# Patient Record
Sex: FEMALE | Race: WHITE | NOT HISPANIC OR LATINO | Employment: UNEMPLOYED | URBAN - METROPOLITAN AREA
[De-identification: names, ages, dates, MRNs, and addresses within clinical notes are randomized per-mention and may not be internally consistent; named-entity substitution may affect disease eponyms.]

---

## 2020-09-25 ENCOUNTER — APPOINTMENT (OUTPATIENT)
Dept: GENERAL RADIOLOGY | Facility: HOSPITAL | Age: 40
End: 2020-09-25

## 2020-09-25 ENCOUNTER — HOSPITAL ENCOUNTER (EMERGENCY)
Facility: HOSPITAL | Age: 40
Discharge: HOME | End: 2020-09-26
Attending: EMERGENCY MEDICINE | Admitting: EMERGENCY MEDICINE

## 2020-09-25 DIAGNOSIS — S20.212A CONTUSION OF LEFT CHEST WALL, INITIAL ENCOUNTER: ICD-10-CM

## 2020-09-25 DIAGNOSIS — S20.222A BACK CONTUSION, LEFT, INITIAL ENCOUNTER: ICD-10-CM
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
## 2020-09-25 LAB
AMPHET+METHAMPHET UR QL: NEGATIVE
BARBITURATES UR QL SCN: NEGATIVE
BENZODIAZ UR QL SCN: NEGATIVE
BILIRUB UR QL STRIP: NEGATIVE
CANNABINOIDS SERPL QL: NEGATIVE
CLARITY UR: CLEAR
COCAINE UR QL: NEGATIVE
COLOR UR: YELLOW
GLUCOSE UR STRIP-MCNC: NEGATIVE MG/DL
HGB UR QL STRIP.AUTO: NEGATIVE
KETONES UR QL STRIP: NEGATIVE
LEUKOCYTE ESTERASE UR QL STRIP.AUTO: NEGATIVE
METHADONE UR QL SCN: NEGATIVE
NITRITE UR QL STRIP: NEGATIVE
OPIATES UR QL: POSITIVE
OXYCODONE UR QL SCN: NEGATIVE
PH UR STRIP.AUTO: 6.5 [PH] (ref 5–8)
PROT UR QL STRIP: NEGATIVE
SP GR UR STRIP: 1.02 (ref 1–1.03)
UROBILINOGEN UR QL STRIP: NORMAL

## 2020-09-25 PROCEDURE — 71101 X-RAY EXAM UNILAT RIBS/CHEST: CPT

## 2020-09-25 PROCEDURE — 80307 DRUG TEST PRSMV CHEM ANLYZR: CPT | Performed by: EMERGENCY MEDICINE

## 2020-09-25 PROCEDURE — 25010000002 HYDROMORPHONE 1 MG/ML SOLUTION: Performed by: EMERGENCY MEDICINE

## 2020-09-25 PROCEDURE — 72110 X-RAY EXAM L-2 SPINE 4/>VWS: CPT

## 2020-09-25 PROCEDURE — 99284 EMERGENCY DEPT VISIT MOD MDM: CPT

## 2020-09-25 PROCEDURE — 81003 URINALYSIS AUTO W/O SCOPE: CPT | Performed by: EMERGENCY MEDICINE

## 2020-09-25 PROCEDURE — 96372 THER/PROPH/DIAG INJ SC/IM: CPT

## 2020-09-25 PROCEDURE — 73060 X-RAY EXAM OF HUMERUS: CPT

## 2020-09-25 RX ORDER — OXYCODONE HYDROCHLORIDE AND ACETAMINOPHEN 5; 325 MG/1; MG/1
1 TABLET ORAL ONCE
Status: COMPLETED | OUTPATIENT
Start: 2020-09-25 | End: 2020-09-25

## 2020-09-25 RX ORDER — ONDANSETRON 2 MG/ML
4 INJECTION INTRAMUSCULAR; INTRAVENOUS ONCE
Status: COMPLETED | OUTPATIENT
Start: 2020-09-25 | End: 2020-09-26

## 2020-09-25 RX ORDER — HYDROMORPHONE HYDROCHLORIDE 1 MG/ML
0.5 INJECTION, SOLUTION INTRAMUSCULAR; INTRAVENOUS; SUBCUTANEOUS ONCE
Status: COMPLETED | OUTPATIENT
Start: 2020-09-25 | End: 2020-09-26

## 2020-09-25 RX ADMIN — HYDROMORPHONE HYDROCHLORIDE 1 MG: 1 INJECTION, SOLUTION INTRAMUSCULAR; INTRAVENOUS; SUBCUTANEOUS at 21:52

## 2020-09-25 RX ADMIN — OXYCODONE HYDROCHLORIDE AND ACETAMINOPHEN 1 TABLET: 5; 325 TABLET ORAL at 23:32

## 2020-09-26 ENCOUNTER — APPOINTMENT (OUTPATIENT)
Dept: CT IMAGING | Facility: HOSPITAL | Age: 40
End: 2020-09-26

## 2020-09-26 VITALS
WEIGHT: 190 LBS | DIASTOLIC BLOOD PRESSURE: 107 MMHG | BODY MASS INDEX: 32.44 KG/M2 | HEIGHT: 64 IN | HEART RATE: 98 BPM | TEMPERATURE: 98.6 F | SYSTOLIC BLOOD PRESSURE: 169 MMHG | RESPIRATION RATE: 18 BRPM | OXYGEN SATURATION: 96 %

## 2020-09-26 LAB
ALBUMIN SERPL-MCNC: 3.8 G/DL (ref 3.5–5.2)
ALBUMIN/GLOB SERPL: 1.5 G/DL
ALP SERPL-CCNC: 120 U/L (ref 39–117)
ALT SERPL W P-5'-P-CCNC: 32 U/L (ref 1–33)
ANION GAP SERPL CALCULATED.3IONS-SCNC: 7.8 MMOL/L (ref 5–15)
AST SERPL-CCNC: 33 U/L (ref 1–32)
BASOPHILS # BLD AUTO: 0.04 10*3/MM3 (ref 0–0.2)
BASOPHILS NFR BLD AUTO: 0.4 % (ref 0–1.5)
BILIRUB SERPL-MCNC: 0.2 MG/DL (ref 0–1.2)
BUN SERPL-MCNC: 7 MG/DL (ref 6–20)
BUN/CREAT SERPL: 9.7 (ref 7–25)
CALCIUM SPEC-SCNC: 9.5 MG/DL (ref 8.6–10.5)
CHLORIDE SERPL-SCNC: 101 MMOL/L (ref 98–107)
CO2 SERPL-SCNC: 32.2 MMOL/L (ref 22–29)
CREAT SERPL-MCNC: 0.72 MG/DL (ref 0.57–1)
DEPRECATED RDW RBC AUTO: 48.1 FL (ref 37–54)
EOSINOPHIL # BLD AUTO: 0.35 10*3/MM3 (ref 0–0.4)
EOSINOPHIL NFR BLD AUTO: 3.6 % (ref 0.3–6.2)
ERYTHROCYTE [DISTWIDTH] IN BLOOD BY AUTOMATED COUNT: 13.6 % (ref 12.3–15.4)
GFR SERPL CREATININE-BSD FRML MDRD: 91 ML/MIN/1.73
GLOBULIN UR ELPH-MCNC: 2.5 GM/DL
GLUCOSE SERPL-MCNC: 91 MG/DL (ref 65–99)
HCG SERPL QL: NEGATIVE
HCT VFR BLD AUTO: 36.1 % (ref 34–46.6)
HGB BLD-MCNC: 12.3 G/DL (ref 12–15.9)
IMM GRANULOCYTES # BLD AUTO: 0.04 10*3/MM3 (ref 0–0.05)
IMM GRANULOCYTES NFR BLD AUTO: 0.4 % (ref 0–0.5)
LYMPHOCYTES # BLD AUTO: 3.19 10*3/MM3 (ref 0.7–3.1)
LYMPHOCYTES NFR BLD AUTO: 32.7 % (ref 19.6–45.3)
MCH RBC QN AUTO: 32.7 PG (ref 26.6–33)
MCHC RBC AUTO-ENTMCNC: 34.1 G/DL (ref 31.5–35.7)
MCV RBC AUTO: 96 FL (ref 79–97)
MONOCYTES # BLD AUTO: 0.55 10*3/MM3 (ref 0.1–0.9)
MONOCYTES NFR BLD AUTO: 5.6 % (ref 5–12)
NEUTROPHILS NFR BLD AUTO: 5.58 10*3/MM3 (ref 1.7–7)
NEUTROPHILS NFR BLD AUTO: 57.3 % (ref 42.7–76)
NRBC BLD AUTO-RTO: 0 /100 WBC (ref 0–0.2)
PLATELET # BLD AUTO: 294 10*3/MM3 (ref 140–450)
PMV BLD AUTO: 9.3 FL (ref 6–12)
POTASSIUM SERPL-SCNC: 3.9 MMOL/L (ref 3.5–5.2)
PROT SERPL-MCNC: 6.3 G/DL (ref 6–8.5)
RBC # BLD AUTO: 3.76 10*6/MM3 (ref 3.77–5.28)
SODIUM SERPL-SCNC: 141 MMOL/L (ref 136–145)
WBC # BLD AUTO: 9.75 10*3/MM3 (ref 3.4–10.8)

## 2020-09-26 PROCEDURE — 96375 TX/PRO/DX INJ NEW DRUG ADDON: CPT

## 2020-09-26 PROCEDURE — 74177 CT ABD & PELVIS W/CONTRAST: CPT

## 2020-09-26 PROCEDURE — 85025 COMPLETE CBC W/AUTO DIFF WBC: CPT | Performed by: EMERGENCY MEDICINE

## 2020-09-26 PROCEDURE — 25010000002 ONDANSETRON PER 1 MG: Performed by: EMERGENCY MEDICINE

## 2020-09-26 PROCEDURE — 84703 CHORIONIC GONADOTROPIN ASSAY: CPT | Performed by: EMERGENCY MEDICINE

## 2020-09-26 PROCEDURE — 36415 COLL VENOUS BLD VENIPUNCTURE: CPT

## 2020-09-26 PROCEDURE — 25010000002 IOPAMIDOL 61 % SOLUTION: Performed by: EMERGENCY MEDICINE

## 2020-09-26 PROCEDURE — 96374 THER/PROPH/DIAG INJ IV PUSH: CPT

## 2020-09-26 PROCEDURE — 80053 COMPREHEN METABOLIC PANEL: CPT | Performed by: EMERGENCY MEDICINE

## 2020-09-26 PROCEDURE — 25010000002 HYDROMORPHONE PER 4 MG: Performed by: EMERGENCY MEDICINE

## 2020-09-26 RX ORDER — HYDROCODONE BITARTRATE AND ACETAMINOPHEN 10; 325 MG/1; MG/1
1 TABLET ORAL EVERY 4 HOURS PRN
Qty: 10 TABLET | Refills: 0 | Status: ON HOLD | OUTPATIENT
Start: 2020-09-26 | End: 2020-11-09

## 2020-09-26 RX ORDER — HYDROCODONE BITARTRATE AND ACETAMINOPHEN 7.5; 325 MG/1; MG/1
1 TABLET ORAL ONCE
Status: COMPLETED | OUTPATIENT
Start: 2020-09-26 | End: 2020-09-26

## 2020-09-26 RX ADMIN — HYDROCODONE BITARTRATE AND ACETAMINOPHEN 1 TABLET: 7.5; 325 TABLET ORAL at 02:25

## 2020-09-26 RX ADMIN — HYDROMORPHONE HYDROCHLORIDE 0.5 MG: 1 INJECTION, SOLUTION INTRAMUSCULAR; INTRAVENOUS; SUBCUTANEOUS at 00:24

## 2020-09-26 RX ADMIN — ONDANSETRON HYDROCHLORIDE 4 MG: 2 INJECTION, SOLUTION INTRAMUSCULAR; INTRAVENOUS at 00:22

## 2020-09-26 RX ADMIN — IOPAMIDOL 85 ML: 612 INJECTION, SOLUTION INTRAVENOUS at 01:34

## 2020-09-26 RX ADMIN — SODIUM CHLORIDE 500 ML: 9 INJECTION, SOLUTION INTRAVENOUS at 00:24

## 2020-09-26 NOTE — ED NOTES
Pt c/o generalized back and left rib pain that started couple days ago. Pt states that she fell out of bed onto stool. Reports feeling week in the legs. Bruising to left upper arm that is yellow in color. No bruising to back, abd or rib area. Breath sounds present bilaterally, bowel sounds present    Pt had mask on when placed in room. RN wore goggles and surgical mask upon entering room.        Kathy Lees, RN  09/25/20 2662

## 2020-09-26 NOTE — ED PROVIDER NOTES
EMERGENCY DEPARTMENT ENCOUNTER    Room Number:  22/22  Date of encounter:  9/26/2020  PCP: Sahil Cornelius MD  Historian: Patient      HPI:  Chief Complaint: Fall      Context: Belen Allen is a 39 y.o. female who presents to the ED c/o rolling out of bed 2 days ago and landing on a stool on her left side.  Since that time she has had left rib, left arm and back pain.  The patient denies hitting her head, loss of consciousness or neck pain.  The patient denies abdominal pain, vomiting or diarrhea.  The patient has been ambulatory since that time with mild shortness of breath.  She denies fevers, chills, cough or known COVID exposure      PAST MEDICAL HISTORY  Active Ambulatory Problems     Diagnosis Date Noted   • No Active Ambulatory Problems     Resolved Ambulatory Problems     Diagnosis Date Noted   • No Resolved Ambulatory Problems     Past Medical History:   Diagnosis Date   • Injury of back          PAST SURGICAL HISTORY  Past Surgical History:   Procedure Laterality Date   • BACK SURGERY     • CHOLECYSTECTOMY     • SKIN SURGERY           FAMILY HISTORY  History reviewed. No pertinent family history.      SOCIAL HISTORY  Social History     Socioeconomic History   • Marital status: Single     Spouse name: Not on file   • Number of children: Not on file   • Years of education: Not on file   • Highest education level: Not on file   Tobacco Use   • Smoking status: Current Every Day Smoker     Packs/day: 0.50   • Smokeless tobacco: Never Used   Substance and Sexual Activity   • Alcohol use: Not Currently   • Drug use: Not Currently         ALLERGIES  Patient has no known allergies.        REVIEW OF SYSTEMS  Review of Systems         All systems reviewed and negative except for those discussed in HPI.     PHYSICAL EXAM    I have reviewed the triage vital signs and nursing notes.    ED Triage Vitals   Temp Heart Rate Resp BP SpO2   09/25/20 2041 09/25/20 2041 09/25/20 2041 09/25/20 2127 09/25/20 2041    98.6 °F (37 °C) (!) 136 20 (!) 167/116 97 %      Temp src Heart Rate Source Patient Position BP Location FiO2 (%)   09/25/20 2041 09/25/20 2041 -- -- --   Tympanic Monitor          GENERAL: 39-year-old well developed, well nourished in mild distress  HENT: NCAT, neck supple, trachea midline  EYES: no scleral icterus, PERRL, normal conjunctiva  CV: regular rhythm, regular rate, no murmur: Tenderness to left lower ribs but no bruising  RESPIRATORY: unlabored effort, CTAB  ABDOMEN: soft, non-tender, non-distended, bowel sounds present: No left upper quadrant tenderness and no bruising  MUSCULOSKELETAL: no gross deformity, no pedal edema, no calf tenderness: There is an old bruise to her left humerus with full range of motion: The patient complains of L-spine and left flank tenderness with no bruising  NEURO: alert,  sensory and motor function of extremities grossly intact, speech clear, mental status normal  SKIN: warm, dry, no rash  PSYCH:  Appropriate mood and affect      PPE  Pt does not present with symptoms for COVID19; however, I was wearing a mask throughout all patient interaction.        Vital signs and nursing notes reviewed.      LAB RESULTS  Recent Results (from the past 24 hour(s))   Urinalysis With Microscopic If Indicated (No Culture) - Urine, Clean Catch    Collection Time: 09/25/20 10:34 PM    Specimen: Urine, Clean Catch   Result Value Ref Range    Color, UA Yellow Yellow, Straw    Appearance, UA Clear Clear    pH, UA 6.5 5.0 - 8.0    Specific Gravity, UA 1.025 1.005 - 1.030    Glucose, UA Negative Negative    Ketones, UA Negative Negative    Bilirubin, UA Negative Negative    Blood, UA Negative Negative    Protein, UA Negative Negative    Leuk Esterase, UA Negative Negative    Nitrite, UA Negative Negative    Urobilinogen, UA 0.2 E.U./dL 0.2 - 1.0 E.U./dL   Urine Drug Screen - Urine, Clean Catch    Collection Time: 09/25/20 10:34 PM    Specimen: Urine, Clean Catch   Result Value Ref Range     Amphet/Methamphet, Screen Negative Negative    Barbiturates Screen, Urine Negative Negative    Benzodiazepine Screen, Urine Negative Negative    Cocaine Screen, Urine Negative Negative    Opiate Screen Positive (A) Negative    THC, Screen, Urine Negative Negative    Methadone Screen, Urine Negative Negative    Oxycodone Screen, Urine Negative Negative   Comprehensive Metabolic Panel    Collection Time: 09/26/20 12:22 AM    Specimen: Blood   Result Value Ref Range    Glucose 91 65 - 99 mg/dL    BUN 7 6 - 20 mg/dL    Creatinine 0.72 0.57 - 1.00 mg/dL    Sodium 141 136 - 145 mmol/L    Potassium 3.9 3.5 - 5.2 mmol/L    Chloride 101 98 - 107 mmol/L    CO2 32.2 (H) 22.0 - 29.0 mmol/L    Calcium 9.5 8.6 - 10.5 mg/dL    Total Protein 6.3 6.0 - 8.5 g/dL    Albumin 3.80 3.50 - 5.20 g/dL    ALT (SGPT) 32 1 - 33 U/L    AST (SGOT) 33 (H) 1 - 32 U/L    Alkaline Phosphatase 120 (H) 39 - 117 U/L    Total Bilirubin 0.2 0.0 - 1.2 mg/dL    eGFR Non African Amer 91 >60 mL/min/1.73    Globulin 2.5 gm/dL    A/G Ratio 1.5 g/dL    BUN/Creatinine Ratio 9.7 7.0 - 25.0    Anion Gap 7.8 5.0 - 15.0 mmol/L   hCG, Serum, Qualitative    Collection Time: 09/26/20 12:22 AM    Specimen: Blood   Result Value Ref Range    HCG Qualitative Negative Negative   CBC Auto Differential    Collection Time: 09/26/20 12:22 AM    Specimen: Blood   Result Value Ref Range    WBC 9.75 3.40 - 10.80 10*3/mm3    RBC 3.76 (L) 3.77 - 5.28 10*6/mm3    Hemoglobin 12.3 12.0 - 15.9 g/dL    Hematocrit 36.1 34.0 - 46.6 %    MCV 96.0 79.0 - 97.0 fL    MCH 32.7 26.6 - 33.0 pg    MCHC 34.1 31.5 - 35.7 g/dL    RDW 13.6 12.3 - 15.4 %    RDW-SD 48.1 37.0 - 54.0 fl    MPV 9.3 6.0 - 12.0 fL    Platelets 294 140 - 450 10*3/mm3    Neutrophil % 57.3 42.7 - 76.0 %    Lymphocyte % 32.7 19.6 - 45.3 %    Monocyte % 5.6 5.0 - 12.0 %    Eosinophil % 3.6 0.3 - 6.2 %    Basophil % 0.4 0.0 - 1.5 %    Immature Grans % 0.4 0.0 - 0.5 %    Neutrophils, Absolute 5.58 1.70 - 7.00 10*3/mm3     Lymphocytes, Absolute 3.19 (H) 0.70 - 3.10 10*3/mm3    Monocytes, Absolute 0.55 0.10 - 0.90 10*3/mm3    Eosinophils, Absolute 0.35 0.00 - 0.40 10*3/mm3    Basophils, Absolute 0.04 0.00 - 0.20 10*3/mm3    Immature Grans, Absolute 0.04 0.00 - 0.05 10*3/mm3    nRBC 0.0 0.0 - 0.2 /100 WBC       Ordered the above labs and independently reviewed the results.        RADIOLOGY  Xr Ribs Left With Pa Chest    Result Date: 9/25/2020  CHEST, LEFT RIB, LUMBAR SPINE, LEFT HUMERUS X-RAYS  HISTORY: Pain after falling off of bed.  TECHNIQUE: Six x-rays of the lumbar spine, two x-rays of the left humerus, five x-rays of the chest and left thoracic cage are provided. These are correlated with lumbar spine x-rays from 11/01/2009.  FINDINGS: The cardiomediastinal silhouette is normal. There is no pneumothorax or pleural effusion. No rib fracture or rib lesion is identified.  Two images of the left humerus show no osseous, articular, or soft tissue abnormality.  Lumbar spine x-rays show posterior and interbody fusion hardware at L5-S1. There is 4 mm retrolisthesis of L4 on L5. The hardware appears intact. The patient describes known loose hardware. There is some subtle lucency around the left L5 pedicle screw but this is not absolutely convincing for loose hardware. Vertebral height and alignment elsewhere throughout the lumbar spine is normal. There is degenerative disc change along the lower thoracic spine. No acute posttraumatic abnormalities identified anywhere in the lumbar spine or the visualized pelvis.      No acute abnormality identified.  This report was finalized on 9/25/2020 11:45 PM by Dr. Dennys Eckert M.D.      Xr Humerus Left    Result Date: 9/25/2020  CHEST, LEFT RIB, LUMBAR SPINE, LEFT HUMERUS X-RAYS  HISTORY: Pain after falling off of bed.  TECHNIQUE: Six x-rays of the lumbar spine, two x-rays of the left humerus, five x-rays of the chest and left thoracic cage are provided. These are correlated with lumbar spine  x-rays from 11/01/2009.  FINDINGS: The cardiomediastinal silhouette is normal. There is no pneumothorax or pleural effusion. No rib fracture or rib lesion is identified.  Two images of the left humerus show no osseous, articular, or soft tissue abnormality.  Lumbar spine x-rays show posterior and interbody fusion hardware at L5-S1. There is 4 mm retrolisthesis of L4 on L5. The hardware appears intact. The patient describes known loose hardware. There is some subtle lucency around the left L5 pedicle screw but this is not absolutely convincing for loose hardware. Vertebral height and alignment elsewhere throughout the lumbar spine is normal. There is degenerative disc change along the lower thoracic spine. No acute posttraumatic abnormalities identified anywhere in the lumbar spine or the visualized pelvis.      No acute abnormality identified.  This report was finalized on 9/25/2020 11:45 PM by Dr. Dennys Eckert M.D.      Xr Spine Lumbar Complete 4+vw    Result Date: 9/25/2020  CHEST, LEFT RIB, LUMBAR SPINE, LEFT HUMERUS X-RAYS  HISTORY: Pain after falling off of bed.  TECHNIQUE: Six x-rays of the lumbar spine, two x-rays of the left humerus, five x-rays of the chest and left thoracic cage are provided. These are correlated with lumbar spine x-rays from 11/01/2009.  FINDINGS: The cardiomediastinal silhouette is normal. There is no pneumothorax or pleural effusion. No rib fracture or rib lesion is identified.  Two images of the left humerus show no osseous, articular, or soft tissue abnormality.  Lumbar spine x-rays show posterior and interbody fusion hardware at L5-S1. There is 4 mm retrolisthesis of L4 on L5. The hardware appears intact. The patient describes known loose hardware. There is some subtle lucency around the left L5 pedicle screw but this is not absolutely convincing for loose hardware. Vertebral height and alignment elsewhere throughout the lumbar spine is normal. There is degenerative disc change  along the lower thoracic spine. No acute posttraumatic abnormalities identified anywhere in the lumbar spine or the visualized pelvis.      No acute abnormality identified.  This report was finalized on 9/25/2020 11:45 PM by Dr. Dennys Eckert M.D.        I ordered the above noted radiological studies. Independently reviewed by me and discussed with radiologist.  See dictation above for official radiology interpretation.      PROCEDURES    Procedures        MEDICATIONS GIVEN IN ER    Medications   HYDROmorphone (DILAUDID) injection 1 mg (1 mg Intramuscular Given 9/25/20 2152)   oxyCODONE-acetaminophen (PERCOCET) 5-325 MG per tablet 1 tablet (1 tablet Oral Given 9/25/20 2332)   sodium chloride 0.9 % bolus 500 mL (0 mL Intravenous Stopped 9/26/20 0226)   HYDROmorphone (DILAUDID) injection 0.5 mg (0.5 mg Intravenous Given 9/26/20 0024)   ondansetron (ZOFRAN) injection 4 mg (4 mg Intravenous Given 9/26/20 0022)   iopamidol (ISOVUE-300) 61 % injection 85 mL (85 mL Intravenous Given by Other 9/26/20 0134)   HYDROcodone-acetaminophen (NORCO) 7.5-325 MG per tablet 1 tablet (1 tablet Oral Given 9/26/20 0225)         PROGRESS, DATA ANALYSIS, CONSULTS, AND MEDICAL DECISION MAKING    All labs have been independently reviewed by me.  All radiology studies have been reviewed by me and discussed with radiologist dictating report.   EKG's independently reviewed by me.  Discussion below represents my analysis of pertinent findings related to patient's condition, differential diagnosis, treatment plan and final disposition.      ED Course as of Sep 26 0236   Fri Sep 25, 2020   2348 After telling patient that her x-rays are unremarkable.  She notes that she is having left upper quadrant pain so severe that it hurts to breathe.  Thus I feel I need to image her spleen to ensure there is been no trauma.    [GP]   Sat Sep 26, 2020   0206 I discussed the patient's CT scan with the radiologist.  He states the patient has no rib fractures,  no intra-abdominal trauma, and no back fractures.    [GP]   0209 I advised the patient that her labs and CAT scan and x-rays were all negative and that we will treat her supportively and she could rest through the weekend.    [GP]      ED Course User Index  [GP] Kristopher Sultana MD               AS OF 02:36 EDT VITALS:    BP - (!) 169/107  HR - 98  TEMP - 98.6 °F (37 °C) (Tympanic)  02 SATS - 96%        DIAGNOSIS  Final diagnoses:   Fall, initial encounter   Contusion of left chest wall, initial encounter   Back contusion, left, initial encounter         DISPOSITION  DISCHARGE    Patient discharged in stable condition.    Reviewed implications of results, diagnosis, meds, responsibility to follow up, warning signs and symptoms of possible worsening, potential complications and reasons to return to ER.  Patient/Family voiced understanding of above instructions.    Discussed plan for discharge, as there is no emergent indication for admission.  Pt/family is agreeable and understands need for follow up and repeat testing.  Pt is aware that discharge does not mean that nothing is wrong but it indicates no emergency is present and they must continue care with follow-up as given below or physician of their choice.     FOLLOW-UP  Sahil Cornelius MD  [address] [place], [place] [id number] [phone]    In 1 week  For recheck              EMR Dragon/Transcription disclaimer:   Much of this encounter note is an electronic transcription/translation of spoken language to printed text. The electronic translation of spoken language may permit erroneous, or at times, nonsensical words or phrases to be inadvertently transcribed; Although I have reviewed the note for such errors, some may still exist.       No scribe was used     Kristopher Sultana MD  09/26/20 [id number]

## 2020-11-08 ENCOUNTER — HOSPITAL ENCOUNTER (INPATIENT)
Facility: HOSPITAL | Age: 40
LOS: 4 days | Discharge: HOME | End: 2020-11-12
Attending: EMERGENCY MEDICINE | Admitting: INTERNAL MEDICINE

## 2020-11-08 ENCOUNTER — APPOINTMENT (OUTPATIENT)
Dept: CT IMAGING | Facility: HOSPITAL | Age: 40
End: 2020-11-08

## 2020-11-08 DIAGNOSIS — D73.5 SPLENIC INFARCT: Primary | ICD-10-CM

## 2020-11-08 DIAGNOSIS — Q89.01 FUNCTIONAL ASPLENIA: ICD-10-CM

## 2020-11-08 LAB
ALBUMIN SERPL-MCNC: 4.4 G/DL (ref 3.5–5.2)
ALBUMIN/GLOB SERPL: 1.5 G/DL
ALP SERPL-CCNC: 153 U/L (ref 39–117)
ALT SERPL W P-5'-P-CCNC: 39 U/L (ref 1–33)
AMPHET+METHAMPHET UR QL: NEGATIVE
ANION GAP SERPL CALCULATED.3IONS-SCNC: 8.9 MMOL/L (ref 5–15)
APTT PPP: 30.5 SECONDS (ref 22.7–35.4)
AST SERPL-CCNC: 30 U/L (ref 1–32)
BARBITURATES UR QL SCN: NEGATIVE
BASOPHILS # BLD AUTO: 0.06 10*3/MM3 (ref 0–0.2)
BASOPHILS NFR BLD AUTO: 0.4 % (ref 0–1.5)
BENZODIAZ UR QL SCN: NEGATIVE
BILIRUB SERPL-MCNC: 0.4 MG/DL (ref 0–1.2)
BILIRUB UR QL STRIP: NEGATIVE
BUN SERPL-MCNC: 7 MG/DL (ref 6–20)
BUN/CREAT SERPL: 7.6 (ref 7–25)
CALCIUM SPEC-SCNC: 9.4 MG/DL (ref 8.6–10.5)
CANNABINOIDS SERPL QL: NEGATIVE
CHLORIDE SERPL-SCNC: 102 MMOL/L (ref 98–107)
CLARITY UR: CLEAR
CO2 SERPL-SCNC: 28.1 MMOL/L (ref 22–29)
COCAINE UR QL: NEGATIVE
COLOR UR: YELLOW
CREAT SERPL-MCNC: 0.92 MG/DL (ref 0.57–1)
DEPRECATED RDW RBC AUTO: 43.7 FL (ref 37–54)
EOSINOPHIL # BLD AUTO: 0.15 10*3/MM3 (ref 0–0.4)
EOSINOPHIL NFR BLD AUTO: 1.1 % (ref 0.3–6.2)
ERYTHROCYTE [DISTWIDTH] IN BLOOD BY AUTOMATED COUNT: 12.6 % (ref 12.3–15.4)
GFR SERPL CREATININE-BSD FRML MDRD: 68 ML/MIN/1.73
GLOBULIN UR ELPH-MCNC: 2.9 GM/DL
GLUCOSE SERPL-MCNC: 101 MG/DL (ref 65–99)
GLUCOSE UR STRIP-MCNC: NEGATIVE MG/DL
HCG SERPL QL: NEGATIVE
HCT VFR BLD AUTO: 42.7 % (ref 34–46.6)
HGB BLD-MCNC: 14.6 G/DL (ref 12–15.9)
HGB UR QL STRIP.AUTO: NEGATIVE
HOLD SPECIMEN: NORMAL
IMM GRANULOCYTES # BLD AUTO: 0.06 10*3/MM3 (ref 0–0.05)
IMM GRANULOCYTES NFR BLD AUTO: 0.4 % (ref 0–0.5)
INR PPP: 0.94 (ref 0.9–1.1)
KETONES UR QL STRIP: NEGATIVE
LEUKOCYTE ESTERASE UR QL STRIP.AUTO: NEGATIVE
LIPASE SERPL-CCNC: 19 U/L (ref 13–60)
LYMPHOCYTES # BLD AUTO: 2.61 10*3/MM3 (ref 0.7–3.1)
LYMPHOCYTES NFR BLD AUTO: 19.5 % (ref 19.6–45.3)
MAGNESIUM SERPL-MCNC: 2.2 MG/DL (ref 1.6–2.6)
MCH RBC QN AUTO: 32.3 PG (ref 26.6–33)
MCHC RBC AUTO-ENTMCNC: 34.2 G/DL (ref 31.5–35.7)
MCV RBC AUTO: 94.5 FL (ref 79–97)
METHADONE UR QL SCN: NEGATIVE
MONOCYTES # BLD AUTO: 1.05 10*3/MM3 (ref 0.1–0.9)
MONOCYTES NFR BLD AUTO: 7.8 % (ref 5–12)
NEUTROPHILS NFR BLD AUTO: 70.8 % (ref 42.7–76)
NEUTROPHILS NFR BLD AUTO: 9.46 10*3/MM3 (ref 1.7–7)
NITRITE UR QL STRIP: NEGATIVE
NRBC BLD AUTO-RTO: 0 /100 WBC (ref 0–0.2)
OPIATES UR QL: NEGATIVE
OXYCODONE UR QL SCN: NEGATIVE
PH UR STRIP.AUTO: 6 [PH] (ref 5–8)
PLATELET # BLD AUTO: 388 10*3/MM3 (ref 140–450)
PMV BLD AUTO: 9.8 FL (ref 6–12)
POTASSIUM SERPL-SCNC: 4.4 MMOL/L (ref 3.5–5.2)
PROT SERPL-MCNC: 7.3 G/DL (ref 6–8.5)
PROT UR QL STRIP: NEGATIVE
PROTHROMBIN TIME: 12.5 SECONDS (ref 11.7–14.2)
RBC # BLD AUTO: 4.52 10*6/MM3 (ref 3.77–5.28)
SODIUM SERPL-SCNC: 139 MMOL/L (ref 136–145)
SP GR UR STRIP: 1.02 (ref 1–1.03)
TSH SERPL DL<=0.05 MIU/L-ACNC: 1.11 UIU/ML (ref 0.27–4.2)
UROBILINOGEN UR QL STRIP: NORMAL
WBC # BLD AUTO: 13.39 10*3/MM3 (ref 3.4–10.8)
WHOLE BLOOD HOLD SPECIMEN: NORMAL

## 2020-11-08 PROCEDURE — 80053 COMPREHEN METABOLIC PANEL: CPT | Performed by: EMERGENCY MEDICINE

## 2020-11-08 PROCEDURE — 0202U NFCT DS 22 TRGT SARS-COV-2: CPT | Performed by: EMERGENCY MEDICINE

## 2020-11-08 PROCEDURE — 85025 COMPLETE CBC W/AUTO DIFF WBC: CPT | Performed by: EMERGENCY MEDICINE

## 2020-11-08 PROCEDURE — 84443 ASSAY THYROID STIM HORMONE: CPT | Performed by: EMERGENCY MEDICINE

## 2020-11-08 PROCEDURE — 80307 DRUG TEST PRSMV CHEM ANLYZR: CPT | Performed by: EMERGENCY MEDICINE

## 2020-11-08 PROCEDURE — 85730 THROMBOPLASTIN TIME PARTIAL: CPT | Performed by: EMERGENCY MEDICINE

## 2020-11-08 PROCEDURE — 25010000002 ONDANSETRON PER 1 MG: Performed by: EMERGENCY MEDICINE

## 2020-11-08 PROCEDURE — 93010 ELECTROCARDIOGRAM REPORT: CPT | Performed by: INTERNAL MEDICINE

## 2020-11-08 PROCEDURE — 84703 CHORIONIC GONADOTROPIN ASSAY: CPT | Performed by: EMERGENCY MEDICINE

## 2020-11-08 PROCEDURE — 74177 CT ABD & PELVIS W/CONTRAST: CPT

## 2020-11-08 PROCEDURE — 71275 CT ANGIOGRAPHY CHEST: CPT

## 2020-11-08 PROCEDURE — 99284 EMERGENCY DEPT VISIT MOD MDM: CPT

## 2020-11-08 PROCEDURE — 85610 PROTHROMBIN TIME: CPT | Performed by: EMERGENCY MEDICINE

## 2020-11-08 PROCEDURE — 83690 ASSAY OF LIPASE: CPT | Performed by: EMERGENCY MEDICINE

## 2020-11-08 PROCEDURE — 93005 ELECTROCARDIOGRAM TRACING: CPT | Performed by: EMERGENCY MEDICINE

## 2020-11-08 PROCEDURE — 83735 ASSAY OF MAGNESIUM: CPT | Performed by: EMERGENCY MEDICINE

## 2020-11-08 PROCEDURE — 0 IOPAMIDOL PER 1 ML: Performed by: EMERGENCY MEDICINE

## 2020-11-08 PROCEDURE — 81003 URINALYSIS AUTO W/O SCOPE: CPT | Performed by: EMERGENCY MEDICINE

## 2020-11-08 PROCEDURE — 25010000002 HYDROMORPHONE 1 MG/ML SOLUTION: Performed by: EMERGENCY MEDICINE

## 2020-11-08 RX ORDER — SODIUM CHLORIDE 9 MG/ML
125 INJECTION, SOLUTION INTRAVENOUS CONTINUOUS
Status: DISCONTINUED | OUTPATIENT
Start: 2020-11-08 | End: 2020-11-09

## 2020-11-08 RX ORDER — ONDANSETRON 2 MG/ML
4 INJECTION INTRAMUSCULAR; INTRAVENOUS ONCE
Status: COMPLETED | OUTPATIENT
Start: 2020-11-08 | End: 2020-11-08

## 2020-11-08 RX ORDER — SODIUM CHLORIDE 0.9 % (FLUSH) 0.9 %
10 SYRINGE (ML) INJECTION AS NEEDED
Status: DISCONTINUED | OUTPATIENT
Start: 2020-11-08 | End: 2020-11-12 | Stop reason: HOSPADM

## 2020-11-08 RX ADMIN — ONDANSETRON 4 MG: 2 INJECTION INTRAMUSCULAR; INTRAVENOUS at 20:48

## 2020-11-08 RX ADMIN — SODIUM CHLORIDE 500 ML: 9 INJECTION, SOLUTION INTRAVENOUS at 23:12

## 2020-11-08 RX ADMIN — HYDROMORPHONE HYDROCHLORIDE 1 MG: 1 INJECTION, SOLUTION INTRAMUSCULAR; INTRAVENOUS; SUBCUTANEOUS at 23:04

## 2020-11-08 RX ADMIN — IOPAMIDOL 95 ML: 755 INJECTION, SOLUTION INTRAVENOUS at 22:04

## 2020-11-08 RX ADMIN — SODIUM CHLORIDE 125 ML/HR: 9 INJECTION, SOLUTION INTRAVENOUS at 23:12

## 2020-11-08 RX ADMIN — HYDROMORPHONE HYDROCHLORIDE 1 MG: 1 INJECTION, SOLUTION INTRAMUSCULAR; INTRAVENOUS; SUBCUTANEOUS at 20:37

## 2020-11-08 NOTE — ED NOTES
Pt reports abd pain radiating to back.     Patient was placed in face mask during first look triage.  Patient was wearing a face mask throughout encounter.  I wore personal protective equipment throughout the encounter.  Hand hygiene was performed before and after patient encounter.        Smitha Hernandez RN  11/08/20 1227

## 2020-11-09 PROBLEM — F41.9 ANXIETY DISORDER: Status: ACTIVE | Noted: 2020-11-09

## 2020-11-09 LAB
ANION GAP SERPL CALCULATED.3IONS-SCNC: 10.6 MMOL/L (ref 5–15)
APTT PPP: 53.3 SECONDS (ref 22.7–35.4)
APTT PPP: 84.3 SECONDS (ref 22.7–35.4)
B PARAPERT DNA SPEC QL NAA+PROBE: NOT DETECTED
B PERT DNA SPEC QL NAA+PROBE: NOT DETECTED
BUN SERPL-MCNC: 8 MG/DL (ref 6–20)
BUN/CREAT SERPL: 10.4 (ref 7–25)
C PNEUM DNA NPH QL NAA+NON-PROBE: NOT DETECTED
CALCIUM SPEC-SCNC: 8.6 MG/DL (ref 8.6–10.5)
CHLORIDE SERPL-SCNC: 102 MMOL/L (ref 98–107)
CO2 SERPL-SCNC: 24.4 MMOL/L (ref 22–29)
CREAT SERPL-MCNC: 0.77 MG/DL (ref 0.57–1)
FLUAV SUBTYP SPEC NAA+PROBE: NOT DETECTED
FLUBV RNA ISLT QL NAA+PROBE: NOT DETECTED
GFR SERPL CREATININE-BSD FRML MDRD: 84 ML/MIN/1.73
GLUCOSE SERPL-MCNC: 126 MG/DL (ref 65–99)
HADV DNA SPEC NAA+PROBE: NOT DETECTED
HCOV 229E RNA SPEC QL NAA+PROBE: NOT DETECTED
HCOV HKU1 RNA SPEC QL NAA+PROBE: NOT DETECTED
HCOV NL63 RNA SPEC QL NAA+PROBE: NOT DETECTED
HCOV OC43 RNA SPEC QL NAA+PROBE: NOT DETECTED
HMPV RNA NPH QL NAA+NON-PROBE: NOT DETECTED
HPIV1 RNA SPEC QL NAA+PROBE: NOT DETECTED
HPIV2 RNA SPEC QL NAA+PROBE: NOT DETECTED
HPIV3 RNA NPH QL NAA+PROBE: NOT DETECTED
HPIV4 P GENE NPH QL NAA+PROBE: NOT DETECTED
M PNEUMO IGG SER IA-ACNC: NOT DETECTED
POTASSIUM SERPL-SCNC: 3.6 MMOL/L (ref 3.5–5.2)
QT INTERVAL: 299 MS
RHINOVIRUS RNA SPEC NAA+PROBE: NOT DETECTED
RSV RNA NPH QL NAA+NON-PROBE: NOT DETECTED
SARS-COV-2 RNA NPH QL NAA+NON-PROBE: NOT DETECTED
SODIUM SERPL-SCNC: 137 MMOL/L (ref 136–145)

## 2020-11-09 PROCEDURE — 85303 CLOT INHIBIT PROT C ACTIVITY: CPT | Performed by: INTERNAL MEDICINE

## 2020-11-09 PROCEDURE — 80048 BASIC METABOLIC PNL TOTAL CA: CPT | Performed by: NURSE PRACTITIONER

## 2020-11-09 PROCEDURE — 85306 CLOT INHIBIT PROT S FREE: CPT | Performed by: INTERNAL MEDICINE

## 2020-11-09 PROCEDURE — 85705 THROMBOPLASTIN INHIBITION: CPT | Performed by: INTERNAL MEDICINE

## 2020-11-09 PROCEDURE — 99223 1ST HOSP IP/OBS HIGH 75: CPT | Performed by: INTERNAL MEDICINE

## 2020-11-09 PROCEDURE — 85670 THROMBIN TIME PLASMA: CPT | Performed by: INTERNAL MEDICINE

## 2020-11-09 PROCEDURE — 85613 RUSSELL VIPER VENOM DILUTED: CPT | Performed by: INTERNAL MEDICINE

## 2020-11-09 PROCEDURE — 85300 ANTITHROMBIN III ACTIVITY: CPT | Performed by: INTERNAL MEDICINE

## 2020-11-09 PROCEDURE — 81240 F2 GENE: CPT | Performed by: INTERNAL MEDICINE

## 2020-11-09 PROCEDURE — 85730 THROMBOPLASTIN TIME PARTIAL: CPT | Performed by: HOSPITALIST

## 2020-11-09 PROCEDURE — 86146 BETA-2 GLYCOPROTEIN ANTIBODY: CPT | Performed by: INTERNAL MEDICINE

## 2020-11-09 PROCEDURE — 25010000002 HEPARIN (PORCINE) PER 1000 UNITS: Performed by: HOSPITALIST

## 2020-11-09 PROCEDURE — 86147 CARDIOLIPIN ANTIBODY EA IG: CPT | Performed by: INTERNAL MEDICINE

## 2020-11-09 PROCEDURE — 85732 THROMBOPLASTIN TIME PARTIAL: CPT | Performed by: INTERNAL MEDICINE

## 2020-11-09 PROCEDURE — 99254 IP/OBS CNSLTJ NEW/EST MOD 60: CPT | Performed by: INTERNAL MEDICINE

## 2020-11-09 PROCEDURE — 85730 THROMBOPLASTIN TIME PARTIAL: CPT | Performed by: INTERNAL MEDICINE

## 2020-11-09 PROCEDURE — 25010000002 ONDANSETRON PER 1 MG: Performed by: NURSE PRACTITIONER

## 2020-11-09 PROCEDURE — 25010000002 HYDROMORPHONE PER 4 MG: Performed by: NURSE PRACTITIONER

## 2020-11-09 PROCEDURE — 81241 F5 GENE: CPT | Performed by: INTERNAL MEDICINE

## 2020-11-09 RX ORDER — SUMATRIPTAN 100 MG/1
100 TABLET, FILM COATED ORAL
COMMUNITY
End: 2021-06-02 | Stop reason: HOSPADM

## 2020-11-09 RX ORDER — HEPARIN SODIUM 10000 [USP'U]/100ML
17.4 INJECTION, SOLUTION INTRAVENOUS
Status: DISCONTINUED | OUTPATIENT
Start: 2020-11-09 | End: 2020-11-09

## 2020-11-09 RX ORDER — HYDROXYZINE HYDROCHLORIDE 25 MG/1
1-2 TABLET, FILM COATED ORAL 3 TIMES DAILY PRN
COMMUNITY
Start: 2020-08-06

## 2020-11-09 RX ORDER — SODIUM CHLORIDE 0.9 % (FLUSH) 0.9 %
10 SYRINGE (ML) INJECTION EVERY 12 HOURS SCHEDULED
Status: DISCONTINUED | OUTPATIENT
Start: 2020-11-09 | End: 2020-11-12 | Stop reason: HOSPADM

## 2020-11-09 RX ORDER — HEPARIN SODIUM 10000 [USP'U]/100ML
11.6 INJECTION, SOLUTION INTRAVENOUS
Status: DISCONTINUED | OUTPATIENT
Start: 2020-11-09 | End: 2020-11-12 | Stop reason: HOSPADM

## 2020-11-09 RX ORDER — HYDROXYZINE HYDROCHLORIDE 25 MG/1
25 TABLET, FILM COATED ORAL 3 TIMES DAILY PRN
Status: DISCONTINUED | OUTPATIENT
Start: 2020-11-09 | End: 2020-11-12 | Stop reason: HOSPADM

## 2020-11-09 RX ORDER — AMITRIPTYLINE HYDROCHLORIDE 25 MG/1
25 TABLET, FILM COATED ORAL NIGHTLY
COMMUNITY
End: 2022-12-20 | Stop reason: HOSPADM

## 2020-11-09 RX ORDER — SODIUM CHLORIDE 0.9 % (FLUSH) 0.9 %
10 SYRINGE (ML) INJECTION AS NEEDED
Status: DISCONTINUED | OUTPATIENT
Start: 2020-11-09 | End: 2020-11-12 | Stop reason: HOSPADM

## 2020-11-09 RX ORDER — ACETAMINOPHEN 650 MG/1
650 SUPPOSITORY RECTAL EVERY 4 HOURS PRN
Status: DISCONTINUED | OUTPATIENT
Start: 2020-11-09 | End: 2020-11-12 | Stop reason: HOSPADM

## 2020-11-09 RX ORDER — OXYCODONE HYDROCHLORIDE AND ACETAMINOPHEN 5; 325 MG/1; MG/1
1 TABLET ORAL EVERY 4 HOURS PRN
Status: DISCONTINUED | OUTPATIENT
Start: 2020-11-09 | End: 2020-11-11

## 2020-11-09 RX ORDER — NITROGLYCERIN 0.4 MG/1
0.4 TABLET SUBLINGUAL
Status: DISCONTINUED | OUTPATIENT
Start: 2020-11-09 | End: 2020-11-12 | Stop reason: HOSPADM

## 2020-11-09 RX ORDER — HYDROMORPHONE HYDROCHLORIDE 1 MG/ML
0.5 INJECTION, SOLUTION INTRAMUSCULAR; INTRAVENOUS; SUBCUTANEOUS
Status: DISCONTINUED | OUTPATIENT
Start: 2020-11-09 | End: 2020-11-11

## 2020-11-09 RX ORDER — GABAPENTIN 800 MG/1
800 TABLET ORAL 4 TIMES DAILY
COMMUNITY
Start: 2020-09-22 | End: 2022-11-30 | Stop reason: HOSPADM

## 2020-11-09 RX ORDER — ACETAMINOPHEN 325 MG/1
650 TABLET ORAL EVERY 4 HOURS PRN
Status: DISCONTINUED | OUTPATIENT
Start: 2020-11-09 | End: 2020-11-12 | Stop reason: HOSPADM

## 2020-11-09 RX ORDER — AMITRIPTYLINE HYDROCHLORIDE 25 MG/1
25 TABLET, FILM COATED ORAL NIGHTLY
Status: DISCONTINUED | OUTPATIENT
Start: 2020-11-09 | End: 2020-11-12 | Stop reason: HOSPADM

## 2020-11-09 RX ORDER — HEPARIN SODIUM 5000 [USP'U]/ML
40-80 INJECTION, SOLUTION INTRAVENOUS; SUBCUTANEOUS EVERY 6 HOURS PRN
Status: DISCONTINUED | OUTPATIENT
Start: 2020-11-09 | End: 2020-11-09

## 2020-11-09 RX ORDER — ACETAMINOPHEN 160 MG/5ML
650 SOLUTION ORAL EVERY 4 HOURS PRN
Status: DISCONTINUED | OUTPATIENT
Start: 2020-11-09 | End: 2020-11-12 | Stop reason: HOSPADM

## 2020-11-09 RX ORDER — SODIUM CHLORIDE 9 MG/ML
100 INJECTION, SOLUTION INTRAVENOUS CONTINUOUS
Status: DISCONTINUED | OUTPATIENT
Start: 2020-11-09 | End: 2020-11-12 | Stop reason: HOSPADM

## 2020-11-09 RX ORDER — LORATADINE 10 MG/1
10 TABLET ORAL DAILY
COMMUNITY
End: 2021-06-02 | Stop reason: HOSPADM

## 2020-11-09 RX ORDER — GABAPENTIN 400 MG/1
400 CAPSULE ORAL EVERY 12 HOURS SCHEDULED
Status: DISCONTINUED | OUTPATIENT
Start: 2020-11-09 | End: 2020-11-09

## 2020-11-09 RX ORDER — HEPARIN SODIUM 5000 [USP'U]/ML
80 INJECTION, SOLUTION INTRAVENOUS; SUBCUTANEOUS ONCE
Status: DISCONTINUED | OUTPATIENT
Start: 2020-11-09 | End: 2020-11-09

## 2020-11-09 RX ORDER — CYCLOBENZAPRINE HCL 10 MG
1 TABLET ORAL 3 TIMES DAILY
Status: ON HOLD | COMMUNITY
Start: 2020-08-17 | End: 2021-07-05

## 2020-11-09 RX ORDER — CYCLOBENZAPRINE HCL 10 MG
10 TABLET ORAL 2 TIMES DAILY
Status: DISCONTINUED | OUTPATIENT
Start: 2020-11-09 | End: 2020-11-12 | Stop reason: HOSPADM

## 2020-11-09 RX ORDER — HEPARIN SODIUM 10000 [USP'U]/100ML
11.6 INJECTION, SOLUTION INTRAVENOUS
Status: DISCONTINUED | OUTPATIENT
Start: 2020-11-09 | End: 2020-11-09

## 2020-11-09 RX ORDER — GABAPENTIN 400 MG/1
800 CAPSULE ORAL EVERY 8 HOURS SCHEDULED
Status: DISCONTINUED | OUTPATIENT
Start: 2020-11-09 | End: 2020-11-12 | Stop reason: HOSPADM

## 2020-11-09 RX ORDER — NICOTINE 21 MG/24HR
1 PATCH, TRANSDERMAL 24 HOURS TRANSDERMAL
Status: DISCONTINUED | OUTPATIENT
Start: 2020-11-09 | End: 2020-11-12 | Stop reason: HOSPADM

## 2020-11-09 RX ORDER — MELATONIN 10 MG
30 CAPSULE ORAL NIGHTLY
COMMUNITY
End: 2021-06-02 | Stop reason: HOSPADM

## 2020-11-09 RX ORDER — ONDANSETRON 2 MG/ML
4 INJECTION INTRAMUSCULAR; INTRAVENOUS EVERY 6 HOURS PRN
Status: DISCONTINUED | OUTPATIENT
Start: 2020-11-09 | End: 2020-11-12 | Stop reason: HOSPADM

## 2020-11-09 RX ORDER — ACETAMINOPHEN,DIPHENHYDRAMINE HCL 500; 25 MG/1; MG/1
2 TABLET, FILM COATED ORAL NIGHTLY
COMMUNITY
End: 2021-06-02 | Stop reason: HOSPADM

## 2020-11-09 RX ADMIN — CYCLOBENZAPRINE 10 MG: 10 TABLET, FILM COATED ORAL at 20:26

## 2020-11-09 RX ADMIN — HYDROMORPHONE HYDROCHLORIDE 0.5 MG: 10 INJECTION INTRAMUSCULAR; INTRAVENOUS; SUBCUTANEOUS at 08:23

## 2020-11-09 RX ADMIN — HYDROMORPHONE HYDROCHLORIDE 0.5 MG: 10 INJECTION INTRAMUSCULAR; INTRAVENOUS; SUBCUTANEOUS at 06:08

## 2020-11-09 RX ADMIN — AMITRIPTYLINE HYDROCHLORIDE 25 MG: 25 TABLET, FILM COATED ORAL at 03:22

## 2020-11-09 RX ADMIN — HYDROMORPHONE HYDROCHLORIDE 0.5 MG: 10 INJECTION INTRAMUSCULAR; INTRAVENOUS; SUBCUTANEOUS at 10:24

## 2020-11-09 RX ADMIN — OXYCODONE HYDROCHLORIDE AND ACETAMINOPHEN 1 TABLET: 5; 325 TABLET ORAL at 15:28

## 2020-11-09 RX ADMIN — SODIUM CHLORIDE, PRESERVATIVE FREE 10 ML: 5 INJECTION INTRAVENOUS at 02:16

## 2020-11-09 RX ADMIN — SODIUM CHLORIDE, PRESERVATIVE FREE 10 ML: 5 INJECTION INTRAVENOUS at 20:30

## 2020-11-09 RX ADMIN — HYDROMORPHONE HYDROCHLORIDE 0.5 MG: 10 INJECTION INTRAMUSCULAR; INTRAVENOUS; SUBCUTANEOUS at 03:21

## 2020-11-09 RX ADMIN — GABAPENTIN 800 MG: 400 CAPSULE ORAL at 14:29

## 2020-11-09 RX ADMIN — HYDROMORPHONE HYDROCHLORIDE 0.5 MG: 10 INJECTION INTRAMUSCULAR; INTRAVENOUS; SUBCUTANEOUS at 12:26

## 2020-11-09 RX ADMIN — GABAPENTIN 800 MG: 400 CAPSULE ORAL at 20:27

## 2020-11-09 RX ADMIN — CYCLOBENZAPRINE 10 MG: 10 TABLET, FILM COATED ORAL at 08:22

## 2020-11-09 RX ADMIN — AMITRIPTYLINE HYDROCHLORIDE 25 MG: 25 TABLET, FILM COATED ORAL at 21:48

## 2020-11-09 RX ADMIN — OXYCODONE HYDROCHLORIDE AND ACETAMINOPHEN 1 TABLET: 5; 325 TABLET ORAL at 20:30

## 2020-11-09 RX ADMIN — SODIUM CHLORIDE 100 ML/HR: 9 INJECTION, SOLUTION INTRAVENOUS at 20:30

## 2020-11-09 RX ADMIN — HYDROXYZINE HYDROCHLORIDE 25 MG: 25 TABLET ORAL at 03:21

## 2020-11-09 RX ADMIN — ONDANSETRON 4 MG: 2 INJECTION INTRAMUSCULAR; INTRAVENOUS at 16:49

## 2020-11-09 RX ADMIN — HYDROMORPHONE HYDROCHLORIDE 0.5 MG: 10 INJECTION INTRAMUSCULAR; INTRAVENOUS; SUBCUTANEOUS at 16:49

## 2020-11-09 RX ADMIN — Medication 1 PATCH: at 08:23

## 2020-11-09 RX ADMIN — SODIUM CHLORIDE 100 ML/HR: 9 INJECTION, SOLUTION INTRAVENOUS at 10:24

## 2020-11-09 RX ADMIN — HYDROMORPHONE HYDROCHLORIDE 0.5 MG: 10 INJECTION INTRAMUSCULAR; INTRAVENOUS; SUBCUTANEOUS at 14:29

## 2020-11-09 RX ADMIN — HYDROMORPHONE HYDROCHLORIDE 0.5 MG: 10 INJECTION INTRAMUSCULAR; INTRAVENOUS; SUBCUTANEOUS at 21:48

## 2020-11-09 RX ADMIN — HEPARIN SODIUM 14.6 UNITS/KG/HR: 10000 INJECTION, SOLUTION INTRAVENOUS at 18:20

## 2020-11-09 RX ADMIN — HYDROMORPHONE HYDROCHLORIDE 0.5 MG: 10 INJECTION INTRAMUSCULAR; INTRAVENOUS; SUBCUTANEOUS at 01:24

## 2020-11-09 RX ADMIN — HYDROXYZINE HYDROCHLORIDE 25 MG: 25 TABLET ORAL at 21:48

## 2020-11-09 RX ADMIN — HYDROMORPHONE HYDROCHLORIDE 0.5 MG: 10 INJECTION INTRAMUSCULAR; INTRAVENOUS; SUBCUTANEOUS at 19:31

## 2020-11-09 RX ADMIN — GABAPENTIN 400 MG: 400 CAPSULE ORAL at 08:22

## 2020-11-09 RX ADMIN — ACETAMINOPHEN 650 MG: 325 TABLET, FILM COATED ORAL at 14:29

## 2020-11-09 RX ADMIN — ONDANSETRON 4 MG: 2 INJECTION INTRAMUSCULAR; INTRAVENOUS at 10:30

## 2020-11-09 RX ADMIN — HEPARIN SODIUM 11.6 UNITS/KG/HR: 10000 INJECTION, SOLUTION INTRAVENOUS at 02:15

## 2020-11-09 NOTE — CONSULTS
Name: Belen Allen ADMIT: 2020   : 1980  PCP: Sahil Cornelius MD    MRN: [id number] LOS: 1 days   AGE/SEX: 39 y.o. female  ROOM: [address]      Patient Care Team:  Sahil Cornelius MD as PCP - General (Internal Medicine)  Chief Complaint   Patient presents with   • Flank Pain     CC: Splenic infarct/splenic aneurysm evaluation.    Subjective     Inpatient Vascular Surgery Consult  Consult performed by: Braulio Jane MD  Consult ordered by: Beena Fregoso APRN  Reason for consult: Splenic infarct and aneurysm evaluation.        History generated from review of chart and verification of findings at bedside with patient.    Ms. Allen is a 39 y.o. female smoker with a history of anxiety, chronic pain that presents to  complaining of left sided abdominal pain and vomiting. Patient reports constant, sharp pain to left upper quadrant, radiating to her back, worse with inspiration. She also reports nausea and vomiting and states these symptoms have been ongoing for past 3 days. She denies fever, chest pain, changes in bowel or bladder habits but does report feeling of shortness of breath due to the pain. 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 She denies history of clots in the past, though does confirm that she is a current smoker. Labs done tonight show leukocytosis and elevated alk phos. CT shows left splenic artery aneurysm with new wedge-shaped hypodensity concerning for splenic infarction.     Review of Systems   Musculoskeletal: Positive for back pain.   All other systems reviewed and are negative.      Past Medical History:   Diagnosis Date   • Injury of back      Past Surgical History:   Procedure Laterality Date   • BACK SURGERY     • CHOLECYSTECTOMY     • SKIN SURGERY       History reviewed. No pertinent family history.  Social History     Tobacco Use   • Smoking status: Current  Every Day Smoker     Packs/day: 0.50   • Smokeless tobacco: Never Used   Substance Use Topics   • Alcohol use: Not Currently   • Drug use: Not Currently     Medications Prior to Admission   Medication Sig Dispense Refill Last Dose   • amitriptyline (ELAVIL) 25 MG tablet Take 25 mg by mouth Every Night.   11/8/2020 at Unknown time   • cyclobenzaprine (FLEXERIL) 10 MG tablet Take 1 tablet by mouth 2 (two) times a day.   11/8/2020 at Unknown time   • gabapentin (NEURONTIN) 800 MG tablet Take 800 mg by mouth 3 (Three) Times a Day.   11/8/2020 at Unknown time   • HYDROcodone-acetaminophen (NORCO)  MG per tablet Take 1 tablet by mouth Every 4 (Four) Hours As Needed for Moderate Pain . 10 tablet 0 11/8/2020 at Unknown time   • hydrOXYzine (ATARAX) 25 MG tablet Take 4 tablets by mouth every night at bedtime.   11/8/2020 at Unknown time   • loratadine (Claritin) 10 MG tablet Take 10 mg by mouth Daily.   11/8/2020 at Unknown time   • SUMAtriptan (IMITREX) 100 MG tablet Take 100 mg by mouth Every 2 (Two) Hours As Needed for Migraine. Take one tablet at onset of headache. May repeat dose one time in 2 hours if headache not relieved.   11/8/2020 at Unknown time     amitriptyline, 25 mg, Oral, Nightly  cyclobenzaprine, 10 mg, Oral, BID  gabapentin, 400 mg, Oral, Q12H  nicotine, 1 patch, Transdermal, Q24H  sodium chloride, 10 mL, Intravenous, Q12H      heparin (porcine), 11.6 Units/kg/hr, Last Rate: 14.6 Units/kg/hr (11/09/20 0941)  sodium chloride, 100 mL/hr, Last Rate: 100 mL/hr (11/09/20 0121)      •  acetaminophen **OR** acetaminophen **OR** acetaminophen  •  HYDROmorphone  •  hydrOXYzine  •  nitroglycerin  •  ondansetron  •  sodium chloride  •  sodium chloride  Patient has no known allergies.    Objective     Physical Exam:  Physical Exam  Vitals signs and nursing note reviewed.   Constitutional:       Appearance: Normal appearance.   HENT:      Head: Normocephalic and atraumatic.      Right Ear: External ear normal.     "  Left Ear: External ear normal.      Nose: Nose normal.      Mouth/Throat:      Mouth: Mucous membranes are moist.   Eyes:      Extraocular Movements: Extraocular movements intact.      Pupils: Pupils are equal, round, and reactive to light.   Neck:      Musculoskeletal: Normal range of motion and neck supple.   Cardiovascular:      Rate and Rhythm: Regular rhythm.      Comments: Tachycardic.  Pulmonary:      Effort: Pulmonary effort is normal.      Breath sounds: Normal breath sounds.   Abdominal:      General: Abdomen is flat.   Musculoskeletal: Normal range of motion.         General: No swelling.   Skin:     General: Skin is warm and dry.      Capillary Refill: Capillary refill takes less than 2 seconds.   Neurological:      General: No focal deficit present.      Mental Status: She is alert and oriented to person, place, and time.   Psychiatric:         Mood and Affect: Mood normal.         Behavior: Behavior normal.          Vital Signs and Labs:  Vital Signs Patient Vitals for the past 24 hrs:   BP Temp Temp src Pulse Resp SpO2 Height Weight   11/09/20 0820 133/87 98.6 °F (37 °C) Oral -- 18 -- -- --   11/09/20 0115 139/95 98.4 °F (36.9 °C) Oral 114 18 96 % 162.6 cm (64\") 104 kg (228 lb 11.2 oz)   11/09/20 0000 145/89 -- -- 115 18 99 % -- --   11/08/20 2330 107/88 -- -- 115 18 99 % -- --   11/08/20 2329 -- -- -- 115 -- 99 % -- --   11/08/20 2300 119/83 -- -- 116 -- 99 % -- --   11/08/20 2246 -- -- -- 116 -- 99 % -- --   11/08/20 2230 -- -- -- (!) 122 -- 98 % -- --   11/08/20 2229 116/72 -- -- -- -- -- -- --   11/08/20 2215 -- -- -- 117 -- 100 % -- --   11/08/20 2158 -- -- -- (!) 126 -- 96 % -- --   11/08/20 2146 -- -- -- (!) 125 -- 100 % -- --   11/08/20 2116 -- -- -- (!) 124 -- 98 % -- --   11/08/20 2115 -- -- -- (!) 125 -- 97 % -- --   11/08/20 2100 112/69 -- -- 118 18 96 % -- --   11/08/20 2031 109/82 -- -- (!) 121 18 99 % -- --   11/08/20 2014 128/90 -- -- (!) 123 18 100 % -- --   11/08/20 1845 -- -- -- " "-- -- -- 162.6 cm (64\") 86.2 kg (190 lb)   11/08/20 1833 -- -- -- (!) 133 -- 100 % -- --   11/08/20 1832 -- -- -- (!) 135 -- 98 % -- --   11/08/20 1831 141/93 -- -- -- -- -- -- --   11/08/20 1827 -- 98.7 °F (37.1 °C) Tympanic (!) 142 18 99 % -- --     I/O:  No intake/output data recorded.    CBC    Results from last 7 days   Lab Units 11/08/20  1851   WBC 10*3/mm3 13.39*   HEMOGLOBIN g/dL 14.6   PLATELETS 10*3/mm3 388     BMP   Results from last 7 days   Lab Units 11/09/20 0511 11/08/20 1851   SODIUM mmol/L 137 139   POTASSIUM mmol/L 3.6 4.4   CHLORIDE mmol/L 102 102   CO2 mmol/L 24.4 28.1   BUN mg/dL 8 7   CREATININE mg/dL 0.77 0.92   GLUCOSE mg/dL 126* 101*   MAGNESIUM mg/dL  --  2.2     Cr Clearance Estimated Creatinine Clearance: 116.4 mL/min (by C-G formula based on SCr of 0.77 mg/dL).  Coag   Results from last 7 days   Lab Units 11/09/20  0807 11/08/20  2318   INR   --  0.94   APTT seconds 53.3* 30.5     HbA1C No results found for: HGBA1C  Blood Glucose No results found for: POCGLU  Infection     CMP   Results from last 7 days   Lab Units 11/09/20 0511 11/08/20  1851   SODIUM mmol/L 137 139   POTASSIUM mmol/L 3.6 4.4   CHLORIDE mmol/L 102 102   CO2 mmol/L 24.4 28.1   BUN mg/dL 8 7   CREATININE mg/dL 0.77 0.92   GLUCOSE mg/dL 126* 101*   ALBUMIN g/dL  --  4.40   BILIRUBIN mg/dL  --  0.4   ALK PHOS U/L  --  153*   AST (SGOT) U/L  --  30   ALT (SGPT) U/L  --  39*   LIPASE U/L  --  19     ABG      UA    Results from last 7 days   Lab Units 11/08/20  1851   NITRITE UA  Negative       Active Hospital Problems    Diagnosis  POA   • **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     Problem Points:  3:  Patient has a new problem, with no additional work-up planned (max of 1)  Total problem points:3    Data Points:  1:  I have reviewed or order clinical lab test  1:  I have reviewed or order radiology test (except heart " catheterization or echo)  2:  I have personally and independently review of image, tracing, or specimen  Total data points:4 or more    Risk Points:  Moderate: New diagnosis with unknown prognosis    MDM requires 2/3 (Problem points, Data points and Risk)  MDM Prob point Data point Risk   SF 1 1 Minimal   Low 2 2 Low   Mod 3 3 Moderate   High 4 4 High     Code requires 3/3 (MDM, History and Exam)  Code MDM History Exam Time   48580 SF/Low Detailed Detailed 30   19306 Mod Comprehensive Comprehensive 50   22390 High Comprehensive Comprehensive 70     Detailed history:  4 elements HPI or status of 3 chronic problems; 2-9 system ROS  Comprehensive:  4 elements HPI or status of 3 chronic problems;  10 system ROS    Detailed Exam:  12 findings from any organ system  Comprehensive Exam:  2 findings from each of 9 systems.   [id number]    Assessment/Plan       Splenic infarct    Anxiety disorder      39 y.o. 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  The area of the spleen that is infarcted is not that that is supplied by the area of the spleen with the aneurysm.  I think these are 2 separate and unrelated issues.  Her aneurysm is small approximately 8 mm that is somewhat saccular.  She is not of childbearing potential she states she has had her tubes tied.  I recommended consider conservative outpatient follow-up with serial observation of the aneurysm.    As far as the infarcted spleen I have no clear-cut cause of this.  Her thoracic aorta appears normal.  We will ask cardiology and hematology to see for both a cardiogenic source work-up as well as a hypercoagulable state work-up.  For now she will be on heparin until evaluations completed but ultimately will likely require outpatient anticoagulation.    Personal protective equipment used for this patient encounter:  Patient wearing surgical mask []    Provider wearing a surgical mask [x]    Gloves [x]    Eye protection  [x]    Face Shield []    Gown []    N 95 respirator or CAPR/PAPR []   Duration of interaction 15    I discussed the patients findings and my recommendations with patient.    Braulio Jane MD  11/09/20  10:02 EST    Please call my office with any question: [phone]

## 2020-11-09 NOTE — PAYOR COMM NOTE
"Carson Muleln (39 y.o. Female)     ATTN: INITIAL CLINICALS FOR REVIEW,  [id number]              PLEASE REPLY TO [facility] DEPT:  LI TAPIA RN/CCP                    [phone], [phone]           Date of Birth Social Security Number Address Home Phone MRN    1980  [address]  [place] [address] [phone] [id number]    Mormonism Marital Status          None Single       Admission Date Admission Type Admitting Provider Attending Provider Department, Room/Bed    11/8/20 Emergency Mane Urrutia MD Reddy, Rahul Kandada, MD [address], [address]    Discharge Date Discharge Disposition Discharge Destination                       Attending Provider: Mane Urrutia MD    Allergies: No Known Allergies    Isolation: None   Infection: None   Code Status: CPR    Ht: 162.6 cm (64\")   Wt: 104 kg (228 lb 11.2 oz)    Admission Cmt: None   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]                 Active Insurance as of 11/8/2020     Primary Coverage     Payor Plan Insurance Group Employer/Plan Group    [id number]     Payor Plan Address Payor Plan Phone Number Payor Plan Fax Number Effective Dates    [address] [phone]  1/1/2019 - None Entered    [facility] [id number]       Subscriber Name Subscriber Birth Date Member ID       CARSON MULLEN 1980 [id number]                 Emergency Contacts      (Rel.) Home Phone Work Phone Mobile Phone    ISAIAH AGUAYO (Significant Other) [phone] -- --               History & Physical      , NELSON Lincoln at 11/09/20 0022              Patient Name:  Carson Mullen  YOB: 1980  MRN:  [id number]  Admit Date:  11/8/2020  Patient Care Team:  Sahil Cornelius MD as PCP - General (Internal Medicine)      Chief Complaint   Patient presents with   • Flank Pain       Subjective     Ms. Mullen is a 39 y.o. female smoker with a history " of anxiety, chronic pain that presents to [facility] complaining of left sided abdominal pain and vomiting. Patient reports constant, sharp pain to left upper quadrant, radiating to her back, worse with inspiration. She also reports nausea and vomiting and states these symptoms have been ongoing for past 3 days. She denies fever, chest pain, changes in bowel or bladder habits but does report feeling of shortness of breath due to the pain. 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 She denies history of clots in the past, though does confirm that she is a current smoker. Labs done tonight show leukocytosis and elevated alk phos. CT shows left splenic artery aneurysm with new wedge-shaped hypodensity concerning for splenic infarction.     History of Present Illness    Past Medical History:   Diagnosis Date   • Injury of back      Past Surgical History:   Procedure Laterality Date   • BACK SURGERY     • CHOLECYSTECTOMY     • SKIN SURGERY       History reviewed. No pertinent family history.  Social History     Tobacco Use   • Smoking status: Current Every Day Smoker     Packs/day: 0.50   • Smokeless tobacco: Never Used   Substance Use Topics   • Alcohol use: Not Currently   • Drug use: Not Currently     Medications Prior to Admission   Medication Sig Dispense Refill Last Dose   • HYDROcodone-acetaminophen (NORCO)  MG per tablet Take 1 tablet by mouth Every 4 (Four) Hours As Needed for Moderate Pain . 10 tablet 0      Allergies:  No Known Allergies    Review of Systems   Constitutional: Negative.  Negative for chills and fever.   HENT: Negative.  Negative for congestion and sore throat.    Eyes: Negative.  Negative for visual disturbance.   Respiratory: Positive for shortness of breath. Negative for cough.    Cardiovascular: Negative.  Negative for chest pain and leg swelling.   Gastrointestinal: Positive for abdominal pain, nausea and vomiting.    Endocrine: Negative.    Genitourinary: Negative.  Negative for dysuria, frequency and urgency.   Musculoskeletal: Negative.  Negative for arthralgias and myalgias.   Skin: Negative.  Negative for color change and pallor.   Allergic/Immunologic: Negative.    Neurological: Negative.  Negative for dizziness, weakness and light-headedness.   Hematological: Negative.    Psychiatric/Behavioral: Negative.  Negative for agitation, behavioral problems and confusion.        Objective    Vital Signs  Temp:  [98.7 °F (37.1 °C)] 98.7 °F (37.1 °C)  Heart Rate:  [115-142] 115  Resp:  [18] 18  BP: (107-145)/(69-93) 145/89  SpO2:  [96 %-100 %] 99 %  on   ;   Device (Oxygen Therapy): room air  Body mass index is 32.61 kg/m².    Physical Exam  Vitals signs and nursing note reviewed.   Constitutional:       General: She is not in acute distress.     Appearance: She is obese.   HENT:      Head: Normocephalic and atraumatic.      Mouth/Throat:      Mouth: Mucous membranes are moist.   Eyes:      Extraocular Movements: Extraocular movements intact.   Neck:      Musculoskeletal: Normal range of motion and neck supple.   Cardiovascular:      Rate and Rhythm: Normal rate.      Pulses: Normal pulses.   Pulmonary:      Effort: Pulmonary effort is normal.      Breath sounds: Normal breath sounds.   Abdominal:      General: Abdomen is flat. Bowel sounds are normal.      Palpations: Abdomen is soft.      Tenderness: There is abdominal tenderness. There is no guarding or rebound.   Musculoskeletal: Normal range of motion.   Skin:     General: Skin is warm and dry.   Neurological:      General: No focal deficit present.      Mental Status: She is alert and oriented to person, place, and time. Mental status is at baseline.   Psychiatric:         Mood and Affect: Mood normal.         Behavior: Behavior normal.         Thought Content: Thought content normal.         Judgment: Judgment normal.         Results Review:   I reviewed the patient's new  clinical results including all labs and xrays.    Lab Results (last 24 hours)     Procedure Component Value Units Date/Time    CBC & Differential [[id number]]  (Abnormal) Collected: 11/08/20 1851    Specimen: Blood Updated: 11/08/20 1858    Narrative:      The following orders were created for panel order CBC & Differential.  Procedure                               Abnormality         Status                     ---------                               -----------         ------                     CBC Auto Differential[[id number]]        Abnormal            Final result                 Please view results for these tests on the individual orders.    Comprehensive Metabolic Panel [[id number]]  (Abnormal) Collected: 11/08/20 1851    Specimen: Blood Updated: 11/08/20 1921     Glucose 101 mg/dL      BUN 7 mg/dL      Creatinine 0.92 mg/dL      Sodium 139 mmol/L      Potassium 4.4 mmol/L      Chloride 102 mmol/L      CO2 28.1 mmol/L      Calcium 9.4 mg/dL      Total Protein 7.3 g/dL      Albumin 4.40 g/dL      ALT (SGPT) 39 U/L      AST (SGOT) 30 U/L      Alkaline Phosphatase 153 U/L      Total Bilirubin 0.4 mg/dL      eGFR Non African Amer 68 mL/min/1.73      Globulin 2.9 gm/dL      A/G Ratio 1.5 g/dL      BUN/Creatinine Ratio 7.6     Anion Gap 8.9 mmol/L     Narrative:      GFR Normal >60  Chronic Kidney Disease <60  Kidney Failure <15      Lipase [[id number]]  (Normal) Collected: 11/08/20 1851    Specimen: Blood Updated: 11/08/20 1921     Lipase 19 U/L     Urinalysis With Microscopic If Indicated (No Culture) - Urine, Clean Catch [[id number]]  (Normal) Collected: 11/08/20 1851    Specimen: Urine, Clean Catch Updated: 11/08/20 1901     Color, UA Yellow     Appearance, UA Clear     pH, UA 6.0     Specific Gravity, UA 1.022     Glucose, UA Negative     Ketones, UA Negative     Bilirubin, UA Negative     Blood, UA Negative     Protein, UA Negative     Leuk Esterase, UA Negative     Nitrite, UA Negative     Urobilinogen, UA 1.0  E.U./dL    Narrative:      Urine microscopic not indicated.    hCG, Serum, Qualitative [[id number]]  (Normal) Collected: 11/08/20 1851    Specimen: Blood Updated: 11/08/20 1923     HCG Qualitative Negative    CBC Auto Differential [[id number]]  (Abnormal) Collected: 11/08/20 1851    Specimen: Blood Updated: 11/08/20 1858     WBC 13.39 10*3/mm3      RBC 4.52 10*6/mm3      Hemoglobin 14.6 g/dL      Hematocrit 42.7 %      MCV 94.5 fL      MCH 32.3 pg      MCHC 34.2 g/dL      RDW 12.6 %      RDW-SD 43.7 fl      MPV 9.8 fL      Platelets 388 10*3/mm3      Neutrophil % 70.8 %      Lymphocyte % 19.5 %      Monocyte % 7.8 %      Eosinophil % 1.1 %      Basophil % 0.4 %      Immature Grans % 0.4 %      Neutrophils, Absolute 9.46 10*3/mm3      Lymphocytes, Absolute 2.61 10*3/mm3      Monocytes, Absolute 1.05 10*3/mm3      Eosinophils, Absolute 0.15 10*3/mm3      Basophils, Absolute 0.06 10*3/mm3      Immature Grans, Absolute 0.06 10*3/mm3      nRBC 0.0 /100 WBC     Urine Drug Screen - Urine, Clean Catch [[id number]]  (Normal) Collected: 11/08/20 1851    Specimen: Urine, Clean Catch Updated: 11/08/20 2101     Amphet/Methamphet, Screen Negative     Barbiturates Screen, Urine Negative     Benzodiazepine Screen, Urine Negative     Cocaine Screen, Urine Negative     Opiate Screen Negative     THC, Screen, Urine Negative     Methadone Screen, Urine Negative     Oxycodone Screen, Urine Negative    Narrative:      Negative Thresholds For Drugs Screened:     Amphetamines               500 ng/ml   Barbiturates               200 ng/ml   Benzodiazepines            100 ng/ml   Cocaine                    300 ng/ml   Methadone                  300 ng/ml   Opiates                    300 ng/ml   Oxycodone                  100 ng/ml   THC                        50 ng/ml    The Normal Value for all drugs tested is negative. This report includes final unconfirmed screening results to be used for medical treatment purposes only. Unconfirmed results  must not be used for non-medical purposes such as employment or legal testing. Clinical consideration should be applied to any drug of abuse test, particulary when unconfirmed results are used.    Magnesium [[id number]]  (Normal) Collected: 11/08/20 1851    Specimen: Blood Updated: 11/08/20 2054     Magnesium 2.2 mg/dL     TSH [[id number]]  (Normal) Collected: 11/08/20 1851    Specimen: Blood Updated: 11/08/20 2108     TSH 1.110 uIU/mL     COVID PRE-OP / PRE-PROCEDURE SCREENING ORDER (NO ISOLATION) - Swab, Nasopharynx [[id number]]  (Normal) Collected: 11/08/20 2314    Specimen: Swab from Nasopharynx Updated: 11/09/20 0052    Narrative:      The following orders were created for panel order COVID PRE-OP / PRE-PROCEDURE SCREENING ORDER (NO ISOLATION) - Swab, Nasopharynx.  Procedure                               Abnormality         Status                     ---------                               -----------         ------                     Respiratory Panel PCR w/...[[id number]]  Normal              Final result                 Please view results for these tests on the individual orders.    Respiratory Panel PCR w/COVID-19(SARS-CoV-2) LETY/REESE/RASHAWN/PAD/COR/MAD/CHANEL In-House, NP Swab in [facility], 3-4 HR TAT - Swab, Nasopharynx [[id number]]  (Normal) Collected: 11/08/20 2314    Specimen: Swab from Nasopharynx Updated: 11/09/20 0052     ADENOVIRUS, PCR Not Detected     Coronavirus 229E Not Detected     Coronavirus HKU1 Not Detected     Coronavirus NL63 Not Detected     Coronavirus OC43 Not Detected     COVID19 Not Detected     Human Metapneumovirus Not Detected     Human Rhinovirus/Enterovirus Not Detected     Influenza A PCR Not Detected     Influenza B PCR Not Detected     Parainfluenza Virus 1 Not Detected     Parainfluenza Virus 2 Not Detected     Parainfluenza Virus 3 Not Detected     Parainfluenza Virus 4 Not Detected     RSV, PCR Not Detected     Bordetella pertussis pcr Not Detected     Bordetella parapertussis PCR  Not Detected     Chlamydophila pneumoniae PCR Not Detected     Mycoplasma pneumo by PCR Not Detected    Narrative:      Fact sheet for providers: https://docs.[facility]/wp-content/uploads/FFJ7312-3709-KO8.1-EUA-Provider-Fact-Sheet-3.pdf    Fact sheet for patients: https://docs.[facility]/wp-content/uploads/HGL2203-0798-WG1.1-EUA-Patient-Fact-Sheet-1.pdf    Protime-INR [[id number]]  (Normal) Collected: 11/08/20 2318    Specimen: Blood Updated: 11/08/20 2337     Protime 12.5 Seconds      INR 0.94    aPTT [[id number]]  (Normal) Collected: 11/08/20 2318    Specimen: Blood Updated: 11/08/20 2337     PTT 30.5 seconds           CT Abdomen Pelvis With Contrast   Final Result   1.  No PE.   2.  No acute cardiopulmonary pathology.   3.  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                       CT ABDOMEN AND PELVIS WITH CONTRAST:       HISTORY: Left flank pain.        TECHNIQUE:  Multiple contiguous helical CT images of the abdomen and   pelvis were obtained following the uneventful administration of   intravenous contrast. Multiplanar reformatted images were reconstructed   from the helical source data. Radiation dose reduction techniques were   utilized, including automated exposure control and exposure modulation   based on body size.           COMPARISON:  CT abdomen and pelvis 09/26/2020       FINDINGS:        Vasculature: Normal caliber abdominal aorta and branch vessels. No   atherosclerotic calcifications.  Major venous structures are   unremarkable.       Liver: Unremarkable.        Gallbladder and Bile Ducts: Cholecystectomy. No biliary ductal   dilatation.       Spleen: 3.3 x 4.6 cm wedge-shaped region of hypodensity in the superior   aspect of the spleen. This is new from the prior CT. 8mm distal splenic   artery aneurysm.       Pancreas: Unremarkable.       Adrenals: Unremarkable.       Kidneys/Ureters/Bladder: Stable left renal volume loss. 2 cm simple left   lower pole renal cyst. No calculi or  hydronephrosis. Unremarkable   ureters and bladder.       GI Tract/Mesentery:The stomach is unremarkable. No abnormally dilated   small or large bowel loops. No definite bowel wall thickening. Normal   appendix. No free or loculated fluid collections. No intraperitoneal   free air.       Lymphadenopathy: No retroperitoneal, mesenteric or pelvic   lymphadenopathy.       Pelvic organs: Unremarkable       Peripheral Soft Tissues: No soft tissue mass or fluid collection.  Small   fat-containing umbilical hernia.       Osseous Structures: No acute osseous abnormality. No suspicious lytic or   blastic lesions. Moderate spondylosis in the lower thoracic spine.   Fusion hardware at L5-S1.           IMPRESSION:     New wedge-shaped hypodensity in the superior aspect of the spleen   concerning for splenic infarction.   8 mm distal left splenic artery aneurysm best seen on the concurrent PE   exam.       Findings were discussed with Dr. Hansen at 10:50 PM on 11/08/2020.               This report was finalized on 11/8/2020 11:02 PM by Dr. Charly Hood M.D.          CT Angiogram Chest   Final Result   1.  No PE.   2.  No acute cardiopulmonary pathology.   3.  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                       CT ABDOMEN AND PELVIS WITH CONTRAST:       HISTORY: Left flank pain.        TECHNIQUE:  Multiple contiguous helical CT images of the abdomen and   pelvis were obtained following the uneventful administration of   intravenous contrast. Multiplanar reformatted images were reconstructed   from the helical source data. Radiation dose reduction techniques were   utilized, including automated exposure control and exposure modulation   based on body size.           COMPARISON:  CT abdomen and pelvis 09/26/2020       FINDINGS:        Vasculature: Normal caliber abdominal aorta and branch vessels. No   atherosclerotic calcifications.  Major venous structures are   unremarkable.       Liver: Unremarkable.         Gallbladder and Bile Ducts: Cholecystectomy. No biliary ductal   dilatation.       Spleen: 3.3 x 4.6 cm wedge-shaped region of hypodensity in the superior   aspect of the spleen. This is new from the prior CT. 8mm distal splenic   artery aneurysm.       Pancreas: Unremarkable.       Adrenals: Unremarkable.       Kidneys/Ureters/Bladder: Stable left renal volume loss. 2 cm simple left   lower pole renal cyst. No calculi or hydronephrosis. Unremarkable   ureters and bladder.       GI Tract/Mesentery:The stomach is unremarkable. No abnormally dilated   small or large bowel loops. No definite bowel wall thickening. Normal   appendix. No free or loculated fluid collections. No intraperitoneal   free air.       Lymphadenopathy: No retroperitoneal, mesenteric or pelvic   lymphadenopathy.       Pelvic organs: Unremarkable       Peripheral Soft Tissues: No soft tissue mass or fluid collection.  Small   fat-containing umbilical hernia.       Osseous Structures: No acute osseous abnormality. No suspicious lytic or   blastic lesions. Moderate spondylosis in the lower thoracic spine.   Fusion hardware at L5-S1.           IMPRESSION:     New wedge-shaped hypodensity in the superior aspect of the spleen   concerning for splenic infarction.   8 mm distal left splenic artery aneurysm best seen on the concurrent PE   exam.       Findings were discussed with Dr. Hansen at 10:50 PM on 11/08/2020.               This report was finalized on 11/8/2020 11:02 PM by Dr. Charly Hood M.D.            Assessment/Plan      Active Hospital Problems    Diagnosis  POA   • **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     Splenic infarct  -CTA showing distal left splenic artery aneurysm with area concerning for splenic infarction, apparent cause of her left sided pain tonight  -will start low dose heparin gtt for now, no boluses  -vascular consult  -continue  antiemetics and pain medication PRN    Anxiety  -will continue home medications    VTE Ppx  -SCDs    CODE status  -full    I discussed the patients findings and my recommendations with patient.    NELSON Koehler  [facility]  11/09/20  12:22 AM EST    Electronically signed by Beena Fregoso APRN at 11/09/20 0121          Emergency Department Notes      Smitha Hernandez RN at 11/08/20 1826        Pt reports abd pain radiating to back.     Patient was placed in face mask during first look triage.  Patient was wearing a face mask throughout encounter.  I wore personal protective equipment throughout the encounter.  Hand hygiene was performed before and after patient encounter.        Smitha Hernandez RN  11/08/20 1826      Electronically signed by Smitha Hernandez RN at 11/08/20 1826     Teresa Hansen MD at 11/08/20 2018           EMERGENCY DEPARTMENT ENCOUNTER    CHIEF COMPLAINT  Chief Complaint: Left flank pain  History given by: Patient  History limited by: Nothing  Room Number: 16/16  PMD: Sahil Cornelius MD      HPI:  Pt is a 39 y.o. female presents complaining of left flank/abdomen pain for 3 days.  Patient reports the pain is sharp, worse with movement and deep breath.  Patient denies shortness of air, cough, fever, rash, urinary symptoms, does report nausea but denies vomiting.    Patient reports she is a smoker, history of BTL, cholecystectomy and lumbar fusion.  Patient reports her last normal menstrual period was 11/1/2020.  Patient reports she has back pain on a daily basis for which she takes gabapentin and Tylenol/ibuprofen.    Duration: 3 days  Associated Symptoms: Nausea  Aggravating Factors: Movement, deep breath  Alleviating Factors: Nothing  Treatment before arrival: Regular medications    PAST MEDICAL HISTORY  Active Ambulatory Problems     Diagnosis Date Noted   • No Active Ambulatory Problems     Resolved Ambulatory Problems     Diagnosis Date  Noted   • No Resolved Ambulatory Problems     Past Medical History:   Diagnosis Date   • Injury of back        PAST SURGICAL HISTORY  Past Surgical History:   Procedure Laterality Date   • BACK SURGERY     • CHOLECYSTECTOMY     • SKIN SURGERY         FAMILY HISTORY  History reviewed. No pertinent family history.    SOCIAL HISTORY  Social History     Socioeconomic History   • Marital status: Single     Spouse name: Not on file   • Number of children: Not on file   • Years of education: Not on file   • Highest education level: Not on file   Tobacco Use   • Smoking status: Current Every Day Smoker     Packs/day: 0.50   • Smokeless tobacco: Never Used   Substance and Sexual Activity   • Alcohol use: Not Currently   • Drug use: Not Currently       ALLERGIES  Patient has no known allergies.    REVIEW OF SYSTEMS  Review of Systems   Constitutional: Negative for chills and fever.   HENT: Negative for sore throat and trouble swallowing.    Eyes: Negative for visual disturbance.   Respiratory: Negative for cough and shortness of breath.    Cardiovascular: Negative for chest pain, palpitations and leg swelling.   Gastrointestinal: Positive for abdominal pain ( ) and nausea. Negative for diarrhea and vomiting.   Endocrine: Negative.    Genitourinary: Negative for decreased urine volume, dysuria and frequency.   Musculoskeletal: Positive for back pain ( Left lower back). Negative for neck pain.   Skin: Negative for rash.   Allergic/Immunologic: Negative.    Neurological: Negative for syncope, weakness, numbness and headaches.   Hematological: Negative.    Psychiatric/Behavioral: Negative.    All other systems reviewed and are negative.      PHYSICAL EXAM  ED Triage Vitals   Temp Heart Rate Resp BP SpO2   11/08/20 1827 11/08/20 1827 11/08/20 1827 11/08/20 1831 11/08/20 1827   98.7 °F (37.1 °C) (!) 142 18 141/93 99 %      Temp src Heart Rate Source Patient Position BP Location FiO2 (%)   11/08/20 1827 11/08/20 1827 -- -- --    Tympanic Monitor          Physical Exam   Constitutional: She is oriented to person, place, and time.  Non-toxic appearance. She appears distressed (mild).   HENT:   Head: Normocephalic and atraumatic.   Eyes: EOM are normal.   Neck: Normal range of motion. Neck supple.   Cardiovascular: Normal rate, regular rhythm, normal heart sounds and intact distal pulses.   No murmur heard.  Pulses:       Posterior tibial pulses are 2+ on the right side and 2+ on the left side.   Pulmonary/Chest: Effort normal and breath sounds normal. No respiratory distress.   Abdominal: Soft. Bowel sounds are normal. There is abdominal tenderness in the left upper quadrant. There is no rebound and no guarding.   Musculoskeletal: Normal range of motion.         General: No edema.      Lumbar back: She exhibits tenderness ( Soft tissue tenderness left lower back, no midline vertebral tenderness).   Neurological: She is alert and oriented to person, place, and time.   Skin: Skin is warm and dry.   Psychiatric: Affect normal.   Nursing note and vitals reviewed.      LAB RESULTS  Lab Results (last 24 hours)     Procedure Component Value Units Date/Time    CBC & Differential [[id number]]  (Abnormal) Collected: 11/08/20 1851    Specimen: Blood Updated: 11/08/20 1858    Narrative:      The following orders were created for panel order CBC & Differential.  Procedure                               Abnormality         Status                     ---------                               -----------         ------                     CBC Auto Differential[[id number]]        Abnormal            Final result                 Please view results for these tests on the individual orders.    Comprehensive Metabolic Panel [[id number]]  (Abnormal) Collected: 11/08/20 1851    Specimen: Blood Updated: 11/08/20 1921     Glucose 101 mg/dL      BUN 7 mg/dL      Creatinine 0.92 mg/dL      Sodium 139 mmol/L      Potassium 4.4 mmol/L      Chloride 102 mmol/L      CO2 28.1  mmol/L      Calcium 9.4 mg/dL      Total Protein 7.3 g/dL      Albumin 4.40 g/dL      ALT (SGPT) 39 U/L      AST (SGOT) 30 U/L      Alkaline Phosphatase 153 U/L      Total Bilirubin 0.4 mg/dL      eGFR Non African Amer 68 mL/min/1.73      Globulin 2.9 gm/dL      A/G Ratio 1.5 g/dL      BUN/Creatinine Ratio 7.6     Anion Gap 8.9 mmol/L     Narrative:      GFR Normal >60  Chronic Kidney Disease <60  Kidney Failure <15      Lipase [[id number]]  (Normal) Collected: 11/08/20 1851    Specimen: Blood Updated: 11/08/20 1921     Lipase 19 U/L     Urinalysis With Microscopic If Indicated (No Culture) - Urine, Clean Catch [[id number]]  (Normal) Collected: 11/08/20 1851    Specimen: Urine, Clean Catch Updated: 11/08/20 1901     Color, UA Yellow     Appearance, UA Clear     pH, UA 6.0     Specific Gravity, UA 1.022     Glucose, UA Negative     Ketones, UA Negative     Bilirubin, UA Negative     Blood, UA Negative     Protein, UA Negative     Leuk Esterase, UA Negative     Nitrite, UA Negative     Urobilinogen, UA 1.0 E.U./dL    Narrative:      Urine microscopic not indicated.    hCG, Serum, Qualitative [[id number]]  (Normal) Collected: 11/08/20 1851    Specimen: Blood Updated: 11/08/20 1923     HCG Qualitative Negative    CBC Auto Differential [[id number]]  (Abnormal) Collected: 11/08/20 1851    Specimen: Blood Updated: 11/08/20 1858     WBC 13.39 10*3/mm3      RBC 4.52 10*6/mm3      Hemoglobin 14.6 g/dL      Hematocrit 42.7 %      MCV 94.5 fL      MCH 32.3 pg      MCHC 34.2 g/dL      RDW 12.6 %      RDW-SD 43.7 fl      MPV 9.8 fL      Platelets 388 10*3/mm3      Neutrophil % 70.8 %      Lymphocyte % 19.5 %      Monocyte % 7.8 %      Eosinophil % 1.1 %      Basophil % 0.4 %      Immature Grans % 0.4 %      Neutrophils, Absolute 9.46 10*3/mm3      Lymphocytes, Absolute 2.61 10*3/mm3      Monocytes, Absolute 1.05 10*3/mm3      Eosinophils, Absolute 0.15 10*3/mm3      Basophils, Absolute 0.06 10*3/mm3      Immature Grans, Absolute 0.06  10*3/mm3      nRBC 0.0 /100 WBC     Urine Drug Screen - Urine, Clean Catch [[id number]]  (Normal) Collected: 11/08/20 1851    Specimen: Urine, Clean Catch Updated: 11/08/20 2101     Amphet/Methamphet, Screen Negative     Barbiturates Screen, Urine Negative     Benzodiazepine Screen, Urine Negative     Cocaine Screen, Urine Negative     Opiate Screen Negative     THC, Screen, Urine Negative     Methadone Screen, Urine Negative     Oxycodone Screen, Urine Negative    Narrative:      Negative Thresholds For Drugs Screened:     Amphetamines               500 ng/ml   Barbiturates               200 ng/ml   Benzodiazepines            100 ng/ml   Cocaine                    300 ng/ml   Methadone                  300 ng/ml   Opiates                    300 ng/ml   Oxycodone                  100 ng/ml   THC                        50 ng/ml    The Normal Value for all drugs tested is negative. This report includes final unconfirmed screening results to be used for medical treatment purposes only. Unconfirmed results must not be used for non-medical purposes such as employment or legal testing. Clinical consideration should be applied to any drug of abuse test, particulary when unconfirmed results are used.    Magnesium [[id number]]  (Normal) Collected: 11/08/20 1851    Specimen: Blood Updated: 11/08/20 2054     Magnesium 2.2 mg/dL     TSH [[id number]]  (Normal) Collected: 11/08/20 1851    Specimen: Blood Updated: 11/08/20 2108     TSH 1.110 uIU/mL     COVID PRE-OP / PRE-PROCEDURE SCREENING ORDER (NO ISOLATION) - Swab, Nasopharynx [[id number]] Collected: 11/08/20 2314    Specimen: Swab from Nasopharynx Updated: 11/08/20 2324    Narrative:      The following orders were created for panel order COVID PRE-OP / PRE-PROCEDURE SCREENING ORDER (NO ISOLATION) - Swab, Nasopharynx.  Procedure                               Abnormality         Status                     ---------                               -----------         ------                      Respiratory Panel PCR w/...[[id number]]                      In process                   Please view results for these tests on the individual orders.    Respiratory Panel PCR w/COVID-19(SARS-CoV-2) LETY/REESE/RASHAWN/PAD/COR/MAD/CHANEL In-House, NP Swab in UTM/VTM, 3-4 HR TAT - Swab, Nasopharynx [[id number]] Collected: 11/08/20 2314    Specimen: Swab from Nasopharynx Updated: 11/08/20 2324    Protime-INR [[id number]]  (Normal) Collected: 11/08/20 2318    Specimen: Blood Updated: 11/08/20 2337     Protime 12.5 Seconds      INR 0.94    aPTT [[id number]]  (Normal) Collected: 11/08/20 2318    Specimen: Blood Updated: 11/08/20 2337     PTT 30.5 seconds           I ordered the above labs and reviewed the results    RADIOLOGY  CT Abdomen Pelvis With Contrast   Final Result   1.  No PE.   2.  No acute cardiopulmonary pathology.   3.  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                       CT ABDOMEN AND PELVIS WITH CONTRAST:       HISTORY: Left flank pain.        TECHNIQUE:  Multiple contiguous helical CT images of the abdomen and   pelvis were obtained following the uneventful administration of   intravenous contrast. Multiplanar reformatted images were reconstructed   from the helical source data. Radiation dose reduction techniques were   utilized, including automated exposure control and exposure modulation   based on body size.           COMPARISON:  CT abdomen and pelvis 09/26/2020       FINDINGS:        Vasculature: Normal caliber abdominal aorta and branch vessels. No   atherosclerotic calcifications.  Major venous structures are   unremarkable.       Liver: Unremarkable.        Gallbladder and Bile Ducts: Cholecystectomy. No biliary ductal   dilatation.       Spleen: 3.3 x 4.6 cm wedge-shaped region of hypodensity in the superior   aspect of the spleen. This is new from the prior CT. 8mm distal splenic   artery aneurysm.       Pancreas: Unremarkable.       Adrenals: Unremarkable.       Kidneys/Ureters/Bladder:  Stable left renal volume loss. 2 cm simple left   lower pole renal cyst. No calculi or hydronephrosis. Unremarkable   ureters and bladder.       GI Tract/Mesentery:The stomach is unremarkable. No abnormally dilated   small or large bowel loops. No definite bowel wall thickening. Normal   appendix. No free or loculated fluid collections. No intraperitoneal   free air.       Lymphadenopathy: No retroperitoneal, mesenteric or pelvic   lymphadenopathy.       Pelvic organs: Unremarkable       Peripheral Soft Tissues: No soft tissue mass or fluid collection.  Small   fat-containing umbilical hernia.       Osseous Structures: No acute osseous abnormality. No suspicious lytic or   blastic lesions. Moderate spondylosis in the lower thoracic spine.   Fusion hardware at L5-S1.           IMPRESSION:     New wedge-shaped hypodensity in the superior aspect of the spleen   concerning for splenic infarction.   8 mm distal left splenic artery aneurysm best seen on the concurrent PE   exam.       Findings were discussed with Dr. Hansen at 10:50 PM on 11/08/2020.               This report was finalized on 11/8/2020 11:02 PM by Dr. Charly Hood M.D.          CT Angiogram Chest   Final Result   1.  No PE.   2.  No acute cardiopulmonary pathology.   3.  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                       CT ABDOMEN AND PELVIS WITH CONTRAST:       HISTORY: Left flank pain.        TECHNIQUE:  Multiple contiguous helical CT images of the abdomen and   pelvis were obtained following the uneventful administration of   intravenous contrast. Multiplanar reformatted images were reconstructed   from the helical source data. Radiation dose reduction techniques were   utilized, including automated exposure control and exposure modulation   based on body size.           COMPARISON:  CT abdomen and pelvis 09/26/2020       FINDINGS:        Vasculature: Normal caliber abdominal aorta and branch vessels. No   atherosclerotic calcifications.   Major venous structures are   unremarkable.       Liver: Unremarkable.        Gallbladder and Bile Ducts: Cholecystectomy. No biliary ductal   dilatation.       Spleen: 3.3 x 4.6 cm wedge-shaped region of hypodensity in the superior   aspect of the spleen. This is new from the prior CT. 8mm distal splenic   artery aneurysm.       Pancreas: Unremarkable.       Adrenals: Unremarkable.       Kidneys/Ureters/Bladder: Stable left renal volume loss. 2 cm simple left   lower pole renal cyst. No calculi or hydronephrosis. Unremarkable   ureters and bladder.       GI Tract/Mesentery:The stomach is unremarkable. No abnormally dilated   small or large bowel loops. No definite bowel wall thickening. Normal   appendix. No free or loculated fluid collections. No intraperitoneal   free air.       Lymphadenopathy: No retroperitoneal, mesenteric or pelvic   lymphadenopathy.       Pelvic organs: Unremarkable       Peripheral Soft Tissues: No soft tissue mass or fluid collection.  Small   fat-containing umbilical hernia.       Osseous Structures: No acute osseous abnormality. No suspicious lytic or   blastic lesions. Moderate spondylosis in the lower thoracic spine.   Fusion hardware at L5-S1.           IMPRESSION:     New wedge-shaped hypodensity in the superior aspect of the spleen   concerning for splenic infarction.   8 mm distal left splenic artery aneurysm best seen on the concurrent PE   exam.       Findings were discussed with Dr. Hansen at 10:50 PM on 11/08/2020.               This report was finalized on 11/8/2020 11:02 PM by Dr. Charly Hood M.D.               I ordered the above noted radiological studies. Interpreted by radiologist. Viewed by me in PACS.       PROCEDURES  Procedures      PROGRESS AND CONSULTS  ED Course as of Nov 09 0022   Sun Nov 08, 2020   9075 Discussed with NELSON Hargrove on-call for A who is aware patient presentation, complaints CT imaging results and agrees with admit for further testing,  treatment as needed to Dr. Suazo.    [TO]   2331 Discussed with Dr. Hood, radiology who reports patient CT chest negative for PE but significant for 8 mm splenic artery aneurysm and wedge-shaped perfusion defect of the parenchyma of the spleen consistent with infarct.    [TO]   Mon Nov 09, 2020   0017 Leon with NELSON Hargrove who examined the patient and request heparin drip with bolus as discussed with Dr. Suazo     [TO]      ED Course User Index  [TO] Teresa Hansen MD     EKG          EKG time: 2035  Rhythm/Rate: Sinus tachycardia, rate in the 1 teens  P waves and NH: Normal P waves, normal CHEN's  QRS, axis: QRS unremarkable  ST and T waves: Nonspecific ST/T wave findings    Interpreted Contemporaneously by me, independently viewed  No old for comparison        MEDICAL DECISION MAKING  Results were reviewed/discussed with the patient and they were also made aware of online access. Pt also made aware that some labs, such as cultures, will not be resulted during ER visit and follow up with PMD is necessary.       MDM       DIAGNOSIS  Final diagnoses:   Splenic infarct       DISPOSITION  ADMISSION    Discussed treatment plan and reason for admission with pt/family and admitting physician.  Pt/family voiced understanding of the plan for admission for further testing/treatment as needed.         Latest Documented Vital Signs:  As of 00:22 EST  BP- 145/89 HR- 115 Temp- 98.7 °F (37.1 °C) (Tympanic) O2 sat- 99%    --  Patient was wearing facemask when I entered the room and throughout our encounter. Full protective equipment was worn throughout this patient encounter including a face mask, eye protection and gloves. Hand hygiene was performed before donning protective equipment and after removal when leaving the room.      Teresa Hansen MD  11/09/20 0023       Teresa Hansen MD  11/09/20 0041      Electronically signed by Teresa Hansen MD at 11/09/20 0041     Alana Luo, RN at 11/08/20 4188        Pt calling out  "for pain medication for increased flank pain and headache. MD notified, primary nurse in cath lab with pt. Awaiting further orders.     Alana Luo RN  11/08/20 2235      Electronically signed by Alana Luo RN at 11/08/20 2235     Marysol Pa RN at 11/08/20 2350          \"  Nursing report ED to floor  Belen Allen  39 y.o.  female    HPI (triage note):   Chief Complaint   Patient presents with   • Flank Pain       Admitting doctor:   Nadir Suazo MD    Admitting diagnosis:   The encounter diagnosis was Splenic infarct.    Code status:   Current Code Status     Date Active Code Status Order ID Comments User Context       Not on file    Advance Care Planning Activity          Allergies:   Patient has no known allergies.    Weight:       11/08/20  1845   Weight: 86.2 kg (190 lb)       Most recent vitals:   Vitals:    11/08/20 2246 11/08/20 2300 11/08/20 2329 11/08/20 2330   BP:  119/83  107/88   Pulse: 116 116 115 115   Resp:    18   Temp:       TempSrc:       SpO2: 99% 99% 99% 99%   Weight:       Height:           Active LDAs/IV Access:   Lines, Drains & Airways    Active LDAs     Name:   Placement date:   Placement time:   Site:   Days:    Peripheral IV 11/08/20 2146 Left;Medial Antecubital   11/08/20 2146    Antecubital   less than 1                Labs (abnormal labs have a star):   Labs Reviewed   COMPREHENSIVE METABOLIC PANEL - Abnormal; Notable for the following components:       Result Value    Glucose 101 (*)     ALT (SGPT) 39 (*)     Alkaline Phosphatase 153 (*)     All other components within normal limits    Narrative:     GFR Normal >60  Chronic Kidney Disease <60  Kidney Failure <15     CBC WITH AUTO DIFFERENTIAL - Abnormal; Notable for the following components:    WBC 13.39 (*)     Lymphocyte % 19.5 (*)     Neutrophils, Absolute 9.46 (*)     Monocytes, Absolute 1.05 (*)     Immature Grans, Absolute 0.06 (*)     All other components within normal limits   LIPASE - Normal "   URINALYSIS W/ MICROSCOPIC IF INDICATED (NO CULTURE) - Normal    Narrative:     Urine microscopic not indicated.   HCG, SERUM, QUALITATIVE - Normal   URINE DRUG SCREEN - Normal    Narrative:     Negative Thresholds For Drugs Screened:     Amphetamines               500 ng/ml   Barbiturates               200 ng/ml   Benzodiazepines            100 ng/ml   Cocaine                    300 ng/ml   Methadone                  300 ng/ml   Opiates                    300 ng/ml   Oxycodone                  100 ng/ml   THC                        50 ng/ml    The Normal Value for all drugs tested is negative. This report includes final unconfirmed screening results to be used for medical treatment purposes only. Unconfirmed results must not be used for non-medical purposes such as employment or legal testing. Clinical consideration should be applied to any drug of abuse test, particulary when unconfirmed results are used.   MAGNESIUM - Normal   TSH - Normal   PROTIME-INR - Normal   APTT - Normal   COVID PRE-OP / PRE-PROCEDURE SCREENING ORDER (NO ISOLATION)    Narrative:     The following orders were created for panel order COVID PRE-OP / PRE-PROCEDURE SCREENING ORDER (NO ISOLATION) - Swab, Nasopharynx.  Procedure                               Abnormality         Status                     ---------                               -----------         ------                     Respiratory Panel PCR w/...[[id number]]                      In process                   Please view results for these tests on the individual orders.   RESPIRATORY PANEL PCR W/ COVID-19 (SARS-COV-2) LETY/REESE/RASHAWN/PAD/COR/MAD/CHANEL IN-HOUSE, NP SWAB IN [facility], 3-4 HR TAT   RAINBOW DRAW    Narrative:     The following orders were created for panel order [place] Draw.  Procedure                               Abnormality         Status                     ---------                               -----------         ------                     Light Blue Top[[id number]]                                                               Green Top (Gel)[[id number]]                                  Final result               Lavender Top[[id number]]                                     Final result               Gold Top - [facility][[id number]]                                                                Please view results for these tests on the individual orders.   CBC AND DIFFERENTIAL    Narrative:     The following orders were created for panel order CBC & Differential.  Procedure                               Abnormality         Status                     ---------                               -----------         ------                     CBC Auto Differential[[id number]]        Abnormal            Final result                 Please view results for these tests on the individual orders.   GREEN TOP   LAVENDER TOP   LIGHT BLUE TOP       EKG:   ECG 12 Lead   Preliminary Result   HEART RATE= 119  bpm   RR Interval= 504  ms   TN Interval= 135  ms   P Horizontal Axis= 22  deg   P Front Axis= 57  deg   QRSD Interval= 80  ms   QT Interval= 299  ms   QRS Axis= 25  deg   T Wave Axis= 13  deg   - BORDERLINE ECG -   Sinus tachycardia   Borderline T wave abnormalities   Electronically Signed By:    Date and Time of Study: 2020-11-08 20:35:14          Meds given in ED:   Medications   sodium chloride 0.9 % flush 10 mL (has no administration in time range)   sodium chloride 0.9 % infusion (125 mL/hr Intravenous New Bag 11/8/20 2312)   HYDROmorphone (DILAUDID) injection 1 mg (1 mg Intravenous Given 11/8/20 2037)   ondansetron (ZOFRAN) injection 4 mg (4 mg Intravenous Given 11/8/20 2048)   iopamidol (ISOVUE-370) 76 % injection 100 mL (95 mL Intravenous Given by Other 11/8/20 2204)   HYDROmorphone (DILAUDID) injection 1 mg (1 mg Intravenous Given 11/8/20 2304)   sodium chloride 0.9 % bolus 500 mL (500 mL Intravenous New Bag 11/8/20 2312)       Imaging results:  Ct Abdomen Pelvis With Contrast    Result Date:  11/8/2020  1.  No PE. 2.  No acute cardiopulmonary pathology. 3.  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      CT ABDOMEN AND PELVIS WITH CONTRAST:  HISTORY: Left flank pain.  TECHNIQUE:  Multiple contiguous helical CT images of the abdomen and pelvis were obtained following the uneventful administration of intravenous contrast. Multiplanar reformatted images were reconstructed from the helical source data. Radiation dose reduction techniques were utilized, including automated exposure control and exposure modulation based on body size.   COMPARISON:  CT abdomen and pelvis 09/26/2020  FINDINGS:  Vasculature: Normal caliber abdominal aorta and branch vessels. No atherosclerotic calcifications.  Major venous structures are unremarkable.  Liver: Unremarkable.  Gallbladder and Bile Ducts: Cholecystectomy. No biliary ductal dilatation.  Spleen: 3.3 x 4.6 cm wedge-shaped region of hypodensity in the superior aspect of the spleen. This is new from the prior CT. 8mm distal splenic artery aneurysm.  Pancreas: Unremarkable.  Adrenals: Unremarkable.  Kidneys/Ureters/Bladder: Stable left renal volume loss. 2 cm simple left lower pole renal cyst. No calculi or hydronephrosis. Unremarkable ureters and bladder.  GI Tract/Mesentery:The stomach is unremarkable. No abnormally dilated small or large bowel loops. No definite bowel wall thickening. Normal appendix. No free or loculated fluid collections. No intraperitoneal free air.  Lymphadenopathy: No retroperitoneal, mesenteric or pelvic lymphadenopathy.  Pelvic organs: Unremarkable  Peripheral Soft Tissues: No soft tissue mass or fluid collection.  Small fat-containing umbilical hernia.  Osseous Structures: No acute osseous abnormality. No suspicious lytic or blastic lesions. Moderate spondylosis in the lower thoracic spine. Fusion hardware at L5-S1.   IMPRESSION:  New wedge-shaped hypodensity in the superior aspect of the spleen concerning for splenic infarction. 8 mm distal left  splenic artery aneurysm best seen on the concurrent PE exam.  Findings were discussed with Dr. Hansen at 10:50 PM on 11/08/2020.    This report was finalized on 11/8/2020 11:02 PM by Dr. Charly Hood M.D.      Ct Angiogram Chest    Result Date: 11/8/2020  1.  No PE. 2.  No acute cardiopulmonary pathology. 3.  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      CT ABDOMEN AND PELVIS WITH CONTRAST:  HISTORY: Left flank pain.  TECHNIQUE:  Multiple contiguous helical CT images of the abdomen and pelvis were obtained following the uneventful administration of intravenous contrast. Multiplanar reformatted images were reconstructed from the helical source data. Radiation dose reduction techniques were utilized, including automated exposure control and exposure modulation based on body size.   COMPARISON:  CT abdomen and pelvis 09/26/2020  FINDINGS:  Vasculature: Normal caliber abdominal aorta and branch vessels. No atherosclerotic calcifications.  Major venous structures are unremarkable.  Liver: Unremarkable.  Gallbladder and Bile Ducts: Cholecystectomy. No biliary ductal dilatation.  Spleen: 3.3 x 4.6 cm wedge-shaped region of hypodensity in the superior aspect of the spleen. This is new from the prior CT. 8mm distal splenic artery aneurysm.  Pancreas: Unremarkable.  Adrenals: Unremarkable.  Kidneys/Ureters/Bladder: Stable left renal volume loss. 2 cm simple left lower pole renal cyst. No calculi or hydronephrosis. Unremarkable ureters and bladder.  GI Tract/Mesentery:The stomach is unremarkable. No abnormally dilated small or large bowel loops. No definite bowel wall thickening. Normal appendix. No free or loculated fluid collections. No intraperitoneal free air.  Lymphadenopathy: No retroperitoneal, mesenteric or pelvic lymphadenopathy.  Pelvic organs: Unremarkable  Peripheral Soft Tissues: No soft tissue mass or fluid collection.  Small fat-containing umbilical hernia.  Osseous Structures: No acute osseous abnormality. No  suspicious lytic or blastic lesions. Moderate spondylosis in the lower thoracic spine. Fusion hardware at L5-S1.   IMPRESSION:  New wedge-shaped hypodensity in the superior aspect of the spleen concerning for splenic infarction. 8 mm distal left splenic artery aneurysm best seen on the concurrent PE exam.  Findings were discussed with Dr. Hansen at 10:50 PM on 11/08/2020.    This report was finalized on 11/8/2020 11:02 PM by Dr. Charly Hood M.D.        Ambulatory status:   Ad js  Social issues:   Social History     Socioeconomic History   • Marital status: Single     Spouse name: Not on file   • Number of children: Not on file   • Years of education: Not on file   • Highest education level: Not on file   Tobacco Use   • Smoking status: Current Every Day Smoker     Packs/day: 0.50   • Smokeless tobacco: Never Used   Substance and Sexual Activity   • Alcohol use: Not Currently   • Drug use: Not Currently    Nursing report ED to floor     Marysol Pa RN  11/08/20 2351      Electronically signed by Marysol Pa RN at 11/08/20 2351       {Outbreak/Travel/Exposure Documentation......;  Question Available Choices Patient Response   Outbreak Screen: Do you currently have a new onset of the following symptoms?        Fever/Chils, Cough, Shortness of air, Loss of taste or smell, No, Unknown  No (11/08/20 1845)   Outbreak Screen: In the last 14 days, have you had contact with anyone who is ill, has show any of the symptoms listed above and/or has been diagnosis with the 2019 Novel Coronavirus? This includes any immediate household members but excludes any patients with whom you have been in contact within your normal work duties wearing proper PPE, if you are a healthcare worker.  Yes, No, Unknown              No (11/08/20 1845)   Outbreak Screen: Who was notified?    Free text  (not recorded)   Travel Screen: Have you traveled in the last month? If so, to what country have you traveled? If [place] what state? Yes, No,  Unknown  List of all countries  List of all States No (11/08/20 1845)  (not recorded)  (not recorded)   Infection Risk: Do you currently have the following symptoms?  (If cough is selected, the Tuberculosis Screen is performed.) Cough, Fever, Rash, No No (11/08/20 1845)   Tuberculosis Screen: Do you have any of the following Tuberculosis Risks?  · Have you lived or spent time with anyone who had or may have TB?  · Have you lived in or visited any of the following areas for more than one month: [place], [place], [place], [place] or [place], [place] or [place]?  · Do you have HIV/AIDS?  · Have you lived in or worked in a nursing home, homeless shelter, correctional facility, or substance abuse treatment facility?   · No    If Yes do you have any of the following symptoms? Yes responses display to the right    If Yes, symptoms listed are:  Cough greater than or equal to 3 weeks, Loss of appetite, Unexplained weight loss, Night sweats, Bloody sputum or hemoptysis, Hoarseness, Fever, Fatigue, Chest pain, No (not recorded)  (not recorded)   Exposure Screen: Have you been exposed to any of these contagious diseases in the last month? Measles, Chickenpox, Meningitis, Pertussis, Whooping Cough, No No (11/08/20 1845)       Vital Signs (last day)     Date/Time   Temp   Temp src   Pulse   Resp   BP   Patient Position   SpO2    11/09/20 0820   98.6 (37)   Oral   --   18   133/87   Lying   --    11/09/20 0115   98.4 (36.9)   Oral   114   18   139/95   Lying   96    11/09/20 0000   --   --   115   18   145/89   --   99    11/08/20 2330   --   --   115   18   107/88   --   99    11/08/20 2329   --   --   115   --   --   --   99    11/08/20 2300   --   --   116   --   119/83   --   99    11/08/20 2246   --   --   116   --   --   --   99    11/08/20 2230   --   --   (!) 122   --   --   --   98    11/08/20 2229   --   --   --   --   116/72   --   --    11/08/20 2215   --   --   117   --   --   --   100    11/08/20 2150    --   --   (!) 126   --   --   --   96    11/08/20 2146   --   --   (!) 125   --   --   --   100    11/08/20 2116   --   --   (!) 124   --   --   --   98    11/08/20 2115   --   --   (!) 125   --   --   --   97    11/08/20 2100   --   --   118   18   112/69   --   96    11/08/20 2031   --   --   (!) 121   18   109/82   --   99    11/08/20 2014   --   --   (!) 123   18   128/90   --   100    11/08/20 1833   --   --   (!) 133   --   --   --   100    11/08/20 1832   --   --   (!) 135   --   --   --   98    11/08/20 1831   --   --   --   --   141/93   --   --    11/08/20 1827   98.7 (37.1)   Tympanic   (!) 142   18   --   --   99              Oxygen Therapy (last day)     Date/Time   SpO2   Device (Oxygen Therapy)   Flow (L/min)   Oxygen Concentration (%)   ETCO2 (mmHg)    11/09/20 0820   --   room air   --   --   --    11/09/20 0800   --   room air   --   --   --    11/09/20 0115   96   room air   --   --   --    11/09/20 0000   99   --   --   --   --    11/08/20 2330   99   --   --   --   --    11/08/20 2329   99   --   --   --   --    11/08/20 2300   99   --   --   --   --    11/08/20 2246   99   --   --   --   --    11/08/20 2230   98   --   --   --   --    11/08/20 2215   100   --   --   --   --    11/08/20 2158   96   --   --   --   --    11/08/20 2146   100   --   --   --   --    11/08/20 2116   98   --   --   --   --    11/08/20 2115   97   --   --   --   --    11/08/20 2100   96   --   --   --   --    11/08/20 2031   99   --   --   --   --    11/08/20 2014   100   --   --   --   --    11/08/20 1833   100   --   --   --   --    11/08/20 1832   98   --   --   --   --    11/08/20 1827   99   room air   --   --   --            Intake & Output (last day)       11/08 0701 - 11/09 0700 11/09 0701 - 11/10 0700          Urine Unmeasured Occurrence 1 x         Lines, Drains & Airways    Active LDAs     Name:   Placement date:   Placement time:   Site:   Days:    Peripheral IV 11/08/20 2146 Left;Medial Antecubital    11/08/20 2146    Antecubital   less than 1    Peripheral IV 11/09/20 0130 Right Antecubital   11/09/20    0130    Antecubital   less than 1                Facility-Administered Medications as of 11/9/2020   Medication Dose Route Frequency Provider Last Rate Last Dose   • acetaminophen (TYLENOL) tablet 650 mg  650 mg Oral Q4H PRN Beena Fregoso APRN        Or   • acetaminophen (TYLENOL) 160 MG/5ML solution 650 mg  650 mg Oral Q4H PRN Beena Fregoso APRN        Or   • acetaminophen (TYLENOL) suppository 650 mg  650 mg Rectal Q4H PRN Beena Fregoso APRN       • amitriptyline (ELAVIL) tablet 25 mg  25 mg Oral Nightly Beatriz Hernandez APRN   25 mg at 11/09/20 0322   • cyclobenzaprine (FLEXERIL) tablet 10 mg  10 mg Oral BID Beatriz Hernandez APRN   10 mg at 11/09/20 0822   • gabapentin (NEURONTIN) capsule 800 mg  800 mg Oral Q8H Mane Urrutia MD       • heparin 61188 units/250 mL (100 units/mL) in 0.45 % NaCl infusion  11.6 Units/kg/hr Intravenous Titrated Nadir Suazo MD 15.18 mL/hr at 11/09/20 0941 14.6 Units/kg/hr at 11/09/20 0941   • HYDROmorphone (DILAUDID) injection 0.5 mg  0.5 mg Intravenous Q2H PRN Beena Fregoso APRN   0.5 mg at 11/09/20 1024   • [COMPLETED] HYDROmorphone (DILAUDID) injection 1 mg  1 mg Intravenous Once Teresa Hansen MD   1 mg at 11/08/20 2037   • [COMPLETED] HYDROmorphone (DILAUDID) injection 1 mg  1 mg Intravenous Once Teresa Hansen MD   1 mg at 11/08/20 2304   • hydrOXYzine (ATARAX) tablet 25 mg  25 mg Oral TID PRN Beatriz Hernandez APRN   25 mg at 11/09/20 0321   • [COMPLETED] iopamidol (ISOVUE-370) 76 % injection 100 mL  100 mL Intravenous Once in imaging Teresa Hansen MD   95 mL at 11/08/20 2204   • nicotine (NICODERM CQ) 14 MG/24HR patch 1 patch  1 patch Transdermal Q24H Beatriz Hernandez APRN   1 patch at 11/09/20 0823   • nitroglycerin (NITROSTAT) SL tablet 0.4 mg  0.4 mg Sublingual Q5 Min PRN Beena Fregoso APRN       • [COMPLETED]  ondansetron (ZOFRAN) injection 4 mg  4 mg Intravenous Once Teresa Hansen MD   4 mg at 11/08/20 2048   • ondansetron (ZOFRAN) injection 4 mg  4 mg Intravenous Q6H PRN Beena Fregoso APRN   4 mg at 11/09/20 1030   • oxyCODONE-acetaminophen (PERCOCET) 5-325 MG per tablet 1 tablet  1 tablet Oral Q4H PRN Mane Urrutia MD       • [COMPLETED] sodium chloride 0.9 % bolus 500 mL  500 mL Intravenous Once Teresa Hansen MD 1,000 mL/hr at 11/08/20 2312 500 mL at 11/08/20 2312   • sodium chloride 0.9 % flush 10 mL  10 mL Intravenous PRN Teresa Hansen MD       • sodium chloride 0.9 % flush 10 mL  10 mL Intravenous Q12H Beena Fregoso APRN   10 mL at 11/09/20 0216   • sodium chloride 0.9 % flush 10 mL  10 mL Intravenous PRN Beena Fregoso APRN       • sodium chloride 0.9 % infusion  100 mL/hr Intravenous Continuous Beena Fregoso, APRN 100 mL/hr at 11/09/20 1024 100 mL/hr at 11/09/20 1024     Lab Results (last 24 hours)     Procedure Component Value Units Date/Time    aPTT [[id number]]  (Abnormal) Collected: 11/09/20 0807    Specimen: Blood from Hand, Left Updated: 11/09/20 0851     PTT 53.3 seconds     Basic Metabolic Panel [[id number]]  (Abnormal) Collected: 11/09/20 0511    Specimen: Blood Updated: 11/09/20 0614     Glucose 126 mg/dL      BUN 8 mg/dL      Creatinine 0.77 mg/dL      Sodium 137 mmol/L      Potassium 3.6 mmol/L      Chloride 102 mmol/L      CO2 24.4 mmol/L      Calcium 8.6 mg/dL      eGFR Non African Amer 84 mL/min/1.73      BUN/Creatinine Ratio 10.4     Anion Gap 10.6 mmol/L     Narrative:      GFR Normal >60  Chronic Kidney Disease <60  Kidney Failure <15      [place] Draw [[id number]] Collected: 11/08/20 1851    Specimen: Blood Updated: 11/09/20 0507    Narrative:      The following orders were created for panel order [place] Draw.  Procedure                               Abnormality         Status                     ---------                               -----------         ------                      Light Blue Top[[id number]]                                                              Green Top (Gel)[[id number]]                                  Final result               Lavender Top[[id number]]                                     Final result               Gold Top - SST[[id number]]                                                                Please view results for these tests on the individual orders.    COVID PRE-OP / PRE-PROCEDURE SCREENING ORDER (NO ISOLATION) - Swab, Nasopharynx [[id number]]  (Normal) Collected: 11/08/20 2314    Specimen: Swab from Nasopharynx Updated: 11/09/20 0052    Narrative:      The following orders were created for panel order COVID PRE-OP / PRE-PROCEDURE SCREENING ORDER (NO ISOLATION) - Swab, Nasopharynx.  Procedure                               Abnormality         Status                     ---------                               -----------         ------                     Respiratory Panel PCR w/...[[id number]]  Normal              Final result                 Please view results for these tests on the individual orders.    Respiratory Panel PCR w/COVID-19(SARS-CoV-2) LETY/REESE/RASHAWN/PAD/COR/MAD/CHANEL In-House, NP Swab in UTM/VTM, 3-4 HR TAT - Swab, Nasopharynx [[id number]]  (Normal) Collected: 11/08/20 2314    Specimen: Swab from Nasopharynx Updated: 11/09/20 0052     ADENOVIRUS, PCR Not Detected     Coronavirus 229E Not Detected     Coronavirus HKU1 Not Detected     Coronavirus NL63 Not Detected     Coronavirus OC43 Not Detected     COVID19 Not Detected     Human Metapneumovirus Not Detected     Human Rhinovirus/Enterovirus Not Detected     Influenza A PCR Not Detected     Influenza B PCR Not Detected     Parainfluenza Virus 1 Not Detected     Parainfluenza Virus 2 Not Detected     Parainfluenza Virus 3 Not Detected     Parainfluenza Virus 4 Not Detected     RSV, PCR Not Detected     Bordetella pertussis pcr Not Detected     Bordetella parapertussis PCR Not Detected      Chlamydophila pneumoniae PCR Not Detected     Mycoplasma pneumo by PCR Not Detected    Narrative:      Fact sheet for providers: https://docs.[employer]/wp-content/uploads/LKC6764-3558-ZL2.1-EUA-Provider-Fact-Sheet-3.pdf    Fact sheet for patients: https://docs.[employer]/wp-content/uploads/SQC2440-3814-MJ3.1-EUA-Patient-Fact-Sheet-1.pdf    Protime-INR [[id number]]  (Normal) Collected: 11/08/20 2318    Specimen: Blood Updated: 11/08/20 2337     Protime 12.5 Seconds      INR 0.94    aPTT [[id number]]  (Normal) Collected: 11/08/20 2318    Specimen: Blood Updated: 11/08/20 2337     PTT 30.5 seconds     TSH [[id number]]  (Normal) Collected: 11/08/20 1851    Specimen: Blood Updated: 11/08/20 2108     TSH 1.110 uIU/mL     Urine Drug Screen - Urine, Clean Catch [[id number]]  (Normal) Collected: 11/08/20 1851    Specimen: Urine, Clean Catch Updated: 11/08/20 2101     Amphet/Methamphet, Screen Negative     Barbiturates Screen, Urine Negative     Benzodiazepine Screen, Urine Negative     Cocaine Screen, Urine Negative     Opiate Screen Negative     THC, Screen, Urine Negative     Methadone Screen, Urine Negative     Oxycodone Screen, Urine Negative    Narrative:      Negative Thresholds For Drugs Screened:     Amphetamines               500 ng/ml   Barbiturates               200 ng/ml   Benzodiazepines            100 ng/ml   Cocaine                    300 ng/ml   Methadone                  300 ng/ml   Opiates                    300 ng/ml   Oxycodone                  100 ng/ml   THC                        50 ng/ml    The Normal Value for all drugs tested is negative. This report includes final unconfirmed screening results to be used for medical treatment purposes only. Unconfirmed results must not be used for non-medical purposes such as employment or legal testing. Clinical consideration should be applied to any drug of abuse test, particulary when unconfirmed results are used.    Magnesium [[id number]]  (Normal) Collected:  11/08/20 1851    Specimen: Blood Updated: 11/08/20 2054     Magnesium 2.2 mg/dL     Green Top (Gel) [[id number]] Collected: 11/08/20 1851    Specimen: Blood Updated: 11/08/20 2000     Extra Tube Hold for add-ons.     Comment: Auto resulted.       Lavender Top [[id number]] Collected: 11/08/20 1851    Specimen: Blood Updated: 11/08/20 2000     Extra Tube hold for add-on     Comment: Auto resulted       hCG, Serum, Qualitative [[id number]]  (Normal) Collected: 11/08/20 1851    Specimen: Blood Updated: 11/08/20 1923     HCG Qualitative Negative    Comprehensive Metabolic Panel [[id number]]  (Abnormal) Collected: 11/08/20 1851    Specimen: Blood Updated: 11/08/20 1921     Glucose 101 mg/dL      BUN 7 mg/dL      Creatinine 0.92 mg/dL      Sodium 139 mmol/L      Potassium 4.4 mmol/L      Chloride 102 mmol/L      CO2 28.1 mmol/L      Calcium 9.4 mg/dL      Total Protein 7.3 g/dL      Albumin 4.40 g/dL      ALT (SGPT) 39 U/L      AST (SGOT) 30 U/L      Alkaline Phosphatase 153 U/L      Total Bilirubin 0.4 mg/dL      eGFR Non African Amer 68 mL/min/1.73      Globulin 2.9 gm/dL      A/G Ratio 1.5 g/dL      BUN/Creatinine Ratio 7.6     Anion Gap 8.9 mmol/L     Narrative:      GFR Normal >60  Chronic Kidney Disease <60  Kidney Failure <15      Lipase [[id number]]  (Normal) Collected: 11/08/20 1851    Specimen: Blood Updated: 11/08/20 1921     Lipase 19 U/L     Urinalysis With Microscopic If Indicated (No Culture) - Urine, Clean Catch [[id number]]  (Normal) Collected: 11/08/20 1851    Specimen: Urine, Clean Catch Updated: 11/08/20 1901     Color, UA Yellow     Appearance, UA Clear     pH, UA 6.0     Specific Gravity, UA 1.022     Glucose, UA Negative     Ketones, UA Negative     Bilirubin, UA Negative     Blood, UA Negative     Protein, UA Negative     Leuk Esterase, UA Negative     Nitrite, UA Negative     Urobilinogen, UA 1.0 E.U./dL    Narrative:      Urine microscopic not indicated.    CBC & Differential [[id number]]  (Abnormal)  Collected: 11/08/20 1851    Specimen: Blood Updated: 11/08/20 1858    Narrative:      The following orders were created for panel order CBC & Differential.  Procedure                               Abnormality         Status                     ---------                               -----------         ------                     CBC Auto Differential[[id number]]        Abnormal            Final result                 Please view results for these tests on the individual orders.    CBC Auto Differential [[id number]]  (Abnormal) Collected: 11/08/20 1851    Specimen: Blood Updated: 11/08/20 1858     WBC 13.39 10*3/mm3      RBC 4.52 10*6/mm3      Hemoglobin 14.6 g/dL      Hematocrit 42.7 %      MCV 94.5 fL      MCH 32.3 pg      MCHC 34.2 g/dL      RDW 12.6 %      RDW-SD 43.7 fl      MPV 9.8 fL      Platelets 388 10*3/mm3      Neutrophil % 70.8 %      Lymphocyte % 19.5 %      Monocyte % 7.8 %      Eosinophil % 1.1 %      Basophil % 0.4 %      Immature Grans % 0.4 %      Neutrophils, Absolute 9.46 10*3/mm3      Lymphocytes, Absolute 2.61 10*3/mm3      Monocytes, Absolute 1.05 10*3/mm3      Eosinophils, Absolute 0.15 10*3/mm3      Basophils, Absolute 0.06 10*3/mm3      Immature Grans, Absolute 0.06 10*3/mm3      nRBC 0.0 /100 WBC         Imaging Results (Last 24 Hours)     Procedure Component Value Units Date/Time    CT Abdomen Pelvis With Contrast [[id number]] Collected: 11/08/20 2241     Updated: 11/08/20 2305    Narrative:      CT PULMONARY ANGIOGRAM:     HISTORY:  Evaluate for left lower PE     TECHNIQUE:  Non contrast localizing images were performed through the  mediastinum for localization and bolus timing. Axial images were  obtained from the apex to the lung bases and during IV contrast  infusion.  100 ml of Isovue-300 intravenous contrast was administered.  No adverse reaction. Multiplanar reformatted images were reconstructed  from the helical source data. Radiation dose reduction techniques were  utilized,  including automated exposure control and exposure modulation  based on body size.         COMPARISON:  CT abdomen and pelvis 09/26/2020.     FINDINGS:        Excellent opacification of the pulmonary arterial system. No  respiratory motion artifact. Normal caliber main pulmonary artery. No  evidence of pulmonary embolus.     The thoracic aorta is normal in caliber.     The heart is normal in size. No pericardial effusion.     No mediastinal, hilar or axillary adenopathy.     The central airways are patent. No focal consolidation or suspicious  pulmonary nodules. No pleural effusions. No pneumothorax.     No acute osseous abnormality. No aggressive osseous lesions. Moderate  thoracic spondylosis.     The chest wall soft tissues are normal.     The visualized thyroid gland is unremarkable.     8 mm distal left splenic artery aneurysm (series 7, image 133). This is  stable compared to the prior exam dated 09/26/2020.          Impression:      1.  No PE.  2.  No acute cardiopulmonary pathology.  3.  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                 CT ABDOMEN AND PELVIS WITH CONTRAST:     HISTORY: Left flank pain.      TECHNIQUE:  Multiple contiguous helical CT images of the abdomen and  pelvis were obtained following the uneventful administration of  intravenous contrast. Multiplanar reformatted images were reconstructed  from the helical source data. Radiation dose reduction techniques were  utilized, including automated exposure control and exposure modulation  based on body size.        COMPARISON:  CT abdomen and pelvis 09/26/2020     FINDINGS:      Vasculature: Normal caliber abdominal aorta and branch vessels. No  atherosclerotic calcifications.  Major venous structures are  unremarkable.     Liver: Unremarkable.      Gallbladder and Bile Ducts: Cholecystectomy. No biliary ductal  dilatation.     Spleen: 3.3 x 4.6 cm wedge-shaped region of hypodensity in the superior  aspect of the spleen. This is new from  the prior CT. 8mm distal splenic  artery aneurysm.     Pancreas: Unremarkable.     Adrenals: Unremarkable.     Kidneys/Ureters/Bladder: Stable left renal volume loss. 2 cm simple left  lower pole renal cyst. No calculi or hydronephrosis. Unremarkable  ureters and bladder.     GI Tract/Mesentery:The stomach is unremarkable. No abnormally dilated  small or large bowel loops. No definite bowel wall thickening. Normal  appendix. No free or loculated fluid collections. No intraperitoneal  free air.     Lymphadenopathy: No retroperitoneal, mesenteric or pelvic  lymphadenopathy.     Pelvic organs: Unremarkable     Peripheral Soft Tissues: No soft tissue mass or fluid collection.  Small  fat-containing umbilical hernia.     Osseous Structures: No acute osseous abnormality. No suspicious lytic or  blastic lesions. Moderate spondylosis in the lower thoracic spine.  Fusion hardware at L5-S1.        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  8 mm distal left splenic artery aneurysm best seen on the concurrent PE  exam.     Findings were discussed with Dr. Hansen at 10:50 PM on 11/08/2020.           This report was finalized on 11/8/2020 11:02 PM by Dr. Charly Hood M.D.       CT Angiogram Chest [[id number]] Collected: 11/08/20 2241     Updated: 11/08/20 2305    Narrative:      CT PULMONARY ANGIOGRAM:     HISTORY:  Evaluate for left lower PE     TECHNIQUE:  Non contrast localizing images were performed through the  mediastinum for localization and bolus timing. Axial images were  obtained from the apex to the lung bases and during IV contrast  infusion.  100 ml of Isovue-300 intravenous contrast was administered.  No adverse reaction. Multiplanar reformatted images were reconstructed  from the helical source data. Radiation dose reduction techniques were  utilized, including automated exposure control and exposure modulation  based on body size.          COMPARISON:  CT abdomen and pelvis 09/26/2020.     FINDINGS:        Excellent opacification of the pulmonary arterial system. No  respiratory motion artifact. Normal caliber main pulmonary artery. No  evidence of pulmonary embolus.     The thoracic aorta is normal in caliber.     The heart is normal in size. No pericardial effusion.     No mediastinal, hilar or axillary adenopathy.     The central airways are patent. No focal consolidation or suspicious  pulmonary nodules. No pleural effusions. No pneumothorax.     No acute osseous abnormality. No aggressive osseous lesions. Moderate  thoracic spondylosis.     The chest wall soft tissues are normal.     The visualized thyroid gland is unremarkable.     8 mm distal left splenic artery aneurysm (series 7, image 133). This is  stable compared to the prior exam dated 09/26/2020.          Impression:      1.  No PE.  2.  No acute cardiopulmonary pathology.  3.  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                 CT ABDOMEN AND PELVIS WITH CONTRAST:     HISTORY: Left flank pain.      TECHNIQUE:  Multiple contiguous helical CT images of the abdomen and  pelvis were obtained following the uneventful administration of  intravenous contrast. Multiplanar reformatted images were reconstructed  from the helical source data. Radiation dose reduction techniques were  utilized, including automated exposure control and exposure modulation  based on body size.        COMPARISON:  CT abdomen and pelvis 09/26/2020     FINDINGS:      Vasculature: Normal caliber abdominal aorta and branch vessels. No  atherosclerotic calcifications.  Major venous structures are  unremarkable.     Liver: Unremarkable.      Gallbladder and Bile Ducts: Cholecystectomy. No biliary ductal  dilatation.     Spleen: 3.3 x 4.6 cm wedge-shaped region of hypodensity in the superior  aspect of the spleen. This is new from the prior CT. 8mm distal splenic  artery aneurysm.     Pancreas: Unremarkable.      Adrenals: Unremarkable.     Kidneys/Ureters/Bladder: Stable left renal volume loss. 2 cm simple left  lower pole renal cyst. No calculi or hydronephrosis. Unremarkable  ureters and bladder.     GI Tract/Mesentery:The stomach is unremarkable. No abnormally dilated  small or large bowel loops. No definite bowel wall thickening. Normal  appendix. No free or loculated fluid collections. No intraperitoneal  free air.     Lymphadenopathy: No retroperitoneal, mesenteric or pelvic  lymphadenopathy.     Pelvic organs: Unremarkable     Peripheral Soft Tissues: No soft tissue mass or fluid collection.  Small  fat-containing umbilical hernia.     Osseous Structures: No acute osseous abnormality. No suspicious lytic or  blastic lesions. Moderate spondylosis in the lower thoracic spine.  Fusion hardware at L5-S1.        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  8 mm distal left splenic artery aneurysm best seen on the concurrent PE  exam.     Findings were discussed with Dr. Hansen at 10:50 PM on 11/08/2020.           This report was finalized on 11/8/2020 11:02 PM by Dr. Charly Hood M.D.           Orders (last 24 hrs)      Start     Ordered    11/10/20 0430  aPTT  Daily,   Status:  Canceled     Comments: Cancel If Patient Has Infusion Changes      11/09/20 0017    11/10/20 0430  CBC & Differential  Daily,   Status:  Canceled     Comments: Discontinue After Heparin Stopped      11/09/20 0017    11/10/20 0430  aPTT  Daily     Comments: Cancel If Patient Has Infusion Changes      11/09/20 0110    11/10/20 0430  CBC & Differential  Daily     Comments: Discontinue After Heparin Stopped      11/09/20 0110    11/09/20 1600  aPTT  Timed      11/09/20 1040    11/09/20 1400  gabapentin (NEURONTIN) capsule 800 mg  Every 8 Hours Scheduled      11/09/20 1201    11/09/20 1202  oxyCODONE-acetaminophen (PERCOCET) 5-325 MG per tablet 1 tablet  Every 4 Hours PRN       11/09/20 1203    11/09/20 0950  Hematology & Oncology Inpatient Consult  Once     Specialty:  Hematology and Oncology  Provider:  Bjorn Kendrick II, MD    11/09/20 0949    11/09/20 0950  Inpatient Cardiology Consult  Once     Specialty:  Cardiology  Provider:  Jannie Vu MD    11/09/20 0949    11/09/20 0900  gabapentin (NEURONTIN) capsule 400 mg  Every 12 Hours Scheduled,   Status:  Discontinued      11/09/20 0208    11/09/20 0900  cyclobenzaprine (FLEXERIL) tablet 10 mg  2 times daily      11/09/20 0208    11/09/20 0900  nicotine (NICODERM CQ) 14 MG/24HR patch 1 patch  Every 24 Hours Scheduled      11/09/20 0401    11/09/20 0815  aPTT  Timed      11/09/20 0552    11/09/20 0600  Basic Metabolic Panel  Morning Draw      11/09/20 0112 11/09/20 0600  CBC (No Diff)  Morning Draw,   Status:  Canceled      11/09/20 0112 11/09/20 0400  Vital Signs  Every 4 Hours      11/09/20 0112    11/09/20 0300  amitriptyline (ELAVIL) tablet 25 mg  Nightly      11/09/20 0208    11/09/20 0300  heparin 44579 units/250 mL (100 units/mL) in 0.45 % NaCl infusion  Titrated      11/09/20 0209    11/09/20 0207  hydrOXYzine (ATARAX) tablet 25 mg  3 Times Daily PRN      11/09/20 0208    11/09/20 0200  heparin 93196 units/250 mL (100 units/mL) in 0.45 % NaCl infusion  Titrated,   Status:  Discontinued      11/09/20 0110 11/09/20 0200  sodium chloride 0.9 % flush 10 mL  Every 12 Hours Scheduled      11/09/20 0112 11/09/20 0200  sodium chloride 0.9 % infusion  Continuous      11/09/20 0112 11/09/20 0130  Opioid Administration - Continuous Pulse Oximetry (SpO2) Monitoring  Per Order Details      11/09/20 0130 11/09/20 0130  Opioid Administration - Document SpO2 Value With Each Set of Vitals & Any Change in Patient Status  Per Order Details      11/09/20 0130    11/09/20 0130  Opioid Administration - Notify Provider Pulse Oximetry (SpO2)  Until Discontinued      11/09/20 0130    11/09/20 0112  HYDROmorphone (DILAUDID)  injection 0.5 mg  Every 2 Hours PRN      11/09/20 0112    11/09/20 0112  Place Sequential Compression Device  Once      11/09/20 0112 11/09/20 0112  Maintain Sequential Compression Device  Continuous      11/09/20 0112 11/09/20 0112  Telemetry - Maintain IV Access  Continuous      11/09/20 0112 11/09/20 0112  Continuous Cardiac Monitoring  Continuous      11/09/20 0112 11/09/20 0112  May Be Off Telemetry for Tests  Continuous      11/09/20 0112 11/09/20 0112  ACLS Protocol For Life Threatening Dysrhythmias (Unless Code Status Indicates Otherwise)  Continuous      11/09/20 0112 11/09/20 0112  Notify Provider if ACLS Protocol Activated  Until Discontinued      11/09/20 0112 11/09/20 0112  Diet Regular; Cardiac  Diet Effective Now      11/09/20 0112 11/09/20 0112  Inpatient Vascular Surgery Consult  Once     Specialty:  Vascular Surgery  Provider:  Christian Auguste MD    11/09/20 0112 11/09/20 0111  ondansetron (ZOFRAN) injection 4 mg  Every 6 Hours PRN      11/09/20 0112 11/09/20 0111  Telemetry - Pulse Oximetry  Continuous PRN,   Status:  Canceled     Comments: If Patient Develops Unresponsiveness, Acute Dyspnea, Cyanosis or Suspected Hypoxemia Start Continuous Pulse Ox Monitoring, Apply Oxygen & Notify Provider    11/09/20 0112 11/09/20 0111  nitroglycerin (NITROSTAT) SL tablet 0.4 mg  Every 5 Minutes PRN      11/09/20 0112 11/09/20 0111  Adjust Heparin Rate Based on aPTT Using Nomogram  Continuous      11/09/20 0110 11/09/20 0111  Verify All Anticoagulant Orders Are Discontinued Upon Initiation of Heparin Protocol (eg Enoxaparin, Fondaparinux, Apixaban, Dabigatran, Edoxaban, or Rivaroxaban)  Once      11/09/20 0110 11/09/20 0111  Protime-INR  Once,   Status:  Canceled     Comments: Prior to Initial Heparin Bolus      11/09/20 0110 11/09/20 0111  aPTT  Once,   Status:  Canceled     Comments: Prior to Initial Heparin Bolus      11/09/20 0110 11/09/20 0111  RN To  Release aPTT Order 6 Hours After Heparin Bolus & 6 Hours After Any Heparin Rate Change  Continuous      11/09/20 0110 11/09/20 0111  CBC & Differential  Once,   Status:  Canceled     Comments: Prior to Initial Heparin Bolus      11/09/20 0110 11/09/20 0111  CBC Auto Differential  PROCEDURE ONCE,   Status:  Canceled     Comments: Prior to Initial Heparin Bolus      11/09/20 0110 11/09/20 0111  Code Status and Medical Interventions:  Continuous      11/09/20 0112 11/09/20 0111  Intake & Output  Every Shift      11/09/20 0112 11/09/20 0111  Weigh Patient  Once      11/09/20 0112 11/09/20 0111  Oxygen Therapy- Nasal Cannula; Titrate for SPO2: 90%  Continuous      11/09/20 0112 11/09/20 0111  Insert Peripheral IV  Once      11/09/20 0112 11/09/20 0111  Saline Lock & Maintain IV Access  Continuous,   Status:  Canceled      11/09/20 0112 11/09/20 0111  sodium chloride 0.9 % flush 10 mL  As Needed      11/09/20 0112 11/09/20 0111  acetaminophen (TYLENOL) tablet 650 mg  Every 4 Hours PRN      11/09/20 0112    11/09/20 0111  acetaminophen (TYLENOL) 160 MG/5ML solution 650 mg  Every 4 Hours PRN      11/09/20 0112    11/09/20 0111  acetaminophen (TYLENOL) suppository 650 mg  Every 4 Hours PRN      11/09/20 0112    11/09/20 0019  heparin (porcine) 5000 UNIT/ML injection 6,900 Units  Once,   Status:  Discontinued      11/09/20 0017 11/09/20 0019  heparin 96324 units/250 mL (100 units/mL) in 0.45 % NaCl infusion  Titrated,   Status:  Discontinued      11/09/20 0017 11/09/20 0018  Insert 2nd peripheral IV  Once      11/09/20 0017 11/09/20 0016  Adjust Heparin Rate Based on aPTT Using Nomogram  Continuous,   Status:  Canceled      11/09/20 0017 11/09/20 0016  Verify All Anticoagulant Orders Are Discontinued Upon Initiation of Heparin Protocol (eg Enoxaparin, Fondaparinux, Apixaban, Dabigatran, Edoxaban, or Rivaroxaban)  Once,   Status:  Canceled      11/09/20 0017    11/09/20 0016  RN To  Release aPTT Order 6 Hours After Heparin Bolus & 6 Hours After Any Heparin Rate Change  Continuous,   Status:  Canceled      11/09/20 0017 11/09/20 0015  heparin (porcine) 5000 UNIT/ML injection 3,400-6,900 Units  Every 6 Hours PRN,   Status:  Discontinued      11/09/20 0017 11/09/20 0015  aPTT  As Needed,   Status:  Canceled      11/09/20 0017 11/08/20 2335  Cardiac Monitoring  Once,   Status:  Canceled      11/08/20 2334    11/08/20 2335  Inpatient Admission  Once      11/08/20 2335    11/08/20 2322  [facility] (on-call MD unless specified) Details  Once     Specialty:  Hospitalist  Provider:  (Not yet assigned)    11/08/20 2321 11/08/20 2319  aPTT  Once      11/08/20 2318 11/08/20 2317  aPTT  Once,   Status:  Canceled      11/08/20 2316 11/08/20 2302  HYDROmorphone (DILAUDID) injection 1 mg  Once      11/08/20 2300 11/08/20 2302  sodium chloride 0.9 % infusion  Continuous,   Status:  Discontinued      11/08/20 2300 11/08/20 2302  sodium chloride 0.9 % bolus 500 mL  Once      11/08/20 2300 11/08/20 2302  COVID PRE-OP / PRE-PROCEDURE SCREENING ORDER (NO ISOLATION) - Swab, Nasopharynx  Once      11/08/20 2301 11/08/20 2302  Respiratory Panel PCR w/COVID-19(SARS-CoV-2) LETY/REESE/RASHAWN/PAD/COR/MAD/CHANEL In-House, NP Swab in UTM/VTM, 3-4 HR TAT - Swab, Nasopharynx  PROCEDURE ONCE      11/08/20 2301 11/08/20 2301  Protime-INR  Once      11/08/20 2300 11/08/20 2301  aPTT  Once,   Status:  Canceled      11/08/20 2300 11/08/20 2126  iopamidol (ISOVUE-370) 76 % injection 100 mL  Once in Imaging      11/08/20 2124 11/08/20 2048  ondansetron (ZOFRAN) injection 4 mg  Once      11/08/20 2046 11/08/20 2030  HYDROmorphone (DILAUDID) injection 1 mg  Once      11/08/20 2028 11/08/20 2028  Magnesium  Once      11/08/20 2028 11/08/20 2028  TSH  Once      11/08/20 2028 11/08/20 2028  ECG 12 Lead  Once      11/08/20 2028 11/08/20 2028  CT Angiogram Chest  1 Time Imaging      11/08/20 2028     11/08/20 2020  Urine Drug Screen - Urine, Clean Catch  Once      11/08/20 2019 11/08/20 2019  CT Abdomen Pelvis With Contrast  1 Time Imaging     Comments: IV CONTRAST ONLY      11/08/20 2019 11/08/20 1842  Insert peripheral IV  Once      11/08/20 1841 11/08/20 1842  [place] Draw  Once      11/08/20 1841 11/08/20 1842  CBC & Differential  Once      11/08/20 1841 11/08/20 1842  Comprehensive Metabolic Panel  Once      11/08/20 1841 11/08/20 1842  Lipase  Once      11/08/20 1841 11/08/20 1842  Urinalysis With Microscopic If Indicated (No Culture) - Urine, Clean Catch  STAT      11/08/20 1841 11/08/20 1842  hCG, Serum, Qualitative  Once      11/08/20 1841 11/08/20 1842  Light Blue Top  PROCEDURE ONCE,   Status:  Canceled      11/08/20 1841 11/08/20 1842  Green Top (Gel)  PROCEDURE ONCE      11/08/20 1841 11/08/20 1842  Lavender Top  PROCEDURE ONCE      11/08/20 1841 11/08/20 1842  Gold Top - SST  PROCEDURE ONCE,   Status:  Canceled      11/08/20 1841 11/08/20 1842  CBC Auto Differential  PROCEDURE ONCE      11/08/20 1841 11/08/20 1841  sodium chloride 0.9 % flush 10 mL  As Needed      11/08/20 1841 09/22/20 0000  gabapentin (NEURONTIN) 800 MG tablet  3 Times Daily      11/09/20 0133 08/17/20 0000  cyclobenzaprine (FLEXERIL) 10 MG tablet  2 times daily      11/09/20 0133 08/06/20 0000  hydrOXYzine (ATARAX) 25 MG tablet  Every Night at Bedtime      11/09/20 0133    Unscheduled  aPTT  As Needed      11/09/20 0110    Unscheduled  Oxygen Therapy- Nasal Cannula; Titrate for SPO2: 90% - 95%  Continuous PRN     Comments: If Patient Develops Unresponsiveness, Acute Dyspnea, Cyanosis or Suspected Hypoxemia Start Continuous Pulse Ox Monitoring, Apply Oxygen & Notify Provider    11/09/20 0112    Unscheduled  ECG 12 Lead  As Needed     Comments: Nurse to Release if Patient Expericences Acute Chest Pain or Dysrhythmias    11/09/20 0112    Unscheduled  Potassium  As Needed      Comments: For Ventricular Arrhythmias      20    Unscheduled  Magnesium  As Needed     Comments: For Ventricular Arrhythmias      20    Unscheduled  Troponin  As Needed     Comments: For Chest Pain      20    Unscheduled  Blood Gas, Arterial -  As Needed     Comments: Per O2 PolicyNotify Physician      20    Unscheduled  Up With Assistance  As Needed      20    --  SUMAtriptan (IMITREX) 100 MG tablet  Every 2 Hours PRN      20    --  amitriptyline (ELAVIL) 25 MG tablet  Nightly      20    --  loratadine (Claritin) 10 MG tablet  Daily      20                   Consult Notes (last 24 hours) (Notes from 20 1208 through 20 1208)      Braulio Jane MD at 20 1002      Consult Orders    1. Inpatient Vascular Surgery Consult [[id number]] ordered by Beena Fregoso APRN               Name: Belen Allen ADMIT: 2020   : 1980  PCP: Sahil Cornelius MD    MRN: [id number] LOS: 1 days   AGE/SEX: 39 y.o. female  ROOM: [address]      Patient Care Team:  Sahil Cornelius MD as PCP - General (Internal Medicine)  Chief Complaint   Patient presents with   • Flank Pain     CC: Splenic infarct/splenic aneurysm evaluation.    Subjective     Inpatient Vascular Surgery Consult  Consult performed by: Braulio Jane MD  Consult ordered by: Beena Fregoso APRN  Reason for consult: Splenic infarct and aneurysm evaluation.    History generated from review of chart and verification of findings at bedside with patient.    Ms. Allen is a 39 y.o. female smoker with a history of anxiety, chronic pain that presents to [facility] complaining of left sided abdominal pain and vomiting. Patient reports constant, sharp pain to left upper quadrant, radiating to her back, worse with inspiration. She also reports nausea and vomiting and states these symptoms have been ongoing for  past 3 days. She denies fever, chest pain, changes in bowel or bladder habits but does report feeling of shortness of breath due to the pain. 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 She denies history of clots in the past, though does confirm that she is a current smoker. Labs done tonight show leukocytosis and elevated alk phos. CT shows left splenic artery aneurysm with new wedge-shaped hypodensity concerning for splenic infarction.     Review of Systems   Musculoskeletal: Positive for back pain.   All other systems reviewed and are negative.    Past Medical History:   Diagnosis Date   • Injury of back      Past Surgical History:   Procedure Laterality Date   • BACK SURGERY     • CHOLECYSTECTOMY     • SKIN SURGERY       History reviewed. No pertinent family history.  Social History     Tobacco Use   • Smoking status: Current Every Day Smoker     Packs/day: 0.50   • Smokeless tobacco: Never Used   Substance Use Topics   • Alcohol use: Not Currently   • Drug use: Not Currently     Medications Prior to Admission   Medication Sig Dispense Refill Last Dose   • amitriptyline (ELAVIL) 25 MG tablet Take 25 mg by mouth Every Night.   11/8/2020 at Unknown time   • cyclobenzaprine (FLEXERIL) 10 MG tablet Take 1 tablet by mouth 2 (two) times a day.   11/8/2020 at Unknown time   • gabapentin (NEURONTIN) 800 MG tablet Take 800 mg by mouth 3 (Three) Times a Day.   11/8/2020 at Unknown time   • HYDROcodone-acetaminophen (NORCO)  MG per tablet Take 1 tablet by mouth Every 4 (Four) Hours As Needed for Moderate Pain . 10 tablet 0 11/8/2020 at Unknown time   • hydrOXYzine (ATARAX) 25 MG tablet Take 4 tablets by mouth every night at bedtime.   11/8/2020 at Unknown time   • loratadine (Claritin) 10 MG tablet Take 10 mg by mouth Daily.   11/8/2020 at Unknown time   • SUMAtriptan (IMITREX) 100 MG tablet Take 100 mg by mouth Every 2 (Two) Hours As Needed for Migraine. Take one  tablet at onset of headache. May repeat dose one time in 2 hours if headache not relieved.   11/8/2020 at Unknown time     amitriptyline, 25 mg, Oral, Nightly  cyclobenzaprine, 10 mg, Oral, BID  gabapentin, 400 mg, Oral, Q12H  nicotine, 1 patch, Transdermal, Q24H  sodium chloride, 10 mL, Intravenous, Q12H      heparin (porcine), 11.6 Units/kg/hr, Last Rate: 14.6 Units/kg/hr (11/09/20 0941)  sodium chloride, 100 mL/hr, Last Rate: 100 mL/hr (11/09/20 0121)      •  acetaminophen **OR** acetaminophen **OR** acetaminophen  •  HYDROmorphone  •  hydrOXYzine  •  nitroglycerin  •  ondansetron  •  sodium chloride  •  sodium chloride  Patient has no known allergies.    Objective     Physical Exam:  Physical Exam  Vitals signs and nursing note reviewed.   Constitutional:       Appearance: Normal appearance.   HENT:      Head: Normocephalic and atraumatic.      Right Ear: External ear normal.      Left Ear: External ear normal.      Nose: Nose normal.      Mouth/Throat:      Mouth: Mucous membranes are moist.   Eyes:      Extraocular Movements: Extraocular movements intact.      Pupils: Pupils are equal, round, and reactive to light.   Neck:      Musculoskeletal: Normal range of motion and neck supple.   Cardiovascular:      Rate and Rhythm: Regular rhythm.      Comments: Tachycardic.  Pulmonary:      Effort: Pulmonary effort is normal.      Breath sounds: Normal breath sounds.   Abdominal:      General: Abdomen is flat.   Musculoskeletal: Normal range of motion.         General: No swelling.   Skin:     General: Skin is warm and dry.      Capillary Refill: Capillary refill takes less than 2 seconds.   Neurological:      General: No focal deficit present.      Mental Status: She is alert and oriented to person, place, and time.   Psychiatric:         Mood and Affect: Mood normal.         Behavior: Behavior normal.          Vital Signs and Labs:  Vital Signs Patient Vitals for the past 24 hrs:   BP Temp Temp src Pulse Resp SpO2  "Height Weight   11/09/20 0820 133/87 98.6 °F (37 °C) Oral -- 18 -- -- --   11/09/20 0115 139/95 98.4 °F (36.9 °C) Oral 114 18 96 % 162.6 cm (64\") 104 kg (228 lb 11.2 oz)   11/09/20 0000 145/89 -- -- 115 18 99 % -- --   11/08/20 2330 107/88 -- -- 115 18 99 % -- --   11/08/20 2329 -- -- -- 115 -- 99 % -- --   11/08/20 2300 119/83 -- -- 116 -- 99 % -- --   11/08/20 2246 -- -- -- 116 -- 99 % -- --   11/08/20 2230 -- -- -- (!) 122 -- 98 % -- --   11/08/20 2229 116/72 -- -- -- -- -- -- --   11/08/20 2215 -- -- -- 117 -- 100 % -- --   11/08/20 2158 -- -- -- (!) 126 -- 96 % -- --   11/08/20 2146 -- -- -- (!) 125 -- 100 % -- --   11/08/20 2116 -- -- -- (!) 124 -- 98 % -- --   11/08/20 2115 -- -- -- (!) 125 -- 97 % -- --   11/08/20 2100 112/69 -- -- 118 18 96 % -- --   11/08/20 2031 109/82 -- -- (!) 121 18 99 % -- --   11/08/20 2014 128/90 -- -- (!) 123 18 100 % -- --   11/08/20 1845 -- -- -- -- -- -- 162.6 cm (64\") 86.2 kg (190 lb)   11/08/20 1833 -- -- -- (!) 133 -- 100 % -- --   11/08/20 1832 -- -- -- (!) 135 -- 98 % -- --   11/08/20 1831 141/93 -- -- -- -- -- -- --   11/08/20 1827 -- 98.7 °F (37.1 °C) Tympanic (!) 142 18 99 % -- --     I/O:  No intake/output data recorded.    CBC    Results from last 7 days   Lab Units 11/08/20  1851   WBC 10*3/mm3 13.39*   HEMOGLOBIN g/dL 14.6   PLATELETS 10*3/mm3 388     BMP   Results from last 7 days   Lab Units 11/09/20  0511 11/08/20  1851   SODIUM mmol/L 137 139   POTASSIUM mmol/L 3.6 4.4   CHLORIDE mmol/L 102 102   CO2 mmol/L 24.4 28.1   BUN mg/dL 8 7   CREATININE mg/dL 0.77 0.92   GLUCOSE mg/dL 126* 101*   MAGNESIUM mg/dL  --  2.2     Cr Clearance Estimated Creatinine Clearance: 116.4 mL/min (by C-G formula based on SCr of 0.77 mg/dL).  Coag   Results from last 7 days   Lab Units 11/09/20  0807 11/08/20  2318   INR   --  0.94   APTT seconds 53.3* 30.5     HbA1C No results found for: HGBA1C  Blood Glucose No results found for: POCGLU  Infection     CMP   Results from last 7 days "   Lab Units 11/09/20  0511 11/08/20  1851   SODIUM mmol/L 137 139   POTASSIUM mmol/L 3.6 4.4   CHLORIDE mmol/L 102 102   CO2 mmol/L 24.4 28.1   BUN mg/dL 8 7   CREATININE mg/dL 0.77 0.92   GLUCOSE mg/dL 126* 101*   ALBUMIN g/dL  --  4.40   BILIRUBIN mg/dL  --  0.4   ALK PHOS U/L  --  153*   AST (SGOT) U/L  --  30   ALT (SGPT) U/L  --  39*   LIPASE U/L  --  19     ABG      UA    Results from last 7 days   Lab Units 11/08/20  1851   NITRITE UA  Negative       Active Hospital Problems    Diagnosis  POA   • **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     Problem Points:  3:  Patient has a new problem, with no additional work-up planned (max of 1)  Total problem points:3    Data Points:  1:  I have reviewed or order clinical lab test  1:  I have reviewed or order radiology test (except heart catheterization or echo)  2:  I have personally and independently review of image, tracing, or specimen  Total data points:4 or more    Risk Points:  Moderate: New diagnosis with unknown prognosis    MDM requires 2/3 (Problem points, Data points and Risk)  MDM Prob point Data point Risk   SF 1 1 Minimal   Low 2 2 Low   Mod 3 3 Moderate   High 4 4 High     Code requires 3/3 (MDM, History and Exam)  Code MDM History Exam Time   94563 SF/Low Detailed Detailed 30   08935 Mod Comprehensive Comprehensive 50   99127 High Comprehensive Comprehensive 70     Detailed history:  4 elements HPI or status of 3 chronic problems; 2-9 system ROS  Comprehensive:  4 elements HPI or status of 3 chronic problems;  10 system ROS    Detailed Exam:  12 findings from any organ system  Comprehensive Exam:  2 findings from each of 9 systems.   15277    Assessment/Plan       Splenic infarct    Anxiety disorder      39 y.o. 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  The area of the spleen that is infarcted is not that  that is supplied by the area of the spleen with the aneurysm.  I think these are 2 separate and unrelated issues.  Her aneurysm is small approximately 8 mm that is somewhat saccular.  She is not of childbearing potential she states she has had her tubes tied.  I recommended consider conservative outpatient follow-up with serial observation of the aneurysm.    As far as the infarcted spleen I have no clear-cut cause of this.  Her thoracic aorta appears normal.  We will ask cardiology and hematology to see for both a cardiogenic source work-up as well as a hypercoagulable state work-up.  For now she will be on heparin until evaluations completed but ultimately will likely require outpatient anticoagulation.    Personal protective equipment used for this patient encounter:  Patient wearing surgical mask []    Provider wearing a surgical mask [x]    Gloves [x]    Eye protection [x]    Face Shield []    Gown []    N 95 respirator or CAPR/PAPR []   Duration of interaction 15    I discussed the patients findings and my recommendations with patient.    Braulio Jane MD  11/09/20  10:02 EST    Please call my office with any question: [phone]              Electronically signed by Braulio Jane MD at 11/09/20 4943

## 2020-11-09 NOTE — CONSULTS
.     REASON FOR CONSULTATION:   Splenic infarct evaluation, hypercoagulable state evaluation.  Provide an opinion on any further workup or treatment                             REQUESTING PHYSICIAN: Nadir Suazo MD  RECORDS OBTAINED:  Records of the patients history including those from the electronic medical record were reviewed and summarized in detail.    HISTORY OF PRESENT ILLNESS:  The patient is a 39 y.o. year old female  who is here for follow-up with the above-mentioned history.    Patient came to ER due to left flank abdominal pain x3 days, worse with movement and deep breaths.  Associated nausea.    CT angiogram chest and CT AP 11/8/2020: No PE.  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  Also, 8 mm splenic artery aneurysm.  Dr. Jane, vascular surgery, evaluate this patient today, 11/9/2020.  He recommended conservative outpatient follow-up with serial imaging of the aneurysm.  He has recommended anticoagulation and ordered hematology and cardiology consult to assess for possible etiologies of infarction.    Denies any prior history of clotting and any family history of clotting.  She has not been on any OCP recently.  She has had a bilateral tubal ligation.    Continues to have significant left-sided abdominal pain with deep breaths.    Past Medical History:   Diagnosis Date   • Injury of back      Past Surgical History:   Procedure Laterality Date   • BACK SURGERY     • CHOLECYSTECTOMY     • SKIN SURGERY         MEDICATIONS    Current Facility-Administered Medications:   •  acetaminophen (TYLENOL) tablet 650 mg, 650 mg, Oral, Q4H PRN **OR** acetaminophen (TYLENOL) 160 MG/5ML solution 650 mg, 650 mg, Oral, Q4H PRN **OR** acetaminophen (TYLENOL) suppository 650 mg, 650 mg, Rectal, Q4H PRN, Beena Fregoso APRN  •  amitriptyline (ELAVIL) tablet 25 mg, 25 mg, Oral, Nightly, Beatriz Hernandez APRN, 25 mg at 11/09/20 0322  •  cyclobenzaprine (FLEXERIL) tablet 10 mg, 10 mg, Oral, BID,  Beatriz Hernandez APRN, 10 mg at 11/09/20 0822  •  gabapentin (NEURONTIN) capsule 400 mg, 400 mg, Oral, Q12H, Beatriz Hernandez APRN, 400 mg at 11/09/20 0822  •  heparin 53763 units/250 mL (100 units/mL) in 0.45 % NaCl infusion, 11.6 Units/kg/hr, Intravenous, Titrated, Nadir Suazo MD, Last Rate: 15.18 mL/hr at 11/09/20 0941, 14.6 Units/kg/hr at 11/09/20 0941  •  HYDROmorphone (DILAUDID) injection 0.5 mg, 0.5 mg, Intravenous, Q2H PRN, Beena Fregoso APRN, 0.5 mg at 11/09/20 1024  •  hydrOXYzine (ATARAX) tablet 25 mg, 25 mg, Oral, TID PRN, Beatriz Hernandez APRN, 25 mg at 11/09/20 0321  •  nicotine (NICODERM CQ) 14 MG/24HR patch 1 patch, 1 patch, Transdermal, Q24H, Beatriz Hernandez APRN, 1 patch at 11/09/20 0823  •  nitroglycerin (NITROSTAT) SL tablet 0.4 mg, 0.4 mg, Sublingual, Q5 Min PRN, Beena Fregoso APRN  •  ondansetron (ZOFRAN) injection 4 mg, 4 mg, Intravenous, Q6H PRN, Beena Fregoso APRN, 4 mg at 11/09/20 1030  •  sodium chloride 0.9 % flush 10 mL, 10 mL, Intravenous, PRN, Teresa Hansen MD  •  sodium chloride 0.9 % flush 10 mL, 10 mL, Intravenous, Q12H, Beena Fregoso APRN, 10 mL at 11/09/20 0216  •  sodium chloride 0.9 % flush 10 mL, 10 mL, Intravenous, PRN, Beena Fregoso APRN  •  sodium chloride 0.9 % infusion, 100 mL/hr, Intravenous, Continuous, Beena Fregoso APRN, Last Rate: 100 mL/hr at 11/09/20 1024, 100 mL/hr at 11/09/20 1024    ALLERGIES:   No Known Allergies    SOCIAL HISTORY:       Social History     Socioeconomic History   • Marital status: Single     Spouse name: Not on file   • Number of children: Not on file   • Years of education: Not on file   • Highest education level: Not on file   Tobacco Use   • Smoking status: Current Every Day Smoker     Packs/day: 0.50   • Smokeless tobacco: Never Used   Substance and Sexual Activity   • Alcohol use: Not Currently   • Drug use: Not Currently         FAMILY HISTORY:  History reviewed. No pertinent family  "history.    REVIEW OF SYSTEMS:  Review of Systems   Constitutional: Negative for activity change.   HENT: Negative for nosebleeds and trouble swallowing.    Respiratory: Negative for shortness of breath and wheezing.    Cardiovascular: Negative for chest pain and palpitations.   Gastrointestinal: Positive for abdominal pain. Negative for constipation, diarrhea and nausea.   Genitourinary: Negative for dysuria and hematuria.   Musculoskeletal: Negative for arthralgias and myalgias.   Skin: Negative for rash and wound.   Neurological: Negative for seizures and syncope.   Hematological: Negative for adenopathy. Does not bruise/bleed easily.   Psychiatric/Behavioral: Negative for confusion.              Vitals:    11/08/20 2330 11/09/20 0000 11/09/20 0115 11/09/20 0820   BP: 107/88 145/89 139/95 133/87   BP Location:   Left arm Left arm   Patient Position:   Lying Lying   Pulse: 115 115 114    Resp: 18 18 18 18   Temp:   98.4 °F (36.9 °C) 98.6 °F (37 °C)   TempSrc:   Oral Oral   SpO2: 99% 99% 96%    Weight:   104 kg (228 lb 11.2 oz)    Height:   162.6 cm (64\")      No flowsheet data found.   PHYSICAL EXAM:    CONSTITUTIONAL:  Vital signs reviewed.  No distress, looks comfortable.  Overweight  EYES:  Conjunctiva and lids unremarkable.  PERRLA  EARS,NOSE,MOUTH,THROAT:  Ears and nose appear unremarkable.  Lips, teeth, gums appear unremarkable.  RESPIRATORY:  Normal respiratory effort.  Lungs clear to auscultation bilaterally.  CARDIOVASCULAR:  Normal S1, S2.  No murmurs rubs or gallops.  No significant lower extremity edema.  GASTROINTESTINAL: Abdomen appears unremarkable.  Nontender.  No hepatomegaly.  No splenomegaly.  LYMPHATIC:  No cervical, supraclavicular, axillary lymphadenopathy.  NEURO: cranial nerves 2-12 grossly intact.  No focal deficits.  Appears to have equal strength all 4 extremities.  MUSCULOSKELETAL:  Unremarkable digits/nails.  No cyanosis or clubbing.  No apparent joint deformities.  SKIN:  Warm.  No " vishal.  PSYCHIATRIC:  Normal judgment and insight.  Normal mood and affect.     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    *Risk factors for clotting  · Smokes  · Overweight  · No personal or family history of clotting  · Await hypercoagulable labs    *Smokes.  Recommended smoking cessation.  She was told this increases the risk of both arterial and venous clotting as well as strokes and heart attacks.  It also increases the risk of malignancies.  She states she is considering smoking cessation.    *Class II obesity  Body mass index is 39.26 kg/m².  BMI 25 to <30 is overweight  BMI 30 to <35 is class 1 obesity  BMI 35 to <40 is class 2 obesity  BMI 40 or higher is class 3 obesity   This increases the risk of clotting.  She was told she should ideally lose weight    *Splenic artery aneurysm, 8 mm, initially seen on 11/8/2020 CT.  Dr. Jane, vascular surgery, recommended serial outpatient imaging to follow this.    *Leukocytosis.  Predominance of mature segmented neutrophils.  Therefore, appears reactive.    Plan  · Check hypercoagulable labs (currently on heparin which will likely make Antithrombin falsely low).  · Ideally, lose weight and stop smoking.  · Await cardiology evaluation.    Chart reviewed and summarized including vascular surgery note

## 2020-11-09 NOTE — ED NOTES
"\"  Nursing report ED to floor  Belen Allen  39 y.o.  female    HPI (triage note):   Chief Complaint   Patient presents with   • Flank Pain       Admitting doctor:   Nadir Suazo MD    Admitting diagnosis:   The encounter diagnosis was Splenic infarct.    Code status:   Current Code Status     Date Active Code Status Order ID Comments User Context       Not on file    Advance Care Planning Activity          Allergies:   Patient has no known allergies.    Weight:       11/08/20  1845   Weight: 86.2 kg (190 lb)       Most recent vitals:   Vitals:    11/08/20 2246 11/08/20 2300 11/08/20 2329 11/08/20 2330   BP:  119/83  107/88   Pulse: 116 116 115 115   Resp:    18   Temp:       TempSrc:       SpO2: 99% 99% 99% 99%   Weight:       Height:           Active LDAs/IV Access:   Lines, Drains & Airways    Active LDAs     Name:   Placement date:   Placement time:   Site:   Days:    Peripheral IV 11/08/20 2146 Left;Medial Antecubital   11/08/20 2146    Antecubital   less than 1                Labs (abnormal labs have a star):   Labs Reviewed   COMPREHENSIVE METABOLIC PANEL - Abnormal; Notable for the following components:       Result Value    Glucose 101 (*)     ALT (SGPT) 39 (*)     Alkaline Phosphatase 153 (*)     All other components within normal limits    Narrative:     GFR Normal >60  Chronic Kidney Disease <60  Kidney Failure <15     CBC WITH AUTO DIFFERENTIAL - Abnormal; Notable for the following components:    WBC 13.39 (*)     Lymphocyte % 19.5 (*)     Neutrophils, Absolute 9.46 (*)     Monocytes, Absolute 1.05 (*)     Immature Grans, Absolute 0.06 (*)     All other components within normal limits   LIPASE - Normal   URINALYSIS W/ MICROSCOPIC IF INDICATED (NO CULTURE) - Normal    Narrative:     Urine microscopic not indicated.   HCG, SERUM, QUALITATIVE - Normal   URINE DRUG SCREEN - Normal    Narrative:     Negative Thresholds For Drugs Screened:     Amphetamines               500 ng/ml   Barbiturates   "             200 ng/ml   Benzodiazepines            100 ng/ml   Cocaine                    300 ng/ml   Methadone                  300 ng/ml   Opiates                    300 ng/ml   Oxycodone                  100 ng/ml   THC                        50 ng/ml    The Normal Value for all drugs tested is negative. This report includes final unconfirmed screening results to be used for medical treatment purposes only. Unconfirmed results must not be used for non-medical purposes such as employment or legal testing. Clinical consideration should be applied to any drug of abuse test, particulary when unconfirmed results are used.   MAGNESIUM - Normal   TSH - Normal   PROTIME-INR - Normal   APTT - Normal   COVID PRE-OP / PRE-PROCEDURE SCREENING ORDER (NO ISOLATION)    Narrative:     The following orders were created for panel order COVID PRE-OP / PRE-PROCEDURE SCREENING ORDER (NO ISOLATION) - Swab, Nasopharynx.  Procedure                               Abnormality         Status                     ---------                               -----------         ------                     Respiratory Panel PCR w/...[[id number]]                      In process                   Please view results for these tests on the individual orders.   RESPIRATORY PANEL PCR W/ COVID-19 (SARS-COV-2) LETY/REESE/RASHAWN/PAD/COR/MAD/CHANEL IN-HOUSE, NP SWAB IN [facility], 3-4 HR TAT   RAINBOW DRAW    Narrative:     The following orders were created for panel order [place] Draw.  Procedure                               Abnormality         Status                     ---------                               -----------         ------                     Light Blue Top[[id number]]                                                              Green Top (Gel)[[id number]]                                  Final result               Lavender Top[[id number]]                                     Final result               Gold Top - SST[[id number]]                                                                 Please view results for these tests on the individual orders.   CBC AND DIFFERENTIAL    Narrative:     The following orders were created for panel order CBC & Differential.  Procedure                               Abnormality         Status                     ---------                               -----------         ------                     CBC Auto Differential[[id number]]        Abnormal            Final result                 Please view results for these tests on the individual orders.   GREEN TOP   LAVENDER TOP   LIGHT BLUE TOP       EKG:   ECG 12 Lead   Preliminary Result   HEART RATE= 119  bpm   RR Interval= 504  ms   MI Interval= 135  ms   P Horizontal Axis= 22  deg   P Front Axis= 57  deg   QRSD Interval= 80  ms   QT Interval= 299  ms   QRS Axis= 25  deg   T Wave Axis= 13  deg   - BORDERLINE ECG -   Sinus tachycardia   Borderline T wave abnormalities   Electronically Signed By:    Date and Time of Study: 2020-11-08 20:35:14          Meds given in ED:   Medications   sodium chloride 0.9 % flush 10 mL (has no administration in time range)   sodium chloride 0.9 % infusion (125 mL/hr Intravenous New Bag 11/8/20 2312)   HYDROmorphone (DILAUDID) injection 1 mg (1 mg Intravenous Given 11/8/20 2037)   ondansetron (ZOFRAN) injection 4 mg (4 mg Intravenous Given 11/8/20 2048)   iopamidol (ISOVUE-370) 76 % injection 100 mL (95 mL Intravenous Given by Other 11/8/20 2204)   HYDROmorphone (DILAUDID) injection 1 mg (1 mg Intravenous Given 11/8/20 2304)   sodium chloride 0.9 % bolus 500 mL (500 mL Intravenous New Bag 11/8/20 2312)       Imaging results:  Ct Abdomen Pelvis With Contrast    Result Date: 11/8/2020  1.  No PE. 2.  No acute cardiopulmonary pathology. 3.  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      CT ABDOMEN AND PELVIS WITH CONTRAST:  HISTORY: Left flank pain.  TECHNIQUE:  Multiple contiguous helical CT images of the abdomen and pelvis were obtained following the uneventful  administration of intravenous contrast. Multiplanar reformatted images were reconstructed from the helical source data. Radiation dose reduction techniques were utilized, including automated exposure control and exposure modulation based on body size.   COMPARISON:  CT abdomen and pelvis 09/26/2020  FINDINGS:  Vasculature: Normal caliber abdominal aorta and branch vessels. No atherosclerotic calcifications.  Major venous structures are unremarkable.  Liver: Unremarkable.  Gallbladder and Bile Ducts: Cholecystectomy. No biliary ductal dilatation.  Spleen: 3.3 x 4.6 cm wedge-shaped region of hypodensity in the superior aspect of the spleen. This is new from the prior CT. 8mm distal splenic artery aneurysm.  Pancreas: Unremarkable.  Adrenals: Unremarkable.  Kidneys/Ureters/Bladder: Stable left renal volume loss. 2 cm simple left lower pole renal cyst. No calculi or hydronephrosis. Unremarkable ureters and bladder.  GI Tract/Mesentery:The stomach is unremarkable. No abnormally dilated small or large bowel loops. No definite bowel wall thickening. Normal appendix. No free or loculated fluid collections. No intraperitoneal free air.  Lymphadenopathy: No retroperitoneal, mesenteric or pelvic lymphadenopathy.  Pelvic organs: Unremarkable  Peripheral Soft Tissues: No soft tissue mass or fluid collection.  Small fat-containing umbilical hernia.  Osseous Structures: No acute osseous abnormality. No suspicious lytic or blastic lesions. Moderate spondylosis in the lower thoracic spine. Fusion hardware at L5-S1.   IMPRESSION:  New wedge-shaped hypodensity in the superior aspect of the spleen concerning for splenic infarction. 8 mm distal left splenic artery aneurysm best seen on the concurrent PE exam.  Findings were discussed with Dr. Hansen at 10:50 PM on 11/08/2020.    This report was finalized on 11/8/2020 11:02 PM by Dr. Charly Hood M.D.      Ct Angiogram Chest    Result Date: 11/8/2020  1.  No PE. 2.  No acute  cardiopulmonary pathology. 3.  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      CT ABDOMEN AND PELVIS WITH CONTRAST:  HISTORY: Left flank pain.  TECHNIQUE:  Multiple contiguous helical CT images of the abdomen and pelvis were obtained following the uneventful administration of intravenous contrast. Multiplanar reformatted images were reconstructed from the helical source data. Radiation dose reduction techniques were utilized, including automated exposure control and exposure modulation based on body size.   COMPARISON:  CT abdomen and pelvis 09/26/2020  FINDINGS:  Vasculature: Normal caliber abdominal aorta and branch vessels. No atherosclerotic calcifications.  Major venous structures are unremarkable.  Liver: Unremarkable.  Gallbladder and Bile Ducts: Cholecystectomy. No biliary ductal dilatation.  Spleen: 3.3 x 4.6 cm wedge-shaped region of hypodensity in the superior aspect of the spleen. This is new from the prior CT. 8mm distal splenic artery aneurysm.  Pancreas: Unremarkable.  Adrenals: Unremarkable.  Kidneys/Ureters/Bladder: Stable left renal volume loss. 2 cm simple left lower pole renal cyst. No calculi or hydronephrosis. Unremarkable ureters and bladder.  GI Tract/Mesentery:The stomach is unremarkable. No abnormally dilated small or large bowel loops. No definite bowel wall thickening. Normal appendix. No free or loculated fluid collections. No intraperitoneal free air.  Lymphadenopathy: No retroperitoneal, mesenteric or pelvic lymphadenopathy.  Pelvic organs: Unremarkable  Peripheral Soft Tissues: No soft tissue mass or fluid collection.  Small fat-containing umbilical hernia.  Osseous Structures: No acute osseous abnormality. No suspicious lytic or blastic lesions. Moderate spondylosis in the lower thoracic spine. Fusion hardware at L5-S1.   IMPRESSION:  New wedge-shaped hypodensity in the superior aspect of the spleen concerning for splenic infarction. 8 mm distal left splenic artery aneurysm best seen  on the concurrent PE exam.  Findings were discussed with Dr. Hansen at 10:50 PM on 11/08/2020.    This report was finalized on 11/8/2020 11:02 PM by Dr. Charly Hood M.D.        Ambulatory status:   Ad js  Social issues:   Social History     Socioeconomic History   • Marital status: Single     Spouse name: Not on file   • Number of children: Not on file   • Years of education: Not on file   • Highest education level: Not on file   Tobacco Use   • Smoking status: Current Every Day Smoker     Packs/day: 0.50   • Smokeless tobacco: Never Used   Substance and Sexual Activity   • Alcohol use: Not Currently   • Drug use: Not Currently    Nursing report ED to floor     Marysol Pa RN  11/08/20 3199

## 2020-11-09 NOTE — ED NOTES
Pt calling out for pain medication for increased flank pain and headache. MD notified, primary nurse in cath lab with pt. Awaiting further orders.     Alana Luo, RN  11/08/20 7241

## 2020-11-09 NOTE — PROGRESS NOTES
Clinical Pharmacy Services: Medication History    Belen Allen is a 39 y.o. female presenting to [facility] for Splenic infarct [D73.5]    She  has a past medical history of Injury of back.    Allergies as of 11/08/2020    (No Known Allergies)       Medication information was obtained from: Patient  Pharmacy and Phone Number:      Prior to Admission Medications       Prescriptions Last Dose Informant Patient Reported? Taking?    amitriptyline (ELAVIL) 25 MG tablet 11/8/2020 Self Yes Yes    Take 25 mg by mouth Every Night.    cyclobenzaprine (FLEXERIL) 10 MG tablet 11/8/2020 Self Yes Yes    Take 1 tablet by mouth 3 (Three) Times a Day.    diphenhydrAMINE-acetaminophen (TYLENOL PM)  MG tablet per tablet  Self Yes Yes    Take 2 tablets by mouth Every Night.    gabapentin (NEURONTIN) 800 MG tablet 11/8/2020 Self Yes Yes    Take 800 mg by mouth 3 (Three) Times a Day.    hydrOXYzine (ATARAX) 25 MG tablet 11/8/2020 Self Yes Yes    Take 4 tablets by mouth every night at bedtime.    loratadine (Claritin) 10 MG tablet 11/8/2020 Self Yes Yes    Take 10 mg by mouth Daily.    Melatonin 10 MG capsule  Self Yes Yes    Take 30 mg by mouth Every Night.    SUMAtriptan (IMITREX) 100 MG tablet 11/8/2020 Self Yes Yes    Take 100 mg by mouth Every 2 (Two) Hours As Needed for Migraine. Take one tablet at onset of headache. May repeat dose one time in 2 hours if headache not relieved.              Medication notes:      This medication list is complete to the best of my knowledge as of 11/9/2020    Please call if questions.    Penny Rodriguez, Pharmacy Intern  11/9/2020 13:36 EST

## 2020-11-09 NOTE — H&P
Patient Name:  Belen Allen  YOB: 1980  MRN:  [id number]  Admit Date:  11/8/2020  Patient Care Team:  Sahil Cornelius MD as PCP - General (Internal Medicine)      Chief Complaint   Patient presents with   • Flank Pain       Subjective     Ms. Allen is a 39 y.o. female smoker with a history of anxiety, chronic pain that presents to [facility] complaining of left sided abdominal pain and vomiting. Patient reports constant, sharp pain to left upper quadrant, radiating to her back, worse with inspiration. She also reports nausea and vomiting and states these symptoms have been ongoing for past 3 days. She denies fever, chest pain, changes in bowel or bladder habits but does report feeling of shortness of breath due to the pain. 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 She denies history of clots in the past, though does confirm that she is a current smoker. Labs done tonight show leukocytosis and elevated alk phos. CT shows left splenic artery aneurysm with new wedge-shaped hypodensity concerning for splenic infarction.     History of Present Illness    Past Medical History:   Diagnosis Date   • Injury of back      Past Surgical History:   Procedure Laterality Date   • BACK SURGERY     • CHOLECYSTECTOMY     • SKIN SURGERY       History reviewed. No pertinent family history.  Social History     Tobacco Use   • Smoking status: Current Every Day Smoker     Packs/day: 0.50   • Smokeless tobacco: Never Used   Substance Use Topics   • Alcohol use: Not Currently   • Drug use: Not Currently     Medications Prior to Admission   Medication Sig Dispense Refill Last Dose   • HYDROcodone-acetaminophen (NORCO)  MG per tablet Take 1 tablet by mouth Every 4 (Four) Hours As Needed for Moderate Pain . 10 tablet 0      Allergies:  No Known Allergies    Review of Systems   Constitutional: Negative.  Negative for chills and fever.    HENT: Negative.  Negative for congestion and sore throat.    Eyes: Negative.  Negative for visual disturbance.   Respiratory: Positive for shortness of breath. Negative for cough.    Cardiovascular: Negative.  Negative for chest pain and leg swelling.   Gastrointestinal: Positive for abdominal pain, nausea and vomiting.   Endocrine: Negative.    Genitourinary: Negative.  Negative for dysuria, frequency and urgency.   Musculoskeletal: Negative.  Negative for arthralgias and myalgias.   Skin: Negative.  Negative for color change and pallor.   Allergic/Immunologic: Negative.    Neurological: Negative.  Negative for dizziness, weakness and light-headedness.   Hematological: Negative.    Psychiatric/Behavioral: Negative.  Negative for agitation, behavioral problems and confusion.        Objective    Vital Signs  Temp:  [98.7 °F (37.1 °C)] 98.7 °F (37.1 °C)  Heart Rate:  [115-142] 115  Resp:  [18] 18  BP: (107-145)/(69-93) 145/89  SpO2:  [96 %-100 %] 99 %  on   ;   Device (Oxygen Therapy): room air  Body mass index is 32.61 kg/m².    Physical Exam  Vitals signs and nursing note reviewed.   Constitutional:       General: She is not in acute distress.     Appearance: She is obese.   HENT:      Head: Normocephalic and atraumatic.      Mouth/Throat:      Mouth: Mucous membranes are moist.   Eyes:      Extraocular Movements: Extraocular movements intact.   Neck:      Musculoskeletal: Normal range of motion and neck supple.   Cardiovascular:      Rate and Rhythm: Normal rate.      Pulses: Normal pulses.   Pulmonary:      Effort: Pulmonary effort is normal.      Breath sounds: Normal breath sounds.   Abdominal:      General: Abdomen is flat. Bowel sounds are normal.      Palpations: Abdomen is soft.      Tenderness: There is abdominal tenderness. There is no guarding or rebound.   Musculoskeletal: Normal range of motion.   Skin:     General: Skin is warm and dry.   Neurological:      General: No focal deficit present.       Mental Status: She is alert and oriented to person, place, and time. Mental status is at baseline.   Psychiatric:         Mood and Affect: Mood normal.         Behavior: Behavior normal.         Thought Content: Thought content normal.         Judgment: Judgment normal.         Results Review:   I reviewed the patient's new clinical results including all labs and xrays.    Lab Results (last 24 hours)     Procedure Component Value Units Date/Time    CBC & Differential [[id number]]  (Abnormal) Collected: 11/08/20 1851    Specimen: Blood Updated: 11/08/20 1858    Narrative:      The following orders were created for panel order CBC & Differential.  Procedure                               Abnormality         Status                     ---------                               -----------         ------                     CBC Auto Differential[[id number]]        Abnormal            Final result                 Please view results for these tests on the individual orders.    Comprehensive Metabolic Panel [[id number]]  (Abnormal) Collected: 11/08/20 1851    Specimen: Blood Updated: 11/08/20 1921     Glucose 101 mg/dL      BUN 7 mg/dL      Creatinine 0.92 mg/dL      Sodium 139 mmol/L      Potassium 4.4 mmol/L      Chloride 102 mmol/L      CO2 28.1 mmol/L      Calcium 9.4 mg/dL      Total Protein 7.3 g/dL      Albumin 4.40 g/dL      ALT (SGPT) 39 U/L      AST (SGOT) 30 U/L      Alkaline Phosphatase 153 U/L      Total Bilirubin 0.4 mg/dL      eGFR Non African Amer 68 mL/min/1.73      Globulin 2.9 gm/dL      A/G Ratio 1.5 g/dL      BUN/Creatinine Ratio 7.6     Anion Gap 8.9 mmol/L     Narrative:      GFR Normal >60  Chronic Kidney Disease <60  Kidney Failure <15      Lipase [[id number]]  (Normal) Collected: 11/08/20 1851    Specimen: Blood Updated: 11/08/20 1921     Lipase 19 U/L     Urinalysis With Microscopic If Indicated (No Culture) - Urine, Clean Catch [[id number]]  (Normal) Collected: 11/08/20 1851    Specimen: Urine, Clean  Catch Updated: 11/08/20 1901     Color, UA Yellow     Appearance, UA Clear     pH, UA 6.0     Specific Gravity, UA 1.022     Glucose, UA Negative     Ketones, UA Negative     Bilirubin, UA Negative     Blood, UA Negative     Protein, UA Negative     Leuk Esterase, UA Negative     Nitrite, UA Negative     Urobilinogen, UA 1.0 E.U./dL    Narrative:      Urine microscopic not indicated.    hCG, Serum, Qualitative [[id number]]  (Normal) Collected: 11/08/20 1851    Specimen: Blood Updated: 11/08/20 1923     HCG Qualitative Negative    CBC Auto Differential [[id number]]  (Abnormal) Collected: 11/08/20 1851    Specimen: Blood Updated: 11/08/20 1858     WBC 13.39 10*3/mm3      RBC 4.52 10*6/mm3      Hemoglobin 14.6 g/dL      Hematocrit 42.7 %      MCV 94.5 fL      MCH 32.3 pg      MCHC 34.2 g/dL      RDW 12.6 %      RDW-SD 43.7 fl      MPV 9.8 fL      Platelets 388 10*3/mm3      Neutrophil % 70.8 %      Lymphocyte % 19.5 %      Monocyte % 7.8 %      Eosinophil % 1.1 %      Basophil % 0.4 %      Immature Grans % 0.4 %      Neutrophils, Absolute 9.46 10*3/mm3      Lymphocytes, Absolute 2.61 10*3/mm3      Monocytes, Absolute 1.05 10*3/mm3      Eosinophils, Absolute 0.15 10*3/mm3      Basophils, Absolute 0.06 10*3/mm3      Immature Grans, Absolute 0.06 10*3/mm3      nRBC 0.0 /100 WBC     Urine Drug Screen - Urine, Clean Catch [[id number]]  (Normal) Collected: 11/08/20 1851    Specimen: Urine, Clean Catch Updated: 11/08/20 2101     Amphet/Methamphet, Screen Negative     Barbiturates Screen, Urine Negative     Benzodiazepine Screen, Urine Negative     Cocaine Screen, Urine Negative     Opiate Screen Negative     THC, Screen, Urine Negative     Methadone Screen, Urine Negative     Oxycodone Screen, Urine Negative    Narrative:      Negative Thresholds For Drugs Screened:     Amphetamines               500 ng/ml   Barbiturates               200 ng/ml   Benzodiazepines            100 ng/ml   Cocaine                    300 ng/ml    Methadone                  300 ng/ml   Opiates                    300 ng/ml   Oxycodone                  100 ng/ml   THC                        50 ng/ml    The Normal Value for all drugs tested is negative. This report includes final unconfirmed screening results to be used for medical treatment purposes only. Unconfirmed results must not be used for non-medical purposes such as employment or legal testing. Clinical consideration should be applied to any drug of abuse test, particulary when unconfirmed results are used.    Magnesium [[id number]]  (Normal) Collected: 11/08/20 1851    Specimen: Blood Updated: 11/08/20 2054     Magnesium 2.2 mg/dL     TSH [[id number]]  (Normal) Collected: 11/08/20 1851    Specimen: Blood Updated: 11/08/20 2108     TSH 1.110 uIU/mL     COVID PRE-OP / PRE-PROCEDURE SCREENING ORDER (NO ISOLATION) - Swab, Nasopharynx [[id number]]  (Normal) Collected: 11/08/20 2314    Specimen: Swab from Nasopharynx Updated: 11/09/20 0052    Narrative:      The following orders were created for panel order COVID PRE-OP / PRE-PROCEDURE SCREENING ORDER (NO ISOLATION) - Swab, Nasopharynx.  Procedure                               Abnormality         Status                     ---------                               -----------         ------                     Respiratory Panel PCR w/...[[id number]]  Normal              Final result                 Please view results for these tests on the individual orders.    Respiratory Panel PCR w/COVID-19(SARS-CoV-2) LETY/REESE/RASHAWN/PAD/COR/MAD/CHANEL In-House, NP Swab in [place], 3-4 HR TAT - Swab, Nasopharynx [[id number]]  (Normal) Collected: 11/08/20 2314    Specimen: Swab from Nasopharynx Updated: 11/09/20 0052     ADENOVIRUS, PCR Not Detected     Coronavirus 229E Not Detected     Coronavirus HKU1 Not Detected     Coronavirus NL63 Not Detected     Coronavirus OC43 Not Detected     COVID19 Not Detected     Human Metapneumovirus Not Detected     Human Rhinovirus/Enterovirus Not  Detected     Influenza A PCR Not Detected     Influenza B PCR Not Detected     Parainfluenza Virus 1 Not Detected     Parainfluenza Virus 2 Not Detected     Parainfluenza Virus 3 Not Detected     Parainfluenza Virus 4 Not Detected     RSV, PCR Not Detected     Bordetella pertussis pcr Not Detected     Bordetella parapertussis PCR Not Detected     Chlamydophila pneumoniae PCR Not Detected     Mycoplasma pneumo by PCR Not Detected    Narrative:      Fact sheet for providers: https://docs.[employer]/wp-content/uploads/OTY0444-9203-VN5.1-EUA-Provider-Fact-Sheet-3.pdf    Fact sheet for patients: https://docs.[employer]/wp-content/uploads/SLT1293-6816-GU5.1-EUA-Patient-Fact-Sheet-1.pdf    Protime-INR [[id number]]  (Normal) Collected: 11/08/20 2318    Specimen: Blood Updated: 11/08/20 2337     Protime 12.5 Seconds      INR 0.94    aPTT [[id number]]  (Normal) Collected: 11/08/20 2318    Specimen: Blood Updated: 11/08/20 2337     PTT 30.5 seconds           CT Abdomen Pelvis With Contrast   Final Result   1.  No PE.   2.  No acute cardiopulmonary pathology.   3.  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                       CT ABDOMEN AND PELVIS WITH CONTRAST:       HISTORY: Left flank pain.        TECHNIQUE:  Multiple contiguous helical CT images of the abdomen and   pelvis were obtained following the uneventful administration of   intravenous contrast. Multiplanar reformatted images were reconstructed   from the helical source data. Radiation dose reduction techniques were   utilized, including automated exposure control and exposure modulation   based on body size.           COMPARISON:  CT abdomen and pelvis 09/26/2020       FINDINGS:        Vasculature: Normal caliber abdominal aorta and branch vessels. No   atherosclerotic calcifications.  Major venous structures are   unremarkable.       Liver: Unremarkable.        Gallbladder and Bile Ducts: Cholecystectomy. No biliary ductal   dilatation.       Spleen: 3.3 x  4.6 cm wedge-shaped region of hypodensity in the superior   aspect of the spleen. This is new from the prior CT. 8mm distal splenic   artery aneurysm.       Pancreas: Unremarkable.       Adrenals: Unremarkable.       Kidneys/Ureters/Bladder: Stable left renal volume loss. 2 cm simple left   lower pole renal cyst. No calculi or hydronephrosis. Unremarkable   ureters and bladder.       GI Tract/Mesentery:The stomach is unremarkable. No abnormally dilated   small or large bowel loops. No definite bowel wall thickening. Normal   appendix. No free or loculated fluid collections. No intraperitoneal   free air.       Lymphadenopathy: No retroperitoneal, mesenteric or pelvic   lymphadenopathy.       Pelvic organs: Unremarkable       Peripheral Soft Tissues: No soft tissue mass or fluid collection.  Small   fat-containing umbilical hernia.       Osseous Structures: No acute osseous abnormality. No suspicious lytic or   blastic lesions. Moderate spondylosis in the lower thoracic spine.   Fusion hardware at L5-S1.           IMPRESSION:     New wedge-shaped hypodensity in the superior aspect of the spleen   concerning for splenic infarction.   8 mm distal left splenic artery aneurysm best seen on the concurrent PE   exam.       Findings were discussed with Dr. Hansen at 10:50 PM on 11/08/2020.               This report was finalized on 11/8/2020 11:02 PM by Dr. Charly Hood M.D.          CT Angiogram Chest   Final Result   1.  No PE.   2.  No acute cardiopulmonary pathology.   3.  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                       CT ABDOMEN AND PELVIS WITH CONTRAST:       HISTORY: Left flank pain.        TECHNIQUE:  Multiple contiguous helical CT images of the abdomen and   pelvis were obtained following the uneventful administration of   intravenous contrast. Multiplanar reformatted images were reconstructed   from the helical source data. Radiation dose reduction techniques were   utilized, including automated  exposure control and exposure modulation   based on body size.           COMPARISON:  CT abdomen and pelvis 09/26/2020       FINDINGS:        Vasculature: Normal caliber abdominal aorta and branch vessels. No   atherosclerotic calcifications.  Major venous structures are   unremarkable.       Liver: Unremarkable.        Gallbladder and Bile Ducts: Cholecystectomy. No biliary ductal   dilatation.       Spleen: 3.3 x 4.6 cm wedge-shaped region of hypodensity in the superior   aspect of the spleen. This is new from the prior CT. 8mm distal splenic   artery aneurysm.       Pancreas: Unremarkable.       Adrenals: Unremarkable.       Kidneys/Ureters/Bladder: Stable left renal volume loss. 2 cm simple left   lower pole renal cyst. No calculi or hydronephrosis. Unremarkable   ureters and bladder.       GI Tract/Mesentery:The stomach is unremarkable. No abnormally dilated   small or large bowel loops. No definite bowel wall thickening. Normal   appendix. No free or loculated fluid collections. No intraperitoneal   free air.       Lymphadenopathy: No retroperitoneal, mesenteric or pelvic   lymphadenopathy.       Pelvic organs: Unremarkable       Peripheral Soft Tissues: No soft tissue mass or fluid collection.  Small   fat-containing umbilical hernia.       Osseous Structures: No acute osseous abnormality. No suspicious lytic or   blastic lesions. Moderate spondylosis in the lower thoracic spine.   Fusion hardware at L5-S1.           IMPRESSION:     New wedge-shaped hypodensity in the superior aspect of the spleen   concerning for splenic infarction.   8 mm distal left splenic artery aneurysm best seen on the concurrent PE   exam.       Findings were discussed with Dr. Hansen at 10:50 PM on 11/08/2020.               This report was finalized on 11/8/2020 11:02 PM by Dr. Charly Hood M.D.            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     Splenic infarct  -CTA showing distal left splenic artery aneurysm with area concerning for splenic infarction, apparent cause of her left sided pain tonight  -will start low dose heparin gtt for now, no boluses  -vascular consult  -continue antiemetics and pain medication PRN    Anxiety  -will continue home medications    VTE Ppx  -SCDs    CODE status  -full    I discussed the patients findings and my recommendations with patient.    NELSON Koehler  [facility]  11/09/20  12:22 AM EST

## 2020-11-09 NOTE — CONSULTS
Patient Name: Belen Allen  :1980  39 y.o.    Date of Admission: 2020  Date of Consultation:  20  Encounter Provider: Jannie Vu MD  Place of Service: [facility] CARDIOLOGY  Referring Provider: Nadir Suazo MD  Patient Care Team:  Sahil Cornelius MD as PCP - General (Internal Medicine)      Chief complaint: left flank pain     History of Present Illness:   This is a 39-year-old female with history of anxiety, chronic pain and tobacco use.  She has had a cholecystectomy in the past.  She has no history of prior myocardial infarction, heart failure, heart rhythm disturbances, cancer or blood clots.  She has had palpitations in the past.    She presented to [facility] on 2020 with left-sided abdominal pain and vomiting.  The pain was constant and sharp in left upper quadrant with radiation to her back and worse with taking a deep breath.  She also had nausea and vomiting for 3 days prior.  She lives at home with her fiancé and 2 little girls.  She smokes but does not use alcohol or illicit drugs.  Upon arrival, her CT showed a left splenic artery aneurysm with a wedge-shaped hypodensity concerning for splenic infarct.  CTA then revealed a distal left splenic artery aneurysm.  Vitals showed blood pressure in the 140s, heart rate 142, respiratory 99% on room air.  She is still mildly tachycardic.  Labs showed glucose elevation at 101, alkaline phosphatase 153, otherwise normal CMP, normal TSH, normal magnesium and lipase, negative beta-hCG and normal CBC.     We have been asked to see for cardioembolic source of stroke.    Past Medical History:   Diagnosis Date   • Injury of back        Past Surgical History:   Procedure Laterality Date   • BACK SURGERY     • CHOLECYSTECTOMY     • SKIN SURGERY           Prior to Admission medications    Medication Sig Start Date End Date Taking? Authorizing Provider   amitriptyline (ELAVIL) 25  MG tablet Take 25 mg by mouth Every Night.   Yes Michele Chandler MD   cyclobenzaprine (FLEXERIL) 10 MG tablet Take 1 tablet by mouth 2 (two) times a day. 8/17/20  Yes Michele Chandler MD   gabapentin (NEURONTIN) 800 MG tablet Take 800 mg by mouth 3 (Three) Times a Day. 9/22/20 9/22/21 Yes Michele Chandler MD   HYDROcodone-acetaminophen (NORCO)  MG per tablet Take 1 tablet by mouth Every 4 (Four) Hours As Needed for Moderate Pain . 9/26/20  Yes Kristopher Sultana MD   hydrOXYzine (ATARAX) 25 MG tablet Take 4 tablets by mouth every night at bedtime. 8/6/20  Yes Michele Chandler MD   loratadine (Claritin) 10 MG tablet Take 10 mg by mouth Daily.   Yes Michele Chandler MD   SUMAtriptan (IMITREX) 100 MG tablet Take 100 mg by mouth Every 2 (Two) Hours As Needed for Migraine. Take one tablet at onset of headache. May repeat dose one time in 2 hours if headache not relieved.   Yes Michele Chandler MD       No Known Allergies    Social History     Socioeconomic History   • Marital status: Single     Spouse name: Not on file   • Number of children: Not on file   • Years of education: Not on file   • Highest education level: Not on file   Tobacco Use   • Smoking status: Current Every Day Smoker     Packs/day: 0.50   • Smokeless tobacco: Never Used   Substance and Sexual Activity   • Alcohol use: Not Currently   • Drug use: Not Currently       History reviewed. No pertinent family history.    REVIEW OF SYSTEMS:   All systems reviewed.  Pertinent positives identified in HPI.  All other systems are negative.      Objective:     Vitals:    11/09/20 0000 11/09/20 0115 11/09/20 0820 11/09/20 1100   BP: 145/89 139/95 133/87 122/82   BP Location:  Left arm Left arm Left arm   Patient Position:  Lying Lying Lying   Pulse: 115 114  110   Resp: 18 18 18 18   Temp:  98.4 °F (36.9 °C) 98.6 °F (37 °C) 98.3 °F (36.8 °C)   TempSrc:  Oral Oral Oral   SpO2: 99% 96%  93%   Weight:  104 kg (228 lb 11.2 oz)    "  Height:  162.6 cm (64\")       Body mass index is 39.26 kg/m².    General Appearance:    Alert, cooperative, in no acute distress   Head:    Normocephalic, without obvious abnormality, atraumatic   Eyes:            Lids and lashes normal, conjunctivae and sclerae normal, no icterus, no pallor, corneas clear   Ears:    Ears appear intact with no abnormalities noted   Throat:   No oral lesions, no thrush, oral mucosa moist   Neck:   No adenopathy, supple, trachea midline, no thyromegaly, no carotid bruit, no JVD   Back:     No kyphosis present, no scoliosis present, no skin lesions, erythema or scars, no tenderness to palpation, range of motion normal   Lungs:     Clear to auscultation, respirations regular, even and unlabored    Heart:    Regular rhythm and normal rate, normal S1 and S2, no murmur, no gallop, no rub, no click   Chest Wall:    No abnormalities observed   Abdomen:     Normal bowel sounds, no masses, no organomegaly, soft, nontender, nondistended, no guarding, no rebound  tenderness   Extremities:   Moves all extremities well, no edema, no cyanosis, no redness   Pulses:   Pulses palpable and equal bilaterally. Normal radial, carotid,dorsalis pedis and posterior tibial pulses bilaterally.    Skin:  Psychiatric:   No bleeding, bruising or rash    Alert and oriented x 3, normal mood and affect   Lab Review:     Results from last 7 days   Lab Units 11/09/20  0511 11/08/20  1851   SODIUM mmol/L 137 139   POTASSIUM mmol/L 3.6 4.4   CHLORIDE mmol/L 102 102   CO2 mmol/L 24.4 28.1   BUN mg/dL 8 7   CREATININE mg/dL 0.77 0.92   CALCIUM mg/dL 8.6 9.4   BILIRUBIN mg/dL  --  0.4   ALK PHOS U/L  --  153*   ALT (SGPT) U/L  --  39*   AST (SGOT) U/L  --  30   GLUCOSE mg/dL 126* 101*         Results from last 7 days   Lab Units 11/08/20  1851   WBC 10*3/mm3 13.39*   HEMOGLOBIN g/dL 14.6   HEMATOCRIT % 42.7   PLATELETS 10*3/mm3 388     Results from last 7 days   Lab Units 11/09/20  0807 11/08/20  2318   INR   --  0.94 "   APTT seconds 53.3* 30.5     Results from last 7 days   Lab Units 11/08/20  1851   MAGNESIUM mg/dL 2.2                             I personally viewed and interpreted the patient's EKG/Telemetry data.        Assessment and Plan:       1. Splenic infarct, will set the DENIS for tomorrow.  I am concerned that her body habitus is such that we will not get good images on a transthoracic echo.  Risks and benefits were explained to patient and she is amenable to this.  2. Tobacco use, advised cessation.  3. Obesity  4. Anxiety  5. Splenic artery aneurysm, followed by vascular.    Hematology is seeing for hypercoagulable work-up.  DENIS will be rescheduled for tomorrow.  No medication changes at this time.    Jannie Vu MD  11/09/20  12:54 EST

## 2020-11-09 NOTE — ED PROVIDER NOTES
EMERGENCY DEPARTMENT ENCOUNTER    CHIEF COMPLAINT  Chief Complaint: Left flank pain  History given by: Patient  History limited by: Nothing  Room Number: 16/16  PMD: Sahil Cornelius MD      HPI:  Pt is a 39 y.o. female presents complaining of left flank/abdomen pain for 3 days.  Patient reports the pain is sharp, worse with movement and deep breath.  Patient denies shortness of air, cough, fever, rash, urinary symptoms, does report nausea but denies vomiting.    Patient reports she is a smoker, history of BTL, cholecystectomy and lumbar fusion.  Patient reports her last normal menstrual period was 11/1/2020.  Patient reports she has back pain on a daily basis for which she takes gabapentin and Tylenol/ibuprofen.    Duration: 3 days  Associated Symptoms: Nausea  Aggravating Factors: Movement, deep breath  Alleviating Factors: Nothing  Treatment before arrival: Regular medications    PAST MEDICAL HISTORY  Active Ambulatory Problems     Diagnosis Date Noted   • No Active Ambulatory Problems     Resolved Ambulatory Problems     Diagnosis Date Noted   • No Resolved Ambulatory Problems     Past Medical History:   Diagnosis Date   • Injury of back        PAST SURGICAL HISTORY  Past Surgical History:   Procedure Laterality Date   • BACK SURGERY     • CHOLECYSTECTOMY     • SKIN SURGERY         FAMILY HISTORY  History reviewed. No pertinent family history.    SOCIAL HISTORY  Social History     Socioeconomic History   • Marital status: Single     Spouse name: Not on file   • Number of children: Not on file   • Years of education: Not on file   • Highest education level: Not on file   Tobacco Use   • Smoking status: Current Every Day Smoker     Packs/day: 0.50   • Smokeless tobacco: Never Used   Substance and Sexual Activity   • Alcohol use: Not Currently   • Drug use: Not Currently       ALLERGIES  Patient has no known allergies.    REVIEW OF SYSTEMS  Review of Systems   Constitutional: Negative for chills and fever.    HENT: Negative for sore throat and trouble swallowing.    Eyes: Negative for visual disturbance.   Respiratory: Negative for cough and shortness of breath.    Cardiovascular: Negative for chest pain, palpitations and leg swelling.   Gastrointestinal: Positive for abdominal pain ( ) and nausea. Negative for diarrhea and vomiting.   Endocrine: Negative.    Genitourinary: Negative for decreased urine volume, dysuria and frequency.   Musculoskeletal: Positive for back pain ( Left lower back). Negative for neck pain.   Skin: Negative for rash.   Allergic/Immunologic: Negative.    Neurological: Negative for syncope, weakness, numbness and headaches.   Hematological: Negative.    Psychiatric/Behavioral: Negative.    All other systems reviewed and are negative.      PHYSICAL EXAM  ED Triage Vitals   Temp Heart Rate Resp BP SpO2   11/08/20 1827 11/08/20 1827 11/08/20 1827 11/08/20 1831 11/08/20 1827   98.7 °F (37.1 °C) (!) 142 18 141/93 99 %      Temp src Heart Rate Source Patient Position BP Location FiO2 (%)   11/08/20 1827 11/08/20 1827 -- -- --   Tympanic Monitor          Physical Exam   Constitutional: She is oriented to person, place, and time.  Non-toxic appearance. She appears distressed (mild).   HENT:   Head: Normocephalic and atraumatic.   Eyes: EOM are normal.   Neck: Normal range of motion. Neck supple.   Cardiovascular: Normal rate, regular rhythm, normal heart sounds and intact distal pulses.   No murmur heard.  Pulses:       Posterior tibial pulses are 2+ on the right side and 2+ on the left side.   Pulmonary/Chest: Effort normal and breath sounds normal. No respiratory distress.   Abdominal: Soft. Bowel sounds are normal. There is abdominal tenderness in the left upper quadrant. There is no rebound and no guarding.   Musculoskeletal: Normal range of motion.         General: No edema.      Lumbar back: She exhibits tenderness ( Soft tissue tenderness left lower back, no midline vertebral tenderness).    Neurological: She is alert and oriented to person, place, and time.   Skin: Skin is warm and dry.   Psychiatric: Affect normal.   Nursing note and vitals reviewed.      LAB RESULTS  Lab Results (last 24 hours)     Procedure Component Value Units Date/Time    CBC & Differential [[id number]]  (Abnormal) Collected: 11/08/20 1851    Specimen: Blood Updated: 11/08/20 1858    Narrative:      The following orders were created for panel order CBC & Differential.  Procedure                               Abnormality         Status                     ---------                               -----------         ------                     CBC Auto Differential[[id number]]        Abnormal            Final result                 Please view results for these tests on the individual orders.    Comprehensive Metabolic Panel [[id number]]  (Abnormal) Collected: 11/08/20 1851    Specimen: Blood Updated: 11/08/20 1921     Glucose 101 mg/dL      BUN 7 mg/dL      Creatinine 0.92 mg/dL      Sodium 139 mmol/L      Potassium 4.4 mmol/L      Chloride 102 mmol/L      CO2 28.1 mmol/L      Calcium 9.4 mg/dL      Total Protein 7.3 g/dL      Albumin 4.40 g/dL      ALT (SGPT) 39 U/L      AST (SGOT) 30 U/L      Alkaline Phosphatase 153 U/L      Total Bilirubin 0.4 mg/dL      eGFR Non African Amer 68 mL/min/1.73      Globulin 2.9 gm/dL      A/G Ratio 1.5 g/dL      BUN/Creatinine Ratio 7.6     Anion Gap 8.9 mmol/L     Narrative:      GFR Normal >60  Chronic Kidney Disease <60  Kidney Failure <15      Lipase [[id number]]  (Normal) Collected: 11/08/20 1851    Specimen: Blood Updated: 11/08/20 1921     Lipase 19 U/L     Urinalysis With Microscopic If Indicated (No Culture) - Urine, Clean Catch [[id number]]  (Normal) Collected: 11/08/20 1851    Specimen: Urine, Clean Catch Updated: 11/08/20 1901     Color, UA Yellow     Appearance, UA Clear     pH, UA 6.0     Specific Gravity, UA 1.022     Glucose, UA Negative     Ketones, UA Negative     Bilirubin, UA  Negative     Blood, UA Negative     Protein, UA Negative     Leuk Esterase, UA Negative     Nitrite, UA Negative     Urobilinogen, UA 1.0 E.U./dL    Narrative:      Urine microscopic not indicated.    hCG, Serum, Qualitative [[id number]]  (Normal) Collected: 11/08/20 1851    Specimen: Blood Updated: 11/08/20 1923     HCG Qualitative Negative    CBC Auto Differential [[id number]]  (Abnormal) Collected: 11/08/20 1851    Specimen: Blood Updated: 11/08/20 1858     WBC 13.39 10*3/mm3      RBC 4.52 10*6/mm3      Hemoglobin 14.6 g/dL      Hematocrit 42.7 %      MCV 94.5 fL      MCH 32.3 pg      MCHC 34.2 g/dL      RDW 12.6 %      RDW-SD 43.7 fl      MPV 9.8 fL      Platelets 388 10*3/mm3      Neutrophil % 70.8 %      Lymphocyte % 19.5 %      Monocyte % 7.8 %      Eosinophil % 1.1 %      Basophil % 0.4 %      Immature Grans % 0.4 %      Neutrophils, Absolute 9.46 10*3/mm3      Lymphocytes, Absolute 2.61 10*3/mm3      Monocytes, Absolute 1.05 10*3/mm3      Eosinophils, Absolute 0.15 10*3/mm3      Basophils, Absolute 0.06 10*3/mm3      Immature Grans, Absolute 0.06 10*3/mm3      nRBC 0.0 /100 WBC     Urine Drug Screen - Urine, Clean Catch [[id number]]  (Normal) Collected: 11/08/20 1851    Specimen: Urine, Clean Catch Updated: 11/08/20 2101     Amphet/Methamphet, Screen Negative     Barbiturates Screen, Urine Negative     Benzodiazepine Screen, Urine Negative     Cocaine Screen, Urine Negative     Opiate Screen Negative     THC, Screen, Urine Negative     Methadone Screen, Urine Negative     Oxycodone Screen, Urine Negative    Narrative:      Negative Thresholds For Drugs Screened:     Amphetamines               500 ng/ml   Barbiturates               200 ng/ml   Benzodiazepines            100 ng/ml   Cocaine                    300 ng/ml   Methadone                  300 ng/ml   Opiates                    300 ng/ml   Oxycodone                  100 ng/ml   THC                        50 ng/ml    The Normal Value for all drugs tested  is negative. This report includes final unconfirmed screening results to be used for medical treatment purposes only. Unconfirmed results must not be used for non-medical purposes such as employment or legal testing. Clinical consideration should be applied to any drug of abuse test, particulary when unconfirmed results are used.    Magnesium [[id number]]  (Normal) Collected: 11/08/20 1851    Specimen: Blood Updated: 11/08/20 2054     Magnesium 2.2 mg/dL     TSH [[id number]]  (Normal) Collected: 11/08/20 1851    Specimen: Blood Updated: 11/08/20 2108     TSH 1.110 uIU/mL     COVID PRE-OP / PRE-PROCEDURE SCREENING ORDER (NO ISOLATION) - Swab, Nasopharynx [[id number]] Collected: 11/08/20 2314    Specimen: Swab from Nasopharynx Updated: 11/08/20 2324    Narrative:      The following orders were created for panel order COVID PRE-OP / PRE-PROCEDURE SCREENING ORDER (NO ISOLATION) - Swab, Nasopharynx.  Procedure                               Abnormality         Status                     ---------                               -----------         ------                     Respiratory Panel PCR w/...[[id number]]                      In process                   Please view results for these tests on the individual orders.    Respiratory Panel PCR w/COVID-19(SARS-CoV-2) LETY/REESE/RASHAWN/PAD/COR/MAD/CHANEL In-House, NP Swab in [facility], 3-4 HR TAT - Swab, Nasopharynx [[id number]] Collected: 11/08/20 2314    Specimen: Swab from Nasopharynx Updated: 11/08/20 2324    Protime-INR [[id number]]  (Normal) Collected: 11/08/20 2318    Specimen: Blood Updated: 11/08/20 2337     Protime 12.5 Seconds      INR 0.94    aPTT [[id number]]  (Normal) Collected: 11/08/20 2318    Specimen: Blood Updated: 11/08/20 2337     PTT 30.5 seconds           I ordered the above labs and reviewed the results    RADIOLOGY  CT Abdomen Pelvis With Contrast   Final Result   1.  No PE.   2.  No acute cardiopulmonary pathology.   3.  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                        CT ABDOMEN AND PELVIS WITH CONTRAST:       HISTORY: Left flank pain.        TECHNIQUE:  Multiple contiguous helical CT images of the abdomen and   pelvis were obtained following the uneventful administration of   intravenous contrast. Multiplanar reformatted images were reconstructed   from the helical source data. Radiation dose reduction techniques were   utilized, including automated exposure control and exposure modulation   based on body size.           COMPARISON:  CT abdomen and pelvis 09/26/2020       FINDINGS:        Vasculature: Normal caliber abdominal aorta and branch vessels. No   atherosclerotic calcifications.  Major venous structures are   unremarkable.       Liver: Unremarkable.        Gallbladder and Bile Ducts: Cholecystectomy. No biliary ductal   dilatation.       Spleen: 3.3 x 4.6 cm wedge-shaped region of hypodensity in the superior   aspect of the spleen. This is new from the prior CT. 8mm distal splenic   artery aneurysm.       Pancreas: Unremarkable.       Adrenals: Unremarkable.       Kidneys/Ureters/Bladder: Stable left renal volume loss. 2 cm simple left   lower pole renal cyst. No calculi or hydronephrosis. Unremarkable   ureters and bladder.       GI Tract/Mesentery:The stomach is unremarkable. No abnormally dilated   small or large bowel loops. No definite bowel wall thickening. Normal   appendix. No free or loculated fluid collections. No intraperitoneal   free air.       Lymphadenopathy: No retroperitoneal, mesenteric or pelvic   lymphadenopathy.       Pelvic organs: Unremarkable       Peripheral Soft Tissues: No soft tissue mass or fluid collection.  Small   fat-containing umbilical hernia.       Osseous Structures: No acute osseous abnormality. No suspicious lytic or   blastic lesions. Moderate spondylosis in the lower thoracic spine.   Fusion hardware at L5-S1.           IMPRESSION:     New wedge-shaped hypodensity in the superior aspect of the spleen    concerning for splenic infarction.   8 mm distal left splenic artery aneurysm best seen on the concurrent PE   exam.       Findings were discussed with Dr. Hansen at 10:50 PM on 11/08/2020.               This report was finalized on 11/8/2020 11:02 PM by Dr. Charly Hood M.D.          CT Angiogram Chest   Final Result   1.  No PE.   2.  No acute cardiopulmonary pathology.   3.  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                       CT ABDOMEN AND PELVIS WITH CONTRAST:       HISTORY: Left flank pain.        TECHNIQUE:  Multiple contiguous helical CT images of the abdomen and   pelvis were obtained following the uneventful administration of   intravenous contrast. Multiplanar reformatted images were reconstructed   from the helical source data. Radiation dose reduction techniques were   utilized, including automated exposure control and exposure modulation   based on body size.           COMPARISON:  CT abdomen and pelvis 09/26/2020       FINDINGS:        Vasculature: Normal caliber abdominal aorta and branch vessels. No   atherosclerotic calcifications.  Major venous structures are   unremarkable.       Liver: Unremarkable.        Gallbladder and Bile Ducts: Cholecystectomy. No biliary ductal   dilatation.       Spleen: 3.3 x 4.6 cm wedge-shaped region of hypodensity in the superior   aspect of the spleen. This is new from the prior CT. 8mm distal splenic   artery aneurysm.       Pancreas: Unremarkable.       Adrenals: Unremarkable.       Kidneys/Ureters/Bladder: Stable left renal volume loss. 2 cm simple left   lower pole renal cyst. No calculi or hydronephrosis. Unremarkable   ureters and bladder.       GI Tract/Mesentery:The stomach is unremarkable. No abnormally dilated   small or large bowel loops. No definite bowel wall thickening. Normal   appendix. No free or loculated fluid collections. No intraperitoneal   free air.       Lymphadenopathy: No retroperitoneal, mesenteric or pelvic    lymphadenopathy.       Pelvic organs: Unremarkable       Peripheral Soft Tissues: No soft tissue mass or fluid collection.  Small   fat-containing umbilical hernia.       Osseous Structures: No acute osseous abnormality. No suspicious lytic or   blastic lesions. Moderate spondylosis in the lower thoracic spine.   Fusion hardware at L5-S1.           IMPRESSION:     New wedge-shaped hypodensity in the superior aspect of the spleen   concerning for splenic infarction.   8 mm distal left splenic artery aneurysm best seen on the concurrent PE   exam.       Findings were discussed with Dr. Hansen at 10:50 PM on 11/08/2020.               This report was finalized on 11/8/2020 11:02 PM by Dr. Charly Hood M.D.               I ordered the above noted radiological studies. Interpreted by radiologist. Viewed by me in PACS.       PROCEDURES  Procedures      PROGRESS AND CONSULTS  ED Course as of Nov 09 0022   Sun Nov 08, 2020 2331 Discussed with NELSON Hargrove on-call for A who is aware patient presentation, complaints CT imaging results and agrees with admit for further testing, treatment as needed to Dr. Suazo.    [TO]   2331 Discussed with Dr. Hood, radiology who reports patient CT chest negative for PE but significant for 8 mm splenic artery aneurysm and wedge-shaped perfusion defect of the parenchyma of the spleen consistent with infarct.    [TO]   Mon Nov 09, 2020   0017 Leon with NELSON Hargrove who examined the patient and request heparin drip with bolus as discussed with Dr. Suazo     [TO]      ED Course User Index  [TO] Teresa Hansen MD     EKG          EKG time: 2035  Rhythm/Rate: Sinus tachycardia, rate in the 1 teens  P waves and MA: Normal P waves, normal CHEN's  QRS, axis: QRS unremarkable  ST and T waves: Nonspecific ST/T wave findings    Interpreted Contemporaneously by me, independently viewed  No old for comparison        MEDICAL DECISION MAKING  Results were reviewed/discussed with the patient and they  were also made aware of online access. Pt also made aware that some labs, such as cultures, will not be resulted during ER visit and follow up with PMD is necessary.       MDM       DIAGNOSIS  Final diagnoses:   Splenic infarct       DISPOSITION  ADMISSION    Discussed treatment plan and reason for admission with pt/family and admitting physician.  Pt/family voiced understanding of the plan for admission for further testing/treatment as needed.         Latest Documented Vital Signs:  As of 00:22 EST  BP- 145/89 HR- 115 Temp- 98.7 °F (37.1 °C) (Tympanic) O2 sat- 99%    --  Patient was wearing facemask when I entered the room and throughout our encounter. Full protective equipment was worn throughout this patient encounter including a face mask, eye protection and gloves. Hand hygiene was performed before donning protective equipment and after removal when leaving the room.      Teresa Hansen MD  11/09/20 0023       Teresa Hansen MD  11/09/20 0041

## 2020-11-09 NOTE — PLAN OF CARE
Goal Outcome Evaluation:         VSS, A/O x4, admit from ED for splenic infarct, heparin gtt started, pain control w/IV dilaudid, IVF, vascular consult in AM, nicotine patch ordered per pt request, home meds restarted, will continue to monitor.

## 2020-11-09 NOTE — PLAN OF CARE
Problem: Adult Inpatient Plan of Care  Goal: Plan of Care Review  Outcome: Ongoing, Progressing  Flowsheets (Taken 11/9/2020 1845)  Progress: no change  Plan of Care Reviewed With: patient  Outcome Summary: VSS. pain controlled with prn pain medication, continues on hep gtt at 14.6 units/kg. NPO at midnight, will continue to montior  Goal: Patient-Specific Goal (Individualized)  Outcome: Ongoing, Progressing  Goal: Absence of Hospital-Acquired Illness or Injury  Outcome: Ongoing, Progressing  Intervention: Identify and Manage Fall Risk  Recent Flowsheet Documentation  Taken 11/9/2020 1800 by Aida Contreras RN  Safety Promotion/Fall Prevention:   activity supervised   clutter free environment maintained   safety round/check completed  Taken 11/9/2020 1630 by Aida Contreras RN  Safety Promotion/Fall Prevention:   activity supervised   clutter free environment maintained  Taken 11/9/2020 1520 by Aida Contreras RN  Safety Promotion/Fall Prevention:   activity supervised   clutter free environment maintained  Taken 11/9/2020 1430 by Aida Contreras RN  Safety Promotion/Fall Prevention:   activity supervised   clutter free environment maintained   assistive device/personal items within reach  Taken 11/9/2020 1200 by Aida Contreras RN  Safety Promotion/Fall Prevention:   activity supervised   assistive device/personal items within reach  Taken 11/9/2020 1000 by Aida Contreras RN  Safety Promotion/Fall Prevention:   activity supervised   clutter free environment maintained  Taken 11/9/2020 0800 by Aida Contreras RN  Safety Promotion/Fall Prevention:   activity supervised   clutter free environment maintained  Intervention: Prevent and Manage VTE (venous thromboembolism) Risk  Recent Flowsheet Documentation  Taken 11/9/2020 1430 by Aida Contreras RN  VTE Prevention/Management:   bilateral   dorsiflexion/plantar flexion performed  Taken 11/9/2020 0800 by Aida Contreras RN  VTE Prevention/Management:    bilateral   dorsiflexion/plantar flexion performed  Intervention: Prevent Infection  Recent Flowsheet Documentation  Taken 11/9/2020 1800 by Aida Contreras RN  Infection Prevention:   single patient room provided   visitors restricted/screened  Taken 11/9/2020 1630 by Aida Contreras RN  Infection Prevention: single patient room provided  Taken 11/9/2020 1520 by Aida Contreras RN  Infection Prevention: single patient room provided  Taken 11/9/2020 1430 by Aida Contreras RN  Infection Prevention:   visitors restricted/screened   single patient room provided  Taken 11/9/2020 1000 by Aida Contreras RN  Infection Prevention:   single patient room provided   visitors restricted/screened   hand hygiene promoted  Taken 11/9/2020 0800 by Aida Contreras RN  Infection Prevention: single patient room provided  Goal: Optimal Comfort and Wellbeing  Outcome: Ongoing, Progressing  Goal: Readiness for Transition of Care  Outcome: Ongoing, Progressing     Problem: Asthma Comorbidity  Goal: Maintenance of Asthma Control  Outcome: Ongoing, Progressing  Intervention: Maintain Asthma Symptom Control  Recent Flowsheet Documentation  Taken 11/9/2020 1800 by Aida Contreras RN  Medication Review/Management: medications reviewed  Taken 11/9/2020 1630 by Aida Contreras RN  Medication Review/Management: medications reviewed  Taken 11/9/2020 1520 by Aida Contreras RN  Medication Review/Management: medications reviewed  Taken 11/9/2020 1430 by Aida Contreras RN  Medication Review/Management: medications reviewed  Taken 11/9/2020 1200 by Aida Contreras RN  Medication Review/Management: medications reviewed  Taken 11/9/2020 1000 by Aida Contreras RN  Medication Review/Management: medications reviewed  Taken 11/9/2020 0800 by Aida Contreras RN  Medication Review/Management: medications reviewed     Problem: COPD Comorbidity  Goal: Maintenance of COPD Symptom Control  Outcome: Ongoing, Progressing  Intervention: Maintain  COPD-Symptom Control  Recent Flowsheet Documentation  Taken 11/9/2020 1800 by Aida Contreras RN  Medication Review/Management: medications reviewed  Taken 11/9/2020 1630 by Aida Contreras RN  Medication Review/Management: medications reviewed  Taken 11/9/2020 1520 by Aida Contreras RN  Medication Review/Management: medications reviewed  Taken 11/9/2020 1430 by Aida Contreras RN  Medication Review/Management: medications reviewed  Taken 11/9/2020 1200 by Aida Contreras RN  Medication Review/Management: medications reviewed  Taken 11/9/2020 1000 by Aida Contreras RN  Medication Review/Management: medications reviewed  Taken 11/9/2020 0800 by Aida Contreras RN  Medication Review/Management: medications reviewed     Problem: Diabetes Comorbidity  Goal: Blood Glucose Level Within Desired Range  Outcome: Ongoing, Progressing  Intervention: Maintain Glycemic Control  Recent Flowsheet Documentation  Taken 11/9/2020 1430 by Aida Contreras RN  Glycemic Management: blood glucose monitoring  Taken 11/9/2020 0800 by Aida Contreras RN  Glycemic Management: blood glucose monitoring     Problem: Heart Failure Comorbidity  Goal: Maintenance of Heart Failure Symptom Control  Outcome: Ongoing, Progressing  Intervention: Maintain Heart Failure-Management Strategies  Recent Flowsheet Documentation  Taken 11/9/2020 1800 by Aida Contreras RN  Medication Review/Management: medications reviewed  Taken 11/9/2020 1630 by Aida Contreras RN  Medication Review/Management: medications reviewed  Taken 11/9/2020 1520 by Aida Contreras RN  Medication Review/Management: medications reviewed  Taken 11/9/2020 1430 by Aida Contreras RN  Medication Review/Management: medications reviewed  Taken 11/9/2020 1200 by Aida Contreras RN  Medication Review/Management: medications reviewed  Taken 11/9/2020 1000 by Aida Contreras RN  Medication Review/Management: medications reviewed  Taken 11/9/2020 0800 by Aida Contreras  RN  Medication Review/Management: medications reviewed     Problem: Hypertension Comorbidity  Goal: Blood Pressure in Desired Range  Outcome: Ongoing, Progressing  Intervention: Maintain Hypertension-Management Strategies  Recent Flowsheet Documentation  Taken 11/9/2020 1800 by Aida Contreras RN  Medication Review/Management: medications reviewed  Taken 11/9/2020 1630 by Aida Contreras RN  Medication Review/Management: medications reviewed  Taken 11/9/2020 1520 by Aida Contreras RN  Medication Review/Management: medications reviewed  Taken 11/9/2020 1430 by Aida Contreras RN  Medication Review/Management: medications reviewed  Taken 11/9/2020 1200 by Aida Contreras RN  Medication Review/Management: medications reviewed  Taken 11/9/2020 1000 by Aida Contreras RN  Medication Review/Management: medications reviewed  Taken 11/9/2020 0800 by Aida Contreras RN  Medication Review/Management: medications reviewed     Problem: Obstructive Sleep Apnea Risk or Actual (Comorbidity Management)  Goal: Unobstructed Breathing During Sleep  Outcome: Ongoing, Progressing     Problem: Pain Chronic (Persistent) (Comorbidity Management)  Goal: Acceptable Pain Control and Functional Ability  Outcome: Ongoing, Progressing  Intervention: Develop Pain Management Plan  Recent Flowsheet Documentation  Taken 11/9/2020 1800 by Aida Contreras RN  Pain Management Interventions: see MAR  Taken 11/9/2020 1630 by Aida Contreras RN  Pain Management Interventions: see MAR  Taken 11/9/2020 1520 by Aida Contreras RN  Pain Management Interventions: see MAR  Taken 11/9/2020 0800 by Aida Contreras RN  Pain Management Interventions: see MAR  Intervention: Manage Persistent Pain  Recent Flowsheet Documentation  Taken 11/9/2020 1800 by Aida Contreras RN  Medication Review/Management: medications reviewed  Taken 11/9/2020 1630 by Aida Contreras RN  Medication Review/Management: medications reviewed  Taken 11/9/2020 1520 by Ben  Aida MENEZES RN  Medication Review/Management: medications reviewed  Taken 11/9/2020 1430 by Aida Contreras RN  Medication Review/Management: medications reviewed  Taken 11/9/2020 1200 by Aida Contreras RN  Medication Review/Management: medications reviewed  Taken 11/9/2020 1000 by Aida Contreras RN  Medication Review/Management: medications reviewed  Taken 11/9/2020 0800 by Aida Contreras RN  Medication Review/Management: medications reviewed  Intervention: Optimize Psychosocial Wellbeing  Recent Flowsheet Documentation  Taken 11/9/2020 1430 by Aida Contreras RN  Supportive Measures: active listening utilized     Problem: Seizure Disorder Comorbidity  Goal: Maintenance of Seizure Control  Outcome: Ongoing, Progressing   Goal Outcome Evaluation:  Plan of Care Reviewed With: patient  Progress: no change  Outcome Summary: VSS. pain controlled with prn pain medication, continues on hep gtt at 14.6 units/kg. NPO at midnight[facility]

## 2020-11-10 ENCOUNTER — APPOINTMENT (OUTPATIENT)
Dept: CARDIOLOGY | Facility: HOSPITAL | Age: 40
End: 2020-11-10

## 2020-11-10 DIAGNOSIS — C91.10 CLL (CHRONIC LYMPHOCYTIC LEUKEMIA) (HCC): Primary | ICD-10-CM

## 2020-11-10 PROBLEM — Q89.01 FUNCTIONAL ASPLENIA: Status: ACTIVE | Noted: 2020-11-10

## 2020-11-10 LAB
APTT PPP: 63.9 SECONDS (ref 22.7–35.4)
APTT PPP: 99 SECONDS (ref 22.7–35.4)
ASCENDING AORTA: 2.3 CM
AT III PPP CHRO-ACNC: 110 % (ref 90–134)
BASOPHILS # BLD AUTO: 0.06 10*3/MM3 (ref 0–0.2)
BASOPHILS NFR BLD AUTO: 0.7 % (ref 0–1.5)
BH CV ECHO MEAS - ASC AORTA: 2.3 CM
BH CV ECHO MEAS - BSA(HAYCOCK): 2.2 M^2
BH CV ECHO MEAS - BSA: 2 M^2
BH CV ECHO MEAS - BZI_BMI: 37.8 KILOGRAMS/M^2
BH CV ECHO MEAS - BZI_METRIC_HEIGHT: 162.6 CM
BH CV ECHO MEAS - BZI_METRIC_WEIGHT: 99.8 KG
CARDIOLIPIN IGA SER IA-ACNC: <9 APL U/ML (ref 0–11)
CARDIOLIPIN IGG SER IA-ACNC: <9 GPL U/ML (ref 0–14)
CARDIOLIPIN IGM SER IA-ACNC: 22 MPL U/ML (ref 0–12)
DEPRECATED RDW RBC AUTO: 43.4 FL (ref 37–54)
EOSINOPHIL # BLD AUTO: 0.22 10*3/MM3 (ref 0–0.4)
EOSINOPHIL NFR BLD AUTO: 2.5 % (ref 0.3–6.2)
ERYTHROCYTE [DISTWIDTH] IN BLOOD BY AUTOMATED COUNT: 12.3 % (ref 12.3–15.4)
FERRITIN SERPL-MCNC: 138 NG/ML (ref 13–150)
FOLATE SERPL-MCNC: 2.71 NG/ML (ref 4.78–24.2)
HCT VFR BLD AUTO: 36.5 % (ref 34–46.6)
HGB BLD-MCNC: 11.8 G/DL (ref 12–15.9)
IMM GRANULOCYTES # BLD AUTO: 0.05 10*3/MM3 (ref 0–0.05)
IMM GRANULOCYTES NFR BLD AUTO: 0.6 % (ref 0–0.5)
IRON 24H UR-MRATE: 58 MCG/DL (ref 37–145)
IRON SATN MFR SERPL: 19 % (ref 20–50)
LYMPHOCYTES # BLD AUTO: 3.1 10*3/MM3 (ref 0.7–3.1)
LYMPHOCYTES NFR BLD AUTO: 35.3 % (ref 19.6–45.3)
MCH RBC QN AUTO: 31 PG (ref 26.6–33)
MCHC RBC AUTO-ENTMCNC: 32.3 G/DL (ref 31.5–35.7)
MCV RBC AUTO: 95.8 FL (ref 79–97)
MONOCYTES # BLD AUTO: 0.66 10*3/MM3 (ref 0.1–0.9)
MONOCYTES NFR BLD AUTO: 7.5 % (ref 5–12)
NEUTROPHILS NFR BLD AUTO: 4.68 10*3/MM3 (ref 1.7–7)
NEUTROPHILS NFR BLD AUTO: 53.4 % (ref 42.7–76)
NRBC BLD AUTO-RTO: 0 /100 WBC (ref 0–0.2)
PLATELET # BLD AUTO: 294 10*3/MM3 (ref 140–450)
PMV BLD AUTO: 9.9 FL (ref 6–12)
PROT C ACT/NOR PPP: 125 % (ref 86–163)
PROT S ACT/NOR PPP: 84 % (ref 70–127)
PROT S FREE PPP-ACNC: 106 % (ref 49–138)
RBC # BLD AUTO: 3.81 10*6/MM3 (ref 3.77–5.28)
SINUS: 2.3 CM
STJ: 2.1 CM
TIBC SERPL-MCNC: 301 MCG/DL (ref 298–536)
TRANSFERRIN SERPL-MCNC: 202 MG/DL (ref 200–360)
VIT B12 BLD-MCNC: 255 PG/ML (ref 211–946)
WBC # BLD AUTO: 8.77 10*3/MM3 (ref 3.4–10.8)

## 2020-11-10 PROCEDURE — 99232 SBSQ HOSP IP/OBS MODERATE 35: CPT | Performed by: INTERNAL MEDICINE

## 2020-11-10 PROCEDURE — 93325 DOPPLER ECHO COLOR FLOW MAPG: CPT | Performed by: INTERNAL MEDICINE

## 2020-11-10 PROCEDURE — 25010000002 MIDAZOLAM PER 1 MG: Performed by: INTERNAL MEDICINE

## 2020-11-10 PROCEDURE — 25010000002 HEPARIN (PORCINE) PER 1000 UNITS: Performed by: HOSPITALIST

## 2020-11-10 PROCEDURE — 82746 ASSAY OF FOLIC ACID SERUM: CPT | Performed by: INTERNAL MEDICINE

## 2020-11-10 PROCEDURE — 93312 ECHO TRANSESOPHAGEAL: CPT

## 2020-11-10 PROCEDURE — 93321 DOPPLER ECHO F-UP/LMTD STD: CPT | Performed by: INTERNAL MEDICINE

## 2020-11-10 PROCEDURE — 25010000002 FENTANYL CITRATE (PF) 100 MCG/2ML SOLUTION: Performed by: INTERNAL MEDICINE

## 2020-11-10 PROCEDURE — 25010000002 HYDROMORPHONE 1 MG/ML SOLUTION: Performed by: NURSE PRACTITIONER

## 2020-11-10 PROCEDURE — 84466 ASSAY OF TRANSFERRIN: CPT | Performed by: INTERNAL MEDICINE

## 2020-11-10 PROCEDURE — 82607 VITAMIN B-12: CPT | Performed by: INTERNAL MEDICINE

## 2020-11-10 PROCEDURE — 93325 DOPPLER ECHO COLOR FLOW MAPG: CPT

## 2020-11-10 PROCEDURE — 85730 THROMBOPLASTIN TIME PARTIAL: CPT | Performed by: NURSE PRACTITIONER

## 2020-11-10 PROCEDURE — 85025 COMPLETE CBC W/AUTO DIFF WBC: CPT | Performed by: NURSE PRACTITIONER

## 2020-11-10 PROCEDURE — 83540 ASSAY OF IRON: CPT | Performed by: INTERNAL MEDICINE

## 2020-11-10 PROCEDURE — 83735 ASSAY OF MAGNESIUM: CPT | Performed by: INTERNAL MEDICINE

## 2020-11-10 PROCEDURE — 85730 THROMBOPLASTIN TIME PARTIAL: CPT | Performed by: INTERNAL MEDICINE

## 2020-11-10 PROCEDURE — 84132 ASSAY OF SERUM POTASSIUM: CPT | Performed by: INTERNAL MEDICINE

## 2020-11-10 PROCEDURE — 99152 MOD SED SAME PHYS/QHP 5/>YRS: CPT

## 2020-11-10 PROCEDURE — 82728 ASSAY OF FERRITIN: CPT | Performed by: INTERNAL MEDICINE

## 2020-11-10 PROCEDURE — 25010000002 HYDROMORPHONE PER 4 MG: Performed by: NURSE PRACTITIONER

## 2020-11-10 PROCEDURE — 93312 ECHO TRANSESOPHAGEAL: CPT | Performed by: INTERNAL MEDICINE

## 2020-11-10 PROCEDURE — 93321 DOPPLER ECHO F-UP/LMTD STD: CPT

## 2020-11-10 RX ORDER — LIDOCAINE HYDROCHLORIDE 20 MG/ML
SOLUTION OROPHARYNGEAL
Status: COMPLETED | OUTPATIENT
Start: 2020-11-10 | End: 2020-11-10

## 2020-11-10 RX ORDER — MIDAZOLAM HYDROCHLORIDE 1 MG/ML
INJECTION INTRAMUSCULAR; INTRAVENOUS
Status: COMPLETED | OUTPATIENT
Start: 2020-11-10 | End: 2020-11-10

## 2020-11-10 RX ORDER — SODIUM CHLORIDE 9 MG/ML
INJECTION, SOLUTION INTRAVENOUS
Status: COMPLETED | OUTPATIENT
Start: 2020-11-10 | End: 2020-11-10

## 2020-11-10 RX ORDER — FENTANYL CITRATE 50 UG/ML
INJECTION, SOLUTION INTRAMUSCULAR; INTRAVENOUS
Status: COMPLETED | OUTPATIENT
Start: 2020-11-10 | End: 2020-11-10

## 2020-11-10 RX ADMIN — HYDROMORPHONE HYDROCHLORIDE 0.5 MG: 10 INJECTION INTRAMUSCULAR; INTRAVENOUS; SUBCUTANEOUS at 21:33

## 2020-11-10 RX ADMIN — OXYCODONE HYDROCHLORIDE AND ACETAMINOPHEN 1 TABLET: 5; 325 TABLET ORAL at 05:42

## 2020-11-10 RX ADMIN — FENTANYL CITRATE 25 MCG: 50 INJECTION INTRAMUSCULAR; INTRAVENOUS at 09:48

## 2020-11-10 RX ADMIN — FENTANYL CITRATE 25 MCG: 50 INJECTION INTRAMUSCULAR; INTRAVENOUS at 09:51

## 2020-11-10 RX ADMIN — FENTANYL CITRATE 25 MCG: 50 INJECTION INTRAMUSCULAR; INTRAVENOUS at 09:46

## 2020-11-10 RX ADMIN — HYDROMORPHONE HYDROCHLORIDE 0.5 MG: 10 INJECTION INTRAMUSCULAR; INTRAVENOUS; SUBCUTANEOUS at 23:25

## 2020-11-10 RX ADMIN — HYDROMORPHONE HYDROCHLORIDE 0.5 MG: 10 INJECTION INTRAMUSCULAR; INTRAVENOUS; SUBCUTANEOUS at 02:46

## 2020-11-10 RX ADMIN — AMITRIPTYLINE HYDROCHLORIDE 25 MG: 25 TABLET, FILM COATED ORAL at 20:36

## 2020-11-10 RX ADMIN — MIDAZOLAM 2 MG: 1 INJECTION INTRAMUSCULAR; INTRAVENOUS at 09:51

## 2020-11-10 RX ADMIN — HYDROXYZINE HYDROCHLORIDE 25 MG: 25 TABLET ORAL at 20:36

## 2020-11-10 RX ADMIN — BENZOCAINE, BUTAMBEN, AND TETRACAINE HYDROCHLORIDE 1 SPRAY: .028; .004; .004 AEROSOL, SPRAY TOPICAL at 09:46

## 2020-11-10 RX ADMIN — OXYCODONE HYDROCHLORIDE AND ACETAMINOPHEN 1 TABLET: 5; 325 TABLET ORAL at 11:43

## 2020-11-10 RX ADMIN — MIDAZOLAM 1 MG: 1 INJECTION INTRAMUSCULAR; INTRAVENOUS at 09:55

## 2020-11-10 RX ADMIN — HYDROMORPHONE HYDROCHLORIDE 0.5 MG: 10 INJECTION INTRAMUSCULAR; INTRAVENOUS; SUBCUTANEOUS at 05:11

## 2020-11-10 RX ADMIN — HYDROMORPHONE HYDROCHLORIDE 0.5 MG: 10 INJECTION INTRAMUSCULAR; INTRAVENOUS; SUBCUTANEOUS at 17:29

## 2020-11-10 RX ADMIN — SODIUM CHLORIDE, PRESERVATIVE FREE 10 ML: 5 INJECTION INTRAVENOUS at 20:37

## 2020-11-10 RX ADMIN — HYDROMORPHONE HYDROCHLORIDE 0.5 MG: 10 INJECTION INTRAMUSCULAR; INTRAVENOUS; SUBCUTANEOUS at 07:12

## 2020-11-10 RX ADMIN — HEPARIN SODIUM 12.6 UNITS/KG/HR: 10000 INJECTION, SOLUTION INTRAVENOUS at 15:20

## 2020-11-10 RX ADMIN — SODIUM CHLORIDE 100 ML/HR: 9 INJECTION, SOLUTION INTRAVENOUS at 23:25

## 2020-11-10 RX ADMIN — SODIUM CHLORIDE 100 ML/HR: 9 INJECTION, SOLUTION INTRAVENOUS at 10:40

## 2020-11-10 RX ADMIN — HYDROMORPHONE HYDROCHLORIDE 0.5 MG: 10 INJECTION INTRAMUSCULAR; INTRAVENOUS; SUBCUTANEOUS at 10:41

## 2020-11-10 RX ADMIN — MIDAZOLAM 1 MG: 1 INJECTION INTRAMUSCULAR; INTRAVENOUS at 09:59

## 2020-11-10 RX ADMIN — HYDROMORPHONE HYDROCHLORIDE 0.5 MG: 10 INJECTION INTRAMUSCULAR; INTRAVENOUS; SUBCUTANEOUS at 15:24

## 2020-11-10 RX ADMIN — FENTANYL CITRATE 25 MCG: 50 INJECTION INTRAMUSCULAR; INTRAVENOUS at 09:58

## 2020-11-10 RX ADMIN — CYCLOBENZAPRINE 10 MG: 10 TABLET, FILM COATED ORAL at 20:36

## 2020-11-10 RX ADMIN — HYDROMORPHONE HYDROCHLORIDE 0.5 MG: 10 INJECTION INTRAMUSCULAR; INTRAVENOUS; SUBCUTANEOUS at 13:18

## 2020-11-10 RX ADMIN — CYCLOBENZAPRINE 10 MG: 10 TABLET, FILM COATED ORAL at 11:43

## 2020-11-10 RX ADMIN — MIDAZOLAM 2 MG: 1 INJECTION INTRAMUSCULAR; INTRAVENOUS at 09:46

## 2020-11-10 RX ADMIN — SODIUM CHLORIDE 50 ML/HR: 9 INJECTION, SOLUTION INTRAVENOUS at 09:23

## 2020-11-10 RX ADMIN — MIDAZOLAM 2 MG: 1 INJECTION INTRAMUSCULAR; INTRAVENOUS at 09:48

## 2020-11-10 RX ADMIN — GABAPENTIN 800 MG: 400 CAPSULE ORAL at 14:36

## 2020-11-10 RX ADMIN — LIDOCAINE HYDROCHLORIDE 10 ML: 20 SOLUTION ORAL; TOPICAL at 09:23

## 2020-11-10 RX ADMIN — OXYCODONE HYDROCHLORIDE AND ACETAMINOPHEN 1 TABLET: 5; 325 TABLET ORAL at 15:57

## 2020-11-10 RX ADMIN — GABAPENTIN 800 MG: 400 CAPSULE ORAL at 20:38

## 2020-11-10 RX ADMIN — OXYCODONE HYDROCHLORIDE AND ACETAMINOPHEN 1 TABLET: 5; 325 TABLET ORAL at 20:36

## 2020-11-10 RX ADMIN — GABAPENTIN 800 MG: 400 CAPSULE ORAL at 05:11

## 2020-11-10 RX ADMIN — HYDROMORPHONE HYDROCHLORIDE 0.5 MG: 10 INJECTION INTRAMUSCULAR; INTRAVENOUS; SUBCUTANEOUS at 19:16

## 2020-11-10 RX ADMIN — Medication 1 PATCH: at 10:41

## 2020-11-10 NOTE — PLAN OF CARE
Vss, Ra, NSR, A&O. Up with standby assist. Pt went for DENIS today, results came back normal. Pt remains on heparin gtt with plans to transfer to PO eliquis or xarelto at discharge. Pt will c/u with hem/onc in 2-4 weeks and with vascular in 6 months for follow up CT angiogram. Cardiology has signed off. Pt requests dilaudid q2 and percocet q4 when due, c/o severe abdominal and L flank pain. NS infusing @100/hr. It is possible patient can discharge today pending order of PO anticoagulant by attending physician.

## 2020-11-10 NOTE — PROGRESS NOTES
Name: Belen Allen ADMIT: 2020   : 1980  PCP: Sahil Cornelius MD    MRN: [id number] LOS: 2 days   AGE/SEX: 39 y.o. female  ROOM: [facility]     Subjective   Subjective    Patient is lying in the bed in no major distress.  No new events overnight.  Continues to complain of left-sided abdominal pain.  Denies nausea, vomiting or chest pain, shortness of breath.       Objective   Objective   Vital Signs  Temp:  [97.5 °F (36.4 °C)-98.7 °F (37.1 °C)] 97.5 °F (36.4 °C)  Heart Rate:  [100-118] 109  Resp:  [16-20] 16  BP: (104-144)/() 104/93  SpO2:  [97 %-100 %] 100 %  on   ;   Device (Oxygen Therapy): room air  Body mass index is 39.26 kg/m².  Physical Exam    HEENT:  Atraumatic, normocephalic.  PERRLA.  Extraocular movements intact.  Conjunctivae pink.  Sclerae, no icterus.  Mucous membranes dry.    NECK:  Supple.  No JVD.  HEART:  Regular rate and rhythm.  Normal S1, S2.   LUNGS:  Fairly clear to auscultation anteriorly.  No wheezes.  No crackles.  ABDOMEN:   Soft,  Tender on the left side.  Bowel sounds present.  No rebound.  No  guarding.  EXTREMITIES:  No cyanosis, clubbing, or edema.  Palpable pedal pulses.  NEURO:  Grossly nonfocal.  No facial asymmetry.  Good strength in all 4  extremities.  SKIN:  Warm and dry.  No evidence of rashes.  LYMPH NODES:  No palpable cervical or supraclavicular lymphadenopathy.    Results Review     I reviewed the patient's new clinical results.  Results from last 7 days   Lab Units 11/10/20  0512 20  1851   WBC 10*3/mm3 8.77 13.39*   HEMOGLOBIN g/dL 11.8* 14.6   PLATELETS 10*3/mm3 294 388     Results from last 7 days   Lab Units 20  0511 20  1851   SODIUM mmol/L 137 139   POTASSIUM mmol/L 3.6 4.4   CHLORIDE mmol/L 102 102   CO2 mmol/L 24.4 28.1   BUN mg/dL 8 7   CREATININE mg/dL 0.77 0.92   GLUCOSE mg/dL 126* 101*   Estimated Creatinine Clearance: 116.4 mL/min (by C-G formula based on SCr of 0.77 mg/dL).  Results from last 7 days   Lab  Units 11/08/20  1851   ALBUMIN g/dL 4.40   BILIRUBIN mg/dL 0.4   ALK PHOS U/L 153*   AST (SGOT) U/L 30   ALT (SGPT) U/L 39*     Results from last 7 days   Lab Units 11/09/20  0511 11/08/20  1851   CALCIUM mg/dL 8.6 9.4   ALBUMIN g/dL  --  4.40   MAGNESIUM mg/dL  --  2.2       COVID19   Date Value Ref Range Status   11/08/2020 Not Detected Not Detected - Ref. Range Final     No results found for: HGBA1C, POCGLU    Adult Transesophageal Echo (DENIS) W/ Cont if Necessary Per Protocol  · Left ventricular ejection fraction appears to be 61 - 65%. Left   ventricular systolic function is normal.  · Saline test results are negative.       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        1. 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  Currently on heparin drip and will switch to Eliquis or Xarelto upon discharge.  She is due for DENIS tomorrow.    2. Tobacco abuse, patient was counseled to quit smoking.    3. Obesity, patient was counseled to lose weight.  4. Splenic artery aneurysm, vascular surgery did evaluate and no need for any further intervention at this point.  5. Patient is being administered vaccines for encapsulated organisms and arrangements have been made to receive these vaccinations as an outpatient basis.    Mane Urrutia MD  [facility]  11/10/20  15:04 EST

## 2020-11-10 NOTE — PROGRESS NOTES
Name: Belen Allen ADMIT: 2020   : 1980  PCP: Sahil Cornelius MD    MRN: [id number] LOS: 2 days   AGE/SEX: 39 y.o. female  ROOM: [address]    [place], Subsequent Hospital Care    Chief Complaint   Patient presents with   • Flank Pain     CC: Splenic aneurysm and infarct follow-up.  Subjective     39 y.o. female patient still complaining of left upper quadrant and left flank pain.  She denies any chest pain or shortness of breath.  She is going to get her transesophageal echocardiogram today.    Review of Systems    Objective     Scheduled Medications:   amitriptyline, 25 mg, Oral, Nightly  cyclobenzaprine, 10 mg, Oral, BID  gabapentin, 800 mg, Oral, Q8H  nicotine, 1 patch, Transdermal, Q24H  sodium chloride, 10 mL, Intravenous, Q12H        Active Infusions:  heparin (porcine), 11.6 Units/kg/hr, Last Rate: 12.6 Units/kg/hr (11/10/20 0636)  sodium chloride, 100 mL/hr, Last Rate: 100 mL/hr (20)        As Needed Medications:  •  acetaminophen **OR** acetaminophen **OR** acetaminophen  •  HYDROmorphone  •  hydrOXYzine  •  nitroglycerin  •  ondansetron  •  oxyCODONE-acetaminophen  •  sodium chloride  •  sodium chloride    Vital Signs  Vital Signs Patient Vitals for the past 24 hrs:   BP Temp Temp src Pulse Resp SpO2   11/10/20 0700 116/85 97.7 °F (36.5 °C) Oral 104 16 98 %   20 2346 -- -- -- 100 -- --   20 2214 133/89 98.7 °F (37.1 °C) Oral 109 17 97 %   20 1900 130/81 98.5 °F (36.9 °C) Oral 108 18 97 %   20 1500 119/89 98.5 °F (36.9 °C) Oral 120 18 92 %   20 1100 122/82 98.3 °F (36.8 °C) Oral 110 18 93 %     I/O:  I/O last 3 completed shifts:  In: 2781 [P.O.:1560; I.V.:1221]  Out: -     Physical Exam:  Physical Exam  Vitals signs reviewed.   Constitutional:       Appearance: She is well-developed.   HENT:      Head: Normocephalic and atraumatic.   Eyes:      General: No scleral icterus.        Right eye: No discharge.         Left  eye: No discharge.      Comments: Vision grossly intact   Neck:      Musculoskeletal: Normal range of motion.      Trachea: No tracheal deviation.   Pulmonary:      Effort: Pulmonary effort is normal. No respiratory distress.   Abdominal:      General: There is no distension.      Palpations: Abdomen is soft.   Skin:     General: Skin is warm and dry.   Neurological:      Mental Status: She is alert and oriented to person, place, and time.   Psychiatric:         Behavior: Behavior normal.          Results Review:     CBC    Results from last 7 days   Lab Units 11/10/20  0512 11/08/20  1851   WBC 10*3/mm3 8.77 13.39*   HEMOGLOBIN g/dL 11.8* 14.6   PLATELETS 10*3/mm3 294 388     BMP   Results from last 7 days   Lab Units 11/09/20  0511 11/08/20  1851   SODIUM mmol/L 137 139   POTASSIUM mmol/L 3.6 4.4   CHLORIDE mmol/L 102 102   CO2 mmol/L 24.4 28.1   BUN mg/dL 8 7   CREATININE mg/dL 0.77 0.92   GLUCOSE mg/dL 126* 101*   MAGNESIUM mg/dL  --  2.2     Cr Clearance Estimated Creatinine Clearance: 116.4 mL/min (by C-G formula based on SCr of 0.77 mg/dL).  Coag   Results from last 7 days   Lab Units 11/10/20  0512 11/09/20  1539 11/09/20  0807 11/08/20  2318   INR   --   --   --  0.94   APTT seconds 99.0* 84.3* 53.3* 30.5     HbA1C No results found for: HGBA1C  Blood Glucose No results found for: POCGLU  Infection     CMP   Results from last 7 days   Lab Units 11/09/20  0511 11/08/20  1851   SODIUM mmol/L 137 139   POTASSIUM mmol/L 3.6 4.4   CHLORIDE mmol/L 102 102   CO2 mmol/L 24.4 28.1   BUN mg/dL 8 7   CREATININE mg/dL 0.77 0.92   GLUCOSE mg/dL 126* 101*   ALBUMIN g/dL  --  4.40   BILIRUBIN mg/dL  --  0.4   ALK PHOS U/L  --  153*   AST (SGOT) U/L  --  30   ALT (SGPT) U/L  --  39*   LIPASE U/L  --  19     ABG      UA    Results from last 7 days   Lab Units 11/08/20  1851   NITRITE UA  Negative     MARIA GUADALUPE  No results found for: POCMETH, POCAMPHET, POCBARBITUR, POCBENZO, POCCOCAINE, POCOPIATES, POCOXYCODO, POCPHENCYC,  POCPROPOXY, POCTHC, POCTRICYC  Radiology(recent) Ct Abdomen Pelvis With Contrast    Result Date: 11/8/2020  1.  No PE. 2.  No acute cardiopulmonary pathology. 3.  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      CT ABDOMEN AND PELVIS WITH CONTRAST:  HISTORY: Left flank pain.  TECHNIQUE:  Multiple contiguous helical CT images of the abdomen and pelvis were obtained following the uneventful administration of intravenous contrast. Multiplanar reformatted images were reconstructed from the helical source data. Radiation dose reduction techniques were utilized, including automated exposure control and exposure modulation based on body size.   COMPARISON:  CT abdomen and pelvis 09/26/2020  FINDINGS:  Vasculature: Normal caliber abdominal aorta and branch vessels. No atherosclerotic calcifications.  Major venous structures are unremarkable.  Liver: Unremarkable.  Gallbladder and Bile Ducts: Cholecystectomy. No biliary ductal dilatation.  Spleen: 3.3 x 4.6 cm wedge-shaped region of hypodensity in the superior aspect of the spleen. This is new from the prior CT. 8mm distal splenic artery aneurysm.  Pancreas: Unremarkable.  Adrenals: Unremarkable.  Kidneys/Ureters/Bladder: Stable left renal volume loss. 2 cm simple left lower pole renal cyst. No calculi or hydronephrosis. Unremarkable ureters and bladder.  GI Tract/Mesentery:The stomach is unremarkable. No abnormally dilated small or large bowel loops. No definite bowel wall thickening. Normal appendix. No free or loculated fluid collections. No intraperitoneal free air.  Lymphadenopathy: No retroperitoneal, mesenteric or pelvic lymphadenopathy.  Pelvic organs: Unremarkable  Peripheral Soft Tissues: No soft tissue mass or fluid collection.  Small fat-containing umbilical hernia.  Osseous Structures: No acute osseous abnormality. No suspicious lytic or blastic lesions. Moderate spondylosis in the lower thoracic spine. Fusion hardware at L5-S1.   IMPRESSION:  New wedge-shaped  hypodensity in the superior aspect of the spleen concerning for splenic infarction. 8 mm distal left splenic artery aneurysm best seen on the concurrent PE exam.  Findings were discussed with Dr. Hansen at 10:50 PM on 11/08/2020.    This report was finalized on 11/8/2020 11:02 PM by Dr. Charly Hood M.D.      Ct Angiogram Chest    Result Date: 11/8/2020  1.  No PE. 2.  No acute cardiopulmonary pathology. 3.  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      CT ABDOMEN AND PELVIS WITH CONTRAST:  HISTORY: Left flank pain.  TECHNIQUE:  Multiple contiguous helical CT images of the abdomen and pelvis were obtained following the uneventful administration of intravenous contrast. Multiplanar reformatted images were reconstructed from the helical source data. Radiation dose reduction techniques were utilized, including automated exposure control and exposure modulation based on body size.   COMPARISON:  CT abdomen and pelvis 09/26/2020  FINDINGS:  Vasculature: Normal caliber abdominal aorta and branch vessels. No atherosclerotic calcifications.  Major venous structures are unremarkable.  Liver: Unremarkable.  Gallbladder and Bile Ducts: Cholecystectomy. No biliary ductal dilatation.  Spleen: 3.3 x 4.6 cm wedge-shaped region of hypodensity in the superior aspect of the spleen. This is new from the prior CT. 8mm distal splenic artery aneurysm.  Pancreas: Unremarkable.  Adrenals: Unremarkable.  Kidneys/Ureters/Bladder: Stable left renal volume loss. 2 cm simple left lower pole renal cyst. No calculi or hydronephrosis. Unremarkable ureters and bladder.  GI Tract/Mesentery:The stomach is unremarkable. No abnormally dilated small or large bowel loops. No definite bowel wall thickening. Normal appendix. No free or loculated fluid collections. No intraperitoneal free air.  Lymphadenopathy: No retroperitoneal, mesenteric or pelvic lymphadenopathy.  Pelvic organs: Unremarkable  Peripheral Soft Tissues: No soft tissue mass or fluid  collection.  Small fat-containing umbilical hernia.  Osseous Structures: No acute osseous abnormality. No suspicious lytic or blastic lesions. Moderate spondylosis in the lower thoracic spine. Fusion hardware at L5-S1.   IMPRESSION:  New wedge-shaped hypodensity in the superior aspect of the spleen concerning for splenic infarction. 8 mm distal left splenic artery aneurysm best seen on the concurrent PE exam.  Findings were discussed with Dr. Hansen at 10:50 PM on 11/08/2020.    This report was finalized on 11/8/2020 11:02 PM by Dr. Charly Hood M.D.        Assessment/Plan     Assessment & Plan      Splenic infarct    Anxiety disorder      39 y.o. female patient has 2 splenic abnormalities.  First is her splenic artery aneurysm and a hilar branch in the inferior aspect.  I believe this to be asymptomatic as the area of infarcted spleen is not supplied by this area.  She is undergoing transesophageal echocardiogram and hypercoagulable state work-up by hematology oncology and cardiology today.  From my standpoint I am fine with her transitioning over to an oral anticoagulant.  I would like to see her back in 6 months time for a CT angiogram to follow the splenic aneurysm.  Would be more than happy to see her back while she is in the hospital but please call with questions.      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

## 2020-11-10 NOTE — PROGRESS NOTES
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    11/23 or later: ActHIB, Prevnar, Menveo, Bexsero   Administer second set of vaccines 8 weeks or more after: Bexero, Menveo, Pneumovax.     ___________  S/w Dr Kendrick and he is ok w transition to Eliquis or Xarelto today, defers ordering to [facility].   Request pt to  f/u w/ oncology in 2-4 weeks to f/u w/ labs.     Leah Omer, PharmD, BCPS

## 2020-11-10 NOTE — PROGRESS NOTES
LOS: 2 days   Patient Care Team:  Sahil Cornelius MD as PCP - General (Internal Medicine)    Chief Complaint:     F/u splenic infarct - DENIS today.     Interval History:     She has mild difficulty swallowing.  She has no chest pain or difficulty breathing.  She is had no tachycardia or dizziness.  She denies nausea.  She still has some left upper quadrant pain.    Objective   Vital Signs  Temp:  [97.7 °F (36.5 °C)-98.7 °F (37.1 °C)] 97.7 °F (36.5 °C)  Heart Rate:  [100-120] 104  Resp:  [16-18] 16  BP: (116-133)/(81-89) 116/85    Intake/Output Summary (Last 24 hours) at 11/10/2020 0831  Last data filed at 11/9/2020 2208  Gross per 24 hour   Intake 2780.95 ml   Output --   Net 2780.95 ml       Comfortable NAD  conjunctivae clear  Neck supple, no JVD or thyromegaly appreciated  S1/S2 RRR, no m/r/g  Lungs CTA B, normal effort  Abdomen S/NT/ND (+) BS, no HSM appreciated  Extremities warm, no clubbing, cyanosis, or edema  No visible or palpable skin lesions  A/Ox4, mood and affect appropriate    Results Review:      Results from last 7 days   Lab Units 11/09/20  0511 11/08/20  1851   SODIUM mmol/L 137 139   POTASSIUM mmol/L 3.6 4.4   CHLORIDE mmol/L 102 102   CO2 mmol/L 24.4 28.1   BUN mg/dL 8 7   CREATININE mg/dL 0.77 0.92   GLUCOSE mg/dL 126* 101*   CALCIUM mg/dL 8.6 9.4         Results from last 7 days   Lab Units 11/10/20  0512 11/08/20  1851   WBC 10*3/mm3 8.77 13.39*   HEMOGLOBIN g/dL 11.8* 14.6   HEMATOCRIT % 36.5 42.7   PLATELETS 10*3/mm3 294 388     Results from last 7 days   Lab Units 11/10/20  0512 11/09/20  1539 11/09/20  0807 11/08/20  2318   INR   --   --   --  0.94   APTT seconds 99.0* 84.3* 53.3* 30.5         Results from last 7 days   Lab Units 11/08/20  1851   MAGNESIUM mg/dL 2.2           I reviewed the patient's new clinical results.  I personally viewed and interpreted the patient's EKG/Telemetry data        Medication Review:   amitriptyline, 25 mg, Oral, Nightly  cyclobenzaprine, 10 mg,  Oral, BID  gabapentin, 800 mg, Oral, Q8H  nicotine, 1 patch, Transdermal, Q24H  sodium chloride, 10 mL, Intravenous, Q12H        heparin (porcine), 11.6 Units/kg/hr, Last Rate: 12.6 Units/kg/hr (11/10/20 0636)  sodium chloride, 100 mL/hr, Last Rate: 100 mL/hr (11/09/20 2030)        Assessment/Plan       Splenic infarct    Anxiety disorder    1. Splenic infarct, will set the DENIS for tomorrow.  I am concerned that her body habitus is such that we will not get good images on a transthoracic echo.  Risks and benefits were explained to patient and she is amenable to this.  2. Tobacco use, advised cessation.  3. Obesity  4. Anxiety  5. Splenic artery aneurysm, followed by vascular.    DENIS was normal.     We will sign off.    Jannie Vu MD  11/10/20  08:31 EST

## 2020-11-10 NOTE — PLAN OF CARE
Problem: Adult Inpatient Plan of Care  Goal: Plan of Care Review  Outcome: Ongoing, Progressing  Flowsheets (Taken 11/9/2020 1845 by Aida Contreras, RN)  Outcome Summary: VSS. pain controlled with prn pain medication, continues on hep gtt at 14.6 units/kg. NPO at midnight for DENIS in AM, will continue to montior  Goal: Patient-Specific Goal (Individualized)  Outcome: Ongoing, Progressing  Goal: Absence of Hospital-Acquired Illness or Injury  Outcome: Ongoing, Progressing  Intervention: Identify and Manage Fall Risk  Recent Flowsheet Documentation  Taken 11/10/2020 0252 by Dorie Kim, RN  Safety Promotion/Fall Prevention:   activity supervised   assistive device/personal items within reach   clutter free environment maintained   fall prevention program maintained   lighting adjusted   mobility aid in reach   nonskid shoes/slippers when out of bed   safety round/check completed   toileting scheduled  Taken 11/10/2020 0026 by Dorie Kim, RN  Safety Promotion/Fall Prevention:   activity supervised   assistive device/personal items within reach   clutter free environment maintained   fall prevention program maintained   lighting adjusted   mobility aid in reach   nonskid shoes/slippers when out of bed   safety round/check completed   toileting scheduled  Taken 11/9/2020 2230 by Dorie Kim, RN  Safety Promotion/Fall Prevention:   activity supervised   assistive device/personal items within reach   clutter free environment maintained   fall prevention program maintained   lighting adjusted   mobility aid in reach   nonskid shoes/slippers when out of bed   safety round/check completed   toileting scheduled  Taken 11/9/2020 2027 by Dorie Kim, RN  Safety Promotion/Fall Prevention:   activity supervised   assistive device/personal items within reach   clutter free environment maintained   fall prevention program maintained   lighting adjusted   mobility aid in reach   nonskid  shoes/slippers when out of bed   safety round/check completed   toileting scheduled  Intervention: Prevent and Manage VTE (venous thromboembolism) Risk  Recent Flowsheet Documentation  Taken 11/10/2020 0252 by Dorie Kim RN  VTE Prevention/Management:   bilateral   dorsiflexion/plantar flexion performed  Taken 11/9/2020 2027 by Dorie Kim, RN  VTE Prevention/Management:   bilateral   dorsiflexion/plantar flexion performed  Goal: Optimal Comfort and Wellbeing  Outcome: Ongoing, Progressing  Goal: Readiness for Transition of Care  Outcome: Ongoing, Progressing   Goal Outcome Evaluation:  Plan of Care Reviewed With: patient  Progress: no change

## 2020-11-10 NOTE — PROGRESS NOTES
Discharge Planning Assessment  [facility]     Patient Name: Belen Allen  MRN: [id number]  Today's Date: 11/10/2020    Admit Date: 11/8/2020    Discharge Needs Assessment     Row Name 11/10/20 1434       Living Environment    Lives With  significant other    Name(s) of Who Lives With Patient  alex Aquino [phone] and her 2 daughters    Current Living Arrangements  home/apartment/condo    Provides Primary Care For  child(manuel)    Family Caregiver if Needed  significant other    Family Caregiver Names  alex Aquino [phone] and her 2 daughters    Quality of Family Relationships  helpful;involved;supportive    Able to Return to Prior Arrangements  yes       Resource/Environmental Concerns    Resource/Environmental Concerns  home accessibility    Home Accessibility Concerns  stairs to enter home 8-9 steps with 2 handrails to enter the 1st floor apartment.       Transition Planning    Patient/Family Anticipates Transition to  home with family    Patient/Family Anticipated Services at Transition  outpatient care    Transportation Anticipated  family or friend will provide       Discharge Needs Assessment    Equipment Currently Used at Home  none    Concerns to be Addressed  discharge planning    Anticipated Changes Related to Illness  none    Equipment Needed After Discharge  none    Current Discharge Risk  physical impairment        Discharge Plan     Row Name 11/10/20 2301       Plan    Plan  Plans home; follow up to assist the patient with her ambulatory referral to [facility] for vaccine series for hyposplenia and follow to see if the patient is d/c on Eliquis or Xarelto    Provided Post Acute Provider List?  Yes    Post Acute Provider List  Home Health;Nursing Home    Provided Post Acute Provider Quality & Resource List?  N/A    N/A Quality & Resource List Comment  The patient was provided with a HH/SNF list and not a print out of the HH/nursing home compare list as she currently denies any d/c  needs.    Delivered To  Patient    Method of Delivery  In person    Patient/Family in Agreement with Plan  yes    Plan Comments  Met with the patient at bedside; explained role of CCP, verified facesheet and discussed discharge planning needs. The patient states that she will return home upon d/c with assistance from her firayne Aquino [phone] and her 2 daughters.  The patient uses no DME, has 8-9 steps with 2 handrails to enter the 1st floor apartment.  The patient’s PCP is Sahil Cornelius, pharmacy is [facility] in [facility] and she does not have any trouble remembering to take her medication or with affording her medication.  The patient has no HH/SNF history, denies any POA documents, she drives herself to her appointments and her fiancé will transport her home upon d/c.  The patient was provided with a HH/SNF list and not a print out of the HH/nursing home compare list as she currently denies any d/c needs.  CCP will follow up to assist the patient with her ambulatory referral to [facility] for vaccine series for hyposplenia and will follow to see if the patient is d/c on Eliquis or Xarelto.  ZACHARY Soriano        Continued Care and Services - Admitted Since 11/8/2020    Coordination has not been started for this encounter.         Demographic Summary     Row Name 11/10/20 143       General Information    Admission Type  inpatient    Arrived From  home    Referral Source  admission list    Reason for Consult  discharge planning    Preferred Language  English     Used During This Interaction  no        Functional Status     Row Name 11/10/20 1438       Functional Status    Usual Activity Tolerance  moderate    Current Activity Tolerance  fair       Functional Status, IADL    Medications  independent    Meal Preparation  independent    Housekeeping  independent    Laundry  independent    Shopping  independent       Mental Status    General Appearance WDL  WDL       Mental Status Summary     Recent Changes in Mental Status/Cognitive Functioning  no changes        Psychosocial    No documentation.       Abuse/Neglect    No documentation.       Legal    No documentation.       Substance Abuse    No documentation.       Patient Forms     Row Name 11/10/20 3250       Patient Forms    Provider Choice List  Delivered    Delivered to  Patient    Method of delivery  In person            ZACHARY Eli

## 2020-11-10 NOTE — PROGRESS NOTES
Continued Stay Note  [facility]     Patient Name: Belen Allen  MRN: [id number]  Today's Date: 11/10/2020    Admit Date: 11/8/2020    Discharge Plan     Row Name 11/10/20 1541       Plan    Plan Comments  Spoke with Bindu Murillo/[facility] who scheduled pt for hyposplenia vaccine series on 11/23/2020 at 11am.  [facility] will schedule second part of series.  JUANITA Pulliam RN    Row Name 11/10/20 6092       Plan    Plan  Plans home; follow up to assist the patient with her ambulatory referral to [facility] for vaccine series for hyposplenia and follow to see if the patient is d/c on Eliquis or Xarelto    Provided Post Acute Provider List?  Yes    Post Acute Provider List  Home Health;Nursing Home    Provided Post Acute Provider Quality & Resource List?  N/A    N/A Quality & Resource List Comment  The patient was provided with a HH/SNF list and not a print out of the HH/nursing home compare list as she currently denies any d/c needs.    Delivered To  Patient    Method of Delivery  In person    Patient/Family in Agreement with Plan  yes    Plan Comments  Met with the patient at bedside; explained role of CCP, verified facesheet and discussed discharge planning needs. The patient states that she will return home upon d/c with assistance from her fiancé Ced Aquino [phone] and her 2 daughters.  The patient uses no DME, has 8-9 steps with 2 handrails to enter the 1st floor apartment.  The patient’s PCP is Sahil Cornelius, pharmacy is [facility] in [place] [facility] and she does not have any trouble remembering to take her medication or with affording her medication.  The patient has no HH/SNF history, denies any POA documents, she drives herself to her appointments and her fiancé will transport her home upon d/c.  The patient was provided with a HH/SNF list and not a print out of the HH/nursing home compare list as she currently denies any d/c needs.  CCP will follow up to assist the patient with her ambulatory referral to [facility] for  vaccine series for hyposplenia and will follow to see if the patient is d/c on Eliquis or Xarelto.  ZACHARY Soriano        Discharge Codes    No documentation.             Dianelys Pulliam RN

## 2020-11-10 NOTE — PROGRESS NOTES
REASON FOR FOLLOWUP/CHIEF COMPLAINT:  Splenic infarct.    HISTORY OF PRESENT ILLNESS:   No new events overnight.  DENIS planned today.  Denies bleeding.    Past Medical History, Past Surgical History, Social History, Family History have been reviewed and are without significant changes except as mentioned.    Review of Systems   Review of Systems   Constitutional: Negative for activity change.   HENT: Negative for nosebleeds and trouble swallowing.    Respiratory: Negative for shortness of breath and wheezing.    Cardiovascular: Negative for chest pain and palpitations.   Gastrointestinal: Negative for constipation, diarrhea and nausea.   Genitourinary: Negative for dysuria and hematuria.   Musculoskeletal: Negative for arthralgias and myalgias.   Skin: Negative for rash and wound.   Neurological: Negative for seizures and syncope.   Hematological: Negative for adenopathy. Does not bruise/bleed easily.   Psychiatric/Behavioral: Negative for confusion.       Medications:  The current medication list was reviewed in the EMR    ALLERGIES:  No Known Allergies           Vitals:    11/09/20 1900 11/09/20 2214 11/09/20 2346 11/10/20 0700   BP: 130/81 133/89  116/85   BP Location: Left arm Left arm  Right arm   Patient Position: Lying Lying  Lying   Pulse: 108 109 100 104   Resp: 18 17  16   Temp: 98.5 °F (36.9 °C) 98.7 °F (37.1 °C)  97.7 °F (36.5 °C)   TempSrc: Oral Oral  Oral   SpO2: 97% 97%  98%   Weight:       Height:         Physical Exam    CONSTITUTIONAL:  Vital signs reviewed.  No distress, looks comfortable.  EYES:  Conjunctiva and lids unremarkable.  PERRLA  EARS,NOSE,MOUTH,THROAT:  Ears and nose appear unremarkable.  Lips, teeth, gums appear unremarkable.  RESPIRATORY:  Normal respiratory effort.  Lungs clear to auscultation bilaterally.  CARDIOVASCULAR:  Normal S1, S2.  No murmurs rubs or gallops.  No significant lower extremity edema.  GASTROINTESTINAL: Abdomen appears unremarkable.  Nontender.  No  hepatomegaly.  No splenomegaly.  NEURO: cranial nerves 2-12 grossly intact.  No focal deficits.  Appears to have equal strength all 4 extremities.  MUSCULOSKELETAL:  Unremarkable digits/nails.  No cyanosis or clubbing.  SKIN:  Warm.  No rashes.  PSYCHIATRIC:  Normal judgment and insight.  Normal mood and affect.       RECENT LABS:  WBC   Date Value Ref Range Status   11/10/2020 8.77 3.40 - 10.80 10*3/mm3 Final   11/08/2020 13.39 (H) 3.40 - 10.80 10*3/mm3 Final     Hemoglobin   Date Value Ref Range Status   11/10/2020 11.8 (L) 12.0 - 15.9 g/dL Final   11/08/2020 14.6 12.0 - 15.9 g/dL Final     Platelets   Date Value Ref Range Status   11/10/2020 294 140 - 450 10*3/mm3 Final   11/08/2020 388 140 - 450 10*3/mm3 Final       ASSESSMENT/PLAN:  Belen KEYON Alejandro E562/1     *Splenic infarction, 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  Currently on heparin     *Risk factors for clotting  · Smokes  · Overweight  · No personal or family history of clotting  · Await hypercoagulable labs     *Smokes.  Recommended smoking cessation.  She was told this increases the risk of both arterial and venous clotting as well as strokes and heart attacks.  It also increases the risk of malignancies.  She states she is considering smoking cessation.     *Class II obesity  Body mass index is 39.26 kg/m².  BMI 25 to <30 is overweight  BMI 30 to <35 is class 1 obesity  BMI 35 to <40 is class 2 obesity  BMI 40 or higher is class 3 obesity   This increases the risk of clotting.  She was told she should ideally lose weight     *Splenic artery aneurysm, 8 mm, initially seen on 11/8/2020 CT.  Dr. Jane, vascular surgery, recommended serial outpatient imaging to follow this.  · He is going to see her in 6 months with CT angiogram to follow the splenic aneurysm     *Leukocytosis.  Predominance of mature segmented neutrophils.  Therefore, appears reactive.  WBC down to 8.8.    *Anemia.  Mild.  Check anemia labs.  This was a new issue for me to address  today.     Plan  · Await hypercoagulable labs (currently on heparin which will likely make Antithrombin falsely low).  · Ideally, lose weight and stop smoking.  · DENIS planned today by cardiology.  · Check anemia labs    Chart reviewed and summarized including cardiology note.     Addendum  DENIS was normal.  Cardiology signed off.  Nursing called me asking my opinion on discharge and switch to oral anticoagulant  I am fine with any oral anticoagulant.  Xarelto or Eliquis might be easiest for her.  Okay from a hematology standpoint with discharge.  I asked our office to arrange follow-up with me in 2 to 4 weeks with CBC that day

## 2020-11-11 ENCOUNTER — APPOINTMENT (OUTPATIENT)
Dept: GENERAL RADIOLOGY | Facility: HOSPITAL | Age: 40
End: 2020-11-11

## 2020-11-11 ENCOUNTER — APPOINTMENT (OUTPATIENT)
Dept: CARDIOLOGY | Facility: HOSPITAL | Age: 40
End: 2020-11-11

## 2020-11-11 LAB
APTT PPP: 55.5 SECONDS (ref 22.7–35.4)
APTT PPP: 66.2 SECONDS (ref 22.7–35.4)
B2 GLYCOPROT1 IGA SER-ACNC: <9 GPI IGA UNITS (ref 0–25)
B2 GLYCOPROT1 IGG SER-ACNC: <9 GPI IGG UNITS (ref 0–20)
B2 GLYCOPROT1 IGM SER-ACNC: <9 GPI IGM UNITS (ref 0–32)
BASOPHILS # BLD AUTO: 0.04 10*3/MM3 (ref 0–0.2)
BASOPHILS NFR BLD AUTO: 0.5 % (ref 0–1.5)
BH CV LOWER VASCULAR LEFT COMMON FEMORAL AUGMENT: NORMAL
BH CV LOWER VASCULAR LEFT COMMON FEMORAL COMPETENT: NORMAL
BH CV LOWER VASCULAR LEFT COMMON FEMORAL COMPRESS: NORMAL
BH CV LOWER VASCULAR LEFT COMMON FEMORAL PHASIC: NORMAL
BH CV LOWER VASCULAR LEFT COMMON FEMORAL SPONT: NORMAL
BH CV LOWER VASCULAR LEFT DISTAL FEMORAL COMPRESS: NORMAL
BH CV LOWER VASCULAR LEFT GASTRONEMIUS COMPRESS: NORMAL
BH CV LOWER VASCULAR LEFT GREATER SAPH AK COMPRESS: NORMAL
BH CV LOWER VASCULAR LEFT GREATER SAPH BK COMPRESS: NORMAL
BH CV LOWER VASCULAR LEFT LESSER SAPH COMPRESS: NORMAL
BH CV LOWER VASCULAR LEFT MID FEMORAL AUGMENT: NORMAL
BH CV LOWER VASCULAR LEFT MID FEMORAL COMPETENT: NORMAL
BH CV LOWER VASCULAR LEFT MID FEMORAL COMPRESS: NORMAL
BH CV LOWER VASCULAR LEFT MID FEMORAL PHASIC: NORMAL
BH CV LOWER VASCULAR LEFT MID FEMORAL SPONT: NORMAL
BH CV LOWER VASCULAR LEFT PERONEAL COMPRESS: NORMAL
BH CV LOWER VASCULAR LEFT POPLITEAL AUGMENT: NORMAL
BH CV LOWER VASCULAR LEFT POPLITEAL COMPETENT: NORMAL
BH CV LOWER VASCULAR LEFT POPLITEAL COMPRESS: NORMAL
BH CV LOWER VASCULAR LEFT POPLITEAL PHASIC: NORMAL
BH CV LOWER VASCULAR LEFT POPLITEAL SPONT: NORMAL
BH CV LOWER VASCULAR LEFT POSTERIOR TIBIAL COMPRESS: NORMAL
BH CV LOWER VASCULAR LEFT PROFUNDA FEMORAL COMPRESS: NORMAL
BH CV LOWER VASCULAR LEFT PROXIMAL FEMORAL COMPRESS: NORMAL
BH CV LOWER VASCULAR LEFT SAPHENOFEMORAL JUNCTION COMPRESS: NORMAL
BH CV LOWER VASCULAR RIGHT COMMON FEMORAL AUGMENT: NORMAL
BH CV LOWER VASCULAR RIGHT COMMON FEMORAL COMPETENT: NORMAL
BH CV LOWER VASCULAR RIGHT COMMON FEMORAL COMPRESS: NORMAL
BH CV LOWER VASCULAR RIGHT COMMON FEMORAL PHASIC: NORMAL
BH CV LOWER VASCULAR RIGHT COMMON FEMORAL SPONT: NORMAL
CK MB SERPL-CCNC: <1 NG/ML
CK SERPL-CCNC: 30 U/L (ref 20–180)
CK SERPL-CCNC: 30 U/L (ref 20–180)
DEPRECATED RDW RBC AUTO: 45.6 FL (ref 37–54)
EOSINOPHIL # BLD AUTO: 0.29 10*3/MM3 (ref 0–0.4)
EOSINOPHIL NFR BLD AUTO: 3.7 % (ref 0.3–6.2)
ERYTHROCYTE [DISTWIDTH] IN BLOOD BY AUTOMATED COUNT: 12.6 % (ref 12.3–15.4)
HCT VFR BLD AUTO: 38.5 % (ref 34–46.6)
HGB BLD-MCNC: 12.2 G/DL (ref 12–15.9)
IMM GRANULOCYTES # BLD AUTO: 0.04 10*3/MM3 (ref 0–0.05)
IMM GRANULOCYTES NFR BLD AUTO: 0.5 % (ref 0–0.5)
LYMPHOCYTES # BLD AUTO: 3.08 10*3/MM3 (ref 0.7–3.1)
LYMPHOCYTES NFR BLD AUTO: 39.6 % (ref 19.6–45.3)
MAGNESIUM SERPL-MCNC: 2.1 MG/DL (ref 1.6–2.6)
MCH RBC QN AUTO: 31.2 PG (ref 26.6–33)
MCHC RBC AUTO-ENTMCNC: 31.7 G/DL (ref 31.5–35.7)
MCV RBC AUTO: 98.5 FL (ref 79–97)
MONOCYTES # BLD AUTO: 0.46 10*3/MM3 (ref 0.1–0.9)
MONOCYTES NFR BLD AUTO: 5.9 % (ref 5–12)
NEUTROPHILS NFR BLD AUTO: 3.87 10*3/MM3 (ref 1.7–7)
NEUTROPHILS NFR BLD AUTO: 49.8 % (ref 42.7–76)
NRBC BLD AUTO-RTO: 0 /100 WBC (ref 0–0.2)
PLATELET # BLD AUTO: 293 10*3/MM3 (ref 140–450)
PMV BLD AUTO: 9.7 FL (ref 6–12)
POTASSIUM SERPL-SCNC: 4 MMOL/L (ref 3.5–5.2)
QT INTERVAL: 329 MS
RBC # BLD AUTO: 3.91 10*6/MM3 (ref 3.77–5.28)
TROPONIN T SERPL-MCNC: <0.01 NG/ML (ref 0–0.03)
WBC # BLD AUTO: 7.78 10*3/MM3 (ref 3.4–10.8)

## 2020-11-11 PROCEDURE — 93971 EXTREMITY STUDY: CPT

## 2020-11-11 PROCEDURE — 93010 ELECTROCARDIOGRAM REPORT: CPT | Performed by: INTERNAL MEDICINE

## 2020-11-11 PROCEDURE — 85730 THROMBOPLASTIN TIME PARTIAL: CPT | Performed by: INTERNAL MEDICINE

## 2020-11-11 PROCEDURE — 25010000002 ONDANSETRON PER 1 MG: Performed by: NURSE PRACTITIONER

## 2020-11-11 PROCEDURE — 71045 X-RAY EXAM CHEST 1 VIEW: CPT

## 2020-11-11 PROCEDURE — 84484 ASSAY OF TROPONIN QUANT: CPT | Performed by: INTERNAL MEDICINE

## 2020-11-11 PROCEDURE — 25010000002 HEPARIN (PORCINE) PER 1000 UNITS: Performed by: HOSPITALIST

## 2020-11-11 PROCEDURE — 25010000002 HYDROMORPHONE PER 4 MG: Performed by: NURSE PRACTITIONER

## 2020-11-11 PROCEDURE — 99233 SBSQ HOSP IP/OBS HIGH 50: CPT | Performed by: INTERNAL MEDICINE

## 2020-11-11 PROCEDURE — 82553 CREATINE MB FRACTION: CPT | Performed by: INTERNAL MEDICINE

## 2020-11-11 PROCEDURE — 85730 THROMBOPLASTIN TIME PARTIAL: CPT | Performed by: NURSE PRACTITIONER

## 2020-11-11 PROCEDURE — 85025 COMPLETE CBC W/AUTO DIFF WBC: CPT | Performed by: NURSE PRACTITIONER

## 2020-11-11 PROCEDURE — 82550 ASSAY OF CK (CPK): CPT | Performed by: INTERNAL MEDICINE

## 2020-11-11 PROCEDURE — 93005 ELECTROCARDIOGRAM TRACING: CPT | Performed by: INTERNAL MEDICINE

## 2020-11-11 RX ORDER — OXYCODONE AND ACETAMINOPHEN 10; 325 MG/1; MG/1
1 TABLET ORAL EVERY 4 HOURS PRN
Status: DISCONTINUED | OUTPATIENT
Start: 2020-11-11 | End: 2020-11-12 | Stop reason: HOSPADM

## 2020-11-11 RX ORDER — FOLIC ACID 1 MG/1
1 TABLET ORAL DAILY
Status: DISCONTINUED | OUTPATIENT
Start: 2020-11-11 | End: 2020-11-12 | Stop reason: HOSPADM

## 2020-11-11 RX ORDER — DIPHENHYDRAMINE HCL 25 MG
25 CAPSULE ORAL NIGHTLY PRN
Status: DISCONTINUED | OUTPATIENT
Start: 2020-11-11 | End: 2020-11-12 | Stop reason: HOSPADM

## 2020-11-11 RX ORDER — OXYCODONE AND ACETAMINOPHEN 7.5; 325 MG/1; MG/1
1 TABLET ORAL EVERY 4 HOURS PRN
Status: DISCONTINUED | OUTPATIENT
Start: 2020-11-11 | End: 2020-11-11

## 2020-11-11 RX ADMIN — GABAPENTIN 800 MG: 400 CAPSULE ORAL at 20:24

## 2020-11-11 RX ADMIN — OXYCODONE HYDROCHLORIDE AND ACETAMINOPHEN 1 TABLET: 10; 325 TABLET ORAL at 22:04

## 2020-11-11 RX ADMIN — HYDROMORPHONE HYDROCHLORIDE 0.5 MG: 10 INJECTION INTRAMUSCULAR; INTRAVENOUS; SUBCUTANEOUS at 05:23

## 2020-11-11 RX ADMIN — CYCLOBENZAPRINE 10 MG: 10 TABLET, FILM COATED ORAL at 20:24

## 2020-11-11 RX ADMIN — OXYCODONE HYDROCHLORIDE AND ACETAMINOPHEN 1 TABLET: 5; 325 TABLET ORAL at 09:54

## 2020-11-11 RX ADMIN — OXYCODONE HYDROCHLORIDE AND ACETAMINOPHEN 1 TABLET: 5; 325 TABLET ORAL at 03:34

## 2020-11-11 RX ADMIN — HEPARIN SODIUM 13.6 UNITS/KG/HR: 10000 INJECTION, SOLUTION INTRAVENOUS at 10:16

## 2020-11-11 RX ADMIN — SODIUM CHLORIDE 100 ML/HR: 9 INJECTION, SOLUTION INTRAVENOUS at 11:23

## 2020-11-11 RX ADMIN — ONDANSETRON 4 MG: 2 INJECTION INTRAMUSCULAR; INTRAVENOUS at 14:10

## 2020-11-11 RX ADMIN — OXYCODONE HYDROCHLORIDE AND ACETAMINOPHEN 1 TABLET: 10; 325 TABLET ORAL at 18:06

## 2020-11-11 RX ADMIN — CYCLOBENZAPRINE 10 MG: 10 TABLET, FILM COATED ORAL at 08:19

## 2020-11-11 RX ADMIN — HYDROMORPHONE HYDROCHLORIDE 0.5 MG: 10 INJECTION INTRAMUSCULAR; INTRAVENOUS; SUBCUTANEOUS at 08:20

## 2020-11-11 RX ADMIN — GABAPENTIN 800 MG: 400 CAPSULE ORAL at 15:40

## 2020-11-11 RX ADMIN — HYDROMORPHONE HYDROCHLORIDE 0.5 MG: 10 INJECTION INTRAMUSCULAR; INTRAVENOUS; SUBCUTANEOUS at 11:23

## 2020-11-11 RX ADMIN — OXYCODONE HYDROCHLORIDE AND ACETAMINOPHEN 1 TABLET: 7.5; 325 TABLET ORAL at 14:10

## 2020-11-11 RX ADMIN — GABAPENTIN 800 MG: 400 CAPSULE ORAL at 05:23

## 2020-11-11 RX ADMIN — FOLIC ACID 1 MG: 1 TABLET ORAL at 08:19

## 2020-11-11 RX ADMIN — AMITRIPTYLINE HYDROCHLORIDE 25 MG: 25 TABLET, FILM COATED ORAL at 20:24

## 2020-11-11 RX ADMIN — SODIUM CHLORIDE, PRESERVATIVE FREE 10 ML: 5 INJECTION INTRAVENOUS at 20:24

## 2020-11-11 RX ADMIN — HYDROMORPHONE HYDROCHLORIDE 0.5 MG: 10 INJECTION INTRAMUSCULAR; INTRAVENOUS; SUBCUTANEOUS at 01:46

## 2020-11-11 RX ADMIN — Medication 1 PATCH: at 16:14

## 2020-11-11 RX ADMIN — SODIUM CHLORIDE, PRESERVATIVE FREE 10 ML: 5 INJECTION INTRAVENOUS at 08:20

## 2020-11-11 NOTE — PROGRESS NOTES
Name: Belen Allen ADMIT: 2020   : 1980  PCP: Sahil Cornelius MD    MRN: [id number] LOS: 3 days   AGE/SEX: 39 y.o. female  ROOM: [facility]     Subjective   Subjective     Patient is lying on the bed and is requiring IV Dilaudid every 2 hours  For pain control.  Denies nausea, vomiting, shortness of breath, chest pain.     Objective   Objective   Vital Signs  Temp:  [98.1 °F (36.7 °C)-99.9 °F (37.7 °C)] 99.9 °F (37.7 °C)  Heart Rate:  [101-111] 111  Resp:  [16-18] 18  BP: (105-137)/() 137/85  SpO2:  [92 %-99 %] 96 %  on   ;   Device (Oxygen Therapy): room air  Body mass index is 39.26 kg/m².  Physical Exam    HEENT:  Atraumatic, normocephalic.  PERRLA.  Extraocular movements intact.  Conjunctivae pink.  Sclerae, no icterus.  Mucous membranes dry.    NECK:  Supple.  No JVD.  HEART:  Regular rate and rhythm.  Normal S1, S2.   LUNGS:  Fairly clear to auscultation anteriorly.  No wheezes.  No crackles.  ABDOMEN:   Soft,  Tender on the left side.  Bowel sounds present.  No rebound.  No  guarding.  EXTREMITIES:  No cyanosis, clubbing, or edema.  Palpable pedal pulses.  NEURO:  Grossly nonfocal.  No facial asymmetry.  Good strength in all 4  extremities.  SKIN:  Warm and dry.  No evidence of rashes.  LYMPH NODES:  No palpable cervical or supraclavicular lymphadenopathy.    Results Review     I reviewed the patient's new clinical results.  Results from last 7 days   Lab Units 20  0423 11/10/20  0512 20  1851   WBC 10*3/mm3 7.78 8.77 13.39*   HEMOGLOBIN g/dL 12.2 11.8* 14.6   PLATELETS 10*3/mm3 293 294 388     Results from last 7 days   Lab Units 11/10/20  1205 20  0511 20  1851   SODIUM mmol/L  --  137 139   POTASSIUM mmol/L 4.0 3.6 4.4   CHLORIDE mmol/L  --  102 102   CO2 mmol/L  --  24.4 28.1   BUN mg/dL  --  8 7   CREATININE mg/dL  --  0.77 0.92   GLUCOSE mg/dL  --  126* 101*   Estimated Creatinine Clearance: 116.4 mL/min (by C-G formula based on SCr of 0.77  mg/dL).  Results from last 7 days   Lab Units 11/08/20  1851   ALBUMIN g/dL 4.40   BILIRUBIN mg/dL 0.4   ALK PHOS U/L 153*   AST (SGOT) U/L 30   ALT (SGPT) U/L 39*     Results from last 7 days   Lab Units 11/10/20  1205 11/09/20  0511 11/08/20  1851   CALCIUM mg/dL  --  8.6 9.4   ALBUMIN g/dL  --   --  4.40   MAGNESIUM mg/dL 2.1  --  2.2       COVID19   Date Value Ref Range Status   11/08/2020 Not Detected Not Detected - Ref. Range Final     No results found for: HGBA1C, POCGLU    Duplex Venous Lower Extremity - Left CAR  · Normal left lower extremity venous duplex scan.     XR Chest 1 View  Narrative: SINGLE VIEW CHEST RADIOGRAPH     HISTORY: 39-year-old female with a history of chest pain with a deep  breath.     FINDINGS: An AP chest radiograph was obtained. Comparison is made to a  chest CT dated 11/08/2020. The lungs are normal in volume and are clear.  The cardiomediastinal silhouette and pulmonary vasculature appear  normal. No bony abnormality of the chest is appreciated.     Impression: Negative chest radiograph.       Scheduled Medications  amitriptyline, 25 mg, Oral, Nightly  cyclobenzaprine, 10 mg, Oral, BID  folic acid, 1 mg, Oral, Daily  gabapentin, 800 mg, Oral, Q8H  nicotine, 1 patch, Transdermal, Q24H  sodium chloride, 10 mL, Intravenous, Q12H    Infusions  heparin (porcine), 11.6 Units/kg/hr, Last Rate: 13.6 Units/kg/hr (11/11/20 1016)  sodium chloride, 100 mL/hr, Last Rate: 100 mL/hr (11/11/20 1123)    Diet  Diet Regular; Cardiac       Assessment/Plan     Active Hospital Problems    Diagnosis  POA   • **Splenic infarct [D73.5]  Yes   • Functional asplenia [Q89.01]  Unknown   • Anxiety disorder [F41.9]  Yes      Resolved Hospital Problems   No resolved problems to display.        1. 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  Currently on heparin drip and will switch to Eliquis or Xarelto upon discharge.  She is due for DENIS  Did not reveal any clots.  Ejection fraction is normal.   Patient's IV Dilaudid will be discontinued and dose of p.o. analgesics will be further advanced in anticipation of going home tomorrow.    2. Tobacco abuse, patient was counseled to quit smoking.    3. Obesity, patient was counseled to lose weight.  4. Splenic artery aneurysm, vascular surgery did evaluate and no need for any further intervention at this point.  5. Patient is being administered vaccines for encapsulated organisms and arrangements have been made to receive these vaccinations as an outpatient basis.    Mane Urrutia MD  [facility]  11/11/20  15:00 EST

## 2020-11-11 NOTE — PLAN OF CARE
Pt. VS WNL.  C/o persistent abd and back pain, unrelieved by IV and oral pain medications per patient.Pt. A&O x4.  Pt. Up ad js.  Pt. Receiving IVFs.  Pt. Receiving IV heparin, will switch to PO eliquis or Xarelto at DC.  Possible DC in a.m.  Pt. Anxious about DC.  Pt. Resting comfortably at present, will continue to monitor closely.      Problem: Adult Inpatient Plan of Care  Goal: Plan of Care Review  Outcome: Ongoing, Progressing  Flowsheets (Taken 11/11/2020 6721)  Progress: improving  Plan of Care Reviewed With: patient

## 2020-11-11 NOTE — PLAN OF CARE
Problem: Adult Inpatient Plan of Care  Goal: Plan of Care Review  Outcome: Ongoing, Progressing  Flowsheets (Taken 11/11/2020 0256)  Progress: improving  Plan of Care Reviewed With: patient  Outcome Summary: Monitor pain,labs,and vitals. Pt c/o frequent pain on current shift. Requested IV pain medications every two hours. Educated patient on pain management plan and the importance of asking questions before discharge. Heparin drip. Plan is to discharge patient today. Will continue to monitor.  Goal: Absence of Hospital-Acquired Illness or Injury  Intervention: Identify and Manage Fall Risk  Recent Flowsheet Documentation  Taken 11/11/2020 0118 by Zahira Vernon, YNES  Safety Promotion/Fall Prevention:   activity supervised   assistive device/personal items within reach   clutter free environment maintained   fall prevention program maintained   lighting adjusted   room organization consistent   safety round/check completed   nonskid shoes/slippers when out of bed  Taken 11/11/2020 0012 by Zahira Vernon RN  Safety Promotion/Fall Prevention:   clutter free environment maintained   fall prevention program maintained   lighting adjusted   nonskid shoes/slippers when out of bed   room organization consistent   safety round/check completed   activity supervised   assistive device/personal items within reach  Taken 11/10/2020 2228 by Zahira Vernon RN  Safety Promotion/Fall Prevention:   activity supervised   assistive device/personal items within reach   clutter free environment maintained   fall prevention program maintained   lighting adjusted   nonskid shoes/slippers when out of bed   room organization consistent   safety round/check completed  Taken 11/10/2020 2030 by Zahira Vernon, RN  Safety Promotion/Fall Prevention:   activity supervised   assistive device/personal items within reach   clutter free environment maintained   fall prevention program maintained   nonskid shoes/slippers when out of bed   room  organization consistent   safety round/check completed  Intervention: Prevent Skin Injury  Recent Flowsheet Documentation  Taken 11/11/2020 0118 by Zahira Vernon RN  Body Position: position changed independently  Taken 11/10/2020 2030 by Zahira Vernon RN  Body Position: position changed independently  Intervention: Prevent and Manage VTE (venous thromboembolism) Risk  Recent Flowsheet Documentation  Taken 11/11/2020 0118 by Zahira Vernon RN  VTE Prevention/Management:   bilateral   dorsiflexion/plantar flexion performed  Taken 11/10/2020 2030 by Zahira Vernon RN  VTE Prevention/Management:   bilateral   dorsiflexion/plantar flexion performed  Intervention: Prevent Infection  Recent Flowsheet Documentation  Taken 11/11/2020 0118 by Zahira Vernon RN  Infection Prevention:   personal protective equipment utilized   rest/sleep promoted   hand hygiene promoted   single patient room provided  Taken 11/11/2020 0012 by Zahira Vernon RN  Infection Prevention:   personal protective equipment utilized   hand hygiene promoted   rest/sleep promoted   single patient room provided  Taken 11/10/2020 2228 by Zahira Vernon RN  Infection Prevention:   single patient room provided   rest/sleep promoted   personal protective equipment utilized   hand hygiene promoted  Taken 11/10/2020 2030 by Zahira Vernon RN  Infection Prevention:   rest/sleep promoted   single patient room provided   personal protective equipment utilized   hand hygiene promoted   Goal Outcome Evaluation:  Plan of Care Reviewed With: patient  Progress: improving  Outcome Summary: Monitor pain,labs,and vitals. Pt c/o frequent pain on current shift. Requested IV pain medications every two hours. Educated patient on pain management plan and the importance of asking questions before discharge. Heparin drip. Plan is to discharge patient today. Will continue to monitor.

## 2020-11-11 NOTE — PROGRESS NOTES
REASON FOR FOLLOWUP/CHIEF COMPLAINT:  Splenic infarct.    HISTORY OF PRESENT ILLNESS:   She feels like her left foot is swollen. No bleeding.  No other new events    Past Medical History, Past Surgical History, Social History, Family History have been reviewed and are without significant changes except as mentioned.    Review of Systems   Review of Systems   Constitutional: Negative for activity change.   HENT: Negative for nosebleeds and trouble swallowing.    Respiratory: Negative for shortness of breath and wheezing.    Cardiovascular: Negative for chest pain and palpitations.   Gastrointestinal: Negative for constipation, diarrhea and nausea.   Genitourinary: Negative for dysuria and hematuria.   Musculoskeletal: Negative for arthralgias and myalgias.   Skin: Negative for rash and wound.   Neurological: Negative for seizures and syncope.   Hematological: Negative for adenopathy. Does not bruise/bleed easily.   Psychiatric/Behavioral: Negative for confusion.       Medications:  The current medication list was reviewed in the EMR    ALLERGIES:  No Known Allergies           Vitals:    11/10/20 1100 11/10/20 1500 11/10/20 1900 11/10/20 2300   BP: 104/93 111/100 (!) 130/102 115/82   BP Location: Left arm Left arm Left arm Left arm   Patient Position: Sitting   Lying   Pulse: 109 114 109    Resp: 16 16 16 16   Temp: 97.5 °F (36.4 °C) 98 °F (36.7 °C)  98.1 °F (36.7 °C)   TempSrc: Oral Oral Oral Oral   SpO2: 100% 97% 92% 99%   Weight:       Height:         Physical Exam    CONSTITUTIONAL:  Vital signs reviewed.  No distress, looks comfortable.  EYES:  Conjunctiva and lids unremarkable.  PERRLA  EARS,NOSE,MOUTH,THROAT:  Ears and nose appear unremarkable.  Lips, teeth, gums appear unremarkable.  RESPIRATORY:  Normal respiratory effort.  Lungs clear to auscultation bilaterally.  CARDIOVASCULAR:  Normal S1, S2.  No murmurs rubs or gallops.  No significant lower extremity edema on my exam, although patient feels left  foot is swollen.  GASTROINTESTINAL: Abdomen appears unremarkable.  Nontender.  No hepatomegaly.  No splenomegaly.  NEURO: cranial nerves 2-12 grossly intact.  No focal deficits.  Appears to have equal strength all 4 extremities.  MUSCULOSKELETAL:  Unremarkable digits/nails.  No cyanosis or clubbing.  SKIN:  Warm.  No rashes.  PSYCHIATRIC:  Normal judgment and insight.  Normal mood and affect.          RECENT LABS:  WBC   Date Value Ref Range Status   11/11/2020 7.78 3.40 - 10.80 10*3/mm3 Final   11/10/2020 8.77 3.40 - 10.80 10*3/mm3 Final   11/08/2020 13.39 (H) 3.40 - 10.80 10*3/mm3 Final     Hemoglobin   Date Value Ref Range Status   11/11/2020 12.2 12.0 - 15.9 g/dL Final   11/10/2020 11.8 (L) 12.0 - 15.9 g/dL Final   11/08/2020 14.6 12.0 - 15.9 g/dL Final     Platelets   Date Value Ref Range Status   11/11/2020 293 140 - 450 10*3/mm3 Final   11/10/2020 294 140 - 450 10*3/mm3 Final   11/08/2020 388 140 - 450 10*3/mm3 Final       ASSESSMENT/PLAN:  Belen Allen E562/1     *Splenic infarction, 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  Currently on heparin     *Risk factors for clotting  · Smokes  · Overweight  · No personal or family history of clotting  · Unremarkable: Protein C activity, protein S activity, protein S free, beta-2 glycoprotein antibodies, antithrombin  · Await remaining hypercoagulable labs (factor V Leiden and prothrombin gene mutation and lupus anticoagulant)    *Low to medium positive anticardiolipin IgM  · 22 (low to medium +21-80), on 11/9/2020    *Choice of anticoagulation  I told her with the positive anticardiolipin antibody lab test, she may have antiphospholipid antibody syndrome.  I told her if this is the case, Coumadin has been proven to be more effective than newer oral direct anticoagulants.  She understands this but refuses Coumadin.  She does not want to come in frequently for lab checks, deal with those changes, deal with dietary changes.  She wants something such as Eliquis or Xarelto upon  discharge.     *Smokes.  Recommended smoking cessation.  She was told this increases the risk of both arterial and venous clotting as well as strokes and heart attacks.  It also increases the risk of malignancies.  She states she is considering smoking cessation.     *Class II obesity  Body mass index is 39.26 kg/m².  BMI 25 to <30 is overweight  BMI 30 to <35 is class 1 obesity  BMI 35 to <40 is class 2 obesity  BMI 40 or higher is class 3 obesity   This increases the risk of clotting.  She was told she should ideally lose weight     *Splenic artery aneurysm, 8 mm, initially seen on 11/8/2020 CT.  Dr. Jane, vascular surgery, recommended serial outpatient imaging to follow this.  · He is going to see her in 6 months with CT angiogram to follow the splenic aneurysm     *Leukocytosis.  Predominance of mature segmented neutrophils.  Therefore, appears reactive.  WBC down to 8.8.    *Anemia.  Mild.  Check anemia labs.  Unremarkable: Iron labs.  B12 normal at 255.    *Folate deficiency  Serum folate 2.7 on 11/10/2020  Begin folic acid 1 mg daily    *Patient feels her left foot is swollen (although not so on exam)     Plan  · Await remaining hypercoagulable labs  · Ideally, lose weight and stop smoking.  · Begin folic acid 1 mg daily  · Left leg Doppler  · Check left lower extremity Doppler  · I told her if anticardiolipin antibody lab remains persistently positive after 3 months, she should probably stay on indefinite anticoagulation.  (Unless develops a contraindication)    Chart reviewed and summarized including hospitalist note.  They are vaccinating her     Okay from a hematology standpoint with discharge.  I asked our office to arrange follow-up with me in 2 to 4 weeks with CBC that day

## 2020-11-12 VITALS
DIASTOLIC BLOOD PRESSURE: 97 MMHG | OXYGEN SATURATION: 96 % | WEIGHT: 228.7 LBS | HEIGHT: 64 IN | RESPIRATION RATE: 17 BRPM | SYSTOLIC BLOOD PRESSURE: 153 MMHG | TEMPERATURE: 98.2 F | BODY MASS INDEX: 39.05 KG/M2 | HEART RATE: 106 BPM

## 2020-11-12 LAB
APTT PPP: 70.5 SECONDS (ref 22.7–35.4)
BASOPHILS # BLD AUTO: 0.03 10*3/MM3 (ref 0–0.2)
BASOPHILS NFR BLD AUTO: 0.4 % (ref 0–1.5)
DEPRECATED RDW RBC AUTO: 42.6 FL (ref 37–54)
EOSINOPHIL # BLD AUTO: 0.24 10*3/MM3 (ref 0–0.4)
EOSINOPHIL NFR BLD AUTO: 3 % (ref 0.3–6.2)
ERYTHROCYTE [DISTWIDTH] IN BLOOD BY AUTOMATED COUNT: 12.4 % (ref 12.3–15.4)
F2 GENE MUT ANL BLD/T: NORMAL
HCT VFR BLD AUTO: 36 % (ref 34–46.6)
HGB BLD-MCNC: 12 G/DL (ref 12–15.9)
IMM GRANULOCYTES # BLD AUTO: 0.05 10*3/MM3 (ref 0–0.05)
IMM GRANULOCYTES NFR BLD AUTO: 0.6 % (ref 0–0.5)
LYMPHOCYTES # BLD AUTO: 3.37 10*3/MM3 (ref 0.7–3.1)
LYMPHOCYTES NFR BLD AUTO: 42.6 % (ref 19.6–45.3)
MCH RBC QN AUTO: 31.3 PG (ref 26.6–33)
MCHC RBC AUTO-ENTMCNC: 33.3 G/DL (ref 31.5–35.7)
MCV RBC AUTO: 94 FL (ref 79–97)
MONOCYTES # BLD AUTO: 0.48 10*3/MM3 (ref 0.1–0.9)
MONOCYTES NFR BLD AUTO: 6.1 % (ref 5–12)
NEUTROPHILS NFR BLD AUTO: 3.74 10*3/MM3 (ref 1.7–7)
NEUTROPHILS NFR BLD AUTO: 47.3 % (ref 42.7–76)
NRBC BLD AUTO-RTO: 0 /100 WBC (ref 0–0.2)
PLATELET # BLD AUTO: 322 10*3/MM3 (ref 140–450)
PMV BLD AUTO: 9.9 FL (ref 6–12)
RBC # BLD AUTO: 3.83 10*6/MM3 (ref 3.77–5.28)
WBC # BLD AUTO: 7.91 10*3/MM3 (ref 3.4–10.8)

## 2020-11-12 PROCEDURE — 99233 SBSQ HOSP IP/OBS HIGH 50: CPT | Performed by: INTERNAL MEDICINE

## 2020-11-12 PROCEDURE — 85730 THROMBOPLASTIN TIME PARTIAL: CPT | Performed by: NURSE PRACTITIONER

## 2020-11-12 PROCEDURE — 25010000002 HEPARIN (PORCINE) PER 1000 UNITS: Performed by: HOSPITALIST

## 2020-11-12 PROCEDURE — 85025 COMPLETE CBC W/AUTO DIFF WBC: CPT | Performed by: NURSE PRACTITIONER

## 2020-11-12 RX ORDER — NICOTINE 21 MG/24HR
1 PATCH, TRANSDERMAL 24 HOURS TRANSDERMAL
Qty: 30 PATCH | Refills: 0 | Status: SHIPPED | OUTPATIENT
Start: 2020-11-12 | End: 2020-12-12

## 2020-11-12 RX ORDER — OXYCODONE AND ACETAMINOPHEN 10; 325 MG/1; MG/1
1 TABLET ORAL EVERY 6 HOURS PRN
Qty: 28 TABLET | Refills: 0 | Status: SHIPPED | OUTPATIENT
Start: 2020-11-12 | End: 2020-11-19

## 2020-11-12 RX ADMIN — GABAPENTIN 800 MG: 400 CAPSULE ORAL at 05:48

## 2020-11-12 RX ADMIN — FOLIC ACID 1 MG: 1 TABLET ORAL at 08:16

## 2020-11-12 RX ADMIN — CYCLOBENZAPRINE 10 MG: 10 TABLET, FILM COATED ORAL at 08:16

## 2020-11-12 RX ADMIN — OXYCODONE HYDROCHLORIDE AND ACETAMINOPHEN 1 TABLET: 10; 325 TABLET ORAL at 05:48

## 2020-11-12 RX ADMIN — SODIUM CHLORIDE, PRESERVATIVE FREE 10 ML: 5 INJECTION INTRAVENOUS at 08:16

## 2020-11-12 RX ADMIN — Medication 1 PATCH: at 12:17

## 2020-11-12 RX ADMIN — HEPARIN SODIUM 13.6 UNITS/KG/HR: 10000 INJECTION, SOLUTION INTRAVENOUS at 05:51

## 2020-11-12 RX ADMIN — OXYCODONE HYDROCHLORIDE AND ACETAMINOPHEN 1 TABLET: 10; 325 TABLET ORAL at 10:11

## 2020-11-12 NOTE — PROGRESS NOTES
Case Management Discharge Note      Final Note: Pt discharged home.  Spoke with [facility] pharmacy who state her Eliquis is covered with no charge.  Appt scheduled for pt to f/u at [facility] for hyposplenia vaccine series. No further needs identified.  JUANITA Pulliam RN    Provided Post Acute Provider List?: Yes  Post Acute Provider List: Home Health, Nursing Home  Provided Post Acute Provider Quality & Resource List?: N/A  N/A Quality & Resource List Comment: The patient was provided with a HH/SNF list and not a print out of the HH/nursing home compare list as she currently denies any d/c needs.  Delivered To: Patient  Method of Delivery: In person    Selected Continued Care - Discharged on 11/12/2020 Admission date: 11/8/2020 - Discharge disposition: Home or Self Care    Destination    No services have been selected for the patient.              Durable Medical Equipment    No services have been selected for the patient.              Dialysis/Infusion    No services have been selected for the patient.              Home Medical Care    No services have been selected for the patient.              Therapy    No services have been selected for the patient.              Community Resources    No services have been selected for the patient.                  Transportation Services  Private: Car    Final Discharge Disposition Code: 01 - home or self-care

## 2020-11-12 NOTE — PAYOR COMM NOTE
"Carson Mullen (39 y.o. Female)     ATTN: CONTINUED STAY CLINICALS FOR REVIEW, [id number]                       PLEASE REPLY TO UR DEPT: LI TAPIA RN/CCP                                 [phone], [phone]      Date of Birth Social Security Number Address Home Phone MRN    1980  [address]  [place] [address] [phone] [id number]    Cheondoism Marital Status          None Single       Admission Date Admission Type Admitting Provider Attending Provider Department, Room/Bed    11/8/20 Emergency Mane Urrutia MD Reddy, Rahul Kandada, MD [address], [address]    Discharge Date Discharge Disposition Discharge Destination                       Attending Provider: Mane Urrutia MD    Allergies: No Known Allergies    Isolation: None   Infection: None   Code Status: CPR    Ht: 162.6 cm (64\")   Wt: 104 kg (228 lb 11.2 oz)    Admission Cmt: None   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]                 Active Insurance as of 11/8/2020     Primary Coverage     Payor Plan Insurance Group Employer/Plan Group    [id number]     Payor Plan Address Payor Plan Phone Number Payor Plan Fax Number Effective Dates    [address] [phone]  1/1/2019 - None Entered    [facility] [id number]       Subscriber Name Subscriber Birth Date Member ID       CARSON MULELN 1980 [id number]                 Emergency Contacts      (Rel.) Home Phone Work Phone Mobile Phone    ISAIAH AGUAYO (Significant Other) [phone] -- --            Vital Signs (last 2 days)     Date/Time   Temp   Temp src   Pulse   Resp   BP   Patient Position   SpO2    11/11/20 2300   98 (36.7)   Oral   113   17   (!) 146/114   Lying   --    11/11/20 2048   98.9 (37.2)   Oral   104   17   144/100   Lying   --    11/11/20 1700   --   --   99   --   --   --   --    11/11/20 1530   --   --   114   --   141/90   Sitting   96    11/11/20 1123   " 99.9 (37.7)   Oral   111   18   137/85   Lying   96    11/11/20 0720   98.3 (36.8)   Oral   101   16   105/93   Lying   --    11/10/20 2300   98.1 (36.7)   Oral   --   16   115/82   Lying   99    11/10/20 1900   --   Oral   109   16   (!) 130/102   --   92    11/10/20 1500   98 (36.7)   Oral   114   16   111/100   --   97    11/10/20 1100   97.5 (36.4)   Oral   109   16   104/93   Sitting   100    11/10/20 1015   --   --   114   16   136/80   --   99    11/10/20 1010   --   --   117   18   137/81   --   99    11/10/20 1005   --   --   118   20   (!) 135/106   --   97    11/10/20 1000   --   --   110   20   (!) 130/108   --   100    11/10/20 09:56:04   --   --   111   16   116/93   --   100    11/10/20 0950   --   --   110   16   111/74   --   100    11/10/20 0945   --   --   112   16   138/88   --   100    11/10/20 09:22:03   --   --   105   16   144/83   --   98    11/10/20 0700   97.7 (36.5)   Oral   104   16   116/85   Lying   98            Oxygen Therapy (last 2 days)     Date/Time   SpO2   Device (Oxygen Therapy)   Flow (L/min)   Oxygen Concentration (%)   ETCO2 (mmHg)    11/12/20 0200   --   room air   --   --   --    11/11/20 2300   --   room air   --   --   --    11/11/20 2048   --   room air   --   --   --    11/11/20 2026   --   room air   --   --   --    11/11/20 1530   96   room air   --   --   --    11/11/20 1123   96   room air   --   --   --    11/11/20 0118   --   room air   --   --   --    11/10/20 2300   99   room air   --   --   --    11/10/20 2030   --   room air   --   --   --    11/10/20 1900   92   room air   --   --   --    11/10/20 1500   97   room air   --   --   --    11/10/20 1400   --   room air   --   --   --    11/10/20 1100   100   --   --   --   --    11/10/20 1015   99   --   --   --   --    11/10/20 1010   99   --   --   --   --    11/10/20 1005   97   --   --   --   --    11/10/20 1000   100   --   --   --   --    11/10/20 09:56:04   100   --   --   --   --    11/10/20 0950   100    --   --   --   --    11/10/20 0945   100   --   --   --   --    11/10/20 09:22:03   98   --   --   --   --    11/10/20 0800   --   room air   --   --   --    11/10/20 0700   98   room air   --   --   --    11/10/20 0252   --   room air   --   --   --            Intake & Output (last 2 days)       11/10 0701 - 11/11 0700 11/11 0701 - 11/12 0700 11/12 0701 - 11/13 0700    P.O. 960 480     I.V. (mL/kg) 980 (9.4)      Total Intake(mL/kg) 1940 (18.7) 480 (4.6)     Net +1940 +480            Urine Unmeasured Occurrence 4 x 5 x         Lines, Drains & Airways    Active LDAs     Name:   Placement date:   Placement time:   Site:   Days:    Peripheral IV 11/08/20 2146 Left;Medial Antecubital   11/08/20 2146    Antecubital   3    Peripheral IV 11/10/20 2100 Right Forearm   11/10/20    2100    Forearm   1              Lab Results (last 48 hours)     Procedure Component Value Units Date/Time    aPTT [[id number]]  (Abnormal) Collected: 11/12/20 0504    Specimen: Blood Updated: 11/12/20 0610     PTT 70.5 seconds     CBC & Differential [[id number]]  (Abnormal) Collected: 11/12/20 0504    Specimen: Blood Updated: 11/12/20 0559    Narrative:      The following orders were created for panel order CBC & Differential.  Procedure                               Abnormality         Status                     ---------                               -----------         ------                     CBC Auto Differential[[id number]]        Abnormal            Final result                 Please view results for these tests on the individual orders.    CBC Auto Differential [[id number]]  (Abnormal) Collected: 11/12/20 0504    Specimen: Blood Updated: 11/12/20 0559     WBC 7.91 10*3/mm3      RBC 3.83 10*6/mm3      Hemoglobin 12.0 g/dL      Hematocrit 36.0 %      MCV 94.0 fL      MCH 31.3 pg      MCHC 33.3 g/dL      RDW 12.4 %      RDW-SD 42.6 fl      MPV 9.9 fL      Platelets 322 10*3/mm3      Neutrophil % 47.3 %      Lymphocyte % 42.6 %       Monocyte % 6.1 %      Eosinophil % 3.0 %      Basophil % 0.4 %      Immature Grans % 0.6 %      Neutrophils, Absolute 3.74 10*3/mm3      Lymphocytes, Absolute 3.37 10*3/mm3      Monocytes, Absolute 0.48 10*3/mm3      Eosinophils, Absolute 0.24 10*3/mm3      Basophils, Absolute 0.03 10*3/mm3      Immature Grans, Absolute 0.05 10*3/mm3      nRBC 0.0 /100 WBC     aPTT [[id number]]  (Abnormal) Collected: 11/11/20 1147    Specimen: Blood from Hand, Left Updated: 11/11/20 1258     PTT 66.2 seconds     CK Total & CKMB [[id number]]  (Normal) Collected: 11/11/20 1019    Specimen: Blood Updated: 11/11/20 1137     CKMB <1.00 ng/mL      Creatine Kinase 30 U/L     Narrative:      CKMB results may be falsely decreased if patient taking Biotin.    CK [[id number]]  (Normal) Collected: 11/11/20 1019    Specimen: Blood Updated: 11/11/20 1122     Creatine Kinase 30 U/L     Troponin [[id number]]  (Normal) Collected: 11/11/20 1019    Specimen: Blood Updated: 11/11/20 1102     Troponin T <0.010 ng/mL     Narrative:      Troponin T Reference Range:  <= 0.03 ng/mL-   Negative for AMI  >0.03 ng/mL-     Abnormal for myocardial necrosis.  Clinicians would have to utilize clinical acumen, EKG, Troponin and serial changes to determine if it is an Acute Myocardial Infarction or myocardial injury due to an underlying chronic condition.       Results may be falsely decreased if patient taking Biotin.      Potassium [[id number]]  (Normal) Collected: 11/10/20 1205    Specimen: Blood Updated: 11/11/20 0956     Potassium 4.0 mmol/L     Magnesium [[id number]]  (Normal) Collected: 11/10/20 1205    Specimen: Blood Updated: 11/11/20 0946     Magnesium 2.1 mg/dL     aPTT [[id number]]  (Abnormal) Collected: 11/11/20 0423    Specimen: Blood from Arm, Left Updated: 11/11/20 0459     PTT 55.5 seconds     CBC & Differential [[id number]]  (Abnormal) Collected: 11/11/20 0423    Specimen: Blood Updated: 11/11/20 0447    Narrative:      The following orders were created  for panel order CBC & Differential.  Procedure                               Abnormality         Status                     ---------                               -----------         ------                     CBC Auto Differential[[id number]]        Abnormal            Final result                 Please view results for these tests on the individual orders.    CBC Auto Differential [[id number]]  (Abnormal) Collected: 11/11/20 0423    Specimen: Blood Updated: 11/11/20 0447     WBC 7.78 10*3/mm3      RBC 3.91 10*6/mm3      Hemoglobin 12.2 g/dL      Hematocrit 38.5 %      MCV 98.5 fL      MCH 31.2 pg      MCHC 31.7 g/dL      RDW 12.6 %      RDW-SD 45.6 fl      MPV 9.7 fL      Platelets 293 10*3/mm3      Neutrophil % 49.8 %      Lymphocyte % 39.6 %      Monocyte % 5.9 %      Eosinophil % 3.7 %      Basophil % 0.5 %      Immature Grans % 0.5 %      Neutrophils, Absolute 3.87 10*3/mm3      Lymphocytes, Absolute 3.08 10*3/mm3      Monocytes, Absolute 0.46 10*3/mm3      Eosinophils, Absolute 0.29 10*3/mm3      Basophils, Absolute 0.04 10*3/mm3      Immature Grans, Absolute 0.04 10*3/mm3      nRBC 0.0 /100 WBC     Beta-2 Glycoprotein Antibodies [[id number]] Collected: 11/09/20 1539    Specimen: Blood Updated: 11/11/20 0407     Beta-2 Glyco 1 IgG <9 GPI IgG units      Comment: The reference interval reflects a 3SD or 99th percentile interval,  which is thought to represent a potentially clinically significant  result in accordance with the International Consensus Statement on  the classification criteria for definitive antiphospholipid syndrome  (APS). J Thromb Haem 2006;4:295-306.        Beta-2 Glyco 1 IgA <9 GPI IgA units      Comment: The reference interval reflects a 3SD or 99th percentile interval,  which is thought to represent a potentially clinically significant  result in accordance with the International Consensus Statement on  the classification criteria for definitive antiphospholipid syndrome  (APS). J Thromb  Haem 2006;4:295-306.        Beta-2 Glyco 1 IgM <9 GPI IgM units      Comment: The reference interval reflects a 3SD or 99th percentile interval,  which is thought to represent a potentially clinically significant  result in accordance with the International Consensus Statement on  the classification criteria for definitive antiphospholipid syndrome  (APS). J Thromb Haem 2006;4:295-306.       Narrative:      Performed at:  01 - [facility] [address]  [id number]  : Shan Elliott MD, Phone:  [phone]    Cardiolipin Antibody [[id number]]  (Abnormal) Collected: 11/09/20 1539    Specimen: Blood Updated: 11/10/20 1410     Anticardiolipin IgG <9 GPL U/mL      Comment:                           Negative:              <15                            Indeterminate:     15 - 20                            Low-Med Positive: >20 - 80                            High Positive:         >80        Anticardiolipin IgM 22 MPL U/mL      Comment:                           Negative:              <13                            Indeterminate:     13 - 20                            Low-Med Positive: >20 - 80                            High Positive:         >80        Anticardiolipin IgA <9 APL U/mL      Comment:                           Negative:              <12                            Indeterminate:     12 - 20                            Low-Med Positive: >20 - 80                            High Positive:         >80       Narrative:      Performed at:  01 - [facility] [address]  [id number]  : Anton Westfall PhD, Phone:  [phone]    Folate [[id number]]  (Abnormal) Collected: 11/10/20 1205    Specimen: Blood Updated: 11/10/20 1320     Folate 2.71 ng/mL     Narrative:      Results may be falsely increased if patient taking Biotin.      Ferritin [[id number]]  (Normal) Collected: 11/10/20 1205    Specimen: Blood Updated: 11/10/20 1320     Ferritin 138.00 ng/mL      Narrative:      Results may be falsely decreased if patient taking Biotin.      Vitamin B12 [[id number]]  (Normal) Collected: 11/10/20 1205    Specimen: Blood Updated: 11/10/20 1320     Vitamin B-12 255 pg/mL     Narrative:      Results may be falsely increased if patient taking Biotin.      aPTT [[id number]]  (Abnormal) Collected: 11/10/20 1205    Specimen: Blood from Hand, Right Updated: 11/10/20 1303     PTT 63.9 seconds     Iron Profile [[id number]]  (Abnormal) Collected: 11/10/20 1205    Specimen: Blood Updated: 11/10/20 1303     Iron 58 mcg/dL      Iron Saturation 19 %      Transferrin 202 mg/dL      TIBC 301 mcg/dL     Protein S Antigen, Free [[id number]]  (Normal) Collected: 11/09/20 1539    Specimen: Blood Updated: 11/10/20 1133     Protein S Ag, Free 106.0 %     Narrative:      Deficiency of Protein S is associated with a high risk of developing venous thromboembolism, especially in young adults.  Acquired deficiencies of Protein S are associated with pregnancy, hepatic disorder, oral anticoagulant therapy, disseminated intravascular coagulation (DIC), newborns, and other clinical conditions.      Protein S Functional [[id number]]  (Normal) Collected: 11/09/20 1539    Specimen: Blood Updated: 11/10/20 1132     Protein S Functional 84 %     Narrative:      Lupus anticoagulants and/or anti-phospholipid antibodies (APA) have been noted to interfere in the assay. Highly elevated levels of Factor VIII (greater than 250%) may lead to an under-estimation of the functional protein S level. Thrombin inhibitors and heparin may lead to an over-estimation of the functional protein S level.    Protein C Activity [[id number]]  (Normal) Collected: 11/09/20 1539    Specimen: Blood Updated: 11/10/20 1131     Protein C Activity 125 %     Narrative:      Deficiency of Protein C is associated with recurrent venous thrombosis, especially in young adults.  Acquired deficiencies of Protein C are associated with hepatic disorder,  oral anticoagulant therapy, and disseminated intravascular coagulation (DIC).      Antithrombin III [[id number]]  (Normal) Collected: 11/09/20 1539    Specimen: Blood from Hand, Left Updated: 11/10/20 1052     Antithrombin Activity 110 %           Imaging Results (Last 48 Hours)     Procedure Component Value Units Date/Time    XR Chest 1 View [[id number]] Collected: 11/11/20 1041     Updated: 11/11/20 1710    Narrative:      SINGLE VIEW CHEST RADIOGRAPH     HISTORY: 39-year-old female with a history of chest pain with a deep  breath.     FINDINGS: An AP chest radiograph was obtained. Comparison is made to a  chest CT dated 11/08/2020. The lungs are normal in volume and are clear.  The cardiomediastinal silhouette and pulmonary vasculature appear  normal. No bony abnormality of the chest is appreciated.       Impression:      Negative chest radiograph.     This report was finalized on 11/11/2020 5:07 PM by Dr. Glenn Lozada M.D.             Orders (last 48 hrs)      Start     Ordered    11/12/20 0600  CBC Auto Differential  PROCEDURE ONCE     Comments: Discontinue After Heparin Stopped      11/12/20 0002    11/11/20 1705  diphenhydrAMINE (BENADRYL) capsule 25 mg  Nightly PRN      11/11/20 1706    11/11/20 1639  oxyCODONE-acetaminophen (PERCOCET)  MG per tablet 1 tablet  Every 4 Hours PRN      11/11/20 1640    11/11/20 1154  oxyCODONE-acetaminophen (PERCOCET) 7.5-325 MG per tablet 1 tablet  Every 4 Hours PRN,   Status:  Discontinued      11/11/20 1155    11/11/20 1130  aPTT  Timed      11/11/20 0622    11/11/20 0934  CK Total & CKMB  STAT      11/11/20 0934    11/11/20 0934  Magnesium  STAT      11/11/20 0934    11/11/20 0934  Potassium  STAT      11/11/20 0934    11/11/20 0933  XR Chest 1 View  1 Time Imaging      11/11/20 0934    11/11/20 0933  CK  STAT      11/11/20 0934    11/11/20 0933  Troponin  STAT      11/11/20 0934    11/11/20 0928  ECG 12 Lead  STAT      11/11/20 0928    11/11/20 0815  folic acid  (FOLVITE) tablet 1 mg  Daily      11/11/20 0725    11/11/20 0725  Duplex Venous Lower Extremity - Left CAR  Once      11/11/20 0725    11/11/20 0600  CBC Auto Differential  PROCEDURE ONCE     Comments: Discontinue After Heparin Stopped      11/11/20 0002    11/10/20 1230  aPTT  Timed      11/10/20 0843    11/10/20 1130  Diet Regular  Diet Effective Now,   Status:  Canceled      11/10/20 1036    11/10/20 1130  Diet Regular; Cardiac  Diet Effective Now      11/10/20 1037    11/10/20 0946  butamben-tetracaine-benzocaine (CETACAINE) 2-2-14 % spray  Code / Trauma / Sedation Medication      11/10/20 0946    11/10/20 0946  fentaNYL citrate (PF) (SUBLIMAZE) injection  Code / Trauma / Sedation Medication      11/10/20 0946    11/10/20 0946  midazolam (VERSED) injection  Code / Trauma / Sedation Medication      11/10/20 0946    11/10/20 0923  Lidocaine Viscous HCl (XYLOCAINE) 2 % mouth solution  Code / Trauma / Sedation Medication      11/10/20 0923    11/10/20 0923  sodium chloride 0.9 % infusion  Code / Trauma / Sedation Continuous Med      11/10/20 0923    11/10/20 0842  Ferritin  Once      11/10/20 0841    11/10/20 0842  Iron Profile  Once      11/10/20 0841    11/10/20 0842  Vitamin B12  Once      11/10/20 0841    11/10/20 0842  Folate  Once      11/10/20 0841    11/10/20 0430  aPTT  Daily     Comments: Cancel If Patient Has Infusion Changes      11/09/20 0110    11/10/20 0430  CBC & Differential  Daily     Comments: Discontinue After Heparin Stopped      11/09/20 0110    11/10/20 0000  Ambulatory Referral to [facility] For Infusion Treatment      11/10/20 1204    11/09/20 1400  gabapentin (NEURONTIN) capsule 800 mg  Every 8 Hours Scheduled      11/09/20 1201    11/09/20 1202  oxyCODONE-acetaminophen (PERCOCET) 5-325 MG per tablet 1 tablet  Every 4 Hours PRN,   Status:  Discontinued      11/09/20 1203    11/09/20 0900  cyclobenzaprine (FLEXERIL) tablet 10 mg  2 times daily      11/09/20 0208    11/09/20 0900  nicotine (NICODERM  CQ) 14 MG/24HR patch 1 patch  Every 24 Hours Scheduled      11/09/20 0401    11/09/20 0400  Vital Signs  Every 4 Hours      11/09/20 0112    11/09/20 0300  amitriptyline (ELAVIL) tablet 25 mg  Nightly      11/09/20 0208    11/09/20 0300  heparin 89253 units/250 mL (100 units/mL) in 0.45 % NaCl infusion  Titrated      11/09/20 0209    11/09/20 0207  hydrOXYzine (ATARAX) tablet 25 mg  3 Times Daily PRN      11/09/20 0208    11/09/20 0200  sodium chloride 0.9 % flush 10 mL  Every 12 Hours Scheduled      11/09/20 0112 11/09/20 0200  sodium chloride 0.9 % infusion  Continuous      11/09/20 0112 11/09/20 0112  HYDROmorphone (DILAUDID) injection 0.5 mg  Every 2 Hours PRN,   Status:  Discontinued      11/09/20 0112 11/09/20 0111  ondansetron (ZOFRAN) injection 4 mg  Every 6 Hours PRN      11/09/20 0112    11/09/20 0111  nitroglycerin (NITROSTAT) SL tablet 0.4 mg  Every 5 Minutes PRN      11/09/20 0112    11/09/20 0111  Intake & Output  Every Shift      11/09/20 0112    11/09/20 0111  sodium chloride 0.9 % flush 10 mL  As Needed      11/09/20 0112    11/09/20 0111  acetaminophen (TYLENOL) tablet 650 mg  Every 4 Hours PRN      11/09/20 0112    11/09/20 0111  acetaminophen (TYLENOL) 160 MG/5ML solution 650 mg  Every 4 Hours PRN      11/09/20 0112    11/09/20 0111  acetaminophen (TYLENOL) suppository 650 mg  Every 4 Hours PRN      11/09/20 0112    11/08/20 1841  sodium chloride 0.9 % flush 10 mL  As Needed      11/08/20 1841 09/22/20 0000  gabapentin (NEURONTIN) 800 MG tablet  3 Times Daily      11/09/20 0133    08/17/20 0000  cyclobenzaprine (FLEXERIL) 10 MG tablet  3 Times Daily      11/09/20 0133    08/06/20 0000  hydrOXYzine (ATARAX) 25 MG tablet  Every Night at Bedtime      11/09/20 0133    Unscheduled  aPTT  As Needed      11/09/20 0110    Unscheduled  Oxygen Therapy- Nasal Cannula; Titrate for SPO2: 90% - 95%  Continuous PRN     Comments: If Patient Develops Unresponsiveness, Acute Dyspnea, Cyanosis or  Suspected Hypoxemia Start Continuous Pulse Ox Monitoring, Apply Oxygen & Notify Provider    20    Unscheduled  ECG 12 Lead  As Needed     Comments: Nurse to Release if Patient Expericences Acute Chest Pain or Dysrhythmias    20    Unscheduled  Potassium  As Needed     Comments: For Ventricular Arrhythmias      20    Unscheduled  Magnesium  As Needed     Comments: For Ventricular Arrhythmias      20    Unscheduled  Troponin  As Needed     Comments: For Chest Pain      20    Unscheduled  Blood Gas, Arterial -  As Needed     Comments: Per O2 PolicyNotify Physician      20    Unscheduled  Up With Assistance  As Needed      20    --  SUMAtriptan (IMITREX) 100 MG tablet  Every 2 Hours PRN      20    --  amitriptyline (ELAVIL) 25 MG tablet  Nightly      20    --  loratadine (Claritin) 10 MG tablet  Daily      20    --  Melatonin 10 MG capsule  Nightly      20 1330    --  diphenhydrAMINE-acetaminophen (TYLENOL PM)  MG tablet per tablet  Nightly      20 1330                 Physician Progress Notes (last 48 hours) (Notes from 11/10/20 0716 through 20 0716)      Mane Urrutia MD at 20 1500              Name: Belen Allen ADMIT: 2020   : 1980  PCP: Sahil Cornelius MD    MRN: [id number] LOS: 3 days   AGE/SEX: 39 y.o. female  ROOM: [facility]     Subjective   Subjective     Patient is lying on the bed and is requiring IV Dilaudid every 2 hours  For pain control.  Denies nausea, vomiting, shortness of breath, chest pain.    Objective   Objective   Vital Signs  Temp:  [98.1 °F (36.7 °C)-99.9 °F (37.7 °C)] 99.9 °F (37.7 °C)  Heart Rate:  [101-111] 111  Resp:  [16-18] 18  BP: (105-137)/() 137/85  SpO2:  [92 %-99 %] 96 %  on   ;   Device (Oxygen Therapy): room air  Body mass index is 39.26 kg/m².  Physical Exam    HEENT:  Atraumatic, normocephalic.  PERRLA.   Extraocular movements intact.  Conjunctivae pink.  Sclerae, no icterus.  Mucous membranes dry.    NECK:  Supple.  No JVD.  HEART:  Regular rate and rhythm.  Normal S1, S2.   LUNGS:  Fairly clear to auscultation anteriorly.  No wheezes.  No crackles.  ABDOMEN:   Soft,  Tender on the left side.  Bowel sounds present.  No rebound.  No  guarding.  EXTREMITIES:  No cyanosis, clubbing, or edema.  Palpable pedal pulses.  NEURO:  Grossly nonfocal.  No facial asymmetry.  Good strength in all 4  extremities.  SKIN:  Warm and dry.  No evidence of rashes.  LYMPH NODES:  No palpable cervical or supraclavicular lymphadenopathy.    Results Review     I reviewed the patient's new clinical results.  Results from last 7 days   Lab Units 11/11/20  0423 11/10/20  0512 11/08/20  1851   WBC 10*3/mm3 7.78 8.77 13.39*   HEMOGLOBIN g/dL 12.2 11.8* 14.6   PLATELETS 10*3/mm3 293 294 388     Results from last 7 days   Lab Units 11/10/20  1205 11/09/20  0511 11/08/20  1851   SODIUM mmol/L  --  137 139   POTASSIUM mmol/L 4.0 3.6 4.4   CHLORIDE mmol/L  --  102 102   CO2 mmol/L  --  24.4 28.1   BUN mg/dL  --  8 7   CREATININE mg/dL  --  0.77 0.92   GLUCOSE mg/dL  --  126* 101*   Estimated Creatinine Clearance: 116.4 mL/min (by C-G formula based on SCr of 0.77 mg/dL).  Results from last 7 days   Lab Units 11/08/20  1851   ALBUMIN g/dL 4.40   BILIRUBIN mg/dL 0.4   ALK PHOS U/L 153*   AST (SGOT) U/L 30   ALT (SGPT) U/L 39*     Results from last 7 days   Lab Units 11/10/20  1205 11/09/20 0511 11/08/20  1851   CALCIUM mg/dL  --  8.6 9.4   ALBUMIN g/dL  --   --  4.40   MAGNESIUM mg/dL 2.1  --  2.2       COVID19   Date Value Ref Range Status   11/08/2020 Not Detected Not Detected - Ref. Range Final     No results found for: HGBA1C, POCGLU    Duplex Venous Lower Extremity - Left CAR  · Normal left lower extremity venous duplex scan.     XR Chest 1 View  Narrative: SINGLE VIEW CHEST RADIOGRAPH     HISTORY: 39-year-old female with a history of chest pain  with a deep  breath.     FINDINGS: An AP chest radiograph was obtained. Comparison is made to a  chest CT dated 11/08/2020. The lungs are normal in volume and are clear.  The cardiomediastinal silhouette and pulmonary vasculature appear  normal. No bony abnormality of the chest is appreciated.     Impression: Negative chest radiograph.       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        1. 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  Currently on heparin drip and will switch to Eliquis or Xarelto upon discharge.  She is due for DENIS  Did not reveal any clots.  Ejection fraction is normal.  Patient's IV Dilaudid will be discontinued and dose of p.o. analgesics will be further advanced in anticipation of going home tomorrow.    2. Tobacco abuse, patient was counseled to quit smoking.    3. Obesity, patient was counseled to lose weight.  4. Splenic artery aneurysm, vascular surgery did evaluate and no need for any further intervention at this point.  5. Patient is being administered vaccines for encapsulated organisms and arrangements have been made to receive these vaccinations as an outpatient basis.    Mane Urrutia MD  [facility]  11/11/20  15:00 EST        Electronically signed by Mane Urrutia MD at 11/11/20 1501     Aroldo, Bjorn HERRERA II,  MD at 11/11/20 0713            Subjective      REASON FOR FOLLOWUP/CHIEF COMPLAINT:  Splenic infarct.    HISTORY OF PRESENT ILLNESS:   She feels like her left foot is swollen. No bleeding.  No other new events    Past Medical History, Past Surgical History, Social History, Family History have been reviewed and are without significant changes except as mentioned.    Review of Systems   Review of Systems   Constitutional: Negative for activity change.   HENT: Negative for nosebleeds and trouble swallowing.    Respiratory: Negative for shortness of breath and wheezing.    Cardiovascular: Negative for chest pain and palpitations.   Gastrointestinal: Negative for constipation, diarrhea and nausea.   Genitourinary: Negative for dysuria and hematuria.   Musculoskeletal: Negative for arthralgias and myalgias.   Skin: Negative for rash and wound.   Neurological: Negative for seizures and syncope.   Hematological: Negative for adenopathy. Does not bruise/bleed easily.   Psychiatric/Behavioral: Negative for confusion.       Medications:  The current medication list was reviewed in the EMR    ALLERGIES:  No Known Allergies    Objective       Vitals:    11/10/20 1100 11/10/20 1500 11/10/20 1900 11/10/20 2300   BP: 104/93 111/100 (!) 130/102 115/82   BP Location: Left arm Left arm Left arm Left arm   Patient Position: Sitting   Lying   Pulse: 109 114 109    Resp: 16 16 16 16   Temp: 97.5 °F (36.4 °C) 98 °F (36.7 °C)  98.1 °F (36.7 °C)   TempSrc: Oral Oral Oral Oral   SpO2: 100% 97% 92% 99%   Weight:       Height:         Physical Exam    CONSTITUTIONAL:  Vital signs reviewed.  No distress, looks comfortable.  EYES:  Conjunctiva and lids unremarkable.  PERRLA  EARS,NOSE,MOUTH,THROAT:  Ears and nose appear unremarkable.  Lips, teeth, gums appear unremarkable.  RESPIRATORY:  Normal respiratory effort.  Lungs clear to auscultation bilaterally.  CARDIOVASCULAR:  Normal S1, S2.  No murmurs rubs or gallops.  No significant lower  extremity edema on my exam, although patient feels left foot is swollen.  GASTROINTESTINAL: Abdomen appears unremarkable.  Nontender.  No hepatomegaly.  No splenomegaly.  NEURO: cranial nerves 2-12 grossly intact.  No focal deficits.  Appears to have equal strength all 4 extremities.  MUSCULOSKELETAL:  Unremarkable digits/nails.  No cyanosis or clubbing.  SKIN:  Warm.  No rashes.  PSYCHIATRIC:  Normal judgment and insight.  Normal mood and affect.          RECENT LABS:  WBC   Date Value Ref Range Status   11/11/2020 7.78 3.40 - 10.80 10*3/mm3 Final   11/10/2020 8.77 3.40 - 10.80 10*3/mm3 Final   11/08/2020 13.39 (H) 3.40 - 10.80 10*3/mm3 Final     Hemoglobin   Date Value Ref Range Status   11/11/2020 12.2 12.0 - 15.9 g/dL Final   11/10/2020 11.8 (L) 12.0 - 15.9 g/dL Final   11/08/2020 14.6 12.0 - 15.9 g/dL Final     Platelets   Date Value Ref Range Status   11/11/2020 293 140 - 450 10*3/mm3 Final   11/10/2020 294 140 - 450 10*3/mm3 Final   11/08/2020 388 140 - 450 10*3/mm3 Final     Assessment/Plan  ASSESSMENT/PLAN:  Belen Allen E562/1     *Splenic infarction, 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  Currently on heparin     *Risk factors for clotting  · Smokes  · Overweight  · No personal or family history of clotting  · Unremarkable: Protein C activity, protein S activity, protein S free, beta-2 glycoprotein antibodies, antithrombin  · Await remaining hypercoagulable labs (factor V Leiden and prothrombin gene mutation and lupus anticoagulant)    *Low to medium positive anticardiolipin IgM  · 22 (low to medium +21-80), on 11/9/2020    *Choice of anticoagulation  I told her with the positive anticardiolipin antibody lab test, she may have antiphospholipid antibody syndrome.  I told her if this is the case, Coumadin has been proven to be more effective than newer oral direct anticoagulants.  She understands this but refuses Coumadin.  She does not want to come in frequently for lab checks, deal with those changes, deal with dietary  changes.  She wants something such as Eliquis or Xarelto upon discharge.     *Smokes.  Recommended smoking cessation.  She was told this increases the risk of both arterial and venous clotting as well as strokes and heart attacks.  It also increases the risk of malignancies.  She states she is considering smoking cessation.     *Class II obesity  Body mass index is 39.26 kg/m².  BMI 25 to <30 is overweight  BMI 30 to <35 is class 1 obesity  BMI 35 to <40 is class 2 obesity  BMI 40 or higher is class 3 obesity   This increases the risk of clotting.  She was told she should ideally lose weight     *Splenic artery aneurysm, 8 mm, initially seen on 11/8/2020 CT.  Dr. Jane, vascular surgery, recommended serial outpatient imaging to follow this.  · He is going to see her in 6 months with CT angiogram to follow the splenic aneurysm     *Leukocytosis.  Predominance of mature segmented neutrophils.  Therefore, appears reactive.  WBC down to 8.8.    *Anemia.  Mild.  Check anemia labs.  Unremarkable: Iron labs.  B12 normal at 255.    *Folate deficiency  Serum folate 2.7 on 11/10/2020  Begin folic acid 1 mg daily    *Patient feels her left foot is swollen (although not so on exam)     Plan  · Await remaining hypercoagulable labs  · Ideally, lose weight and stop smoking.  · Begin folic acid 1 mg daily  · Left leg Doppler  · Check left lower extremity Doppler  · I told her if anticardiolipin antibody lab remains persistently positive after 3 months, she should probably stay on indefinite anticoagulation.  (Unless develops a contraindication)    Chart reviewed and summarized including hospitalist note.  They are vaccinating her     Okay from a hematology standpoint with discharge.  I asked our office to arrange follow-up with me in 2 to 4 weeks with CBC that day    Electronically signed by Bjorn Kendrick II, MD at 11/11/20 3011     Mane Urrutia MD at 11/10/20 2921              Name: Belen Allen ADMIT: 11/8/2020    : 1980  PCP: Sahil Cornelius MD    MRN: [id number] LOS: 2 days   AGE/SEX: 39 y.o. female  ROOM: [facility]     Subjective   Subjective    Patient is lying in the bed in no major distress.  No new events overnight.  Continues to complain of left-sided abdominal pain.  Denies nausea, vomiting or chest pain, shortness of breath.      Objective   Objective   Vital Signs  Temp:  [97.5 °F (36.4 °C)-98.7 °F (37.1 °C)] 97.5 °F (36.4 °C)  Heart Rate:  [100-118] 109  Resp:  [16-20] 16  BP: (104-144)/() 104/93  SpO2:  [97 %-100 %] 100 %  on   ;   Device (Oxygen Therapy): room air  Body mass index is 39.26 kg/m².  Physical Exam    HEENT:  Atraumatic, normocephalic.  PERRLA.  Extraocular movements intact.  Conjunctivae pink.  Sclerae, no icterus.  Mucous membranes dry.    NECK:  Supple.  No JVD.  HEART:  Regular rate and rhythm.  Normal S1, S2.   LUNGS:  Fairly clear to auscultation anteriorly.  No wheezes.  No crackles.  ABDOMEN:   Soft,  Tender on the left side.  Bowel sounds present.  No rebound.  No  guarding.  EXTREMITIES:  No cyanosis, clubbing, or edema.  Palpable pedal pulses.  NEURO:  Grossly nonfocal.  No facial asymmetry.  Good strength in all 4  extremities.  SKIN:  Warm and dry.  No evidence of rashes.  LYMPH NODES:  No palpable cervical or supraclavicular lymphadenopathy.    Results Review     I reviewed the patient's new clinical results.  Results from last 7 days   Lab Units 11/10/20  0512 20  1851   WBC 10*3/mm3 8.77 13.39*   HEMOGLOBIN g/dL 11.8* 14.6   PLATELETS 10*3/mm3 294 388     Results from last 7 days   Lab Units 20  0511 20  1851   SODIUM mmol/L 137 139   POTASSIUM mmol/L 3.6 4.4   CHLORIDE mmol/L 102 102   CO2 mmol/L 24.4 28.1   BUN mg/dL 8 7   CREATININE mg/dL 0.77 0.92   GLUCOSE mg/dL 126* 101*   Estimated Creatinine Clearance: 116.4 mL/min (by C-G formula based on SCr of 0.77 mg/dL).  Results from last 7 days   Lab Units 20  1851   ALBUMIN g/dL 4.40    BILIRUBIN mg/dL 0.4   ALK PHOS U/L 153*   AST (SGOT) U/L 30   ALT (SGPT) U/L 39*     Results from last 7 days   Lab Units 11/09/20  0511 11/08/20  1851   CALCIUM mg/dL 8.6 9.4   ALBUMIN g/dL  --  4.40   MAGNESIUM mg/dL  --  2.2       COVID19   Date Value Ref Range Status   11/08/2020 Not Detected Not Detected - Ref. Range Final     No results found for: HGBA1C, POCGLU    Adult Transesophageal Echo (DENIS) W/ Cont if Necessary Per Protocol  · Left ventricular ejection fraction appears to be 61 - 65%. Left   ventricular systolic function is normal.  · Saline test results are negative.       Scheduled Medications  amitriptyline, 25 mg, Oral, Nightly  cyclobenzaprine, 10 mg, Oral, BID  gabapentin, 800 mg, Oral, Q8H  nicotine, 1 patch, Transdermal, Q24H  sodium chloride, 10 mL, Intravenous, Q12H    Infusions  heparin (porcine), 11.6 Units/kg/hr, Last Rate: 12.6 Units/kg/hr (11/10/20 0636)  sodium chloride, 100 mL/hr, Last Rate: 100 mL/hr (11/10/20 1040)    Diet  Diet Regular; Cardiac      Assessment/Plan     Active Hospital Problems    Diagnosis  POA   • **Splenic infarct [D73.5]  Yes   • Functional asplenia [Q89.01]  Unknown   • Anxiety disorder [F41.9]  Yes      Resolved Hospital Problems   No resolved problems to display.        1. 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  Currently on heparin drip and will switch to Eliquis or Xarelto upon discharge.  She is due for DENIS tomorrow.    2. Tobacco abuse, patient was counseled to quit smoking.    3. Obesity, patient was counseled to lose weight.  4. Splenic artery aneurysm, vascular surgery did evaluate and no need for any further intervention at this point.  5. Patient is being administered vaccines for encapsulated organisms and arrangements have been made to receive these vaccinations as an outpatient basis.    Mane Urrutia MD  [facility]  11/10/20  15:04 EST      Electronically signed by Mane Urrutia MD at  11/10/20 1506     Code, Bjorn HERRERA II, MD at 11/10/20 0838            Subjective      REASON FOR FOLLOWUP/CHIEF COMPLAINT:  Splenic infarct.    HISTORY OF PRESENT ILLNESS:   No new events overnight.  DENIS planned today.  Denies bleeding.    Past Medical History, Past Surgical History, Social History, Family History have been reviewed and are without significant changes except as mentioned.    Review of Systems   Review of Systems   Constitutional: Negative for activity change.   HENT: Negative for nosebleeds and trouble swallowing.    Respiratory: Negative for shortness of breath and wheezing.    Cardiovascular: Negative for chest pain and palpitations.   Gastrointestinal: Negative for constipation, diarrhea and nausea.   Genitourinary: Negative for dysuria and hematuria.   Musculoskeletal: Negative for arthralgias and myalgias.   Skin: Negative for rash and wound.   Neurological: Negative for seizures and syncope.   Hematological: Negative for adenopathy. Does not bruise/bleed easily.   Psychiatric/Behavioral: Negative for confusion.       Medications:  The current medication list was reviewed in the EMR    ALLERGIES:  No Known Allergies    Objective       Vitals:    11/09/20 1900 11/09/20 2214 11/09/20 2346 11/10/20 0700   BP: 130/81 133/89  116/85   BP Location: Left arm Left arm  Right arm   Patient Position: Lying Lying  Lying   Pulse: 108 109 100 104   Resp: 18 17  16   Temp: 98.5 °F (36.9 °C) 98.7 °F (37.1 °C)  97.7 °F (36.5 °C)   TempSrc: Oral Oral  Oral   SpO2: 97% 97%  98%   Weight:       Height:         Physical Exam    CONSTITUTIONAL:  Vital signs reviewed.  No distress, looks comfortable.  EYES:  Conjunctiva and lids unremarkable.  PERRLA  EARS,NOSE,MOUTH,THROAT:  Ears and nose appear unremarkable.  Lips, teeth, gums appear unremarkable.  RESPIRATORY:  Normal respiratory effort.  Lungs clear to auscultation bilaterally.  CARDIOVASCULAR:  Normal S1, S2.  No murmurs rubs or gallops.  No significant lower  extremity edema.  GASTROINTESTINAL: Abdomen appears unremarkable.  Nontender.  No hepatomegaly.  No splenomegaly.  NEURO: cranial nerves 2-12 grossly intact.  No focal deficits.  Appears to have equal strength all 4 extremities.  MUSCULOSKELETAL:  Unremarkable digits/nails.  No cyanosis or clubbing.  SKIN:  Warm.  No rashes.  PSYCHIATRIC:  Normal judgment and insight.  Normal mood and affect.       RECENT LABS:  WBC   Date Value Ref Range Status   11/10/2020 8.77 3.40 - 10.80 10*3/mm3 Final   11/08/2020 13.39 (H) 3.40 - 10.80 10*3/mm3 Final     Hemoglobin   Date Value Ref Range Status   11/10/2020 11.8 (L) 12.0 - 15.9 g/dL Final   11/08/2020 14.6 12.0 - 15.9 g/dL Final     Platelets   Date Value Ref Range Status   11/10/2020 294 140 - 450 10*3/mm3 Final   11/08/2020 388 140 - 450 10*3/mm3 Final     Assessment/Plan  ASSESSMENT/PLAN:  Belen Allen E562/1     *Splenic infarction, 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  Currently on heparin     *Risk factors for clotting  · Smokes  · Overweight  · No personal or family history of clotting  · Await hypercoagulable labs     *Smokes.  Recommended smoking cessation.  She was told this increases the risk of both arterial and venous clotting as well as strokes and heart attacks.  It also increases the risk of malignancies.  She states she is considering smoking cessation.     *Class II obesity  Body mass index is 39.26 kg/m².  BMI 25 to <30 is overweight  BMI 30 to <35 is class 1 obesity  BMI 35 to <40 is class 2 obesity  BMI 40 or higher is class 3 obesity   This increases the risk of clotting.  She was told she should ideally lose weight     *Splenic artery aneurysm, 8 mm, initially seen on 11/8/2020 CT.  Dr. Jane, vascular surgery, recommended serial outpatient imaging to follow this.  · He is going to see her in 6 months with CT angiogram to follow the splenic aneurysm     *Leukocytosis.  Predominance of mature segmented neutrophils.  Therefore, appears reactive.  WBC down to  8.8.    *Anemia.  Mild.  Check anemia labs.  This was a new issue for me to address today.     Plan  · Await hypercoagulable labs (currently on heparin which will likely make Antithrombin falsely low).  · Ideally, lose weight and stop smoking.  · DENIS planned today by cardiology.  · Check anemia labs    Chart reviewed and summarized including cardiology note.     Addendum  DENIS was normal.  Cardiology signed off.  Nursing called me asking my opinion on discharge and switch to oral anticoagulant  I am fine with any oral anticoagulant.  Xarelto or Eliquis might be easiest for her.  Okay from a hematology standpoint with discharge.  I asked our office to arrange follow-up with me in 2 to 4 weeks with CBC that day    Electronically signed by Bjorn Kendrick II, MD at 11/10/20 1319     Jannie Vu MD at 11/10/20 0831             LOS: 2 days   Patient Care Team:  Sahil Cornelius MD as PCP - General (Internal Medicine)    Chief Complaint:     F/u splenic infarct - DENIS today.     Interval History:     She has mild difficulty swallowing.  She has no chest pain or difficulty breathing.  She is had no tachycardia or dizziness.  She denies nausea.  She still has some left upper quadrant pain.    Objective   Vital Signs  Temp:  [97.7 °F (36.5 °C)-98.7 °F (37.1 °C)] 97.7 °F (36.5 °C)  Heart Rate:  [100-120] 104  Resp:  [16-18] 16  BP: (116-133)/(81-89) 116/85    Intake/Output Summary (Last 24 hours) at 11/10/2020 0831  Last data filed at 11/9/2020 2208  Gross per 24 hour   Intake 2780.95 ml   Output --   Net 2780.95 ml       Comfortable NAD  conjunctivae clear  Neck supple, no JVD or thyromegaly appreciated  S1/S2 RRR, no m/r/g  Lungs CTA B, normal effort  Abdomen S/NT/ND (+) BS, no HSM appreciated  Extremities warm, no clubbing, cyanosis, or edema  No visible or palpable skin lesions  A/Ox4, mood and affect appropriate    Results Review:      Results from last 7 days   Lab Units 11/09/20  0511 11/08/20  2028    SODIUM mmol/L 137 139   POTASSIUM mmol/L 3.6 4.4   CHLORIDE mmol/L 102 102   CO2 mmol/L 24.4 28.1   BUN mg/dL 8 7   CREATININE mg/dL 0.77 0.92   GLUCOSE mg/dL 126* 101*   CALCIUM mg/dL 8.6 9.4         Results from last 7 days   Lab Units 11/10/20  0512 20  1851   WBC 10*3/mm3 8.77 13.39*   HEMOGLOBIN g/dL 11.8* 14.6   HEMATOCRIT % 36.5 42.7   PLATELETS 10*3/mm3 294 388     Results from last 7 days   Lab Units 11/10/20  0512 20  1539 20  0807 20  2318   INR   --   --   --  0.94   APTT seconds 99.0* 84.3* 53.3* 30.5         Results from last 7 days   Lab Units 20  1851   MAGNESIUM mg/dL 2.2           I reviewed the patient's new clinical results.  I personally viewed and interpreted the patient's EKG/Telemetry data        Medication Review:   amitriptyline, 25 mg, Oral, Nightly  cyclobenzaprine, 10 mg, Oral, BID  gabapentin, 800 mg, Oral, Q8H  nicotine, 1 patch, Transdermal, Q24H  sodium chloride, 10 mL, Intravenous, Q12H        heparin (porcine), 11.6 Units/kg/hr, Last Rate: 12.6 Units/kg/hr (11/10/20 0636)  sodium chloride, 100 mL/hr, Last Rate: 100 mL/hr (20)        Assessment/Plan       Splenic infarct    Anxiety disorder    1. Splenic infarct, will set the DENIS for tomorrow.  I am concerned that her body habitus is such that we will not get good images on a transthoracic echo.  Risks and benefits were explained to patient and she is amenable to this.  2. Tobacco use, advised cessation.  3. Obesity  4. Anxiety  5. Splenic artery aneurysm, followed by vascular.    DENIS was normal.     We will sign off.    Jannie Vu MD  11/10/20  08:31 EST        Electronically signed by Jannie Vu MD at 11/10/20 1036     Braulio Jane MD at 11/10/20 3711          Name: Belen Allen ADMIT: 2020   : 1980  PCP: Sahil Cornelius MD    MRN: [id number] LOS: 2 days   AGE/SEX: 39 y.o. female  ROOM: [address]    [place],  Subsequent Hospital Care    Chief Complaint   Patient presents with   • Flank Pain     CC: Splenic aneurysm and infarct follow-up.  Subjective     39 y.o. female patient still complaining of left upper quadrant and left flank pain.  She denies any chest pain or shortness of breath.  She is going to get her transesophageal echocardiogram today.    Review of Systems    Objective     Scheduled Medications:   amitriptyline, 25 mg, Oral, Nightly  cyclobenzaprine, 10 mg, Oral, BID  gabapentin, 800 mg, Oral, Q8H  nicotine, 1 patch, Transdermal, Q24H  sodium chloride, 10 mL, Intravenous, Q12H        Active Infusions:  heparin (porcine), 11.6 Units/kg/hr, Last Rate: 12.6 Units/kg/hr (11/10/20 0636)  sodium chloride, 100 mL/hr, Last Rate: 100 mL/hr (11/09/20 2030)        As Needed Medications:  •  acetaminophen **OR** acetaminophen **OR** acetaminophen  •  HYDROmorphone  •  hydrOXYzine  •  nitroglycerin  •  ondansetron  •  oxyCODONE-acetaminophen  •  sodium chloride  •  sodium chloride    Vital Signs  Vital Signs Patient Vitals for the past 24 hrs:   BP Temp Temp src Pulse Resp SpO2   11/10/20 0700 116/85 97.7 °F (36.5 °C) Oral 104 16 98 %   11/09/20 2346 -- -- -- 100 -- --   11/09/20 2214 133/89 98.7 °F (37.1 °C) Oral 109 17 97 %   11/09/20 1900 130/81 98.5 °F (36.9 °C) Oral 108 18 97 %   11/09/20 1500 119/89 98.5 °F (36.9 °C) Oral 120 18 92 %   11/09/20 1100 122/82 98.3 °F (36.8 °C) Oral 110 18 93 %     I/O:  I/O last 3 completed shifts:  In: 2781 [P.O.:1560; I.V.:1221]  Out: -     Physical Exam:  Physical Exam  Vitals signs reviewed.   Constitutional:       Appearance: She is well-developed.   HENT:      Head: Normocephalic and atraumatic.   Eyes:      General: No scleral icterus.        Right eye: No discharge.         Left eye: No discharge.      Comments: Vision grossly intact   Neck:      Musculoskeletal: Normal range of motion.      Trachea: No tracheal deviation.   Pulmonary:      Effort: Pulmonary effort is normal.  No respiratory distress.   Abdominal:      General: There is no distension.      Palpations: Abdomen is soft.   Skin:     General: Skin is warm and dry.   Neurological:      Mental Status: She is alert and oriented to person, place, and time.   Psychiatric:         Behavior: Behavior normal.          Results Review:     CBC    Results from last 7 days   Lab Units 11/10/20  0512 11/08/20  1851   WBC 10*3/mm3 8.77 13.39*   HEMOGLOBIN g/dL 11.8* 14.6   PLATELETS 10*3/mm3 294 388     BMP   Results from last 7 days   Lab Units 11/09/20  0511 11/08/20  1851   SODIUM mmol/L 137 139   POTASSIUM mmol/L 3.6 4.4   CHLORIDE mmol/L 102 102   CO2 mmol/L 24.4 28.1   BUN mg/dL 8 7   CREATININE mg/dL 0.77 0.92   GLUCOSE mg/dL 126* 101*   MAGNESIUM mg/dL  --  2.2     Cr Clearance Estimated Creatinine Clearance: 116.4 mL/min (by C-G formula based on SCr of 0.77 mg/dL).  Coag   Results from last 7 days   Lab Units 11/10/20  0512 11/09/20  1539 11/09/20  0807 11/08/20  2318   INR   --   --   --  0.94   APTT seconds 99.0* 84.3* 53.3* 30.5     HbA1C No results found for: HGBA1C  Blood Glucose No results found for: POCGLU  Infection     CMP   Results from last 7 days   Lab Units 11/09/20  0511 11/08/20  1851   SODIUM mmol/L 137 139   POTASSIUM mmol/L 3.6 4.4   CHLORIDE mmol/L 102 102   CO2 mmol/L 24.4 28.1   BUN mg/dL 8 7   CREATININE mg/dL 0.77 0.92   GLUCOSE mg/dL 126* 101*   ALBUMIN g/dL  --  4.40   BILIRUBIN mg/dL  --  0.4   ALK PHOS U/L  --  153*   AST (SGOT) U/L  --  30   ALT (SGPT) U/L  --  39*   LIPASE U/L  --  19     ABG      UA    Results from last 7 days   Lab Units 11/08/20  1851   NITRITE UA  Negative     MARIA GUADALUPE  No results found for: POCMETH, POCAMPHET, POCBARBITUR, POCBENZO, POCCOCAINE, POCOPIATES, POCOXYCODO, POCPHENCYC, POCPROPOXY, POCTHC, POCTRICYC  Radiology(recent) Ct Abdomen Pelvis With Contrast    Result Date: 11/8/2020  1.  No PE. 2.  No acute cardiopulmonary pathology. 3.  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       CT ABDOMEN AND PELVIS WITH CONTRAST:  HISTORY: Left flank pain.  TECHNIQUE:  Multiple contiguous helical CT images of the abdomen and pelvis were obtained following the uneventful administration of intravenous contrast. Multiplanar reformatted images were reconstructed from the helical source data. Radiation dose reduction techniques were utilized, including automated exposure control and exposure modulation based on body size.   COMPARISON:  CT abdomen and pelvis 09/26/2020  FINDINGS:  Vasculature: Normal caliber abdominal aorta and branch vessels. No atherosclerotic calcifications.  Major venous structures are unremarkable.  Liver: Unremarkable.  Gallbladder and Bile Ducts: Cholecystectomy. No biliary ductal dilatation.  Spleen: 3.3 x 4.6 cm wedge-shaped region of hypodensity in the superior aspect of the spleen. This is new from the prior CT. 8mm distal splenic artery aneurysm.  Pancreas: Unremarkable.  Adrenals: Unremarkable.  Kidneys/Ureters/Bladder: Stable left renal volume loss. 2 cm simple left lower pole renal cyst. No calculi or hydronephrosis. Unremarkable ureters and bladder.  GI Tract/Mesentery:The stomach is unremarkable. No abnormally dilated small or large bowel loops. No definite bowel wall thickening. Normal appendix. No free or loculated fluid collections. No intraperitoneal free air.  Lymphadenopathy: No retroperitoneal, mesenteric or pelvic lymphadenopathy.  Pelvic organs: Unremarkable  Peripheral Soft Tissues: No soft tissue mass or fluid collection.  Small fat-containing umbilical hernia.  Osseous Structures: No acute osseous abnormality. No suspicious lytic or blastic lesions. Moderate spondylosis in the lower thoracic spine. Fusion hardware at L5-S1.   IMPRESSION:  New wedge-shaped hypodensity in the superior aspect of the spleen concerning for splenic infarction. 8 mm distal left splenic artery aneurysm best seen on the concurrent PE exam.  Findings were discussed with Dr. Hansen at 10:50 PM  on 11/08/2020.    This report was finalized on 11/8/2020 11:02 PM by Dr. Charly Hood M.D.      Ct Angiogram Chest    Result Date: 11/8/2020  1.  No PE. 2.  No acute cardiopulmonary pathology. 3.  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      CT ABDOMEN AND PELVIS WITH CONTRAST:  HISTORY: Left flank pain.  TECHNIQUE:  Multiple contiguous helical CT images of the abdomen and pelvis were obtained following the uneventful administration of intravenous contrast. Multiplanar reformatted images were reconstructed from the helical source data. Radiation dose reduction techniques were utilized, including automated exposure control and exposure modulation based on body size.   COMPARISON:  CT abdomen and pelvis 09/26/2020  FINDINGS:  Vasculature: Normal caliber abdominal aorta and branch vessels. No atherosclerotic calcifications.  Major venous structures are unremarkable.  Liver: Unremarkable.  Gallbladder and Bile Ducts: Cholecystectomy. No biliary ductal dilatation.  Spleen: 3.3 x 4.6 cm wedge-shaped region of hypodensity in the superior aspect of the spleen. This is new from the prior CT. 8mm distal splenic artery aneurysm.  Pancreas: Unremarkable.  Adrenals: Unremarkable.  Kidneys/Ureters/Bladder: Stable left renal volume loss. 2 cm simple left lower pole renal cyst. No calculi or hydronephrosis. Unremarkable ureters and bladder.  GI Tract/Mesentery:The stomach is unremarkable. No abnormally dilated small or large bowel loops. No definite bowel wall thickening. Normal appendix. No free or loculated fluid collections. No intraperitoneal free air.  Lymphadenopathy: No retroperitoneal, mesenteric or pelvic lymphadenopathy.  Pelvic organs: Unremarkable  Peripheral Soft Tissues: No soft tissue mass or fluid collection.  Small fat-containing umbilical hernia.  Osseous Structures: No acute osseous abnormality. No suspicious lytic or blastic lesions. Moderate spondylosis in the lower thoracic spine. Fusion hardware at L5-S1.    IMPRESSION:  New wedge-shaped hypodensity in the superior aspect of the spleen concerning for splenic infarction. 8 mm distal left splenic artery aneurysm best seen on the concurrent PE exam.  Findings were discussed with Dr. Hansen at 10:50 PM on 11/08/2020.    This report was finalized on 11/8/2020 11:02 PM by Dr. Charly Hood M.D.        Assessment/Plan     Assessment & Plan      Splenic infarct    Anxiety disorder      39 y.o. female patient has 2 splenic abnormalities.  First is her splenic artery aneurysm and a hilar branch in the inferior aspect.  I believe this to be asymptomatic as the area of infarcted spleen is not supplied by this area.  She is undergoing transesophageal echocardiogram and hypercoagulable state work-up by hematology oncology and cardiology today.  From my standpoint I am fine with her transitioning over to an oral anticoagulant.  I would like to see her back in 6 months time for a CT angiogram to follow the splenic aneurysm.  Would be more than happy to see her back while she is in the hospital but please call with questions.      Personal protective equipment used for this patient encounter:  Patient wearing surgical mask []    Provider wearing a surgical mask [x]    Gloves [x]    Eye protection [x]    Face Shield []    Gown []    N 95 respirator or CAPR/PAPR []   Duration of interaction 10    Braulio Jane MD  11/10/20  07:41 EST    Please call my office with any question: [phone]    Active Hospital Problems    Diagnosis  POA   • **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             Electronically signed by Braulio Jane MD at 11/10/20 0824       Dianelys Pulliam, YNES      Case Management   Progress Notes   Signed   Date of Service:  11/10/20 1543   Creation Time:  11/10/20 1543            Signed             Show:Clear all  []Manual[x]Template[]Copied    Added  by:  [x]Dianelys Pulliam RN    []Renetta for details  Continued Stay Note  [facility]     Patient Name: Belen Allen              MRN: [id number]  Today's Date: 11/10/2020                   Admit Date: 11/8/2020          Discharge Plan      Row Name 11/10/20 1541           Plan     Plan Comments  Spoke with Bindu Murillo/[facility] who scheduled pt for hyposplenia vaccine series on 11/23/2020 at 11am.  [facility] will schedule second part of series.  JUANITA Pulliam RN     Row Name 11/10/20 0774           Plan     Plan  Plans home; follow up to assist the patient with her ambulatory referral to [facility] for vaccine series for hyposplenia and follow to see if the patient is d/c on Eliquis or Xarelto     Provided Post Acute Provider List?  Yes     Post Acute Provider List  Home Health;Nursing Home     Provided Post Acute Provider Quality & Resource List?  N/A     N/A Quality & Resource List Comment  The patient was provided with a HH/SNF list and not a print out of the HH/nursing home compare list as she currently denies any d/c needs.     Delivered To  Patient     Method of Delivery  In person     Patient/Family in Agreement with Plan  yes     Plan Comments  Met with the patient at bedside; explained role of CCP, verified facesheet and discussed discharge planning needs. The patient states that she will return home upon d/c with assistance from her fiancé Ced Aquino [phone] and her 2 daughters.  The patient uses no DME, has 8-9 steps with 2 handrails to enter the 1st floor apartment.  The patient’s PCP is Sahil Cornelius, pharmacy is [facility] in [facility] and she does not have any trouble remembering to take her medication or with affording her medication.  The patient has no HH/SNF history, denies any POA documents, she drives herself to her appointments and her fiancé will transport her home upon d/c.  The patient was provided with a HH/SNF list and not a print out of the HH/nursing home compare list as she  currently denies any d/c needs.  CCP will follow up to assist the patient with her ambulatory referral to [facility] for vaccine series for hyposplenia and will follow to see if the patient is d/c on Eliquis or Xarelto.  ZACHARY Soriano          Discharge Codes    No documentation.                  Dianelys Pulliam RN

## 2020-11-12 NOTE — PROGRESS NOTES
Patient was counseled extensively on Eliquis including the followin) Eliquis's indication, mechanism of action, and dosing  2) Enforced the importance of taking Eliquis as instructed every day and that it is a twice a day medication.  3) Explained possible side effects of Eliquis therapy, including increased risk of bleeding, s/sx of bleeding, and s/sx of any additional clots/PE/CVA.   4) Emphasized the importance of going to the emergency room if any of the following occur: Falling and hitting your head; noticing bright red blood in urine or dark/tarry stools; vomiting up blood or vomit has a coffee-ground like texture; coughing up blood  5) Discussed the importance of speaking with physician/pharmacist when starting any new medications to check for drug interactions with Eliquis  6) Discussed the importance of informing any surgeon or proceduralist that you are on Eliquis and may need to go off prior to the procedure (including spinal/epidural procedures)  7) Discussed what to do about a missed dose (Take as soon as you remember and if it is closer to the time for your next dose, skip the missed dose and go back to your normal time; DO NOT double up doses)  8) Instructed the pt not to take or discontinue any medications without informing his physician/pharmacist     I also explained to the patient that this medication may have a high copay associated with it and to let the hematologist/oncologist know if it is unaffordable.     The patient emphasized that she was scared and I calmly educated her on the importance of taking this medication and that her outcomes would be much worse were she not to take this medication. I also briefly educated the patient on reasons why it was important to get her vaccinations with regards specifically to risk of meningitis.      The patient expressed understanding and had no further questions.    The patient wore a mask during the entirety of the counseling session and I  wore a mask and goggles during the entirety of the counseling session as well.        Ricardo Yee, PharmD, BCPS  Clinical Staff Pharmacist  Ext. [phone]

## 2020-11-12 NOTE — PROGRESS NOTES
REASON FOR FOLLOWUP/CHIEF COMPLAINT:  Splenic infarct.    HISTORY OF PRESENT ILLNESS:   Still having a great deal of abdominal pain.  States the Percocet only last 3 hours.  States she was crying due to the pain.  She is worried about going home with the pain.  However, she states she can now take deep breaths without causing additional abdominal pain.  This is an improvement.  However, again, has significant abdominal pain.    Denies bleeding.    Past Medical History, Past Surgical History, Social History, Family History have been reviewed and are without significant changes except as mentioned.    Review of Systems   Review of Systems   Constitutional: Negative for activity change.   HENT: Negative for nosebleeds and trouble swallowing.    Respiratory: Negative for shortness of breath and wheezing.    Cardiovascular: Negative for chest pain and palpitations.   Gastrointestinal: Negative for constipation, diarrhea and nausea.   Genitourinary: Negative for dysuria and hematuria.   Musculoskeletal: Negative for arthralgias and myalgias.   Skin: Negative for rash and wound.   Neurological: Negative for seizures and syncope.   Hematological: Negative for adenopathy. Does not bruise/bleed easily.   Psychiatric/Behavioral: Negative for confusion.       Medications:  The current medication list was reviewed in the EMR    ALLERGIES:  No Known Allergies           Vitals:    11/11/20 1700 11/11/20 2048 11/11/20 2300 11/12/20 0700   BP:  144/100 (!) 146/114 154/93   BP Location:  Left arm Left arm Left arm   Patient Position:  Lying Lying Lying   Pulse: 99 104 113 106   Resp:  17 17 17   Temp:  98.9 °F (37.2 °C) 98 °F (36.7 °C) 98.2 °F (36.8 °C)   TempSrc:  Oral Oral Oral   SpO2:       Weight:       Height:         Physical Exam    CONSTITUTIONAL:  Vital signs reviewed.  No distress, looks comfortable.  EYES:  Conjunctiva and lids unremarkable.  PERRLA  EARS,NOSE,MOUTH,THROAT:  Ears and nose appear unremarkable.   Lips, teeth, gums appear unremarkable.  RESPIRATORY:  Normal respiratory effort.  Lungs clear to auscultation bilaterally.  CARDIOVASCULAR:  Normal S1, S2.  No murmurs rubs or gallops.  No significant lower extremity edema.  GASTROINTESTINAL: Abdomen appears unremarkable.  Nontender.  No hepatomegaly.  No splenomegaly.  NEURO: cranial nerves 2-12 grossly intact.  No focal deficits.  Appears to have equal strength all 4 extremities.  MUSCULOSKELETAL:  Unremarkable digits/nails.  No cyanosis or clubbing.  SKIN:  Warm.  No rashes.  PSYCHIATRIC:  Normal judgment and insight.  Normal mood and affect.        RECENT LABS:  WBC   Date Value Ref Range Status   11/12/2020 7.91 3.40 - 10.80 10*3/mm3 Final   11/11/2020 7.78 3.40 - 10.80 10*3/mm3 Final   11/10/2020 8.77 3.40 - 10.80 10*3/mm3 Final     Hemoglobin   Date Value Ref Range Status   11/12/2020 12.0 12.0 - 15.9 g/dL Final   11/11/2020 12.2 12.0 - 15.9 g/dL Final   11/10/2020 11.8 (L) 12.0 - 15.9 g/dL Final     Platelets   Date Value Ref Range Status   11/12/2020 322 140 - 450 10*3/mm3 Final   11/11/2020 293 140 - 450 10*3/mm3 Final   11/10/2020 294 140 - 450 10*3/mm3 Final       ASSESSMENT/PLAN:  Belen Allen E562/1     *Splenic infarction, 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  Currently on heparin  · Hospitalist vaccinated and arranged follow-up outpatient vaccinations     *Risk factors for clotting  · Smokes  · Overweight  · No personal or family history of clotting  · Unremarkable: Protein C activity, protein S activity, protein S free, beta-2 glycoprotein antibodies, antithrombin  · Await remaining hypercoagulable labs (factor V Leiden and prothrombin gene mutation and lupus anticoagulant)    *Low to medium positive anticardiolipin IgM  · 22 (low to medium +21-80), on 11/9/2020    *Choice of anticoagulation  I told her with the positive anticardiolipin antibody lab test, she may have antiphospholipid antibody syndrome.  I told her if this is the case, Coumadin has been proven to  be more effective than newer oral direct anticoagulants.  She understands this but refuses Coumadin.  She does not want to come in frequently for lab checks, deal with those changes, deal with dietary changes.  She wants something such as Eliquis or Xarelto upon discharge.  · She told me again today she absolutely refuses Coumadin/warfarin.    · She wants Xarelto or Eliquis.    *Smokes.  Recommended smoking cessation.  She was told this increases the risk of both arterial and venous clotting as well as strokes and heart attacks.  It also increases the risk of malignancies.  She states she is considering smoking cessation.     *Class II obesity  Body mass index is 39.26 kg/m².  · BMI 25 to <30 is overweight  · BMI 30 to <35 is class 1 obesity  · BMI 35 to <40 is class 2 obesity  · BMI 40 or higher is class 3 obesity   This increases the risk of clotting.  She was told she should ideally lose weight     *Splenic artery aneurysm, 8 mm, initially seen on 11/8/2020 CT.  Dr. Jane, vascular surgery, recommended serial outpatient imaging to follow this.  · He is going to see Dr. Jane in 6 months with CT angiogram to follow the splenic aneurysm     *Leukocytosis.  Predominance of mature segmented neutrophils.  Therefore, appears reactive.  WBC down to 7.9    *Anemia.  Mild.   Unremarkable: Iron labs.  B12 normal at 255.  Hb 12    *Folate deficiency  Serum folate 2.7 on 11/10/2020  Began folic acid 1 mg daily on 11/11/20    *Patient feels her left foot is swollen (although not so on exam)  Doppler 11/11/2020, left leg: Normal    *Abdominal pain  · I told her in the office does not prescribe narcotics for pain from clots.  If she feels she needs additional narcotics beyond what the hospitalist is comfortable prescribing upon discharge, she should see a pain management physician as an outpatient.  · In last 24 hours, used a total of 35 mg Percocet and a total of 1 mg IV Dilaudid (IV Dilaudid was discontinued yesterday  morning).  She states despite this she was crying due to the pain.  Percocet only last 3 hours.  She is worried about going home without enough pain medicines.     Plan  · Await remaining hypercoagulable labs  · Ideally, lose weight and stop smoking.  · Continue folic acid 1 mg daily  · I told her if anticardiolipin antibody lab remains persistently positive after 3 months, she should probably stay on indefinite anticoagulation.  (Unless develops a contraindication)  · She has follow-up with me on 12/1/2020 with CBC that day.    Chart reviewed and summarized including hospitalist note noting IV Dilaudid discontinued.  Percocet dose increased.    Okay from a hematology standpoint with discharge.

## 2020-11-13 ENCOUNTER — READMISSION MANAGEMENT (OUTPATIENT)
Dept: CALL CENTER | Facility: HOSPITAL | Age: 40
End: 2020-11-13

## 2020-11-13 LAB — F5 GENE MUT ANL BLD/T: ABNORMAL

## 2020-11-13 NOTE — OUTREACH NOTE
Prep Survey      Responses   [facility] of hospitals in [place] facility patient discharged from?  [place]   Is LACE score < 7 ?  No   Eligibility  Readm Mgmt   Discharge diagnosis  Splenic infarct   Does the patient have one of the following disease processes/diagnoses(primary or secondary)?  Other   Does the patient have Home health ordered?  No   Is there a DME ordered?  No   Comments regarding appointments  Needs f/u scheduled   Medication alerts for this patient  started eliquis   Prep survey completed?  Yes          Jennifer Tompkins RN

## 2020-11-14 LAB
APTT SCREEN TO CONFIRM RATIO: 0.99 RATIO (ref 0–1.4)
CONFIRM APTT/NORMAL: 31.3 SEC (ref 0–55)
LA 2 SCREEN W REFLEX-IMP: ABNORMAL
SCREEN APTT: 38.8 SEC (ref 0–51.9)
SCREEN DRVVT: 35 SEC (ref 0–47)
THROMBIN TIME: 35.6 SEC (ref 0–23)
TT IMM NP PPP: 31.5 SEC (ref 0–23)
TT P HPASE PPP: 14.9 SEC (ref 0–23)

## 2020-11-18 ENCOUNTER — READMISSION MANAGEMENT (OUTPATIENT)
Dept: CALL CENTER | Facility: HOSPITAL | Age: 40
End: 2020-11-18

## 2020-11-18 NOTE — OUTREACH NOTE
Medical Week 1 Survey      Responses   [facility] patient discharged from?  [place]   Does the patient have one of the following disease processes/diagnoses(primary or secondary)?  Other   Week 1 attempt successful?  Yes   Call start time  1216   Rescheduled  Rescheduled-pt requested [Mother answwered phone and not on EC list]   Discharge diagnosis  Splenic infarct          Kay Mendoza RN

## 2020-11-22 ENCOUNTER — READMISSION MANAGEMENT (OUTPATIENT)
Dept: CALL CENTER | Facility: HOSPITAL | Age: 40
End: 2020-11-22

## 2020-11-22 NOTE — OUTREACH NOTE
Medical Week 1 Survey      Responses   [facility] patient discharged from?  [place]   Does the patient have one of the following disease processes/diagnoses(primary or secondary)?  Other   Week 1 attempt successful?  Yes   Call start time  1159   Revoke  Decline to participate   Call end time  1212          Earnestine Regalado, RN

## 2020-11-23 ENCOUNTER — HOSPITAL ENCOUNTER (OUTPATIENT)
Dept: INFUSION THERAPY | Facility: HOSPITAL | Age: 40
Discharge: HOME | End: 2020-11-23
Admitting: INTERNAL MEDICINE

## 2020-11-23 VITALS
WEIGHT: 200 LBS | SYSTOLIC BLOOD PRESSURE: 134 MMHG | HEIGHT: 64 IN | TEMPERATURE: 98.5 F | HEART RATE: 107 BPM | BODY MASS INDEX: 34.15 KG/M2 | OXYGEN SATURATION: 97 % | DIASTOLIC BLOOD PRESSURE: 91 MMHG | RESPIRATION RATE: 18 BRPM

## 2020-11-23 DIAGNOSIS — Q89.01 FUNCTIONAL ASPLENIA: Primary | ICD-10-CM

## 2020-11-23 DIAGNOSIS — D73.5 SPLENIC INFARCT: ICD-10-CM

## 2020-11-23 PROCEDURE — 90471 IMMUNIZATION ADMIN: CPT

## 2020-11-23 PROCEDURE — G0009 ADMIN PNEUMOCOCCAL VACCINE: HCPCS | Performed by: INTERNAL MEDICINE

## 2020-11-23 PROCEDURE — 25010000002 MENINGOCOCCAL RECONSTITUTED SOLUTION: Performed by: INTERNAL MEDICINE

## 2020-11-23 PROCEDURE — 90670 PCV13 VACCINE IM: CPT | Performed by: INTERNAL MEDICINE

## 2020-11-23 PROCEDURE — 96372 THER/PROPH/DIAG INJ SC/IM: CPT

## 2020-11-23 PROCEDURE — 25010000002 MENINGOCOCCAL B SUSPENSION PREFILLED SYRINGE: Performed by: INTERNAL MEDICINE

## 2020-11-23 PROCEDURE — 25010000002 PNEUMOCOCCAL CONJ. 13-VALENT SUSPENSION: Performed by: INTERNAL MEDICINE

## 2020-11-23 PROCEDURE — 90472 IMMUNIZATION ADMIN EACH ADD: CPT

## 2020-11-23 PROCEDURE — 90620 MENB-4C VACCINE IM: CPT | Performed by: INTERNAL MEDICINE

## 2020-11-23 PROCEDURE — 90734 MENACWYD/MENACWYCRM VACC IM: CPT | Performed by: INTERNAL MEDICINE

## 2020-11-23 RX ADMIN — PNEUMOCOCCAL 13-VALENT CONJUGATE VACCINE 0.5 ML: 2.2; 2.2; 2.2; 2.2; 2.2; 4.4; 2.2; 2.2; 2.2; 2.2; 2.2; 2.2; 2.2 INJECTION, SUSPENSION INTRAMUSCULAR at 11:43

## 2020-11-23 RX ADMIN — NEISSERIA MENINGITIDIS SEROGROUP B NHBA FUSION PROTEIN ANTIGEN, NEISSERIA MENINGITIDIS SEROGROUP B FHBP FUSION PROTEIN ANTIGEN AND NEISSERIA MENINGITIDIS SEROGROUP B NADA PROTEIN ANTIGEN 0.5 ML: 50; 50; 50; 25 INJECTION, SUSPENSION INTRAMUSCULAR at 11:39

## 2020-11-23 RX ADMIN — Medication 0.5 ML: at 11:36

## 2020-11-23 RX ADMIN — HAEMOPHILUS B POLYSACCHARIDE CONJUGATE VACCINE FOR INJ 0.5 ML: RECON SOLN at 11:41

## 2020-11-23 NOTE — PATIENT INSTRUCTIONS
"Meningococcal B Vaccine: What You Need to Know  1. Why get vaccinated?  Meningococcal B vaccine can help protect against meningococcal disease caused by serogroup B. A different meningococcal vaccine is available that can help protect against serogroups A, C, W, and Y.  Meningococcal disease can cause meningitis (infection of the lining of the brain and spinal cord) and infections of the blood. Even when it is treated, meningococcal disease kills 10 to 15 infected people out of 100. And of those who survive, about 10 to 20 out of every 100 will suffer disabilities such as hearing loss, brain damage, kidney damage, loss of limbs, nervous system problems, or severe scars from skin grafts.  Anyone can get meningococcal disease but certain people are at increased risk, including:  · Infants younger than one year old  · Adolescents and young adults 16 through 23 years old  · People with certain medical conditions that affect the immune system  · Microbiologists who routinely work with isolates of N. meningitidis, the bacteria that cause meningococcal disease  · People at risk because of an outbreak in their community  2. Meningococcal B vaccine  For best protection, more than 1 dose of a meningococcal B vaccine is needed. There are two meningococcal B vaccines available. The same vaccine must be used for all doses.  Meningococcal B vaccines are recommended for people 10 years or older who are at increased risk for serogroup B meningococcal disease, including:  · People at risk because of a serogroup B meningococcal disease outbreak  · Anyone whose spleen is damaged or has been removed, including people with sickle cell disease  · Anyone with a rare immune system condition called \"persistent complement component deficiency\"  · Anyone taking a type of drug called a complement inhibitor, such as eculizumab (also called Soliris®) or ravulizumab (also called Ultomiris®)  · Microbiologists who routinely work with isolates of " N. meningitidis  These vaccines may also be given to anyone 16 through 23 years old to provide short-term protection against most strains of serogroup B meningococcal disease; 16 through 18 years are the preferred ages for vaccination.  3. Talk with your health care provider  Tell your vaccine provider if the person getting the vaccine:  · Has had an allergic reaction after a previous dose of meningococcal B vaccine, or has any severe, life-threatening allergies.  · Is pregnant or breastfeeding.  In some cases, your health care provider may decide to postpone meningococcal B vaccination to a future visit.  People with minor illnesses, such as a cold, may be vaccinated. People who are moderately or severely ill should usually wait until they recover before getting meningococcal B vaccine.  Your health care provider can give you more information.  4. Risks of a vaccine reaction  · Soreness, redness, or swelling where the shot is given, tiredness, fatigue, headache, muscle or joint pain, fever, chills, nausea, or diarrhea can happen after meningococcal B vaccine. Some of these reactions occur in more than half of the people who receive the vaccine.  People sometimes faint after medical procedures, including vaccination. Tell your provider if you feel dizzy or have vision changes or ringing in the ears.  As with any medicine, there is a very remote chance of a vaccine causing a severe allergic reaction, other serious injury, or death.  5. What if there is a serious problem?  An allergic reaction could occur after the vaccinated person leaves the clinic. If you see signs of a severe allergic reaction (hives, swelling of the face and throat, difficulty breathing, a fast heartbeat, dizziness, or weakness), call 9-1-1 and get the person to the nearest hospital.  For other signs that concern you, call your health care provider.  Adverse reactions should be reported to the Vaccine Adverse Event Reporting System (VAERS). Your  health care provider will usually file this report, or you can do it yourself. Visit the VAERS website at www.vaers.hhs.gov or call 1-408.539.6901.VAERS is only for reporting reactions, and VAERS staff do not give medical advice.  6. The National Vaccine Injury Compensation Program  The National Vaccine Injury Compensation Program (VICP) is a federal program that was created to compensate people who may have been injured by certain vaccines. Visit the VICP website at www.hrsa.gov/vaccinecompensation or call 1-951.512.8812 to learn about the program and about filing a claim. There is a time limit to file a claim for compensation.  7. How can I learn more?  · Ask your healthcare provider.  · Call your local or state health department.  · Contact the Centers for Disease Control and Prevention (CDC):  ? Call 1-921.541.1451 (7-998-MML-INFO) or  ? Visit CDC's www.cdc.gov/vaccines  Vaccine Information Statement (Interim) Meningococcal B Vaccine (8/15/2019)  This information is not intended to replace advice given to you by your health care provider. Make sure you discuss any questions you have with your health care provider.  Document Released: 08/21/2015 Document Revised: 04/07/2020 Document Reviewed: 08/19/2019  Elsevier Patient Education © 2020 [employer]  Meningococcal Diphtheria Toxoid Conjugate Vaccine  What is this medicine?  MENINGOCOCCAL DIPHTHERIA TOXOID CONJUGATE VACCINE ([facility]ak SEEN) is a vaccine to protect from bacterial meningitis. This vaccine does not contain live bacteria. It will not cause a meningitis.  This medicine may be used for other purposes; ask your health care provider or pharmacist if you have questions.  COMMON BRAND NAME(S): Menactra, Menveo  What should I tell my health care provider before I take this medicine?  They need to know if you have any of these conditions:  · bleeding disorder  · fever or infection  · history of  Guillain-[place] syndrome  · immune system problems  · an unusual or allergic reaction to diphtheria toxoid, meningococcal vaccine, latex, other medicines, foods, dyes, or preservatives  · pregnant or trying to get pregnant  · breast-feeding  How should I use this medicine?  This medicine is for injection into a muscle. It is given by a health care professional in a hospital or clinic setting.  A copy of Vaccine Information Statements will be given before each vaccination. Read this sheet carefully each time. The sheet may change frequently.  Talk to your pediatrician regarding the use of this medicine in children. While some brands of this drug may be prescribed for children as young as 9 months of age for selected conditions, precautions do apply.  Overdosage: If you think you have taken too much of this medicine contact a poison control center or emergency room at once.  NOTE: This medicine is only for you. Do not share this medicine with others.  What if I miss a dose?  This does not apply.  What may interact with this medicine?  · adalimumab  · anakinra  · infliximab  · medicines for organ transplant  · medicines to treat cancer  · medicines used during some procedures to diagnose a medical condition  · other vaccines  · some medicines for arthritis  · steroid medicines like prednisone or cortisone  This list may not describe all possible interactions. Give your health care provider a list of all the medicines, herbs, non-prescription drugs, or dietary supplements you use. Also tell them if you smoke, drink alcohol, or use illegal drugs. Some items may interact with your medicine.  What should I watch for while using this medicine?  Report any side effects that are worrisome to your doctor right away. Call your doctor if you have any unusual symptoms within 6 weeks of getting this vaccine.  This vaccine may not protect from all meningitis infections.  Women should inform their doctor if they wish to become  pregnant or think they might be pregnant. Talk to your health care professional or pharmacist for more information.  What side effects may I notice from receiving this medicine?  Side effects that you should report to your doctor or health care professional as soon as possible:  · allergic reactions like skin rash, itching or hives, swelling of the face, lips, or tongue  · breathing problems  · feeling faint or lightheaded, falls  · fever over 102 degrees F  · muscle weakness  · unusual drooping or paralysis of face  Side effects that usually do not require medical attention (report to your doctor or health care professional if they continue or are bothersome):  · chills  · diarrhea  · headache  · loss of appetite  · muscle aches and pains  · pain at site where injected  · tired  This list may not describe all possible side effects. Call your doctor for medical advice about side effects. You may report side effects to FDA at 0-193-FDA-1706.  Where should I keep my medicine?  This drug is given in a hospital or clinic and will not be stored at home.  NOTE: This sheet is a summary. It may not cover all possible information. If you have questions about this medicine, talk to your doctor, pharmacist, or health care provider.  © 2020 Elsevier/[employer] Standard (2011-05-10 21:41:10)  Haemophilus influenzae type b Conjugate Vaccine injection  What is this medicine?  HAEMOPHILUS INFLUENZAE TYPE B CONJUGATE VACCINE (hem OFF ursula us in floo En zuh type B TRINH ji get VAK seen) is used to prevent infections of a Haemophilus bacteria.  This medicine may be used for other purposes; ask your health care provider or pharmacist if you have questions.  COMMON BRAND NAME(S): ActHIB, Hiberix, HibTITER, PedvaxHIB  What should I tell my health care provider before I take this medicine?  They need to know if you have any of these conditions:  · bleeding disorder  · Guillain-[place]ough[place] syndrome  · immune system problems  · infection with fever  · low  levels of platelets in the blood  · take medicines that treat or prevent blood clots  · an unusual or allergic reaction to vaccines, other medicines, foods, dyes, or preservatives  · pregnant or trying to get pregnant  · breast-feeding  How should I use this medicine?  This vaccine is for injection into a muscle. It is given by a health care professional.  A copy of Vaccine Information Statements will be given before each vaccination. Read this sheet carefully each time. The sheet may change frequently.  Talk to your pediatrician regarding the use of this medicine in children. While this drug may be prescribed for children as young as 2 months old for selected conditions, precautions do apply.  Overdosage: If you think you have taken too much of this medicine contact a poison control center or emergency room at once.  NOTE: This medicine is only for you. Do not share this medicine with others.  What if I miss a dose?  Keep appointments for follow-up (booster) doses as directed. It is important not to miss your dose. Call your doctor or health care professional if you are unable to keep an appointment.  What may interact with this medicine?  · adalimumab  · anakinra  · infliximab  · medicines that suppress your immune system  · medicines that treat or prevent blood clots like warfarin, enoxaparin, and dalteparin  · medicines to treat cancer  This list may not describe all possible interactions. Give your health care provider a list of all the medicines, herbs, non-prescription drugs, or dietary supplements you use. Also tell them if you smoke, drink alcohol, or use illegal drugs. Some items may interact with your medicine.  What should I watch for while using this medicine?  Visit your doctor for regular check-ups as directed.  This vaccine, like all vaccines, may not fully protect everyone.  What side effects may I notice from receiving this medicine?  Side effects that you should report to your doctor or health  care professional as soon as possible:  · allergic reactions like skin rash, itching or hives, swelling of the face, lips, or tongue  · breathing problems  · extreme changes in behavior  · fever over 100 degrees F  · pain, tingling, numbness in the hands or feet  · seizures  · unusually weak or tired  Side effects that usually do not require medical attention (report to your doctor or health care professional if they continue or are bothersome):  · aches or pains  · bruising, pain, swelling at site where injected  · diarrhea  · headache  · loss of appetite  · low-grade fever of 100 degrees F or less  · nausea, vomiting  · sleepy  This list may not describe all possible side effects. Call your doctor for medical advice about side effects. You may report side effects to FDA at 2-167-UJX-0768.  Where should I keep my medicine?  This drug is given in a hospital or clinic and will not be stored at home.  NOTE: This sheet is a summary. It may not cover all possible information. If you have questions about this medicine, talk to your doctor, pharmacist, or health care provider.  © 2020 Elsevier/Gold Standard (2015-04-20 13:43:01)  Pneumococcal Conjugate Vaccine suspension for injection  What is this medicine?  PNEUMOCOCCAL VACCINE (SUMAYA mo MONET al vak SEEN) is a vaccine used to prevent pneumococcus bacterial infections. These bacteria can cause serious infections like pneumonia, meningitis, and blood infections. This vaccine will lower your chance of getting pneumonia. If you do get pneumonia, it can make your symptoms milder and your illness shorter. This vaccine will not treat an infection and will not cause infection. This vaccine is recommended for infants and young children, adults with certain medical conditions, and adults 65 years or older.  This medicine may be used for other purposes; ask your health care provider or pharmacist if you have questions.  COMMON BRAND NAME(S): Prevnar, Prevnar 13  What should I tell my  health care provider before I take this medicine?  They need to know if you have any of these conditions:  · bleeding problems  · fever  · immune system problems  · an unusual or allergic reaction to pneumococcal vaccine, diphtheria toxoid, other vaccines, latex, other medicines, foods, dyes, or preservatives  · pregnant or trying to get pregnant  · breast-feeding  How should I use this medicine?  This vaccine is for injection into a muscle. It is given by a health care professional.  A copy of Vaccine Information Statements will be given before each vaccination. Read this sheet carefully each time. The sheet may change frequently.  Talk to your pediatrician regarding the use of this medicine in children. While this drug may be prescribed for children as young as 6 weeks old for selected conditions, precautions do apply.  Overdosage: If you think you have taken too much of this medicine contact a poison control center or emergency room at once.  NOTE: This medicine is only for you. Do not share this medicine with others.  What if I miss a dose?  It is important not to miss your dose. Call your doctor or health care professional if you are unable to keep an appointment.  What may interact with this medicine?  · medicines for cancer chemotherapy  · medicines that suppress your immune function  · steroid medicines like prednisone or cortisone  This list may not describe all possible interactions. Give your health care provider a list of all the medicines, herbs, non-prescription drugs, or dietary supplements you use. Also tell them if you smoke, drink alcohol, or use illegal drugs. Some items may interact with your medicine.  What should I watch for while using this medicine?  Mild fever and pain should go away in 3 days or less. Report any unusual symptoms to your doctor or health care professional.  What side effects may I notice from receiving this medicine?  Side effects that you should report to your doctor or  health care professional as soon as possible:  · allergic reactions like skin rash, itching or hives, swelling of the face, lips, or tongue  · breathing problems  · confused  · fast or irregular heartbeat  · fever over 102 degrees F  · seizures  · unusual bleeding or bruising  · unusual muscle weakness  Side effects that usually do not require medical attention (report to your doctor or health care professional if they continue or are bothersome):  · aches and pains  · diarrhea  · fever of 102 degrees F or less  · headache  · irritable  · loss of appetite  · pain, tender at site where injected  · trouble sleeping  This list may not describe all possible side effects. Call your doctor for medical advice about side effects. You may report side effects to FDA at 7-636-AXH-6807.  Where should I keep my medicine?  This does not apply. This vaccine is given in a clinic, pharmacy, doctor's office, or other health care setting and will not be stored at home.  NOTE: This sheet is a summary. It may not cover all possible information. If you have questions about this medicine, talk to your doctor, pharmacist, or health care provider.  © 2020 Elsevier/Gold Standard (2015-09-24 10:27:27)

## 2020-11-28 ENCOUNTER — HOSPITAL ENCOUNTER (INPATIENT)
Facility: HOSPITAL | Age: 40
LOS: 3 days | Discharge: HOME HEALTH | End: 2020-12-02
Attending: EMERGENCY MEDICINE | Admitting: INTERNAL MEDICINE

## 2020-11-28 ENCOUNTER — APPOINTMENT (OUTPATIENT)
Dept: CT IMAGING | Facility: HOSPITAL | Age: 40
End: 2020-11-28

## 2020-11-28 ENCOUNTER — APPOINTMENT (OUTPATIENT)
Dept: GENERAL RADIOLOGY | Facility: HOSPITAL | Age: 40
End: 2020-11-28

## 2020-11-28 DIAGNOSIS — M54.9 PAIN OF BACK AND LEFT LOWER EXTREMITY: ICD-10-CM

## 2020-11-28 DIAGNOSIS — M79.605 PAIN OF BACK AND LEFT LOWER EXTREMITY: ICD-10-CM

## 2020-11-28 DIAGNOSIS — M54.9 ACUTE BACK PAIN, UNSPECIFIED BACK LOCATION, UNSPECIFIED BACK PAIN LATERALITY: ICD-10-CM

## 2020-11-28 DIAGNOSIS — M54.42 ACUTE BILATERAL LOW BACK PAIN WITH LEFT-SIDED SCIATICA: ICD-10-CM

## 2020-11-28 DIAGNOSIS — R29.898 BILATERAL LEG WEAKNESS: ICD-10-CM

## 2020-11-28 DIAGNOSIS — R29.898 WEAKNESS OF BOTH LOWER EXTREMITIES: ICD-10-CM

## 2020-11-28 DIAGNOSIS — R10.84 GENERALIZED ABDOMINAL PAIN: Primary | ICD-10-CM

## 2020-11-28 LAB
ALBUMIN SERPL-MCNC: 4.5 G/DL (ref 3.5–5.2)
ALBUMIN/GLOB SERPL: 1.5 G/DL
ALP SERPL-CCNC: 174 U/L (ref 39–117)
ALT SERPL W P-5'-P-CCNC: 39 U/L (ref 1–33)
ANION GAP SERPL CALCULATED.3IONS-SCNC: 8 MMOL/L (ref 5–15)
AST SERPL-CCNC: 33 U/L (ref 1–32)
BACTERIA UR QL AUTO: ABNORMAL /HPF
BASOPHILS # BLD AUTO: 0.04 10*3/MM3 (ref 0–0.2)
BASOPHILS NFR BLD AUTO: 0.4 % (ref 0–1.5)
BILIRUB SERPL-MCNC: 0.2 MG/DL (ref 0–1.2)
BILIRUB UR QL STRIP: NEGATIVE
BUN SERPL-MCNC: 7 MG/DL (ref 6–20)
BUN/CREAT SERPL: 8.6 (ref 7–25)
CALCIUM SPEC-SCNC: 9.5 MG/DL (ref 8.6–10.5)
CHLORIDE SERPL-SCNC: 103 MMOL/L (ref 98–107)
CLARITY UR: CLEAR
CO2 SERPL-SCNC: 29 MMOL/L (ref 22–29)
COLOR UR: YELLOW
CREAT SERPL-MCNC: 0.81 MG/DL (ref 0.57–1)
DEPRECATED RDW RBC AUTO: 47.3 FL (ref 37–54)
EOSINOPHIL # BLD AUTO: 0.15 10*3/MM3 (ref 0–0.4)
EOSINOPHIL NFR BLD AUTO: 1.5 % (ref 0.3–6.2)
ERYTHROCYTE [DISTWIDTH] IN BLOOD BY AUTOMATED COUNT: 13.2 % (ref 12.3–15.4)
GFR SERPL CREATININE-BSD FRML MDRD: 79 ML/MIN/1.73
GLOBULIN UR ELPH-MCNC: 3.1 GM/DL
GLUCOSE SERPL-MCNC: 93 MG/DL (ref 65–99)
GLUCOSE UR STRIP-MCNC: NEGATIVE MG/DL
HCG SERPL QL: NEGATIVE
HCT VFR BLD AUTO: 44.9 % (ref 34–46.6)
HGB BLD-MCNC: 14.6 G/DL (ref 12–15.9)
HGB UR QL STRIP.AUTO: ABNORMAL
HYALINE CASTS UR QL AUTO: ABNORMAL /LPF
IMM GRANULOCYTES # BLD AUTO: 0.04 10*3/MM3 (ref 0–0.05)
IMM GRANULOCYTES NFR BLD AUTO: 0.4 % (ref 0–0.5)
KETONES UR QL STRIP: NEGATIVE
LEUKOCYTE ESTERASE UR QL STRIP.AUTO: NEGATIVE
LIPASE SERPL-CCNC: 33 U/L (ref 13–60)
LYMPHOCYTES # BLD AUTO: 2.39 10*3/MM3 (ref 0.7–3.1)
LYMPHOCYTES NFR BLD AUTO: 23.9 % (ref 19.6–45.3)
MCH RBC QN AUTO: 31.5 PG (ref 26.6–33)
MCHC RBC AUTO-ENTMCNC: 32.5 G/DL (ref 31.5–35.7)
MCV RBC AUTO: 96.8 FL (ref 79–97)
MONOCYTES # BLD AUTO: 0.58 10*3/MM3 (ref 0.1–0.9)
MONOCYTES NFR BLD AUTO: 5.8 % (ref 5–12)
NEUTROPHILS NFR BLD AUTO: 6.79 10*3/MM3 (ref 1.7–7)
NEUTROPHILS NFR BLD AUTO: 68 % (ref 42.7–76)
NITRITE UR QL STRIP: NEGATIVE
NRBC BLD AUTO-RTO: 0 /100 WBC (ref 0–0.2)
PH UR STRIP.AUTO: 8 [PH] (ref 5–8)
PLATELET # BLD AUTO: 419 10*3/MM3 (ref 140–450)
PMV BLD AUTO: 9.5 FL (ref 6–12)
POTASSIUM SERPL-SCNC: 3.7 MMOL/L (ref 3.5–5.2)
PROT SERPL-MCNC: 7.6 G/DL (ref 6–8.5)
PROT UR QL STRIP: NEGATIVE
RBC # BLD AUTO: 4.64 10*6/MM3 (ref 3.77–5.28)
RBC # UR: ABNORMAL /HPF
REF LAB TEST METHOD: ABNORMAL
SODIUM SERPL-SCNC: 140 MMOL/L (ref 136–145)
SP GR UR STRIP: 1.01 (ref 1–1.03)
SQUAMOUS #/AREA URNS HPF: ABNORMAL /HPF
UROBILINOGEN UR QL STRIP: ABNORMAL
WBC # BLD AUTO: 9.99 10*3/MM3 (ref 3.4–10.8)
WBC UR QL AUTO: ABNORMAL /HPF

## 2020-11-28 PROCEDURE — 93010 ELECTROCARDIOGRAM REPORT: CPT | Performed by: INTERNAL MEDICINE

## 2020-11-28 PROCEDURE — 70450 CT HEAD/BRAIN W/O DYE: CPT

## 2020-11-28 PROCEDURE — 74177 CT ABD & PELVIS W/CONTRAST: CPT

## 2020-11-28 PROCEDURE — 84703 CHORIONIC GONADOTROPIN ASSAY: CPT | Performed by: EMERGENCY MEDICINE

## 2020-11-28 PROCEDURE — 99284 EMERGENCY DEPT VISIT MOD MDM: CPT

## 2020-11-28 PROCEDURE — 85025 COMPLETE CBC W/AUTO DIFF WBC: CPT | Performed by: EMERGENCY MEDICINE

## 2020-11-28 PROCEDURE — 25010000002 HYDROMORPHONE PER 4 MG: Performed by: EMERGENCY MEDICINE

## 2020-11-28 PROCEDURE — 80053 COMPREHEN METABOLIC PANEL: CPT | Performed by: EMERGENCY MEDICINE

## 2020-11-28 PROCEDURE — 83690 ASSAY OF LIPASE: CPT | Performed by: EMERGENCY MEDICINE

## 2020-11-28 PROCEDURE — 73560 X-RAY EXAM OF KNEE 1 OR 2: CPT

## 2020-11-28 PROCEDURE — 25010000002 ONDANSETRON PER 1 MG: Performed by: EMERGENCY MEDICINE

## 2020-11-28 PROCEDURE — 93005 ELECTROCARDIOGRAM TRACING: CPT | Performed by: EMERGENCY MEDICINE

## 2020-11-28 PROCEDURE — 81001 URINALYSIS AUTO W/SCOPE: CPT | Performed by: EMERGENCY MEDICINE

## 2020-11-28 PROCEDURE — 25010000002 IOPAMIDOL 61 % SOLUTION: Performed by: EMERGENCY MEDICINE

## 2020-11-28 RX ORDER — ONDANSETRON 2 MG/ML
4 INJECTION INTRAMUSCULAR; INTRAVENOUS ONCE
Status: COMPLETED | OUTPATIENT
Start: 2020-11-28 | End: 2020-11-28

## 2020-11-28 RX ORDER — SODIUM CHLORIDE 0.9 % (FLUSH) 0.9 %
10 SYRINGE (ML) INJECTION AS NEEDED
Status: DISCONTINUED | OUTPATIENT
Start: 2020-11-28 | End: 2020-12-02 | Stop reason: HOSPADM

## 2020-11-28 RX ORDER — HYDROMORPHONE HYDROCHLORIDE 1 MG/ML
0.5 INJECTION, SOLUTION INTRAMUSCULAR; INTRAVENOUS; SUBCUTANEOUS ONCE
Status: COMPLETED | OUTPATIENT
Start: 2020-11-28 | End: 2020-11-28

## 2020-11-28 RX ORDER — TRAZODONE HYDROCHLORIDE 50 MG/1
100 TABLET ORAL NIGHTLY
Status: ON HOLD | COMMUNITY
End: 2022-12-20 | Stop reason: SDUPTHER

## 2020-11-28 RX ADMIN — SODIUM CHLORIDE 1000 ML: 9 INJECTION, SOLUTION INTRAVENOUS at 23:29

## 2020-11-28 RX ADMIN — ONDANSETRON 4 MG: 2 INJECTION INTRAMUSCULAR; INTRAVENOUS at 23:32

## 2020-11-28 RX ADMIN — HYDROMORPHONE HYDROCHLORIDE 0.5 MG: 1 INJECTION, SOLUTION INTRAMUSCULAR; INTRAVENOUS; SUBCUTANEOUS at 23:32

## 2020-11-28 RX ADMIN — IOPAMIDOL 95 ML: 612 INJECTION, SOLUTION INTRAVENOUS at 23:43

## 2020-11-29 ENCOUNTER — APPOINTMENT (OUTPATIENT)
Dept: MRI IMAGING | Facility: HOSPITAL | Age: 40
End: 2020-11-29

## 2020-11-29 PROBLEM — R10.84 GENERALIZED ABDOMINAL PAIN: Status: ACTIVE | Noted: 2020-11-29

## 2020-11-29 PROBLEM — R29.898 BILATERAL LEG WEAKNESS: Status: ACTIVE | Noted: 2020-11-29

## 2020-11-29 PROBLEM — R76.0 ANTIPHOSPHOLIPID ANTIBODY POSITIVE: Status: ACTIVE | Noted: 2020-11-29

## 2020-11-29 PROBLEM — Z79.01 ON ANTICOAGULANT THERAPY: Status: ACTIVE | Noted: 2020-11-29

## 2020-11-29 PROBLEM — M54.50 ACUTE BILATERAL LOW BACK PAIN: Status: ACTIVE | Noted: 2020-11-29

## 2020-11-29 PROBLEM — M25.562 ACUTE PAIN OF LEFT KNEE: Status: ACTIVE | Noted: 2020-11-29

## 2020-11-29 LAB
ANION GAP SERPL CALCULATED.3IONS-SCNC: 11.2 MMOL/L (ref 5–15)
B PARAPERT DNA SPEC QL NAA+PROBE: NOT DETECTED
B PERT DNA SPEC QL NAA+PROBE: NOT DETECTED
BUN SERPL-MCNC: 7 MG/DL (ref 6–20)
BUN/CREAT SERPL: 10.4 (ref 7–25)
C PNEUM DNA NPH QL NAA+NON-PROBE: NOT DETECTED
CALCIUM SPEC-SCNC: 8.7 MG/DL (ref 8.6–10.5)
CHLORIDE SERPL-SCNC: 106 MMOL/L (ref 98–107)
CO2 SERPL-SCNC: 21.8 MMOL/L (ref 22–29)
CREAT SERPL-MCNC: 0.67 MG/DL (ref 0.57–1)
DEPRECATED RDW RBC AUTO: 44.3 FL (ref 37–54)
ERYTHROCYTE [DISTWIDTH] IN BLOOD BY AUTOMATED COUNT: 12.9 % (ref 12.3–15.4)
FLUAV SUBTYP SPEC NAA+PROBE: NOT DETECTED
FLUBV RNA ISLT QL NAA+PROBE: NOT DETECTED
GFR SERPL CREATININE-BSD FRML MDRD: 99 ML/MIN/1.73
GLUCOSE SERPL-MCNC: 104 MG/DL (ref 65–99)
HADV DNA SPEC NAA+PROBE: NOT DETECTED
HCOV 229E RNA SPEC QL NAA+PROBE: NOT DETECTED
HCOV HKU1 RNA SPEC QL NAA+PROBE: NOT DETECTED
HCOV NL63 RNA SPEC QL NAA+PROBE: NOT DETECTED
HCOV OC43 RNA SPEC QL NAA+PROBE: NOT DETECTED
HCT VFR BLD AUTO: 36.7 % (ref 34–46.6)
HGB BLD-MCNC: 12.3 G/DL (ref 12–15.9)
HMPV RNA NPH QL NAA+NON-PROBE: NOT DETECTED
HPIV1 RNA SPEC QL NAA+PROBE: NOT DETECTED
HPIV2 RNA SPEC QL NAA+PROBE: NOT DETECTED
HPIV3 RNA NPH QL NAA+PROBE: NOT DETECTED
HPIV4 P GENE NPH QL NAA+PROBE: NOT DETECTED
M PNEUMO IGG SER IA-ACNC: NOT DETECTED
MCH RBC QN AUTO: 31.6 PG (ref 26.6–33)
MCHC RBC AUTO-ENTMCNC: 33.5 G/DL (ref 31.5–35.7)
MCV RBC AUTO: 94.3 FL (ref 79–97)
PLATELET # BLD AUTO: 326 10*3/MM3 (ref 140–450)
PMV BLD AUTO: 9.7 FL (ref 6–12)
POTASSIUM SERPL-SCNC: 3.8 MMOL/L (ref 3.5–5.2)
QT INTERVAL: 353 MS
RBC # BLD AUTO: 3.89 10*6/MM3 (ref 3.77–5.28)
RHINOVIRUS RNA SPEC NAA+PROBE: NOT DETECTED
RSV RNA NPH QL NAA+NON-PROBE: NOT DETECTED
SARS-COV-2 RNA NPH QL NAA+NON-PROBE: NOT DETECTED
SODIUM SERPL-SCNC: 139 MMOL/L (ref 136–145)
WBC # BLD AUTO: 9.62 10*3/MM3 (ref 3.4–10.8)

## 2020-11-29 PROCEDURE — G0378 HOSPITAL OBSERVATION PER HR: HCPCS

## 2020-11-29 PROCEDURE — 25010000002 ONDANSETRON PER 1 MG: Performed by: NURSE PRACTITIONER

## 2020-11-29 PROCEDURE — 85027 COMPLETE CBC AUTOMATED: CPT | Performed by: NURSE PRACTITIONER

## 2020-11-29 PROCEDURE — 25010000002 HYDROMORPHONE PER 4 MG: Performed by: NURSE PRACTITIONER

## 2020-11-29 PROCEDURE — 72158 MRI LUMBAR SPINE W/O & W/DYE: CPT

## 2020-11-29 PROCEDURE — 0 GADOBENATE DIMEGLUMINE 529 MG/ML SOLUTION: Performed by: HOSPITALIST

## 2020-11-29 PROCEDURE — 80048 BASIC METABOLIC PNL TOTAL CA: CPT | Performed by: NURSE PRACTITIONER

## 2020-11-29 PROCEDURE — 0202U NFCT DS 22 TRGT SARS-COV-2: CPT | Performed by: EMERGENCY MEDICINE

## 2020-11-29 PROCEDURE — A9577 INJ MULTIHANCE: HCPCS | Performed by: HOSPITALIST

## 2020-11-29 PROCEDURE — 97110 THERAPEUTIC EXERCISES: CPT

## 2020-11-29 PROCEDURE — 25010000002 ONDANSETRON PER 1 MG: Performed by: EMERGENCY MEDICINE

## 2020-11-29 PROCEDURE — 97161 PT EVAL LOW COMPLEX 20 MIN: CPT

## 2020-11-29 PROCEDURE — 25010000002 HYDROMORPHONE 1 MG/ML SOLUTION: Performed by: EMERGENCY MEDICINE

## 2020-11-29 PROCEDURE — 63710000001 METHYLPREDNISOLONE 4 MG TABLET THERAPY PACK 21 EACH DISP PACK: Performed by: HOSPITALIST

## 2020-11-29 RX ORDER — HYDROCODONE BITARTRATE AND ACETAMINOPHEN 10; 325 MG/1; MG/1
1 TABLET ORAL EVERY 4 HOURS PRN
Status: DISCONTINUED | OUTPATIENT
Start: 2020-11-29 | End: 2020-11-30

## 2020-11-29 RX ORDER — METHYLPREDNISOLONE 4 MG/1
8 TABLET ORAL
Status: COMPLETED | OUTPATIENT
Start: 2020-11-30 | End: 2020-11-30

## 2020-11-29 RX ORDER — ONDANSETRON 2 MG/ML
4 INJECTION INTRAMUSCULAR; INTRAVENOUS ONCE
Status: COMPLETED | OUTPATIENT
Start: 2020-11-29 | End: 2020-11-29

## 2020-11-29 RX ORDER — TRAZODONE HYDROCHLORIDE 50 MG/1
50 TABLET ORAL NIGHTLY
Status: DISCONTINUED | OUTPATIENT
Start: 2020-11-29 | End: 2020-12-02 | Stop reason: HOSPADM

## 2020-11-29 RX ORDER — ONDANSETRON 2 MG/ML
4 INJECTION INTRAMUSCULAR; INTRAVENOUS EVERY 6 HOURS PRN
Status: DISCONTINUED | OUTPATIENT
Start: 2020-11-29 | End: 2020-12-02 | Stop reason: HOSPADM

## 2020-11-29 RX ORDER — METHYLPREDNISOLONE 4 MG/1
8 TABLET ORAL
Status: COMPLETED | OUTPATIENT
Start: 2020-11-29 | End: 2020-11-29

## 2020-11-29 RX ORDER — ACETAMINOPHEN 160 MG/5ML
650 SOLUTION ORAL EVERY 4 HOURS PRN
Status: DISCONTINUED | OUTPATIENT
Start: 2020-11-29 | End: 2020-12-02 | Stop reason: HOSPADM

## 2020-11-29 RX ORDER — SODIUM CHLORIDE 0.9 % (FLUSH) 0.9 %
10 SYRINGE (ML) INJECTION EVERY 12 HOURS SCHEDULED
Status: DISCONTINUED | OUTPATIENT
Start: 2020-11-29 | End: 2020-12-02 | Stop reason: HOSPADM

## 2020-11-29 RX ORDER — CHOLECALCIFEROL (VITAMIN D3) 125 MCG
10 CAPSULE ORAL NIGHTLY PRN
Status: DISCONTINUED | OUTPATIENT
Start: 2020-11-29 | End: 2020-12-02 | Stop reason: HOSPADM

## 2020-11-29 RX ORDER — CYCLOBENZAPRINE HCL 10 MG
10 TABLET ORAL 3 TIMES DAILY
Status: DISCONTINUED | OUTPATIENT
Start: 2020-11-29 | End: 2020-11-29

## 2020-11-29 RX ORDER — NITROGLYCERIN 0.4 MG/1
0.4 TABLET SUBLINGUAL
Status: DISCONTINUED | OUTPATIENT
Start: 2020-11-29 | End: 2020-12-02 | Stop reason: HOSPADM

## 2020-11-29 RX ORDER — HYDROCODONE BITARTRATE AND ACETAMINOPHEN 10; 325 MG/1; MG/1
1 TABLET ORAL EVERY 6 HOURS PRN
Status: DISCONTINUED | OUTPATIENT
Start: 2020-11-29 | End: 2020-11-29

## 2020-11-29 RX ORDER — NICOTINE 21 MG/24HR
1 PATCH, TRANSDERMAL 24 HOURS TRANSDERMAL
Status: DISCONTINUED | OUTPATIENT
Start: 2020-11-29 | End: 2020-12-02 | Stop reason: HOSPADM

## 2020-11-29 RX ORDER — METHYLPREDNISOLONE 4 MG/1
4 TABLET ORAL
Status: COMPLETED | OUTPATIENT
Start: 2020-12-01 | End: 2020-12-01

## 2020-11-29 RX ORDER — GABAPENTIN 400 MG/1
800 CAPSULE ORAL EVERY 8 HOURS SCHEDULED
Status: DISCONTINUED | OUTPATIENT
Start: 2020-11-29 | End: 2020-12-02 | Stop reason: HOSPADM

## 2020-11-29 RX ORDER — HYDROMORPHONE HYDROCHLORIDE 1 MG/ML
0.5 INJECTION, SOLUTION INTRAMUSCULAR; INTRAVENOUS; SUBCUTANEOUS EVERY 4 HOURS PRN
Status: DISCONTINUED | OUTPATIENT
Start: 2020-11-29 | End: 2020-12-02 | Stop reason: HOSPADM

## 2020-11-29 RX ORDER — METHYLPREDNISOLONE 4 MG/1
4 TABLET ORAL
Status: DISCONTINUED | OUTPATIENT
Start: 2020-12-03 | End: 2020-12-02 | Stop reason: HOSPADM

## 2020-11-29 RX ORDER — TRAZODONE HYDROCHLORIDE 50 MG/1
50 TABLET ORAL NIGHTLY
Status: DISCONTINUED | OUTPATIENT
Start: 2020-11-29 | End: 2020-11-29

## 2020-11-29 RX ORDER — SODIUM CHLORIDE 0.9 % (FLUSH) 0.9 %
10 SYRINGE (ML) INJECTION AS NEEDED
Status: DISCONTINUED | OUTPATIENT
Start: 2020-11-29 | End: 2020-12-02 | Stop reason: HOSPADM

## 2020-11-29 RX ORDER — METHYLPREDNISOLONE 4 MG/1
4 TABLET ORAL
Status: DISCONTINUED | OUTPATIENT
Start: 2020-12-04 | End: 2020-12-02 | Stop reason: HOSPADM

## 2020-11-29 RX ORDER — CYCLOBENZAPRINE HCL 10 MG
10 TABLET ORAL 3 TIMES DAILY PRN
Status: DISCONTINUED | OUTPATIENT
Start: 2020-11-29 | End: 2020-11-29

## 2020-11-29 RX ORDER — CYCLOBENZAPRINE HCL 10 MG
10 TABLET ORAL EVERY 8 HOURS SCHEDULED
Status: DISCONTINUED | OUTPATIENT
Start: 2020-11-29 | End: 2020-12-02 | Stop reason: HOSPADM

## 2020-11-29 RX ORDER — METHYLPREDNISOLONE 4 MG/1
4 TABLET ORAL
Status: COMPLETED | OUTPATIENT
Start: 2020-11-29 | End: 2020-11-29

## 2020-11-29 RX ORDER — METHYLPREDNISOLONE 4 MG/1
4 TABLET ORAL
Status: COMPLETED | OUTPATIENT
Start: 2020-11-30 | End: 2020-11-30

## 2020-11-29 RX ORDER — ACETAMINOPHEN 325 MG/1
650 TABLET ORAL EVERY 4 HOURS PRN
Status: DISCONTINUED | OUTPATIENT
Start: 2020-11-29 | End: 2020-12-02 | Stop reason: HOSPADM

## 2020-11-29 RX ORDER — ACETAMINOPHEN 650 MG/1
650 SUPPOSITORY RECTAL EVERY 4 HOURS PRN
Status: DISCONTINUED | OUTPATIENT
Start: 2020-11-29 | End: 2020-12-02 | Stop reason: HOSPADM

## 2020-11-29 RX ORDER — CETIRIZINE HYDROCHLORIDE 10 MG/1
10 TABLET ORAL DAILY
Status: DISCONTINUED | OUTPATIENT
Start: 2020-11-29 | End: 2020-12-02 | Stop reason: HOSPADM

## 2020-11-29 RX ORDER — AMITRIPTYLINE HYDROCHLORIDE 25 MG/1
25 TABLET, FILM COATED ORAL NIGHTLY
Status: DISCONTINUED | OUTPATIENT
Start: 2020-11-29 | End: 2020-12-02 | Stop reason: HOSPADM

## 2020-11-29 RX ORDER — METHYLPREDNISOLONE 4 MG/1
4 TABLET ORAL
Status: DISCONTINUED | OUTPATIENT
Start: 2020-12-02 | End: 2020-12-02 | Stop reason: HOSPADM

## 2020-11-29 RX ORDER — AMITRIPTYLINE HYDROCHLORIDE 25 MG/1
25 TABLET, FILM COATED ORAL NIGHTLY
Status: DISCONTINUED | OUTPATIENT
Start: 2020-11-29 | End: 2020-11-29

## 2020-11-29 RX ADMIN — CYCLOBENZAPRINE 10 MG: 10 TABLET, FILM COATED ORAL at 23:09

## 2020-11-29 RX ADMIN — TRAZODONE HYDROCHLORIDE 50 MG: 50 TABLET ORAL at 20:47

## 2020-11-29 RX ADMIN — SODIUM CHLORIDE, PRESERVATIVE FREE 10 ML: 5 INJECTION INTRAVENOUS at 08:21

## 2020-11-29 RX ADMIN — APIXABAN 5 MG: 5 TABLET, FILM COATED ORAL at 20:47

## 2020-11-29 RX ADMIN — METHYLPREDNISOLONE 8 MG: 4 TABLET ORAL at 20:47

## 2020-11-29 RX ADMIN — HYDROCODONE BITARTRATE AND ACETAMINOPHEN 1 TABLET: 10; 325 TABLET ORAL at 18:49

## 2020-11-29 RX ADMIN — AMITRIPTYLINE HYDROCHLORIDE 25 MG: 25 TABLET, FILM COATED ORAL at 20:47

## 2020-11-29 RX ADMIN — HYDROCODONE BITARTRATE AND ACETAMINOPHEN 1 TABLET: 10; 325 TABLET ORAL at 10:49

## 2020-11-29 RX ADMIN — ONDANSETRON 4 MG: 2 INJECTION INTRAMUSCULAR; INTRAVENOUS at 14:51

## 2020-11-29 RX ADMIN — CYCLOBENZAPRINE 10 MG: 10 TABLET, FILM COATED ORAL at 14:26

## 2020-11-29 RX ADMIN — Medication 10 MG: at 23:09

## 2020-11-29 RX ADMIN — HYDROMORPHONE HYDROCHLORIDE 0.5 MG: 1 INJECTION, SOLUTION INTRAMUSCULAR; INTRAVENOUS; SUBCUTANEOUS at 04:01

## 2020-11-29 RX ADMIN — METHYLPREDNISOLONE 8 MG: 4 TABLET ORAL at 14:45

## 2020-11-29 RX ADMIN — APIXABAN 5 MG: 5 TABLET, FILM COATED ORAL at 04:17

## 2020-11-29 RX ADMIN — ONDANSETRON 4 MG: 2 INJECTION INTRAMUSCULAR; INTRAVENOUS at 00:51

## 2020-11-29 RX ADMIN — GADOBENATE DIMEGLUMINE 20 ML: 529 INJECTION, SOLUTION INTRAVENOUS at 10:23

## 2020-11-29 RX ADMIN — SODIUM CHLORIDE, PRESERVATIVE FREE 10 ML: 5 INJECTION INTRAVENOUS at 03:10

## 2020-11-29 RX ADMIN — HYDROMORPHONE HYDROCHLORIDE 0.5 MG: 1 INJECTION, SOLUTION INTRAMUSCULAR; INTRAVENOUS; SUBCUTANEOUS at 08:21

## 2020-11-29 RX ADMIN — HYDROCODONE BITARTRATE AND ACETAMINOPHEN 1 TABLET: 10; 325 TABLET ORAL at 14:46

## 2020-11-29 RX ADMIN — GABAPENTIN 800 MG: 400 CAPSULE ORAL at 04:01

## 2020-11-29 RX ADMIN — METHYLPREDNISOLONE 4 MG: 4 TABLET ORAL at 14:45

## 2020-11-29 RX ADMIN — METHYLPREDNISOLONE 4 MG: 4 TABLET ORAL at 18:00

## 2020-11-29 RX ADMIN — Medication 1 PATCH: at 10:49

## 2020-11-29 RX ADMIN — SODIUM CHLORIDE, PRESERVATIVE FREE 10 ML: 5 INJECTION INTRAVENOUS at 22:38

## 2020-11-29 RX ADMIN — HYDROMORPHONE HYDROCHLORIDE 0.5 MG: 1 INJECTION, SOLUTION INTRAMUSCULAR; INTRAVENOUS; SUBCUTANEOUS at 21:23

## 2020-11-29 RX ADMIN — HYDROCODONE BITARTRATE AND ACETAMINOPHEN 1 TABLET: 10; 325 TABLET ORAL at 23:09

## 2020-11-29 RX ADMIN — HYDROMORPHONE HYDROCHLORIDE 1 MG: 1 INJECTION, SOLUTION INTRAMUSCULAR; INTRAVENOUS; SUBCUTANEOUS at 00:53

## 2020-11-29 RX ADMIN — GABAPENTIN 800 MG: 400 CAPSULE ORAL at 20:47

## 2020-11-29 RX ADMIN — GABAPENTIN 800 MG: 400 CAPSULE ORAL at 14:26

## 2020-11-29 RX ADMIN — ONDANSETRON 4 MG: 2 INJECTION INTRAMUSCULAR; INTRAVENOUS at 09:35

## 2020-11-29 RX ADMIN — CETIRIZINE HYDROCHLORIDE 10 MG: 10 TABLET ORAL at 08:21

## 2020-11-30 ENCOUNTER — TELEPHONE (OUTPATIENT)
Dept: ONCOLOGY | Facility: CLINIC | Age: 40
End: 2020-11-30

## 2020-11-30 DIAGNOSIS — R76.0 ANTIPHOSPHOLIPID ANTIBODY POSITIVE: ICD-10-CM

## 2020-11-30 DIAGNOSIS — Z79.01 ON ANTICOAGULANT THERAPY: Primary | ICD-10-CM

## 2020-11-30 PROBLEM — E55.9 VITAMIN D DEFICIENCY: Status: ACTIVE | Noted: 2020-11-30

## 2020-11-30 LAB
25(OH)D3 SERPL-MCNC: 12.5 NG/ML (ref 30–100)
ALBUMIN SERPL-MCNC: 4.1 G/DL (ref 3.5–5.2)
ALBUMIN/GLOB SERPL: 1.6 G/DL
ALP SERPL-CCNC: 133 U/L (ref 39–117)
ALT SERPL W P-5'-P-CCNC: 31 U/L (ref 1–33)
ANION GAP SERPL CALCULATED.3IONS-SCNC: 8.1 MMOL/L (ref 5–15)
AST SERPL-CCNC: 23 U/L (ref 1–32)
BASOPHILS # BLD AUTO: 0.03 10*3/MM3 (ref 0–0.2)
BASOPHILS NFR BLD AUTO: 0.3 % (ref 0–1.5)
BILIRUB SERPL-MCNC: 0.2 MG/DL (ref 0–1.2)
BUN SERPL-MCNC: 9 MG/DL (ref 6–20)
BUN/CREAT SERPL: 13.2 (ref 7–25)
CALCIUM SPEC-SCNC: 9.4 MG/DL (ref 8.6–10.5)
CHLORIDE SERPL-SCNC: 103 MMOL/L (ref 98–107)
CK SERPL-CCNC: 26 U/L (ref 20–180)
CO2 SERPL-SCNC: 25.9 MMOL/L (ref 22–29)
CREAT SERPL-MCNC: 0.68 MG/DL (ref 0.57–1)
DEPRECATED RDW RBC AUTO: 45.7 FL (ref 37–54)
EOSINOPHIL # BLD AUTO: 0 10*3/MM3 (ref 0–0.4)
EOSINOPHIL NFR BLD AUTO: 0 % (ref 0.3–6.2)
ERYTHROCYTE [DISTWIDTH] IN BLOOD BY AUTOMATED COUNT: 13.1 % (ref 12.3–15.4)
GFR SERPL CREATININE-BSD FRML MDRD: 97 ML/MIN/1.73
GLOBULIN UR ELPH-MCNC: 2.5 GM/DL
GLUCOSE SERPL-MCNC: 120 MG/DL (ref 65–99)
HCT VFR BLD AUTO: 39 % (ref 34–46.6)
HGB BLD-MCNC: 13.1 G/DL (ref 12–15.9)
IMM GRANULOCYTES # BLD AUTO: 0.04 10*3/MM3 (ref 0–0.05)
IMM GRANULOCYTES NFR BLD AUTO: 0.4 % (ref 0–0.5)
LYMPHOCYTES # BLD AUTO: 1.5 10*3/MM3 (ref 0.7–3.1)
LYMPHOCYTES NFR BLD AUTO: 14.8 % (ref 19.6–45.3)
MCH RBC QN AUTO: 32 PG (ref 26.6–33)
MCHC RBC AUTO-ENTMCNC: 33.6 G/DL (ref 31.5–35.7)
MCV RBC AUTO: 95.4 FL (ref 79–97)
MONOCYTES # BLD AUTO: 0.38 10*3/MM3 (ref 0.1–0.9)
MONOCYTES NFR BLD AUTO: 3.8 % (ref 5–12)
NEUTROPHILS NFR BLD AUTO: 8.16 10*3/MM3 (ref 1.7–7)
NEUTROPHILS NFR BLD AUTO: 80.7 % (ref 42.7–76)
NRBC BLD AUTO-RTO: 0 /100 WBC (ref 0–0.2)
PLATELET # BLD AUTO: 331 10*3/MM3 (ref 140–450)
PMV BLD AUTO: 9.6 FL (ref 6–12)
POTASSIUM SERPL-SCNC: 4.3 MMOL/L (ref 3.5–5.2)
PROT SERPL-MCNC: 6.6 G/DL (ref 6–8.5)
RBC # BLD AUTO: 4.09 10*6/MM3 (ref 3.77–5.28)
SODIUM SERPL-SCNC: 137 MMOL/L (ref 136–145)
WBC # BLD AUTO: 10.11 10*3/MM3 (ref 3.4–10.8)

## 2020-11-30 PROCEDURE — 25010000002 HYDROMORPHONE PER 4 MG: Performed by: NURSE PRACTITIONER

## 2020-11-30 PROCEDURE — 25010000002 ONDANSETRON PER 1 MG: Performed by: NURSE PRACTITIONER

## 2020-11-30 PROCEDURE — 85025 COMPLETE CBC W/AUTO DIFF WBC: CPT | Performed by: HOSPITALIST

## 2020-11-30 PROCEDURE — G0378 HOSPITAL OBSERVATION PER HR: HCPCS

## 2020-11-30 PROCEDURE — 63710000001 METHYLPREDNISOLONE 4 MG TABLET THERAPY PACK 21 EACH DISP PACK: Performed by: HOSPITALIST

## 2020-11-30 PROCEDURE — 99214 OFFICE O/P EST MOD 30 MIN: CPT | Performed by: INTERNAL MEDICINE

## 2020-11-30 PROCEDURE — 82306 VITAMIN D 25 HYDROXY: CPT | Performed by: HOSPITALIST

## 2020-11-30 PROCEDURE — 80053 COMPREHEN METABOLIC PANEL: CPT | Performed by: HOSPITALIST

## 2020-11-30 PROCEDURE — 99213 OFFICE O/P EST LOW 20 MIN: CPT | Performed by: PHYSICIAN ASSISTANT

## 2020-11-30 PROCEDURE — 82550 ASSAY OF CK (CPK): CPT | Performed by: HOSPITALIST

## 2020-11-30 RX ORDER — FOLIC ACID 1 MG/1
1 TABLET ORAL DAILY
Status: DISCONTINUED | OUTPATIENT
Start: 2020-11-30 | End: 2020-12-02 | Stop reason: HOSPADM

## 2020-11-30 RX ORDER — OXYCODONE AND ACETAMINOPHEN 7.5; 325 MG/1; MG/1
1 TABLET ORAL EVERY 4 HOURS PRN
Status: DISCONTINUED | OUTPATIENT
Start: 2020-11-30 | End: 2020-12-02 | Stop reason: HOSPADM

## 2020-11-30 RX ORDER — SUMATRIPTAN 100 MG/1
100 TABLET, FILM COATED ORAL ONCE
Status: COMPLETED | OUTPATIENT
Start: 2020-11-30 | End: 2020-11-30

## 2020-11-30 RX ORDER — ERGOCALCIFEROL 1.25 MG/1
50000 CAPSULE ORAL
Status: DISCONTINUED | OUTPATIENT
Start: 2020-11-30 | End: 2020-12-02 | Stop reason: HOSPADM

## 2020-11-30 RX ADMIN — ONDANSETRON 4 MG: 2 INJECTION INTRAMUSCULAR; INTRAVENOUS at 19:04

## 2020-11-30 RX ADMIN — HYDROMORPHONE HYDROCHLORIDE 0.5 MG: 1 INJECTION, SOLUTION INTRAMUSCULAR; INTRAVENOUS; SUBCUTANEOUS at 18:54

## 2020-11-30 RX ADMIN — HYDROMORPHONE HYDROCHLORIDE 0.5 MG: 1 INJECTION, SOLUTION INTRAMUSCULAR; INTRAVENOUS; SUBCUTANEOUS at 14:55

## 2020-11-30 RX ADMIN — TRAZODONE HYDROCHLORIDE 50 MG: 50 TABLET ORAL at 20:53

## 2020-11-30 RX ADMIN — HYDROCODONE BITARTRATE AND ACETAMINOPHEN 1 TABLET: 10; 325 TABLET ORAL at 07:52

## 2020-11-30 RX ADMIN — METHYLPREDNISOLONE 8 MG: 4 TABLET ORAL at 20:53

## 2020-11-30 RX ADMIN — HYDROCODONE BITARTRATE AND ACETAMINOPHEN 1 TABLET: 10; 325 TABLET ORAL at 12:14

## 2020-11-30 RX ADMIN — METHYLPREDNISOLONE 4 MG: 4 TABLET ORAL at 18:59

## 2020-11-30 RX ADMIN — METHYLPREDNISOLONE 4 MG: 4 TABLET ORAL at 12:14

## 2020-11-30 RX ADMIN — CYCLOBENZAPRINE 10 MG: 10 TABLET, FILM COATED ORAL at 07:52

## 2020-11-30 RX ADMIN — APIXABAN 5 MG: 5 TABLET, FILM COATED ORAL at 20:53

## 2020-11-30 RX ADMIN — APIXABAN 5 MG: 5 TABLET, FILM COATED ORAL at 07:55

## 2020-11-30 RX ADMIN — SUMATRIPTAN SUCCINATE 100 MG: 100 TABLET ORAL at 21:22

## 2020-11-30 RX ADMIN — OXYCODONE HYDROCHLORIDE AND ACETAMINOPHEN 1 TABLET: 7.5; 325 TABLET ORAL at 20:52

## 2020-11-30 RX ADMIN — HYDROMORPHONE HYDROCHLORIDE 0.5 MG: 1 INJECTION, SOLUTION INTRAMUSCULAR; INTRAVENOUS; SUBCUTANEOUS at 10:11

## 2020-11-30 RX ADMIN — HYDROMORPHONE HYDROCHLORIDE 0.5 MG: 1 INJECTION, SOLUTION INTRAMUSCULAR; INTRAVENOUS; SUBCUTANEOUS at 23:12

## 2020-11-30 RX ADMIN — AMITRIPTYLINE HYDROCHLORIDE 25 MG: 25 TABLET, FILM COATED ORAL at 20:52

## 2020-11-30 RX ADMIN — GABAPENTIN 800 MG: 400 CAPSULE ORAL at 14:55

## 2020-11-30 RX ADMIN — ERGOCALCIFEROL 50000 UNITS: 1.25 CAPSULE ORAL at 14:55

## 2020-11-30 RX ADMIN — SODIUM CHLORIDE, PRESERVATIVE FREE 10 ML: 5 INJECTION INTRAVENOUS at 20:55

## 2020-11-30 RX ADMIN — GABAPENTIN 800 MG: 400 CAPSULE ORAL at 07:52

## 2020-11-30 RX ADMIN — SODIUM CHLORIDE, PRESERVATIVE FREE 10 ML: 5 INJECTION INTRAVENOUS at 07:55

## 2020-11-30 RX ADMIN — METHYLPREDNISOLONE 4 MG: 4 TABLET ORAL at 07:53

## 2020-11-30 RX ADMIN — FOLIC ACID 1 MG: 1 TABLET ORAL at 18:54

## 2020-11-30 RX ADMIN — OXYCODONE HYDROCHLORIDE AND ACETAMINOPHEN 1 TABLET: 7.5; 325 TABLET ORAL at 16:33

## 2020-11-30 RX ADMIN — ONDANSETRON 4 MG: 2 INJECTION INTRAMUSCULAR; INTRAVENOUS at 08:05

## 2020-11-30 RX ADMIN — GABAPENTIN 800 MG: 400 CAPSULE ORAL at 22:54

## 2020-11-30 RX ADMIN — Medication 1 PATCH: at 07:55

## 2020-11-30 RX ADMIN — CYCLOBENZAPRINE 10 MG: 10 TABLET, FILM COATED ORAL at 22:54

## 2020-11-30 RX ADMIN — CETIRIZINE HYDROCHLORIDE 10 MG: 10 TABLET ORAL at 07:55

## 2020-11-30 RX ADMIN — CYCLOBENZAPRINE 10 MG: 10 TABLET, FILM COATED ORAL at 14:55

## 2020-11-30 NOTE — TELEPHONE ENCOUNTER
Caller: CARSON MULLEN    Relationship to patient: SELF    Best call back number: [phone]    PT STATES SHE CANNOT MAKE IT TO HER APPT TODAY 11/30 BECAUSE SHE IS CURRENTLY IN THE HOSPITAL, PT NEEDS TO CANCEL THE APPT. PT WANTED TO LET DR ARAUJO KNOW SHE IS AT [facility] IN CASE HE WANTED TO HAVE LABS DONE WHILE SHE IS THERE. PT STATES IF SHE DOES NOT HEAR FROM DR ARAUJO WHILE SHE IS IN THE HOSPITAL SHE WILL CALL TO RESCHED THE APPT ONCE SHE IS RELEASED

## 2020-12-01 ENCOUNTER — APPOINTMENT (OUTPATIENT)
Dept: MRI IMAGING | Facility: HOSPITAL | Age: 40
End: 2020-12-01

## 2020-12-01 ENCOUNTER — TELEPHONE (OUTPATIENT)
Dept: ONCOLOGY | Facility: CLINIC | Age: 40
End: 2020-12-01

## 2020-12-01 PROBLEM — E53.8 FOLATE DEFICIENCY: Status: ACTIVE | Noted: 2020-12-01

## 2020-12-01 LAB
ANION GAP SERPL CALCULATED.3IONS-SCNC: 16.8 MMOL/L (ref 5–15)
BUN SERPL-MCNC: 13 MG/DL (ref 6–20)
BUN/CREAT SERPL: 15.3 (ref 7–25)
CALCIUM SPEC-SCNC: 9.8 MG/DL (ref 8.6–10.5)
CHLORIDE SERPL-SCNC: 101 MMOL/L (ref 98–107)
CO2 SERPL-SCNC: 19.2 MMOL/L (ref 22–29)
CREAT SERPL-MCNC: 0.85 MG/DL (ref 0.57–1)
DEPRECATED RDW RBC AUTO: 46 FL (ref 37–54)
ERYTHROCYTE [DISTWIDTH] IN BLOOD BY AUTOMATED COUNT: 13 % (ref 12.3–15.4)
GFR SERPL CREATININE-BSD FRML MDRD: 75 ML/MIN/1.73
GLUCOSE SERPL-MCNC: 107 MG/DL (ref 65–99)
HCT VFR BLD AUTO: 42.9 % (ref 34–46.6)
HGB BLD-MCNC: 14.2 G/DL (ref 12–15.9)
MCH RBC QN AUTO: 31.7 PG (ref 26.6–33)
MCHC RBC AUTO-ENTMCNC: 33.1 G/DL (ref 31.5–35.7)
MCV RBC AUTO: 95.8 FL (ref 79–97)
PLATELET # BLD AUTO: 347 10*3/MM3 (ref 140–450)
PMV BLD AUTO: 9.8 FL (ref 6–12)
POTASSIUM SERPL-SCNC: 4.6 MMOL/L (ref 3.5–5.2)
RBC # BLD AUTO: 4.48 10*6/MM3 (ref 3.77–5.28)
SODIUM SERPL-SCNC: 137 MMOL/L (ref 136–145)
WBC # BLD AUTO: 12.15 10*3/MM3 (ref 3.4–10.8)

## 2020-12-01 PROCEDURE — G0378 HOSPITAL OBSERVATION PER HR: HCPCS

## 2020-12-01 PROCEDURE — 25010000002 HYDROMORPHONE PER 4 MG: Performed by: NURSE PRACTITIONER

## 2020-12-01 PROCEDURE — 63710000001 METHYLPREDNISOLONE 4 MG TABLET THERAPY PACK 21 EACH DISP PACK: Performed by: HOSPITALIST

## 2020-12-01 PROCEDURE — 97110 THERAPEUTIC EXERCISES: CPT

## 2020-12-01 PROCEDURE — 73721 MRI JNT OF LWR EXTRE W/O DYE: CPT

## 2020-12-01 PROCEDURE — 25010000002 ONDANSETRON PER 1 MG: Performed by: NURSE PRACTITIONER

## 2020-12-01 PROCEDURE — 80048 BASIC METABOLIC PNL TOTAL CA: CPT | Performed by: HOSPITALIST

## 2020-12-01 PROCEDURE — 85027 COMPLETE CBC AUTOMATED: CPT | Performed by: HOSPITALIST

## 2020-12-01 RX ADMIN — AMITRIPTYLINE HYDROCHLORIDE 25 MG: 25 TABLET, FILM COATED ORAL at 20:48

## 2020-12-01 RX ADMIN — METHYLPREDNISOLONE 4 MG: 4 TABLET ORAL at 20:49

## 2020-12-01 RX ADMIN — FOLIC ACID 1 MG: 1 TABLET ORAL at 10:03

## 2020-12-01 RX ADMIN — Medication 10 MG: at 22:41

## 2020-12-01 RX ADMIN — Medication 10 MG: at 00:17

## 2020-12-01 RX ADMIN — Medication 1 PATCH: at 12:10

## 2020-12-01 RX ADMIN — METHYLPREDNISOLONE 4 MG: 4 TABLET ORAL at 16:47

## 2020-12-01 RX ADMIN — HYDROMORPHONE HYDROCHLORIDE 0.5 MG: 1 INJECTION, SOLUTION INTRAMUSCULAR; INTRAVENOUS; SUBCUTANEOUS at 22:42

## 2020-12-01 RX ADMIN — HYDROMORPHONE HYDROCHLORIDE 0.5 MG: 1 INJECTION, SOLUTION INTRAMUSCULAR; INTRAVENOUS; SUBCUTANEOUS at 18:42

## 2020-12-01 RX ADMIN — METHYLPREDNISOLONE 4 MG: 4 TABLET ORAL at 06:38

## 2020-12-01 RX ADMIN — GABAPENTIN 800 MG: 400 CAPSULE ORAL at 06:38

## 2020-12-01 RX ADMIN — OXYCODONE HYDROCHLORIDE AND ACETAMINOPHEN 1 TABLET: 7.5; 325 TABLET ORAL at 16:46

## 2020-12-01 RX ADMIN — CYCLOBENZAPRINE 10 MG: 10 TABLET, FILM COATED ORAL at 06:38

## 2020-12-01 RX ADMIN — OXYCODONE HYDROCHLORIDE AND ACETAMINOPHEN 1 TABLET: 7.5; 325 TABLET ORAL at 20:48

## 2020-12-01 RX ADMIN — HYDROMORPHONE HYDROCHLORIDE 0.5 MG: 1 INJECTION, SOLUTION INTRAMUSCULAR; INTRAVENOUS; SUBCUTANEOUS at 14:20

## 2020-12-01 RX ADMIN — CYCLOBENZAPRINE 10 MG: 10 TABLET, FILM COATED ORAL at 20:48

## 2020-12-01 RX ADMIN — SODIUM CHLORIDE, PRESERVATIVE FREE 10 ML: 5 INJECTION INTRAVENOUS at 20:49

## 2020-12-01 RX ADMIN — GABAPENTIN 800 MG: 400 CAPSULE ORAL at 20:48

## 2020-12-01 RX ADMIN — CYCLOBENZAPRINE 10 MG: 10 TABLET, FILM COATED ORAL at 14:20

## 2020-12-01 RX ADMIN — HYDROMORPHONE HYDROCHLORIDE 0.5 MG: 1 INJECTION, SOLUTION INTRAMUSCULAR; INTRAVENOUS; SUBCUTANEOUS at 04:40

## 2020-12-01 RX ADMIN — OXYCODONE HYDROCHLORIDE AND ACETAMINOPHEN 1 TABLET: 7.5; 325 TABLET ORAL at 07:45

## 2020-12-01 RX ADMIN — GABAPENTIN 800 MG: 400 CAPSULE ORAL at 14:20

## 2020-12-01 RX ADMIN — HYDROMORPHONE HYDROCHLORIDE 0.5 MG: 1 INJECTION, SOLUTION INTRAMUSCULAR; INTRAVENOUS; SUBCUTANEOUS at 10:02

## 2020-12-01 RX ADMIN — OXYCODONE HYDROCHLORIDE AND ACETAMINOPHEN 1 TABLET: 7.5; 325 TABLET ORAL at 01:12

## 2020-12-01 RX ADMIN — SODIUM CHLORIDE, PRESERVATIVE FREE 10 ML: 5 INJECTION INTRAVENOUS at 10:02

## 2020-12-01 RX ADMIN — OXYCODONE HYDROCHLORIDE AND ACETAMINOPHEN 1 TABLET: 7.5; 325 TABLET ORAL at 12:11

## 2020-12-01 RX ADMIN — METHYLPREDNISOLONE 4 MG: 4 TABLET ORAL at 14:20

## 2020-12-01 RX ADMIN — CETIRIZINE HYDROCHLORIDE 10 MG: 10 TABLET ORAL at 10:03

## 2020-12-01 RX ADMIN — APIXABAN 5 MG: 5 TABLET, FILM COATED ORAL at 10:02

## 2020-12-01 RX ADMIN — ONDANSETRON 4 MG: 2 INJECTION INTRAMUSCULAR; INTRAVENOUS at 14:28

## 2020-12-01 RX ADMIN — TRAZODONE HYDROCHLORIDE 50 MG: 50 TABLET ORAL at 20:48

## 2020-12-01 RX ADMIN — APIXABAN 5 MG: 5 TABLET, FILM COATED ORAL at 20:48

## 2020-12-01 NOTE — THERAPY TREATMENT NOTE
Patient Name: Belen Allen  : 1980    MRN: [id number]                              Today's Date: 2020       Admit Date: 2020    Visit Dx:     ICD-10-CM ICD-9-CM   1. Generalized abdominal pain  R10.84 789.07   2. Acute back pain, unspecified back location, unspecified back pain laterality  M54.9 724.5   3. Weakness of both lower extremities  R29.898 729.89     Patient Active Problem List   Diagnosis   • Splenic infarct   • Anxiety disorder   • Functional asplenia   • Generalized abdominal pain   • Bilateral leg weakness   • Acute bilateral low back pain   • Antiphospholipid antibody positive   • On anticoagulant therapy   • Acute pain of left knee   • Vitamin D deficiency     Past Medical History:   Diagnosis Date   • Abnormal Pap smear of cervix    • Ankle fracture    • H/O Elevated liver enzymes    • History of chronic back pain    • History of migraine    • History of urinary tract infection    • Injury of back    • PONV (postoperative nausea and vomiting)    • Seasonal allergies      Past Surgical History:   Procedure Laterality Date   • BACK SURGERY  2006    fusion L4, L5     • CHOLECYSTECTOMY     • DILATATION AND CURETTAGE     • SKIN SURGERY     • TUBAL ABDOMINAL LIGATION     • WISDOM TOOTH EXTRACTION  2018    x2     General Information     Row Name 20          Physical Therapy Time and Intention    Document Type  therapy note (daily note)  -     Mode of Treatment  physical therapy  -     Row Name 20          Safety Issues, Functional Mobility    Impairments Affecting Function (Mobility)  pain;endurance/activity tolerance  -PH       User Key  (r) = Recorded By, (t) = Taken By, (c) = Cosigned By    Initials Name Provider Type    PH Barbra Macias PTA Physical Therapy Assistant        Mobility     Row Name 20          Bed Mobility    Bed Mobility  bed mobility (all) activities  -     All Activities, [place] (Bed  Mobility)  standby assist  -PH     Assistive Device (Bed Mobility)  bed rails;head of bed elevated  -PH     Row Name 12/01/20 0924          Sit-Stand Transfer    Sit-Stand [place] (Transfers)  contact guard;verbal cues;nonverbal cues (demo/gesture)  -PH     Assistive Device (Sit-Stand Transfers)  walker, front-wheeled  -PH     Row Name 12/01/20 0924          Gait/Stairs (Locomotion)    [place] Level (Gait)  contact guard;verbal cues;nonverbal cues (demo/gesture)  -PH     Assistive Device (Gait)  walker, front-wheeled  -PH     Distance in Feet (Gait)  40'  -PH     Deviations/Abnormal Patterns (Gait)  nubia decreased;gait speed decreased;stride length decreased;antalgic  -PH     Bilateral Gait Deviations  forward flexed posture;heel strike decreased  -PH     Comment (Gait/Stairs)  Pt educ on seq w/ fww. Pt w/ very reduced, antalgic step to pattern; Pt limited by pain and fatigue.  -PH       User Key  (r) = Recorded By, (t) = Taken By, (c) = Cosigned By    Initials Name Provider Type     Barbra Macias PTA Physical Therapy Assistant        Obj/Interventions     Row Name 12/01/20 0925          Motor Skills    Therapeutic Exercise  other (see comments) BLE: AP, abd w/ resist/add w/ pillow, GS - all x 5 sec; LLE: HS, SLR; - x 10 reps all - very reduced speed w/ assist for L SLR 2/2 pain.  -PH     Row Name 12/01/20 0925          Balance    Balance Assessment  sitting static balance;standing static balance  -PH     Static Sitting Balance  WNL  -PH     Static Standing Balance  WFL  -PH       User Key  (r) = Recorded By, (t) = Taken By, (c) = Cosigned By    Initials Name Provider Type     Barbra Macias PTA Physical Therapy Assistant        Goals/Plan    No documentation.       Clinical Impression     Row Name 12/01/20 0927          Pain    Additional Documentation  Pain Scale: FACES Pre/Post-Treatment (Group)  -PH     Row Name 12/01/20 0927          Pain Scale: FACES Pre/Post-Treatment     Pain: FACES Scale, Pretreatment  4-->hurts little more  -PH     Posttreatment Pain Rating  8-->hurts whole lot  -PH     Pain Location - Side  Left  -PH     Pain Location - Orientation  lower  -PH     Pain Location  extremity  -PH     Row Name 12/01/20 0927          Plan of Care Review    Plan of Care Reviewed With  patient  -PH     Progress  improving  -PH     Outcome Summary  Pt making improvements w/ amb of 40' exhibiting reduce speed, antalgic, step through pattern. Pt req CGA / fww w/ slight unsteadiness noted occasionally. Pt performed LE HEP w/ assist for L SLR 2/2 pain. PT will prog as pt erin.  -PH     Row Name 12/01/20 0927          Positioning and Restraints    Pre-Treatment Position  in bed  -PH     Post Treatment Position  bed  -PH     In Bed  fowlers;call light within reach;encouraged to call for assist  -PH       User Key  (r) = Recorded By, (t) = Taken By, (c) = Cosigned By    Initials Name Provider Type    PH Barbra Macias PTA Physical Therapy Assistant        Outcome Measures     Row Name 12/01/20 0929          How much help from another person do you currently need...    Turning from your back to your side while in flat bed without using bedrails?  4  -PH     Moving from lying on back to sitting on the side of a flat bed without bedrails?  4  -PH     Moving to and from a bed to a chair (including a wheelchair)?  3  -PH     Standing up from a chair using your arms (e.g., wheelchair, bedside chair)?  3  -PH     Climbing 3-5 steps with a railing?  2  -PH     To walk in hospital room?  3  -PH     AM-PAC 6 Clicks Score (PT)  19  -PH     Row Name 12/01/20 0929          Functional Assessment    Outcome Measure Options  AM-PAC 6 Clicks Basic Mobility (PT)  -PH       User Key  (r) = Recorded By, (t) = Taken By, (c) = Cosigned By    Initials Name Provider Type    Barbra Wang PTA Physical Therapy Assistant        Physical Therapy Education                 Title: PT OT SLP Therapies  (Done)     Topic: Physical Therapy (Done)     Point: Mobility training (Done)     Learning Progress Summary           Patient Acceptance, E,D, VU,DU by  at 12/1/2020 0930    Acceptance, E, VU by AL at 11/29/2020 1323                   Point: Home exercise program (Done)     Learning Progress Summary           Patient Acceptance, E,D, VU,DU by PH at 12/1/2020 0930    Acceptance, E, VU by AL at 11/29/2020 1323                   Point: Body mechanics (Done)     Learning Progress Summary           Patient Acceptance, E,D, VU,DU by PH at 12/1/2020 0930    Acceptance, E, VU by AL at 11/29/2020 1323                   Point: Precautions (Done)     Learning Progress Summary           Patient Acceptance, E,D, VU,DU by  at 12/1/2020 0930    Acceptance, E, VU by AL at 11/29/2020 1323                               User Key     Initials Effective Dates Name Provider Type Discipline    AL 04/03/18 -  Ana Borja, PT Physical Therapist PT     08/20/19 -  Barbra Macias PTA Physical Therapy Assistant PT              PT Recommendation and Plan     Plan of Care Reviewed With: patient  Progress: improving  Outcome Summary: Pt making improvements w/ amb of 40' exhibiting reduce speed, antalgic, step through pattern. Pt req CGA / fww w/ slight unsteadiness noted occasionally. Pt performed LE HEP w/ assist for L SLR 2/2 pain. PT will prog as pt erin.     Time Calculation:   PT Charges     Row Name 12/01/20 0930             Time Calculation    Start Time  0859  -PH      Stop Time  0922  -      Time Calculation (min)  23 min  -      PT Received On  12/01/20  -      PT - Next Appointment  12/03/20  -        User Key  (r) = Recorded By, (t) = Taken By, (c) = Cosigned By    Initials Name Provider Type     Barbra Macias PTA Physical Therapy Assistant        Therapy Charges for Today     Code Description Service Date Service Provider Modifiers Qty    73985048458 HC PT THER PROC EA 15 MIN 12/1/2020 Gilberto  Barbra, PTA GP 2          PT G-Codes  Outcome Measure Options: AM-PAC 6 Clicks Basic Mobility (PT)  AM-PAC 6 Clicks Score (PT): 19    Barbra Macias, PTA  12/1/2020

## 2020-12-01 NOTE — PROGRESS NOTES
Discharge Planning Assessment  [facility]     Patient Name: Belen Allen  MRN: [id number]  Today's Date: 12/1/2020    Admit Date: 11/28/2020    Discharge Needs Assessment     Row Name 12/01/20 1647       Living Environment    Lives With  significant other;child(manuel), dependent    Name(s) of Who Lives With Patient  Significant other, Ced Aquino [phone]    Current Living Arrangements  home/apartment/condo    Primary Care Provided by  self    Provides Primary Care For  child(manuel)    Family Caregiver if Needed  significant other    Family Caregiver Names  Significant other, Ced Aquino [phone]    Able to Return to Prior Arrangements  yes       Resource/Environmental Concerns    Resource/Environmental Concerns  none    Transportation Concerns  car, none       Transition Planning    Patient/Family Anticipates Transition to  home with family    Patient/Family Anticipated Services at Transition  home health care    Transportation Anticipated  family or friend will provide       Discharge Needs Assessment    Readmission Within the Last 30 Days  no previous admission in last 30 days    Equipment Currently Used at Home  none    Concerns to be Addressed  discharge planning    Anticipated Changes Related to Illness  none    Equipment Needed After Discharge  walker, rolling    Provided Post Acute Provider List?  Yes    Post Acute Provider List  Home Health    Provided Post Acute Provider Quality & Resource List?  Yes    Post Acute Provider Quality and Resource List  Home Health Medicare.gov for her zip code    Delivered To  Patient    Method of Delivery  In person    Patient's Choice of Community Agency(s)  she prefers any agency that will work with her insurance        Discharge Plan     Row Name 12/01/20 8420       Plan    Plan  Home with referrals for HH    Plan Comments  Facesheet information was verified.  Patient lives with her significant other Ced [phone] and two dependent children in an apartment in  [place], [place].  She is typically IADLs.  She does have a walker at home and she did not use before and did not need.  She verified her PCP and her pharmacy in [employer] [place].  She states that Ced will transport her at [place].  She declines to provide another contact for emergency contact at this time.  She would prefer home health for PT and was provides Medicare.gov list for agencies that service her area.  She was agreeable to mulit referrals and prefers any agency that accepts her insurance. Ced will transport at [place]...........................Marry Arizmendi RN        Continued Care and Services - Admitted Since 11/28/2020     Home Medical Care     Service Provider Request Status Selected Services Address Phone Fax Patient Preferred    [facility]  [facility] - Request Sent N/A [address] [address]  [facility] [address] [address] [phone] --    [facility] [place]  Pending - Request Sent N/A [address] [facility] [address] [id number] [phone] --    [facility]  Pending - Request Sent N/A [address] [id number] [phone] --    [facility]  Pending - Request Sent N/A [address] [facility] [address] [address] [phone] --    [facility]  Pending - Request Sent N/A [address] [facility] [address] [phone] --    [facility]  Pending - Request Sent N/A [address] [facility] [address] [id number] [phone] --                Demographic Summary     Row Name 12/01/20 5572       General Information    Admission Type  inpatient    Arrived From  home    Referral Source  admission list    Preferred Language  English       Contact Information    Permission Granted to Share Info With  ;family/designee    Contact Information Comments  Significant other,  Ced Aquion [phone]        Functional Status     Row Name 12/01/20 1646       Functional Status    Usual Activity Tolerance  good    Current Activity Tolerance  moderate       Functional Status, IADL    Medications  independent    Meal Preparation  independent    Housekeeping  independent    Laundry  independent    Shopping  independent       Mental Status    General Appearance WDL  WDL       Mental Status Summary    Recent Changes in Mental Status/Cognitive Functioning  no changes       Employment/    Employment Status  unemployed        Psychosocial    No documentation.       Abuse/Neglect    No documentation.       Legal    No documentation.       Substance Abuse    No documentation.       Patient Forms    No documentation.           Marry Arizmendi RN

## 2020-12-01 NOTE — DISCHARGE PLACEMENT REQUEST
"Carson Mullen (39 y.o. Female)     Date of Birth Social Security Number Address Home Phone MRN    1980  [address]  [facility] [address] [phone] [id number]    [employer] Marital Status          None Single       Admission Date Admission Type Admitting Provider Attending Provider Department, Room/Bed    11/28/20 Emergency Nirmal Zendejas MD Benton, John B, MD [address], [address]    Discharge Date Discharge Disposition Discharge Destination                       Attending Provider: Dennys Carranza MD    Allergies: No Known Allergies    Isolation: None   Infection: None   Code Status: CPR    Ht: 162.6 cm (64\")   Wt: 104 kg (229 lb)    Admission Cmt: None   Principal Problem: Bilateral leg weakness [R29.898]                 Active Insurance as of 11/28/2020     Primary Coverage     Payor Plan Insurance Group Employer/Plan Group    [employer]AN [employer] [id number]     Payor Plan Address Payor Plan Phone Number Payor Plan Fax Number Effective Dates    [address] [phone]  1/1/2019 - None Entered    [facility] [address]       Subscriber Name Subscriber Birth Date Member ID       CARSON MULLEN 1980 [id number]                 Emergency Contacts      (Rel.) Home Phone Work Phone Mobile Phone    ISAIAH AGUAYO (Significant Other) [phone] -- --            "

## 2020-12-01 NOTE — PLAN OF CARE
Goal Outcome Evaluation:  Plan of Care Reviewed With: patient  Progress: improving  Outcome Summary: Pt making improvements w/ amb of 40' exhibiting reduce speed, antalgic, step through pattern. Pt req CGA / fww w/ slight unsteadiness noted occasionally. Pt performed LE HEP w/ assist for L SLR 2/2 pain. PT will prog as pt erin.    Patient was wearing a face mask during this therapy encounter. Therapist used appropriate personal protective equipment including eye protection, mask, and gloves.  Mask used was standard procedure mask. Appropriate PPE was worn during the entire therapy session. Hand hygiene was completed before and after therapy session. Patient is not in enhanced droplet precautions.

## 2020-12-01 NOTE — CONSULTS
"Inpatient Orthopedic Surgery Consult  Consult performed by: Dennys Segundo Jr., MD  Consult ordered by: Dennys Carranza MD          Patient Care Team:  Sahil Cornelius MD as PCP - General (Internal Medicine)  Code, Bjorn HERRERA II, MD as Consulting Physician (Hematology and Oncology)    Chief complaint: left knee pain    Subjective     History of Present Illness     The patient is a 39-year-old female with history of recent splenic infarct on Eliquis, chronic low back pain, anxiety disorder who was admitted to the emergency department yesterday for progressive back pain and bilateral leg weakness.  She initially reported pain on the left lower back and flank.  She denied any numbness, tingling, bowel or bladder dysfunction.  Neurosurgery was consulted and MRI of the lumbar spine obtained.  Neurosurgery concluded the lumbar MRI showed no pathology that would contribute to her complaints of left-sided back and flank pain.  She also began complaining of left knee pain.  An x-ray was negative for any acute fracture.  Given her persistent complaints of pain, orthopedics was consulted.    The patient reports she has had problems with her left knee for \"years\".  She denies any specific antecedent trauma or injury.  She reports over the past 7-10 days her knee has \"given out\" but denies any recent injury.  She gives variable chronology for her left knee pain.    When asked to localize her pain, she reports the entire knee from approximately 5 cm above the patella to 5 cm below the proximal tibia.    She reports the pain can be an 8 out of 10.  It is sharp and aching.  It is worse with any motion and better with rest.    She has had pain medication continuously since admission with no relief.    Review of Systems   Review of Systems:  Constitutional: Negative  HENT: Negative  Eyes: Negative  Respiratory: Negative; no shortness of breath  Cardiovascular: Negative; no current chest pain  Gastrointestinal: Negative  : " Negative  Skin: Negative except as listed in HPI  Neurological: Negative, no numbness or deficits  Hematological: Negative  Muscoloskeletal: Per HPI                     Past Medical History:   Diagnosis Date   • Abnormal Pap smear of cervix    • Ankle fracture    • H/O Elevated liver enzymes    • History of chronic back pain    • History of migraine    • History of urinary tract infection    • Injury of back    • PONV (postoperative nausea and vomiting)    • Seasonal allergies    ,   Past Surgical History:   Procedure Laterality Date   • BACK SURGERY      fusion L4, L5     • CHOLECYSTECTOMY     • DILATATION AND CURETTAGE     • SKIN SURGERY     • TUBAL ABDOMINAL LIGATION     • WISDOM TOOTH EXTRACTION  2018    x2   ,   Family History   Problem Relation Age of Onset   • Stroke Mother    • Allergic rhinitis Mother    • Allergic rhinitis Sister    • Breast cancer Maternal Aunt    • Cancer Maternal Grandmother    • Allergic rhinitis Paternal Grandfather    • Cancer Paternal Grandfather    • Prostate cancer Paternal Grandfather    ,   Social History     Tobacco Use   • Smoking status: Former Smoker     Packs/day: 0.50     Quit date: 2020     Years since quittin.0   • Smokeless tobacco: Never Used   Substance Use Topics   • Alcohol use: Not Currently   • Drug use: Not Currently     E-cigarette/Vaping     E-cigarette/Vaping Substances     E-cigarette/Vaping Devices       ,   Medications Prior to Admission   Medication Sig Dispense Refill Last Dose   • amitriptyline (ELAVIL) 25 MG tablet Take 25 mg by mouth Every Night.      • apixaban (ELIQUIS) 5 MG tablet tablet Take 1 tablet by mouth Every 12 (Twelve) Hours for 30 days. 60 tablet 0    • cyclobenzaprine (FLEXERIL) 10 MG tablet Take 1 tablet by mouth 3 (Three) Times a Day.      • gabapentin (NEURONTIN) 800 MG tablet Take 800 mg by mouth 3 (Three) Times a Day.      • hydrOXYzine (ATARAX) 25 MG tablet Take 4 tablets by mouth every night at  bedtime.      • loratadine (Claritin) 10 MG tablet Take 10 mg by mouth Daily.      • Melatonin 10 MG capsule Take 30 mg by mouth Every Night.      • nicotine (NICODERM CQ) 14 MG/24HR patch Place 1 patch on the skin as directed by provider Daily for 30 days. 30 patch 0    • SUMAtriptan (IMITREX) 100 MG tablet Take 100 mg by mouth Every 2 (Two) Hours As Needed for Migraine. Take one tablet at onset of headache. May repeat dose one time in 2 hours if headache not relieved.      • traZODone (DESYREL) 50 MG tablet Take 50 mg by mouth Every Night.      • diphenhydrAMINE-acetaminophen (TYLENOL PM)  MG tablet per tablet Take 2 tablets by mouth Every Night.      , Scheduled Meds:  amitriptyline, 25 mg, Oral, Nightly  apixaban, 5 mg, Oral, Q12H  cetirizine, 10 mg, Oral, Daily  cyclobenzaprine, 10 mg, Oral, Q8H  folic acid, 1 mg, Oral, Daily  gabapentin, 800 mg, Oral, Q8H  [START ON 12/1/2020] methylPREDNISolone, 4 mg, Oral, 4x Daily Taper  [START ON 12/2/2020] methylPREDNISolone, 4 mg, Oral, TID Around Food  [START ON 12/3/2020] methylPREDNISolone, 4 mg, Oral, Before Breakfast  [START ON 12/3/2020] methylPREDNISolone, 4 mg, Oral, Tonight  [START ON 12/4/2020] methylPREDNISolone, 4 mg, Oral, Before Breakfast  nicotine, 1 patch, Transdermal, Q24H  sodium chloride, 10 mL, Intravenous, Q12H  traZODone, 50 mg, Oral, Nightly  vitamin D, 50,000 Units, Oral, Q7 Days    , Continuous Infusions:   , PRN Meds:  •  acetaminophen **OR** acetaminophen **OR** acetaminophen  •  HYDROmorphone  •  melatonin  •  nitroglycerin  •  ondansetron  •  oxyCODONE-acetaminophen  •  [COMPLETED] Insert peripheral IV **AND** sodium chloride  •  sodium chloride and Allergies:  Patient has no known allergies.    Objective      Vital Signs  Temp:  [97.5 °F (36.4 °C)-98.7 °F (37.1 °C)] 97.5 °F (36.4 °C)  Heart Rate:  [] 105  Resp:  [20] 20  BP: (130-146)/() 138/95    Physical Exam   Physical Exam:  Vital signs reviewed.  Constitutional:   Appears well-developed, well nourished.  HEENT:  Head: normocephalic, atraumatic  Eyes: EOMI, grossly normal.  No scleral icterus.  Neck: Normal range of motion.  Supple, no tracheal deviation.  Cardiovascular: Regular rate.  Pulmonary: Effort normal, symmetric chest expansion, no labored breathing.  Abdominal: Soft, non distended  Neurological: No gross deficits, oriented to person, place, and time.  Skin: Warm and dry  Psychiatric: Normal mood and affect  Musculoskeletal:    Upon my entrance into the room, the patient is resting in no acute distress.  She does not appear to be in any pain.  However she reports severe 8 out of 10 pain in the back and leg.  She localizes diffusely to the low back, left flank, and left leg/knee.    Examination of her left knee shows no erythema.  No swelling.  No significant effusion.  She has a hyperesthetic pain response to gentle palpation of the entire distal femur and proximal tibia.  Examination largely nonfocal.  She will not permit a stress exam.  Her pain response does lessen with distraction.    Active range of motion of the knee is limited, 0° extension to 45° of flexion.  With distraction, she permits 0-60° passive range of motion of the knee with minimal discomfort.    Active dorsiflexion and plantar flexion.  She reports ankle motion hurts her back and leg.  5 out of 5 tibialis anterior, gastrocsoleus.  Sensation intact light touch throughout.  Palpable dorsalis pedis pulse.      Radiology review:  PACS Images     Radiology Images   Study Result    2 VIEWS LEFT KNEE     HISTORY: Pain     COMPARISON: None available.     FINDINGS:  No acute fracture or subluxation the left knee is seen. There is mild  degenerative change, with some joint space narrowing noted within the  medial compartment. There is no suprapatellar effusion. No aggressive  osseous abnormalities are seen.     IMPRESSION:  No acute findings.           Assessment/Plan       Bilateral leg weakness    Splenic  infarct    Generalized abdominal pain    Acute bilateral low back pain    Antiphospholipid antibody positive    On anticoagulant therapy    Acute pain of left knee    Vitamin D deficiency      Assessment & Plan  This is a 39-year-old female with history of chronic low back pain, recent splenic infarct presenting with diffuse complaints of pain throughout the low back, flank, and left knee of uncertain etiology.    Neurosurgery feels there is no significant spinal/low back pathology contributing to her persistent complaints of low back/flank pain.    Examination of her left knee is nonfocal and somewhat inconsistent.  There is no evidence of fracture on radiographs.  She denies any significant recent trauma.  There is no evidence of significant effusion or erythema on exam, and she is afebrile with a normal white count.    - Given her persistent complaints of pain and subjective instability, we will order MRI of the left knee.  - Pain control  - PT: Weightbearing as tolerated  - DVT: Chay/SCDs, on Eliquis  - Disposition: Pending    Thank you for this consultation, please call with questions        Dennys Segundo Jr, MD  11/30/20  22:05 EST

## 2020-12-01 NOTE — TELEPHONE ENCOUNTER
----- Message from Bjorn Alberto MD sent at 11/30/2020  4:01 PM EST -----  Cancel appointment for 12/1/2020  Arrange the patient to have a lab visit in 3 months-CBC, CMP, lupus anticoagulant, beta-2 glycoprotein 1 profile, anticardiolipin profile.  See Dr. Kendrick 2 weeks later 2 units; pt requests frandy for appts

## 2020-12-01 NOTE — PLAN OF CARE
Goal Outcome Evaluation:  Plan of Care Reviewed With: patient  Progress: improving  Outcome Summary: VSS, complaints of pain.  PRN pain medication given.  Pt working with physical therapy and making improvements.  Pt had left knee MRI done today that was normal.  Plan is for possible discharge tomorrow.  Will continue to monitor.

## 2020-12-01 NOTE — PROGRESS NOTES
"   LOS: 0 days   Patient Care Team:  Sahil Cornelius MD as PCP - General (Internal Medicine)  Code, Bjorn HERRERA II, MD as Consulting Physician (Hematology and Oncology)    Chief Complaint: left flank pain    Subjective     Seems to be somewhat improved today. Still reports pain in left flank and left knee. Still c/o spasms in LLE and weakness in BLEs.      Subjective:  Symptoms:  Improved.  She reports weakness.  No shortness of breath, malaise, cough, chest pain, headache, chest pressure, anorexia, diarrhea or anxiety.    Diet:  Adequate intake.  No nausea or vomiting.    Activity level: Impaired due to pain.    Pain:  She complains of pain that is moderate.  She reports pain is improving.  Pain is requiring pain medication and well controlled.        History taken from: patient chart RN    Objective     Vital Signs  Temp:  [97.5 °F (36.4 °C)-98.3 °F (36.8 °C)] 98.2 °F (36.8 °C)  Heart Rate:  [] 86  Resp:  [16-20] 16  BP: (113-158)/(81-97) 137/87    Objective:  General Appearance:  Comfortable, in no acute distress, not in pain and well-appearing.    Vital signs: (most recent): Blood pressure 137/87, pulse 86, temperature 98.2 °F (36.8 °C), temperature source Oral, resp. rate 16, height 162.6 cm (64\"), weight 104 kg (229 lb), SpO2 97 %.  Vital signs are normal.  No fever.    Output: Producing urine.    HEENT: Normal HEENT exam.    Lungs:  Normal effort and normal respiratory rate.  Breath sounds clear to auscultation.  She is not in respiratory distress.    Heart: Normal rate.  Regular rhythm.    Abdomen: Abdomen is soft.  (Obese).  Bowel sounds are normal.   There is no abdominal tenderness.     Extremities: There is no dependent edema.    Pulses: Distal pulses are intact.    Neurological: Patient is alert and oriented to person, place and time.  Normal strength.  Patient has normal reflexes, normal muscle tone and normal coordination.    Skin:  Warm and dry.  No rash.             Results Review:     I " reviewed the patient's new clinical results.  I reviewed the patient's new imaging results and agree with the interpretation.  I reviewed the patient's other test results and agree with the interpretation  I personally viewed and interpreted the patient's EKG/Telemetry data  Discussed with pt, RN, and CCP    Results from last 7 days   Lab Units 12/01/20 0406 11/30/20 0423 11/29/20 0456 11/28/20  2203   WBC 10*3/mm3 12.15* 10.11 9.62 9.99   HEMOGLOBIN g/dL 14.2 13.1 12.3 14.6   PLATELETS 10*3/mm3 347 331 326 419     Results from last 7 days   Lab Units 12/01/20 0406 11/30/20 0423 11/29/20 0456 11/28/20 2203   SODIUM mmol/L 137 137 139 140   POTASSIUM mmol/L 4.6 4.3 3.8 3.7   CHLORIDE mmol/L 101 103 106 103   CO2 mmol/L 19.2* 25.9 21.8* 29.0   BUN mg/dL 13 9 7 7   CREATININE mg/dL 0.85 0.68 0.67 0.81   CALCIUM mg/dL 9.8 9.4 8.7 9.5   Estimated Creatinine Clearance: 105.4 mL/min (by C-G formula based on SCr of 0.85 mg/dL).    Medication Review: reviewed     Assessment/Plan       Bilateral leg weakness    Splenic infarct    Generalized abdominal pain    Acute bilateral low back pain    Antiphospholipid antibody positive    On anticoagulant therapy    Acute pain of left knee    Vitamin D deficiency    Folate deficiency          Plan:   (Left flank and left low back pain  -LYNSEY has reviewed imaging and feels most c/w pain from splenic infarct and not any acute spine issue  -continue analgesia, changed hydrocodone to oxycodone, continue PRN IV Dilaudid  -continue Medrol DosePack  -continue scheduled muscle relaxer and QHS Elavil  -continue chronic gabapentin    Left knee pain s/p fall  -no fracture noted on XR  -appreciate Ortho attention to pt  -MRI did not reveal any derangement of left knee    Falls and BLE weakness  -Vitamin D level quite low  -replacing orally  -continue PT    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 Alberto has started pt on oral folate for folate deficiency noted last admission    Full code  Eliquis should suffice for DVT ppx  Dispo: continue PT, hopefully ready to go home tomorrow).       Dennys Carranza MD  12/01/20  14:14 EST    Time: 25min

## 2020-12-02 VITALS
TEMPERATURE: 98.7 F | OXYGEN SATURATION: 94 % | BODY MASS INDEX: 39.09 KG/M2 | HEIGHT: 64 IN | DIASTOLIC BLOOD PRESSURE: 85 MMHG | SYSTOLIC BLOOD PRESSURE: 124 MMHG | RESPIRATION RATE: 18 BRPM | WEIGHT: 229 LBS | HEART RATE: 87 BPM

## 2020-12-02 PROBLEM — M54.9 PAIN OF BACK AND LEFT LOWER EXTREMITY: Status: ACTIVE | Noted: 2020-12-02

## 2020-12-02 PROBLEM — M79.605 PAIN OF BACK AND LEFT LOWER EXTREMITY: Status: ACTIVE | Noted: 2020-12-02

## 2020-12-02 LAB
ANION GAP SERPL CALCULATED.3IONS-SCNC: 9 MMOL/L (ref 5–15)
BASOPHILS # BLD AUTO: 0.05 10*3/MM3 (ref 0–0.2)
BASOPHILS NFR BLD AUTO: 0.5 % (ref 0–1.5)
BUN SERPL-MCNC: 11 MG/DL (ref 6–20)
BUN/CREAT SERPL: 14.7 (ref 7–25)
CALCIUM SPEC-SCNC: 9.1 MG/DL (ref 8.6–10.5)
CHLORIDE SERPL-SCNC: 100 MMOL/L (ref 98–107)
CO2 SERPL-SCNC: 26 MMOL/L (ref 22–29)
CREAT SERPL-MCNC: 0.75 MG/DL (ref 0.57–1)
DEPRECATED RDW RBC AUTO: 45.5 FL (ref 37–54)
EOSINOPHIL # BLD AUTO: 0.06 10*3/MM3 (ref 0–0.4)
EOSINOPHIL NFR BLD AUTO: 0.6 % (ref 0.3–6.2)
ERYTHROCYTE [DISTWIDTH] IN BLOOD BY AUTOMATED COUNT: 13.1 % (ref 12.3–15.4)
GFR SERPL CREATININE-BSD FRML MDRD: 86 ML/MIN/1.73
GLUCOSE SERPL-MCNC: 87 MG/DL (ref 65–99)
HCT VFR BLD AUTO: 40.1 % (ref 34–46.6)
HGB BLD-MCNC: 12.9 G/DL (ref 12–15.9)
IMM GRANULOCYTES # BLD AUTO: 0.06 10*3/MM3 (ref 0–0.05)
IMM GRANULOCYTES NFR BLD AUTO: 0.6 % (ref 0–0.5)
LYMPHOCYTES # BLD AUTO: 3.12 10*3/MM3 (ref 0.7–3.1)
LYMPHOCYTES NFR BLD AUTO: 29.5 % (ref 19.6–45.3)
MCH RBC QN AUTO: 30.6 PG (ref 26.6–33)
MCHC RBC AUTO-ENTMCNC: 32.2 G/DL (ref 31.5–35.7)
MCV RBC AUTO: 95.2 FL (ref 79–97)
MONOCYTES # BLD AUTO: 0.66 10*3/MM3 (ref 0.1–0.9)
MONOCYTES NFR BLD AUTO: 6.3 % (ref 5–12)
NEUTROPHILS NFR BLD AUTO: 6.61 10*3/MM3 (ref 1.7–7)
NEUTROPHILS NFR BLD AUTO: 62.5 % (ref 42.7–76)
NRBC BLD AUTO-RTO: 0 /100 WBC (ref 0–0.2)
PLATELET # BLD AUTO: 322 10*3/MM3 (ref 140–450)
PMV BLD AUTO: 9.7 FL (ref 6–12)
POTASSIUM SERPL-SCNC: 4.2 MMOL/L (ref 3.5–5.2)
RBC # BLD AUTO: 4.21 10*6/MM3 (ref 3.77–5.28)
SODIUM SERPL-SCNC: 135 MMOL/L (ref 136–145)
WBC # BLD AUTO: 10.56 10*3/MM3 (ref 3.4–10.8)

## 2020-12-02 PROCEDURE — 85025 COMPLETE CBC W/AUTO DIFF WBC: CPT | Performed by: HOSPITALIST

## 2020-12-02 PROCEDURE — 25010000002 ONDANSETRON PER 1 MG: Performed by: NURSE PRACTITIONER

## 2020-12-02 PROCEDURE — 25010000002 HYDROMORPHONE PER 4 MG: Performed by: NURSE PRACTITIONER

## 2020-12-02 PROCEDURE — 80048 BASIC METABOLIC PNL TOTAL CA: CPT | Performed by: HOSPITALIST

## 2020-12-02 PROCEDURE — 63710000001 METHYLPREDNISOLONE 4 MG TABLET THERAPY PACK 21 EACH DISP PACK: Performed by: HOSPITALIST

## 2020-12-02 RX ORDER — FOLIC ACID 1 MG/1
1 TABLET ORAL DAILY
Qty: 30 TABLET | Refills: 0 | Status: SHIPPED | OUTPATIENT
Start: 2020-12-03

## 2020-12-02 RX ORDER — METHYLPREDNISOLONE 4 MG/1
TABLET ORAL
Start: 2020-12-02 | End: 2021-02-21

## 2020-12-02 RX ORDER — ERGOCALCIFEROL 1.25 MG/1
50000 CAPSULE ORAL
Qty: 5 CAPSULE | Refills: 0 | Status: SHIPPED | OUTPATIENT
Start: 2020-12-07 | End: 2021-06-02 | Stop reason: HOSPADM

## 2020-12-02 RX ORDER — OXYCODONE AND ACETAMINOPHEN 7.5; 325 MG/1; MG/1
1 TABLET ORAL EVERY 4 HOURS PRN
Qty: 25 TABLET | Refills: 0 | Status: SHIPPED | OUTPATIENT
Start: 2020-12-02 | End: 2020-12-07

## 2020-12-02 RX ADMIN — OXYCODONE HYDROCHLORIDE AND ACETAMINOPHEN 1 TABLET: 7.5; 325 TABLET ORAL at 06:46

## 2020-12-02 RX ADMIN — OXYCODONE HYDROCHLORIDE AND ACETAMINOPHEN 1 TABLET: 7.5; 325 TABLET ORAL at 11:29

## 2020-12-02 RX ADMIN — OXYCODONE HYDROCHLORIDE AND ACETAMINOPHEN 1 TABLET: 7.5; 325 TABLET ORAL at 01:02

## 2020-12-02 RX ADMIN — METHYLPREDNISOLONE 4 MG: 4 TABLET ORAL at 06:46

## 2020-12-02 RX ADMIN — CYCLOBENZAPRINE 10 MG: 10 TABLET, FILM COATED ORAL at 06:46

## 2020-12-02 RX ADMIN — FOLIC ACID 1 MG: 1 TABLET ORAL at 08:51

## 2020-12-02 RX ADMIN — APIXABAN 5 MG: 5 TABLET, FILM COATED ORAL at 08:51

## 2020-12-02 RX ADMIN — SODIUM CHLORIDE, PRESERVATIVE FREE 10 ML: 5 INJECTION INTRAVENOUS at 08:52

## 2020-12-02 RX ADMIN — GABAPENTIN 800 MG: 400 CAPSULE ORAL at 05:06

## 2020-12-02 RX ADMIN — CETIRIZINE HYDROCHLORIDE 10 MG: 10 TABLET ORAL at 08:51

## 2020-12-02 RX ADMIN — METHYLPREDNISOLONE 4 MG: 4 TABLET ORAL at 11:31

## 2020-12-02 RX ADMIN — HYDROMORPHONE HYDROCHLORIDE 0.5 MG: 1 INJECTION, SOLUTION INTRAMUSCULAR; INTRAVENOUS; SUBCUTANEOUS at 05:06

## 2020-12-02 RX ADMIN — Medication 1 PATCH: at 08:52

## 2020-12-02 RX ADMIN — ONDANSETRON 4 MG: 2 INJECTION INTRAMUSCULAR; INTRAVENOUS at 05:11

## 2020-12-02 RX ADMIN — HYDROMORPHONE HYDROCHLORIDE 0.5 MG: 1 INJECTION, SOLUTION INTRAMUSCULAR; INTRAVENOUS; SUBCUTANEOUS at 08:51

## 2020-12-02 NOTE — PROGRESS NOTES
MRI of the left knee reviewed.  MRI entirely normal.  Specifically, no significant effusion, no ligament injury, no fracture, no meniscus tear, no osteochondral lesion.    - Patient can WB as tolerated on the knee, no restrictions  - NSAIDs as needed  - Plan of care per primary team, NSU    Please call with any questions.

## 2020-12-02 NOTE — PLAN OF CARE
Goal Outcome Evaluation:  Plan of Care Reviewed With: patient  Progress: improving  Outcome Summary: VSS, complaints of pain, prn pain medication given.  Up with assist x1. Plan is for patient to be discharged home today.  Will continue to monitor.

## 2020-12-02 NOTE — DISCHARGE SUMMARY
Patient Name: Belen Allen  : 1980  MRN: [id number]    Date of Admission: 2020  Date of Discharge:  2020  Primary Care Physician: Sahil Cornelius MD      Chief Complaint:   Abdominal Pain and Extremity Weakness      Discharge Diagnoses     Active Hospital Problems    Diagnosis  POA   • **Bilateral leg weakness [R29.898]  Yes   • Pain of back and left lower extremity [M54.9, M79.605]  Yes   • Folate deficiency [E53.8]  Yes   • Vitamin D deficiency [E55.9]  Yes   • Generalized abdominal pain [R10.84]  Yes   • Acute bilateral low back pain [M54.5]  Yes   • Antiphospholipid antibody positive [R76.0]  Yes   • On anticoagulant therapy [Z79.01]  Not Applicable   • Acute pain of left knee [M25.562]  Yes   • 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        Hospital Course     Pleasant 39yo woman admitted with pain in left flank, left low back, and LLE. Please see below for details of admission:    Left flank and left low back pain  -LYNSEY has reviewed imaging and feels most c/w pain from splenic infarct and not any acute spine issue  -continue analgesia, changed hydrocodone to oxycodone and seems to be working well  -complete Medrol DosePack  -continue scheduled muscle relaxer and QHS Elavil  -continue chronic gabapentin  -PT to follow at home  -f/u with PCP in a week     Left knee pain s/p fall  -no fracture noted on XR  -appreciate Ortho attention to pt  -MRI did not reveal any derangement of left knee  -recommend ice and PT     Falls and BLE weakness  -Vitamin D level quite low  -replacing orally now  -continue PT     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 Alberto has started pt on oral folate for folate deficiency noted last admission     Dispo: home today on current med regimen with  HH/PT    Day of Discharge     Subjective:  Seems to be somewhat improved again today. Still reports pain in left flank and left knee. Still c/o spasms in LLE and weakness in BLEs. Working well with PT. Tolerating diet. No SOA or CP. Eager to go home today.    Review of Systems    Physical Exam:  Temp:  [98.2 °F (36.8 °C)-98.7 °F (37.1 °C)] 98.7 °F (37.1 °C)  Heart Rate:  [] 87  Resp:  [16-18] 18  BP: (112-137)/(82-87) 124/85  Body mass index is 39.31 kg/m².  Physical Exam  General Appearance:  Comfortable, in no acute distress, not in pain and well-appearing.    Vital signs:  Vital signs are normal.  No fever.    Output: Producing urine.    HEENT: Normal HEENT exam.    Lungs:  Normal effort and normal respiratory rate.  Breath sounds clear to auscultation.  She is not in respiratory distress.    Heart: Normal rate.  Regular rhythm.    Abdomen: Abdomen is soft.  (Obese).  Bowel sounds are normal.   There is no abdominal tenderness.     Extremities: There is no dependent edema.    Pulses: Distal pulses are intact.    Neurological: Patient is alert and oriented to person, place and time.  Normal strength.  Patient has normal reflexes, normal muscle tone and normal coordination.    Skin:  Warm and dry.  No rash.      Consultants     Consult Orders (all) (From admission, onward)     Start     Ordered    11/30/20 1458  Inpatient Orthopedic Surgery Consult  Once     Specialty:  Orthopedic Surgery  Provider:  Keith Ortiz II, MD    11/30/20 1457    11/30/20 1457  Hematology & Oncology Inpatient Consult  Once     Specialty:  Hematology and Oncology  Provider:  Bjorn Kendrick II, MD    11/30/20 1457    11/29/20 0435  Inpatient Case Management  Consult  Once     Provider:  (Not yet assigned)    11/29/20 0435    11/29/20 0435  Inpatient Spiritual Care Consult  Once     Provider:  (Not yet assigned)    11/29/20 0435    11/29/20 0326  Inpatient Neurosurgery Consult  Once     Specialty:   Neurosurgery  Provider:  Brian Brock MD    11/29/20 0326    11/29/20 0137  [facility] (on-call MD unless specified) Details  Once     Specialty:  Hospitalist  Provider:  (Not yet assigned)    11/29/20 0136              Procedures     Imaging Results (All)     Procedure Component Value Units Date/Time    MRI Knee Left Without Contrast [[id number]] Collected: 12/01/20 1158     Updated: 12/01/20 1553    Narrative:      MRI LEFT KNEE WITHOUT CONTRAST     HISTORY: Anterior knee pain and weakness.     TECHNIQUE: MRI left knee includes axial and coronal PD fat-sat as well  as sagittal PD, T1, T2 fat-saturated sequences.     COMPARISON: None.     FINDINGS: The medial and lateral meniscus are intact. The anterior and  posterior cruciate ligaments, medial and lateral collateral ligament  complexes, and the extensor mechanism are intact. No focal articular  cartilage defect or intra-articular loose body is evident. Knee joint  fluid volume is within normal limits. The periarticular musculature  appears normal. Bone marrow signal appears normal.       Impression:      No evidence for internal derangement of the left knee.     This report was finalized on 12/1/2020 3:49 PM by Dr. Kristopher Arambula M.D.       MRI Lumbar Spine With & Without Contrast [[id number]] Collected: 11/29/20 1100     Updated: 11/29/20 1121    Narrative:      MR SCAN OF THE LUMBAR SPINE WITHOUT AND WITH INTRAVENOUS CONTRAST     HISTORY: Low back pain with pain and weakness extending into both legs,  left greater than right.     The MR scan was performed through the lumbar spine with sagittal and  axial images and includes T1 images without and with intravenous  contrast. The following findings are present:  1. There is a left renal cyst measuring 2 mm. The visualized right  kidney is unremarkable.  2. The lower thoracic spinal cord and conus appear normal. There is no  significant disc or bony abnormality at T9-10, T10-11, T11-12, T12-L1,  or L1-2. At L2-3  there is no disc or bony abnormality.  3. At L3-4 there is no disc or bony abnormality.  4. At L4-5 there is no disc abnormality. There is minimal facet  hypertrophy without central or foraminal encroachment.  5. At L5-S1 the patient has had previous anterior and posterior fusion  including posterior plates and pedicle screws. There is no central or  foraminal encroachment. There is no abnormal enhancement.     This report was finalized on 11/29/2020 11:18 AM by Dr. Chay Gunn M.D.       XR Knee 1 or 2 View Left [[id number]] Collected: 11/29/20 0019     Updated: 11/29/20 0023    Narrative:      2 VIEWS LEFT KNEE     HISTORY: Pain     COMPARISON: None available.     FINDINGS:  No acute fracture or subluxation the left knee is seen. There is mild  degenerative change, with some joint space narrowing noted within the  medial compartment. There is no suprapatellar effusion. No aggressive  osseous abnormalities are seen.       Impression:      No acute findings.     This report was finalized on 11/29/2020 12:20 AM by Dr. Marli Eastman M.D.       CT Abdomen Pelvis With Contrast [[id number]] Collected: 11/29/20 0008     Updated: 11/29/20 0022    Narrative:      CT OF THE ABDOMEN AND PELVIS WITH CONTRAST     HISTORY: Left-sided pain. HISTORY of splenic artery clot     COMPARISON: 11/08/2020     TECHNIQUE: Axial CT imaging was obtained through the thorax. IV contrast  was administered.     FINDINGS:  Images through the lung bases do not demonstrate any acute  abnormalities. The stomach and duodenum appear unremarkable, as are the  adrenal glands. No focal hepatic lesions are seen. Gallbladder is  surgically absent. The pancreas is normal. As was described on prior  exam, there is a wedge-shaped area of decreased attenuation involving  the superior aspect of the spleen. The appearance is most in keeping  with the splenic infarct. No new areas of infarction are identified.  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  There may be a second aneurysm seen within the hilum which measures 1  cm, and may have some thrombus within it. Its appearance is unchanged  when compared to prior CT. Areas of cortical thinning are seen within  the left kidney, suggesting the sequela prior insults. Left kidney is  smaller than the right. There is a simple left renal cyst. No additional  follow-up is necessary. Uterus and urinary bladder appear unremarkable.  The GI tract appears normal. There are changes of prior posterior lumbar  spinal fusion at L5-S1.       Impression:         1. Stable appearance to an area of infarction involving the spleen. A  previously described splenic artery aneurysm appears unchanged. There is  an additional rounded structure noted within the splenic hilum. The  possibility that this represents thrombosed splenic artery aneurysm is  not excluded. Again, the appearance is stable compared to the prior  study.     Radiation dose reduction techniques were utilized, including automated  exposure control and exposure modulation based on body size.     This report was finalized on 11/29/2020 12:19 AM by Dr. Marli Eastman M.D.       CT Head Without Contrast [[id number]] Collected: 11/28/20 2355     Updated: 11/29/20 0005    Narrative:      CT HEAD WITHOUT CONTRAST   HISTORY: weakness in the lower extremities today        COMPARISON: None available.     TECHNIQUE: Axial CT imaging was obtained through the brain. No IV  contrast was administered.     FINDINGS:  No acute intracranial hemorrhage is identified. The ventricles are  normal in size. There is no midline shift or mass effect. The paranasal  sinuses and mastoid air cells appear clear.       Impression:      No acute intracranial findings.     Radiation dose reduction techniques were utilized, including automated  exposure control and exposure modulation based on body size.     This report was finalized on 11/29/2020 12:02 AM by   Marli Eastman M.D.             Pertinent Labs     Results from last 7 days   Lab Units 12/02/20 0439 12/01/20 0406 11/30/20 0423 11/29/20 0456   WBC 10*3/mm3 10.56 12.15* 10.11 9.62   HEMOGLOBIN g/dL 12.9 14.2 13.1 12.3   PLATELETS 10*3/mm3 322 347 331 326     Results from last 7 days   Lab Units 12/02/20 0439 12/01/20 0406 11/30/20 0423 11/29/20 0456   SODIUM mmol/L 135* 137 137 139   POTASSIUM mmol/L 4.2 4.6 4.3 3.8   CHLORIDE mmol/L 100 101 103 106   CO2 mmol/L 26.0 19.2* 25.9 21.8*   BUN mg/dL 11 13 9 7   CREATININE mg/dL 0.75 0.85 0.68 0.67   GLUCOSE mg/dL 87 107* 120* 104*   Estimated Creatinine Clearance: 118.3 mL/min (by C-G formula based on SCr of 0.75 mg/dL).  Results from last 7 days   Lab Units 11/30/20 0423 11/28/20  2203   ALBUMIN g/dL 4.10 4.50   BILIRUBIN mg/dL 0.2 0.2   ALK PHOS U/L 133* 174*   AST (SGOT) U/L 23 33*   ALT (SGPT) U/L 31 39*     Results from last 7 days   Lab Units 12/02/20 0439 12/01/20 0406 11/30/20 0423 11/29/20 0456 11/28/20  2203   CALCIUM mg/dL 9.1 9.8 9.4 8.7 9.5   ALBUMIN g/dL  --   --  4.10  --  4.50     Results from last 7 days   Lab Units 11/28/20  2203   LIPASE U/L 33     Results from last 7 days   Lab Units 11/30/20 0423   CK TOTAL U/L 26           Invalid input(s): LDLCALC        Test Results Pending at Discharge       Discharge Details        Discharge Medications      New Medications      Instructions Start Date   folic acid 1 MG tablet  Commonly known as: FOLVITE   1 mg, Oral, Daily   Start Date: December 3, 2020     methylPREDNISolone 4 MG dose pack  Commonly known as: MEDROL   Take as directed on package instructions.      oxyCODONE-acetaminophen 7.5-325 MG per tablet  Commonly known as: PERCOCET   1 tablet, Oral, Every 4 Hours PRN      vitamin D 1.25 MG (55685 UT) capsule capsule  Commonly known as: ERGOCALCIFEROL   50,000 Units, Oral, Every 7 Days   Start Date: December 7, 2020        Continue These Medications      Instructions Start Date    amitriptyline 25 MG tablet  Commonly known as: ELAVIL   25 mg, Oral, Nightly      apixaban 5 MG tablet tablet  Commonly known as: ELIQUIS   5 mg, Oral, Every 12 Hours Scheduled      Claritin 10 MG tablet  Generic drug: loratadine   10 mg, Oral, Daily      cyclobenzaprine 10 MG tablet  Commonly known as: FLEXERIL   1 tablet, Oral, 3 Times Daily      diphenhydrAMINE-acetaminophen  MG tablet per tablet  Commonly known as: TYLENOL PM   2 tablets, Oral, Nightly      gabapentin 800 MG tablet  Commonly known as: NEURONTIN   800 mg, Oral, 3 Times Daily      hydrOXYzine 25 MG tablet  Commonly known as: ATARAX   4 tablets, Oral, Every Night at Bedtime      Melatonin 10 MG capsule   30 mg, Oral, Nightly      nicotine 14 MG/24HR patch  Commonly known as: NICODERM CQ   1 patch, Transdermal, Every 24 Hours Scheduled      SUMAtriptan 100 MG tablet  Commonly known as: IMITREX   100 mg, Oral, Every 2 Hours PRN, Take one tablet at onset of headache. May repeat dose one time in 2 hours if headache not relieved.       traZODone 50 MG tablet  Commonly known as: DESYREL   50 mg, Oral, Nightly             No Known Allergies      Discharge Disposition:  Home-Health Care [facility]    Discharge Diet:  Diet Order   Procedures   • Diet Regular       Discharge Activity:   as tolerated    CODE STATUS:    Code Status and Medical Interventions:   Ordered at: 11/29/20 0158     Code Status:    CPR     Medical Interventions (Level of Support Prior to Arrest):    Full       Future Appointments   Date Time Provider Department Center   1/20/2021 11:00 AM ROOM 2 [facility]   2/10/2021 10:00 AM LAB CHAIR 1 [facility]  [facility]   2/24/2021  8:40 AM VITALS ONLY [facility]  [facility]   2/24/2021  9:00 AM Bjorn Kendrick II, MD [facility]     Additional Instructions for the Follow-ups that You Need to Schedule     Ambulatory Referral to Home Health   As directed      Face to Face Visit Date: 12/2/2020    Follow-up provider  for Plan of Care?: I treated the patient in an acute care facility and will not continue treatment after discharge.    Follow-up provider: GLENIS CORNELIUS [[id number]]    Reason/Clinical Findings: BLE weakness    Describe mobility limitations that make leaving home difficult: requires the assistance of another to leave the home    Nursing/Therapeutic Services Requested: Physical Therapy    PT orders: Therapeutic exercise Strengthening    Frequency: 1 Week 1         Discharge Follow-up with PCP   As directed       Currently Documented PCP:    Glenis Cornelius MD    PCP Phone Number:    [phone]     Follow Up Details: Dr. Cornelius (PCP) in 1 week           Follow-up Information     Glenis Cornelius MD .    Specialty: Internal Medicine  Why: Dr. Cornelius (PCP) in 1 week  Contact information:  [address] [place], [place] [id number] [phone]                   Additional Instructions for the Follow-ups that You Need to Schedule     Ambulatory Referral to Home Health   As directed      Face to Face Visit Date: 12/2/2020    Follow-up provider for Plan of Care?: I treated the patient in an acute care facility and will not continue treatment after discharge.    Follow-up provider: GLENIS CORNELIUS [[id number]]    Reason/Clinical Findings: BLE weakness    Describe mobility limitations that make leaving home difficult: requires the assistance of another to leave the home    Nursing/Therapeutic Services Requested: Physical Therapy    PT orders: Therapeutic exercise Strengthening    Frequency: 1 Week 1         Discharge Follow-up with PCP   As directed       Currently Documented PCP:    Glenis Cornelius MD    PCP Phone Number:    [phone]     Follow Up Details: Dr. Cornelius (PCP) in 1 week           Time Spent on Discharge:  Greater than 30 minutes      Dennys Carranza MD  [facility]  12/02/20  11:55 EST

## 2020-12-03 NOTE — PROGRESS NOTES
Case Management Discharge Note      Final Note: home with [facility]    Provided Post Acute Provider List?: Yes  Post Acute Provider List: Home Health  Provided Post Acute Provider Quality & Resource List?: Yes  Post Acute Provider Quality and Resource List: Home Health(Medicare.gov for her zip code)  Delivered To: Patient  Method of Delivery: In person    Selected Continued Care - Discharged on 12/2/2020 Admission date: 11/28/2020 - Discharge disposition: Home-Health Care Svc    Destination    No services have been selected for the patient.              Durable Medical Equipment    No services have been selected for the patient.              Dialysis/Infusion    No services have been selected for the patient.              Home Medical Care Coordination complete    Service Provider Selected Services Address Phone Fax Patient Preferred    [facility] [address] [place] [id number] [phone] --          Therapy    No services have been selected for the patient.              Community Resources    No services have been selected for the patient.                  Transportation Services  Private: Car    Final Discharge Disposition Code: 06 - home with home health care

## 2020-12-19 NOTE — ED NOTES
"Pt given mask upon arrival. RN wearing mask and eye protection during pt interactions.      Pt reports that she fell out of her bed that it is really high up. Pt reports having left rib pain and back pain. Pt reports history of back surgery. Pt reports intermittent SOB after fall. Pt reports that her legs feel \"floppy, like they are going to give out.\"     Nisha Rodriguez RN  09/25/20 2041    " altered mental status

## 2021-01-20 ENCOUNTER — HOSPITAL ENCOUNTER (OUTPATIENT)
Dept: INFUSION THERAPY | Facility: HOSPITAL | Age: 41
Discharge: HOME | End: 2021-01-20

## 2021-02-21 ENCOUNTER — APPOINTMENT (OUTPATIENT)
Dept: CARDIOLOGY | Facility: HOSPITAL | Age: 41
End: 2021-02-21

## 2021-02-21 ENCOUNTER — HOSPITAL ENCOUNTER (EMERGENCY)
Facility: HOSPITAL | Age: 41
Discharge: HOME | End: 2021-02-21
Attending: EMERGENCY MEDICINE | Admitting: EMERGENCY MEDICINE

## 2021-02-21 VITALS
SYSTOLIC BLOOD PRESSURE: 135 MMHG | TEMPERATURE: 98.1 F | RESPIRATION RATE: 16 BRPM | WEIGHT: 180 LBS | HEART RATE: 110 BPM | BODY MASS INDEX: 30.73 KG/M2 | DIASTOLIC BLOOD PRESSURE: 82 MMHG | OXYGEN SATURATION: 94 % | HEIGHT: 64 IN

## 2021-02-21 DIAGNOSIS — M54.32 SCIATICA, LEFT SIDE: Primary | ICD-10-CM

## 2021-02-21 LAB
ANION GAP SERPL CALCULATED.3IONS-SCNC: 8.3 MMOL/L (ref 5–15)
BASOPHILS # BLD AUTO: 0.04 10*3/MM3 (ref 0–0.2)
BASOPHILS NFR BLD AUTO: 0.5 % (ref 0–1.5)
BH CV LOWER VASCULAR LEFT COMMON FEMORAL AUGMENT: NORMAL
BH CV LOWER VASCULAR LEFT COMMON FEMORAL COMPETENT: NORMAL
BH CV LOWER VASCULAR LEFT COMMON FEMORAL COMPRESS: NORMAL
BH CV LOWER VASCULAR LEFT COMMON FEMORAL PHASIC: NORMAL
BH CV LOWER VASCULAR LEFT COMMON FEMORAL SPONT: NORMAL
BH CV LOWER VASCULAR LEFT DISTAL FEMORAL COMPRESS: NORMAL
BH CV LOWER VASCULAR LEFT GASTRONEMIUS COMPRESS: NORMAL
BH CV LOWER VASCULAR LEFT GREATER SAPH AK COMPRESS: NORMAL
BH CV LOWER VASCULAR LEFT GREATER SAPH BK COMPRESS: NORMAL
BH CV LOWER VASCULAR LEFT LESSER SAPH COMPRESS: NORMAL
BH CV LOWER VASCULAR LEFT MID FEMORAL AUGMENT: NORMAL
BH CV LOWER VASCULAR LEFT MID FEMORAL COMPETENT: NORMAL
BH CV LOWER VASCULAR LEFT MID FEMORAL COMPRESS: NORMAL
BH CV LOWER VASCULAR LEFT MID FEMORAL PHASIC: NORMAL
BH CV LOWER VASCULAR LEFT MID FEMORAL SPONT: NORMAL
BH CV LOWER VASCULAR LEFT PERONEAL COMPRESS: NORMAL
BH CV LOWER VASCULAR LEFT POPLITEAL AUGMENT: NORMAL
BH CV LOWER VASCULAR LEFT POPLITEAL COMPETENT: NORMAL
BH CV LOWER VASCULAR LEFT POPLITEAL COMPRESS: NORMAL
BH CV LOWER VASCULAR LEFT POPLITEAL PHASIC: NORMAL
BH CV LOWER VASCULAR LEFT POPLITEAL SPONT: NORMAL
BH CV LOWER VASCULAR LEFT POSTERIOR TIBIAL COMPRESS: NORMAL
BH CV LOWER VASCULAR LEFT PROFUNDA FEMORAL COMPRESS: NORMAL
BH CV LOWER VASCULAR LEFT PROXIMAL FEMORAL COMPRESS: NORMAL
BH CV LOWER VASCULAR LEFT SAPHENOFEMORAL JUNCTION COMPRESS: NORMAL
BH CV LOWER VASCULAR RIGHT COMMON FEMORAL AUGMENT: NORMAL
BH CV LOWER VASCULAR RIGHT COMMON FEMORAL COMPETENT: NORMAL
BH CV LOWER VASCULAR RIGHT COMMON FEMORAL COMPRESS: NORMAL
BH CV LOWER VASCULAR RIGHT COMMON FEMORAL PHASIC: NORMAL
BH CV LOWER VASCULAR RIGHT COMMON FEMORAL SPONT: NORMAL
BUN SERPL-MCNC: 6 MG/DL (ref 6–20)
BUN/CREAT SERPL: 5.6 (ref 7–25)
CALCIUM SPEC-SCNC: 9.5 MG/DL (ref 8.6–10.5)
CHLORIDE SERPL-SCNC: 101 MMOL/L (ref 98–107)
CO2 SERPL-SCNC: 29.7 MMOL/L (ref 22–29)
CREAT SERPL-MCNC: 1.07 MG/DL (ref 0.57–1)
DEPRECATED RDW RBC AUTO: 46.2 FL (ref 37–54)
EOSINOPHIL # BLD AUTO: 0.21 10*3/MM3 (ref 0–0.4)
EOSINOPHIL NFR BLD AUTO: 2.7 % (ref 0.3–6.2)
ERYTHROCYTE [DISTWIDTH] IN BLOOD BY AUTOMATED COUNT: 13.4 % (ref 12.3–15.4)
GFR SERPL CREATININE-BSD FRML MDRD: 57 ML/MIN/1.73
GLUCOSE SERPL-MCNC: 71 MG/DL (ref 65–99)
HCT VFR BLD AUTO: 40.8 % (ref 34–46.6)
HGB BLD-MCNC: 13.5 G/DL (ref 12–15.9)
IMM GRANULOCYTES # BLD AUTO: 0.02 10*3/MM3 (ref 0–0.05)
IMM GRANULOCYTES NFR BLD AUTO: 0.3 % (ref 0–0.5)
LYMPHOCYTES # BLD AUTO: 2.22 10*3/MM3 (ref 0.7–3.1)
LYMPHOCYTES NFR BLD AUTO: 28.4 % (ref 19.6–45.3)
MCH RBC QN AUTO: 31.3 PG (ref 26.6–33)
MCHC RBC AUTO-ENTMCNC: 33.1 G/DL (ref 31.5–35.7)
MCV RBC AUTO: 94.4 FL (ref 79–97)
MONOCYTES # BLD AUTO: 0.63 10*3/MM3 (ref 0.1–0.9)
MONOCYTES NFR BLD AUTO: 8.1 % (ref 5–12)
NEUTROPHILS NFR BLD AUTO: 4.7 10*3/MM3 (ref 1.7–7)
NEUTROPHILS NFR BLD AUTO: 60 % (ref 42.7–76)
NRBC BLD AUTO-RTO: 0 /100 WBC (ref 0–0.2)
PLATELET # BLD AUTO: 322 10*3/MM3 (ref 140–450)
PMV BLD AUTO: 10 FL (ref 6–12)
POTASSIUM SERPL-SCNC: 3.5 MMOL/L (ref 3.5–5.2)
RBC # BLD AUTO: 4.32 10*6/MM3 (ref 3.77–5.28)
SODIUM SERPL-SCNC: 139 MMOL/L (ref 136–145)
WBC # BLD AUTO: 7.82 10*3/MM3 (ref 3.4–10.8)

## 2021-02-21 PROCEDURE — 80048 BASIC METABOLIC PNL TOTAL CA: CPT | Performed by: NURSE PRACTITIONER

## 2021-02-21 PROCEDURE — 85025 COMPLETE CBC W/AUTO DIFF WBC: CPT | Performed by: NURSE PRACTITIONER

## 2021-02-21 PROCEDURE — 99284 EMERGENCY DEPT VISIT MOD MDM: CPT

## 2021-02-21 PROCEDURE — 93971 EXTREMITY STUDY: CPT

## 2021-02-21 RX ORDER — METHYLPREDNISOLONE 4 MG/1
TABLET ORAL
Qty: 21 EACH | Refills: 0 | Status: SHIPPED | OUTPATIENT
Start: 2021-02-21 | End: 2021-06-02 | Stop reason: HOSPADM

## 2021-02-21 RX ORDER — CYCLOBENZAPRINE HCL 10 MG
10 TABLET ORAL ONCE
Status: COMPLETED | OUTPATIENT
Start: 2021-02-21 | End: 2021-02-21

## 2021-02-21 RX ORDER — HYDROCODONE BITARTRATE AND ACETAMINOPHEN 7.5; 325 MG/1; MG/1
1 TABLET ORAL ONCE
Status: COMPLETED | OUTPATIENT
Start: 2021-02-21 | End: 2021-02-21

## 2021-02-21 RX ADMIN — HYDROCODONE BITARTRATE AND ACETAMINOPHEN 1 TABLET: 7.5; 325 TABLET ORAL at 11:45

## 2021-02-21 RX ADMIN — CYCLOBENZAPRINE 10 MG: 10 TABLET, FILM COATED ORAL at 11:45

## 2021-02-21 NOTE — ED PROVIDER NOTES
EMERGENCY DEPARTMENT ENCOUNTER    Room Number:  40/40  Date of encounter:  2/21/2021  PCP: Sahil Cornelius MD  Historian: Patient      PPE    Patient was placed in face mask in first look. Patient was wearing facemask when I entered the room and throughout our encounter. I wore full protective equipment throughout this patient encounter including a face mask, and gloves. Hand hygiene was performed before donning protective equipment and after removal when leaving the room.        HPI:  Chief Complaint: Left leg pain  A complete HPI/ROS/PMH/PSH/SH/FH are unobtainable due to: Nothing    Context: Belen Allen is a 40 y.o. female who arrives to the ED via EMS from home.  Patient presents with c/o moderate, constant, stabbing pain that begins in her left buttock and goes down her left leg that began this morning.  Patient states that she woke up this morning and was unable to even get out of bed secondary to the pain.  She denies injury, states that she does use a walker to ambulate at home.  She states the pain was so bad that she had to call EMS to get to the emergency room.  Patient denies fever, chills, shortness of breath, chest pain, nausea, swelling in her leg, numbness or tingling to her left leg or loss of bowel or bladder function.  Patient states that nothing makes the symptoms better and movement worsens symptoms.  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  She states she does have issues with sciatica on the right, having ever has never had any problems on the left.        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   • Generalized abdominal pain 11/29/2020   • Bilateral leg weakness 11/29/2020   • Acute bilateral low back pain 11/29/2020   • Antiphospholipid antibody positive 11/29/2020   • On anticoagulant therapy  2020   • Acute pain of left knee 2020   • Vitamin D deficiency 2020   • Folate deficiency 2020   • Pain of back and left lower extremity 2020     Resolved Ambulatory Problems     Diagnosis Date Noted   • No Resolved Ambulatory Problems     Past Medical History:   Diagnosis Date   • Abnormal Pap smear of cervix    • Ankle fracture    • H/O Elevated liver enzymes    • History of chronic back pain    • History of migraine    • History of urinary tract infection    • Injury of back    • PONV (postoperative nausea and vomiting)    • Seasonal allergies          PAST SURGICAL HISTORY  Past Surgical History:   Procedure Laterality Date   • BACK SURGERY  2006    fusion L4, L5     • CHOLECYSTECTOMY     • DILATATION AND CURETTAGE     • SKIN SURGERY     • TUBAL ABDOMINAL LIGATION     • WISDOM TOOTH EXTRACTION  2018    x2         FAMILY HISTORY  Family History   Problem Relation Age of Onset   • Stroke Mother    • Allergic rhinitis Mother    • Allergic rhinitis Sister    • Breast cancer Maternal Aunt    • Cancer Maternal Grandmother    • Allergic rhinitis Paternal Grandfather    • Cancer Paternal Grandfather    • Prostate cancer Paternal Grandfather          SOCIAL HISTORY  Social History     Socioeconomic History   • Marital status: Single     Spouse name: Not on file   • Number of children: 2   • Years of education: Not on file   • Highest education level: Not on file   Occupational History     Employer: UNEMPLOYED   Tobacco Use   • Smoking status: Current Some Day Smoker     Packs/day: 0.50     Last attempt to quit: 2020     Years since quittin.2   • Smokeless tobacco: Never Used   Substance and Sexual Activity   • Alcohol use: Not Currently   • Drug use: Not Currently   • Sexual activity: Defer         ALLERGIES  Patient has no known allergies.        REVIEW OF SYSTEMS  Review of Systems     All systems reviewed and negative except for those discussed in HPI.         PHYSICAL EXAM    ED Triage Vitals   Temp Heart Rate Resp BP SpO2   02/21/21 1104 02/21/21 1122 02/21/21 1122 02/21/21 1122 02/21/21 1122   98.1 °F (36.7 °C) 112 16 130/86 96 %       Physical Exam  GENERAL: Appears older than stated age, non-toxic appearing, not distressed  HENT: normocephalic, atraumatic  EYES: no scleral icterus, PERRL  CV: regular rhythm, tachycardic, no murmur  RESPIRATORY: normal effort, CTAB  ABDOMEN: soft   MUSCULOSKELETAL: no deformity  No cervical, thoracic or lumbar vertebral tenderness to palpation  No step off or crepitus noted  Left lumbar paraspinal tenderness to palpation  Negative straight Leg Raises bilaterally  5/5 muscle strength with dorsiflexion and flexion  2+ DP & PT pulses bilaterally  Normal sensation to bilateral lower extremities, no saddle paresthesia  Left SI joint tenderness  NEURO: alert, moves all extremities, follows commands, mental status normal/baseline  SKIN: warm, dry, no rash, multiple scabbed areas to arm, abdomen  Psych: Appropriate mood and affect  Nursing notes and vital signs reviewed      LAB RESULTS  Recent Results (from the past 24 hour(s))   Basic Metabolic Panel    Collection Time: 02/21/21 11:45 AM    Specimen: Blood   Result Value Ref Range    Glucose 71 65 - 99 mg/dL    BUN 6 6 - 20 mg/dL    Creatinine 1.07 (H) 0.57 - 1.00 mg/dL    Sodium 139 136 - 145 mmol/L    Potassium 3.5 3.5 - 5.2 mmol/L    Chloride 101 98 - 107 mmol/L    CO2 29.7 (H) 22.0 - 29.0 mmol/L    Calcium 9.5 8.6 - 10.5 mg/dL    eGFR Non African Amer 57 (L) >60 mL/min/1.73    BUN/Creatinine Ratio 5.6 (L) 7.0 - 25.0    Anion Gap 8.3 5.0 - 15.0 mmol/L   CBC Auto Differential    Collection Time: 02/21/21 11:45 AM    Specimen: Blood   Result Value Ref Range    WBC 7.82 3.40 - 10.80 10*3/mm3    RBC 4.32 3.77 - 5.28 10*6/mm3    Hemoglobin 13.5 12.0 - 15.9 g/dL    Hematocrit 40.8 34.0 - 46.6 %    MCV 94.4 79.0 - 97.0 fL    MCH 31.3 26.6 - 33.0 pg    MCHC 33.1 31.5 - 35.7 g/dL    RDW 13.4  12.3 - 15.4 %    RDW-SD 46.2 37.0 - 54.0 fl    MPV 10.0 6.0 - 12.0 fL    Platelets 322 140 - 450 10*3/mm3    Neutrophil % 60.0 42.7 - 76.0 %    Lymphocyte % 28.4 19.6 - 45.3 %    Monocyte % 8.1 5.0 - 12.0 %    Eosinophil % 2.7 0.3 - 6.2 %    Basophil % 0.5 0.0 - 1.5 %    Immature Grans % 0.3 0.0 - 0.5 %    Neutrophils, Absolute 4.70 1.70 - 7.00 10*3/mm3    Lymphocytes, Absolute 2.22 0.70 - 3.10 10*3/mm3    Monocytes, Absolute 0.63 0.10 - 0.90 10*3/mm3    Eosinophils, Absolute 0.21 0.00 - 0.40 10*3/mm3    Basophils, Absolute 0.04 0.00 - 0.20 10*3/mm3    Immature Grans, Absolute 0.02 0.00 - 0.05 10*3/mm3    nRBC 0.0 0.0 - 0.2 /100 WBC   Duplex Venous Lower Extremity - Left    Collection Time: 02/21/21  1:09 PM   Result Value Ref Range    Right Common Femoral Spont Y     Right Common Femoral Phasic Y     Right Common Femoral Augment Y     Right Common Femoral Competent Y     Right Common Femoral Compress C     Left Common Femoral Spont Y     Left Common Femoral Phasic Y     Left Common Femoral Augment Y     Left Common Femoral Competent Y     Left Common Femoral Compress C     Left Saphenofemoral Junction Compress C     Left Profunda Femoral Compress C     Left Proximal Femoral Compress C     Left Mid Femoral Spont Y     Left Mid Femoral Phasic Y     Left Mid Femoral Augment Y     Left Mid Femoral Competent Y     Left Mid Femoral Compress C     Left Distal Femoral Compress C     Left Popliteal Spont Y     Left Popliteal Phasic Y     Left Popliteal Augment Y     Left Popliteal Competent Y     Left Popliteal Compress C     Left Posterior Tibial Compress C     Left Peroneal Compress C     Left GastronemiusSoleal Compress C     Left Greater Saph AK Compress C     Left Greater Saph BK Compress C     Left Lesser Saph Compress C        Ordered the above labs and independently reviewed the results.      RADIOLOGY  No Radiology Exams Resulted Within Past 24 Hours    I ordered the above noted radiological studies and viewed the  images on the PACS system.         MEDICAL RECORD REVIEW  Medical records reviewed in [facility], patient was discharged from this hospital December 2, 2020 after being admitted for bilateral leg weakness, splenic infarct, left lower extremity pain    Pleasant 41yo woman admitted with pain in left flank, left low back, and LLE. Please see below for details of admission:     Left flank and left low back pain  -LYNSEY has reviewed imaging and feels most c/w pain from splenic infarct and not any acute spine issue  -continue analgesia, changed hydrocodone to oxycodone and seems to be working well  -complete Medrol DosePack  -continue scheduled muscle relaxer and QHS Elavil  -continue chronic gabapentin  -PT to follow at home  -f/u with PCP in a week     Left knee pain s/p fall  -no fracture noted on XR  -appreciate Ortho attention to pt  -MRI did not reveal any derangement of left knee  -recommend ice and PT     Falls and BLE weakness  -Vitamin D level quite low  -replacing orally now  -continue PT     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 Alberto has started pt on oral folate for folate deficiency noted last admission     Dispo: home today on current med regimen with HH/PT    PROCEDURES    Procedures        DIFFERENTIAL DIAGNOSIS  Differential diagnosis include but are not limited to the following: Sciatica, musculoskeletal pain, left lower extremity DVT, electrolyte abnormality      PROGRESS, DATA ANALYSIS, CONSULTS, AND MEDICAL DECISION MAKING        ED Course as of Feb 21 1903   Sun Feb 21, 2021   1142 Discussed pertinent information from history and physical exam with patient.  Discussed differential diagnosis and plan for ED evaluation/work-up and treatment including will obtain a venous Doppler of the left lower extremity to evaluate for DVT, pain control and labs.  All  questions answered.  Patient is agreeable with this plan.        [MS]   1142 Reviewed pt's history and workup with Dr. Sultana.  After a bedside evaluation, they agree with the plan of care.          [MS]   1317 Discussed with [facility] tech, patient is negative for acute DVT in her left lower extremity.    [MS]   1350 Patient updated on unremarkable work-up seen here today including negative venous Doppler of the left lower extremity.  Did discuss with her her creatinine was mildly elevated and she should increase her water intake and follow-up with her PMD to have that rechecked in a week.  Patient symptoms appear more sciatic in nature.  Patient states she does have a walker at home and was instructed to use that until she felt more steady on her leg.  Patient will be discharged home in stable condition with close follow-up.    [MS]      ED Course User Index  [MS] Prerna Henderson, NELSON     Discussed plan for discharge, as there is no emergent indication for admission. Pt/family is agreeable and understands need for follow up and repeat testing.  Pt is aware that discharge does not mean that nothing is wrong but it indicates no emergency is present that requires admission and they must continue care with follow-up as given below or physician of their choice.   Patient/Family voiced understanding of above instructions.  Patient discharged in stable condition.    DIAGNOSIS  Final diagnoses:   Sciatica, left side       FOLLOW UP   Sahil Cornelius MD  [facility] [id number] [phone]    Call in 1 day        RX     Where to Get Your Medications      You can get these medications from any pharmacy    Bring a paper prescription for each of these medications  · methylPREDNISolone 4 MG dose pack        Medication List      No changes were made to your prescriptions during this visit.           MEDICATIONS GIVEN IN ED    Medications   HYDROcodone-acetaminophen (NORCO)  7.5-325 MG per tablet 1 tablet (1 tablet Oral Given 2/21/21 1145)   cyclobenzaprine (FLEXERIL) tablet 10 mg (10 mg Oral Given 2/21/21 1145)           COURSE & MEDICAL DECISION MAKING  Any/All labs and Any/All Imaging studies that were ordered were reviewed and are noted above.  Results were reviewed/discussed with the patient and they were also made aware of online access.    Pt also made aware that some labs, such as cultures, will not be resulted during ER visit and follow up with PMD is necessary.        Prerna Henderson, APRN  02/21/21 1639

## 2021-02-21 NOTE — ED NOTES
Patient was placed in face mask in first look.  Patient was wearing a face mask throughout our encounter.  I wore protective eye protection throughout the encounter.  Hand hygiene was performed before and after patient encounter.        Brad Jenkins RN  02/21/21 1126

## 2021-02-21 NOTE — ED NOTES
Patient was placed in face mask in first look.  Patient was wearing a face mask throughout our encounter.  I wore protective eye protection throughout the encounter.  Hand hygiene was performed before and after patient encounter.        Brad Jenkins RN  02/21/21 1111

## 2021-02-21 NOTE — DISCHARGE INSTRUCTIONS
Medications as ordered  For mild to moderate pain you should alternate Tylenol  every 4-6 hours  Heat or ice to back  Activity as tolerated  Gentle stretching  Follow up with pmd in 5-7 days if symptoms not improving  Return to er for numbness/tingling to legs, loss of bowel/bladder function, increased pain or any new or worsening symptoms

## 2021-02-21 NOTE — PROGRESS NOTES
Left lower venous doppler complete and preiminary is in negative for dvt to Prerna eHnderson, APRN

## 2021-02-21 NOTE — ED NOTES
Pt to ER by EMS from home c/o left leg pain, hx of DVT, pt reports feels similar to previous episode. Pt reports pain in left lower back and radiates to posterior LLE.    This RN is wearing mask, gloves and goggles at all times during all patient interactions.     Brad Tineo, RN  02/21/21 1288

## 2021-05-31 ENCOUNTER — APPOINTMENT (OUTPATIENT)
Dept: CT IMAGING | Facility: HOSPITAL | Age: 41
End: 2021-05-31

## 2021-05-31 ENCOUNTER — HOSPITAL ENCOUNTER (INPATIENT)
Facility: HOSPITAL | Age: 41
LOS: 1 days | Discharge: ANOTHER HOSPITAL | End: 2021-06-02
Attending: EMERGENCY MEDICINE | Admitting: INTERNAL MEDICINE

## 2021-05-31 DIAGNOSIS — K83.8 COMMON BILE DUCT DILATATION: Primary | ICD-10-CM

## 2021-05-31 DIAGNOSIS — R74.8 ELEVATED ALKALINE PHOSPHATASE LEVEL: ICD-10-CM

## 2021-05-31 DIAGNOSIS — R10.11 RIGHT UPPER QUADRANT ABDOMINAL PAIN: ICD-10-CM

## 2021-05-31 DIAGNOSIS — R79.89 ELEVATED LFTS: ICD-10-CM

## 2021-05-31 LAB
ALBUMIN SERPL-MCNC: 3.7 G/DL (ref 3.5–5.2)
ALBUMIN/GLOB SERPL: 1.4 G/DL
ALP SERPL-CCNC: 225 U/L (ref 39–117)
ALT SERPL W P-5'-P-CCNC: 109 U/L (ref 1–33)
ANION GAP SERPL CALCULATED.3IONS-SCNC: 7 MMOL/L (ref 5–15)
AST SERPL-CCNC: 104 U/L (ref 1–32)
BASOPHILS # BLD AUTO: 0.03 10*3/MM3 (ref 0–0.2)
BASOPHILS NFR BLD AUTO: 0.4 % (ref 0–1.5)
BILIRUB SERPL-MCNC: 0.5 MG/DL (ref 0–1.2)
BILIRUB UR QL STRIP: NEGATIVE
BUN SERPL-MCNC: 9 MG/DL (ref 6–20)
BUN/CREAT SERPL: 12.3 (ref 7–25)
CALCIUM SPEC-SCNC: 9 MG/DL (ref 8.6–10.5)
CHLORIDE SERPL-SCNC: 105 MMOL/L (ref 98–107)
CLARITY UR: ABNORMAL
CO2 SERPL-SCNC: 28 MMOL/L (ref 22–29)
COLOR UR: YELLOW
CREAT SERPL-MCNC: 0.73 MG/DL (ref 0.57–1)
DEPRECATED RDW RBC AUTO: 44.4 FL (ref 37–54)
EOSINOPHIL # BLD AUTO: 0.23 10*3/MM3 (ref 0–0.4)
EOSINOPHIL NFR BLD AUTO: 2.8 % (ref 0.3–6.2)
ERYTHROCYTE [DISTWIDTH] IN BLOOD BY AUTOMATED COUNT: 13.1 % (ref 12.3–15.4)
GFR SERPL CREATININE-BSD FRML MDRD: 89 ML/MIN/1.73
GLOBULIN UR ELPH-MCNC: 2.7 GM/DL
GLUCOSE SERPL-MCNC: 95 MG/DL (ref 65–99)
GLUCOSE UR STRIP-MCNC: NEGATIVE MG/DL
HCT VFR BLD AUTO: 36 % (ref 34–46.6)
HGB BLD-MCNC: 12.2 G/DL (ref 12–15.9)
HGB UR QL STRIP.AUTO: NEGATIVE
HOLD SPECIMEN: NORMAL
HOLD SPECIMEN: NORMAL
IMM GRANULOCYTES # BLD AUTO: 0.06 10*3/MM3 (ref 0–0.05)
IMM GRANULOCYTES NFR BLD AUTO: 0.7 % (ref 0–0.5)
KETONES UR QL STRIP: NEGATIVE
LEUKOCYTE ESTERASE UR QL STRIP.AUTO: NEGATIVE
LIPASE SERPL-CCNC: 24 U/L (ref 13–60)
LYMPHOCYTES # BLD AUTO: 2.11 10*3/MM3 (ref 0.7–3.1)
LYMPHOCYTES NFR BLD AUTO: 25.7 % (ref 19.6–45.3)
MCH RBC QN AUTO: 31.6 PG (ref 26.6–33)
MCHC RBC AUTO-ENTMCNC: 33.9 G/DL (ref 31.5–35.7)
MCV RBC AUTO: 93.3 FL (ref 79–97)
MONOCYTES # BLD AUTO: 0.6 10*3/MM3 (ref 0.1–0.9)
MONOCYTES NFR BLD AUTO: 7.3 % (ref 5–12)
NEUTROPHILS NFR BLD AUTO: 5.17 10*3/MM3 (ref 1.7–7)
NEUTROPHILS NFR BLD AUTO: 63.1 % (ref 42.7–76)
NITRITE UR QL STRIP: NEGATIVE
NRBC BLD AUTO-RTO: 0.1 /100 WBC (ref 0–0.2)
PH UR STRIP.AUTO: 7.5 [PH] (ref 5–8)
PLATELET # BLD AUTO: 311 10*3/MM3 (ref 140–450)
PMV BLD AUTO: 10.1 FL (ref 6–12)
POTASSIUM SERPL-SCNC: 3.9 MMOL/L (ref 3.5–5.2)
PROT SERPL-MCNC: 6.4 G/DL (ref 6–8.5)
PROT UR QL STRIP: ABNORMAL
RBC # BLD AUTO: 3.86 10*6/MM3 (ref 3.77–5.28)
SARS-COV-2 ORF1AB RESP QL NAA+PROBE: NOT DETECTED
SODIUM SERPL-SCNC: 140 MMOL/L (ref 136–145)
SP GR UR STRIP: 1.03 (ref 1–1.03)
UROBILINOGEN UR QL STRIP: ABNORMAL
WBC # BLD AUTO: 8.2 10*3/MM3 (ref 3.4–10.8)
WHOLE BLOOD HOLD SPECIMEN: NORMAL
WHOLE BLOOD HOLD SPECIMEN: NORMAL

## 2021-05-31 PROCEDURE — 99285 EMERGENCY DEPT VISIT HI MDM: CPT

## 2021-05-31 PROCEDURE — 74177 CT ABD & PELVIS W/CONTRAST: CPT

## 2021-05-31 PROCEDURE — 25010000002 HYDROMORPHONE 1 MG/ML SOLUTION: Performed by: INTERNAL MEDICINE

## 2021-05-31 PROCEDURE — G0378 HOSPITAL OBSERVATION PER HR: HCPCS

## 2021-05-31 PROCEDURE — 25010000002 PIPERACILLIN SOD-TAZOBACTAM PER 1 G: Performed by: INTERNAL MEDICINE

## 2021-05-31 PROCEDURE — 25010000002 HYDROMORPHONE PER 4 MG: Performed by: INTERNAL MEDICINE

## 2021-05-31 PROCEDURE — 81003 URINALYSIS AUTO W/O SCOPE: CPT

## 2021-05-31 PROCEDURE — 25010000002 HYDROMORPHONE PER 4 MG: Performed by: EMERGENCY MEDICINE

## 2021-05-31 PROCEDURE — 85025 COMPLETE CBC W/AUTO DIFF WBC: CPT

## 2021-05-31 PROCEDURE — 83690 ASSAY OF LIPASE: CPT | Performed by: PHYSICIAN ASSISTANT

## 2021-05-31 PROCEDURE — 25010000002 MORPHINE PER 10 MG: Performed by: EMERGENCY MEDICINE

## 2021-05-31 PROCEDURE — 25010000002 ONDANSETRON PER 1 MG: Performed by: EMERGENCY MEDICINE

## 2021-05-31 PROCEDURE — 25010000002 IOPAMIDOL 61 % SOLUTION: Performed by: EMERGENCY MEDICINE

## 2021-05-31 PROCEDURE — 25010000002 HYDROMORPHONE 1 MG/ML SOLUTION: Performed by: EMERGENCY MEDICINE

## 2021-05-31 PROCEDURE — U0004 COV-19 TEST NON-CDC HGH THRU: HCPCS | Performed by: PHYSICIAN ASSISTANT

## 2021-05-31 PROCEDURE — 63710000001 DIPHENHYDRAMINE PER 50 MG: Performed by: INTERNAL MEDICINE

## 2021-05-31 PROCEDURE — 25010000002 ONDANSETRON PER 1 MG: Performed by: INTERNAL MEDICINE

## 2021-05-31 PROCEDURE — 80053 COMPREHEN METABOLIC PANEL: CPT

## 2021-05-31 RX ORDER — ONDANSETRON 2 MG/ML
4 INJECTION INTRAMUSCULAR; INTRAVENOUS ONCE
Status: COMPLETED | OUTPATIENT
Start: 2021-05-31 | End: 2021-05-31

## 2021-05-31 RX ORDER — DIPHENHYDRAMINE HCL 50 MG
50 CAPSULE ORAL NIGHTLY
Status: DISCONTINUED | OUTPATIENT
Start: 2021-05-31 | End: 2021-06-02 | Stop reason: HOSPADM

## 2021-05-31 RX ORDER — NICOTINE 21 MG/24HR
1 PATCH, TRANSDERMAL 24 HOURS TRANSDERMAL
Status: DISCONTINUED | OUTPATIENT
Start: 2021-05-31 | End: 2021-06-02 | Stop reason: HOSPADM

## 2021-05-31 RX ORDER — FOLIC ACID 1 MG/1
1 TABLET ORAL DAILY
Status: DISCONTINUED | OUTPATIENT
Start: 2021-05-31 | End: 2021-06-02 | Stop reason: HOSPADM

## 2021-05-31 RX ORDER — SODIUM CHLORIDE 9 MG/ML
75 INJECTION, SOLUTION INTRAVENOUS CONTINUOUS
Status: DISCONTINUED | OUTPATIENT
Start: 2021-05-31 | End: 2021-06-02 | Stop reason: HOSPADM

## 2021-05-31 RX ORDER — ONDANSETRON 2 MG/ML
4 INJECTION INTRAMUSCULAR; INTRAVENOUS EVERY 6 HOURS PRN
Status: DISCONTINUED | OUTPATIENT
Start: 2021-05-31 | End: 2021-06-02 | Stop reason: HOSPADM

## 2021-05-31 RX ORDER — AMITRIPTYLINE HYDROCHLORIDE 25 MG/1
25 TABLET, FILM COATED ORAL NIGHTLY
Status: DISCONTINUED | OUTPATIENT
Start: 2021-05-31 | End: 2021-06-02 | Stop reason: HOSPADM

## 2021-05-31 RX ORDER — HYDROMORPHONE HYDROCHLORIDE 1 MG/ML
0.5 INJECTION, SOLUTION INTRAMUSCULAR; INTRAVENOUS; SUBCUTANEOUS ONCE
Status: COMPLETED | OUTPATIENT
Start: 2021-05-31 | End: 2021-05-31

## 2021-05-31 RX ORDER — HYDROMORPHONE HYDROCHLORIDE 1 MG/ML
0.5 INJECTION, SOLUTION INTRAMUSCULAR; INTRAVENOUS; SUBCUTANEOUS ONCE
Status: COMPLETED | OUTPATIENT
Start: 2021-06-01 | End: 2021-05-31

## 2021-05-31 RX ORDER — HYDROXYZINE 50 MG/1
100 TABLET, FILM COATED ORAL NIGHTLY
Status: DISCONTINUED | OUTPATIENT
Start: 2021-05-31 | End: 2021-06-02 | Stop reason: HOSPADM

## 2021-05-31 RX ORDER — TRAZODONE HYDROCHLORIDE 50 MG/1
50 TABLET ORAL NIGHTLY
Status: DISCONTINUED | OUTPATIENT
Start: 2021-05-31 | End: 2021-06-02 | Stop reason: HOSPADM

## 2021-05-31 RX ORDER — MORPHINE SULFATE 2 MG/ML
4 INJECTION, SOLUTION INTRAMUSCULAR; INTRAVENOUS ONCE
Status: COMPLETED | OUTPATIENT
Start: 2021-05-31 | End: 2021-05-31

## 2021-05-31 RX ORDER — GABAPENTIN 400 MG/1
800 CAPSULE ORAL EVERY 8 HOURS SCHEDULED
Status: DISCONTINUED | OUTPATIENT
Start: 2021-05-31 | End: 2021-06-01

## 2021-05-31 RX ORDER — HYDROMORPHONE HYDROCHLORIDE 1 MG/ML
0.5 INJECTION, SOLUTION INTRAMUSCULAR; INTRAVENOUS; SUBCUTANEOUS EVERY 4 HOURS PRN
Status: DISCONTINUED | OUTPATIENT
Start: 2021-05-31 | End: 2021-06-01

## 2021-05-31 RX ORDER — CYCLOBENZAPRINE HCL 10 MG
10 TABLET ORAL 3 TIMES DAILY
Status: DISCONTINUED | OUTPATIENT
Start: 2021-05-31 | End: 2021-06-02 | Stop reason: HOSPADM

## 2021-05-31 RX ORDER — ACETAMINOPHEN 500 MG
1000 TABLET ORAL NIGHTLY
Status: DISCONTINUED | OUTPATIENT
Start: 2021-05-31 | End: 2021-06-02 | Stop reason: HOSPADM

## 2021-05-31 RX ORDER — ACETAMINOPHEN,DIPHENHYDRAMINE HCL 500; 25 MG/1; MG/1
2 TABLET, FILM COATED ORAL NIGHTLY
Status: DISCONTINUED | OUTPATIENT
Start: 2021-05-31 | End: 2021-05-31 | Stop reason: CLARIF

## 2021-05-31 RX ADMIN — IOPAMIDOL 85 ML: 612 INJECTION, SOLUTION INTRAVENOUS at 17:01

## 2021-05-31 RX ADMIN — ONDANSETRON 4 MG: 2 INJECTION INTRAMUSCULAR; INTRAVENOUS at 15:32

## 2021-05-31 RX ADMIN — ONDANSETRON 4 MG: 2 INJECTION INTRAMUSCULAR; INTRAVENOUS at 17:47

## 2021-05-31 RX ADMIN — ACETAMINOPHEN 1000 MG: 500 TABLET, FILM COATED ORAL at 22:10

## 2021-05-31 RX ADMIN — AMITRIPTYLINE HYDROCHLORIDE 25 MG: 25 TABLET, FILM COATED ORAL at 22:10

## 2021-05-31 RX ADMIN — ONDANSETRON 4 MG: 2 INJECTION INTRAMUSCULAR; INTRAVENOUS at 20:59

## 2021-05-31 RX ADMIN — FOLIC ACID 1 MG: 1 TABLET ORAL at 22:10

## 2021-05-31 RX ADMIN — HYDROMORPHONE HYDROCHLORIDE 0.5 MG: 1 INJECTION, SOLUTION INTRAMUSCULAR; INTRAVENOUS; SUBCUTANEOUS at 20:59

## 2021-05-31 RX ADMIN — TRAZODONE HYDROCHLORIDE 50 MG: 50 TABLET ORAL at 22:09

## 2021-05-31 RX ADMIN — HYDROXYZINE HYDROCHLORIDE 100 MG: 50 TABLET, FILM COATED ORAL at 22:10

## 2021-05-31 RX ADMIN — HYDROMORPHONE HYDROCHLORIDE 0.5 MG: 1 INJECTION, SOLUTION INTRAMUSCULAR; INTRAVENOUS; SUBCUTANEOUS at 23:35

## 2021-05-31 RX ADMIN — SODIUM CHLORIDE 1000 ML: 9 INJECTION, SOLUTION INTRAVENOUS at 16:47

## 2021-05-31 RX ADMIN — HYDROMORPHONE HYDROCHLORIDE 1 MG: 1 INJECTION, SOLUTION INTRAMUSCULAR; INTRAVENOUS; SUBCUTANEOUS at 17:47

## 2021-05-31 RX ADMIN — TAZOBACTAM SODIUM AND PIPERACILLIN SODIUM 3.38 G: 375; 3 INJECTION, SOLUTION INTRAVENOUS at 20:36

## 2021-05-31 RX ADMIN — CYCLOBENZAPRINE 10 MG: 10 TABLET, FILM COATED ORAL at 22:10

## 2021-05-31 RX ADMIN — NICOTINE 1 PATCH: 21 PATCH, EXTENDED RELEASE TRANSDERMAL at 23:57

## 2021-05-31 RX ADMIN — DIPHENHYDRAMINE HYDROCHLORIDE 50 MG: 50 CAPSULE ORAL at 22:10

## 2021-05-31 RX ADMIN — HYDROMORPHONE HYDROCHLORIDE 0.5 MG: 1 INJECTION, SOLUTION INTRAMUSCULAR; INTRAVENOUS; SUBCUTANEOUS at 23:58

## 2021-05-31 RX ADMIN — GABAPENTIN 800 MG: 400 CAPSULE ORAL at 22:10

## 2021-05-31 RX ADMIN — MORPHINE SULFATE 4 MG: 2 INJECTION, SOLUTION INTRAMUSCULAR; INTRAVENOUS at 15:33

## 2021-05-31 RX ADMIN — HYDROMORPHONE HYDROCHLORIDE 0.5 MG: 1 INJECTION, SOLUTION INTRAMUSCULAR; INTRAVENOUS; SUBCUTANEOUS at 16:47

## 2021-05-31 RX ADMIN — SODIUM CHLORIDE 75 ML/HR: 9 INJECTION, SOLUTION INTRAVENOUS at 20:36

## 2021-06-01 ENCOUNTER — INPATIENT HOSPITAL (OUTPATIENT)
Dept: URBAN - METROPOLITAN AREA HOSPITAL 113 | Facility: HOSPITAL | Age: 41
End: 2021-06-01
Payer: COMMERCIAL

## 2021-06-01 ENCOUNTER — APPOINTMENT (OUTPATIENT)
Dept: MRI IMAGING | Facility: HOSPITAL | Age: 41
End: 2021-06-01

## 2021-06-01 DIAGNOSIS — R19.8 OTHER SPECIFIED SYMPTOMS AND SIGNS INVOLVING THE DIGESTIVE S: ICD-10-CM

## 2021-06-01 DIAGNOSIS — K83.1 OBSTRUCTION OF BILE DUCT: ICD-10-CM

## 2021-06-01 DIAGNOSIS — R93.2 ABNORMAL FINDINGS ON DIAGNOSTIC IMAGING OF LIVER AND BILIARY: ICD-10-CM

## 2021-06-01 DIAGNOSIS — R10.9 UNSPECIFIED ABDOMINAL PAIN: ICD-10-CM

## 2021-06-01 PROBLEM — R10.11 RIGHT UPPER QUADRANT ABDOMINAL PAIN: Status: ACTIVE | Noted: 2021-06-01

## 2021-06-01 PROBLEM — R79.89 LFTS ABNORMAL: Status: ACTIVE | Noted: 2021-06-01

## 2021-06-01 LAB — GGT SERPL-CCNC: 302 U/L (ref 5–36)

## 2021-06-01 PROCEDURE — 74183 MRI ABD W/O CNTR FLWD CNTR: CPT

## 2021-06-01 PROCEDURE — 99223 1ST HOSP IP/OBS HIGH 75: CPT | Performed by: INTERNAL MEDICINE

## 2021-06-01 PROCEDURE — 63710000001 DIPHENHYDRAMINE PER 50 MG: Performed by: INTERNAL MEDICINE

## 2021-06-01 PROCEDURE — 25010000002 HYDROMORPHONE PER 4 MG: Performed by: NURSE PRACTITIONER

## 2021-06-01 PROCEDURE — 25010000002 HYDROMORPHONE 1 MG/ML SOLUTION: Performed by: NURSE PRACTITIONER

## 2021-06-01 PROCEDURE — 25010000002 PIPERACILLIN SOD-TAZOBACTAM PER 1 G: Performed by: INTERNAL MEDICINE

## 2021-06-01 PROCEDURE — 0 GADOBENATE DIMEGLUMINE 529 MG/ML SOLUTION: Performed by: HOSPITALIST

## 2021-06-01 PROCEDURE — 82977 ASSAY OF GGT: CPT | Performed by: INTERNAL MEDICINE

## 2021-06-01 PROCEDURE — A9577 INJ MULTIHANCE: HCPCS | Performed by: HOSPITALIST

## 2021-06-01 PROCEDURE — 25010000002 ONDANSETRON PER 1 MG: Performed by: INTERNAL MEDICINE

## 2021-06-01 RX ORDER — FLUOXETINE HYDROCHLORIDE 40 MG/1
60 CAPSULE ORAL NIGHTLY
COMMUNITY

## 2021-06-01 RX ORDER — SODIUM CHLORIDE 0.9 % (FLUSH) 0.9 %
10 SYRINGE (ML) INJECTION AS NEEDED
Status: DISCONTINUED | OUTPATIENT
Start: 2021-06-01 | End: 2021-06-02 | Stop reason: HOSPADM

## 2021-06-01 RX ORDER — FLUOXETINE HYDROCHLORIDE 20 MG/1
40 CAPSULE ORAL NIGHTLY
Status: DISCONTINUED | OUTPATIENT
Start: 2021-06-01 | End: 2021-06-02 | Stop reason: HOSPADM

## 2021-06-01 RX ORDER — SODIUM CHLORIDE 0.9 % (FLUSH) 0.9 %
10 SYRINGE (ML) INJECTION EVERY 12 HOURS SCHEDULED
Status: DISCONTINUED | OUTPATIENT
Start: 2021-06-01 | End: 2021-06-02 | Stop reason: HOSPADM

## 2021-06-01 RX ORDER — FAMOTIDINE 20 MG/1
20 TABLET, FILM COATED ORAL
Status: DISCONTINUED | OUTPATIENT
Start: 2021-06-01 | End: 2021-06-02 | Stop reason: HOSPADM

## 2021-06-01 RX ORDER — ACETAMINOPHEN 325 MG/1
650 TABLET ORAL EVERY 6 HOURS PRN
Status: DISCONTINUED | OUTPATIENT
Start: 2021-06-01 | End: 2021-06-02 | Stop reason: HOSPADM

## 2021-06-01 RX ORDER — HYDROMORPHONE HYDROCHLORIDE 1 MG/ML
0.5 INJECTION, SOLUTION INTRAMUSCULAR; INTRAVENOUS; SUBCUTANEOUS
Status: DISCONTINUED | OUTPATIENT
Start: 2021-06-01 | End: 2021-06-02 | Stop reason: HOSPADM

## 2021-06-01 RX ORDER — GABAPENTIN 400 MG/1
800 CAPSULE ORAL 4 TIMES DAILY
Status: DISCONTINUED | OUTPATIENT
Start: 2021-06-01 | End: 2021-06-02 | Stop reason: HOSPADM

## 2021-06-01 RX ADMIN — HYDROXYZINE HYDROCHLORIDE 100 MG: 50 TABLET, FILM COATED ORAL at 22:36

## 2021-06-01 RX ADMIN — HYDROMORPHONE HYDROCHLORIDE 0.5 MG: 1 INJECTION, SOLUTION INTRAMUSCULAR; INTRAVENOUS; SUBCUTANEOUS at 22:37

## 2021-06-01 RX ADMIN — ONDANSETRON 4 MG: 2 INJECTION INTRAMUSCULAR; INTRAVENOUS at 02:24

## 2021-06-01 RX ADMIN — CYCLOBENZAPRINE 10 MG: 10 TABLET, FILM COATED ORAL at 22:35

## 2021-06-01 RX ADMIN — FLUOXETINE HYDROCHLORIDE 40 MG: 20 CAPSULE ORAL at 22:36

## 2021-06-01 RX ADMIN — NICOTINE 1 PATCH: 21 PATCH, EXTENDED RELEASE TRANSDERMAL at 22:37

## 2021-06-01 RX ADMIN — CYCLOBENZAPRINE 10 MG: 10 TABLET, FILM COATED ORAL at 08:45

## 2021-06-01 RX ADMIN — DIPHENHYDRAMINE HYDROCHLORIDE 50 MG: 50 CAPSULE ORAL at 22:36

## 2021-06-01 RX ADMIN — HYDROMORPHONE HYDROCHLORIDE 0.5 MG: 1 INJECTION, SOLUTION INTRAMUSCULAR; INTRAVENOUS; SUBCUTANEOUS at 09:46

## 2021-06-01 RX ADMIN — ONDANSETRON 4 MG: 2 INJECTION INTRAMUSCULAR; INTRAVENOUS at 15:41

## 2021-06-01 RX ADMIN — FOLIC ACID 1 MG: 1 TABLET ORAL at 08:44

## 2021-06-01 RX ADMIN — SODIUM CHLORIDE 75 ML/HR: 9 INJECTION, SOLUTION INTRAVENOUS at 17:49

## 2021-06-01 RX ADMIN — GABAPENTIN 800 MG: 400 CAPSULE ORAL at 14:29

## 2021-06-01 RX ADMIN — CYCLOBENZAPRINE 10 MG: 10 TABLET, FILM COATED ORAL at 15:40

## 2021-06-01 RX ADMIN — HYDROMORPHONE HYDROCHLORIDE 0.5 MG: 1 INJECTION, SOLUTION INTRAMUSCULAR; INTRAVENOUS; SUBCUTANEOUS at 13:21

## 2021-06-01 RX ADMIN — TAZOBACTAM SODIUM AND PIPERACILLIN SODIUM 3.38 G: 375; 3 INJECTION, SOLUTION INTRAVENOUS at 02:26

## 2021-06-01 RX ADMIN — TAZOBACTAM SODIUM AND PIPERACILLIN SODIUM 3.38 G: 375; 3 INJECTION, SOLUTION INTRAVENOUS at 09:47

## 2021-06-01 RX ADMIN — GADOBENATE DIMEGLUMINE 19 ML: 529 INJECTION, SOLUTION INTRAVENOUS at 21:15

## 2021-06-01 RX ADMIN — HYDROMORPHONE HYDROCHLORIDE 0.5 MG: 1 INJECTION, SOLUTION INTRAMUSCULAR; INTRAVENOUS; SUBCUTANEOUS at 15:37

## 2021-06-01 RX ADMIN — GABAPENTIN 800 MG: 400 CAPSULE ORAL at 22:36

## 2021-06-01 RX ADMIN — GABAPENTIN 800 MG: 400 CAPSULE ORAL at 08:44

## 2021-06-01 RX ADMIN — HYDROMORPHONE HYDROCHLORIDE 0.5 MG: 1 INJECTION, SOLUTION INTRAMUSCULAR; INTRAVENOUS; SUBCUTANEOUS at 06:22

## 2021-06-01 RX ADMIN — HYDROMORPHONE HYDROCHLORIDE 0.5 MG: 1 INJECTION, SOLUTION INTRAMUSCULAR; INTRAVENOUS; SUBCUTANEOUS at 02:23

## 2021-06-01 RX ADMIN — SODIUM CHLORIDE, PRESERVATIVE FREE 10 ML: 5 INJECTION INTRAVENOUS at 13:21

## 2021-06-01 RX ADMIN — HYDROMORPHONE HYDROCHLORIDE 0.5 MG: 1 INJECTION, SOLUTION INTRAMUSCULAR; INTRAVENOUS; SUBCUTANEOUS at 17:44

## 2021-06-01 RX ADMIN — ACETAMINOPHEN 1000 MG: 500 TABLET, FILM COATED ORAL at 22:35

## 2021-06-01 RX ADMIN — ONDANSETRON 4 MG: 2 INJECTION INTRAMUSCULAR; INTRAVENOUS at 08:42

## 2021-06-01 RX ADMIN — AMITRIPTYLINE HYDROCHLORIDE 25 MG: 25 TABLET, FILM COATED ORAL at 22:36

## 2021-06-01 RX ADMIN — SODIUM CHLORIDE, PRESERVATIVE FREE 10 ML: 5 INJECTION INTRAVENOUS at 22:37

## 2021-06-01 RX ADMIN — HYDROMORPHONE HYDROCHLORIDE 0.5 MG: 1 INJECTION, SOLUTION INTRAMUSCULAR; INTRAVENOUS; SUBCUTANEOUS at 20:19

## 2021-06-01 RX ADMIN — FAMOTIDINE 20 MG: 20 TABLET, FILM COATED ORAL at 15:40

## 2021-06-01 RX ADMIN — HYDROMORPHONE HYDROCHLORIDE 0.5 MG: 1 INJECTION, SOLUTION INTRAMUSCULAR; INTRAVENOUS; SUBCUTANEOUS at 11:29

## 2021-06-01 NOTE — PAYOR COMM NOTE
"Carson Mullen (40 y.o. Female)     PLEASE SEE ATTACHED FOR INPT REF#    PLEASE CALL   OR  [phone] WITH REFERENCE NUMBER.    THANK YOU    YARELI MOYER LPN CCP    Date of Birth Social Security Number Address Home Phone MRN    1980  [address]  [facility] [phone] [id number]    Roman [employer] Marital Status          None Single       Admission Date Admission Type Admitting Provider Attending Provider Department, Room/Bed    21 Emergency Kathy Kim MD Masden, Troy Andrew, MD [address], [address]1    Discharge Date Discharge Disposition Discharge Destination                       Attending Provider: Qasim Schmidt MD    Allergies: No Known Allergies    Isolation: None   Infection: None   Code Status: CPR    Ht: 162.6 cm (64\")   Wt: 93 kg (205 lb)    Admission Cmt: None   Principal Problem: Right upper quadrant abdominal pain [R10.11]                 Active Insurance as of 2021     Primary Coverage     Payor Plan Insurance Group Employer/Plan Group    [facility] [employer] BY ALYSHA [id number]     Payor Plan Address Payor Plan Phone Number Payor Plan Fax Number Effective Dates    [address]   2021 - None Entered    [place] [phone]       Subscriber Name Subscriber Birth Date Member ID       CARSON MULLEN 1980 [id number]                 Emergency Contacts      (Rel.) Home Phone Work Phone Mobile Phone    JOSÉ MANUEL AGUAYORY (Significant Other) [phone] -- --               History & Physical      Kathy Kim MD at 21 1930          HISTORY AND PHYSICAL   [facility]        Patient Identification:  Name: Carson Mullen  Age: 40 y.o.  Sex: female  :  1980  MRN: [id number]                     Primary Care Physician: Sahil Cornelius MD    Chief Complaint:  40 year old female who presented to the emergency room with " abdominal pain which started two days ago; she has also had nausea but no vomiting; no fever or chills; she had a cholecystectomy some years ago but had a dilated duct on imaging today    History of Present Illness:   As above    Past Medical History:  Past Medical History:   Diagnosis Date   • Abnormal Pap smear of cervix    • Ankle fracture 2013   • H/O Elevated liver enzymes    • History of chronic back pain    • History of migraine    • History of urinary tract infection    • Injury of back    • PONV (postoperative nausea and vomiting)    • Seasonal allergies      Past Surgical History:  Past Surgical History:   Procedure Laterality Date   • BACK SURGERY  2006    fusion L4, L5     • CHOLECYSTECTOMY  2014   • DILATATION AND CURETTAGE  2009   • SKIN SURGERY     • TUBAL ABDOMINAL LIGATION  2013   • WISDOM TOOTH EXTRACTION  2018    x2      Home Meds:  Medications Prior to Admission   Medication Sig Dispense Refill Last Dose   • amitriptyline (ELAVIL) 25 MG tablet Take 25 mg by mouth Every Night.      • apixaban (ELIQUIS) 5 MG tablet tablet Take 5 mg by mouth 2 (two) times a day.      • cyclobenzaprine (FLEXERIL) 10 MG tablet Take 1 tablet by mouth 3 (Three) Times a Day.      • diphenhydrAMINE-acetaminophen (TYLENOL PM)  MG tablet per tablet Take 2 tablets by mouth Every Night.      • folic acid (FOLVITE) 1 MG tablet Take 1 tablet by mouth Daily. 30 tablet 0    • gabapentin (NEURONTIN) 800 MG tablet Take 800 mg by mouth 3 (Three) Times a Day.      • hydrOXYzine (ATARAX) 25 MG tablet Take 4 tablets by mouth every night at bedtime.      • loratadine (Claritin) 10 MG tablet Take 10 mg by mouth Daily.      • Melatonin 10 MG capsule Take 30 mg by mouth Every Night.      • methylPREDNISolone (MEDROL) 4 MG dose pack Take as directed on package instructions. 21 each 0    • SUMAtriptan (IMITREX) 100 MG tablet Take 100 mg by mouth Every 2 (Two) Hours As Needed for Migraine. Take one tablet at onset of headache. May  repeat dose one time in 2 hours if headache not relieved.      • traZODone (DESYREL) 50 MG tablet Take 50 mg by mouth Every Night.      • vitamin D (ERGOCALCIFEROL) 1.25 MG (49829 UT) capsule capsule Take 1 capsule by mouth Every 7 (Seven) Days. 5 capsule 0        Allergies:  No Known Allergies  Immunizations:  Immunization History   Administered Date(s) Administered   • Hib (PRP-T) 2020   • Meningococcal B,(Bexsero) 2020   • Meningococcal Conjugate 2020   • Pneumococcal Conjugate 13-Valent (PCV13) 2020     Social History:   Social History     Social History Narrative   • Not on file     Social History     Socioeconomic History   • Marital status: Single     Spouse name: Not on file   • Number of children: 2   • Years of education: Not on file   • Highest education level: Not on file   Tobacco Use   • Smoking status: Current Some Day Smoker     Packs/day: 0.50     Last attempt to quit: 2020     Years since quittin.5   • Smokeless tobacco: Never Used   Vaping Use   • Vaping Use: Never used   Substance and Sexual Activity   • Alcohol use: Not Currently   • Drug use: Not Currently   • Sexual activity: Defer       Family History:  Family History   Problem Relation Age of Onset   • Stroke Mother    • Allergic rhinitis Mother    • Allergic rhinitis Sister    • Breast cancer Maternal Aunt    • Cancer Maternal Grandmother    • Allergic rhinitis Paternal Grandfather    • Cancer Paternal Grandfather    • Prostate cancer Paternal Grandfather         Review of Systems  See history of present illness and past medical history.  Patient denies headache, dizziness, syncope, falls, trauma, change in vision, change in hearing, change in taste, changes in weight, changes in appetite, focal weakness, numbness, or paresthesia.  Patient denies chest pain, palpitations, dyspnea, orthopnea, PND, cough, sinus pressure, rhinorrhea, epistaxis, hemoptysis, nausea, vomiting,hematemesis, diarrhea, constipation  "or hematchezia.  Denies cold or heat intolerance, polydipsia, polyuria, polyphagia. Denies hematuria, pyuria, dysuria, hesitancy, frequency or urgency. Denies consumption of raw and under cooked meats foods or change in water source.  Denies fever, chills, sweats, night sweats.  Denies missing any routine medications. Remainder of ROS is negative.    Objective:  T Max 24 hrs: Temp (24hrs), Av.1 °F (37.3 °C), Min:98.6 °F (37 °C), Max:99.6 °F (37.6 °C)    Vitals Ranges:   Temp:  [98.6 °F (37 °C)-99.6 °F (37.6 °C)] 98.6 °F (37 °C)  Heart Rate:  [] 86  Resp:  [16-17] 17  BP: (120-151)/(64-91) 125/64      Exam:  /64   Pulse 86   Temp 98.6 °F (37 °C) (Oral)   Resp 17   Ht 162.6 cm (64\")   Wt 93 kg (205 lb)   LMP 2021 (Exact Date)   SpO2 92%   BMI 35.19 kg/m²     General Appearance:    Alert, cooperative, no distress, appears stated age   Head:    Normocephalic, without obvious abnormality, atraumatic   Eyes:    PERRL, conjunctivae/corneas clear, EOM's intact, both eyes   Ears:    Normal external ear canals, both ears   Nose:   Nares normal, septum midline, mucosa normal, no drainage    or sinus tenderness   Throat:   Lips, mucosa, and tongue normal   Neck:   Supple, symmetrical, trachea midline, no adenopathy;     thyroid:  no enlargement/tenderness/nodules; no carotid    bruit or JVD   Back:     Symmetric, no curvature, ROM normal, no CVA tenderness   Lungs:     Decreased breath sounds bilaterally, respirations unlabored   Chest Wall:    No tenderness or deformity    Heart:    Regular rate and rhythm, S1 and S2 normal, no murmur, rub   or gallop   Abdomen:     Soft, nontender, bowel sounds active all four quadrants,     no masses, no hepatomegaly, no splenomegaly   Extremities:   Extremities normal, atraumatic, no cyanosis or edema   Pulses:   2+ and symmetric all extremities   Skin:   Skin color, texture, turgor normal, no rashes or lesions   Lymph nodes:   Cervical, supraclavicular, and " axillary nodes normal   Neurologic:   CNII-XII intact, normal strength, sensation intact throughout      .    Data Review:  Labs in chart were reviewed.  WBC   Date Value Ref Range Status   05/31/2021 8.20 3.40 - 10.80 10*3/mm3 Final     Hemoglobin   Date Value Ref Range Status   05/31/2021 12.2 12.0 - 15.9 g/dL Final     Hematocrit   Date Value Ref Range Status   05/31/2021 36.0 34.0 - 46.6 % Final     Platelets   Date Value Ref Range Status   05/31/2021 311 140 - 450 10*3/mm3 Final     Sodium   Date Value Ref Range Status   05/31/2021 140 136 - 145 mmol/L Final     Potassium   Date Value Ref Range Status   05/31/2021 3.9 3.5 - 5.2 mmol/L Final     Chloride   Date Value Ref Range Status   05/31/2021 105 98 - 107 mmol/L Final     CO2   Date Value Ref Range Status   05/31/2021 28.0 22.0 - 29.0 mmol/L Final     BUN   Date Value Ref Range Status   05/31/2021 9 6 - 20 mg/dL Final     Creatinine   Date Value Ref Range Status   05/31/2021 0.73 0.57 - 1.00 mg/dL Final     Glucose   Date Value Ref Range Status   05/31/2021 95 65 - 99 mg/dL Final     Calcium   Date Value Ref Range Status   05/31/2021 9.0 8.6 - 10.5 mg/dL Final     AST (SGOT)   Date Value Ref Range Status   05/31/2021 104 (H) 1 - 32 U/L Final     ALT (SGPT)   Date Value Ref Range Status   05/31/2021 109 (H) 1 - 33 U/L Final     Alkaline Phosphatase   Date Value Ref Range Status   05/31/2021 225 (H) 39 - 117 U/L Final                Imaging Results (All)     Procedure Component Value Units Date/Time    CT Abdomen Pelvis With Contrast [[id number]] Collected: 05/31/21 1736     Updated: 05/31/21 1737    Narrative:      CT ABDOMEN AND PELVIS WITH IV CONTRAST     HISTORY: 40-year-old female with right upper quadrant pain.  Cholecystectomy in the past. Splenic infarct history.     TECHNIQUE: Radiation dose reduction techniques were utilized, including  automated exposure control and exposure modulation based on body size.   3 mm images were obtained through the  abdomen and pelvis after the  administration of IV contrast. Compared with previous CT from  11/28/2020.     FINDINGS: The liver appears unremarkable, but there is mild biliary  prominence. The common bile duct measures 1.1 cm in diameter. There is  no dilatation of the pancreatic duct. There is a tiny cystic focus at  the pancreatic duct which likely represents an ectatic side branch,  image 63. Splenic size is normal and a scar is noted at the site of the  previous splenic infarct. Adrenals and right kidney appear unremarkable.  There are multiple left renal scars and a small left renal cyst, stable.  No acute bowel abnormality is seen. The appendix appears normal.  Noncontrasted uterus and adnexa appear unremarkable.       Impression:      1. Development of mild intrahepatic and extrahepatic biliary dilatation.  No definite calcified stone is seen within the common bile duct, but  there may be sludge or possibly tiny stones.  2. There are small patchy central airspace opacities within both  visualized lower lobes. Appearance is nonspecific. Please correlate  clinically for either resolving or early pneumonia. Follow up is  recommended.     Discussed with HANNA Chappell.           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   • Anxiety disorder F41.9   • Functional asplenia Q89.01   • Generalized abdominal pain R10.84   • Bilateral leg weakness R29.898   • Acute bilateral low back pain M54.5   • Antiphospholipid antibody positive R76.0   • On anticoagulant therapy Z79.01   • Acute pain of left knee M25.562   • Vitamin D deficiency E55.9   • Folate deficiency E53.8   • Pain of back and left lower extremity M54.9, M79.605   • Common bile duct dilatation K83.8       Assessment:    Common bile duct dilatation  obesity  Abdominal pain      Plan:  Antibiotics  Gi consult  Antiemetics, fluids, clear liquid diet    Kathy Kim MD  5/31/2021  19:30 EDT      Electronically signed by  Kathy Kim MD at 05/31/21 1932          Emergency Department Notes      Nisha Long, RN at 05/31/21 1343        Pt complains of RUQ abdominal pain with nausea and diarrhea that started yesterday. Darron actual vomiting. States that the pain radiates into her back. Patient masked at arrival and triage staff wore all appropriate PPE during entire encounter with patient.       Electronically signed by Nisha Long RN at 05/31/21 1344     Manda Mares RN at 05/31/21 1350        This RN wore mask, gloves, and protective eyewear throughout patient encounter. Pt wearing mask at all times. Hand hygiene performed before and after entering room.        Manda Mares RN  05/31/21 1350      Electronically signed by Manda Mares RN at 05/31/21 1350     Aylin Noel PA-C at 05/31/21 1410     Attestation signed by Barrera Hauser MD at 06/01/21 1156          For this patient encounter, I reviewed the NP or PA documentation, treatment plan, and medical decision making. Barrera Hauser MD 6/1/2021 11:56 EDT                  EMERGENCY DEPARTMENT ENCOUNTER    Room Number:  N426/1  Date seen:  6/1/2021  Time seen: 14:10 EDT  PCP: Sahil Cornelius MD  Historian: Patient    HPI:  Chief complaint: Abdominal pain  A complete HPI/ROS/PMH/PSH/SH/FH are unobtainable due to: None  Context:Belen Allen is a 40 y.o. female, hx of tubal ligation, splenic infarct, and cholecystectomy, who presents to the ED with c/o constant, sudden right upper quadrant abdominal pain which radiates to her right flank area last night.  And has been constant and rated a 10 out of 10 pain scale.  Nothing makes it better or worse.  Patient denies dysuria, hematuria, urinary frequency or urgency.  She reports that she has history of kidney stones.  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  She says her current symptoms are similar to when patient  was diagnosed with a splenic infarct November 2020.    Patient was placed in face mask in first look. Patient was wearing facemask when I entered the room and throughout our encounter. I wore full protective equipment throughout this patient encounter including a face mask, goggles, and gloves. Hand hygiene was performed before donning protective equipment and after removal when leaving the room.      MEDICAL RECORD REVIEW  Patient was seen here November 2020 for splenic infarct.    ALLERGIES  Patient has no known allergies.    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   • Generalized abdominal pain 11/29/2020   • Bilateral leg weakness 11/29/2020   • Acute bilateral low back pain 11/29/2020   • Antiphospholipid antibody positive 11/29/2020   • On anticoagulant therapy 11/29/2020   • Acute pain of left knee 11/29/2020   • Vitamin D deficiency 11/30/2020   • Folate deficiency 12/01/2020   • Pain of back and left lower extremity 12/02/2020     Resolved Ambulatory Problems     Diagnosis Date Noted   • No Resolved Ambulatory Problems     Past Medical History:   Diagnosis Date   • Abnormal Pap smear of cervix    • Ankle fracture 2013   • H/O Elevated liver enzymes    • History of chronic back pain    • History of migraine    • History of urinary tract infection    • Injury of back    • PONV (postoperative nausea and vomiting)    • Seasonal allergies        PAST SURGICAL HISTORY  Past Surgical History:   Procedure Laterality Date   • BACK SURGERY  2006    fusion L4, L5     • CHOLECYSTECTOMY  2014   • DILATATION AND CURETTAGE  2009   • SKIN SURGERY     • TUBAL ABDOMINAL LIGATION  2013   • WISDOM TOOTH EXTRACTION  2018    x2       FAMILY HISTORY  Family History   Problem Relation Age of Onset   • Stroke Mother    • Allergic rhinitis Mother    • Allergic rhinitis Sister    • Breast cancer Maternal  Aunt    • Cancer Maternal Grandmother    • Allergic rhinitis Paternal Grandfather    • Cancer Paternal Grandfather    • Prostate cancer Paternal Grandfather        SOCIAL HISTORY  Social History     Socioeconomic History   • Marital status: Single     Spouse name: Not on file   • Number of children: 2   • Years of education: Not on file   • Highest education level: Not on file   Tobacco Use   • Smoking status: Current Some Day Smoker     Packs/day: 0.50     Last attempt to quit: 2020     Years since quittin.5   • Smokeless tobacco: Never Used   Vaping Use   • Vaping Use: Never used   Substance and Sexual Activity   • Alcohol use: Not Currently   • Drug use: Not Currently   • Sexual activity: Defer       REVIEW OF SYSTEMS  Review of Systems    All systems reviewed and negative except for those discussed in HPI.     PHYSICAL EXAM    ED Triage Vitals   Temp Heart Rate Resp BP SpO2   21 1356 21 1343 21 1343 21 1343 21 1343   99.6 °F (37.6 °C) 102 16 121/73 97 %      Temp src Heart Rate Source Patient Position BP Location FiO2 (%)   21 1356 21 1343 21 1343 -- --   Oral Monitor Lying       Physical Exam    I have reviewed the triage vital signs and nursing notes.      GENERAL: not distressed,  HENT: nares patent  EYES: no scleral icterus  NECK: no ROM limitations  CV: regular rhythm, regular rate  RESPIRATORY: normal effort, clear to auscultation bilaterally  ABDOMEN: soft, tenderness palpation to the right upper quadrant with guarding, positive Tilley sign  : deferred  MUSCULOSKELETAL: no deformity, no lower extremity swelling, no calf tenderness  NEURO: alert, moves all extremities, follows commands  SKIN: warm, dry    LAB RESULTS  Recent Results (from the past 24 hour(s))   Urinalysis With Microscopic If Indicated (No Culture) - Urine, Clean Catch    Collection Time: 21  2:00 PM    Specimen: Urine, Clean Catch   Result Value Ref Range    Color, UA Yellow  Yellow, Straw    Appearance, UA Cloudy (A) Clear    pH, UA 7.5 5.0 - 8.0    Specific Gravity, UA 1.027 1.005 - 1.030    Glucose, UA Negative Negative    Ketones, UA Negative Negative    Bilirubin, UA Negative Negative    Blood, UA Negative Negative    Protein, UA Trace (A) Negative    Leuk Esterase, UA Negative Negative    Nitrite, UA Negative Negative    Urobilinogen, UA 1.0 E.U./dL 0.2 - 1.0 E.U./dL   Comprehensive Metabolic Panel    Collection Time: 05/31/21  2:01 PM    Specimen: Blood   Result Value Ref Range    Glucose 95 65 - 99 mg/dL    BUN 9 6 - 20 mg/dL    Creatinine 0.73 0.57 - 1.00 mg/dL    Sodium 140 136 - 145 mmol/L    Potassium 3.9 3.5 - 5.2 mmol/L    Chloride 105 98 - 107 mmol/L    CO2 28.0 22.0 - 29.0 mmol/L    Calcium 9.0 8.6 - 10.5 mg/dL    Total Protein 6.4 6.0 - 8.5 g/dL    Albumin 3.70 3.50 - 5.20 g/dL    ALT (SGPT) 109 (H) 1 - 33 U/L    AST (SGOT) 104 (H) 1 - 32 U/L    Alkaline Phosphatase 225 (H) 39 - 117 U/L    Total Bilirubin 0.5 0.0 - 1.2 mg/dL    eGFR Non African Amer 89 >60 mL/min/1.73    Globulin 2.7 gm/dL    A/G Ratio 1.4 g/dL    BUN/Creatinine Ratio 12.3 7.0 - 25.0    Anion Gap 7.0 5.0 - 15.0 mmol/L   CBC Auto Differential    Collection Time: 05/31/21  2:01 PM    Specimen: Blood   Result Value Ref Range    WBC 8.20 3.40 - 10.80 10*3/mm3    RBC 3.86 3.77 - 5.28 10*6/mm3    Hemoglobin 12.2 12.0 - 15.9 g/dL    Hematocrit 36.0 34.0 - 46.6 %    MCV 93.3 79.0 - 97.0 fL    MCH 31.6 26.6 - 33.0 pg    MCHC 33.9 31.5 - 35.7 g/dL    RDW 13.1 12.3 - 15.4 %    RDW-SD 44.4 37.0 - 54.0 fl    MPV 10.1 6.0 - 12.0 fL    Platelets 311 140 - 450 10*3/mm3    Neutrophil % 63.1 42.7 - 76.0 %    Lymphocyte % 25.7 19.6 - 45.3 %    Monocyte % 7.3 5.0 - 12.0 %    Eosinophil % 2.8 0.3 - 6.2 %    Basophil % 0.4 0.0 - 1.5 %    Immature Grans % 0.7 (H) 0.0 - 0.5 %    Neutrophils, Absolute 5.17 1.70 - 7.00 10*3/mm3    Lymphocytes, Absolute 2.11 0.70 - 3.10 10*3/mm3    Monocytes, Absolute 0.60 0.10 - 0.90 10*3/mm3     Eosinophils, Absolute 0.23 0.00 - 0.40 10*3/mm3    Basophils, Absolute 0.03 0.00 - 0.20 10*3/mm3    Immature Grans, Absolute 0.06 (H) 0.00 - 0.05 10*3/mm3    nRBC 0.1 0.0 - 0.2 /100 WBC   Light Blue Top    Collection Time: 05/31/21  2:01 PM   Result Value Ref Range    Extra Tube hold for add-on    Green Top (Gel)    Collection Time: 05/31/21  2:01 PM   Result Value Ref Range    Extra Tube Hold for add-ons.    Lavender Top    Collection Time: 05/31/21  2:01 PM   Result Value Ref Range    Extra Tube hold for add-on    Gold Top - SST    Collection Time: 05/31/21  2:01 PM   Result Value Ref Range    Extra Tube Hold for add-ons.    Lipase    Collection Time: 05/31/21  2:01 PM    Specimen: Blood   Result Value Ref Range    Lipase 24 13 - 60 U/L   COVID-19,APTIMA PANTHER,[facility] IN-HOUSE, NP/OP SWAB IN UTM/VTM/[facility] TRANSPORT MEDIA,24 HR TAT - Swab, Nasopharynx    Collection Time: 05/31/21  5:50 PM    Specimen: Nasopharynx; Swab   Result Value Ref Range    COVID19 Not Detected Not Detected - Ref. Range         RADIOLOGY RESULTS  CT Abdomen Pelvis With Contrast   Final Result   1. Development of mild intrahepatic and extrahepatic biliary dilatation.   No definite calcified stone is seen within the common bile duct, but   there may be sludge or possibly tiny stones.   2. There are small patchy central airspace opacities within both   visualized lower lobes. Appearance is nonspecific. Please correlate   clinically for either resolving or early pneumonia. Follow up is   recommended.       Discussed with HANNA Chappell.       This report was finalized on 6/1/2021 6:26 AM by Dr. Emely Diaz M.D.          MRI abdomen w wo contrast mrcp    (Results Pending)         PROGRESS, DATA ANALYSIS, CONSULTS AND MEDICAL DECISION MAKING  All labs have been independently reviewed by me.  All radiology studies have been reviewed by me and discussed with radiologist dictating the report. Discussion below represents my analysis of pertinent  findings related to patient's condition, differential diagnosis, treatment plan and final disposition.     ED Course as of Hal 01 0950   Mon May 31, 2021   1620 I called the CT staff.  There are 3 people ahead of patient before she can get her CT scan.    [SS]   1626 ALT (SGPT)(!): 109 [SS]   1626 AST (SGOT)(!): 104 [SS]   1626 Alkaline Phosphatase(!): 225 [SS]   1644 I rechecked patient and she reports that the IV morphine temporarily alleviated the pain but only lasted for a few minutes and is now back in pain.  I informed her of her elevated liver enzymes and elevated alk phos, still waiting for patient get her CT abdomen done at this time.  She is resting comfortably in bed.  Will order more pain medication at this time.    [SS]   1726 I discussed with Dr. Diaz, radiology regarding patient CT abdomen.  She notes common bile duct is dilated and is 1.1 cm.  There is mild intrahepatic and extrahepatic biliary dilatation, no definite stones noted.    [SS]   1755 Pt care turned over to Claire Aguilera NP with pending hospitalist consult.     [SS]   1758 I discussed with Dr. Medina, [facility] regarding patient.  Agrees admit patient all questions addressed at this time.    [SS]      ED Course User Index  [SS] Aylin Noel PA-C       The differential diagnosis include but are not limited to cholecystitis, choledocholithiasis, pancreatitis, hepatitis.       Reviewed pt's history and workup with Dr. Hauser.  After a bedside evaluation, Dr. Hauser agrees with the plan of care.      (FOR ADMIT) Based on the patient's lab findings and presenting symptoms, the doctor and I feel it is appropriate to admit the patient for further management, evaluation, and treatment.  I have discussed this with the admitting team.  I have also discussed this with the patient/family.  They are in agreement with admission.          Disposition vitals:  /68 (BP Location: Right arm, Patient Position: Lying)   Pulse 88   Temp 98 °F (36.7 °C)  "(Oral)   Resp 18   Ht 162.6 cm (64\")   Wt 93 kg (205 lb)   LMP 05/17/2021 (Exact Date)   SpO2 97%   BMI 35.19 kg/m²       DIAGNOSIS  Final diagnoses:   Elevated LFTs   Elevated alkaline phosphatase level   Right upper quadrant abdominal pain   Common bile duct dilatation       FOLLOW UP   No follow-up provider specified.       Aylin Noel PA-C  06/01/21 0950      Electronically signed by Barrera Hauser MD at 06/01/21 1156     Barrera Hauser MD at 05/31/21 1522        Pt presents to the ED c/o  right upper quadrant pain onset yesterday.  Her gallbladder has been surgically removed.  She does report history of kidney stones as well.  She denies dysuria.  The pain is severe.  She has a history of blood clots in her spleen for which she is on Eliquis.     On exam,   Awake and alert, appears uncomfortable.  Moderate tenderness in the right upper quadrant without rebound or guarding.  Skin is normal to inspection, no rash on the right flank.     Plan: LFTs are elevated.  CT abdomen pelvis pending for further evaluation of patient's right upper quadrant abdominal pain.      I wore a mask, face shield, and gloves during this patient encounter.  Patient also wearing a surgical mask.  Hand hygeine performed before and after seeing the patient.     Attestation:  The RAY and I have discussed this patient's history, physical exam, and treatment plan.  I have reviewed the documentation and personally had a face to face interaction with the patient. I affirm the documentation and agree with the treatment and plan.  The attached note describes my personal findings.            Barrera Huaser MD  05/31/21 1523      Electronically signed by Barrera Hauser MD at 05/31/21 1523       "

## 2021-06-01 NOTE — PROGRESS NOTES
"    DAILY PROGRESS NOTE  [facility]    Patient Identification:  Name: Belen Allen  Age: 40 y.o.  Sex: female  :  1980  MRN: [id number]         Primary Care Physician: Sahil Cornelius MD    Subjective:  Interval History: Complains of pain.  A bit interested in exact details regarding opiate dosing and requested more.  Admits to nausea but denies emesis.  Discussed case with RN and apparently patient has no issues finishing her tray of food despite the reported nausea.  Denies any chest pain troubles breathing or altered mentation    Objective: Nontoxic.  Conversational and pleasant to me.  No family at bedside patient did not asked me to discuss her case with anyone additionally.  Case was discussed in multidisciplinary rounds    Scheduled Meds:acetaminophen, 1,000 mg, Oral, Nightly   And  diphenhydrAMINE, 50 mg, Oral, Nightly  amitriptyline, 25 mg, Oral, Nightly  cyclobenzaprine, 10 mg, Oral, TID  folic acid, 1 mg, Oral, Daily  gabapentin, 800 mg, Oral, Q8H  hydrOXYzine, 100 mg, Oral, Nightly  nicotine, 1 patch, Transdermal, Q24H  traZODone, 50 mg, Oral, Nightly      Continuous Infusions:sodium chloride, 75 mL/hr, Last Rate: 75 mL/hr (21 0644)        Vital signs in last 24 hours:  Temp:  [97.7 °F (36.5 °C)-99.6 °F (37.6 °C)] 98 °F (36.7 °C)  Heart Rate:  [] 88  Resp:  [16-20] 18  BP: (105-151)/(63-91) 105/68    Intake/Output:    Intake/Output Summary (Last 24 hours) at 2021 1102  Last data filed at 2021 0900  Gross per 24 hour   Intake 1210 ml   Output --   Net 1210 ml       Exam:  /68 (BP Location: Right arm, Patient Position: Lying)   Pulse 88   Temp 98 °F (36.7 °C) (Oral)   Resp 18   Ht 162.6 cm (64\")   Wt 93 kg (205 lb)   LMP 2021 (Exact Date)   SpO2 97%   BMI 35.19 kg/m²     General Appearance:    Alert, cooperative, histrionic, AAOx3                          Head:    Normocephalic, without obvious abnormality, atraumatic              "              Eyes:   Conjunctivae/corneas clear, EOM's intact, both eyes                         Throat:   Oral mucosa pink and moist                           Neck:   No JVD                         Lungs:    Clear to auscultation bilaterally, respirations unlabored                         Heart:    Regular rate and rhythm, S1 and S2 normal                  Abdomen:     Soft, was nontender in her abdomen until I had a pinpoint spot of her right upper quadrant and then she jumped and withdrew in pain, otherwise no mass or further rebound or guarding                 extremities: Moving all, no cyanosis or edema                        Pulses:   Pulses palpable in lower extremities                            Skin:   Skin is warm and dry, nonjaundiced                  Neurologic:   CNII-XII intact, no focal deficits noted     Data Review:  Labs in chart were reviewed.    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       Plan:    Right upper quadrant pain with CT showing mild intra and extrahepatic biliary dilatation with the past history of cholecystectomy concerning for possible retained stone products   -DC Zosyn as I am unsure of the clinical necessity   -Reduce diet to full liquids   -Past history of splenic infarct with AC on hold in case we move towards ERCP   -Elevated LFTs are new when compared to last CMP on 11/30/2020   -Pain management was consulted and signed off -I am not changing any pain regimen with no plans to increase opiates until I further see the MRCP   -Case discussed in multidisciplinary rounds -concerns for secondary gain noted      Abnormalities noted on CT the abdomen pelvis regarding her  lung   -I do not feel aspects of pneumonia are currently present.  We will check a procalcitonin with a.m. labs to be a bit more definitive.   -Tobacco use    Chronic pain syndrome on Elavil, Flexeril, gabapentin prior to admission    Add SCDs for additional prophylaxis    Pepcid for GI prophylaxis    Qasim Schmidt MD  6/1/2021  11:02 EDT

## 2021-06-01 NOTE — CONSULTS
[facility]   Consult Note    Patient Name: Belen Allen  : 1980  MRN: [id number]  Primary Care Physician:  Sahil Cornelius MD  Referring Physician: Kathy Kim MD  Date of admission: 2021    Inpatient Gastroenterology Consult  Consult performed by: Vance Marshall MD  Consult ordered by: Kathy Kim MD        Subjective   Subjective     Reason for Consult/ Chief Complaint: back and right flank pain     History of Present Illness  Belen Allen is a 40 y.o. female presents with acute on chronic right sided abdominal pain.  This is worse along the lower ribs into the right back in a band like fashion.  No change in urine,  Not worse with eating .  She denies any black or bloody stools. She notes a lump in her back that is tender to touch.  The pain is intense states no worse with bending but seems less bothersome when sitting still.  She is hungry this am      Review of Systems   Gastrointestinal: Positive for abdominal pain.   Musculoskeletal: Positive for back pain.        Personal History     Past Medical History:   Diagnosis Date   • Abnormal Pap smear of cervix    • Ankle fracture    • H/O Elevated liver enzymes    • History of chronic back pain    • History of migraine    • History of urinary tract infection    • Injury of back    • PONV (postoperative nausea and vomiting)    • Seasonal allergies        Past Surgical History:   Procedure Laterality Date   • BACK SURGERY  2006    fusion L4, L5     • CHOLECYSTECTOMY     • DILATATION AND CURETTAGE     • SKIN SURGERY     • TUBAL ABDOMINAL LIGATION     • WISDOM TOOTH EXTRACTION  2018    x2       Family History: family history includes Allergic rhinitis in her mother, paternal grandfather, and sister; Breast cancer in her maternal aunt; Cancer in her maternal grandmother and paternal grandfather; Prostate cancer in her paternal grandfather; Stroke in her mother. Otherwise pertinent FHx was  reviewed and not pertinent to current issue.    Social History:  reports that she has been smoking. She has been smoking about 0.50 packs per day. She has never used smokeless tobacco. She reports previous alcohol use. She reports previous drug use.    Home Medications:   Melatonin, SUMAtriptan, amitriptyline, apixaban, cyclobenzaprine, diphenhydrAMINE-acetaminophen, folic acid, gabapentin, hydrOXYzine, loratadine, methylPREDNISolone, traZODone, and vitamin D    Allergies:  No Known Allergies    Objective    Objective     Vitals:  Temp:  [97.7 °F (36.5 °C)-99.6 °F (37.6 °C)] 97.7 °F (36.5 °C)  Heart Rate:  [] 78  Resp:  [16-20] 18  BP: (120-151)/(63-91) 127/63    Physical Exam  HENT:      Right Ear: External ear normal.      Left Ear: External ear normal.      Mouth/Throat:      Pharynx: Oropharynx is clear.   Eyes:      Conjunctiva/sclera: Conjunctivae normal.   Cardiovascular:      Rate and Rhythm: Normal rate.      Pulses: Normal pulses.   Pulmonary:      Effort: Pulmonary effort is normal.   Abdominal:      General: Abdomen is flat. Bowel sounds are normal.      Tenderness: There is abdominal tenderness in the right upper quadrant.      Comments: mnimal ruq tender.  Right sided tenderness in paraspinal area to the back.  Trigger point tender on the right lower thoracic/lumbar area   Skin:     General: Skin is warm and dry.   Neurological:      General: No focal deficit present.      Mental Status: She is alert.   Psychiatric:         Mood and Affect: Mood normal.         Result Review    Result Review:  I have personally reviewed the results from the time of this admission to 6/1/2021 07:08 EDT and agree with these findings:  []  Laboratory  []  Microbiology  []  Radiology  []  EKG/Telemetry   []  Cardiology/Vascular   []  Pathology  []  Old records  []  Other:    Assessment/Plan   Assessment / Plan     Brief Patient Summary:  Belen Allen is a 40 y.o. female   Right sided abdominal pain- certainly  there may be  A biliary component to this but pain is mostly back and flank, worse with palpation along the spine,  I believe a larger component of this  From her back an parapspinal muscles  Abnormal imaging of the bile duct  Increased liver function tests.   Active Hospital Problems:  Active Hospital Problems    Diagnosis    • Common bile duct dilatation      Plan: pain management consult for the back pain, musculoskeletal component of this pain  Will check an MRCP to better characterized bile duct  I have stressed to patient I dont believe the bile duct is causing all this discomfort, there is a back/muscular issue that is a big component so having an ercp done quickly may minimally improve discomfort.  I would hold eliquis for now in event ERCP is done     Electronically signed by Vance Marshall MD, 06/01/21, 7:08 AM EDT.

## 2021-06-01 NOTE — PROGRESS NOTES
[facility]     Progress Note    Patient Name: Belen Allen  : 1980  MRN: [id number]  Primary Care Physician:  Sahil Cornelius MD  Date of admission: 2021    Subjective   Subjective     Chief Complaint: Is a pleasant 40-year-old lady complaining of right upper quadrant pain as well as thoracic back pain    History of Present Illness  Patient Reports that she has very significant pain in her right upper quadrant as well as throughout her right side and her mid back area.  She also has low back pain which is longstanding and has had surgery already for this.    Pain management was consulted by Dr. Vance Marshall for pain medication recommendations.    Review of Systems    Objective   Objective     Vitals:   Temp:  [36.5 °C (97.7 °F)-37.6 °C (99.6 °F)] 36.7 °C (98 °F)  Heart Rate:  [] 88  Resp:  [16-20] 18  BP: (105-151)/(63-91) 105/68    Physical Exam     This patient is exquisitely tender throughout her right upper quadrant of her abdomen, her right flank, and the majority of her thoracic spine.  Even the very slightest touch of her skin causes her to jump and scream out in pain.  She was unable to tolerate deep palpation.  She is uncertain whether her thoracic pain in her abdominal/flank pain are related.  Any movement causes extreme pain to the patient, and obviously, the physical exam is somewhat limited today.    She also reports some sort of lump of tissue to the right of her mid thoracic spine.  Unfortunately, I was unable to definitively palpate this today.    Result Review    Most notable findings include:     She had a CT scan of her abdomen and pelvis with contrast yesterday.  No definitive stone was seen in the common bile duct.  The radiologist notes possibility of a sludge or tiny stones.  I will leave interpretation and management of this to her gastroenterologist.    She has an MRI of her abdomen pending.  I will review that obviously to her gastroenterologist.    She  has an MRI of her lumbar spine with and without contrast from 11/29/2020 which does show a left renal cyst measuring 2 mm.  I will leave management of this to the ordering physician.  Relative to today's consult, it is noted that her T9-L3 spine shows no disc or bony abnormality.  I do not currently see any other thoracic studies available on her.    Assessment/Plan   Assessment / Plan     Brief Patient Summary:  Belen Allen is a 40 y.o. female who has exquisite pain throughout her right upper quadrant, flank, and right-sided mid and low back.    Active Hospital Problems:  Active Hospital Problems    Diagnosis    • Common bile duct dilatation      Plan: I think it is unlikely that this patient would benefit from a pain management injection at this time.  She is exquisitely tender to even the slightest touch, and I am not sure any particular intervention that we offer would be helpful for her.    She tells me that her current pain medicine regimen is very helpful but is not lasting long enough.  Patient's MAR suggest that she is receiving Tylenol, Benadryl, Elavil, Flexeril, gabapentin, morphine, and Dilaudid for her pain.  She thinks that her opioid medication does not last long enough.    At this time, the primary team may want to consider increasing the frequency of her opioid medication as it does not seem to be lasting long enough for her.  Obviously, she will need to be monitored for side effects including but not limited to respiratory depression.  Alternatively, the primary team may wish to choose a longer lasting opiate option.    I do not currently have a recommendation of any other pain medications to add to her regimen.    I am currently uncertain of the etiology of her multiple pains including the lump that she claims she has to the right of her mid thoracic spine.  Obviously, I will leave work-up for these issues to the primary team.    Anesthesia/pain management will sign off at this time and  are available in the future if needed.    DVT prophylaxis:  No DVT prophylaxis order currently exists.    CODE STATUS:           Electronically signed by Karl Cota MD, 06/01/21, 10:36 AM EDT.

## 2021-06-02 ENCOUNTER — HOSPITAL ENCOUNTER (OUTPATIENT)
Facility: HOSPITAL | Age: 41
Setting detail: OBSERVATION
Discharge: HOME | End: 2021-06-03
Attending: INTERNAL MEDICINE | Admitting: INTERNAL MEDICINE

## 2021-06-02 ENCOUNTER — ANESTHESIA EVENT (OUTPATIENT)
Dept: GASTROENTEROLOGY | Facility: HOSPITAL | Age: 41
End: 2021-06-02

## 2021-06-02 ENCOUNTER — ANESTHESIA (OUTPATIENT)
Dept: GASTROENTEROLOGY | Facility: HOSPITAL | Age: 41
End: 2021-06-02

## 2021-06-02 VITALS
RESPIRATION RATE: 16 BRPM | DIASTOLIC BLOOD PRESSURE: 82 MMHG | OXYGEN SATURATION: 97 % | HEIGHT: 64 IN | TEMPERATURE: 98.3 F | SYSTOLIC BLOOD PRESSURE: 128 MMHG | HEART RATE: 78 BPM | BODY MASS INDEX: 35 KG/M2 | WEIGHT: 205 LBS

## 2021-06-02 DIAGNOSIS — K83.8 COMMON BILE DUCT DILATATION: ICD-10-CM

## 2021-06-02 DIAGNOSIS — R79.89 LFTS ABNORMAL: ICD-10-CM

## 2021-06-02 DIAGNOSIS — R10.11 RIGHT UPPER QUADRANT ABDOMINAL PAIN: Primary | ICD-10-CM

## 2021-06-02 PROBLEM — K80.50 CHOLEDOCHOLITHIASIS: Status: ACTIVE | Noted: 2021-06-02

## 2021-06-02 PROBLEM — Z72.0 TOBACCO ABUSE: Chronic | Status: ACTIVE | Noted: 2021-06-02

## 2021-06-02 PROBLEM — F41.9 ANXIETY DISORDER: Chronic | Status: ACTIVE | Noted: 2020-11-09

## 2021-06-02 PROBLEM — E53.8 FOLATE DEFICIENCY: Chronic | Status: ACTIVE | Noted: 2020-12-01

## 2021-06-02 PROBLEM — K21.9 GERD WITHOUT ESOPHAGITIS: Chronic | Status: ACTIVE | Noted: 2021-06-02

## 2021-06-02 PROBLEM — E66.9 OBESITY (BMI 35.0-39.9 WITHOUT COMORBIDITY): Status: ACTIVE | Noted: 2019-04-18

## 2021-06-02 PROBLEM — E66.9 OBESITY (BMI 35.0-39.9 WITHOUT COMORBIDITY): Chronic | Status: ACTIVE | Noted: 2019-04-18

## 2021-06-02 LAB
ALBUMIN SERPL-MCNC: 3.2 G/DL (ref 3.5–5.2)
ALBUMIN/GLOB SERPL: 1.3 G/DL
ALP SERPL-CCNC: 200 U/L (ref 39–117)
ALT SERPL W P-5'-P-CCNC: 81 U/L (ref 1–33)
ANION GAP SERPL CALCULATED.3IONS-SCNC: 8.1 MMOL/L (ref 5–15)
AST SERPL-CCNC: 49 U/L (ref 1–32)
B-HCG UR QL: NEGATIVE
BASOPHILS # BLD AUTO: 0 10*3/MM3 (ref 0–0.2)
BASOPHILS NFR BLD AUTO: 0.5 % (ref 0–1.5)
BILIRUB SERPL-MCNC: 0.2 MG/DL (ref 0–1.2)
BUN SERPL-MCNC: 8 MG/DL (ref 6–20)
BUN/CREAT SERPL: 11.6 (ref 7–25)
CALCIUM SPEC-SCNC: 8.4 MG/DL (ref 8.6–10.5)
CHLORIDE SERPL-SCNC: 104 MMOL/L (ref 98–107)
CO2 SERPL-SCNC: 25.9 MMOL/L (ref 22–29)
CREAT SERPL-MCNC: 0.69 MG/DL (ref 0.57–1)
DEPRECATED RDW RBC AUTO: 44.6 FL (ref 37–54)
EOSINOPHIL # BLD AUTO: 0.2 10*3/MM3 (ref 0–0.4)
EOSINOPHIL NFR BLD AUTO: 2.8 % (ref 0.3–6.2)
ERYTHROCYTE [DISTWIDTH] IN BLOOD BY AUTOMATED COUNT: 13.8 % (ref 12.3–15.4)
GFR SERPL CREATININE-BSD FRML MDRD: 95 ML/MIN/1.73
GLOBULIN UR ELPH-MCNC: 2.5 GM/DL
GLUCOSE SERPL-MCNC: 124 MG/DL (ref 65–99)
HCT VFR BLD AUTO: 35.5 % (ref 34–46.6)
HGB BLD-MCNC: 12.1 G/DL (ref 12–15.9)
LYMPHOCYTES # BLD AUTO: 2.3 10*3/MM3 (ref 0.7–3.1)
LYMPHOCYTES NFR BLD AUTO: 30.8 % (ref 19.6–45.3)
MCH RBC QN AUTO: 31.5 PG (ref 26.6–33)
MCHC RBC AUTO-ENTMCNC: 34.1 G/DL (ref 31.5–35.7)
MCV RBC AUTO: 92.4 FL (ref 79–97)
MONOCYTES # BLD AUTO: 0.5 10*3/MM3 (ref 0.1–0.9)
MONOCYTES NFR BLD AUTO: 6.2 % (ref 5–12)
NEUTROPHILS NFR BLD AUTO: 4.5 10*3/MM3 (ref 1.7–7)
NEUTROPHILS NFR BLD AUTO: 59.7 % (ref 42.7–76)
NRBC BLD AUTO-RTO: 0.1 /100 WBC (ref 0–0.2)
PLATELET # BLD AUTO: 290 10*3/MM3 (ref 140–450)
PMV BLD AUTO: 8 FL (ref 6–12)
POTASSIUM SERPL-SCNC: 3.7 MMOL/L (ref 3.5–5.2)
PROCALCITONIN SERPL-MCNC: 0.06 NG/ML (ref 0–0.25)
PROT SERPL-MCNC: 5.7 G/DL (ref 6–8.5)
RBC # BLD AUTO: 3.85 10*6/MM3 (ref 3.77–5.28)
SODIUM SERPL-SCNC: 138 MMOL/L (ref 136–145)
WBC # BLD AUTO: 7.6 10*3/MM3 (ref 3.4–10.8)

## 2021-06-02 PROCEDURE — 25010000002 KETOROLAC TROMETHAMINE PER 15 MG: Performed by: NURSE PRACTITIONER

## 2021-06-02 PROCEDURE — 25010000002 HYDROMORPHONE PER 4 MG: Performed by: NURSE PRACTITIONER

## 2021-06-02 PROCEDURE — 85025 COMPLETE CBC W/AUTO DIFF WBC: CPT | Performed by: NURSE PRACTITIONER

## 2021-06-02 PROCEDURE — 83516 IMMUNOASSAY NONANTIBODY: CPT | Performed by: INTERNAL MEDICINE

## 2021-06-02 PROCEDURE — G0378 HOSPITAL OBSERVATION PER HR: HCPCS

## 2021-06-02 PROCEDURE — 96375 TX/PRO/DX INJ NEW DRUG ADDON: CPT

## 2021-06-02 PROCEDURE — 81025 URINE PREGNANCY TEST: CPT | Performed by: NURSE PRACTITIONER

## 2021-06-02 PROCEDURE — 96376 TX/PRO/DX INJ SAME DRUG ADON: CPT

## 2021-06-02 PROCEDURE — 96374 THER/PROPH/DIAG INJ IV PUSH: CPT

## 2021-06-02 PROCEDURE — 99220 PR INITIAL OBSERVATION CARE/DAY 70 MINUTES: CPT | Performed by: INTERNAL MEDICINE

## 2021-06-02 PROCEDURE — 80053 COMPREHEN METABOLIC PANEL: CPT | Performed by: HOSPITALIST

## 2021-06-02 PROCEDURE — 99232 SBSQ HOSP IP/OBS MODERATE 35: CPT | Performed by: INTERNAL MEDICINE

## 2021-06-02 PROCEDURE — 84145 PROCALCITONIN (PCT): CPT | Performed by: HOSPITALIST

## 2021-06-02 RX ORDER — CHOLECALCIFEROL (VITAMIN D3) 125 MCG
5 CAPSULE ORAL NIGHTLY PRN
Status: DISCONTINUED | OUTPATIENT
Start: 2021-06-02 | End: 2021-06-03 | Stop reason: HOSPADM

## 2021-06-02 RX ORDER — HYDROXYZINE HYDROCHLORIDE 25 MG/1
100 TABLET, FILM COATED ORAL NIGHTLY
Status: DISCONTINUED | OUTPATIENT
Start: 2021-06-02 | End: 2021-06-03 | Stop reason: HOSPADM

## 2021-06-02 RX ORDER — FAMOTIDINE 20 MG/1
20 TABLET, FILM COATED ORAL
Start: 2021-06-02 | End: 2022-11-30 | Stop reason: HOSPADM

## 2021-06-02 RX ORDER — SODIUM CHLORIDE 9 MG/ML
75 INJECTION, SOLUTION INTRAVENOUS CONTINUOUS
Status: DISCONTINUED | OUTPATIENT
Start: 2021-06-02 | End: 2021-06-03

## 2021-06-02 RX ORDER — ALUMINA, MAGNESIA, AND SIMETHICONE 2400; 2400; 240 MG/30ML; MG/30ML; MG/30ML
15 SUSPENSION ORAL EVERY 6 HOURS PRN
Status: DISCONTINUED | OUTPATIENT
Start: 2021-06-02 | End: 2021-06-03 | Stop reason: HOSPADM

## 2021-06-02 RX ORDER — CHOLECALCIFEROL (VITAMIN D3) 125 MCG
10 CAPSULE ORAL NIGHTLY
COMMUNITY

## 2021-06-02 RX ORDER — ACETAMINOPHEN 325 MG/1
650 TABLET ORAL EVERY 4 HOURS PRN
Status: DISCONTINUED | OUTPATIENT
Start: 2021-06-02 | End: 2021-06-03 | Stop reason: HOSPADM

## 2021-06-02 RX ORDER — ONDANSETRON 2 MG/ML
4 INJECTION INTRAMUSCULAR; INTRAVENOUS EVERY 6 HOURS PRN
Status: DISCONTINUED | OUTPATIENT
Start: 2021-06-02 | End: 2021-06-03 | Stop reason: HOSPADM

## 2021-06-02 RX ORDER — DIPHENHYDRAMINE HCL 25 MG
50 CAPSULE ORAL NIGHTLY
Status: DISCONTINUED | OUTPATIENT
Start: 2021-06-02 | End: 2021-06-02

## 2021-06-02 RX ORDER — ACETAMINOPHEN 500 MG
1000 TABLET ORAL NIGHTLY
Status: DISCONTINUED | OUTPATIENT
Start: 2021-06-02 | End: 2021-06-02

## 2021-06-02 RX ORDER — SODIUM CHLORIDE 0.9 % (FLUSH) 0.9 %
10 SYRINGE (ML) INJECTION EVERY 12 HOURS SCHEDULED
Status: DISCONTINUED | OUTPATIENT
Start: 2021-06-02 | End: 2021-06-03 | Stop reason: HOSPADM

## 2021-06-02 RX ORDER — ACETAMINOPHEN 650 MG/1
650 SUPPOSITORY RECTAL EVERY 4 HOURS PRN
Status: DISCONTINUED | OUTPATIENT
Start: 2021-06-02 | End: 2021-06-03 | Stop reason: HOSPADM

## 2021-06-02 RX ORDER — MAGNESIUM SULFATE HEPTAHYDRATE 40 MG/ML
2 INJECTION, SOLUTION INTRAVENOUS AS NEEDED
Status: DISCONTINUED | OUTPATIENT
Start: 2021-06-02 | End: 2021-06-03 | Stop reason: HOSPADM

## 2021-06-02 RX ORDER — MAGNESIUM SULFATE 1 G/100ML
1 INJECTION INTRAVENOUS AS NEEDED
Status: DISCONTINUED | OUTPATIENT
Start: 2021-06-02 | End: 2021-06-03 | Stop reason: HOSPADM

## 2021-06-02 RX ORDER — KETOROLAC TROMETHAMINE 30 MG/ML
30 INJECTION, SOLUTION INTRAMUSCULAR; INTRAVENOUS EVERY 6 HOURS PRN
Status: DISCONTINUED | OUTPATIENT
Start: 2021-06-02 | End: 2021-06-03 | Stop reason: HOSPADM

## 2021-06-02 RX ORDER — CHOLECALCIFEROL (VITAMIN D3) 125 MCG
10 CAPSULE ORAL NIGHTLY
Status: DISCONTINUED | OUTPATIENT
Start: 2021-06-02 | End: 2021-06-03 | Stop reason: HOSPADM

## 2021-06-02 RX ORDER — FAMOTIDINE 20 MG/1
20 TABLET, FILM COATED ORAL
Status: DISCONTINUED | OUTPATIENT
Start: 2021-06-02 | End: 2021-06-03 | Stop reason: HOSPADM

## 2021-06-02 RX ORDER — TRAZODONE HYDROCHLORIDE 50 MG/1
50 TABLET ORAL NIGHTLY
Status: DISCONTINUED | OUTPATIENT
Start: 2021-06-02 | End: 2021-06-03 | Stop reason: HOSPADM

## 2021-06-02 RX ORDER — NICOTINE 21 MG/24HR
1 PATCH, TRANSDERMAL 24 HOURS TRANSDERMAL
Status: DISCONTINUED | OUTPATIENT
Start: 2021-06-02 | End: 2021-06-03 | Stop reason: HOSPADM

## 2021-06-02 RX ORDER — ONDANSETRON 4 MG/1
4 TABLET, FILM COATED ORAL EVERY 6 HOURS PRN
Status: DISCONTINUED | OUTPATIENT
Start: 2021-06-02 | End: 2021-06-03 | Stop reason: HOSPADM

## 2021-06-02 RX ORDER — HYDROMORPHONE HCL 110MG/55ML
1 PATIENT CONTROLLED ANALGESIA SYRINGE INTRAVENOUS EVERY 4 HOURS PRN
Status: DISCONTINUED | OUTPATIENT
Start: 2021-06-02 | End: 2021-06-03

## 2021-06-02 RX ORDER — POTASSIUM CHLORIDE 20 MEQ/1
40 TABLET, EXTENDED RELEASE ORAL AS NEEDED
Status: DISCONTINUED | OUTPATIENT
Start: 2021-06-02 | End: 2021-06-03 | Stop reason: HOSPADM

## 2021-06-02 RX ORDER — ACETAMINOPHEN 160 MG/5ML
650 SOLUTION ORAL EVERY 4 HOURS PRN
Status: DISCONTINUED | OUTPATIENT
Start: 2021-06-02 | End: 2021-06-03 | Stop reason: HOSPADM

## 2021-06-02 RX ORDER — FLUOXETINE HYDROCHLORIDE 20 MG/1
40 CAPSULE ORAL NIGHTLY
Status: DISCONTINUED | OUTPATIENT
Start: 2021-06-02 | End: 2021-06-03 | Stop reason: HOSPADM

## 2021-06-02 RX ORDER — HYDROMORPHONE HCL 110MG/55ML
0.5 PATIENT CONTROLLED ANALGESIA SYRINGE INTRAVENOUS
Status: DISCONTINUED | OUTPATIENT
Start: 2021-06-02 | End: 2021-06-02

## 2021-06-02 RX ORDER — GABAPENTIN 400 MG/1
800 CAPSULE ORAL 4 TIMES DAILY
Status: DISCONTINUED | OUTPATIENT
Start: 2021-06-02 | End: 2021-06-03 | Stop reason: HOSPADM

## 2021-06-02 RX ORDER — ONDANSETRON 2 MG/ML
4 INJECTION INTRAMUSCULAR; INTRAVENOUS EVERY 6 HOURS PRN
Status: DISCONTINUED | OUTPATIENT
Start: 2021-06-02 | End: 2021-06-02

## 2021-06-02 RX ORDER — FOLIC ACID 1 MG/1
1 TABLET ORAL DAILY
Status: DISCONTINUED | OUTPATIENT
Start: 2021-06-03 | End: 2021-06-03 | Stop reason: HOSPADM

## 2021-06-02 RX ORDER — AMITRIPTYLINE HYDROCHLORIDE 25 MG/1
25 TABLET, FILM COATED ORAL NIGHTLY
Status: DISCONTINUED | OUTPATIENT
Start: 2021-06-02 | End: 2021-06-03 | Stop reason: HOSPADM

## 2021-06-02 RX ORDER — ACETAMINOPHEN 325 MG/1
650 TABLET ORAL EVERY 6 HOURS PRN
Status: DISCONTINUED | OUTPATIENT
Start: 2021-06-02 | End: 2021-06-02

## 2021-06-02 RX ORDER — SODIUM CHLORIDE 0.9 % (FLUSH) 0.9 %
10 SYRINGE (ML) INJECTION AS NEEDED
Status: DISCONTINUED | OUTPATIENT
Start: 2021-06-02 | End: 2021-06-03 | Stop reason: HOSPADM

## 2021-06-02 RX ORDER — CYCLOBENZAPRINE HCL 10 MG
10 TABLET ORAL 3 TIMES DAILY
Status: DISCONTINUED | OUTPATIENT
Start: 2021-06-02 | End: 2021-06-03 | Stop reason: HOSPADM

## 2021-06-02 RX ADMIN — Medication 10 ML: at 14:00

## 2021-06-02 RX ADMIN — KETOROLAC TROMETHAMINE 30 MG: 30 INJECTION, SOLUTION INTRAMUSCULAR; INTRAVENOUS at 18:14

## 2021-06-02 RX ADMIN — Medication 10 ML: at 21:24

## 2021-06-02 RX ADMIN — GABAPENTIN 800 MG: 400 CAPSULE ORAL at 09:28

## 2021-06-02 RX ADMIN — FAMOTIDINE 20 MG: 20 TABLET ORAL at 17:24

## 2021-06-02 RX ADMIN — HYDROMORPHONE HYDROCHLORIDE 1 MG: 2 INJECTION, SOLUTION INTRAMUSCULAR; INTRAVENOUS; SUBCUTANEOUS at 19:55

## 2021-06-02 RX ADMIN — HYDROMORPHONE HYDROCHLORIDE 0.5 MG: 1 INJECTION, SOLUTION INTRAMUSCULAR; INTRAVENOUS; SUBCUTANEOUS at 03:02

## 2021-06-02 RX ADMIN — CYCLOBENZAPRINE HYDROCHLORIDE 10 MG: 10 TABLET, FILM COATED ORAL at 17:24

## 2021-06-02 RX ADMIN — CYCLOBENZAPRINE 10 MG: 10 TABLET, FILM COATED ORAL at 09:28

## 2021-06-02 RX ADMIN — CYCLOBENZAPRINE HYDROCHLORIDE 10 MG: 10 TABLET, FILM COATED ORAL at 21:24

## 2021-06-02 RX ADMIN — Medication 10 ML: at 15:31

## 2021-06-02 RX ADMIN — Medication 1 PATCH: at 19:59

## 2021-06-02 RX ADMIN — HYDROMORPHONE HYDROCHLORIDE 0.5 MG: 1 INJECTION, SOLUTION INTRAMUSCULAR; INTRAVENOUS; SUBCUTANEOUS at 06:57

## 2021-06-02 RX ADMIN — TRAZODONE HYDROCHLORIDE 50 MG: 50 TABLET ORAL at 21:23

## 2021-06-02 RX ADMIN — AMITRIPTYLINE HYDROCHLORIDE 25 MG: 25 TABLET, FILM COATED ORAL at 21:23

## 2021-06-02 RX ADMIN — TRAZODONE HYDROCHLORIDE 50 MG: 50 TABLET ORAL at 00:36

## 2021-06-02 RX ADMIN — HYDROMORPHONE HYDROCHLORIDE 0.5 MG: 2 INJECTION, SOLUTION INTRAMUSCULAR; INTRAVENOUS; SUBCUTANEOUS at 13:05

## 2021-06-02 RX ADMIN — HYDROMORPHONE HYDROCHLORIDE 1 MG: 2 INJECTION, SOLUTION INTRAMUSCULAR; INTRAVENOUS; SUBCUTANEOUS at 15:27

## 2021-06-02 RX ADMIN — GABAPENTIN 800 MG: 400 CAPSULE ORAL at 21:23

## 2021-06-02 RX ADMIN — FOLIC ACID 1 MG: 1 TABLET ORAL at 09:28

## 2021-06-02 RX ADMIN — FLUOXETINE 40 MG: 20 CAPSULE ORAL at 21:24

## 2021-06-02 RX ADMIN — HYDROMORPHONE HYDROCHLORIDE 0.5 MG: 1 INJECTION, SOLUTION INTRAMUSCULAR; INTRAVENOUS; SUBCUTANEOUS at 00:37

## 2021-06-02 RX ADMIN — HYDROXYZINE HYDROCHLORIDE 100 MG: 25 TABLET, FILM COATED ORAL at 21:24

## 2021-06-02 RX ADMIN — SODIUM CHLORIDE, PRESERVATIVE FREE 10 ML: 5 INJECTION INTRAVENOUS at 09:27

## 2021-06-02 RX ADMIN — HYDROMORPHONE HYDROCHLORIDE 0.5 MG: 1 INJECTION, SOLUTION INTRAMUSCULAR; INTRAVENOUS; SUBCUTANEOUS at 04:59

## 2021-06-02 RX ADMIN — SODIUM CHLORIDE 75 ML/HR: 9 INJECTION, SOLUTION INTRAVENOUS at 13:06

## 2021-06-02 RX ADMIN — Medication 10 MG: at 21:22

## 2021-06-02 RX ADMIN — GABAPENTIN 800 MG: 400 CAPSULE ORAL at 17:24

## 2021-06-02 RX ADMIN — HYDROMORPHONE HYDROCHLORIDE 0.5 MG: 1 INJECTION, SOLUTION INTRAMUSCULAR; INTRAVENOUS; SUBCUTANEOUS at 09:28

## 2021-06-02 RX ADMIN — FAMOTIDINE 20 MG: 20 TABLET, FILM COATED ORAL at 09:28

## 2021-06-02 NOTE — PROGRESS NOTES
[facility]     Progress Note    Patient Name: Belen Allen  : 1980  MRN: [id number]  Primary Care Physician:  Sahil Cornelius MD  Date of admission: 2021    Subjective   Subjective     Chief Complaint: back pain, some abdominal pain    History of Present Illness  Patient Reports  Back pain right side, some pain in the lateral abdomen     Review of Systems   Gastrointestinal: Positive for abdominal distention and abdominal pain.   Musculoskeletal: Positive for back pain.   Neurological: Positive for weakness.       Objective   Objective     Vitals:   Temp:  [98.3 °F (36.8 °C)-98.7 °F (37.1 °C)] 98.3 °F (36.8 °C)  Heart Rate:  [78-90] 78  Resp:  [16-18] 16  BP: (128-132)/(82-91) 128/82    Physical Exam  HENT:      Right Ear: External ear normal.      Left Ear: External ear normal.      Nose: Nose normal.      Mouth/Throat:      Pharynx: Oropharynx is clear.   Eyes:      Conjunctiva/sclera: Conjunctivae normal.   Cardiovascular:      Rate and Rhythm: Normal rate.      Pulses: Normal pulses.   Pulmonary:      Breath sounds: Normal breath sounds.   Abdominal:      Tenderness: There is abdominal tenderness in the right upper quadrant and right lower quadrant. There is right CVA tenderness. There is no guarding or rebound.      Comments: Right paraspinal tenderness, some trigger points    Neurological:      Mental Status: She is alert.          Result Review    Result Review:  I have personally reviewed the results from the time of this admission to 2021 07:28 EDT and agree with these findings:  []  Laboratory  []  Microbiology  []  Radiology  []  EKG/Telemetry   []  Cardiology/Vascular   []  Pathology  []  Old records  []  Other:    Assessment/Plan   Assessment / Plan     Brief Patient Summary:  Belen Allen is a 40 y.o. female   Biliary obstruction, concerned about a stone, stricture  Back pain- suspect paraspinal muscle irritation     Active Hospital Problems:  Active Hospital  Problems    Diagnosis    • **Right upper quadrant abdominal pain    • LFTs abnormal    • Common bile duct dilatation    • Antiphospholipid antibody positive    • Splenic infarct      Plan: analgesia as per hospitalist  Patients needs ercp, risks, alternatives and benefits reviewed with major risks, bleeding, perforation, pancreatitis, and need for surgery.  Patient is agreable. Will ask hospitalist to  arrange transfer to [facility]       DVT prophylaxis:  Mechanical DVT prophylaxis orders are present.    CODE STATUS:    Level Of Support Discussed With: Patient  Code Status: CPR  Medical Interventions (Level of Support Prior to Arrest): Full      Electronically signed by Vance Marshall MD, 06/02/21, 7:28 AM EDT.

## 2021-06-02 NOTE — DISCHARGE SUMMARY
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          Consults     Date and Time Order Name Status Description    5/31/2021  7:34 PM Inpatient Gastroenterology Consult Completed     5/31/2021  5:43 PM [facility] (on-call MD unless specified) Details Completed         Hospital Course  Please see history and physical for details. Patient is a 40 y.o. female initially admitted for complaints of right upper quadrant pain.  Pain was reproducible on exam and CT initially showed some mild intra and extrahepatic ductal dilation with the past history of cholecystectomy and the concern was for retained stone products.  GI saw in consultation .  We ordered an MRCP and ultimately it is showing changes concerning for choledocholithiasis versus malignant stricture.  ERCP is recommended and we have held anticoagulation with Eliquis since admission in preparation for possible ERCP.  Unfortunately our GI physician who performs ERCP is unavailable at this time and patient is to be transferred to [facility].  I am keeping her n.p.o. today and she has not had anything by mouth as of 0930 this a.m. in case they can move for ERCP later today.  Until then I suggest continued as needed pain control.  She is also on Pepcid for additional GI prophylaxis.  Abnormalities noted on CT of the abdomen pelvis regarding her lung, I do not feel there is any clinical presence of pneumonia at this time.  I further checked a procalcitonin which is  normal at 0.06 and we have remained fever free while here in addition to no elevated white blood cell count nor does the patient have issues with any type of productive cough shortness of breath or chest pain.  Her complaints are revolve around pain involving the right upper quadrant.  There are issues with chronic pain syndromes which she was already taking Elavil Flexeril and gabapentin prior to admission.  No family has been present at bedside during this admission at the time of my rounding and patient did not asked me to reach out discussed her case with anyone additionally.  I discussed the case with GI physician  as well as staff to try to coordinate a smooth transition to this patient can get the desired procedure of ERCP.       Condition on Discharge: Improved.     Temp:  [98.3 °F (36.8 °C)-98.7 °F (37.1 °C)] 98.3 °F (36.8 °C)  Heart Rate:  [78-90] 78  Resp:  [16-18] 16  BP: (128-132)/(82-91) 128/82  Body mass index is 35.19 kg/m².    Physical Exam  HENT:      Head: Normocephalic.      Nose: Nose normal.      Mouth/Throat:      Mouth: Mucous membranes are moist.      Pharynx: Oropharynx is clear.   Eyes:      Conjunctiva/sclera: Conjunctivae normal.   Cardiovascular:      Rate and Rhythm: Normal rate and regular rhythm.   Pulmonary:      Effort: Pulmonary effort is normal.      Breath sounds: Normal breath sounds.   Abdominal:      General: Bowel sounds are normal. There is no distension.      Palpations: Abdomen is soft.      Tenderness: There is abdominal tenderness.   Musculoskeletal:         General: No swelling.   Skin:     General: Skin is warm and dry.      Coloration: Skin is not jaundiced.   Neurological:      General: No focal deficit present.      Mental Status: She is alert and oriented to person, place, and time. Mental status is at baseline.     [unfilled]    Disposition: Short Term Hospital (DC - External)       Discharge Medications      New Medications      Instructions Start Date    famotidine 20 MG tablet  Commonly known as: PEPCID   20 mg, Oral, 2 Times Daily Before Meals         Continue These Medications      Instructions Start Date   amitriptyline 25 MG tablet  Commonly known as: ELAVIL   25 mg, Oral, Nightly      cyclobenzaprine 10 MG tablet  Commonly known as: FLEXERIL   1 tablet, Oral, 3 Times Daily      folic acid 1 MG tablet  Commonly known as: FOLVITE   1 mg, Oral, Daily      gabapentin 800 MG tablet  Commonly known as: NEURONTIN   800 mg, Oral, 4 Times Daily      hydrOXYzine 25 MG tablet  Commonly known as: ATARAX   4 tablets, Oral, Every Night at Bedtime      PROzac 40 MG capsule  Generic drug: FLUoxetine   40 mg, Oral, Nightly      traZODone 50 MG tablet  Commonly known as: DESYREL   50 mg, Oral, Nightly         Stop These Medications    apixaban 5 MG tablet tablet  Commonly known as: ELIQUIS     Claritin 10 MG tablet  Generic drug: loratadine     diphenhydrAMINE-acetaminophen  MG tablet per tablet  Commonly known as: TYLENOL PM     Melatonin 10 MG capsule     methylPREDNISolone 4 MG dose pack  Commonly known as: MEDROL     SUMAtriptan 100 MG tablet  Commonly known as: IMITREX     vitamin D 1.25 MG (10773 UT) capsule capsule  Commonly known as: ERGOCALCIFEROL             Additional Instructions for the Follow-ups that You Need to Schedule     Discharge Follow-up with PCP   As directed       Currently Documented PCP:    Sahil Cornelius MD    PCP Phone Number:    [phone]     Follow Up Details: Defer further follow-up recommendations to physicians at [place]           Follow-up Information     Sahil Cornelius MD .    Specialty: Internal Medicine  Why: Defer further follow-up recommendations to physicians at [place]  Contact information:  [address] [facility] [address] [phone]                  Pending Labs     Order Current Status    Mitochondrial Antibodies, M2 In process         Qasim Schmidt MD  06/02/21  09:34 EDT    Discharge  time spent greater than 30 minutes.

## 2021-06-02 NOTE — ANESTHESIA PREPROCEDURE EVALUATION
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    Musculoskeletal     (+) back pain,   Abdominal   (+) obese,     Bowel sounds: normal.   Substance History - negative use     OB/GYN negative ob/gyn ROS         Other                      Anesthesia Plan    ASA 3     general     intravenous induction     Anesthetic plan, all risks, benefits, and alternatives have been provided, discussed and informed consent has been obtained with: patient.    Plan discussed with CAA and CRNA.

## 2021-06-02 NOTE — CASE MANAGEMENT/SOCIAL WORK
"Physicians Statement of Medical Necessity for  Ambulance Transportation    GENERAL INFORMATION     Name: Belen Allen  YOB: 1980  [facility] BY ALYSHA#: [id number]  Transport Date: 06/02/2021 (Valid for round trips this date, or for scheduled repetitive trips for 60 days from the date signed below.)  Origin: [facility]  Destination: [address] [place] [address]  Is the Patient's stay covered under Medicare Part A (PPS/DRG?)No   Closest appropriate facility? Yes  If this a hosp-hosp transfer? Yes, describe services needed at 2nd facility not available at 1st facility ERCP  Is this a hospice patient? No    MEDICAL NECESSITY QUESTIONAIRE    Ambulance Transportation is medically necessary only if other means of transportation are contraindicated or would be potentially harmful to the patient.  To meet this requirement, the patient must be either \"bed confined\" or suffer from a condition such that transport by means other than an ambulance is contraindicated by the patient's condition.  The following questions must be answered by the healthcare professional signing below for this form to be valid:     1) Describe the MEDICAL CONDITION (physical and/or mental) of this patient AT THE TIME OF AMBULANCE TRANSPORT that requires the patient to be transported in an ambulance, and why transport by other means is contraindicated by the patient's condition: Patient is being transferred from [facility] to [facility] for an ERCP  Past Medical History:   Diagnosis Date   • Abnormal Pap smear of cervix    • Ankle fracture 2013   • H/O Elevated liver enzymes    • History of chronic back pain    • History of migraine    • History of urinary tract infection    • Injury of back    • PONV (postoperative nausea and vomiting)    • Seasonal allergies       Past Surgical History:   Procedure Laterality Date   • BACK SURGERY  2006    fusion L4, L5     • " "CHOLECYSTECTOMY  2014   • DILATATION AND CURETTAGE  2009   • SKIN SURGERY     • TUBAL ABDOMINAL LIGATION  2013   • WISDOM TOOTH EXTRACTION  2018    x2      2) Is this patient \"bed confined\" as defined below?No    To be \"bed confined\" the patient must satisfy all three of the following criteria:  (1) unable to get up from bed without assistance; AND (2) unable to ambulate;  AND (3) unable to sit in a chair or wheelchair.  3) Can this patient safely be transported by car or wheelchair van (I.e., may safely sit during transport, without an attendant or monitoring?)No   4. In addition to completing questions 1-3 above, please check any of the following conditions that apply*:          *Note: supporting documentation for any boxes checked must be maintained in the patient's medical records Moderate/severe pain on movement, Medical attendant required and Cardiac monitoring required en route      SIGNATURE OF PHYSICIAN OR OTHER AUTHORIZED HEALTHCARE PROFESSIONAL    I certify that the above information is true and correct based on my evaluation of this patient, and represent that the patient requires transport by ambulance and that other forms of transport are contraindicated.  I understand that this information will be used by the Centers for Medicare and Medicaid Services (CMS) to support the determiniation of medical necessity for ambulance services, and I represent that I have personal knowledge of the patient's condition at the time of transport.       If this box is checked, I also certify that the patient is physically or mentally incapable of signing the ambulance service's claim form and that the institution with which I am affiliated has furnished care, services or assistance to the patient.  My signature below is made on behalf of the patient pursuant to 42 .36(b)(4). In accordance with 42 .37, the specific reason(s) that the patient is physically or mentally incapable of signing the claim for is as " follows:     Signature of Physician or Healthcare Professional  Date/Time:   06/02/2021 1020     (For Scheduled repetitive transport, this form is not valid for transports performed more than 60 days after this date).                                                                                                                                            --------------------------------------------------------------------------------------------  Printed Name and Credentials of Physician or Authorized Healthcare Professional     *Form must be signed by patient's attending physician for scheduled, repetitive transports,.  For non-repetitive ambulance transports, if unable to obtain the signature of the attending physician, any of the following may sign (please select below):     Physician  Clinical Nurse Specialist  Registered Nurse     Physician Assistant  Discharge Planner  Licensed Practical Nurse     Nurse Practitioner

## 2021-06-02 NOTE — CASE MANAGEMENT/SOCIAL WORK
Case Management Discharge Note      Final Note: Transferred to [facility] [place]         Selected Continued Care - Admitted Since 6/2/2021     Destination    No services have been selected for the patient.              Durable Medical Equipment    No services have been selected for the patient.              Dialysis/Infusion    No services have been selected for the patient.              Home Medical Care    No services have been selected for the patient.              Therapy    No services have been selected for the patient.              Community Resources    No services have been selected for the patient.                       Final Discharge Disposition Code: 02 - short term hospital for [facility]

## 2021-06-02 NOTE — PLAN OF CARE
Goal Outcome Evaluation:  Plan of Care Reviewed With: patient      Frequent requests for pain meds, no other distress noted, safety maintained continue plan of care

## 2021-06-02 NOTE — PLAN OF CARE
Goal Outcome Evaluation:  Plan of Care Reviewed With: patient  Progress: improving  Outcome Summary: pain controlled with dilaudid every two hours. Went to MRI and was npo. Sats stable on dilaudid.

## 2021-06-02 NOTE — CASE MANAGEMENT/SOCIAL WORK
Continued Stay Note  [facility]     Patient Name: Belen Allen  MRN: [id number]  Today's Date: 6/2/2021    Admit Date: 6/2/2021    Discharge Plan     Row Name 06/02/21 1549       Plan    Plan  Transferred to [facility] via [facility]    Plan Comments  CCP noted discharge orders with plans to transfer patient to [facility] for ERCP. [facility] scheduled for 1045. Notified nurse and patient.    Final Discharge Disposition Code  02 - short term hospital for  care    Final Note  Transferred to [facility] via [facility] for ERCP        Discharge Codes    No documentation.             Christiana Carlson

## 2021-06-02 NOTE — H&P
"      [facility] Medicine Services      Patient Name: Belen Allen  : 1980  MRN: [id number]  Primary Care Physician: Sahil Cornelius MD  Date of admission: 2021    Patient Care Team:  Sahil Cornelius MD as PCP - General (Internal Medicine)  Code, Bjorn HERRERA II, MD as Consulting Physician (Hematology and Oncology)  Dennys Carranza MD as Referring Physician (Hospitalist)          Subjective   History Present Illness     Chief Complaint: Abdominal pain    Ms. Allen is a 40 y.o. female with a past medical history of chronic back pain and anxiety who presented to [facility] on 2021 due to abdominal pain.  Patient explains her right-sided abdominal pain radiating to her back started on Saturday.  She denies any aggravating factors.  Upon admission to [facility], it was noted on imaging patient had a dilated duct.  GI was consulted.  Patient underwent an MRCP, which was abnormal.  Patient was transferred to [facility] on 2021 due to need for ERCP and I am able to obtain this at [facility].  Hospitalist and GI at [facility] accepted patient for further care and management.  Upon admission to [facility], patient reports her abdominal pain is a 10 out of 10.  She feels \"like someone is cutting the inside of her abdomen.  \"She explains she has had on and off nausea.  She denies any vomiting, diarrhea, fever, chills, cough, shortness breath, or chest pain.  She reports she had a laparoscopic cholecystectomy 6 years ago.  Her last bowel movement was this morning and it was diarrhea.      Review of Systems   Constitutional: Negative.   HENT: Negative.    Cardiovascular: Negative.    Respiratory: Negative.    Skin: Negative.    Musculoskeletal:        Right side low back/flank pain    Gastrointestinal: Positive for abdominal pain.   Genitourinary: Positive for flank pain.   Neurological: " Negative.    Psychiatric/Behavioral: Negative.            Personal History     Past Medical History:   Past Medical History:   Diagnosis Date   • Abnormal Pap smear of cervix    • Ankle fracture 2013   • H/O Elevated liver enzymes    • History of chronic back pain    • History of migraine    • History of urinary tract infection    • Injury of back    • PONV (postoperative nausea and vomiting)    • Seasonal allergies        Surgical History:      Past Surgical History:   Procedure Laterality Date   • BACK SURGERY  2006    fusion L4, L5     • CHOLECYSTECTOMY  2014   • DILATATION AND CURETTAGE  2009   • SKIN SURGERY     • TUBAL ABDOMINAL LIGATION  2013   • WISDOM TOOTH EXTRACTION  2018    x2           Family History: family history includes Allergic rhinitis in her mother, paternal grandfather, and sister; Breast cancer in her maternal aunt; Cancer in her maternal grandmother and paternal grandfather; Prostate cancer in her paternal grandfather; Stroke in her mother. Family History was reviewed.     Social History:  reports that she has been smoking. She has been smoking about 0.50 packs per day. She has never used smokeless tobacco. She reports previous alcohol use. She reports previous drug use.      Medications:  Prior to Admission medications    Medication Sig Start Date End Date Taking? Authorizing Provider   amitriptyline (ELAVIL) 25 MG tablet Take 25 mg by mouth Every Night.    Michele Chandler MD   cyclobenzaprine (FLEXERIL) 10 MG tablet Take 1 tablet by mouth 3 (Three) Times a Day. 8/17/20   Michele Chandler MD   famotidine (PEPCID) 20 MG tablet Take 1 tablet by mouth 2 (Two) Times a Day Before Meals. 6/2/21   Qasim Schmidt MD   FLUoxetine (PROzac) 40 MG capsule Take 40 mg by mouth Every Night.    Michele Chandler MD   folic acid (FOLVITE) 1 MG tablet Take 1 tablet by mouth Daily. 12/3/20   Dennys Carranza MD   gabapentin (NEURONTIN) 800 MG tablet Take 800 mg by mouth 4 (Four) Times a  Day. 9/22/20 9/22/21  Michele Chandler MD   hydrOXYzine (ATARAX) 25 MG tablet Take 4 tablets by mouth every night at bedtime. 8/6/20   Michele Chandler MD   traZODone (DESYREL) 50 MG tablet Take 50 mg by mouth Every Night.    Michele Chandler MD   apixaban (ELIQUIS) 5 MG tablet tablet Take 5 mg by mouth 2 (two) times a day.  6/2/21  Michele Chandler MD   diphenhydrAMINE-acetaminophen (TYLENOL PM)  MG tablet per tablet Take 2 tablets by mouth Every Night.  6/2/21  Michele Chandler MD   loratadine (Claritin) 10 MG tablet Take 10 mg by mouth Daily.  6/2/21  Michele Chandler MD   Melatonin 10 MG capsule Take 30 mg by mouth Every Night.  6/2/21  Michele Chandler MD   methylPREDNISolone (MEDROL) 4 MG dose pack Take as directed on package instructions. 2/21/21 6/2/21  Prerna Henderson APRN   SUMAtriptan (IMITREX) 100 MG tablet Take 100 mg by mouth Every 2 (Two) Hours As Needed for Migraine. Take one tablet at onset of headache. May repeat dose one time in 2 hours if headache not relieved.  6/2/21  Michele Chandler MD   vitamin D (ERGOCALCIFEROL) 1.25 MG (80851 UT) capsule capsule Take 1 capsule by mouth Every 7 (Seven) Days. 12/7/20 6/2/21  Dennys Carranza MD       Allergies:  No Known Allergies    Objective   Objective     Vital Signs  Temp:  [98 °F (36.7 °C)-98.3 °F (36.8 °C)] 98 °F (36.7 °C)  Heart Rate:  [78-87] 83  Resp:  [16-18] 16  BP: (128-132)/(82-88) 131/88  SpO2:  [97 %-100 %] 100 %  on   ;   Device (Oxygen Therapy): room air  There is no height or weight on file to calculate BMI.    Physical Exam  Vitals reviewed.   Constitutional:       General: She is not in acute distress.     Appearance: She is obese.   HENT:      Head: Normocephalic and atraumatic.      Mouth/Throat:      Mouth: Mucous membranes are moist.   Eyes:      General: No scleral icterus.     Pupils: Pupils are equal, round, and reactive to light.   Cardiovascular:      Rate and Rhythm: Normal  rate and regular rhythm.      Heart sounds: No murmur heard.     Pulmonary:      Effort: Pulmonary effort is normal. No respiratory distress.      Breath sounds: No wheezing or rales.   Abdominal:      General: Bowel sounds are normal.      Palpations: Abdomen is soft.      Tenderness: There is abdominal tenderness.      Comments: Obese, RUQ ttp    Musculoskeletal:      Right lower leg: No edema.      Left lower leg: No edema.   Skin:     General: Skin is warm and dry.      Findings: No rash.   Neurological:      Mental Status: She is alert and oriented to person, place, and time. Mental status is at baseline.      Cranial Nerves: No cranial nerve deficit.   Psychiatric:         Mood and Affect: Mood normal.         Behavior: Behavior normal.           Results Review:  I have personally reviewed most recent cardiac tracings, lab results and radiology images and interpretations and agree with findings.    Results from last 7 days   Lab Units 05/31/21  1401   WBC 10*3/mm3 8.20   HEMOGLOBIN g/dL 12.2   HEMATOCRIT % 36.0   PLATELETS 10*3/mm3 311     Results from last 7 days   Lab Units 06/02/21  0258   SODIUM mmol/L 138   POTASSIUM mmol/L 3.7   CHLORIDE mmol/L 104   CO2 mmol/L 25.9   BUN mg/dL 8   CREATININE mg/dL 0.69   GLUCOSE mg/dL 124*   CALCIUM mg/dL 8.4*   ALT (SGPT) U/L 81*   AST (SGOT) U/L 49*   PROCALCITONIN ng/mL 0.06     Estimated Creatinine Clearance: 121 mL/min (by C-G formula based on SCr of 0.69 mg/dL).  Brief Urine Lab Results  (Last result in the past 365 days)      Color   Clarity   Blood   Leuk Est   Nitrite   Protein   CREAT   Urine HCG        05/31/21 1400 Yellow Cloudy Negative Negative Negative Trace               Microbiology Results (last 10 days)     Procedure Component Value - Date/Time    COVID PRE-OP / PRE-PROCEDURE SCREENING ORDER (NO ISOLATION) - Swab, Nasopharynx [[id number]]  (Normal) Collected: 05/31/21 1750    Lab Status: Final result Specimen: Swab from Nasopharynx Updated: 05/31/21  2220    Narrative:      The following orders were created for panel order COVID PRE-OP / PRE-PROCEDURE SCREENING ORDER (NO ISOLATION) - Swab, Nasopharynx.  Procedure                               Abnormality         Status                     ---------                               -----------         ------                     COVID-19,APTIMA PANTHER,...[[id number]]  Normal              Final result                 Please view results for these tests on the individual orders.    COVID-19,APTIMA PANTHER,LETY IN-HOUSE, NP/OP SWAB IN UTM/VTM/SALINE TRANSPORT MEDIA,24 HR TAT - Swab, Nasopharynx [[id number]]  (Normal) Collected: 05/31/21 1750    Lab Status: Final result Specimen: Swab from Nasopharynx Updated: 05/31/21 2220     COVID19 Not Detected    Narrative:      Fact sheet for providers: https://www.fda.gov/media/576496/download     Fact sheet for patients: https://www.fda.gov/media/432218/download    Test performed by RT PCR.          ECG/EMG Results (most recent)     None          Results for orders placed during the hospital encounter of 02/21/21    Duplex Venous Lower Extremity - Left    Interpretation Summary  · Normal left lower extremity venous duplex scan.      Results for orders placed during the hospital encounter of 11/08/20    Adult Transesophageal Echo (DENIS) W/ Cont if Necessary Per Protocol    Interpretation Summary  · Left ventricular ejection fraction appears to be 61 - 65%. Left ventricular systolic function is normal.  · Saline test results are negative.      CT Abdomen Pelvis With Contrast    Result Date: 6/1/2021  1. Development of mild intrahepatic and extrahepatic biliary dilatation. No definite calcified stone is seen within the common bile duct, but there may be sludge or possibly tiny stones. 2. There are small patchy central airspace opacities within both visualized lower lobes. Appearance is nonspecific. Please correlate clinically for either resolving or early pneumonia. Follow up is  recommended.  Discussed with HANNA Chappell.  This report was finalized on 6/1/2021 6:26 AM by Dr. Emely Diaz M.D.          Estimated Creatinine Clearance: 121 mL/min (by C-G formula based on SCr of 0.69 mg/dL).    Assessment/Plan   Assessment/Plan       Active Hospital Problems    Diagnosis  POA   • Choledocholithiasis [K80.50]  Yes     Priority: High   • Right upper quadrant abdominal pain [R10.11]  Yes     Priority: High   • LFTs abnormal [R94.5]  Yes     Priority: High   • Tobacco abuse [Z72.0]  Yes   • GERD without esophagitis [K21.9]  Yes   • Common bile duct dilatation [K83.8]  Unknown   • Folate deficiency [E53.8]  Yes   • Anxiety disorder [F41.9]  Yes   • Obesity (BMI 35.0-39.9 without comorbidity) [E66.9]  Yes      Resolved Hospital Problems   No resolved problems to display.       Acute RUQ pain with elevated LFTs  - CT abd pel- mild intrahepatic/extrahepatic biliary dilatation  - MRCP reviewed  - discussed with Dr. Flannery and plan on ERCP tomorrow  - npo after midnight  - continue IV fluids  - GI following  - continue IV Dilaudid for now     Chronic pain  - Continue gabapentin (INSPECT verified)     Folic acid deficiency  - Monitor CBC   - Continue folic acid     GERD  - Continue pepcid    Anxiety  - Stable  - Continue Prozac, trazodone, and elavil     Obesity  - BMI 35.1  - Enourage lifestyle modifications as appropriate     Tobacco abuse  - nicotine patch  - encourage cessation     DVT Prophylaxis  - SCDs        VTE Prophylaxis -   Mechanical Order History:      Ordered        06/02/21 1146  Maintain Sequential Compression Device  Continuous                 Pharmalogical Order History:     None          CODE STATUS:    Code Status and Medical Interventions:   Ordered at: 06/02/21 1311     Level Of Support Discussed With:    Patient     Code Status:    CPR     Medical Interventions (Level of Support Prior to Arrest):    Full       This patient has been examined wearing appropriate Personal  Protective Equipment. 06/02/21      I discussed the patient's findings and my recommendations with patient.      Signature: Electronically signed by NELSON Fernandez, 06/02/21, 1:13 PM EDT.    [facility] Hospitalist Team      For this patient encounter, I reviewed the mid-level provider documentation, medical decision making and treatment plan, and I have personally spent time with the patient.      HPI:  Patient is a 39 y/o female who initially presented to [facility] due to abdominal pain. Patient describes the patient has sharp, intermittent in the RUQ with radiation around to her back. She denies any exacerbating factors but is relieved with IV pain medication. Patient denies any associated fever or chills.     Electronically signed by Nicky Calle DO, 06/02/21, 5:49 PM EDT.

## 2021-06-02 NOTE — PAYOR COMM NOTE
"Carson Mullen (40 y.o. Female)     PLEASE SEE ATTACHED DC SUMMARY    STILL NEEDING REF#    PLEASE CALL   OR  [phone] WITH INPT AUTH.    THANK YOU    YARELI MOYER LPN CCP        Date of Birth Social Security Number Address Home Phone MRN    1980  [id number]  [facility] [phone] [id number]    Christianity Marital Status          None Single       Admission Date Admission Type Admitting Provider Attending Provider Department, Room/Bed    5/31/21 Emergency Stingl, Kathy St MD  [address], [address]    Discharge Date Discharge Disposition Discharge Destination        6/2/2021 [facility] ([place] - External)              Attending Provider: (none)   Allergies: No Known Allergies    Isolation: None   Infection: None   Code Status: CPR    Ht: 162.6 cm (64\")   Wt: 93 kg (205 lb)    Admission Cmt: None   Principal Problem: Choledocholithiasis [K80.50]                 Active Insurance as of 5/31/2021     Primary Coverage     Payor Plan Insurance Group Employer/Plan Group    [facility] ENCARNA[facility]ION [id number]     Payor Plan Address Payor Plan Phone Number Payor Plan Fax Number Effective Dates    [address]   1/1/2021 - None Entered    [place] [id number]       Subscriber Name Subscriber Birth Date Member ID       CARSON MULLEN 1980 [id number]                 Emergency Contacts      (Rel.) Home Phone Work Phone Mobile Phone    DEDEJUNIOR (Significant Other) [phone] -- --               Discharge Summary      Qasim Schmidt MD at 06/02/21 0934                              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          Consults     Date and Time Order Name Status Description    5/31/2021  7:34 PM Inpatient Gastroenterology Consult Completed     5/31/2021  5:43 PM [facility] (on-call MD unless specified) Details Completed         Hospital Course  Please see history and physical for details. Patient is a 40 y.o. female initially admitted for complaints of right upper quadrant pain.  Pain was reproducible on exam and CT initially showed some mild intra and extrahepatic ductal dilation with the past history of cholecystectomy and the concern was for retained stone products.  GI saw in consultation .  We ordered an MRCP and ultimately it is showing changes concerning for choledocholithiasis versus malignant stricture.  ERCP is recommended and we have held anticoagulation with Eliquis since admission in preparation for possible ERCP.  Unfortunately our GI physician who performs ERCP is unavailable at this time and patient is to be transferred to [facility].  I am keeping her n.p.o. today and she has not had anything by mouth as of 0930 this a.m. in case they can move for ERCP later today.  Until then I suggest continued as needed pain control.  She is also on Pepcid for additional GI prophylaxis.  Abnormalities noted on CT of the abdomen pelvis regarding her lung, I do not feel there is any clinical presence of pneumonia at this time.  I further checked a procalcitonin which is normal at 0.06 and we have remained fever free while here in addition to no elevated white blood cell count nor does the patient have issues with any type of productive cough shortness of breath or chest pain.  Her complaints are revolve around pain involving the right upper quadrant.  There are issues with chronic pain  syndromes which she was already taking Elavil Flexeril and gabapentin prior to admission.  No family has been present at bedside during this admission at the time of my rounding and patient did not asked me to reach out discussed her case with anyone additionally.  I discussed the case with GI physician  as well as staff to try to coordinate a smooth transition to this patient can get the desired procedure of ERCP.       Condition on Discharge: Improved.     Temp:  [98.3 °F (36.8 °C)-98.7 °F (37.1 °C)] 98.3 °F (36.8 °C)  Heart Rate:  [78-90] 78  Resp:  [16-18] 16  BP: (128-132)/(82-91) 128/82  Body mass index is 35.19 kg/m².    Physical Exam  HENT:      Head: Normocephalic.      Nose: Nose normal.      Mouth/Throat:      Mouth: Mucous membranes are moist.      Pharynx: Oropharynx is clear.   Eyes:      Conjunctiva/sclera: Conjunctivae normal.   Cardiovascular:      Rate and Rhythm: Normal rate and regular rhythm.   Pulmonary:      Effort: Pulmonary effort is normal.      Breath sounds: Normal breath sounds.   Abdominal:      General: Bowel sounds are normal. There is no distension.      Palpations: Abdomen is soft.      Tenderness: There is abdominal tenderness.   Musculoskeletal:         General: No swelling.   Skin:     General: Skin is warm and dry.      Coloration: Skin is not jaundiced.   Neurological:      General: No focal deficit present.      Mental Status: She is alert and oriented to person, place, and time. Mental status is at baseline.     [unfilled]    Disposition: Short Term Hospital ([place] - External)       Discharge Medications      New Medications      Instructions Start Date   famotidine 20 MG tablet  Commonly known as: PEPCID   20 mg, Oral, 2 Times Daily Before Meals         Continue These Medications      Instructions Start Date   amitriptyline 25 MG tablet  Commonly known as: ELAVIL   25 mg, Oral, Nightly      cyclobenzaprine 10 MG tablet  Commonly known as: FLEXERIL   1 tablet, Oral, 3 Times  Daily      folic acid 1 MG tablet  Commonly known as: FOLVITE   1 mg, Oral, Daily      gabapentin 800 MG tablet  Commonly known as: NEURONTIN   800 mg, Oral, 4 Times Daily      hydrOXYzine 25 MG tablet  Commonly known as: ATARAX   4 tablets, Oral, Every Night at Bedtime      PROzac 40 MG capsule  Generic drug: FLUoxetine   40 mg, Oral, Nightly      traZODone 50 MG tablet  Commonly known as: DESYREL   50 mg, Oral, Nightly         Stop These Medications    apixaban 5 MG tablet tablet  Commonly known as: ELIQUIS     Claritin 10 MG tablet  Generic drug: loratadine     diphenhydrAMINE-acetaminophen  MG tablet per tablet  Commonly known as: TYLENOL PM     Melatonin 10 MG capsule     methylPREDNISolone 4 MG dose pack  Commonly known as: MEDROL     SUMAtriptan 100 MG tablet  Commonly known as: IMITREX     vitamin D 1.25 MG (49018 UT) capsule capsule  Commonly known as: ERGOCALCIFEROL             Additional Instructions for the Follow-ups that You Need to Schedule     Discharge Follow-up with PCP   As directed       Currently Documented PCP:    Sahil Cornelius MD    PCP Phone Number:    [phone]     Follow Up Details: Defer further follow-up recommendations to physicians at [place]           Follow-up Information     Sahil Cornelius MD .    Specialty: Internal Medicine  Why: Defer further follow-up recommendations to physicians at [place]  Contact information:  [address] [facility] [address] [phone]                  Pending Labs     Order Current Status    Mitochondrial Antibodies, M2 In process         Qasim Schmidt MD  06/02/21  09:34 EDT    Discharge time spent greater than 30 minutes.    Electronically signed by Qasim Schmidt MD at 06/02/21 7194

## 2021-06-03 ENCOUNTER — APPOINTMENT (OUTPATIENT)
Dept: GENERAL RADIOLOGY | Facility: HOSPITAL | Age: 41
End: 2021-06-03

## 2021-06-03 ENCOUNTER — ON CAMPUS - OUTPATIENT (OUTPATIENT)
Dept: URBAN - METROPOLITAN AREA HOSPITAL 85 | Facility: HOSPITAL | Age: 41
End: 2021-06-03
Payer: COMMERCIAL

## 2021-06-03 VITALS
HEART RATE: 71 BPM | DIASTOLIC BLOOD PRESSURE: 83 MMHG | SYSTOLIC BLOOD PRESSURE: 143 MMHG | OXYGEN SATURATION: 99 % | HEIGHT: 64 IN | RESPIRATION RATE: 19 BRPM | WEIGHT: 235.8 LBS | TEMPERATURE: 98.9 F | BODY MASS INDEX: 40.26 KG/M2

## 2021-06-03 DIAGNOSIS — K83.8 OTHER SPECIFIED DISEASES OF BILIARY TRACT: ICD-10-CM

## 2021-06-03 DIAGNOSIS — R74.8 ABNORMAL LEVELS OF OTHER SERUM ENZYMES: ICD-10-CM

## 2021-06-03 DIAGNOSIS — R10.11 RIGHT UPPER QUADRANT PAIN: ICD-10-CM

## 2021-06-03 PROBLEM — R79.89 LFTS ABNORMAL: Status: RESOLVED | Noted: 2021-06-01 | Resolved: 2021-06-03

## 2021-06-03 PROBLEM — K80.50 CHOLEDOCHOLITHIASIS: Status: RESOLVED | Noted: 2021-06-02 | Resolved: 2021-06-03

## 2021-06-03 LAB
ALBUMIN SERPL-MCNC: 3.3 G/DL (ref 3.5–5.2)
ALBUMIN/GLOB SERPL: 1.4 G/DL
ALP SERPL-CCNC: 194 U/L (ref 39–117)
ALT SERPL W P-5'-P-CCNC: 57 U/L (ref 1–33)
ANION GAP SERPL CALCULATED.3IONS-SCNC: 11 MMOL/L (ref 5–15)
AST SERPL-CCNC: 31 U/L (ref 1–32)
BASOPHILS # BLD AUTO: 0 10*3/MM3 (ref 0–0.2)
BASOPHILS NFR BLD AUTO: 0.6 % (ref 0–1.5)
BILIRUB SERPL-MCNC: 0.2 MG/DL (ref 0–1.2)
BUN SERPL-MCNC: 7 MG/DL (ref 6–20)
BUN/CREAT SERPL: 9 (ref 7–25)
CALCIUM SPEC-SCNC: 8.3 MG/DL (ref 8.6–10.5)
CHLORIDE SERPL-SCNC: 105 MMOL/L (ref 98–107)
CO2 SERPL-SCNC: 24 MMOL/L (ref 22–29)
CREAT SERPL-MCNC: 0.78 MG/DL (ref 0.57–1)
DEPRECATED RDW RBC AUTO: 44.6 FL (ref 37–54)
EOSINOPHIL # BLD AUTO: 0.2 10*3/MM3 (ref 0–0.4)
EOSINOPHIL NFR BLD AUTO: 2.8 % (ref 0.3–6.2)
ERYTHROCYTE [DISTWIDTH] IN BLOOD BY AUTOMATED COUNT: 13.8 % (ref 12.3–15.4)
GFR SERPL CREATININE-BSD FRML MDRD: 82 ML/MIN/1.73
GLOBULIN UR ELPH-MCNC: 2.4 GM/DL
GLUCOSE SERPL-MCNC: 96 MG/DL (ref 65–99)
HCT VFR BLD AUTO: 32.4 % (ref 34–46.6)
HGB BLD-MCNC: 11.2 G/DL (ref 12–15.9)
LYMPHOCYTES # BLD AUTO: 2.9 10*3/MM3 (ref 0.7–3.1)
LYMPHOCYTES NFR BLD AUTO: 43.6 % (ref 19.6–45.3)
MAGNESIUM SERPL-MCNC: 2.2 MG/DL (ref 1.6–2.6)
MCH RBC QN AUTO: 31.4 PG (ref 26.6–33)
MCHC RBC AUTO-ENTMCNC: 34.5 G/DL (ref 31.5–35.7)
MCV RBC AUTO: 91.1 FL (ref 79–97)
MITOCHONDRIA M2 IGG SER-ACNC: <20 UNITS (ref 0–20)
MONOCYTES # BLD AUTO: 0.5 10*3/MM3 (ref 0.1–0.9)
MONOCYTES NFR BLD AUTO: 7.4 % (ref 5–12)
NEUTROPHILS NFR BLD AUTO: 3 10*3/MM3 (ref 1.7–7)
NEUTROPHILS NFR BLD AUTO: 45.6 % (ref 42.7–76)
NRBC BLD AUTO-RTO: 0.1 /100 WBC (ref 0–0.2)
PLATELET # BLD AUTO: 293 10*3/MM3 (ref 140–450)
PMV BLD AUTO: 8 FL (ref 6–12)
POTASSIUM SERPL-SCNC: 4.1 MMOL/L (ref 3.5–5.2)
PROT SERPL-MCNC: 5.7 G/DL (ref 6–8.5)
RBC # BLD AUTO: 3.55 10*6/MM3 (ref 3.77–5.28)
SODIUM SERPL-SCNC: 140 MMOL/L (ref 136–145)
WBC # BLD AUTO: 6.6 10*3/MM3 (ref 3.4–10.8)

## 2021-06-03 PROCEDURE — 99217 PR OBSERVATION CARE DISCHARGE MANAGEMENT: CPT | Performed by: INTERNAL MEDICINE

## 2021-06-03 PROCEDURE — 25010000002 MIDAZOLAM PER 1 MG: Performed by: ANESTHESIOLOGY

## 2021-06-03 PROCEDURE — 43262 ENDO CHOLANGIOPANCREATOGRAPH: CPT | Performed by: INTERNAL MEDICINE

## 2021-06-03 PROCEDURE — 96376 TX/PRO/DX INJ SAME DRUG ADON: CPT

## 2021-06-03 PROCEDURE — G0378 HOSPITAL OBSERVATION PER HR: HCPCS

## 2021-06-03 PROCEDURE — 25010000002 HYDROMORPHONE PER 4 MG: Performed by: NURSE PRACTITIONER

## 2021-06-03 PROCEDURE — C1769 GUIDE WIRE: HCPCS | Performed by: INTERNAL MEDICINE

## 2021-06-03 PROCEDURE — 74328 X-RAY BILE DUCT ENDOSCOPY: CPT

## 2021-06-03 PROCEDURE — 80053 COMPREHEN METABOLIC PANEL: CPT | Performed by: INTERNAL MEDICINE

## 2021-06-03 PROCEDURE — 25010000002 ONDANSETRON PER 1 MG: Performed by: NURSE PRACTITIONER

## 2021-06-03 PROCEDURE — 83735 ASSAY OF MAGNESIUM: CPT | Performed by: NURSE PRACTITIONER

## 2021-06-03 PROCEDURE — 25010000002 FENTANYL CITRATE (PF) 100 MCG/2ML SOLUTION: Performed by: ANESTHESIOLOGY

## 2021-06-03 PROCEDURE — 85025 COMPLETE CBC W/AUTO DIFF WBC: CPT | Performed by: NURSE PRACTITIONER

## 2021-06-03 PROCEDURE — 25010000002 PROPOFOL 10 MG/ML EMULSION: Performed by: ANESTHESIOLOGY

## 2021-06-03 PROCEDURE — 25010000002 ONDANSETRON PER 1 MG: Performed by: ANESTHESIOLOGY

## 2021-06-03 PROCEDURE — 25010000002 DEXAMETHASONE PER 1 MG: Performed by: ANESTHESIOLOGY

## 2021-06-03 PROCEDURE — 96375 TX/PRO/DX INJ NEW DRUG ADDON: CPT

## 2021-06-03 PROCEDURE — 25010000002 IOPAMIDOL 61 % SOLUTION 30 ML VIAL: Performed by: INTERNAL MEDICINE

## 2021-06-03 RX ORDER — LIDOCAINE HYDROCHLORIDE 20 MG/ML
INJECTION, SOLUTION EPIDURAL; INFILTRATION; INTRACAUDAL; PERINEURAL AS NEEDED
Status: DISCONTINUED | OUTPATIENT
Start: 2021-06-03 | End: 2021-06-03 | Stop reason: SURG

## 2021-06-03 RX ORDER — ACETAMINOPHEN 325 MG/1
650 TABLET ORAL ONCE AS NEEDED
Status: DISCONTINUED | OUTPATIENT
Start: 2021-06-03 | End: 2021-06-03 | Stop reason: HOSPADM

## 2021-06-03 RX ORDER — TRAMADOL HYDROCHLORIDE 50 MG/1
50 TABLET ORAL EVERY 6 HOURS PRN
Status: DISCONTINUED | OUTPATIENT
Start: 2021-06-03 | End: 2021-06-03 | Stop reason: HOSPADM

## 2021-06-03 RX ORDER — SODIUM CHLORIDE, SODIUM LACTATE, POTASSIUM CHLORIDE, CALCIUM CHLORIDE 600; 310; 30; 20 MG/100ML; MG/100ML; MG/100ML; MG/100ML
150 INJECTION, SOLUTION INTRAVENOUS CONTINUOUS
Status: DISCONTINUED | OUTPATIENT
Start: 2021-06-03 | End: 2021-06-03 | Stop reason: HOSPADM

## 2021-06-03 RX ORDER — ACETAMINOPHEN 650 MG/1
650 SUPPOSITORY RECTAL ONCE AS NEEDED
Status: DISCONTINUED | OUTPATIENT
Start: 2021-06-03 | End: 2021-06-03 | Stop reason: HOSPADM

## 2021-06-03 RX ORDER — PROPOFOL 10 MG/ML
VIAL (ML) INTRAVENOUS AS NEEDED
Status: DISCONTINUED | OUTPATIENT
Start: 2021-06-03 | End: 2021-06-03 | Stop reason: SURG

## 2021-06-03 RX ORDER — ONDANSETRON 2 MG/ML
4 INJECTION INTRAMUSCULAR; INTRAVENOUS ONCE AS NEEDED
Status: DISCONTINUED | OUTPATIENT
Start: 2021-06-03 | End: 2021-06-03 | Stop reason: HOSPADM

## 2021-06-03 RX ORDER — DEXAMETHASONE SODIUM PHOSPHATE 4 MG/ML
INJECTION, SOLUTION INTRA-ARTICULAR; INTRALESIONAL; INTRAMUSCULAR; INTRAVENOUS; SOFT TISSUE AS NEEDED
Status: DISCONTINUED | OUTPATIENT
Start: 2021-06-03 | End: 2021-06-03 | Stop reason: SURG

## 2021-06-03 RX ORDER — IPRATROPIUM BROMIDE AND ALBUTEROL SULFATE 2.5; .5 MG/3ML; MG/3ML
3 SOLUTION RESPIRATORY (INHALATION) ONCE AS NEEDED
Status: DISCONTINUED | OUTPATIENT
Start: 2021-06-03 | End: 2021-06-03 | Stop reason: HOSPADM

## 2021-06-03 RX ORDER — MORPHINE SULFATE 4 MG/ML
2 INJECTION, SOLUTION INTRAMUSCULAR; INTRAVENOUS
Status: DISCONTINUED | OUTPATIENT
Start: 2021-06-03 | End: 2021-06-03

## 2021-06-03 RX ORDER — SODIUM CHLORIDE 9 MG/ML
30 INJECTION, SOLUTION INTRAVENOUS CONTINUOUS PRN
Status: DISCONTINUED | OUTPATIENT
Start: 2021-06-03 | End: 2021-06-03

## 2021-06-03 RX ORDER — HYDROMORPHONE HCL 110MG/55ML
0.5 PATIENT CONTROLLED ANALGESIA SYRINGE INTRAVENOUS
Status: DISCONTINUED | OUTPATIENT
Start: 2021-06-03 | End: 2021-06-03 | Stop reason: HOSPADM

## 2021-06-03 RX ORDER — MIDAZOLAM HYDROCHLORIDE 1 MG/ML
INJECTION INTRAMUSCULAR; INTRAVENOUS AS NEEDED
Status: DISCONTINUED | OUTPATIENT
Start: 2021-06-03 | End: 2021-06-03 | Stop reason: SURG

## 2021-06-03 RX ORDER — LORAZEPAM 2 MG/ML
1 INJECTION INTRAMUSCULAR
Status: DISCONTINUED | OUTPATIENT
Start: 2021-06-03 | End: 2021-06-03 | Stop reason: HOSPADM

## 2021-06-03 RX ORDER — ROCURONIUM BROMIDE 10 MG/ML
INJECTION, SOLUTION INTRAVENOUS AS NEEDED
Status: DISCONTINUED | OUTPATIENT
Start: 2021-06-03 | End: 2021-06-03 | Stop reason: SURG

## 2021-06-03 RX ORDER — SODIUM CHLORIDE 9 MG/ML
INJECTION INTRAVENOUS
Status: DISCONTINUED
Start: 2021-06-03 | End: 2021-06-03 | Stop reason: HOSPADM

## 2021-06-03 RX ORDER — ONDANSETRON 2 MG/ML
INJECTION INTRAMUSCULAR; INTRAVENOUS AS NEEDED
Status: DISCONTINUED | OUTPATIENT
Start: 2021-06-03 | End: 2021-06-03 | Stop reason: SURG

## 2021-06-03 RX ORDER — MIDAZOLAM HYDROCHLORIDE 1 MG/ML
1 INJECTION INTRAMUSCULAR; INTRAVENOUS
Status: DISCONTINUED | OUTPATIENT
Start: 2021-06-03 | End: 2021-06-03 | Stop reason: HOSPADM

## 2021-06-03 RX ORDER — FENTANYL CITRATE 50 UG/ML
INJECTION, SOLUTION INTRAMUSCULAR; INTRAVENOUS AS NEEDED
Status: DISCONTINUED | OUTPATIENT
Start: 2021-06-03 | End: 2021-06-03 | Stop reason: SURG

## 2021-06-03 RX ADMIN — TRAMADOL HYDROCHLORIDE 50 MG: 50 TABLET, COATED ORAL at 09:51

## 2021-06-03 RX ADMIN — LIDOCAINE HYDROCHLORIDE 100 MG: 20 INJECTION, SOLUTION EPIDURAL; INFILTRATION; INTRACAUDAL; PERINEURAL at 08:05

## 2021-06-03 RX ADMIN — SODIUM CHLORIDE: 9 INJECTION, SOLUTION INTRAVENOUS at 08:41

## 2021-06-03 RX ADMIN — FOLIC ACID 1 MG: 1 TABLET ORAL at 09:51

## 2021-06-03 RX ADMIN — CYCLOBENZAPRINE HYDROCHLORIDE 10 MG: 10 TABLET, FILM COATED ORAL at 09:51

## 2021-06-03 RX ADMIN — DEXAMETHASONE SODIUM PHOSPHATE 8 MG: 4 INJECTION, SOLUTION INTRAMUSCULAR; INTRAVENOUS at 08:22

## 2021-06-03 RX ADMIN — FAMOTIDINE 20 MG: 20 TABLET ORAL at 09:51

## 2021-06-03 RX ADMIN — SODIUM CHLORIDE, POTASSIUM CHLORIDE, SODIUM LACTATE AND CALCIUM CHLORIDE 150 ML/HR: 600; 310; 30; 20 INJECTION, SOLUTION INTRAVENOUS at 10:08

## 2021-06-03 RX ADMIN — ONDANSETRON 4 MG: 2 INJECTION INTRAMUSCULAR; INTRAVENOUS at 04:53

## 2021-06-03 RX ADMIN — PROPOFOL 150 MG: 10 INJECTION, EMULSION INTRAVENOUS at 08:05

## 2021-06-03 RX ADMIN — HYDROMORPHONE HYDROCHLORIDE 1 MG: 2 INJECTION, SOLUTION INTRAMUSCULAR; INTRAVENOUS; SUBCUTANEOUS at 04:41

## 2021-06-03 RX ADMIN — ROCURONIUM BROMIDE 40 MG: 10 INJECTION INTRAVENOUS at 08:05

## 2021-06-03 RX ADMIN — GABAPENTIN 800 MG: 400 CAPSULE ORAL at 09:51

## 2021-06-03 RX ADMIN — Medication 10 ML: at 10:08

## 2021-06-03 RX ADMIN — ONDANSETRON 4 MG: 2 INJECTION INTRAMUSCULAR; INTRAVENOUS at 08:27

## 2021-06-03 RX ADMIN — ONDANSETRON 4 MG: 2 INJECTION INTRAMUSCULAR; INTRAVENOUS at 08:39

## 2021-06-03 RX ADMIN — MIDAZOLAM 2 MG: 1 INJECTION INTRAMUSCULAR; INTRAVENOUS at 08:04

## 2021-06-03 RX ADMIN — FENTANYL CITRATE 100 MCG: 50 INJECTION, SOLUTION INTRAMUSCULAR; INTRAVENOUS at 08:05

## 2021-06-03 RX ADMIN — HYDROMORPHONE HYDROCHLORIDE 1 MG: 2 INJECTION, SOLUTION INTRAMUSCULAR; INTRAVENOUS; SUBCUTANEOUS at 00:21

## 2021-06-03 NOTE — ANESTHESIA POSTPROCEDURE EVALUATION
Patient: Belen Allen    Procedure Summary     Date: 06/03/21 Room / Location: [facility] ENDOSCOPY 2 / [facility] ENDOSCOPY    Anesthesia Start: 0802 Anesthesia Stop: 0847    Procedure: ENDOSCOPIC RETROGRADE CHOLANGIOPANCREATOGRAPHY WITH SPHINCTEROTOMY, DILATION WITH BALLOON CLEARANCE  (12MM-15MM) (N/A ) Diagnosis:       Right upper quadrant abdominal pain      LFTs abnormal      Common bile duct dilatation      (Right upper quadrant abdominal pain [R10.11])      (LFTs abnormal [R94.5])      (Common bile duct dilatation [K83.8])    Surgeons: Bjorn Flannery MD Provider: Van Terry MD    Anesthesia Type: general ASA Status: 3          Anesthesia Type: general    Vitals  Vitals Value Taken Time   /98 06/03/21 0858   Temp     Pulse 87 06/03/21 0858   Resp 20 06/03/21 0858   SpO2 99 % 06/03/21 0858           Post Anesthesia Care and Evaluation    Patient location during evaluation: PACU  Patient participation: complete - patient participated  Level of consciousness: sleepy but conscious and responsive to verbal stimuli  Pain score: 0  Pain management: adequate  Airway patency: patent  Anesthetic complications: No anesthetic complications  PONV Status: none  Cardiovascular status: acceptable  Respiratory status: acceptable  Hydration status: acceptable

## 2021-06-03 NOTE — OP NOTE
ENDOSCOPIC RETROGRADE CHOLANGIOPANCREATOGRAPHY Procedure Report    Patient Name:  Belen Allen  YOB: 1980    Date of Surgery:  6/3/2021     Pre-Op Diagnosis:  Right upper quadrant abdominal pain [R10.11]  LFTs abnormal [R94.5]  Common bile duct dilatation [K83.8]        Procedure/CPT® Codes:      Procedure(s):  ENDOSCOPIC RETROGRADE CHOLANGIOPANCREATOGRAPHY WITH SPHINCTEROTOMY, DILATION WITH BALLOON CLEARANCE  (12MM-15MM)    Staff:  Surgeon(s):  Bjorn Flannery MD      Anesthesia: General    Description of Procedure:  A description of the procedure as well as risks, benefits and alternative methods were explained to the patient who voiced understanding and signed the corresponding consent form.Specifically risks of post-ERCP pancreatitis, bleeding, perforation, failure to canulate and adverse reaction to sedation were discussed. A physical exam was performed and vital signs were monitored throughout the procedure.    A  film was performed which was normal. With the patient in the semi-prone position, an Olympus side viewing endoscope was placed into the mouth and proceeded through the esophagus, stomach and second portion of the duodenum without difficulty. Limited views of the esophagus and stomach were normal. The ampulla was visualized and appeared somewhat patulous suggestive of recent passage of a stone. A Babyage hydrotome was used to cannulate the ampulla using wire guided technique. Bile was aspirated to ensure this was the duct of interest. Contrast was injected into the bile duct.      The scope was then retroflexed and the fundus was visualized. The procedure was not difficult and there were no immediate complications. There was no blood loss.    Findings:   The ampulla appeared patulous suggestive of recent passage of a stone.  The 0.035 Jagwire was used to cannulate the common bile duct without difficulty.  The common bile duct was deeply cannulated over the  wire.  Bile was aspirated and a cholangiogram was obtained revealing a dilated common bile duct to 15 mm near the bifurcation.  The intrahepatic biliary tree was mildly dilated.  The proximal common bile duct was dilated.  There was normal taper down to the papilla.  The cystic duct stump was unremarkable.  I did not see any filling defects or stricture.  A 10 mm biliary sphincterotomy was performed in the 12 o'clock position.  Then a 12/15 mm stone extraction balloon was passed over the wire.  The balloon was inflated to 15 mm of the bifurcation and pulled down through the duct on multiple occasions.  On one occasion there appeared to be possibly small yellowish stone debris.  The 12 mm balloon pulled easily through the sphincterotomy site.  At the completion of the procedure an air cholangiogram was noted consistent with widely patent sphincterotomy.  An occlusion cholangiogram was obtained and revealed a dilated bile duct but no filling defects.  Therefore the balloon, wire, and endoscope were withdrawn and the procedure was completed.  The patient tolerated procedure well and there were no immediate complications.    Impression:  1.  Dilated common bile duct    Recommendations:  1.  Advance diet as tolerated  2.  Okay for discharge home today  3.  Follow-up with me as needed      Bjorn Flannery MD     Date: 6/3/2021    Time: 08:36 EDT

## 2021-06-03 NOTE — DISCHARGE SUMMARY
Date of Admission: 6/2/2021    Date of Discharge:  6/3/2021    Length of stay:  LOS: 0 days     Admission Diagnosis:   RUQ abdominal pain [R10.11]      Discharge Diagnosis:     Acute RUQ pain with elevated LFTs and dilated common bile duct s/p ERCP with sphincterotomy and dilation 6/3/2021  - CT abd pel- mild intrahepatic/extrahepatic biliary dilatation  - MRCP reviewed  - tolerating diet  - follow up with GI as needed      Chronic pain  - Continue gabapentin (INSPECT verified)     Hypertension  - ? Related to pain  - discussed with patient about need for outpatient follow up, diet and exercise      Folic acid deficiency  - Continue folic acid      GERD  - Continue pepcid     Anxiety  - Stable  - Continue Prozac, trazodone, and elavil      Obesity  - BMI 35.1  - Enourage lifestyle modifications as appropriate      Tobacco abuse  - nicotine patch  - encourage cessation     Active Hospital Problems    Diagnosis  POA   • Tobacco abuse [Z72.0]  Yes   • GERD without esophagitis [K21.9]  Yes   • Right upper quadrant abdominal pain [R10.11]  Yes   • Common bile duct dilatation [K83.8]  Yes   • Folate deficiency [E53.8]  Yes   • Anxiety disorder [F41.9]  Yes   • Obesity (BMI 35.0-39.9 without comorbidity) [E66.9]  Yes      Resolved Hospital Problems    Diagnosis Date Resolved POA   • Choledocholithiasis [K80.50] 06/03/2021 Yes   • LFTs abnormal [R94.5] 06/03/2021 Yes       Hospital Course:    Ms. Allen is a 40 y.o. female with a past medical history of chronic back pain and anxiety who presented to [facility] on 5/31/2021 due to abdominal pain.  Patient explains her right-sided abdominal pain radiating to her back started on Saturday.  She denies any aggravating factors.  Upon admission to [facility], it was noted on imaging patient had a dilated duct.  GI was consulted.  Patient underwent an MRCP, which was abnormal.  Patient was transferred to [facility] on 6/2/2021 due to need  "for ERCP and I am able to obtain this at [facility].  Hospitalist and GI at [facility] accepted patient for further care and management.  Upon admission to [facility], patient reports her abdominal pain is a 10 out of 10.  She feels \"like someone is cutting the inside of her abdomen.  \"She explains she has had on and off nausea.  She denies any vomiting, diarrhea, fever, chills, cough, shortness breath, or chest pain.  She reports she had a laparoscopic cholecystectomy 6 years ago.  Her last bowel movement was this morning and it was diarrhea.   Patient underwent ERCP with sphincterotomy and dilation. She is tolerating diet and will be discharged home in stable condition.        Procedures Performed:    Procedure(s):  ENDOSCOPIC RETROGRADE CHOLANGIOPANCREATOGRAPHY WITH SPHINCTEROTOMY, DILATION WITH BALLOON CLEARANCE  (12MM-15MM)  -------------------       Consults:   Consults     Date and Time Order Name Status Description    5/31/2021  7:34 PM Inpatient Gastroenterology Consult Completed     5/31/2021  5:43 PM [facility] (on-call MD unless specified) Details Completed           Vital Signs:  Temp:  [98 °F (36.7 °C)-98.9 °F (37.2 °C)] 98.9 °F (37.2 °C)  Heart Rate:  [] 71  Resp:  [14-20] 19  BP: (101-174)/() 143/83        Physical Exam:  Physical Exam  Vitals reviewed.   Constitutional:       General: She is not in acute distress.     Appearance: She is morbidly obese.   Cardiovascular:      Rate and Rhythm: Normal rate and regular rhythm.   Pulmonary:      Effort: Pulmonary effort is normal. No respiratory distress.   Abdominal:      General: Bowel sounds are normal.      Palpations: Abdomen is soft.   Neurological:      Mental Status: She is alert.   Psychiatric:         Mood and Affect: Mood normal.         Behavior: Behavior normal.                 Pertinent Test Results:   Lab Results (last 72 hours)     Procedure Component Value Units Date/Time    Comprehensive Metabolic " Panel [[id number]]  (Abnormal) Collected: 06/03/21 0438    Specimen: Blood Updated: 06/03/21 0539     Glucose 96 mg/dL      BUN 7 mg/dL      Creatinine 0.78 mg/dL      Sodium 140 mmol/L      Potassium 4.1 mmol/L      Chloride 105 mmol/L      CO2 24.0 mmol/L      Calcium 8.3 mg/dL      Total Protein 5.7 g/dL      Albumin 3.30 g/dL      ALT (SGPT) 57 U/L      AST (SGOT) 31 U/L      Alkaline Phosphatase 194 U/L      Total Bilirubin 0.2 mg/dL      eGFR Non African Amer 82 mL/min/1.73      Globulin 2.4 gm/dL      A/G Ratio 1.4 g/dL      BUN/Creatinine Ratio 9.0     Anion Gap 11.0 mmol/L     Narrative:      GFR Normal >60  Chronic Kidney Disease <60  Kidney Failure <15      Magnesium [[id number]]  (Normal) Collected: 06/03/21 0438    Specimen: Blood Updated: 06/03/21 0538     Magnesium 2.2 mg/dL     CBC Auto Differential [[id number]]  (Abnormal) Collected: 06/03/21 0438    Specimen: Blood Updated: 06/03/21 0521     WBC 6.60 10*3/mm3      RBC 3.55 10*6/mm3      Hemoglobin 11.2 g/dL      Hematocrit 32.4 %      MCV 91.1 fL      MCH 31.4 pg      MCHC 34.5 g/dL      RDW 13.8 %      RDW-SD 44.6 fl      MPV 8.0 fL      Platelets 293 10*3/mm3      Neutrophil % 45.6 %      Lymphocyte % 43.6 %      Monocyte % 7.4 %      Eosinophil % 2.8 %      Basophil % 0.6 %      Neutrophils, Absolute 3.00 10*3/mm3      Lymphocytes, Absolute 2.90 10*3/mm3      Monocytes, Absolute 0.50 10*3/mm3      Eosinophils, Absolute 0.20 10*3/mm3      Basophils, Absolute 0.00 10*3/mm3      nRBC 0.1 /100 WBC     CBC & Differential [[id number]]  (Normal) Collected: 06/02/21 1329    Specimen: Blood Updated: 06/02/21 1404    Narrative:      The following orders were created for panel order CBC & Differential.  Procedure                               Abnormality         Status                     ---------                               -----------         ------                     CBC Auto Differential[[id number]]        Normal              Final result                  Please view results for these tests on the individual orders.    CBC Auto Differential [[id number]]  (Normal) Collected: 06/02/21 1329    Specimen: Blood Updated: 06/02/21 1404     WBC 7.60 10*3/mm3      RBC 3.85 10*6/mm3      Hemoglobin 12.1 g/dL      Hematocrit 35.5 %      MCV 92.4 fL      MCH 31.5 pg      MCHC 34.1 g/dL      RDW 13.8 %      RDW-SD 44.6 fl      MPV 8.0 fL      Platelets 290 10*3/mm3      Neutrophil % 59.7 %      Lymphocyte % 30.8 %      Monocyte % 6.2 %      Eosinophil % 2.8 %      Basophil % 0.5 %      Neutrophils, Absolute 4.50 10*3/mm3      Lymphocytes, Absolute 2.30 10*3/mm3      Monocytes, Absolute 0.50 10*3/mm3      Eosinophils, Absolute 0.20 10*3/mm3      Basophils, Absolute 0.00 10*3/mm3      nRBC 0.1 /100 WBC     Pregnancy, Urine - Urine, Clean Catch [[id number]]  (Normal) Collected: 06/02/21 1314    Specimen: Urine, Clean Catch Updated: 06/02/21 1359     HCG, Urine QL Negative            Results for orders placed during the hospital encounter of 11/08/20    Adult Transesophageal Echo (DENIS) W/ Cont if Necessary Per Protocol    Interpretation Summary  · Left ventricular ejection fraction appears to be 61 - 65%. Left ventricular systolic function is normal.  · Saline test results are negative.      Imaging Results (All)     Procedure Component Value Units Date/Time    FL ercp biliary duct only [[id number]] Collected: 06/03/21 0912     Updated: 06/03/21 0915    Narrative:      DATE OF EXAM:  6/3/2021 8:00 AM     PROCEDURE:  FL ERCP BILIARY DUCT ONLY-     INDICATIONS:  ENDOSCOPIC RETROGRADE CHOLANGIOPANCREATOGRAPHY WITH SPHINCTEROTOMY,  DILATION WITH BALLOON CLEARANCE  (12MM-15MM); R10.11-Right upper  quadrant pain; R94.5-Abnormal results of liver function studies;  K83.8-Other specified diseases of biliary tract     COMPARISON:  No Comparisons Available     TECHNIQUE:   A series of radiographic digital spot films were obtained in conjunction  with a endoscopic catheterization of the biliary  and pancreatic ductal  system, performed by the gastroenterologist.     Fluoroscopic time:   1.3 minutes     FINDINGS:  7 spot fluoroscopic series images were obtained during ERCP procedure.  Contrast was injected retrograde into the common bile duct. There is  fusiform dilation of common bile duct with tapering near the level of  the ampulla. Balloon sweep was performed.        Impression:      ERCP with fluoroscopy. Please refer to the procedure report for findings  and recommendations.     Electronically Signed By-Brittni Castaneda MD On:6/3/2021 9:13 AM  This report was finalized on [id number] by  Brittni Castaneda MD.                Discharge Disposition:  Home or Self Care    Discharge Medications:     Discharge Medications      Continue These Medications      Instructions Start Date   amitriptyline 25 MG tablet  Commonly known as: ELAVIL   25 mg, Oral, Nightly      cyclobenzaprine 10 MG tablet  Commonly known as: FLEXERIL   1 tablet, Oral, 3 Times Daily      famotidine 20 MG tablet  Commonly known as: PEPCID   20 mg, Oral, 2 Times Daily Before Meals      folic acid 1 MG tablet  Commonly known as: FOLVITE   1 mg, Oral, Daily      gabapentin 800 MG tablet  Commonly known as: NEURONTIN   800 mg, Oral, 4 Times Daily      hydrOXYzine 25 MG tablet  Commonly known as: ATARAX   4 tablets, Oral, Every Night at Bedtime      melatonin 5 MG tablet tablet   10 mg, Oral, Nightly      PROzac 40 MG capsule  Generic drug: FLUoxetine   40 mg, Oral, Nightly      traZODone 50 MG tablet  Commonly known as: DESYREL   50 mg, Oral, Nightly             Discharge Diet:   Diet Instructions     Advance Diet As Tolerated      Diet: Specialty Diet; Thin Liquids, No Restrictions; Low Residue      Discharge Diet: Specialty Diet    Fluid Consistency: Thin Liquids, No Restrictions    Specialty Diets: Low Residue          Activity at Discharge:   Activity Instructions     Activity as Tolerated            Follow-up Appointments  No future  appointments.    Additional Instructions for the Follow-ups that You Need to Schedule     Discharge Follow-up with PCP   As directed       Currently Documented PCP:    Sahil Cornelius MD    PCP Phone Number:    [phone]     Follow Up Details: one week or sooner if needed         Discharge Follow-up with Specified Provider: Dr. Flannery as needed   As directed      To: Dr. Flannery as needed               Test Results Pending at Discharge:  none    Condition on Discharge:    stable     Risk for Readmission (LACE): Score: 3 (6/3/2021  6:01 AM)          Nicky Calle DO  06/03/21  19:39 EDT

## 2021-06-03 NOTE — ANESTHESIA PROCEDURE NOTES
Airway  Urgency: elective    Date/Time: 6/3/2021 8:07 AM  Airway not difficult    General Information and Staff    Patient location during procedure: OR  Anesthesiologist: Van Terry MD    Indications and Patient Condition  Indications for airway management: airway protection    Preoxygenated: yes  MILS not maintained throughout  Mask difficulty assessment: 1 - vent by mask    Final Airway Details  Final airway type: endotracheal airway      Successful airway: ETT  Cuffed: yes   Successful intubation technique: direct laryngoscopy  Facilitating devices/methods: intubating stylet  Endotracheal tube insertion site: oral  Blade: Liu  Blade size: 4  ETT size (mm): 7.0  Cormack-Lehane Classification: grade IIa - partial view of glottis  Placement verified by: capnometry   Measured from: gums  ETT/EBT to gums (cm): 20  Number of attempts at approach: 1  Assessment: lips, teeth, and gum same as pre-op and atraumatic intubation

## 2021-06-03 NOTE — DISCHARGE INSTRUCTIONS
Take medication as prescribed.  Follow up with PCP in one week or sooner if needed.  Follow up with Dr. Flannery as needed.  Recommend diet and exercise for improved blood pressure control.  Continue bland/low residue diet and advance as tolerated.  May alternate Tylenol and ibuprofen as needed for pain.

## 2021-06-07 NOTE — CASE MANAGEMENT/SOCIAL WORK
Case Management Discharge Note      Final Note: Transferred to [facility] via [facility] for ERCP         Selected Continued Care - Discharged on 6/3/2021 Admission date: 6/2/2021 - Discharge disposition: Home or Self Care                     Final Discharge Disposition Code: 01 - home or self-care

## 2021-07-02 ENCOUNTER — APPOINTMENT (OUTPATIENT)
Dept: CT IMAGING | Facility: HOSPITAL | Age: 41
End: 2021-07-02

## 2021-07-02 ENCOUNTER — APPOINTMENT (OUTPATIENT)
Dept: GENERAL RADIOLOGY | Facility: HOSPITAL | Age: 41
End: 2021-07-02

## 2021-07-02 ENCOUNTER — HOSPITAL ENCOUNTER (EMERGENCY)
Facility: HOSPITAL | Age: 41
Discharge: HOME | End: 2021-07-03
Attending: EMERGENCY MEDICINE | Admitting: EMERGENCY MEDICINE

## 2021-07-02 DIAGNOSIS — R10.9 ACUTE RIGHT FLANK PAIN: Primary | ICD-10-CM

## 2021-07-02 DIAGNOSIS — R60.0 BILATERAL LOWER EXTREMITY EDEMA: ICD-10-CM

## 2021-07-02 LAB
ALBUMIN SERPL-MCNC: 4.1 G/DL (ref 3.5–5.2)
ALBUMIN/GLOB SERPL: 1.7 G/DL
ALP SERPL-CCNC: 132 U/L (ref 39–117)
ALT SERPL W P-5'-P-CCNC: 26 U/L (ref 1–33)
ANION GAP SERPL CALCULATED.3IONS-SCNC: 8.2 MMOL/L (ref 5–15)
AST SERPL-CCNC: 28 U/L (ref 1–32)
BASOPHILS # BLD AUTO: 0.03 10*3/MM3 (ref 0–0.2)
BASOPHILS NFR BLD AUTO: 0.4 % (ref 0–1.5)
BILIRUB SERPL-MCNC: 0.3 MG/DL (ref 0–1.2)
BILIRUB UR QL STRIP: NEGATIVE
BUN SERPL-MCNC: 7 MG/DL (ref 6–20)
BUN/CREAT SERPL: 9.7 (ref 7–25)
CALCIUM SPEC-SCNC: 8.8 MG/DL (ref 8.6–10.5)
CHLORIDE SERPL-SCNC: 105 MMOL/L (ref 98–107)
CLARITY UR: CLEAR
CO2 SERPL-SCNC: 27.8 MMOL/L (ref 22–29)
COLOR UR: YELLOW
CREAT SERPL-MCNC: 0.72 MG/DL (ref 0.57–1)
DEPRECATED RDW RBC AUTO: 45.5 FL (ref 37–54)
EOSINOPHIL # BLD AUTO: 0.21 10*3/MM3 (ref 0–0.4)
EOSINOPHIL NFR BLD AUTO: 2.5 % (ref 0.3–6.2)
ERYTHROCYTE [DISTWIDTH] IN BLOOD BY AUTOMATED COUNT: 13.3 % (ref 12.3–15.4)
GFR SERPL CREATININE-BSD FRML MDRD: 90 ML/MIN/1.73
GLOBULIN UR ELPH-MCNC: 2.4 GM/DL
GLUCOSE SERPL-MCNC: 109 MG/DL (ref 65–99)
GLUCOSE UR STRIP-MCNC: NEGATIVE MG/DL
HCG SERPL QL: NEGATIVE
HCT VFR BLD AUTO: 36 % (ref 34–46.6)
HGB BLD-MCNC: 11.9 G/DL (ref 12–15.9)
HGB UR QL STRIP.AUTO: NEGATIVE
HOLD SPECIMEN: NORMAL
IMM GRANULOCYTES # BLD AUTO: 0.05 10*3/MM3 (ref 0–0.05)
IMM GRANULOCYTES NFR BLD AUTO: 0.6 % (ref 0–0.5)
KETONES UR QL STRIP: NEGATIVE
LEUKOCYTE ESTERASE UR QL STRIP.AUTO: NEGATIVE
LIPASE SERPL-CCNC: 12 U/L (ref 13–60)
LYMPHOCYTES # BLD AUTO: 2.7 10*3/MM3 (ref 0.7–3.1)
LYMPHOCYTES NFR BLD AUTO: 31.9 % (ref 19.6–45.3)
MCH RBC QN AUTO: 31.1 PG (ref 26.6–33)
MCHC RBC AUTO-ENTMCNC: 33.1 G/DL (ref 31.5–35.7)
MCV RBC AUTO: 94 FL (ref 79–97)
MONOCYTES # BLD AUTO: 0.55 10*3/MM3 (ref 0.1–0.9)
MONOCYTES NFR BLD AUTO: 6.5 % (ref 5–12)
NEUTROPHILS NFR BLD AUTO: 4.92 10*3/MM3 (ref 1.7–7)
NEUTROPHILS NFR BLD AUTO: 58.1 % (ref 42.7–76)
NITRITE UR QL STRIP: NEGATIVE
NRBC BLD AUTO-RTO: 0 /100 WBC (ref 0–0.2)
NT-PROBNP SERPL-MCNC: 116.4 PG/ML (ref 0–450)
PH UR STRIP.AUTO: 5.5 [PH] (ref 5–8)
PLATELET # BLD AUTO: 316 10*3/MM3 (ref 140–450)
PMV BLD AUTO: 9.5 FL (ref 6–12)
POTASSIUM SERPL-SCNC: 3.7 MMOL/L (ref 3.5–5.2)
PROT SERPL-MCNC: 6.5 G/DL (ref 6–8.5)
PROT UR QL STRIP: NEGATIVE
RBC # BLD AUTO: 3.83 10*6/MM3 (ref 3.77–5.28)
SODIUM SERPL-SCNC: 141 MMOL/L (ref 136–145)
SP GR UR STRIP: 1.03 (ref 1–1.03)
TROPONIN T SERPL-MCNC: <0.01 NG/ML (ref 0–0.03)
UROBILINOGEN UR QL STRIP: NORMAL
WBC # BLD AUTO: 8.46 10*3/MM3 (ref 3.4–10.8)
WHOLE BLOOD HOLD SPECIMEN: NORMAL

## 2021-07-02 PROCEDURE — 25010000002 MORPHINE PER 10 MG: Performed by: NURSE PRACTITIONER

## 2021-07-02 PROCEDURE — 25010000002 ONDANSETRON PER 1 MG: Performed by: NURSE PRACTITIONER

## 2021-07-02 PROCEDURE — 0 IOPAMIDOL PER 1 ML: Performed by: EMERGENCY MEDICINE

## 2021-07-02 PROCEDURE — 71045 X-RAY EXAM CHEST 1 VIEW: CPT

## 2021-07-02 PROCEDURE — 80053 COMPREHEN METABOLIC PANEL: CPT

## 2021-07-02 PROCEDURE — 96374 THER/PROPH/DIAG INJ IV PUSH: CPT

## 2021-07-02 PROCEDURE — 96376 TX/PRO/DX INJ SAME DRUG ADON: CPT

## 2021-07-02 PROCEDURE — 99284 EMERGENCY DEPT VISIT MOD MDM: CPT

## 2021-07-02 PROCEDURE — 74174 CTA ABD&PLVS W/CONTRAST: CPT

## 2021-07-02 PROCEDURE — 84703 CHORIONIC GONADOTROPIN ASSAY: CPT

## 2021-07-02 PROCEDURE — 84484 ASSAY OF TROPONIN QUANT: CPT | Performed by: NURSE PRACTITIONER

## 2021-07-02 PROCEDURE — 81003 URINALYSIS AUTO W/O SCOPE: CPT

## 2021-07-02 PROCEDURE — 25010000002 HYDROMORPHONE 1 MG/ML SOLUTION: Performed by: EMERGENCY MEDICINE

## 2021-07-02 PROCEDURE — 25010000002 DROPERIDOL PER 5 MG: Performed by: EMERGENCY MEDICINE

## 2021-07-02 PROCEDURE — 93010 ELECTROCARDIOGRAM REPORT: CPT | Performed by: INTERNAL MEDICINE

## 2021-07-02 PROCEDURE — 25010000002 HYDROMORPHONE 1 MG/ML SOLUTION: Performed by: NURSE PRACTITIONER

## 2021-07-02 PROCEDURE — 96375 TX/PRO/DX INJ NEW DRUG ADDON: CPT

## 2021-07-02 PROCEDURE — 83690 ASSAY OF LIPASE: CPT

## 2021-07-02 PROCEDURE — 83880 ASSAY OF NATRIURETIC PEPTIDE: CPT | Performed by: NURSE PRACTITIONER

## 2021-07-02 PROCEDURE — 85025 COMPLETE CBC W/AUTO DIFF WBC: CPT

## 2021-07-02 PROCEDURE — 93005 ELECTROCARDIOGRAM TRACING: CPT | Performed by: NURSE PRACTITIONER

## 2021-07-02 RX ORDER — DROPERIDOL 2.5 MG/ML
2.5 INJECTION, SOLUTION INTRAMUSCULAR; INTRAVENOUS ONCE
Status: COMPLETED | OUTPATIENT
Start: 2021-07-02 | End: 2021-07-02

## 2021-07-02 RX ORDER — SODIUM CHLORIDE 0.9 % (FLUSH) 0.9 %
10 SYRINGE (ML) INJECTION AS NEEDED
Status: DISCONTINUED | OUTPATIENT
Start: 2021-07-02 | End: 2021-07-03 | Stop reason: HOSPADM

## 2021-07-02 RX ORDER — MORPHINE SULFATE 2 MG/ML
4 INJECTION, SOLUTION INTRAMUSCULAR; INTRAVENOUS ONCE
Status: COMPLETED | OUTPATIENT
Start: 2021-07-02 | End: 2021-07-02

## 2021-07-02 RX ORDER — ONDANSETRON 2 MG/ML
4 INJECTION INTRAMUSCULAR; INTRAVENOUS ONCE
Status: COMPLETED | OUTPATIENT
Start: 2021-07-02 | End: 2021-07-02

## 2021-07-02 RX ORDER — CYCLOBENZAPRINE HCL 10 MG
10 TABLET ORAL 2 TIMES DAILY PRN
Qty: 20 TABLET | Refills: 0 | Status: SHIPPED | OUTPATIENT
Start: 2021-07-02 | End: 2022-01-05 | Stop reason: HOSPADM

## 2021-07-02 RX ORDER — HYDROCODONE BITARTRATE AND ACETAMINOPHEN 7.5; 325 MG/1; MG/1
1 TABLET ORAL EVERY 6 HOURS PRN
Qty: 12 TABLET | Refills: 0 | Status: SHIPPED | OUTPATIENT
Start: 2021-07-02 | End: 2022-01-05 | Stop reason: HOSPADM

## 2021-07-02 RX ADMIN — HYDROMORPHONE HYDROCHLORIDE 1 MG: 1 INJECTION, SOLUTION INTRAMUSCULAR; INTRAVENOUS; SUBCUTANEOUS at 21:13

## 2021-07-02 RX ADMIN — ONDANSETRON 4 MG: 2 INJECTION INTRAMUSCULAR; INTRAVENOUS at 20:46

## 2021-07-02 RX ADMIN — HYDROMORPHONE HYDROCHLORIDE 1 MG: 1 INJECTION, SOLUTION INTRAMUSCULAR; INTRAVENOUS; SUBCUTANEOUS at 22:58

## 2021-07-02 RX ADMIN — DROPERIDOL 2.5 MG: 2.5 INJECTION, SOLUTION INTRAMUSCULAR; INTRAVENOUS at 23:04

## 2021-07-02 RX ADMIN — IOPAMIDOL 95 ML: 755 INJECTION, SOLUTION INTRAVENOUS at 23:01

## 2021-07-02 RX ADMIN — MORPHINE SULFATE 4 MG: 2 INJECTION, SOLUTION INTRAMUSCULAR; INTRAVENOUS at 20:46

## 2021-07-03 VITALS
RESPIRATION RATE: 16 BRPM | HEIGHT: 64 IN | TEMPERATURE: 98 F | DIASTOLIC BLOOD PRESSURE: 85 MMHG | BODY MASS INDEX: 39.95 KG/M2 | OXYGEN SATURATION: 96 % | SYSTOLIC BLOOD PRESSURE: 118 MMHG | HEART RATE: 90 BPM | WEIGHT: 234 LBS

## 2021-07-03 LAB — QT INTERVAL: 376 MS

## 2021-07-03 NOTE — ED NOTES
C/o right sided flank pain abd bilateral feet swelling denies trauma. HX kidney stones  VS stable     Mane Conteh, YNES  07/02/21 2004       Mane Conteh RN  07/02/21 2005

## 2021-07-03 NOTE — ED PROVIDER NOTES
MD ATTESTATION NOTE    The RAY and I have discussed this patient's history, physical exam, and treatment plan.  I have reviewed the documentation and personally had a face to face interaction with the patient. I affirm the documentation and agree with the treatment and plan.  The attached note describes my personal findings.      Belen Allen is a 40 y.o. female who presents to the ED c/o right sided abdominal and flank pain with lower extremity edema bilaterally.    On exam:  Tearful  Right upper quadrant diane pain  No rash to the abdomen or back  2+ lower extremity edema bilaterally    Labs  Recent Results (from the past 24 hour(s))   Comprehensive Metabolic Panel    Collection Time: 07/02/21  8:37 PM    Specimen: Blood   Result Value Ref Range    Glucose 109 (H) 65 - 99 mg/dL    BUN 7 6 - 20 mg/dL    Creatinine 0.72 0.57 - 1.00 mg/dL    Sodium 141 136 - 145 mmol/L    Potassium 3.7 3.5 - 5.2 mmol/L    Chloride 105 98 - 107 mmol/L    CO2 27.8 22.0 - 29.0 mmol/L    Calcium 8.8 8.6 - 10.5 mg/dL    Total Protein 6.5 6.0 - 8.5 g/dL    Albumin 4.10 3.50 - 5.20 g/dL    ALT (SGPT) 26 1 - 33 U/L    AST (SGOT) 28 1 - 32 U/L    Alkaline Phosphatase 132 (H) 39 - 117 U/L    Total Bilirubin 0.3 0.0 - 1.2 mg/dL    eGFR Non African Amer 90 >60 mL/min/1.73    Globulin 2.4 gm/dL    A/G Ratio 1.7 g/dL    BUN/Creatinine Ratio 9.7 7.0 - 25.0    Anion Gap 8.2 5.0 - 15.0 mmol/L   Lipase    Collection Time: 07/02/21  8:37 PM    Specimen: Blood   Result Value Ref Range    Lipase 12 (L) 13 - 60 U/L   hCG, Serum, Qualitative    Collection Time: 07/02/21  8:37 PM    Specimen: Blood   Result Value Ref Range    HCG Qualitative Negative Negative   Green Top (Gel)    Collection Time: 07/02/21  8:37 PM   Result Value Ref Range    Extra Tube Hold for add-ons.    Lavender Top    Collection Time: 07/02/21  8:37 PM   Result Value Ref Range    Extra Tube hold for add-on    CBC Auto Differential    Collection Time: 07/02/21  8:37 PM     Specimen: Blood   Result Value Ref Range    WBC 8.46 3.40 - 10.80 10*3/mm3    RBC 3.83 3.77 - 5.28 10*6/mm3    Hemoglobin 11.9 (L) 12.0 - 15.9 g/dL    Hematocrit 36.0 34.0 - 46.6 %    MCV 94.0 79.0 - 97.0 fL    MCH 31.1 26.6 - 33.0 pg    MCHC 33.1 31.5 - 35.7 g/dL    RDW 13.3 12.3 - 15.4 %    RDW-SD 45.5 37.0 - 54.0 fl    MPV 9.5 6.0 - 12.0 fL    Platelets 316 140 - 450 10*3/mm3    Neutrophil % 58.1 42.7 - 76.0 %    Lymphocyte % 31.9 19.6 - 45.3 %    Monocyte % 6.5 5.0 - 12.0 %    Eosinophil % 2.5 0.3 - 6.2 %    Basophil % 0.4 0.0 - 1.5 %    Immature Grans % 0.6 (H) 0.0 - 0.5 %    Neutrophils, Absolute 4.92 1.70 - 7.00 10*3/mm3    Lymphocytes, Absolute 2.70 0.70 - 3.10 10*3/mm3    Monocytes, Absolute 0.55 0.10 - 0.90 10*3/mm3    Eosinophils, Absolute 0.21 0.00 - 0.40 10*3/mm3    Basophils, Absolute 0.03 0.00 - 0.20 10*3/mm3    Immature Grans, Absolute 0.05 0.00 - 0.05 10*3/mm3    nRBC 0.0 0.0 - 0.2 /100 WBC   Troponin    Collection Time: 07/02/21  8:37 PM    Specimen: Blood   Result Value Ref Range    Troponin T <0.010 0.000 - 0.030 ng/mL   BNP    Collection Time: 07/02/21  8:37 PM    Specimen: Blood   Result Value Ref Range    proBNP 116.4 0.0 - 450.0 pg/mL   ECG 12 Lead    Collection Time: 07/02/21  8:55 PM   Result Value Ref Range    QT Interval 376 ms   Urinalysis With Microscopic If Indicated (No Culture) - Urine, Clean Catch    Collection Time: 07/02/21  9:14 PM    Specimen: Urine, Clean Catch   Result Value Ref Range    Color, UA Yellow Yellow, Straw    Appearance, UA Clear Clear    pH, UA 5.5 5.0 - 8.0    Specific Gravity, UA 1.026 1.005 - 1.030    Glucose, UA Negative Negative    Ketones, UA Negative Negative    Bilirubin, UA Negative Negative    Blood, UA Negative Negative    Protein, UA Negative Negative    Leuk Esterase, UA Negative Negative    Nitrite, UA Negative Negative    Urobilinogen, UA 1.0 E.U./dL 0.2 - 1.0 E.U./dL       Radiology  XR Chest 1 View    Result Date: 7/2/2021  Patient: PAZ  CARSON  Time Out: 21:53 Exam(s): FILM CXR 1 VIEW EXAM:   XR Chest, 1 View CLINICAL HISTORY:    Reason for exam: eval chf. TECHNIQUE:   Frontal view of the chest. COMPARISON:   11 11 2020. FINDINGS:   Lungs:  Unremarkable.  No consolidation.   Pleural space:  Unremarkable.  No pneumothorax.   Heart:  Unremarkable.  No cardiomegaly.   Mediastinum:  Unremarkable.   Bones joints:  Unremarkable. IMPRESSION:       No acute cardiopulmonary disease.     Electronically signed by Mega Hugo M.D. on 07-02-21 at 2153      Medical Decision Making:  ED Course as of Jul 02 2353 Fri Jul 02, 2021 2038 Basic labs ordered as well as cardiac testing and BNP, CXR.  Further discussion with patient reveals she has h/o BTL, LMP was 2 weeks ago.  Her eliquis was stopped prior to her ERCP and she has not resumed it.  She was on Eliquis for splenic infarct.     [EW]   2101 EKG interpreted by myself.  Time 2055.  Sinus rhythm.  Heart rate 89.  Low voltage in the precordial leads.  No acute ST abnormality.    [TD]   2102 On medical chart review, old EKG obtained from 11/28/2020.  Sinus rhythm.  Heart rate 101.  Normal intervals and axis.  No acute ST abnormality.    [TD]   2103 Initial dose of pain medication not very effective.  I would ordered additional pain medication.    [EW]   2244 Blood, UA: Negative [TD]   2333 MDM/dispo:  Labs and UA normal.  CT scan not remarkable.  I will plan to send home with some muscle relaxers and she can follow up with BLE edema with her PCP.     [EW]      ED Course User Index  [EW] Claire Aguilera APRN  [TD] Christian Thurston II, MD           PPE: Both the patient and I wore a surgical mask throughout the entire patient encounter. I wore protective goggles.     Diagnosis  Final diagnoses:   Acute right flank pain   Bilateral lower extremity edema        Christian Thurston II, MD  07/02/21 2290
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
## 2021-07-04 ENCOUNTER — APPOINTMENT (OUTPATIENT)
Dept: CT IMAGING | Facility: HOSPITAL | Age: 41
End: 2021-07-04

## 2021-07-04 ENCOUNTER — APPOINTMENT (OUTPATIENT)
Dept: ULTRASOUND IMAGING | Facility: HOSPITAL | Age: 41
End: 2021-07-04

## 2021-07-04 ENCOUNTER — HOSPITAL ENCOUNTER (OUTPATIENT)
Facility: HOSPITAL | Age: 41
Setting detail: OBSERVATION
Discharge: HOME | End: 2021-07-07
Attending: EMERGENCY MEDICINE | Admitting: INTERNAL MEDICINE

## 2021-07-04 DIAGNOSIS — R10.9 INTRACTABLE ABDOMINAL PAIN: Primary | ICD-10-CM

## 2021-07-04 LAB
ALBUMIN SERPL-MCNC: 3.7 G/DL (ref 3.5–5.2)
ALBUMIN/GLOB SERPL: 1.5 G/DL
ALP SERPL-CCNC: 139 U/L (ref 39–117)
ALT SERPL W P-5'-P-CCNC: 23 U/L (ref 1–33)
AMPHET+METHAMPHET UR QL: NEGATIVE
ANION GAP SERPL CALCULATED.3IONS-SCNC: 8.6 MMOL/L (ref 5–15)
AST SERPL-CCNC: 26 U/L (ref 1–32)
BARBITURATES UR QL SCN: NEGATIVE
BASOPHILS # BLD MANUAL: 0.17 10*3/MM3 (ref 0–0.2)
BASOPHILS NFR BLD AUTO: 2 % (ref 0–1.5)
BENZODIAZ UR QL SCN: NEGATIVE
BILIRUB SERPL-MCNC: 0.2 MG/DL (ref 0–1.2)
BILIRUB UR QL STRIP: NEGATIVE
BUN SERPL-MCNC: 6 MG/DL (ref 6–20)
BUN/CREAT SERPL: 7.6 (ref 7–25)
CALCIUM SPEC-SCNC: 8.8 MG/DL (ref 8.6–10.5)
CANNABINOIDS SERPL QL: NEGATIVE
CHLORIDE SERPL-SCNC: 103 MMOL/L (ref 98–107)
CLARITY UR: CLEAR
CO2 SERPL-SCNC: 23.4 MMOL/L (ref 22–29)
COCAINE UR QL: NEGATIVE
COLOR UR: YELLOW
CREAT SERPL-MCNC: 0.79 MG/DL (ref 0.57–1)
DEPRECATED RDW RBC AUTO: 45.9 FL (ref 37–54)
EOSINOPHIL # BLD MANUAL: 0.43 10*3/MM3 (ref 0–0.4)
EOSINOPHIL NFR BLD MANUAL: 5.1 % (ref 0.3–6.2)
ERYTHROCYTE [DISTWIDTH] IN BLOOD BY AUTOMATED COUNT: 13.7 % (ref 12.3–15.4)
GFR SERPL CREATININE-BSD FRML MDRD: 81 ML/MIN/1.73
GLOBULIN UR ELPH-MCNC: 2.5 GM/DL
GLUCOSE SERPL-MCNC: 94 MG/DL (ref 65–99)
GLUCOSE UR STRIP-MCNC: NEGATIVE MG/DL
HCG SERPL QL: NEGATIVE
HCT VFR BLD AUTO: 37.2 % (ref 34–46.6)
HGB BLD-MCNC: 12.5 G/DL (ref 12–15.9)
HGB UR QL STRIP.AUTO: NEGATIVE
KETONES UR QL STRIP: NEGATIVE
LEUKOCYTE ESTERASE UR QL STRIP.AUTO: NEGATIVE
LIPASE SERPL-CCNC: 14 U/L (ref 13–60)
LYMPHOCYTES # BLD MANUAL: 2.28 10*3/MM3 (ref 0.7–3.1)
LYMPHOCYTES NFR BLD MANUAL: 27.3 % (ref 19.6–45.3)
LYMPHOCYTES NFR BLD MANUAL: 4 % (ref 5–12)
MCH RBC QN AUTO: 31.3 PG (ref 26.6–33)
MCHC RBC AUTO-ENTMCNC: 33.6 G/DL (ref 31.5–35.7)
MCV RBC AUTO: 93 FL (ref 79–97)
METHADONE UR QL SCN: NEGATIVE
MONOCYTES # BLD AUTO: 0.33 10*3/MM3 (ref 0.1–0.9)
NEUTROPHILS # BLD AUTO: 5.14 10*3/MM3 (ref 1.7–7)
NEUTROPHILS NFR BLD MANUAL: 61.6 % (ref 42.7–76)
NITRITE UR QL STRIP: NEGATIVE
OPIATES UR QL: POSITIVE
OXYCODONE UR QL SCN: NEGATIVE
PH UR STRIP.AUTO: 8 [PH] (ref 5–8)
PLAT MORPH BLD: NORMAL
PLATELET # BLD AUTO: 320 10*3/MM3 (ref 140–450)
PMV BLD AUTO: 9.6 FL (ref 6–12)
POTASSIUM SERPL-SCNC: 3.9 MMOL/L (ref 3.5–5.2)
PROT SERPL-MCNC: 6.2 G/DL (ref 6–8.5)
PROT UR QL STRIP: NEGATIVE
RBC # BLD AUTO: 4 10*6/MM3 (ref 3.77–5.28)
RBC MORPH BLD: NORMAL
SODIUM SERPL-SCNC: 135 MMOL/L (ref 136–145)
SP GR UR STRIP: 1.01 (ref 1–1.03)
UROBILINOGEN UR QL STRIP: NORMAL
WBC # BLD AUTO: 8.35 10*3/MM3 (ref 3.4–10.8)
WBC MORPH BLD: NORMAL

## 2021-07-04 PROCEDURE — 25010000002 ONDANSETRON PER 1 MG: Performed by: EMERGENCY MEDICINE

## 2021-07-04 PROCEDURE — 80307 DRUG TEST PRSMV CHEM ANLYZR: CPT | Performed by: EMERGENCY MEDICINE

## 2021-07-04 PROCEDURE — 80053 COMPREHEN METABOLIC PANEL: CPT | Performed by: EMERGENCY MEDICINE

## 2021-07-04 PROCEDURE — 83690 ASSAY OF LIPASE: CPT | Performed by: EMERGENCY MEDICINE

## 2021-07-04 PROCEDURE — 85007 BL SMEAR W/DIFF WBC COUNT: CPT | Performed by: EMERGENCY MEDICINE

## 2021-07-04 PROCEDURE — 81003 URINALYSIS AUTO W/O SCOPE: CPT | Performed by: EMERGENCY MEDICINE

## 2021-07-04 PROCEDURE — 84703 CHORIONIC GONADOTROPIN ASSAY: CPT | Performed by: EMERGENCY MEDICINE

## 2021-07-04 PROCEDURE — 85025 COMPLETE CBC W/AUTO DIFF WBC: CPT | Performed by: EMERGENCY MEDICINE

## 2021-07-04 PROCEDURE — 76705 ECHO EXAM OF ABDOMEN: CPT

## 2021-07-04 PROCEDURE — 74176 CT ABD & PELVIS W/O CONTRAST: CPT

## 2021-07-04 PROCEDURE — 99284 EMERGENCY DEPT VISIT MOD MDM: CPT

## 2021-07-04 PROCEDURE — 25010000002 KETOROLAC TROMETHAMINE PER 15 MG: Performed by: EMERGENCY MEDICINE

## 2021-07-04 PROCEDURE — 25010000002 HYDROMORPHONE 1 MG/ML SOLUTION: Performed by: EMERGENCY MEDICINE

## 2021-07-04 RX ORDER — KETOROLAC TROMETHAMINE 15 MG/ML
15 INJECTION, SOLUTION INTRAMUSCULAR; INTRAVENOUS ONCE
Status: COMPLETED | OUTPATIENT
Start: 2021-07-04 | End: 2021-07-04

## 2021-07-04 RX ORDER — SODIUM CHLORIDE 0.9 % (FLUSH) 0.9 %
10 SYRINGE (ML) INJECTION AS NEEDED
Status: DISCONTINUED | OUTPATIENT
Start: 2021-07-04 | End: 2021-07-07 | Stop reason: HOSPADM

## 2021-07-04 RX ORDER — ONDANSETRON 2 MG/ML
4 INJECTION INTRAMUSCULAR; INTRAVENOUS ONCE
Status: COMPLETED | OUTPATIENT
Start: 2021-07-04 | End: 2021-07-04

## 2021-07-04 RX ADMIN — ONDANSETRON 4 MG: 2 INJECTION INTRAMUSCULAR; INTRAVENOUS at 21:17

## 2021-07-04 RX ADMIN — KETOROLAC TROMETHAMINE 15 MG: 15 INJECTION, SOLUTION INTRAMUSCULAR; INTRAVENOUS at 21:17

## 2021-07-04 RX ADMIN — HYDROMORPHONE HYDROCHLORIDE 1 MG: 1 INJECTION, SOLUTION INTRAMUSCULAR; INTRAVENOUS; SUBCUTANEOUS at 22:29

## 2021-07-04 NOTE — ED TRIAGE NOTES
Pt to ER from home via [facility] with c/o R flank pain and BLE swelling. Pt states she was seen here 2 days ago for the same. Pt states symptoms have been worsening in severity since discharge. Pt states R flank pain wraps around to RLQ, denies any dysuria but reports nausea and diarrhea. Pt reports diarrhea. Pt reports feet as being very tender to touch.       Pt masked in triage, staff in appropriate ppe.

## 2021-07-05 PROBLEM — E66.01 OBESITY, CLASS III, BMI 40-49.9 (MORBID OBESITY): Status: ACTIVE | Noted: 2021-07-05

## 2021-07-05 PROBLEM — K59.00 CONSTIPATION: Status: ACTIVE | Noted: 2021-07-05

## 2021-07-05 PROBLEM — G89.29 OTHER CHRONIC PAIN: Status: ACTIVE | Noted: 2021-07-05

## 2021-07-05 PROBLEM — R10.9 INTRACTABLE ABDOMINAL PAIN: Status: ACTIVE | Noted: 2021-07-05

## 2021-07-05 PROBLEM — Z98.890 HISTORY OF ERCP: Status: ACTIVE | Noted: 2021-07-05

## 2021-07-05 LAB
ANION GAP SERPL CALCULATED.3IONS-SCNC: 7.1 MMOL/L (ref 5–15)
BASOPHILS # BLD AUTO: 0.02 10*3/MM3 (ref 0–0.2)
BASOPHILS NFR BLD AUTO: 0.3 % (ref 0–1.5)
BUN SERPL-MCNC: 6 MG/DL (ref 6–20)
BUN/CREAT SERPL: 8.1 (ref 7–25)
CALCIUM SPEC-SCNC: 8.6 MG/DL (ref 8.6–10.5)
CHLORIDE SERPL-SCNC: 106 MMOL/L (ref 98–107)
CO2 SERPL-SCNC: 23.9 MMOL/L (ref 22–29)
CREAT SERPL-MCNC: 0.74 MG/DL (ref 0.57–1)
DEPRECATED RDW RBC AUTO: 48.5 FL (ref 37–54)
EOSINOPHIL # BLD AUTO: 0.19 10*3/MM3 (ref 0–0.4)
EOSINOPHIL NFR BLD AUTO: 3.1 % (ref 0.3–6.2)
ERYTHROCYTE [DISTWIDTH] IN BLOOD BY AUTOMATED COUNT: 13.9 % (ref 12.3–15.4)
GFR SERPL CREATININE-BSD FRML MDRD: 87 ML/MIN/1.73
GLUCOSE SERPL-MCNC: 76 MG/DL (ref 65–99)
HCT VFR BLD AUTO: 35.5 % (ref 34–46.6)
HGB BLD-MCNC: 11.4 G/DL (ref 12–15.9)
IMM GRANULOCYTES # BLD AUTO: 0.03 10*3/MM3 (ref 0–0.05)
IMM GRANULOCYTES NFR BLD AUTO: 0.5 % (ref 0–0.5)
INR PPP: 0.97 (ref 0.9–1.1)
LYMPHOCYTES # BLD AUTO: 2.88 10*3/MM3 (ref 0.7–3.1)
LYMPHOCYTES NFR BLD AUTO: 47.4 % (ref 19.6–45.3)
MCH RBC QN AUTO: 31 PG (ref 26.6–33)
MCHC RBC AUTO-ENTMCNC: 32.1 G/DL (ref 31.5–35.7)
MCV RBC AUTO: 96.5 FL (ref 79–97)
MONOCYTES # BLD AUTO: 0.35 10*3/MM3 (ref 0.1–0.9)
MONOCYTES NFR BLD AUTO: 5.8 % (ref 5–12)
NEUTROPHILS NFR BLD AUTO: 2.61 10*3/MM3 (ref 1.7–7)
NEUTROPHILS NFR BLD AUTO: 42.9 % (ref 42.7–76)
NRBC BLD AUTO-RTO: 0 /100 WBC (ref 0–0.2)
PLATELET # BLD AUTO: 290 10*3/MM3 (ref 140–450)
PMV BLD AUTO: 9.7 FL (ref 6–12)
POTASSIUM SERPL-SCNC: 4.1 MMOL/L (ref 3.5–5.2)
PROTHROMBIN TIME: 12.7 SECONDS (ref 11.7–14.2)
RBC # BLD AUTO: 3.68 10*6/MM3 (ref 3.77–5.28)
SARS-COV-2 RNA RESP QL NAA+PROBE: NOT DETECTED
SODIUM SERPL-SCNC: 137 MMOL/L (ref 136–145)
WBC # BLD AUTO: 6.08 10*3/MM3 (ref 3.4–10.8)

## 2021-07-05 PROCEDURE — 84080 ASSAY ALKALINE PHOSPHATASES: CPT | Performed by: INTERNAL MEDICINE

## 2021-07-05 PROCEDURE — 85025 COMPLETE CBC W/AUTO DIFF WBC: CPT | Performed by: NURSE PRACTITIONER

## 2021-07-05 PROCEDURE — G0378 HOSPITAL OBSERVATION PER HR: HCPCS

## 2021-07-05 PROCEDURE — U0003 INFECTIOUS AGENT DETECTION BY NUCLEIC ACID (DNA OR RNA); SEVERE ACUTE RESPIRATORY SYNDROME CORONAVIRUS 2 (SARS-COV-2) (CORONAVIRUS DISEASE [COVID-19]), AMPLIFIED PROBE TECHNIQUE, MAKING USE OF HIGH THROUGHPUT TECHNOLOGIES AS DESCRIBED BY CMS-2020-01-R: HCPCS | Performed by: EMERGENCY MEDICINE

## 2021-07-05 PROCEDURE — 25010000002 ENOXAPARIN PER 10 MG: Performed by: INTERNAL MEDICINE

## 2021-07-05 PROCEDURE — 80048 BASIC METABOLIC PNL TOTAL CA: CPT | Performed by: NURSE PRACTITIONER

## 2021-07-05 PROCEDURE — 85610 PROTHROMBIN TIME: CPT | Performed by: NURSE PRACTITIONER

## 2021-07-05 PROCEDURE — 25010000002 ONDANSETRON PER 1 MG: Performed by: NURSE PRACTITIONER

## 2021-07-05 PROCEDURE — 99213 OFFICE O/P EST LOW 20 MIN: CPT | Performed by: INTERNAL MEDICINE

## 2021-07-05 PROCEDURE — 25010000002 HYDROMORPHONE PER 4 MG: Performed by: NURSE PRACTITIONER

## 2021-07-05 PROCEDURE — 25010000002 KETOROLAC TROMETHAMINE PER 15 MG: Performed by: INTERNAL MEDICINE

## 2021-07-05 PROCEDURE — 84075 ASSAY ALKALINE PHOSPHATASE: CPT | Performed by: INTERNAL MEDICINE

## 2021-07-05 RX ORDER — CYCLOBENZAPRINE HCL 10 MG
10 TABLET ORAL 2 TIMES DAILY PRN
Status: DISCONTINUED | OUTPATIENT
Start: 2021-07-05 | End: 2021-07-07 | Stop reason: HOSPADM

## 2021-07-05 RX ORDER — SODIUM CHLORIDE 0.9 % (FLUSH) 0.9 %
10 SYRINGE (ML) INJECTION AS NEEDED
Status: DISCONTINUED | OUTPATIENT
Start: 2021-07-05 | End: 2021-07-07 | Stop reason: HOSPADM

## 2021-07-05 RX ORDER — SODIUM CHLORIDE 0.9 % (FLUSH) 0.9 %
10 SYRINGE (ML) INJECTION EVERY 12 HOURS SCHEDULED
Status: DISCONTINUED | OUTPATIENT
Start: 2021-07-05 | End: 2021-07-07 | Stop reason: HOSPADM

## 2021-07-05 RX ORDER — HYDROMORPHONE HYDROCHLORIDE 1 MG/ML
0.5 INJECTION, SOLUTION INTRAMUSCULAR; INTRAVENOUS; SUBCUTANEOUS EVERY 4 HOURS PRN
Status: DISCONTINUED | OUTPATIENT
Start: 2021-07-05 | End: 2021-07-05

## 2021-07-05 RX ORDER — ACETAMINOPHEN 325 MG/1
650 TABLET ORAL EVERY 4 HOURS PRN
Status: DISCONTINUED | OUTPATIENT
Start: 2021-07-05 | End: 2021-07-07 | Stop reason: HOSPADM

## 2021-07-05 RX ORDER — SUMATRIPTAN 100 MG/1
50 TABLET, FILM COATED ORAL
COMMUNITY

## 2021-07-05 RX ORDER — POLYETHYLENE GLYCOL 3350 17 G/17G
17 POWDER, FOR SOLUTION ORAL DAILY
Status: DISCONTINUED | OUTPATIENT
Start: 2021-07-05 | End: 2021-07-07 | Stop reason: HOSPADM

## 2021-07-05 RX ORDER — FAMOTIDINE 10 MG/ML
20 INJECTION, SOLUTION INTRAVENOUS EVERY 12 HOURS SCHEDULED
Status: DISCONTINUED | OUTPATIENT
Start: 2021-07-05 | End: 2021-07-07

## 2021-07-05 RX ORDER — ACETAMINOPHEN 160 MG/5ML
650 SOLUTION ORAL EVERY 4 HOURS PRN
Status: DISCONTINUED | OUTPATIENT
Start: 2021-07-05 | End: 2021-07-07 | Stop reason: HOSPADM

## 2021-07-05 RX ORDER — NICOTINE 21 MG/24HR
1 PATCH, TRANSDERMAL 24 HOURS TRANSDERMAL
Status: DISCONTINUED | OUTPATIENT
Start: 2021-07-05 | End: 2021-07-07 | Stop reason: HOSPADM

## 2021-07-05 RX ORDER — ACETAMINOPHEN 650 MG/1
650 SUPPOSITORY RECTAL EVERY 4 HOURS PRN
Status: DISCONTINUED | OUTPATIENT
Start: 2021-07-05 | End: 2021-07-07 | Stop reason: HOSPADM

## 2021-07-05 RX ORDER — HYDROCODONE BITARTRATE AND ACETAMINOPHEN 7.5; 325 MG/1; MG/1
1 TABLET ORAL EVERY 6 HOURS PRN
Status: DISCONTINUED | OUTPATIENT
Start: 2021-07-05 | End: 2021-07-07 | Stop reason: HOSPADM

## 2021-07-05 RX ORDER — GABAPENTIN 400 MG/1
800 CAPSULE ORAL 4 TIMES DAILY
Status: DISCONTINUED | OUTPATIENT
Start: 2021-07-05 | End: 2021-07-07 | Stop reason: HOSPADM

## 2021-07-05 RX ORDER — ERGOCALCIFEROL 1.25 MG/1
50000 CAPSULE ORAL WEEKLY
COMMUNITY

## 2021-07-05 RX ORDER — DOCUSATE SODIUM 100 MG/1
100 CAPSULE, LIQUID FILLED ORAL 2 TIMES DAILY
Status: DISCONTINUED | OUTPATIENT
Start: 2021-07-05 | End: 2021-07-07 | Stop reason: HOSPADM

## 2021-07-05 RX ORDER — KETOROLAC TROMETHAMINE 15 MG/ML
15 INJECTION, SOLUTION INTRAMUSCULAR; INTRAVENOUS EVERY 6 HOURS PRN
Status: COMPLETED | OUTPATIENT
Start: 2021-07-05 | End: 2021-07-06

## 2021-07-05 RX ORDER — ONDANSETRON 2 MG/ML
4 INJECTION INTRAMUSCULAR; INTRAVENOUS EVERY 6 HOURS PRN
Status: DISCONTINUED | OUTPATIENT
Start: 2021-07-05 | End: 2021-07-07 | Stop reason: HOSPADM

## 2021-07-05 RX ORDER — SODIUM CHLORIDE 9 MG/ML
100 INJECTION, SOLUTION INTRAVENOUS CONTINUOUS
Status: DISCONTINUED | OUTPATIENT
Start: 2021-07-05 | End: 2021-07-07

## 2021-07-05 RX ADMIN — KETOROLAC TROMETHAMINE 15 MG: 15 INJECTION, SOLUTION INTRAMUSCULAR; INTRAVENOUS at 20:15

## 2021-07-05 RX ADMIN — HYDROCODONE BITARTRATE AND ACETAMINOPHEN 1 TABLET: 7.5; 325 TABLET ORAL at 09:25

## 2021-07-05 RX ADMIN — KETOROLAC TROMETHAMINE 15 MG: 15 INJECTION, SOLUTION INTRAMUSCULAR; INTRAVENOUS at 14:10

## 2021-07-05 RX ADMIN — GABAPENTIN 800 MG: 400 CAPSULE ORAL at 23:55

## 2021-07-05 RX ADMIN — SODIUM CHLORIDE, PRESERVATIVE FREE 10 ML: 5 INJECTION INTRAVENOUS at 20:08

## 2021-07-05 RX ADMIN — SODIUM CHLORIDE 100 ML/HR: 9 INJECTION, SOLUTION INTRAVENOUS at 14:14

## 2021-07-05 RX ADMIN — GABAPENTIN 800 MG: 400 CAPSULE ORAL at 09:25

## 2021-07-05 RX ADMIN — HYDROMORPHONE HYDROCHLORIDE 0.5 MG: 1 INJECTION, SOLUTION INTRAMUSCULAR; INTRAVENOUS; SUBCUTANEOUS at 03:43

## 2021-07-05 RX ADMIN — SODIUM CHLORIDE 100 ML/HR: 9 INJECTION, SOLUTION INTRAVENOUS at 03:47

## 2021-07-05 RX ADMIN — POLYETHYLENE GLYCOL 3350 17 G: 17 POWDER, FOR SOLUTION ORAL at 08:33

## 2021-07-05 RX ADMIN — NICOTINE 1 PATCH: 14 PATCH, EXTENDED RELEASE TRANSDERMAL at 20:09

## 2021-07-05 RX ADMIN — ONDANSETRON 4 MG: 2 INJECTION INTRAMUSCULAR; INTRAVENOUS at 03:43

## 2021-07-05 RX ADMIN — HYDROCODONE BITARTRATE AND ACETAMINOPHEN 1 TABLET: 7.5; 325 TABLET ORAL at 16:34

## 2021-07-05 RX ADMIN — FAMOTIDINE 20 MG: 10 INJECTION INTRAVENOUS at 11:41

## 2021-07-05 RX ADMIN — DOCUSATE SODIUM 100 MG: 100 CAPSULE, LIQUID FILLED ORAL at 08:33

## 2021-07-05 RX ADMIN — ENOXAPARIN SODIUM 40 MG: 40 INJECTION SUBCUTANEOUS at 16:34

## 2021-07-05 RX ADMIN — CYCLOBENZAPRINE 10 MG: 10 TABLET, FILM COATED ORAL at 11:41

## 2021-07-05 RX ADMIN — ONDANSETRON 4 MG: 2 INJECTION INTRAMUSCULAR; INTRAVENOUS at 16:34

## 2021-07-05 RX ADMIN — GABAPENTIN 800 MG: 400 CAPSULE ORAL at 14:11

## 2021-07-05 RX ADMIN — GABAPENTIN 800 MG: 400 CAPSULE ORAL at 18:53

## 2021-07-05 RX ADMIN — FAMOTIDINE 20 MG: 10 INJECTION INTRAVENOUS at 20:08

## 2021-07-05 RX ADMIN — HYDROCODONE BITARTRATE AND ACETAMINOPHEN 1 TABLET: 7.5; 325 TABLET ORAL at 23:55

## 2021-07-05 RX ADMIN — DOCUSATE SODIUM 100 MG: 100 CAPSULE, LIQUID FILLED ORAL at 20:09

## 2021-07-05 NOTE — PROGRESS NOTES
Clinical Pharmacy Services: Medication History    Belen Allen is a 40 y.o. female presenting to [facility] for Intractable abdominal pain [R10.9]    She  has a past medical history of Abnormal Pap smear of cervix, Ankle fracture (2013), H/O Elevated liver enzymes, History of chronic back pain, History of migraine, History of urinary tract infection, Injury of back, PONV (postoperative nausea and vomiting), and Seasonal allergies.    Allergies as of 07/04/2021   • (No Known Allergies)       Medication information was obtained from: Patient and Pharmacy  Pharmacy and Phone Number: [facility] #[phone]    Prior to Admission Medications     Prescriptions Last Dose Informant Patient Reported? Taking?    amitriptyline (ELAVIL) 25 MG tablet Past Week Pharmacy Yes Yes    Take 25 mg by mouth Every Night.    apixaban (ELIQUIS) 5 MG tablet tablet Last month Pharmacy Yes Yes    Take 5 mg by mouth 2 (Two) Times a Day. Last filled 4/12/21 - pt states she was instructed to stop taking medication prior to ERCP ~1 month ago and was not instructed to restart medication.   Indication: Splenic infarct    cyclobenzaprine (FLEXERIL) 10 MG tablet Past Week Pharmacy No Yes    Take 1 tablet by mouth 2 (Two) Times a Day As Needed for Muscle Spasms.    famotidine (PEPCID) 20 MG tablet Past Week Pharmacy Yes Yes    Take 1 tablet by mouth 2 (Two) Times a Day Before Meals.    FLUoxetine (PROzac) 40 MG capsule Past Week Pharmacy Yes Yes    Take 40 mg by mouth Every Night.    folic acid (FOLVITE) 1 MG tablet Past Week Pharmacy No Yes    Take 1 tablet by mouth Daily.    gabapentin (NEURONTIN) 800 MG tablet 7/4/2021 Pharmacy Yes Yes    Take 800 mg by mouth 4 (Four) Times a Day.    hydrOXYzine (ATARAX) 25 MG tablet Past Week Pharmacy Yes Yes    Take 1-2 tablets by mouth 3 (Three) Times a Day As Needed for Anxiety.    melatonin 5 MG tablet tablet Past Week Pharmacy Yes Yes    Take 10 mg by mouth Every Night.    SUMAtriptan  (IMITREX) 100 MG tablet  Pharmacy Yes Yes    Take 50 mg by mouth Every 2 (Two) Hours As Needed for Migraine. Take one tablet at onset of headache. May repeat dose one time in 2 hours if headache not relieved.    traZODone (DESYREL) 50 MG tablet Past Week Pharmacy Yes Yes    Take 25-50 mg by mouth At Night As Needed for Sleep.    vitamin D (ERGOCALCIFEROL) 1.25 MG (69123 UT) capsule capsule  Pharmacy No Yes    Take 50,000 Units by mouth 1 (One) Time Per Week.    HYDROcodone-acetaminophen (NORCO) 7.5-325 MG per tablet  Pharmacy No Yes    Take 1 tablet by mouth Every 6 (Six) Hours As Needed for Moderate Pain .            Medication notes: I personally interviewed the patient and she states that she takes a medication for migraine HA but cannot recall the name. She is able to provide partial PTA med information therefore all medications were confirmed with her home pharmacy. Based on the information obtained the following changes were made:    · Hydroxyzine 25 mg tab - Take 1-2 tablets PO TID PRN anxiety - CHANGED dosing instructions   · Eliquis 5 mg PO BID - ADDED per pharmacy last filled 4/12/21 - pt states she was instructed to stop taking medication prior to ERCP ~1 month ago and was not instructed to restart medication; Indication: Splenic infarct  · Trazodone 50 mg tab - Take 1/2-1 tablet PO QHS PRN sleep - CHANGED dosing instructions  · Sumatriptan 100 mg tab - Take 1/2 PO PRN migraine, may repeat 1x in 2 hours - ADDED per pharmacy last filled 6/11/21  · Vitamin D2 50,000 unit - Take 1 capsule every 7 days - ADDED per pharmacy last filled 4/19/21 qty #13    This medication list is complete to the best of my knowledge as of 7/5/2021    Please call if questions.    Maciel Murillo, PharmD  7/5/2021 12:31 EDT

## 2021-07-05 NOTE — H&P
Patient Name:  Belen Allen  YOB: 1980  MRN:  [id number]  Admit Date:  7/4/2021  Patient Care Team:  Sahil Cornelius MD as PCP - General (Internal Medicine)  Code, Bjorn HERRERA II, MD as Consulting Physician (Hematology and Oncology)  Dennys Carranza MD as Referring Physician (Hospitalist)      Subjective   History Present Illness     Chief Complaint   Patient presents with   • Flank Pain     R side   • Leg Swelling     bilateral        Ms. Allen is a 40 y.o. smoker with a history of migraines, chronic back pain on home neurontin and narcotics, splenic infarct that presents to [facility] complaining of right sided abfominal pain. Pain is described as severe and radiating to her back.  Pain has been intermittent for several weeks but worse in the last several days.  He has nausea with several episodes of nonbloody emesis.  She has not followed up with gastroenterologist notified PCP of this pain.  Her home medication is not effective in treating discomfort.  She denies diarrhea, rash, change in stools, LUTS, fever, chills, chest pain, cough, shortness of breath.  Last bowel movement this morning.  She was treated here in June for abdominal pain but we are unable to do MRCP states she was sent to [facility] [place], operated by [facility].  On 6/3/2020 when she underwent ERCP with sphincterotomy and dilation, cholangiogram demonstrated widely patent sphincterotomy and dilated bile ducts with no filling abnormality.   She was here in ED 3 days ago and had CTA of the abdomen without evolving infarct, no acute intraabdominal  process.  Patient  was on Eliquis for splenic infarct that was stopped prior to ERCP but she has not yet resumed it.       History of Present Illness  Review of Systems   Constitutional: Negative for appetite change and unexpected weight change.   HENT: Negative for trouble swallowing.    Eyes: Negative for visual disturbance.   Respiratory: Negative for cough, choking, chest  tightness and shortness of breath.    Cardiovascular: Negative for chest pain.   Gastrointestinal: Positive for abdominal pain, nausea and vomiting. Negative for abdominal distention, blood in stool, constipation and diarrhea.   Genitourinary: Negative for dysuria.   Musculoskeletal: Negative for back pain.   Skin: Negative for color change.   Neurological: Negative for dizziness.   Hematological: Does not bruise/bleed easily.   Psychiatric/Behavioral: Negative.  Negative for confusion.        Personal History     Past Medical History:   Diagnosis Date   • Abnormal Pap smear of cervix    • Ankle fracture    • H/O Elevated liver enzymes    • History of chronic back pain    • History of migraine    • History of urinary tract infection    • Injury of back    • PONV (postoperative nausea and vomiting)    • Seasonal allergies      Past Surgical History:   Procedure Laterality Date   • BACK SURGERY      fusion L4, L5     • CHOLECYSTECTOMY     • DILATATION AND CURETTAGE     • ERCP N/A 6/3/2021    Procedure: ENDOSCOPIC RETROGRADE CHOLANGIOPANCREATOGRAPHY WITH SPHINCTEROTOMY, DILATION WITH BALLOON CLEARANCE  (12MM-15MM);  Surgeon: Bjorn Flannery MD;  Location: [facility] ENDOSCOPY;  Service: Gastroenterology;  Laterality: N/A;  DILATED COMMON BILE DUCT   • SKIN SURGERY     • TUBAL ABDOMINAL LIGATION     • WISDOM TOOTH EXTRACTION  2018    x2     Family History   Problem Relation Age of Onset   • Stroke Mother    • Allergic rhinitis Mother    • Allergic rhinitis Sister    • Breast cancer Maternal Aunt    • Cancer Maternal Grandmother    • Allergic rhinitis Paternal Grandfather    • Cancer Paternal Grandfather    • Prostate cancer Paternal Grandfather      Social History     Tobacco Use   • Smoking status: Current Some Day Smoker     Packs/day: 0.50     Last attempt to quit: 2020     Years since quittin.6   • Smokeless tobacco: Never Used   Vaping Use   • Vaping Use: Never used   Substance Use  Topics   • Alcohol use: Not Currently   • Drug use: Not Currently     No current facility-administered medications on file prior to encounter.     Current Outpatient Medications on File Prior to Encounter   Medication Sig Dispense Refill   • amitriptyline (ELAVIL) 25 MG tablet Take 25 mg by mouth Every Night.     • apixaban (ELIQUIS) 5 MG tablet tablet Take 5 mg by mouth 2 (Two) Times a Day. Last filled 4/12/21 - pt states she was instructed to stop taking medication prior to ERCP ~1 month ago and was not instructed to restart medication.   Indication: Splenic infarct     • cyclobenzaprine (FLEXERIL) 10 MG tablet Take 1 tablet by mouth 2 (Two) Times a Day As Needed for Muscle Spasms. 20 tablet 0   • famotidine (PEPCID) 20 MG tablet Take 1 tablet by mouth 2 (Two) Times a Day Before Meals.     • FLUoxetine (PROzac) 40 MG capsule Take 40 mg by mouth Every Night.     • folic acid (FOLVITE) 1 MG tablet Take 1 tablet by mouth Daily. 30 tablet 0   • gabapentin (NEURONTIN) 800 MG tablet Take 800 mg by mouth 4 (Four) Times a Day.     • hydrOXYzine (ATARAX) 25 MG tablet Take 1-2 tablets by mouth 3 (Three) Times a Day As Needed for Anxiety.     • melatonin 5 MG tablet tablet Take 10 mg by mouth Every Night.     • SUMAtriptan (IMITREX) 100 MG tablet Take 50 mg by mouth Every 2 (Two) Hours As Needed for Migraine. Take one tablet at onset of headache. May repeat dose one time in 2 hours if headache not relieved.     • traZODone (DESYREL) 50 MG tablet Take 25-50 mg by mouth At Night As Needed for Sleep.     • vitamin D (ERGOCALCIFEROL) 1.25 MG (98023 UT) capsule capsule Take 50,000 Units by mouth 1 (One) Time Per Week.     • HYDROcodone-acetaminophen (NORCO) 7.5-325 MG per tablet Take 1 tablet by mouth Every 6 (Six) Hours As Needed for Moderate Pain . 12 tablet 0   • [DISCONTINUED] cyclobenzaprine (FLEXERIL) 10 MG tablet Take 1 tablet by mouth 3 (Three) Times a Day.       No Known Allergies    Objective    Objective     Vital  Signs  Temp:  [97 °F (36.1 °C)-98.7 °F (37.1 °C)] 97.2 °F (36.2 °C)  Heart Rate:  [] 80  Resp:  [16-18] 16  BP: (107-135)/() 113/67  SpO2:  [96 %-99 %] 96 %  on   ;   Device (Oxygen Therapy): room air  Body mass index is 41.18 kg/m².    Physical Exam  Vitals and nursing note reviewed.   Constitutional:       General: She is not in acute distress.     Appearance: She is well-developed. She is obese.      Comments: Patient sleeping snoring loudly upon entering room.  When I wake her up she starts shaking and moaning complaining of abdominal pain   HENT:      Head: Normocephalic and atraumatic.   Eyes:      Pupils: Pupils are equal, round, and reactive to light.   Cardiovascular:      Rate and Rhythm: Normal rate and regular rhythm.      Heart sounds: Normal heart sounds.   Pulmonary:      Effort: Pulmonary effort is normal.      Breath sounds: Normal breath sounds.   Abdominal:      General: Bowel sounds are normal. There is no distension or abdominal bruit.      Palpations: Abdomen is soft. Abdomen is not rigid. There is no shifting dullness, fluid wave, mass or pulsatile mass.      Tenderness: There is abdominal tenderness in the right upper quadrant. There is no guarding or rebound.      Hernia: No hernia is present.      Comments: Patient sleeping snoring loudly upon entering room.  Pain with abdominal palpation but no pain when pushing with stethoscope in right upper quadrant.  Soft round obese   Musculoskeletal:         General: Normal range of motion.   Skin:     General: Skin is warm and dry.   Neurological:      Mental Status: She is alert and oriented to person, place, and time.   Psychiatric:         Behavior: Behavior normal.         Thought Content: Thought content normal.         Results Review:  I reviewed the patient's new clinical results.  I reviewed the patient's new imaging results and agree with the interpretation.  I reviewed the patient's other test results and agree with the  interpretation  I personally viewed and interpreted the patient's EKG/Telemetry data  Discussed with ED provider.    Lab Results (last 24 hours)     Procedure Component Value Units Date/Time    CBC & Differential [[id number]]  (Normal) Collected: 07/04/21 2048    Specimen: Blood Updated: 07/04/21 2101    Narrative:      The following orders were created for panel order CBC & Differential.  Procedure                               Abnormality         Status                     ---------                               -----------         ------                     CBC Auto Differential[[id number]]        Normal              Final result                 Please view results for these tests on the individual orders.    Comprehensive Metabolic Panel [[id number]]  (Abnormal) Collected: 07/04/21 2048    Specimen: Blood Updated: 07/04/21 2125     Glucose 94 mg/dL      BUN 6 mg/dL      Creatinine 0.79 mg/dL      Sodium 135 mmol/L      Potassium 3.9 mmol/L      Chloride 103 mmol/L      CO2 23.4 mmol/L      Calcium 8.8 mg/dL      Total Protein 6.2 g/dL      Albumin 3.70 g/dL      ALT (SGPT) 23 U/L      AST (SGOT) 26 U/L      Alkaline Phosphatase 139 U/L      Total Bilirubin 0.2 mg/dL      eGFR Non African Amer 81 mL/min/1.73      Globulin 2.5 gm/dL      A/G Ratio 1.5 g/dL      BUN/Creatinine Ratio 7.6     Anion Gap 8.6 mmol/L     Narrative:      GFR Normal >60  Chronic Kidney Disease <60  Kidney Failure <15      Lipase [[id number]]  (Normal) Collected: 07/04/21 2048    Specimen: Blood Updated: 07/04/21 2125     Lipase 14 U/L     hCG, Serum, Qualitative [[id number]]  (Normal) Collected: 07/04/21 2048    Specimen: Blood Updated: 07/04/21 2114     HCG Qualitative Negative    CBC Auto Differential [[id number]]  (Normal) Collected: 07/04/21 2048    Specimen: Blood Updated: 07/04/21 2101     WBC 8.35 10*3/mm3      RBC 4.00 10*6/mm3      Hemoglobin 12.5 g/dL      Hematocrit 37.2 %      MCV 93.0 fL      MCH 31.3 pg      MCHC 33.6 g/dL      RDW  13.7 %      RDW-SD 45.9 fl      MPV 9.6 fL      Platelets 320 10*3/mm3     Manual Differential [[id number]]  (Abnormal) Collected: 07/04/21 2048    Specimen: Blood Updated: 07/04/21 2126     Neutrophil % 61.6 %      Lymphocyte % 27.3 %      Monocyte % 4.0 %      Eosinophil % 5.1 %      Basophil % 2.0 %      Neutrophils Absolute 5.14 10*3/mm3      Lymphocytes Absolute 2.28 10*3/mm3      Monocytes Absolute 0.33 10*3/mm3      Eosinophils Absolute 0.43 10*3/mm3      Basophils Absolute 0.17 10*3/mm3      RBC Morphology Normal     WBC Morphology Normal     Platelet Morphology Normal    Urinalysis With Microscopic If Indicated (No Culture) - Urine, Clean Catch [[id number]]  (Normal) Collected: 07/04/21 2120    Specimen: Urine, Clean Catch Updated: 07/04/21 2139     Color, UA Yellow     Appearance, UA Clear     pH, UA 8.0     Specific Gravity, UA 1.013     Glucose, UA Negative     Ketones, UA Negative     Bilirubin, UA Negative     Blood, UA Negative     Protein, UA Negative     Leuk Esterase, UA Negative     Nitrite, UA Negative     Urobilinogen, UA 0.2 E.U./dL    Narrative:      Urine microscopic not indicated.    Urine Drug Screen - Urine, Clean Catch [[id number]]  (Abnormal) Collected: 07/04/21 2120    Specimen: Urine, Clean Catch Updated: 07/04/21 2202     Amphet/Methamphet, Screen Negative     Barbiturates Screen, Urine Negative     Benzodiazepine Screen, Urine Negative     Cocaine Screen, Urine Negative     Opiate Screen Positive     THC, Screen, Urine Negative     Methadone Screen, Urine Negative     Oxycodone Screen, Urine Negative    Narrative:      Negative Thresholds Per Drugs Screened:    Amphetamines                 500 ng/ml  Barbiturates                 200 ng/ml  Benzodiazepines              100 ng/ml  Cocaine                      300 ng/ml  Methadone                    300 ng/ml  Opiates                      300 ng/ml  Oxycodone                    100 ng/ml  THC                           50 ng/ml    The  Normal Value for all drugs tested is negative. This report includes final unconfirmed screening results to be used for medical treatment purposes only. Unconfirmed results must not be used for non-medical purposes such as employment or legal testing. Clinical consideration should be applied to any drug of abuse test, particularly when unconfirmed results are used.            COVID PRE-OP / PRE-PROCEDURE SCREENING ORDER (NO ISOLATION) - Swab, Nasopharynx [[id number]]  (Normal) Collected: 07/05/21 0141    Specimen: Swab from Nasopharynx Updated: 07/05/21 0333    Narrative:      The following orders were created for panel order COVID PRE-OP / PRE-PROCEDURE SCREENING ORDER (NO ISOLATION) - Swab, Nasopharynx.  Procedure                               Abnormality         Status                     ---------                               -----------         ------                     COVID-19,[facility] IN-HOUSE...[[id number]]  Normal              Final result                 Please view results for these tests on the individual orders.    COVID-19,[facility] IN-HOUSE CEPHEID/MIGNON NP SWAB IN TRANSPORT MEDIA 8-12 HR TAT - Swab, Nasopharynx [[id number]]  (Normal) Collected: 07/05/21 0141    Specimen: Swab from Nasopharynx Updated: 07/05/21 0333     COVID19 Not Detected    Narrative:      Fact sheet for providers: https://www.fda.gov/media/471858/download     Fact sheet for patients: https://www.fda.gov/media/656363/download    CBC Auto Differential [[id number]]  (Abnormal) Collected: 07/05/21 0645    Specimen: Blood Updated: 07/05/21 0740     WBC 6.08 10*3/mm3      RBC 3.68 10*6/mm3      Hemoglobin 11.4 g/dL      Hematocrit 35.5 %      MCV 96.5 fL      MCH 31.0 pg      MCHC 32.1 g/dL      RDW 13.9 %      RDW-SD 48.5 fl      MPV 9.7 fL      Platelets 290 10*3/mm3      Neutrophil % 42.9 %      Lymphocyte % 47.4 %      Monocyte % 5.8 %      Eosinophil % 3.1 %      Basophil % 0.3 %      Immature Grans % 0.5 %      Neutrophils, Absolute 2.61  10*3/mm3      Lymphocytes, Absolute 2.88 10*3/mm3      Monocytes, Absolute 0.35 10*3/mm3      Eosinophils, Absolute 0.19 10*3/mm3      Basophils, Absolute 0.02 10*3/mm3      Immature Grans, Absolute 0.03 10*3/mm3      nRBC 0.0 /100 WBC     Basic Metabolic Panel [[id number]]  (Normal) Collected: 07/05/21 0645    Specimen: Blood Updated: 07/05/21 0803     Glucose 76 mg/dL      BUN 6 mg/dL      Creatinine 0.74 mg/dL      Sodium 137 mmol/L      Potassium 4.1 mmol/L      Chloride 106 mmol/L      CO2 23.9 mmol/L      Calcium 8.6 mg/dL      eGFR Non African Amer 87 mL/min/1.73      BUN/Creatinine Ratio 8.1     Anion Gap 7.1 mmol/L     Narrative:      GFR Normal >60  Chronic Kidney Disease <60  Kidney Failure <15      Protime-INR [[id number]]  (Normal) Collected: 07/05/21 0645    Specimen: Blood Updated: 07/05/21 0750     Protime 12.7 Seconds      INR 0.97    Alkaline Phosphatase, Isoenzymes [[id number]] Collected: 07/05/21 1112    Specimen: Blood Updated: 07/05/21 1146        Results from last 7 days   Lab Units 07/05/21  0645 07/04/21 2048 07/02/21  2037   SODIUM mmol/L 137 135* 141   POTASSIUM mmol/L 4.1 3.9 3.7   CHLORIDE mmol/L 106 103 105   CO2 mmol/L 23.9 23.4 27.8   BUN mg/dL 6 6 7   CREATININE mg/dL 0.74 0.79 0.72   CALCIUM mg/dL 8.6 8.8 8.8   BILIRUBIN mg/dL  --  0.2 0.3   ALK PHOS U/L  --  139* 132*   ALT (SGPT) U/L  --  23 26   AST (SGOT) U/L  --  26 28   GLUCOSE mg/dL 76 94 109*         Imaging Results (Last 24 Hours)     Procedure Component Value Units Date/Time    US Abdomen Limited [[id number]] Collected: 07/05/21 0010     Updated: 07/05/21 0010    Narrative:        Patient: CARSON MULLEN  Time Out: 00:10  Exam(s): US ABDOMEN LIMITED     EXAM:    US Abdomen Limited, Right Upper Quadrant    CLINICAL HISTORY:     Reason for exam: Right upper quadrant pain; History cholecystectomy-  eval common bile duct and liver please.    TECHNIQUE:    Real-time ultrasound of the right upper quadrant with image    documentation.    COMPARISON:    No relevant prior studies available.    FINDINGS:    Liver:  Unremarkable.    Gallbladder:  Cholecystectomy.    Common bile duct:  CBD 6 mm.  No definite choledocholithiasis.    Pancreas:  Unremarkable as visualized.    Right kidney:  No hydronephrosis.    IMPRESSION:         CBD 6 mm.  No definite choledocholithiasis.      Impression:          Electronically signed by Brad Galloway M.D. on 07-05-21 at 0010    CT Abdomen Pelvis Without Contrast [[id number]] Collected: 07/04/21 2355     Updated: 07/04/21 2355    Narrative:        Patient: CARSON MULLEN  Time Out: 23:54  Exam(s): CT ABDOMEN + PELVIS Without Contrast     EXAM:    CT Abdomen and Pelvis Without Intravenous Contrast    CLINICAL HISTORY:     Reason for exam: R flank pain.    TECHNIQUE:    Axial computed tomography images of the abdomen and pelvis without   intravenous contrast.  CTDI is 31.9 mGy and DLP is 1711.3 mGy-cm.  This   CT exam was performed according to the principle of ALARA (As Low As   Reasonably Achievable) by using one or more of the following dose   reduction techniques: automated exposure control, adjustment of the mA   and or kV according to patient size, and or use of iterative   reconstruction technique.    COMPARISON:    07 02 2021.    FINDINGS:    Limitations:  Limited due to metallic artifact.    Lung bases:  Unremarkable.     ABDOMEN:    Liver:  Unremarkable.    Gallbladder and bile ducts:  Cholecystectomy.    Pancreas:  Unremarkable.    Spleen:  Unremarkable.    Adrenals:  Unremarkable.    Kidneys and ureters:  No hydronephrosis or ureteral calculus.  Left   renal scarring.    Stomach and bowel:  Moderate colonic stool which may be   ileus constipation.  No mechanical bowel obstruction.  No diverticulitis.     PELVIS:    Appendix:  No findings to suggest acute appendicitis.    Bladder:  Distended bladder.    Reproductive:  Unremarkable as visualized.     ABDOMEN and PELVIS:    Intraperitoneal  space:  No free air.    Bones joints:  Lumbar spine postop changes.    Soft tissues:  Unremarkable.    Vasculature:  Unremarkable.    Lymph nodes:  Unremarkable.    IMPRESSION:       1.  No hydronephrosis or ureteral calculus.  2.  Moderate colonic stool which may be ileus constipation.  No   mechanical bowel obstruction.  No diverticulitis.      Impression:          Electronically signed by Brad Galloway M.D. on 07-04-21 at 2354          Results for orders placed during the hospital encounter of 11/08/20    Adult Transesophageal Echo ([facility]) W/ Cont if Necessary Per Protocol    Interpretation Summary  · Left ventricular ejection fraction appears to be 61 - 65%. Left ventricular systolic function is normal.  · Saline test results are negative.      No orders to display        Assessment/Plan     Active Hospital Problems    Diagnosis  POA   • Intractable abdominal pain [R10.9]  Yes   • Obesity, Class III, BMI 40-49.9 (morbid obesity) (CMS/HCC) [E66.01]  Yes   • Constipation [K59.00]  Yes   • History of ERCP [Z98.890]  Not Applicable   • Other chronic pain [G89.29]  Yes   • Tobacco abuse [Z72.0]  Yes   • Vitamin D deficiency [E55.9]  Yes   • Generalized abdominal pain [R10.84]  Yes   • Antiphospholipid antibody positive [R76.0]  Yes      Resolved Hospital Problems   No resolved problems to display.       Ms. Allen is a 40 y.o. presents with right upper quadrant pain status post ERCP with sphincterotomy 6/3/2021. He is afebrile without leukocytosis. Normal LFTs except mild alkaline phosphatase elevation.  Normal lipase.  CT abdomen pelvis without abnormality and ultrasound abdomen unrevealing except possible ileus but her bowels are moving.    Appreciate GI assistance  MRCP ordered  Continue as needed Toradol, Norco, Flexeril, and home gabapentin.  She is asking for Dilaudid which was administered in the ER but given lack of  evidence of acute intra-abdominal process it is not appropriate.   Continue IV fluids,  antiemetics, CLD  hold OAC until determined no intervention needed based on MRCP     Splenic Infarct in setting of APL syndrome  Diagnosed 2020.  Seen by hematology at that time.     Started on eliquis BID which she stopped prior to ERCP and has not resumed. Per EMR, she has not followed up with [facility] as directed. She is asking for hematology consult now.        I discussed the patient's findings and my recommendations with patient, nursing staff, consulting provider and Dr. Arnold .    VTE Prophylaxis - SCDs.  Code Status - Full code.       NELSON Dunbar  [facility]  07/05/21  12:56 EDT

## 2021-07-05 NOTE — PLAN OF CARE
Goal Outcome Evaluation:  Plan of Care Reviewed With: patient        Progress: no change  Outcome Summary: Pt admitted early this am from ER with intractable abdominal pain. Pt states she was having some vomiting at home. She states her pain has been there for about a month. NELSON Ribera added some admission orders. NPO except sips with meds. Call placed to consult Dr Antonio to see. Medicated with zofran for nausea and dilaudid for pain. Afebrile and VS stable. Sig other at bedside

## 2021-07-05 NOTE — CASE MANAGEMENT/SOCIAL WORK
Discharge Planning Assessment  [facility]     Patient Name: Belen Allen  MRN: [id number]  Today's Date: 7/5/2021    Admit Date: 7/4/2021    Discharge Needs Assessment     Row Name 07/05/21 1323       Living Environment    Lives With  spouse    Current Living Arrangements  home/apartment/condo    Primary Care Provided by  self;spouse/significant other    Provides Primary Care For  no one    Family Caregiver if Needed  spouse    Quality of Family Relationships  helpful;involved;supportive    Able to Return to Prior Arrangements  yes       Resource/Environmental Concerns    Transportation Concerns  car, none       Transition Planning    Patient/Family Anticipates Transition to  home with family    Patient/Family Anticipated Services at Transition      Transportation Anticipated  family or friend will provide       Discharge Needs Assessment    Readmission Within the Last 30 Days  no previous admission in last 30 days    Equipment Currently Used at Home  walker, rolling        Discharge Plan     Row Name 07/05/21 3096       Plan    Plan  Home with family support.    Patient/Family in Agreement with Plan  yes    Plan Comments  Spoke with the patient, verified current information and explained the role of the CCP. Patient said she lives with her /Ced and has very good family support. She's overall IADL, but at times, depends on her /Ced to assist with running errands, meal prep. She has a walker, and has no history with SNF/HH. Patient said she plans to d/c home with family support. She denies needs for DME/RH/HH at this time. MRCP pending. CCP will follow for appropraite d/c needs.        Continued Care and Services - Admitted Since 7/4/2021    Coordination has not been started for this encounter.         Demographic Summary     Row Name 07/05/21 1320       General Information    Admission Type  observation    Reason for Consult  discharge planning    Preferred Language  English      Used During This Interaction  no       Contact Information    Permission Granted to Share Info With  ;family/designee        Functional Status     Row Name 07/05/21 1323       Functional Status    Usual Activity Tolerance  moderate    Current Activity Tolerance  fair       Functional Status, IADL    Medications  independent    Meal Preparation  assistive equipment    Housekeeping  assistive equipment and person    Laundry  assistive equipment and person    Shopping  assistive equipment and person       Mental Status Summary    Recent Changes in Mental Status/Cognitive Functioning  no changes        Psychosocial     Row Name 07/05/21 1323       Coping/Stress    Patient Personal Strengths  able to adapt    Sources of Support  spouse    Reaction to Health Status  accepting    Understanding of Condition and Treatment  adequate understanding of medical condition       Developmental Stage (Eriksson's)    Developmental Stage  Stage 7 (35-65 years/Middle Adulthood) Generativity vs. Stagnation        Abuse/Neglect    No documentation.       Legal    No documentation.       Substance Abuse    No documentation.       Patient Forms    No documentation.           Jannie Toribio RN

## 2021-07-05 NOTE — PLAN OF CARE
Goal Outcome Evaluation:           Progress: no change  Outcome Summary: vss and afebrile, pt continually c/o right abdominal pain at 10/10 on pain scale, states the PO pain medication is not helping her pain, nausea meication given x1, no emesis, pt had BM today, encouraged ambulation but pt refused, diet advanced to clear liquids - but she refused to drink anything except water, will be NPO after midnight for MRCP, ivf infusing, voiding well

## 2021-07-05 NOTE — ED NOTES
"Nursing report ED to floor  Belen Allen  40 y.o.  female    HPI (triage note):   Chief Complaint   Patient presents with   • Flank Pain     R side   • Leg Swelling     bilateral       Admitting doctor:   Mane Urrutia MD    Admitting diagnosis:   The encounter diagnosis was Intractable abdominal pain.    Code status:   Current Code Status     Date Active Code Status Order ID Comments User Context       Prior    Advance Care Planning Activity          Allergies:   Patient has no known allergies.    Weight:       07/04/21 2212   Weight: 106 kg (234 lb)       Most recent vitals:   Vitals:    07/04/21 1956 07/04/21 2212 07/05/21 0019   BP: (!) 135/102  122/94   BP Location: Left arm     Patient Position: Sitting     Pulse: 104     Resp: 18     Temp: 98.7 °F (37.1 °C)     SpO2: 98%     Weight:  106 kg (234 lb)    Height: 162.6 cm (64\")         Active LDAs/IV Access:   Lines, Drains & Airways    Active LDAs     Name:   Placement date:   Placement time:   Site:   Days:    Peripheral IV 07/04/21 2045 Left;Distal Forearm   07/04/21 2045    Forearm   less than 1                Labs (abnormal labs have a star):   Labs Reviewed   COMPREHENSIVE METABOLIC PANEL - Abnormal; Notable for the following components:       Result Value    Sodium 135 (*)     Alkaline Phosphatase 139 (*)     All other components within normal limits    Narrative:     GFR Normal >60  Chronic Kidney Disease <60  Kidney Failure <15     URINE DRUG SCREEN - Abnormal; Notable for the following components:    Opiate Screen Positive (*)     All other components within normal limits    Narrative:     Negative Thresholds Per Drugs Screened:    Amphetamines                 500 ng/ml  Barbiturates                 200 ng/ml  Benzodiazepines              100 ng/ml  Cocaine                      300 ng/ml  Methadone                    300 ng/ml  Opiates                      300 ng/ml  Oxycodone                    100 ng/ml  THC                           " 50 ng/ml    The Normal Value for all drugs tested is negative. This report includes final unconfirmed screening results to be used for medical treatment purposes only. Unconfirmed results must not be used for non-medical purposes such as employment or legal testing. Clinical consideration should be applied to any drug of abuse test, particularly when unconfirmed results are used.           MANUAL DIFFERENTIAL - Abnormal; Notable for the following components:    Monocyte % 4.0 (*)     Basophil % 2.0 (*)     Eosinophils Absolute 0.43 (*)     All other components within normal limits   LIPASE - Normal   URINALYSIS W/ MICROSCOPIC IF INDICATED (NO CULTURE) - Normal    Narrative:     Urine microscopic not indicated.   HCG, SERUM, QUALITATIVE - Normal   CBC WITH AUTO DIFFERENTIAL - Normal   COVID PRE-OP / PRE-PROCEDURE SCREENING ORDER (NO ISOLATION)    Narrative:     The following orders were created for panel order COVID PRE-OP / PRE-PROCEDURE SCREENING ORDER (NO ISOLATION) - Swab, Nasopharynx.  Procedure                               Abnormality         Status                     ---------                               -----------         ------                     COVID-19,[facility] IN-HOUSE...[[id number]]                                                   Please view results for these tests on the individual orders.   COVID-19,[facility] IN-HOUSE CEPHEID/MIGNON, NP SWAB IN TRANSPORT MEDIA 8-12 HR TAT   CBC AND DIFFERENTIAL    Narrative:     The following orders were created for panel order CBC & Differential.  Procedure                               Abnormality         Status                     ---------                               -----------         ------                     CBC Auto Differential[[id number]]        Normal              Final result                 Please view results for these tests on the individual orders.       EKG:   No orders to display       Meds given in ED:   Medications   sodium chloride 0.9 % flush  10 mL (has no administration in time range)   ketorolac (TORADOL) injection 15 mg (15 mg Intravenous Given 21)   ondansetron (ZOFRAN) injection 4 mg (4 mg Intravenous Given 21)   HYDROmorphone (DILAUDID) injection 1 mg (1 mg Intravenous Given 21)       Imaging results:  CT Abdomen Pelvis Without Contrast    Result Date: 2021  Electronically signed by Brad Galloway M.D. on 21 at 2354    US Abdomen Limited    Result Date: 2021  Electronically signed by Brad Galloway M.D. on 21 at 0010      Ambulatory status:   - assist    Social issues:   Social History     Socioeconomic History   • Marital status: Single     Spouse name: Not on file   • Number of children: 2   • Years of education: Not on file   • Highest education level: Not on file   Tobacco Use   • Smoking status: Current Some Day Smoker     Packs/day: 0.50     Last attempt to quit: 2020     Years since quittin.6   • Smokeless tobacco: Never Used   Vaping Use   • Vaping Use: Never used   Substance and Sexual Activity   • Alcohol use: Not Currently   • Drug use: Not Currently   • Sexual activity: Defer    Nursing report ED to floor       Conchis Chamberlain RN  [phone]

## 2021-07-05 NOTE — ED NOTES
Pt asking for pain meds - Dr Rodriguez went in to speak with patient, and agreed to a single dose of dilaudid IVP.  Pt agreeable to this plan.      Sheridan Romero RN  07/05/21 0023

## 2021-07-05 NOTE — ED PROVIDER NOTES
EMERGENCY DEPARTMENT ENCOUNTER    Room Number:  18/18  Date of encounter:  7/5/2021  PCP: Sahil Cornelius MD  Historian: Patient    Patient was placed in face mask during triage process. Patient was wearing facemask when I entered the room and throughout our encounter. I wore full protective equipment throughout this patient encounter including a face mask, eye protection, and gloves. Hand hygiene was performed before donning protective equipment and again following doffing of PPE after leaving the room.    HPI:  Chief Complaint: Right flank pain  A complete HPI/ROS/PMH/PSH/SH/FH are unobtainable due to: N/A   Context: Belen Allen is a 40 y.o. female who presents to the ED c/o right flank pain that radiated to the right upper quadrant.  Worsening over several days to weeks.  Patient reports nausea without vomiting.  Some loose stool which nonblack and nonbloody.  No reported dysuria or hematuria.  No reported fevers.  No shortness of breath or chest pain reported.  No clear exacerbating relieving factors identified.  Discomfort described as moderately severe at this time.      MEDICAL HISTORY REVIEW    CTA abdomen/pelvis  7/2/21  IMPRESSION:     1.  Suboptimal CT angiogram evaluation as contrast was not optimally time   for arterial phase.  2.  No evolving splenic infarct identified.  3.  No acute intra-abdominal process.  4.  Additional findings, as described above.     IMPRESSION:        Electronically signed by Mega Hugo M.D. on 07-02-21 at 2328  ---------------------------------------------------------------------  [facility]  Date of Admission: 6/2/2021  Date of Discharge:  6/3/2021  Length of stay:  LOS: 0 days      Admission Diagnosis:   RUQ abdominal pain [R10.11]     Discharge Diagnosis:   Acute RUQ pain with elevated LFTs and dilated common bile duct s/p ERCP with sphincterotomy and dilation 6/3/2021  - CT abd pel- mild intrahepatic/extrahepatic biliary dilatation  - MRCP  reviewed  - tolerating diet  - follow up with GI as needed   Chronic pain  - Continue gabapentin (INSPECT verified)   Hypertension  - ? Related to pain  - discussed with patient about need for outpatient follow up, diet and exercise   Folic acid deficiency  - Continue folic acid   GERD  - Continue pepcid  Anxiety  - Stable  - Continue Prozac, trazodone, and elavil    Obesity  - BMI 35.1  - Enourage lifestyle modifications as appropriate   Tobacco abuse  - nicotine patch  - encourage cessation         Active Hospital Problems     Diagnosis   POA   • Tobacco abuse [Z72.0]   Yes   • GERD without esophagitis [K21.9]   Yes   • Right upper quadrant abdominal pain [R10.11]   Yes   • Common bile duct dilatation [K83.8]   Yes   • Folate deficiency [E53.8]   Yes   • Anxiety disorder [F41.9]   Yes   • Obesity (BMI 35.0-39.9 without comorbidity) [E66.9]            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   • Generalized abdominal pain 11/29/2020   • Bilateral leg weakness 11/29/2020   • Acute bilateral low back pain 11/29/2020   • Antiphospholipid antibody positive 11/29/2020   • On anticoagulant therapy 11/29/2020   • Acute pain of left knee 11/29/2020   • Vitamin D deficiency 11/30/2020   • Folate deficiency 12/01/2020   • Pain of back and left lower extremity 12/02/2020   • Common bile duct dilatation 05/31/2021   • Right upper quadrant abdominal pain 06/01/2021   • Obesity (BMI 35.0-39.9 without comorbidity) 04/18/2019   • Tobacco abuse 06/02/2021   • GERD without esophagitis 06/02/2021     Resolved Ambulatory Problems     Diagnosis Date Noted   • LFTs abnormal 06/01/2021   • Choledocholithiasis 06/02/2021     Past Medical History:   Diagnosis Date   • Abnormal Pap smear of cervix    • Ankle fracture 2013   • H/O Elevated liver enzymes    • History of chronic back pain    • History of migraine     • History of urinary tract infection    • Injury of back    • PONV (postoperative nausea and vomiting)    • Seasonal allergies          PAST SURGICAL HISTORY  Past Surgical History:   Procedure Laterality Date   • BACK SURGERY  2006    fusion L4, L5     • CHOLECYSTECTOMY     • DILATATION AND CURETTAGE     • ERCP N/A 6/3/2021    Procedure: ENDOSCOPIC RETROGRADE CHOLANGIOPANCREATOGRAPHY WITH SPHINCTEROTOMY, DILATION WITH BALLOON CLEARANCE  (12MM-15MM);  Surgeon: Bjorn Flannery MD;  Location: [facility] ENDOSCOPY;  Service: Gastroenterology;  Laterality: N/A;  DILATED COMMON BILE DUCT   • SKIN SURGERY     • TUBAL ABDOMINAL LIGATION     • WISDOM TOOTH EXTRACTION  2018    x2         FAMILY HISTORY  Family History   Problem Relation Age of Onset   • Stroke Mother    • Allergic rhinitis Mother    • Allergic rhinitis Sister    • Breast cancer Maternal Aunt    • Cancer Maternal Grandmother    • Allergic rhinitis Paternal Grandfather    • Cancer Paternal Grandfather    • Prostate cancer Paternal Grandfather          SOCIAL HISTORY  Social History     Socioeconomic History   • Marital status: Single     Spouse name: Not on file   • Number of children: 2   • Years of education: Not on file   • Highest education level: Not on file   Tobacco Use   • Smoking status: Current Some Day Smoker     Packs/day: 0.50     Last attempt to quit: 2020     Years since quittin.6   • Smokeless tobacco: Never Used   Vaping Use   • Vaping Use: Never used   Substance and Sexual Activity   • Alcohol use: Not Currently   • Drug use: Not Currently   • Sexual activity: Defer         ALLERGIES  Patient has no known allergies.        REVIEW OF SYSTEMS  Review of Systems     All systems reviewed and negative except for those discussed in HPI.       PHYSICAL EXAM    I have reviewed the triage vital signs and nursing notes.    ED Triage Vitals [21]   Temp Heart Rate Resp BP SpO2   98.7 °F (37.1 °C) 104 18 (!) 135/102  98 %      Temp src Heart Rate Source Patient Position BP Location FiO2 (%)   -- -- Sitting Left arm --       Physical Exam    Physical Exam   Constitutional: No distress.  Uncomfortable appearing though not overtly toxic  HENT:  Head: Normocephalic and atraumatic.   Oropharynx: Mucous membranes are moist.   Eyes: No scleral icterus. No conjunctival pallor.  Neck: Painless range of motion noted. Neck supple.   Cardiovascular: Normal rate, regular rhythm and intact distal pulses.  Pulmonary/Chest: No respiratory distress.  No tachypnea or increased work of breathing noted.    Abdominal: Soft, large habitus. There is some right upper quadrant tenderness. There is no rebound and no guarding.  There is significant soft tissue tenderness in the right flank with no external findings of trauma, rash, or ecchymosis.  Musculoskeletal: Moves all extremities equally.  Trace pitting edema bilateral shins with no significant calf tenderness, erythema, or palpable cords.  Neurological: Alert.  Baseline strength and sensation noted.   Skin: Skin is pink, warm, and dry. No pallor.   Psychiatric: Mood and affect normal.   Nursing note and vitals reviewed.    LAB RESULTS  Recent Results (from the past 24 hour(s))   Comprehensive Metabolic Panel    Collection Time: 07/04/21  8:48 PM    Specimen: Blood   Result Value Ref Range    Glucose 94 65 - 99 mg/dL    BUN 6 6 - 20 mg/dL    Creatinine 0.79 0.57 - 1.00 mg/dL    Sodium 135 (L) 136 - 145 mmol/L    Potassium 3.9 3.5 - 5.2 mmol/L    Chloride 103 98 - 107 mmol/L    CO2 23.4 22.0 - 29.0 mmol/L    Calcium 8.8 8.6 - 10.5 mg/dL    Total Protein 6.2 6.0 - 8.5 g/dL    Albumin 3.70 3.50 - 5.20 g/dL    ALT (SGPT) 23 1 - 33 U/L    AST (SGOT) 26 1 - 32 U/L    Alkaline Phosphatase 139 (H) 39 - 117 U/L    Total Bilirubin 0.2 0.0 - 1.2 mg/dL    eGFR Non African Amer 81 >60 mL/min/1.73    Globulin 2.5 gm/dL    A/G Ratio 1.5 g/dL    BUN/Creatinine Ratio 7.6 7.0 - 25.0    Anion Gap 8.6 5.0 - 15.0 mmol/L    Lipase    Collection Time: 07/04/21  8:48 PM    Specimen: Blood   Result Value Ref Range    Lipase 14 13 - 60 U/L   hCG, Serum, Qualitative    Collection Time: 07/04/21  8:48 PM    Specimen: Blood   Result Value Ref Range    HCG Qualitative Negative Negative   CBC Auto Differential    Collection Time: 07/04/21  8:48 PM    Specimen: Blood   Result Value Ref Range    WBC 8.35 3.40 - 10.80 10*3/mm3    RBC 4.00 3.77 - 5.28 10*6/mm3    Hemoglobin 12.5 12.0 - 15.9 g/dL    Hematocrit 37.2 34.0 - 46.6 %    MCV 93.0 79.0 - 97.0 fL    MCH 31.3 26.6 - 33.0 pg    MCHC 33.6 31.5 - 35.7 g/dL    RDW 13.7 12.3 - 15.4 %    RDW-SD 45.9 37.0 - 54.0 fl    MPV 9.6 6.0 - 12.0 fL    Platelets 320 140 - 450 10*3/mm3   Manual Differential    Collection Time: 07/04/21  8:48 PM    Specimen: Blood   Result Value Ref Range    Neutrophil % 61.6 42.7 - 76.0 %    Lymphocyte % 27.3 19.6 - 45.3 %    Monocyte % 4.0 (L) 5.0 - 12.0 %    Eosinophil % 5.1 0.3 - 6.2 %    Basophil % 2.0 (H) 0.0 - 1.5 %    Neutrophils Absolute 5.14 1.70 - 7.00 10*3/mm3    Lymphocytes Absolute 2.28 0.70 - 3.10 10*3/mm3    Monocytes Absolute 0.33 0.10 - 0.90 10*3/mm3    Eosinophils Absolute 0.43 (H) 0.00 - 0.40 10*3/mm3    Basophils Absolute 0.17 0.00 - 0.20 10*3/mm3    RBC Morphology Normal Normal    WBC Morphology Normal Normal    Platelet Morphology Normal Normal   Urinalysis With Microscopic If Indicated (No Culture) - Urine, Clean Catch    Collection Time: 07/04/21  9:20 PM    Specimen: Urine, Clean Catch   Result Value Ref Range    Color, UA Yellow Yellow, Straw    Appearance, UA Clear Clear    pH, UA 8.0 5.0 - 8.0    Specific Gravity, UA 1.013 1.005 - 1.030    Glucose, UA Negative Negative    Ketones, UA Negative Negative    Bilirubin, UA Negative Negative    Blood, UA Negative Negative    Protein, UA Negative Negative    Leuk Esterase, UA Negative Negative    Nitrite, UA Negative Negative    Urobilinogen, UA 0.2 E.U./dL 0.2 - 1.0 E.U./dL   Urine Drug Screen - Urine,  Clean Catch    Collection Time: 07/04/21  9:20 PM    Specimen: Urine, Clean Catch   Result Value Ref Range    Amphet/Methamphet, Screen Negative Negative    Barbiturates Screen, Urine Negative Negative    Benzodiazepine Screen, Urine Negative Negative    Cocaine Screen, Urine Negative Negative    Opiate Screen Positive (A) Negative    THC, Screen, Urine Negative Negative    Methadone Screen, Urine Negative Negative    Oxycodone Screen, Urine Negative Negative       Ordered the above labs and independently reviewed the results.        RADIOLOGY  CT Abdomen Pelvis Without Contrast    Result Date: 7/4/2021  Patient: CARSON MULLEN  Time Out: 23:54 Exam(s): CT ABDOMEN + PELVIS Without Contrast EXAM:   CT Abdomen and Pelvis Without Intravenous Contrast CLINICAL HISTORY:    Reason for exam: R flank pain. TECHNIQUE:   Axial computed tomography images of the abdomen and pelvis without intravenous contrast.  CTDI is 31.9 mGy and DLP is 1711.3 mGy-cm.  This CT exam was performed according to the principle of ALARA (As Low As Reasonably Achievable) by using one or more of the following dose reduction techniques: automated exposure control, adjustment of the mA and or kV according to patient size, and or use of iterative reconstruction technique. COMPARISON:   07 02 2021. FINDINGS:   Limitations:  Limited due to metallic artifact.   Lung bases:  Unremarkable.  ABDOMEN:   Liver:  Unremarkable.   Gallbladder and bile ducts:  Cholecystectomy.   Pancreas:  Unremarkable.   Spleen:  Unremarkable.   Adrenals:  Unremarkable.   Kidneys and ureters:  No hydronephrosis or ureteral calculus.  Left renal scarring.   Stomach and bowel:  Moderate colonic stool which may be ileus constipation.  No mechanical bowel obstruction.  No diverticulitis.  PELVIS:   Appendix:  No findings to suggest acute appendicitis.   Bladder:  Distended bladder.   Reproductive:  Unremarkable as visualized.  ABDOMEN and PELVIS:   Intraperitoneal space:  No free  air.   Bones joints:  Lumbar spine postop changes.   Soft tissues:  Unremarkable.   Vasculature:  Unremarkable.   Lymph nodes:  Unremarkable. IMPRESSION:     1.  No hydronephrosis or ureteral calculus. 2.  Moderate colonic stool which may be ileus constipation.  No mechanical bowel obstruction.  No diverticulitis.     Electronically signed by Brad Galloway M.D. on 07-04-21 at 2354    US Abdomen Limited    Result Date: 7/5/2021  Patient: CARSON MULLEN  Time Out: 00:10 Exam(s): US ABDOMEN LIMITED EXAM:   US Abdomen Limited, Right Upper Quadrant CLINICAL HISTORY:    Reason for exam: Right upper quadrant pain; History cholecystectomy- eval common bile duct and liver please. TECHNIQUE:   Real-time ultrasound of the right upper quadrant with image documentation. COMPARISON:   No relevant prior studies available. FINDINGS:   Liver:  Unremarkable.   Gallbladder:  Cholecystectomy.   Common bile duct:  CBD 6 mm.  No definite choledocholithiasis.   Pancreas:  Unremarkable as visualized.   Right kidney:  No hydronephrosis. IMPRESSION:       CBD 6 mm.  No definite choledocholithiasis.     Electronically signed by Brad Galloway M.D. on 07-05-21 at 0010      I ordered the above noted radiological studies. Reviewed by me and discussed with radiologist.  See dictation for official radiology interpretation.      PROCEDURES    Procedures        MEDICATIONS GIVEN IN ER    Medications   sodium chloride 0.9 % flush 10 mL (has no administration in time range)   ketorolac (TORADOL) injection 15 mg (15 mg Intravenous Given 7/4/21 2117)   ondansetron (ZOFRAN) injection 4 mg (4 mg Intravenous Given 7/4/21 2117)   HYDROmorphone (DILAUDID) injection 1 mg (1 mg Intravenous Given 7/4/21 2229)         PROGRESS, DATA ANALYSIS, CONSULTS, AND MEDICAL DECISION MAKING    My differential diagnosis for abdominal pain includes but is not limited to:  Gastritis, gastroenteritis, peptic ulcer disease, GERD, esophageal perforation, acute appendicitis,  mesenteric adenitis, Meckel’s diverticulum, epiploic appendagitis, diverticulitis, colon cancer, ulcerative colitis, Crohn’s disease, intussusception, small bowel obstruction, adhesions, ischemic bowel, perforated viscus, ileus, obstipation, biliary colic, cholecystitis, cholelithiasis, Jarad-Mo Travon, hepatitis, pancreatitis, common bile duct obstruction, cholangitis, bile leak, splenic trauma, splenic rupture, splenic infarction, splenic abscess, abdominal abscess, ascites, spontaneous bacterial peritonitis, hernia, UTI, cystitis,ureterolithiasis, urinary obstruction, ovarian cyst, torsion, pregnancy, ectopic pregnancy, PID, pelvic abscess, mittelschmerz, endometriosis, AAA, myocardial infarction, pneumonia, cancer, porphyria, DKA, medications, sickle cell, viral syndrome, zoster        All labs have been independently reviewed by me.  All radiology studies have been reviewed by me and discussed with radiologist dictating the report.   EKG's independently viewed and interpreted by me.  Discussion below represents my analysis of pertinent findings related to patient's condition, differential diagnosis, treatment plan and final disposition.      ED Course as of Jul 05 0137   Sun Jul 04, 2021   2316 My plan was to not use narcotics in this patient.  Ultimately, our work-up was taking longer than anticipated and I did order some narcotics to help her during her wait.    [RS]   2317 Lyn with ultrasound provides preliminary right upper quadrant ultrasound-no acute abnormal findings with CBD measuring 6 mm with no identified obstruction    [RS]   Mon Jul 05, 2021   0017 I have reviewed labs, CT result, and ultrasound result with patient.  Patient reports that her pain was transiently improved with Dilaudid but now is returning.  She reports intractable pain that she cannot live with.  She reports no relief with her narcotics and Zofran at home.  While I have not identified any acute life threat, I have also not  identified any explanation of the patient's pain.  She reports that this pain is intractable and intolerable.  Admission for further evaluation is offered, and patient eagerly accepts.    [RS]   0136 CONSULT        Provider: ESE ScottAIDA    Discussion: Viewed patient history, ED presentation and evaluation as well as therapies that have been initiated in the ED and patient's history of ERCP with sphincterotomy.  She is agreeable to accept the patient to the [facility] floor for observation on behalf of Dr. Urrutia.    Agreeable c treatment and planned disposition.            [RS]      ED Course User Index  [RS] Sarath Rodriguez MD       AS OF 01:37 EDT VITALS:    BP - 122/94  HR - 104  TEMP - 98.7 °F (37.1 °C)  O2 SATS - 98%        DIAGNOSIS  Final diagnoses:   Intractable abdominal pain         DISPOSITION  ADMISSION    Discussed treatment plan and reason for admission with pt/family and admitting physician.  Pt/family voiced understanding of the plan for admission for further testing/treatment as needed.          Sarath Rodriguez MD  07/05/21 0137

## 2021-07-05 NOTE — NURSING NOTE
Per radiology, the MRCP will not be able to be completed until tomorrow (7/6/2021).  She will need to be NPO after midnight for this test.  Dr Harmony farias.

## 2021-07-05 NOTE — ED NOTES
Checking on patient, she states her pain is coming back. Dr Rodriguez notified.     Sheridan Romero RN  07/05/21 0023

## 2021-07-06 ENCOUNTER — APPOINTMENT (OUTPATIENT)
Dept: MRI IMAGING | Facility: HOSPITAL | Age: 41
End: 2021-07-06

## 2021-07-06 LAB
ALBUMIN SERPL-MCNC: 3.1 G/DL (ref 3.5–5.2)
ALBUMIN/GLOB SERPL: 1.4 G/DL
ALP SERPL-CCNC: 100 U/L (ref 39–117)
ALT SERPL W P-5'-P-CCNC: 20 U/L (ref 1–33)
ANION GAP SERPL CALCULATED.3IONS-SCNC: 7 MMOL/L (ref 5–15)
AST SERPL-CCNC: 22 U/L (ref 1–32)
BILIRUB SERPL-MCNC: 0.2 MG/DL (ref 0–1.2)
BUN SERPL-MCNC: 5 MG/DL (ref 6–20)
BUN/CREAT SERPL: 6.9 (ref 7–25)
CALCIUM SPEC-SCNC: 8.3 MG/DL (ref 8.6–10.5)
CHLORIDE SERPL-SCNC: 110 MMOL/L (ref 98–107)
CO2 SERPL-SCNC: 24 MMOL/L (ref 22–29)
CREAT SERPL-MCNC: 0.72 MG/DL (ref 0.57–1)
DEPRECATED RDW RBC AUTO: 44.6 FL (ref 37–54)
ERYTHROCYTE [DISTWIDTH] IN BLOOD BY AUTOMATED COUNT: 13.2 % (ref 12.3–15.4)
GFR SERPL CREATININE-BSD FRML MDRD: 90 ML/MIN/1.73
GLOBULIN UR ELPH-MCNC: 2.2 GM/DL
GLUCOSE SERPL-MCNC: 89 MG/DL (ref 65–99)
HCT VFR BLD AUTO: 35.6 % (ref 34–46.6)
HGB BLD-MCNC: 11.7 G/DL (ref 12–15.9)
MCH RBC QN AUTO: 30.7 PG (ref 26.6–33)
MCHC RBC AUTO-ENTMCNC: 32.9 G/DL (ref 31.5–35.7)
MCV RBC AUTO: 93.4 FL (ref 79–97)
PLATELET # BLD AUTO: 277 10*3/MM3 (ref 140–450)
PMV BLD AUTO: 9.6 FL (ref 6–12)
POTASSIUM SERPL-SCNC: 3.8 MMOL/L (ref 3.5–5.2)
PROT SERPL-MCNC: 5.3 G/DL (ref 6–8.5)
RBC # BLD AUTO: 3.81 10*6/MM3 (ref 3.77–5.28)
SODIUM SERPL-SCNC: 141 MMOL/L (ref 136–145)
WBC # BLD AUTO: 7.39 10*3/MM3 (ref 3.4–10.8)

## 2021-07-06 PROCEDURE — 99212 OFFICE O/P EST SF 10 MIN: CPT | Performed by: INTERNAL MEDICINE

## 2021-07-06 PROCEDURE — 25010000002 KETOROLAC TROMETHAMINE PER 15 MG: Performed by: INTERNAL MEDICINE

## 2021-07-06 PROCEDURE — A9577 INJ MULTIHANCE: HCPCS | Performed by: INTERNAL MEDICINE

## 2021-07-06 PROCEDURE — 25010000002 LORAZEPAM PER 2 MG: Performed by: NURSE PRACTITIONER

## 2021-07-06 PROCEDURE — G0378 HOSPITAL OBSERVATION PER HR: HCPCS

## 2021-07-06 PROCEDURE — 80053 COMPREHEN METABOLIC PANEL: CPT | Performed by: INTERNAL MEDICINE

## 2021-07-06 PROCEDURE — 25010000002 ONDANSETRON PER 1 MG: Performed by: NURSE PRACTITIONER

## 2021-07-06 PROCEDURE — 85027 COMPLETE CBC AUTOMATED: CPT | Performed by: INTERNAL MEDICINE

## 2021-07-06 PROCEDURE — 25010000002 ENOXAPARIN PER 10 MG: Performed by: INTERNAL MEDICINE

## 2021-07-06 PROCEDURE — 0 GADOBENATE DIMEGLUMINE 529 MG/ML SOLUTION: Performed by: INTERNAL MEDICINE

## 2021-07-06 PROCEDURE — 74183 MRI ABD W/O CNTR FLWD CNTR: CPT

## 2021-07-06 RX ORDER — LORAZEPAM 2 MG/ML
0.5 INJECTION INTRAMUSCULAR ONCE
Status: COMPLETED | OUTPATIENT
Start: 2021-07-06 | End: 2021-07-06

## 2021-07-06 RX ADMIN — SODIUM CHLORIDE, PRESERVATIVE FREE 10 ML: 5 INJECTION INTRAVENOUS at 20:31

## 2021-07-06 RX ADMIN — GADOBENATE DIMEGLUMINE 20 ML: 529 INJECTION, SOLUTION INTRAVENOUS at 20:12

## 2021-07-06 RX ADMIN — GABAPENTIN 800 MG: 400 CAPSULE ORAL at 11:59

## 2021-07-06 RX ADMIN — GABAPENTIN 800 MG: 400 CAPSULE ORAL at 18:00

## 2021-07-06 RX ADMIN — NICOTINE 1 PATCH: 14 PATCH, EXTENDED RELEASE TRANSDERMAL at 23:11

## 2021-07-06 RX ADMIN — GABAPENTIN 800 MG: 400 CAPSULE ORAL at 08:28

## 2021-07-06 RX ADMIN — SODIUM CHLORIDE 100 ML/HR: 9 INJECTION, SOLUTION INTRAVENOUS at 10:11

## 2021-07-06 RX ADMIN — KETOROLAC TROMETHAMINE 15 MG: 15 INJECTION, SOLUTION INTRAMUSCULAR; INTRAVENOUS at 12:00

## 2021-07-06 RX ADMIN — SODIUM CHLORIDE 100 ML/HR: 9 INJECTION, SOLUTION INTRAVENOUS at 00:19

## 2021-07-06 RX ADMIN — LORAZEPAM 0.5 MG: 2 INJECTION INTRAMUSCULAR; INTRAVENOUS at 18:56

## 2021-07-06 RX ADMIN — KETOROLAC TROMETHAMINE 15 MG: 15 INJECTION, SOLUTION INTRAMUSCULAR; INTRAVENOUS at 17:59

## 2021-07-06 RX ADMIN — ENOXAPARIN SODIUM 40 MG: 40 INJECTION SUBCUTANEOUS at 15:55

## 2021-07-06 RX ADMIN — DOCUSATE SODIUM 100 MG: 100 CAPSULE, LIQUID FILLED ORAL at 20:29

## 2021-07-06 RX ADMIN — HYDROCODONE BITARTRATE AND ACETAMINOPHEN 1 TABLET: 7.5; 325 TABLET ORAL at 08:37

## 2021-07-06 RX ADMIN — FAMOTIDINE 20 MG: 10 INJECTION INTRAVENOUS at 08:28

## 2021-07-06 RX ADMIN — GABAPENTIN 800 MG: 400 CAPSULE ORAL at 20:29

## 2021-07-06 RX ADMIN — HYDROCODONE BITARTRATE AND ACETAMINOPHEN 1 TABLET: 7.5; 325 TABLET ORAL at 15:55

## 2021-07-06 RX ADMIN — HYDROCODONE BITARTRATE AND ACETAMINOPHEN 1 TABLET: 7.5; 325 TABLET ORAL at 21:54

## 2021-07-06 RX ADMIN — FAMOTIDINE 20 MG: 10 INJECTION INTRAVENOUS at 20:31

## 2021-07-06 RX ADMIN — SODIUM CHLORIDE 100 ML/HR: 9 INJECTION, SOLUTION INTRAVENOUS at 20:34

## 2021-07-06 RX ADMIN — SODIUM CHLORIDE, PRESERVATIVE FREE 10 ML: 5 INJECTION INTRAVENOUS at 08:29

## 2021-07-06 RX ADMIN — ONDANSETRON 4 MG: 2 INJECTION INTRAMUSCULAR; INTRAVENOUS at 08:37

## 2021-07-06 NOTE — PLAN OF CARE
Goal Outcome Evaluation:  Plan of Care Reviewed With: patient        Progress: no change  Outcome Summary: c/o right sidedabdominal pain.  always rates pain on the high side.  Toradol and lortab given.  Kept NPO for mri with mrcp.   labs ordered this am

## 2021-07-06 NOTE — PROGRESS NOTES
Name: Belen Allen ADMIT: 2021   : 1980  PCP: Sahil Cornelius MD    MRN: [id number] LOS: 0 days   AGE/SEX: 40 y.o. female  ROOM: [facility]   Subjective   Chief Complaint   Patient presents with   • Flank Pain     R side   • Leg Swelling     bilateral     Still with pain  Not improved thus far with medications  Had clears yesterday  NPO for MRI today    ROS  No f/c  +n/v  No cp/palp  No soa/cough    Objective   Vital Signs  Temp:  [97.3 °F (36.3 °C)-98.9 °F (37.2 °C)] 98.9 °F (37.2 °C)  Heart Rate:  [82-89] 88  Resp:  [16] 16  BP: (112-138)/(70-84) 138/84  SpO2:  [96 %-98 %] 98 %  on   ;   Device (Oxygen Therapy): room air  Body mass index is 41.18 kg/m².    Physical Exam  Constitutional:       General: She is not in acute distress.     Appearance: She is well-developed. She is obese.   HENT:      Head: Normocephalic and atraumatic.   Eyes:      General: No scleral icterus.  Cardiovascular:      Rate and Rhythm: Regular rhythm.      Heart sounds: Normal heart sounds.   Pulmonary:      Effort: Pulmonary effort is normal. No respiratory distress.   Abdominal:      General: There is no distension.      Palpations: Abdomen is soft.      Tenderness: There is abdominal tenderness (minimal RUQ). There is no guarding.   Musculoskeletal:      Cervical back: Neck supple.   Neurological:      Mental Status: She is alert.   Psychiatric:         Behavior: Behavior normal.         Results Review:       I reviewed the patient's new clinical results.  Results from last 7 days   Lab Units 21  0532 21  0645 21   WBC 10*3/mm3 7.39 6.08 8.35 8.46   HEMOGLOBIN g/dL 11.7* 11.4* 12.5 11.9*   PLATELETS 10*3/mm3 277 290 320 316     Results from last 7 days   Lab Units 21  0532 21  0645 21   SODIUM mmol/L 141 137 135* 141   POTASSIUM mmol/L 3.8 4.1 3.9 3.7   CHLORIDE mmol/L 110* 106 103 105   CO2 mmol/L 24.0 23.9 23.4 27.8   BUN mg/dL 5* 6  6 7   CREATININE mg/dL 0.72 0.74 0.79 0.72   GLUCOSE mg/dL 89 76 94 109*   Estimated Creatinine Clearance: 126.5 mL/min (by C-G formula based on SCr of 0.72 mg/dL).  Results from last 7 days   Lab Units 07/06/21  0532 07/04/21 2048 07/02/21 2037   ALBUMIN g/dL 3.10* 3.70 4.10   BILIRUBIN mg/dL 0.2 0.2 0.3   ALK PHOS U/L 100 139* 132*   AST (SGOT) U/L 22 26 28   ALT (SGPT) U/L 20 23 26     Results from last 7 days   Lab Units 07/06/21  0532 07/05/21  0645 07/04/21 2048 07/02/21 2037   CALCIUM mg/dL 8.3* 8.6 8.8 8.8   ALBUMIN g/dL 3.10*  --  3.70 4.10         Coag   Results from last 7 days   Lab Units 07/05/21  0645   INR  0.97     HbA1C No results found for: HGBA1C  Infection     Radiology(recent) CT Abdomen Pelvis Without Contrast    Result Date: 7/4/2021  Electronically signed by Brad Galloway M.D. on 07-04-21 at 2354    US Abdomen Limited    Result Date: 7/5/2021  Electronically signed by Brad Galloway M.D. on 07-05-21 at 0010    No results found for: TROPONINT, TROPONINI, BNP  No components found for: TSH;2    docusate sodium, 100 mg, Oral, BID  enoxaparin, 40 mg, Subcutaneous, Q24H  famotidine, 20 mg, Intravenous, Q12H  gabapentin, 800 mg, Oral, 4x Daily  LORazepam, 0.5 mg, Intravenous, Once  nicotine, 1 patch, Transdermal, Q24H  polyethylene glycol, 17 g, Oral, Daily  sodium chloride, 10 mL, Intravenous, Q12H      sodium chloride, 100 mL/hr, Last Rate: 100 mL/hr (07/06/21 1011)    NPO Diet      Assessment/Plan      Active Hospital Problems    Diagnosis  POA   • Intractable abdominal pain [R10.9]  Yes   • Obesity, Class III, BMI 40-49.9 (morbid obesity) (CMS/HCC) [E66.01]  Yes   • Constipation [K59.00]  Yes   • History of ERCP [Z98.890]  Not Applicable   • Other chronic pain [G89.29]  Yes   • Tobacco abuse [Z72.0]  Yes   • Vitamin D deficiency [E55.9]  Yes   • Generalized abdominal pain [R10.84]  Yes   • Antiphospholipid antibody positive [R76.0]  Yes      Resolved Hospital Problems   No resolved problems to  display.       Ms. Allen is a 40 y.o. presents with right upper quadrant pain status post ERCP with sphincterotomy 6/3/2021. He is afebrile without leukocytosis. Normal LFTs except mild alkaline phosphatase elevation.  Normal lipase.  CT abdomen pelvis without abnormality and ultrasound abdomen unrevealing except possible ileus but her bowels are moving.    · Appreciate GI assistance  · MRCP ordered  · Continue as needed Toradol, Norco, Flexeril, and home gabapentin, toradol.  She is asking for Dilaudid which was administered in the ER but given lack of  evidence of acute intra-abdominal process it is not appropriate.   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  Seen by hematology at that time.     · Started on eliquis BID which she stopped prior to ERCP and has not resumed. Per EMR, she has not followed up with [facility]ology as directed.     ALICIA RN  Reviewed records  Greater than 36 minutes spent with greater than 50% counseling and coordinating care     VTE Prophylaxis - SCDs and lovenox      Rogelio Arnold MD  [facility]  07/06/21  14:09 EDT

## 2021-07-06 NOTE — PROGRESS NOTES
Gastroenterology   Inpatient Progress Note    Reason for Follow Up: Elevated alkaline phosphatase and right upper quadrant pain    Subjective     Interval History:   Patient says she feels about the same today with significant pain in the right upper abdomen to right flank area radiating around to the right side of the back.    Current Facility-Administered Medications:   •  acetaminophen (TYLENOL) tablet 650 mg, 650 mg, Oral, Q4H PRN **OR** acetaminophen (TYLENOL) 160 MG/5ML solution 650 mg, 650 mg, Oral, Q4H PRN **OR** acetaminophen (TYLENOL) suppository 650 mg, 650 mg, Rectal, Q4H PRN, Nisha Ribera APRN  •  cyclobenzaprine (FLEXERIL) tablet 10 mg, 10 mg, Oral, BID PRN, Rogelio Arnold MD, 10 mg at 07/05/21 1141  •  docusate sodium (COLACE) capsule 100 mg, 100 mg, Oral, BID, Nisha Ribera APRN, 100 mg at 07/05/21 2009  •  enoxaparin (LOVENOX) syringe 40 mg, 40 mg, Subcutaneous, Q24H, Rogelio Arnold MD, 40 mg at 07/05/21 1634  •  famotidine (PEPCID) injection 20 mg, 20 mg, Intravenous, Q12H, Rogelio Arnold MD, 20 mg at 07/06/21 0828  •  gabapentin (NEURONTIN) capsule 800 mg, 800 mg, Oral, 4x Daily, Rogelio Arnold MD, 800 mg at 07/06/21 0828  •  HYDROcodone-acetaminophen (NORCO) 7.5-325 MG per tablet 1 tablet, 1 tablet, Oral, Q6H PRN, Rogelio Arnold MD, 1 tablet at 07/06/21 0837  •  ketorolac (TORADOL) injection 15 mg, 15 mg, Intravenous, Q6H PRN, Rogelio Arnold MD, 15 mg at 07/05/21 2015  •  LORazepam (ATIVAN) injection 0.5 mg, 0.5 mg, Intravenous, Once, Nisha Ribera APRN  •  nicotine (NICODERM CQ) 14 MG/24HR patch 1 patch, 1 patch, Transdermal, Q24H, Rogelio Arnold MD, 1 patch at 07/05/21 2009  •  ondansetron (ZOFRAN) injection 4 mg, 4 mg, Intravenous, Q6H PRN, Nisha Ribera APRN, 4 mg at 07/06/21 0837  •  polyethylene glycol (MIRALAX) packet 17 g, 17 g, Oral, Daily, Nisha Ribera APRN, 17 g at 07/05/21 0833  •  [COMPLETED] Insert  peripheral IV, , , Once **AND** sodium chloride 0.9 % flush 10 mL, 10 mL, Intravenous, PRN, Sarath Rodriguez MD  •  sodium chloride 0.9 % flush 10 mL, 10 mL, Intravenous, Q12H, Nisha Ribera APRN, 10 mL at 07/06/21 0829  •  sodium chloride 0.9 % flush 10 mL, 10 mL, Intravenous, PRN, Nisha Ribera APRN  •  sodium chloride 0.9 % infusion, 100 mL/hr, Intravenous, Continuous, Nisha Ribera APRN, Last Rate: 100 mL/hr at 07/06/21 1011, 100 mL/hr at 07/06/21 1011  Review of Systems:    All systems were reviewed and negative except for:  Gastrointestinal: positive for  pain    Objective     Vital Signs  Temp:  [97.3 °F (36.3 °C)-98.9 °F (37.2 °C)] 97.3 °F (36.3 °C)  Heart Rate:  [82-89] 88  Resp:  [16] 16  BP: (112-131)/(70-83) 131/81  Body mass index is 41.18 kg/m².    Intake/Output Summary (Last 24 hours) at 7/6/2021 1027  Last data filed at 7/6/2021 1011  Gross per 24 hour   Intake 3350.33 ml   Output 2200 ml   Net 1150.33 ml     I/O this shift:  In: 1000 [I.V.:1000]  Out: -      Physical Exam:   General: patient awake, alert and cooperative   Eyes: no scleral icterus   Skin: warm and dry, not jaundiced   Abdomen: soft, mild tenderness to palpation in the right mid abdomen but no guarding or rebound, nondistended; normal bowel sounds, no masses palpated, no periumbical lymphadenopathy   Psychiatric: Appropriate affect and behavior     Results Review:     I reviewed the patient's new clinical results.    Results from last 7 days   Lab Units 07/06/21  0532 07/05/21  0645 07/04/21  2048   WBC 10*3/mm3 7.39 6.08 8.35   HEMOGLOBIN g/dL 11.7* 11.4* 12.5   HEMATOCRIT % 35.6 35.5 37.2   PLATELETS 10*3/mm3 277 290 320     Results from last 7 days   Lab Units 07/06/21  0532 07/05/21  0645 07/04/21 2048 07/02/21 2037   SODIUM mmol/L 141 137 135* 141   POTASSIUM mmol/L 3.8 4.1 3.9 3.7   CHLORIDE mmol/L 110* 106 103 105   CO2 mmol/L 24.0 23.9 23.4 27.8   BUN mg/dL 5* 6 6 7   CREATININE mg/dL 0.72 0.74 0.79 0.72    CALCIUM mg/dL 8.3* 8.6 8.8 8.8   BILIRUBIN mg/dL 0.2  --  0.2 0.3   ALK PHOS U/L 100  --  139* 132*   ALT (SGPT) U/L 20  --  23 26   AST (SGOT) U/L 22  --  26 28   GLUCOSE mg/dL 89 76 94 109*     Results from last 7 days   Lab Units 07/05/21  0645   INR  0.97     Lab Results   Lab Value Date/Time    LIPASE 14 07/04/2021 2048    LIPASE 12 (L) 07/02/2021 2037    LIPASE 24 05/31/2021 1401    LIPASE 33 11/28/2020 2203    LIPASE 19 11/08/2020 1851    LIPASE 44 12/30/2019 0208    LIPASE 25 05/25/2018 1202    LIPASE 63 03/03/2018 2058       Radiology:  US Abdomen Limited   Final Result         Electronically signed by Brad Galloway M.D. on 07-05-21 at 0010      CT Abdomen Pelvis Without Contrast   Final Result         Electronically signed by Brad Galloway M.D. on 07-04-21 at 2354      MRI abdomen w wo contrast mrcp    (Results Pending)       Assessment/Plan     Patient Active Problem List   Diagnosis   • Splenic infarct   • Anxiety disorder   • Functional asplenia   • Generalized abdominal pain   • Bilateral leg weakness   • Acute bilateral low back pain   • Antiphospholipid antibody positive   • On anticoagulant therapy   • Acute pain of left knee   • Vitamin D deficiency   • Folate deficiency   • Pain of back and left lower extremity   • Common bile duct dilatation   • Right upper quadrant abdominal pain   • Obesity (BMI 35.0-39.9 without comorbidity)   • Tobacco abuse   • GERD without esophagitis   • Intractable abdominal pain   • Obesity, Class III, BMI 40-49.9 (morbid obesity) (CMS/HCC)   • Constipation   • History of ERCP   • Other chronic pain     These problems are new to me  Assessment:  1. Right upper quadrant pain.  Pain complex is stable per patient's report.  No improvement or worsening.  2. Elevated alkaline phosphatase.  This has now normalized.  3. S post ERCP with sphincterotomy  4. Nausea.  Improved.      Plan:  · Follow-up results of MRCP.  · Monitor liver enzymes with alkaline phosphatase now normal.   Await results of fractionated alkaline phosphatase.  · Doubt need for endoscopic evaluation at this point.  I discussed the patients findings and my recommendations with patient and nursing staff.    MD Mariusz Gee M.D.  [facility] [place] [address]  Office: [phone]

## 2021-07-06 NOTE — PROGRESS NOTES
Continued Stay Note  [facility]     Patient Name: Belen Allen  MRN: [id number]  Today's Date: 7/6/2021    Admit Date: 7/4/2021    Discharge Plan     Row Name 07/06/21 1031       Plan    Plan  Home no needs    Plan Comments  Met with patient who confirmed DC plan is home. Denies any needs/equipment at this time. CCP will continue to follow.        Discharge Codes    No documentation.             Vanita Plascencia RN

## 2021-07-07 VITALS
RESPIRATION RATE: 18 BRPM | OXYGEN SATURATION: 97 % | DIASTOLIC BLOOD PRESSURE: 84 MMHG | HEIGHT: 64 IN | TEMPERATURE: 97.7 F | WEIGHT: 239.9 LBS | SYSTOLIC BLOOD PRESSURE: 138 MMHG | BODY MASS INDEX: 40.96 KG/M2 | HEART RATE: 101 BPM

## 2021-07-07 LAB
ALP BONE CFR SERPL: 37 % (ref 14–68)
ALP INTEST CFR SERPL: 1 % (ref 0–18)
ALP LIVER CFR SERPL: 62 % (ref 18–85)
ALP SERPL-CCNC: 126 IU/L (ref 48–121)

## 2021-07-07 PROCEDURE — G0378 HOSPITAL OBSERVATION PER HR: HCPCS

## 2021-07-07 PROCEDURE — 99212 OFFICE O/P EST SF 10 MIN: CPT | Performed by: INTERNAL MEDICINE

## 2021-07-07 RX ORDER — FAMOTIDINE 20 MG/1
20 TABLET, FILM COATED ORAL
Status: DISCONTINUED | OUTPATIENT
Start: 2021-07-07 | End: 2021-07-07 | Stop reason: HOSPADM

## 2021-07-07 RX ORDER — POLYETHYLENE GLYCOL 3350 17 G/17G
17 POWDER, FOR SOLUTION ORAL DAILY
Start: 2021-07-08 | End: 2023-01-18 | Stop reason: HOSPADM

## 2021-07-07 RX ADMIN — HYDROCODONE BITARTRATE AND ACETAMINOPHEN 1 TABLET: 7.5; 325 TABLET ORAL at 10:41

## 2021-07-07 RX ADMIN — GABAPENTIN 800 MG: 400 CAPSULE ORAL at 08:38

## 2021-07-07 RX ADMIN — FAMOTIDINE 20 MG: 10 INJECTION INTRAVENOUS at 08:38

## 2021-07-07 RX ADMIN — SODIUM CHLORIDE, PRESERVATIVE FREE 10 ML: 5 INJECTION INTRAVENOUS at 08:38

## 2021-07-07 RX ADMIN — HYDROCODONE BITARTRATE AND ACETAMINOPHEN 1 TABLET: 7.5; 325 TABLET ORAL at 04:43

## 2021-07-07 RX ADMIN — SODIUM CHLORIDE 100 ML/HR: 9 INJECTION, SOLUTION INTRAVENOUS at 06:48

## 2021-07-07 NOTE — PAYOR COMM NOTE
"Carson Mullen (40 y.o. Female)     DC SUMMARY FOR [id number].    PER THE MEDICAL DIRECTOR THE IP AUTH WILL REMAIN VALID.  THE REQ TO CANCEL IP REQ WAS SENT IN ERROR.        Date of Birth Social Security Number Address Home Phone MRN    1980  [address]  [facility] [id number] [phone] [id number]    Hindu Marital Status          None Single       Admission Date Admission Type Admitting Provider Attending Provider Department, Room/Bed    7/4/21 Emergency Mane Urrutia MD  [facility] [address] [place], 78/1    Discharge Date Discharge Disposition Discharge Destination        7/7/2021 Home or Self Care              Attending Provider: (none)   Allergies: No Known Allergies    Isolation: None   Infection: None   Code Status: Prior    Ht: 162.6 cm (64\")   Wt: 109 kg (239 lb 14.4 oz)    Admission Cmt: None   Principal Problem: Generalized abdominal pain [R10.84]                 Active Insurance as of 7/4/2021     Primary Coverage     Payor Plan Insurance Group Employer/Plan Group    [facility] BY ALYSHA [facility] BY ALYSHA [id number]     Payor Plan Address Payor Plan Phone Number Payor Plan Fax Number Effective Dates    [address]   1/1/2021 - None Entered    [place] [address]       Subscriber Name Subscriber Birth Date Member ID       CARSON MULLEN 1980 [id number]                 Emergency Contacts      (Rel.) Home Phone Work Phone Mobile Phone    JUNIOR AGUAYO (Significant Other) [phone] -- [phone]               Discharge Summary      Qasim Schmidt MD at 07/07/21 0838                              [facility]    Date of Discharge:  7/7/2021    PCP: Sahil Cornelius MD    Discharge Diagnosis:   Active Hospital Problems    Diagnosis  POA   • **Generalized abdominal pain [R10.84]  Yes   • Obesity, Class III, BMI 40-49.9 (morbid obesity) (CMS/[facility]) [E66.01]  Yes   • Constipation " [K59.00]  Yes   • History of ERCP [Z98.890]  Not Applicable   • Other chronic pain [G89.29]  Yes   • Tobacco abuse [Z72.0]  Yes   • Vitamin D deficiency [E55.9]  Yes   • Antiphospholipid antibody positive [R76.0]  Yes      Resolved Hospital Problems   No resolved problems to display.          Consults     Date and Time Order Name Status Description    7/5/2021  3:15 AM Inpatient Gastroenterology Consult Completed     7/5/2021 12:37 AM [facility] (on-call MD unless specified) Details Completed         Hospital Course  Please see history and physical for details. 40 y.o. female who was here just last month with abdominal pain and she underwent ERCP with sphincterotomy as an outpatient.  She comes back here complaining of right upper quadrant pain and GI has seen in consult as well.  We have evaluated with CT of the abdomen pelvis which then led to an MRCP all of which are unremarkable for acute disease though CT did show aspects of moderate constipation without obstruction.  She has normal LFTs and only very mildly elevated ALP.  Her lipase is normal.  She reported nausea and vomiting no at this point none is present and patient is tolerated dietary advancement.  She complains of this right upper quadrant pain.  Her exam is completely benign though she will demonstrate pursed lip breathing at times which makes validity/interpretation of pain difficult.  Her vital signs are completely stable and afebrile there is no issues of hypoxia.  She does have a past history of antiphospholipid antibody syndrome and is anticoagulated on Eliquis.  This was temporarily held and she was kept on Lovenox for prophylaxis.  Her cardiac troponins are normal and EKG is unremarkable.  I do not have a reason to go looking for PE via CT for risk versus benefits considering she is already anticoagulated.  At this point I question any secondary gain or phantom pain.  I discussed the case with the RN and ultimately I think she is medically stable to  complete work-up as an outpatient.  She has requested IV Dilaudid previously which was declined since we have not demonstrated any acute disease to warrant IV narcotics.  I will allow disposition only if okay with GI from their perspective.  All questions answered to the patient to the best my ability.  She seemed amenable to the above plan.  No family was present at bedside patient did not asked me to discuss her case with anyone additionally.      Condition on Discharge: Improved.     Temp:  [97.9 °F (36.6 °C)-99.1 °F (37.3 °C)] 98.2 °F (36.8 °C)  Heart Rate:  [71-90] 71  Resp:  [16] 16  BP: (130-151)/(79-96) 130/79  Body mass index is 41.18 kg/m².    Physical Exam  HENT:      Head: Normocephalic.      Nose: Nose normal.      Mouth/Throat:      Mouth: Mucous membranes are moist.      Pharynx: Oropharynx is clear.   Eyes:      General: No scleral icterus.     Conjunctiva/sclera: Conjunctivae normal.   Cardiovascular:      Rate and Rhythm: Normal rate and regular rhythm.   Pulmonary:      Effort: Pulmonary effort is normal. No respiratory distress.      Breath sounds: Normal breath sounds. No wheezing or rales.   Abdominal:      General: Bowel sounds are normal. There is no distension.      Palpations: Abdomen is soft.      Tenderness: There is no abdominal tenderness. There is no guarding or rebound.   Musculoskeletal:         General: No swelling.   Skin:     General: Skin is warm and dry.      Coloration: Skin is not jaundiced.   Neurological:      General: No focal deficit present.      Mental Status: She is alert and oriented to person, place, and time. Mental status is at baseline.     [unfilled]    Disposition: Home or Self Care       Discharge Medications      New Medications      Instructions Start Date   polyethylene glycol 17 g packet  Commonly known as: MIRALAX   17 g, Oral, Daily   Start Date: July 8, 2021        Continue These Medications      Instructions Start Date   amitriptyline 25 MG tablet  Commonly  known as: ELAVIL   25 mg, Oral, Nightly      apixaban 5 MG tablet tablet  Commonly known as: ELIQUIS   5 mg, Oral, 2 Times Daily, Last filled 4/12/21 - pt states she was instructed to stop taking medication prior to ERCP ~1 month ago and was not instructed to restart medication.  Indication: Splenic infarct       cyclobenzaprine 10 MG tablet  Commonly known as: FLEXERIL   10 mg, Oral, 2 Times Daily PRN      famotidine 20 MG tablet  Commonly known as: PEPCID   20 mg, Oral, 2 Times Daily Before Meals      folic acid 1 MG tablet  Commonly known as: FOLVITE   1 mg, Oral, Daily      gabapentin 800 MG tablet  Commonly known as: NEURONTIN   800 mg, Oral, 4 Times Daily      HYDROcodone-acetaminophen 7.5-325 MG per tablet  Commonly known as: NORCO   1 tablet, Oral, Every 6 Hours PRN      hydrOXYzine 25 MG tablet  Commonly known as: ATARAX   1-2 tablets, Oral, 3 Times Daily PRN      melatonin 5 MG tablet tablet   10 mg, Oral, Nightly      PROzac 40 MG capsule  Generic drug: FLUoxetine   40 mg, Oral, Nightly      SUMAtriptan 100 MG tablet  Commonly known as: IMITREX   50 mg, Oral, Every 2 Hours PRN, Take one tablet at onset of headache. May repeat dose one time in 2 hours if headache not relieved.       traZODone 50 MG tablet  Commonly known as: DESYREL   25-50 mg, Oral, Nightly PRN      vitamin D 1.25 MG (21250 UT) capsule capsule  Commonly known as: ERGOCALCIFEROL   50,000 Units, Oral, Weekly              Additional Instructions for the Follow-ups that You Need to Schedule     Discharge Follow-up with PCP   As directed       Currently Documented PCP:    Sahil Cornelius MD    PCP Phone Number:    [phone]     Follow Up Details: PCP 1 to 2 weeks.  GI per their recommendations           Follow-up Information     Sahil Cornelius MD .    Specialty: Internal Medicine  Why: PCP 1 to 2 weeks.  GI per their recommendations  Contact information:  [facility] [place] [facility] [address] [phone]                   Pending Labs     Order Current Status    Alkaline Phosphatase, Isoenzymes In process         Qasim Schmidt MD  07/07/21  08:42 EDT    Discharge time spent greater than 30 minutes.    Electronically signed by Qasim Schmidt MD at 07/07/21 0843

## 2021-07-07 NOTE — PLAN OF CARE
Goal Outcome Evaluation:  Plan of Care Reviewed With: patient        Progress: improving  Outcome Summary: c/o right sided abdominal pain.  Lortab given.  MRI with MRCP done last night.  tolerated diet

## 2021-07-07 NOTE — PROGRESS NOTES
Case Management Discharge Note      Final Note: Discharged home. Vanita Plascencia RN         Transportation Services  Private: Car    Final Discharge Disposition Code: 01 - home or self-care

## 2021-07-07 NOTE — PAYOR COMM NOTE
"Carson Mullen (40 y.o. Female)     PLEASE CANCEL INPATIENT REQUEST FOR MEMBER [id number].  PATIENT HAVE BEEN CHANGED TO OBSERVATION AND WILL DC'D TODAY.    CONTACT BHAVIN KLEIN# [phone]  F# [phone]      Date of Birth Social Security Number Address Home Phone MRN    1980  [address]  [facility] [facility] [address] [phone] [id number]    Religious Marital Status          None Single       Admission Date Admission Type Admitting Provider Attending Provider Department, Room/Bed    7/4/21 Emergency Mane Urrutia MD Masden, Troy Andrew, MD [address], [facility]/    Discharge Date Discharge Disposition Discharge Destination         Home or Self Care              Attending Provider: Qasim Schmidt MD    Allergies: No Known Allergies    Isolation: None   Infection: None   Code Status: CPR    Ht: 162.6 cm (64\")   Wt: 109 kg (239 lb 14.4 oz)    Admission Cmt: None   Principal Problem: Generalized abdominal pain [R10.84]                 Active Insurance as of 7/4/2021     Primary Coverage     Payor Plan Insurance Group Employer/Plan Group    [facility] [facility] [id number]     Payor Plan Address Payor Plan Phone Number Payor Plan Fax Number Effective Dates    [address]   1/1/2021 - None Entered    [place] [address]       Subscriber Name Subscriber Birth Date Member ID       CARSON MULLEN 1980 [id number]                 Emergency Contacts      (Rel.) Home Phone Work Phone Mobile Phone    JUNIOR AGUAYO (Significant Other) [phone] -- [phone]               Discharge Summary      Qasim Schmidt MD at 07/07/21 0838                              [facility]    Date of Discharge:  7/7/2021    PCP: Sahil Cornelius MD    Discharge Diagnosis:   Active Hospital Problems    Diagnosis  POA   • **Generalized abdominal pain [R10.84]  Yes   • Obesity, Class III, BMI " 40-49.9 (morbid obesity) (CMS/[facility]) [E66.01]  Yes   • Constipation [K59.00]  Yes   • History of ERCP [Z98.890]  Not Applicable   • Other chronic pain [G89.29]  Yes   • Tobacco abuse [Z72.0]  Yes   • Vitamin D deficiency [E55.9]  Yes   • Antiphospholipid antibody positive [R76.0]  Yes      Resolved Hospital Problems   No resolved problems to display.          Consults     Date and Time Order Name Status Description    7/5/2021  3:15 AM Inpatient Gastroenterology Consult Completed     7/5/2021 12:37 AM [facility] (on-call MD unless specified) Details Completed         Hospital Course  Please see history and physical for details. 40 y.o. female who was here just last month with abdominal pain and she underwent ERCP with sphincterotomy as an outpatient.  She comes back here complaining of right upper quadrant pain and GI has seen in consult as well.  We have evaluated with CT of the abdomen pelvis which then led to an MRCP all of which are unremarkable for acute disease though CT did show aspects of moderate constipation without obstruction.  She has normal LFTs and only very mildly elevated ALP.  Her lipase is normal.  She reported nausea and vomiting no at this point none is present and patient is tolerated dietary advancement.  She complains of this right upper quadrant pain.  Her exam is completely benign though she will demonstrate pursed lip breathing at times which makes validity/interpretation of pain difficult.  Her vital signs are completely stable and afebrile there is no issues of hypoxia.  She does have a past history of antiphospholipid antibody syndrome and is anticoagulated on Eliquis.  This was temporarily held and she was kept on Lovenox for prophylaxis.  Her cardiac troponins are normal and EKG is unremarkable.  I do not have a reason to go looking for PE via CT for risk versus benefits considering she is already anticoagulated.  At this point I question any secondary gain or phantom pain.  I discussed the  case with the RN and ultimately I think she is medically stable to complete work-up as an outpatient.  She has requested IV Dilaudid previously which was declined since we have not demonstrated any acute disease to warrant IV narcotics.  I will allow disposition only if okay with GI from their perspective.  All questions answered to the patient to the best my ability.  She seemed amenable to the above plan.  No family was present at bedside patient did not asked me to discuss her case with anyone additionally.      Condition on Discharge: Improved.     Temp:  [97.9 °F (36.6 °C)-99.1 °F (37.3 °C)] 98.2 °F (36.8 °C)  Heart Rate:  [71-90] 71  Resp:  [16] 16  BP: (130-151)/(79-96) 130/79  Body mass index is 41.18 kg/m².    Physical Exam  HENT:      Head: Normocephalic.      Nose: Nose normal.      Mouth/Throat:      Mouth: Mucous membranes are moist.      Pharynx: Oropharynx is clear.   Eyes:      General: No scleral icterus.     Conjunctiva/sclera: Conjunctivae normal.   Cardiovascular:      Rate and Rhythm: Normal rate and regular rhythm.   Pulmonary:      Effort: Pulmonary effort is normal. No respiratory distress.      Breath sounds: Normal breath sounds. No wheezing or rales.   Abdominal:      General: Bowel sounds are normal. There is no distension.      Palpations: Abdomen is soft.      Tenderness: There is no abdominal tenderness. There is no guarding or rebound.   Musculoskeletal:         General: No swelling.   Skin:     General: Skin is warm and dry.      Coloration: Skin is not jaundiced.   Neurological:      General: No focal deficit present.      Mental Status: She is alert and oriented to person, place, and time. Mental status is at baseline.     [unfilled]    Disposition: Home or Self Care       Discharge Medications      New Medications      Instructions Start Date   polyethylene glycol 17 g packet  Commonly known as: MIRALAX   17 g, Oral, Daily   Start Date: July 8, 2021        Continue These Medications       Instructions Start Date   amitriptyline 25 MG tablet  Commonly known as: ELAVIL   25 mg, Oral, Nightly      apixaban 5 MG tablet tablet  Commonly known as: ELIQUIS   5 mg, Oral, 2 Times Daily, Last filled 4/12/21 - pt states she was instructed to stop taking medication prior to ERCP ~1 month ago and was not instructed to restart medication.  Indication: Splenic infarct       cyclobenzaprine 10 MG tablet  Commonly known as: FLEXERIL   10 mg, Oral, 2 Times Daily PRN      famotidine 20 MG tablet  Commonly known as: PEPCID   20 mg, Oral, 2 Times Daily Before Meals      folic acid 1 MG tablet  Commonly known as: FOLVITE   1 mg, Oral, Daily      gabapentin 800 MG tablet  Commonly known as: NEURONTIN   800 mg, Oral, 4 Times Daily      HYDROcodone-acetaminophen 7.5-325 MG per tablet  Commonly known as: NORCO   1 tablet, Oral, Every 6 Hours PRN      hydrOXYzine 25 MG tablet  Commonly known as: ATARAX   1-2 tablets, Oral, 3 Times Daily PRN      melatonin 5 MG tablet tablet   10 mg, Oral, Nightly      PROzac 40 MG capsule  Generic drug: FLUoxetine   40 mg, Oral, Nightly      SUMAtriptan 100 MG tablet  Commonly known as: IMITREX   50 mg, Oral, Every 2 Hours PRN, Take one tablet at onset of headache. May repeat dose one time in 2 hours if headache not relieved.       traZODone 50 MG tablet  Commonly known as: DESYREL   25-50 mg, Oral, Nightly PRN      vitamin D 1.25 MG (01373 UT) capsule capsule  Commonly known as: ERGOCALCIFEROL   50,000 Units, Oral, Weekly              Additional Instructions for the Follow-ups that You Need to Schedule     Discharge Follow-up with PCP   As directed       Currently Documented PCP:    Sahil Cornelius MD    PCP Phone Number:    [phone]     Follow Up Details: PCP 1 to 2 weeks.  GI per their recommendations           Follow-up Information     Sahil Cornelius MD .    Specialty: Internal Medicine  Why: PCP 1 to 2 weeks.  GI per their recommendations  Contact  information:  [address]  [facility] [id number] [phone]                  Pending Labs     Order Current Status    Alkaline Phosphatase, Isoenzymes In process         Qasim Schmidt MD  07/07/21  08:42 EDT    Discharge time spent greater than 30 minutes.    Electronically signed by Qasim Schmidt MD at 07/07/21 0869

## 2021-07-07 NOTE — DISCHARGE SUMMARY
[facility]    Date of Discharge:  7/7/2021    PCP: Sahil Cornelius MD    Discharge Diagnosis:   Active Hospital Problems    Diagnosis  POA   • **Generalized abdominal pain [R10.84]  Yes   • Obesity, Class III, BMI 40-49.9 (morbid obesity) (CMS/HCC) [E66.01]  Yes   • Constipation [K59.00]  Yes   • History of ERCP [Z98.890]  Not Applicable   • Other chronic pain [G89.29]  Yes   • Tobacco abuse [Z72.0]  Yes   • Vitamin D deficiency [E55.9]  Yes   • Antiphospholipid antibody positive [R76.0]  Yes      Resolved Hospital Problems   No resolved problems to display.          Consults     Date and Time Order Name Status Description    7/5/2021  3:15 AM Inpatient Gastroenterology Consult Completed     7/5/2021 12:37 AM [facility] (on-call MD unless specified) Details Completed         Hospital Course  Please see history and physical for details. 40 y.o. female who was here just last month with abdominal pain and she underwent ERCP with sphincterotomy as an outpatient.  She comes back here complaining of right upper quadrant pain and GI has seen in consult as well.  We have evaluated with CT of the abdomen pelvis which then led to an MRCP all of which are unremarkable for acute disease though CT did show aspects of moderate constipation without obstruction.  She has normal LFTs and only very mildly elevated ALP.  Her lipase is normal.  She reported nausea and vomiting no at this point none is present and patient is tolerated dietary advancement.  She complains of this right upper quadrant pain.  Her exam is completely benign though she will demonstrate pursed lip breathing at times which makes validity/interpretation of pain difficult.  Her vital signs are completely stable and afebrile there is no issues of hypoxia.  She does have a past history of antiphospholipid antibody syndrome and is anticoagulated on Eliquis.  This was temporarily held and she was kept on  Lovenox for prophylaxis.  Her cardiac troponins are normal and EKG is unremarkable.  I do not have a reason to go looking for PE via CT for risk versus benefits considering she is already anticoagulated.  At this point I question any secondary gain or phantom pain.  I discussed the case with the RN and ultimately I think she is medically stable to complete work-up as an outpatient.  She has requested IV Dilaudid previously which was declined since we have not demonstrated any acute disease to warrant IV narcotics.  I will allow disposition only if okay with GI from their perspective.  All questions answered to the patient to the best my ability.  She seemed amenable to the above plan.  No family was present at bedside patient did not asked me to discuss her case with anyone additionally.      Condition on Discharge: Improved.     Temp:  [97.9 °F (36.6 °C)-99.1 °F (37.3 °C)] 98.2 °F (36.8 °C)  Heart Rate:  [71-90] 71  Resp:  [16] 16  BP: (130-151)/(79-96) 130/79  Body mass index is 41.18 kg/m².    Physical Exam  HENT:      Head: Normocephalic.      Nose: Nose normal.      Mouth/Throat:      Mouth: Mucous membranes are moist.      Pharynx: Oropharynx is clear.   Eyes:      General: No scleral icterus.     Conjunctiva/sclera: Conjunctivae normal.   Cardiovascular:      Rate and Rhythm: Normal rate and regular rhythm.   Pulmonary:      Effort: Pulmonary effort is normal. No respiratory distress.      Breath sounds: Normal breath sounds. No wheezing or rales.   Abdominal:      General: Bowel sounds are normal. There is no distension.      Palpations: Abdomen is soft.      Tenderness: There is no abdominal tenderness. There is no guarding or rebound.   Musculoskeletal:         General: No swelling.   Skin:     General: Skin is warm and dry.      Coloration: Skin is not jaundiced.   Neurological:      General: No focal deficit present.      Mental Status: She is alert and oriented to person, place, and time. Mental status  is at baseline.     [unfilled]    Disposition: Home or Self Care       Discharge Medications      New Medications      Instructions Start Date   polyethylene glycol 17 g packet  Commonly known as: MIRALAX   17 g, Oral, Daily   Start Date: July 8, 2021        Continue These Medications      Instructions Start Date   amitriptyline 25 MG tablet  Commonly known as: ELAVIL   25 mg, Oral, Nightly      apixaban 5 MG tablet tablet  Commonly known as: ELIQUIS   5 mg, Oral, 2 Times Daily, Last filled 4/12/21 - pt states she was instructed to stop taking medication prior to ERCP ~1 month ago and was not instructed to restart medication.  Indication: Splenic infarct       cyclobenzaprine 10 MG tablet  Commonly known as: FLEXERIL   10 mg, Oral, 2 Times Daily PRN      famotidine 20 MG tablet  Commonly known as: PEPCID   20 mg, Oral, 2 Times Daily Before Meals      folic acid 1 MG tablet  Commonly known as: FOLVITE   1 mg, Oral, Daily      gabapentin 800 MG tablet  Commonly known as: NEURONTIN   800 mg, Oral, 4 Times Daily      HYDROcodone-acetaminophen 7.5-325 MG per tablet  Commonly known as: NORCO   1 tablet, Oral, Every 6 Hours PRN      hydrOXYzine 25 MG tablet  Commonly known as: ATARAX   1-2 tablets, Oral, 3 Times Daily PRN      melatonin 5 MG tablet tablet   10 mg, Oral, Nightly      PROzac 40 MG capsule  Generic drug: FLUoxetine   40 mg, Oral, Nightly      SUMAtriptan 100 MG tablet  Commonly known as: IMITREX   50 mg, Oral, Every 2 Hours PRN, Take one tablet at onset of headache. May repeat dose one time in 2 hours if headache not relieved.       traZODone 50 MG tablet  Commonly known as: DESYREL   25-50 mg, Oral, Nightly PRN      vitamin D 1.25 MG (75726 UT) capsule capsule  Commonly known as: ERGOCALCIFEROL   50,000 Units, Oral, Weekly              Additional Instructions for the Follow-ups that You Need to Schedule     Discharge Follow-up with PCP   As directed       Currently Documented PCP:    Sahil Cornelius MD     PCP Phone Number:    [phone]     Follow Up Details: PCP 1 to 2 weeks.  GI per their recommendations           Follow-up Information     Sahil Cornelius MD .    Specialty: Internal Medicine  Why: PCP 1 to 2 weeks.  GI per their recommendations  Contact information:  [address] [facility] [address] [phone]                  Pending Labs     Order Current Status    Alkaline Phosphatase, Isoenzymes In process         Qasim Schmidt MD  07/07/21  08:42 EDT    Discharge time spent greater than 30 minutes.

## 2021-07-07 NOTE — PROGRESS NOTES
Continued Stay Note  [facility]     Patient Name: Belen Allen  MRN: [id number]  Today's Date: 7/7/2021    Admit Date: 7/4/2021    Discharge Plan     Row Name 07/07/21 0915       Plan    Plan  Home no needs    Plan Comments  Discharge order noted. Met with patient who confirmed DC plan is home. Stated Ced Aquino (Significant other) will assist as needed and will provide transportation at DC. Denies any needs/equipment.        Discharge Codes    No documentation.       Expected Discharge Date and Time     Expected Discharge Date Expected Discharge Time    Jul 7, 2021             Vanita Plascencia RN

## 2021-07-07 NOTE — PROGRESS NOTES
[facility]  Inpatient Progress Note    Reason for Follow Up: Abdominal pain    Subjective     Interval History:   Discussed with patient, discussed with RN.  Plans for discharge noted.  She still complains of some pain in the right mid abdomen with radiation to the back.  I reviewed her work-up to date with no evidence of ductal dilation or obstructive picture.  She can follow-up with Dr. Ordoñez in the outpatient setting for further work-up as needed.    Current Facility-Administered Medications:   •  acetaminophen (TYLENOL) tablet 650 mg, 650 mg, Oral, Q4H PRN **OR** acetaminophen (TYLENOL) 160 MG/5ML solution 650 mg, 650 mg, Oral, Q4H PRN **OR** acetaminophen (TYLENOL) suppository 650 mg, 650 mg, Rectal, Q4H PRN, Nisha Ribera APRN  •  cyclobenzaprine (FLEXERIL) tablet 10 mg, 10 mg, Oral, BID PRN, Rogelio Arnold MD, 10 mg at 07/05/21 1141  •  docusate sodium (COLACE) capsule 100 mg, 100 mg, Oral, BID, Nisha Ribera APRN, 100 mg at 07/06/21 2029  •  enoxaparin (LOVENOX) syringe 40 mg, 40 mg, Subcutaneous, Q24H, Rogelio Arnold MD, 40 mg at 07/06/21 1555  •  famotidine (PEPCID) tablet 20 mg, 20 mg, Oral, BID AC, Rogelio Arnold MD  •  gabapentin (NEURONTIN) capsule 800 mg, 800 mg, Oral, 4x Daily, Rogelio Arnold MD, 800 mg at 07/07/21 0838  •  HYDROcodone-acetaminophen (NORCO) 7.5-325 MG per tablet 1 tablet, 1 tablet, Oral, Q6H PRN, Rogelio Arnold MD, 1 tablet at 07/07/21 0443  •  nicotine (NICODERM CQ) 14 MG/24HR patch 1 patch, 1 patch, Transdermal, Q24H, Rogelio Arnold MD, 1 patch at 07/06/21 2311  •  ondansetron (ZOFRAN) injection 4 mg, 4 mg, Intravenous, Q6H PRN, Nisha Ribera APRN, 4 mg at 07/06/21 0837  •  polyethylene glycol (MIRALAX) packet 17 g, 17 g, Oral, Daily, Nisha Ribera, APRN, 17 g at 07/05/21 0833  •  [COMPLETED] Insert peripheral IV, , , Once **AND** sodium chloride 0.9 % flush 10 mL, 10 mL, Intravenous, PRN,  Sarath Rodriguez MD  •  sodium chloride 0.9 % flush 10 mL, 10 mL, Intravenous, Q12H, Nisha Ribera APRN, 10 mL at 07/07/21 0838  •  sodium chloride 0.9 % flush 10 mL, 10 mL, Intravenous, PRN, Nisha Ribera APRN  Review of Systems:    All systems were reviewed and negative except for:  Gastrointestinal: positive for  See HPI    Objective     Vital Signs  Temp:  [97.7 °F (36.5 °C)-99.1 °F (37.3 °C)] 97.7 °F (36.5 °C)  Heart Rate:  [71-90] 76  Resp:  [16-18] 18  BP: (130-160)/() 160/106  Body mass index is 41.18 kg/m².    Intake/Output Summary (Last 24 hours) at 7/7/2021 1035  Last data filed at 7/7/2021 0844  Gross per 24 hour   Intake 2051.67 ml   Output --   Net 2051.67 ml     I/O this shift:  In: 150 [I.V.:150]  Out: -      Physical Exam:   General: patient awake, alert and cooperative   Eyes: Normal lids and lashes, no scleral icterus   Neck: supple, normal ROM   Skin: warm and dry, not jaundiced   Cardiovascular: regular rhythm and rate, no murmurs auscultated   Pulm: clear to auscultation bilaterally, regular and unlabored   Abdomen: soft, nontender, nondistended; normal bowel sounds   Extremities: no rash or edema   Psychiatric: Normal mood and behavior; memory intact     Results Review:     I reviewed the patient's new clinical results.    Results from last 7 days   Lab Units 07/06/21  0532 07/05/21  0645 07/04/21 2048   WBC 10*3/mm3 7.39 6.08 8.35   HEMOGLOBIN g/dL 11.7* 11.4* 12.5   HEMATOCRIT % 35.6 35.5 37.2   PLATELETS 10*3/mm3 277 290 320     Results from last 7 days   Lab Units 07/06/21  0532 07/05/21  0645 07/04/21 2048 07/02/21 2037   SODIUM mmol/L 141 137 135* 141   POTASSIUM mmol/L 3.8 4.1 3.9 3.7   CHLORIDE mmol/L 110* 106 103 105   CO2 mmol/L 24.0 23.9 23.4 27.8   BUN mg/dL 5* 6 6 7   CREATININE mg/dL 0.72 0.74 0.79 0.72   CALCIUM mg/dL 8.3* 8.6 8.8 8.8   BILIRUBIN mg/dL 0.2  --  0.2 0.3   ALK PHOS U/L 100  --  139* 132*   ALT (SGPT) U/L 20  --  23 26   AST (SGOT) U/L 22  --   26 28   GLUCOSE mg/dL 89 76 94 109*     Results from last 7 days   Lab Units 07/05/21  0645   INR  0.97     Lab Results   Lab Value Date/Time    LIPASE 14 07/04/2021 2048    LIPASE 12 (L) 07/02/2021 2037    LIPASE 24 05/31/2021 1401    LIPASE 33 11/28/2020 2203    LIPASE 19 11/08/2020 1851    LIPASE 44 12/30/2019 0208    LIPASE 25 05/25/2018 1202    LIPASE 63 03/03/2018 2058       Radiology:  MRI abdomen w wo contrast mrcp   Final Result   Interval resolution of bile duct dilatation since a   preceding MRCP study dated 06/01/2021. Currently there is no evidence of   choledocholithiasis and no bile duct strictures are identified.       This report was finalized on 7/6/2021 8:43 PM by Dr. Gold Zhang M.D.          US Abdomen Limited   Final Result         Electronically signed by Brad Galloway M.D. on 07-05-21 at 0010      CT Abdomen Pelvis Without Contrast   Final Result         Electronically signed by Brad Galloway M.D. on 07-04-21 at 2354          Assessment/Plan     Patient Active Problem List   Diagnosis   • Splenic infarct   • Anxiety disorder   • Functional asplenia   • Generalized abdominal pain   • Bilateral leg weakness   • Acute bilateral low back pain   • Antiphospholipid antibody positive   • On anticoagulant therapy   • Acute pain of left knee   • Vitamin D deficiency   • Folate deficiency   • Pain of back and left lower extremity   • Common bile duct dilatation   • Right upper quadrant abdominal pain   • Obesity (BMI 35.0-39.9 without comorbidity)   • Tobacco abuse   • GERD without esophagitis   • Intractable abdominal pain   • Obesity, Class III, BMI 40-49.9 (morbid obesity) (CMS/HCC)   • Constipation   • History of ERCP   • Other chronic pain       Assessment:  1. Right-sided abdominal pain: Work-up negative to date  2. Elevated alkaline phosphatase: Resolved  3. Status post ERCP with sphincterotomy      Plan:  · Can follow-up with Dr. Antunez in the outpatient setting  ·   I discussed the patients  findings and my recommendations with patient and nursing staff.    Brad Bernard MD

## 2021-07-08 ENCOUNTER — READMISSION MANAGEMENT (OUTPATIENT)
Dept: CALL CENTER | Facility: HOSPITAL | Age: 41
End: 2021-07-08

## 2021-07-08 NOTE — OUTREACH NOTE
Prep Survey      Responses   [facility] facility patient discharged from?  [place]   Is LACE score < 7 ?  No   Emergency Room discharge w/ pulse ox?  No   Eligibility  Readm Mgmt   Discharge diagnosis  Generalized abdominal pain   Does the patient have one of the following disease processes/diagnoses(primary or secondary)?  Other   Does the patient have Home health ordered?  No   Is there a DME ordered?  No   Prep survey completed?  Yes          Serena Mahajan RN

## 2021-07-12 ENCOUNTER — READMISSION MANAGEMENT (OUTPATIENT)
Dept: CALL CENTER | Facility: HOSPITAL | Age: 41
End: 2021-07-12

## 2021-07-12 NOTE — OUTREACH NOTE
Medical Week 1 Survey      Responses   [facility] patient discharged from?  [place]   Does the patient have one of the following disease processes/diagnoses(primary or secondary)?  Other   Week 1 attempt successful?  No   Unsuccessful attempts  Attempt 1          Sharda Gill RN

## 2021-07-15 ENCOUNTER — READMISSION MANAGEMENT (OUTPATIENT)
Dept: CALL CENTER | Facility: HOSPITAL | Age: 41
End: 2021-07-15

## 2021-07-15 NOTE — OUTREACH NOTE
Medical Week 1 Survey      Responses   [facility] patient discharged from?  [place]   Does the patient have one of the following disease processes/diagnoses(primary or secondary)?  Other   Week 1 attempt successful?  No   Unsuccessful attempts  Attempt 2          Isabell Plata RN

## 2021-07-19 ENCOUNTER — READMISSION MANAGEMENT (OUTPATIENT)
Dept: CALL CENTER | Facility: HOSPITAL | Age: 41
End: 2021-07-19

## 2021-07-19 NOTE — OUTREACH NOTE
Medical Week 2 Survey      Responses   [facility] patient discharged from?  [place]   Does the patient have one of the following disease processes/diagnoses(primary or secondary)?  Other   Week 2 attempt successful?  No   Unsuccessful attempts  Attempt 1          Sharda Gill RN

## 2021-07-22 ENCOUNTER — READMISSION MANAGEMENT (OUTPATIENT)
Dept: CALL CENTER | Facility: HOSPITAL | Age: 41
End: 2021-07-22

## 2021-07-22 NOTE — OUTREACH NOTE
Medical Week 2 Survey      Responses   [facility] [place] patient discharged from?  [place]   Does the patient have one of the following disease processes/diagnoses(primary or secondary)?  Other   Week 2 attempt successful?  No   Unsuccessful attempts  Attempt 2          Jenny Villarreal RN

## 2021-07-30 ENCOUNTER — READMISSION MANAGEMENT (OUTPATIENT)
Dept: CALL CENTER | Facility: HOSPITAL | Age: 41
End: 2021-07-30

## 2021-07-30 NOTE — OUTREACH NOTE
Medical Week 3 Survey      Responses   [facility] patient discharged from?  [place]   Does the patient have one of the following disease processes/diagnoses(primary or secondary)?  Other   Week 3 attempt successful?  No   Unsuccessful attempts  Attempt 1          Susie Bridges RN

## 2021-08-04 ENCOUNTER — READMISSION MANAGEMENT (OUTPATIENT)
Dept: CALL CENTER | Facility: HOSPITAL | Age: 41
End: 2021-08-04

## 2021-08-04 NOTE — OUTREACH NOTE
Medical Week 3 Survey      Responses   [facility] patient discharged from?  [place]   Does the patient have one of the following disease processes/diagnoses(primary or secondary)?  Other   Week 3 attempt successful?  No   Unsuccessful attempts  Attempt 2          Lexi Kumar RN

## 2021-10-22 NOTE — CONSULTS
Gastroenterology   Initial Inpatient Consult Note    Referring Provider: Nisha Ribera    Reason for Consultation: Abdominal pain    Subjective     History of present illness:    40 y.o. female who we are asked to see for evaluation of abdominal pain.  The patient reports that she has severe abdominal pain that is in the right upper quadrant with radiation to her back.  She reports that it has been going on for several weeks but has been worsening over the last several days.  She had a couple of episodes of vomiting but she has not vomited in some time.  She does have nausea she reports the pain medication being given to her here at the hospital is working effectively for her as is the antinausea medication and with both of these she is not nauseous or having significant abdominal pain.  She has no urinary changes no fevers she has no chest pain or shortness of breath    Her liver tests are normal with an ALT of 23 and AST of 26 a total bilirubin of 0.2 a lipase of 14 and alkaline phosphatase slightly elevated at 139    Recent history significant for ERCP performed 6/3/2021 with Dr. Flannery noted the intrahepatic biliary tree mildly dilated the proximal common bile duct was dilated there was a normal taper to the papilla the cystic duct stump was unremarkable no filling defects or stricture a 10 mm biliary sphincterotomy was performed then a 12 out of 15 mm stone extraction balloon was passed over the wire may be some yellowish debris but no stones were removed a cholangiogram was done consistent with a widely patent sphincterotomy and occlusion cholangiogram was obtained and revealed a dilated bile duct with no filling defects    Past Medical History:  Past Medical History:   Diagnosis Date   • Abnormal Pap smear of cervix    • Ankle fracture 2013   • H/O Elevated liver enzymes    • History of chronic back pain    • History of migraine    • History of urinary tract infection    • Injury of back    • PONV  (postoperative nausea and vomiting)    • Seasonal allergies      Past Surgical History:  Past Surgical History:   Procedure Laterality Date   • BACK SURGERY  2006    fusion L4, L5     • CHOLECYSTECTOMY     • DILATATION AND CURETTAGE     • ERCP N/A 6/3/2021    Procedure: ENDOSCOPIC RETROGRADE CHOLANGIOPANCREATOGRAPHY WITH SPHINCTEROTOMY, DILATION WITH BALLOON CLEARANCE  (12MM-15MM);  Surgeon: Bjorn Flannery MD;  Location: [facility] ENDOSCOPY;  Service: Gastroenterology;  Laterality: N/A;  DILATED COMMON BILE DUCT   • SKIN SURGERY     • TUBAL ABDOMINAL LIGATION     • WISDOM TOOTH EXTRACTION  2018    x2      Social History:   Social History     Tobacco Use   • Smoking status: Current Some Day Smoker     Packs/day: 0.50     Last attempt to quit: 2020     Years since quittin.6   • Smokeless tobacco: Never Used   Substance Use Topics   • Alcohol use: Not Currently      Family History:  Family History   Problem Relation Age of Onset   • Stroke Mother    • Allergic rhinitis Mother    • Allergic rhinitis Sister    • Breast cancer Maternal Aunt    • Cancer Maternal Grandmother    • Allergic rhinitis Paternal Grandfather    • Cancer Paternal Grandfather    • Prostate cancer Paternal Grandfather        Home Meds:  Medications Prior to Admission   Medication Sig Dispense Refill Last Dose   • amitriptyline (ELAVIL) 25 MG tablet Take 25 mg by mouth Every Night.   Past Week at Unknown time   • cyclobenzaprine (FLEXERIL) 10 MG tablet Take 1 tablet by mouth 2 (Two) Times a Day As Needed for Muscle Spasms. 20 tablet 0 Past Week at Unknown time   • famotidine (PEPCID) 20 MG tablet Take 1 tablet by mouth 2 (Two) Times a Day Before Meals.   Past Week at Unknown time   • FLUoxetine (PROzac) 40 MG capsule Take 40 mg by mouth Every Night.   Past Week at Unknown time   • folic acid (FOLVITE) 1 MG tablet Take 1 tablet by mouth Daily. 30 tablet 0 Past Week at Unknown time   • gabapentin (NEURONTIN) 800 MG tablet Take  800 mg by mouth 4 (Four) Times a Day.   7/4/2021 at Unknown time   • hydrOXYzine (ATARAX) 25 MG tablet Take 4 tablets by mouth every night at bedtime.   Past Week at Unknown time   • melatonin 5 MG tablet tablet Take 10 mg by mouth Every Night.   Past Week at Unknown time   • traZODone (DESYREL) 50 MG tablet Take 50 mg by mouth Every Night.   Past Week at Unknown time   • cyclobenzaprine (FLEXERIL) 10 MG tablet Take 1 tablet by mouth 3 (Three) Times a Day.      • HYDROcodone-acetaminophen (NORCO) 7.5-325 MG per tablet Take 1 tablet by mouth Every 6 (Six) Hours As Needed for Moderate Pain . 12 tablet 0      Current Meds:   docusate sodium, 100 mg, Oral, BID  famotidine, 20 mg, Intravenous, Q12H  gabapentin, 800 mg, Oral, 4x Daily  polyethylene glycol, 17 g, Oral, Daily  sodium chloride, 10 mL, Intravenous, Q12H      Allergies:  No Known Allergies  Review of Systems  Pertinent items are noted in HPI, all other systems reviewed and negative    Objective     Vital Signs  Temp:  [97 °F (36.1 °C)-98.7 °F (37.1 °C)] 97.2 °F (36.2 °C)  Heart Rate:  [] 80  Resp:  [16-18] 16  BP: (107-135)/() 113/67    Physical Exam:  CONSTITUTIONAL:  today's vital signs reviewed  EARS NOSE THROAT: trachea midline and no deformity of the nares  EYES: no scleral icterus  GASTROINTESTINAL: abdomen is soft, mild tenderness to palpation of the right upper quadrant, nondistended with normal active bowel sounds, no masses are appreciated  PSYCHIATRIC: appropriate mood and affect  RESPIRATORY: normal inspiratory effort with no increased work of breathing  NEUROLOGIC: patient is awake and alert  DERMATOLOGIC: skin is warm with no cyanosis  LYMPHATIC: no periumbilical lymphadenopathy     Results Review:              I reviewed the patient's new clinical results.    Results from last 7 days   Lab Units 07/05/21  0645 07/04/21 2048 07/02/21 2037   WBC 10*3/mm3 6.08 8.35 8.46   HEMOGLOBIN g/dL 11.4* 12.5 11.9*   HEMATOCRIT % 35.5 37.2 36.0    PLATELETS 10*3/mm3 290 320 316     Results from last 7 days   Lab Units 07/05/21  0645 07/04/21 2048 07/02/21 2037   SODIUM mmol/L 137 135* 141   POTASSIUM mmol/L 4.1 3.9 3.7   CHLORIDE mmol/L 106 103 105   CO2 mmol/L 23.9 23.4 27.8   BUN mg/dL 6 6 7   CREATININE mg/dL 0.74 0.79 0.72   CALCIUM mg/dL 8.6 8.8 8.8   BILIRUBIN mg/dL  --  0.2 0.3   ALK PHOS U/L  --  139* 132*   ALT (SGPT) U/L  --  23 26   AST (SGOT) U/L  --  26 28   GLUCOSE mg/dL 76 94 109*     Results from last 7 days   Lab Units 07/05/21  0645   INR  0.97     Lab Results   Lab Value Date/Time    LIPASE 14 07/04/2021 2048    LIPASE 12 (L) 07/02/2021 2037    LIPASE 24 05/31/2021 1401    LIPASE 33 11/28/2020 2203    LIPASE 19 11/08/2020 1851    LIPASE 44 12/30/2019 0208    LIPASE 25 05/25/2018 1202    LIPASE 63 03/03/2018 2058       Radiology:  US Abdomen Limited   Final Result         Electronically signed by Brad Galloway M.D. on 07-05-21 at 0010      CT Abdomen Pelvis Without Contrast   Final Result         Electronically signed by Brad Galloway M.D. on 07-04-21 at 2354          Assessment/Plan   Patient Active Problem List   Diagnosis   • Splenic infarct   • Anxiety disorder   • Functional asplenia   • Generalized abdominal pain   • Bilateral leg weakness   • Acute bilateral low back pain   • Antiphospholipid antibody positive   • On anticoagulant therapy   • Acute pain of left knee   • Vitamin D deficiency   • Folate deficiency   • Pain of back and left lower extremity   • Common bile duct dilatation   • Right upper quadrant abdominal pain   • Obesity (BMI 35.0-39.9 without comorbidity)   • Tobacco abuse   • GERD without esophagitis   • Intractable abdominal pain   • Obesity, Class III, BMI 40-49.9 (morbid obesity) (CMS/HCC)   • Constipation   • History of ERCP   • Other chronic pain       Assessment:  1. Right upper quadrant abdominal pain  2. Status post ERCP with sphincterotomy  3. Normal LFTs other than a mild alkaline phosphatase  elevation  4. Normal pancreas tests  5. Nausea  These problems are new to me.  Plan:  · To new supportive therapy with antiemetics and pain control this seems to be helping the patient  · Slow but advancement of p.o. when able  · MRCP to evaluate the ductal structures in this area noninvasively  · Fractionate alkaline phosphatase      I discussed the patients findings and my recommendations with patient and nursing staff.           Elvin Antunez M.D.  [facility] [place] [address]  Office: [phone]     No

## 2021-12-01 ENCOUNTER — APPOINTMENT (OUTPATIENT)
Dept: GENERAL RADIOLOGY | Facility: HOSPITAL | Age: 41
End: 2021-12-01

## 2021-12-01 ENCOUNTER — HOSPITAL ENCOUNTER (EMERGENCY)
Facility: HOSPITAL | Age: 41
Discharge: HOME | End: 2021-12-01
Attending: EMERGENCY MEDICINE | Admitting: EMERGENCY MEDICINE

## 2021-12-01 VITALS
SYSTOLIC BLOOD PRESSURE: 136 MMHG | RESPIRATION RATE: 20 BRPM | OXYGEN SATURATION: 95 % | DIASTOLIC BLOOD PRESSURE: 95 MMHG | HEART RATE: 89 BPM | TEMPERATURE: 98 F

## 2021-12-01 DIAGNOSIS — M54.50 ACUTE EXACERBATION OF CHRONIC LOW BACK PAIN: ICD-10-CM

## 2021-12-01 DIAGNOSIS — G89.29 ACUTE EXACERBATION OF CHRONIC LOW BACK PAIN: ICD-10-CM

## 2021-12-01 DIAGNOSIS — J20.9 ACUTE BRONCHITIS, UNSPECIFIED ORGANISM: Primary | ICD-10-CM

## 2021-12-01 LAB
ALBUMIN SERPL-MCNC: 4.1 G/DL (ref 3.5–5.2)
ALBUMIN/GLOB SERPL: 1.6 G/DL
ALP SERPL-CCNC: 132 U/L (ref 39–117)
ALT SERPL W P-5'-P-CCNC: 34 U/L (ref 1–33)
ANION GAP SERPL CALCULATED.3IONS-SCNC: 7.6 MMOL/L (ref 5–15)
AST SERPL-CCNC: 27 U/L (ref 1–32)
BASOPHILS # BLD AUTO: 0.03 10*3/MM3 (ref 0–0.2)
BASOPHILS NFR BLD AUTO: 0.5 % (ref 0–1.5)
BILIRUB SERPL-MCNC: 0.3 MG/DL (ref 0–1.2)
BUN SERPL-MCNC: 5 MG/DL (ref 6–20)
BUN/CREAT SERPL: 6.3 (ref 7–25)
CALCIUM SPEC-SCNC: 9.3 MG/DL (ref 8.6–10.5)
CHLORIDE SERPL-SCNC: 104 MMOL/L (ref 98–107)
CO2 SERPL-SCNC: 29.4 MMOL/L (ref 22–29)
CREAT SERPL-MCNC: 0.8 MG/DL (ref 0.57–1)
DEPRECATED RDW RBC AUTO: 44.2 FL (ref 37–54)
EOSINOPHIL # BLD AUTO: 0.28 10*3/MM3 (ref 0–0.4)
EOSINOPHIL NFR BLD AUTO: 4.5 % (ref 0.3–6.2)
ERYTHROCYTE [DISTWIDTH] IN BLOOD BY AUTOMATED COUNT: 12.7 % (ref 12.3–15.4)
GFR SERPL CREATININE-BSD FRML MDRD: 80 ML/MIN/1.73
GLOBULIN UR ELPH-MCNC: 2.5 GM/DL
GLUCOSE SERPL-MCNC: 105 MG/DL (ref 65–99)
HCT VFR BLD AUTO: 42.3 % (ref 34–46.6)
HGB BLD-MCNC: 14 G/DL (ref 12–15.9)
IMM GRANULOCYTES # BLD AUTO: 0.01 10*3/MM3 (ref 0–0.05)
IMM GRANULOCYTES NFR BLD AUTO: 0.2 % (ref 0–0.5)
LIPASE SERPL-CCNC: 20 U/L (ref 13–60)
LYMPHOCYTES # BLD AUTO: 1.29 10*3/MM3 (ref 0.7–3.1)
LYMPHOCYTES NFR BLD AUTO: 20.9 % (ref 19.6–45.3)
MCH RBC QN AUTO: 31.2 PG (ref 26.6–33)
MCHC RBC AUTO-ENTMCNC: 33.1 G/DL (ref 31.5–35.7)
MCV RBC AUTO: 94.2 FL (ref 79–97)
MONOCYTES # BLD AUTO: 0.53 10*3/MM3 (ref 0.1–0.9)
MONOCYTES NFR BLD AUTO: 8.6 % (ref 5–12)
NEUTROPHILS NFR BLD AUTO: 4.04 10*3/MM3 (ref 1.7–7)
NEUTROPHILS NFR BLD AUTO: 65.3 % (ref 42.7–76)
NRBC BLD AUTO-RTO: 0 /100 WBC (ref 0–0.2)
PLATELET # BLD AUTO: 262 10*3/MM3 (ref 140–450)
PMV BLD AUTO: 9.6 FL (ref 6–12)
POTASSIUM SERPL-SCNC: 4.4 MMOL/L (ref 3.5–5.2)
PROT SERPL-MCNC: 6.6 G/DL (ref 6–8.5)
RBC # BLD AUTO: 4.49 10*6/MM3 (ref 3.77–5.28)
SODIUM SERPL-SCNC: 141 MMOL/L (ref 136–145)
WBC NRBC COR # BLD: 6.18 10*3/MM3 (ref 3.4–10.8)

## 2021-12-01 PROCEDURE — 85025 COMPLETE CBC W/AUTO DIFF WBC: CPT | Performed by: EMERGENCY MEDICINE

## 2021-12-01 PROCEDURE — 99283 EMERGENCY DEPT VISIT LOW MDM: CPT

## 2021-12-01 PROCEDURE — 63710000001 ONDANSETRON ODT 4 MG TABLET DISPERSIBLE: Performed by: EMERGENCY MEDICINE

## 2021-12-01 PROCEDURE — 71045 X-RAY EXAM CHEST 1 VIEW: CPT

## 2021-12-01 PROCEDURE — 36415 COLL VENOUS BLD VENIPUNCTURE: CPT

## 2021-12-01 PROCEDURE — 80053 COMPREHEN METABOLIC PANEL: CPT | Performed by: EMERGENCY MEDICINE

## 2021-12-01 PROCEDURE — 83690 ASSAY OF LIPASE: CPT | Performed by: EMERGENCY MEDICINE

## 2021-12-01 RX ORDER — ONDANSETRON 4 MG/1
4 TABLET, ORALLY DISINTEGRATING ORAL ONCE
Status: COMPLETED | OUTPATIENT
Start: 2021-12-01 | End: 2021-12-01

## 2021-12-01 RX ORDER — OXYCODONE HYDROCHLORIDE AND ACETAMINOPHEN 5; 325 MG/1; MG/1
2 TABLET ORAL ONCE
Status: COMPLETED | OUTPATIENT
Start: 2021-12-01 | End: 2021-12-01

## 2021-12-01 RX ORDER — METHYLPREDNISOLONE 4 MG/1
TABLET ORAL
Qty: 1 EACH | Refills: 0 | Status: SHIPPED | OUTPATIENT
Start: 2021-12-01 | End: 2022-01-05 | Stop reason: HOSPADM

## 2021-12-01 RX ORDER — DOXYCYCLINE 100 MG/1
100 CAPSULE ORAL 2 TIMES DAILY
Qty: 20 CAPSULE | Refills: 0 | Status: SHIPPED | OUTPATIENT
Start: 2021-12-01 | End: 2022-01-05 | Stop reason: HOSPADM

## 2021-12-01 RX ADMIN — OXYCODONE AND ACETAMINOPHEN 2 TABLET: 5; 325 TABLET ORAL at 18:53

## 2021-12-01 RX ADMIN — ONDANSETRON 4 MG: 4 TABLET, ORALLY DISINTEGRATING ORAL at 18:54

## 2021-12-01 NOTE — ED TRIAGE NOTES
Patient to er from home with c/o increasing in back pain followed with nasal congestion/ upper chest congestion. Patient reported the pain in her back is going down into her legs.

## 2021-12-01 NOTE — ED PROVIDER NOTES
EMERGENCY DEPARTMENT ENCOUNTER    Room Number:  32/32  Date of encounter:  12/1/2021  PCP: Sahil Cornelius MD  Historian: Patient      HPI:  Chief Complaint: Cough, back pain  A complete HPI/ROS/PMH/PSH/SH/FH are unobtainable due to: N/A    Context: Belen Allen is a 40 y.o. female who presents to the ED c/o cough, congestion and low back pain.  Her symptoms started a few weeks ago and it sounds like she had a GI bug-she had multiple episodes of diarrhea, vomiting abdominal pain.  This is resolved, however the last 4 days she reports a cough with occasional brownish-yellow sputum production.  Her chest feels tight.  Her ears hurt and she feels congested.  She also has chronic lower back pain that is worsening.  It radiates to her bilateral hips/buttocks.  She has had no bladder or bowel incontinence, no saddle anesthesia.  She states her pain is severe, it is aggravated by coughing or walking and has no alleviating factors.  She was seen at an outlying facility a few days ago (see discussion below).  She states she is not in pain management and does not take chronic opiates (see discussion below).    She has not been vaccinated for COVID-19      The patient was placed in a mask in triage, hand hygiene was performed before and after my interaction with the patient.  I wore a mask, safety glasses and gloves during my entire interaction with the patient.    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   • Generalized abdominal pain 11/29/2020   • Bilateral leg weakness 11/29/2020   • Acute bilateral low back pain 11/29/2020   • Antiphospholipid antibody positive 11/29/2020   • On anticoagulant therapy 11/29/2020   • Acute pain of left knee 11/29/2020   • Vitamin D deficiency 11/30/2020   • Folate deficiency 12/01/2020   • Pain of back and left lower extremity 12/02/2020   •  Common bile duct dilatation 05/31/2021   • Right upper quadrant abdominal pain 06/01/2021   • Obesity (BMI 35.0-39.9 without comorbidity) 04/18/2019   • Tobacco abuse 06/02/2021   • GERD without esophagitis 06/02/2021   • Intractable abdominal pain 07/05/2021   • Obesity, Class III, BMI 40-49.9 (morbid obesity) ([facility]) 07/05/2021   • Constipation 07/05/2021   • History of ERCP 07/05/2021   • Other chronic pain 07/05/2021     Resolved Ambulatory Problems     Diagnosis Date Noted   • LFTs abnormal 06/01/2021   • Choledocholithiasis 06/02/2021     Past Medical History:   Diagnosis Date   • Abnormal Pap smear of cervix    • Ankle fracture 2013   • H/O Elevated liver enzymes    • History of chronic back pain    • History of migraine    • History of urinary tract infection    • Injury of back    • PONV (postoperative nausea and vomiting)    • Seasonal allergies          PAST SURGICAL HISTORY  Past Surgical History:   Procedure Laterality Date   • BACK SURGERY  2006    fusion L4, L5     • CHOLECYSTECTOMY  2014   • DILATATION AND CURETTAGE  2009   • ERCP N/A 6/3/2021    Procedure: ENDOSCOPIC RETROGRADE CHOLANGIOPANCREATOGRAPHY WITH SPHINCTEROTOMY, DILATION WITH BALLOON CLEARANCE  (12MM-15MM);  Surgeon: Bjorn Flannery MD;  Location: [facility] ENDOSCOPY;  Service: Gastroenterology;  Laterality: N/A;  DILATED COMMON BILE DUCT   • SKIN SURGERY     • TUBAL ABDOMINAL LIGATION  2013   • WISDOM TOOTH EXTRACTION  2018    x2         FAMILY HISTORY  Family History   Problem Relation Age of Onset   • Stroke Mother    • Allergic rhinitis Mother    • Allergic rhinitis Sister    • Breast cancer Maternal Aunt    • Cancer Maternal Grandmother    • Allergic rhinitis Paternal Grandfather    • Cancer Paternal Grandfather    • Prostate cancer Paternal Grandfather          SOCIAL HISTORY  Social History     Socioeconomic History   • Marital status: Single   • Number of children: 2   Tobacco Use   • Smoking status: Current Some Day Smoker      Packs/day: 0.50     Last attempt to quit: 2020     Years since quittin.0   • Smokeless tobacco: Never Used   Vaping Use   • Vaping Use: Never used   Substance and Sexual Activity   • Alcohol use: Not Currently   • Drug use: Not Currently   • Sexual activity: Defer         ALLERGIES  Patient has no known allergies.        REVIEW OF SYSTEMS  Review of Systems   Constitutional: Positive for chills. Negative for fever.   Respiratory: Positive for cough, chest tightness and shortness of breath.    Gastrointestinal: Positive for nausea. Negative for diarrhea (Resolved) and vomiting (Resolved).   Genitourinary: Negative for difficulty urinating, dysuria and hematuria.   Musculoskeletal: Positive for back pain.        All systems reviewed and negative except for those discussed in HPI.       PHYSICAL EXAM    I have reviewed the triage vital signs and nursing notes.    ED Triage Vitals [21 1533]   Temp Heart Rate Resp BP SpO2   98 °F (36.7 °C) 116 20 -- 95 %      Temp src Heart Rate Source Patient Position BP Location FiO2 (%)   -- -- -- -- --       Physical Exam   Constitutional: Pt. is oriented to person, place, and time and well-developed, well-nourished, and in no distress.   HENT: Normocephalic and atraumatic. Oropharynx moist/nonerythematous.  TMs are clear bilaterally.  Neck: Normal range of motion. Neck supple. No JVD present.   Cardiovascular: Normal rate, regular rhythm and normal heart sounds. Exam reveals no gallop and no friction rub.   No murmur heard.  Pulmonary/Chest: Effort normal and breath sounds normal. No stridor. No respiratory distress. No wheezes, no rales.  Few scattered rhonchi.  Abdominal: Soft, obese. There is minimal periumbilical tenderness. There is no rebound and no guarding.   Musculoskeletal: Normal range of motion. No edema, tenderness or deformity.   Neurological: Pt. is alert and oriented to person, place, and time.  She has no focal neurologic deficits  Skin: Skin is warm  and dry. No rash noted. Pt. is not diaphoretic. No erythema.   Psychiatric: Mood, affect normal.      Nursing note and vitals reviewed.        LAB RESULTS  Recent Results (from the past 24 hour(s))   Comprehensive Metabolic Panel    Collection Time: 12/01/21  6:45 PM    Specimen: Blood   Result Value Ref Range    Glucose 105 (H) 65 - 99 mg/dL    BUN 5 (L) 6 - 20 mg/dL    Creatinine 0.80 0.57 - 1.00 mg/dL    Sodium 141 136 - 145 mmol/L    Potassium 4.4 3.5 - 5.2 mmol/L    Chloride 104 98 - 107 mmol/L    CO2 29.4 (H) 22.0 - 29.0 mmol/L    Calcium 9.3 8.6 - 10.5 mg/dL    Total Protein 6.6 6.0 - 8.5 g/dL    Albumin 4.10 3.50 - 5.20 g/dL    ALT (SGPT) 34 (H) 1 - 33 U/L    AST (SGOT) 27 1 - 32 U/L    Alkaline Phosphatase 132 (H) 39 - 117 U/L    Total Bilirubin 0.3 0.0 - 1.2 mg/dL    eGFR Non African Amer 80 >60 mL/min/1.73    Globulin 2.5 gm/dL    A/G Ratio 1.6 g/dL    BUN/Creatinine Ratio 6.3 (L) 7.0 - 25.0    Anion Gap 7.6 5.0 - 15.0 mmol/L   Lipase    Collection Time: 12/01/21  6:45 PM    Specimen: Blood   Result Value Ref Range    Lipase 20 13 - 60 U/L   CBC Auto Differential    Collection Time: 12/01/21  6:45 PM    Specimen: Blood   Result Value Ref Range    WBC 6.18 3.40 - 10.80 10*3/mm3    RBC 4.49 3.77 - 5.28 10*6/mm3    Hemoglobin 14.0 12.0 - 15.9 g/dL    Hematocrit 42.3 34.0 - 46.6 %    MCV 94.2 79.0 - 97.0 fL    MCH 31.2 26.6 - 33.0 pg    MCHC 33.1 31.5 - 35.7 g/dL    RDW 12.7 12.3 - 15.4 %    RDW-SD 44.2 37.0 - 54.0 fl    MPV 9.6 6.0 - 12.0 fL    Platelets 262 140 - 450 10*3/mm3    Neutrophil % 65.3 42.7 - 76.0 %    Lymphocyte % 20.9 19.6 - 45.3 %    Monocyte % 8.6 5.0 - 12.0 %    Eosinophil % 4.5 0.3 - 6.2 %    Basophil % 0.5 0.0 - 1.5 %    Immature Grans % 0.2 0.0 - 0.5 %    Neutrophils, Absolute 4.04 1.70 - 7.00 10*3/mm3    Lymphocytes, Absolute 1.29 0.70 - 3.10 10*3/mm3    Monocytes, Absolute 0.53 0.10 - 0.90 10*3/mm3    Eosinophils, Absolute 0.28 0.00 - 0.40 10*3/mm3    Basophils, Absolute 0.03 0.00 -  0.20 10*3/mm3    Immature Grans, Absolute 0.01 0.00 - 0.05 10*3/mm3    nRBC 0.0 0.0 - 0.2 /100 WBC       Ordered the above labs and independently reviewed the results.        RADIOLOGY  XR Chest 1 View    Result Date: 12/1/2021  XR CHEST 1 VW-  HISTORY: Female who is 40 years-old,  cough  TECHNIQUE: Frontal view of the chest  COMPARISON: 07/02/2021  FINDINGS: Heart, mediastinum and pulmonary vasculature are unremarkable. No focal pulmonary consolidation, pleural effusion, or pneumothorax. No acute osseous process.      No evidence for acute pulmonary process. Follow-up as clinical indications persist.  This report was finalized on 12/1/2021 7:29 PM by Dr. Yonathan Neal M.D.        I ordered the above noted radiological studies. Reviewed by me and discussed with radiologist.  See dictation for official radiology interpretation.      PROCEDURES    Procedures      MEDICATIONS GIVEN IN ER    Medications   oxyCODONE-acetaminophen (PERCOCET) 5-325 MG per tablet 2 tablet (2 tablets Oral Given 12/1/21 1853)   ondansetron ODT (ZOFRAN-ODT) disintegrating tablet 4 mg (4 mg Oral Given 12/1/21 1854)         PROGRESS, DATA ANALYSIS, CONSULTS, AND MEDICAL DECISION MAKING    Any/all labs have been independently reviewed by me.  Any/all radiology studies have been reviewed by me and discussed with radiologist dictating the report.   EKG's independently viewed and interpreted by me.  Discussion below represents my analysis of pertinent findings related to patient's condition, differential diagnosis, treatment plan and final disposition.    Number of Diagnoses or Management Options     Amount and/or Complexity of Data Reviewed  Clinical lab tests:  Yes  Tests in the radiology section of CPT®:  Yes  Tests in the medicine section of CPT®:  Yes  Review and summarize past medical records: yes (see below)  Independent visualization of images, tracings, or specimens: yes (see below)      ED Course as of 12/01/21 1951   Wed Dec 01, 2021    1825 Prior record review-the patient was seen at an outlying facility on 11/29/2021 for cough, dyspnea and chest tightness as well as abdominal pain.  CT scan of the abdomen was negative for acute processes.  She also had bilateral lower extremity Doppler studies which were negative for DVT.  She tested negative for Covid at that time. [WC]   1840 Review of [facility] shows recent prescriptions for Percocet on 11/18 and 11/11 as well as 10/8, 10/4, 10/1.  She had prescriptions for hydrocodone on 9/29, 9/24 and 9/22.  She is also prescribed gabapentin routinely and has had several recent prescriptions for lorazepam. [WC]   1903 CBC is unremarkable. [WC]   1923 CMP is unremarkable.  Lipase is normal. [WC]   1933 Chest x-ray is unremarkable.    Given the duration of her symptoms, doxycycline will be prescribed for bronchitis.  Review of her records in epic showed that this low back pain has been going on for several months-any further opiates will need to come from her primary care provider. [WC]   1938 Discussed all lab and/or imaging with the patient.  The patient's abdominal exam has improved.  I explained that the patient should return to the emergency department should the pain recur or for any new symptoms (fever, blood in emesis/stool).  I also advised the patient to follow up with their PMD for further evaluation.  There is nothing on workup to suggest appendicitis, diverticulitis, cholecystitis, pancreatitis, peritonitis, mesenteric ischemia, ovarian/testicular torsion, ureteral stones or any other emergent intra-abdominal process.  My clinical suspicion for serious intra-abdominal pathology is low, and the patient can be safely discharged home for outpatient follow up.   [WC]      ED Course User Index  [WC] North Medina MD       AS OF 19:51 EST VITALS:    BP - 136/95  HR - 116  TEMP - 98 °F (36.7 °C)  02 SATS - 95%        DIAGNOSIS  Final diagnoses:   Acute bronchitis, unspecified organism   Acute  exacerbation of chronic low back pain         DISPOSITION  Discharge           North Medina MD  12/01/21 1951

## 2022-01-01 ENCOUNTER — APPOINTMENT (OUTPATIENT)
Dept: GENERAL RADIOLOGY | Facility: HOSPITAL | Age: 42
End: 2022-01-01

## 2022-01-01 ENCOUNTER — HOSPITAL ENCOUNTER (INPATIENT)
Facility: HOSPITAL | Age: 42
LOS: 1 days | Discharge: HOME | End: 2022-01-05
Attending: EMERGENCY MEDICINE | Admitting: INTERNAL MEDICINE

## 2022-01-01 ENCOUNTER — APPOINTMENT (OUTPATIENT)
Dept: CT IMAGING | Facility: HOSPITAL | Age: 42
End: 2022-01-01

## 2022-01-01 DIAGNOSIS — R32 URINARY INCONTINENCE, UNSPECIFIED TYPE: ICD-10-CM

## 2022-01-01 DIAGNOSIS — G89.29 EXACERBATION OF CHRONIC BACK PAIN: ICD-10-CM

## 2022-01-01 DIAGNOSIS — R56.9 SEIZURES: Primary | ICD-10-CM

## 2022-01-01 DIAGNOSIS — M54.16 LUMBAR BACK PAIN WITH RADICULOPATHY AFFECTING LEFT LOWER EXTREMITY: ICD-10-CM

## 2022-01-01 DIAGNOSIS — M54.9 EXACERBATION OF CHRONIC BACK PAIN: ICD-10-CM

## 2022-01-01 PROBLEM — R55 SYNCOPE: Status: ACTIVE | Noted: 2022-01-01

## 2022-01-01 PROBLEM — M62.82 RHABDOMYOLYSIS: Status: ACTIVE | Noted: 2022-01-01

## 2022-01-01 PROBLEM — G93.40 ENCEPHALOPATHY: Status: ACTIVE | Noted: 2022-01-01

## 2022-01-01 LAB
ALBUMIN SERPL-MCNC: 3.8 G/DL (ref 3.5–5.2)
ALBUMIN/GLOB SERPL: 1.5 G/DL
ALP SERPL-CCNC: 126 U/L (ref 39–117)
ALT SERPL W P-5'-P-CCNC: 35 U/L (ref 1–33)
AMPHET+METHAMPHET UR QL: NEGATIVE
ANION GAP SERPL CALCULATED.3IONS-SCNC: 9 MMOL/L (ref 5–15)
AST SERPL-CCNC: 57 U/L (ref 1–32)
BARBITURATES UR QL SCN: NEGATIVE
BASOPHILS # BLD AUTO: 0.03 10*3/MM3 (ref 0–0.2)
BASOPHILS NFR BLD AUTO: 0.4 % (ref 0–1.5)
BENZODIAZ UR QL SCN: NEGATIVE
BILIRUB SERPL-MCNC: 0.5 MG/DL (ref 0–1.2)
BILIRUB UR QL STRIP: NEGATIVE
BUN SERPL-MCNC: 4 MG/DL (ref 6–20)
BUN/CREAT SERPL: 5.3 (ref 7–25)
CALCIUM SPEC-SCNC: 9.7 MG/DL (ref 8.6–10.5)
CANNABINOIDS SERPL QL: NEGATIVE
CHLORIDE SERPL-SCNC: 100 MMOL/L (ref 98–107)
CK SERPL-CCNC: 889 U/L (ref 20–180)
CLARITY UR: CLEAR
CO2 SERPL-SCNC: 30 MMOL/L (ref 22–29)
COCAINE UR QL: NEGATIVE
COLOR UR: YELLOW
CREAT SERPL-MCNC: 0.75 MG/DL (ref 0.57–1)
DEPRECATED RDW RBC AUTO: 43.1 FL (ref 37–54)
EOSINOPHIL # BLD AUTO: 0.14 10*3/MM3 (ref 0–0.4)
EOSINOPHIL NFR BLD AUTO: 2 % (ref 0.3–6.2)
ERYTHROCYTE [DISTWIDTH] IN BLOOD BY AUTOMATED COUNT: 12.6 % (ref 12.3–15.4)
GFR SERPL CREATININE-BSD FRML MDRD: 86 ML/MIN/1.73
GLOBULIN UR ELPH-MCNC: 2.5 GM/DL
GLUCOSE SERPL-MCNC: 107 MG/DL (ref 65–99)
GLUCOSE UR STRIP-MCNC: NEGATIVE MG/DL
HCG SERPL QL: NEGATIVE
HCT VFR BLD AUTO: 37.4 % (ref 34–46.6)
HGB BLD-MCNC: 12.7 G/DL (ref 12–15.9)
HGB UR QL STRIP.AUTO: NEGATIVE
HOLD SPECIMEN: NORMAL
IMM GRANULOCYTES # BLD AUTO: 0.02 10*3/MM3 (ref 0–0.05)
IMM GRANULOCYTES NFR BLD AUTO: 0.3 % (ref 0–0.5)
KETONES UR QL STRIP: NEGATIVE
LEUKOCYTE ESTERASE UR QL STRIP.AUTO: NEGATIVE
LYMPHOCYTES # BLD AUTO: 1.86 10*3/MM3 (ref 0.7–3.1)
LYMPHOCYTES NFR BLD AUTO: 27.1 % (ref 19.6–45.3)
MCH RBC QN AUTO: 31.7 PG (ref 26.6–33)
MCHC RBC AUTO-ENTMCNC: 34 G/DL (ref 31.5–35.7)
MCV RBC AUTO: 93.3 FL (ref 79–97)
METHADONE UR QL SCN: NEGATIVE
MONOCYTES # BLD AUTO: 0.51 10*3/MM3 (ref 0.1–0.9)
MONOCYTES NFR BLD AUTO: 7.4 % (ref 5–12)
NEUTROPHILS NFR BLD AUTO: 4.31 10*3/MM3 (ref 1.7–7)
NEUTROPHILS NFR BLD AUTO: 62.8 % (ref 42.7–76)
NITRITE UR QL STRIP: NEGATIVE
NRBC BLD AUTO-RTO: 0 /100 WBC (ref 0–0.2)
OPIATES UR QL: POSITIVE
OXYCODONE UR QL SCN: NEGATIVE
PH UR STRIP.AUTO: 6.5 [PH] (ref 5–8)
PLATELET # BLD AUTO: 242 10*3/MM3 (ref 140–450)
PMV BLD AUTO: 10.3 FL (ref 6–12)
POTASSIUM SERPL-SCNC: 3.7 MMOL/L (ref 3.5–5.2)
PROT SERPL-MCNC: 6.3 G/DL (ref 6–8.5)
PROT UR QL STRIP: NEGATIVE
RBC # BLD AUTO: 4.01 10*6/MM3 (ref 3.77–5.28)
SODIUM SERPL-SCNC: 139 MMOL/L (ref 136–145)
SP GR UR STRIP: 1.01 (ref 1–1.03)
TROPONIN T SERPL-MCNC: <0.01 NG/ML (ref 0–0.03)
UROBILINOGEN UR QL STRIP: NORMAL
WBC NRBC COR # BLD: 6.87 10*3/MM3 (ref 3.4–10.8)
WHOLE BLOOD HOLD SPECIMEN: NORMAL
WHOLE BLOOD HOLD SPECIMEN: NORMAL

## 2022-01-01 PROCEDURE — 80307 DRUG TEST PRSMV CHEM ANLYZR: CPT | Performed by: NURSE PRACTITIONER

## 2022-01-01 PROCEDURE — 0 LEVETIRACETAM IN NACL 0.75% 1000 MG/100ML SOLUTION: Performed by: NURSE PRACTITIONER

## 2022-01-01 PROCEDURE — 84703 CHORIONIC GONADOTROPIN ASSAY: CPT | Performed by: NURSE PRACTITIONER

## 2022-01-01 PROCEDURE — 81003 URINALYSIS AUTO W/O SCOPE: CPT | Performed by: NURSE PRACTITIONER

## 2022-01-01 PROCEDURE — 73502 X-RAY EXAM HIP UNI 2-3 VIEWS: CPT

## 2022-01-01 PROCEDURE — 93005 ELECTROCARDIOGRAM TRACING: CPT | Performed by: NURSE PRACTITIONER

## 2022-01-01 PROCEDURE — 82550 ASSAY OF CK (CPK): CPT | Performed by: EMERGENCY MEDICINE

## 2022-01-01 PROCEDURE — G0378 HOSPITAL OBSERVATION PER HR: HCPCS

## 2022-01-01 PROCEDURE — 25010000002 DROPERIDOL PER 5 MG: Performed by: EMERGENCY MEDICINE

## 2022-01-01 PROCEDURE — U0004 COV-19 TEST NON-CDC HGH THRU: HCPCS | Performed by: OBSTETRICS & GYNECOLOGY

## 2022-01-01 PROCEDURE — 82077 ASSAY SPEC XCP UR&BREATH IA: CPT | Performed by: NURSE PRACTITIONER

## 2022-01-01 PROCEDURE — 25010000002 MORPHINE PER 10 MG: Performed by: NURSE PRACTITIONER

## 2022-01-01 PROCEDURE — 70450 CT HEAD/BRAIN W/O DYE: CPT

## 2022-01-01 PROCEDURE — 80053 COMPREHEN METABOLIC PANEL: CPT | Performed by: NURSE PRACTITIONER

## 2022-01-01 PROCEDURE — 93010 ELECTROCARDIOGRAM REPORT: CPT | Performed by: INTERNAL MEDICINE

## 2022-01-01 PROCEDURE — 72131 CT LUMBAR SPINE W/O DYE: CPT

## 2022-01-01 PROCEDURE — 85025 COMPLETE CBC W/AUTO DIFF WBC: CPT | Performed by: NURSE PRACTITIONER

## 2022-01-01 PROCEDURE — 99285 EMERGENCY DEPT VISIT HI MDM: CPT

## 2022-01-01 PROCEDURE — 84484 ASSAY OF TROPONIN QUANT: CPT | Performed by: EMERGENCY MEDICINE

## 2022-01-01 RX ORDER — ONDANSETRON 2 MG/ML
4 INJECTION INTRAMUSCULAR; INTRAVENOUS ONCE
Status: DISCONTINUED | OUTPATIENT
Start: 2022-01-01 | End: 2022-01-01

## 2022-01-01 RX ORDER — ACETAMINOPHEN 650 MG/1
650 SUPPOSITORY RECTAL EVERY 4 HOURS PRN
Status: DISCONTINUED | OUTPATIENT
Start: 2022-01-01 | End: 2022-01-02

## 2022-01-01 RX ORDER — DROPERIDOL 2.5 MG/ML
1.25 INJECTION, SOLUTION INTRAMUSCULAR; INTRAVENOUS ONCE
Status: COMPLETED | OUTPATIENT
Start: 2022-01-01 | End: 2022-01-01

## 2022-01-01 RX ORDER — SODIUM CHLORIDE 0.9 % (FLUSH) 0.9 %
10 SYRINGE (ML) INJECTION AS NEEDED
Status: DISCONTINUED | OUTPATIENT
Start: 2022-01-01 | End: 2022-01-05 | Stop reason: HOSPADM

## 2022-01-01 RX ORDER — MORPHINE SULFATE 2 MG/ML
1 INJECTION, SOLUTION INTRAMUSCULAR; INTRAVENOUS EVERY 4 HOURS PRN
Status: DISCONTINUED | OUTPATIENT
Start: 2022-01-01 | End: 2022-01-02

## 2022-01-01 RX ORDER — NITROGLYCERIN 0.4 MG/1
0.4 TABLET SUBLINGUAL
Status: DISCONTINUED | OUTPATIENT
Start: 2022-01-01 | End: 2022-01-05 | Stop reason: HOSPADM

## 2022-01-01 RX ORDER — LEVETIRACETAM 10 MG/ML
1000 INJECTION INTRAVASCULAR EVERY 12 HOURS SCHEDULED
Status: DISCONTINUED | OUTPATIENT
Start: 2022-01-01 | End: 2022-01-03

## 2022-01-01 RX ORDER — SODIUM CHLORIDE 0.9 % (FLUSH) 0.9 %
10 SYRINGE (ML) INJECTION EVERY 12 HOURS SCHEDULED
Status: DISCONTINUED | OUTPATIENT
Start: 2022-01-01 | End: 2022-01-05 | Stop reason: HOSPADM

## 2022-01-01 RX ORDER — SODIUM CHLORIDE 9 MG/ML
125 INJECTION, SOLUTION INTRAVENOUS CONTINUOUS
Status: DISCONTINUED | OUTPATIENT
Start: 2022-01-01 | End: 2022-01-02

## 2022-01-01 RX ORDER — ONDANSETRON 2 MG/ML
4 INJECTION INTRAMUSCULAR; INTRAVENOUS EVERY 6 HOURS PRN
Status: DISCONTINUED | OUTPATIENT
Start: 2022-01-01 | End: 2022-01-05 | Stop reason: HOSPADM

## 2022-01-01 RX ORDER — MORPHINE SULFATE 2 MG/ML
4 INJECTION, SOLUTION INTRAMUSCULAR; INTRAVENOUS ONCE
Status: COMPLETED | OUTPATIENT
Start: 2022-01-01 | End: 2022-01-01

## 2022-01-01 RX ORDER — ACETAMINOPHEN 160 MG/5ML
650 SOLUTION ORAL EVERY 4 HOURS PRN
Status: DISCONTINUED | OUTPATIENT
Start: 2022-01-01 | End: 2022-01-02

## 2022-01-01 RX ORDER — ACETAMINOPHEN 325 MG/1
650 TABLET ORAL EVERY 4 HOURS PRN
Status: DISCONTINUED | OUTPATIENT
Start: 2022-01-01 | End: 2022-01-05 | Stop reason: HOSPADM

## 2022-01-01 RX ORDER — NALOXONE HCL 0.4 MG/ML
0.4 VIAL (ML) INJECTION
Status: DISCONTINUED | OUTPATIENT
Start: 2022-01-01 | End: 2022-01-05 | Stop reason: HOSPADM

## 2022-01-01 RX ADMIN — SODIUM CHLORIDE 1000 ML: 9 INJECTION, SOLUTION INTRAVENOUS at 19:44

## 2022-01-01 RX ADMIN — MORPHINE SULFATE 4 MG: 2 INJECTION, SOLUTION INTRAMUSCULAR; INTRAVENOUS at 17:35

## 2022-01-01 RX ADMIN — DROPERIDOL 1.25 MG: 2.5 INJECTION, SOLUTION INTRAMUSCULAR; INTRAVENOUS at 17:32

## 2022-01-01 RX ADMIN — LEVETIRACETAM 1000 MG: 10 INJECTION INTRAVENOUS at 19:49

## 2022-01-01 NOTE — ED TRIAGE NOTES
"All triage performed with this RN wearing appropriate PPE.  Pt placed in mask upon arrival to ED.  Patient c/o having \"6 seizures\" last night. Her family reports her legs were shaking. She has a hx of seizures but hasn't had one in over 5 years. She fell off of the toilet today and hit her face and head. Per family they lasted 6-120 seconds.   "

## 2022-01-01 NOTE — ED PROVIDER NOTES
MD ATTESTATION NOTE    The RAY and I have discussed this patient's history, physical exam, and treatment plan.  I have reviewed the documentation and personally had a face to face interaction with the patient. I affirm the documentation and agree with the treatment and plan.  The attached note describes my personal findings.      Belen Allen is a 41 y.o. female who presents to the ED c/o having multiple episodes of syncope over the last 2 days.  She reports that she injured her left hip on 1 of these episodes.  She states she was on the ground for several hours because she could not get up because of this left hip pain.  She reports she has had a seizure in the past but did not go to the hospital for it.  She states that her boyfriend witnessed it at the time.  She is a very poor historian.  She reports she is on Eliquis.  Her primary concern is her left hip pain.      On exam:  GENERAL: Awake, alert, no acute distress, no outward signs of trauma  SKIN: Warm, dry  HENT: Normocephalic, atraumatic  EYES: no scleral icterus  CV: regular rhythm, regular rate  RESPIRATORY: normal effort, lungs clear  ABDOMEN: soft, nontender, nondistended  MUSCULOSKELETAL: no deformity.  Tenderness to the left lateral hip.  Distal pulses strong.  Lower extremity strength exam on the left is inconsistent.  NEURO: alert, moves all extremities, follows commands    Labs  Recent Results (from the past 24 hour(s))   ECG 12 Lead    Collection Time: 01/01/22  4:55 PM   Result Value Ref Range    QT Interval 376 ms   Comprehensive Metabolic Panel    Collection Time: 01/01/22  5:23 PM    Specimen: Blood   Result Value Ref Range    Glucose 107 (H) 65 - 99 mg/dL    BUN 4 (L) 6 - 20 mg/dL    Creatinine 0.75 0.57 - 1.00 mg/dL    Sodium 139 136 - 145 mmol/L    Potassium 3.7 3.5 - 5.2 mmol/L    Chloride 100 98 - 107 mmol/L    CO2 30.0 (H) 22.0 - 29.0 mmol/L    Calcium 9.7 8.6 - 10.5 mg/dL    Total Protein 6.3 6.0 - 8.5 g/dL    Albumin 3.80 3.50 -  5.20 g/dL    ALT (SGPT) 35 (H) 1 - 33 U/L    AST (SGOT) 57 (H) 1 - 32 U/L    Alkaline Phosphatase 126 (H) 39 - 117 U/L    Total Bilirubin 0.5 0.0 - 1.2 mg/dL    eGFR Non African Amer 86 >60 mL/min/1.73    Globulin 2.5 gm/dL    A/G Ratio 1.5 g/dL    BUN/Creatinine Ratio 5.3 (L) 7.0 - 25.0    Anion Gap 9.0 5.0 - 15.0 mmol/L   hCG, Serum, Qualitative    Collection Time: 01/01/22  5:23 PM    Specimen: Blood   Result Value Ref Range    HCG Qualitative Negative Negative   CBC Auto Differential    Collection Time: 01/01/22  5:23 PM    Specimen: Blood   Result Value Ref Range    WBC 6.87 3.40 - 10.80 10*3/mm3    RBC 4.01 3.77 - 5.28 10*6/mm3    Hemoglobin 12.7 12.0 - 15.9 g/dL    Hematocrit 37.4 34.0 - 46.6 %    MCV 93.3 79.0 - 97.0 fL    MCH 31.7 26.6 - 33.0 pg    MCHC 34.0 31.5 - 35.7 g/dL    RDW 12.6 12.3 - 15.4 %    RDW-SD 43.1 37.0 - 54.0 fl    MPV 10.3 6.0 - 12.0 fL    Platelets 242 140 - 450 10*3/mm3    Neutrophil % 62.8 42.7 - 76.0 %    Lymphocyte % 27.1 19.6 - 45.3 %    Monocyte % 7.4 5.0 - 12.0 %    Eosinophil % 2.0 0.3 - 6.2 %    Basophil % 0.4 0.0 - 1.5 %    Immature Grans % 0.3 0.0 - 0.5 %    Neutrophils, Absolute 4.31 1.70 - 7.00 10*3/mm3    Lymphocytes, Absolute 1.86 0.70 - 3.10 10*3/mm3    Monocytes, Absolute 0.51 0.10 - 0.90 10*3/mm3    Eosinophils, Absolute 0.14 0.00 - 0.40 10*3/mm3    Basophils, Absolute 0.03 0.00 - 0.20 10*3/mm3    Immature Grans, Absolute 0.02 0.00 - 0.05 10*3/mm3    nRBC 0.0 0.0 - 0.2 /100 WBC   Green Top (Gel)    Collection Time: 01/01/22  5:23 PM   Result Value Ref Range    Extra Tube Hold for add-ons.    Lavender Top    Collection Time: 01/01/22  5:23 PM   Result Value Ref Range    Extra Tube hold for add-on    Light Blue Top    Collection Time: 01/01/22  5:23 PM   Result Value Ref Range    Extra Tube hold for add-on    CK    Collection Time: 01/01/22  5:23 PM    Specimen: Blood   Result Value Ref Range    Creatine Kinase 889 (H) 20 - 180 U/L   Troponin    Collection Time: 01/01/22   5:23 PM    Specimen: Blood   Result Value Ref Range    Troponin T <0.010 0.000 - 0.030 ng/mL   Urinalysis With Microscopic If Indicated (No Culture) - Urine, Clean Catch    Collection Time: 01/01/22  5:26 PM    Specimen: Urine, Clean Catch   Result Value Ref Range    Color, UA Yellow Yellow, Straw    Appearance, UA Clear Clear    pH, UA 6.5 5.0 - 8.0    Specific Gravity, UA 1.010 1.005 - 1.030    Glucose, UA Negative Negative    Ketones, UA Negative Negative    Bilirubin, UA Negative Negative    Blood, UA Negative Negative    Protein, UA Negative Negative    Leuk Esterase, UA Negative Negative    Nitrite, UA Negative Negative    Urobilinogen, UA 0.2 E.U./dL 0.2 - 1.0 E.U./dL   Urine Drug Screen - Urine, Clean Catch    Collection Time: 01/01/22  5:26 PM    Specimen: Urine, Clean Catch   Result Value Ref Range    Amphet/Methamphet, Screen Negative Negative    Barbiturates Screen, Urine Negative Negative    Benzodiazepine Screen, Urine Negative Negative    Cocaine Screen, Urine Negative Negative    Opiate Screen Positive (A) Negative    THC, Screen, Urine Negative Negative    Methadone Screen, Urine Negative Negative    Oxycodone Screen, Urine Negative Negative       Radiology  CT Head Without Contrast    Result Date: 1/1/2022  Patient: CARSON MULLEN  Time Out: 18:47 Exam(s): CT HEAD Without Contrast EXAM:   CT Head Without Intravenous Contrast CLINICAL HISTORY:    Reason for exam: seizure, syncope. TECHNIQUE:   Axial computed tomography images of the head brain without intravenous contrast.  CTDI is 54.8 mGy and DLP is 1023.8 mGy-cm.  This CT exam was performed according to the principle of ALARA (As Low As Reasonably Achievable) by using one or more of the following dose reduction techniques: automated exposure control, adjustment of the mA and or kV according to patient size, and or use of iterative reconstruction technique. COMPARISON:   CT head dated 11 28 2020 FINDINGS:   Brain:  No acute infarct or  hemorrhage.   Ventricles:  Unremarkable.  No ventriculomegaly.   Bones joints:  Unremarkable.  No acute calvarial fracture.   Soft tissues:  Unremarkable.   Sinuses:  Mild mucosal thickening the paranasal sinuses.   Mastoid air cells:  Unremarkable as visualized. IMPRESSION:       No acute infarct or hemorrhage.     Electronically signed by Sarath Lilly MD on 01-01-22 at 1847    CT Lumbar Spine Without Contrast    Result Date: 1/1/2022  Patient: CARSON MULLEN  Time Out: 18:51 Exam(s): CT L SPINE EXAM:   CT Lumbar Spine Without Intravenous Contrast CLINICAL HISTORY:    Reason for exam: low back pain. TECHNIQUE:   Axial computed tomography images of the lumbar spine without intravenous contrast.  CTDI is 68.9 mGy and DLP is 2326 mGy-cm.  This CT exam was performed according to the principle of ALARA (As Low As Reasonably Achievable) by using one or more of the following dose reduction techniques: automated exposure control, adjustment of the mA and or kV according to patient size, and or use of iterative reconstruction technique. COMPARISON:   No relevant prior studies available. FINDINGS:   Vertebrae:  No acute fracture or traumatic malalignment.   Discs spinal canal neural foramina:  Postsurgical changes noted at L5- S1.  Minimal multilevel degenerative changes.   Soft tissues:  Unremarkable.  Other: Scarring and presumed cyst within the left kidney.  Nonobstructing calculus within left kidney.  Gallbladder is surgically absent. IMPRESSION:       No acute fracture or traumatic malalignment.     Electronically signed by Sarath Lilly MD on 01-01-22 at 1851    XR Hip With or Without Pelvis 2 - 3 View Left    Result Date: 1/1/2022  XR HIP W OR WO PELVIS 2-3 VIEW LEFT-  Clinical: Fell, pain  FINDINGS: The left hip joint is normal. No avascular necrosis, fracture, dislocation or bone lesion. The left hemipelvis is satisfactory in appearance. Soft tissues within normal limits.  CONCLUSION: No acute osseous or  articular abnormality nor avascular necrosis.  This report was finalized on 1/1/2022 6:31 PM by Dr. Matias Romero M.D.       Medical Decision Making:  ED Course as of 01/01/22 2139   Sat Jan 01, 2022   1659 EKG          EKG time: 1655  Rhythm/Rate: Normal sinus, rate 91  P waves and AK: Normal P, normal AK  QRS, axis: Narrow QRS, normal axis  ST and T waves: Normal ST and T waves    Interpreted Contemporaneously by me, independently viewed  Not significantly changed compared to prior 7-2-21   [TR]   1715 The patient's provided history has been inconsistent from provider to provider and provider to nurse. [TR]   1725 History and physical exam are inconsistent from my evaluation versus been Dr. Yee when and shortly after I did to evaluate this patient. Postvoid residual was noted to be 0 mL per nursing staff. It is unclear if the patient has incontinence due to her chronic back pain versus seizure induced versus psychological etiology. It is reassuring that post void residual is 0 mL and the patient is not retaining urine from a chronic back pain standpoint. We will proceed with laboratory work-up and imaging of her lumbar spine and hip due to complaint of pain. [JS]   1919 Creatine Kinase(!): 889 [JS]   1959 I discussed patient with Dr. Tristan who is agreeable to admit the patient. [JS]   2000 PVR is 0 but there was decerased rectal tone on rectal examination noted. CK elevated which may be due to reported possible seizure. I suspect her incontinence is secondary to seizure like activity and not cauda equina syndrome given the hx PE and context. I discussed this with admitting team. She was given prophylactic dose of Keppra given suspected seizures. Plan to admit for further evaluation [JS]      ED Course User Index  [JS] Beatriz Rodriguez APRN  [TR] Jose Yee MD       Plan syncope work-up including CT of the head given that she is on blood thinners, EKG, troponin, pregnancy test, chemistries.  Plan  toxicology screening.    PPE: The patient wore a mask and I wore an N95 mask throughout the entire patient encounter.      The patient qualifies to receive the vaccine, but they have not yet received it.    Diagnosis  Final diagnoses:   Seizures (HCC)   Urinary incontinence, unspecified type   Exacerbation of chronic back pain        Jose Yee MD  01/01/22 5561

## 2022-01-01 NOTE — ED PROVIDER NOTES
EMERGENCY DEPARTMENT ENCOUNTER    Room Number:  05/05  Date of encounter:  1/1/2022  PCP: Sahil Cornelius MD  Historian: patient   Full history not obtainable due to: seizure    HPI:  Chief Complaint: Left leg pain    Context: Belen Allen is a 41 y.o. female who presents to the ED c/o left leg pain onset 2 days ago. Radiates from left buttock into the lower leg and abets at the ankle. Denies back pain. Also reports multiple complaints and her story is very difficult to follow. She reports generalized malaise, incontenience and seizures. Her family states she had 6 seizures last night. She thinks she passed out. She denies hx of chronic back pain but it is listed in her chart and she takes flexeril and gabapentin for the pain. When questions about this she affirms she has had  Back surgery and regularly deals with back pain.    Anticoagulated on Eliquis due to blood clots. States she has a blood clotting disorder.       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   • Anxiety disorder 11/09/2020   • Functional asplenia 11/10/2020   • Generalized abdominal pain 11/29/2020   • Bilateral leg weakness 11/29/2020   • Acute bilateral low back pain 11/29/2020   • Antiphospholipid antibody positive 11/29/2020   • On anticoagulant therapy 11/29/2020   • Acute pain of left knee 11/29/2020   • Vitamin D deficiency 11/30/2020   • Folate deficiency 12/01/2020   • Pain of back and left lower extremity 12/02/2020   • Common bile duct dilatation 05/31/2021   • Right upper quadrant abdominal pain 06/01/2021   • Obesity (BMI 35.0-39.9 without comorbidity) 04/18/2019   • Tobacco abuse 06/02/2021   • GERD without esophagitis 06/02/2021   • Intractable abdominal pain 07/05/2021   • Obesity, Class III, BMI 40-49.9 (morbid obesity) (HCC) 07/05/2021   • Constipation 07/05/2021   • History of ERCP 07/05/2021   • Other chronic pain 07/05/2021     Resolved Ambulatory  Problems     Diagnosis Date Noted   • LFTs abnormal 2021   • Choledocholithiasis 2021     Past Medical History:   Diagnosis Date   • Abnormal Pap smear of cervix    • Ankle fracture    • H/O Elevated liver enzymes    • History of chronic back pain    • History of migraine    • History of urinary tract infection    • Injury of back    • PONV (postoperative nausea and vomiting)    • Seasonal allergies          PAST SURGICAL HISTORY  Past Surgical History:   Procedure Laterality Date   • BACK SURGERY      fusion L4, L5     • CHOLECYSTECTOMY     • DILATATION AND CURETTAGE     • ERCP N/A 6/3/2021    Procedure: ENDOSCOPIC RETROGRADE CHOLANGIOPANCREATOGRAPHY WITH SPHINCTEROTOMY, DILATION WITH BALLOON CLEARANCE  (12MM-15MM);  Surgeon: Bjorn Flannery MD;  Location: [facility] ENDOSCOPY;  Service: Gastroenterology;  Laterality: N/A;  DILATED COMMON BILE DUCT   • SKIN SURGERY     • TUBAL ABDOMINAL LIGATION     • WISDOM TOOTH EXTRACTION  2018    x2         FAMILY HISTORY  Family History   Problem Relation Age of Onset   • Stroke Mother    • Allergic rhinitis Mother    • Allergic rhinitis Sister    • Breast cancer Maternal Aunt    • Cancer Maternal Grandmother    • Allergic rhinitis Paternal Grandfather    • Cancer Paternal Grandfather    • Prostate cancer Paternal Grandfather          SOCIAL HISTORY  Social History     Socioeconomic History   • Marital status: Single   • Number of children: 2   Tobacco Use   • Smoking status: Current Some Day Smoker     Packs/day: 0.50     Last attempt to quit: 2020     Years since quittin.1   • Smokeless tobacco: Never Used   Vaping Use   • Vaping Use: Never used   Substance and Sexual Activity   • Alcohol use: Not Currently   • Drug use: Not Currently   • Sexual activity: Defer         ALLERGIES  Patient has no known allergies.        REVIEW OF SYSTEMS  Review of Systems   All systems reviewed and marked as negative except as listed in HPI        PHYSICAL EXAM    I have reviewed the triage vital signs and nursing notes.    ED Triage Vitals   Temp Pulse Resp BP SpO2   -- -- -- -- --      Temp src Heart Rate Source Patient Position BP Location FiO2 (%)   -- -- -- -- --       GENERAL: alert well developed, well nourished in moderate distress secondary to pain. Disheveled appearance.   HENT: NCAT, neck supple, trachea midline  EYES: no scleral icterus, PERRL, normal conjunctivae  CV: regular rhythm, regular rate, no murmur  RESPIRATORY: unlabored effort, CTAB  ABDOMEN: soft, nontender, nondistended, bowel sounds present  MUSCULOSKELETAL: no gross deformity. No foot drop. Poor effort given on examination of BLE. She is able to perform SLR bilaterally but begins crying and yelling on elevation of her LLE. There is tenderness of inferior aspect of lumbar spine.   NEURO: alert,  sensory and motor function of extremities grossly intact, speech clear, mental status normal/baseline  SKIN: warm, dry, no rash  PSYCH:  Appropriate mood and affect    Vital signs and nursing notes reviewed.          LAB RESULTS  Recent Results (from the past 24 hour(s))   ECG 12 Lead    Collection Time: 01/01/22  4:55 PM   Result Value Ref Range    QT Interval 376 ms   Comprehensive Metabolic Panel    Collection Time: 01/01/22  5:23 PM    Specimen: Blood   Result Value Ref Range    Glucose 107 (H) 65 - 99 mg/dL    BUN 4 (L) 6 - 20 mg/dL    Creatinine 0.75 0.57 - 1.00 mg/dL    Sodium 139 136 - 145 mmol/L    Potassium 3.7 3.5 - 5.2 mmol/L    Chloride 100 98 - 107 mmol/L    CO2 30.0 (H) 22.0 - 29.0 mmol/L    Calcium 9.7 8.6 - 10.5 mg/dL    Total Protein 6.3 6.0 - 8.5 g/dL    Albumin 3.80 3.50 - 5.20 g/dL    ALT (SGPT) 35 (H) 1 - 33 U/L    AST (SGOT) 57 (H) 1 - 32 U/L    Alkaline Phosphatase 126 (H) 39 - 117 U/L    Total Bilirubin 0.5 0.0 - 1.2 mg/dL    eGFR Non African Amer 86 >60 mL/min/1.73    Globulin 2.5 gm/dL    A/G Ratio 1.5 g/dL    BUN/Creatinine Ratio 5.3 (L) 7.0 - 25.0    Anion  Gap 9.0 5.0 - 15.0 mmol/L   hCG, Serum, Qualitative    Collection Time: 01/01/22  5:23 PM    Specimen: Blood   Result Value Ref Range    HCG Qualitative Negative Negative   CBC Auto Differential    Collection Time: 01/01/22  5:23 PM    Specimen: Blood   Result Value Ref Range    WBC 6.87 3.40 - 10.80 10*3/mm3    RBC 4.01 3.77 - 5.28 10*6/mm3    Hemoglobin 12.7 12.0 - 15.9 g/dL    Hematocrit 37.4 34.0 - 46.6 %    MCV 93.3 79.0 - 97.0 fL    MCH 31.7 26.6 - 33.0 pg    MCHC 34.0 31.5 - 35.7 g/dL    RDW 12.6 12.3 - 15.4 %    RDW-SD 43.1 37.0 - 54.0 fl    MPV 10.3 6.0 - 12.0 fL    Platelets 242 140 - 450 10*3/mm3    Neutrophil % 62.8 42.7 - 76.0 %    Lymphocyte % 27.1 19.6 - 45.3 %    Monocyte % 7.4 5.0 - 12.0 %    Eosinophil % 2.0 0.3 - 6.2 %    Basophil % 0.4 0.0 - 1.5 %    Immature Grans % 0.3 0.0 - 0.5 %    Neutrophils, Absolute 4.31 1.70 - 7.00 10*3/mm3    Lymphocytes, Absolute 1.86 0.70 - 3.10 10*3/mm3    Monocytes, Absolute 0.51 0.10 - 0.90 10*3/mm3    Eosinophils, Absolute 0.14 0.00 - 0.40 10*3/mm3    Basophils, Absolute 0.03 0.00 - 0.20 10*3/mm3    Immature Grans, Absolute 0.02 0.00 - 0.05 10*3/mm3    nRBC 0.0 0.0 - 0.2 /100 WBC   Green Top (Gel)    Collection Time: 01/01/22  5:23 PM   Result Value Ref Range    Extra Tube Hold for add-ons.    Lavender Top    Collection Time: 01/01/22  5:23 PM   Result Value Ref Range    Extra Tube hold for add-on    Light Blue Top    Collection Time: 01/01/22  5:23 PM   Result Value Ref Range    Extra Tube hold for add-on    CK    Collection Time: 01/01/22  5:23 PM    Specimen: Blood   Result Value Ref Range    Creatine Kinase 889 (H) 20 - 180 U/L   Troponin    Collection Time: 01/01/22  5:23 PM    Specimen: Blood   Result Value Ref Range    Troponin T <0.010 0.000 - 0.030 ng/mL   Urinalysis With Microscopic If Indicated (No Culture) - Urine, Clean Catch    Collection Time: 01/01/22  5:26 PM    Specimen: Urine, Clean Catch   Result Value Ref Range    Color, UA Yellow Yellow,  Straw    Appearance, UA Clear Clear    pH, UA 6.5 5.0 - 8.0    Specific Gravity, UA 1.010 1.005 - 1.030    Glucose, UA Negative Negative    Ketones, UA Negative Negative    Bilirubin, UA Negative Negative    Blood, UA Negative Negative    Protein, UA Negative Negative    Leuk Esterase, UA Negative Negative    Nitrite, UA Negative Negative    Urobilinogen, UA 0.2 E.U./dL 0.2 - 1.0 E.U./dL   Urine Drug Screen - Urine, Clean Catch    Collection Time: 01/01/22  5:26 PM    Specimen: Urine, Clean Catch   Result Value Ref Range    Amphet/Methamphet, Screen Negative Negative    Barbiturates Screen, Urine Negative Negative    Benzodiazepine Screen, Urine Negative Negative    Cocaine Screen, Urine Negative Negative    Opiate Screen Positive (A) Negative    THC, Screen, Urine Negative Negative    Methadone Screen, Urine Negative Negative    Oxycodone Screen, Urine Negative Negative       Ordered the above labs and independently reviewed the results.        RADIOLOGY  CT Head Without Contrast    Result Date: 1/1/2022  Patient: CARSON MULLEN  Time Out: 18:47 Exam(s): CT HEAD Without Contrast EXAM:   CT Head Without Intravenous Contrast CLINICAL HISTORY:    Reason for exam: seizure, syncope. TECHNIQUE:   Axial computed tomography images of the head brain without intravenous contrast.  CTDI is 54.8 mGy and DLP is 1023.8 mGy-cm.  This CT exam was performed according to the principle of ALARA (As Low As Reasonably Achievable) by using one or more of the following dose reduction techniques: automated exposure control, adjustment of the mA and or kV according to patient size, and or use of iterative reconstruction technique. COMPARISON:   CT head dated 11 28 2020 FINDINGS:   Brain:  No acute infarct or hemorrhage.   Ventricles:  Unremarkable.  No ventriculomegaly.   Bones joints:  Unremarkable.  No acute calvarial fracture.   Soft tissues:  Unremarkable.   Sinuses:  Mild mucosal thickening the paranasal sinuses.   Mastoid air  cells:  Unremarkable as visualized. IMPRESSION:       No acute infarct or hemorrhage.     Electronically signed by Sarath Lilly MD on 01-01-22 at 1847    CT Lumbar Spine Without Contrast    Result Date: 1/1/2022  Patient: CARSON MULLEN  Time Out: 18:51 Exam(s): CT L SPINE EXAM:   CT Lumbar Spine Without Intravenous Contrast CLINICAL HISTORY:    Reason for exam: low back pain. TECHNIQUE:   Axial computed tomography images of the lumbar spine without intravenous contrast.  CTDI is 68.9 mGy and DLP is 2326 mGy-cm.  This CT exam was performed according to the principle of ALARA (As Low As Reasonably Achievable) by using one or more of the following dose reduction techniques: automated exposure control, adjustment of the mA and or kV according to patient size, and or use of iterative reconstruction technique. COMPARISON:   No relevant prior studies available. FINDINGS:   Vertebrae:  No acute fracture or traumatic malalignment.   Discs spinal canal neural foramina:  Postsurgical changes noted at L5- S1.  Minimal multilevel degenerative changes.   Soft tissues:  Unremarkable.  Other: Scarring and presumed cyst within the left kidney.  Nonobstructing calculus within left kidney.  Gallbladder is surgically absent. IMPRESSION:       No acute fracture or traumatic malalignment.     Electronically signed by Sarath Lilly MD on 01-01-22 at 1851    XR Hip With or Without Pelvis 2 - 3 View Left    Result Date: 1/1/2022  XR HIP W OR WO PELVIS 2-3 VIEW LEFT-  Clinical: Fell, pain  FINDINGS: The left hip joint is normal. No avascular necrosis, fracture, dislocation or bone lesion. The left hemipelvis is satisfactory in appearance. Soft tissues within normal limits.  CONCLUSION: No acute osseous or articular abnormality nor avascular necrosis.  This report was finalized on 1/1/2022 6:31 PM by Dr. Matias Romero M.D.        I ordered the above noted radiological studies. Independently reviewed by me and discussed with  radiologist.  See dictation above for official radiology interpretation.      PROCEDURES    Procedures        MEDICATIONS GIVEN IN ER    Medications   sodium chloride 0.9 % bolus 1,000 mL (1,000 mL Intravenous New Bag 1/1/22 1944)   levETIRAcetam in NaCl 0.75% (KEPPRA) IVPB 1,000 mg (1,000 mg Intravenous New Bag 1/1/22 1949)   morphine injection 4 mg (4 mg Intravenous Given 1/1/22 1735)   droperidol (INAPSINE) injection 1.25 mg (1.25 mg Intravenous Given 1/1/22 1732)         PROGRESS, DATA ANALYSIS, CONSULTS, AND MEDICAL DECISION MAKING    All labs have been independently reviewed by me.  All radiology studies have been reviewed by me.   EKG's independently reviewed by me.  Discussion below represents my analysis of pertinent findings related to patient's condition, differential diagnosis, treatment plan and final disposition.    DIFFERENTIAL DIAGNOSIS INCLUDE BUT NOT LIMITED TO: Vasovagal episode, orthostasis, arrhythmia, seizure, pseudo seizure, dehydration, brittany, electrolyte disturbance, CVA, TIA , pharmacy, malingering, polysubstance abuse      ED Course as of 01/01/22 2002   Sat Jan 01, 2022   1659 EKG          EKG time: 1655  Rhythm/Rate: Normal sinus, rate 91  P waves and WY: Normal P, normal WY  QRS, axis: Narrow QRS, normal axis  ST and T waves: Normal ST and T waves    Interpreted Contemporaneously by me, independently viewed  Not significantly changed compared to prior 7-2-21   [TR]   1715 The patient's provided history has been inconsistent from provider to provider and provider to nurse. [TR]   1725 History and physical exam are inconsistent from my evaluation versus been Dr. Yee when and shortly after I did to evaluate this patient. Postvoid residual was noted to be 0 mL per nursing staff. It is unclear if the patient has incontinence due to her chronic back pain versus seizure induced versus psychological etiology. It is reassuring that post void residual is 0 mL and the patient is not retaining  urine from a chronic back pain standpoint. We will proceed with laboratory work-up and imaging of her lumbar spine and hip due to complaint of pain. [JS]   1919 Creatine Kinase(!): 889 [JS]   1959 I discussed patient with Dr. Tristan who is agreeable to admit the patient. [JS]   2000 PVR is 0 but there was decerased rectal tone on rectal examination noted. CK elevated which may be due to reported possible seizure. I suspect her incontinence is secondary to seizure like activity and not cauda equina syndrome given the hx PE and context. I discussed this with admitting team. She was given prophylactic dose of Keppra given suspected seizures. Plan to admit for further evaluation [JS]      ED Course User Index  [JS] Beatriz Rodriguez APRN  [TR] Jose Yee MD       AS OF 20:02 EST VITALS:        BP - 111/68  HR - 88  TEMP - 98.2 °F (36.8 °C) (Oral)  O2 SATS - 94%        DIAGNOSIS  Final diagnoses:   Seizures (HCC)   Urinary incontinence, unspecified type   Exacerbation of chronic back pain         DISPOSITION  Admission    Pt masked in first look. I wore appropriate PPE throughout my encounters with the pt. I performed hand hygiene on entry into the pt room and upon exit.     Dictated utilizing Dragon dictation:  Much of this encounter note is an electronic transcription/translation of spoken language to printed text. The electronic translation of spoken language may permit erroneous, or at times, nonsensical words or phrases to be inadvertently transcribed; Although I have reviewed the note for such errors, some may still exist.     Beatriz Rodriguez APRN  01/01/22 2003       Beatriz Rodriguez APRN  01/01/22 2003

## 2022-01-02 PROBLEM — G93.40 ENCEPHALOPATHY: Status: RESOLVED | Noted: 2022-01-01 | Resolved: 2022-01-02

## 2022-01-02 PROBLEM — M62.82 RHABDOMYOLYSIS: Status: RESOLVED | Noted: 2022-01-01 | Resolved: 2022-01-02

## 2022-01-02 LAB
ALBUMIN SERPL-MCNC: 3.9 G/DL (ref 3.5–5.2)
ALBUMIN/GLOB SERPL: 2.2 G/DL
ALP SERPL-CCNC: 104 U/L (ref 39–117)
ALT SERPL W P-5'-P-CCNC: 30 U/L (ref 1–33)
ANION GAP SERPL CALCULATED.3IONS-SCNC: 5.4 MMOL/L (ref 5–15)
AST SERPL-CCNC: 44 U/L (ref 1–32)
BASOPHILS # BLD AUTO: 0.03 10*3/MM3 (ref 0–0.2)
BASOPHILS NFR BLD AUTO: 0.4 % (ref 0–1.5)
BILIRUB SERPL-MCNC: 0.3 MG/DL (ref 0–1.2)
BUN SERPL-MCNC: 5 MG/DL (ref 6–20)
BUN/CREAT SERPL: 6.4 (ref 7–25)
CALCIUM SPEC-SCNC: 8.9 MG/DL (ref 8.6–10.5)
CHLORIDE SERPL-SCNC: 107 MMOL/L (ref 98–107)
CK SERPL-CCNC: 392 U/L (ref 20–180)
CO2 SERPL-SCNC: 30.6 MMOL/L (ref 22–29)
CREAT SERPL-MCNC: 0.78 MG/DL (ref 0.57–1)
D-LACTATE SERPL-SCNC: 0.7 MMOL/L (ref 0.5–2)
DEPRECATED RDW RBC AUTO: 42.7 FL (ref 37–54)
EOSINOPHIL # BLD AUTO: 0.16 10*3/MM3 (ref 0–0.4)
EOSINOPHIL NFR BLD AUTO: 2.3 % (ref 0.3–6.2)
ERYTHROCYTE [DISTWIDTH] IN BLOOD BY AUTOMATED COUNT: 12.7 % (ref 12.3–15.4)
ETHANOL BLD-MCNC: <10 MG/DL (ref 0–10)
ETHANOL UR QL: <0.01 %
GFR SERPL CREATININE-BSD FRML MDRD: 82 ML/MIN/1.73
GLOBULIN UR ELPH-MCNC: 1.8 GM/DL
GLUCOSE SERPL-MCNC: 106 MG/DL (ref 65–99)
HAV IGM SERPL QL IA: NORMAL
HBV CORE IGM SERPL QL IA: NORMAL
HBV SURFACE AG SERPL QL IA: NORMAL
HCT VFR BLD AUTO: 35.1 % (ref 34–46.6)
HCV AB SER DONR QL: NORMAL
HGB BLD-MCNC: 12 G/DL (ref 12–15.9)
IMM GRANULOCYTES # BLD AUTO: 0.03 10*3/MM3 (ref 0–0.05)
IMM GRANULOCYTES NFR BLD AUTO: 0.4 % (ref 0–0.5)
INR PPP: 1.03 (ref 0.9–1.1)
LYMPHOCYTES # BLD AUTO: 1.91 10*3/MM3 (ref 0.7–3.1)
LYMPHOCYTES NFR BLD AUTO: 27.7 % (ref 19.6–45.3)
MCH RBC QN AUTO: 31.7 PG (ref 26.6–33)
MCHC RBC AUTO-ENTMCNC: 34.2 G/DL (ref 31.5–35.7)
MCV RBC AUTO: 92.6 FL (ref 79–97)
MONOCYTES # BLD AUTO: 0.6 10*3/MM3 (ref 0.1–0.9)
MONOCYTES NFR BLD AUTO: 8.7 % (ref 5–12)
NEUTROPHILS NFR BLD AUTO: 4.16 10*3/MM3 (ref 1.7–7)
NEUTROPHILS NFR BLD AUTO: 60.5 % (ref 42.7–76)
NRBC BLD AUTO-RTO: 0 /100 WBC (ref 0–0.2)
PLATELET # BLD AUTO: 247 10*3/MM3 (ref 140–450)
PMV BLD AUTO: 10 FL (ref 6–12)
POTASSIUM SERPL-SCNC: 3.9 MMOL/L (ref 3.5–5.2)
PROT SERPL-MCNC: 5.7 G/DL (ref 6–8.5)
PROTHROMBIN TIME: 13.3 SECONDS (ref 11.7–14.2)
QT INTERVAL: 376 MS
RBC # BLD AUTO: 3.79 10*6/MM3 (ref 3.77–5.28)
SARS-COV-2 ORF1AB RESP QL NAA+PROBE: NOT DETECTED
SODIUM SERPL-SCNC: 143 MMOL/L (ref 136–145)
TSH SERPL DL<=0.05 MIU/L-ACNC: 0.73 UIU/ML (ref 0.27–4.2)
WBC NRBC COR # BLD: 6.89 10*3/MM3 (ref 3.4–10.8)

## 2022-01-02 PROCEDURE — 85610 PROTHROMBIN TIME: CPT | Performed by: NURSE PRACTITIONER

## 2022-01-02 PROCEDURE — 80053 COMPREHEN METABOLIC PANEL: CPT | Performed by: NURSE PRACTITIONER

## 2022-01-02 PROCEDURE — 36415 COLL VENOUS BLD VENIPUNCTURE: CPT | Performed by: NURSE PRACTITIONER

## 2022-01-02 PROCEDURE — G0378 HOSPITAL OBSERVATION PER HR: HCPCS

## 2022-01-02 PROCEDURE — 84443 ASSAY THYROID STIM HORMONE: CPT | Performed by: NURSE PRACTITIONER

## 2022-01-02 PROCEDURE — 85025 COMPLETE CBC W/AUTO DIFF WBC: CPT | Performed by: NURSE PRACTITIONER

## 2022-01-02 PROCEDURE — 83605 ASSAY OF LACTIC ACID: CPT | Performed by: NURSE PRACTITIONER

## 2022-01-02 PROCEDURE — 25010000002 MORPHINE PER 10 MG: Performed by: NURSE PRACTITIONER

## 2022-01-02 PROCEDURE — 0 LEVETIRACETAM IN NACL 0.75% 1000 MG/100ML SOLUTION: Performed by: NURSE PRACTITIONER

## 2022-01-02 PROCEDURE — 80074 ACUTE HEPATITIS PANEL: CPT | Performed by: NURSE PRACTITIONER

## 2022-01-02 PROCEDURE — 82550 ASSAY OF CK (CPK): CPT | Performed by: NURSE PRACTITIONER

## 2022-01-02 RX ORDER — LORAZEPAM 0.5 MG/1
.25-.5 TABLET ORAL
COMMUNITY
Start: 2021-12-17 | End: 2023-01-18 | Stop reason: HOSPADM

## 2022-01-02 RX ORDER — OMEPRAZOLE 20 MG/1
20 CAPSULE, DELAYED RELEASE ORAL DAILY
COMMUNITY
Start: 2021-11-29 | End: 2022-11-30 | Stop reason: HOSPADM

## 2022-01-02 RX ORDER — TRAZODONE HYDROCHLORIDE 100 MG/1
100 TABLET ORAL NIGHTLY PRN
Status: DISCONTINUED | OUTPATIENT
Start: 2022-01-02 | End: 2022-01-05 | Stop reason: HOSPADM

## 2022-01-02 RX ORDER — FAMOTIDINE 20 MG/1
20 TABLET, FILM COATED ORAL
Status: DISCONTINUED | OUTPATIENT
Start: 2022-01-02 | End: 2022-01-05 | Stop reason: HOSPADM

## 2022-01-02 RX ORDER — PANTOPRAZOLE SODIUM 40 MG/1
40 TABLET, DELAYED RELEASE ORAL EVERY MORNING
Status: DISCONTINUED | OUTPATIENT
Start: 2022-01-02 | End: 2022-01-05 | Stop reason: HOSPADM

## 2022-01-02 RX ORDER — FOLIC ACID 1 MG/1
1 TABLET ORAL DAILY
Status: DISCONTINUED | OUTPATIENT
Start: 2022-01-02 | End: 2022-01-05 | Stop reason: HOSPADM

## 2022-01-02 RX ORDER — CETIRIZINE HYDROCHLORIDE 10 MG/1
10 TABLET ORAL DAILY
Status: DISCONTINUED | OUTPATIENT
Start: 2022-01-02 | End: 2022-01-05 | Stop reason: HOSPADM

## 2022-01-02 RX ORDER — ONDANSETRON 8 MG/1
8 TABLET, ORALLY DISINTEGRATING ORAL
COMMUNITY
Start: 2021-11-29 | End: 2022-11-30 | Stop reason: HOSPADM

## 2022-01-02 RX ORDER — GABAPENTIN 400 MG/1
800 CAPSULE ORAL 4 TIMES DAILY
Status: DISCONTINUED | OUTPATIENT
Start: 2022-01-02 | End: 2022-01-05 | Stop reason: HOSPADM

## 2022-01-02 RX ORDER — CHOLECALCIFEROL (VITAMIN D3) 125 MCG
10 CAPSULE ORAL NIGHTLY
Status: DISCONTINUED | OUTPATIENT
Start: 2022-01-02 | End: 2022-01-05 | Stop reason: HOSPADM

## 2022-01-02 RX ORDER — POLYETHYLENE GLYCOL 3350 17 G/17G
17 POWDER, FOR SOLUTION ORAL DAILY
Status: DISCONTINUED | OUTPATIENT
Start: 2022-01-02 | End: 2022-01-05 | Stop reason: HOSPADM

## 2022-01-02 RX ORDER — FLUOXETINE HYDROCHLORIDE 20 MG/1
40 CAPSULE ORAL NIGHTLY
Status: DISCONTINUED | OUTPATIENT
Start: 2022-01-02 | End: 2022-01-05 | Stop reason: HOSPADM

## 2022-01-02 RX ORDER — AMITRIPTYLINE HYDROCHLORIDE 25 MG/1
25 TABLET, FILM COATED ORAL NIGHTLY
Status: DISCONTINUED | OUTPATIENT
Start: 2022-01-02 | End: 2022-01-05 | Stop reason: HOSPADM

## 2022-01-02 RX ORDER — HYDROCODONE BITARTRATE AND ACETAMINOPHEN 7.5; 325 MG/1; MG/1
1 TABLET ORAL EVERY 6 HOURS PRN
Status: DISCONTINUED | OUTPATIENT
Start: 2022-01-02 | End: 2022-01-05

## 2022-01-02 RX ORDER — LORAZEPAM 0.5 MG/1
0.5 TABLET ORAL EVERY 12 HOURS PRN
Status: DISCONTINUED | OUTPATIENT
Start: 2022-01-02 | End: 2022-01-05 | Stop reason: HOSPADM

## 2022-01-02 RX ORDER — LORATADINE 10 MG/1
10 TABLET ORAL DAILY
COMMUNITY
Start: 2021-12-21

## 2022-01-02 RX ORDER — HYDROXYZINE HYDROCHLORIDE 25 MG/1
25 TABLET, FILM COATED ORAL 3 TIMES DAILY PRN
Status: DISCONTINUED | OUTPATIENT
Start: 2022-01-02 | End: 2022-01-05 | Stop reason: HOSPADM

## 2022-01-02 RX ADMIN — LEVETIRACETAM 1000 MG: 10 INJECTION INTRAVENOUS at 06:32

## 2022-01-02 RX ADMIN — PANTOPRAZOLE SODIUM 40 MG: 40 TABLET, DELAYED RELEASE ORAL at 06:32

## 2022-01-02 RX ADMIN — HYDROCODONE BITARTRATE AND ACETAMINOPHEN 1 TABLET: 7.5; 325 TABLET ORAL at 05:10

## 2022-01-02 RX ADMIN — FLUOXETINE HYDROCHLORIDE 40 MG: 20 CAPSULE ORAL at 05:10

## 2022-01-02 RX ADMIN — SODIUM CHLORIDE, PRESERVATIVE FREE 10 ML: 5 INJECTION INTRAVENOUS at 08:39

## 2022-01-02 RX ADMIN — FLUOXETINE HYDROCHLORIDE 40 MG: 20 CAPSULE ORAL at 20:21

## 2022-01-02 RX ADMIN — MORPHINE SULFATE 1 MG: 2 INJECTION, SOLUTION INTRAMUSCULAR; INTRAVENOUS at 06:28

## 2022-01-02 RX ADMIN — TRAZODONE HYDROCHLORIDE 100 MG: 100 TABLET ORAL at 22:23

## 2022-01-02 RX ADMIN — HYDROCODONE BITARTRATE AND ACETAMINOPHEN 1 TABLET: 7.5; 325 TABLET ORAL at 17:45

## 2022-01-02 RX ADMIN — SODIUM CHLORIDE 125 ML/HR: 9 INJECTION, SOLUTION INTRAVENOUS at 01:23

## 2022-01-02 RX ADMIN — GABAPENTIN 800 MG: 400 CAPSULE ORAL at 08:38

## 2022-01-02 RX ADMIN — FAMOTIDINE 20 MG: 20 TABLET, FILM COATED ORAL at 18:05

## 2022-01-02 RX ADMIN — SODIUM CHLORIDE 125 ML/HR: 9 INJECTION, SOLUTION INTRAVENOUS at 08:39

## 2022-01-02 RX ADMIN — Medication 10 MG: at 20:21

## 2022-01-02 RX ADMIN — GABAPENTIN 800 MG: 400 CAPSULE ORAL at 18:05

## 2022-01-02 RX ADMIN — CETIRIZINE HYDROCHLORIDE 10 MG: 10 TABLET ORAL at 08:44

## 2022-01-02 RX ADMIN — GABAPENTIN 800 MG: 400 CAPSULE ORAL at 20:21

## 2022-01-02 RX ADMIN — FOLIC ACID 1 MG: 1 TABLET ORAL at 08:38

## 2022-01-02 RX ADMIN — APIXABAN 5 MG: 5 TABLET, FILM COATED ORAL at 20:21

## 2022-01-02 RX ADMIN — MORPHINE SULFATE 1 MG: 2 INJECTION, SOLUTION INTRAMUSCULAR; INTRAVENOUS at 01:23

## 2022-01-02 RX ADMIN — SODIUM CHLORIDE, PRESERVATIVE FREE 10 ML: 5 INJECTION INTRAVENOUS at 20:22

## 2022-01-02 RX ADMIN — HYDROCODONE BITARTRATE AND ACETAMINOPHEN 1 TABLET: 7.5; 325 TABLET ORAL at 23:01

## 2022-01-02 RX ADMIN — AMITRIPTYLINE HYDROCHLORIDE 25 MG: 25 TABLET, FILM COATED ORAL at 20:21

## 2022-01-02 RX ADMIN — HYDROCODONE BITARTRATE AND ACETAMINOPHEN 1 TABLET: 7.5; 325 TABLET ORAL at 11:51

## 2022-01-02 RX ADMIN — FAMOTIDINE 20 MG: 20 TABLET, FILM COATED ORAL at 06:32

## 2022-01-02 RX ADMIN — SODIUM CHLORIDE, PRESERVATIVE FREE 10 ML: 5 INJECTION INTRAVENOUS at 01:28

## 2022-01-02 RX ADMIN — LEVETIRACETAM 1000 MG: 10 INJECTION INTRAVENOUS at 20:23

## 2022-01-02 RX ADMIN — LORAZEPAM 0.5 MG: 0.5 TABLET ORAL at 21:19

## 2022-01-02 RX ADMIN — APIXABAN 5 MG: 5 TABLET, FILM COATED ORAL at 08:39

## 2022-01-02 RX ADMIN — AMITRIPTYLINE HYDROCHLORIDE 25 MG: 25 TABLET, FILM COATED ORAL at 05:09

## 2022-01-02 RX ADMIN — GABAPENTIN 800 MG: 400 CAPSULE ORAL at 11:51

## 2022-01-02 NOTE — H&P
"    Patient Name:  Belen Allen  YOB: 1980  MRN:  [id number]  Admit Date:  1/1/2022  Patient Care Team:  Sahil Cornelius MD as PCP - General (Internal Medicine)  Code, Bjorn HERRERA II, MD as Consulting Physician (Hematology and Oncology)  Dennys Carranza MD as Referring Physician (Hospitalist)      Subjective   History Present Illness     Chief Complaint   Patient presents with   • Seizures     History of Present Illness   Mrs. 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  Patient is a very poor historian, continues to fall asleep during examination.  She states that she had \"seizure activity\" today and she passed out 2 times, is unclear if the 2 times of \"passing out or at the same time as the \"seizure activity\".  She states that she was told that she had jerking of her legs, her eyes rolled back in her head and she was incontinent of urine.  She did fall and continues to complain of left hip pain from fall, the x-ray is negative, she seems to have normal range of motion just pain with movement.  She does have a history of chronic pain and is on narcotics at home, she denies any illegal drug use or alcohol use.  Patient states she has had a few other \"these episodes\" in the last few weeks but has not seek medical treatment for them.  She has been seen in the emergency room several times with complaints of pain.  She does have chronic back pain, denies any radiating leg pain, no new back pain, she has been able to get up and void in the bedside commode and according to ED notes postvoid residual was 0.  Patient states she was on the ground for several hours this afternoon after her episode because she was unable to get up due to the pain in her hip.  In the emergency room urine drug screen was positive for opiates which she does have a " "prescription for Norco, blood alcohol level pending.  CK level 889, this could represents a possible seizure activity or prolonged lying on the floor due to hip pain.  Blood glucose 107, sodium 139, potassium 3.7, creatinine 0.75, BUN 4, alkaline phosphatase 126, ALT 35, AST 57, white blood cell count 6.7, hemoglobin 12.7, hematocrit 37.4, platelets 242.  Patient does states she is on a \"blood thinner\" for splenic infarct that was diagnosed about a year ago according to the patient.  Urinalysis is negative, COVID-19 pending, patient states she has been vaccinated.  X-ray of left hip shows no acute osseous or articular abnormality nor avascular necrosis.  CT of her head shows no acute infarct or hemorrhage.  CT lumbar spine shows no acute fracture or traumatic malalignment.  EKG shows sinus rhythm with a rate 91.    Review of Systems   Constitutional: Negative for appetite change and fever.   HENT: Negative for nosebleeds and trouble swallowing.    Eyes: Negative for photophobia, redness and visual disturbance.   Respiratory: Negative for cough, chest tightness, shortness of breath and wheezing.    Cardiovascular: Negative for chest pain, palpitations and leg swelling.   Gastrointestinal: Negative for abdominal distention, abdominal pain, nausea and vomiting.   Endocrine: Negative.    Genitourinary: Negative.    Musculoskeletal: Negative for gait problem and joint swelling.   Skin: Negative.    Neurological: Positive for seizures (shaking legs, incontinenece, eyes rolled back in her head reported by her boyfriend) and syncope. Negative for dizziness, speech difficulty, light-headedness and headaches.   Hematological: Negative.    Psychiatric/Behavioral: Positive for confusion. Negative for behavioral problems.        Personal History     Past Medical History:   Diagnosis Date   • Abnormal Pap smear of cervix    • Ankle fracture 2013   • H/O Elevated liver enzymes    • History of chronic back pain    • History of " migraine    • History of urinary tract infection    • Injury of back    • PONV (postoperative nausea and vomiting)    • Seasonal allergies      Past Surgical History:   Procedure Laterality Date   • BACK SURGERY      fusion L4, L5     • CHOLECYSTECTOMY     • DILATATION AND CURETTAGE     • ERCP N/A 6/3/2021    Procedure: ENDOSCOPIC RETROGRADE CHOLANGIOPANCREATOGRAPHY WITH SPHINCTEROTOMY, DILATION WITH BALLOON CLEARANCE  (12MM-15MM);  Surgeon: Bjorn Flannery MD;  Location: [facility] ENDOSCOPY;  Service: Gastroenterology;  Laterality: N/A;  DILATED COMMON BILE DUCT   • SKIN SURGERY     • TUBAL ABDOMINAL LIGATION     • WISDOM TOOTH EXTRACTION  2018    x2     Family History   Problem Relation Age of Onset   • Stroke Mother    • Allergic rhinitis Mother    • Allergic rhinitis Sister    • Breast cancer Maternal Aunt    • Cancer Maternal Grandmother    • Allergic rhinitis Paternal Grandfather    • Cancer Paternal Grandfather    • Prostate cancer Paternal Grandfather      Social History     Tobacco Use   • Smoking status: Current Some Day Smoker     Packs/day: 0.50     Last attempt to quit: 2020     Years since quittin.1   • Smokeless tobacco: Never Used   Vaping Use   • Vaping Use: Never used   Substance Use Topics   • Alcohol use: Not Currently   • Drug use: Not Currently     No current facility-administered medications on file prior to encounter.     Current Outpatient Medications on File Prior to Encounter   Medication Sig Dispense Refill   • amitriptyline (ELAVIL) 25 MG tablet Take 25 mg by mouth Every Night.     • apixaban (ELIQUIS) 5 MG tablet tablet Take 5 mg by mouth 2 (Two) Times a Day. Last filled 21 - pt states she was instructed to stop taking medication prior to ERCP ~1 month ago and was not instructed to restart medication.   Indication: Splenic infarct     • cyclobenzaprine (FLEXERIL) 10 MG tablet Take 1 tablet by mouth 2 (Two) Times a Day As Needed for Muscle Spasms. 20  tablet 0   • doxycycline (MONODOX) 100 MG capsule Take 1 capsule by mouth 2 (Two) Times a Day. 20 capsule 0   • famotidine (PEPCID) 20 MG tablet Take 1 tablet by mouth 2 (Two) Times a Day Before Meals.     • FLUoxetine (PROzac) 40 MG capsule Take 40 mg by mouth Every Night.     • folic acid (FOLVITE) 1 MG tablet Take 1 tablet by mouth Daily. 30 tablet 0   • gabapentin (NEURONTIN) 800 MG tablet Take 800 mg by mouth 4 (Four) Times a Day.     • HYDROcodone-acetaminophen (NORCO) 7.5-325 MG per tablet Take 1 tablet by mouth Every 6 (Six) Hours As Needed for Moderate Pain . 12 tablet 0   • hydrOXYzine (ATARAX) 25 MG tablet Take 1-2 tablets by mouth 3 (Three) Times a Day As Needed for Anxiety.     • melatonin 5 MG tablet tablet Take 10 mg by mouth Every Night.     • methylPREDNISolone (MEDROL) 4 MG dose pack Take as directed on package instructions. 1 each 0   • polyethylene glycol (polyethylene glycol) 17 g packet Take 17 g by mouth Daily.     • SUMAtriptan (IMITREX) 100 MG tablet Take 50 mg by mouth Every 2 (Two) Hours As Needed for Migraine. Take one tablet at onset of headache. May repeat dose one time in 2 hours if headache not relieved.     • traZODone (DESYREL) 50 MG tablet Take 25-50 mg by mouth At Night As Needed for Sleep.     • vitamin D (ERGOCALCIFEROL) 1.25 MG (30593 UT) capsule capsule Take 50,000 Units by mouth 1 (One) Time Per Week.       No Known Allergies    Objective    Objective     Vital Signs  Temp:  [98.2 °F (36.8 °C)] 98.2 °F (36.8 °C)  Heart Rate:  [84-89] 87  Resp:  [16] 16  BP: (111-133)/(67-94) 121/94  SpO2:  [94 %-100 %] 99 %  on   ;      There is no height or weight on file to calculate BMI.    Physical Exam  Vitals and nursing note reviewed.   Constitutional:       General: She is not in acute distress.     Appearance: She is well-developed.   HENT:      Head: Normocephalic.   Neck:      Vascular: No JVD.   Cardiovascular:      Rate and Rhythm: Normal rate and regular rhythm.      Heart  sounds: Normal heart sounds.      Comments: Normal sinus rhythm on monitor with heart rate 76 during my exam.  No peripheral edema  Pulmonary:      Effort: Pulmonary effort is normal.      Breath sounds: Normal breath sounds.      Comments: Lung sounds clear, sats 97% on 2 L of oxygen  Abdominal:      General: Bowel sounds are normal. There is no distension.      Palpations: Abdomen is soft.      Tenderness: There is no abdominal tenderness.   Musculoskeletal:         General: Normal range of motion.      Cervical back: Normal range of motion.      Right lower leg: No edema.      Left lower leg: No edema.   Skin:     General: Skin is warm and dry.      Capillary Refill: Capillary refill takes less than 2 seconds.   Neurological:      Mental Status: She is lethargic.      Comments: Patient sleepy, easily aroused by voice but falls back to sleep during conversation, will answer some questions.  She could tell me her name, date of birth, tell me the year was 2019, unable to give very good history.  She does follow commands and does not appear to have any focal motor neuro deficits.   Psychiatric:         Behavior: Behavior is slowed.         Cognition and Memory: Cognition is impaired. Memory is impaired.         Results Review:  I reviewed the patient's new clinical results.  I reviewed the patient's new imaging results and agree with the interpretation.  I reviewed the patient's other test results and agree with the interpretation  I personally viewed and interpreted the patient's EKG/Telemetry data  Discussed with ED provider.    Lab Results (last 24 hours)     Procedure Component Value Units Date/Time    CBC & Differential [[id number]]  (Normal) Collected: 01/01/22 1723    Specimen: Blood Updated: 01/01/22 1823    Narrative:      The following orders were created for panel order CBC & Differential.  Procedure                               Abnormality         Status                     ---------                                -----------         ------                     CBC Auto Differential[[id number]]        Normal              Final result                 Please view results for these tests on the individual orders.    Comprehensive Metabolic Panel [[id number]]  (Abnormal) Collected: 01/01/22 1723    Specimen: Blood Updated: 01/01/22 1848     Glucose 107 mg/dL      BUN 4 mg/dL      Creatinine 0.75 mg/dL      Sodium 139 mmol/L      Potassium 3.7 mmol/L      Chloride 100 mmol/L      CO2 30.0 mmol/L      Calcium 9.7 mg/dL      Total Protein 6.3 g/dL      Albumin 3.80 g/dL      ALT (SGPT) 35 U/L      AST (SGOT) 57 U/L      Alkaline Phosphatase 126 U/L      Total Bilirubin 0.5 mg/dL      eGFR Non African Amer 86 mL/min/1.73      Globulin 2.5 gm/dL      A/G Ratio 1.5 g/dL      BUN/Creatinine Ratio 5.3     Anion Gap 9.0 mmol/L     Narrative:      GFR Normal >60  Chronic Kidney Disease <60  Kidney Failure <15      hCG, Serum, Qualitative [[id number]]  (Normal) Collected: 01/01/22 1723    Specimen: Blood Updated: 01/01/22 1830     HCG Qualitative Negative    CBC Auto Differential [[id number]]  (Normal) Collected: 01/01/22 1723    Specimen: Blood Updated: 01/01/22 1823     WBC 6.87 10*3/mm3      RBC 4.01 10*6/mm3      Hemoglobin 12.7 g/dL      Hematocrit 37.4 %      MCV 93.3 fL      MCH 31.7 pg      MCHC 34.0 g/dL      RDW 12.6 %      RDW-SD 43.1 fl      MPV 10.3 fL      Platelets 242 10*3/mm3      Neutrophil % 62.8 %      Lymphocyte % 27.1 %      Monocyte % 7.4 %      Eosinophil % 2.0 %      Basophil % 0.4 %      Immature Grans % 0.3 %      Neutrophils, Absolute 4.31 10*3/mm3      Lymphocytes, Absolute 1.86 10*3/mm3      Monocytes, Absolute 0.51 10*3/mm3      Eosinophils, Absolute 0.14 10*3/mm3      Basophils, Absolute 0.03 10*3/mm3      Immature Grans, Absolute 0.02 10*3/mm3      nRBC 0.0 /100 WBC     CK [[id number]]  (Abnormal) Collected: 01/01/22 1723    Specimen: Blood Updated: 01/01/22 1848     Creatine Kinase 889 U/L     Troponin  [[id number]]  (Normal) Collected: 01/01/22 1723    Specimen: Blood Updated: 01/01/22 1840     Troponin T <0.010 ng/mL     Narrative:      Troponin T Reference Range:  <= 0.03 ng/mL-   Negative for AMI  >0.03 ng/mL-     Abnormal for myocardial necrosis.  Clinicians would have to utilize clinical acumen, EKG, Troponin and serial changes to determine if it is an Acute Myocardial Infarction or myocardial injury due to an underlying chronic condition.       Results may be falsely decreased if patient taking Biotin.      Urinalysis With Microscopic If Indicated (No Culture) - Urine, Clean Catch [[id number]]  (Normal) Collected: 01/01/22 1726    Specimen: Urine, Clean Catch Updated: 01/01/22 1821     Color, UA Yellow     Appearance, UA Clear     pH, UA 6.5     Specific Gravity, UA 1.010     Glucose, UA Negative     Ketones, UA Negative     Bilirubin, UA Negative     Blood, UA Negative     Protein, UA Negative     Leuk Esterase, UA Negative     Nitrite, UA Negative     Urobilinogen, UA 0.2 E.U./dL    Narrative:      Urine microscopic not indicated.    Urine Drug Screen - Urine, Clean Catch [[id number]]  (Abnormal) Collected: 01/01/22 1726    Specimen: Urine, Clean Catch Updated: 01/01/22 1839     Amphet/Methamphet, Screen Negative     Barbiturates Screen, Urine Negative     Benzodiazepine Screen, Urine Negative     Cocaine Screen, Urine Negative     Opiate Screen Positive     THC, Screen, Urine Negative     Methadone Screen, Urine Negative     Oxycodone Screen, Urine Negative    Narrative:      Negative Thresholds Per Drugs Screened:    Amphetamines                 500 ng/ml  Barbiturates                 200 ng/ml  Benzodiazepines              100 ng/ml  Cocaine                      300 ng/ml  Methadone                    300 ng/ml  Opiates                      300 ng/ml  Oxycodone                    100 ng/ml  THC                           50 ng/ml    The Normal Value for all drugs tested is negative. This report includes  final unconfirmed screening results to be used for medical treatment purposes only. Unconfirmed results must not be used for non-medical purposes such as employment or legal testing. Clinical consideration should be applied to any drug of abuse test, particularly when unconfirmed results are used.            COVID PRE-OP / PRE-PROCEDURE SCREENING ORDER (NO ISOLATION) - Swab, Nasopharynx [[id number]] Collected: 01/01/22 2114    Specimen: Swab from Nasopharynx Updated: 01/01/22 2120    Narrative:      The following orders were created for panel order COVID PRE-OP / PRE-PROCEDURE SCREENING ORDER (NO ISOLATION) - Swab, Nasopharynx.  Procedure                               Abnormality         Status                     ---------                               -----------         ------                     COVID-19,APTIMA PANTHER(...[[id number]]                      In process                   Please view results for these tests on the individual orders.    COVID-19,APTIMA PANTHER(TILA),[facility], NP/OP SWAB IN UTM/VTM/SALINE TRANSPORT MEDIA,24 HR TAT - Swab, Nasopharynx [[id number]] Collected: 01/01/22 2114    Specimen: Swab from Nasopharynx Updated: 01/01/22 2120          Imaging Results (Last 24 Hours)     Procedure Component Value Units Date/Time    CT Lumbar Spine Without Contrast [[id number]] Collected: 01/01/22 1852     Updated: 01/01/22 1852    Narrative:        Patient: CARSON MULLEN  Time Out: 18:51  Exam(s): CT L SPINE     EXAM:    CT Lumbar Spine Without Intravenous Contrast    CLINICAL HISTORY:     Reason for exam: low back pain.    TECHNIQUE:    Axial computed tomography images of the lumbar spine without   intravenous contrast.  CTDI is 68.9 mGy and DLP is 2326 mGy-cm.  This CT   exam was performed according to the principle of ALARA (As Low As   Reasonably Achievable) by using one or more of the following dose   reduction techniques: automated exposure control, adjustment of the mA   and or kV according  to patient size, and or use of iterative   reconstruction technique.    COMPARISON:    No relevant prior studies available.    FINDINGS:    Vertebrae:  No acute fracture or traumatic malalignment.    Discs spinal canal neural foramina:  Postsurgical changes noted at L5-  S1.  Minimal multilevel degenerative changes.    Soft tissues:  Unremarkable.   Other: Scarring and presumed cyst within the left kidney.    Nonobstructing calculus within left kidney.  Gallbladder is surgically   absent.    IMPRESSION:         No acute fracture or traumatic malalignment.      Impression:          Electronically signed by Sarath Lilly MD on 01-01-22 at 1851    CT Head Without Contrast [[id number]] Collected: 01/01/22 1848     Updated: 01/01/22 1848    Narrative:        Patient: CARSON MULLEN  Time Out: 18:47  Exam(s): CT HEAD Without Contrast     EXAM:    CT Head Without Intravenous Contrast    CLINICAL HISTORY:     Reason for exam: seizure, syncope.    TECHNIQUE:    Axial computed tomography images of the head brain without intravenous   contrast.  CTDI is 54.8 mGy and DLP is 1023.8 mGy-cm.  This CT exam was   performed according to the principle of ALARA (As Low As Reasonably   Achievable) by using one or more of the following dose reduction   techniques: automated exposure control, adjustment of the mA and or kV   according to patient size, and or use of iterative reconstruction   technique.    COMPARISON:    CT head dated 11 28 2020    FINDINGS:    Brain:  No acute infarct or hemorrhage.    Ventricles:  Unremarkable.  No ventriculomegaly.    Bones joints:  Unremarkable.  No acute calvarial fracture.    Soft tissues:  Unremarkable.    Sinuses:  Mild mucosal thickening the paranasal sinuses.    Mastoid air cells:  Unremarkable as visualized.    IMPRESSION:         No acute infarct or hemorrhage.      Impression:          Electronically signed by Sarath Lilly MD on 01-01-22 at 1847    XR Hip With or Without Pelvis 2 -  3 View Left [[id number]] Collected: 01/01/22 1829     Updated: 01/01/22 1834    Narrative:      XR HIP W OR WO PELVIS 2-3 VIEW LEFT-     Clinical: Fell, pain     FINDINGS: The left hip joint is normal. No avascular necrosis, fracture,  dislocation or bone lesion. The left hemipelvis is satisfactory in  appearance. Soft tissues within normal limits.     CONCLUSION: No acute osseous or articular abnormality nor avascular  necrosis.     This report was finalized on 1/1/2022 6:31 PM by Dr. Matias Romero M.D.             Results for orders placed during the hospital encounter of 11/08/20    Adult Transesophageal Echo ([facility]) W/ Cont if Necessary Per Protocol    Interpretation Summary  · Left ventricular ejection fraction appears to be 61 - 65%. Left ventricular systolic function is normal.  · Saline test results are negative.      ECG 12 Lead   Preliminary Result   HEART RATE= 91  bpm   RR Interval= 656  ms   GA Interval= 144  ms   P Horizontal Axis= 16  deg   P Front Axis= 70  deg   QRSD Interval= 90  ms   QT Interval= 376  ms   QRS Axis= 33  deg   T Wave Axis= 33  deg   - BORDERLINE ECG -   Sinus rhythm   Low voltage, precordial leads   Borderline T abnormalities, anterior leads   Electronically Signed By:    Date and Time of Study: 2022-01-01 16:55:32           Assessment/Plan     Active Hospital Problems    Diagnosis  POA   • **Syncope [R55]  Yes   • Seizures (HCC) [R56.9]  Yes   • Rhabdomyolysis [M62.82]  Yes   • Encephalopathy [G93.40]  Unknown   • Other chronic pain [G89.29]  Yes   • GERD without esophagitis [K21.9]  Yes   • Tobacco abuse [Z72.0]  Yes   • On anticoagulant therapy [Z79.01]  Not Applicable   • Anxiety disorder [F41.9]  Yes   • Obesity (BMI 35.0-39.9 without comorbidity) [E66.9]  Yes     Mrs. 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      Syncope/seizure/rhabdomyolysis/encephalopathy  -Telemetry unit for monitoring  -Neurochecks every 4 hours  -Consult neurology  -Seizure precautions  -Started on Keppra in the emergency room, will defer to neurology for further antiepileptic medications  -Normal saline at 125 cc an hour overnight, recheck CK in a.m.  -We will likely need EEG, will defer to neurology in a.m.  -Orthostatic blood pressures  -Check alcohol level    History of splenic infarct/chronic anticoagulation  -Continue Eliquis    Anxiety disorder/GERD  -Chronic conditions, continue home medications when med rec is verified    I discussed the patient's findings and my recommendations with patient and ED provider.    VTE Prophylaxis - SCDs.  Code Status - Full code.       NELSON Elliott  [facility]  01/02/22  00:37 EST

## 2022-01-03 PROCEDURE — 0 LEVETIRACETAM IN NACL 0.75% 1000 MG/100ML SOLUTION: Performed by: NURSE PRACTITIONER

## 2022-01-03 PROCEDURE — 25010000002 KETOROLAC TROMETHAMINE PER 15 MG: Performed by: HOSPITALIST

## 2022-01-03 PROCEDURE — 99222 1ST HOSP IP/OBS MODERATE 55: CPT | Performed by: STUDENT IN AN ORGANIZED HEALTH CARE EDUCATION/TRAINING PROGRAM

## 2022-01-03 PROCEDURE — G0378 HOSPITAL OBSERVATION PER HR: HCPCS

## 2022-01-03 PROCEDURE — 97165 OT EVAL LOW COMPLEX 30 MIN: CPT | Performed by: OCCUPATIONAL THERAPIST

## 2022-01-03 RX ORDER — MORPHINE SULFATE 2 MG/ML
2 INJECTION, SOLUTION INTRAMUSCULAR; INTRAVENOUS EVERY 4 HOURS PRN
Status: DISCONTINUED | OUTPATIENT
Start: 2022-01-03 | End: 2022-01-05

## 2022-01-03 RX ORDER — KETOROLAC TROMETHAMINE 15 MG/ML
15 INJECTION, SOLUTION INTRAMUSCULAR; INTRAVENOUS ONCE
Status: COMPLETED | OUTPATIENT
Start: 2022-01-03 | End: 2022-01-03

## 2022-01-03 RX ORDER — DIVALPROEX SODIUM 500 MG/1
500 TABLET, DELAYED RELEASE ORAL EVERY 12 HOURS SCHEDULED
Status: DISCONTINUED | OUTPATIENT
Start: 2022-01-03 | End: 2022-01-05 | Stop reason: HOSPADM

## 2022-01-03 RX ADMIN — HYDROCODONE BITARTRATE AND ACETAMINOPHEN 1 TABLET: 7.5; 325 TABLET ORAL at 11:25

## 2022-01-03 RX ADMIN — APIXABAN 5 MG: 5 TABLET, FILM COATED ORAL at 08:40

## 2022-01-03 RX ADMIN — GABAPENTIN 800 MG: 400 CAPSULE ORAL at 07:00

## 2022-01-03 RX ADMIN — CETIRIZINE HYDROCHLORIDE 10 MG: 10 TABLET ORAL at 08:40

## 2022-01-03 RX ADMIN — SODIUM CHLORIDE, PRESERVATIVE FREE 10 ML: 5 INJECTION INTRAVENOUS at 08:40

## 2022-01-03 RX ADMIN — PANTOPRAZOLE SODIUM 40 MG: 40 TABLET, DELAYED RELEASE ORAL at 06:59

## 2022-01-03 RX ADMIN — FAMOTIDINE 20 MG: 20 TABLET, FILM COATED ORAL at 06:59

## 2022-01-03 RX ADMIN — LEVETIRACETAM 1000 MG: 10 INJECTION INTRAVENOUS at 08:39

## 2022-01-03 RX ADMIN — FOLIC ACID 1 MG: 1 TABLET ORAL at 08:40

## 2022-01-03 RX ADMIN — GABAPENTIN 800 MG: 400 CAPSULE ORAL at 20:33

## 2022-01-03 RX ADMIN — TRAZODONE HYDROCHLORIDE 100 MG: 100 TABLET ORAL at 21:52

## 2022-01-03 RX ADMIN — APIXABAN 5 MG: 5 TABLET, FILM COATED ORAL at 20:34

## 2022-01-03 RX ADMIN — FLUOXETINE HYDROCHLORIDE 40 MG: 20 CAPSULE ORAL at 21:52

## 2022-01-03 RX ADMIN — AMITRIPTYLINE HYDROCHLORIDE 25 MG: 25 TABLET, FILM COATED ORAL at 20:34

## 2022-01-03 RX ADMIN — HYDROCODONE BITARTRATE AND ACETAMINOPHEN 1 TABLET: 7.5; 325 TABLET ORAL at 18:36

## 2022-01-03 RX ADMIN — Medication 10 MG: at 20:33

## 2022-01-03 RX ADMIN — FAMOTIDINE 20 MG: 20 TABLET, FILM COATED ORAL at 18:36

## 2022-01-03 RX ADMIN — GABAPENTIN 800 MG: 400 CAPSULE ORAL at 18:36

## 2022-01-03 RX ADMIN — DIVALPROEX SODIUM 500 MG: 500 TABLET, DELAYED RELEASE ORAL at 20:33

## 2022-01-03 RX ADMIN — HYDROCODONE BITARTRATE AND ACETAMINOPHEN 1 TABLET: 7.5; 325 TABLET ORAL at 05:01

## 2022-01-03 RX ADMIN — LORAZEPAM 0.5 MG: 0.5 TABLET ORAL at 20:34

## 2022-01-03 RX ADMIN — KETOROLAC TROMETHAMINE 15 MG: 15 INJECTION, SOLUTION INTRAMUSCULAR; INTRAVENOUS at 11:22

## 2022-01-03 RX ADMIN — GABAPENTIN 800 MG: 400 CAPSULE ORAL at 11:25

## 2022-01-03 RX ADMIN — LORAZEPAM 0.5 MG: 0.5 TABLET ORAL at 08:37

## 2022-01-03 NOTE — PLAN OF CARE
Goal Outcome Evaluation:  Plan of Care Reviewed With: patient           Outcome Summary: Pt seen today for OT eval following falls at home with multiple seizures and now with L hip pain. Pt this date able to walk to bathroom with supervision and perform ADLs standing at sink with supervision. No LOB noted. Pt returns to bed and lays back down, began yelling in pain. Pt reports she lives at home with s/o and children. Pt appears to be back at baseline with no deficits and no OT needs at this time. OT will d/c.

## 2022-01-03 NOTE — CONSULTS
Neurology Consult Note    Consult Date: 1/3/2022    Referring MD: Ton Tristan MD    Reason for Consult I have been asked to see the patient in neurological consultation to render advice and opinion regarding seizure-like activity    History from the patient and her boyfriend/fiancé over the phone.  Belen Allen is a 41 y.o. right-handed white female with past medical history of anxiety, depression, morbid obesity, migraine headache, seizures with no prior work-up.  Presented to the hospital after a seizure like activity resulted in a fall witnessed by her fiancé.  Patient states that her first seizure was at age 21, described as generalized tonic-clonic seizure.  Patient also states that since her 20s she would have jerking episodes and dropping things from the hands.  Her fancy says that in the last 6 months she had about 4 seizures described as generalized tonic-clonic with the last one on this admission that lasted for few minutes and followed by confusion, patient lost consciousness with eyes open and rolled back and the head tilted to the left side.  Patient did bite her tongue from the left side and passed urine on herself, no fecal incontinence.  Patient denied alcohol drinking.  Patient current every day smoker.  Denied drug illicit.  Urine drug screen positive for opiate while patient was getting morphine for back pain on this admission.    Past Medical History:   Diagnosis Date   • Abnormal Pap smear of cervix    • Ankle fracture 2013   • H/O Elevated liver enzymes    • History of chronic back pain    • History of migraine    • History of urinary tract infection    • Injury of back    • PONV (postoperative nausea and vomiting)    • Seasonal allergies        ROS:  No fevers, chills  No weakness, numbness  Nausea or nausea or vomiting  No chest pain, palpitation    All other systems reviewed and they are negative    Exam  /95 (BP Location: Right arm, Patient Position: Lying)   Pulse 75    "Temp 98.5 °F (36.9 °C) (Oral)   Resp 16   Ht 162.6 cm (64\")   Wt 83.9 kg (185 lb)   SpO2 94%   BMI 31.76 kg/m²   Gen: NAD, vitals reviewed  MS: oriented x3, recent/remote memory intact, normal attention/concentration, language intact, no neglect.  CN: visual acuity grossly normal, PERRL, EOMI, no facial droop, no dysarthria  Motor: 5/5 throughout upper and lower extremities, normal tone  Sensory exam: Normal to light touch throughout  Coordination: Normal finger-nose-finger bilateral  Reflexes: 2+ throughout with downgoing plantars  Gait: Not assessed    DATA:    Lab Results   Component Value Date    GLUCOSE 106 (H) 01/02/2022    CALCIUM 8.9 01/02/2022     01/02/2022    K 3.9 01/02/2022    CO2 30.6 (H) 01/02/2022     01/02/2022    BUN 5 (L) 01/02/2022    CREATININE 0.78 01/02/2022    EGFRIFNONA 82 01/02/2022    BCR 6.4 (L) 01/02/2022    ANIONGAP 5.4 01/02/2022     Lab Results   Component Value Date    WBC 6.89 01/02/2022    HGB 12.0 01/02/2022    HCT 35.1 01/02/2022    MCV 92.6 01/02/2022     01/02/2022       Lab review: Glucose 106, sodium 140, BUN 5, creatinine 0.78, WBC 6.89    Imaging review: CT head negative for acute abnormality.  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  We will start valproic acid 500 mg twice daily.  Will avoid Keppra due to patient anxiety and depression and mood issues.  Valproic acid will also help her migraine headache  2.  MRI brain with and without contrast  3.  Over 1 hour EEG  4.  If EEG negative patient would benefit from prolonged EEG monitoring as an outpatient.  5.  Follow-up with neurology as an outpatient.  6.  Seizure precautions discussed with the patient no driving for 90 days from the last seizure, no operating heavy machines, avoid swimming unsupervised, avoid bathtub and prefer to take a shower instead.  Avoid any activity " that would put patient or other people at danger in case she would have a seizure.  Patient states understanding.    Plan discussed with patient and nursing staff.    Time spent on this encounter 60 minutes.

## 2022-01-03 NOTE — NURSING NOTE
Pt woke up tearful, complaints of back pain from fall, feels her pain is not taken seriously and would like to go home. Paged on call physician. Pain meds given as ordered. Will await response.

## 2022-01-03 NOTE — THERAPY DISCHARGE NOTE
Acute Care - Occupational Therapy Discharge  [facility]    Patient Name: Belen Allen  : 1980    MRN: [id number]                              Today's Date: 1/3/2022       Admit Date: 2022    Visit Dx:     ICD-10-CM ICD-9-CM   1. Seizures (HCC)  R56.9 780.39   2. Urinary incontinence, unspecified type  R32 788.30   3. Exacerbation of chronic back pain  M54.9 724.5    G89.29 338.29     338.19     Patient Active Problem List   Diagnosis   • Splenic infarct   • Anxiety disorder   • Functional asplenia   • Generalized abdominal pain   • Bilateral leg weakness   • Acute bilateral low back pain   • Antiphospholipid antibody positive   • On anticoagulant therapy   • Acute pain of left knee   • Vitamin D deficiency   • Folate deficiency   • Pain of back and left lower extremity   • Common bile duct dilatation   • Right upper quadrant abdominal pain   • Obesity (BMI 30-39.9)   • Tobacco abuse   • GERD without esophagitis   • Intractable abdominal pain   • Obesity, Class III, BMI 40-49.9 (morbid obesity) (HCC)   • Constipation   • History of ERCP   • Other chronic pain   • Seizures (HCC)   • Syncope     Past Medical History:   Diagnosis Date   • Abnormal Pap smear of cervix    • Ankle fracture    • H/O Elevated liver enzymes    • History of chronic back pain    • History of migraine    • History of urinary tract infection    • Injury of back    • PONV (postoperative nausea and vomiting)    • Seasonal allergies      Past Surgical History:   Procedure Laterality Date   • BACK SURGERY      fusion L4, L5     • CHOLECYSTECTOMY     • DILATATION AND CURETTAGE     • ERCP N/A 6/3/2021    Procedure: ENDOSCOPIC RETROGRADE CHOLANGIOPANCREATOGRAPHY WITH SPHINCTEROTOMY, DILATION WITH BALLOON CLEARANCE  (12MM-15MM);  Surgeon: Bjorn Flannery MD;  Location: [facility] ENDOSCOPY;  Service: Gastroenterology;  Laterality: N/A;  DILATED COMMON BILE DUCT   • SKIN SURGERY     • TUBAL ABDOMINAL LIGATION      • WISDOM TOOTH EXTRACTION  2018    x2      General Information    No documentation.                Mobility/ADL's    No documentation.                Obj/Interventions    No documentation.                Goals/Plan    No documentation.                Clinical Impression    No documentation.                Outcome Measures     Row Name 01/03/22 1500          How much help from another is currently needed...    Putting on and taking off regular lower body clothing? 4  -SG     Bathing (including washing, rinsing, and drying) 4  -SG     Toileting (which includes using toilet bed pan or urinal) 4  -SG     Putting on and taking off regular upper body clothing 4  -SG     Taking care of personal grooming (such as brushing teeth) 4  -SG     Eating meals 4  -SG     AM-PAC 6 Clicks Score (OT) 24  -SG     Row Name 01/03/22 1500          Functional Assessment    Outcome Measure Options AM-PAC 6 Clicks Daily Activity (OT)  -SG           User Key  (r) = Recorded By, (t) = Taken By, (c) = Cosigned By    Initials Name Provider Type    Marly Orr OTR Occupational Therapist                OT Recommendation and Plan  Retired Outcome Summary/Treatment Plan (OT)  Anticipated Discharge Disposition (OT): home  Therapy Frequency (OT): evaluation only  Plan of Care Review  Plan of Care Reviewed With: patient  Outcome Summary: Pt seen today for OT eval following falls at home with multiple seizures and now with L hip pain. Pt this date able to walk to bathroom with supervision and perform ADLs standing at sink with supervision. No LOB noted. Pt returns to bed and lays back down, began yelling in pain. Pt reports she lives at home with s/o and children. Pt appears to be back at baseline with no deficits and no OT needs at this time. OT will d/c.  Plan of Care Reviewed With: patient  Outcome Summary: Pt seen today for OT eval following falls at home with multiple seizures and now with L hip pain. Pt this date able to walk to  bathroom with supervision and perform ADLs standing at sink with supervision. No LOB noted. Pt returns to bed and lays back down, began yelling in pain. Pt reports she lives at home with s/o and children. Pt appears to be back at baseline with no deficits and no OT needs at this time. OT will d/c.     Time Calculation:    Time Calculation- OT     Row Name 01/03/22 1558             Time Calculation- OT    OT Start Time 1105  -SG      OT Stop Time 1120  -SG      OT Time Calculation (min) 15 min  -SG      OT Received On 01/03/22  -SG              Untimed Charges    OT Eval/Re-eval Minutes 15  -SG              Total Minutes    Untimed Charges Total Minutes 15  -SG       Total Minutes 15  -SG            User Key  (r) = Recorded By, (t) = Taken By, (c) = Cosigned By    Initials Name Provider Type    Marly Orr OTR Occupational Therapist              Therapy Charges for Today     Code Description Service Date Service Provider Modifiers Qty    [id number] HC OT EVAL LOW COMPLEXITY 2 1/3/2022 Marly Levine OTR GO 1               NICOLE Salvador  1/3/2022

## 2022-01-04 ENCOUNTER — APPOINTMENT (OUTPATIENT)
Dept: NEUROLOGY | Facility: HOSPITAL | Age: 42
End: 2022-01-04

## 2022-01-04 ENCOUNTER — APPOINTMENT (OUTPATIENT)
Dept: MRI IMAGING | Facility: HOSPITAL | Age: 42
End: 2022-01-04

## 2022-01-04 LAB
BASOPHILS # BLD AUTO: 0.04 10*3/MM3 (ref 0–0.2)
BASOPHILS NFR BLD AUTO: 0.5 % (ref 0–1.5)
DEPRECATED RDW RBC AUTO: 44.7 FL (ref 37–54)
EOSINOPHIL # BLD AUTO: 0.09 10*3/MM3 (ref 0–0.4)
EOSINOPHIL NFR BLD AUTO: 1.2 % (ref 0.3–6.2)
ERYTHROCYTE [DISTWIDTH] IN BLOOD BY AUTOMATED COUNT: 13.1 % (ref 12.3–15.4)
HCT VFR BLD AUTO: 40.5 % (ref 34–46.6)
HGB BLD-MCNC: 13.4 G/DL (ref 12–15.9)
IMM GRANULOCYTES # BLD AUTO: 0.09 10*3/MM3 (ref 0–0.05)
IMM GRANULOCYTES NFR BLD AUTO: 1.2 % (ref 0–0.5)
LYMPHOCYTES # BLD AUTO: 1 10*3/MM3 (ref 0.7–3.1)
LYMPHOCYTES NFR BLD AUTO: 13.3 % (ref 19.6–45.3)
MAGNESIUM SERPL-MCNC: 2 MG/DL (ref 1.6–2.6)
MCH RBC QN AUTO: 31.2 PG (ref 26.6–33)
MCHC RBC AUTO-ENTMCNC: 33.1 G/DL (ref 31.5–35.7)
MCV RBC AUTO: 94.4 FL (ref 79–97)
MONOCYTES # BLD AUTO: 0.23 10*3/MM3 (ref 0.1–0.9)
MONOCYTES NFR BLD AUTO: 3.1 % (ref 5–12)
NEUTROPHILS NFR BLD AUTO: 6.06 10*3/MM3 (ref 1.7–7)
NEUTROPHILS NFR BLD AUTO: 80.7 % (ref 42.7–76)
NRBC BLD AUTO-RTO: 0 /100 WBC (ref 0–0.2)
PLATELET # BLD AUTO: 300 10*3/MM3 (ref 140–450)
PMV BLD AUTO: 10.9 FL (ref 6–12)
PROCALCITONIN SERPL-MCNC: 0.04 NG/ML (ref 0–0.25)
RBC # BLD AUTO: 4.29 10*6/MM3 (ref 3.77–5.28)
WBC NRBC COR # BLD: 7.51 10*3/MM3 (ref 3.4–10.8)

## 2022-01-04 PROCEDURE — 72148 MRI LUMBAR SPINE W/O DYE: CPT

## 2022-01-04 PROCEDURE — 84145 PROCALCITONIN (PCT): CPT | Performed by: HOSPITALIST

## 2022-01-04 PROCEDURE — 99233 SBSQ HOSP IP/OBS HIGH 50: CPT | Performed by: NURSE PRACTITIONER

## 2022-01-04 PROCEDURE — 25010000002 DEXAMETHASONE PER 1 MG: Performed by: HOSPITALIST

## 2022-01-04 PROCEDURE — 83735 ASSAY OF MAGNESIUM: CPT | Performed by: NURSE PRACTITIONER

## 2022-01-04 PROCEDURE — 85025 COMPLETE CBC W/AUTO DIFF WBC: CPT | Performed by: HOSPITALIST

## 2022-01-04 PROCEDURE — 70551 MRI BRAIN STEM W/O DYE: CPT

## 2022-01-04 PROCEDURE — 80053 COMPREHEN METABOLIC PANEL: CPT | Performed by: HOSPITALIST

## 2022-01-04 PROCEDURE — 25010000002 MORPHINE PER 10 MG: Performed by: HOSPITALIST

## 2022-01-04 PROCEDURE — 95813 EEG EXTND MNTR 61-119 MIN: CPT | Performed by: PSYCHIATRY & NEUROLOGY

## 2022-01-04 PROCEDURE — 97161 PT EVAL LOW COMPLEX 20 MIN: CPT

## 2022-01-04 PROCEDURE — 95813 EEG EXTND MNTR 61-119 MIN: CPT

## 2022-01-04 RX ORDER — DEXAMETHASONE SODIUM PHOSPHATE 10 MG/ML
8 INJECTION INTRAMUSCULAR; INTRAVENOUS ONCE
Status: COMPLETED | OUTPATIENT
Start: 2022-01-04 | End: 2022-01-04

## 2022-01-04 RX ORDER — METHOCARBAMOL 750 MG/1
750 TABLET, FILM COATED ORAL EVERY 8 HOURS PRN
Status: DISCONTINUED | OUTPATIENT
Start: 2022-01-04 | End: 2022-01-05 | Stop reason: HOSPADM

## 2022-01-04 RX ADMIN — SODIUM CHLORIDE, PRESERVATIVE FREE 10 ML: 5 INJECTION INTRAVENOUS at 20:45

## 2022-01-04 RX ADMIN — MORPHINE SULFATE 2 MG: 2 INJECTION, SOLUTION INTRAMUSCULAR; INTRAVENOUS at 22:41

## 2022-01-04 RX ADMIN — LORAZEPAM 0.5 MG: 0.5 TABLET ORAL at 20:45

## 2022-01-04 RX ADMIN — PANTOPRAZOLE SODIUM 40 MG: 40 TABLET, DELAYED RELEASE ORAL at 06:22

## 2022-01-04 RX ADMIN — AMITRIPTYLINE HYDROCHLORIDE 25 MG: 25 TABLET, FILM COATED ORAL at 20:43

## 2022-01-04 RX ADMIN — DIVALPROEX SODIUM 500 MG: 500 TABLET, DELAYED RELEASE ORAL at 20:43

## 2022-01-04 RX ADMIN — GABAPENTIN 800 MG: 400 CAPSULE ORAL at 20:43

## 2022-01-04 RX ADMIN — LORAZEPAM 0.5 MG: 0.5 TABLET ORAL at 07:24

## 2022-01-04 RX ADMIN — MORPHINE SULFATE 2 MG: 2 INJECTION, SOLUTION INTRAMUSCULAR; INTRAVENOUS at 08:15

## 2022-01-04 RX ADMIN — HYDROCODONE BITARTRATE AND ACETAMINOPHEN 1 TABLET: 7.5; 325 TABLET ORAL at 00:18

## 2022-01-04 RX ADMIN — MORPHINE SULFATE 2 MG: 2 INJECTION, SOLUTION INTRAMUSCULAR; INTRAVENOUS at 13:30

## 2022-01-04 RX ADMIN — DIVALPROEX SODIUM 500 MG: 500 TABLET, DELAYED RELEASE ORAL at 11:27

## 2022-01-04 RX ADMIN — DEXAMETHASONE SODIUM PHOSPHATE 8 MG: 10 INJECTION INTRAMUSCULAR; INTRAVENOUS at 20:07

## 2022-01-04 RX ADMIN — SODIUM CHLORIDE, PRESERVATIVE FREE 10 ML: 5 INJECTION INTRAVENOUS at 11:29

## 2022-01-04 RX ADMIN — Medication 10 MG: at 20:42

## 2022-01-04 RX ADMIN — HYDROCODONE BITARTRATE AND ACETAMINOPHEN 1 TABLET: 7.5; 325 TABLET ORAL at 20:42

## 2022-01-04 RX ADMIN — CETIRIZINE HYDROCHLORIDE 10 MG: 10 TABLET ORAL at 11:28

## 2022-01-04 RX ADMIN — APIXABAN 5 MG: 5 TABLET, FILM COATED ORAL at 20:43

## 2022-01-04 RX ADMIN — FLUOXETINE HYDROCHLORIDE 40 MG: 20 CAPSULE ORAL at 20:43

## 2022-01-04 RX ADMIN — FAMOTIDINE 20 MG: 20 TABLET, FILM COATED ORAL at 20:43

## 2022-01-04 RX ADMIN — MORPHINE SULFATE 2 MG: 2 INJECTION, SOLUTION INTRAMUSCULAR; INTRAVENOUS at 17:10

## 2022-01-04 RX ADMIN — GABAPENTIN 800 MG: 400 CAPSULE ORAL at 11:28

## 2022-01-04 RX ADMIN — HYDROCODONE BITARTRATE AND ACETAMINOPHEN 1 TABLET: 7.5; 325 TABLET ORAL at 06:22

## 2022-01-04 RX ADMIN — FOLIC ACID 1 MG: 1 TABLET ORAL at 11:28

## 2022-01-04 RX ADMIN — APIXABAN 5 MG: 5 TABLET, FILM COATED ORAL at 11:28

## 2022-01-04 RX ADMIN — FAMOTIDINE 20 MG: 20 TABLET, FILM COATED ORAL at 06:22

## 2022-01-04 NOTE — PROGRESS NOTES
DOS: 2022  NAME: Belen Allen   : 1980  PCP: Sahil Cornelius MD    Chief Complaint   Patient presents with   • Seizures         Subjective: Pt seen in follow up, however the problem is new to me. Sitting up on the side of the bed about to go for a walk with the RN. States she has had no further seizure activity. Denies any new weakness, numbness, speech or visual disturbances, or headaches.    Objective:  Vital signs:   Vitals:    22 1130 22 1515 22 1900 22 0727   BP: 157/100 146/95 (!) 164/132 (!) 154/112   BP Location: Right arm Right arm Right arm Right arm   Patient Position: Lying Lying Lying Lying   Pulse: 78 75  79   Resp: 16 16 16 16   Temp: 97.8 °F (36.6 °C) 98.5 °F (36.9 °C) 98.6 °F (37 °C) 98.5 °F (36.9 °C)   TempSrc: Oral Oral Oral Oral   SpO2:       Weight:       Height:           Current Facility-Administered Medications:   •  acetaminophen (TYLENOL) tablet 650 mg, 650 mg, Oral, Q4H PRN **OR** [DISCONTINUED] acetaminophen (TYLENOL) 160 MG/5ML solution 650 mg, 650 mg, Oral, Q4H PRN **OR** [DISCONTINUED] acetaminophen (TYLENOL) suppository 650 mg, 650 mg, Rectal, Q4H PRN, Nisha Ribera, APRN  •  amitriptyline (ELAVIL) tablet 25 mg, 25 mg, Oral, Nightly, Nisha Ribera APRN, 25 mg at 22  •  apixaban (ELIQUIS) tablet 5 mg, 5 mg, Oral, Q12H, Nisha Ribera APRN, 5 mg at 22  •  cetirizine (zyrTEC) tablet 10 mg, 10 mg, Oral, Daily, Nisha Ribera APRN, 10 mg at 22  •  dexamethasone (DECADRON) injection 8 mg, 8 mg, Intravenous, Once, David Brumfield MD  •  divalproex (DEPAKOTE) DR tablet 500 mg, 500 mg, Oral, Q12H, Laura Antony MD, 500 mg at 22 1127  •  famotidine (PEPCID) tablet 20 mg, 20 mg, Oral, BID AC, Nisha Ribera APRN, 20 mg at 22 0622  •  FLUoxetine (PROzac) capsule 40 mg, 40 mg, Oral, Nightly, Nisha Ribera APRN, 40 mg at 22 5582  •  folic acid  (FOLVITE) tablet 1 mg, 1 mg, Oral, Daily, Nisha Ribera, APRN, 1 mg at 01/04/22 1128  •  gabapentin (NEURONTIN) capsule 800 mg, 800 mg, Oral, 4x Daily, Nisha Ribera, APRN, 800 mg at 01/04/22 1128  •  gadobenate dimeglumine (MULTIHANCE) injection 17 mL, 17 mL, Intravenous, Once in imaging, David Brumfield MD  •  HYDROcodone-acetaminophen (NORCO) 7.5-325 MG per tablet 1 tablet, 1 tablet, Oral, Q6H PRN, Nisha Ribera APRN, 1 tablet at 01/04/22 0622  •  hydrOXYzine (ATARAX) tablet 25 mg, 25 mg, Oral, TID PRN, Nisha Ribera APRN  •  LORazepam (ATIVAN) tablet 0.5 mg, 0.5 mg, Oral, Q12H PRN, Nisha Ribera APRN, 0.5 mg at 01/04/22 0724  •  melatonin tablet 10 mg, 10 mg, Oral, Nightly, Nisha Ribera APRN, 10 mg at 01/03/22 2033  •  methocarbamol (ROBAXIN) tablet 750 mg, 750 mg, Oral, Q8H PRN, David Brumfield MD  •  morphine injection 2 mg, 2 mg, Intravenous, Q4H PRN, David Brumfield MD, 2 mg at 01/04/22 1330  •  [DISCONTINUED] morphine injection 1 mg, 1 mg, Intravenous, Q4H PRN, 1 mg at 01/02/22 0628 **AND** naloxone (NARCAN) injection 0.4 mg, 0.4 mg, Intravenous, Q5 Min PRN, Nisha Ribera APRN  •  nitroglycerin (NITROSTAT) SL tablet 0.4 mg, 0.4 mg, Sublingual, Q5 Min PRN, Nisha Ribera APRN  •  ondansetron (ZOFRAN) injection 4 mg, 4 mg, Intravenous, Q6H PRN, Nisha Ribera APRN  •  pantoprazole (PROTONIX) EC tablet 40 mg, 40 mg, Oral, QAM, Nisha Ribera, APRN, 40 mg at 01/04/22 0622  •  polyethylene glycol (MIRALAX) packet 17 g, 17 g, Oral, Daily, Nisha Ribera APRN  •  sodium chloride 0.9 % flush 10 mL, 10 mL, Intravenous, Q12H, Nisha Ribera APRN, 10 mL at 01/04/22 1129  •  sodium chloride 0.9 % flush 10 mL, 10 mL, Intravenous, PRN, Nisha Ribera, APRN  •  traZODone (DESYREL) tablet 100 mg, 100 mg, Oral, Nightly PRN, Nisha Ribera APRN, 100 mg at 01/03/22 2152    PRN meds  •  acetaminophen **OR** [DISCONTINUED]  acetaminophen **OR** [DISCONTINUED] acetaminophen  •  HYDROcodone-acetaminophen  •  hydrOXYzine  •  LORazepam  •  methocarbamol  •  Morphine  •  [DISCONTINUED] Morphine **AND** naloxone  •  nitroglycerin  •  ondansetron  •  sodium chloride  •  traZODone    No current facility-administered medications on file prior to encounter.     Current Outpatient Medications on File Prior to Encounter   Medication Sig   • amitriptyline (ELAVIL) 25 MG tablet Take 25 mg by mouth Every Night.   • apixaban (ELIQUIS) 5 MG tablet tablet Take 5 mg by mouth 2 (Two) Times a Day. Last filled 4/12/21 - pt states she was instructed to stop taking medication prior to ERCP ~1 month ago and was not instructed to restart medication.   Indication: Splenic infarct   • Calcium Carbonate-Vitamin D 600-200 MG-UNIT tablet Take 1 tablet by mouth Daily.   • cyclobenzaprine (FLEXERIL) 10 MG tablet Take 1 tablet by mouth 2 (Two) Times a Day As Needed for Muscle Spasms.   • doxycycline (MONODOX) 100 MG capsule Take 1 capsule by mouth 2 (Two) Times a Day.   • famotidine (PEPCID) 20 MG tablet Take 1 tablet by mouth 2 (Two) Times a Day Before Meals.   • FLUoxetine (PROzac) 40 MG capsule Take 40 mg by mouth Every Night.   • folic acid (FOLVITE) 1 MG tablet Take 1 tablet by mouth Daily.   • gabapentin (NEURONTIN) 800 MG tablet Take 800 mg by mouth 4 (Four) Times a Day.   • HYDROcodone-acetaminophen (NORCO) 7.5-325 MG per tablet Take 1 tablet by mouth Every 6 (Six) Hours As Needed for Moderate Pain .   • hydrOXYzine (ATARAX) 25 MG tablet Take 1-2 tablets by mouth 3 (Three) Times a Day As Needed for Anxiety.   • loratadine (CLARITIN) 10 MG tablet Take 10 mg by mouth 2 (Two) Times a Day.   • LORazepam (ATIVAN) 0.5 MG tablet Take 0.25-0.5 mg by mouth.   • melatonin 5 MG tablet tablet Take 10 mg by mouth Every Night.   • methylPREDNISolone (MEDROL) 4 MG dose pack Take as directed on package instructions.   • omeprazole (priLOSEC) 20 MG capsule Take 20 mg by mouth  Daily.   • ondansetron ODT (ZOFRAN-ODT) 8 MG disintegrating tablet Take 8 mg by mouth.   • polyethylene glycol (polyethylene glycol) 17 g packet Take 17 g by mouth Daily.   • SUMAtriptan (IMITREX) 100 MG tablet Take 50 mg by mouth Every 2 (Two) Hours As Needed for Migraine. Take one tablet at onset of headache. May repeat dose one time in 2 hours if headache not relieved.   • traZODone (DESYREL) 50 MG tablet Take 100 mg by mouth At Night As Needed for Sleep. Take 1 to 2 tablets by mouth every  Night as needed for sleep   • vitamin D (ERGOCALCIFEROL) 1.25 MG (12832 UT) capsule capsule Take 50,000 Units by mouth 1 (One) Time Per Week.       General appearance: Obese female, flat affect, NAD, alert and cooperative, well groomed  HEENT: Normocephalic, atraumatic, PERRL, no masses or tenderness  Resp: Even and unlabored  Extremities: No obvious edema.  Skin: warm, dry    Neurological:   MS: oriented x3, able to correctly identify the wall clock time, recent/remote memory intact, normal attention/concentration, language intact, no neglect, normal fund of knowledge  CN: visual acuity grossly normal, visual fields full, PERRL, EOMI, facial sensation equal, no facial droop, hearing symmetric, palate elevates symmetrically, shoulder shrug equal, tongue midline  Motor: 5/5 in all 4 ext., normal tone  Sensory: light touch sensation intact in all 4 ext.  Coordination: Normal finger to nose test  Rapid alternating movements: normal finger to thumb tap    Laboratory results:  Lab Results   Component Value Date    TSH 0.734 01/02/2022     Lab Results   Component Value Date    MGADJLTS14 255 11/10/2020     No results found for: HGBA1C  Lab Results   Component Value Date    GLUCOSE 106 (H) 01/02/2022    BUN 5 (L) 01/02/2022    CREATININE 0.78 01/02/2022    EGFRIFNONA 82 01/02/2022    BCR 6.4 (L) 01/02/2022    K 3.9 01/02/2022    CO2 30.6 (H) 01/02/2022    CALCIUM 8.9 01/02/2022    ALBUMIN 3.90 01/02/2022    LABIL2 1.6 11/11/2021     AST 44 (H) 01/02/2022    ALT 30 01/02/2022     Lab Results   Component Value Date    WBC 6.89 01/02/2022    HGB 12.0 01/02/2022    HCT 35.1 01/02/2022    MCV 92.6 01/02/2022     01/02/2022     Brief Urine Lab Results  (Last result in the past 365 days)      Color   Clarity   Blood   Leuk Est   Nitrite   Protein   CREAT   Urine HCG        01/01/22 1726 Yellow   Clear   Negative   Negative   Negative   Negative               No results found for: ACANTHNAEG, AFBCX, BPERTUSSISCX, BLOODCX  No results found for: BCIDPCR, CXREFLEX, CSFCX, CULTURETIS  No results found for: CULTURES, HSVCX, URCX  No results found for: EYECULTURE, GCCX, HSVCULTURE, LABHSV  No results found for: LEGIONELLA, MRSACX, MUMPSCX, MYCOPLASCX  No results found for: NOCARDIACX, STOOLCX  No results found for: THROATCX, UNSTIMCULT, URINECX, CULTURE, VZVCULTUR  No results found for: VIRALCULTU, WOUNDCX  Pain Management Panel     Pain Management Panel Latest Ref Rng & Units 1/1/2022 7/4/2021    BARBITURATES SCREEN Negative Negative Negative    BENZODIAZEPINE SCREEN, URINE Negative Negative Negative    COCAINE SCREEN, URINE Negative Negative Negative    METHADONE SCREEN, URINE Negative Negative Negative          Review and interpretation of imaging:  CT Head Without Contrast    Result Date: 1/1/2022  Electronically signed by Sarath Lilly MD on 01-01-22 at 1847    CT Lumbar Spine Without Contrast    Result Date: 1/1/2022  Electronically signed by Sarath Lilly MD on 01-01-22 at 1851    MRI Brain Without Contrast    Result Date: 1/4/2022   Unremarkable MRI of the brain.   TECHNIQUE: MRI of the lumbar spine was obtained with sagittal T1, proton-density, and T2-weighted images. Additionally, there are axial T1 and T2-weighted images through the lumbar spine.  Comparison is made to previous CT scan of the lumbar spine dated 01/01/2022.  FINDINGS:  The conus medullaris terminates at the level of the L1-2 disc space and has normal signal  intensity. The visualized distal thoracic spinal cord is unremarkable.  At L1-2, L2-3, L3-4, and L4-5, there is no significant canal or foraminal stenosis.  At the L5-S1 level, there has been a prior bilateral laminectomy procedure. There has been a previous interbody fusion procedure at L5-S1 and on the recent CT scan of the lumbar spine, there was incomplete osseous fusion across the disc space. Bilateral pedicle screws and posterior rods are noted fusing L5 down to S1. There is mild bilateral foraminal narrowing at the L5-S1 level secondary to a disc osteophyte complex or new bone formation mildly narrowing the neural foramina.  There is no pathological area of marrow replacement. There is maintenance of vertebral body height.  IMPRESSION:  There have been prior bilateral laminectomy procedures at the L5-S1 level. There has been a previous interbody fusion procedure at L5-S1 with incomplete osseous fusion noted across the L5-S1 disc space. Mild bilateral foraminal narrowing is seen at L5-S1. Otherwise, no significant canal or foraminal stenosis is noted throughout the remaining lumbar spine.  This report was finalized on 1/4/2022 1:09 PM by Dr. Vinicius Muse M.D.      MRI Lumbar Spine Without Contrast    Result Date: 1/4/2022   Unremarkable MRI of the brain.   TECHNIQUE: MRI of the lumbar spine was obtained with sagittal T1, proton-density, and T2-weighted images. Additionally, there are axial T1 and T2-weighted images through the lumbar spine.  Comparison is made to previous CT scan of the lumbar spine dated 01/01/2022.  FINDINGS:  The conus medullaris terminates at the level of the L1-2 disc space and has normal signal intensity. The visualized distal thoracic spinal cord is unremarkable.  At L1-2, L2-3, L3-4, and L4-5, there is no significant canal or foraminal stenosis.  At the L5-S1 level, there has been a prior bilateral laminectomy procedure. There has been a previous interbody fusion procedure at L5-S1  and on the recent CT scan of the lumbar spine, there was incomplete osseous fusion across the disc space. Bilateral pedicle screws and posterior rods are noted fusing L5 down to S1. There is mild bilateral foraminal narrowing at the L5-S1 level secondary to a disc osteophyte complex or new bone formation mildly narrowing the neural foramina.  There is no pathological area of marrow replacement. There is maintenance of vertebral body height.  IMPRESSION:  There have been prior bilateral laminectomy procedures at the L5-S1 level. There has been a previous interbody fusion procedure at L5-S1 with incomplete osseous fusion noted across the L5-S1 disc space. Mild bilateral foraminal narrowing is seen at L5-S1. Otherwise, no significant canal or foraminal stenosis is noted throughout the remaining lumbar spine.  This report was finalized on 1/4/2022 1:09 PM by Dr. Vinicius Muse M.D.      Results for orders placed during the hospital encounter of 11/08/20    Adult Transesophageal Echo ([facility]) W/ Cont if Necessary Per Protocol    Interpretation Summary  · Left ventricular ejection fraction appears to be 61 - 65%. Left ventricular systolic function is normal.  · Saline test results are negative.      Impression/Assessment:  This is a 41-year-old female with past medical history of migraine, chronic back pain, obesity, anxiety and depression, seizures who presented to the hospital 1/1/2022 with complaints of suffering a seizure resulting in a fall. Patient's fiancé reported that the patient had a generalized tonic-clonic seizure that lasted for a few minutes and was followed by confusion. She did lose consciousness. Fiancé states that her eyes were open and rolled to the back of her head. He also noted that her head turned to the left side. She did have tongue biting and urinary incontinence. Reportedly she has a history of seizures with her first one being at the age of 21. Her fiancé stated that in the last 6 months she has  had at least 4 seizures. She denies any substance abuse. She had a positive UDS after receiving morphine. Denies any alcohol use. She does currently smoke cigarettes.    Diagnosis:  Seizure disorder  History of migraine headache  Anxiety and depression    Plan:  No obvious acute findings on MRI brain.  EEG still pending, if negative continue with plan of outpatient f/u.  Continue Depakote 500 mg twice daily, tolerating well with no current side effects.  Discussed that the Depakote can have several interactions with her current medications that she is taking including her amitriptyline, trazodone, Eliquis, Prozac, Flexeril, gabapentin, and Imitrex. She will need to monitor for any signs or symptoms of bleeding or CNS depression.  Reviewed her LFTs which shows a minimally elevated AST of 44 and a normal ALT of 30. We will need to continue to monitor this in the outpatient setting if we are going to continue her on Depakote. Also reviewed her CBC which was also normal.  She will need to make sure that she is on some form of birth control if she is not already while taking this.  Discussed seizure precautions extensively including no driving for 90 days which she did voiced understanding.  Continue seizure precautions.  Will check Mg level, recommend keeping 2.0 or >.  I have scheduled her a follow-up with Dr. Sandoval in our office in the next 6 to 8 weeks.  We will sign off, will see again per request.    SEIZURE INSTRUCTIONS:     Recommended to observe all seizure precautions, including, but not limited to no driving until seizure free for more than 3 months- as per State driving regulation / law;   Avoid all high-risk activity,   Take showers instead of baths,   Avoid swimming without observation,   Avoid open heat sources,   Avoid working at heights and   Avoid engaging in all potentially hazardous activities.   Patient expressed clear understanding    Case discussed with patient, RN, and Dr. Antony, and he agrees  with plan above.     Vanessa Calderon, APRN

## 2022-01-04 NOTE — NURSING NOTE
Spoke with Dr Brumfield, unwilling to change pain management plan, made aware patient considering leaving AMA, needs MRI results before willing to prescribe po steroids. Discussed with patient, tearful. Speaking with family on phone.

## 2022-01-04 NOTE — THERAPY EVALUATION
Patient Name: Belen Allen  : 1980    MRN: [id number]                              Today's Date: 2022       Admit Date: 2022    Visit Dx:     ICD-10-CM ICD-9-CM   1. Seizures (HCC)  R56.9 780.39   2. Urinary incontinence, unspecified type  R32 788.30   3. Exacerbation of chronic back pain  M54.9 724.5    G89.29 338.29     338.19     Patient Active Problem List   Diagnosis   • Splenic infarct   • Anxiety disorder   • Functional asplenia   • Generalized abdominal pain   • Bilateral leg weakness   • Acute bilateral low back pain   • Antiphospholipid antibody positive   • On anticoagulant therapy   • Acute pain of left knee   • Vitamin D deficiency   • Folate deficiency   • Pain of back and left lower extremity   • Common bile duct dilatation   • Right upper quadrant abdominal pain   • Obesity (BMI 30-39.9)   • Tobacco abuse   • GERD without esophagitis   • Intractable abdominal pain   • Obesity, Class III, BMI 40-49.9 (morbid obesity) (HCC)   • Constipation   • History of ERCP   • Other chronic pain   • Seizures (HCC)   • Syncope     Past Medical History:   Diagnosis Date   • Abnormal Pap smear of cervix    • Ankle fracture    • H/O Elevated liver enzymes    • History of chronic back pain    • History of migraine    • History of urinary tract infection    • Injury of back    • PONV (postoperative nausea and vomiting)    • Seasonal allergies      Past Surgical History:   Procedure Laterality Date   • BACK SURGERY  2006    fusion L4, L5     • CHOLECYSTECTOMY     • DILATATION AND CURETTAGE     • ERCP N/A 6/3/2021    Procedure: ENDOSCOPIC RETROGRADE CHOLANGIOPANCREATOGRAPHY WITH SPHINCTEROTOMY, DILATION WITH BALLOON CLEARANCE  (12MM-15MM);  Surgeon: Bjorn Flannery MD;  Location: [facility] ENDOSCOPY;  Service: Gastroenterology;  Laterality: N/A;  DILATED COMMON BILE DUCT   • SKIN SURGERY     • TUBAL ABDOMINAL LIGATION     • WISDOM TOOTH EXTRACTION  2018    x2      General  Information     Row Name 01/04/22 1322          Physical Therapy Time and Intention    Document Type evaluation  -     Mode of Treatment individual therapy; physical therapy  -     Row Name 01/04/22 1322          General Information    Patient Profile Reviewed yes  -     Existing Precautions/Restrictions seizures  -     Barriers to Rehab medically complex  -     Row Name 01/04/22 1322          Living Environment    Lives With significant other; child(manuel), dependent  Fiance and 2 childen, ages 9 and 11  -     Row Name 01/04/22 1322          Home Main Entrance    Number of Stairs, Main Entrance three  -     Stair Railings, Main Entrance railing on right side (ascending)  -     Row Name 01/04/22 1322          Stairs Within Home, Primary    Number of Stairs, Within Home, Primary none  -[facility] Name 01/04/22 1322          Cognition    Orientation Status (Cognition) oriented x 4  -           User Key  (r) = Recorded By, (t) = Taken By, (c) = Cosigned By    Initials Name Provider Type     Fe Cabrera Physical Therapist               Mobility     Row Name 01/04/22 1324          Bed Mobility    Bed Mobility bed mobility (all) activities  -     All Activities, [place] (Bed Mobility) modified independence  -[facility] Name 01/04/22 1324          Sit-Stand Transfer    Sit-Stand [place] (Transfers) independent  -     Assistive Device (Sit-Stand Transfers) other (see comments)  No AD  -[facility] Name 01/04/22 1324          Gait/Stairs (Locomotion)    [place] Level (Gait) independent  -     Assistive Device (Gait) other (see comments)  No AD  -     Distance in Feet (Gait) 150ft  -     Deviations/Abnormal Patterns (Gait) antalgic  -     [place] Level (Stairs) not tested  -     Comment (Gait/Stairs) Tolerated gait well, despite reporting 10/10 pain when laying in bed and reporting pain got worse with activity. [place] noted with gait - no unsteadiness, LOB, or safety  "deficits  -           User Key  (r) = Recorded By, (t) = Taken By, (c) = Cosigned By    Initials Name Provider Type     Fe Cabrera Physical Therapist               Obj/Interventions     Row Name 01/04/22 1326          Range of Motion Comprehensive    General Range of Motion no range of motion deficits identified  -BH     Row Name 01/04/22 1326          Strength Comprehensive (MMT)    General Manual Muscle Testing (MMT) Assessment no strength deficits identified  -BH     Row Name 01/04/22 [facility]6          Motor Skills    Therapeutic Exercise --  10 reps B AP/LAQ/seated marches  -BH     Row Name 01/04/22 132          Balance    Balance Assessment sitting static balance; standing static balance  -     Static Sitting Balance WFL; unsupported; sitting, edge of bed  -     Static Standing Balance WFL; unsupported; standing  -BH     Row Name 01/04/22 1326          Sensory Assessment (Somatosensory)    Sensory Assessment (Somatosensory) sensation intact  -           User Key  (r) = Recorded By, (t) = Taken By, (c) = Cosigned By    Initials Name Provider Type     Fe Cabrera Physical Therapist               Goals/Plan    No documentation.                Clinical Impression     Row Name 01/04/22 1327          Pain    Additional Documentation Pain Scale: Numbers Pre/Post-Treatment (Group)  -BH     Row Name 01/04/22 1327          Pain Scale: Numbers Pre/Post-Treatment    Pretreatment Pain Rating 10/10  -     Posttreatment Pain Rating 10/10  -     Pain Location - Side Left  -     Pain Location hip  -     Pain Intervention(s) Repositioned; Ambulation/increased activity  -BH     Row Name 01/04/22 1327          Plan of Care Review    Plan of Care Reviewed With patient  -     Outcome Summary Pt is a 42 yo F who presents from home following multiple seizures resulting in a fall and no with L hip pain (x-ray normal). PMHx includes lumber spine fusion in 2004, which pt reports she has been told \"screws are " "loose\" however pt was unable to tolerate full MRI today to assess. Pt presents to PT with minimal functional deficits despite reporting 10/10 pain. Pt completed bed mobility, STS, and gait 150ft with independence and no use of AD. Pt reported increase in pain with activity and when encouraged to ambulate multiple times throughout the day to, pt states \"you people are really annoying me.\" Pt reports she knows what the issue is and walking \"isn't going to fix it.\" PT explained to pt that acutely, PT is limited with interventions in the hospital to address pain but pt may benefit from OPPT. PT recommending D/C home with OPPT to continue addressing deficits d/t pain. PT will sign-off.  -     Row Name 01/04/22 1327          Therapy Assessment/Plan (PT)    Criteria for Skilled Interventions Met (PT) no  -     Row Name 01/04/22 1327          Positioning and Restraints    Pre-Treatment Position in bed  -BH     Post Treatment Position bed  -     In Bed supine; call light within reach; encouraged to call for assist  -           User Key  (r) = Recorded By, (t) = Taken By, (c) = Cosigned By    Initials Name Provider Type     Fe Cabrera Physical Therapist               Outcome Measures     Row Name 01/04/22 8583          How much help from another person do you currently need...    Turning from your back to your side while in flat bed without using bedrails? 4  -BH     Moving from lying on back to sitting on the side of a flat bed without bedrails? 4  -BH     Moving to and from a bed to a chair (including a wheelchair)? 4  -BH     Standing up from a chair using your arms (e.g., wheelchair, bedside chair)? 4  -BH     Climbing 3-5 steps with a railing? 4  -BH     To walk in hospital room? 4  -BH     AM-PAC 6 Clicks Score (PT) 24  -     Row Name 01/04/22 1482          Functional Assessment    Outcome Measure Options AM-PAC 6 Clicks Basic Mobility (PT)  -           User Key  (r) = Recorded By, (t) = Taken By, (c) " "= Cosigned By    Initials Name Provider Type     Fe Cabrera Physical Therapist                             Physical Therapy Education                 Title: PT OT SLP Therapies (Done)     Topic: Physical Therapy (Done)     Point: Mobility training (Done)     Learning Progress Summary           Patient Acceptance, E,TB,D, VU by  at 1/4/2022 1333                   Point: Home exercise program (Done)     Learning Progress Summary           Patient Acceptance, E,TB,D, VU by  at 1/4/2022 1333                   Point: Body mechanics (Done)     Learning Progress Summary           Patient Acceptance, E,TB,D, VU by  at 1/4/2022 1333                   Point: Precautions (Done)     Learning Progress Summary           Patient Acceptance, E,TB,D, VU by  at 1/4/2022 1333                               User Key     Initials Effective Dates Name Provider Type Discipline     05/10/21 -  Fe Cabrera Physical Therapist PT              PT Recommendation and Plan     Plan of Care Reviewed With: patient  Outcome Summary: Pt is a 40 yo F who presents from home following multiple seizures resulting in a fall and no with L hip pain (x-ray normal). PMHx includes lumber spine fusion in 2004, which pt reports she has been told \"screws are loose\" however pt was unable to tolerate full MRI today to assess. Pt presents to PT with minimal functional deficits despite reporting 10/10 pain. Pt completed bed mobility, STS, and gait 150ft with independence and no use of AD. Pt reported increase in pain with activity and when encouraged to ambulate multiple times throughout the day to, pt states \"you people are really annoying me.\" Pt reports she knows what the issue is and walking \"isn't going to fix it.\" PT explained to pt that acutely, PT is limited with interventions in the hospital to address pain but pt may benefit from OPPT. PT recommending D/C home with OPPT to continue addressing deficits d/t pain. PT will sign-off.     Time " Calculation:    PT Charges     Row Name 01/04/22 1333             Time Calculation    Start Time 1103  -      Stop Time 1111  -      Time Calculation (min) 8 min  -      PT Received On 01/04/22  -              Time Calculation- PT    Total Timed Code Minutes- PT 0 minute(s)  -              Untimed Charges    PT Eval/Re-eval Minutes 8  -BH              Total Minutes    Untimed Charges Total Minutes 8  -BH       Total Minutes 8  -BH            User Key  (r) = Recorded By, (t) = Taken By, (c) = Cosigned By    Initials Name Provider Type     Fe Cabrera Physical Therapist              Therapy Charges for Today     Code Description Service Date Service Provider Modifiers Qty    47039150612 HC PT EVAL LOW COMPLEXITY 1 1/4/2022 Fe Cabrera GP 1          PT G-Codes  Outcome Measure Options: AM-PAC 6 Clicks Basic Mobility (PT)  AM-PAC 6 Clicks Score (PT): 24  AM-PAC 6 Clicks Score (OT): 24    FE CABRERA  1/4/2022

## 2022-01-04 NOTE — CASE MANAGEMENT/SOCIAL WORK
Discharge Planning Assessment  [facility]     Patient Name: Belen Allen  MRN: [id number]  Today's Date: 1/3/2022    Admit Date: 1/1/2022     Discharge Needs Assessment     Row Name 01/03/22 1928       Living Environment    Lives With significant other    Name(s) of Who Lives With Patient significant other Ced Aquino [phone]    Current Living Arrangements home/apartment/condo    Primary Care Provided by self; spouse/significant other    Provides Primary Care For no one, unable/limited ability to care for self    Family Caregiver if Needed significant other    Family Caregiver Names significant other Ced Aquino [phone]    Quality of Family Relationships helpful; involved; supportive    Able to Return to Prior Arrangements yes       Resource/Environmental Concerns    Resource/Environmental Concerns home accessibility    Home Accessibility Concerns stairs to enter home  2 steps with 1 handrail to enter their single story home from the front and 3 steps to enter their home from the back       Transition Planning    Patient/Family Anticipates Transition to home with family    Patient/Family Anticipated Services at Transition none    Transportation Anticipated family or friend will provide       Discharge Needs Assessment    Equipment Currently Used at Home none    Concerns to be Addressed denies needs/concerns at this time    Anticipated Changes Related to Illness none    Equipment Needed After Discharge none    Current Discharge Risk physical impairment               Discharge Plan     Row Name 01/03/22 1926       Plan    Plan Plans home; denies needs.    Provided Post Acute Provider List? N/A    N/A Provider List Comment Denies HH/SNF needs.    Provided Post Acute Provider Quality & Resource List? N/A    N/A Quality & Resource List Comment Denies HH/SNF needs.    Patient/Family in Agreement with Plan yes    Plan Comments Met with the patient at bedside; explained role of CCP, verified facesheet and  discussed discharge planning needs.  The patient plans to return home upon d/c with assistance from her significant other Ced Aquino [phone]. The patient uses no DME, has 2 steps with 1 handrail to enter their single story home from the front and 3 steps to enter their home from the back.  The patient’s PCP is Sahil Cornelius, pharmacy is [facility] in [facility] and she denies any trouble remembering to take her medication or with affording her medication. The patient denies any HH/SNF history, denies any POA documents, states that she drives herself to appointments and her significant other will transport her home upon d/c. The patient states that she was on Eliquis prior to admission.  The patient currently denies any HH/SNF needs.  Noted that O.T. signed off on the patient and CCP will follow to see if the patient needs a P.T. eval.  ZACHARY Soriano              Continued Care and Services - Admitted Since 1/1/2022    Coordination has not been started for this encounter.       Expected Discharge Date and Time     Expected Discharge Date Expected Discharge Time    Jan 6, 2022          Demographic Summary     Row Name 01/03/22 1927       General Information    Admission Type observation    Arrived From home    Referral Source admission list    Reason for Consult discharge planning    Preferred Language English     Used During This Interaction no               Functional Status     Row Name 01/03/22 1928       Functional Status    Usual Activity Tolerance fair    Current Activity Tolerance poor       Functional Status, IADL    Medications independent    Meal Preparation independent    Housekeeping independent    Laundry independent    Shopping independent       Mental Status    General Appearance WDL WDL       Mental Status Summary    Recent Changes in Mental Status/Cognitive Functioning no changes               Psychosocial    No documentation.                Abuse/Neglect    No  documentation.                Legal    No documentation.                Substance Abuse    No documentation.                Patient Forms    No documentation.                   ZACHARY Eli

## 2022-01-04 NOTE — PROGRESS NOTES
"  Dedicated to Hospital Care    [phone]   LOS: 0 days     Name: Belen Allen  Age/Sex: 41 y.o. female  :  1980        PCP: Sahil Cornelius MD  Chief Complaint   Patient presents with   • Seizures      Subjective   She continues to have no seizure-like activity here in the hospital remains on seizure precautions.  The patient is up ad js. in the room.  Watch the patient get up out of the bed on her own and walk to the bathroom without any issues.  I ordered narcotic pain medication IV overnight last night the patient did not use any doses.  I reiterated to the patient today that I am taking her problems seriously but that narcotic pain medication for musculoskeletal back pain does not usually do very well at fixing the problem.  She was unable to tolerate the MRI and aborted the procedure early.  I did review her records.  She has a history of radiculopathy and symptoms back in .  She was seen by one of my partners and managed in the hospital.  She seemed to respond well to muscle relaxants and steroids then.  She is also been complaining of myoclonic jerks in her upper extremities.  Per nursing yesterday she got no relief with IV Toradol.  I do not see any benefit in increasing the dose and trying again with this.  Again today she describes the pain is in majority left side of her lower back down into her butt and radiates all the way down her leg.  She says the pain is excruciating.  She says there is no aggravating or relieving factors it just hurts.  She feels as though she is being \"split open\" with this pain.      General: No Fever or Chills, Cardiac: No Chest Pain or Palpitations, Resp: No Cough or SOA, GI: No Nausea, Vomiting, or Diarrhea and Other: No bleeding    amitriptyline, 25 mg, Oral, Nightly  apixaban, 5 mg, Oral, Q12H  cetirizine, 10 mg, Oral, Daily  divalproex, 500 mg, Oral, Q12H  famotidine, 20 mg, Oral, BID AC  FLUoxetine, 40 mg, Oral, Nightly  folic acid, 1 mg, " Oral, Daily  gabapentin, 800 mg, Oral, 4x Daily  melatonin, 10 mg, Oral, Nightly  pantoprazole, 40 mg, Oral, QAM  polyethylene glycol, 17 g, Oral, Daily  sodium chloride, 10 mL, Intravenous, Q12H           Objective   Vital Signs  Temp:  [97.8 °F (36.6 °C)-98.6 °F (37 °C)] 98.6 °F (37 °C)  Heart Rate:  [75-78] 75  Resp:  [16] 16  BP: (146-164)/() 164/132  Body mass index is 31.76 kg/m².  No intake or output data in the 24 hours ending 01/04/22 0713    Physical Exam  Vitals and nursing note reviewed.   Constitutional:       Appearance: Normal appearance.   Cardiovascular:      Rate and Rhythm: Normal rate and regular rhythm.   Pulmonary:      Effort: No respiratory distress.      Breath sounds: Normal breath sounds.   Abdominal:      General: Bowel sounds are normal. There is no distension.      Palpations: Abdomen is soft.   Neurological:      General: No focal deficit present.      Mental Status: She is alert and oriented to person, place, and time.           Results Review:       I reviewed the patient's new clinical results.  Results from last 7 days   Lab Units 01/02/22  0508 01/01/22  1723   WBC 10*3/mm3 6.89 6.87   HEMOGLOBIN g/dL 12.0 12.7   PLATELETS 10*3/mm3 247 242     Results from last 7 days   Lab Units 01/02/22  0508 01/01/22  1723   SODIUM mmol/L 143 139   POTASSIUM mmol/L 3.9 3.7   CHLORIDE mmol/L 107 100   CO2 mmol/L 30.6* 30.0*   BUN mg/dL 5* 4*   CREATININE mg/dL 0.78 0.75   CALCIUM mg/dL 8.9 9.7   Estimated Creatinine Clearance: 100.5 mL/min (by C-G formula based on SCr of 0.78 mg/dL).      Assessment/Plan   Active Hospital Problems    Diagnosis  POA   • **Syncope [R55]  Yes   • Seizures (HCC) [R56.9]  Yes   • Other chronic pain [G89.29]  Yes   • Tobacco abuse [Z72.0]  Yes   • On anticoagulant therapy [Z79.01]  Not Applicable   • Anxiety disorder [F41.9]  Yes   • Obesity (BMI 30-39.9) [E66.9]  Yes      Resolved Hospital Problems    Diagnosis Date Resolved POA   • Rhabdomyolysis [M62.82]  "01/02/2022 Yes   • Encephalopathy [G93.40] 01/02/2022 Unknown       PLAN  This is a 41-year-old female that presents to the hospital after multiple episodes that her family feels more seizures; she was admitted with possible seizure activity versus recurrent syncope.  -She continues to complain of a lot of back pain (see progress note from late in the day yesterday)  -Unable to tolerate the MRI this AM; this unfortunately limits our ability to really figure things out, follow up the radiology read for imaging today  -I think with nl WBC and nl procal and lactate we can proceed with trial of steroids; Give one dose of IV decadron this AM  -Appreciate neurology input and patient initiated on antiepileptic meications and educated and seizure precautions and restrictions  -Start the patient on Robaxin today, she is already maxed out on gabapentin.  Again we will discontinue Flexeril.  This was held on admission.  I do not think it would provide much benefit given the tachyphylaxis noted with this medication and the fact she has been on this long-term.  -She has had no issues on telemetry and no cardiac symptoms preceding any of the episodes.  I do not think any cardiac work-up would be warranted at this time.  -Physical therapy's been consulted encourage out of bed activity, Occupational Therapy notes reviewed and patient is signed off  -Eliquis covers for DVT prophylaxis  -Full code    Disposition  To be determined but hopefully home tomorrow if cleared by neurology      David Brumfield MD  [facility]  01/04/22  07:13 EST      Addendum:  PT note: \"Pt presents to PT with minimal functional deficits despite reporting 10/10 pain. Pt completed bed mobility, STS, and gait 150ft with independence and no use of AD\"    "

## 2022-01-04 NOTE — PLAN OF CARE
"Goal Outcome Evaluation:  Plan of Care Reviewed With: patient           Outcome Summary: Pt is a 40 yo F who presents from home following multiple seizures resulting in a fall and no with L hip pain (x-ray normal). PMHx includes lumber spine fusion in 2004, which pt reports she has been told \"screws are loose\" however pt was unable to tolerate full MRI today to assess. Pt presents to PT with minimal functional deficits despite reporting 10/10 pain. Pt completed bed mobility, STS, and gait 150ft with independence and no use of AD. Pt reported increase in pain with activity and when encouraged to ambulate multiple times throughout the day to, pt states \"you people are really annoying me.\" Pt reports she knows what the issue is and walking \"isn't going to fix it.\" PT explained to pt that acutely, PT is limited with interventions in the hospital to address pain but pt may benefit from OPPT. PT recommending D/C home with OPPT to continue addressing deficits d/t pain. PT will sign-off.    Patient was intermittently wearing a face mask during this therapy encounter. Therapist used appropriate personal protective equipment including eye protection, mask, and gloves.  Mask used was standard procedure mask. Appropriate PPE was worn during the entire therapy session. Hand hygiene was completed before and after therapy session. Patient is not in enhanced droplet precautions.     "

## 2022-01-05 VITALS
DIASTOLIC BLOOD PRESSURE: 111 MMHG | HEIGHT: 64 IN | RESPIRATION RATE: 16 BRPM | HEART RATE: 84 BPM | OXYGEN SATURATION: 94 % | TEMPERATURE: 98.4 F | SYSTOLIC BLOOD PRESSURE: 153 MMHG | BODY MASS INDEX: 31.58 KG/M2 | WEIGHT: 185 LBS

## 2022-01-05 PROBLEM — M54.16 LUMBAR BACK PAIN WITH RADICULOPATHY AFFECTING LEFT LOWER EXTREMITY: Status: ACTIVE | Noted: 2022-01-05

## 2022-01-05 LAB
ALBUMIN SERPL-MCNC: 3.7 G/DL (ref 3.5–5.2)
ALBUMIN SERPL-MCNC: 4 G/DL (ref 3.5–5.2)
ALBUMIN/GLOB SERPL: 1.5 G/DL
ALBUMIN/GLOB SERPL: 1.7 G/DL
ALP SERPL-CCNC: 108 U/L (ref 39–117)
ALP SERPL-CCNC: 109 U/L (ref 39–117)
ALT SERPL W P-5'-P-CCNC: 18 U/L (ref 1–33)
ALT SERPL W P-5'-P-CCNC: 20 U/L (ref 1–33)
ANION GAP SERPL CALCULATED.3IONS-SCNC: 10 MMOL/L (ref 5–15)
ANION GAP SERPL CALCULATED.3IONS-SCNC: 11.6 MMOL/L (ref 5–15)
AST SERPL-CCNC: 18 U/L (ref 1–32)
AST SERPL-CCNC: 23 U/L (ref 1–32)
BASOPHILS # BLD AUTO: 0.03 10*3/MM3 (ref 0–0.2)
BASOPHILS NFR BLD AUTO: 0.5 % (ref 0–1.5)
BILIRUB SERPL-MCNC: 0.2 MG/DL (ref 0–1.2)
BILIRUB SERPL-MCNC: <0.2 MG/DL (ref 0–1.2)
BUN SERPL-MCNC: 8 MG/DL (ref 6–20)
BUN SERPL-MCNC: 9 MG/DL (ref 6–20)
BUN/CREAT SERPL: 11.3 (ref 7–25)
BUN/CREAT SERPL: 9.8 (ref 7–25)
CALCIUM SPEC-SCNC: 9.4 MG/DL (ref 8.6–10.5)
CALCIUM SPEC-SCNC: 9.4 MG/DL (ref 8.6–10.5)
CHLORIDE SERPL-SCNC: 103 MMOL/L (ref 98–107)
CHLORIDE SERPL-SCNC: 106 MMOL/L (ref 98–107)
CO2 SERPL-SCNC: 24 MMOL/L (ref 22–29)
CO2 SERPL-SCNC: 25.4 MMOL/L (ref 22–29)
CREAT SERPL-MCNC: 0.8 MG/DL (ref 0.57–1)
CREAT SERPL-MCNC: 0.82 MG/DL (ref 0.57–1)
DEPRECATED RDW RBC AUTO: 42.6 FL (ref 37–54)
EOSINOPHIL # BLD AUTO: 0 10*3/MM3 (ref 0–0.4)
EOSINOPHIL NFR BLD AUTO: 0 % (ref 0.3–6.2)
ERYTHROCYTE [DISTWIDTH] IN BLOOD BY AUTOMATED COUNT: 12.7 % (ref 12.3–15.4)
GFR SERPL CREATININE-BSD FRML MDRD: 77 ML/MIN/1.73
GFR SERPL CREATININE-BSD FRML MDRD: 79 ML/MIN/1.73
GLOBULIN UR ELPH-MCNC: 2.4 GM/DL
GLOBULIN UR ELPH-MCNC: 2.4 GM/DL
GLUCOSE SERPL-MCNC: 112 MG/DL (ref 65–99)
GLUCOSE SERPL-MCNC: 129 MG/DL (ref 65–99)
HCT VFR BLD AUTO: 40.2 % (ref 34–46.6)
HGB BLD-MCNC: 13.6 G/DL (ref 12–15.9)
IMM GRANULOCYTES # BLD AUTO: 0.03 10*3/MM3 (ref 0–0.05)
IMM GRANULOCYTES NFR BLD AUTO: 0.5 % (ref 0–0.5)
LYMPHOCYTES # BLD AUTO: 0.69 10*3/MM3 (ref 0.7–3.1)
LYMPHOCYTES NFR BLD AUTO: 10.5 % (ref 19.6–45.3)
MCH RBC QN AUTO: 31.2 PG (ref 26.6–33)
MCHC RBC AUTO-ENTMCNC: 33.8 G/DL (ref 31.5–35.7)
MCV RBC AUTO: 92.2 FL (ref 79–97)
MONOCYTES # BLD AUTO: 0.12 10*3/MM3 (ref 0.1–0.9)
MONOCYTES NFR BLD AUTO: 1.8 % (ref 5–12)
NEUTROPHILS NFR BLD AUTO: 5.72 10*3/MM3 (ref 1.7–7)
NEUTROPHILS NFR BLD AUTO: 86.7 % (ref 42.7–76)
NRBC BLD AUTO-RTO: 0 /100 WBC (ref 0–0.2)
PLATELET # BLD AUTO: 281 10*3/MM3 (ref 140–450)
PMV BLD AUTO: 9.9 FL (ref 6–12)
POTASSIUM SERPL-SCNC: 4.6 MMOL/L (ref 3.5–5.2)
POTASSIUM SERPL-SCNC: 5.6 MMOL/L (ref 3.5–5.2)
PROT SERPL-MCNC: 6.1 G/DL (ref 6–8.5)
PROT SERPL-MCNC: 6.4 G/DL (ref 6–8.5)
RBC # BLD AUTO: 4.36 10*6/MM3 (ref 3.77–5.28)
SODIUM SERPL-SCNC: 140 MMOL/L (ref 136–145)
SODIUM SERPL-SCNC: 140 MMOL/L (ref 136–145)
WBC NRBC COR # BLD: 6.59 10*3/MM3 (ref 3.4–10.8)

## 2022-01-05 PROCEDURE — 25010000002 MORPHINE PER 10 MG: Performed by: HOSPITALIST

## 2022-01-05 PROCEDURE — 80053 COMPREHEN METABOLIC PANEL: CPT | Performed by: HOSPITALIST

## 2022-01-05 PROCEDURE — 85025 COMPLETE CBC W/AUTO DIFF WBC: CPT | Performed by: HOSPITALIST

## 2022-01-05 PROCEDURE — 63710000001 METHYLPREDNISOLONE 4 MG TABLET THERAPY PACK 21 EACH DISP PACK: Performed by: HOSPITALIST

## 2022-01-05 RX ORDER — METHYLPREDNISOLONE 4 MG/1
4 TABLET ORAL
Status: DISCONTINUED | OUTPATIENT
Start: 2022-01-09 | End: 2022-01-05 | Stop reason: HOSPADM

## 2022-01-05 RX ORDER — METHYLPREDNISOLONE 4 MG/1
4 TABLET ORAL
Status: DISCONTINUED | OUTPATIENT
Start: 2022-01-05 | End: 2022-01-05 | Stop reason: HOSPADM

## 2022-01-05 RX ORDER — METHYLPREDNISOLONE 4 MG/1
8 TABLET ORAL
Status: COMPLETED | OUTPATIENT
Start: 2022-01-05 | End: 2022-01-05

## 2022-01-05 RX ORDER — METHOCARBAMOL 750 MG/1
750 TABLET, FILM COATED ORAL EVERY 8 HOURS PRN
Qty: 21 TABLET | Refills: 0 | OUTPATIENT
Start: 2022-01-05 | End: 2022-11-23

## 2022-01-05 RX ORDER — METHYLPREDNISOLONE 4 MG/1
TABLET ORAL
Start: 2022-01-05 | End: 2022-11-30 | Stop reason: HOSPADM

## 2022-01-05 RX ORDER — DIVALPROEX SODIUM 500 MG/1
500 TABLET, DELAYED RELEASE ORAL EVERY 12 HOURS SCHEDULED
Qty: 60 TABLET | Refills: 0 | Status: ON HOLD | OUTPATIENT
Start: 2022-01-05 | End: 2022-12-17

## 2022-01-05 RX ORDER — METHYLPREDNISOLONE 4 MG/1
4 TABLET ORAL
Status: DISCONTINUED | OUTPATIENT
Start: 2022-01-08 | End: 2022-01-05 | Stop reason: HOSPADM

## 2022-01-05 RX ORDER — METHYLPREDNISOLONE 4 MG/1
4 TABLET ORAL
Status: COMPLETED | OUTPATIENT
Start: 2022-01-05 | End: 2022-01-05

## 2022-01-05 RX ORDER — OXYCODONE AND ACETAMINOPHEN 10; 325 MG/1; MG/1
1 TABLET ORAL EVERY 6 HOURS PRN
Qty: 16 TABLET | Refills: 0 | Status: SHIPPED | OUTPATIENT
Start: 2022-01-05 | End: 2022-01-12

## 2022-01-05 RX ORDER — METHYLPREDNISOLONE 4 MG/1
4 TABLET ORAL
Status: DISCONTINUED | OUTPATIENT
Start: 2022-01-07 | End: 2022-01-05 | Stop reason: HOSPADM

## 2022-01-05 RX ORDER — METHYLPREDNISOLONE 4 MG/1
8 TABLET ORAL
Status: DISCONTINUED | OUTPATIENT
Start: 2022-01-06 | End: 2022-01-05 | Stop reason: HOSPADM

## 2022-01-05 RX ORDER — METHYLPREDNISOLONE 4 MG/1
4 TABLET ORAL
Status: DISCONTINUED | OUTPATIENT
Start: 2022-01-06 | End: 2022-01-05 | Stop reason: HOSPADM

## 2022-01-05 RX ORDER — OXYCODONE AND ACETAMINOPHEN 10; 325 MG/1; MG/1
1 TABLET ORAL EVERY 6 HOURS PRN
Status: DISCONTINUED | OUTPATIENT
Start: 2022-01-05 | End: 2022-01-05 | Stop reason: HOSPADM

## 2022-01-05 RX ORDER — METHYLPREDNISOLONE 4 MG/1
8 TABLET ORAL
Status: DISCONTINUED | OUTPATIENT
Start: 2022-01-05 | End: 2022-01-05 | Stop reason: HOSPADM

## 2022-01-05 RX ORDER — METHYLPREDNISOLONE 4 MG/1
4 TABLET ORAL
Status: DISCONTINUED | OUTPATIENT
Start: 2022-01-10 | End: 2022-01-05 | Stop reason: HOSPADM

## 2022-01-05 RX ADMIN — OXYCODONE AND ACETAMINOPHEN 1 TABLET: 325; 10 TABLET ORAL at 15:15

## 2022-01-05 RX ADMIN — DIVALPROEX SODIUM 500 MG: 500 TABLET, DELAYED RELEASE ORAL at 11:01

## 2022-01-05 RX ADMIN — FOLIC ACID 1 MG: 1 TABLET ORAL at 11:01

## 2022-01-05 RX ADMIN — CETIRIZINE HYDROCHLORIDE 10 MG: 10 TABLET ORAL at 11:01

## 2022-01-05 RX ADMIN — APIXABAN 5 MG: 5 TABLET, FILM COATED ORAL at 11:01

## 2022-01-05 RX ADMIN — MORPHINE SULFATE 2 MG: 2 INJECTION, SOLUTION INTRAMUSCULAR; INTRAVENOUS at 04:24

## 2022-01-05 RX ADMIN — METHYLPREDNISOLONE 4 MG: 4 TABLET ORAL at 15:16

## 2022-01-05 RX ADMIN — PANTOPRAZOLE SODIUM 40 MG: 40 TABLET, DELAYED RELEASE ORAL at 06:36

## 2022-01-05 RX ADMIN — FAMOTIDINE 20 MG: 20 TABLET, FILM COATED ORAL at 06:36

## 2022-01-05 RX ADMIN — LORAZEPAM 0.5 MG: 0.5 TABLET ORAL at 11:01

## 2022-01-05 RX ADMIN — MORPHINE SULFATE 2 MG: 2 INJECTION, SOLUTION INTRAMUSCULAR; INTRAVENOUS at 11:17

## 2022-01-05 RX ADMIN — SODIUM CHLORIDE, PRESERVATIVE FREE 10 ML: 5 INJECTION INTRAVENOUS at 11:02

## 2022-01-05 RX ADMIN — GABAPENTIN 800 MG: 400 CAPSULE ORAL at 11:01

## 2022-01-05 RX ADMIN — METHYLPREDNISOLONE 8 MG: 4 TABLET ORAL at 15:15

## 2022-01-05 RX ADMIN — HYDROCODONE BITARTRATE AND ACETAMINOPHEN 1 TABLET: 7.5; 325 TABLET ORAL at 06:38

## 2022-01-05 NOTE — CASE MANAGEMENT/SOCIAL WORK
Case Management Discharge Note           Provided Post Acute Provider List?: N/A  N/A Provider List Comment: Denies HH/SNF needs.  Provided Post Acute Provider Quality & Resource List?: N/A  N/A Quality & Resource List Comment: Denies HH/SNF needs.    Selected Continued Care - Discharged on 1/5/2022 Admission date: 1/1/2022 - Discharge disposition: Home or Self Care    Destination    No services have been selected for the patient.              Durable Medical Equipment    No services have been selected for the patient.              Dialysis/Infusion    No services have been selected for the patient.              Home Medical Care    No services have been selected for the patient.              Therapy    No services have been selected for the patient.              Community Resources    No services have been selected for the patient.              Community & DME    No services have been selected for the patient.                       Final Discharge Disposition Code: 01 - home or self-care

## 2022-01-05 NOTE — PAYOR COMM NOTE
"Carson Mullen (41 y.o. Female)                                              ATTENTION;    FOR NOTIFICATION OF ADMISSION - OBSERVATION STATUS CHANGED                                            TO INPATIENT, REPLY TO UR DEPT  [phone] OR CALL  Giovana ESVIN             Date of Birth Social Security Number Address Home Phone MRN    1980  [address] [facility] [address]  [facility] [address] [phone] [id number]    Anglican Marital Status             None Single       Admission Date Admission Type Admitting Provider Attending Provider Department, Room/Bed    1/1/22 Emergency Ton Tristan MD Richards, Stephen J, MD [address], 2212/1    Discharge Date Discharge Disposition Discharge Destination                         Attending Provider: David Brumfield MD    Allergies: No Known Allergies    Isolation: None   Infection: None   Code Status: CPR   Advance Care Planning Activity    Ht: 162.6 cm (64\")   Wt: 83.9 kg (185 lb)    Admission Cmt: None   Principal Problem: Syncope [R55]                 Active Insurance as of 1/1/2022     Primary Coverage     Payor Plan Insurance Group Employer/Plan Group    [employer] [facility] [employer] [facility] [employer] [id number]     Payor Plan Address Payor Plan Phone Number Payor Plan Fax Number Effective Dates    [address]   1/1/2021 - None Entered    [facility] [address]       Subscriber Name Subscriber Birth Date Member ID       CARSON MULLEN 1980 [id number]                 Emergency Contacts      (Rel.) Home Phone Work Phone Mobile Phone    JUNIOR AGUAYO (Significant Other) [phone] -- [phone]               History & Physical      Nisha Ribera APRN at 01/02/22 0020     Attestation signed by Saqib Pantoja MD at 01/02/22 1246    I reviewed NELSON Scott note and I agree with her history and exam with the addition of the following...  41-year-old female with remote " "history of \"seizures\" comes in with 4-day history of recurrent syncopal episodes.  Some of these were witnessed by boyfriend who described some generalized convulsions and eyes rolling back and head.  No incontinence.  Came in last evening after a football in her home resulting in injury to her left hip.  She states she was unable to get up off the ground \"for hours.\"  No chest pain or palpitations associated with any of these events.  No significant cardiac history.  She reports she continues to \"pass out\" here in the hospital even while sitting in bed attempting to eat her lunch.  No episodes during my assessment.  On exam she is alert and oriented x3.  Conversant.  Heart regular.  Hip without obvious bruising.  Nontender to touch.  Hip x-rays reviewed, negative.    Recurrent syncope.  Seems very unlikely this would be due to seizure activity.  We discussed her Edward.  She states she is prescribed gabapentin chronically for low back pain.  She has been intermittently prescribed Ativan for anxiety associated with loss of her mother in October.  She states she ran out of these and has not taken in about a week.  She states that she is not currently prescribed opiates.  Still awaiting neurology consultation.  These episodes seem rather low risk but for now continue seizure precautions.  Continue as needed lorazepam.  Morphine ordered on admission will be discontinued.  Will continue Norco for now due to contusion left hip but would likely not prescribe at discharge.  Encouraged her to get up and ambulate with assistance.  CPK minimally elevated on admit improved today.  Stop IV fluids.  Labs are stable.    Electronically signed by Saqib Pantoja MD, 01/02/22, 12:46 PM EST.                            Patient Name:  Belen Allen  YOB: 1980  MRN:  [id number]  Admit Date:  1/1/2022  Patient Care Team:  Sahil Cornelius MD as PCP - General (Internal Medicine)  Code, Bjorn HERRERA II, MD as " "Consulting Physician (Hematology and Oncology)  Dennys Carranza MD as Referring Physician (Hospitalist)      Subjective   History Present Illness     Chief Complaint   Patient presents with   • Seizures     History of Present Illness   Mrs. 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  Patient is a very poor historian, continues to fall asleep during examination.  She states that she had \"seizure activity\" today and she passed out 2 times, is unclear if the 2 times of \"passing out or at the same time as the \"seizure activity\".  She states that she was told that she had jerking of her legs, her eyes rolled back in her head and she was incontinent of urine.  She did fall and continues to complain of left hip pain from fall, the x-ray is negative, she seems to have normal range of motion just pain with movement.  She does have a history of chronic pain and is on narcotics at home, she denies any illegal drug use or alcohol use.  Patient states she has had a few other \"these episodes\" in the last few weeks but has not seek medical treatment for them.  She has been seen in the emergency room several times with complaints of pain.  She does have chronic back pain, denies any radiating leg pain, no new back pain, she has been able to get up and void in the bedside commode and according to ED notes postvoid residual was 0.  Patient states she was on the ground for several hours this afternoon after her episode because she was unable to get up due to the pain in her hip.  In the emergency room urine drug screen was positive for opiates which she does have a prescription for Norco, blood alcohol level pending.  CK level 889, this could represents a possible seizure activity or prolonged lying on the floor due to hip pain.  Blood glucose 107, sodium 139, potassium 3.7, " "creatinine 0.75, BUN 4, alkaline phosphatase 126, ALT 35, AST 57, white blood cell count 6.7, hemoglobin 12.7, hematocrit 37.4, platelets 242.  Patient does states she is on a \"blood thinner\" for splenic infarct that was diagnosed about a year ago according to the patient.  Urinalysis is negative, COVID-19 pending, patient states she has been vaccinated.  X-ray of left hip shows no acute osseous or articular abnormality nor avascular necrosis.  CT of her head shows no acute infarct or hemorrhage.  CT lumbar spine shows no acute fracture or traumatic malalignment.  EKG shows sinus rhythm with a rate 91.    Review of Systems   Constitutional: Negative for appetite change and fever.   HENT: Negative for nosebleeds and trouble swallowing.    Eyes: Negative for photophobia, redness and visual disturbance.   Respiratory: Negative for cough, chest tightness, shortness of breath and wheezing.    Cardiovascular: Negative for chest pain, palpitations and leg swelling.   Gastrointestinal: Negative for abdominal distention, abdominal pain, nausea and vomiting.   Endocrine: Negative.    Genitourinary: Negative.    Musculoskeletal: Negative for gait problem and joint swelling.   Skin: Negative.    Neurological: Positive for seizures (shaking legs, incontinenece, eyes rolled back in her head reported by her boyfriend) and syncope. Negative for dizziness, speech difficulty, light-headedness and headaches.   Hematological: Negative.    Psychiatric/Behavioral: Positive for confusion. Negative for behavioral problems.        Personal History     Past Medical History:   Diagnosis Date   • Abnormal Pap smear of cervix    • Ankle fracture 2013   • H/O Elevated liver enzymes    • History of chronic back pain    • History of migraine    • History of urinary tract infection    • Injury of back    • PONV (postoperative nausea and vomiting)    • Seasonal allergies      Past Surgical History:   Procedure Laterality Date   • BACK SURGERY  2006 "    fusion L4, L5     • CHOLECYSTECTOMY     • DILATATION AND CURETTAGE     • ERCP N/A 6/3/2021    Procedure: ENDOSCOPIC RETROGRADE CHOLANGIOPANCREATOGRAPHY WITH SPHINCTEROTOMY, DILATION WITH BALLOON CLEARANCE  (12MM-15MM);  Surgeon: Bjorn Flannery MD;  Location: [facility] ENDOSCOPY;  Service: Gastroenterology;  Laterality: N/A;  DILATED COMMON BILE DUCT   • SKIN SURGERY     • TUBAL ABDOMINAL LIGATION     • WISDOM TOOTH EXTRACTION  2018    x2     Family History   Problem Relation Age of Onset   • Stroke Mother    • Allergic rhinitis Mother    • Allergic rhinitis Sister    • Breast cancer Maternal Aunt    • Cancer Maternal Grandmother    • Allergic rhinitis Paternal Grandfather    • Cancer Paternal Grandfather    • Prostate cancer Paternal Grandfather      Social History     Tobacco Use   • Smoking status: Current Some Day Smoker     Packs/day: 0.50     Last attempt to quit: 2020     Years since quittin.1   • Smokeless tobacco: Never Used   Vaping Use   • Vaping Use: Never used   Substance Use Topics   • Alcohol use: Not Currently   • Drug use: Not Currently     No current facility-administered medications on file prior to encounter.     Current Outpatient Medications on File Prior to Encounter   Medication Sig Dispense Refill   • amitriptyline (ELAVIL) 25 MG tablet Take 25 mg by mouth Every Night.     • apixaban (ELIQUIS) 5 MG tablet tablet Take 5 mg by mouth 2 (Two) Times a Day. Last filled 21 - pt states she was instructed to stop taking medication prior to ERCP ~1 month ago and was not instructed to restart medication.   Indication: Splenic infarct     • cyclobenzaprine (FLEXERIL) 10 MG tablet Take 1 tablet by mouth 2 (Two) Times a Day As Needed for Muscle Spasms. 20 tablet 0   • doxycycline (MONODOX) 100 MG capsule Take 1 capsule by mouth 2 (Two) Times a Day. 20 capsule 0   • famotidine (PEPCID) 20 MG tablet Take 1 tablet by mouth 2 (Two) Times a Day Before Meals.     • FLUoxetine  (PROzac) 40 MG capsule Take 40 mg by mouth Every Night.     • folic acid (FOLVITE) 1 MG tablet Take 1 tablet by mouth Daily. 30 tablet 0   • gabapentin (NEURONTIN) 800 MG tablet Take 800 mg by mouth 4 (Four) Times a Day.     • HYDROcodone-acetaminophen (NORCO) 7.5-325 MG per tablet Take 1 tablet by mouth Every 6 (Six) Hours As Needed for Moderate Pain . 12 tablet 0   • hydrOXYzine (ATARAX) 25 MG tablet Take 1-2 tablets by mouth 3 (Three) Times a Day As Needed for Anxiety.     • melatonin 5 MG tablet tablet Take 10 mg by mouth Every Night.     • methylPREDNISolone (MEDROL) 4 MG dose pack Take as directed on package instructions. 1 each 0   • polyethylene glycol (polyethylene glycol) 17 g packet Take 17 g by mouth Daily.     • SUMAtriptan (IMITREX) 100 MG tablet Take 50 mg by mouth Every 2 (Two) Hours As Needed for Migraine. Take one tablet at onset of headache. May repeat dose one time in 2 hours if headache not relieved.     • traZODone (DESYREL) 50 MG tablet Take 25-50 mg by mouth At Night As Needed for Sleep.     • vitamin D (ERGOCALCIFEROL) 1.25 MG (39909 UT) capsule capsule Take 50,000 Units by mouth 1 (One) Time Per Week.       No Known Allergies    Objective    Objective     Vital Signs  Temp:  [98.2 °F (36.8 °C)] 98.2 °F (36.8 °C)  Heart Rate:  [84-89] 87  Resp:  [16] 16  BP: (111-133)/(67-94) 121/94  SpO2:  [94 %-100 %] 99 %  on   ;      There is no height or weight on file to calculate BMI.    Physical Exam  Vitals and nursing note reviewed.   Constitutional:       General: She is not in acute distress.     Appearance: She is well-developed.   HENT:      Head: Normocephalic.   Neck:      Vascular: No JVD.   Cardiovascular:      Rate and Rhythm: Normal rate and regular rhythm.      Heart sounds: Normal heart sounds.      Comments: Normal sinus rhythm on monitor with heart rate 76 during my exam.  No peripheral edema  Pulmonary:      Effort: Pulmonary effort is normal.      Breath sounds: Normal breath  sounds.      Comments: Lung sounds clear, sats 97% on 2 L of oxygen  Abdominal:      General: Bowel sounds are normal. There is no distension.      Palpations: Abdomen is soft.      Tenderness: There is no abdominal tenderness.   Musculoskeletal:         General: Normal range of motion.      Cervical back: Normal range of motion.      Right lower leg: No edema.      Left lower leg: No edema.   Skin:     General: Skin is warm and dry.      Capillary Refill: Capillary refill takes less than 2 seconds.   Neurological:      Mental Status: She is lethargic.      Comments: Patient sleepy, easily aroused by voice but falls back to sleep during conversation, will answer some questions.  She could tell me her name, date of birth, tell me the year was 2019, unable to give very good history.  She does follow commands and does not appear to have any focal motor neuro deficits.   Psychiatric:         Behavior: Behavior is slowed.         Cognition and Memory: Cognition is impaired. Memory is impaired.         Results Review:  I reviewed the patient's new clinical results.  I reviewed the patient's new imaging results and agree with the interpretation.  I reviewed the patient's other test results and agree with the interpretation  I personally viewed and interpreted the patient's EKG/Telemetry data  Discussed with ED provider.    Lab Results (last 24 hours)     Procedure Component Value Units Date/Time    CBC & Differential [[id number]]  (Normal) Collected: 01/01/22 1723    Specimen: Blood Updated: 01/01/22 1823    Narrative:      The following orders were created for panel order CBC & Differential.  Procedure                               Abnormality         Status                     ---------                               -----------         ------                     CBC Auto Differential[[id number]]        Normal              Final result                 Please view results for these tests on the individual orders.     Comprehensive Metabolic Panel [[id number]]  (Abnormal) Collected: 01/01/22 1723    Specimen: Blood Updated: 01/01/22 1848     Glucose 107 mg/dL      BUN 4 mg/dL      Creatinine 0.75 mg/dL      Sodium 139 mmol/L      Potassium 3.7 mmol/L      Chloride 100 mmol/L      CO2 30.0 mmol/L      Calcium 9.7 mg/dL      Total Protein 6.3 g/dL      Albumin 3.80 g/dL      ALT (SGPT) 35 U/L      AST (SGOT) 57 U/L      Alkaline Phosphatase 126 U/L      Total Bilirubin 0.5 mg/dL      eGFR Non African Amer 86 mL/min/1.73      Globulin 2.5 gm/dL      A/G Ratio 1.5 g/dL      BUN/Creatinine Ratio 5.3     Anion Gap 9.0 mmol/L     Narrative:      GFR Normal >60  Chronic Kidney Disease <60  Kidney Failure <15      hCG, Serum, Qualitative [[id number]]  (Normal) Collected: 01/01/22 1723    Specimen: Blood Updated: 01/01/22 1830     HCG Qualitative Negative    CBC Auto Differential [[id number]]  (Normal) Collected: 01/01/22 1723    Specimen: Blood Updated: 01/01/22 1823     WBC 6.87 10*3/mm3      RBC 4.01 10*6/mm3      Hemoglobin 12.7 g/dL      Hematocrit 37.4 %      MCV 93.3 fL      MCH 31.7 pg      MCHC 34.0 g/dL      RDW 12.6 %      RDW-SD 43.1 fl      MPV 10.3 fL      Platelets 242 10*3/mm3      Neutrophil % 62.8 %      Lymphocyte % 27.1 %      Monocyte % 7.4 %      Eosinophil % 2.0 %      Basophil % 0.4 %      Immature Grans % 0.3 %      Neutrophils, Absolute 4.31 10*3/mm3      Lymphocytes, Absolute 1.86 10*3/mm3      Monocytes, Absolute 0.51 10*3/mm3      Eosinophils, Absolute 0.14 10*3/mm3      Basophils, Absolute 0.03 10*3/mm3      Immature Grans, Absolute 0.02 10*3/mm3      nRBC 0.0 /100 WBC     CK [[id number]]  (Abnormal) Collected: 01/01/22 1723    Specimen: Blood Updated: 01/01/22 1848     Creatine Kinase 889 U/L     Troponin [[id number]]  (Normal) Collected: 01/01/22 1723    Specimen: Blood Updated: 01/01/22 1840     Troponin T <0.010 ng/mL     Narrative:      Troponin T Reference Range:  <= 0.03 ng/mL-   Negative for AMI  >0.03  ng/mL-     Abnormal for myocardial necrosis.  Clinicians would have to utilize clinical acumen, EKG, Troponin and serial changes to determine if it is an Acute Myocardial Infarction or myocardial injury due to an underlying chronic condition.       Results may be falsely decreased if patient taking Biotin.      Urinalysis With Microscopic If Indicated (No Culture) - Urine, Clean Catch [[id number]]  (Normal) Collected: 01/01/22 1726    Specimen: Urine, Clean Catch Updated: 01/01/22 1821     Color, UA Yellow     Appearance, UA Clear     pH, UA 6.5     Specific Gravity, UA 1.010     Glucose, UA Negative     Ketones, UA Negative     Bilirubin, UA Negative     Blood, UA Negative     Protein, UA Negative     Leuk Esterase, UA Negative     Nitrite, UA Negative     Urobilinogen, UA 0.2 E.U./dL    Narrative:      Urine microscopic not indicated.    Urine Drug Screen - Urine, Clean Catch [[id number]]  (Abnormal) Collected: 01/01/22 1726    Specimen: Urine, Clean Catch Updated: 01/01/22 1839     Amphet/Methamphet, Screen Negative     Barbiturates Screen, Urine Negative     Benzodiazepine Screen, Urine Negative     Cocaine Screen, Urine Negative     Opiate Screen Positive     THC, Screen, Urine Negative     Methadone Screen, Urine Negative     Oxycodone Screen, Urine Negative    Narrative:      Negative Thresholds Per Drugs Screened:    Amphetamines                 500 ng/ml  Barbiturates                 200 ng/ml  Benzodiazepines              100 ng/ml  Cocaine                      300 ng/ml  Methadone                    300 ng/ml  Opiates                      300 ng/ml  Oxycodone                    100 ng/ml  THC                           50 ng/ml    The Normal Value for all drugs tested is negative. This report includes final unconfirmed screening results to be used for medical treatment purposes only. Unconfirmed results must not be used for non-medical purposes such as employment or legal testing. Clinical consideration  should be applied to any drug of abuse test, particularly when unconfirmed results are used.            COVID PRE-OP / PRE-PROCEDURE SCREENING ORDER (NO ISOLATION) - Swab, Nasopharynx [[id number]] Collected: 01/01/22 2114    Specimen: Swab from Nasopharynx Updated: 01/01/22 2120    Narrative:      The following orders were created for panel order COVID PRE-OP / PRE-PROCEDURE SCREENING ORDER (NO ISOLATION) - Swab, Nasopharynx.  Procedure                               Abnormality         Status                     ---------                               -----------         ------                     COVID-19,APTIMA PANTHER(...[[id number]]                      In process                   Please view results for these tests on the individual orders.    COVID-19,APTIMA PANTHER(TILA),BH LETY/[facility]LAYNE, NP/OP SWAB IN UTM/VTM/SALINE TRANSPORT MEDIA,24 HR TAT - Swab, Nasopharynx [[id number]] Collected: 01/01/22 2114    Specimen: Swab from Nasopharynx Updated: 01/01/22 2120          Imaging Results (Last 24 Hours)     Procedure Component Value Units Date/Time    CT Lumbar Spine Without Contrast [[id number]] Collected: 01/01/22 1852     Updated: 01/01/22 1852    Narrative:        Patient: CARSON MULLEN  Time Out: 18:51  Exam(s): CT L SPINE     EXAM:    CT Lumbar Spine Without Intravenous Contrast    CLINICAL HISTORY:     Reason for exam: low back pain.    TECHNIQUE:    Axial computed tomography images of the lumbar spine without   intravenous contrast.  CTDI is 68.9 mGy and DLP is 2326 mGy-cm.  This CT   exam was performed according to the principle of ALARA (As Low As   Reasonably Achievable) by using one or more of the following dose   reduction techniques: automated exposure control, adjustment of the mA   and or kV according to patient size, and or use of iterative   reconstruction technique.    COMPARISON:    No relevant prior studies available.    FINDINGS:    Vertebrae:  No acute fracture or traumatic malalignment.    Discs  spinal canal neural foramina:  Postsurgical changes noted at L5-  S1.  Minimal multilevel degenerative changes.    Soft tissues:  Unremarkable.   Other: Scarring and presumed cyst within the left kidney.    Nonobstructing calculus within left kidney.  Gallbladder is surgically   absent.    IMPRESSION:         No acute fracture or traumatic malalignment.      Impression:          Electronically signed by Sarath Lilly MD on 01-01-22 at 1851    CT Head Without Contrast [[id number]] Collected: 01/01/22 1848     Updated: 01/01/22 1848    Narrative:        Patient: CARSON MULLEN  Time Out: 18:47  Exam(s): CT HEAD Without Contrast     EXAM:    CT Head Without Intravenous Contrast    CLINICAL HISTORY:     Reason for exam: seizure, syncope.    TECHNIQUE:    Axial computed tomography images of the head brain without intravenous   contrast.  CTDI is 54.8 mGy and DLP is 1023.8 mGy-cm.  This CT exam was   performed according to the principle of ALARA (As Low As Reasonably   Achievable) by using one or more of the following dose reduction   techniques: automated exposure control, adjustment of the mA and or kV   according to patient size, and or use of iterative reconstruction   technique.    COMPARISON:    CT head dated 11 28 2020    FINDINGS:    Brain:  No acute infarct or hemorrhage.    Ventricles:  Unremarkable.  No ventriculomegaly.    Bones joints:  Unremarkable.  No acute calvarial fracture.    Soft tissues:  Unremarkable.    Sinuses:  Mild mucosal thickening the paranasal sinuses.    Mastoid air cells:  Unremarkable as visualized.    IMPRESSION:         No acute infarct or hemorrhage.      Impression:          Electronically signed by Sarath Lilly MD on 01-01-22 at 1847    XR Hip With or Without Pelvis 2 - 3 View Left [[id number]] Collected: 01/01/22 1829     Updated: 01/01/22 1834    Narrative:      XR HIP W OR WO PELVIS 2-3 VIEW LEFT-     Clinical: Fell, pain     FINDINGS: The left hip joint is normal. No  avascular necrosis, fracture,  dislocation or bone lesion. The left hemipelvis is satisfactory in  appearance. Soft tissues within normal limits.     CONCLUSION: No acute osseous or articular abnormality nor avascular  necrosis.     This report was finalized on 1/1/2022 6:31 PM by Dr. Matias Romero M.D.             Results for orders placed during the hospital encounter of 11/08/20    Adult Transesophageal Echo (DENIS) W/ Cont if Necessary Per Protocol    Interpretation Summary  · Left ventricular ejection fraction appears to be 61 - 65%. Left ventricular systolic function is normal.  · Saline test results are negative.      ECG 12 Lead   Preliminary Result   HEART RATE= 91  bpm   RR Interval= 656  ms   AR Interval= 144  ms   P Horizontal Axis= 16  deg   P Front Axis= 70  deg   QRSD Interval= 90  ms   QT Interval= 376  ms   QRS Axis= 33  deg   T Wave Axis= 33  deg   - BORDERLINE ECG -   Sinus rhythm   Low voltage, precordial leads   Borderline T abnormalities, anterior leads   Electronically Signed By:    Date and Time of Study: 2022-01-01 16:55:32           Assessment/Plan     Active Hospital Problems    Diagnosis  POA   • **Syncope [R55]  Yes   • Seizures (HCC) [R56.9]  Yes   • Rhabdomyolysis [M62.82]  Yes   • Encephalopathy [G93.40]  Unknown   • Other chronic pain [G89.29]  Yes   • GERD without esophagitis [K21.9]  Yes   • Tobacco abuse [Z72.0]  Yes   • On anticoagulant therapy [Z79.01]  Not Applicable   • Anxiety disorder [F41.9]  Yes   • Obesity (BMI 35.0-39.9 without comorbidity) [E66.9]  Yes     Mrs. 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     Syncope/seizure/rhabdomyolysis/encephalopathy  -Telemetry unit for monitoring  -Neurochecks every 4 hours  -Consult neurology  -Seizure precautions  -Started on Keppra in the emergency room, will defer to  neurology for further antiepileptic medications  -Normal saline at 125 cc an hour overnight, recheck CK in a.m.  -We will likely need EEG, will defer to neurology in a.m.  -Orthostatic blood pressures  -Check alcohol level    History of splenic infarct/chronic anticoagulation  -Continue Eliquis    Anxiety disorder/GERD  -Chronic conditions, continue home medications when med rec is verified    · I discussed the patient's findings and my recommendations with patient and ED provider.    VTE Prophylaxis - SCDs.  Code Status - Full code.       NELSON Elliott  [facility]  01/02/22  00:37 EST      Electronically signed by Saqib Pantoja MD at 01/02/22 7217

## 2022-01-05 NOTE — DISCHARGE SUMMARY
Patient Name: Belen Allen  : 1980  MRN: [id number]    Date of Admission: 2022  Date of Discharge:  2022  Primary Care Physician: Sahil Cornelius MD      Chief Complaint:   Seizures      Discharge Diagnoses     Active Hospital Problems    Diagnosis  POA   • **Syncope [R55]  Yes   • Lumbar back pain with radiculopathy affecting left lower extremity [M54.16]  Yes   • Seizures (HCC) [R56.9]  Yes   • Other chronic pain [G89.29]  Yes   • Tobacco abuse [Z72.0]  Yes   • On anticoagulant therapy [Z79.01]  Not Applicable   • Anxiety disorder [F41.9]  Yes   • Obesity (BMI 30-39.9) [E66.9]  Yes      Resolved Hospital Problems    Diagnosis Date Resolved POA   • Rhabdomyolysis [M62.82] 2022 Yes   • Encephalopathy [G93.40] 2022 Unknown        Hospital Course     Ms. Allen is a 41 y.o. female with a history of seizures in the distant past, tobacco use, chronic back pain, and obesity who presented to [facility] initially complaining of falling episodes and.  Please see the admitting history and physical for further details.  She was found to have possible seizures with back and leg pain and was admitted to the hospital for further evaluation and treatment.  She was seen and evaluated neurology and we did do an inpatient EEG that was negative for abnormal activity.  Neurology recommended antiepileptic medication and seizure precautions with outpatient neurology follow-up for prolonged EEG.  She said no seizure activity during her hospitalization.  She also complained of pain in her left lower back hip and radicular symptoms down her left leg.  She has a strong history of chronic back issues and is actually been admitted by our service in the past for this issue.  Despite multiple attempts to avoid narcotic pain medications the patient was fairly insistent that it was the only thing that would work.  She was started on IV morphine in order to proceed with MRIs of the  thoracic and lumbar spine.  The patient could not complete imaging and aborted the procedure before contrast could be given.  Noncontrast imaging just showed some mild foraminal narrowing but no areas of stenosis or acute abnormalities.  She worked with physical therapy and Occupational Therapy and is able to walk independently and complete her ADLs.  IV narcotics have been discontinued I have agreed to a short course of oral pain medication at discharge with continuation of the Medrol Dosepak.  The plan was discussed with the patient at discharge she acknowledged understanding and will follow up with her primary care physician at the end of this week or beginning of next week for further direction regarding her pain.  She was given follow-up instructions for the neurology group and patient will make that follow-up appointment after discharge.      Day of Discharge     Subjective:  She denies any issues today.  Her pain is a little better overnight.  No further seizure activity    Physical Exam:  Temp:  [97.7 °F (36.5 °C)-99.3 °F (37.4 °C)] 98.4 °F (36.9 °C)  Heart Rate:  [76-91] 84  Resp:  [16] 16  BP: (126-158)/() 153/111  Body mass index is 31.76 kg/m².  Physical Exam  Vitals and nursing note reviewed.   Constitutional:       Appearance: Normal appearance.   Pulmonary:      Effort: Pulmonary effort is normal. No respiratory distress.   Neurological:      General: No focal deficit present.      Mental Status: She is alert and oriented to person, place, and time. Mental status is at baseline.   Psychiatric:         Mood and Affect: Mood normal.         Behavior: Behavior normal.         Consultants     Consult Orders (all) (From admission, onward)     Start     Ordered    01/01/22 2317  Inpatient Neurology Consult General  Once        Specialty:  Neurology  Provider:  Jamie Rodriguez MD    01/01/22 7720    01/01/22 1926  [facility] (on-call MD unless specified) Details  Once,   Status:  Canceled        Specialty:   Hospitalist  Provider:  (Not yet assigned)    01/01/22 1925              Procedures     * Surgery not found *      Imaging Results (All)     Procedure Component Value Units Date/Time    MRI Lumbar Spine Without Contrast [[id number]] Collected: 01/04/22 1144     Updated: 01/04/22 1312    Narrative:      MRI OF THE BRAIN AND LUMBAR SPINE WITHOUT CONTRAST     CLINICAL HISTORY: History of seizures and chronic low back pain.  Worsening left leg pain. History of lumbar spine fusion procedure in  2006. Falls, hitting head.     TECHNIQUE: MRI of the brain was obtained with sagittal T1, axial T1,  axial FLAIR, axial T2, axial diffusion, axial MP RAGE, and axial  susceptibility weighted images.     Comparison is made to previous CT scan of the head dated 01/01/2022.     FINDINGS:     The ventricles, sulci, and cisterns are age appropriate. The midline  intracranial anatomy is within normal limits. There are no abnormal foci  of restricted diffusion within the brain parenchyma. No significant  white matter abnormalities are identified. The major intracranial flow  related signal voids are within normal limits. No congenital anomalies  are appreciated on the axial MP RAGE images. No abnormal areas of  susceptibility artifact are appreciated.     Incidental note is made of trace fluid signal intensity within the  mastoid air cells bilaterally.       Impression:         Unremarkable MRI of the brain.        TECHNIQUE: MRI of the lumbar spine was obtained with sagittal T1,  proton-density, and T2-weighted images. Additionally, there are axial T1  and T2-weighted images through the lumbar spine.     Comparison is made to previous CT scan of the lumbar spine dated  01/01/2022.     FINDINGS:     The conus medullaris terminates at the level of the L1-2 disc space and  has normal signal intensity. The visualized distal thoracic spinal cord  is unremarkable.     At L1-2, L2-3, L3-4, and L4-5, there is no significant canal or  foraminal  stenosis.     At the L5-S1 level, there has been a prior bilateral laminectomy  procedure. There has been a previous interbody fusion procedure at L5-S1  and on the recent CT scan of the lumbar spine, there was incomplete  osseous fusion across the disc space. Bilateral pedicle screws and  posterior rods are noted fusing L5 down to S1. There is mild bilateral  foraminal narrowing at the L5-S1 level secondary to a disc osteophyte  complex or new bone formation mildly narrowing the neural foramina.     There is no pathological area of marrow replacement. There is  maintenance of vertebral body height.     IMPRESSION:     There have been prior bilateral laminectomy procedures at the L5-S1  level. There has been a previous interbody fusion procedure at L5-S1  with incomplete osseous fusion noted across the L5-S1 disc space. Mild  bilateral foraminal narrowing is seen at L5-S1. Otherwise, no  significant canal or foraminal stenosis is noted throughout the  remaining lumbar spine.     This report was finalized on 1/4/2022 1:09 PM by Dr. Vinicius Muse M.D.       MRI Brain Without Contrast [[id number]] Collected: 01/04/22 1144     Updated: 01/04/22 1312    Narrative:      MRI OF THE BRAIN AND LUMBAR SPINE WITHOUT CONTRAST     CLINICAL HISTORY: History of seizures and chronic low back pain.  Worsening left leg pain. History of lumbar spine fusion procedure in  2006. Falls, hitting head.     TECHNIQUE: MRI of the brain was obtained with sagittal T1, axial T1,  axial FLAIR, axial T2, axial diffusion, axial MP RAGE, and axial  susceptibility weighted images.     Comparison is made to previous CT scan of the head dated 01/01/2022.     FINDINGS:     The ventricles, sulci, and cisterns are age appropriate. The midline  intracranial anatomy is within normal limits. There are no abnormal foci  of restricted diffusion within the brain parenchyma. No significant  white matter abnormalities are identified. The major intracranial  flow  related signal voids are within normal limits. No congenital anomalies  are appreciated on the axial MP RAGE images. No abnormal areas of  susceptibility artifact are appreciated.     Incidental note is made of trace fluid signal intensity within the  mastoid air cells bilaterally.       Impression:         Unremarkable MRI of the brain.        TECHNIQUE: MRI of the lumbar spine was obtained with sagittal T1,  proton-density, and T2-weighted images. Additionally, there are axial T1  and T2-weighted images through the lumbar spine.     Comparison is made to previous CT scan of the lumbar spine dated  01/01/2022.     FINDINGS:     The conus medullaris terminates at the level of the L1-2 disc space and  has normal signal intensity. The visualized distal thoracic spinal cord  is unremarkable.     At L1-2, L2-3, L3-4, and L4-5, there is no significant canal or  foraminal stenosis.     At the L5-S1 level, there has been a prior bilateral laminectomy  procedure. There has been a previous interbody fusion procedure at L5-S1  and on the recent CT scan of the lumbar spine, there was incomplete  osseous fusion across the disc space. Bilateral pedicle screws and  posterior rods are noted fusing L5 down to S1. There is mild bilateral  foraminal narrowing at the L5-S1 level secondary to a disc osteophyte  complex or new bone formation mildly narrowing the neural foramina.     There is no pathological area of marrow replacement. There is  maintenance of vertebral body height.     IMPRESSION:     There have been prior bilateral laminectomy procedures at the L5-S1  level. There has been a previous interbody fusion procedure at L5-S1  with incomplete osseous fusion noted across the L5-S1 disc space. Mild  bilateral foraminal narrowing is seen at L5-S1. Otherwise, no  significant canal or foraminal stenosis is noted throughout the  remaining lumbar spine.     This report was finalized on 1/4/2022 1:09 PM by Dr. Vinicius Muse,  M.D.       MRI Thoracic Spine With & Without Contrast [[id number]] Resulted: 01/04/22 0839     Updated: 01/04/22 0935    CT Lumbar Spine Without Contrast [[id number]] Collected: 01/01/22 1852     Updated: 01/01/22 1852    Narrative:        Patient: CARSON MULLEN  Time Out: 18:51  Exam(s): CT L SPINE     EXAM:    CT Lumbar Spine Without Intravenous Contrast    CLINICAL HISTORY:     Reason for exam: low back pain.    TECHNIQUE:    Axial computed tomography images of the lumbar spine without   intravenous contrast.  CTDI is 68.9 mGy and DLP is 2326 mGy-cm.  This CT   exam was performed according to the principle of ALARA (As Low As   Reasonably Achievable) by using one or more of the following dose   reduction techniques: automated exposure control, adjustment of the mA   and or kV according to patient size, and or use of iterative   reconstruction technique.    COMPARISON:    No relevant prior studies available.    FINDINGS:    Vertebrae:  No acute fracture or traumatic malalignment.    Discs spinal canal neural foramina:  Postsurgical changes noted at L5-  S1.  Minimal multilevel degenerative changes.    Soft tissues:  Unremarkable.   Other: Scarring and presumed cyst within the left kidney.    Nonobstructing calculus within left kidney.  Gallbladder is surgically   absent.    IMPRESSION:         No acute fracture or traumatic malalignment.      Impression:          Electronically signed by Sarath Lilly MD on 01-01-22 at 1851    CT Head Without Contrast [[id number]] Collected: 01/01/22 1848     Updated: 01/01/22 1848    Narrative:        Patient: CARSON MULLEN  Time Out: 18:47  Exam(s): CT HEAD Without Contrast     EXAM:    CT Head Without Intravenous Contrast    CLINICAL HISTORY:     Reason for exam: seizure, syncope.    TECHNIQUE:    Axial computed tomography images of the head brain without intravenous   contrast.  CTDI is 54.8 mGy and DLP is 1023.8 mGy-cm.  This CT exam was   performed according to the  principle of ALARA (As Low As Reasonably   Achievable) by using one or more of the following dose reduction   techniques: automated exposure control, adjustment of the mA and or kV   according to patient size, and or use of iterative reconstruction   technique.    COMPARISON:    CT head dated 11 28 2020    FINDINGS:    Brain:  No acute infarct or hemorrhage.    Ventricles:  Unremarkable.  No ventriculomegaly.    Bones joints:  Unremarkable.  No acute calvarial fracture.    Soft tissues:  Unremarkable.    Sinuses:  Mild mucosal thickening the paranasal sinuses.    Mastoid air cells:  Unremarkable as visualized.    IMPRESSION:         No acute infarct or hemorrhage.      Impression:          Electronically signed by Sarath Lilly MD on 01-01-22 at 1847    XR Hip With or Without Pelvis 2 - 3 View Left [[id number]] Collected: 01/01/22 1829     Updated: 01/01/22 1834    Narrative:      XR HIP W OR WO PELVIS 2-3 VIEW LEFT-     Clinical: Fell, pain     FINDINGS: The left hip joint is normal. No avascular necrosis, fracture,  dislocation or bone lesion. The left hemipelvis is satisfactory in  appearance. Soft tissues within normal limits.     CONCLUSION: No acute osseous or articular abnormality nor avascular  necrosis.     This report was finalized on 1/1/2022 6:31 PM by Dr. Matias Romero M.D.           Results for orders placed during the hospital encounter of 02/21/21    Duplex Venous Lower Extremity - Left    Interpretation Summary  · Normal left lower extremity venous duplex scan.    Results for orders placed during the hospital encounter of 11/08/20    Adult Transesophageal Echo (DENIS) W/ Cont if Necessary Per Protocol    Interpretation Summary  · Left ventricular ejection fraction appears to be 61 - 65%. Left ventricular systolic function is normal.  · Saline test results are negative.    Pertinent Labs     Results from last 7 days   Lab Units 01/05/22  0412 01/04/22  2225 01/02/22  0508 01/01/22  1723   WBC  10*3/mm3 6.59 7.51 6.89 6.87   HEMOGLOBIN g/dL 13.6 13.4 12.0 12.7   PLATELETS 10*3/mm3 281 300 247 242     Results from last 7 days   Lab Units 01/05/22 0412 01/04/22 2225 01/02/22  0508 01/01/22  1723   SODIUM mmol/L 140 140 143 139   POTASSIUM mmol/L 4.6 5.6* 3.9 3.7   CHLORIDE mmol/L 103 106 107 100   CO2 mmol/L 25.4 24.0 30.6* 30.0*   BUN mg/dL 9 8 5* 4*   CREATININE mg/dL 0.80 0.82 0.78 0.75   GLUCOSE mg/dL 129* 112* 106* 107*   EGFR IF NONAFRICN AM mL/min/1.73 79 77 82 86     Results from last 7 days   Lab Units 01/05/22 0412 01/04/22 2225 01/02/22  0508 01/01/22  1723   ALBUMIN g/dL 4.00 3.70 3.90 3.80   BILIRUBIN mg/dL 0.2 <0.2 0.3 0.5   ALK PHOS U/L 108 109 104 126*   AST (SGOT) U/L 18 23 44* 57*   ALT (SGPT) U/L 18 20 30 35*     Results from last 7 days   Lab Units 01/05/22 0412 01/04/22 2225 01/04/22 0417 01/02/22  0508 01/01/22  1723   CALCIUM mg/dL 9.4 9.4  --  8.9 9.7   ALBUMIN g/dL 4.00 3.70  --  3.90 3.80   MAGNESIUM mg/dL  --   --  2.0  --   --        Results from last 7 days   Lab Units 01/02/22  0508 01/01/22  1723   CK TOTAL U/L 392* 889*   TROPONIN T ng/mL  --  <0.010           Invalid input(s): LDLCALC      Results from last 7 days   Lab Units 01/01/22 2114   COVID19  Not Detected       Test Results Pending at Discharge       Discharge Details        Discharge Medications      New Medications      Instructions Start Date   divalproex 500 MG DR tablet  Commonly known as: DEPAKOTE   500 mg, Oral, Every 12 Hours Scheduled      methocarbamol 750 MG tablet  Commonly known as: ROBAXIN   750 mg, Oral, Every 8 Hours PRN      oxyCODONE-acetaminophen  MG per tablet  Commonly known as: PERCOCET   1 tablet, Oral, Every 6 Hours PRN         Continue These Medications      Instructions Start Date   amitriptyline 25 MG tablet  Commonly known as: ELAVIL   25 mg, Oral, Nightly      apixaban 5 MG tablet tablet  Commonly known as: ELIQUIS   5 mg, Oral, 2 Times Daily, Last filled 4/12/21 - pt [facility]  she was instructed to stop taking medication prior to ERCP ~1 month ago and was not instructed to restart medication.  Indication: Splenic infarct       Calcium Carbonate-Vitamin D 600-200 MG-UNIT tablet   1 tablet, Oral, Daily      famotidine 20 MG tablet  Commonly known as: PEPCID   20 mg, Oral, 2 Times Daily Before Meals      folic acid 1 MG tablet  Commonly known as: FOLVITE   1 mg, Oral, Daily      gabapentin 800 MG tablet  Commonly known as: NEURONTIN   800 mg, Oral, 4 Times Daily      hydrOXYzine 25 MG tablet  Commonly known as: ATARAX   1-2 tablets, Oral, 3 Times Daily PRN      loratadine 10 MG tablet  Commonly known as: CLARITIN   10 mg, Oral, 2 Times Daily      LORazepam 0.5 MG tablet  Commonly known as: ATIVAN   0.25-0.5 mg, Oral      melatonin 5 MG tablet tablet   10 mg, Oral, Nightly      methylPREDNISolone 4 MG dose pack  Commonly known as: MEDROL   Take as directed on package instructions.      omeprazole 20 MG capsule  Commonly known as: priLOSEC   20 mg, Oral, Daily      ondansetron ODT 8 MG disintegrating tablet  Commonly known as: ZOFRAN-ODT   8 mg, Oral      polyethylene glycol 17 g packet  Commonly known as: MIRALAX   17 g, Oral, Daily      PROzac 40 MG capsule  Generic drug: FLUoxetine   40 mg, Oral, Nightly      SUMAtriptan 100 MG tablet  Commonly known as: IMITREX   50 mg, Oral, Every 2 Hours PRN, Take one tablet at onset of headache. May repeat dose one time in 2 hours if headache not relieved.       traZODone 50 MG tablet  Commonly known as: DESYREL   100 mg, Oral, Nightly PRN, Take 1 to 2 tablets by mouth every  Night as needed for sleep      vitamin D 1.25 MG (74826 UT) capsule capsule  Commonly known as: ERGOCALCIFEROL   50,000 Units, Oral, Weekly         Stop These Medications    cyclobenzaprine 10 MG tablet  Commonly known as: FLEXERIL     doxycycline 100 MG capsule  Commonly known as: MONODOX     HYDROcodone-acetaminophen 7.5-325 MG per tablet  Commonly known as: NORCO            No  Known Allergies    Discharge Disposition:  Home or Self Care      Discharge Diet:  Diet Order   Procedures   • Diet Regular; Cardiac       Discharge Activity:   Activity Instructions     Activity as Tolerated     Additional Activity Instructions:        Activity as tolerated           CODE STATUS:    Code Status and Medical Interventions:   Ordered at: 01/01/22 2321     Code Status (Patient has no pulse and is not breathing):    CPR (Attempt to Resuscitate)     Medical Interventions (Patient has pulse or is breathing):    Full Support       No future appointments.  Additional Instructions for the Follow-ups that You Need to Schedule     Ambulatory Referral to Physical Therapy Evaluate and treat   As directed      Specialty needed: Evaluate and treat         Discharge Follow-up with PCP   As directed       Currently Documented PCP:    Sahil Cornleius MD    PCP Phone Number:    [phone]     Follow Up Details: 1-2 WEEKS         Discharge Follow-up with Specified Provider: Dr Sandoval; 1 Month   As directed      To: Dr Sandoval    Follow Up: 1 Month            Follow-up Information     Sahil Cornelius MD .    Specialty: Internal Medicine  Why: 1-2 WEEKS  Contact information:  [address] [place], [place] [id number] [phone]                         Additional Instructions for the Follow-ups that You Need to Schedule     Ambulatory Referral to Physical Therapy Evaluate and treat   As directed      Specialty needed: Evaluate and treat         Discharge Follow-up with PCP   As directed       Currently Documented PCP:    Sahil Cornelius MD    PCP Phone Number:    [phone]     Follow Up Details: 1-2 WEEKS         Discharge Follow-up with Specified Provider: Dr Sandoval; 1 Month   As directed      To: Dr Sandoval    Follow Up: 1 Month           Time Spent on Discharge:  Greater than 30 minutes      David Brumfield MD  [facility]  01/05/22  14:04 EST

## 2022-01-06 ENCOUNTER — READMISSION MANAGEMENT (OUTPATIENT)
Dept: CALL CENTER | Facility: HOSPITAL | Age: 42
End: 2022-01-06

## 2022-01-06 NOTE — OUTREACH NOTE
Prep Survey      Responses   [facility] facility patient discharged from? [place]   Is LACE score < 7 ? No   Emergency Room discharge w/ pulse ox? No   Eligibility Readm Mgmt   Discharge diagnosis SyncopeRhabdomyolysis M62   Does the patient have one of the following disease processes/diagnoses(primary or secondary)? Other   Does the patient have Home health ordered? No   Is there a DME ordered? No   Prep survey completed? Yes          Paradise Connolly RN

## 2022-01-11 ENCOUNTER — READMISSION MANAGEMENT (OUTPATIENT)
Dept: CALL CENTER | Facility: HOSPITAL | Age: 42
End: 2022-01-11

## 2022-01-11 NOTE — OUTREACH NOTE
Medical Week 1 Survey      Responses   [facility] patient discharged from? [place]   Does the patient have one of the following disease processes/diagnoses(primary or secondary)? Other   Week 1 attempt successful? No   Unsuccessful attempts Attempt 1          Vanita Tineo RN

## 2022-01-14 ENCOUNTER — READMISSION MANAGEMENT (OUTPATIENT)
Dept: CALL CENTER | Facility: HOSPITAL | Age: 42
End: 2022-01-14

## 2022-01-14 NOTE — OUTREACH NOTE
Medical Week 1 Survey      Responses   [facility] patient discharged from? [place]   Does the patient have one of the following disease processes/diagnoses(primary or secondary)? Other   Week 1 attempt successful? No   Unsuccessful attempts Attempt 2          Earnestine Regalado RN

## 2022-01-21 ENCOUNTER — READMISSION MANAGEMENT (OUTPATIENT)
Dept: CALL CENTER | Facility: HOSPITAL | Age: 42
End: 2022-01-21

## 2022-01-21 NOTE — OUTREACH NOTE
Medical Week 3 Survey      Responses   [facility] patient discharged from? [place]   Does the patient have one of the following disease processes/diagnoses(primary or secondary)? Other   Week 3 attempt successful? No   Unsuccessful attempts Attempt 1          Susie Bridges RN

## 2022-01-21 NOTE — OUTREACH NOTE
Medical Week 3 Survey      Responses   [facility] patient discharged from? [place]   Does the patient have one of the following disease processes/diagnoses(primary or secondary)? Other   Week 3 attempt successful? No   Unsuccessful attempts Attempt 1          Sadie Gonzales RN

## 2022-01-24 ENCOUNTER — READMISSION MANAGEMENT (OUTPATIENT)
Dept: CALL CENTER | Facility: HOSPITAL | Age: 42
End: 2022-01-24

## 2022-01-24 NOTE — OUTREACH NOTE
Medical Week 3 Survey      Responses   [facility] patient discharged from? [place]   Does the patient have one of the following disease processes/diagnoses(primary or secondary)? Other   Week 3 attempt successful? No   Unsuccessful attempts Attempt 2          Ana Beal RN

## 2022-02-02 ENCOUNTER — APPOINTMENT (OUTPATIENT)
Dept: CT IMAGING | Facility: HOSPITAL | Age: 42
End: 2022-02-02

## 2022-02-02 ENCOUNTER — HOSPITAL ENCOUNTER (EMERGENCY)
Facility: HOSPITAL | Age: 42
Discharge: HOME | End: 2022-02-02
Attending: EMERGENCY MEDICINE | Admitting: EMERGENCY MEDICINE

## 2022-02-02 VITALS
TEMPERATURE: 97.4 F | HEART RATE: 93 BPM | RESPIRATION RATE: 16 BRPM | OXYGEN SATURATION: 92 % | BODY MASS INDEX: 31.58 KG/M2 | WEIGHT: 185 LBS | HEIGHT: 64 IN | SYSTOLIC BLOOD PRESSURE: 141 MMHG | DIASTOLIC BLOOD PRESSURE: 93 MMHG

## 2022-02-02 DIAGNOSIS — N12 PYELONEPHRITIS: Primary | ICD-10-CM

## 2022-02-02 DIAGNOSIS — R60.9 PERIPHERAL EDEMA: ICD-10-CM

## 2022-02-02 DIAGNOSIS — M54.50 CHRONIC LOW BACK PAIN, UNSPECIFIED BACK PAIN LATERALITY, UNSPECIFIED WHETHER SCIATICA PRESENT: ICD-10-CM

## 2022-02-02 DIAGNOSIS — G89.29 CHRONIC LOW BACK PAIN, UNSPECIFIED BACK PAIN LATERALITY, UNSPECIFIED WHETHER SCIATICA PRESENT: ICD-10-CM

## 2022-02-02 LAB
ALBUMIN SERPL-MCNC: 3.5 G/DL (ref 3.5–5.2)
ALBUMIN/GLOB SERPL: 1.4 G/DL
ALP SERPL-CCNC: 128 U/L (ref 39–117)
ALT SERPL W P-5'-P-CCNC: 23 U/L (ref 1–33)
ANION GAP SERPL CALCULATED.3IONS-SCNC: 8 MMOL/L (ref 5–15)
APTT PPP: 27.7 SECONDS (ref 22.7–35.4)
AST SERPL-CCNC: 21 U/L (ref 1–32)
BACTERIA UR QL AUTO: ABNORMAL /HPF
BASOPHILS # BLD AUTO: 0.02 10*3/MM3 (ref 0–0.2)
BASOPHILS NFR BLD AUTO: 0.3 % (ref 0–1.5)
BILIRUB SERPL-MCNC: <0.2 MG/DL (ref 0–1.2)
BILIRUB UR QL STRIP: NEGATIVE
BUN SERPL-MCNC: 8 MG/DL (ref 6–20)
BUN/CREAT SERPL: 11.6 (ref 7–25)
CALCIUM SPEC-SCNC: 9.1 MG/DL (ref 8.6–10.5)
CHLORIDE SERPL-SCNC: 102 MMOL/L (ref 98–107)
CLARITY UR: CLEAR
CO2 SERPL-SCNC: 30 MMOL/L (ref 22–29)
COLOR UR: YELLOW
CREAT SERPL-MCNC: 0.69 MG/DL (ref 0.57–1)
DEPRECATED RDW RBC AUTO: 48.9 FL (ref 37–54)
EOSINOPHIL # BLD AUTO: 0.33 10*3/MM3 (ref 0–0.4)
EOSINOPHIL NFR BLD AUTO: 4.4 % (ref 0.3–6.2)
ERYTHROCYTE [DISTWIDTH] IN BLOOD BY AUTOMATED COUNT: 13.8 % (ref 12.3–15.4)
GFR SERPL CREATININE-BSD FRML MDRD: 94 ML/MIN/1.73
GLOBULIN UR ELPH-MCNC: 2.5 GM/DL
GLUCOSE SERPL-MCNC: 102 MG/DL (ref 65–99)
GLUCOSE UR STRIP-MCNC: NEGATIVE MG/DL
HCT VFR BLD AUTO: 35.6 % (ref 34–46.6)
HGB BLD-MCNC: 11.7 G/DL (ref 12–15.9)
HGB UR QL STRIP.AUTO: ABNORMAL
HYALINE CASTS UR QL AUTO: ABNORMAL /LPF
IMM GRANULOCYTES # BLD AUTO: 0.05 10*3/MM3 (ref 0–0.05)
IMM GRANULOCYTES NFR BLD AUTO: 0.7 % (ref 0–0.5)
INR PPP: 0.89 (ref 0.9–1.1)
KETONES UR QL STRIP: NEGATIVE
LEUKOCYTE ESTERASE UR QL STRIP.AUTO: NEGATIVE
LIPASE SERPL-CCNC: 11 U/L (ref 13–60)
LYMPHOCYTES # BLD AUTO: 2.53 10*3/MM3 (ref 0.7–3.1)
LYMPHOCYTES NFR BLD AUTO: 33.8 % (ref 19.6–45.3)
MCH RBC QN AUTO: 32 PG (ref 26.6–33)
MCHC RBC AUTO-ENTMCNC: 32.9 G/DL (ref 31.5–35.7)
MCV RBC AUTO: 97.3 FL (ref 79–97)
MONOCYTES # BLD AUTO: 0.52 10*3/MM3 (ref 0.1–0.9)
MONOCYTES NFR BLD AUTO: 6.9 % (ref 5–12)
NEUTROPHILS NFR BLD AUTO: 4.04 10*3/MM3 (ref 1.7–7)
NEUTROPHILS NFR BLD AUTO: 53.9 % (ref 42.7–76)
NITRITE UR QL STRIP: NEGATIVE
NRBC BLD AUTO-RTO: 0 /100 WBC (ref 0–0.2)
NT-PROBNP SERPL-MCNC: 117 PG/ML (ref 0–450)
PH UR STRIP.AUTO: 7 [PH] (ref 5–8)
PLATELET # BLD AUTO: 352 10*3/MM3 (ref 140–450)
PMV BLD AUTO: 9 FL (ref 6–12)
POTASSIUM SERPL-SCNC: 3.9 MMOL/L (ref 3.5–5.2)
PROT SERPL-MCNC: 6 G/DL (ref 6–8.5)
PROT UR QL STRIP: NEGATIVE
PROTHROMBIN TIME: 12 SECONDS (ref 11.7–14.2)
QT INTERVAL: 361 MS
RBC # BLD AUTO: 3.66 10*6/MM3 (ref 3.77–5.28)
RBC # UR STRIP: ABNORMAL /HPF
REF LAB TEST METHOD: ABNORMAL
SODIUM SERPL-SCNC: 140 MMOL/L (ref 136–145)
SP GR UR STRIP: 1.02 (ref 1–1.03)
SQUAMOUS #/AREA URNS HPF: ABNORMAL /HPF
TROPONIN T SERPL-MCNC: <0.01 NG/ML (ref 0–0.03)
UROBILINOGEN UR QL STRIP: ABNORMAL
WBC # UR STRIP: ABNORMAL /HPF
WBC NRBC COR # BLD: 7.49 10*3/MM3 (ref 3.4–10.8)

## 2022-02-02 PROCEDURE — 25010000002 KETOROLAC TROMETHAMINE PER 15 MG: Performed by: EMERGENCY MEDICINE

## 2022-02-02 PROCEDURE — 83880 ASSAY OF NATRIURETIC PEPTIDE: CPT | Performed by: EMERGENCY MEDICINE

## 2022-02-02 PROCEDURE — 25010000002 ONDANSETRON PER 1 MG: Performed by: EMERGENCY MEDICINE

## 2022-02-02 PROCEDURE — 81001 URINALYSIS AUTO W/SCOPE: CPT | Performed by: EMERGENCY MEDICINE

## 2022-02-02 PROCEDURE — 80053 COMPREHEN METABOLIC PANEL: CPT | Performed by: EMERGENCY MEDICINE

## 2022-02-02 PROCEDURE — 85610 PROTHROMBIN TIME: CPT | Performed by: EMERGENCY MEDICINE

## 2022-02-02 PROCEDURE — 99283 EMERGENCY DEPT VISIT LOW MDM: CPT

## 2022-02-02 PROCEDURE — 85730 THROMBOPLASTIN TIME PARTIAL: CPT | Performed by: EMERGENCY MEDICINE

## 2022-02-02 PROCEDURE — 96375 TX/PRO/DX INJ NEW DRUG ADDON: CPT

## 2022-02-02 PROCEDURE — 84484 ASSAY OF TROPONIN QUANT: CPT | Performed by: EMERGENCY MEDICINE

## 2022-02-02 PROCEDURE — 83690 ASSAY OF LIPASE: CPT | Performed by: EMERGENCY MEDICINE

## 2022-02-02 PROCEDURE — 93010 ELECTROCARDIOGRAM REPORT: CPT | Performed by: INTERNAL MEDICINE

## 2022-02-02 PROCEDURE — 25010000002 METHYLPREDNISOLONE PER 125 MG: Performed by: EMERGENCY MEDICINE

## 2022-02-02 PROCEDURE — 85025 COMPLETE CBC W/AUTO DIFF WBC: CPT | Performed by: EMERGENCY MEDICINE

## 2022-02-02 PROCEDURE — 25010000002 MORPHINE PER 10 MG: Performed by: EMERGENCY MEDICINE

## 2022-02-02 PROCEDURE — 93005 ELECTROCARDIOGRAM TRACING: CPT | Performed by: EMERGENCY MEDICINE

## 2022-02-02 PROCEDURE — 96365 THER/PROPH/DIAG IV INF INIT: CPT

## 2022-02-02 PROCEDURE — 25010000002 CEFTRIAXONE PER 250 MG: Performed by: EMERGENCY MEDICINE

## 2022-02-02 PROCEDURE — 74176 CT ABD & PELVIS W/O CONTRAST: CPT

## 2022-02-02 RX ORDER — ONDANSETRON 2 MG/ML
4 INJECTION INTRAMUSCULAR; INTRAVENOUS ONCE
Status: COMPLETED | OUTPATIENT
Start: 2022-02-02 | End: 2022-02-02

## 2022-02-02 RX ORDER — KETOROLAC TROMETHAMINE 15 MG/ML
15 INJECTION, SOLUTION INTRAMUSCULAR; INTRAVENOUS ONCE
Status: COMPLETED | OUTPATIENT
Start: 2022-02-02 | End: 2022-02-02

## 2022-02-02 RX ORDER — SODIUM CHLORIDE 0.9 % (FLUSH) 0.9 %
10 SYRINGE (ML) INJECTION AS NEEDED
Status: DISCONTINUED | OUTPATIENT
Start: 2022-02-02 | End: 2022-02-02 | Stop reason: HOSPADM

## 2022-02-02 RX ORDER — METHYLPREDNISOLONE SODIUM SUCCINATE 125 MG/2ML
125 INJECTION, POWDER, LYOPHILIZED, FOR SOLUTION INTRAMUSCULAR; INTRAVENOUS ONCE
Status: COMPLETED | OUTPATIENT
Start: 2022-02-02 | End: 2022-02-02

## 2022-02-02 RX ORDER — CIPROFLOXACIN 500 MG/1
500 TABLET, FILM COATED ORAL 2 TIMES DAILY
Qty: 20 TABLET | Refills: 0 | Status: SHIPPED | OUTPATIENT
Start: 2022-02-02 | End: 2022-02-12

## 2022-02-02 RX ORDER — MORPHINE SULFATE 2 MG/ML
4 INJECTION, SOLUTION INTRAMUSCULAR; INTRAVENOUS ONCE
Status: COMPLETED | OUTPATIENT
Start: 2022-02-02 | End: 2022-02-02

## 2022-02-02 RX ADMIN — METHYLPREDNISOLONE SODIUM SUCCINATE 125 MG: 125 INJECTION, POWDER, FOR SOLUTION INTRAMUSCULAR; INTRAVENOUS at 06:28

## 2022-02-02 RX ADMIN — ONDANSETRON 4 MG: 2 INJECTION INTRAMUSCULAR; INTRAVENOUS at 05:22

## 2022-02-02 RX ADMIN — CEFTRIAXONE 1 G: 1 INJECTION, POWDER, FOR SOLUTION INTRAMUSCULAR; INTRAVENOUS at 06:28

## 2022-02-02 RX ADMIN — MORPHINE SULFATE 4 MG: 2 INJECTION, SOLUTION INTRAMUSCULAR; INTRAVENOUS at 05:22

## 2022-02-02 RX ADMIN — KETOROLAC TROMETHAMINE 15 MG: 15 INJECTION, SOLUTION INTRAMUSCULAR; INTRAVENOUS at 06:28

## 2022-02-02 NOTE — ED NOTES
Patient was wearing a face mask throughout our encounter.  This RN wore appropriate PPE throughout the encounter.  Hand hygiene was performed before and after patient encounter.        Pallavi Martini RN  02/02/22 0534

## 2022-02-02 NOTE — ED NOTES
Upon entering the room pt was sleeping. This RN called to pt. Pt did not wake up. Pt only aroused to vigorous shaking. Pt quick to fall back asleep and could not answer name and  without falling asleep in between. Dilaudid held d/t pt sedation.      Nisha Long, RN  22 0860

## 2022-02-02 NOTE — ED NOTES
Pt ambulatory to triage from home with c/o legs swelling x 3 days, and also c/o an area of her right flank that is causing her pain.  Pt denies dysuria.  Pt wearing mask in triage. Triage personnel wore appropriate PPE       Sheridan Romero RN  02/02/22 0158

## 2022-02-02 NOTE — ED NOTES
Patient was wearing a face mask throughout our encounter.  This RN wore appropriate PPE throughout the encounter.  Hand hygiene was performed before and after patient encounter.        Pallavi Martini RN  02/02/22 0444

## 2022-02-02 NOTE — ED PROVIDER NOTES
EMERGENCY DEPARTMENT ENCOUNTER    Room Number:  09/09  Date of encounter:  2/2/2022  PCP: Sahil Cornelius MD  Historian: Patient      HPI:  Chief Complaint: Leg swelling right flank pain  A complete HPI/ROS/PMH/PSH/SH/FH are unobtainable due to: None    Context: Belen Allen is a 41 y.o. female who presents to the ED c/o 3 days of right flank pain and swelling to bilateral lower extremities.  Patient states that she has history of lumbar radiculopathy as well.      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   • Generalized abdominal pain 11/29/2020   • Bilateral leg weakness 11/29/2020   • Acute bilateral low back pain 11/29/2020   • Antiphospholipid antibody positive 11/29/2020   • On anticoagulant therapy 11/29/2020   • Acute pain of left knee 11/29/2020   • Vitamin D deficiency 11/30/2020   • Folate deficiency 12/01/2020   • Pain of back and left lower extremity 12/02/2020   • Common bile duct dilatation 05/31/2021   • Right upper quadrant abdominal pain 06/01/2021   • Obesity (BMI 30-39.9) 04/18/2019   • Tobacco abuse 06/02/2021   • GERD without esophagitis 06/02/2021   • Intractable abdominal pain 07/05/2021   • Obesity, Class III, BMI 40-49.9 (morbid obesity) ([facility]) 07/05/2021   • Constipation 07/05/2021   • History of ERCP 07/05/2021   • Other chronic pain 07/05/2021   • Seizures ([facility]) 01/01/2022   • Syncope 01/01/2022   • Lumbar back pain with radiculopathy affecting left lower extremity 01/05/2022     Resolved Ambulatory Problems     Diagnosis Date Noted   • LFTs abnormal 06/01/2021   • Choledocholithiasis 06/02/2021   • Rhabdomyolysis 01/01/2022   • Encephalopathy 01/01/2022     Past Medical History:   Diagnosis Date   • Abnormal Pap smear of cervix    • Ankle fracture 2013   • H/O Elevated liver enzymes    • History of chronic back pain    • History of migraine     • History of urinary tract infection    • Injury of back    • PONV (postoperative nausea and vomiting)    • Seasonal allergies          PAST SURGICAL HISTORY  Past Surgical History:   Procedure Laterality Date   • BACK SURGERY  2006    fusion L4, L5     • CHOLECYSTECTOMY     • DILATATION AND CURETTAGE     • ERCP N/A 6/3/2021    Procedure: ENDOSCOPIC RETROGRADE CHOLANGIOPANCREATOGRAPHY WITH SPHINCTEROTOMY, DILATION WITH BALLOON CLEARANCE  (12MM-15MM);  Surgeon: Bjorn Flannery MD;  Location: [facility] ENDOSCOPY;  Service: Gastroenterology;  Laterality: N/A;  DILATED COMMON BILE DUCT   • SKIN SURGERY     • TUBAL ABDOMINAL LIGATION     • WISDOM TOOTH EXTRACTION  2018    x2         FAMILY HISTORY  Family History   Problem Relation Age of Onset   • Stroke Mother    • Allergic rhinitis Mother    • Allergic rhinitis Sister    • Breast cancer Maternal Aunt    • Cancer Maternal Grandmother    • Allergic rhinitis Paternal Grandfather    • Cancer Paternal Grandfather    • Prostate cancer Paternal Grandfather          SOCIAL HISTORY  Social History     Socioeconomic History   • Marital status: Single   • Number of children: 2   Tobacco Use   • Smoking status: Current Some Day Smoker     Packs/day: 0.50     Last attempt to quit: 2020     Years since quittin.2   • Smokeless tobacco: Never Used   Vaping Use   • Vaping Use: Never used   Substance and Sexual Activity   • Alcohol use: Not Currently   • Drug use: Not Currently   • Sexual activity: Defer         ALLERGIES  Patient has no known allergies.        REVIEW OF SYSTEMS  Review of Systems     All systems reviewed and negative except for those discussed in HPI.       PHYSICAL EXAM    I have reviewed the triage vital signs and nursing notes.    ED Triage Vitals [22 0157]   Temp Heart Rate Resp BP SpO2   97.4 °F (36.3 °C) 108 18 123/89 96 %      Temp src Heart Rate Source Patient Position BP Location FiO2 (%)   Tympanic Monitor Standing Right arm  --       Physical Exam  GENERAL: not distressed  HENT: nares patent  EYES: no scleral icterus  CV: regular rhythm, regular rate, 1+ edema bilateral lower extremities  RESPIRATORY: normal effort  ABDOMEN: soft, nontender nondistended there is right CVA tenderness   MUSCULOSKELETAL: no deformity  NEURO: alert, moves all extremities, follows commands  SKIN: warm, dry, lower extremities are and erythematous and nontender strong peripheral pulses        LAB RESULTS  Recent Results (from the past 24 hour(s))   ECG 12 Lead    Collection Time: 02/02/22  4:18 AM   Result Value Ref Range    QT Interval 361 ms   Protime-INR    Collection Time: 02/02/22  4:36 AM    Specimen: Blood   Result Value Ref Range    Protime 12.0 11.7 - 14.2 Seconds    INR 0.89 (L) 0.90 - 1.10   aPTT    Collection Time: 02/02/22  4:36 AM    Specimen: Blood   Result Value Ref Range    PTT 27.7 22.7 - 35.4 seconds   Troponin    Collection Time: 02/02/22  4:36 AM    Specimen: Blood   Result Value Ref Range    Troponin T <0.010 0.000 - 0.030 ng/mL   CBC Auto Differential    Collection Time: 02/02/22  4:36 AM    Specimen: Blood   Result Value Ref Range    WBC 7.49 3.40 - 10.80 10*3/mm3    RBC 3.66 (L) 3.77 - 5.28 10*6/mm3    Hemoglobin 11.7 (L) 12.0 - 15.9 g/dL    Hematocrit 35.6 34.0 - 46.6 %    MCV 97.3 (H) 79.0 - 97.0 fL    MCH 32.0 26.6 - 33.0 pg    MCHC 32.9 31.5 - 35.7 g/dL    RDW 13.8 12.3 - 15.4 %    RDW-SD 48.9 37.0 - 54.0 fl    MPV 9.0 6.0 - 12.0 fL    Platelets 352 140 - 450 10*3/mm3    Neutrophil % 53.9 42.7 - 76.0 %    Lymphocyte % 33.8 19.6 - 45.3 %    Monocyte % 6.9 5.0 - 12.0 %    Eosinophil % 4.4 0.3 - 6.2 %    Basophil % 0.3 0.0 - 1.5 %    Immature Grans % 0.7 (H) 0.0 - 0.5 %    Neutrophils, Absolute 4.04 1.70 - 7.00 10*3/mm3    Lymphocytes, Absolute 2.53 0.70 - 3.10 10*3/mm3    Monocytes, Absolute 0.52 0.10 - 0.90 10*3/mm3    Eosinophils, Absolute 0.33 0.00 - 0.40 10*3/mm3    Basophils, Absolute 0.02 0.00 - 0.20 10*3/mm3    Immature Grans,  Absolute 0.05 0.00 - 0.05 10*3/mm3    nRBC 0.0 0.0 - 0.2 /100 WBC   Urinalysis With Microscopic If Indicated (No Culture) - Urine, Clean Catch    Collection Time: 02/02/22  5:21 AM    Specimen: Urine, Clean Catch   Result Value Ref Range    Color, UA Yellow Yellow, Straw    Appearance, UA Clear Clear    pH, UA 7.0 5.0 - 8.0    Specific Gravity, UA 1.018 1.005 - 1.030    Glucose, UA Negative Negative    Ketones, UA Negative Negative    Bilirubin, UA Negative Negative    Blood, UA Large (3+) (A) Negative    Protein, UA Negative Negative    Leuk Esterase, UA Negative Negative    Nitrite, UA Negative Negative    Urobilinogen, UA 0.2 E.U./dL 0.2 - 1.0 E.U./dL   Urinalysis, Microscopic Only - Urine, Clean Catch    Collection Time: 02/02/22  5:21 AM    Specimen: Urine, Clean Catch   Result Value Ref Range    RBC, UA Too Numerous to Count (A) None Seen, 0-2 /HPF    WBC, UA 0-2 None Seen, 0-2 /HPF    Bacteria, UA 2+ (A) None Seen /HPF    Squamous Epithelial Cells, UA 7-12 (A) None Seen, 0-2 /HPF    Hyaline Casts, UA 0-2 None Seen /LPF    Methodology Automated Microscopy        Ordered the above labs and independently reviewed the results.        RADIOLOGY  CT Abdomen Pelvis Without Contrast    Result Date: 2/2/2022  CT ABDOMEN AND PELVIS WITHOUT IV CONTRAST  HISTORY: 41-year-old female with right-sided flank pain.  TECHNIQUE: Radiation dose reduction techniques were utilized, including automated exposure control and exposure modulation based on body size. 3 mm images were obtained through the abdomen and pelvis without the administration of IV contrast. Compared with previous CT from 07/04/2021.  FINDINGS: There are no renal or ureteral stones and there is no hydronephrosis bilaterally. Left kidney appears stable with multiple parenchymal scars and a 2 cm cyst. Noncontrasted liver appears unremarkable and there is no biliary dilatation. Gallbladder is surgically absent. Noncontrasted spleen, pancreas, and adrenals appear  unremarkable. The stomach is filled with fluid and food debris, presumably postprandial. There is formed stool throughout the colon. There is no bowel thickening. Appendix appears normal. Noncontrasted uterus and adnexa appear unremarkable. There are no significant abdominal aortic atherosclerotic changes. Subcutaneous scars are noted and likely related to liposuction in the past.      1. No nephroureterolithiasis or hydronephrosis bilaterally. 2. Constipation is suspected.  Discussed with Dr. Willams.        I ordered the above noted radiological studies. Reviewed by me and discussed with radiologist.  See dictation for official radiology interpretation.      PROCEDURES    Procedures      MEDICATIONS GIVEN IN ER    Medications   sodium chloride 0.9 % flush 10 mL (has no administration in time range)   HYDROmorphone (DILAUDID) injection 1 mg (has no administration in time range)   morphine injection 4 mg (4 mg Intravenous Given 2/2/22 0522)   ondansetron (ZOFRAN) injection 4 mg (4 mg Intravenous Given 2/2/22 0522)   ketorolac (TORADOL) injection 15 mg (15 mg Intravenous Given 2/2/22 0628)   cefTRIAXone (ROCEPHIN) 1 g in sodium chloride 0.9 % 100 mL IVPB-VTB (1 g Intravenous New Bag 2/2/22 0628)   methylPREDNISolone sodium succinate (SOLU-Medrol) injection 125 mg (125 mg Intravenous Given 2/2/22 0628)         PROGRESS, DATA ANALYSIS, CONSULTS, AND MEDICAL DECISION MAKING    All labs have been independently reviewed by me.  All radiology studies have been reviewed by me and discussed with radiologist dictating the report.   EKG's independently viewed and interpreted by me.  Discussion below represents my analysis of pertinent findings related to patient's condition, differential diagnosis, treatment plan and final disposition.        ED Course as of 02/02/22 0729   Wed Feb 02, 2022   0609 CT abdomen pelvis without acute abnormality per radiologist.  No ureter to dilatation or renal stone seen. [TJ]   0705 EKG           EKG time: 0418  Rhythm/Rate: Sinus rhythm rate 93  P waves and [place]: Normal  QRS, axis: Normal  ST and T waves: Normal    Interpreted Contemporaneously by me, independently viewed [TJ]      ED Course User Index  [TJ] Christian Willams MD           PPE: The patient wore a surgical mask throughout the entire patient encounter. I wore an N95.    AS OF 07:29 EST VITALS:    BP - 133/77  HR - 91  TEMP - 97.4 °F (36.3 °C) (Tympanic)  O2 SATS - 94%        DIAGNOSIS  Final diagnoses:   Pyelonephritis   Peripheral edema   Chronic low back pain, unspecified back pain laterality, unspecified whether sciatica present         DISPOSITION  Discharge           Christian Willams MD  02/02/22 3061

## 2022-02-02 NOTE — ED PROVIDER NOTES
0700: Care center from Dr. Willams pending lab results.  ED Course as of 02/02/22 0847   Wed Feb 02, 2022   0609 CT abdomen pelvis without acute abnormality per radiologist.  No ureter to dilatation or renal stone seen. [TJ]   0705 EKG          EKG time: 0418  Rhythm/Rate: Sinus rhythm rate 93  P waves and [place]: Normal  QRS, axis: Normal  ST and T waves: Normal    Interpreted Contemporaneously by me, independently viewed [TJ]   0749 Delaying care due to awaiting lab results, BNP, lipase and CMP have been in process since 4:36 AM. [MS]   0845 Patient has been resting comfortably while awaiting lab results, RN states that she went in to give her pain medication and patient had to be awoken so she held pain medication at this time.  Patient will be discharged home at this time. [MS]      ED Course User Index  [MS] Prerna Henderson, APRN  [TJ] Christian Willams MD Stettenbenz, Michelle R, APRN  02/02/22 0847

## 2022-11-22 ENCOUNTER — HOSPITAL ENCOUNTER (EMERGENCY)
Facility: HOSPITAL | Age: 42
Discharge: HOME | End: 2022-11-23
Attending: EMERGENCY MEDICINE | Admitting: EMERGENCY MEDICINE

## 2022-11-22 DIAGNOSIS — N39.0 ACUTE UTI: Primary | ICD-10-CM

## 2022-11-22 DIAGNOSIS — R74.8 ELEVATED LIVER ENZYMES: ICD-10-CM

## 2022-11-22 PROCEDURE — 99284 EMERGENCY DEPT VISIT MOD MDM: CPT

## 2022-11-23 ENCOUNTER — APPOINTMENT (OUTPATIENT)
Dept: CT IMAGING | Facility: HOSPITAL | Age: 42
End: 2022-11-23

## 2022-11-23 ENCOUNTER — APPOINTMENT (OUTPATIENT)
Dept: ULTRASOUND IMAGING | Facility: HOSPITAL | Age: 42
End: 2022-11-23

## 2022-11-23 ENCOUNTER — APPOINTMENT (OUTPATIENT)
Dept: GENERAL RADIOLOGY | Facility: HOSPITAL | Age: 42
End: 2022-11-23

## 2022-11-23 ENCOUNTER — HOSPITAL ENCOUNTER (INPATIENT)
Facility: HOSPITAL | Age: 42
LOS: 7 days | Discharge: HOME | End: 2022-11-30
Attending: EMERGENCY MEDICINE | Admitting: INTERNAL MEDICINE

## 2022-11-23 VITALS
TEMPERATURE: 97.5 F | DIASTOLIC BLOOD PRESSURE: 90 MMHG | WEIGHT: 238.1 LBS | RESPIRATION RATE: 16 BRPM | OXYGEN SATURATION: 97 % | SYSTOLIC BLOOD PRESSURE: 142 MMHG | HEIGHT: 64 IN | BODY MASS INDEX: 40.65 KG/M2 | HEART RATE: 70 BPM

## 2022-11-23 DIAGNOSIS — R74.8 ELEVATED LIVER ENZYMES: ICD-10-CM

## 2022-11-23 DIAGNOSIS — G89.29 OTHER CHRONIC PAIN: ICD-10-CM

## 2022-11-23 DIAGNOSIS — R10.9 INTRACTABLE ABDOMINAL PAIN: Primary | ICD-10-CM

## 2022-11-23 DIAGNOSIS — R10.11 RIGHT UPPER QUADRANT PAIN: ICD-10-CM

## 2022-11-23 LAB
ALBUMIN SERPL-MCNC: 3.6 G/DL (ref 3.5–5.2)
ALBUMIN SERPL-MCNC: 3.8 G/DL (ref 3.5–5.2)
ALBUMIN/GLOB SERPL: 1.5 G/DL
ALBUMIN/GLOB SERPL: 1.9 G/DL
ALP SERPL-CCNC: 137 U/L (ref 39–117)
ALP SERPL-CCNC: 144 U/L (ref 39–117)
ALT SERPL W P-5'-P-CCNC: 216 U/L (ref 1–33)
ALT SERPL W P-5'-P-CCNC: 229 U/L (ref 1–33)
ANION GAP SERPL CALCULATED.3IONS-SCNC: 7 MMOL/L (ref 5–15)
ANION GAP SERPL CALCULATED.3IONS-SCNC: 7.4 MMOL/L (ref 5–15)
AST SERPL-CCNC: 117 U/L (ref 1–32)
AST SERPL-CCNC: 141 U/L (ref 1–32)
BACTERIA UR QL AUTO: ABNORMAL /HPF
BASOPHILS # BLD AUTO: 0.02 10*3/MM3 (ref 0–0.2)
BASOPHILS # BLD AUTO: 0.02 10*3/MM3 (ref 0–0.2)
BASOPHILS NFR BLD AUTO: 0.2 % (ref 0–1.5)
BASOPHILS NFR BLD AUTO: 0.3 % (ref 0–1.5)
BILIRUB SERPL-MCNC: 0.2 MG/DL (ref 0–1.2)
BILIRUB SERPL-MCNC: 0.3 MG/DL (ref 0–1.2)
BILIRUB UR QL STRIP: NEGATIVE
BILIRUB UR QL STRIP: NEGATIVE
BUN SERPL-MCNC: 11 MG/DL (ref 6–20)
BUN SERPL-MCNC: 8 MG/DL (ref 6–20)
BUN/CREAT SERPL: 10.6 (ref 7–25)
BUN/CREAT SERPL: 8.2 (ref 7–25)
CALCIUM SPEC-SCNC: 8.8 MG/DL (ref 8.6–10.5)
CALCIUM SPEC-SCNC: 8.9 MG/DL (ref 8.6–10.5)
CHLORIDE SERPL-SCNC: 104 MMOL/L (ref 98–107)
CHLORIDE SERPL-SCNC: 109 MMOL/L (ref 98–107)
CLARITY UR: ABNORMAL
CLARITY UR: ABNORMAL
CO2 SERPL-SCNC: 24.6 MMOL/L (ref 22–29)
CO2 SERPL-SCNC: 29 MMOL/L (ref 22–29)
COLOR UR: YELLOW
COLOR UR: YELLOW
CREAT SERPL-MCNC: 0.97 MG/DL (ref 0.57–1)
CREAT SERPL-MCNC: 1.04 MG/DL (ref 0.57–1)
DEPRECATED RDW RBC AUTO: 42.5 FL (ref 37–54)
DEPRECATED RDW RBC AUTO: 43.9 FL (ref 37–54)
EGFRCR SERPLBLD CKD-EPI 2021: 69.8 ML/MIN/1.73
EGFRCR SERPLBLD CKD-EPI 2021: 75.9 ML/MIN/1.73
EOSINOPHIL # BLD AUTO: 0.16 10*3/MM3 (ref 0–0.4)
EOSINOPHIL # BLD AUTO: 0.18 10*3/MM3 (ref 0–0.4)
EOSINOPHIL NFR BLD AUTO: 2.1 % (ref 0.3–6.2)
EOSINOPHIL NFR BLD AUTO: 2.2 % (ref 0.3–6.2)
ERYTHROCYTE [DISTWIDTH] IN BLOOD BY AUTOMATED COUNT: 12.8 % (ref 12.3–15.4)
ERYTHROCYTE [DISTWIDTH] IN BLOOD BY AUTOMATED COUNT: 12.8 % (ref 12.3–15.4)
FLUAV SUBTYP SPEC NAA+PROBE: NOT DETECTED
FLUBV RNA ISLT QL NAA+PROBE: NOT DETECTED
GLOBULIN UR ELPH-MCNC: 2 GM/DL
GLOBULIN UR ELPH-MCNC: 2.4 GM/DL
GLUCOSE SERPL-MCNC: 105 MG/DL (ref 65–99)
GLUCOSE SERPL-MCNC: 108 MG/DL (ref 65–99)
GLUCOSE UR STRIP-MCNC: NEGATIVE MG/DL
GLUCOSE UR STRIP-MCNC: NEGATIVE MG/DL
HAV IGM SERPL QL IA: NORMAL
HBV CORE IGM SERPL QL IA: NORMAL
HBV SURFACE AG SERPL QL IA: NORMAL
HCG SERPL QL: NEGATIVE
HCT VFR BLD AUTO: 37.3 % (ref 34–46.6)
HCT VFR BLD AUTO: 37.8 % (ref 34–46.6)
HCV AB SER DONR QL: NORMAL
HGB BLD-MCNC: 12.8 G/DL (ref 12–15.9)
HGB BLD-MCNC: 12.8 G/DL (ref 12–15.9)
HGB UR QL STRIP.AUTO: NEGATIVE
HGB UR QL STRIP.AUTO: NEGATIVE
HOLD SPECIMEN: NORMAL
HOLD SPECIMEN: NORMAL
HYALINE CASTS UR QL AUTO: ABNORMAL /LPF
IMM GRANULOCYTES # BLD AUTO: 0.02 10*3/MM3 (ref 0–0.05)
IMM GRANULOCYTES # BLD AUTO: 0.02 10*3/MM3 (ref 0–0.05)
IMM GRANULOCYTES NFR BLD AUTO: 0.2 % (ref 0–0.5)
IMM GRANULOCYTES NFR BLD AUTO: 0.3 % (ref 0–0.5)
KETONES UR QL STRIP: NEGATIVE
KETONES UR QL STRIP: NEGATIVE
LEUKOCYTE ESTERASE UR QL STRIP.AUTO: ABNORMAL
LEUKOCYTE ESTERASE UR QL STRIP.AUTO: NEGATIVE
LIPASE SERPL-CCNC: 25 U/L (ref 13–60)
LIPASE SERPL-CCNC: 27 U/L (ref 13–60)
LYMPHOCYTES # BLD AUTO: 1.25 10*3/MM3 (ref 0.7–3.1)
LYMPHOCYTES # BLD AUTO: 2.17 10*3/MM3 (ref 0.7–3.1)
LYMPHOCYTES NFR BLD AUTO: 17.6 % (ref 19.6–45.3)
LYMPHOCYTES NFR BLD AUTO: 25.5 % (ref 19.6–45.3)
MCH RBC QN AUTO: 31 PG (ref 26.6–33)
MCH RBC QN AUTO: 31.5 PG (ref 26.6–33)
MCHC RBC AUTO-ENTMCNC: 33.9 G/DL (ref 31.5–35.7)
MCHC RBC AUTO-ENTMCNC: 34.3 G/DL (ref 31.5–35.7)
MCV RBC AUTO: 91.5 FL (ref 79–97)
MCV RBC AUTO: 91.9 FL (ref 79–97)
MONOCYTES # BLD AUTO: 0.36 10*3/MM3 (ref 0.1–0.9)
MONOCYTES # BLD AUTO: 0.63 10*3/MM3 (ref 0.1–0.9)
MONOCYTES NFR BLD AUTO: 5.1 % (ref 5–12)
MONOCYTES NFR BLD AUTO: 7.4 % (ref 5–12)
NEUTROPHILS NFR BLD AUTO: 5.31 10*3/MM3 (ref 1.7–7)
NEUTROPHILS NFR BLD AUTO: 5.49 10*3/MM3 (ref 1.7–7)
NEUTROPHILS NFR BLD AUTO: 64.6 % (ref 42.7–76)
NEUTROPHILS NFR BLD AUTO: 74.5 % (ref 42.7–76)
NITRITE UR QL STRIP: NEGATIVE
NITRITE UR QL STRIP: NEGATIVE
NRBC BLD AUTO-RTO: 0 /100 WBC (ref 0–0.2)
NRBC BLD AUTO-RTO: 0 /100 WBC (ref 0–0.2)
PH UR STRIP.AUTO: 6.5 [PH] (ref 5–8)
PH UR STRIP.AUTO: 6.5 [PH] (ref 5–8)
PLATELET # BLD AUTO: 242 10*3/MM3 (ref 140–450)
PLATELET # BLD AUTO: 254 10*3/MM3 (ref 140–450)
PMV BLD AUTO: 9.8 FL (ref 6–12)
PMV BLD AUTO: 9.8 FL (ref 6–12)
POTASSIUM SERPL-SCNC: 4.1 MMOL/L (ref 3.5–5.2)
POTASSIUM SERPL-SCNC: 4.9 MMOL/L (ref 3.5–5.2)
PROT SERPL-MCNC: 5.8 G/DL (ref 6–8.5)
PROT SERPL-MCNC: 6 G/DL (ref 6–8.5)
PROT UR QL STRIP: ABNORMAL
PROT UR QL STRIP: NEGATIVE
RBC # BLD AUTO: 4.06 10*6/MM3 (ref 3.77–5.28)
RBC # BLD AUTO: 4.13 10*6/MM3 (ref 3.77–5.28)
RBC # UR STRIP: ABNORMAL /HPF
REF LAB TEST METHOD: ABNORMAL
SARS-COV-2 RNA PNL SPEC NAA+PROBE: NOT DETECTED
SODIUM SERPL-SCNC: 140 MMOL/L (ref 136–145)
SODIUM SERPL-SCNC: 141 MMOL/L (ref 136–145)
SP GR UR STRIP: 1.02 (ref 1–1.03)
SP GR UR STRIP: >=1.03 (ref 1–1.03)
SQUAMOUS #/AREA URNS HPF: ABNORMAL /HPF
UROBILINOGEN UR QL STRIP: ABNORMAL
UROBILINOGEN UR QL STRIP: ABNORMAL
WBC # UR STRIP: ABNORMAL /HPF
WBC NRBC COR # BLD: 7.12 10*3/MM3 (ref 3.4–10.8)
WBC NRBC COR # BLD: 8.51 10*3/MM3 (ref 3.4–10.8)
WHOLE BLOOD HOLD COAG: NORMAL
WHOLE BLOOD HOLD SPECIMEN: NORMAL

## 2022-11-23 PROCEDURE — 85025 COMPLETE CBC W/AUTO DIFF WBC: CPT

## 2022-11-23 PROCEDURE — 25010000002 HYDROMORPHONE PER 4 MG: Performed by: EMERGENCY MEDICINE

## 2022-11-23 PROCEDURE — 71046 X-RAY EXAM CHEST 2 VIEWS: CPT

## 2022-11-23 PROCEDURE — 84703 CHORIONIC GONADOTROPIN ASSAY: CPT | Performed by: INTERNAL MEDICINE

## 2022-11-23 PROCEDURE — 25010000002 ONDANSETRON PER 1 MG: Performed by: NURSE PRACTITIONER

## 2022-11-23 PROCEDURE — 81003 URINALYSIS AUTO W/O SCOPE: CPT

## 2022-11-23 PROCEDURE — 76705 ECHO EXAM OF ABDOMEN: CPT

## 2022-11-23 PROCEDURE — 83690 ASSAY OF LIPASE: CPT

## 2022-11-23 PROCEDURE — 25010000002 HYDROMORPHONE 1 MG/ML SOLUTION: Performed by: NURSE PRACTITIONER

## 2022-11-23 PROCEDURE — 81001 URINALYSIS AUTO W/SCOPE: CPT | Performed by: PHYSICIAN ASSISTANT

## 2022-11-23 PROCEDURE — 87636 SARSCOV2 & INF A&B AMP PRB: CPT | Performed by: NURSE PRACTITIONER

## 2022-11-23 PROCEDURE — 99284 EMERGENCY DEPT VISIT MOD MDM: CPT

## 2022-11-23 PROCEDURE — 96365 THER/PROPH/DIAG IV INF INIT: CPT

## 2022-11-23 PROCEDURE — 80053 COMPREHEN METABOLIC PANEL: CPT | Performed by: PHYSICIAN ASSISTANT

## 2022-11-23 PROCEDURE — 85025 COMPLETE CBC W/AUTO DIFF WBC: CPT | Performed by: PHYSICIAN ASSISTANT

## 2022-11-23 PROCEDURE — 80074 ACUTE HEPATITIS PANEL: CPT | Performed by: NURSE PRACTITIONER

## 2022-11-23 PROCEDURE — 25010000002 IOPAMIDOL 61 % SOLUTION: Performed by: EMERGENCY MEDICINE

## 2022-11-23 PROCEDURE — 25010000002 ONDANSETRON PER 1 MG: Performed by: STUDENT IN AN ORGANIZED HEALTH CARE EDUCATION/TRAINING PROGRAM

## 2022-11-23 PROCEDURE — 74177 CT ABD & PELVIS W/CONTRAST: CPT

## 2022-11-23 PROCEDURE — G0378 HOSPITAL OBSERVATION PER HR: HCPCS

## 2022-11-23 PROCEDURE — 80053 COMPREHEN METABOLIC PANEL: CPT

## 2022-11-23 PROCEDURE — 25010000002 CEFTRIAXONE PER 250 MG: Performed by: PHYSICIAN ASSISTANT

## 2022-11-23 PROCEDURE — 36415 COLL VENOUS BLD VENIPUNCTURE: CPT

## 2022-11-23 PROCEDURE — 25010000002 KETOROLAC TROMETHAMINE PER 15 MG: Performed by: PHYSICIAN ASSISTANT

## 2022-11-23 PROCEDURE — 99285 EMERGENCY DEPT VISIT HI MDM: CPT

## 2022-11-23 PROCEDURE — 96375 TX/PRO/DX INJ NEW DRUG ADDON: CPT

## 2022-11-23 PROCEDURE — 83690 ASSAY OF LIPASE: CPT | Performed by: PHYSICIAN ASSISTANT

## 2022-11-23 PROCEDURE — 84703 CHORIONIC GONADOTROPIN ASSAY: CPT | Performed by: PHYSICIAN ASSISTANT

## 2022-11-23 PROCEDURE — 25010000002 ONDANSETRON PER 1 MG: Performed by: PHYSICIAN ASSISTANT

## 2022-11-23 PROCEDURE — 96376 TX/PRO/DX INJ SAME DRUG ADON: CPT

## 2022-11-23 RX ORDER — AMITRIPTYLINE HYDROCHLORIDE 25 MG/1
25 TABLET, FILM COATED ORAL NIGHTLY
Status: DISCONTINUED | OUTPATIENT
Start: 2022-11-24 | End: 2022-11-30 | Stop reason: HOSPADM

## 2022-11-23 RX ORDER — HYDROXYZINE HYDROCHLORIDE 25 MG/1
25 TABLET, FILM COATED ORAL 3 TIMES DAILY PRN
Status: DISCONTINUED | OUTPATIENT
Start: 2022-11-23 | End: 2022-11-30 | Stop reason: HOSPADM

## 2022-11-23 RX ORDER — ACETAMINOPHEN 325 MG/1
650 TABLET ORAL EVERY 4 HOURS PRN
Status: DISCONTINUED | OUTPATIENT
Start: 2022-11-23 | End: 2022-11-23

## 2022-11-23 RX ORDER — ONDANSETRON 2 MG/ML
4 INJECTION INTRAMUSCULAR; INTRAVENOUS ONCE
Status: COMPLETED | OUTPATIENT
Start: 2022-11-23 | End: 2022-11-23

## 2022-11-23 RX ORDER — METHOCARBAMOL 750 MG/1
750 TABLET, FILM COATED ORAL 4 TIMES DAILY
Status: DISCONTINUED | OUTPATIENT
Start: 2022-11-23 | End: 2022-11-23

## 2022-11-23 RX ORDER — GABAPENTIN 800 MG/1
800 TABLET ORAL 4 TIMES DAILY
COMMUNITY

## 2022-11-23 RX ORDER — ONDANSETRON 4 MG/1
4 TABLET, FILM COATED ORAL EVERY 6 HOURS PRN
Status: DISCONTINUED | OUTPATIENT
Start: 2022-11-23 | End: 2022-11-29

## 2022-11-23 RX ORDER — FOLIC ACID 1 MG/1
1 TABLET ORAL DAILY
Status: DISCONTINUED | OUTPATIENT
Start: 2022-11-24 | End: 2022-11-30 | Stop reason: HOSPADM

## 2022-11-23 RX ORDER — ONDANSETRON 2 MG/ML
4 INJECTION INTRAMUSCULAR; INTRAVENOUS EVERY 6 HOURS PRN
Status: DISCONTINUED | OUTPATIENT
Start: 2022-11-23 | End: 2022-11-23 | Stop reason: SDUPTHER

## 2022-11-23 RX ORDER — OXYCODONE AND ACETAMINOPHEN 10; 325 MG/1; MG/1
1 TABLET ORAL ONCE
Status: COMPLETED | OUTPATIENT
Start: 2022-11-23 | End: 2022-11-23

## 2022-11-23 RX ORDER — SUMATRIPTAN 100 MG/1
100 TABLET, FILM COATED ORAL ONCE
Status: COMPLETED | OUTPATIENT
Start: 2022-11-23 | End: 2022-11-23

## 2022-11-23 RX ORDER — ALUMINA, MAGNESIA, AND SIMETHICONE 2400; 2400; 240 MG/30ML; MG/30ML; MG/30ML
15 SUSPENSION ORAL ONCE
Status: COMPLETED | OUTPATIENT
Start: 2022-11-23 | End: 2022-11-23

## 2022-11-23 RX ORDER — CEFUROXIME AXETIL 500 MG/1
500 TABLET ORAL 2 TIMES DAILY
Qty: 14 TABLET | Refills: 0 | Status: SHIPPED | OUTPATIENT
Start: 2022-11-23 | End: 2022-11-30 | Stop reason: HOSPADM

## 2022-11-23 RX ORDER — FLUOXETINE HYDROCHLORIDE 20 MG/1
40 CAPSULE ORAL NIGHTLY
Status: DISCONTINUED | OUTPATIENT
Start: 2022-11-24 | End: 2022-11-30 | Stop reason: HOSPADM

## 2022-11-23 RX ORDER — CEFUROXIME AXETIL 250 MG/1
500 TABLET ORAL 2 TIMES DAILY
Status: COMPLETED | OUTPATIENT
Start: 2022-11-24 | End: 2022-11-30

## 2022-11-23 RX ORDER — HYDROMORPHONE HYDROCHLORIDE 1 MG/ML
0.5 INJECTION, SOLUTION INTRAMUSCULAR; INTRAVENOUS; SUBCUTANEOUS ONCE
Status: COMPLETED | OUTPATIENT
Start: 2022-11-23 | End: 2022-11-23

## 2022-11-23 RX ORDER — SODIUM CHLORIDE 0.9 % (FLUSH) 0.9 %
10 SYRINGE (ML) INJECTION AS NEEDED
Status: DISCONTINUED | OUTPATIENT
Start: 2022-11-23 | End: 2022-11-30 | Stop reason: HOSPADM

## 2022-11-23 RX ORDER — DICYCLOMINE HCL 20 MG
20 TABLET ORAL EVERY 6 HOURS
Qty: 20 TABLET | Refills: 0 | Status: SHIPPED | OUTPATIENT
Start: 2022-11-23

## 2022-11-23 RX ORDER — SODIUM CHLORIDE 9 MG/ML
40 INJECTION, SOLUTION INTRAVENOUS AS NEEDED
Status: DISCONTINUED | OUTPATIENT
Start: 2022-11-23 | End: 2022-11-30 | Stop reason: HOSPADM

## 2022-11-23 RX ORDER — CHOLECALCIFEROL (VITAMIN D3) 125 MCG
5 CAPSULE ORAL NIGHTLY PRN
Status: DISCONTINUED | OUTPATIENT
Start: 2022-11-23 | End: 2022-11-30 | Stop reason: HOSPADM

## 2022-11-23 RX ORDER — HYDROMORPHONE HYDROCHLORIDE 1 MG/ML
0.5 INJECTION, SOLUTION INTRAMUSCULAR; INTRAVENOUS; SUBCUTANEOUS ONCE
Status: COMPLETED | OUTPATIENT
Start: 2022-11-24 | End: 2022-11-23

## 2022-11-23 RX ORDER — FAMOTIDINE 20 MG/1
20 TABLET, FILM COATED ORAL
Status: DISCONTINUED | OUTPATIENT
Start: 2022-11-24 | End: 2022-11-24

## 2022-11-23 RX ORDER — CHOLECALCIFEROL (VITAMIN D3) 125 MCG
10 CAPSULE ORAL NIGHTLY
Status: DISCONTINUED | OUTPATIENT
Start: 2022-11-24 | End: 2022-11-30 | Stop reason: HOSPADM

## 2022-11-23 RX ORDER — SODIUM CHLORIDE, SODIUM LACTATE, POTASSIUM CHLORIDE, CALCIUM CHLORIDE 600; 310; 30; 20 MG/100ML; MG/100ML; MG/100ML; MG/100ML
100 INJECTION, SOLUTION INTRAVENOUS CONTINUOUS
Status: DISCONTINUED | OUTPATIENT
Start: 2022-11-23 | End: 2022-11-24

## 2022-11-23 RX ORDER — NITROGLYCERIN 0.4 MG/1
0.4 TABLET SUBLINGUAL
Status: DISCONTINUED | OUTPATIENT
Start: 2022-11-23 | End: 2022-11-30 | Stop reason: HOSPADM

## 2022-11-23 RX ORDER — DIVALPROEX SODIUM 500 MG/1
500 TABLET, DELAYED RELEASE ORAL EVERY 12 HOURS SCHEDULED
Status: DISCONTINUED | OUTPATIENT
Start: 2022-11-24 | End: 2022-11-30 | Stop reason: HOSPADM

## 2022-11-23 RX ORDER — METHOCARBAMOL 750 MG/1
750 TABLET, FILM COATED ORAL 3 TIMES DAILY PRN
Qty: 21 TABLET | Refills: 0 | Status: SHIPPED | OUTPATIENT
Start: 2022-11-23 | End: 2022-12-20 | Stop reason: HOSPADM

## 2022-11-23 RX ORDER — METHOCARBAMOL 750 MG/1
750 TABLET, FILM COATED ORAL 3 TIMES DAILY PRN
Status: DISCONTINUED | OUTPATIENT
Start: 2022-11-23 | End: 2022-11-30 | Stop reason: HOSPADM

## 2022-11-23 RX ORDER — SODIUM CHLORIDE 0.9 % (FLUSH) 0.9 %
10 SYRINGE (ML) INJECTION AS NEEDED
Status: DISCONTINUED | OUTPATIENT
Start: 2022-11-23 | End: 2022-11-23 | Stop reason: HOSPADM

## 2022-11-23 RX ORDER — LIDOCAINE HYDROCHLORIDE 20 MG/ML
15 SOLUTION OROPHARYNGEAL ONCE
Status: COMPLETED | OUTPATIENT
Start: 2022-11-23 | End: 2022-11-23

## 2022-11-23 RX ORDER — METHOCARBAMOL 750 MG/1
750 TABLET, FILM COATED ORAL ONCE
Status: COMPLETED | OUTPATIENT
Start: 2022-11-23 | End: 2022-11-23

## 2022-11-23 RX ORDER — SODIUM CHLORIDE 0.9 % (FLUSH) 0.9 %
10 SYRINGE (ML) INJECTION EVERY 12 HOURS SCHEDULED
Status: DISCONTINUED | OUTPATIENT
Start: 2022-11-23 | End: 2022-11-30 | Stop reason: HOSPADM

## 2022-11-23 RX ORDER — KETOROLAC TROMETHAMINE 30 MG/ML
30 INJECTION, SOLUTION INTRAMUSCULAR; INTRAVENOUS ONCE
Status: COMPLETED | OUTPATIENT
Start: 2022-11-23 | End: 2022-11-23

## 2022-11-23 RX ORDER — ONDANSETRON 2 MG/ML
4 INJECTION INTRAMUSCULAR; INTRAVENOUS EVERY 6 HOURS PRN
Status: DISCONTINUED | OUTPATIENT
Start: 2022-11-23 | End: 2022-11-29

## 2022-11-23 RX ORDER — GABAPENTIN 400 MG/1
400 CAPSULE ORAL EVERY 8 HOURS SCHEDULED
Status: DISCONTINUED | OUTPATIENT
Start: 2022-11-24 | End: 2022-11-30 | Stop reason: HOSPADM

## 2022-11-23 RX ADMIN — KETOROLAC TROMETHAMINE 30 MG: 30 INJECTION, SOLUTION INTRAMUSCULAR at 00:17

## 2022-11-23 RX ADMIN — HYDROMORPHONE HYDROCHLORIDE 0.5 MG: 1 INJECTION, SOLUTION INTRAMUSCULAR; INTRAVENOUS; SUBCUTANEOUS at 03:09

## 2022-11-23 RX ADMIN — SODIUM CHLORIDE 1000 ML: 9 INJECTION, SOLUTION INTRAVENOUS at 00:16

## 2022-11-23 RX ADMIN — LIDOCAINE HYDROCHLORIDE 15 ML: 20 SOLUTION ORAL; TOPICAL at 17:43

## 2022-11-23 RX ADMIN — HYDROMORPHONE HYDROCHLORIDE 0.5 MG: 1 INJECTION, SOLUTION INTRAMUSCULAR; INTRAVENOUS; SUBCUTANEOUS at 23:39

## 2022-11-23 RX ADMIN — METHOCARBAMOL TABLETS 750 MG: 750 TABLET, COATED ORAL at 02:43

## 2022-11-23 RX ADMIN — IOPAMIDOL 85 ML: 612 INJECTION, SOLUTION INTRAVENOUS at 01:23

## 2022-11-23 RX ADMIN — ALUMINUM HYDROXIDE, MAGNESIUM HYDROXIDE, AND DIMETHICONE 15 ML: 400; 400; 40 SUSPENSION ORAL at 17:43

## 2022-11-23 RX ADMIN — HYDROMORPHONE HYDROCHLORIDE 0.5 MG: 1 INJECTION, SOLUTION INTRAMUSCULAR; INTRAVENOUS; SUBCUTANEOUS at 01:09

## 2022-11-23 RX ADMIN — ONDANSETRON 4 MG: 2 INJECTION INTRAMUSCULAR; INTRAVENOUS at 17:39

## 2022-11-23 RX ADMIN — ONDANSETRON 4 MG: 2 INJECTION INTRAMUSCULAR; INTRAVENOUS at 22:29

## 2022-11-23 RX ADMIN — SUMATRIPTAN SUCCINATE 100 MG: 100 TABLET, FILM COATED ORAL at 20:13

## 2022-11-23 RX ADMIN — HYDROMORPHONE HYDROCHLORIDE 1 MG: 1 INJECTION, SOLUTION INTRAMUSCULAR; INTRAVENOUS; SUBCUTANEOUS at 17:40

## 2022-11-23 RX ADMIN — CEFTRIAXONE SODIUM 1 G: 1 INJECTION, POWDER, FOR SOLUTION INTRAMUSCULAR; INTRAVENOUS at 02:25

## 2022-11-23 RX ADMIN — ONDANSETRON 4 MG: 2 INJECTION INTRAMUSCULAR; INTRAVENOUS at 00:17

## 2022-11-23 RX ADMIN — OXYCODONE AND ACETAMINOPHEN 1 TABLET: 325; 10 TABLET ORAL at 18:45

## 2022-11-23 RX ADMIN — HYDROMORPHONE HYDROCHLORIDE 0.5 MG: 1 INJECTION, SOLUTION INTRAMUSCULAR; INTRAVENOUS; SUBCUTANEOUS at 22:18

## 2022-11-24 ENCOUNTER — APPOINTMENT (OUTPATIENT)
Dept: MRI IMAGING | Facility: HOSPITAL | Age: 42
End: 2022-11-24

## 2022-11-24 LAB
ALBUMIN SERPL-MCNC: 2.9 G/DL (ref 3.5–5.2)
ALBUMIN/GLOB SERPL: 1.3 G/DL
ALP SERPL-CCNC: 112 U/L (ref 39–117)
ALT SERPL W P-5'-P-CCNC: 156 U/L (ref 1–33)
ANION GAP SERPL CALCULATED.3IONS-SCNC: 6.9 MMOL/L (ref 5–15)
AST SERPL-CCNC: 71 U/L (ref 1–32)
BILIRUB SERPL-MCNC: <0.2 MG/DL (ref 0–1.2)
BUN SERPL-MCNC: 9 MG/DL (ref 6–20)
BUN/CREAT SERPL: 11.7 (ref 7–25)
CALCIUM SPEC-SCNC: 8.3 MG/DL (ref 8.6–10.5)
CERULOPLASMIN SERPL-MCNC: 16 MG/DL (ref 19–39)
CHLORIDE SERPL-SCNC: 107 MMOL/L (ref 98–107)
CO2 SERPL-SCNC: 25.1 MMOL/L (ref 22–29)
CREAT SERPL-MCNC: 0.77 MG/DL (ref 0.57–1)
DEPRECATED RDW RBC AUTO: 43.8 FL (ref 37–54)
EGFRCR SERPLBLD CKD-EPI 2021: 100.2 ML/MIN/1.73
ERYTHROCYTE [DISTWIDTH] IN BLOOD BY AUTOMATED COUNT: 12.6 % (ref 12.3–15.4)
GLOBULIN UR ELPH-MCNC: 2.2 GM/DL
GLUCOSE SERPL-MCNC: 82 MG/DL (ref 65–99)
HCG SERPL QL: NEGATIVE
HCT VFR BLD AUTO: 34.5 % (ref 34–46.6)
HGB BLD-MCNC: 11.2 G/DL (ref 12–15.9)
MCH RBC QN AUTO: 30.8 PG (ref 26.6–33)
MCHC RBC AUTO-ENTMCNC: 32.5 G/DL (ref 31.5–35.7)
MCV RBC AUTO: 94.8 FL (ref 79–97)
PLATELET # BLD AUTO: 216 10*3/MM3 (ref 140–450)
PMV BLD AUTO: 10.1 FL (ref 6–12)
POTASSIUM SERPL-SCNC: 4.3 MMOL/L (ref 3.5–5.2)
PROT SERPL-MCNC: 5.1 G/DL (ref 6–8.5)
RBC # BLD AUTO: 3.64 10*6/MM3 (ref 3.77–5.28)
SODIUM SERPL-SCNC: 139 MMOL/L (ref 136–145)
WBC NRBC COR # BLD: 6.54 10*3/MM3 (ref 3.4–10.8)

## 2022-11-24 PROCEDURE — 72197 MRI PELVIS W/O & W/DYE: CPT

## 2022-11-24 PROCEDURE — 80053 COMPREHEN METABOLIC PANEL: CPT | Performed by: STUDENT IN AN ORGANIZED HEALTH CARE EDUCATION/TRAINING PROGRAM

## 2022-11-24 PROCEDURE — 63710000001 ONDANSETRON PER 8 MG: Performed by: STUDENT IN AN ORGANIZED HEALTH CARE EDUCATION/TRAINING PROGRAM

## 2022-11-24 PROCEDURE — 25010000002 METOCLOPRAMIDE PER 10 MG: Performed by: STUDENT IN AN ORGANIZED HEALTH CARE EDUCATION/TRAINING PROGRAM

## 2022-11-24 PROCEDURE — 25010000002 MORPHINE PER 10 MG: Performed by: INTERNAL MEDICINE

## 2022-11-24 PROCEDURE — 99244 OFF/OP CNSLTJ NEW/EST MOD 40: CPT | Performed by: INTERNAL MEDICINE

## 2022-11-24 PROCEDURE — 25010000002 LORAZEPAM PER 2 MG: Performed by: NURSE PRACTITIONER

## 2022-11-24 PROCEDURE — A9577 INJ MULTIHANCE: HCPCS | Performed by: EMERGENCY MEDICINE

## 2022-11-24 PROCEDURE — G0378 HOSPITAL OBSERVATION PER HR: HCPCS

## 2022-11-24 PROCEDURE — 74183 MRI ABD W/O CNTR FLWD CNTR: CPT

## 2022-11-24 PROCEDURE — 82390 ASSAY OF CERULOPLASMIN: CPT | Performed by: INTERNAL MEDICINE

## 2022-11-24 PROCEDURE — 0 GADOBENATE DIMEGLUMINE 529 MG/ML SOLUTION: Performed by: EMERGENCY MEDICINE

## 2022-11-24 PROCEDURE — 25010000002 MORPHINE PER 10 MG: Performed by: NURSE PRACTITIONER

## 2022-11-24 PROCEDURE — 85027 COMPLETE CBC AUTOMATED: CPT | Performed by: STUDENT IN AN ORGANIZED HEALTH CARE EDUCATION/TRAINING PROGRAM

## 2022-11-24 PROCEDURE — 25010000002 KETOROLAC TROMETHAMINE PER 15 MG: Performed by: STUDENT IN AN ORGANIZED HEALTH CARE EDUCATION/TRAINING PROGRAM

## 2022-11-24 PROCEDURE — 25010000002 DIPHENHYDRAMINE PER 50 MG: Performed by: STUDENT IN AN ORGANIZED HEALTH CARE EDUCATION/TRAINING PROGRAM

## 2022-11-24 PROCEDURE — 63710000001 ONDANSETRON PER 8 MG: Performed by: INTERNAL MEDICINE

## 2022-11-24 PROCEDURE — 25010000002 HYDROMORPHONE PER 4 MG: Performed by: EMERGENCY MEDICINE

## 2022-11-24 RX ORDER — METOCLOPRAMIDE HYDROCHLORIDE 5 MG/ML
5 INJECTION INTRAMUSCULAR; INTRAVENOUS ONCE
Status: COMPLETED | OUTPATIENT
Start: 2022-11-24 | End: 2022-11-24

## 2022-11-24 RX ORDER — DICYCLOMINE HYDROCHLORIDE 10 MG/1
20 CAPSULE ORAL 4 TIMES DAILY
Status: DISCONTINUED | OUTPATIENT
Start: 2022-11-24 | End: 2022-11-30 | Stop reason: HOSPADM

## 2022-11-24 RX ORDER — DIPHENHYDRAMINE HYDROCHLORIDE 50 MG/ML
25 INJECTION INTRAMUSCULAR; INTRAVENOUS ONCE
Status: COMPLETED | OUTPATIENT
Start: 2022-11-24 | End: 2022-11-24

## 2022-11-24 RX ORDER — MORPHINE SULFATE 2 MG/ML
4 INJECTION, SOLUTION INTRAMUSCULAR; INTRAVENOUS ONCE
Status: COMPLETED | OUTPATIENT
Start: 2022-11-24 | End: 2022-11-24

## 2022-11-24 RX ORDER — FAMOTIDINE 10 MG/ML
20 INJECTION, SOLUTION INTRAVENOUS EVERY 12 HOURS SCHEDULED
Status: DISCONTINUED | OUTPATIENT
Start: 2022-11-24 | End: 2022-11-28

## 2022-11-24 RX ORDER — LORAZEPAM 2 MG/ML
0.5 INJECTION INTRAMUSCULAR ONCE
Status: COMPLETED | OUTPATIENT
Start: 2022-11-24 | End: 2022-11-24

## 2022-11-24 RX ORDER — KETOROLAC TROMETHAMINE 30 MG/ML
30 INJECTION, SOLUTION INTRAMUSCULAR; INTRAVENOUS ONCE
Status: COMPLETED | OUTPATIENT
Start: 2022-11-24 | End: 2022-11-24

## 2022-11-24 RX ORDER — MORPHINE SULFATE 2 MG/ML
2 INJECTION, SOLUTION INTRAMUSCULAR; INTRAVENOUS EVERY 4 HOURS PRN
Status: DISCONTINUED | OUTPATIENT
Start: 2022-11-24 | End: 2022-11-25

## 2022-11-24 RX ORDER — LORAZEPAM 2 MG/ML
1 INJECTION INTRAMUSCULAR ONCE
Status: DISCONTINUED | OUTPATIENT
Start: 2022-11-24 | End: 2022-11-24

## 2022-11-24 RX ADMIN — DICYCLOMINE HYDROCHLORIDE 20 MG: 10 CAPSULE ORAL at 18:28

## 2022-11-24 RX ADMIN — DICYCLOMINE HYDROCHLORIDE 20 MG: 10 CAPSULE ORAL at 12:22

## 2022-11-24 RX ADMIN — SODIUM CHLORIDE, POTASSIUM CHLORIDE, SODIUM LACTATE AND CALCIUM CHLORIDE 100 ML/HR: 600; 310; 30; 20 INJECTION, SOLUTION INTRAVENOUS at 00:12

## 2022-11-24 RX ADMIN — FLUOXETINE HYDROCHLORIDE 40 MG: 20 CAPSULE ORAL at 21:09

## 2022-11-24 RX ADMIN — AMITRIPTYLINE HYDROCHLORIDE 25 MG: 25 TABLET, FILM COATED ORAL at 22:58

## 2022-11-24 RX ADMIN — DIVALPROEX SODIUM 500 MG: 500 TABLET, DELAYED RELEASE ORAL at 12:23

## 2022-11-24 RX ADMIN — Medication 10 ML: at 00:32

## 2022-11-24 RX ADMIN — LORAZEPAM 0.5 MG: 2 INJECTION INTRAMUSCULAR; INTRAVENOUS at 09:19

## 2022-11-24 RX ADMIN — DIVALPROEX SODIUM 500 MG: 500 TABLET, DELAYED RELEASE ORAL at 00:31

## 2022-11-24 RX ADMIN — KETOROLAC TROMETHAMINE 30 MG: 30 INJECTION, SOLUTION INTRAMUSCULAR; INTRAVENOUS at 04:15

## 2022-11-24 RX ADMIN — GABAPENTIN 400 MG: 400 CAPSULE ORAL at 00:11

## 2022-11-24 RX ADMIN — FLUOXETINE HYDROCHLORIDE 40 MG: 20 CAPSULE ORAL at 00:11

## 2022-11-24 RX ADMIN — DICYCLOMINE HYDROCHLORIDE 20 MG: 10 CAPSULE ORAL at 21:09

## 2022-11-24 RX ADMIN — GADOBENATE DIMEGLUMINE 20 ML: 529 INJECTION, SOLUTION INTRAVENOUS at 11:32

## 2022-11-24 RX ADMIN — APIXABAN 5 MG: 5 TABLET, FILM COATED ORAL at 00:11

## 2022-11-24 RX ADMIN — AMITRIPTYLINE HYDROCHLORIDE 25 MG: 25 TABLET, FILM COATED ORAL at 00:11

## 2022-11-24 RX ADMIN — MORPHINE SULFATE 4 MG: 2 INJECTION, SOLUTION INTRAMUSCULAR; INTRAVENOUS at 12:34

## 2022-11-24 RX ADMIN — FOLIC ACID 1 MG: 1 TABLET ORAL at 12:22

## 2022-11-24 RX ADMIN — Medication 10 MG: at 22:57

## 2022-11-24 RX ADMIN — Medication 10 MG: at 00:11

## 2022-11-24 RX ADMIN — Medication 10 ML: at 08:30

## 2022-11-24 RX ADMIN — GABAPENTIN 400 MG: 400 CAPSULE ORAL at 06:08

## 2022-11-24 RX ADMIN — GABAPENTIN 400 MG: 400 CAPSULE ORAL at 22:59

## 2022-11-24 RX ADMIN — METOCLOPRAMIDE 5 MG: 5 INJECTION, SOLUTION INTRAMUSCULAR; INTRAVENOUS at 06:08

## 2022-11-24 RX ADMIN — FAMOTIDINE 20 MG: 10 INJECTION INTRAVENOUS at 12:22

## 2022-11-24 RX ADMIN — CEFUROXIME AXETIL 500 MG: 250 TABLET ORAL at 12:23

## 2022-11-24 RX ADMIN — ONDANSETRON HYDROCHLORIDE 4 MG: 4 TABLET, FILM COATED ORAL at 12:22

## 2022-11-24 RX ADMIN — MORPHINE SULFATE 2 MG: 2 INJECTION, SOLUTION INTRAMUSCULAR; INTRAVENOUS at 18:28

## 2022-11-24 RX ADMIN — METHOCARBAMOL TABLETS 750 MG: 750 TABLET, COATED ORAL at 16:24

## 2022-11-24 RX ADMIN — ONDANSETRON HYDROCHLORIDE 4 MG: 4 TABLET, FILM COATED ORAL at 21:09

## 2022-11-24 RX ADMIN — FAMOTIDINE 20 MG: 10 INJECTION INTRAVENOUS at 21:09

## 2022-11-24 RX ADMIN — CEFUROXIME AXETIL 500 MG: 250 TABLET ORAL at 00:11

## 2022-11-24 RX ADMIN — DIPHENHYDRAMINE HYDROCHLORIDE 25 MG: 50 INJECTION, SOLUTION INTRAMUSCULAR; INTRAVENOUS at 06:08

## 2022-11-24 RX ADMIN — GABAPENTIN 400 MG: 400 CAPSULE ORAL at 15:26

## 2022-11-24 RX ADMIN — MORPHINE SULFATE 2 MG: 2 INJECTION, SOLUTION INTRAMUSCULAR; INTRAVENOUS at 22:59

## 2022-11-24 RX ADMIN — CEFUROXIME AXETIL 500 MG: 250 TABLET ORAL at 22:58

## 2022-11-24 RX ADMIN — Medication 10 ML: at 21:10

## 2022-11-25 LAB
ALBUMIN SERPL-MCNC: 3.3 G/DL (ref 3.5–5.2)
ALBUMIN/GLOB SERPL: 1.9 G/DL
ALP SERPL-CCNC: 98 U/L (ref 39–117)
ALT SERPL W P-5'-P-CCNC: 116 U/L (ref 1–33)
ANION GAP SERPL CALCULATED.3IONS-SCNC: 9 MMOL/L (ref 5–15)
AST SERPL-CCNC: 42 U/L (ref 1–32)
BILIRUB SERPL-MCNC: 0.3 MG/DL (ref 0–1.2)
BUN SERPL-MCNC: 7 MG/DL (ref 6–20)
BUN/CREAT SERPL: 8 (ref 7–25)
CALCIUM SPEC-SCNC: 8.6 MG/DL (ref 8.6–10.5)
CERULOPLASMIN SERPL-MCNC: 16 MG/DL (ref 19–39)
CHLORIDE SERPL-SCNC: 105 MMOL/L (ref 98–107)
CO2 SERPL-SCNC: 27 MMOL/L (ref 22–29)
CREAT SERPL-MCNC: 0.88 MG/DL (ref 0.57–1)
EGFRCR SERPLBLD CKD-EPI 2021: 85.3 ML/MIN/1.73
FERRITIN SERPL-MCNC: 114 NG/ML (ref 13–150)
GLOBULIN UR ELPH-MCNC: 1.7 GM/DL
GLUCOSE SERPL-MCNC: 81 MG/DL (ref 65–99)
IRON 24H UR-MRATE: 86 MCG/DL (ref 37–145)
IRON SATN MFR SERPL: 28 % (ref 20–50)
LIPASE SERPL-CCNC: 15 U/L (ref 13–60)
POTASSIUM SERPL-SCNC: 4.3 MMOL/L (ref 3.5–5.2)
PROT SERPL-MCNC: 5 G/DL (ref 6–8.5)
SODIUM SERPL-SCNC: 141 MMOL/L (ref 136–145)
TIBC SERPL-MCNC: 311 MCG/DL (ref 298–536)
TRANSFERRIN SERPL-MCNC: 209 MG/DL (ref 200–360)

## 2022-11-25 PROCEDURE — 80053 COMPREHEN METABOLIC PANEL: CPT | Performed by: INTERNAL MEDICINE

## 2022-11-25 PROCEDURE — 83540 ASSAY OF IRON: CPT | Performed by: INTERNAL MEDICINE

## 2022-11-25 PROCEDURE — 86665 EPSTEIN-BARR CAPSID VCA: CPT | Performed by: INTERNAL MEDICINE

## 2022-11-25 PROCEDURE — 25010000002 MORPHINE PER 10 MG: Performed by: INTERNAL MEDICINE

## 2022-11-25 PROCEDURE — 84466 ASSAY OF TRANSFERRIN: CPT | Performed by: INTERNAL MEDICINE

## 2022-11-25 PROCEDURE — G0378 HOSPITAL OBSERVATION PER HR: HCPCS

## 2022-11-25 PROCEDURE — 63710000001 ONDANSETRON PER 8 MG: Performed by: INTERNAL MEDICINE

## 2022-11-25 PROCEDURE — 86258 DGP ANTIBODY EACH IG CLASS: CPT | Performed by: INTERNAL MEDICINE

## 2022-11-25 PROCEDURE — 86364 TISS TRNSGLTMNASE EA IG CLAS: CPT | Performed by: INTERNAL MEDICINE

## 2022-11-25 PROCEDURE — 25010000002 ONDANSETRON PER 1 MG: Performed by: INTERNAL MEDICINE

## 2022-11-25 PROCEDURE — 86231 EMA EACH IG CLASS: CPT | Performed by: INTERNAL MEDICINE

## 2022-11-25 PROCEDURE — 25010000002 MORPHINE PER 10 MG: Performed by: HOSPITALIST

## 2022-11-25 PROCEDURE — 84165 PROTEIN E-PHORESIS SERUM: CPT | Performed by: INTERNAL MEDICINE

## 2022-11-25 PROCEDURE — 25010000002 ONDANSETRON PER 1 MG: Performed by: STUDENT IN AN ORGANIZED HEALTH CARE EDUCATION/TRAINING PROGRAM

## 2022-11-25 PROCEDURE — 82390 ASSAY OF CERULOPLASMIN: CPT | Performed by: INTERNAL MEDICINE

## 2022-11-25 PROCEDURE — 63710000001 ONDANSETRON PER 8 MG: Performed by: STUDENT IN AN ORGANIZED HEALTH CARE EDUCATION/TRAINING PROGRAM

## 2022-11-25 PROCEDURE — 82784 ASSAY IGA/IGD/IGG/IGM EACH: CPT | Performed by: INTERNAL MEDICINE

## 2022-11-25 PROCEDURE — 86015 ACTIN ANTIBODY EACH: CPT | Performed by: INTERNAL MEDICINE

## 2022-11-25 PROCEDURE — 86038 ANTINUCLEAR ANTIBODIES: CPT | Performed by: INTERNAL MEDICINE

## 2022-11-25 PROCEDURE — 82728 ASSAY OF FERRITIN: CPT | Performed by: INTERNAL MEDICINE

## 2022-11-25 PROCEDURE — 83690 ASSAY OF LIPASE: CPT | Performed by: INTERNAL MEDICINE

## 2022-11-25 PROCEDURE — 99214 OFFICE O/P EST MOD 30 MIN: CPT | Performed by: INTERNAL MEDICINE

## 2022-11-25 PROCEDURE — 86381 MITOCHONDRIAL ANTIBODY EACH: CPT | Performed by: INTERNAL MEDICINE

## 2022-11-25 RX ORDER — MORPHINE SULFATE 2 MG/ML
2 INJECTION, SOLUTION INTRAMUSCULAR; INTRAVENOUS
Status: DISCONTINUED | OUTPATIENT
Start: 2022-11-25 | End: 2022-11-28

## 2022-11-25 RX ORDER — OXYCODONE AND ACETAMINOPHEN 7.5; 325 MG/1; MG/1
1 TABLET ORAL EVERY 6 HOURS PRN
Status: DISCONTINUED | OUTPATIENT
Start: 2022-11-25 | End: 2022-11-28

## 2022-11-25 RX ADMIN — ONDANSETRON HYDROCHLORIDE 4 MG: 4 TABLET, FILM COATED ORAL at 12:27

## 2022-11-25 RX ADMIN — DIVALPROEX SODIUM 500 MG: 500 TABLET, DELAYED RELEASE ORAL at 20:56

## 2022-11-25 RX ADMIN — GABAPENTIN 400 MG: 400 CAPSULE ORAL at 06:37

## 2022-11-25 RX ADMIN — OXYCODONE HYDROCHLORIDE AND ACETAMINOPHEN 1 TABLET: 7.5; 325 TABLET ORAL at 18:20

## 2022-11-25 RX ADMIN — Medication 10 ML: at 20:56

## 2022-11-25 RX ADMIN — FAMOTIDINE 20 MG: 10 INJECTION INTRAVENOUS at 08:35

## 2022-11-25 RX ADMIN — MORPHINE SULFATE 2 MG: 2 INJECTION, SOLUTION INTRAMUSCULAR; INTRAVENOUS at 03:07

## 2022-11-25 RX ADMIN — CEFUROXIME AXETIL 500 MG: 250 TABLET ORAL at 08:33

## 2022-11-25 RX ADMIN — GABAPENTIN 400 MG: 400 CAPSULE ORAL at 14:58

## 2022-11-25 RX ADMIN — DIVALPROEX SODIUM 500 MG: 500 TABLET, DELAYED RELEASE ORAL at 08:32

## 2022-11-25 RX ADMIN — Medication 10 MG: at 20:55

## 2022-11-25 RX ADMIN — GABAPENTIN 400 MG: 400 CAPSULE ORAL at 21:08

## 2022-11-25 RX ADMIN — DIVALPROEX SODIUM 500 MG: 500 TABLET, DELAYED RELEASE ORAL at 00:50

## 2022-11-25 RX ADMIN — FLUOXETINE HYDROCHLORIDE 40 MG: 20 CAPSULE ORAL at 20:55

## 2022-11-25 RX ADMIN — MORPHINE SULFATE 2 MG: 2 INJECTION, SOLUTION INTRAMUSCULAR; INTRAVENOUS at 15:05

## 2022-11-25 RX ADMIN — DICYCLOMINE HYDROCHLORIDE 20 MG: 10 CAPSULE ORAL at 12:27

## 2022-11-25 RX ADMIN — METHOCARBAMOL TABLETS 750 MG: 750 TABLET, COATED ORAL at 10:14

## 2022-11-25 RX ADMIN — ONDANSETRON 4 MG: 2 INJECTION INTRAMUSCULAR; INTRAVENOUS at 03:07

## 2022-11-25 RX ADMIN — AMITRIPTYLINE HYDROCHLORIDE 25 MG: 25 TABLET, FILM COATED ORAL at 20:55

## 2022-11-25 RX ADMIN — MORPHINE SULFATE 2 MG: 2 INJECTION, SOLUTION INTRAMUSCULAR; INTRAVENOUS at 08:32

## 2022-11-25 RX ADMIN — ONDANSETRON 4 MG: 2 INJECTION INTRAMUSCULAR; INTRAVENOUS at 21:08

## 2022-11-25 RX ADMIN — DICYCLOMINE HYDROCHLORIDE 20 MG: 10 CAPSULE ORAL at 20:55

## 2022-11-25 RX ADMIN — DICYCLOMINE HYDROCHLORIDE 20 MG: 10 CAPSULE ORAL at 18:20

## 2022-11-25 RX ADMIN — METHOCARBAMOL TABLETS 750 MG: 750 TABLET, COATED ORAL at 20:55

## 2022-11-25 RX ADMIN — CEFUROXIME AXETIL 500 MG: 250 TABLET ORAL at 21:52

## 2022-11-25 RX ADMIN — DICYCLOMINE HYDROCHLORIDE 20 MG: 10 CAPSULE ORAL at 08:32

## 2022-11-25 RX ADMIN — FOLIC ACID 1 MG: 1 TABLET ORAL at 08:33

## 2022-11-25 RX ADMIN — Medication 10 ML: at 08:35

## 2022-11-25 RX ADMIN — OXYCODONE HYDROCHLORIDE AND ACETAMINOPHEN 1 TABLET: 7.5; 325 TABLET ORAL at 12:27

## 2022-11-25 RX ADMIN — FAMOTIDINE 20 MG: 10 INJECTION INTRAVENOUS at 20:57

## 2022-11-25 RX ADMIN — MORPHINE SULFATE 2 MG: 2 INJECTION, SOLUTION INTRAMUSCULAR; INTRAVENOUS at 20:56

## 2022-11-26 LAB
ALBUMIN SERPL-MCNC: 3.3 G/DL (ref 3.5–5.2)
ALBUMIN/GLOB SERPL: 1.5 G/DL
ALP SERPL-CCNC: 102 U/L (ref 39–117)
ALT SERPL W P-5'-P-CCNC: 86 U/L (ref 1–33)
ANA HOMOGEN TITR SER: ABNORMAL {TITER}
ANA SER QL IF: POSITIVE
ANION GAP SERPL CALCULATED.3IONS-SCNC: 6.4 MMOL/L (ref 5–15)
AST SERPL-CCNC: 28 U/L (ref 1–32)
BILIRUB SERPL-MCNC: 0.3 MG/DL (ref 0–1.2)
BUN SERPL-MCNC: 8 MG/DL (ref 6–20)
BUN/CREAT SERPL: 9.2 (ref 7–25)
CALCIUM SPEC-SCNC: 8.8 MG/DL (ref 8.6–10.5)
CHLORIDE SERPL-SCNC: 103 MMOL/L (ref 98–107)
CO2 SERPL-SCNC: 28.6 MMOL/L (ref 22–29)
CREAT SERPL-MCNC: 0.87 MG/DL (ref 0.57–1)
EGFRCR SERPLBLD CKD-EPI 2021: 86.5 ML/MIN/1.73
GLOBULIN UR ELPH-MCNC: 2.2 GM/DL
GLUCOSE SERPL-MCNC: 78 MG/DL (ref 65–99)
Lab: ABNORMAL
POTASSIUM SERPL-SCNC: 4.3 MMOL/L (ref 3.5–5.2)
PROT SERPL-MCNC: 5.5 G/DL (ref 6–8.5)
SMA IGG SER-ACNC: 3 UNITS (ref 0–19)
SODIUM SERPL-SCNC: 138 MMOL/L (ref 136–145)

## 2022-11-26 PROCEDURE — 63710000001 ONDANSETRON PER 8 MG: Performed by: INTERNAL MEDICINE

## 2022-11-26 PROCEDURE — 25010000002 MORPHINE PER 10 MG: Performed by: INTERNAL MEDICINE

## 2022-11-26 PROCEDURE — 25010000002 MORPHINE PER 10 MG: Performed by: HOSPITALIST

## 2022-11-26 PROCEDURE — 63710000001 ONDANSETRON PER 8 MG: Performed by: STUDENT IN AN ORGANIZED HEALTH CARE EDUCATION/TRAINING PROGRAM

## 2022-11-26 PROCEDURE — 99214 OFFICE O/P EST MOD 30 MIN: CPT | Performed by: INTERNAL MEDICINE

## 2022-11-26 PROCEDURE — 80053 COMPREHEN METABOLIC PANEL: CPT | Performed by: INTERNAL MEDICINE

## 2022-11-26 PROCEDURE — G0378 HOSPITAL OBSERVATION PER HR: HCPCS

## 2022-11-26 RX ADMIN — DIVALPROEX SODIUM 500 MG: 500 TABLET, DELAYED RELEASE ORAL at 08:20

## 2022-11-26 RX ADMIN — GABAPENTIN 400 MG: 400 CAPSULE ORAL at 05:26

## 2022-11-26 RX ADMIN — FLUOXETINE HYDROCHLORIDE 40 MG: 20 CAPSULE ORAL at 20:50

## 2022-11-26 RX ADMIN — MORPHINE SULFATE 2 MG: 2 INJECTION, SOLUTION INTRAMUSCULAR; INTRAVENOUS at 15:57

## 2022-11-26 RX ADMIN — GABAPENTIN 400 MG: 400 CAPSULE ORAL at 21:29

## 2022-11-26 RX ADMIN — CEFUROXIME AXETIL 500 MG: 250 TABLET ORAL at 08:20

## 2022-11-26 RX ADMIN — ONDANSETRON HYDROCHLORIDE 4 MG: 4 TABLET, FILM COATED ORAL at 20:50

## 2022-11-26 RX ADMIN — DICYCLOMINE HYDROCHLORIDE 20 MG: 10 CAPSULE ORAL at 20:50

## 2022-11-26 RX ADMIN — OXYCODONE HYDROCHLORIDE AND ACETAMINOPHEN 1 TABLET: 7.5; 325 TABLET ORAL at 11:47

## 2022-11-26 RX ADMIN — FAMOTIDINE 20 MG: 10 INJECTION INTRAVENOUS at 20:50

## 2022-11-26 RX ADMIN — MORPHINE SULFATE 2 MG: 2 INJECTION, SOLUTION INTRAMUSCULAR; INTRAVENOUS at 13:53

## 2022-11-26 RX ADMIN — CEFUROXIME AXETIL 500 MG: 250 TABLET ORAL at 20:50

## 2022-11-26 RX ADMIN — ONDANSETRON HYDROCHLORIDE 4 MG: 4 TABLET, FILM COATED ORAL at 11:47

## 2022-11-26 RX ADMIN — ONDANSETRON HYDROCHLORIDE 4 MG: 4 TABLET, FILM COATED ORAL at 05:31

## 2022-11-26 RX ADMIN — Medication 10 ML: at 20:53

## 2022-11-26 RX ADMIN — AMITRIPTYLINE HYDROCHLORIDE 25 MG: 25 TABLET, FILM COATED ORAL at 20:50

## 2022-11-26 RX ADMIN — OXYCODONE HYDROCHLORIDE AND ACETAMINOPHEN 1 TABLET: 7.5; 325 TABLET ORAL at 18:08

## 2022-11-26 RX ADMIN — MORPHINE SULFATE 2 MG: 2 INJECTION, SOLUTION INTRAMUSCULAR; INTRAVENOUS at 20:59

## 2022-11-26 RX ADMIN — FAMOTIDINE 20 MG: 10 INJECTION INTRAVENOUS at 08:23

## 2022-11-26 RX ADMIN — MORPHINE SULFATE 2 MG: 2 INJECTION, SOLUTION INTRAMUSCULAR; INTRAVENOUS at 03:28

## 2022-11-26 RX ADMIN — DICYCLOMINE HYDROCHLORIDE 20 MG: 10 CAPSULE ORAL at 11:47

## 2022-11-26 RX ADMIN — Medication 10 ML: at 08:23

## 2022-11-26 RX ADMIN — OXYCODONE HYDROCHLORIDE AND ACETAMINOPHEN 1 TABLET: 7.5; 325 TABLET ORAL at 05:26

## 2022-11-26 RX ADMIN — DICYCLOMINE HYDROCHLORIDE 20 MG: 10 CAPSULE ORAL at 08:20

## 2022-11-26 RX ADMIN — OXYCODONE HYDROCHLORIDE AND ACETAMINOPHEN 1 TABLET: 7.5; 325 TABLET ORAL at 23:53

## 2022-11-26 RX ADMIN — Medication 10 MG: at 20:54

## 2022-11-26 RX ADMIN — DICYCLOMINE HYDROCHLORIDE 20 MG: 10 CAPSULE ORAL at 18:08

## 2022-11-26 RX ADMIN — DIVALPROEX SODIUM 500 MG: 500 TABLET, DELAYED RELEASE ORAL at 20:53

## 2022-11-26 RX ADMIN — FOLIC ACID 1 MG: 1 TABLET ORAL at 08:20

## 2022-11-26 RX ADMIN — GABAPENTIN 400 MG: 400 CAPSULE ORAL at 13:48

## 2022-11-26 RX ADMIN — MORPHINE SULFATE 2 MG: 2 INJECTION, SOLUTION INTRAMUSCULAR; INTRAVENOUS at 08:23

## 2022-11-27 ENCOUNTER — ANESTHESIA (OUTPATIENT)
Dept: GASTROENTEROLOGY | Facility: HOSPITAL | Age: 42
End: 2022-11-27

## 2022-11-27 ENCOUNTER — ANESTHESIA EVENT (OUTPATIENT)
Dept: GASTROENTEROLOGY | Facility: HOSPITAL | Age: 42
End: 2022-11-27

## 2022-11-27 ENCOUNTER — APPOINTMENT (OUTPATIENT)
Dept: MRI IMAGING | Facility: HOSPITAL | Age: 42
End: 2022-11-27

## 2022-11-27 LAB
ANION GAP SERPL CALCULATED.3IONS-SCNC: 8.4 MMOL/L (ref 5–15)
BASOPHILS # BLD AUTO: 0.01 10*3/MM3 (ref 0–0.2)
BASOPHILS NFR BLD AUTO: 0.2 % (ref 0–1.5)
BUN SERPL-MCNC: 10 MG/DL (ref 6–20)
BUN/CREAT SERPL: 10.8 (ref 7–25)
CALCIUM SPEC-SCNC: 9.1 MG/DL (ref 8.6–10.5)
CHLORIDE SERPL-SCNC: 101 MMOL/L (ref 98–107)
CO2 SERPL-SCNC: 28.6 MMOL/L (ref 22–29)
CREAT SERPL-MCNC: 0.93 MG/DL (ref 0.57–1)
DEPRECATED RDW RBC AUTO: 44.2 FL (ref 37–54)
EGFRCR SERPLBLD CKD-EPI 2021: 79.8 ML/MIN/1.73
EOSINOPHIL # BLD AUTO: 0.11 10*3/MM3 (ref 0–0.4)
EOSINOPHIL NFR BLD AUTO: 1.7 % (ref 0.3–6.2)
ERYTHROCYTE [DISTWIDTH] IN BLOOD BY AUTOMATED COUNT: 12.9 % (ref 12.3–15.4)
GLUCOSE SERPL-MCNC: 74 MG/DL (ref 65–99)
HCT VFR BLD AUTO: 37.6 % (ref 34–46.6)
HGB BLD-MCNC: 12.2 G/DL (ref 12–15.9)
IMM GRANULOCYTES # BLD AUTO: 0.03 10*3/MM3 (ref 0–0.05)
IMM GRANULOCYTES NFR BLD AUTO: 0.5 % (ref 0–0.5)
LYMPHOCYTES # BLD AUTO: 3.34 10*3/MM3 (ref 0.7–3.1)
LYMPHOCYTES NFR BLD AUTO: 50.2 % (ref 19.6–45.3)
MCH RBC QN AUTO: 30.7 PG (ref 26.6–33)
MCHC RBC AUTO-ENTMCNC: 32.4 G/DL (ref 31.5–35.7)
MCV RBC AUTO: 94.7 FL (ref 79–97)
MONOCYTES # BLD AUTO: 0.37 10*3/MM3 (ref 0.1–0.9)
MONOCYTES NFR BLD AUTO: 5.6 % (ref 5–12)
NEUTROPHILS NFR BLD AUTO: 2.8 10*3/MM3 (ref 1.7–7)
NEUTROPHILS NFR BLD AUTO: 41.8 % (ref 42.7–76)
NRBC BLD AUTO-RTO: 0 /100 WBC (ref 0–0.2)
PLATELET # BLD AUTO: 258 10*3/MM3 (ref 140–450)
PMV BLD AUTO: 10 FL (ref 6–12)
POTASSIUM SERPL-SCNC: 4.1 MMOL/L (ref 3.5–5.2)
RBC # BLD AUTO: 3.97 10*6/MM3 (ref 3.77–5.28)
SODIUM SERPL-SCNC: 138 MMOL/L (ref 136–145)
WBC NRBC COR # BLD: 6.66 10*3/MM3 (ref 3.4–10.8)

## 2022-11-27 PROCEDURE — 85025 COMPLETE CBC W/AUTO DIFF WBC: CPT | Performed by: HOSPITALIST

## 2022-11-27 PROCEDURE — 0DB48ZX EXCISION OF ESOPHAGOGASTRIC JUNCTION, VIA NATURAL OR ARTIFICIAL OPENING ENDOSCOPIC, DIAGNOSTIC: ICD-10-PCS | Performed by: INTERNAL MEDICINE

## 2022-11-27 PROCEDURE — 25010000002 MORPHINE PER 10 MG: Performed by: HOSPITALIST

## 2022-11-27 PROCEDURE — 88305 TISSUE EXAM BY PATHOLOGIST: CPT | Performed by: INTERNAL MEDICINE

## 2022-11-27 PROCEDURE — 72148 MRI LUMBAR SPINE W/O DYE: CPT

## 2022-11-27 PROCEDURE — 63710000001 ONDANSETRON PER 8 MG: Performed by: INTERNAL MEDICINE

## 2022-11-27 PROCEDURE — 80048 BASIC METABOLIC PNL TOTAL CA: CPT | Performed by: HOSPITALIST

## 2022-11-27 PROCEDURE — G0378 HOSPITAL OBSERVATION PER HR: HCPCS

## 2022-11-27 PROCEDURE — 25010000002 PROPOFOL 10 MG/ML EMULSION: Performed by: ANESTHESIOLOGY

## 2022-11-27 PROCEDURE — 0DB98ZX EXCISION OF DUODENUM, VIA NATURAL OR ARTIFICIAL OPENING ENDOSCOPIC, DIAGNOSTIC: ICD-10-PCS | Performed by: INTERNAL MEDICINE

## 2022-11-27 PROCEDURE — 43239 EGD BIOPSY SINGLE/MULTIPLE: CPT | Performed by: INTERNAL MEDICINE

## 2022-11-27 PROCEDURE — 25010000002 MORPHINE PER 10 MG: Performed by: INTERNAL MEDICINE

## 2022-11-27 PROCEDURE — 63710000001 ONDANSETRON PER 8 MG: Performed by: STUDENT IN AN ORGANIZED HEALTH CARE EDUCATION/TRAINING PROGRAM

## 2022-11-27 PROCEDURE — 0DB68ZX EXCISION OF STOMACH, VIA NATURAL OR ARTIFICIAL OPENING ENDOSCOPIC, DIAGNOSTIC: ICD-10-PCS | Performed by: INTERNAL MEDICINE

## 2022-11-27 RX ORDER — LIDOCAINE HYDROCHLORIDE 20 MG/ML
INJECTION, SOLUTION INFILTRATION; PERINEURAL AS NEEDED
Status: DISCONTINUED | OUTPATIENT
Start: 2022-11-27 | End: 2022-11-27 | Stop reason: SURG

## 2022-11-27 RX ORDER — GLYCOPYRROLATE 0.2 MG/ML
INJECTION INTRAMUSCULAR; INTRAVENOUS AS NEEDED
Status: DISCONTINUED | OUTPATIENT
Start: 2022-11-27 | End: 2022-11-27 | Stop reason: SURG

## 2022-11-27 RX ORDER — SODIUM CHLORIDE, SODIUM LACTATE, POTASSIUM CHLORIDE, CALCIUM CHLORIDE 600; 310; 30; 20 MG/100ML; MG/100ML; MG/100ML; MG/100ML
30 INJECTION, SOLUTION INTRAVENOUS CONTINUOUS
Status: DISCONTINUED | OUTPATIENT
Start: 2022-11-27 | End: 2022-11-27

## 2022-11-27 RX ORDER — PROPOFOL 10 MG/ML
VIAL (ML) INTRAVENOUS AS NEEDED
Status: DISCONTINUED | OUTPATIENT
Start: 2022-11-27 | End: 2022-11-27 | Stop reason: SURG

## 2022-11-27 RX ORDER — DIAZEPAM 5 MG/1
5 TABLET ORAL ONCE
Status: COMPLETED | OUTPATIENT
Start: 2022-11-27 | End: 2022-11-27

## 2022-11-27 RX ORDER — SODIUM CHLORIDE 9 MG/ML
50 INJECTION, SOLUTION INTRAVENOUS CONTINUOUS
Status: DISCONTINUED | OUTPATIENT
Start: 2022-11-27 | End: 2022-11-27

## 2022-11-27 RX ADMIN — Medication 10 ML: at 11:50

## 2022-11-27 RX ADMIN — GLYCOPYRROLATE 0.2 MG: 0.2 INJECTION INTRAMUSCULAR; INTRAVENOUS at 10:48

## 2022-11-27 RX ADMIN — DIVALPROEX SODIUM 500 MG: 500 TABLET, DELAYED RELEASE ORAL at 11:50

## 2022-11-27 RX ADMIN — MORPHINE SULFATE 2 MG: 2 INJECTION, SOLUTION INTRAMUSCULAR; INTRAVENOUS at 13:35

## 2022-11-27 RX ADMIN — DICYCLOMINE HYDROCHLORIDE 20 MG: 10 CAPSULE ORAL at 17:01

## 2022-11-27 RX ADMIN — MORPHINE SULFATE 2 MG: 2 INJECTION, SOLUTION INTRAMUSCULAR; INTRAVENOUS at 15:56

## 2022-11-27 RX ADMIN — ONDANSETRON HYDROCHLORIDE 4 MG: 4 TABLET, FILM COATED ORAL at 11:49

## 2022-11-27 RX ADMIN — GABAPENTIN 400 MG: 400 CAPSULE ORAL at 05:43

## 2022-11-27 RX ADMIN — ONDANSETRON HYDROCHLORIDE 4 MG: 4 TABLET, FILM COATED ORAL at 20:29

## 2022-11-27 RX ADMIN — DICYCLOMINE HYDROCHLORIDE 20 MG: 10 CAPSULE ORAL at 11:49

## 2022-11-27 RX ADMIN — LIDOCAINE HYDROCHLORIDE 100 MG: 20 INJECTION, SOLUTION INFILTRATION; PERINEURAL at 10:50

## 2022-11-27 RX ADMIN — Medication 10 MG: at 20:29

## 2022-11-27 RX ADMIN — CEFUROXIME AXETIL 500 MG: 250 TABLET ORAL at 11:49

## 2022-11-27 RX ADMIN — DICYCLOMINE HYDROCHLORIDE 20 MG: 10 CAPSULE ORAL at 20:29

## 2022-11-27 RX ADMIN — FLUOXETINE HYDROCHLORIDE 40 MG: 20 CAPSULE ORAL at 20:29

## 2022-11-27 RX ADMIN — MORPHINE SULFATE 2 MG: 2 INJECTION, SOLUTION INTRAMUSCULAR; INTRAVENOUS at 04:40

## 2022-11-27 RX ADMIN — Medication 10 ML: at 20:29

## 2022-11-27 RX ADMIN — GABAPENTIN 400 MG: 400 CAPSULE ORAL at 13:35

## 2022-11-27 RX ADMIN — OXYCODONE HYDROCHLORIDE AND ACETAMINOPHEN 1 TABLET: 7.5; 325 TABLET ORAL at 11:49

## 2022-11-27 RX ADMIN — MORPHINE SULFATE 2 MG: 2 INJECTION, SOLUTION INTRAMUSCULAR; INTRAVENOUS at 08:40

## 2022-11-27 RX ADMIN — AMITRIPTYLINE HYDROCHLORIDE 25 MG: 25 TABLET, FILM COATED ORAL at 20:29

## 2022-11-27 RX ADMIN — CEFUROXIME AXETIL 500 MG: 250 TABLET ORAL at 20:29

## 2022-11-27 RX ADMIN — DIAZEPAM 5 MG: 5 TABLET ORAL at 17:27

## 2022-11-27 RX ADMIN — SODIUM CHLORIDE 50 ML/HR: 9 INJECTION, SOLUTION INTRAVENOUS at 09:25

## 2022-11-27 RX ADMIN — FAMOTIDINE 20 MG: 10 INJECTION INTRAVENOUS at 08:40

## 2022-11-27 RX ADMIN — OXYCODONE HYDROCHLORIDE AND ACETAMINOPHEN 1 TABLET: 7.5; 325 TABLET ORAL at 18:01

## 2022-11-27 RX ADMIN — FAMOTIDINE 20 MG: 10 INJECTION INTRAVENOUS at 20:29

## 2022-11-27 RX ADMIN — PROPOFOL 300 MG: 10 INJECTION, EMULSION INTRAVENOUS at 10:50

## 2022-11-27 RX ADMIN — DIVALPROEX SODIUM 500 MG: 500 TABLET, DELAYED RELEASE ORAL at 20:30

## 2022-11-27 RX ADMIN — MORPHINE SULFATE 2 MG: 2 INJECTION, SOLUTION INTRAMUSCULAR; INTRAVENOUS at 20:29

## 2022-11-27 RX ADMIN — GABAPENTIN 400 MG: 400 CAPSULE ORAL at 22:00

## 2022-11-27 RX ADMIN — FOLIC ACID 1 MG: 1 TABLET ORAL at 11:49

## 2022-11-27 NOTE — ANESTHESIA PREPROCEDURE EVALUATION
Anesthesia Evaluation     Patient summary reviewed and Nursing notes reviewed   history of anesthetic complications: PONV  NPO Solid Status: > 8 hours  NPO Liquid Status: > 8 hours           Airway   Mallampati: I  TM distance: >3 FB  Neck ROM: full  No difficulty expected  Dental - normal exam     Pulmonary - normal exam   (+) a smoker Current Abstained day of surgery,   Cardiovascular - negative cardio ROS and normal exam      ROS comment: Antiphospholipid syndrome    Neuro/Psych  (+) psychiatric history Anxiety,    GI/Hepatic/Renal/Endo    (+) obesity, morbid obesity, GERD,      ROS Comment: 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    Musculoskeletal     (+) back pain,   Abdominal   (+) obese,     Bowel sounds: normal.   Substance History - negative use     OB/GYN negative ob/gyn ROS         Other                          Anesthesia Plan    ASA 3     MAC     intravenous induction     Anesthetic plan, risks, benefits, and alternatives have been provided, discussed and informed consent has been obtained with: patient.

## 2022-11-27 NOTE — ANESTHESIA POSTPROCEDURE EVALUATION
Patient: Belen Allen    Procedure Summary     Date: 11/27/22 Room / Location:  [facility]    Anesthesia Start: 1046 Anesthesia Stop: 1104    Procedure: ESOPHAGOGASTRODUODENOSCOPY with biopsy (Esophagus) Diagnosis:       Right upper quadrant pain      (Right upper quadrant pain [R10.11])    Surgeons: Christiana Mann MD Provider: Dennys Woods MD    Anesthesia Type: MAC ASA Status: 3          Anesthesia Type: MAC    Vitals  Vitals Value Taken Time   /86 11/27/22 1109   Temp     Pulse 77 11/27/22 1109   Resp 14 11/27/22 1109   SpO2 98 % 11/27/22 1109           Post Anesthesia Care and Evaluation    Patient location during evaluation: PHASE II  Patient participation: complete - patient participated  Level of consciousness: awake and alert  Pain management: adequate    Airway patency: patent  Anesthetic complications: No anesthetic complications  PONV Status: none  Cardiovascular status: acceptable and hemodynamically stable  Respiratory status: acceptable, nonlabored ventilation and spontaneous ventilation  Hydration status: acceptable

## 2022-11-28 LAB
ALBUMIN SERPL ELPH-MCNC: 2.7 G/DL (ref 2.9–4.4)
ALBUMIN SERPL-MCNC: 3.6 G/DL (ref 3.5–5.2)
ALBUMIN SERPL-MCNC: 3.8 G/DL (ref 3.5–5.2)
ALBUMIN/GLOB SERPL: 1.3 {RATIO} (ref 0.7–1.7)
ALP SERPL-CCNC: 105 U/L (ref 39–117)
ALPHA1 GLOB SERPL ELPH-MCNC: 0.2 G/DL (ref 0–0.4)
ALPHA2 GLOB SERPL ELPH-MCNC: 0.6 G/DL (ref 0.4–1)
ALT SERPL W P-5'-P-CCNC: 47 U/L (ref 1–33)
ANION GAP SERPL CALCULATED.3IONS-SCNC: 9 MMOL/L (ref 5–15)
AST SERPL-CCNC: 16 U/L (ref 1–32)
B-GLOBULIN SERPL ELPH-MCNC: 0.7 G/DL (ref 0.7–1.3)
BILIRUB CONJ SERPL-MCNC: <0.2 MG/DL (ref 0–0.3)
BILIRUB INDIRECT SERPL-MCNC: ABNORMAL MG/DL
BILIRUB SERPL-MCNC: 0.3 MG/DL (ref 0–1.2)
BUN SERPL-MCNC: 7 MG/DL (ref 6–20)
BUN/CREAT SERPL: 8 (ref 7–25)
CALCIUM SPEC-SCNC: 9 MG/DL (ref 8.6–10.5)
CHLORIDE SERPL-SCNC: 100 MMOL/L (ref 98–107)
CO2 SERPL-SCNC: 28 MMOL/L (ref 22–29)
CREAT SERPL-MCNC: 0.87 MG/DL (ref 0.57–1)
EBV VCA IGM SER IA-ACNC: <36 U/ML (ref 0–35.9)
EGFRCR SERPLBLD CKD-EPI 2021: 86.5 ML/MIN/1.73
ENDOMYSIUM IGA SER QL: NEGATIVE
GAMMA GLOB SERPL ELPH-MCNC: 0.6 G/DL (ref 0.4–1.8)
GLIADIN PEPTIDE IGA SER-ACNC: 2 UNITS (ref 0–19)
GLIADIN PEPTIDE IGG SER-ACNC: 1 UNITS (ref 0–19)
GLOBULIN SER CALC-MCNC: 2.1 G/DL (ref 2.2–3.9)
GLUCOSE SERPL-MCNC: 94 MG/DL (ref 65–99)
IGA SERPL-MCNC: 73 MG/DL (ref 87–352)
LABORATORY COMMENT REPORT: ABNORMAL
M PROTEIN SERPL ELPH-MCNC: ABNORMAL G/DL
MITOCHONDRIA M2 IGG SER-ACNC: <20 UNITS (ref 0–20)
PHOSPHATE SERPL-MCNC: 4 MG/DL (ref 2.5–4.5)
POTASSIUM SERPL-SCNC: 4.5 MMOL/L (ref 3.5–5.2)
PROT SERPL-MCNC: 4.8 G/DL (ref 6–8.5)
PROT SERPL-MCNC: 6.4 G/DL (ref 6–8.5)
QT INTERVAL: 399 MS
SODIUM SERPL-SCNC: 137 MMOL/L (ref 136–145)
TTG IGA SER-ACNC: <2 U/ML (ref 0–3)
TTG IGG SER-ACNC: <2 U/ML (ref 0–5)

## 2022-11-28 PROCEDURE — 93005 ELECTROCARDIOGRAM TRACING: CPT | Performed by: HOSPITALIST

## 2022-11-28 PROCEDURE — 25010000002 SODIUM CHLORIDE 0.9 % WITH KCL 20 MEQ 20-0.9 MEQ/L-% SOLUTION: Performed by: HOSPITALIST

## 2022-11-28 PROCEDURE — G0378 HOSPITAL OBSERVATION PER HR: HCPCS

## 2022-11-28 PROCEDURE — 84100 ASSAY OF PHOSPHORUS: CPT | Performed by: HOSPITALIST

## 2022-11-28 PROCEDURE — 25010000002 ONDANSETRON PER 1 MG: Performed by: INTERNAL MEDICINE

## 2022-11-28 PROCEDURE — 25010000002 MORPHINE PER 10 MG: Performed by: INTERNAL MEDICINE

## 2022-11-28 PROCEDURE — 63710000001 ONDANSETRON PER 8 MG: Performed by: INTERNAL MEDICINE

## 2022-11-28 PROCEDURE — 25010000002 MORPHINE PER 10 MG: Performed by: HOSPITALIST

## 2022-11-28 PROCEDURE — 99214 OFFICE O/P EST MOD 30 MIN: CPT | Performed by: PHYSICIAN ASSISTANT

## 2022-11-28 PROCEDURE — 93010 ELECTROCARDIOGRAM REPORT: CPT | Performed by: INTERNAL MEDICINE

## 2022-11-28 PROCEDURE — 82248 BILIRUBIN DIRECT: CPT | Performed by: HOSPITALIST

## 2022-11-28 PROCEDURE — 80053 COMPREHEN METABOLIC PANEL: CPT | Performed by: HOSPITALIST

## 2022-11-28 PROCEDURE — 25010000002 PROCHLORPERAZINE 10 MG/2ML SOLUTION: Performed by: HOSPITALIST

## 2022-11-28 PROCEDURE — 63710000001 PROCHLORPERAZINE MALEATE PER 5 MG: Performed by: HOSPITALIST

## 2022-11-28 RX ORDER — PROCHLORPERAZINE MALEATE 5 MG/1
5 TABLET ORAL EVERY 6 HOURS PRN
Status: DISCONTINUED | OUTPATIENT
Start: 2022-11-28 | End: 2022-11-30 | Stop reason: HOSPADM

## 2022-11-28 RX ORDER — PROCHLORPERAZINE EDISYLATE 5 MG/ML
5 INJECTION INTRAMUSCULAR; INTRAVENOUS EVERY 6 HOURS PRN
Status: DISCONTINUED | OUTPATIENT
Start: 2022-11-28 | End: 2022-11-30 | Stop reason: HOSPADM

## 2022-11-28 RX ORDER — MORPHINE SULFATE 2 MG/ML
2 INJECTION, SOLUTION INTRAMUSCULAR; INTRAVENOUS EVERY 4 HOURS PRN
Status: DISCONTINUED | OUTPATIENT
Start: 2022-11-28 | End: 2022-11-30

## 2022-11-28 RX ORDER — FAMOTIDINE 20 MG/1
20 TABLET, FILM COATED ORAL
Status: DISCONTINUED | OUTPATIENT
Start: 2022-11-28 | End: 2022-11-29

## 2022-11-28 RX ORDER — OXYCODONE AND ACETAMINOPHEN 10; 325 MG/1; MG/1
1 TABLET ORAL EVERY 4 HOURS PRN
Status: DISCONTINUED | OUTPATIENT
Start: 2022-11-28 | End: 2022-11-30 | Stop reason: HOSPADM

## 2022-11-28 RX ORDER — PROCHLORPERAZINE 25 MG
25 SUPPOSITORY, RECTAL RECTAL EVERY 12 HOURS PRN
Status: DISCONTINUED | OUTPATIENT
Start: 2022-11-28 | End: 2022-11-30 | Stop reason: HOSPADM

## 2022-11-28 RX ORDER — SODIUM CHLORIDE AND POTASSIUM CHLORIDE 150; 900 MG/100ML; MG/100ML
100 INJECTION, SOLUTION INTRAVENOUS CONTINUOUS
Status: DISCONTINUED | OUTPATIENT
Start: 2022-11-28 | End: 2022-11-30

## 2022-11-28 RX ADMIN — OXYCODONE HYDROCHLORIDE AND ACETAMINOPHEN 1 TABLET: 10; 325 TABLET ORAL at 23:23

## 2022-11-28 RX ADMIN — OXYCODONE HYDROCHLORIDE AND ACETAMINOPHEN 1 TABLET: 10; 325 TABLET ORAL at 18:34

## 2022-11-28 RX ADMIN — APIXABAN 5 MG: 5 TABLET, FILM COATED ORAL at 21:10

## 2022-11-28 RX ADMIN — OXYCODONE HYDROCHLORIDE AND ACETAMINOPHEN 1 TABLET: 10; 325 TABLET ORAL at 14:40

## 2022-11-28 RX ADMIN — AMITRIPTYLINE HYDROCHLORIDE 25 MG: 25 TABLET, FILM COATED ORAL at 20:35

## 2022-11-28 RX ADMIN — OXYCODONE HYDROCHLORIDE AND ACETAMINOPHEN 1 TABLET: 10; 325 TABLET ORAL at 09:23

## 2022-11-28 RX ADMIN — MORPHINE SULFATE 2 MG: 2 INJECTION, SOLUTION INTRAMUSCULAR; INTRAVENOUS at 05:36

## 2022-11-28 RX ADMIN — Medication 10 MG: at 20:35

## 2022-11-28 RX ADMIN — MORPHINE SULFATE 2 MG: 2 INJECTION, SOLUTION INTRAMUSCULAR; INTRAVENOUS at 16:42

## 2022-11-28 RX ADMIN — FAMOTIDINE 20 MG: 20 TABLET ORAL at 17:02

## 2022-11-28 RX ADMIN — CEFUROXIME AXETIL 500 MG: 250 TABLET ORAL at 09:18

## 2022-11-28 RX ADMIN — DICYCLOMINE HYDROCHLORIDE 20 MG: 10 CAPSULE ORAL at 11:47

## 2022-11-28 RX ADMIN — DICYCLOMINE HYDROCHLORIDE 20 MG: 10 CAPSULE ORAL at 20:35

## 2022-11-28 RX ADMIN — PROCHLORPERAZINE EDISYLATE 5 MG: 5 INJECTION INTRAMUSCULAR; INTRAVENOUS at 11:53

## 2022-11-28 RX ADMIN — DIVALPROEX SODIUM 500 MG: 500 TABLET, DELAYED RELEASE ORAL at 20:35

## 2022-11-28 RX ADMIN — CEFUROXIME AXETIL 500 MG: 250 TABLET ORAL at 20:36

## 2022-11-28 RX ADMIN — DIVALPROEX SODIUM 500 MG: 500 TABLET, DELAYED RELEASE ORAL at 09:18

## 2022-11-28 RX ADMIN — GABAPENTIN 400 MG: 400 CAPSULE ORAL at 14:38

## 2022-11-28 RX ADMIN — DICYCLOMINE HYDROCHLORIDE 20 MG: 10 CAPSULE ORAL at 17:02

## 2022-11-28 RX ADMIN — ONDANSETRON 4 MG: 2 INJECTION INTRAMUSCULAR; INTRAVENOUS at 16:42

## 2022-11-28 RX ADMIN — PROCHLORPERAZINE MALEATE 5 MG: 5 TABLET ORAL at 21:13

## 2022-11-28 RX ADMIN — Medication 10 ML: at 09:19

## 2022-11-28 RX ADMIN — FAMOTIDINE 20 MG: 20 TABLET ORAL at 09:23

## 2022-11-28 RX ADMIN — FLUOXETINE HYDROCHLORIDE 40 MG: 20 CAPSULE ORAL at 20:35

## 2022-11-28 RX ADMIN — MORPHINE SULFATE 2 MG: 2 INJECTION, SOLUTION INTRAMUSCULAR; INTRAVENOUS at 21:10

## 2022-11-28 RX ADMIN — SODIUM CHLORIDE 500 ML: 9 INJECTION, SOLUTION INTRAVENOUS at 09:23

## 2022-11-28 RX ADMIN — MORPHINE SULFATE 2 MG: 2 INJECTION, SOLUTION INTRAMUSCULAR; INTRAVENOUS at 11:47

## 2022-11-28 RX ADMIN — FOLIC ACID 1 MG: 1 TABLET ORAL at 09:19

## 2022-11-28 RX ADMIN — POTASSIUM CHLORIDE AND SODIUM CHLORIDE 100 ML/HR: 900; 150 INJECTION, SOLUTION INTRAVENOUS at 16:42

## 2022-11-28 RX ADMIN — ONDANSETRON HYDROCHLORIDE 4 MG: 4 TABLET, FILM COATED ORAL at 09:23

## 2022-11-28 RX ADMIN — DICYCLOMINE HYDROCHLORIDE 20 MG: 10 CAPSULE ORAL at 09:18

## 2022-11-28 RX ADMIN — GABAPENTIN 400 MG: 400 CAPSULE ORAL at 21:10

## 2022-11-28 RX ADMIN — OXYCODONE HYDROCHLORIDE AND ACETAMINOPHEN 1 TABLET: 7.5; 325 TABLET ORAL at 01:36

## 2022-11-28 RX ADMIN — GABAPENTIN 400 MG: 400 CAPSULE ORAL at 05:36

## 2022-11-29 ENCOUNTER — APPOINTMENT (OUTPATIENT)
Dept: GENERAL RADIOLOGY | Facility: HOSPITAL | Age: 42
End: 2022-11-29

## 2022-11-29 ENCOUNTER — APPOINTMENT (OUTPATIENT)
Dept: CARDIOLOGY | Facility: HOSPITAL | Age: 42
End: 2022-11-29

## 2022-11-29 LAB
BH CV VAS SMA AORTA DIAMETER: 1.67 CM
BH CV VAS SMA AORTA EDV: 15.5 CM/S
BH CV VAS SMA AORTA PSV: 91.2 CM/S
BH CV VAS SMA CELIAC DIST EDV: 35.9 CM/S
BH CV VAS SMA CELIAC DIST PSV: 109 CM/S
BH CV VAS SMA CELIAC ORIGIN EDV: 14.1 CM/S
BH CV VAS SMA CELIAC ORIGIN PSV: 64.2 CM/S
BH CV VAS SMA CELIAC PROX EDV: 29.5 CM/S
BH CV VAS SMA CELIAC PROX PSV: 104 CM/S
BH CV VAS SMA HEPATIC EDV: 36.7 CM/S
BH CV VAS SMA HEPATIC PSV: 196 CM/S
BH CV VAS SMA IMA EDV: 19.2 CM/S
BH CV VAS SMA IMA PSV: 167 CM/S
BH CV VAS SMA ORIGIN EDV: 77 CM/S
BH CV VAS SMA ORIGIN PSV: 244 CM/S
BH CV VAS SMA SMA DIST EDV: 23.7 CM/S
BH CV VAS SMA SMA DIST PSV: 113 CM/S
BH CV VAS SMA SMA MID EDV: 25.5 CM/S
BH CV VAS SMA SMA MID PSV: 133 CM/S
BH CV VAS SMA SMA PROX EDV: 59.6 CM/S
BH CV VAS SMA SMA PROX PSV: 220 CM/S
BH CV VAS SMA SPLENIC EDV: 32.1 CM/S
BH CV VAS SMA SPLENIC PSV: 159 CM/S
LAB AP CASE REPORT: NORMAL
MAXIMAL PREDICTED HEART RATE: 180 BPM
PATH REPORT.FINAL DX SPEC: NORMAL
PATH REPORT.GROSS SPEC: NORMAL
STRESS TARGET HR: 153 BPM

## 2022-11-29 PROCEDURE — 74250 X-RAY XM SM INT 1CNTRST STD: CPT

## 2022-11-29 PROCEDURE — 63710000001 PROCHLORPERAZINE MALEATE PER 5 MG: Performed by: HOSPITALIST

## 2022-11-29 PROCEDURE — 63710000001 ONDANSETRON ODT 4 MG TABLET DISPERSIBLE: Performed by: HOSPITALIST

## 2022-11-29 PROCEDURE — 25010000002 MORPHINE PER 10 MG: Performed by: HOSPITALIST

## 2022-11-29 PROCEDURE — 93975 VASCULAR STUDY: CPT

## 2022-11-29 PROCEDURE — 25010000002 SODIUM CHLORIDE 0.9 % WITH KCL 20 MEQ 20-0.9 MEQ/L-% SOLUTION: Performed by: HOSPITALIST

## 2022-11-29 PROCEDURE — 99232 SBSQ HOSP IP/OBS MODERATE 35: CPT | Performed by: PHYSICIAN ASSISTANT

## 2022-11-29 RX ORDER — PANTOPRAZOLE SODIUM 40 MG/1
40 TABLET, DELAYED RELEASE ORAL
Status: DISCONTINUED | OUTPATIENT
Start: 2022-11-30 | End: 2022-11-30 | Stop reason: HOSPADM

## 2022-11-29 RX ORDER — ONDANSETRON 4 MG/1
4 TABLET, ORALLY DISINTEGRATING ORAL
Status: DISCONTINUED | OUTPATIENT
Start: 2022-11-29 | End: 2022-11-30 | Stop reason: HOSPADM

## 2022-11-29 RX ORDER — ONDANSETRON 4 MG/1
4 TABLET, FILM COATED ORAL
Status: DISCONTINUED | OUTPATIENT
Start: 2022-11-30 | End: 2022-11-30 | Stop reason: HOSPADM

## 2022-11-29 RX ADMIN — MORPHINE SULFATE 2 MG: 2 INJECTION, SOLUTION INTRAMUSCULAR; INTRAVENOUS at 08:07

## 2022-11-29 RX ADMIN — PROCHLORPERAZINE MALEATE 5 MG: 5 TABLET ORAL at 11:45

## 2022-11-29 RX ADMIN — DICYCLOMINE HYDROCHLORIDE 20 MG: 10 CAPSULE ORAL at 20:44

## 2022-11-29 RX ADMIN — MORPHINE SULFATE 2 MG: 2 INJECTION, SOLUTION INTRAMUSCULAR; INTRAVENOUS at 14:32

## 2022-11-29 RX ADMIN — PROCHLORPERAZINE MALEATE 5 MG: 5 TABLET ORAL at 05:06

## 2022-11-29 RX ADMIN — APIXABAN 5 MG: 5 TABLET, FILM COATED ORAL at 08:07

## 2022-11-29 RX ADMIN — OXYCODONE HYDROCHLORIDE AND ACETAMINOPHEN 1 TABLET: 10; 325 TABLET ORAL at 15:45

## 2022-11-29 RX ADMIN — OXYCODONE HYDROCHLORIDE AND ACETAMINOPHEN 1 TABLET: 10; 325 TABLET ORAL at 20:43

## 2022-11-29 RX ADMIN — DIVALPROEX SODIUM 500 MG: 500 TABLET, DELAYED RELEASE ORAL at 20:44

## 2022-11-29 RX ADMIN — FOLIC ACID 1 MG: 1 TABLET ORAL at 08:08

## 2022-11-29 RX ADMIN — GABAPENTIN 400 MG: 400 CAPSULE ORAL at 23:00

## 2022-11-29 RX ADMIN — PROCHLORPERAZINE MALEATE 5 MG: 5 TABLET ORAL at 18:57

## 2022-11-29 RX ADMIN — Medication 10 MG: at 20:44

## 2022-11-29 RX ADMIN — OXYCODONE HYDROCHLORIDE AND ACETAMINOPHEN 1 TABLET: 10; 325 TABLET ORAL at 09:47

## 2022-11-29 RX ADMIN — MORPHINE SULFATE 2 MG: 2 INJECTION, SOLUTION INTRAMUSCULAR; INTRAVENOUS at 18:57

## 2022-11-29 RX ADMIN — DIVALPROEX SODIUM 500 MG: 500 TABLET, DELAYED RELEASE ORAL at 08:07

## 2022-11-29 RX ADMIN — OXYCODONE HYDROCHLORIDE AND ACETAMINOPHEN 1 TABLET: 10; 325 TABLET ORAL at 05:04

## 2022-11-29 RX ADMIN — AMITRIPTYLINE HYDROCHLORIDE 25 MG: 25 TABLET, FILM COATED ORAL at 20:44

## 2022-11-29 RX ADMIN — DICYCLOMINE HYDROCHLORIDE 20 MG: 10 CAPSULE ORAL at 08:07

## 2022-11-29 RX ADMIN — FLUOXETINE HYDROCHLORIDE 40 MG: 20 CAPSULE ORAL at 20:43

## 2022-11-29 RX ADMIN — MORPHINE SULFATE 2 MG: 2 INJECTION, SOLUTION INTRAMUSCULAR; INTRAVENOUS at 22:41

## 2022-11-29 RX ADMIN — GABAPENTIN 400 MG: 400 CAPSULE ORAL at 15:45

## 2022-11-29 RX ADMIN — DICYCLOMINE HYDROCHLORIDE 20 MG: 10 CAPSULE ORAL at 11:45

## 2022-11-29 RX ADMIN — CEFUROXIME AXETIL 500 MG: 250 TABLET ORAL at 20:44

## 2022-11-29 RX ADMIN — POTASSIUM CHLORIDE AND SODIUM CHLORIDE 100 ML/HR: 900; 150 INJECTION, SOLUTION INTRAVENOUS at 06:54

## 2022-11-29 RX ADMIN — APIXABAN 5 MG: 5 TABLET, FILM COATED ORAL at 20:43

## 2022-11-29 RX ADMIN — GABAPENTIN 400 MG: 400 CAPSULE ORAL at 05:04

## 2022-11-29 RX ADMIN — FAMOTIDINE 20 MG: 20 TABLET ORAL at 06:54

## 2022-11-29 RX ADMIN — CEFUROXIME AXETIL 500 MG: 250 TABLET ORAL at 08:07

## 2022-11-29 RX ADMIN — DICYCLOMINE HYDROCHLORIDE 20 MG: 10 CAPSULE ORAL at 17:16

## 2022-11-30 ENCOUNTER — APPOINTMENT (OUTPATIENT)
Dept: CT IMAGING | Facility: HOSPITAL | Age: 42
End: 2022-11-30

## 2022-11-30 ENCOUNTER — READMISSION MANAGEMENT (OUTPATIENT)
Dept: CALL CENTER | Facility: HOSPITAL | Age: 42
End: 2022-11-30

## 2022-11-30 VITALS
TEMPERATURE: 98.2 F | OXYGEN SATURATION: 98 % | SYSTOLIC BLOOD PRESSURE: 117 MMHG | DIASTOLIC BLOOD PRESSURE: 78 MMHG | RESPIRATION RATE: 16 BRPM | BODY MASS INDEX: 36.89 KG/M2 | HEART RATE: 89 BPM | WEIGHT: 216.05 LBS | HEIGHT: 64 IN

## 2022-11-30 LAB
ALBUMIN SERPL-MCNC: 3.3 G/DL (ref 3.5–5.2)
ALBUMIN/GLOB SERPL: 1.7 G/DL
ALP SERPL-CCNC: 94 U/L (ref 39–117)
ALT SERPL W P-5'-P-CCNC: 26 U/L (ref 1–33)
ANION GAP SERPL CALCULATED.3IONS-SCNC: 9 MMOL/L (ref 5–15)
AST SERPL-CCNC: 14 U/L (ref 1–32)
BASOPHILS # BLD AUTO: 0.02 10*3/MM3 (ref 0–0.2)
BASOPHILS NFR BLD AUTO: 0.3 % (ref 0–1.5)
BILIRUB SERPL-MCNC: 0.4 MG/DL (ref 0–1.2)
BUN SERPL-MCNC: 3 MG/DL (ref 6–20)
BUN/CREAT SERPL: 3.3 (ref 7–25)
CALCIUM SPEC-SCNC: 8.5 MG/DL (ref 8.6–10.5)
CHLORIDE SERPL-SCNC: 104 MMOL/L (ref 98–107)
CO2 SERPL-SCNC: 26 MMOL/L (ref 22–29)
CREAT SERPL-MCNC: 0.91 MG/DL (ref 0.57–1)
DEPRECATED RDW RBC AUTO: 43.7 FL (ref 37–54)
EGFRCR SERPLBLD CKD-EPI 2021: 82 ML/MIN/1.73
EOSINOPHIL # BLD AUTO: 0.13 10*3/MM3 (ref 0–0.4)
EOSINOPHIL NFR BLD AUTO: 1.9 % (ref 0.3–6.2)
ERYTHROCYTE [DISTWIDTH] IN BLOOD BY AUTOMATED COUNT: 12.8 % (ref 12.3–15.4)
GLOBULIN UR ELPH-MCNC: 2 GM/DL
GLUCOSE SERPL-MCNC: 77 MG/DL (ref 65–99)
HCT VFR BLD AUTO: 36.4 % (ref 34–46.6)
HGB BLD-MCNC: 11.8 G/DL (ref 12–15.9)
IMM GRANULOCYTES # BLD AUTO: 0.03 10*3/MM3 (ref 0–0.05)
IMM GRANULOCYTES NFR BLD AUTO: 0.4 % (ref 0–0.5)
LYMPHOCYTES # BLD AUTO: 3.27 10*3/MM3 (ref 0.7–3.1)
LYMPHOCYTES NFR BLD AUTO: 48.8 % (ref 19.6–45.3)
MCH RBC QN AUTO: 30.6 PG (ref 26.6–33)
MCHC RBC AUTO-ENTMCNC: 32.4 G/DL (ref 31.5–35.7)
MCV RBC AUTO: 94.5 FL (ref 79–97)
MONOCYTES # BLD AUTO: 0.42 10*3/MM3 (ref 0.1–0.9)
MONOCYTES NFR BLD AUTO: 6.3 % (ref 5–12)
NEUTROPHILS NFR BLD AUTO: 2.83 10*3/MM3 (ref 1.7–7)
NEUTROPHILS NFR BLD AUTO: 42.3 % (ref 42.7–76)
NRBC BLD AUTO-RTO: 0 /100 WBC (ref 0–0.2)
PLATELET # BLD AUTO: 276 10*3/MM3 (ref 140–450)
PMV BLD AUTO: 10.1 FL (ref 6–12)
POTASSIUM SERPL-SCNC: 4.2 MMOL/L (ref 3.5–5.2)
PROT SERPL-MCNC: 5.3 G/DL (ref 6–8.5)
RBC # BLD AUTO: 3.85 10*6/MM3 (ref 3.77–5.28)
SODIUM SERPL-SCNC: 139 MMOL/L (ref 136–145)
TSH SERPL DL<=0.05 MIU/L-ACNC: 4 UIU/ML (ref 0.27–4.2)
WBC NRBC COR # BLD: 6.7 10*3/MM3 (ref 3.4–10.8)

## 2022-11-30 PROCEDURE — 85025 COMPLETE CBC W/AUTO DIFF WBC: CPT | Performed by: HOSPITALIST

## 2022-11-30 PROCEDURE — 80053 COMPREHEN METABOLIC PANEL: CPT | Performed by: HOSPITALIST

## 2022-11-30 PROCEDURE — 25010000002 PROCHLORPERAZINE 10 MG/2ML SOLUTION: Performed by: HOSPITALIST

## 2022-11-30 PROCEDURE — 63710000001 ONDANSETRON ODT 4 MG TABLET DISPERSIBLE: Performed by: HOSPITALIST

## 2022-11-30 PROCEDURE — 25010000002 SODIUM CHLORIDE 0.9 % WITH KCL 20 MEQ 20-0.9 MEQ/L-% SOLUTION: Performed by: HOSPITALIST

## 2022-11-30 PROCEDURE — 25010000002 MORPHINE PER 10 MG: Performed by: HOSPITALIST

## 2022-11-30 PROCEDURE — 71250 CT THORAX DX C-: CPT

## 2022-11-30 PROCEDURE — 63710000001 ONDANSETRON PER 8 MG: Performed by: HOSPITALIST

## 2022-11-30 PROCEDURE — 84443 ASSAY THYROID STIM HORMONE: CPT | Performed by: HOSPITALIST

## 2022-11-30 PROCEDURE — 99232 SBSQ HOSP IP/OBS MODERATE 35: CPT | Performed by: PHYSICIAN ASSISTANT

## 2022-11-30 RX ORDER — ONDANSETRON 4 MG/1
4 TABLET, FILM COATED ORAL
Qty: 90 TABLET | Refills: 0 | OUTPATIENT
Start: 2022-11-30 | End: 2022-12-13

## 2022-11-30 RX ORDER — OXYCODONE AND ACETAMINOPHEN 10; 325 MG/1; MG/1
1 TABLET ORAL EVERY 4 HOURS PRN
Qty: 15 TABLET | Refills: 0 | Status: SHIPPED | OUTPATIENT
Start: 2022-11-30 | End: 2022-12-05

## 2022-11-30 RX ORDER — PANTOPRAZOLE SODIUM 40 MG/1
40 TABLET, DELAYED RELEASE ORAL
Qty: 30 TABLET | Refills: 0 | Status: SHIPPED | OUTPATIENT
Start: 2022-12-01 | End: 2022-12-13 | Stop reason: SDUPTHER

## 2022-11-30 RX ADMIN — GABAPENTIN 400 MG: 400 CAPSULE ORAL at 15:02

## 2022-11-30 RX ADMIN — POTASSIUM CHLORIDE AND SODIUM CHLORIDE 100 ML/HR: 900; 150 INJECTION, SOLUTION INTRAVENOUS at 04:47

## 2022-11-30 RX ADMIN — OXYCODONE HYDROCHLORIDE AND ACETAMINOPHEN 1 TABLET: 10; 325 TABLET ORAL at 01:01

## 2022-11-30 RX ADMIN — ONDANSETRON 4 MG: 4 TABLET, ORALLY DISINTEGRATING ORAL at 08:38

## 2022-11-30 RX ADMIN — CEFUROXIME AXETIL 500 MG: 250 TABLET ORAL at 08:36

## 2022-11-30 RX ADMIN — MORPHINE SULFATE 2 MG: 2 INJECTION, SOLUTION INTRAMUSCULAR; INTRAVENOUS at 04:49

## 2022-11-30 RX ADMIN — DIVALPROEX SODIUM 500 MG: 500 TABLET, DELAYED RELEASE ORAL at 08:37

## 2022-11-30 RX ADMIN — PROCHLORPERAZINE EDISYLATE 5 MG: 5 INJECTION INTRAMUSCULAR; INTRAVENOUS at 01:01

## 2022-11-30 RX ADMIN — PROCHLORPERAZINE EDISYLATE 5 MG: 5 INJECTION INTRAMUSCULAR; INTRAVENOUS at 06:40

## 2022-11-30 RX ADMIN — FOLIC ACID 1 MG: 1 TABLET ORAL at 08:37

## 2022-11-30 RX ADMIN — DICYCLOMINE HYDROCHLORIDE 20 MG: 10 CAPSULE ORAL at 12:45

## 2022-11-30 RX ADMIN — OXYCODONE HYDROCHLORIDE AND ACETAMINOPHEN 1 TABLET: 10; 325 TABLET ORAL at 06:45

## 2022-11-30 RX ADMIN — ONDANSETRON HYDROCHLORIDE 4 MG: 4 TABLET, FILM COATED ORAL at 08:36

## 2022-11-30 RX ADMIN — OXYCODONE HYDROCHLORIDE AND ACETAMINOPHEN 1 TABLET: 10; 325 TABLET ORAL at 10:54

## 2022-11-30 RX ADMIN — Medication 10 ML: at 08:41

## 2022-11-30 RX ADMIN — APIXABAN 5 MG: 5 TABLET, FILM COATED ORAL at 08:36

## 2022-11-30 RX ADMIN — OXYCODONE HYDROCHLORIDE AND ACETAMINOPHEN 1 TABLET: 10; 325 TABLET ORAL at 15:02

## 2022-11-30 RX ADMIN — GABAPENTIN 400 MG: 400 CAPSULE ORAL at 05:41

## 2022-11-30 RX ADMIN — DICYCLOMINE HYDROCHLORIDE 20 MG: 10 CAPSULE ORAL at 08:36

## 2022-11-30 RX ADMIN — ONDANSETRON HYDROCHLORIDE 4 MG: 4 TABLET, FILM COATED ORAL at 12:44

## 2022-11-30 RX ADMIN — PANTOPRAZOLE SODIUM 40 MG: 40 TABLET, DELAYED RELEASE ORAL at 05:41

## 2022-12-01 NOTE — PAYOR COMM NOTE
"Carson Mullen (41 y.o. Female)     DC SUMMARY FOR [id number]    CONTACT ESVIN DELCID  P# [phone]  F# [phone]      Date of Birth   1980    Social Security Number       Address   [address] [place] [address]    Home Phone   [phone]    MRN   [id number]       [facility]   None    Marital Status   Single                            Admission Date   22    Admission Type   Emergency    Admitting Provider   Wiley Garzon MD    Attending Provider       Department, Room/Bed   [address], 76/1       Discharge Date   2022    Discharge Disposition   Home or Self Care    Discharge Destination                               Attending Provider: (none)   Allergies: No Known Allergies    Isolation: None   Infection: None   Code Status: Prior    Ht: 162.6 cm (64\")   Wt: 98 kg (216 lb 0.8 oz)    Admission Cmt: None   Principal Problem: Right upper quadrant pain [R10.11]                 Active Insurance as of 2022     Primary Coverage     Payor Plan Insurance Group Employer/Plan Group    [facility] BY ALYSHA [facility] BY ALYSHA [id number]     Payor Plan Address Payor Plan Phone Number Payor Plan Fax Number Effective Dates    [address] [address]   2021 - None Entered    [facility] [address]       Subscriber Name Subscriber Birth Date Member ID       CARSON MULLEN 1980 [id number]                 Emergency Contacts      (Rel.) Home Phone Work Phone Mobile Phone    JUNIOR AGUAYO (Significant Other) [phone] -- [phone]               Discharge Summary      David Brumfield MD at 22 1159              Patient Name: Carson Mullen  : 1980  MRN: [id number]    Date of Admission: 2022  Date of Discharge:  2022  Primary Care Physician: Sahil Cornelius MD      Chief Complaint:   Abdominal Pain      Discharge Diagnoses     Active Hospital Problems    Diagnosis  POA   • **Right upper " quadrant pain [R10.11]  Yes   • Intractable abdominal pain [R10.9]  Yes   • Tobacco abuse [Z72.0]  Yes   • Elevated LFTs [R79.89]  Unknown   • 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        Hospital Course     Ms. Allen is a 41 y.o. female with a history of chronic pain, chronic nausea, multiple admissions for nausea vomiting and abdominal and back pain who presented to [facility] initially complaining of right flank pain and epigastric pain.  Please see the admitting history and physical for further details.  She was found to have intractable nausea and vomiting intractable pain with elevated LFTs and was admitted to the hospital for further evaluation and treatment.  She was admitted to the hospital started on IV fluids and GI was consulted.  Work-up of the liver function test was negative for any etiology.  It was felt to be probably related to her underlying infectious process.  She was treated for a urinary tract infection on admission.  As far as her nausea vomiting is concerned she had a full and thorough work-up here with negative small bowel follow-through negative EGD and continued to have symptoms.  She is responded reasonably well to scheduled Zofran and plan is to continue that in the outpatient setting and follow-up with GI.  Describes her pains concerned she had a thorough work-up for this as well.  MRIs of the thoracic and lumbar spine showed no acute findings.  CT of the chest showed no acute findings other than some very's minimally small right pleural effusion.  She will be discharged with a few days worth of oxycodone.  She can follow-up with her primary care physician but should probably see an outpatient pain physician.  A referral was placed in the computer.  Again there is no etiology noted for her pain or her nausea and vomiting.  Plan is to follow-up with GI and her primary care physician.        Day of Discharge      Subjective:  She is feeling better still having vomiting at times but tolerating some things well.  Started on a low residue diet and discussed the potential for gastroparesis.    Physical Exam:  Temp:  [97 °F (36.1 °C)-98.3 °F (36.8 °C)] 98.2 °F (36.8 °C)  Heart Rate:  [68-96] 89  Resp:  [16] 16  BP: ()/(63-78) 117/78  Body mass index is 37.09 kg/m².  Physical Exam  Vitals and nursing note reviewed.   Constitutional:       Appearance: Normal appearance.   HENT:      Head: Normocephalic and atraumatic.   Cardiovascular:      Rate and Rhythm: Normal rate and regular rhythm.   Pulmonary:      Effort: Pulmonary effort is normal.      Breath sounds: Normal breath sounds.   Neurological:      General: No focal deficit present.      Mental Status: She is alert and oriented to person, place, and time.         Consultants     Consult Orders (all) (From admission, onward)     Start     Ordered    11/24/22 1422  Inpatient Hospitalist Consult  Once        Specialty:  Hospitalist  Provider:  Wiley Garzon MD    11/24/22 1421    11/24/22 0702  Inpatient Gastroenterology Consult  IN         Specialty:  Gastroenterology  Provider:  Karl Giordano MD    11/24/22 0200              Procedures     ESOPHAGOGASTRODUODENOSCOPY with biopsy      Imaging Results (All)     Procedure Component Value Units Date/Time    CT Chest Without Contrast Diagnostic [[id number]] Collected: 11/30/22 1205     Updated: 11/30/22 1244    Narrative:      CT CHEST WO CONTRAST DIAGNOSTIC-     HISTORY:  Right rib pain. Chest radiograph 11/23/2022.     TECHNIQUE: CT of the chest was performed without intravenous contrast.  Reformatted images were reviewed. Radiation dose reduction techniques  were utilized, including automated exposure control and exposure  modulation based on body size.     COMPARISON:  Chest radiograph 11/23/2022. CT chest 11/8/2020.        FINDINGS: No pathologically enlarged thoracic lymph nodes are  identified. Heart size is  normal. There is no pericardial effusion.  There is a normal thoracic aortic branch pattern. There is trace right  pleural fluid.  Limited imaging through the upper abdomen demonstrates surgical clips at  the gallbladder fossa. There is high attenuation within portions of the  visualized colon due to residual oral contrast from recent small bowel  follow-through examination. There is multilevel degenerative disc  disease. No acute displaced right rib fracture is identified, as  clinically questioned.  The trachea is clear. There is minimal curvilinear opacity at the  posterolateral right lung base.          Impression:         Trace right pleural fluid with minimal curvilinear opacity at the right  lung base, likely atelectasis. No acute rib fracture is identified.        This report was finalized on 11/30/2022 12:41 PM by Dr. Claire Estrada M.D.       FL Small Bowel Follow Through Single-Contrast [[id number]] Collected: 11/29/22 1505     Updated: 11/29/22 1531    Narrative:      FLUOROSCOPIC SMALL BOWEL FOLLOW-THROUGH SINGLE CONTRAST     CLINICAL INFORMATION:  Nausea and vomiting, upper quadrant pain.     PROCEDURE: After the ingestion of barium, serial images of the abdomen  performed up to 2.5 hours.     FLUOROSCOPY TIME: 0, 9 images.     FINDINGS: On the 15-minute image there is contrast material within the  stomach, duodenum and proximal jejunum. The small bowel caliber is  normal. No wall or fold thickening seen. No tumor or extrinsic  displacement identified. At approximately 2 hours, the terminal ileum  begins to fill. By 2.5 hours the ascending colon is opacified. The  terminal ileum is typical in appearance. No small bowel obstruction  seen.     CONCLUSION: Normal small bowel.     This report was finalized on 11/29/2022 3:25 PM by Dr. Matias Romero M.D.       MRI Lumbar Spine Without Contrast [[id number]] Collected: 11/27/22 1957     Updated: 11/28/22 2015    Narrative:      MRI LUMBAR SPINE WO CONTRAST-   11/27/2022     HISTORY: Back pain.     Multiple sagittal and axial images were obtained through the lumbar  spine.     The T11-T12 intervertebral disc appears within normal limits with no  canal stenosis.     T12-L1, L1-L2, L2-L3 and L3-4 levels appear within normal limits.  Minimal broad-based disc bulge is seen at L4-5 with some mild facet  joint arthropathy. No significant canal stenosis is seen.     There are bilateral pedicle screws fusing L5 and S1 with intervertebral  disc spacer seen and minimal disc bulge. There is facet joint  arthropathy with no significant canal stenosis at L5-S1.     There is some soft tissue edema in the posterior subcutaneous tissues of  the lumbar spine.     Lower cord and conus appear normal.     No fractures are seen. No significant subluxation is seen.       Impression:      1. Postoperative changes at L5-S1 with minimal disc bulge and facet  joint arthropathy. No significant canal stenosis is seen.  2. Minimal disc bulge is seen at L4-5.  3. No canal stenosis or other significant findings are noted.     This report was finalized on 11/28/2022 8:11 PM by Dr. Evens Arriaza M.D.       MRI abdomen w wo contrast mrcp [[id number]] Collected: 11/25/22 0825     Updated: 11/25/22 0844    Narrative:      MRI AND MRCP ABDOMEN WITH AND WITHOUT CONTRAST; MR ENTEROGRAPHY ABDOMEN  AND PELVIS WITH AND WITHOUT CONTRAST     HISTORY: Right upper quadrant pain for a week, history of  cholecystectomy; alkaline phosphatase and bilirubin are within normal  limits on 11/25/2022, AST and ALT are elevated at 42 and 116,  respectively     TECHNIQUE: Multiplanar multisequence MRI and MRCP of the abdomen was  performed before and after the IV administration of intravenous  contrast. Additionally, MR enterography of the abdomen and pelvis was  performed before and after the IV administration of contrast.  Approximately 20 mL of MultiHance was administered.     COMPARISON: CT abdomen pelvis 11/23/2022 and  7/4/2021 and MRCP  07/06/2021     FINDINGS:  Metallic artifact from lumbar fusion hardware is present and is not well  evaluated. Additionally, please note that evaluation is significantly  suboptimal due to motion artifact, particularly the post contrast  coronal and axial images. Additionally, a precontrast fat-sat  T1-weighted axial image was not obtained which also limits evaluation.     No findings of persistent small or large bowel thickening demonstrating  restricted diffusion is seen. While no definite area of small or large  bowel hyperenhancement seen, please note the above stated limitations.     The gallbladder is surgically absent. No intrahepatic biliary duct  dilation is present. Common bile duct measures up to approximately 7 to  8 mm in diameter, grossly unchanged since 07/06/2021. The main  pancreatic duct is not distended. Cortical defect along the posterior  aspect of the spleen is present and grossly unchanged since 07/06/2021.  Asymmetric cortical atrophy of the left kidney is present. Homogenously  T2 hyperintense nonenhancing lesion within the left kidney better  evaluated on prior MRI and likely benign.     Mild hepatic steatosis is present. While no gross abnormality seen  within the liver or pancreas, please note evaluation is significantly  suboptimal due to the above stated limitations.     IMPRESSION  1.  Please note the above stated limitations making this examination is  significantly suboptimal.  2.  No definite findings of active inflammatory bowel disease are seen.  However, please note that MR enterography, especially given the  limitations, cannot exclude the possibility of inflammatory bowel  disease.  3.  No intrahepatic biliary ductal dilation. The common bile duct  measures up to 7-8 mm and grossly unchanged in size since 07/06/2021.  4.   Mild hepatic steatosis.  5.  Other findings as above.     This report was finalized on 11/25/2022 8:41 AM by Dr. Lloyd Martinez M.D.        MRI Abdomen Pelvis Enterography [[id number]] Collected: 11/25/22 0825     Updated: 11/25/22 0844    Narrative:      MRI AND MRCP ABDOMEN WITH AND WITHOUT CONTRAST; MR ENTEROGRAPHY ABDOMEN  AND PELVIS WITH AND WITHOUT CONTRAST     HISTORY: Right upper quadrant pain for a week, history of  cholecystectomy; alkaline phosphatase and bilirubin are within normal  limits on 11/25/2022, AST and ALT are elevated at 42 and 116,  respectively     TECHNIQUE: Multiplanar multisequence MRI and MRCP of the abdomen was  performed before and after the IV administration of intravenous  contrast. Additionally, MR enterography of the abdomen and pelvis was  performed before and after the IV administration of contrast.  Approximately 20 mL of MultiHance was administered.     COMPARISON: CT abdomen pelvis 11/23/2022 and 7/4/2021 and MRCP  07/06/2021     FINDINGS:  Metallic artifact from lumbar fusion hardware is present and is not well  evaluated. Additionally, please note that evaluation is significantly  suboptimal due to motion artifact, particularly the post contrast  coronal and axial images. Additionally, a precontrast fat-sat  T1-weighted axial image was not obtained which also limits evaluation.     No findings of persistent small or large bowel thickening demonstrating  restricted diffusion is seen. While no definite area of small or large  bowel hyperenhancement seen, please note the above stated limitations.     The gallbladder is surgically absent. No intrahepatic biliary duct  dilation is present. Common bile duct measures up to approximately 7 to  8 mm in diameter, grossly unchanged since 07/06/2021. The main  pancreatic duct is not distended. Cortical defect along the posterior  aspect of the spleen is present and grossly unchanged since 07/06/2021.  Asymmetric cortical atrophy of the left kidney is present. Homogenously  T2 hyperintense nonenhancing lesion within the left kidney better  evaluated on prior MRI and likely  benign.     Mild hepatic steatosis is present. While no gross abnormality seen  within the liver or pancreas, please note evaluation is significantly  suboptimal due to the above stated limitations.     IMPRESSION  1.  Please note the above stated limitations making this examination is  significantly suboptimal.  2.  No definite findings of active inflammatory bowel disease are seen.  However, please note that MR enterography, especially given the  limitations, cannot exclude the possibility of inflammatory bowel  disease.  3.  No intrahepatic biliary ductal dilation. The common bile duct  measures up to 7-8 mm and grossly unchanged in size since 07/06/2021.  4.   Mild hepatic steatosis.  5.  Other findings as above.     This report was finalized on 11/25/2022 8:41 AM by Dr. Lloyd Martinez M.D.       US Liver [[id number]] Collected: 11/24/22 0135     Updated: 11/24/22 0135    Narrative:        Patient: CARSON MULLEN  Time Out: 01:35  Exam(s): US LIVER     EXAM:    US Abdomen Limited, Right Upper Quadrant    CLINICAL HISTORY:     Reason for exam: RUQ pain, transaminitis.    TECHNIQUE:    Real-time ultrasound of the right upper quadrant with image   documentation.    COMPARISON:    CT abdomen 11 23 2022    FINDINGS:    Liver: 14.5 cm length.  No mass.  No intrahepatic bile duct dilation.    Gallbladder: Status post cholecystectomy..    Common bile duct: 8 mm diameter.  No stones.      Pancreas: Partially visualized pancreas is unremarkable..    Right kidney: 11 cm in length..  No stones.  No solid mass.  No   hydronephrosis.    IMPRESSION:       Status post cholecystectomy.  Age-indeterminant 8 mm common bile duct.    No intraductal calculus identified.      Impression:          Electronically signed by Brad Yip M.D. on 11-24-22 at 0135    XR Chest 2 View [[id number]] Collected: 11/23/22 1823     Updated: 11/23/22 1827    Narrative:      XR CHEST 2 VW-     HISTORY:  Right upper quadrant pain that radiates to  back.     COMPARISON:  Chest radiograph 12/1/2021     FINDINGS:    2 views of the chest were obtained.     Support Devices:  None.  Cardiac Silhouette/Mediastinum/Ileana:  The cardiac, mediastinal, and  hilar contours are within normal limits.  Lungs/Pleural Spaces:  The lungs and pleural spaces are clear.  Chest Wall/Diaphragm/Upper Abdomen:  There is multilevel degenerative  disc disease. There are surgical clips in the right upper quadrant.        CONCLUSION(S):       1.  No focal consolidation or effusion.     This report was finalized on 11/23/2022 6:23 PM by Dr. Claire Estrada M.D.           Results for orders placed during the hospital encounter of 11/23/22    Duplex Mesenteric Complete CAR    Interpretation Summary  •  No hemodynamically significant stenosis of the celiac artery is noted.  •  Less than 70% stenosis of the superior mesenteric artery (SMA) is noted.    Results for orders placed during the hospital encounter of 11/08/20    Adult Transesophageal Echo (DENIS) W/ Cont if Necessary Per Protocol    Interpretation Summary  · Left ventricular ejection fraction appears to be 61 - 65%. Left ventricular systolic function is normal.  · Saline test results are negative.    Pertinent Labs     Results from last 7 days   Lab Units 11/30/22  0525 11/27/22  0419 11/24/22  0432 11/23/22  1500   WBC 10*3/mm3 6.70 6.66 6.54 7.12   HEMOGLOBIN g/dL 11.8* 12.2 11.2* 12.8   PLATELETS 10*3/mm3 276 258 216 242     Results from last 7 days   Lab Units 11/30/22  0525 11/28/22  1006 11/27/22  0419 11/26/22  0428   SODIUM mmol/L 139 137 138 138   POTASSIUM mmol/L 4.2 4.5 4.1 4.3   CHLORIDE mmol/L 104 100 101 103   CO2 mmol/L 26.0 28.0 28.6 28.6   BUN mg/dL 3* 7 10 8   CREATININE mg/dL 0.91 0.87 0.93 0.87   GLUCOSE mg/dL 77 94 74 78   EGFR mL/min/1.73 82.0 86.5 79.8 86.5     Results from last 7 days   Lab Units 11/30/22  0525 11/28/22  1006 11/26/22  0428 11/25/22  0508   ALBUMIN g/dL 3.30* 3.80  3.60 3.30* 3.30*  2.7*    BILIRUBIN mg/dL 0.4 0.3 0.3 0.3   ALK PHOS U/L 94 105 102 98   AST (SGOT) U/L 14 16 28 42*   ALT (SGPT) U/L 26 47* 86* 116*     Results from last 7 days   Lab Units 11/30/22  0525 11/28/22  1006 11/27/22  0419 11/26/22  0428 11/25/22  0508   CALCIUM mg/dL 8.5* 9.0 9.1 8.8 8.6   ALBUMIN g/dL 3.30* 3.80  3.60  --  3.30* 3.30*  2.7*   PHOSPHORUS mg/dL  --  4.0  --   --   --      Results from last 7 days   Lab Units 11/25/22  0508 11/23/22  1500   LIPASE U/L 15 27             Invalid input(s): LDLCALC      Results from last 7 days   Lab Units 11/23/22  1745   COVID19  Not Detected       Test Results Pending at Discharge       Discharge Details        Discharge Medications      New Medications      Instructions Start Date   ondansetron 4 MG tablet  Commonly known as: ZOFRAN   4 mg, Oral, 3 Times Daily With Meals      oxyCODONE-acetaminophen  MG per tablet  Commonly known as: PERCOCET   1 tablet, Oral, Every 4 Hours PRN      pantoprazole 40 MG EC tablet  Commonly known as: PROTONIX   40 mg, Oral, Every Early Morning   Start Date: December 1, 2022        Changes to Medications      Instructions Start Date   gabapentin 800 MG tablet  Commonly known as: NEURONTIN  What changed: Another medication with the same name was removed. Continue taking this medication, and follow the directions you see here.   800 mg, Oral, 4 Times Daily         Continue These Medications      Instructions Start Date   amitriptyline 25 MG tablet  Commonly known as: ELAVIL   25 mg, Oral, Nightly      apixaban 5 MG tablet tablet  Commonly known as: ELIQUIS   5 mg, Oral, 2 Times Daily, Last filled 4/12/21 - pt states she was instructed to stop taking medication prior to ERCP ~1 month ago and was not instructed to restart medication.  Indication: Splenic infarct       Calcium Carbonate-Vitamin D 600-200 MG-UNIT tablet   1 tablet, Oral, Daily      dicyclomine 20 MG tablet  Commonly known as: BENTYL   20 mg, Oral, Every 6 Hours      divalproex  500 MG DR tablet  Commonly known as: DEPAKOTE   500 mg, Oral, Every 12 Hours Scheduled      FLUoxetine 40 MG capsule  Commonly known as: PROzac   40 mg, Oral, Nightly      folic acid 1 MG tablet  Commonly known as: FOLVITE   1 mg, Oral, Daily      hydrOXYzine 25 MG tablet  Commonly known as: ATARAX   1-2 tablets, Oral, 3 Times Daily PRN      loratadine 10 MG tablet  Commonly known as: CLARITIN   10 mg, Oral, 2 Times Daily      LORazepam 0.5 MG tablet  Commonly known as: ATIVAN   0.25-0.5 mg, Oral      melatonin 5 MG tablet tablet   10 mg, Oral, Nightly      methocarbamol 750 MG tablet  Commonly known as: ROBAXIN   750 mg, Oral, 3 Times Daily PRN      polyethylene glycol 17 g packet  Commonly known as: MIRALAX   17 g, Oral, Daily      SUMAtriptan 100 MG tablet  Commonly known as: IMITREX   50 mg, Oral, Every 2 Hours PRN, Take one tablet at onset of headache. May repeat dose one time in 2 hours if headache not relieved.       traZODone 50 MG tablet  Commonly known as: DESYREL   100 mg, Oral, Nightly PRN, Take 1 to 2 tablets by mouth every  Night as needed for sleep      vitamin D 1.25 MG (26659 UT) capsule capsule  Commonly known as: ERGOCALCIFEROL   50,000 Units, Oral, Weekly         Stop These Medications    cefuroxime 500 MG tablet  Commonly known as: CEFTIN     famotidine 20 MG tablet  Commonly known as: PEPCID     methylPREDNISolone 4 MG dose pack  Commonly known as: MEDROL     omeprazole 20 MG capsule  Commonly known as: priLOSEC     ondansetron ODT 8 MG disintegrating tablet  Commonly known as: ZOFRAN-ODT            No Known Allergies    Discharge Disposition:  Home or Self Care      Discharge Diet:  Diet Order   Procedures   • Diet: Gastrointestinal Diets; Fiber-Restricted; Texture: Regular Texture (IDDSI 7); Fluid Consistency: Thin (IDDSI 0)       Discharge Activity:   Activity Instructions     Activity as Tolerated            CODE STATUS:    Code Status and Medical Interventions:   Ordered at: 11/23/22 0574      Code Status (Patient has no pulse and is not breathing):    CPR (Attempt to Resuscitate)     Medical Interventions (Patient has pulse or is breathing):    Full Support       No future appointments.  Additional Instructions for the Follow-ups that You Need to Schedule     Discharge Follow-up with PCP   As directed       Currently Documented PCP:    Sahil Cornelius MD    PCP Phone Number:    [phone]     Follow Up Details: 1-2 weeks         Discharge Follow-up with Specified Provider: Gastroenterology; 1 Month   As directed      To: Gastroenterology    Follow Up: 1 Month            Follow-up Information     Sahil Cornelius MD .    Specialty: Internal Medicine  Why: 1-2 weeks  Contact information:  [address] [place] [address] [phone]                         Additional Instructions for the Follow-ups that You Need to Schedule     Discharge Follow-up with PCP   As directed       Currently Documented PCP:    Sahil Cornelius MD    PCP Phone Number:    [phone]     Follow Up Details: 1-2 weeks         Discharge Follow-up with Specified Provider: Gastroenterology; 1 Month   As directed      To: Gastroenterology    Follow Up: 1 Month           Time Spent on Discharge:  Greater than 30 minutes      David Brumfield MD  [facility]  11/30/22  14:44 EST                Electronically signed by David Brumfield MD at 11/30/22 3553

## 2022-12-01 NOTE — PROGRESS NOTES
Case Management Discharge Note      Final Note: Discharged home. Vanita Plascencia, YNES              Transportation Services  Private: Car    Final Discharge Disposition Code: 01 - home or self-care

## 2022-12-01 NOTE — PAYOR COMM NOTE
"Carson Mullen (41 y.o. Female)     DC SUMMARY FOR [id number]        Date of Birth   1980    Social Security Number       Address   [address] [place] [address]    Home Phone   [phone]    MRN   [id number]       [facility]   None    Marital Status   Single                            Admission Date   22    Admission Type   Emergency    Admitting Provider   Wiley Garzon MD    Attending Provider       Department, Room/Bed   [address], 76/1       Discharge Date   2022    Discharge Disposition   Home or Self Care    Discharge Destination                               Attending Provider: (none)   Allergies: No Known Allergies    Isolation: None   Infection: None   Code Status: Prior    Ht: 162.6 cm (64\")   Wt: 98 kg (216 lb 0.8 oz)    Admission Cmt: None   Principal Problem: Right upper quadrant pain [R10.11]                 Active Insurance as of 2022     Primary Coverage     Payor Plan Insurance Group Employer/Plan Group    [employer] BY ALY[employer] BY ALYSHA [id number]     Payor Plan Address Payor Plan Phone Number Payor Plan Fax Number Effective Dates    [address] [address]   2021 - None Entered    [facility] [phone]       Subscriber Name Subscriber Birth Date Member ID       CARSON MULLEN 1980 [id number]                 Emergency Contacts      (Rel.) Home Phone Work Phone Mobile Phone    JUNIOR AGUAYO (Significant Other) [phone] -- [phone]               Discharge Summary      David Brumfield MD at 22 1159              Patient Name: Carson Mullen  : 1980  MRN: [id number]    Date of Admission: 2022  Date of Discharge:  2022  Primary Care Physician: Sahil Cornelius MD      Chief Complaint:   Abdominal Pain      Discharge Diagnoses     Active Hospital Problems    Diagnosis  POA   • **Right upper quadrant pain [R10.11]  Yes   • Intractable abdominal " pain [R10.9]  Yes   • Tobacco abuse [Z72.0]  Yes   • Elevated LFTs [R79.89]  Unknown   • 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        Hospital Course     Ms. Allen is a 41 y.o. female with a history of chronic pain, chronic nausea, multiple admissions for nausea vomiting and abdominal and back pain who presented to  initially complaining of right flank pain and epigastric pain.  Please see the admitting history and physical for further details.  She was found to have intractable nausea and vomiting intractable pain with elevated LFTs and was admitted to the hospital for further evaluation and treatment.  She was admitted to the hospital started on IV fluids and GI was consulted.  Work-up of the liver function test was negative for any etiology.  It was felt to be probably related to her underlying infectious process.  She was treated for a urinary tract infection on admission.  As far as her nausea vomiting is concerned she had a full and thorough work-up here with negative small bowel follow-through negative EGD and continued to have symptoms.  She is responded reasonably well to scheduled Zofran and plan is to continue that in the outpatient setting and follow-up with GI.  Describes her pains concerned she had a thorough work-up for this as well.  MRIs of the thoracic and lumbar spine showed no acute findings.  CT of the chest showed no acute findings other than some very's minimally small right pleural effusion.  She will be discharged with a few days worth of oxycodone.  She can follow-up with her primary care physician but should probably see an outpatient pain physician.  A referral was placed in the computer.  Again there is no etiology noted for her pain or her nausea and vomiting.  Plan is to follow-up with GI and her primary care physician.        Day of Discharge     Subjective:  She is feeling better still having  vomiting at times but tolerating some things well.  Started on a low residue diet and discussed the potential for gastroparesis.    Physical Exam:  Temp:  [97 °F (36.1 °C)-98.3 °F (36.8 °C)] 98.2 °F (36.8 °C)  Heart Rate:  [68-96] 89  Resp:  [16] 16  BP: ()/(63-78) 117/78  Body mass index is 37.09 kg/m².  Physical Exam  Vitals and nursing note reviewed.   Constitutional:       Appearance: Normal appearance.   HENT:      Head: Normocephalic and atraumatic.   Cardiovascular:      Rate and Rhythm: Normal rate and regular rhythm.   Pulmonary:      Effort: Pulmonary effort is normal.      Breath sounds: Normal breath sounds.   Neurological:      General: No focal deficit present.      Mental Status: She is alert and oriented to person, place, and time.         Consultants     Consult Orders (all) (From admission, onward)     Start     Ordered    11/24/22 1422  Inpatient Hospitalist Consult  Once        Specialty:  Hospitalist  Provider:  Wiley Garzon MD    11/24/22 1421    11/24/22 0702  Inpatient Gastroenterology Consult  IN         Specialty:  Gastroenterology  Provider:  Karl Giordano MD    11/24/22 0200              Procedures     ESOPHAGOGASTRODUODENOSCOPY with biopsy      Imaging Results (All)     Procedure Component Value Units Date/Time    CT Chest Without Contrast Diagnostic [[id number]] Collected: 11/30/22 1205     Updated: 11/30/22 1244    Narrative:      CT CHEST WO CONTRAST DIAGNOSTIC-     HISTORY:  Right rib pain. Chest radiograph 11/23/2022.     TECHNIQUE: CT of the chest was performed without intravenous contrast.  Reformatted images were reviewed. Radiation dose reduction techniques  were utilized, including automated exposure control and exposure  modulation based on body size.     COMPARISON:  Chest radiograph 11/23/2022. CT chest 11/8/2020.        FINDINGS: No pathologically enlarged thoracic lymph nodes are  identified. Heart size is normal. There is no pericardial effusion.  There is a  normal thoracic aortic branch pattern. There is trace right  pleural fluid.  Limited imaging through the upper abdomen demonstrates surgical clips at  the gallbladder fossa. There is high attenuation within portions of the  visualized colon due to residual oral contrast from recent small bowel  follow-through examination. There is multilevel degenerative disc  disease. No acute displaced right rib fracture is identified, as  clinically questioned.  The trachea is clear. There is minimal curvilinear opacity at the  posterolateral right lung base.          Impression:         Trace right pleural fluid with minimal curvilinear opacity at the right  lung base, likely atelectasis. No acute rib fracture is identified.        This report was finalized on 11/30/2022 12:41 PM by Dr. Claire Estrada M.D.       FL Small Bowel Follow Through Single-Contrast [[id number]] Collected: 11/29/22 1505     Updated: 11/29/22 1531    Narrative:      FLUOROSCOPIC SMALL BOWEL FOLLOW-THROUGH SINGLE CONTRAST     CLINICAL INFORMATION:  Nausea and vomiting, upper quadrant pain.     PROCEDURE: After the ingestion of barium, serial images of the abdomen  performed up to 2.5 hours.     FLUOROSCOPY TIME: 0, 9 images.     FINDINGS: On the 15-minute image there is contrast material within the  stomach, duodenum and proximal jejunum. The small bowel caliber is  normal. No wall or fold thickening seen. No tumor or extrinsic  displacement identified. At approximately 2 hours, the terminal ileum  begins to fill. By 2.5 hours the ascending colon is opacified. The  terminal ileum is typical in appearance. No small bowel obstruction  seen.     CONCLUSION: Normal small bowel.     This report was finalized on 11/29/2022 3:25 PM by Dr. Matias Romero M.D.       MRI Lumbar Spine Without Contrast [[id number]] Collected: 11/27/22 1957     Updated: 11/28/22 2015    Narrative:      MRI LUMBAR SPINE WO CONTRAST-  11/27/2022     HISTORY: Back pain.     Multiple  sagittal and axial images were obtained through the lumbar  spine.     The T11-T12 intervertebral disc appears within normal limits with no  canal stenosis.     T12-L1, L1-L2, L2-L3 and L3-4 levels appear within normal limits.  Minimal broad-based disc bulge is seen at L4-5 with some mild facet  joint arthropathy. No significant canal stenosis is seen.     There are bilateral pedicle screws fusing L5 and S1 with intervertebral  disc spacer seen and minimal disc bulge. There is facet joint  arthropathy with no significant canal stenosis at L5-S1.     There is some soft tissue edema in the posterior subcutaneous tissues of  the lumbar spine.     Lower cord and conus appear normal.     No fractures are seen. No significant subluxation is seen.       Impression:      1. Postoperative changes at L5-S1 with minimal disc bulge and facet  joint arthropathy. No significant canal stenosis is seen.  2. Minimal disc bulge is seen at L4-5.  3. No canal stenosis or other significant findings are noted.     This report was finalized on 11/28/2022 8:11 PM by Dr. Evens Arriaza M.D.       MRI abdomen w wo contrast mrcp [[id number]] Collected: 11/25/22 0825     Updated: 11/25/22 0844    Narrative:      MRI AND MRCP ABDOMEN WITH AND WITHOUT CONTRAST; MR ENTEROGRAPHY ABDOMEN  AND PELVIS WITH AND WITHOUT CONTRAST     HISTORY: Right upper quadrant pain for a week, history of  cholecystectomy; alkaline phosphatase and bilirubin are within normal  limits on 11/25/2022, AST and ALT are elevated at 42 and 116,  respectively     TECHNIQUE: Multiplanar multisequence MRI and MRCP of the abdomen was  performed before and after the IV administration of intravenous  contrast. Additionally, MR enterography of the abdomen and pelvis was  performed before and after the IV administration of contrast.  Approximately 20 mL of MultiHance was administered.     COMPARISON: CT abdomen pelvis 11/23/2022 and 7/4/2021 and MRCP  07/06/2021      FINDINGS:  Metallic artifact from lumbar fusion hardware is present and is not well  evaluated. Additionally, please note that evaluation is significantly  suboptimal due to motion artifact, particularly the post contrast  coronal and axial images. Additionally, a precontrast fat-sat  T1-weighted axial image was not obtained which also limits evaluation.     No findings of persistent small or large bowel thickening demonstrating  restricted diffusion is seen. While no definite area of small or large  bowel hyperenhancement seen, please note the above stated limitations.     The gallbladder is surgically absent. No intrahepatic biliary duct  dilation is present. Common bile duct measures up to approximately 7 to  8 mm in diameter, grossly unchanged since 07/06/2021. The main  pancreatic duct is not distended. Cortical defect along the posterior  aspect of the spleen is present and grossly unchanged since 07/06/2021.  Asymmetric cortical atrophy of the left kidney is present. Homogenously  T2 hyperintense nonenhancing lesion within the left kidney better  evaluated on prior MRI and likely benign.     Mild hepatic steatosis is present. While no gross abnormality seen  within the liver or pancreas, please note evaluation is significantly  suboptimal due to the above stated limitations.     IMPRESSION  1.  Please note the above stated limitations making this examination is  significantly suboptimal.  2.  No definite findings of active inflammatory bowel disease are seen.  However, please note that MR enterography, especially given the  limitations, cannot exclude the possibility of inflammatory bowel  disease.  3.  No intrahepatic biliary ductal dilation. The common bile duct  measures up to 7-8 mm and grossly unchanged in size since 07/06/2021.  4.   Mild hepatic steatosis.  5.  Other findings as above.     This report was finalized on 11/25/2022 8:41 AM by Dr. Lloyd Martinez M.D.       MRI Abdomen Pelvis  Enterography [[id number]] Collected: 11/25/22 0825     Updated: 11/25/22 0844    Narrative:      MRI AND MRCP ABDOMEN WITH AND WITHOUT CONTRAST; MR ENTEROGRAPHY ABDOMEN  AND PELVIS WITH AND WITHOUT CONTRAST     HISTORY: Right upper quadrant pain for a week, history of  cholecystectomy; alkaline phosphatase and bilirubin are within normal  limits on 11/25/2022, AST and ALT are elevated at 42 and 116,  respectively     TECHNIQUE: Multiplanar multisequence MRI and MRCP of the abdomen was  performed before and after the IV administration of intravenous  contrast. Additionally, MR enterography of the abdomen and pelvis was  performed before and after the IV administration of contrast.  Approximately 20 mL of MultiHance was administered.     COMPARISON: CT abdomen pelvis 11/23/2022 and 7/4/2021 and MRCP  07/06/2021     FINDINGS:  Metallic artifact from lumbar fusion hardware is present and is not well  evaluated. Additionally, please note that evaluation is significantly  suboptimal due to motion artifact, particularly the post contrast  coronal and axial images. Additionally, a precontrast fat-sat  T1-weighted axial image was not obtained which also limits evaluation.     No findings of persistent small or large bowel thickening demonstrating  restricted diffusion is seen. While no definite area of small or large  bowel hyperenhancement seen, please note the above stated limitations.     The gallbladder is surgically absent. No intrahepatic biliary duct  dilation is present. Common bile duct measures up to approximately 7 to  8 mm in diameter, grossly unchanged since 07/06/2021. The main  pancreatic duct is not distended. Cortical defect along the posterior  aspect of the spleen is present and grossly unchanged since 07/06/2021.  Asymmetric cortical atrophy of the left kidney is present. Homogenously  T2 hyperintense nonenhancing lesion within the left kidney better  evaluated on prior MRI and likely benign.     Mild  hepatic steatosis is present. While no gross abnormality seen  within the liver or pancreas, please note evaluation is significantly  suboptimal due to the above stated limitations.     IMPRESSION  1.  Please note the above stated limitations making this examination is  significantly suboptimal.  2.  No definite findings of active inflammatory bowel disease are seen.  However, please note that MR enterography, especially given the  limitations, cannot exclude the possibility of inflammatory bowel  disease.  3.  No intrahepatic biliary ductal dilation. The common bile duct  measures up to 7-8 mm and grossly unchanged in size since 07/06/2021.  4.   Mild hepatic steatosis.  5.  Other findings as above.     This report was finalized on 11/25/2022 8:41 AM by Dr. Lloyd Martinez M.D.       US Liver [[id number]] Collected: 11/24/22 0135     Updated: 11/24/22 0135    Narrative:        Patient: CARSON MULLEN  Time Out: 01:35  Exam(s): US LIVER     EXAM:    US Abdomen Limited, Right Upper Quadrant    CLINICAL HISTORY:     Reason for exam: RUQ pain, transaminitis.    TECHNIQUE:    Real-time ultrasound of the right upper quadrant with image   documentation.    COMPARISON:    CT abdomen 11 23 2022    FINDINGS:    Liver: 14.5 cm length.  No mass.  No intrahepatic bile duct dilation.    Gallbladder: Status post cholecystectomy..    Common bile duct: 8 mm diameter.  No stones.      Pancreas: Partially visualized pancreas is unremarkable..    Right kidney: 11 cm in length..  No stones.  No solid mass.  No   hydronephrosis.    IMPRESSION:       Status post cholecystectomy.  Age-indeterminant 8 mm common bile duct.    No intraductal calculus identified.      Impression:          Electronically signed by Brad Yip M.D. on 11-24-22 at 0135    XR Chest 2 View [[id number]] Collected: 11/23/22 1823     Updated: 11/23/22 1827    Narrative:      XR CHEST 2 VW-     HISTORY:  Right upper quadrant pain that radiates to back.      COMPARISON:  Chest radiograph 12/1/2021     FINDINGS:    2 views of the chest were obtained.     Support Devices:  None.  Cardiac Silhouette/Mediastinum/Ileana:  The cardiac, mediastinal, and  hilar contours are within normal limits.  Lungs/Pleural Spaces:  The lungs and pleural spaces are clear.  Chest Wall/Diaphragm/Upper Abdomen:  There is multilevel degenerative  disc disease. There are surgical clips in the right upper quadrant.        CONCLUSION(S):       1.  No focal consolidation or effusion.     This report was finalized on 11/23/2022 6:23 PM by Dr. Claire Estrada M.D.           Results for orders placed during the hospital encounter of 11/23/22    Duplex Mesenteric Complete CAR    Interpretation Summary  •  No hemodynamically significant stenosis of the celiac artery is noted.  •  Less than 70% stenosis of the superior mesenteric artery (SMA) is noted.    Results for orders placed during the hospital encounter of 11/08/20    Adult Transesophageal Echo (DENIS) W/ Cont if Necessary Per Protocol    Interpretation Summary  · Left ventricular ejection fraction appears to be 61 - 65%. Left ventricular systolic function is normal.  · Saline test results are negative.    Pertinent Labs     Results from last 7 days   Lab Units 11/30/22  0525 11/27/22  0419 11/24/22  0432 11/23/22  1500   WBC 10*3/mm3 6.70 6.66 6.54 7.12   HEMOGLOBIN g/dL 11.8* 12.2 11.2* 12.8   PLATELETS 10*3/mm3 276 258 216 242     Results from last 7 days   Lab Units 11/30/22  0525 11/28/22  1006 11/27/22  0419 11/26/22  0428   SODIUM mmol/L 139 137 138 138   POTASSIUM mmol/L 4.2 4.5 4.1 4.3   CHLORIDE mmol/L 104 100 101 103   CO2 mmol/L 26.0 28.0 28.6 28.6   BUN mg/dL 3* 7 10 8   CREATININE mg/dL 0.91 0.87 0.93 0.87   GLUCOSE mg/dL 77 94 74 78   EGFR mL/min/1.73 82.0 86.5 79.8 86.5     Results from last 7 days   Lab Units 11/30/22  0525 11/28/22  1006 11/26/22  0428 11/25/22  0508   ALBUMIN g/dL 3.30* 3.80  3.60 3.30* 3.30*  2.7*   BILIRUBIN  mg/dL 0.4 0.3 0.3 0.3   ALK PHOS U/L 94 105 102 98   AST (SGOT) U/L 14 16 28 42*   ALT (SGPT) U/L 26 47* 86* 116*     Results from last 7 days   Lab Units 11/30/22  0525 11/28/22  1006 11/27/22  0419 11/26/22  0428 11/25/22  0508   CALCIUM mg/dL 8.5* 9.0 9.1 8.8 8.6   ALBUMIN g/dL 3.30* 3.80  3.60  --  3.30* 3.30*  2.7*   PHOSPHORUS mg/dL  --  4.0  --   --   --      Results from last 7 days   Lab Units 11/25/22  0508 11/23/22  1500   LIPASE U/L 15 27             Invalid input(s): LDLCALC      Results from last 7 days   Lab Units 11/23/22  1745   COVID19  Not Detected       Test Results Pending at Discharge       Discharge Details        Discharge Medications      New Medications      Instructions Start Date   ondansetron 4 MG tablet  Commonly known as: ZOFRAN   4 mg, Oral, 3 Times Daily With Meals      oxyCODONE-acetaminophen  MG per tablet  Commonly known as: PERCOCET   1 tablet, Oral, Every 4 Hours PRN      pantoprazole 40 MG EC tablet  Commonly known as: PROTONIX   40 mg, Oral, Every Early Morning   Start Date: December 1, 2022        Changes to Medications      Instructions Start Date   gabapentin 800 MG tablet  Commonly known as: NEURONTIN  What changed: Another medication with the same name was removed. Continue taking this medication, and follow the directions you see here.   800 mg, Oral, 4 Times Daily         Continue These Medications      Instructions Start Date   amitriptyline 25 MG tablet  Commonly known as: ELAVIL   25 mg, Oral, Nightly      apixaban 5 MG tablet tablet  Commonly known as: ELIQUIS   5 mg, Oral, 2 Times Daily, Last filled 4/12/21 - pt states she was instructed to stop taking medication prior to ERCP ~1 month ago and was not instructed to restart medication.  Indication: Splenic infarct       Calcium Carbonate-Vitamin D 600-200 MG-UNIT tablet   1 tablet, Oral, Daily      dicyclomine 20 MG tablet  Commonly known as: BENTYL   20 mg, Oral, Every 6 Hours      divalproex 500 MG   tablet  Commonly known as: DEPAKOTE   500 mg, Oral, Every 12 Hours Scheduled      FLUoxetine 40 MG capsule  Commonly known as: PROzac   40 mg, Oral, Nightly      folic acid 1 MG tablet  Commonly known as: FOLVITE   1 mg, Oral, Daily      hydrOXYzine 25 MG tablet  Commonly known as: ATARAX   1-2 tablets, Oral, 3 Times Daily PRN      loratadine 10 MG tablet  Commonly known as: CLARITIN   10 mg, Oral, 2 Times Daily      LORazepam 0.5 MG tablet  Commonly known as: ATIVAN   0.25-0.5 mg, Oral      melatonin 5 MG tablet tablet   10 mg, Oral, Nightly      methocarbamol 750 MG tablet  Commonly known as: ROBAXIN   750 mg, Oral, 3 Times Daily PRN      polyethylene glycol 17 g packet  Commonly known as: MIRALAX   17 g, Oral, Daily      SUMAtriptan 100 MG tablet  Commonly known as: IMITREX   50 mg, Oral, Every 2 Hours PRN, Take one tablet at onset of headache. May repeat dose one time in 2 hours if headache not relieved.       traZODone 50 MG tablet  Commonly known as: DESYREL   100 mg, Oral, Nightly PRN, Take 1 to 2 tablets by mouth every  Night as needed for sleep      vitamin D 1.25 MG (25277 UT) capsule capsule  Commonly known as: ERGOCALCIFEROL   50,000 Units, Oral, Weekly         Stop These Medications    cefuroxime 500 MG tablet  Commonly known as: CEFTIN     famotidine 20 MG tablet  Commonly known as: PEPCID     methylPREDNISolone 4 MG dose pack  Commonly known as: MEDROL     omeprazole 20 MG capsule  Commonly known as: priLOSEC     ondansetron ODT 8 MG disintegrating tablet  Commonly known as: ZOFRAN-ODT            No Known Allergies    Discharge Disposition:  Home or Self Care      Discharge Diet:  Diet Order   Procedures   • Diet: Gastrointestinal Diets; Fiber-Restricted; Texture: Regular Texture (IDDSI 7); Fluid Consistency: Thin (IDDSI 0)       Discharge Activity:   Activity Instructions     Activity as Tolerated            CODE STATUS:    Code Status and Medical Interventions:   Ordered at: 11/23/22 7802     Code  Status (Patient has no pulse and is not breathing):    CPR (Attempt to Resuscitate)     Medical Interventions (Patient has pulse or is breathing):    Full Support       No future appointments.  Additional Instructions for the Follow-ups that You Need to Schedule     Discharge Follow-up with PCP   As directed       Currently Documented PCP:    Sahil Cornelius MD    PCP Phone Number:    [phone]     Follow Up Details: 1-2 weeks         Discharge Follow-up with Specified Provider: Gastroenterology; 1 Month   As directed      To: Gastroenterology    Follow Up: 1 Month            Follow-up Information     Sahil Cornelius MD .    Specialty: Internal Medicine  Why: 1-2 weeks  Contact information:  [address] [place] [address] [phone]                         Additional Instructions for the Follow-ups that You Need to Schedule     Discharge Follow-up with PCP   As directed       Currently Documented PCP:    Sahil Cornelius MD    PCP Phone Number:    [phone]     Follow Up Details: 1-2 weeks         Discharge Follow-up with Specified Provider: Gastroenterology; 1 Month   As directed      To: Gastroenterology    Follow Up: 1 Month           Time Spent on Discharge:  Greater than 30 minutes      David Brumfield MD  [facility]  11/30/22  14:44 EST                Electronically signed by David Brumfield MD at 11/30/22 1516

## 2022-12-05 ENCOUNTER — APPOINTMENT (OUTPATIENT)
Dept: CT IMAGING | Facility: HOSPITAL | Age: 42
End: 2022-12-05

## 2022-12-05 ENCOUNTER — READMISSION MANAGEMENT (OUTPATIENT)
Dept: CALL CENTER | Facility: HOSPITAL | Age: 42
End: 2022-12-05

## 2022-12-05 ENCOUNTER — HOSPITAL ENCOUNTER (EMERGENCY)
Facility: HOSPITAL | Age: 42
Discharge: LEFT BEFORE DISCHARGE | End: 2022-12-06
Attending: EMERGENCY MEDICINE | Admitting: EMERGENCY MEDICINE

## 2022-12-05 VITALS
SYSTOLIC BLOOD PRESSURE: 128 MMHG | DIASTOLIC BLOOD PRESSURE: 74 MMHG | HEIGHT: 64 IN | BODY MASS INDEX: 36.89 KG/M2 | RESPIRATION RATE: 18 BRPM | HEART RATE: 70 BPM | TEMPERATURE: 99.8 F | WEIGHT: 216.05 LBS | OXYGEN SATURATION: 97 %

## 2022-12-05 DIAGNOSIS — R10.11 RIGHT UPPER QUADRANT ABDOMINAL PAIN: Primary | ICD-10-CM

## 2022-12-05 LAB
ALBUMIN SERPL-MCNC: 4 G/DL (ref 3.5–5.2)
ALBUMIN/GLOB SERPL: 1.6 G/DL
ALP SERPL-CCNC: 114 U/L (ref 39–117)
ALT SERPL W P-5'-P-CCNC: 16 U/L (ref 1–33)
ANION GAP SERPL CALCULATED.3IONS-SCNC: 8 MMOL/L (ref 5–15)
AST SERPL-CCNC: 18 U/L (ref 1–32)
BASOPHILS # BLD AUTO: 0.02 10*3/MM3 (ref 0–0.2)
BASOPHILS NFR BLD AUTO: 0.3 % (ref 0–1.5)
BILIRUB SERPL-MCNC: 0.2 MG/DL (ref 0–1.2)
BUN SERPL-MCNC: 2 MG/DL (ref 6–20)
BUN/CREAT SERPL: 2.4 (ref 7–25)
CALCIUM SPEC-SCNC: 9.2 MG/DL (ref 8.6–10.5)
CHLORIDE SERPL-SCNC: 106 MMOL/L (ref 98–107)
CO2 SERPL-SCNC: 23 MMOL/L (ref 22–29)
CREAT SERPL-MCNC: 0.84 MG/DL (ref 0.57–1)
DEPRECATED RDW RBC AUTO: 42.4 FL (ref 37–54)
EGFRCR SERPLBLD CKD-EPI 2021: 89.7 ML/MIN/1.73
EOSINOPHIL # BLD AUTO: 0.05 10*3/MM3 (ref 0–0.4)
EOSINOPHIL NFR BLD AUTO: 0.6 % (ref 0.3–6.2)
ERYTHROCYTE [DISTWIDTH] IN BLOOD BY AUTOMATED COUNT: 12.7 % (ref 12.3–15.4)
GLOBULIN UR ELPH-MCNC: 2.5 GM/DL
GLUCOSE SERPL-MCNC: 101 MG/DL (ref 65–99)
HCG SERPL QL: NEGATIVE
HCT VFR BLD AUTO: 42.1 % (ref 34–46.6)
HGB BLD-MCNC: 14.5 G/DL (ref 12–15.9)
HOLD SPECIMEN: NORMAL
HOLD SPECIMEN: NORMAL
IMM GRANULOCYTES # BLD AUTO: 0.03 10*3/MM3 (ref 0–0.05)
IMM GRANULOCYTES NFR BLD AUTO: 0.4 % (ref 0–0.5)
LIPASE SERPL-CCNC: 10 U/L (ref 13–60)
LYMPHOCYTES # BLD AUTO: 1.9 10*3/MM3 (ref 0.7–3.1)
LYMPHOCYTES NFR BLD AUTO: 24.6 % (ref 19.6–45.3)
MCH RBC QN AUTO: 31.5 PG (ref 26.6–33)
MCHC RBC AUTO-ENTMCNC: 34.4 G/DL (ref 31.5–35.7)
MCV RBC AUTO: 91.5 FL (ref 79–97)
MONOCYTES # BLD AUTO: 0.47 10*3/MM3 (ref 0.1–0.9)
MONOCYTES NFR BLD AUTO: 6.1 % (ref 5–12)
NEUTROPHILS NFR BLD AUTO: 5.26 10*3/MM3 (ref 1.7–7)
NEUTROPHILS NFR BLD AUTO: 68 % (ref 42.7–76)
NRBC BLD AUTO-RTO: 0 /100 WBC (ref 0–0.2)
PLATELET # BLD AUTO: 359 10*3/MM3 (ref 140–450)
PMV BLD AUTO: 9.8 FL (ref 6–12)
POTASSIUM SERPL-SCNC: 3.7 MMOL/L (ref 3.5–5.2)
PROT SERPL-MCNC: 6.5 G/DL (ref 6–8.5)
RBC # BLD AUTO: 4.6 10*6/MM3 (ref 3.77–5.28)
SODIUM SERPL-SCNC: 137 MMOL/L (ref 136–145)
VALPROATE SERPL-MCNC: <2.8 MCG/ML (ref 50–125)
WBC NRBC COR # BLD: 7.73 10*3/MM3 (ref 3.4–10.8)
WHOLE BLOOD HOLD COAG: NORMAL
WHOLE BLOOD HOLD SPECIMEN: NORMAL

## 2022-12-05 PROCEDURE — 25010000002 DROPERIDOL PER 5 MG: Performed by: EMERGENCY MEDICINE

## 2022-12-05 PROCEDURE — 85025 COMPLETE CBC W/AUTO DIFF WBC: CPT

## 2022-12-05 PROCEDURE — 99283 EMERGENCY DEPT VISIT LOW MDM: CPT

## 2022-12-05 PROCEDURE — 96374 THER/PROPH/DIAG INJ IV PUSH: CPT

## 2022-12-05 PROCEDURE — 36415 COLL VENOUS BLD VENIPUNCTURE: CPT

## 2022-12-05 PROCEDURE — 74177 CT ABD & PELVIS W/CONTRAST: CPT

## 2022-12-05 PROCEDURE — 80053 COMPREHEN METABOLIC PANEL: CPT

## 2022-12-05 PROCEDURE — 96375 TX/PRO/DX INJ NEW DRUG ADDON: CPT

## 2022-12-05 PROCEDURE — 84703 CHORIONIC GONADOTROPIN ASSAY: CPT | Performed by: EMERGENCY MEDICINE

## 2022-12-05 PROCEDURE — 80164 ASSAY DIPROPYLACETIC ACD TOT: CPT | Performed by: EMERGENCY MEDICINE

## 2022-12-05 PROCEDURE — 83690 ASSAY OF LIPASE: CPT

## 2022-12-05 PROCEDURE — 25010000002 DIPHENHYDRAMINE PER 50 MG: Performed by: EMERGENCY MEDICINE

## 2022-12-05 RX ORDER — SODIUM CHLORIDE 9 MG/ML
125 INJECTION, SOLUTION INTRAVENOUS CONTINUOUS
Status: DISCONTINUED | OUTPATIENT
Start: 2022-12-05 | End: 2022-12-06 | Stop reason: HOSPADM

## 2022-12-05 RX ORDER — SODIUM CHLORIDE 0.9 % (FLUSH) 0.9 %
10 SYRINGE (ML) INJECTION AS NEEDED
Status: DISCONTINUED | OUTPATIENT
Start: 2022-12-05 | End: 2022-12-06 | Stop reason: HOSPADM

## 2022-12-05 RX ORDER — DROPERIDOL 2.5 MG/ML
1.25 INJECTION, SOLUTION INTRAMUSCULAR; INTRAVENOUS ONCE
Status: COMPLETED | OUTPATIENT
Start: 2022-12-05 | End: 2022-12-06

## 2022-12-05 RX ORDER — DROPERIDOL 2.5 MG/ML
1.25 INJECTION, SOLUTION INTRAMUSCULAR; INTRAVENOUS ONCE
Status: COMPLETED | OUTPATIENT
Start: 2022-12-05 | End: 2022-12-05

## 2022-12-05 RX ORDER — DIPHENHYDRAMINE HYDROCHLORIDE 50 MG/ML
25 INJECTION INTRAMUSCULAR; INTRAVENOUS ONCE
Status: COMPLETED | OUTPATIENT
Start: 2022-12-05 | End: 2022-12-05

## 2022-12-05 RX ADMIN — DIPHENHYDRAMINE HYDROCHLORIDE 25 MG: 50 INJECTION, SOLUTION INTRAMUSCULAR; INTRAVENOUS at 23:17

## 2022-12-05 RX ADMIN — SODIUM CHLORIDE 1000 ML: 9 INJECTION, SOLUTION INTRAVENOUS at 23:20

## 2022-12-05 RX ADMIN — DROPERIDOL 1.25 MG: 2.5 INJECTION, SOLUTION INTRAMUSCULAR; INTRAVENOUS at 23:16

## 2022-12-05 NOTE — OUTREACH NOTE
Medical Week 1 Survey    Flowsheet Row Responses   [facility] patient discharged from? [facility]   Does the patient have one of the following disease processes/diagnoses(primary or secondary)? Other   Week 1 attempt successful? No   Unsuccessful attempts Attempt 1          ALANIS WILSON - Registered Nurse

## 2022-12-05 NOTE — ED TRIAGE NOTES
Patient from home, states that she was in the hospital 4 days ago. She states she does not know what she was diagnosed with and to go see a GI doctor, but her appointment is not for another month. Patient having right sided stomach pains with nausea and vomiting. Patient has on mask nurse has on proper ppe.

## 2022-12-06 PROCEDURE — 25010000002 DROPERIDOL PER 5 MG: Performed by: EMERGENCY MEDICINE

## 2022-12-06 PROCEDURE — 96376 TX/PRO/DX INJ SAME DRUG ADON: CPT

## 2022-12-06 PROCEDURE — 25010000002 IOPAMIDOL 61 % SOLUTION: Performed by: EMERGENCY MEDICINE

## 2022-12-06 RX ADMIN — DROPERIDOL 1.25 MG: 2.5 INJECTION, SOLUTION INTRAMUSCULAR; INTRAVENOUS at 00:04

## 2022-12-06 RX ADMIN — IOPAMIDOL 85 ML: 612 INJECTION, SOLUTION INTRAVENOUS at 00:01

## 2022-12-06 NOTE — ED NOTES
Pt's IV had to be reconnected because patient is rolling around in the bed. Pt reports pain to her right side.  MD farias

## 2022-12-06 NOTE — ED PROVIDER NOTES
EMERGENCY DEPARTMENT ENCOUNTER    Room Number:  24/24  Date of encounter:  12/6/2022  PCP: Sahil Cornelius MD  Historian: Patient and significant other      HPI:  Chief Complaint: Abdominal pain and vomiting  A complete HPI/ROS/PMH/PSH/SH/FH are unobtainable due to: Not applicable  Context: Belen Allen is a 42 y.o. female who presents to the ED c/o patient started with midepigastric and right upper quadrant pain yesterday late morning afternoon and became worse this morning.  It is persisted.  Has had multiple episodes of vomiting and dry heaves.  Is not able to hold anything down.  Denies chest pain or shortness of breath.  She is not offering much history to me.  She states that she hurts too much.  She just states that she has been hurting in this right side.  History of similar episodes in the past.  She is unaware of the previous work-up.  Has not followed up with GI or primary care doctor.  She says the GI doctor cannot see her for a month.        Previous Episodes: Yes please see medical history reviewed below  Current Symptoms: Right upper quadrant pain with nausea and vomiting and dry heaves    MEDICAL HISTORY REVIEWED  Patient was in the hospital from November 23 through November 30, 2022.  She came in with abdominal pain.  She also had elevated liver function test.  I reviewed the hospital course and discharge summary.  Patient has a history of chronic pain and chronic nausea.  She is had multiple admissions for nausea vomiting and abdominal pain and back pain to [facility].  She did have the pain and elevation of liver function test.  She started on IV fluids and a GI was consulted.  The work-up was unremarkable.  She was treated for a urinary tract infection on admission.  She had a unremarkable EGD and a small bowel follow-through which was unremarkable.  She is scheduled to follow-up with GI.  She also had an MRI of her thoracic and lumbar spines which were unremarkable.   CT scan of the chest also showed no acute findings there was no definitive etiology for her pain.  But had an extensive work-up.  Schedule follow-up with her primary care physician and GI.      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   • Generalized abdominal pain 11/29/2020   • Bilateral leg weakness 11/29/2020   • Acute bilateral low back pain 11/29/2020   • Antiphospholipid antibody positive 11/29/2020   • On anticoagulant therapy 11/29/2020   • Acute pain of left knee 11/29/2020   • Vitamin D deficiency 11/30/2020   • Folate deficiency 12/01/2020   • Pain of back and left lower extremity 12/02/2020   • Common bile duct dilatation 05/31/2021   • Elevated LFTs 06/01/2021   • Right upper quadrant abdominal pain 06/01/2021   • Obesity (BMI 30-39.9) 04/18/2019   • Tobacco abuse 06/02/2021   • GERD without esophagitis 06/02/2021   • Intractable abdominal pain 07/05/2021   • Obesity, Class III, BMI 40-49.9 (morbid obesity) ([facility]) 07/05/2021   • Constipation 07/05/2021   • History of ERCP 07/05/2021   • Other chronic pain 07/05/2021   • Seizures ([facility]) 01/01/2022   • Syncope 01/01/2022   • Lumbar back pain with radiculopathy affecting left lower extremity 01/05/2022   • Right upper quadrant pain 11/23/2022     Resolved Ambulatory Problems     Diagnosis Date Noted   • Choledocholithiasis 06/02/2021   • Rhabdomyolysis 01/01/2022   • Encephalopathy 01/01/2022     Past Medical History:   Diagnosis Date   • Abnormal Pap smear of cervix    • Ankle fracture 2013   • H/O Elevated liver enzymes    • History of chronic back pain    • History of migraine    • History of urinary tract infection    • Injury of back    • PONV (postoperative nausea and vomiting)    • Seasonal allergies          PAST SURGICAL HISTORY  Past Surgical History:   Procedure Laterality Date   • BACK SURGERY  2006    fusion L4, L5      • CHOLECYSTECTOMY     • DILATATION AND CURETTAGE     • ENDOSCOPY N/A 2022    Procedure: ESOPHAGOGASTRODUODENOSCOPY with biopsy;  Surgeon: Christiana Mann MD;  Location: [facility] ENDOSCOPY;  Service: Gastroenterology;  Laterality: N/A;  gastritis, duodenitis, irregular z line   • ERCP N/A 6/3/2021    Procedure: ENDOSCOPIC RETROGRADE CHOLANGIOPANCREATOGRAPHY WITH SPHINCTEROTOMY, DILATION WITH BALLOON CLEARANCE  (12MM-15MM);  Surgeon: Bjorn Flannery MD;  Location: [facility] ENDOSCOPY;  Service: Gastroenterology;  Laterality: N/A;  DILATED COMMON BILE DUCT   • SKIN SURGERY     • TUBAL ABDOMINAL LIGATION     • WISDOM TOOTH EXTRACTION  2018    x2         FAMILY HISTORY  Family History   Problem Relation Age of Onset   • Stroke Mother    • Allergic rhinitis Mother    • Allergic rhinitis Sister    • Breast cancer Maternal Aunt    • Cancer Maternal Grandmother    • Allergic rhinitis Paternal Grandfather    • Cancer Paternal Grandfather    • Prostate cancer Paternal Grandfather          SOCIAL HISTORY  Social History     Socioeconomic History   • Marital status: Single   • Number of children: 2   Tobacco Use   • Smoking status: Some Days     Packs/day: 0.50     Types: Cigarettes     Last attempt to quit: 2020     Years since quittin.0   • Smokeless tobacco: Never   Vaping Use   • Vaping Use: Never used   Substance and Sexual Activity   • Alcohol use: Not Currently   • Drug use: Not Currently   • Sexual activity: Defer         ALLERGIES  Patient has no known allergies.        REVIEW OF SYSTEMS  Review of Systems     All systems reviewed and negative except for those discussed in HPI.       PHYSICAL EXAM    I have reviewed the triage vital signs and nursing notes.    ED Triage Vitals [22 1417]   Temp Heart Rate Resp BP SpO2   99.8 °F (37.7 °C) 100 18 154/84 98 %      Temp src Heart Rate Source Patient Position BP Location FiO2 (%)   -- -- -- -- --       GENERAL: Female that is tearful  and anxious.  No acute cardiovascular or respiratory distress.Vital signs on my initial evaluation been reviewed  HENT: nares patent  Head/neck/ face are symmetric without gross deformity, signs of trauma, or swelling  EYES: no scleral icterus, no conjunctival pallor.  NECK: Supple, no meningismus  CV: regular rhythm, regular rate with intact distal pulses.  No murmur rub  RESPIRATORY: Lungs are clear to auscultation bilaterally.  Abdominal exam: Patient is morbidly obese.  Has tenderness in the midepigastric and right upper quadrant.  She pushes my hand away and will let me press on her belly.  Belly does appear soft.  Normal inspection and normal bowel sounds.  Again does not provide reliable exam.  MUSCULOSKELETAL: no deformity.  Intact distal pulses.  NEURO: alert and appropriate, moves all extremities, follows commands.  No focal motor or sensory change  SKIN: warm, dry    Vital signs and nursing notes reviewed.        LAB RESULTS  Recent Results (from the past 24 hour(s))   Comprehensive Metabolic Panel    Collection Time: 12/05/22  3:37 PM    Specimen: Arm, Left; Blood   Result Value Ref Range    Glucose 101 (H) 65 - 99 mg/dL    BUN 2 (L) 6 - 20 mg/dL    Creatinine 0.84 0.57 - 1.00 mg/dL    Sodium 137 136 - 145 mmol/L    Potassium 3.7 3.5 - 5.2 mmol/L    Chloride 106 98 - 107 mmol/L    CO2 23.0 22.0 - 29.0 mmol/L    Calcium 9.2 8.6 - 10.5 mg/dL    Total Protein 6.5 6.0 - 8.5 g/dL    Albumin 4.00 3.50 - 5.20 g/dL    ALT (SGPT) 16 1 - 33 U/L    AST (SGOT) 18 1 - 32 U/L    Alkaline Phosphatase 114 39 - 117 U/L    Total Bilirubin 0.2 0.0 - 1.2 mg/dL    Globulin 2.5 gm/dL    A/G Ratio 1.6 g/dL    BUN/Creatinine Ratio 2.4 (L) 7.0 - 25.0    Anion Gap 8.0 5.0 - 15.0 mmol/L    eGFR 89.7 >60.0 mL/min/1.73   Lipase    Collection Time: 12/05/22  3:37 PM    Specimen: Arm, Left; Blood   Result Value Ref Range    Lipase 10 (L) 13 - 60 U/L   Green Top (Gel)    Collection Time: 12/05/22  3:37 PM   Result Value Ref Range     Extra Tube Hold for add-ons.    Lavender Top    Collection Time: 12/05/22  3:37 PM   Result Value Ref Range    Extra Tube hold for add-on    Gold Top - SST    Collection Time: 12/05/22  3:37 PM   Result Value Ref Range    Extra Tube Hold for add-ons.    Light Blue Top    Collection Time: 12/05/22  3:37 PM   Result Value Ref Range    Extra Tube Hold for add-ons.    CBC Auto Differential    Collection Time: 12/05/22  3:37 PM    Specimen: Arm, Left; Blood   Result Value Ref Range    WBC 7.73 3.40 - 10.80 10*3/mm3    RBC 4.60 3.77 - 5.28 10*6/mm3    Hemoglobin 14.5 12.0 - 15.9 g/dL    Hematocrit 42.1 34.0 - 46.6 %    MCV 91.5 79.0 - 97.0 fL    MCH 31.5 26.6 - 33.0 pg    MCHC 34.4 31.5 - 35.7 g/dL    RDW 12.7 12.3 - 15.4 %    RDW-SD 42.4 37.0 - 54.0 fl    MPV 9.8 6.0 - 12.0 fL    Platelets 359 140 - 450 10*3/mm3    Neutrophil % 68.0 42.7 - 76.0 %    Lymphocyte % 24.6 19.6 - 45.3 %    Monocyte % 6.1 5.0 - 12.0 %    Eosinophil % 0.6 0.3 - 6.2 %    Basophil % 0.3 0.0 - 1.5 %    Immature Grans % 0.4 0.0 - 0.5 %    Neutrophils, Absolute 5.26 1.70 - 7.00 10*3/mm3    Lymphocytes, Absolute 1.90 0.70 - 3.10 10*3/mm3    Monocytes, Absolute 0.47 0.10 - 0.90 10*3/mm3    Eosinophils, Absolute 0.05 0.00 - 0.40 10*3/mm3    Basophils, Absolute 0.02 0.00 - 0.20 10*3/mm3    Immature Grans, Absolute 0.03 0.00 - 0.05 10*3/mm3    nRBC 0.0 0.0 - 0.2 /100 WBC   Valproic Acid Level, Total    Collection Time: 12/05/22  3:37 PM    Specimen: Arm, Left; Blood   Result Value Ref Range    Valproic Acid <2.8 (L) 50.0 - 125.0 mcg/mL   hCG, Serum, Qualitative    Collection Time: 12/05/22  3:37 PM    Specimen: Arm, Left; Blood   Result Value Ref Range    HCG Qualitative Negative Negative       Ordered the above labs and independently reviewed the results.        RADIOLOGY  CT Abdomen Pelvis With Contrast    Result Date: 12/6/2022  Patient: CARSON MULLEN  Time Out: 00:26 Exam(s): CT ABDOMEN + PELVIS With Contrast EXAM:   CT Abdomen and Pelvis With  Intravenous Contrast CLINICAL HISTORY:    Reason for exam: Right upper quadrant pain. TECHNIQUE:   Axial computed tomography images of the abdomen and pelvis with intravenous contrast.  CTDI is 17.82 mGy and DLP is 917.8 mGy-cm.  This CT exam was performed according to the principle of ALARA (As Low As Reasonably Achievable) by using one or more of the following dose reduction techniques: automated exposure control, adjustment of the mA and or kV according to patient size, and or use of iterative reconstruction technique. COMPARISON: Abdomen and pelvis CT with IV contrast of 11 23 2022 FINDINGS:   Lung bases:  Unremarkable.  No mass.  No consolidation.  ABDOMEN:   Liver:  Unremarkable.  No mass.   Gallbladder and bile ducts:  Prior cholecystectomy.  No ductal dilation.   Pancreas:  Unremarkable.  No mass.  No ductal dilation.   Spleen:  Unremarkable.  No splenomegaly.   Adrenals:  Unremarkable.  No mass.   Kidneys and ureters:  Left renal cortical scarring.  No hydronephrosis.   Stomach and bowel:  Unremarkable.  No obstruction.  No mucosal thickening.  PELVIS:   Appendix:  No findings to suggest acute appendicitis.   Bladder:  Unremarkable.  No mass.   Reproductive:  Left dominant ovarian follicle measuring 2.5 cm.  ABDOMEN and PELVIS:   Intraperitoneal space:  Unremarkable.  No free air.  No significant fluid collection.   Bones joints:  No acute fracture.  No dislocation.   Soft tissues:  The large bowel demonstrates diffuse collapse and mild wall thickening in the rectosigmoid colon without pericolonic soft tissue stranding.  The collapsed large bowel in the ascending and transverse portions and limits detailed evaluation but there is suggestion of mild mesenteric hyperemia.   Vasculature:  Unremarkable.  No abdominal aortic aneurysm.   Lymph nodes:  Unremarkable.  No enlarged lymph nodes. IMPRESSION:       There are findings suggesting sequelae of colitis.  No bowel obstruction or perforation and no abscess.   Otherwise no specific cause for right upper quadrant abdominal pain is identified postcholecystectomy with no findings suggesting biliary obstruction.     Electronically signed by Mayco Workman MD on 12-06-22 at 0026      I ordered the above noted radiological studies. Reviewed by me and discussed with radiologist.  See dictation for official radiology interpretation.      PROCEDURES    Procedures      MEDICATIONS GIVEN IN ER    Medications   sodium chloride 0.9 % flush 10 mL (has no administration in time range)   sodium chloride 0.9 % infusion (125 mL/hr Intravenous Not Given 12/6/22 0036)   sodium chloride 0.9 % bolus 1,000 mL (0 mL Intravenous Stopped 12/6/22 0037)   droperidol (INAPSINE) injection 1.25 mg (1.25 mg Intravenous Given 12/5/22 2316)   diphenhydrAMINE (BENADRYL) injection 25 mg (25 mg Intravenous Given 12/5/22 2317)   droperidol (INAPSINE) injection 1.25 mg (1.25 mg Intravenous Given 12/6/22 0004)   iopamidol (ISOVUE-300) 61 % injection 100 mL (85 mL Intravenous Given by Other 12/6/22 0001)         PROGRESS, DATA ANALYSIS, CONSULTS, AND MEDICAL DECISION MAKING    Differential diagnosis includes   - hepatobiliary pathology such as cholecystitis, cholangitis, and symptomatic cholelithiasis  -PUD  -Mesenteric ischemia  - Pancreatitis  - Dyspepsia  - Small bowel or large bowel obstruction  - Appendicitis  - Diverticulitis  - UTI including pyelonephritis  - Ureteral stone  - Zoster  - Colitis, including infectious and ischemic  - Atypical ACS  This patient is very anxious and tearful.  She adamantly denies smoking marijuana.  He has had multiple visits in the emergency department both here in [place] for abdominal pain and has had admissions before.  There is also mention of chronic pain and anxiety and depression.  In the past with abdominal pain and vomiting.  Her lab work initially looks unremarkable.  I would repeat a CT scan of the abdomen pelvis.  Again does not provide an adequate exam for me  she keeps pushing my hand away because of the tenderness palpation.  I will give her a dose of droperidol IV as well as Benadryl which should help for the pain, anxiety, and nausea and vomiting.  We will start off with a low-dose of 1.25 mg.  I explained this to the patient and the significant other.  All questions answered at this time    We are currently under a pandemic from the COVID19 infection.  The patient presented to the emergency department by ambulance or personal vehicle. I followed the current protocols required by Infection Control at [facility] in my evaluation and treatment of the patient. The patient was wearing a face mask during my evaluation and throughout my encounter. During my whole encounter with this patient I used appropriate personal protective equipment.  This equipment consisted of eye protection, facemask, gown, and gloves.  I applied this equipment before entering the room.      All labs have been independently reviewed by me.  All radiology studies have been reviewed by me and discussed with radiologist dictating the report.   EKG's independently viewed and interpreted by me.  Discussion below represents my analysis of pertinent findings related to patient's condition, differential diagnosis, treatment plan and final disposition.      ED Course as of 12/06/22 0219   Mon Dec 05, 2022   2243 She did finish her prescription oxycodone that she was prescribed with on discharge. [MM]   2353 HCG Qualitative: Negative [MM]   2353 Valproic Acid(!): <2.8  Patient had partial improvement with Inapsine.  I will go ahead and give her another dose of Inapsine. [MM]   Tue Dec 06, 2022   0217 I reviewed the CT scan of the report from the radiologist.  There is findings suggestive of sequelae of colitis there was no acute bowel abnormality appreciated.  Nothing to explain her right upper quadrant pain.  She is status postcholecystectomy.  No findings suggestive of biliary obstruction.   I reviewed the radiologist report.  Please see complete dictated report from radiologist [MM]   0217 This patient decided to leave on her own accord.  The patient's significant other came out of the room and stated that she wanted her close and wanted the IV out and wanted to leave.  Patient did not wait for me to communicate with her.  I was taking care of of several ill people in the emergency department.  The nurse informed me of the conversation with the fiancé.  She chose not to wait to have any conversation with me.  The CT scan report that I reviewed was after she had already left the emergency department.  She was able to ambulate out of the emergency department on her own accord. [MM]      ED Course User Index  [MM] Avery Mejia MD       AS OF 02:19 EST VITALS:    BP - 128/74  HR - 70  TEMP - 99.8 °F (37.7 °C)  02 SATS - 97%        DIAGNOSIS  Final diagnoses:   Right upper quadrant abdominal pain         DISPOSITION  Patient left on her own accord.  Refused to wait to communicate with me.          DICTATED UTILIZING DRAGON DICTATION    Note Disclaimer: At [facility], we believe that sharing information builds trust and better relationships. You are receiving this note because you recently visited [facility]. It is possible you will see health information before a provider has talked with you about it. This kind of information can be easy to misunderstand. To help you fully understand what it means for your health, we urge you to discuss this note with your provider.     Avery Mejia MD  12/06/22 [id number]

## 2022-12-09 ENCOUNTER — READMISSION MANAGEMENT (OUTPATIENT)
Dept: CALL CENTER | Facility: HOSPITAL | Age: 42
End: 2022-12-09

## 2022-12-09 NOTE — OUTREACH NOTE
Medical Week 1 Survey    Flowsheet Row Responses   [facility] patient discharged from? [place]   Does the patient have one of the following disease processes/diagnoses(primary or secondary)? Other   Week 1 attempt successful? No   Unsuccessful attempts Attempt 2          ERIKA HOLGUIN - Registered Nurse

## 2022-12-13 ENCOUNTER — APPOINTMENT (OUTPATIENT)
Dept: CT IMAGING | Facility: HOSPITAL | Age: 42
End: 2022-12-13

## 2022-12-13 ENCOUNTER — READMISSION MANAGEMENT (OUTPATIENT)
Dept: CALL CENTER | Facility: HOSPITAL | Age: 42
End: 2022-12-13

## 2022-12-13 ENCOUNTER — APPOINTMENT (OUTPATIENT)
Dept: GENERAL RADIOLOGY | Facility: HOSPITAL | Age: 42
End: 2022-12-13

## 2022-12-13 ENCOUNTER — HOSPITAL ENCOUNTER (EMERGENCY)
Facility: HOSPITAL | Age: 42
Discharge: HOME | End: 2022-12-13
Attending: EMERGENCY MEDICINE | Admitting: EMERGENCY MEDICINE

## 2022-12-13 VITALS
OXYGEN SATURATION: 96 % | TEMPERATURE: 98 F | RESPIRATION RATE: 16 BRPM | WEIGHT: 216.05 LBS | DIASTOLIC BLOOD PRESSURE: 94 MMHG | BODY MASS INDEX: 36.89 KG/M2 | HEIGHT: 64 IN | HEART RATE: 87 BPM | SYSTOLIC BLOOD PRESSURE: 127 MMHG

## 2022-12-13 DIAGNOSIS — R10.9 RIGHT SIDED ABDOMINAL PAIN: Primary | ICD-10-CM

## 2022-12-13 DIAGNOSIS — R19.7 DIARRHEA, UNSPECIFIED TYPE: ICD-10-CM

## 2022-12-13 DIAGNOSIS — R11.2 NAUSEA AND VOMITING, UNSPECIFIED VOMITING TYPE: ICD-10-CM

## 2022-12-13 DIAGNOSIS — E87.6 HYPOKALEMIA: ICD-10-CM

## 2022-12-13 LAB
ABO GROUP BLD: NORMAL
ALBUMIN SERPL-MCNC: 3.9 G/DL (ref 3.5–5.2)
ALBUMIN/GLOB SERPL: 1.9 G/DL
ALP SERPL-CCNC: 126 U/L (ref 39–117)
ALT SERPL W P-5'-P-CCNC: 83 U/L (ref 1–33)
ANION GAP SERPL CALCULATED.3IONS-SCNC: 10 MMOL/L (ref 5–15)
APTT PPP: 25.5 SECONDS (ref 22.7–35.4)
AST SERPL-CCNC: 26 U/L (ref 1–32)
BASOPHILS # BLD AUTO: 0.06 10*3/MM3 (ref 0–0.2)
BASOPHILS NFR BLD AUTO: 0.4 % (ref 0–1.5)
BILIRUB SERPL-MCNC: 0.2 MG/DL (ref 0–1.2)
BLD GP AB SCN SERPL QL: NEGATIVE
BUN SERPL-MCNC: 8 MG/DL (ref 6–20)
BUN/CREAT SERPL: 10.3 (ref 7–25)
CALCIUM SPEC-SCNC: 8.9 MG/DL (ref 8.6–10.5)
CHLORIDE SERPL-SCNC: 105 MMOL/L (ref 98–107)
CO2 SERPL-SCNC: 24 MMOL/L (ref 22–29)
CREAT SERPL-MCNC: 0.78 MG/DL (ref 0.57–1)
DEPRECATED RDW RBC AUTO: 44.4 FL (ref 37–54)
EGFRCR SERPLBLD CKD-EPI 2021: 98 ML/MIN/1.73
EOSINOPHIL # BLD AUTO: 0.08 10*3/MM3 (ref 0–0.4)
EOSINOPHIL NFR BLD AUTO: 0.5 % (ref 0.3–6.2)
ERYTHROCYTE [DISTWIDTH] IN BLOOD BY AUTOMATED COUNT: 13.2 % (ref 12.3–15.4)
GLOBULIN UR ELPH-MCNC: 2.1 GM/DL
GLUCOSE SERPL-MCNC: 105 MG/DL (ref 65–99)
HCG SERPL QL: NEGATIVE
HCT VFR BLD AUTO: 43.7 % (ref 34–46.6)
HGB BLD-MCNC: 14.6 G/DL (ref 12–15.9)
IMM GRANULOCYTES # BLD AUTO: 0.07 10*3/MM3 (ref 0–0.05)
IMM GRANULOCYTES NFR BLD AUTO: 0.5 % (ref 0–0.5)
INR PPP: 0.87 (ref 0.9–1.1)
LYMPHOCYTES # BLD AUTO: 4.5 10*3/MM3 (ref 0.7–3.1)
LYMPHOCYTES NFR BLD AUTO: 30.9 % (ref 19.6–45.3)
MAGNESIUM SERPL-MCNC: 2.1 MG/DL (ref 1.6–2.6)
MCH RBC QN AUTO: 30.7 PG (ref 26.6–33)
MCHC RBC AUTO-ENTMCNC: 33.4 G/DL (ref 31.5–35.7)
MCV RBC AUTO: 92 FL (ref 79–97)
MONOCYTES # BLD AUTO: 0.8 10*3/MM3 (ref 0.1–0.9)
MONOCYTES NFR BLD AUTO: 5.5 % (ref 5–12)
NEUTROPHILS NFR BLD AUTO: 62.2 % (ref 42.7–76)
NEUTROPHILS NFR BLD AUTO: 9.06 10*3/MM3 (ref 1.7–7)
NRBC BLD AUTO-RTO: 0 /100 WBC (ref 0–0.2)
PLATELET # BLD AUTO: 347 10*3/MM3 (ref 140–450)
PMV BLD AUTO: 9.9 FL (ref 6–12)
POTASSIUM SERPL-SCNC: 2.8 MMOL/L (ref 3.5–5.2)
PROT SERPL-MCNC: 6 G/DL (ref 6–8.5)
PROTHROMBIN TIME: 11.9 SECONDS (ref 11.7–14.2)
QT INTERVAL: 370 MS
RBC # BLD AUTO: 4.75 10*6/MM3 (ref 3.77–5.28)
RH BLD: POSITIVE
SODIUM SERPL-SCNC: 139 MMOL/L (ref 136–145)
T&S EXPIRATION DATE: NORMAL
TROPONIN T SERPL-MCNC: <0.01 NG/ML (ref 0–0.03)
VALPROATE SERPL-MCNC: <2.8 MCG/ML (ref 50–125)
WBC NRBC COR # BLD: 14.57 10*3/MM3 (ref 3.4–10.8)

## 2022-12-13 PROCEDURE — 74177 CT ABD & PELVIS W/CONTRAST: CPT

## 2022-12-13 PROCEDURE — 25010000002 MORPHINE PER 10 MG: Performed by: EMERGENCY MEDICINE

## 2022-12-13 PROCEDURE — 84703 CHORIONIC GONADOTROPIN ASSAY: CPT | Performed by: EMERGENCY MEDICINE

## 2022-12-13 PROCEDURE — 80164 ASSAY DIPROPYLACETIC ACD TOT: CPT | Performed by: EMERGENCY MEDICINE

## 2022-12-13 PROCEDURE — 85610 PROTHROMBIN TIME: CPT | Performed by: EMERGENCY MEDICINE

## 2022-12-13 PROCEDURE — 85025 COMPLETE CBC W/AUTO DIFF WBC: CPT | Performed by: EMERGENCY MEDICINE

## 2022-12-13 PROCEDURE — 36415 COLL VENOUS BLD VENIPUNCTURE: CPT

## 2022-12-13 PROCEDURE — 99284 EMERGENCY DEPT VISIT MOD MDM: CPT

## 2022-12-13 PROCEDURE — 86901 BLOOD TYPING SEROLOGIC RH(D): CPT | Performed by: EMERGENCY MEDICINE

## 2022-12-13 PROCEDURE — 71045 X-RAY EXAM CHEST 1 VIEW: CPT

## 2022-12-13 PROCEDURE — 96374 THER/PROPH/DIAG INJ IV PUSH: CPT

## 2022-12-13 PROCEDURE — 25010000002 IOPAMIDOL 61 % SOLUTION: Performed by: EMERGENCY MEDICINE

## 2022-12-13 PROCEDURE — 84484 ASSAY OF TROPONIN QUANT: CPT | Performed by: EMERGENCY MEDICINE

## 2022-12-13 PROCEDURE — 93005 ELECTROCARDIOGRAM TRACING: CPT | Performed by: EMERGENCY MEDICINE

## 2022-12-13 PROCEDURE — 85730 THROMBOPLASTIN TIME PARTIAL: CPT | Performed by: EMERGENCY MEDICINE

## 2022-12-13 PROCEDURE — 86850 RBC ANTIBODY SCREEN: CPT | Performed by: EMERGENCY MEDICINE

## 2022-12-13 PROCEDURE — 86900 BLOOD TYPING SEROLOGIC ABO: CPT | Performed by: EMERGENCY MEDICINE

## 2022-12-13 PROCEDURE — 25010000002 HALOPERIDOL LACTATE PER 5 MG: Performed by: EMERGENCY MEDICINE

## 2022-12-13 PROCEDURE — 83735 ASSAY OF MAGNESIUM: CPT | Performed by: EMERGENCY MEDICINE

## 2022-12-13 PROCEDURE — 96361 HYDRATE IV INFUSION ADD-ON: CPT

## 2022-12-13 PROCEDURE — 80053 COMPREHEN METABOLIC PANEL: CPT | Performed by: EMERGENCY MEDICINE

## 2022-12-13 PROCEDURE — 93010 ELECTROCARDIOGRAM REPORT: CPT | Performed by: STUDENT IN AN ORGANIZED HEALTH CARE EDUCATION/TRAINING PROGRAM

## 2022-12-13 PROCEDURE — 96375 TX/PRO/DX INJ NEW DRUG ADDON: CPT

## 2022-12-13 PROCEDURE — 82565 ASSAY OF CREATININE: CPT

## 2022-12-13 PROCEDURE — 25010000002 ONDANSETRON PER 1 MG: Performed by: EMERGENCY MEDICINE

## 2022-12-13 RX ORDER — ONDANSETRON 2 MG/ML
8 INJECTION INTRAMUSCULAR; INTRAVENOUS ONCE
Status: COMPLETED | OUTPATIENT
Start: 2022-12-13 | End: 2022-12-13

## 2022-12-13 RX ORDER — SUCRALFATE 1 G/1
1 TABLET ORAL 4 TIMES DAILY
Qty: 20 TABLET | Refills: 0 | Status: SHIPPED | OUTPATIENT
Start: 2022-12-13

## 2022-12-13 RX ORDER — PANTOPRAZOLE SODIUM 40 MG/1
40 TABLET, DELAYED RELEASE ORAL 2 TIMES DAILY
Qty: 60 TABLET | Refills: 0 | Status: SHIPPED | OUTPATIENT
Start: 2022-12-13 | End: 2023-01-18 | Stop reason: HOSPADM

## 2022-12-13 RX ORDER — MORPHINE SULFATE 2 MG/ML
4 INJECTION, SOLUTION INTRAMUSCULAR; INTRAVENOUS ONCE
Status: COMPLETED | OUTPATIENT
Start: 2022-12-13 | End: 2022-12-13

## 2022-12-13 RX ORDER — POTASSIUM CHLORIDE 750 MG/1
60 TABLET, EXTENDED RELEASE ORAL ONCE
Status: DISCONTINUED | OUTPATIENT
Start: 2022-12-13 | End: 2022-12-14 | Stop reason: HOSPADM

## 2022-12-13 RX ORDER — ONDANSETRON 8 MG/1
8 TABLET, ORALLY DISINTEGRATING ORAL EVERY 8 HOURS PRN
Qty: 15 TABLET | Refills: 0 | Status: ON HOLD | OUTPATIENT
Start: 2022-12-13 | End: 2022-12-20 | Stop reason: SDUPTHER

## 2022-12-13 RX ORDER — HALOPERIDOL 5 MG/ML
2 INJECTION INTRAMUSCULAR ONCE
Status: COMPLETED | OUTPATIENT
Start: 2022-12-13 | End: 2022-12-13

## 2022-12-13 RX ADMIN — ONDANSETRON 8 MG: 2 INJECTION INTRAMUSCULAR; INTRAVENOUS at 20:01

## 2022-12-13 RX ADMIN — MORPHINE SULFATE 4 MG: 2 INJECTION, SOLUTION INTRAMUSCULAR; INTRAVENOUS at 20:02

## 2022-12-13 RX ADMIN — IOPAMIDOL 85 ML: 612 INJECTION, SOLUTION INTRAVENOUS at 20:42

## 2022-12-13 RX ADMIN — HALOPERIDOL LACTATE 2 MG: 5 INJECTION, SOLUTION INTRAMUSCULAR at 21:17

## 2022-12-13 RX ADMIN — SODIUM CHLORIDE 1000 ML: 9 INJECTION, SOLUTION INTRAVENOUS at 20:01

## 2022-12-13 NOTE — OUTREACH NOTE
Medical Week 1 Survey    Flowsheet Row Responses   [facility] patient discharged from? [place]   Does the patient have one of the following disease processes/diagnoses(primary or secondary)? Other   Week 1 attempt successful? No   Unsuccessful attempts Attempt 3   Revoke Decline to participate          RAMÍREZ KLEIN - Licensed Nurse

## 2022-12-14 NOTE — ED NOTES
Spoke with CT tech at this time explained that the Dr would like to have a POC done in CT. CT tech agreeable.

## 2022-12-14 NOTE — ED PROVIDER NOTES
EMERGENCY DEPARTMENT ENCOUNTER  I wore full protective equipment throughout this patient encounter including a N95 mask, eye shield, gown and gloves. Hand hygiene was performed before donning protective equipment and after removal when leaving the room.    Room Number:  12/12  Date of encounter:  12/13/2022  PCP: Sahil Cornelius MD    HPI:  Context: Belen Allen is a 42 y.o. female who presents to the ED c/o chief complaint of abdominal pain with nausea vomiting.  Patient reports that she has been having nausea vomiting for last 2 days.  Patient reports 4 times emesis today.  Patient reports that she has had dark gross blood in her emesis.  Patient reports blood is dark, denies any bright blood, denies any coffee-ground emesis.  Patient reports that she has been having diarrhea, 3-5 episodes a day, has been having traces of blood in her diarrhea.  Patient denies any dark tarry stools.  Patient endorses sharp right-sided abdominal pain that radiates to the back, which worsened with movement.  Patient denies any chest pain or shortness of breath but does report that she has been feeling her heart racing sensation.  Patient denies any history of hepatitis, does report that she has been diagnosed with cirrhosis in the past, reports that she is followed by gastroenterology.  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  Patient denies any history of GI bleed in the past, not taking NSAIDs, not drinking alcohol daily.    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   • Generalized abdominal pain 11/29/2020   • Bilateral leg weakness 11/29/2020   • Acute bilateral low back pain 11/29/2020   • Antiphospholipid antibody positive 11/29/2020   • On anticoagulant therapy 11/29/2020   • Acute pain of left knee  11/29/2020   • Vitamin D deficiency 11/30/2020   • Folate deficiency 12/01/2020   • Pain of back and left lower extremity 12/02/2020   • Common bile duct dilatation 05/31/2021   • Elevated LFTs 06/01/2021   • Right upper quadrant abdominal pain 06/01/2021   • Obesity (BMI 30-39.9) 04/18/2019   • Tobacco abuse 06/02/2021   • GERD without esophagitis 06/02/2021   • Intractable abdominal pain 07/05/2021   • Obesity, Class III, BMI 40-49.9 (morbid obesity) ([facility]) 07/05/2021   • Constipation 07/05/2021   • History of ERCP 07/05/2021   • Other chronic pain 07/05/2021   • Seizures ([facility]) 01/01/2022   • Syncope 01/01/2022   • Lumbar back pain with radiculopathy affecting left lower extremity 01/05/2022   • Right upper quadrant pain 11/23/2022     Resolved Ambulatory Problems     Diagnosis Date Noted   • Choledocholithiasis 06/02/2021   • Rhabdomyolysis 01/01/2022   • Encephalopathy 01/01/2022     Past Medical History:   Diagnosis Date   • Abnormal Pap smear of cervix    • Ankle fracture 2013   • H/O Elevated liver enzymes    • History of chronic back pain    • History of migraine    • History of urinary tract infection    • Injury of back    • PONV (postoperative nausea and vomiting)    • Seasonal allergies        PAST SURGICAL HISTORY  Past Surgical History:   Procedure Laterality Date   • BACK SURGERY  2006    fusion L4, L5     • CHOLECYSTECTOMY  2014   • DILATATION AND CURETTAGE  2009   • ENDOSCOPY N/A 11/27/2022    Procedure: ESOPHAGOGASTRODUODENOSCOPY with biopsy;  Surgeon: Christiana Mann MD;  Location[facility] ENDOSCOPY;  Service: Gastroenterology;  Laterality: N/A;  gastritis, duodenitis, irregular z line   • ERCP N/A 6/3/2021    Procedure: ENDOSCOPIC RETROGRADE CHOLANGIOPANCREATOGRAPHY WITH SPHINCTEROTOMY, DILATION WITH BALLOON CLEARANCE  (12MM-15MM);  Surgeon: Bjorn Flannery MD;  Location: [facility] ENDOSCOPY;  Service: Gastroenterology;  Laterality: N/A;  DILATED COMMON BILE DUCT   • SKIN SURGERY     • TUBAL  ABDOMINAL LIGATION     • WISDOM TOOTH EXTRACTION  2018    x2       FAMILY HISTORY  Family History   Problem Relation Age of Onset   • Stroke Mother    • Allergic rhinitis Mother    • Allergic rhinitis Sister    • Breast cancer Maternal Aunt    • Cancer Maternal Grandmother    • Allergic rhinitis Paternal Grandfather    • Cancer Paternal Grandfather    • Prostate cancer Paternal Grandfather        SOCIAL HISTORY  Social History     Socioeconomic History   • Marital status: Single   • Number of children: 2   Tobacco Use   • Smoking status: Some Days     Packs/day: 0.50     Types: Cigarettes     Last attempt to quit: 2020     Years since quittin.0   • Smokeless tobacco: Never   Vaping Use   • Vaping Use: Never used   Substance and Sexual Activity   • Alcohol use: Not Currently   • Drug use: Not Currently   • Sexual activity: Defer       ALLERGIES  Patient has no known allergies.    The patient's allergies have been reviewed    REVIEW OF SYSTEMS  All systems reviewed and negative except for those discussed in HPI.     PHYSICAL EXAM  I have reviewed the triage vital signs and nursing notes.  ED Triage Vitals [22 1904]   Temp Heart Rate Resp BP SpO2   98 °F (36.7 °C) 108 18 152/97 96 %      Temp src Heart Rate Source Patient Position BP Location FiO2 (%)   Tympanic Monitor -- -- --       General: No acute distress.  HENT: NCAT, PERRL, Nares patent.  Eyes: no scleral icterus.  Neck: trachea midline, no ROM limitations.  CV: regular rhythm, regular rate.  Respiratory: normal effort, CTAB.  Abdomen: soft, nondistended, right-sided abdominal tenderness to palpation, negative Tilley's, negative McBurney's, no rebound tenderness, no guarding or rigidity.  Rectal exam performed.  Chaperone present throughout exam, ED nurse  External exam normal.  No visualized external hemorrhoids, no palpated internal hemorrhoids.  No visible anal fissures.  No gross blood.  Normal stool visualized.  Hemoccult negative.    passed.  Musculoskeletal: no deformity.  Neuro: alert, moves all extremities, follows commands.  Skin: warm, dry.    LAB RESULTS  Recent Results (from the past 24 hour(s))   Protime-INR    Collection Time: 12/13/22  7:18 PM    Specimen: Blood   Result Value Ref Range    Protime 11.9 11.7 - 14.2 Seconds    INR 0.87 (L) 0.90 - 1.10   aPTT    Collection Time: 12/13/22  7:18 PM    Specimen: Blood   Result Value Ref Range    PTT 25.5 22.7 - 35.4 seconds   Type & Screen    Collection Time: 12/13/22  7:18 PM    Specimen: Blood   Result Value Ref Range    ABO Type O     RH type Positive     Antibody Screen Negative     T&S Expiration Date 12/16/2022 11:59:59 PM    Troponin    Collection Time: 12/13/22  7:18 PM    Specimen: Blood   Result Value Ref Range    Troponin T <0.010 0.000 - 0.030 ng/mL   hCG, Serum, Qualitative    Collection Time: 12/13/22  7:18 PM    Specimen: Blood   Result Value Ref Range    HCG Qualitative Negative Negative   Valproic Acid Level, Total    Collection Time: 12/13/22  7:18 PM    Specimen: Blood   Result Value Ref Range    Valproic Acid <2.8 (L) 50.0 - 125.0 mcg/mL   CBC Auto Differential    Collection Time: 12/13/22  7:18 PM    Specimen: Blood   Result Value Ref Range    WBC 14.57 (H) 3.40 - 10.80 10*3/mm3    RBC 4.75 3.77 - 5.28 10*6/mm3    Hemoglobin 14.6 12.0 - 15.9 g/dL    Hematocrit 43.7 34.0 - 46.6 %    MCV 92.0 79.0 - 97.0 fL    MCH 30.7 26.6 - 33.0 pg    MCHC 33.4 31.5 - 35.7 g/dL    RDW 13.2 12.3 - 15.4 %    RDW-SD 44.4 37.0 - 54.0 fl    MPV 9.9 6.0 - 12.0 fL    Platelets 347 140 - 450 10*3/mm3    Neutrophil % 62.2 42.7 - 76.0 %    Lymphocyte % 30.9 19.6 - 45.3 %    Monocyte % 5.5 5.0 - 12.0 %    Eosinophil % 0.5 0.3 - 6.2 %    Basophil % 0.4 0.0 - 1.5 %    Immature Grans % 0.5 0.0 - 0.5 %    Neutrophils, Absolute 9.06 (H) 1.70 - 7.00 10*3/mm3    Lymphocytes, Absolute 4.50 (H) 0.70 - 3.10 10*3/mm3    Monocytes, Absolute 0.80 0.10 - 0.90 10*3/mm3    Eosinophils, Absolute  0.08 0.00 - 0.40 10*3/mm3    Basophils, Absolute 0.06 0.00 - 0.20 10*3/mm3    Immature Grans, Absolute 0.07 (H) 0.00 - 0.05 10*3/mm3    nRBC 0.0 0.0 - 0.2 /100 WBC   ECG 12 Lead    Collection Time: 12/13/22  7:24 PM   Result Value Ref Range    QT Interval 370 ms   Comprehensive Metabolic Panel    Collection Time: 12/13/22  8:25 PM    Specimen: Blood   Result Value Ref Range    Glucose 105 (H) 65 - 99 mg/dL    BUN 8 6 - 20 mg/dL    Creatinine 0.78 0.57 - 1.00 mg/dL    Sodium 139 136 - 145 mmol/L    Potassium 2.8 (L) 3.5 - 5.2 mmol/L    Chloride 105 98 - 107 mmol/L    CO2 24.0 22.0 - 29.0 mmol/L    Calcium 8.9 8.6 - 10.5 mg/dL    Total Protein 6.0 6.0 - 8.5 g/dL    Albumin 3.90 3.50 - 5.20 g/dL    ALT (SGPT) 83 (H) 1 - 33 U/L    AST (SGOT) 26 1 - 32 U/L    Alkaline Phosphatase 126 (H) 39 - 117 U/L    Total Bilirubin 0.2 0.0 - 1.2 mg/dL    Globulin 2.1 gm/dL    A/G Ratio 1.9 g/dL    BUN/Creatinine Ratio 10.3 7.0 - 25.0    Anion Gap 10.0 5.0 - 15.0 mmol/L    eGFR 98.0 >60.0 mL/min/1.73   Magnesium    Collection Time: 12/13/22  8:25 PM    Specimen: Blood   Result Value Ref Range    Magnesium 2.1 1.6 - 2.6 mg/dL       I ordered the above labs and reviewed the results.    RADIOLOGY  CT Abdomen Pelvis With Contrast    Result Date: 12/13/2022  CT OF THE ABDOMEN AND PELVIS WITH CONTRAST  HISTORY: Abdominal pain  COMPARISON: 12/06/2022  TECHNIQUE: Axial CT imaging was obtained through the abdomen and pelvis. No IV contrast was administered.  FINDINGS: Images through the lung bases are clear. The duodenum, adrenal glands, spleen, and pancreas are all normal. The gallbladder is surgically absent. There is pneumobilia. No suspicious hepatic lesions are seen. The patient's proximal gastric wall appears mildly thickened. This may be related to incomplete distention, although correlation with any evidence of gastritis is suggested. The kidneys enhance symmetrically, although there are multiple areas of thinning on the left kidney,  suggesting the sequela of prior insults. There is a simple appearing left renal cyst. No additional follow-up is necessary. There is no hydronephrosis. Uterus appears unremarkable. Small bilateral physiologic ovarian cysts are noted. Trace amount of free fluid is noted within the pelvis, likely physiologic. There is no bowel obstruction. The appendix is normal. Surgical clips are noted within the left-sided the abdomen, unchanged from prior study. There are changes of prior lumbosacral spinal fusion.      Mildly thick-walled appearance to the proximal stomach may be related to incomplete distention. Mild gastritis is not excluded.  Radiation dose reduction techniques were utilized, including automated exposure control and exposure modulation based on body size.  This report was finalized on 12/13/2022 9:08 PM by Dr. Marli Eastman M.D.      XR Chest 1 View    Result Date: 12/13/2022  STAT PORTABLE RADIOGRAPHIC VIEW OF THE CHEST  CLINICAL HISTORY: GI bleeding.  FINDINGS:  The lungs are clear of acute infiltrates. The cardiomediastinal silhouette is within normal limits. The osseous structures are unremarkable.       No active disease in the chest.  This report was finalized on 12/13/2022 8:00 PM by Dr. Vinicius Muse M.D.        I ordered the above noted radiological studies. I reviewed the images and results. I agree with the radiologist interpretation.    PROCEDURES  Procedures    MEDICATIONS GIVEN IN ER  Medications   potassium chloride (K-DUR,KLOR-CON) CR tablet 60 mEq (0 mEq Oral Hold 12/13/22 2114)   magnesium oxide (MAG-OX) tablet 800 mg (0 mg Oral Hold 12/13/22 2114)   sodium chloride 0.9 % bolus 1,000 mL (1,000 mL Intravenous New Bag 12/13/22 2001)   ondansetron (ZOFRAN) injection 8 mg (8 mg Intravenous Given 12/13/22 2001)   morphine injection 4 mg (4 mg Intravenous Given 12/13/22 2002)   iopamidol (ISOVUE-300) 61 % injection 100 mL (85 mL Intravenous Given 12/13/22 2042)   haloperidol lactate (HALDOL)  injection 2 mg (2 mg Intravenous Given 12/13/22 2117)       PROGRESS, DATA ANALYSIS, CONSULTS, AND MEDICAL DECISION MAKING  A complete history and physical exam have been performed.  All available laboratory and imaging results have been reviewed by myself prior to disposition.    MDM  After the initial H&P, I discussed pertinent information from history and physical exam with patient/family.  Discussed differential diagnosis.  Discussed plan for ED evaluation/workup/treatment.  All questions answered.  Patient/family is agreeable with plan.  ED Course as of 12/13/22 2150 Tue Dec 13, 2022   1909 Medical history reviewed and significant for: Patient was last admitted to the hospital on 23 November this year.  Patient has history of chronic pain, chronic nausea vomiting, multiple admissions for abdominal pain with nausea vomiting.  Patient was admitted for intractable nausea vomiting and pain, had elevated liver enzymes.  GI was consulted, patient had work-up for elevated liver enzymes, work-up was unremarkable, patient was treated for urinary tract infection, continue to have nausea and vomiting throughout her stay despite negative work-up.  Patient was seen in the emergency department on the fifth of this month for right upper quadrant pain.  ED work-up was unremarkable.  Patient left emergency department prior to completion of work-up. [JG]   1921 My differential diagnosis for abdominal pain includes but is not limited to:  Gastritis, gastroenteritis, peptic ulcer disease, GERD, esophageal perforation, acute appendicitis, mesenteric adenitis, Meckel's diverticulum, epiploic appendagitis, diverticulitis, colon cancer, ulcerative colitis, Crohn's disease, intussusception, small bowel obstruction, adhesions, ischemic bowel, perforated viscus, ileus, obstipation, biliary colic, cholecystitis, cholelithiasis, Jarad-Mo Travon, hepatitis, pancreatitis, common bile duct obstruction, cholangitis, bile leak, splenic  trauma, splenic rupture, splenic infarction, splenic abscess, abdominal abscess, ascites, spontaneous bacterial peritonitis, hernia, UTI, cystitis, prostatitis, ureterolithiasis, urinary obstruction, ovarian cyst, torsion, pregnancy, ectopic pregnancy, PID, pelvic abscess, mittelschmerz, endometriosis, AAA, myocardial infarction, pneumonia, cancer, porphyria, DKA, medications, sickle cell, viral syndrome, zoster     [JG]   1924 Medical history reviewed, patient has no listed history of cirrhosis, prior imaging of the liver shows hepatic steatosis but no cirrhosis. [JG]   1926 EKG independently viewed and contemporaneously interpreted by ED physician. Time: 7:24 PM.  Rate 103.  Interpretation: Sinus tachycardia, normal axis, normal QRS, no ST elevation or depression, T wave inversion V1 through V3. [JG]   2056 Patient last had EGD performed on the 27th of last month.  Patient had some Z-line irregularity as well as trace gastritis with erythematous duodenopathy.  No bleeding. [JG]   2056 Patient Hemoccult negative here.  Patient initially tachycardic, tachycardia resolved, vital signs stable, no hypotension.Patient is not anemic, hemoglobin 14.6, similar to the last value of 14.88 days prior.  BUN not elevated.  CMP unremarkable other than mild hypokalemia. [JG]   2110 Patient gagging, no actual vomiting.  Giving additional nausea medicine. [JG]   2143 Patient afebrile, vital signs stable.  Patient has been apparently trying to force herself to vomit in the emergency department but no vomiting.  Patient is Hemoccult negative.  Hemoglobin is normal.  CT imaging unremarkable.  ED work-up is reassuring.  Patient just had EGD performed.  I see no indication for admission at present.  Patient is well-appearing appears appropriate for discharge with outpatient follow-up. [JG]   2146 The patient was reexamined.  They have had symptomatic improvement during their ED stay.  I discussed today's findings with the patient,  explaining the pertinent positives and negatives from today's visit, and the plan of care.  Discussed plan for discharge as there is no emergent indication for admission.  Discussed limitation of the ED work-up and that this is to rule out life-threatening emergencies but that they could require further testing as determined by their primary care and or any referred specialist patient is agreeable and understands need for follow-up and repeat exam/testing.  Patient is aware that discharge does not mean there is nothing wrong, indicates no emergency is present, and that they must continue their care with their primary care physician and/or any referred specialist.  They were given appropriate follow-up with their primary care physician and/or specialist.  I had an extensive discussion on the expected clinical course and return precautions.  Patient understands to return to the emergency department for continuation, worsening, or new symptoms.  I answered any of the patient's questions. Patient was discharged home in a stable condition.     [JG]      ED Course User Index  [JG] Arturo Srinivasan MD       AS OF 21:50 EST VITALS:    BP - (!) 134/101  HR - 89  TEMP - 98 °F (36.7 °C) (Tympanic)  O2 SATS - 94%    DIAGNOSIS  Final diagnoses:   Right sided abdominal pain   Nausea and vomiting, unspecified vomiting type   Diarrhea, unspecified type   Hypokalemia         DISPOSITION  DISCHARGE    Patient discharged in stable condition.    Reviewed implications of results, diagnosis, meds, responsibility to follow up, warning signs and symptoms of possible worsening, potential complications and reasons to return to ER.    Patient/Family voiced understanding of above instructions.    Discussed plan for discharge, as there is no emergent indication for admission. Patient referred to primary care provider for BP management due to today's BP. Pt/family is agreeable and understands need for follow up and repeat testing.  Pt is aware  that discharge does not mean that nothing is wrong but it indicates no emergency is present that requires admission and they must continue care with follow-up as given below or physician of their choice.     FOLLOW-UP  Sahil Cornelius MD  [address]  [facility] [address] [phone]    Schedule an appointment as soon as possible for a visit in 2 days  even if well    [facility] GASTROENTEROLOGY  [address]  [facility] [address] [phone]  Schedule an appointment as soon as possible for a visit in 2 days  even if well    [facility] Emergency Department  [address]  [facility] [address] [phone]  Go to   as needed         Medication List      New Prescriptions    ondansetron ODT 8 MG disintegrating tablet  Commonly known as: ZOFRAN-ODT  Place 1 tablet on the tongue Every 8 (Eight) Hours As Needed for Nausea or Vomiting.     sucralfate 1 g tablet  Commonly known as: CARAFATE  Take 1 tablet by mouth 4 (Four) Times a Day.        Changed    pantoprazole 40 MG EC tablet  Commonly known as: PROTONIX  Take 1 tablet by mouth 2 (Two) Times a Day.  What changed: when to take this        Stop    ondansetron 4 MG tablet  Commonly known as: ZOFRAN           Where to Get Your Medications      These medications were sent to [id number] - [place], [place] - [address] [place] [address] [address] - [phone]  - [phone]   [address], [place] [facility] [address]    Phone: [phone]   · ondansetron ODT 8 MG disintegrating tablet  · pantoprazole 40 MG EC tablet  · sucralfate 1 g tablet          Arturo Srinivasan MD  12/13/22 [id number]

## 2022-12-14 NOTE — ED NOTES
Pt arrived by EMS from home x2 days . Vomiting blood and diarrhea with blood, pt is on blood thinners. Denies Dizziness     Patient was placed in face mask during first look triage.  Patient was wearing a face mask throughout encounter.  I wore personal protective equipment throughout the encounter.  Hand hygiene was performed before and after patient encounter.

## 2022-12-17 ENCOUNTER — APPOINTMENT (OUTPATIENT)
Dept: CT IMAGING | Facility: HOSPITAL | Age: 42
DRG: 101 | End: 2022-12-17
Payer: COMMERCIAL

## 2022-12-17 ENCOUNTER — APPOINTMENT (OUTPATIENT)
Dept: GENERAL RADIOLOGY | Facility: HOSPITAL | Age: 42
DRG: 101 | End: 2022-12-17
Payer: COMMERCIAL

## 2022-12-17 ENCOUNTER — HOSPITAL ENCOUNTER (INPATIENT)
Facility: HOSPITAL | Age: 42
LOS: 3 days | Discharge: HOME | DRG: 101 | End: 2022-12-20
Attending: EMERGENCY MEDICINE | Admitting: INTERNAL MEDICINE
Payer: COMMERCIAL

## 2022-12-17 DIAGNOSIS — M54.9 INTRACTABLE BACK PAIN: ICD-10-CM

## 2022-12-17 DIAGNOSIS — M54.31 SCIATICA OF RIGHT SIDE: ICD-10-CM

## 2022-12-17 DIAGNOSIS — R52 UNCONTROLLED PAIN: Primary | ICD-10-CM

## 2022-12-17 DIAGNOSIS — Z74.09 MOBILITY IMPAIRED: ICD-10-CM

## 2022-12-17 DIAGNOSIS — M54.6 ACUTE MIDLINE THORACIC BACK PAIN: ICD-10-CM

## 2022-12-17 DIAGNOSIS — R56.9 SEIZURE: ICD-10-CM

## 2022-12-17 LAB
ALBUMIN SERPL-MCNC: 3.7 G/DL (ref 3.5–5.2)
ALBUMIN/GLOB SERPL: 1.8 G/DL
ALP SERPL-CCNC: 111 U/L (ref 39–117)
ALT SERPL W P-5'-P-CCNC: 31 U/L (ref 1–33)
AMPHET+METHAMPHET UR QL: NEGATIVE
ANION GAP SERPL CALCULATED.3IONS-SCNC: 8 MMOL/L (ref 5–15)
AST SERPL-CCNC: 23 U/L (ref 1–32)
BACTERIA UR QL AUTO: ABNORMAL /HPF
BARBITURATES UR QL SCN: NEGATIVE
BASOPHILS # BLD AUTO: 0.03 10*3/MM3 (ref 0–0.2)
BASOPHILS NFR BLD AUTO: 0.4 % (ref 0–1.5)
BENZODIAZ UR QL SCN: NEGATIVE
BILIRUB SERPL-MCNC: 0.2 MG/DL (ref 0–1.2)
BILIRUB UR QL STRIP: NEGATIVE
BUN SERPL-MCNC: 4 MG/DL (ref 6–20)
BUN/CREAT SERPL: 5.8 (ref 7–25)
CALCIUM SPEC-SCNC: 8.4 MG/DL (ref 8.6–10.5)
CANNABINOIDS SERPL QL: POSITIVE
CHLORIDE SERPL-SCNC: 108 MMOL/L (ref 98–107)
CK SERPL-CCNC: 38 U/L (ref 20–180)
CLARITY UR: ABNORMAL
CO2 SERPL-SCNC: 27 MMOL/L (ref 22–29)
COCAINE UR QL: NEGATIVE
COLOR UR: YELLOW
CREAT SERPL-MCNC: 0.69 MG/DL (ref 0.57–1)
DEPRECATED RDW RBC AUTO: 47.9 FL (ref 37–54)
EGFRCR SERPLBLD CKD-EPI 2021: 112 ML/MIN/1.73
EOSINOPHIL # BLD AUTO: 0.07 10*3/MM3 (ref 0–0.4)
EOSINOPHIL NFR BLD AUTO: 1 % (ref 0.3–6.2)
ERYTHROCYTE [DISTWIDTH] IN BLOOD BY AUTOMATED COUNT: 13.6 % (ref 12.3–15.4)
ETHANOL BLD-MCNC: <10 MG/DL (ref 0–10)
ETHANOL UR QL: <0.01 %
GLOBULIN UR ELPH-MCNC: 2.1 GM/DL
GLUCOSE SERPL-MCNC: 106 MG/DL (ref 65–99)
GLUCOSE UR STRIP-MCNC: NEGATIVE MG/DL
HCT VFR BLD AUTO: 36.4 % (ref 34–46.6)
HGB BLD-MCNC: 12.4 G/DL (ref 12–15.9)
HGB UR QL STRIP.AUTO: ABNORMAL
HYALINE CASTS UR QL AUTO: ABNORMAL /LPF
IMM GRANULOCYTES # BLD AUTO: 0.02 10*3/MM3 (ref 0–0.05)
IMM GRANULOCYTES NFR BLD AUTO: 0.3 % (ref 0–0.5)
KETONES UR QL STRIP: ABNORMAL
LEUKOCYTE ESTERASE UR QL STRIP.AUTO: ABNORMAL
LYMPHOCYTES # BLD AUTO: 2.12 10*3/MM3 (ref 0.7–3.1)
LYMPHOCYTES NFR BLD AUTO: 29.6 % (ref 19.6–45.3)
MAGNESIUM SERPL-MCNC: 2.3 MG/DL (ref 1.6–2.6)
MCH RBC QN AUTO: 32.5 PG (ref 26.6–33)
MCHC RBC AUTO-ENTMCNC: 34.1 G/DL (ref 31.5–35.7)
MCV RBC AUTO: 95.5 FL (ref 79–97)
METHADONE UR QL SCN: NEGATIVE
MONOCYTES # BLD AUTO: 0.51 10*3/MM3 (ref 0.1–0.9)
MONOCYTES NFR BLD AUTO: 7.1 % (ref 5–12)
NEUTROPHILS NFR BLD AUTO: 4.41 10*3/MM3 (ref 1.7–7)
NEUTROPHILS NFR BLD AUTO: 61.6 % (ref 42.7–76)
NITRITE UR QL STRIP: NEGATIVE
NRBC BLD AUTO-RTO: 0.1 /100 WBC (ref 0–0.2)
OPIATES UR QL: POSITIVE
OXYCODONE UR QL SCN: NEGATIVE
PH UR STRIP.AUTO: 6 [PH] (ref 5–8)
PLATELET # BLD AUTO: 284 10*3/MM3 (ref 140–450)
PMV BLD AUTO: 10.2 FL (ref 6–12)
POTASSIUM SERPL-SCNC: 3.5 MMOL/L (ref 3.5–5.2)
PROT SERPL-MCNC: 5.8 G/DL (ref 6–8.5)
PROT UR QL STRIP: ABNORMAL
RBC # BLD AUTO: 3.81 10*6/MM3 (ref 3.77–5.28)
RBC # UR STRIP: ABNORMAL /HPF
REF LAB TEST METHOD: ABNORMAL
SODIUM SERPL-SCNC: 143 MMOL/L (ref 136–145)
SP GR UR STRIP: 1.03 (ref 1–1.03)
SQUAMOUS #/AREA URNS HPF: ABNORMAL /HPF
UROBILINOGEN UR QL STRIP: ABNORMAL
VALPROATE SERPL-MCNC: <2.8 MCG/ML (ref 50–125)
WBC # UR STRIP: ABNORMAL /HPF
WBC NRBC COR # BLD: 7.16 10*3/MM3 (ref 3.4–10.8)

## 2022-12-17 PROCEDURE — 72110 X-RAY EXAM L-2 SPINE 4/>VWS: CPT

## 2022-12-17 PROCEDURE — 99285 EMERGENCY DEPT VISIT HI MDM: CPT

## 2022-12-17 PROCEDURE — 71250 CT THORAX DX C-: CPT

## 2022-12-17 PROCEDURE — 83735 ASSAY OF MAGNESIUM: CPT | Performed by: INTERNAL MEDICINE

## 2022-12-17 PROCEDURE — 80164 ASSAY DIPROPYLACETIC ACD TOT: CPT | Performed by: EMERGENCY MEDICINE

## 2022-12-17 PROCEDURE — 25010000002 LORAZEPAM PER 2 MG: Performed by: EMERGENCY MEDICINE

## 2022-12-17 PROCEDURE — 87086 URINE CULTURE/COLONY COUNT: CPT | Performed by: EMERGENCY MEDICINE

## 2022-12-17 PROCEDURE — 80307 DRUG TEST PRSMV CHEM ANLYZR: CPT | Performed by: INTERNAL MEDICINE

## 2022-12-17 PROCEDURE — G0378 HOSPITAL OBSERVATION PER HR: HCPCS

## 2022-12-17 PROCEDURE — 72125 CT NECK SPINE W/O DYE: CPT

## 2022-12-17 PROCEDURE — 25010000002 HYDROMORPHONE PER 4 MG: Performed by: NURSE PRACTITIONER

## 2022-12-17 PROCEDURE — 72072 X-RAY EXAM THORAC SPINE 3VWS: CPT

## 2022-12-17 PROCEDURE — 82077 ASSAY SPEC XCP UR&BREATH IA: CPT | Performed by: INTERNAL MEDICINE

## 2022-12-17 PROCEDURE — 82550 ASSAY OF CK (CPK): CPT | Performed by: INTERNAL MEDICINE

## 2022-12-17 PROCEDURE — 70450 CT HEAD/BRAIN W/O DYE: CPT

## 2022-12-17 PROCEDURE — 25010000002 MORPHINE PER 10 MG: Performed by: NURSE PRACTITIONER

## 2022-12-17 PROCEDURE — 25010000002 DROPERIDOL PER 5 MG: Performed by: EMERGENCY MEDICINE

## 2022-12-17 PROCEDURE — 25010000002 HYDROMORPHONE 1 MG/ML SOLUTION: Performed by: EMERGENCY MEDICINE

## 2022-12-17 PROCEDURE — 80053 COMPREHEN METABOLIC PANEL: CPT | Performed by: EMERGENCY MEDICINE

## 2022-12-17 PROCEDURE — 81001 URINALYSIS AUTO W/SCOPE: CPT | Performed by: EMERGENCY MEDICINE

## 2022-12-17 PROCEDURE — 85025 COMPLETE CBC W/AUTO DIFF WBC: CPT | Performed by: EMERGENCY MEDICINE

## 2022-12-17 PROCEDURE — 25010000002 ONDANSETRON PER 1 MG: Performed by: EMERGENCY MEDICINE

## 2022-12-17 RX ORDER — HYDROCODONE BITARTRATE AND ACETAMINOPHEN 5; 325 MG/1; MG/1
1 TABLET ORAL EVERY 6 HOURS PRN
Status: DISCONTINUED | OUTPATIENT
Start: 2022-12-17 | End: 2022-12-19

## 2022-12-17 RX ORDER — SODIUM CHLORIDE 0.9 % (FLUSH) 0.9 %
10 SYRINGE (ML) INJECTION AS NEEDED
Status: DISCONTINUED | OUTPATIENT
Start: 2022-12-17 | End: 2022-12-20 | Stop reason: HOSPADM

## 2022-12-17 RX ORDER — PANTOPRAZOLE SODIUM 40 MG/1
40 TABLET, DELAYED RELEASE ORAL 2 TIMES DAILY
Status: DISCONTINUED | OUTPATIENT
Start: 2022-12-18 | End: 2022-12-20 | Stop reason: HOSPADM

## 2022-12-17 RX ORDER — TRAZODONE HYDROCHLORIDE 100 MG/1
100 TABLET ORAL NIGHTLY
Status: DISCONTINUED | OUTPATIENT
Start: 2022-12-18 | End: 2022-12-20 | Stop reason: HOSPADM

## 2022-12-17 RX ORDER — DROPERIDOL 2.5 MG/ML
2.5 INJECTION, SOLUTION INTRAMUSCULAR; INTRAVENOUS ONCE
Status: COMPLETED | OUTPATIENT
Start: 2022-12-17 | End: 2022-12-17

## 2022-12-17 RX ORDER — HYDROMORPHONE HYDROCHLORIDE 1 MG/ML
0.5 INJECTION, SOLUTION INTRAMUSCULAR; INTRAVENOUS; SUBCUTANEOUS
Status: DISCONTINUED | OUTPATIENT
Start: 2022-12-17 | End: 2022-12-19

## 2022-12-17 RX ORDER — SUCRALFATE 1 G/1
1 TABLET ORAL 4 TIMES DAILY
Status: DISCONTINUED | OUTPATIENT
Start: 2022-12-18 | End: 2022-12-20 | Stop reason: HOSPADM

## 2022-12-17 RX ORDER — HYDROXYZINE HYDROCHLORIDE 25 MG/1
25 TABLET, FILM COATED ORAL 3 TIMES DAILY PRN
Status: DISCONTINUED | OUTPATIENT
Start: 2022-12-17 | End: 2022-12-20 | Stop reason: HOSPADM

## 2022-12-17 RX ORDER — DICYCLOMINE HYDROCHLORIDE 10 MG/1
20 CAPSULE ORAL 4 TIMES DAILY
Status: DISCONTINUED | OUTPATIENT
Start: 2022-12-18 | End: 2022-12-20 | Stop reason: HOSPADM

## 2022-12-17 RX ORDER — GABAPENTIN 400 MG/1
400 CAPSULE ORAL EVERY 8 HOURS SCHEDULED
Status: DISCONTINUED | OUTPATIENT
Start: 2022-12-18 | End: 2022-12-19

## 2022-12-17 RX ORDER — FLUOXETINE HYDROCHLORIDE 20 MG/1
60 CAPSULE ORAL NIGHTLY
Status: DISCONTINUED | OUTPATIENT
Start: 2022-12-18 | End: 2022-12-20 | Stop reason: HOSPADM

## 2022-12-17 RX ORDER — SODIUM CHLORIDE 0.9 % (FLUSH) 0.9 %
10 SYRINGE (ML) INJECTION EVERY 12 HOURS SCHEDULED
Status: DISCONTINUED | OUTPATIENT
Start: 2022-12-17 | End: 2022-12-20 | Stop reason: HOSPADM

## 2022-12-17 RX ORDER — ACETAMINOPHEN 650 MG/1
650 SUPPOSITORY RECTAL EVERY 4 HOURS PRN
Status: DISCONTINUED | OUTPATIENT
Start: 2022-12-17 | End: 2022-12-20 | Stop reason: HOSPADM

## 2022-12-17 RX ORDER — SODIUM CHLORIDE 9 MG/ML
40 INJECTION, SOLUTION INTRAVENOUS AS NEEDED
Status: DISCONTINUED | OUTPATIENT
Start: 2022-12-17 | End: 2022-12-20 | Stop reason: HOSPADM

## 2022-12-17 RX ORDER — ONDANSETRON 2 MG/ML
4 INJECTION INTRAMUSCULAR; INTRAVENOUS ONCE
Status: COMPLETED | OUTPATIENT
Start: 2022-12-17 | End: 2022-12-17

## 2022-12-17 RX ORDER — ONDANSETRON 2 MG/ML
4 INJECTION INTRAMUSCULAR; INTRAVENOUS EVERY 6 HOURS PRN
Status: DISCONTINUED | OUTPATIENT
Start: 2022-12-17 | End: 2022-12-20 | Stop reason: HOSPADM

## 2022-12-17 RX ORDER — ONDANSETRON 4 MG/1
4 TABLET, FILM COATED ORAL EVERY 6 HOURS PRN
Status: DISCONTINUED | OUTPATIENT
Start: 2022-12-17 | End: 2022-12-20 | Stop reason: HOSPADM

## 2022-12-17 RX ORDER — CALCIUM CARBONATE 200(500)MG
2 TABLET,CHEWABLE ORAL 2 TIMES DAILY PRN
Status: DISCONTINUED | OUTPATIENT
Start: 2022-12-17 | End: 2022-12-20 | Stop reason: HOSPADM

## 2022-12-17 RX ORDER — MORPHINE SULFATE 2 MG/ML
2 INJECTION, SOLUTION INTRAMUSCULAR; INTRAVENOUS
Status: DISCONTINUED | OUTPATIENT
Start: 2022-12-17 | End: 2022-12-17

## 2022-12-17 RX ORDER — ACETAMINOPHEN 325 MG/1
650 TABLET ORAL EVERY 4 HOURS PRN
Status: DISCONTINUED | OUTPATIENT
Start: 2022-12-17 | End: 2022-12-20 | Stop reason: HOSPADM

## 2022-12-17 RX ORDER — CETIRIZINE HYDROCHLORIDE 10 MG/1
10 TABLET ORAL DAILY
Status: DISCONTINUED | OUTPATIENT
Start: 2022-12-18 | End: 2022-12-20 | Stop reason: HOSPADM

## 2022-12-17 RX ORDER — LIDOCAINE 50 MG/G
1 PATCH TOPICAL
Status: DISCONTINUED | OUTPATIENT
Start: 2022-12-17 | End: 2022-12-17

## 2022-12-17 RX ORDER — LORAZEPAM 2 MG/ML
1 INJECTION INTRAMUSCULAR ONCE
Status: COMPLETED | OUTPATIENT
Start: 2022-12-17 | End: 2022-12-17

## 2022-12-17 RX ORDER — POLYETHYLENE GLYCOL 3350 17 G/17G
17 POWDER, FOR SOLUTION ORAL DAILY
Status: DISCONTINUED | OUTPATIENT
Start: 2022-12-18 | End: 2022-12-20 | Stop reason: HOSPADM

## 2022-12-17 RX ORDER — CHOLECALCIFEROL (VITAMIN D3) 125 MCG
10 CAPSULE ORAL NIGHTLY
Status: DISCONTINUED | OUTPATIENT
Start: 2022-12-18 | End: 2022-12-20 | Stop reason: HOSPADM

## 2022-12-17 RX ORDER — ACETAMINOPHEN 160 MG/5ML
650 SOLUTION ORAL EVERY 4 HOURS PRN
Status: DISCONTINUED | OUTPATIENT
Start: 2022-12-17 | End: 2022-12-20 | Stop reason: HOSPADM

## 2022-12-17 RX ADMIN — LORAZEPAM 1 MG: 2 INJECTION INTRAMUSCULAR; INTRAVENOUS at 16:32

## 2022-12-17 RX ADMIN — HYDROMORPHONE HYDROCHLORIDE 0.5 MG: 1 INJECTION, SOLUTION INTRAMUSCULAR; INTRAVENOUS; SUBCUTANEOUS at 21:45

## 2022-12-17 RX ADMIN — MORPHINE SULFATE 2 MG: 2 INJECTION, SOLUTION INTRAMUSCULAR; INTRAVENOUS at 19:46

## 2022-12-17 RX ADMIN — HYDROMORPHONE HYDROCHLORIDE 1 MG: 1 INJECTION, SOLUTION INTRAMUSCULAR; INTRAVENOUS; SUBCUTANEOUS at 16:59

## 2022-12-17 RX ADMIN — Medication 10 ML: at 21:46

## 2022-12-17 RX ADMIN — ONDANSETRON 4 MG: 2 INJECTION INTRAMUSCULAR; INTRAVENOUS at 16:33

## 2022-12-17 RX ADMIN — HYDROMORPHONE HYDROCHLORIDE 1 MG: 1 INJECTION, SOLUTION INTRAMUSCULAR; INTRAVENOUS; SUBCUTANEOUS at 16:33

## 2022-12-17 RX ADMIN — DROPERIDOL 2.5 MG: 2.5 INJECTION, SOLUTION INTRAMUSCULAR; INTRAVENOUS at 19:44

## 2022-12-17 NOTE — ED NOTES
Pt via [facility] from home with c/o unwitnessed seizure; pt reports that she woke up on bathroom floor with head pain, back pain, R knee pain. Pt denies neck pain.     Pt is on eliquis     Pt given a total of 100mcg of fentanyl IV en route     Pt AAOX4 at this time     Hx of seizures, chronic back pain     All triage performed with this RN wearing appropriate PPE.  Pt placed in mask upon arrival to ED.

## 2022-12-17 NOTE — ED PROVIDER NOTES
EMERGENCY DEPARTMENT ENCOUNTER    Room Number:  14/14  Date seen:  12/17/2022  PCP: Sahil Cornelius MD  Historian: Patient, EMS      HPI:  Chief Complaint: Suspected seizure  A complete HPI/ROS/PMH/PSH/SH/FH are unobtainable due to: None  Context: Belen Allen is a 42 y.o. female who presents to the ED c/o suspected seizure.  Patient states that she was last her completely normal self around 8 AM today.  I see that she woke up on the floor of her bathroom about 45 minutes prior to arrival with pain to her head with having had a suspected seizure.  She states that she has a history of seizures for which she takes Keppra.  She reports compliance and took her Keppra this morning.  She feels like this is similar to prior episodes where she has had a seizure.  She is complaining of severe and constant back pain.  Worse with movement.  She is on Eliquis for history of splenic infarct.            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   • Generalized abdominal pain 11/29/2020   • Bilateral leg weakness 11/29/2020   • Acute bilateral low back pain 11/29/2020   • Antiphospholipid antibody positive 11/29/2020   • On anticoagulant therapy 11/29/2020   • Acute pain of left knee 11/29/2020   • Vitamin D deficiency 11/30/2020   • Folate deficiency 12/01/2020   • Pain of back and left lower extremity 12/02/2020   • Common bile duct dilatation 05/31/2021   • Elevated LFTs 06/01/2021   • Right upper quadrant abdominal pain 06/01/2021   • Obesity (BMI 30-39.9) 04/18/2019   • Tobacco abuse 06/02/2021   • GERD without esophagitis 06/02/2021   • Intractable abdominal pain 07/05/2021   • Obesity, Class III, BMI 40-49.9 (morbid obesity) (HCC) 07/05/2021   • Constipation 07/05/2021   • History of ERCP 07/05/2021   • Other chronic pain 07/05/2021   • Seizures ([facility]) 01/01/2022   • Syncope  01/01/2022   • Lumbar back pain with radiculopathy affecting left lower extremity 01/05/2022   • Right upper quadrant pain 11/23/2022     Resolved Ambulatory Problems     Diagnosis Date Noted   • Choledocholithiasis 06/02/2021   • Rhabdomyolysis 01/01/2022   • Encephalopathy 01/01/2022     Past Medical History:   Diagnosis Date   • Abnormal Pap smear of cervix    • Ankle fracture 2013   • H/O Elevated liver enzymes    • History of chronic back pain    • History of migraine    • History of urinary tract infection    • Injury of back    • PONV (postoperative nausea and vomiting)    • Seasonal allergies          PAST SURGICAL HISTORY  Past Surgical History:   Procedure Laterality Date   • BACK SURGERY  2006    fusion L4, L5     • CHOLECYSTECTOMY  2014   • DILATATION AND CURETTAGE  2009   • ENDOSCOPY N/A 11/27/2022    Procedure: ESOPHAGOGASTRODUODENOSCOPY with biopsy;  Surgeon: Christiana Mann MD;  Location: [facility] ENDOSCOPY;  Service: Gastroenterology;  Laterality: N/A;  gastritis, duodenitis, irregular z line   • ERCP N/A 6/3/2021    Procedure: ENDOSCOPIC RETROGRADE CHOLANGIOPANCREATOGRAPHY WITH SPHINCTEROTOMY, DILATION WITH BALLOON CLEARANCE  (12MM-15MM);  Surgeon: Bjorn Flannery MD;  Location: [facility] ENDOSCOPY;  Service: Gastroenterology;  Laterality: N/A;  DILATED COMMON BILE DUCT   • SKIN SURGERY     • TUBAL ABDOMINAL LIGATION  2013   • WISDOM TOOTH EXTRACTION  2018    x2         FAMILY HISTORY  Family History   Problem Relation Age of Onset   • Stroke Mother    • Allergic rhinitis Mother    • Allergic rhinitis Sister    • Breast cancer Maternal Aunt    • Cancer Maternal Grandmother    • Allergic rhinitis Paternal Grandfather    • Cancer Paternal Grandfather    • Prostate cancer Paternal Grandfather          SOCIAL HISTORY  Social History     Socioeconomic History   • Marital status: Single   • Number of children: 2   Tobacco Use   • Smoking status: Some Days     Packs/day: 0.50     Types: Cigarettes      Last attempt to quit: 2020     Years since quittin.0   • Smokeless tobacco: Never   Vaping Use   • Vaping Use: Never used   Substance and Sexual Activity   • Alcohol use: Not Currently   • Drug use: Not Currently   • Sexual activity: Defer         ALLERGIES  Patient has no known allergies.        REVIEW OF SYSTEMS  Review of Systems     All systems reviewed and negative except for those discussed in HPI.       PHYSICAL EXAM  ED Triage Vitals [22 1606]   Temp Heart Rate Resp BP SpO2   99.1 °F (37.3 °C) 105 18 142/98 98 %      Temp src Heart Rate Source Patient Position BP Location FiO2 (%)   Oral -- -- -- --       Physical Exam      GENERAL: Uncomfortable, nontoxic  HENT: nares patent, scalp is nontraumatic on my examination  EYES: no scleral icterus  CV: regular rhythm, normal rate  RESPIRATORY: normal effort, clear to auscultation bilaterally  ABDOMEN: soft, nontender  MUSCULOSKELETAL: no deformity  NEURO:   Recent and remote memory functions are normal. The patient is attentive with normal concentration. Language is fluent. Speech is clear. The speech is non-dysarthric. Fund of knowledge is normal.   Symmetric smile with no facial droop.  Eyes close shut strongly bilaterally.  Symmetric eyebrow raise bilaterally.  EOMI, PERRL  CN II-XII grossly normal otherwise.  5/5 strength to extremities although requires significant verbal encouragement for her to perform exams.  She also demonstrates weakness to her right leg on command but she is distractible and can demonstrate full strength in her right leg.  No pronator drift.  Intact FNF.  No meningismus.  PSYCH: Anxious  SKIN: warm, dry    Vital signs and nursing notes reviewed.          LAB RESULTS  Recent Results (from the past 24 hour(s))   AUTODIFF    Collection Time: 22  1:49 AM    Specimen: Blood   Result Value Ref Range    Neutrophil % 47.9 34.0 - 69.5 %    Lymphocyte % 44.4 20.0 - 53.0 %    Monocyte % 6.0 5.0 - 12.5 %    Eosinophil % 1.3  0.7 - 6.0 %    Basophil % 0.4 0.0 - 3.0 %    Neutrophils Absolute 4.50 1.70 - 6.00 x10(3)/ul    Lymphocytes Absolute 4.1 (H) 0.8 - 3.2 x10(3)/ul    Monocytes Absolute 0.6 0.2 - 1.4 x10(3)/ul    Eosinophils Absolute 0.1 0.0 - 0.6 x10(3)/ul    Basophils Absolute 0.0 0.0 - 0.3 x10(3)/ul   CBC Auto Differential    Collection Time: 12/17/22  4:28 PM    Specimen: Blood   Result Value Ref Range    WBC 7.16 3.40 - 10.80 10*3/mm3    RBC 3.81 3.77 - 5.28 10*6/mm3    Hemoglobin 12.4 12.0 - 15.9 g/dL    Hematocrit 36.4 34.0 - 46.6 %    MCV 95.5 79.0 - 97.0 fL    MCH 32.5 26.6 - 33.0 pg    MCHC 34.1 31.5 - 35.7 g/dL    RDW 13.6 12.3 - 15.4 %    RDW-SD 47.9 37.0 - 54.0 fl    MPV 10.2 6.0 - 12.0 fL    Platelets 284 140 - 450 10*3/mm3    Neutrophil % 61.6 42.7 - 76.0 %    Lymphocyte % 29.6 19.6 - 45.3 %    Monocyte % 7.1 5.0 - 12.0 %    Eosinophil % 1.0 0.3 - 6.2 %    Basophil % 0.4 0.0 - 1.5 %    Immature Grans % 0.3 0.0 - 0.5 %    Neutrophils, Absolute 4.41 1.70 - 7.00 10*3/mm3    Lymphocytes, Absolute 2.12 0.70 - 3.10 10*3/mm3    Monocytes, Absolute 0.51 0.10 - 0.90 10*3/mm3    Eosinophils, Absolute 0.07 0.00 - 0.40 10*3/mm3    Basophils, Absolute 0.03 0.00 - 0.20 10*3/mm3    Immature Grans, Absolute 0.02 0.00 - 0.05 10*3/mm3    nRBC 0.1 0.0 - 0.2 /100 WBC       Ordered the above labs and reviewed the results.        RADIOLOGY  CT Head Without Contrast    Result Date: 12/17/2022  INDICATION:  Trauma, witnessed fall in ER.  Nausea and vomiting.  Additional History:  Seizures. TECHNIQUE:  CT of the brain was performed without contrast.   Automated mA/kV exposure control was utilized and patient examination was performed in strict accordance with principles of ALARA. RADIATION AMOUNT:  1176.71 mGy-cm. COMPARISON:  None Available. FINDINGS: There is no acute intracranial hemorrhage, midline shift, mass effect or extra-axial fluid collection.  Gray-white differentiation is preserved.   Brain volume and ventricular system are within  normal limits for age.   The skull base and calvarium are intact.  The visualized paranasal sinuses are unremarkable.  The visualized orbits, globes, and mastoid air cells are unremarkable.   IMPRESSION: No acute intracranial findings.   Signed by Karthikeyan Moreno MD ##### Final ##### Dictated by:    KARTHIKEYAN MORENO RAD-RAD Dictated DT/TM: 12/17/2022 3:23 am Interpreted and electronically signed by:  KARTHIKEYAN MORENO RAD-RAD Signed DT/TM:  12/17/2022 3:59 am      Ordered the above noted radiological studies. Reviewed by me in PACS.            PROCEDURES  Procedures              MEDICATIONS GIVEN IN ER  Medications   sodium chloride 0.9 % flush 10 mL (has no administration in time range)   HYDROmorphone (DILAUDID) injection 1 mg (1 mg Intravenous Given 12/17/22 1633)   LORazepam (ATIVAN) injection 1 mg (1 mg Intravenous Given 12/17/22 1632)   ondansetron (ZOFRAN) injection 4 mg (4 mg Intravenous Given 12/17/22 1633)               MEDICAL DECISION MAKING, PROGRESS, and CONSULTS    All labs have been independently reviewed by me.  All radiology studies have been reviewed by me and discussed with radiologist dictating the report.   EKG's independently viewed and interpreted by me.  Discussion below represents my analysis of pertinent findings related to patient's condition, differential diagnosis, treatment plan and final disposition.        Orders placed during this visit:  Orders Placed This Encounter   Procedures   • CT Head Without Contrast   • CT Cervical Spine Without Contrast   • XR Spine Lumbar Complete 4+VW   • XR Spine Thoracic 3 View   • Comprehensive Metabolic Panel   • Urinalysis With Culture If Indicated - Urine, Clean Catch   • Valproic Acid Level, Total   • CBC Auto Differential   • Monitor Blood Pressure   • Cardiac Monitoring   • Pulse Oximetry, Continuous   • Insert Peripheral IV   • CBC & Differential           Differential diagnosis:    Seizure  Stroke  TIA  Intracranial hemorrhage  Muscle  contusion      Independent interpretation of labs, radiology studies, and discussions with consultants:  ED Course as of 12/17/22 2339   Sat Dec 17, 2022   1902 Valproic Acid(!): <2.8  Not on valproic acid anymore [TD]   1902 Creatinine: 0.69 [TD]   1902 Sodium: 143 [TD]   1902 Potassium: 3.5 [TD]   1902 WBC: 7.16 [TD]   1902 I discussed CT films with Dr. Valdivia, radiology.  Imaging is acutely negative. [TD]   1915 I discussed the case with NELSON Tee with [facility].  We reviewed patient's labs, history, imaging.  CT scan of the chest without contrast is currently pending at the time of admission. [TD]      ED Course User Index  [TD] Chrsitian Thurston II, MD                  PPE: The patient wore a mask throughout the entire encounter. I wore a well-fitting mask.    DIAGNOSIS  Final diagnoses:   Uncontrolled pain   Seizure (HCC)   Acute midline thoracic back pain         DISPOSITION  Admit      Latest Documented Vital Signs:  As of 16:46 EST  BP- 142/98 HR- 105 Temp- 99.1 °F (37.3 °C) (Oral) O2 sat- 98%      --    Please note that portions of this were completed with a voice recognition program.       Note Disclaimer: At [facility], we believe that sharing information builds trust and better relationships. You are receiving this note because you are receiving care at [facility] or recently visited. It is possible you will see health information before a provider has talked with you about it. This kind of information can be easy to misunderstand. To help you fully understand what it means for your health, we urge you to discuss this note with your provider.       Christian Thurston II, MD  12/17/22 1205

## 2022-12-18 ENCOUNTER — APPOINTMENT (OUTPATIENT)
Dept: NEUROLOGY | Facility: HOSPITAL | Age: 42
DRG: 101 | End: 2022-12-18
Payer: COMMERCIAL

## 2022-12-18 ENCOUNTER — APPOINTMENT (OUTPATIENT)
Dept: MRI IMAGING | Facility: HOSPITAL | Age: 42
DRG: 101 | End: 2022-12-18
Payer: COMMERCIAL

## 2022-12-18 PROBLEM — E87.6 HYPOKALEMIA: Status: ACTIVE | Noted: 2022-12-18

## 2022-12-18 LAB
ANION GAP SERPL CALCULATED.3IONS-SCNC: 6 MMOL/L (ref 5–15)
BUN SERPL-MCNC: 6 MG/DL (ref 6–20)
BUN/CREAT SERPL: 8 (ref 7–25)
CALCIUM SPEC-SCNC: 8.5 MG/DL (ref 8.6–10.5)
CHLORIDE SERPL-SCNC: 106 MMOL/L (ref 98–107)
CO2 SERPL-SCNC: 29 MMOL/L (ref 22–29)
CREAT SERPL-MCNC: 0.75 MG/DL (ref 0.57–1)
DEPRECATED RDW RBC AUTO: 47.4 FL (ref 37–54)
EGFRCR SERPLBLD CKD-EPI 2021: 102.7 ML/MIN/1.73
ERYTHROCYTE [DISTWIDTH] IN BLOOD BY AUTOMATED COUNT: 13.4 % (ref 12.3–15.4)
GLUCOSE SERPL-MCNC: 82 MG/DL (ref 65–99)
HCT VFR BLD AUTO: 34.4 % (ref 34–46.6)
HGB BLD-MCNC: 11.3 G/DL (ref 12–15.9)
MAGNESIUM SERPL-MCNC: 2.3 MG/DL (ref 1.6–2.6)
MCH RBC QN AUTO: 31.4 PG (ref 26.6–33)
MCHC RBC AUTO-ENTMCNC: 32.8 G/DL (ref 31.5–35.7)
MCV RBC AUTO: 95.6 FL (ref 79–97)
PLATELET # BLD AUTO: 230 10*3/MM3 (ref 140–450)
PMV BLD AUTO: 10.1 FL (ref 6–12)
POTASSIUM SERPL-SCNC: 3.2 MMOL/L (ref 3.5–5.2)
RBC # BLD AUTO: 3.6 10*6/MM3 (ref 3.77–5.28)
SODIUM SERPL-SCNC: 141 MMOL/L (ref 136–145)
WBC NRBC COR # BLD: 8.02 10*3/MM3 (ref 3.4–10.8)

## 2022-12-18 PROCEDURE — 83735 ASSAY OF MAGNESIUM: CPT | Performed by: INTERNAL MEDICINE

## 2022-12-18 PROCEDURE — 36415 COLL VENOUS BLD VENIPUNCTURE: CPT | Performed by: NURSE PRACTITIONER

## 2022-12-18 PROCEDURE — 0 GADOBENATE DIMEGLUMINE 529 MG/ML SOLUTION: Performed by: INTERNAL MEDICINE

## 2022-12-18 PROCEDURE — 95816 EEG AWAKE AND DROWSY: CPT

## 2022-12-18 PROCEDURE — 72158 MRI LUMBAR SPINE W/O & W/DYE: CPT

## 2022-12-18 PROCEDURE — 97530 THERAPEUTIC ACTIVITIES: CPT | Performed by: PHYSICAL THERAPIST

## 2022-12-18 PROCEDURE — 63710000001 DEXAMETHASONE PER 0.25 MG: Performed by: INTERNAL MEDICINE

## 2022-12-18 PROCEDURE — G0378 HOSPITAL OBSERVATION PER HR: HCPCS

## 2022-12-18 PROCEDURE — 25010000002 PROCHLORPERAZINE 10 MG/2ML SOLUTION: Performed by: PSYCHIATRY & NEUROLOGY

## 2022-12-18 PROCEDURE — 95816 EEG AWAKE AND DROWSY: CPT | Performed by: PSYCHIATRY & NEUROLOGY

## 2022-12-18 PROCEDURE — 25010000002 ONDANSETRON PER 1 MG: Performed by: NURSE PRACTITIONER

## 2022-12-18 PROCEDURE — 63710000001 DIPHENHYDRAMINE PER 50 MG: Performed by: PSYCHIATRY & NEUROLOGY

## 2022-12-18 PROCEDURE — 85027 COMPLETE CBC AUTOMATED: CPT | Performed by: NURSE PRACTITIONER

## 2022-12-18 PROCEDURE — 25010000002 CEFTRIAXONE PER 250 MG: Performed by: INTERNAL MEDICINE

## 2022-12-18 PROCEDURE — 80048 BASIC METABOLIC PNL TOTAL CA: CPT | Performed by: NURSE PRACTITIONER

## 2022-12-18 PROCEDURE — 72146 MRI CHEST SPINE W/O DYE: CPT

## 2022-12-18 PROCEDURE — 99222 1ST HOSP IP/OBS MODERATE 55: CPT | Performed by: PSYCHIATRY & NEUROLOGY

## 2022-12-18 PROCEDURE — 25010000002 KETOROLAC TROMETHAMINE PER 15 MG: Performed by: INTERNAL MEDICINE

## 2022-12-18 PROCEDURE — 97161 PT EVAL LOW COMPLEX 20 MIN: CPT | Performed by: PHYSICAL THERAPIST

## 2022-12-18 PROCEDURE — A9577 INJ MULTIHANCE: HCPCS | Performed by: INTERNAL MEDICINE

## 2022-12-18 PROCEDURE — 25010000002 HYDROMORPHONE PER 4 MG: Performed by: NURSE PRACTITIONER

## 2022-12-18 RX ORDER — DIPHENHYDRAMINE HCL 25 MG
25 CAPSULE ORAL EVERY 8 HOURS PRN
Status: DISCONTINUED | OUTPATIENT
Start: 2022-12-18 | End: 2022-12-20 | Stop reason: HOSPADM

## 2022-12-18 RX ORDER — KETOROLAC TROMETHAMINE 30 MG/ML
30 INJECTION, SOLUTION INTRAMUSCULAR; INTRAVENOUS EVERY 6 HOURS
Status: DISCONTINUED | OUTPATIENT
Start: 2022-12-18 | End: 2022-12-19

## 2022-12-18 RX ORDER — DEXAMETHASONE 4 MG/1
4 TABLET ORAL EVERY 8 HOURS SCHEDULED
Status: DISCONTINUED | OUTPATIENT
Start: 2022-12-18 | End: 2022-12-20 | Stop reason: HOSPADM

## 2022-12-18 RX ORDER — POTASSIUM CHLORIDE 1.5 G/1.77G
40 POWDER, FOR SOLUTION ORAL AS NEEDED
Status: DISCONTINUED | OUTPATIENT
Start: 2022-12-18 | End: 2022-12-20 | Stop reason: HOSPADM

## 2022-12-18 RX ORDER — BACLOFEN 10 MG/1
10 TABLET ORAL EVERY 8 HOURS SCHEDULED
Status: DISCONTINUED | OUTPATIENT
Start: 2022-12-18 | End: 2022-12-20 | Stop reason: HOSPADM

## 2022-12-18 RX ORDER — POTASSIUM CHLORIDE 29.8 MG/ML
20 INJECTION INTRAVENOUS
Status: DISCONTINUED | OUTPATIENT
Start: 2022-12-18 | End: 2022-12-20 | Stop reason: HOSPADM

## 2022-12-18 RX ORDER — POTASSIUM CHLORIDE 750 MG/1
40 TABLET, FILM COATED, EXTENDED RELEASE ORAL AS NEEDED
Status: DISCONTINUED | OUTPATIENT
Start: 2022-12-18 | End: 2022-12-20 | Stop reason: HOSPADM

## 2022-12-18 RX ORDER — PROCHLORPERAZINE EDISYLATE 5 MG/ML
10 INJECTION INTRAMUSCULAR; INTRAVENOUS EVERY 8 HOURS PRN
Status: COMPLETED | OUTPATIENT
Start: 2022-12-18 | End: 2022-12-19

## 2022-12-18 RX ADMIN — HYDROMORPHONE HYDROCHLORIDE 0.5 MG: 1 INJECTION, SOLUTION INTRAMUSCULAR; INTRAVENOUS; SUBCUTANEOUS at 18:26

## 2022-12-18 RX ADMIN — CETIRIZINE HYDROCHLORIDE 10 MG: 10 TABLET ORAL at 08:16

## 2022-12-18 RX ADMIN — FLUOXETINE HYDROCHLORIDE 60 MG: 20 CAPSULE ORAL at 00:35

## 2022-12-18 RX ADMIN — HYDROMORPHONE HYDROCHLORIDE 0.5 MG: 1 INJECTION, SOLUTION INTRAMUSCULAR; INTRAVENOUS; SUBCUTANEOUS at 23:36

## 2022-12-18 RX ADMIN — GABAPENTIN 400 MG: 400 CAPSULE ORAL at 00:35

## 2022-12-18 RX ADMIN — GABAPENTIN 400 MG: 400 CAPSULE ORAL at 21:33

## 2022-12-18 RX ADMIN — PANTOPRAZOLE SODIUM 40 MG: 40 TABLET, DELAYED RELEASE ORAL at 00:35

## 2022-12-18 RX ADMIN — DICYCLOMINE HYDROCHLORIDE 20 MG: 10 CAPSULE ORAL at 21:34

## 2022-12-18 RX ADMIN — PROCHLORPERAZINE EDISYLATE 10 MG: 5 INJECTION INTRAMUSCULAR; INTRAVENOUS at 14:00

## 2022-12-18 RX ADMIN — Medication 10 ML: at 08:17

## 2022-12-18 RX ADMIN — Medication 10 MG: at 22:46

## 2022-12-18 RX ADMIN — SUCRALFATE 1 G: 1 TABLET ORAL at 18:17

## 2022-12-18 RX ADMIN — DICYCLOMINE HYDROCHLORIDE 20 MG: 10 CAPSULE ORAL at 00:36

## 2022-12-18 RX ADMIN — FLUOXETINE HYDROCHLORIDE 60 MG: 20 CAPSULE ORAL at 21:32

## 2022-12-18 RX ADMIN — HYDROMORPHONE HYDROCHLORIDE 0.5 MG: 1 INJECTION, SOLUTION INTRAMUSCULAR; INTRAVENOUS; SUBCUTANEOUS at 05:49

## 2022-12-18 RX ADMIN — KETOROLAC TROMETHAMINE 30 MG: 30 INJECTION, SOLUTION INTRAMUSCULAR; INTRAVENOUS at 18:18

## 2022-12-18 RX ADMIN — POLYETHYLENE GLYCOL 3350 17 G: 17 POWDER, FOR SOLUTION ORAL at 08:16

## 2022-12-18 RX ADMIN — Medication 10 ML: at 21:48

## 2022-12-18 RX ADMIN — Medication 400 MG: at 14:00

## 2022-12-18 RX ADMIN — APIXABAN 5 MG: 5 TABLET, FILM COATED ORAL at 00:36

## 2022-12-18 RX ADMIN — GABAPENTIN 400 MG: 400 CAPSULE ORAL at 14:07

## 2022-12-18 RX ADMIN — APIXABAN 5 MG: 5 TABLET, FILM COATED ORAL at 21:32

## 2022-12-18 RX ADMIN — HYDROMORPHONE HYDROCHLORIDE 0.5 MG: 1 INJECTION, SOLUTION INTRAMUSCULAR; INTRAVENOUS; SUBCUTANEOUS at 00:35

## 2022-12-18 RX ADMIN — DICYCLOMINE HYDROCHLORIDE 20 MG: 10 CAPSULE ORAL at 08:16

## 2022-12-18 RX ADMIN — HYDROXYZINE HYDROCHLORIDE 25 MG: 25 TABLET ORAL at 12:31

## 2022-12-18 RX ADMIN — HYDROCODONE BITARTRATE AND ACETAMINOPHEN 1 TABLET: 5; 325 TABLET ORAL at 20:02

## 2022-12-18 RX ADMIN — GABAPENTIN 400 MG: 400 CAPSULE ORAL at 11:37

## 2022-12-18 RX ADMIN — DIVALPROEX SODIUM 750 MG: 500 TABLET, DELAYED RELEASE ORAL at 21:34

## 2022-12-18 RX ADMIN — DIPHENHYDRAMINE HYDROCHLORIDE 25 MG: 25 CAPSULE ORAL at 12:34

## 2022-12-18 RX ADMIN — SUCRALFATE 1 G: 1 TABLET ORAL at 21:33

## 2022-12-18 RX ADMIN — DICYCLOMINE HYDROCHLORIDE 20 MG: 10 CAPSULE ORAL at 18:17

## 2022-12-18 RX ADMIN — PROCHLORPERAZINE EDISYLATE 10 MG: 5 INJECTION INTRAMUSCULAR; INTRAVENOUS at 12:34

## 2022-12-18 RX ADMIN — ONDANSETRON 4 MG: 2 INJECTION INTRAMUSCULAR; INTRAVENOUS at 18:33

## 2022-12-18 RX ADMIN — PANTOPRAZOLE SODIUM 40 MG: 40 TABLET, DELAYED RELEASE ORAL at 21:34

## 2022-12-18 RX ADMIN — ACETAMINOPHEN 650 MG: 325 TABLET, FILM COATED ORAL at 05:49

## 2022-12-18 RX ADMIN — PANTOPRAZOLE SODIUM 40 MG: 40 TABLET, DELAYED RELEASE ORAL at 14:00

## 2022-12-18 RX ADMIN — BACLOFEN 10 MG: 10 TABLET ORAL at 21:32

## 2022-12-18 RX ADMIN — DEXAMETHASONE 4 MG: 4 TABLET ORAL at 21:31

## 2022-12-18 RX ADMIN — Medication 10 MG: at 00:35

## 2022-12-18 RX ADMIN — HYDROMORPHONE HYDROCHLORIDE 0.5 MG: 1 INJECTION, SOLUTION INTRAMUSCULAR; INTRAVENOUS; SUBCUTANEOUS at 08:16

## 2022-12-18 RX ADMIN — HYDROMORPHONE HYDROCHLORIDE 0.5 MG: 1 INJECTION, SOLUTION INTRAMUSCULAR; INTRAVENOUS; SUBCUTANEOUS at 21:26

## 2022-12-18 RX ADMIN — APIXABAN 5 MG: 5 TABLET, FILM COATED ORAL at 08:16

## 2022-12-18 RX ADMIN — POTASSIUM CHLORIDE 40 MEQ: 750 TABLET, EXTENDED RELEASE ORAL at 18:33

## 2022-12-18 RX ADMIN — CEFTRIAXONE 2 G: 2 INJECTION, POWDER, FOR SOLUTION INTRAMUSCULAR; INTRAVENOUS at 18:18

## 2022-12-18 RX ADMIN — HYDROCODONE BITARTRATE AND ACETAMINOPHEN 1 TABLET: 5; 325 TABLET ORAL at 11:37

## 2022-12-18 RX ADMIN — HYDROMORPHONE HYDROCHLORIDE 0.5 MG: 1 INJECTION, SOLUTION INTRAMUSCULAR; INTRAVENOUS; SUBCUTANEOUS at 14:00

## 2022-12-18 RX ADMIN — MAGNESIUM OXIDE 400 MG (241.3 MG MAGNESIUM) TABLET 400 MG: TABLET at 14:00

## 2022-12-18 RX ADMIN — DIVALPROEX SODIUM 750 MG: 500 TABLET, DELAYED RELEASE ORAL at 14:00

## 2022-12-18 RX ADMIN — GADOBENATE DIMEGLUMINE 20 ML: 529 INJECTION, SOLUTION INTRAVENOUS at 13:27

## 2022-12-18 RX ADMIN — SUCRALFATE 1 G: 1 TABLET ORAL at 14:00

## 2022-12-18 RX ADMIN — SUCRALFATE 1 G: 1 TABLET ORAL at 08:16

## 2022-12-18 RX ADMIN — HYDROMORPHONE HYDROCHLORIDE 0.5 MG: 1 INJECTION, SOLUTION INTRAMUSCULAR; INTRAVENOUS; SUBCUTANEOUS at 03:02

## 2022-12-18 RX ADMIN — HYDROXYZINE HYDROCHLORIDE 25 MG: 25 TABLET ORAL at 08:16

## 2022-12-18 RX ADMIN — TRAZODONE HYDROCHLORIDE 100 MG: 100 TABLET ORAL at 21:34

## 2022-12-18 RX ADMIN — DICYCLOMINE HYDROCHLORIDE 20 MG: 10 CAPSULE ORAL at 14:00

## 2022-12-18 NOTE — THERAPY EVALUATION
Patient Name: Belen Allen  : 1980    MRN: [id number]                              Today's Date: 2022       Admit Date: 2022    Visit Dx:     ICD-10-CM ICD-9-CM   1. Uncontrolled pain  R52 780.96   2. Seizure (HCC)  R56.9 780.39   3. Acute midline thoracic back pain  M54.6 724.1     Patient Active Problem List   Diagnosis   • Splenic infarct   • Anxiety disorder   • Functional asplenia   • Generalized abdominal pain   • Bilateral leg weakness   • Acute bilateral low back pain   • Antiphospholipid antibody positive   • On anticoagulant therapy   • Acute pain of left knee   • Vitamin D deficiency   • Folate deficiency   • Pain of back and left lower extremity   • Common bile duct dilatation   • Elevated LFTs   • Right upper quadrant abdominal pain   • Obesity (BMI 30-39.9)   • Tobacco abuse   • GERD without esophagitis   • Intractable abdominal pain   • Obesity, Class III, BMI 40-49.9 (morbid obesity) (HCC)   • Constipation   • History of ERCP   • Other chronic pain   • Seizures (HCC)   • Syncope   • Lumbar back pain with radiculopathy affecting left lower extremity   • Right upper quadrant pain   • Uncontrolled pain   • Intractable back pain     Past Medical History:   Diagnosis Date   • Abnormal Pap smear of cervix    • Ankle fracture    • H/O Elevated liver enzymes    • History of chronic back pain    • History of migraine    • History of urinary tract infection    • Injury of back    • PONV (postoperative nausea and vomiting)    • Seasonal allergies    • Seizure (HCC)     Takes Keppra     Past Surgical History:   Procedure Laterality Date   • BACK SURGERY      fusion L4, L5     • CHOLECYSTECTOMY     • DILATATION AND CURETTAGE     • ENDOSCOPY N/A 2022    Procedure: ESOPHAGOGASTRODUODENOSCOPY with biopsy;  Surgeon: Christiana Mann MD;  Location: [facility] ENDOSCOPY;  Service: Gastroenterology;  Laterality: N/A;  gastritis, duodenitis, irregular z line   • ERCP N/A  6/3/2021    Procedure: ENDOSCOPIC RETROGRADE CHOLANGIOPANCREATOGRAPHY WITH SPHINCTEROTOMY, DILATION WITH BALLOON CLEARANCE  (12MM-15MM);  Surgeon: Bjorn Flannery MD;  Location: [facility] ENDOSCOPY;  Service: Gastroenterology;  Laterality: N/A;  DILATED COMMON BILE DUCT   • SKIN SURGERY     • TUBAL ABDOMINAL LIGATION  2013   • WISDOM TOOTH EXTRACTION  2018    x2      General Information     Row Name 12/18/22 1510          Physical Therapy Time and Intention    Document Type evaluation  -     Mode of Treatment individual therapy;physical therapy  -     Row Name 12/18/22 1510          General Information    Patient Profile Reviewed yes  -     Prior Level of Function transfer;gait;community mobility;ADL's;bed mobility;independent:  -     Existing Precautions/Restrictions fall  -     Row Name 12/18/22 1510          Living Environment    People in Home significant other  -     Row Name 12/18/22 1510          Stairs Within Home, Primary    Number of Stairs, Within Home, Primary three  -     Row Name 12/18/22 1510          Cognition    Orientation Status (Cognition) oriented x 4  -     Row Name 12/18/22 1510          Safety Issues, Functional Mobility    Impairments Affecting Function (Mobility) strength;endurance/activity tolerance;balance  -           User Key  (r) = Recorded By, (t) = Taken By, (c) = Cosigned By    Initials Name Provider Type    LC Darek Cabrera, PT DPT Physical Therapist               Mobility     Row Name 12/18/22 1511          Bed Mobility    Bed Mobility bed mobility (all) activities  -     All Activities, [place] (Bed Mobility) standby assist  -     Assistive Device (Bed Mobility) bed rails  -     Row Name 12/18/22 1511          Sit-Stand Transfer    Sit-Stand [place] (Transfers) contact guard  -     Assistive Device (Sit-Stand Transfers) walker, front-wheeled  -     Row Name 12/18/22 1511          Gait/Stairs (Locomotion)    [place] Level (Gait)  contact guard  -LC     Assistive Device (Gait) walker, front-wheeled  -LC     Distance in Feet (Gait) 15  -LC     Deviations/Abnormal Patterns (Gait) antalgic  -           User Key  (r) = Recorded By, (t) = Taken By, (c) = Cosigned By    Initials Name Provider Type    LC Darek Cabrera, PT DPT Physical Therapist               Obj/Interventions     Row Name 12/18/22 1511          Range of Motion Comprehensive    General Range of Motion no range of motion deficits identified  -     Row Name 12/18/22 1511          Strength Comprehensive (MMT)    Comment, General Manual Muscle Testing (MMT) Assessment BLE MMT grossly 3/5  -LC           User Key  (r) = Recorded By, (t) = Taken By, (c) = Cosigned By    Initials Name Provider Type    LC Darek Cabrera, PT DPT Physical Therapist               Goals/Plan     Row Name 12/18/22 1513          Bed Mobility Goal 1 (PT)    Activity/Assistive Device (Bed Mobility Goal 1, PT) bed mobility activities, all  -LC     [place] Level/Cues Needed (Bed Mobility Goal 1, PT) modified independence  -LC     Time Frame (Bed Mobility Goal 1, PT) 1 week  -     Row Name 12/18/22 1513          Transfer Goal 1 (PT)    Activity/Assistive Device (Transfer Goal 1, PT) transfers, all;walker, rolling  -LC     [place] Level/Cues Needed (Transfer Goal 1, PT) modified independence  -LC     Time Frame (Transfer Goal 1, PT) 1 week  -     Row Name 12/18/22 1513          Gait Training Goal 1 (PT)    Activity/Assistive Device (Gait Training Goal 1, PT) gait (walking locomotion);walker, rolling  -LC     [place] Level (Gait Training Goal 1, PT) modified independence  -LC     Distance (Gait Training Goal 1, PT) 150  -LC     Time Frame (Gait Training Goal 1, PT) 1 week  -           User Key  (r) = Recorded By, (t) = Taken By, (c) = Cosigned By    Initials Name Provider Type    Darek Conroy, PT DPT Physical Therapist               Clinical Impression     Row Name 12/18/22 1511           Pain    Pretreatment Pain Rating 7/10  -LC     Posttreatment Pain Rating 7/10  -LC     Pain Location - Side/Orientation Right  -LC     Pain Location lower  -LC     Pain Location - extremity  -LC     Pain Intervention(s) Medication (See MAR);Repositioned  -LC     Row Name 12/18/22 1511          Plan of Care Review    Plan of Care Reviewed With patient  -LC     Outcome Evaluation PT EVAL completed. Pt reports RLE pain and tingling throughout. Pt transferred supine to sit with SBA x 1. Pt transferred sit to stand with CGA x 1. Pt ambulated 15 ft with CGA x 1 and RW. Pt transferred sit to supine with SBA x 1.Pt requires skilled therapy due to decreased mobility, decreased functional activity tolerance. Recommend DC home with assist  -LC     Row Name 12/18/22 1511          Therapy Assessment/Plan (PT)    Patient/Family Therapy Goals Statement (PT) home with assist  -LC     Rehab Potential (PT) fair, will monitor progress closely  -LC     Criteria for Skilled Interventions Met (PT) yes;skilled treatment is necessary  -LC     Therapy Frequency (PT) daily  -     Predicted Duration of Therapy Intervention (PT) 1 week  -LC     Row Name 12/18/22 1511          Positioning and Restraints    Pre-Treatment Position in bed  -LC     Post Treatment Position bed  -LC     In Bed notified nsg;encouraged to call for assist;supine;call light within reach;exit alarm on;side rails up x2  -LC           User Key  (r) = Recorded By, (t) = Taken By, (c) = Cosigned By    Initials Name Provider Type    Darek Conroy, PT DPT Physical Therapist               Outcome Measures     Row Name 12/18/22 1513 12/18/22 0812       How much help from another person do you currently need...    Turning from your back to your side while in flat bed without using bedrails? 4  -LC 4  -SC    Moving from lying on back to sitting on the side of a flat bed without bedrails? 4  -LC 4  -SC    Moving to and from a bed to a chair (including a wheelchair)? 4  -LC 4   -SC    Standing up from a chair using your arms (e.g., wheelchair, bedside chair)? 4  - 4  -SC    Climbing 3-5 steps with a railing? 3  - 2  -SC    To walk in hospital room? 3  - 3  -SC    AM-PAC 6 Clicks Score (PT) 22  - 21  -SC    Highest level of mobility 7 --> Walked 25 feet or more  - 6 --> Walked 10 steps or more  -SC    Row Name 12/18/22 1513          Functional Assessment    Outcome Measure Options AM-PAC 6 Clicks Basic Mobility (PT)  -           User Key  (r) = Recorded By, (t) = Taken By, (c) = Cosigned By    Initials Name Provider Type     Darek Cabrera, PT DPT Physical Therapist    Nisha Randhawa RN Registered Nurse                             Physical Therapy Education     Title: PT OT SLP Therapies (Done)     Topic: Physical Therapy (Done)     Point: Mobility training (Done)     Learning Progress Summary           Patient Acceptance, E,D, DU,VU by  at 12/18/2022 1514                   Point: Home exercise program (Done)     Learning Progress Summary           Patient Acceptance, E,D, DU,VU by  at 12/18/2022 1514                   Point: Body mechanics (Done)     Learning Progress Summary           Patient Acceptance, E,D, DU,VU by  at 12/18/2022 1514                   Point: Precautions (Done)     Learning Progress Summary           Patient Acceptance, E,D, DU,VU by  at 12/18/2022 1514                               User Key     Initials Effective Dates Name Provider Type [facility] 07/02/20 -  Darek Cabrera, PT DPT Physical Therapist PT              PT Recommendation and Plan     Plan of Care Reviewed With: patient  Outcome Evaluation: PT EVAL completed. Pt reports RLE pain and tingling throughout. Pt transferred supine to sit with SBA x 1. Pt transferred sit to stand with CGA x 1. Pt ambulated 15 ft with CGA x 1 and RW. Pt transferred sit to supine with SBA x 1.Pt requires skilled therapy due to decreased mobility, decreased functional activity tolerance.  Recommend DC home with assist     Time Calculation:    PT Charges     Row Name 12/18/22 1514             Time Calculation    Start Time 1425  -LC      Stop Time 1450  -LC      Time Calculation (min) 25 min  -LC      PT Received On 12/18/22  -LC      PT - Next Appointment 12/19/22  -LC      PT Goal Re-Cert Due Date 12/25/22  -LC         Time Calculation- PT    Total Timed Code Minutes- PT 25 minute(s)  -LC         Timed Charges    77017 - PT Therapeutic Activity Minutes 15  -LC         Total Minutes    Timed Charges Total Minutes 15  -LC       Total Minutes 15  -LC            User Key  (r) = Recorded By, (t) = Taken By, (c) = Cosigned By    Initials Name Provider Type    LC Darek Cabrera, PT DPT Physical Therapist              Therapy Charges for Today     Code Description Service Date Service Provider Modifiers Qty    65063759285 HC PT THERAPEUTIC ACT EA 15 MIN 12/18/2022 Darek Cabrera, PT DPT GP 1    30518791200 HC PT EVAL LOW COMPLEXITY 1 12/18/2022 Darek Cabrera, PT DPT GP 1          PT G-Codes  Outcome Measure Options: AM-PAC 6 Clicks Basic Mobility (PT)  AM-PAC 6 Clicks Score (PT): 22  PT Discharge Summary  Anticipated Discharge Disposition (PT): home with assist    Darek Cabrera, AUGIE DPT  12/18/2022

## 2022-12-18 NOTE — PROGRESS NOTES
Name: Belen Allen ADMIT: 2022   : 1980  PCP: Sahil Cornelius MD    MRN: [id number] LOS: 0 days   AGE/SEX: 42 y.o. female  ROOM: [facility]     Subjective   Subjective   Mild improvement of the low back pain in the right lower extremity pain.  Continues with tingling and weakness of the right lower extremity.  Positive dysuria but no hematuria or urinary incontinence.  Normal urine stream.  Pain level today is 8/10.    Review of Systems  Cardiovascular/respiratory.  No chest pain.  No palpitation.  No shortness of breath.  GI.  No abdominal pain or nausea or vomiting.  Symptoms.  Improved headache.  No focal neurological symptoms.  No seizures.     Objective   Objective   Vital Signs  Temp:  [97.7 °F (36.5 °C)-98.1 °F (36.7 °C)] 98 °F (36.7 °C)  Heart Rate:  [] 94  Resp:  [18] 18  BP: (108-128)/(65-89) 125/87  SpO2:  [95 %-100 %] 97 %  on   ;   Device (Oxygen Therapy): room air  No intake or output data in the 24 hours ending 22  Body mass index is 37.05 kg/m².      22  16222   Weight: 98 kg (216 lb) 97.9 kg (215 lb 13.3 oz)     Physical Exam  General.  Middle-aged female.  She is alert and oriented x3.  In pain during the examination.  No respiratory distress.    Eyes.  Pupils equal round and reactive.  Intact extraocular musculature.  No pallor or jaundice.  Oral cavity.  Moist mucous membrane.  Neck.  Supple.  No JVD.  No lymphadenopathy or thyromegaly.  Cardiovascular.  Regular rate and rhythm with no gallops or murmurs.  Chest.  Clear to auscultation bilaterally with no added sounds.  Abdomen.  Soft lax.  No tenderness.  No organomegaly.  No guarding or rebound.  Extremities.  No clubbing/cyanosis/edema.  Decreased power and sensation and 5 S1 distribution.  CNS.  No acute focal neurological deficits except as above    Results Review:      Results from last 7 days   Lab Units 22  0627 22  1628 22   SODIUM mmol/L 141 143 139    POTASSIUM mmol/L 3.2* 3.5 2.8*   CHLORIDE mmol/L 106 108* 105   CO2 mmol/L 29.0 27.0 24.0   BUN mg/dL 6 4* 8   CREATININE mg/dL 0.75 0.69 0.78   GLUCOSE mg/dL 82 106* 105*   CALCIUM mg/dL 8.5* 8.4* 8.9   AST (SGOT) U/L  --  23 26   ALT (SGPT) U/L  --  31 83*     Estimated Creatinine Clearance: 112.2 mL/min (by C-G formula based on SCr of 0.75 mg/dL).          Results from last 7 days   Lab Units 12/17/22 1628 12/13/22 1918   CK TOTAL U/L 38  --    TROPONIN T ng/mL  --  <0.010             Results from last 7 days   Lab Units 12/18/22 0627 12/17/22 1628 12/13/22 2025   MAGNESIUM mg/dL 2.3 2.3 2.1           Invalid input(s): LDLCALC  Results from last 7 days   Lab Units 12/18/22  0627 12/17/22  1628 12/15/22  2132 12/13/22  1918   WBC 10*3/mm3 8.02 7.16  --  14.57*   HEMOGLOBIN g/dL 11.3* 12.4  --  14.6   HEMATOCRIT % 34.4 36.4  --  43.7   PLATELETS 10*3/mm3 230 284  --  347   MCV fL 95.6 95.5  --  92.0   MCH pg 31.4 32.5  --  30.7   MCHC g/dL 32.8 34.1  --  33.4   RDW % 13.4 13.6  --  13.2   RDW-SD fl 47.4 47.9  --  44.4   MPV fL 10.1 10.2  --  9.9   NEUTROPHIL % %  --  61.6  --  62.2   LYMPHOCYTE % %  --  29.6  --  30.9   MONOCYTES % %  --  7.1  --  5.5   EOSINOPHIL % %  --  1.0  --  0.5   BASOPHIL % %  --  0.4  --  0.4   IMM GRAN % %  --  0.3  --  0.5   NEUTROS ABS 10*3/mm3  --  4.41   < > 9.06*   LYMPHS ABS 10*3/mm3  --  2.12  --  4.50*   MONOS ABS 10*3/mm3  --  0.51  --  0.80   EOS ABS 10*3/mm3  --  0.07   < > 0.08   BASOS ABS 10*3/mm3  --  0.03   < > 0.06   IMMATURE GRANS (ABS) 10*3/mm3  --  0.02  --  0.07*   NRBC /100 WBC  --  0.1  --  0.0    < > = values in this interval not displayed.     Results from last 7 days   Lab Units 12/13/22  1918   INR  0.87*   APTT seconds 25.5                 Results from last 7 days   Lab Units 12/15/22  2132   LIPASE Units/Liter 36     Results from last 7 days   Lab Units 12/17/22  2147   URINECX  Growth present, too young to evaluate         Results from last 7 days   Lab  Units 12/17/22  2147   NITRITE UA  Negative   WBC UA /HPF 21-30*   BACTERIA UA /HPF None Seen   SQUAM EPITHEL UA /HPF 3-6*   URINECX  Growth present, too young to evaluate           Imaging:  Imaging Results (Last 24 Hours)     Procedure Component Value Units Date/Time    MRI Lumbar Spine With & Without Contrast [[id number]] Collected: 12/18/22 1449     Updated: 12/18/22 1449    Narrative:      MRI OF THE LUMBAR SPINE WITH AND WITHOUT CONTRAST 12/18/2022     CLINICAL HISTORY: Patient has had previous lumbar spine surgery and  fusion of L5-S1. Patient fell and has low back pain.     TECHNIQUE: Sagittal T1, proton density fast spin echo T2, STIR and  postcontrast sagittal T1 and postcontrast sagittal fat-suppressed  T1-weighted images were obtained of the lumbar spine.  In addition axial  T2 weighted images were obtained from L1 to S2 and thin cut precontrast  and postcontrast axial T1-weighted images were obtained angled through  the interspaces from L2 to S1.     This is correlated to a prior MRI of the lumbar spine from [facility] on 11/27/2022.     FINDINGS: The distal thoracic cord and the conus is normal in signal  intensity.  The conus terminates at the level of the mid body of L2  which is normal.     At T12-L1 the disc space and facets are normal in appearance with no  canal or foraminal narrowing.     At L1-2 the disc space and facets are normal in appearance with no canal  or foraminal narrowing.     At L2-3 the disc space and facets are normal in appearance with no canal  or foraminal narrowing.     At L3-4 the disc space and facets are normal in appearance with no canal  or foraminal narrowing.     At L4-5 the disc space and facets are normal in appearance with no canal  or foraminal narrowing.     At L5-S1 the patient has had prior surgery with bilateral laminectomies  at L5 with medial facetectomies at L5-S1.  There are bilateral rods  bridging the posterior elements at L5-S1 anchored by  bilateral pedicle  screws at L5 and S1.  There are disc implants in the L5-S1 disc space. I  see no solid bony fusion. I see no canal or lateral recess narrowing.   There is minimal spurring into the foramina and there is minimal left  and there is mild right bony foraminal narrowing.     The marrow signal intensity in the lumbar spine is normal with no  discernible posttraumatic osseous abnormality seen in the lumbar spine.       Impression:      1. No significant change when compared to prior lumbar spine MRI from  [facility] on 11/27/2022.  2. Patient has had surgery at L5-S1 with bilateral laminectomies at L5  and medial facetectomies at L5-S1 with bilateral posterior bridging rods  anchored by bilateral pedicle screws at L5 and S1.  There is a disc  implant in the L5-S1 disc space and there is no evidence of solid bony  fusion.  There remains minimal posterior spurring.  There is no residual  canal or lateral recess narrowing.  There is minimal left and mild right  bony foraminal narrowing.  3. The remainder of the lumbar spine MRI is normal.  Specifically no  disc herniation, canal or foraminal narrowing is seen from T12 to L5 and  no bone marrow edema is seen with no acute posttraumatic abnormality  identified in the lumbar spine.       MRI Thoracic Spine Without Contrast [[id number]] Collected: 12/18/22 1128     Updated: 12/18/22 1128    Narrative:      MRI OF THE THORACIC SPINE WITHOUT CONTRAST 12/18/2022     CLINICAL HISTORY: Patient fell following a seizure yesterday, has mid  back pain.     TECHNIQUE: Sagittal T1, proton density and fat-suppressed T2-weighted  images were obtained of the thoracic spine. In addition axial T1 and  T2-weighted images were obtained from C7 to L1.     FINDINGS: The thoracic cord is normal in signal intensity. The conus  terminates at the L1 lumbar level which is normal.     The thoracic posterior disc margins and facets are within normal limits  with no thoracic  disc herniation, no thoracic central canal or foraminal  narrowing identified.     There are anterior marginal bridging osteophytes at all levels from T3  down to T12 with some flowing ossification anterior to the vertebrae and  the findings are most consistent with diffuse idiopathic skeletal  hyperostosis. There is some faint T2 high signal in the anterior aspect  of the vertebra from T3 to T12, likely reactive marrow edema from the  diffuse idiopathic skeletal hyperostosis. Overall the thoracic vertebral  body heights are well-maintained.  No convincing acute compression  fracture is identified.       Impression:      There is anterior marginal bridging osteophytes and some  flowing ossification anterior to the vertebra from T3 down to T12  compatible with diffuse idiopathic skeletal hyperostosis and there are  thin rinds of marrow edema in the very anterior aspect of the vertebrae  from the inferior body of T3 down to the superior body of T12 precisely  where there is flowing ossification.  Anterior marginal endplate  spurring is likely reactive marrow changes due to the diffuse idiopathic  skeletal hyperostosis.  The remainder of the thoracic spine MRI is  within normal limits.       CT Head Without Contrast [[id number]] Collected: 12/17/22 1920     Updated: 12/18/22 1019    Narrative:      EMERGENCY NONCONTRAST HEAD CT AND NONCONTRAST CERVICAL SPINE CT  12/17/2022     CLINICAL HISTORY: Patient had a seizure, has headaches. Patient is on  Eliquis.     HEAD CT TECHNIQUE: Spiral CT images were obtained from the base of the  skull to the vertex without intravenous contrast. The images were  reformatted and submitted in 3 mm thick axial, sagittal and coronal CT  sections with brain algorithm and 2 mm thick axial CT sections with  high-resolution bone algorithm.     This is correlated to a prior MRI of the brain from [facility] on 01/04/2022 and a noncontrast head CT 01/01/2022.     FINDINGS: The  brain parenchyma is normal in attenuation. The ventricles  are normal in size. I see no focal mass effect. There is no midline  shift. No extraaxial fluid collections are identified. There is no  evidence of acute intracranial hemorrhage. No acute skull fracture is  identified. The calvarium and the skull base are normal in appearance.  The paranasal sinuses and the mastoid air cells and the middle ear  cavities are clear.       Impression:      1.  Normal head CT with no acute skull fracture or intracranial  hemorrhage identified.     CERVICAL SPINE CT TECHNIQUE: Spiral CT images were obtained from the  skull base down to the T2-3 thoracic level and the images were  reformatted and submitted in 2 mm thick axial and sagittal CT sections  with soft tissue algorithm and 1 mm thick axial, sagittal and coronal CT  sections with high-resolution bone algorithm.     FINDINGS: The images are extremely grainy from C4 down to T2 which  limits evaluation of bony detail and limits evaluation of the posterior  disc margins.     The atlantooccipital articulation is normal in appearance.     At C1-C2 there are mild arthritic changes at the atlantodental interval  with mild marginal spurring off the anterior ring of C1 and the  odontoid. Otherwise the C1-2 level is normal in appearance.     At C2-3 the posterior disc margin, facets and uncovertebral joints are  within normal limits with no canal or foraminal narrowing.     At C3-4 the disc space, facets and uncovertebral joints are within  normal limits with no canal or foraminal narrowing.     At C4-5 the posterior disc margin, facets and uncovertebral joints are  within normal limits with no canal or foraminal narrowing.     At C5-6 the posterior endplates, facets and uncovertebral joints are  within normal limits with no canal or foraminal narrowing.     At C6-7 the posterior endplates, facets and uncovertebral joints are  within normal limits with no canal or foraminal  narrowing.     At C7-T1 there are prominent left anterior marginal bridging  osteophytes. The posterior endplates and facets are normal with no canal  or foraminal narrowing.     No acute fracture is seen in the cervical spine.     IMPRESSION:   1. No acute fracture is seen in the cervical spine. The images are  extremely grainy from C4 to T2 which limits evaluation of bony detail  and limits evaluation of the posterior disc margins. There is prominent  left anterior marginal osteophytic spurring at C7-T1. The remainder of  the cervical spine CT is unremarkable.     Radiation dose reduction techniques were utilized, including automated  exposure control and exposure modulation based on body size.     This report was finalized on 12/18/2022 10:16 AM by Dr. Robert Valdivia M.D.       CT Cervical Spine Without Contrast [[id number]] Collected: 12/17/22 1920     Updated: 12/18/22 1019    Narrative:      EMERGENCY NONCONTRAST HEAD CT AND NONCONTRAST CERVICAL SPINE CT  12/17/2022     CLINICAL HISTORY: Patient had a seizure, has headaches. Patient is on  Eliquis.     HEAD CT TECHNIQUE: Spiral CT images were obtained from the base of the  skull to the vertex without intravenous contrast. The images were  reformatted and submitted in 3 mm thick axial, sagittal and coronal CT  sections with brain algorithm and 2 mm thick axial CT sections with  high-resolution bone algorithm.     This is correlated to a prior MRI of the brain from [facility] on 01/04/2022 and a noncontrast head CT 01/01/2022.     FINDINGS: The brain parenchyma is normal in attenuation. The ventricles  are normal in size. I see no focal mass effect. There is no midline  shift. No extraaxial fluid collections are identified. There is no  evidence of acute intracranial hemorrhage. No acute skull fracture is  identified. The calvarium and the skull base are normal in appearance.  The paranasal sinuses and the mastoid air cells and the middle  ear  cavities are clear.       Impression:      1.  Normal head CT with no acute skull fracture or intracranial  hemorrhage identified.     CERVICAL SPINE CT TECHNIQUE: Spiral CT images were obtained from the  skull base down to the T2-3 thoracic level and the images were  reformatted and submitted in 2 mm thick axial and sagittal CT sections  with soft tissue algorithm and 1 mm thick axial, sagittal and coronal CT  sections with high-resolution bone algorithm.     FINDINGS: The images are extremely grainy from C4 down to T2 which  limits evaluation of bony detail and limits evaluation of the posterior  disc margins.     The atlantooccipital articulation is normal in appearance.     At C1-C2 there are mild arthritic changes at the atlantodental interval  with mild marginal spurring off the anterior ring of C1 and the  odontoid. Otherwise the C1-2 level is normal in appearance.     At C2-3 the posterior disc margin, facets and uncovertebral joints are  within normal limits with no canal or foraminal narrowing.     At C3-4 the disc space, facets and uncovertebral joints are within  normal limits with no canal or foraminal narrowing.     At C4-5 the posterior disc margin, facets and uncovertebral joints are  within normal limits with no canal or foraminal narrowing.     At C5-6 the posterior endplates, facets and uncovertebral joints are  within normal limits with no canal or foraminal narrowing.     At C6-7 the posterior endplates, facets and uncovertebral joints are  within normal limits with no canal or foraminal narrowing.     At C7-T1 there are prominent left anterior marginal bridging  osteophytes. The posterior endplates and facets are normal with no canal  or foraminal narrowing.     No acute fracture is seen in the cervical spine.     IMPRESSION:   1. No acute fracture is seen in the cervical spine. The images are  extremely grainy from C4 to T2 which limits evaluation of bony detail  and limits evaluation of  the posterior disc margins. There is prominent  left anterior marginal osteophytic spurring at C7-T1. The remainder of  the cervical spine CT is unremarkable.     Radiation dose reduction techniques were utilized, including automated  exposure control and exposure modulation based on body size.     This report was finalized on 12/18/2022 10:16 AM by Dr. Robert Valdivia M.D.       CT Chest Without Contrast Diagnostic [[id number]] Collected: 12/17/22 2202     Updated: 12/17/22 2202    Narrative:        Patient: CARSON MULLEN  Time Out: 22:02  Exam(s): CT CHEST Without Contrast     EXAM:    CT Chest Without Intravenous Contrast    CLINICAL HISTORY:     Reason for exam: eval for rib fracture.    TECHNIQUE:    Axial computed tomography images of the chest without intravenous   contrast.  CTDI is 14.2 mGy and DLP is 538.6 mGy-cm.  This CT exam was   performed according to the principle of ALARA (As Low As Reasonably   Achievable) by using one or more of the following dose reduction   techniques: automated exposure control, adjustment of the mA and or kV   according to patient size, and or use of iterative reconstruction   technique.    COMPARISON:    No relevant prior studies available.    FINDINGS:    Lungs:  Unremarkable.  No mass.  No consolidation.    Pleural space:  Unremarkable.  No pneumothorax.  No significant   effusion.    Heart:  Unremarkable.  No cardiomegaly.  No significant pericardial   effusion.  No significant coronary artery calcifications.    Bones joints:  Unremarkable.  No acute fracture.  No dislocation.    Soft tissues:  Unremarkable.    Vasculature:  Unremarkable.  No thoracic aortic aneurysm.    Lymph nodes:  Unremarkable.  No enlarged lymph nodes.    Gallbladder and bile ducts:  Cholecystectomy.    Kidneys and ureters:  Atrophy of the left kidney.    IMPRESSION:         No acute findings in the chest.      Impression:          Electronically signed by Mina Veronica MD on 12-17-22 at 2202    XR  Spine Lumbar Complete 4+VW [[id number]] Collected: 12/17/22 1837     Updated: 12/17/22 1845    Narrative:      Emergency 3 views of thoracic spine and lumbar spine plain film series  complete 4+ views 12/17/2022     CLINICAL HISTORY: Back pain after seizure     Thoracic spine: 3 views of thoracic spine including AP and lateral views  of the entire thoracic spine and swimmer's lateral view of lower  cervical and upper thoracic spine are submitted for interpretation. On  the lateral view there is evidence of anterior marginal endplate  spurring at all levels from T3 to T12. Overall the thoracic vertebral  body heights are well-maintained. On the AP view, the pedicles and  spinous processes are intact.       Impression:      1. No convincing acute fracture is seen in the thoracic spine. There is  anterior marginal endplate spurring at all levels from T3 to T12.     Lumbar spine plain films: A total 5 views lumbar spine including AP  bilateral oblique and lateral views of the entire lumbar spine and coned  down lateral view of lumbosacral junction are submitted for  interpretation. Patient's had previous lumbosacral surgery with  bilateral posterior rods bridging the posterior elements at L5-S1  anchored by bilateral pedicle screws at L5 and S1. There is a disc  implants in the L5-S1 disc space. Patient has mild levoscoliotic  curvature lumbar spine with its apex at the L3-4 lumbar level. Overall,  the lumbar vertebral body heights are well-maintained. The disc heights  from T12 to L5 are well-maintained. On the oblique views the pars  interarticularis are intact. On the AP view, the pedicles, transverse  and spinous processes are intact.     IMPRESSION:  1. No acute fracture is seen lumbar spine.  2. Patient had previous bilateral ramses and pedicle screw fixation L5-S1  disc implant L5-S1 disc space is mild levoscoliotic curvature lumbar  spine apex at L3-4.     This report was finalized on 12/17/2022 6:42 PM by   Robert Valdivia M.D.       XR Spine Thoracic 3 View [[id number]] Collected: 12/17/22 1837     Updated: 12/17/22 1845    Narrative:      Emergency 3 views of thoracic spine and lumbar spine plain film series  complete 4+ views 12/17/2022     CLINICAL HISTORY: Back pain after seizure     Thoracic spine: 3 views of thoracic spine including AP and lateral views  of the entire thoracic spine and swimmer's lateral view of lower  cervical and upper thoracic spine are submitted for interpretation. On  the lateral view there is evidence of anterior marginal endplate  spurring at all levels from T3 to T12. Overall the thoracic vertebral  body heights are well-maintained. On the AP view, the pedicles and  spinous processes are intact.       Impression:      1. No convincing acute fracture is seen in the thoracic spine. There is  anterior marginal endplate spurring at all levels from T3 to T12.     Lumbar spine plain films: A total 5 views lumbar spine including AP  bilateral oblique and lateral views of the entire lumbar spine and coned  down lateral view of lumbosacral junction are submitted for  interpretation. Patient's had previous lumbosacral surgery with  bilateral posterior rods bridging the posterior elements at L5-S1  anchored by bilateral pedicle screws at L5 and S1. There is a disc  implants in the L5-S1 disc space. Patient has mild levoscoliotic  curvature lumbar spine with its apex at the L3-4 lumbar level. Overall,  the lumbar vertebral body heights are well-maintained. The disc heights  from T12 to L5 are well-maintained. On the oblique views the pars  interarticularis are intact. On the AP view, the pedicles, transverse  and spinous processes are intact.     IMPRESSION:  1. No acute fracture is seen lumbar spine.  2. Patient had previous bilateral ramses and pedicle screw fixation L5-S1  disc implant L5-S1 disc space is mild levoscoliotic curvature lumbar  spine apex at L3-4.     This report was finalized on  12/17/2022 6:42 PM by Dr. Robert Valdivia M.D.                I reviewed the patient's new clinical results / labs / tests / procedures      Assessment/Plan     Active Hospital Problems    Diagnosis  POA   • **Intractable back pain [M54.9]  Unknown   • Seizures (HCC) [R56.9]  Yes   • GERD without esophagitis [K21.9]  Yes   • Bilateral leg weakness [R29.898]  Yes   • On anticoagulant therapy [Z79.01]  Not Applicable   • Anxiety disorder [F41.9]  Yes   • Obesity (BMI 30-39.9) [E66.9]  Yes      Resolved Hospital Problems   No resolved problems to display.           · Exacerbation of degenerative disc disease of the lumbar spine with acute exacerbation and right sciatica in a patient with a history of degenerative disc disease of the lumbar spine status post L5-S1 decompression and fusion surgery.  MRI with no acute disease or significant changes.  Positive for radiculopathy and myelopathy.  No fever.  We will continue Neurontin and Percocet and add baclofen/p.o. Decadron/IV Toradol.  Appreciate neurosurgery consultation.  · History of seizures disorder/migraine.  There is no relapse of the seizure.  Neurological examination is stable.  Neurology consult noted and appreciated.  Patient to continue Depakote.  Benadryl and Compazine treatment of the migraine helped.  Currently on magnesium and riboflavin.  · Splenic thrombus/infarction.  Continue Eliquis.  · G ERD. Continue Protonix and Carafate.  · Anxiety.  Continue fluoxetine.  · Hypokalemia.  Normal magnesium.  Will substitute  · Mild anemia.  Hemoglobin is stable.  Will monitor.  · UTI.  Currently not on antibiotics.  We will stop Rocephin awaiting urine culture.    Discussed my findings and plan of treatment with the patient.  DVT prophylaxis.  Patient anticoagulated.      Indra Whitman MD  [facility]  12/18/22  17:36 EST

## 2022-12-18 NOTE — ED NOTES
Nursing report ED to floor  Belen Allen  42 y.o.  female    HPI :   Chief Complaint   Patient presents with    Seizures    Head Injury       Admitting doctor:   Sen Salguero MD    Admitting diagnosis:   The primary encounter diagnosis was Uncontrolled pain. Diagnoses of Seizure (HCC) and Acute midline thoracic back pain were also pertinent to this visit.    Code status:   Current Code Status       Date Active Code Status Order ID Comments User Context       12/17/2022 1920 CPR (Attempt to Resuscitate) [id number]  Beatriz Hernandez APRN ED        Question Answer    Code Status (Patient has no pulse and is not breathing) CPR (Attempt to Resuscitate)    Medical Interventions (Patient has pulse or is breathing) Full Support                    Allergies:   Patient has no known allergies.    Isolation:   No active isolations    Intake and Output  No intake or output data in the 24 hours ending 12/17/22 1923    Weight:       12/17/22  1622   Weight: 98 kg (216 lb)       Most recent vitals:   Vitals:    12/17/22 1606 12/17/22 1622 12/17/22 1918   BP: 142/98     Pulse: 105  103   Resp: 18     Temp: 99.1 °F (37.3 °C)     TempSrc: Oral     SpO2: 98%  98%   Weight:  98 kg (216 lb)    Height:  162.6 cm (64\")        Active LDAs/IV Access:   Lines, Drains & Airways       Active LDAs       Name Placement date Placement time Site Days    Peripheral IV 12/17/22 1608 Left Antecubital 12/17/22  1608  Antecubital  less than 1                    Labs (abnormal labs have a star):   Labs Reviewed   COMPREHENSIVE METABOLIC PANEL - Abnormal; Notable for the following components:       Result Value    Glucose 106 (*)     BUN 4 (*)     Chloride 108 (*)     Calcium 8.4 (*)     Total Protein 5.8 (*)     BUN/Creatinine Ratio 5.8 (*)     All other components within normal limits    Narrative:     GFR Normal >60  Chronic Kidney Disease <60  Kidney Failure <15     VALPROIC ACID LEVEL, TOTAL - Abnormal; Notable for the following  components:    Valproic Acid <2.8 (*)     All other components within normal limits    Narrative:     Therapeutic Ranges for Valproic Acid    Epilepsy:       mcg/ml  Bipolar/Lucretia  up to 125 mcg/ml     CBC WITH AUTO DIFFERENTIAL - Normal   URINALYSIS W/ CULTURE IF INDICATED   CBC AND DIFFERENTIAL    Narrative:     The following orders were created for panel order CBC & Differential.  Procedure                               Abnormality         Status                     ---------                               -----------         ------                     CBC Auto Differential[[id number]]        Normal              Final result                 Please view results for these tests on the individual orders.       EKG:   No orders to display       Meds given in ED:   Medications   sodium chloride 0.9 % flush 10 mL (has no administration in time range)   droperidol (INAPSINE) injection 2.5 mg (has no administration in time range)   sodium chloride 0.9 % flush 10 mL (has no administration in time range)   sodium chloride 0.9 % flush 10 mL (has no administration in time range)   sodium chloride 0.9 % infusion 40 mL (has no administration in time range)   acetaminophen (TYLENOL) tablet 650 mg (has no administration in time range)     Or   acetaminophen (TYLENOL) 160 MG/5ML solution 650 mg (has no administration in time range)     Or   acetaminophen (TYLENOL) suppository 650 mg (has no administration in time range)   ondansetron (ZOFRAN) tablet 4 mg (has no administration in time range)     Or   ondansetron (ZOFRAN) injection 4 mg (has no administration in time range)   calcium carbonate (TUMS) chewable tablet 500 mg (200 mg elemental) (has no administration in time range)   HYDROcodone-acetaminophen (NORCO) 5-325 MG per tablet 1 tablet (has no administration in time range)   morphine injection 2 mg (has no administration in time range)   lidocaine (LIDODERM) 5 % 1 patch (has no administration in time range)    HYDROmorphone (DILAUDID) injection 1 mg (1 mg Intravenous Given 12/17/22 1633)   LORazepam (ATIVAN) injection 1 mg (1 mg Intravenous Given 12/17/22 1632)   ondansetron (ZOFRAN) injection 4 mg (4 mg Intravenous Given 12/17/22 1633)   HYDROmorphone (DILAUDID) injection 1 mg (1 mg Intravenous Given 12/17/22 1659)       Imaging results:  XR Spine Thoracic 3 View    Result Date: 12/17/2022  1. No convincing acute fracture is seen in the thoracic spine. There is anterior marginal endplate spurring at all levels from T3 to T12.  Lumbar spine plain films: A total 5 views lumbar spine including AP bilateral oblique and lateral views of the entire lumbar spine and coned down lateral view of lumbosacral junction are submitted for interpretation. Patient's had previous lumbosacral surgery with bilateral posterior rods bridging the posterior elements at L5-S1 anchored by bilateral pedicle screws at L5 and S1. There is a disc implants in the L5-S1 disc space. Patient has mild levoscoliotic curvature lumbar spine with its apex at the L3-4 lumbar level. Overall, the lumbar vertebral body heights are well-maintained. The disc heights from T12 to L5 are well-maintained. On the oblique views the pars interarticularis are intact. On the AP view, the pedicles, transverse and spinous processes are intact.  IMPRESSION: 1. No acute fracture is seen lumbar spine. 2. Patient had previous bilateral ramses and pedicle screw fixation L5-S1 disc implant L5-S1 disc space is mild levoscoliotic curvature lumbar spine apex at L3-4.  This report was finalized on 12/17/2022 6:42 PM by Dr. Robert Valdivia M.D.      CT Head Without Contrast    Result Date: 12/17/2022  Normal head CT with no acute skull fracture or intracranial hemorrhage identified.  CERVICAL SPINE CT TECHNIQUE: Spiral CT images were obtained from the skull base down to the T2-3 thoracic level and the images were reformatted and submitted in 2 mm thick axial and sagittal CT sections with  soft tissue algorithm and 1 mm thick axial, sagittal and coronal CT sections with high-resolution bone algorithm.  FINDINGS: The images are extremely grainy from C4 down to T2 which limits evaluation of bony detail and limits evaluation of the posterior disc margins.  The atlantooccipital articulation is normal in appearance.  At C1-C2 there are mild arthritic changes at the atlantodental interval with mild marginal spurring off the anterior ring of C1 and the odontoid. Otherwise the C1-2 level is normal in appearance.  At C2-3 the posterior disc margin, facets and uncovertebral joints are within normal limits with no canal or foraminal narrowing.  At C3-4 the disc space, facets and uncovertebral joints are within normal limits with no canal or foraminal narrowing.  At C4-5 the posterior disc margin, facets and uncovertebral joints are within normal limits with no canal or foraminal narrowing.  At C5-6 the posterior endplates, facets and uncovertebral joints are within normal limits with no canal or foraminal narrowing.  At C6-7 the posterior endplates, facets and uncovertebral joints are within normal limits with no canal or foraminal narrowing.  At C7-T1 there are prominent left anterior marginal bridging osteophytes. The posterior endplates and facets are normal with no canal or foraminal narrowing.  No acute fracture is seen in the cervical spine.  IMPRESSION: No acute fracture is seen in the cervical spine. The images are extremely grainy from C4 to T2 which limits evaluation of bony detail and limits evaluation of the posterior disc margins. There is prominent left anterior marginal osteophytic spurring at C7-T1. The remainder of the cervical spine CT is unremarkable.  Radiation dose reduction techniques were utilized, including automated exposure control and exposure modulation based on body size.       CT Cervical Spine Without Contrast    Result Date: 12/17/2022  Normal head CT with no acute skull fracture  or intracranial hemorrhage identified.  CERVICAL SPINE CT TECHNIQUE: Spiral CT images were obtained from the skull base down to the T2-3 thoracic level and the images were reformatted and submitted in 2 mm thick axial and sagittal CT sections with soft tissue algorithm and 1 mm thick axial, sagittal and coronal CT sections with high-resolution bone algorithm.  FINDINGS: The images are extremely grainy from C4 down to T2 which limits evaluation of bony detail and limits evaluation of the posterior disc margins.  The atlantooccipital articulation is normal in appearance.  At C1-C2 there are mild arthritic changes at the atlantodental interval with mild marginal spurring off the anterior ring of C1 and the odontoid. Otherwise the C1-2 level is normal in appearance.  At C2-3 the posterior disc margin, facets and uncovertebral joints are within normal limits with no canal or foraminal narrowing.  At C3-4 the disc space, facets and uncovertebral joints are within normal limits with no canal or foraminal narrowing.  At C4-5 the posterior disc margin, facets and uncovertebral joints are within normal limits with no canal or foraminal narrowing.  At C5-6 the posterior endplates, facets and uncovertebral joints are within normal limits with no canal or foraminal narrowing.  At C6-7 the posterior endplates, facets and uncovertebral joints are within normal limits with no canal or foraminal narrowing.  At C7-T1 there are prominent left anterior marginal bridging osteophytes. The posterior endplates and facets are normal with no canal or foraminal narrowing.  No acute fracture is seen in the cervical spine.  IMPRESSION: No acute fracture is seen in the cervical spine. The images are extremely grainy from C4 to T2 which limits evaluation of bony detail and limits evaluation of the posterior disc margins. There is prominent left anterior marginal osteophytic spurring at C7-T1. The remainder of the cervical spine CT is  unremarkable.  Radiation dose reduction techniques were utilized, including automated exposure control and exposure modulation based on body size.       XR Spine Lumbar Complete 4+VW    Result Date: 12/17/2022  1. No convincing acute fracture is seen in the thoracic spine. There is anterior marginal endplate spurring at all levels from T3 to T12.  Lumbar spine plain films: A total 5 views lumbar spine including AP bilateral oblique and lateral views of the entire lumbar spine and coned down lateral view of lumbosacral junction are submitted for interpretation. Patient's had previous lumbosacral surgery with bilateral posterior rods bridging the posterior elements at L5-S1 anchored by bilateral pedicle screws at L5 and S1. There is a disc implants in the L5-S1 disc space. Patient has mild levoscoliotic curvature lumbar spine with its apex at the L3-4 lumbar level. Overall, the lumbar vertebral body heights are well-maintained. The disc heights from T12 to L5 are well-maintained. On the oblique views the pars interarticularis are intact. On the AP view, the pedicles, transverse and spinous processes are intact.  IMPRESSION: 1. No acute fracture is seen lumbar spine. 2. Patient had previous bilateral ramses and pedicle screw fixation L5-S1 disc implant L5-S1 disc space is mild levoscoliotic curvature lumbar spine apex at L3-4.  This report was finalized on 12/17/2022 6:42 PM by Dr. Robert Valdivia M.D.       Ambulatory status:   - pt normally able to ambulate without difficulty. Secondary to pt's pain, pt has refused to get out of bed.     Pt difficult patient to care for in the limited time this RN has been her primary nurse. Pt reports hx of seizures but presents with no anti-convulsants in her medicine list. Pt continually yelling out and moaning despite medication interventions provided by day shift. This RN called to bedside by significant other reporting a seizure. Pt with nondescript movements and immediately ceased  when this RN approached pt's room. Pt with no signs of post-ictal behavior and pt began asking for pain medication after that. MD Thurston notified.     Day shift passed on that the pt was an unwitnessed fall at home following a seizure. Pt with unremarkable imaging in the ED with no obvious etiology for her pain. Pt's family member to nurses station several times reporting concern over pt's oxygen levels. Family member required education about how pulse oximetry works and how how to determine an effective reading.   Social issues:   Social History     Socioeconomic History    Marital status: Single    Number of children: 2   Tobacco Use    Smoking status: Some Days     Packs/day: 0.50     Types: Cigarettes     Last attempt to quit: 2020     Years since quittin.0    Smokeless tobacco: Never   Vaping Use    Vaping Use: Never used   Substance and Sexual Activity    Alcohol use: Not Currently    Drug use: Not Currently    Sexual activity: Defer       NIH Stroke Scale:         Kenrick Vegas RN  22 19:23 EST

## 2022-12-18 NOTE — PLAN OF CARE
Goal Outcome Evaluation:        Problem: Adult Inpatient Plan of Care  Goal: Patient-Specific Goal (Individualized)  Outcome: Ongoing, Progressing     Problem: Adult Inpatient Plan of Care  Goal: Absence of Hospital-Acquired Illness or Injury  Outcome: Ongoing, Progressing  Intervention: Identify and Manage Fall Risk  Recent Flowsheet Documentation  Taken 12/18/2022 1623 by Nisha Terry RN  Safety Promotion/Fall Prevention:   activity supervised   room organization consistent   safety round/check completed  Taken 12/18/2022 1412 by Nisha Terry RN  Safety Promotion/Fall Prevention:   activity supervised   room organization consistent   safety round/check completed  Taken 12/18/2022 1200 by Nisha Terry RN  Safety Promotion/Fall Prevention: patient off unit  Taken 12/18/2022 1023 by Nisha Terry RN  Safety Promotion/Fall Prevention:   activity supervised   room organization consistent   safety round/check completed  Taken 12/18/2022 0812 by Nisha Terry RN  Safety Promotion/Fall Prevention:   activity supervised   room organization consistent   safety round/check completed  Intervention: Prevent Skin Injury  Recent Flowsheet Documentation  Taken 12/18/2022 1623 by Nisha Terry RN  Body Position: weight shifting  Taken 12/18/2022 1412 by Nisha Terry RN  Body Position: weight shifting  Skin Protection: adhesive use limited  Taken 12/18/2022 1023 by Nisha Terry RN  Body Position: weight shifting  Taken 12/18/2022 0812 by Nisha Terry RN  Body Position:   weight shifting   sitting up in bed  Skin Protection: adhesive use limited  Intervention: Prevent and Manage VTE (Venous Thromboembolism) Risk  Recent Flowsheet Documentation  Taken 12/18/2022 1623 by Nisha Terry RN  Activity Management: activity adjusted per tolerance  Taken 12/18/2022 1412 by Nisha Terry RN  Activity Management: activity adjusted per tolerance  VTE Prevention/Management:   bilateral    sequential compression devices off  Taken 12/18/2022 1023 by Nisha Terry RN  Activity Management: activity adjusted per tolerance  Taken 12/18/2022 0812 by Nisha Terry RN  Activity Management: activity adjusted per tolerance  VTE Prevention/Management:   bilateral   sequential compression devices off  Intervention: Prevent Infection  Recent Flowsheet Documentation  Taken 12/18/2022 1623 by Nisha Terry RN  Infection Prevention:   cohorting utilized   hand hygiene promoted  Taken 12/18/2022 1412 by Nisha Terry RN  Infection Prevention:   cohorting utilized   hand hygiene promoted  Taken 12/18/2022 1023 by Nisha Terry RN  Infection Prevention:   cohorting utilized   hand hygiene promoted  Taken 12/18/2022 0812 by Nisha Terry RN  Infection Prevention:   cohorting utilized   hand hygiene promoted      VSS. Weight shifting. Frequent request for dilaudid. MRI of lumbar and thoracic spine completed. Atarax x2. Care plan carefully updated.

## 2022-12-18 NOTE — PLAN OF CARE
Goal Outcome Evaluation:  Plan of Care Reviewed With: patient           Outcome Evaluation: PT EVAL completed. Pt reports RLE pain and tingling throughout. Pt transferred supine to sit with SBA x 1. Pt transferred sit to stand with CGA x 1. Pt ambulated 15 ft with CGA x 1 and RW. Pt transferred sit to supine with SBA x 1.Pt requires skilled therapy due to decreased mobility, decreased functional activity tolerance. Recommend DC home with assist    PPE: Patient was placed in face mask in first look. Patient was wearing facemask when I entered the room and throughout our encounter. I wore full protective equipment throughout this patient encounter including a face mask, and gloves. Hand hygiene was performed before donning protective equipment and after removal when leaving the room.

## 2022-12-18 NOTE — NURSING NOTE
This RN approached the pt to administer morphine, droperidol, and lidocaine patch. Pt reports that lidocaine patches make her break out in a rash. Pt reports that morphine does not really \"work for her pain.\" Pt reports that dilaudid works significantly better for her. Pt reports she is agreeable to morphine at this time but would like to be switched back to dilaudid. This RN will contact [facility] regarding plans for pain control.

## 2022-12-18 NOTE — CONSULTS
Neurology Consult Note    Consult Date: 12/18/2022    Referring MD: Sen Salguero MD    Reason for Consult I have been asked to see the patient in neurological consultation to render advice and opinion regarding seizure    Belen Allen is a 42 y.o. female with a reported history of generalized seizure since her mid 20s.  She reports that currently she has 1-3 seizures per month.  These consist of a vague sensation of dizziness followed by loss of consciousness and generalized shaking.  She occasionally bites her tongue.  She was brought in for a similar spell but did not sustain any injuries.  She was seen by my colleague Dr. Antony about a year ago and he started her on Depakote.  She reports that she is still taking Depakote.  It was prescribed as 3 times daily but she typically only takes it twice daily.  Her level was basically undetectable on admission.  I have restarted it today.  No further seizures since admission.  She has not establish care with a neurologist as of yet.    She also complains of a long history of migraines.  She currently complains of bitemporal headache with photophobia and nausea.    Past Medical History:   Diagnosis Date   • Abnormal Pap smear of cervix    • Ankle fracture 2013   • H/O Elevated liver enzymes    • History of chronic back pain    • History of migraine    • History of urinary tract infection    • Injury of back    • PONV (postoperative nausea and vomiting)    • Seasonal allergies    • Seizure (HCC)     Takes Keppra       ROS:  No fevers, chills  No weakness, numbness, + headache    Exam  /89 (BP Location: Right arm, Patient Position: Lying)   Pulse 82   Temp 97.7 °F (36.5 °C) (Oral)   Resp 18   Ht 162.6 cm (64\")   Wt 97.9 kg (215 lb 13.3 oz)   SpO2 100%   BMI 37.05 kg/m²   Gen: NAD, vitals reviewed  MS: oriented x3, recent/remote memory intact, normal attention/concentration, language intact, no neglect.  CN: visual acuity grossly normal, PERRL,  EOMI, no facial droop, no dysarthria  Motor: 5/5 throughout upper and lower extremities, normal tone    DATA:    Lab Results   Component Value Date    GLUCOSE 82 12/18/2022    CALCIUM 8.5 (L) 12/18/2022     12/18/2022    K 3.2 (L) 12/18/2022    CO2 29.0 12/18/2022     12/18/2022    BUN 6 12/18/2022    CREATININE 0.75 12/18/2022    EGFRIFNONA 94 02/02/2022    BCR 8.0 12/18/2022    ANIONGAP 6.0 12/18/2022     Lab Results   Component Value Date    WBC 8.02 12/18/2022    HGB 11.3 (L) 12/18/2022    HCT 34.4 12/18/2022    MCV 95.6 12/18/2022     12/18/2022       Lab review: CBC, BMP unremarkable, VPA level undetectable on admission.    Diagnoses:  Epilepsy, unclear if focal or generalized, not intractable, not in status  Migraine without aura, without status migrainosus, not intractable  Chronic low back pain    Comment: Depakote restarted as 750 DR tablets twice daily.  She should continue this on discharge.  I will try to arrange follow-up care with one of our epileptologist.  I think she probably needs to be referred to EMU at some point.    I will treat her migraine with Benadryl and Compazine.  We will start magnesium and riboflavin prophylactically for migraine    PLAN:  Would observe until 24 hours seizure-free  I have no opposition to discharge today if Depakote can be restarted    Our office will call her in the next 1 to 2 weeks to arrange follow-up care.       no

## 2022-12-18 NOTE — PLAN OF CARE
Goal Outcome Evaluation:  Plan of Care Reviewed With: patient           Outcome Evaluation: Patient admitted for back pain after having an unwitnessed seizure at home. No seizures witnessed or reported since arrival to unit. Upon arrival, patient asked for iv dilaudid. Rated pain as 10/10 starting in the lower back radiating to the right leg causing increased weakness. Patient able to get up to C with minimal assistance despite pt's pain. Pain management plan was started with patient, educated patient of utilizing oral medication and using iv for breakthrough. Patient verbalized understanding but would refuse oral medication and ask for iv prn. Patient re-educated about pain plan. MRI scheduled for this am. Patient turns self in bed. Patient states they take keppra for as their seizure medications but cannot remember dosage. Patient pharmacy was called but no answer. Will call in am. Seizure precautions in place. Patient is A&Ox4. SR on the monitor. 100% on RA. No c/o nausea. All questions answered with verbalized understanding.

## 2022-12-18 NOTE — CONSULTS
[facility]   Consult Note    Patient Name: Belen Allen  : 1980  MRN: [id number]  Primary Care Physician:  Sahil Cornelius MD  Referring Physician: Sen Salguero MD  Date of admission: 2022    Subjective   Subjective     Reason for Consult/ Chief Complaint: Back and leg pain    HPI:  Belen Allen is a 42 y.o. female with a remote history of L5-S1 decompression and fusion who had a fall and developed severe back pain as well as pain radiating down her right lower extremity.  Patient describes the pain is banding across her low back and extending up into her thoracic region.  The pain also radiates from her buttock down to her foot.  She has noticed some numbness in the same area as well as weakness and dorsiflexion and plantarflexion of her foot.  No incontinence.     Review of Systems   All systems were reviewed and negative except for: Low back pain, leg pain, leg numbness, leg weakness    Personal History     Past Medical History:   Diagnosis Date   • Abnormal Pap smear of cervix    • Ankle fracture    • H/O Elevated liver enzymes    • History of chronic back pain    • History of migraine    • History of urinary tract infection    • Injury of back    • PONV (postoperative nausea and vomiting)    • Seasonal allergies    • Seizure (HCC)     Takes Keppra       Past Surgical History:   Procedure Laterality Date   • BACK SURGERY      fusion L4, L5     • CHOLECYSTECTOMY     • DILATATION AND CURETTAGE     • ENDOSCOPY N/A 2022    Procedure: ESOPHAGOGASTRODUODENOSCOPY with biopsy;  Surgeon: Christiana Mann MD;  Location: [facility] ENDOSCOPY;  Service: Gastroenterology;  Laterality: N/A;  gastritis, duodenitis, irregular z line   • ERCP N/A 6/3/2021    Procedure: ENDOSCOPIC RETROGRADE CHOLANGIOPANCREATOGRAPHY WITH SPHINCTEROTOMY, DILATION WITH BALLOON CLEARANCE  (12MM-15MM);  Surgeon: Bjorn Flannery MD;  Location: [facility] ENDOSCOPY;  Service:  Gastroenterology;  Laterality: N/A;  DILATED COMMON BILE DUCT   • SKIN SURGERY     • TUBAL ABDOMINAL LIGATION  2013   • WISDOM TOOTH EXTRACTION  2018    x2       Family History: family history includes Allergic rhinitis in her mother, paternal grandfather, and sister; Breast cancer in her maternal aunt; Cancer in her maternal grandmother and paternal grandfather; Prostate cancer in her paternal grandfather; Stroke in her mother. Otherwise pertinent FHx was reviewed and not pertinent to current issue.    Social History:  reports that she has been smoking cigarettes. She has been smoking an average of .5 packs per day. She has never used smokeless tobacco. She reports that she does not currently use alcohol. She reports that she does not currently use drugs.    Home Medications:  Calcium Carbonate-Vitamin D, FLUoxetine, LORazepam, SUMAtriptan, amitriptyline, apixaban, dicyclomine, folic acid, gabapentin, hydrOXYzine, loratadine, melatonin, methocarbamol, ondansetron ODT, pantoprazole, polyethylene glycol, sucralfate, traZODone, and vitamin D    Allergies:  Allergies   Allergen Reactions   • Lidocaine Rash     Pt reports lidocaine patches make her breakout in a rash       Objective    Objective     Vitals:   Temp:  [97.7 °F (36.5 °C)-99.1 °F (37.3 °C)] 97.7 °F (36.5 °C)  Heart Rate:  [] 82  Resp:  [18] 18  BP: (108-142)/(65-98) 125/89  Flow (L/min):  [2] 2    Physical Exam  Awake and alert  Extraocular movements intact  Left lower extremity full strength  Right lower extremity full strength except EHL/TA and gastroc 4- out of 5  Decreased sensation over L5 and S1 dermatomes on the right  No lower extremity edema  No respiratory distress  Skin intact    Result Review    Result Review:  I have personally reviewed the results from the time of this admission to 12/18/2022 12:12 EST and agree with these findings:  []  Laboratory list / accordion  []  Microbiology  [x]  Radiology  []  EKG/Telemetry   []   Cardiology/Vascular   []  Pathology  []  Old records  []  Other:  Most notable findings include: MRI thoracic spine: No significant central or foraminal stenosis no cord signal change  X-ray lumbar spine: Degenerative changes and demonstration of L5-S1 hardware  CT cervical spine: No evidence of fracture or other acute process      Assessment & Plan   Assessment / Plan     Brief Patient Summary:  Belen Allen is a 42 y.o. female who presents status post fall with acute low back pain and radiculopathy with weakness in dorsiflexion and plantarflexion on the right concerning for nerve compression.    Active Hospital Problems:  Active Hospital Problems    Diagnosis    • **Intractable back pain    • Seizures (HCC)    • GERD without esophagitis    • Bilateral leg weakness    • On anticoagulant therapy    • Anxiety disorder    • Obesity (BMI 30-39.9)      Plan:   -No emergent neurosurgical intervention indicated at this time  -Lower extremity symptoms concerning for severe radiculopathy.  Recommend MRI lumbar spine to further evaluate  -We will follow-up after MRI is complete with a plan  -Recommend steroids for treatment of symptoms  -Care per primary team      Dennys Oliveros IV, MD

## 2022-12-19 LAB
ALBUMIN SERPL-MCNC: 3.5 G/DL (ref 3.5–5.2)
ALBUMIN/GLOB SERPL: 1.7 G/DL
ALP SERPL-CCNC: 98 U/L (ref 39–117)
ALT SERPL W P-5'-P-CCNC: 22 U/L (ref 1–33)
ANION GAP SERPL CALCULATED.3IONS-SCNC: 5.8 MMOL/L (ref 5–15)
ANION GAP SERPL CALCULATED.3IONS-SCNC: 8 MMOL/L (ref 5–15)
AST SERPL-CCNC: 18 U/L (ref 1–32)
BACTERIA SPEC AEROBE CULT: NORMAL
BASOPHILS # BLD AUTO: 0.02 10*3/MM3 (ref 0–0.2)
BASOPHILS NFR BLD AUTO: 0.3 % (ref 0–1.5)
BILIRUB SERPL-MCNC: 0.2 MG/DL (ref 0–1.2)
BUN SERPL-MCNC: 6 MG/DL (ref 6–20)
BUN SERPL-MCNC: 6 MG/DL (ref 6–20)
BUN/CREAT SERPL: 7.6 (ref 7–25)
BUN/CREAT SERPL: 7.8 (ref 7–25)
CALCIUM SPEC-SCNC: 8.7 MG/DL (ref 8.6–10.5)
CALCIUM SPEC-SCNC: 8.7 MG/DL (ref 8.6–10.5)
CHLORIDE SERPL-SCNC: 107 MMOL/L (ref 98–107)
CHLORIDE SERPL-SCNC: 107 MMOL/L (ref 98–107)
CO2 SERPL-SCNC: 24 MMOL/L (ref 22–29)
CO2 SERPL-SCNC: 27.2 MMOL/L (ref 22–29)
CREAT SERPL-MCNC: 0.77 MG/DL (ref 0.57–1)
CREAT SERPL-MCNC: 0.79 MG/DL (ref 0.57–1)
DEPRECATED RDW RBC AUTO: 43.9 FL (ref 37–54)
EGFRCR SERPLBLD CKD-EPI 2021: 96.5 ML/MIN/1.73
EGFRCR SERPLBLD CKD-EPI 2021: 99.5 ML/MIN/1.73
EOSINOPHIL # BLD AUTO: 0 10*3/MM3 (ref 0–0.4)
EOSINOPHIL NFR BLD AUTO: 0 % (ref 0.3–6.2)
ERYTHROCYTE [DISTWIDTH] IN BLOOD BY AUTOMATED COUNT: 12.8 % (ref 12.3–15.4)
GLOBULIN UR ELPH-MCNC: 2.1 GM/DL
GLUCOSE SERPL-MCNC: 117 MG/DL (ref 65–99)
GLUCOSE SERPL-MCNC: 158 MG/DL (ref 65–99)
HCT VFR BLD AUTO: 36.2 % (ref 34–46.6)
HGB BLD-MCNC: 12.2 G/DL (ref 12–15.9)
IMM GRANULOCYTES # BLD AUTO: 0.02 10*3/MM3 (ref 0–0.05)
IMM GRANULOCYTES NFR BLD AUTO: 0.3 % (ref 0–0.5)
LYMPHOCYTES # BLD AUTO: 0.72 10*3/MM3 (ref 0.7–3.1)
LYMPHOCYTES NFR BLD AUTO: 11.7 % (ref 19.6–45.3)
MCH RBC QN AUTO: 31.2 PG (ref 26.6–33)
MCHC RBC AUTO-ENTMCNC: 33.7 G/DL (ref 31.5–35.7)
MCV RBC AUTO: 92.6 FL (ref 79–97)
MONOCYTES # BLD AUTO: 0.11 10*3/MM3 (ref 0.1–0.9)
MONOCYTES NFR BLD AUTO: 1.8 % (ref 5–12)
NEUTROPHILS NFR BLD AUTO: 5.26 10*3/MM3 (ref 1.7–7)
NEUTROPHILS NFR BLD AUTO: 85.9 % (ref 42.7–76)
NRBC BLD AUTO-RTO: 0 /100 WBC (ref 0–0.2)
PLATELET # BLD AUTO: 252 10*3/MM3 (ref 140–450)
PMV BLD AUTO: 10.2 FL (ref 6–12)
POTASSIUM SERPL-SCNC: 5 MMOL/L (ref 3.5–5.2)
POTASSIUM SERPL-SCNC: 5.5 MMOL/L (ref 3.5–5.2)
PROT SERPL-MCNC: 5.6 G/DL (ref 6–8.5)
RBC # BLD AUTO: 3.91 10*6/MM3 (ref 3.77–5.28)
SODIUM SERPL-SCNC: 139 MMOL/L (ref 136–145)
SODIUM SERPL-SCNC: 140 MMOL/L (ref 136–145)
WBC NRBC COR # BLD: 6.13 10*3/MM3 (ref 3.4–10.8)

## 2022-12-19 PROCEDURE — 63710000001 DEXAMETHASONE PER 0.25 MG: Performed by: INTERNAL MEDICINE

## 2022-12-19 PROCEDURE — 80053 COMPREHEN METABOLIC PANEL: CPT | Performed by: INTERNAL MEDICINE

## 2022-12-19 PROCEDURE — 25010000002 PROCHLORPERAZINE 10 MG/2ML SOLUTION: Performed by: PSYCHIATRY & NEUROLOGY

## 2022-12-19 PROCEDURE — 85025 COMPLETE CBC W/AUTO DIFF WBC: CPT | Performed by: INTERNAL MEDICINE

## 2022-12-19 PROCEDURE — 25010000002 KETOROLAC TROMETHAMINE PER 15 MG: Performed by: INTERNAL MEDICINE

## 2022-12-19 PROCEDURE — 99233 SBSQ HOSP IP/OBS HIGH 50: CPT | Performed by: NURSE PRACTITIONER

## 2022-12-19 PROCEDURE — 99231 SBSQ HOSP IP/OBS SF/LOW 25: CPT | Performed by: NURSE PRACTITIONER

## 2022-12-19 PROCEDURE — 25010000002 HYDROMORPHONE PER 4 MG: Performed by: NURSE PRACTITIONER

## 2022-12-19 PROCEDURE — 63710000001 DIPHENHYDRAMINE PER 50 MG: Performed by: PSYCHIATRY & NEUROLOGY

## 2022-12-19 PROCEDURE — 63710000001 ONDANSETRON PER 8 MG: Performed by: NURSE PRACTITIONER

## 2022-12-19 RX ORDER — OXYCODONE AND ACETAMINOPHEN 7.5; 325 MG/1; MG/1
1 TABLET ORAL EVERY 4 HOURS PRN
Status: DISCONTINUED | OUTPATIENT
Start: 2022-12-19 | End: 2022-12-20 | Stop reason: HOSPADM

## 2022-12-19 RX ORDER — KETOROLAC TROMETHAMINE 30 MG/ML
30 INJECTION, SOLUTION INTRAMUSCULAR; INTRAVENOUS EVERY 6 HOURS
Status: DISCONTINUED | OUTPATIENT
Start: 2022-12-19 | End: 2022-12-20 | Stop reason: HOSPADM

## 2022-12-19 RX ORDER — GABAPENTIN 300 MG/1
600 CAPSULE ORAL EVERY 8 HOURS SCHEDULED
Status: DISCONTINUED | OUTPATIENT
Start: 2022-12-19 | End: 2022-12-20 | Stop reason: HOSPADM

## 2022-12-19 RX ADMIN — ONDANSETRON HYDROCHLORIDE 4 MG: 4 TABLET, FILM COATED ORAL at 21:36

## 2022-12-19 RX ADMIN — SUCRALFATE 1 G: 1 TABLET ORAL at 17:07

## 2022-12-19 RX ADMIN — Medication 10 MG: at 21:00

## 2022-12-19 RX ADMIN — HYDROXYZINE HYDROCHLORIDE 25 MG: 25 TABLET ORAL at 14:07

## 2022-12-19 RX ADMIN — DICYCLOMINE HYDROCHLORIDE 20 MG: 10 CAPSULE ORAL at 17:07

## 2022-12-19 RX ADMIN — HYDROXYZINE HYDROCHLORIDE 25 MG: 25 TABLET ORAL at 09:00

## 2022-12-19 RX ADMIN — HYDROMORPHONE HYDROCHLORIDE 0.5 MG: 1 INJECTION, SOLUTION INTRAMUSCULAR; INTRAVENOUS; SUBCUTANEOUS at 02:13

## 2022-12-19 RX ADMIN — DIPHENHYDRAMINE HYDROCHLORIDE 25 MG: 25 CAPSULE ORAL at 12:14

## 2022-12-19 RX ADMIN — DEXAMETHASONE 4 MG: 4 TABLET ORAL at 06:58

## 2022-12-19 RX ADMIN — OXYCODONE HYDROCHLORIDE AND ACETAMINOPHEN 1 TABLET: 7.5; 325 TABLET ORAL at 17:07

## 2022-12-19 RX ADMIN — Medication 10 ML: at 21:03

## 2022-12-19 RX ADMIN — Medication 400 MG: at 08:59

## 2022-12-19 RX ADMIN — DEXAMETHASONE 4 MG: 4 TABLET ORAL at 13:03

## 2022-12-19 RX ADMIN — GABAPENTIN 400 MG: 400 CAPSULE ORAL at 06:53

## 2022-12-19 RX ADMIN — BACLOFEN 10 MG: 10 TABLET ORAL at 13:03

## 2022-12-19 RX ADMIN — KETOROLAC TROMETHAMINE 30 MG: 30 INJECTION, SOLUTION INTRAMUSCULAR; INTRAVENOUS at 00:06

## 2022-12-19 RX ADMIN — DICYCLOMINE HYDROCHLORIDE 20 MG: 10 CAPSULE ORAL at 21:00

## 2022-12-19 RX ADMIN — SUCRALFATE 1 G: 1 TABLET ORAL at 21:00

## 2022-12-19 RX ADMIN — DICYCLOMINE HYDROCHLORIDE 20 MG: 10 CAPSULE ORAL at 09:01

## 2022-12-19 RX ADMIN — BACLOFEN 10 MG: 10 TABLET ORAL at 06:52

## 2022-12-19 RX ADMIN — SUCRALFATE 1 G: 1 TABLET ORAL at 11:09

## 2022-12-19 RX ADMIN — KETOROLAC TROMETHAMINE 30 MG: 30 INJECTION, SOLUTION INTRAMUSCULAR; INTRAVENOUS at 11:09

## 2022-12-19 RX ADMIN — BACLOFEN 10 MG: 10 TABLET ORAL at 21:00

## 2022-12-19 RX ADMIN — TRAZODONE HYDROCHLORIDE 100 MG: 100 TABLET ORAL at 21:00

## 2022-12-19 RX ADMIN — HYDROCODONE BITARTRATE AND ACETAMINOPHEN 1 TABLET: 5; 325 TABLET ORAL at 14:08

## 2022-12-19 RX ADMIN — DICYCLOMINE HYDROCHLORIDE 20 MG: 10 CAPSULE ORAL at 11:09

## 2022-12-19 RX ADMIN — HYDROMORPHONE HYDROCHLORIDE 0.5 MG: 1 INJECTION, SOLUTION INTRAMUSCULAR; INTRAVENOUS; SUBCUTANEOUS at 13:03

## 2022-12-19 RX ADMIN — KETOROLAC TROMETHAMINE 30 MG: 30 INJECTION, SOLUTION INTRAMUSCULAR; INTRAVENOUS at 06:53

## 2022-12-19 RX ADMIN — SUCRALFATE 1 G: 1 TABLET ORAL at 09:00

## 2022-12-19 RX ADMIN — POTASSIUM CHLORIDE 40 MEQ: 750 TABLET, EXTENDED RELEASE ORAL at 00:02

## 2022-12-19 RX ADMIN — HYDROMORPHONE HYDROCHLORIDE 0.5 MG: 1 INJECTION, SOLUTION INTRAMUSCULAR; INTRAVENOUS; SUBCUTANEOUS at 09:00

## 2022-12-19 RX ADMIN — KETOROLAC TROMETHAMINE 30 MG: 30 INJECTION, SOLUTION INTRAMUSCULAR; INTRAVENOUS at 17:07

## 2022-12-19 RX ADMIN — HYDROXYZINE HYDROCHLORIDE 25 MG: 25 TABLET ORAL at 21:01

## 2022-12-19 RX ADMIN — HYDROCODONE BITARTRATE AND ACETAMINOPHEN 1 TABLET: 5; 325 TABLET ORAL at 09:02

## 2022-12-19 RX ADMIN — APIXABAN 5 MG: 5 TABLET, FILM COATED ORAL at 09:01

## 2022-12-19 RX ADMIN — GABAPENTIN 600 MG: 300 CAPSULE ORAL at 21:01

## 2022-12-19 RX ADMIN — DIVALPROEX SODIUM 750 MG: 500 TABLET, DELAYED RELEASE ORAL at 21:00

## 2022-12-19 RX ADMIN — GABAPENTIN 400 MG: 400 CAPSULE ORAL at 13:03

## 2022-12-19 RX ADMIN — HYDROMORPHONE HYDROCHLORIDE 0.5 MG: 1 INJECTION, SOLUTION INTRAMUSCULAR; INTRAVENOUS; SUBCUTANEOUS at 15:02

## 2022-12-19 RX ADMIN — DIVALPROEX SODIUM 750 MG: 500 TABLET, DELAYED RELEASE ORAL at 08:59

## 2022-12-19 RX ADMIN — CETIRIZINE HYDROCHLORIDE 10 MG: 10 TABLET ORAL at 09:01

## 2022-12-19 RX ADMIN — OXYCODONE HYDROCHLORIDE AND ACETAMINOPHEN 1 TABLET: 7.5; 325 TABLET ORAL at 21:00

## 2022-12-19 RX ADMIN — FLUOXETINE HYDROCHLORIDE 60 MG: 20 CAPSULE ORAL at 21:00

## 2022-12-19 RX ADMIN — Medication 10 ML: at 09:05

## 2022-12-19 RX ADMIN — MAGNESIUM OXIDE 400 MG (241.3 MG MAGNESIUM) TABLET 400 MG: TABLET at 09:01

## 2022-12-19 RX ADMIN — PROCHLORPERAZINE EDISYLATE 10 MG: 5 INJECTION INTRAMUSCULAR; INTRAVENOUS at 12:14

## 2022-12-19 RX ADMIN — DEXAMETHASONE 4 MG: 4 TABLET ORAL at 21:00

## 2022-12-19 RX ADMIN — HYDROMORPHONE HYDROCHLORIDE 0.5 MG: 1 INJECTION, SOLUTION INTRAMUSCULAR; INTRAVENOUS; SUBCUTANEOUS at 11:09

## 2022-12-19 RX ADMIN — PANTOPRAZOLE SODIUM 40 MG: 40 TABLET, DELAYED RELEASE ORAL at 21:00

## 2022-12-19 RX ADMIN — PANTOPRAZOLE SODIUM 40 MG: 40 TABLET, DELAYED RELEASE ORAL at 09:01

## 2022-12-19 RX ADMIN — APIXABAN 5 MG: 5 TABLET, FILM COATED ORAL at 21:00

## 2022-12-19 NOTE — PROGRESS NOTES
Name: Belen Allen ADMIT: 2022   : 1980  PCP: Sahil Cornelius MD    MRN: [id number] LOS: 0 days   AGE/SEX: 42 y.o. female  ROOM: [facility]     Subjective   Subjective   Patient continues to complain of severe low back pain and right lower extremity pain associated with tingling and weakness.  Patient is grading her pain levels 10/10.  Nurse reported that the patient requires IV Dilaudid regularly..  No urinary incontinence.  No dysuria or hematuria.  Normal urine stream.      Review of Systems  Cardiovascular/respiratory.  No chest pain.  No palpitation.  No shortness of breath.  GI.  No abdominal pain or nausea or vomiting.  Symptoms.  Resolved headache.  No focal neurological symptoms.  No seizures.     Objective   Objective   Vital Signs  Temp:  [97.4 °F (36.3 °C)-98.4 °F (36.9 °C)] 97.4 °F (36.3 °C)  Heart Rate:  [71-99] 99  Resp:  [16-18] 18  BP: (119-138)/(67-89) 138/87  SpO2:  [92 %-98 %] 95 %  on   ;   Device (Oxygen Therapy): room air    Intake/Output Summary (Last 24 hours) at 2022 1650  Last data filed at 2022 0719  Gross per 24 hour   Intake 0 ml   Output --   Net 0 ml     Body mass index is 37.05 kg/m².      22  1622 22   Weight: 98 kg (216 lb) 97.9 kg (215 lb 13.3 oz)     Physical Exam    General.  Middle-aged female.  She is alert and oriented x3.  In pain during the examination.  No respiratory distress.    Eyes.  Pupils equal round and reactive.  Intact extraocular musculature.  No pallor or jaundice.  Oral cavity.  Moist mucous membrane.  Neck.  Supple.  No JVD.  No lymphadenopathy or thyromegaly.  Cardiovascular.  Regular rate and rhythm with no gallops or murmurs.  Chest.  Poor bilateral air entry with no added sounds..  Abdomen.  Soft lax.  No tenderness.  No organomegaly.  No guarding or rebound.  Extremities.  No clubbing/cyanosis/edema.  Decreased power and sensation and 5 S1 distribution on the right.  Decreased 5 strength in  phasing..  CNS.  No acute focal neurological deficits except as above    Results Review:      Results from last 7 days   Lab Units 12/19/22  0934 12/19/22  0614 12/18/22 0627 12/17/22 1628 12/13/22 2025   SODIUM mmol/L 139 140 141 143 139   POTASSIUM mmol/L 5.0 5.5* 3.2* 3.5 2.8*   CHLORIDE mmol/L 107 107 106 108* 105   CO2 mmol/L 24.0 27.2 29.0 27.0 24.0   BUN mg/dL 6 6 6 4* 8   CREATININE mg/dL 0.77 0.79 0.75 0.69 0.78   GLUCOSE mg/dL 158* 117* 82 106* 105*   CALCIUM mg/dL 8.7 8.7 8.5* 8.4* 8.9   AST (SGOT) U/L 18  --   --  23 26   ALT (SGPT) U/L 22  --   --  31 83*     Estimated Creatinine Clearance: 109.3 mL/min (by C-G formula based on SCr of 0.77 mg/dL).          Results from last 7 days   Lab Units 12/17/22 1628 12/13/22 1918   CK TOTAL U/L 38  --    TROPONIN T ng/mL  --  <0.010             Results from last 7 days   Lab Units 12/18/22 0627 12/17/22 1628 12/13/22 2025   MAGNESIUM mg/dL 2.3 2.3 2.1           Invalid input(s): LDLCALC  Results from last 7 days   Lab Units 12/19/22  0614 12/18/22  0627 12/17/22  1628 12/15/22  2132 12/13/22  1918   WBC 10*3/mm3 6.13 8.02 7.16  --  14.57*   HEMOGLOBIN g/dL 12.2 11.3* 12.4  --  14.6   HEMATOCRIT % 36.2 34.4 36.4  --  43.7   PLATELETS 10*3/mm3 252 230 284  --  347   MCV fL 92.6 95.6 95.5  --  92.0   MCH pg 31.2 31.4 32.5  --  30.7   MCHC g/dL 33.7 32.8 34.1  --  33.4   RDW % 12.8 13.4 13.6  --  13.2   RDW-SD fl 43.9 47.4 47.9  --  44.4   MPV fL 10.2 10.1 10.2  --  9.9   NEUTROPHIL % % 85.9*  --  61.6  --  62.2   LYMPHOCYTE % % 11.7*  --  29.6  --  30.9   MONOCYTES % % 1.8*  --  7.1  --  5.5   EOSINOPHIL % % 0.0*  --  1.0  --  0.5   BASOPHIL % % 0.3  --  0.4  --  0.4   IMM GRAN % % 0.3  --  0.3  --  0.5   NEUTROS ABS 10*3/mm3 5.26  --  4.41   < > 9.06*   LYMPHS ABS 10*3/mm3 0.72  --  2.12  --  4.50*   MONOS ABS 10*3/mm3 0.11  --  0.51  --  0.80   EOS ABS 10*3/mm3 0.00  --  0.07   < > 0.08   BASOS ABS 10*3/mm3 0.02  --  0.03   < > 0.06   IMMATURE GRANS (ABS)  10*3/mm3 0.02  --  0.02  --  0.07*   NRBC /100 WBC 0.0  --  0.1  --  0.0    < > = values in this interval not displayed.     Results from last 7 days   Lab Units 12/13/22  1918   INR  0.87*   APTT seconds 25.5                 Results from last 7 days   Lab Units 12/15/22  2132   LIPASE Units/Liter 36     Results from last 7 days   Lab Units 12/17/22  2147   URINECX  >100,000 CFU/mL Mixed Kaylin Isolated         Results from last 7 days   Lab Units 12/17/22  2147   NITRITE UA  Negative   WBC UA /HPF 21-30*   BACTERIA UA /HPF None Seen   SQUAM EPITHEL UA /HPF 3-6*   URINECX  >100,000 CFU/mL Mixed Kaylin Isolated           Imaging:  Imaging Results (Last 24 Hours)     Procedure Component Value Units Date/Time    MRI Thoracic Spine Without Contrast [[id number]] Collected: 12/18/22 1128     Updated: 12/19/22 0728    Narrative:      MRI OF THE THORACIC SPINE WITHOUT CONTRAST 12/18/2022     CLINICAL HISTORY: Patient fell following a seizure yesterday, has mid  back pain.     TECHNIQUE: Sagittal T1, proton density and fat-suppressed T2-weighted  images were obtained of the thoracic spine. In addition axial T1 and  T2-weighted images were obtained from C7 to L1.     FINDINGS: The thoracic cord is normal in signal intensity. The conus  terminates at the L1 lumbar level which is normal.     The thoracic posterior disc margins and facets are within normal limits  with no thoracic disc herniation, no thoracic central canal or foraminal  narrowing identified.     There are anterior marginal bridging osteophytes at all levels from T3  down to T12 with some flowing ossification anterior to the vertebrae and  the findings are most consistent with diffuse idiopathic skeletal  hyperostosis. There is some faint T2 high signal in the anterior aspect  of the vertebra from T3 to T12, likely reactive marrow edema from the  diffuse idiopathic skeletal hyperostosis. Overall the thoracic vertebral  body heights are well-maintained.  No  convincing acute compression  fracture is identified.       Impression:      1. There are anterior marginal bridging osteophytes and some flowing  ossification anterior to the vertebra from T3 down to T12 compatible  with diffuse idiopathic skeletal hyperostosis and there are thin rinds  of marrow edema in the very anterior aspect of the vertebrae from the  inferior body of T3 down to the superior body of T12 precisely where  there is flowing ossification and anterior marginal endplate spurring  and this is most probably reactive marrow changes due to the diffuse  idiopathic skeletal hyperostosis.  The remainder of the thoracic spine  MRI is within normal limits.     This report was finalized on 12/19/2022 7:25 AM by Dr. Robert Valdivia M.D.       MRI Lumbar Spine With & Without Contrast [[id number]] Collected: 12/18/22 1449     Updated: 12/19/22 0726    Narrative:      MRI OF THE LUMBAR SPINE WITH AND WITHOUT CONTRAST 12/18/2022     CLINICAL HISTORY: Patient has had previous lumbar spine surgery and  fusion of L5-S1. Patient fell and has low back pain.     TECHNIQUE: Sagittal T1, proton density fast spin echo T2, STIR and  postcontrast sagittal T1 and postcontrast sagittal fat-suppressed  T1-weighted images were obtained of the lumbar spine.  In addition axial  T2 weighted images were obtained from L1 to S2 and thin cut precontrast  and postcontrast axial T1-weighted images were obtained angled through  the interspaces from L2 to S1.     This is correlated to a prior MRI of the lumbar spine from [facility] on 11/27/2022.     FINDINGS: The distal thoracic cord and the conus is normal in signal  intensity.  The conus terminates at the level of the mid body of L2  which is normal.     At T12-L1 the disc space and facets are normal in appearance with no  canal or foraminal narrowing.     At L1-2 the disc space and facets are normal in appearance with no canal  or foraminal narrowing.     At L2-3 the disc  space and facets are normal in appearance with no canal  or foraminal narrowing.     At L3-4 the disc space and facets are normal in appearance with no canal  or foraminal narrowing.     At L4-5 the disc space and facets are normal in appearance with no canal  or foraminal narrowing.     At L5-S1 the patient has had prior surgery with bilateral laminectomies  at L5 with medial facetectomies at L5-S1.  There are bilateral rods  bridging the posterior elements at L5-S1 anchored by bilateral pedicle  screws at L5 and S1.  There are disc implants in the L5-S1 disc space. I  see no solid bony fusion. I see no canal or lateral recess narrowing.   There is minimal spurring into the foramina and there is minimal left  and there is mild right bony foraminal narrowing.     The marrow signal intensity in the lumbar spine is normal with no  discernible posttraumatic osseous abnormality seen in the lumbar spine.       Impression:      1. No significant change when compared to prior lumbar spine MRI from  [facility] on 11/27/2022.  2. Patient has had surgery at L5-S1 with bilateral laminectomies at L5  and medial facetectomies at L5-S1 with bilateral posterior bridging rods  anchored by bilateral pedicle screws at L5 and S1.  There is a disc  implant in the L5-S1 disc space and there is no evidence of solid bony  fusion.  There remains minimal posterior spurring.  There is no residual  canal or lateral recess narrowing.  There is minimal left and mild right  bony foraminal narrowing.  3. The remainder of the lumbar spine MRI is normal.  Specifically no  disc herniation, canal or foraminal narrowing is seen from T12 to L5 and  no bone marrow edema is seen with no acute posttraumatic abnormality  identified in the lumbar spine.     This report was finalized on 12/19/2022 7:23 AM by Dr. Robert Valdivia M.D.                I reviewed the patient's new clinical results / labs / tests / procedures      Assessment/Plan      Active Hospital Problems    Diagnosis  POA   • **Intractable back pain [M54.9]  Yes   • Hypokalemia [E87.6]  No   • Uncontrolled pain [R52]  Yes   • Seizures (HCC) [R56.9]  Yes   • GERD without esophagitis [K21.9]  Yes   • Bilateral leg weakness [R29.898]  Yes   • On anticoagulant therapy [Z79.01]  Not Applicable   • Anxiety disorder [F41.9]  Yes   • Obesity (BMI 30-39.9) [E66.9]  Yes      Resolved Hospital Problems   No resolved problems to display.           · Exacerbation of degenerative disc disease of the lumbar spine with acute exacerbation and right sciatica in a patient with a history of degenerative disc disease of the lumbar spine status post L5-S1 decompression and fusion surgery.  MRI with no acute disease or significant changes.  Positive for radiculopathy and myelopathy.  No fever..  Pain level is still significant.  Will increase Neurontin and Percocet.  We will continue p.o. Decadron and IV Toradol and baclofen.  States that she has failed epidural blocks before.  She refused Lidoderm patch as she states this causes her to have skin rash.  Neurosurgery does not recommend any surgical intervention and recommends pain management and physical therapy.  Will await for final recommendation of physical therapy.  I explained to the patient that we have to wean her off the IV Dilaudid at this time and that there is nothing much else to offer her except outpatient pain clinic/neurosurgery follow-up/physical therapy follow-up.  Patient has a walker at home and will need a bedside commode at discharge.   · History of seizures disorder/migraine.  There is no relapse of the seizure.  Neurological examination is stable.  Improved migraine.  Neurology consult noted and appreciated.  Patient to continue Depakote/Neurontin/magnesium/riboflavin/Benadryl and Compazine as needed Will need neurology follow-up as an outpatient.  · Splenic thrombus/infarction.  Continue Eliquis.  · GERD. Continue Protonix and  Carafate.  · Anxiety.  Continue fluoxetine.  · Hypokalemia.  Normal magnesium.  Will substitute  · Mild anemia.  Hemoglobin is stable.  Will monitor.  · UTI.  Culture is negative.  Mariano Rapp.    Discussed my findings and plan of treatment with the patient.  Disposition.  Hopefully home tomorrow with neurosurgery/neurology/pain clinic/physical therapy follow-up.      Indra Whitman MD  [facility]  12/19/22  16:50 EST

## 2022-12-19 NOTE — PROGRESS NOTES
DOS: 2022  NAME: Belen Allen   : 1980  PCP: Sahil Cornelius MD  Chief Complaint   Patient presents with   • Seizures   • Head Injury     Neurology    Subjective: She reports headache is much better after Compazine/Benadryl cocktail but she feels like it starting to come back.  No further seizure activity since admission.  She states she would like to come off Depakote as she feels it is causing hair loss.  Complaining of ongoing back pain with associated right leg weakness and paresthesias. .Pt seen in follow up today, however the problem is new to the examiner.      Objective:  Vital signs: /67 (BP Location: Right arm, Patient Position: Lying)   Pulse 71   Temp 97.4 °F (36.3 °C) (Oral)   Resp 16   Ht 162.6 cm (64\")   Wt 97.9 kg (215 lb 13.3 oz)   SpO2 92%   BMI 37.05 kg/m²       GEN: Well-developed, well-nourished.  NAD.  Vital signs reviewed.  HEENT: Normocephalic, atraumatic.  Poor dentition  COR: RRR  Resp: Even and unlabored, clear to auscultation bilaterally  Extremities: Equal radial pulses, no edema    Neurological:   MS: AO x3. Language normal. No neglect. Higher integrative function normal  CN: II-XII normal  Motor: 5/5 in bilateral upper and left lower extremities, right lower extremity 3 out of 5, antalgic, normal tone  Reflexes:.  No ankle clonus.  Sensory: Decreased to light touch in right lower extremity.  Coordination: Normal finger-to-nose bilaterally    Scheduled Meds:apixaban, 5 mg, Oral, Q12H  baclofen, 10 mg, Oral, Q8H  cefTRIAXone, 2 g, Intravenous, Q24H  cetirizine, 10 mg, Oral, Daily  dexamethasone, 4 mg, Oral, Q8H  dicyclomine, 20 mg, Oral, 4x Daily  divalproex, 750 mg, Oral, Q12H  FLUoxetine, 60 mg, Oral, Nightly  gabapentin, 400 mg, Oral, Q8H  ketorolac, 30 mg, Intravenous, Q6H  magnesium oxide, 400 mg, Oral, Daily  melatonin, 10 mg, Oral, Nightly  pantoprazole, 40 mg, Oral, BID  polyethylene glycol, 17 g, Oral, Daily  sodium chloride, 10 mL,  Intravenous, Q12H  sucralfate, 1 g, Oral, 4x Daily  traZODone, 100 mg, Oral, Nightly  Vitamin B-2, 400 mg, Oral, Daily      Continuous Infusions:   PRN Meds:.•  acetaminophen **OR** acetaminophen **OR** acetaminophen  •  calcium carbonate  •  diphenhydrAMINE  •  HYDROcodone-acetaminophen  •  HYDROmorphone  •  hydrOXYzine  •  ondansetron **OR** ondansetron  •  potassium chloride **OR** potassium chloride **OR** potassium chloride  •  [COMPLETED] Insert Peripheral IV **AND** sodium chloride  •  sodium chloride  •  sodium chloride    Laboratory results:  Lab Results   Component Value Date    GLUCOSE 158 (H) 12/19/2022    CALCIUM 8.7 12/19/2022     12/19/2022    K 5.0 12/19/2022    CO2 24.0 12/19/2022     12/19/2022    BUN 6 12/19/2022    CREATININE 0.77 12/19/2022    EGFRIFNONA 94 02/02/2022    BCR 7.8 12/19/2022    ANIONGAP 8.0 12/19/2022     Lab Results   Component Value Date    WBC 6.13 12/19/2022    HGB 12.2 12/19/2022    HCT 36.2 12/19/2022    MCV 92.6 12/19/2022     12/19/2022     No results found for: CHOL  Lab Results   Component Value Date    HDL 40 (L) 03/12/2019     Lab Results   Component Value Date    LDL 93 03/12/2019     Lab Results   Component Value Date    TRIG 200 (H) 03/12/2019          Review and interpretation of imaging:  EEG    Result Date: 12/18/2022  Date of study: 12/18/2022 Technician: Alana Indication: Seizure Technical information: This is an inpatient EEG performed using the standard [employer] 10-20 system electrode placement.  Photic stimulation was performed.  Hyperventilation was also performed. Report: Throughout the entire study most predominant background rhythm is approximately 9 Hz.  This rhythm is seen in both posterior head regions symmetrically and does attenuate to eye opening and closure.  Photic stimulation was performed which did not elicit any epileptiform abnormalities.  Good photic driving response was seen.  Hyperventilation was also performed  which did not reproduce any abnormal buildup.  Throughout the entire study there were no electrographic seizures recorded.  Nor were there any independent epileptiform abnormality seen.  Sleep was not recorded during the study. Interpretation: This is a normal EEG.  A normal EEG does not rule out the possibility of a seizure disorder.  Clinical correlation is advised. Total duration of study: 1 hour and 17 minutes.    XR Spine Thoracic 3 View    Result Date: 12/17/2022  Emergency 3 views of thoracic spine and lumbar spine plain film series complete 4+ views 12/17/2022  CLINICAL HISTORY: Back pain after seizure  Thoracic spine: 3 views of thoracic spine including AP and lateral views of the entire thoracic spine and swimmer's lateral view of lower cervical and upper thoracic spine are submitted for interpretation. On the lateral view there is evidence of anterior marginal endplate spurring at all levels from T3 to T12. Overall the thoracic vertebral body heights are well-maintained. On the AP view, the pedicles and spinous processes are intact.      1. No convincing acute fracture is seen in the thoracic spine. There is anterior marginal endplate spurring at all levels from T3 to T12.  Lumbar spine plain films: A total 5 views lumbar spine including AP bilateral oblique and lateral views of the entire lumbar spine and coned down lateral view of lumbosacral junction are submitted for interpretation. Patient's had previous lumbosacral surgery with bilateral posterior rods bridging the posterior elements at L5-S1 anchored by bilateral pedicle screws at L5 and S1. There is a disc implants in the L5-S1 disc space. Patient has mild levoscoliotic curvature lumbar spine with its apex at the L3-4 lumbar level. Overall, the lumbar vertebral body heights are well-maintained. The disc heights from T12 to L5 are well-maintained. On the oblique views the pars interarticularis are intact. On the AP view, the pedicles, transverse  and spinous processes are intact.  IMPRESSION: 1. No acute fracture is seen lumbar spine. 2. Patient had previous bilateral ramses and pedicle screw fixation L5-S1 disc implant L5-S1 disc space is mild levoscoliotic curvature lumbar spine apex at L3-4.  This report was finalized on 12/17/2022 6:42 PM by Dr. Robert Valdivia M.D.      CT Head Without Contrast    Result Date: 12/18/2022  EMERGENCY NONCONTRAST HEAD CT AND NONCONTRAST CERVICAL SPINE CT 12/17/2022  CLINICAL HISTORY: Patient had a seizure, has headaches. Patient is on Eliquis.  HEAD CT TECHNIQUE: Spiral CT images were obtained from the base of the skull to the vertex without intravenous contrast. The images were reformatted and submitted in 3 mm thick axial, sagittal and coronal CT sections with brain algorithm and 2 mm thick axial CT sections with high-resolution bone algorithm.  This is correlated to a prior MRI of the brain from  on 01/04/2022 and a noncontrast head CT 01/01/2022.  FINDINGS: The brain parenchyma is normal in attenuation. The ventricles are normal in size. I see no focal mass effect. There is no midline shift. No extraaxial fluid collections are identified. There is no evidence of acute intracranial hemorrhage. No acute skull fracture is identified. The calvarium and the skull base are normal in appearance. The paranasal sinuses and the mastoid air cells and the middle ear cavities are clear.      1.  Normal head CT with no acute skull fracture or intracranial hemorrhage identified.  CERVICAL SPINE CT TECHNIQUE: Spiral CT images were obtained from the skull base down to the T2-3 thoracic level and the images were reformatted and submitted in 2 mm thick axial and sagittal CT sections with soft tissue algorithm and 1 mm thick axial, sagittal and coronal CT sections with high-resolution bone algorithm.  FINDINGS: The images are extremely grainy from C4 down to T2 which limits evaluation of bony detail and limits  evaluation of the posterior disc margins.  The atlantooccipital articulation is normal in appearance.  At C1-C2 there are mild arthritic changes at the atlantodental interval with mild marginal spurring off the anterior ring of C1 and the odontoid. Otherwise the C1-2 level is normal in appearance.  At C2-3 the posterior disc margin, facets and uncovertebral joints are within normal limits with no canal or foraminal narrowing.  At C3-4 the disc space, facets and uncovertebral joints are within normal limits with no canal or foraminal narrowing.  At C4-5 the posterior disc margin, facets and uncovertebral joints are within normal limits with no canal or foraminal narrowing.  At C5-6 the posterior endplates, facets and uncovertebral joints are within normal limits with no canal or foraminal narrowing.  At C6-7 the posterior endplates, facets and uncovertebral joints are within normal limits with no canal or foraminal narrowing.  At C7-T1 there are prominent left anterior marginal bridging osteophytes. The posterior endplates and facets are normal with no canal or foraminal narrowing.  No acute fracture is seen in the cervical spine.  IMPRESSION: 1. No acute fracture is seen in the cervical spine. The images are extremely grainy from C4 to T2 which limits evaluation of bony detail and limits evaluation of the posterior disc margins. There is prominent left anterior marginal osteophytic spurring at C7-T1. The remainder of the cervical spine CT is unremarkable.  Radiation dose reduction techniques were utilized, including automated exposure control and exposure modulation based on body size.  This report was finalized on 12/18/2022 10:16 AM by Dr. Robert Valdivia M.D.      CT Head Without Contrast    Result Date: 12/17/2022  INDICATION:  Trauma, witnessed fall in ER.  Nausea and vomiting.  Additional History:  Seizures. TECHNIQUE:  CT of the brain was performed without contrast.   Automated mA/kV exposure control was  utilized and patient examination was performed in strict accordance with principles of ALARA. RADIATION AMOUNT:  1176.71 mGy-cm. COMPARISON:  None Available. FINDINGS: There is no acute intracranial hemorrhage, midline shift, mass effect or extra-axial fluid collection.  Gray-white differentiation is preserved.   Brain volume and ventricular system are within normal limits for age.   The skull base and calvarium are intact.  The visualized paranasal sinuses are unremarkable.  The visualized orbits, globes, and mastoid air cells are unremarkable.   IMPRESSION: No acute intracranial findings.   Signed by Karthikeyan Moreno MD ##### Final ##### Dictated by:    KARTHIKEYAN MORENO, RAD-RAD Dictated DT/TM: 12/17/2022 3:23 am Interpreted and electronically signed by:  KARTHIKEYAN MORENO RAD-RAD Signed DT/TM:  12/17/2022 3:59 am    CT Chest Without Contrast Diagnostic    Result Date: 12/17/2022  Patient: CARSON MULLEN  Time Out: 22:02 Exam(s): CT CHEST Without Contrast EXAM:   CT Chest Without Intravenous Contrast CLINICAL HISTORY:    Reason for exam: eval for rib fracture. TECHNIQUE:   Axial computed tomography images of the chest without intravenous contrast.  CTDI is 14.2 mGy and DLP is 538.6 mGy-cm.  This CT exam was performed according to the principle of ALARA (As Low As Reasonably Achievable) by using one or more of the following dose reduction techniques: automated exposure control, adjustment of the mA and or kV according to patient size, and or use of iterative reconstruction technique. COMPARISON:   No relevant prior studies available. FINDINGS:   Lungs:  Unremarkable.  No mass.  No consolidation.   Pleural space:  Unremarkable.  No pneumothorax.  No significant effusion.   Heart:  Unremarkable.  No cardiomegaly.  No significant pericardial effusion.  No significant coronary artery calcifications.   Bones joints:  Unremarkable.  No acute fracture.  No dislocation.   Soft tissues:  Unremarkable.   Vasculature:   Unremarkable.  No thoracic aortic aneurysm.   Lymph nodes:  Unremarkable.  No enlarged lymph nodes.   Gallbladder and bile ducts:  Cholecystectomy.   Kidneys and ureters:  Atrophy of the left kidney. IMPRESSION:       No acute findings in the chest.     Electronically signed by Mina Veronica MD on 12-17-22 at 2202    CT Cervical Spine Without Contrast    Result Date: 12/18/2022  EMERGENCY NONCONTRAST HEAD CT AND NONCONTRAST CERVICAL SPINE CT 12/17/2022  CLINICAL HISTORY: Patient had a seizure, has headaches. Patient is on Eliquis.  HEAD CT TECHNIQUE: Spiral CT images were obtained from the base of the skull to the vertex without intravenous contrast. The images were reformatted and submitted in 3 mm thick axial, sagittal and coronal CT sections with brain algorithm and 2 mm thick axial CT sections with high-resolution bone algorithm.  This is correlated to a prior MRI of the brain from  on 01/04/2022 and a noncontrast head CT 01/01/2022.  FINDINGS: The brain parenchyma is normal in attenuation. The ventricles are normal in size. I see no focal mass effect. There is no midline shift. No extraaxial fluid collections are identified. There is no evidence of acute intracranial hemorrhage. No acute skull fracture is identified. The calvarium and the skull base are normal in appearance. The paranasal sinuses and the mastoid air cells and the middle ear cavities are clear.      1.  Normal head CT with no acute skull fracture or intracranial hemorrhage identified.  CERVICAL SPINE CT TECHNIQUE: Spiral CT images were obtained from the skull base down to the T2-3 thoracic level and the images were reformatted and submitted in 2 mm thick axial and sagittal CT sections with soft tissue algorithm and 1 mm thick axial, sagittal and coronal CT sections with high-resolution bone algorithm.  FINDINGS: The images are extremely grainy from C4 down to T2 which limits evaluation of bony detail and limits evaluation  of the posterior disc margins.  The atlantooccipital articulation is normal in appearance.  At C1-C2 there are mild arthritic changes at the atlantodental interval with mild marginal spurring off the anterior ring of C1 and the odontoid. Otherwise the C1-2 level is normal in appearance.  At C2-3 the posterior disc margin, facets and uncovertebral joints are within normal limits with no canal or foraminal narrowing.  At C3-4 the disc space, facets and uncovertebral joints are within normal limits with no canal or foraminal narrowing.  At C4-5 the posterior disc margin, facets and uncovertebral joints are within normal limits with no canal or foraminal narrowing.  At C5-6 the posterior endplates, facets and uncovertebral joints are within normal limits with no canal or foraminal narrowing.  At C6-7 the posterior endplates, facets and uncovertebral joints are within normal limits with no canal or foraminal narrowing.  At C7-T1 there are prominent left anterior marginal bridging osteophytes. The posterior endplates and facets are normal with no canal or foraminal narrowing.  No acute fracture is seen in the cervical spine.  IMPRESSION: 1. No acute fracture is seen in the cervical spine. The images are extremely grainy from C4 to T2 which limits evaluation of bony detail and limits evaluation of the posterior disc margins. There is prominent left anterior marginal osteophytic spurring at C7-T1. The remainder of the cervical spine CT is unremarkable.  Radiation dose reduction techniques were utilized, including automated exposure control and exposure modulation based on body size.  This report was finalized on 12/18/2022 10:16 AM by Dr. Robert Valdivia M.D.      MRI Thoracic Spine Without Contrast    Result Date: 12/19/2022  MRI OF THE THORACIC SPINE WITHOUT CONTRAST 12/18/2022  CLINICAL HISTORY: Patient fell following a seizure yesterday, has mid back pain.  TECHNIQUE: Sagittal T1, proton density and fat-suppressed  T2-weighted images were obtained of the thoracic spine. In addition axial T1 and T2-weighted images were obtained from C7 to L1.  FINDINGS: The thoracic cord is normal in signal intensity. The conus terminates at the L1 lumbar level which is normal.  The thoracic posterior disc margins and facets are within normal limits with no thoracic disc herniation, no thoracic central canal or foraminal narrowing identified.  There are anterior marginal bridging osteophytes at all levels from T3 down to T12 with some flowing ossification anterior to the vertebrae and the findings are most consistent with diffuse idiopathic skeletal hyperostosis. There is some faint T2 high signal in the anterior aspect of the vertebra from T3 to T12, likely reactive marrow edema from the diffuse idiopathic skeletal hyperostosis. Overall the thoracic vertebral body heights are well-maintained.  No convincing acute compression fracture is identified.      1. There are anterior marginal bridging osteophytes and some flowing ossification anterior to the vertebra from T3 down to T12 compatible with diffuse idiopathic skeletal hyperostosis and there are thin rinds of marrow edema in the very anterior aspect of the vertebrae from the inferior body of T3 down to the superior body of T12 precisely where there is flowing ossification and anterior marginal endplate spurring and this is most probably reactive marrow changes due to the diffuse idiopathic skeletal hyperostosis.  The remainder of the thoracic spine MRI is within normal limits.  This report was finalized on 12/19/2022 7:25 AM by Dr. Robert Valdivia M.D.      CT Abdomen Pelvis With Contrast    Result Date: 12/15/2022  INDICATION:  Severe abdominal pain, nausea and vomiting.  Additional History: Biliary duct sweep.  Pancreatic duct stent.  Pancreatitis.  Cholecystectomy.  Tubal ligation. TECHNIQUE:  CT of the abdomen and pelvis was performed with intravenous contrast. Isovue 370 70 mL was  administered intravenously.   Automated mA/kV exposure control was utilized and patient examination was performed in strict accordance with principles of ALARA. RADIATION AMOUNT:  1109.48 mGy-cm. COMPARISON:  CT AP 12/21/2019. FINDINGS: Abdomen: The lung bases are clear.  Hepatic steatosis is present.  No suspicious hepatic lesion is demonstrated.    The pancreas, spleen, adrenal glands, and kidneys demonstrate no acute pathology. No biliary dilatation is demonstrated. Patient is status post cholecystectomy. There is no free air or lymph node enlargement.  Abdominal aorta is not aneurysmal. Pelvis: There is no bowel wall thickening or obstruction.  There is no free fluid.  Lymph nodes are not enlarged.  LEFT renal cortical scarring is present.  Simple cyst about the medial aspect LEFT kidney is present measuring 2.2 cm in size. There is no evidence of acute appendicitis.  Urinary bladder is poorly distended and not well assessed. Skeleton:   Postoperative changes of the lumbosacral junction noted.  There are no acute fractures.  No suspicious bony lesions. IMPRESSION: 1.Hepatic steatosis is present. No suspicious hepatic lesion is demonstrated. No biliary dilatation is demonstrated. Patient is status post cholecystectomy. 2.LEFT renal cortical scarring. LEFT renal cyst. Signed by Haris Ramirez II, MD ##### Final ##### Dictated by:    HARIS RAMIREZ MD-RAD Dictated DT/TM: 12/15/2022 10:15 pm Interpreted and electronically signed by:  HARIS RAMIREZ MD-RAD Signed DT/TM:  12/15/2022 10:28 pm    XR Spine Lumbar Complete 4+VW    Result Date: 12/17/2022  Emergency 3 views of thoracic spine and lumbar spine plain film series complete 4+ views 12/17/2022  CLINICAL HISTORY: Back pain after seizure  Thoracic spine: 3 views of thoracic spine including AP and lateral views of the entire thoracic spine and swimmer's lateral view of lower cervical and upper thoracic spine are submitted for interpretation. On the lateral  view there is evidence of anterior marginal endplate spurring at all levels from T3 to T12. Overall the thoracic vertebral body heights are well-maintained. On the AP view, the pedicles and spinous processes are intact.      1. No convincing acute fracture is seen in the thoracic spine. There is anterior marginal endplate spurring at all levels from T3 to T12.  Lumbar spine plain films: A total 5 views lumbar spine including AP bilateral oblique and lateral views of the entire lumbar spine and coned down lateral view of lumbosacral junction are submitted for interpretation. Patient's had previous lumbosacral surgery with bilateral posterior rods bridging the posterior elements at L5-S1 anchored by bilateral pedicle screws at L5 and S1. There is a disc implants in the L5-S1 disc space. Patient has mild levoscoliotic curvature lumbar spine with its apex at the L3-4 lumbar level. Overall, the lumbar vertebral body heights are well-maintained. The disc heights from T12 to L5 are well-maintained. On the oblique views the pars interarticularis are intact. On the AP view, the pedicles, transverse and spinous processes are intact.  IMPRESSION: 1. No acute fracture is seen lumbar spine. 2. Patient had previous bilateral ramses and pedicle screw fixation L5-S1 disc implant L5-S1 disc space is mild levoscoliotic curvature lumbar spine apex at L3-4.  This report was finalized on 2022 6:42 PM by Dr. Robert Valdivia M.D.       pelvis transvaginal non-ob    Result Date: 2022  INDICATION:  Severe RLQ and pelvic pain x 3 weeks. Nausea/vomiting x 3 weeks. Additional History: LMP: Current.  A1. Tubal ligation. Bilateral oophorectomy x 8 years. TECHNIQUE:  Transvaginal ultrasonography of the pelvis was performed with spectral Doppler evaluation of the ovaries. COMPARISON:  CT A/P 12/15/2022, 2019. FINDINGS:     The uterus is anteverted in position and measures 7.3 x 4.1 x 3.8 cm.  The uterus demonstrates a normal,  homogeneous echotexture.  There are no discrete fibroids present.  The endometrium measures 0.8 cm.   The bilateral ovaries are surgically absent. No significant fluid is present within the cul-de-sac. IMPRESSION: Unremarkable ultrasound of the pelvis. Surgically absent bilateral ovaries. Signed by Haris Ramirez II, MD ##### Final ##### Dictated by:    HARIS RAMIREZ MD-RAD Dictated DT/TM: 12/16/2022 1:27 am Interpreted and electronically signed by:  HARIS RAMIREZ MD-RAD Signed DT/TM:  12/16/2022 1:37 am    MRI Lumbar Spine With & Without Contrast    Result Date: 12/19/2022  MRI OF THE LUMBAR SPINE WITH AND WITHOUT CONTRAST 12/18/2022  CLINICAL HISTORY: Patient has had previous lumbar spine surgery and fusion of L5-S1. Patient fell and has low back pain.  TECHNIQUE: Sagittal T1, proton density fast spin echo T2, STIR and postcontrast sagittal T1 and postcontrast sagittal fat-suppressed T1-weighted images were obtained of the lumbar spine.  In addition axial T2 weighted images were obtained from L1 to S2 and thin cut precontrast and postcontrast axial T1-weighted images were obtained angled through the interspaces from L2 to S1.  This is correlated to a prior MRI of the lumbar spine from [facility] on 11/27/2022.  FINDINGS: The distal thoracic cord and the conus is normal in signal intensity.  The conus terminates at the level of the mid body of L2 which is normal.  At T12-L1 the disc space and facets are normal in appearance with no canal or foraminal narrowing.  At L1-2 the disc space and facets are normal in appearance with no canal or foraminal narrowing.  At L2-3 the disc space and facets are normal in appearance with no canal or foraminal narrowing.  At L3-4 the disc space and facets are normal in appearance with no canal or foraminal narrowing.  At L4-5 the disc space and facets are normal in appearance with no canal or foraminal narrowing.  At L5-S1 the patient has had prior surgery  with bilateral laminectomies at L5 with medial facetectomies at L5-S1.  There are bilateral rods bridging the posterior elements at L5-S1 anchored by bilateral pedicle screws at L5 and S1.  There are disc implants in the L5-S1 disc space. I see no solid bony fusion. I see no canal or lateral recess narrowing. There is minimal spurring into the foramina and there is minimal left and there is mild right bony foraminal narrowing.  The marrow signal intensity in the lumbar spine is normal with no discernible posttraumatic osseous abnormality seen in the lumbar spine.      1. No significant change when compared to prior lumbar spine MRI from [facility] on 11/27/2022. 2. Patient has had surgery at L5-S1 with bilateral laminectomies at L5 and medial facetectomies at L5-S1 with bilateral posterior bridging rods anchored by bilateral pedicle screws at L5 and S1.  There is a disc implant in the L5-S1 disc space and there is no evidence of solid bony fusion.  There remains minimal posterior spurring.  There is no residual canal or lateral recess narrowing.  There is minimal left and mild right bony foraminal narrowing. 3. The remainder of the lumbar spine MRI is normal.  Specifically no disc herniation, canal or foraminal narrowing is seen from T12 to L5 and no bone marrow edema is seen with no acute posttraumatic abnormality identified in the lumbar spine.  This report was finalized on 12/19/2022 7:23 AM by Dr. Robert Valdivia M.D.        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  Neurosurgery is following for back pain and right leg weakness.      Neurology was consulted for seizure.  She has had a history of generalized seizure since her mid 20s.  She reports she currently has  1-3 seizures per month which consist of vague sensation of dizziness followed by loss of consciousness and generalized shaking with occasional tongue bite.  About a year prior she was seen for seizure by Dr. Antony and he started her on Depakote 500 mg twice daily which she reports she is still taking however dose was less than 2.8 on admission.  She is unsure what dose of Depacon she was taking at home and it is not listed on her home medication list.  She is not established with a neurologist outpatient.  She also has history of migraines for which she is on Imitrex.  She had a headache yesterday so she was treated with Compazine/Benadryl cocktail.  Dr. Melendez also started her on magnesium oxide and riboflavin.    Work-up:  CT head completed 12/17/2022 at [facility] showed no acute findings per radiology report.  Repeat CT head same day here was also negative.  MRI thoracic spine without contrast: Marrow edema noted in anterior aspect of vertebrae from inferior body of T3 down to superior body of T12 probably reactive marrow changes due to diffuse idiopathic skeletal hyper ptosis.  MRI lumbar spine with and without contrast: Shows evidence of previous surgeries with associated hardware but unchanged from previous MRI from 11/27/2022.  Labs: CK level 38,Magnesium level 2.3, UA showed 3+ blood, 1+ protein, 1+ leukoesterase, too numerous to count RBCs, 21-30 WBCs, no bacteria.  Urine culture showed greater than 100 K mixed connor.  UDS positive for opiates (prescribed) and THC.    Diagnoses:  Seizure disorder  History of migraine  History of splenic infarct, on chronic anticoagulation  Exacerbation of degenerative disc disease of the lumbar spine    Plan:  Depakote restarted at 750 mg DR tablets twice daily.  She is interested in coming off of this because she feels that it is causing hair loss.  She is interested in Keppra.  Topamax also a consideration though she has a history of previous kidney stones.  Discussed  with Dr. Cali.  We recommend continuing Depakote at current dose for now.  Can further discuss switching to a different AED as outpatient once established with epileptologist, will try to arrange outpatient follow-up.  Magnesium oxide and riboflavin started this admission for migraine prophylaxis, prescribed Imitrex as outpatient.  She also has additional doses of Compazine/Benadryl cocktails available.    No further inpatient work-up.  Discussed with Dr. Byers.  We will sign off but please call if further questions or concerns.

## 2022-12-19 NOTE — DISCHARGE PLACEMENT REQUEST
Carson Mullen (42 y.o. Female)     Date of Birth   1980    Social Security Number       Address   [address] [place] [address] [place] [address]    Home Phone   [phone]    MRN   [id number]       Catholic   None    Marital Status   Single                            Admission Date   12/17/22    Admission Type   Emergency    Admitting Provider   Sen Salguero MD    Attending Provider   Indra Whitman MD    Department, Room/Bed   [address], S512/1       Discharge Date       Discharge Disposition       Discharge Destination                               Attending Provider: Indra Whitman MD    Allergies: Lidocaine    Isolation: None   Infection: None   Code Status: CPR    Ht: 162.6 cm (64\")   Wt: 97.9 kg (215 lb 13.3 oz)    Admission Cmt: None   Principal Problem: Intractable back pain [M54.9]                 Active Insurance as of 12/17/2022     Primary Coverage     Payor Plan Insurance Group Employer/Plan Group    [facility] BY ALYSHA [facility] BY ALYSHA [id number]     Payor Plan Address Payor Plan Phone Number Payor Plan Fax Number Effective Dates    [address] [address]   1/1/2021 - None Entered    [facility] [address]       Subscriber Name Subscriber Birth Date Member ID       CARSON MULLEN 1980 [id number]                 Emergency Contacts      (Rel.) Home Phone Work Phone Mobile Phone    JUNIOR AGUAYO (Significant Other) [phone] -- [phone]

## 2022-12-19 NOTE — PROGRESS NOTES
Nutrition Services    Patient Name:  Belen Allen  YOB: 1980  MRN: [id number]  Admit Date:  12/17/2022    Assessment Date:  12/19/22    NUTRITION SCREENING      Reason for Encounter MST 4   Diagnosis/Problem Intractable back pain       PO Diet Diet: Regular/House Diet; Texture: Regular Texture (IDDSI 7); Fluid Consistency: Thin (IDDSI 0)   PO Supplements/Snacks n/a   PO Intake % 100% x 1 meal       Labs  Listed below, reviewed   Physical Findings Obese; room air   GI Function + BM 12/17   Skin Status B=18, bruised       Height:  Weight:  BMI:    Weight Trend Height: 162.6 cm (64\")  Weight: 97.9 kg (215 lb 13.3 oz) (12/17/22 2046)  Body mass index is 37.05 kg/m².    Stable       Intervention/Plan Nutrition screen.  Weight appears fairly stable, obese.  Eating 100%.  Will follow as needed.         Results from last 7 days   Lab Units 12/19/22  0934 12/19/22  0614 12/18/22 0627 12/17/22 1628 12/13/22 2025   SODIUM mmol/L 139 140 141 143 139   POTASSIUM mmol/L 5.0 5.5* 3.2* 3.5 2.8*   CHLORIDE mmol/L 107 107 106 108* 105   CO2 mmol/L 24.0 27.2 29.0 27.0 24.0   BUN mg/dL 6 6 6 4* 8   CREATININE mg/dL 0.77 0.79 0.75 0.69 0.78   CALCIUM mg/dL 8.7 8.7 8.5* 8.4* 8.9   BILIRUBIN mg/dL 0.2  --   --  0.2 0.2   ALK PHOS U/L 98  --   --  111 126*   ALT (SGPT) U/L 22  --   --  31 83*   AST (SGOT) U/L 18  --   --  23 26   GLUCOSE mg/dL 158* 117* 82 106* 105*     Results from last 7 days   Lab Units 12/19/22  0614 12/18/22  0627 12/17/22 1628 12/13/22 2025   MAGNESIUM mg/dL  --  2.3 2.3 2.1   HEMOGLOBIN g/dL 12.2 11.3* 12.4  --    HEMATOCRIT % 36.2 34.4 36.4  --      COVID19   Date Value Ref Range Status   11/23/2022 Not Detected Not Detected - Ref. Range Final     No results found for: HGBA1C    RD to follow up per protocol.    Electronically signed by:  May Canela RD  12/19/22 10:49 EST

## 2022-12-19 NOTE — PAYOR COMM NOTE
Carson Mullen (42 y.o. Female)     PLEASE SEE ATTACHED FOR INPT AUTH     PT ID [id number]    PLEASE CALL RAMÍREZ MAR RN/ DEPT @ [phone]  OR FAX  DEPARTMENT @  [phone]    THANK YOU   RAMÍREZ MAR RN  [facility]         Date of Birth   1980    Social Security Number       Address   [address] [place] [address] [place] [address]    Home Phone   [phone]    MRN   [id number]       Latter-day   None    Marital Status   Single                            Admission Date   12/17/22    Admission Type   Emergency    Admitting Provider   Sen Salugero MD    Attending Provider   Indra Whitman MD    Department, Room/Bed   [address], S512/1       Discharge Date       Discharge Disposition       Discharge Destination                               Attending Provider: Indra Whitman MD    Allergies: Lidocaine    Isolation: None   Infection: None   Code Status: CPR    Ht: 162.6 cm (64\")   Wt: 97.9 kg (215 lb 13.3 oz)    Admission Cmt: None   Principal Problem: Intractable back pain [M54.9]                 Active Insurance as of 12/17/2022     Primary Coverage     Payor Plan Insurance Group Employer/Plan Group    [facility] BY ALYSHA [id number]     Payor Plan Address Payor Plan Phone Number Payor Plan Fax Number Effective Dates    [address] [address]   1/1/2021 - None Entered    [facility] [id number]       Subscriber Name Subscriber Birth Date Member ID       CARSON MULLEN 1980 [id number]                 Emergency Contacts      (Rel.) Home Phone Work Phone Mobile Phone    JUNIOR AGUAYO (Significant Other) [phone] -- [phone]            [place]: NPI [id number]  Tax ID [id number]     History & Physical      Beatriz Hernandez APRN at 12/17/22 0531     Attestation signed by Sen Salguero MD at 12/18/22 2647    Addendum: I have reviewed the history and plan as obtained by NELSON Higgins. I  have personally examined the patient. I have reviewed available clinical results, imaging results, EKG/Telemetry results, and prior records. I personally performed >50% of the management of this split shared patient. I agree with the plan as listed above, with the addition of the following:    Back pain is main symptom currently. Non radiating and midline. No associated SOA or cough. No fever. She had possible unwitnessed seizure at home. Some question about AED regimen/compliance. Not confused now. No HA or vision change. No focal weakness or numbness reported. No CP SOA. Some nasuea. No abdominal pain or diarrhea. Tenderness present on exam midline thoracic spine. Had some potential seeking behavior with nursing in ER. Was asking specifically for dilaudid.    Exam:  Gen aa nad  Heent ncat perrl  Cv rrr  Lung ctab  Abd ndnt  Ext no cyanosis, tenderness midline thoracic spine  Neuro cn2-12 grossly intact  Psych normal mood and affect    Plan:  Thoracic Back Pain: MRI T-spine. Consult LYNSEY.    Seizure Disorder: Not certain whether she had poor compliance with the AED regimen or had some alteration since discharge few weeks ago. Consult Neurology.    Continue AC.    GERD: PPI    Anxiety: Continue home regimen.    Patient seen before midnight on 12/17. Note delayed by other patient care.    Sen Salguero MD  [facility]  12/18/22  01:08 EST    Dictated portions using Dragon dictation software.  During the entire encounter, I was wearing recommended PPE including face mask and eye protection. Hand sanitization was performed prior to entering room and upon exit.                           Patient Name:  Belen Allen  YOB: 1980  MRN:  [id number]  Admit Date:  12/17/2022  Patient Care Team:  Sahil Cornelius MD as PCP - General (Internal Medicine)  Code, Bjorn HERRERA II, MD as Consulting Physician (Hematology and Oncology)  Dennys Carranza MD as Referring Physician  (Hospitalist)      Subjective    History Present Illness     Chief Complaint   Patient presents with   • Seizures   • Head Injury       Ms. Allen is a 42 y.o. smoker with a history of splenic infarction, on anticoagulation, chronic back pain, seizure disorder, bilateral leg weakness, obesity, and GERD that presents to [facility] complaining of a seizure and back pain.  She reports she was going to the bathroom earlier today when she had a seizure.  She states she woke up on her back and she believes she may have hit her head.  She reports chronic pain in her back that has worsened.  She complains of pain that radiates from her neck down to her lumbar spine.  She describes the pain as constant, severe, and aching in nature.  She states movement exacerbates her pain and the pain medication she has received has been alleviating.  She reports chronic weakness in bilateral legs that has worsened. She denies loss of bowel and bladder control.  She states she has been having seizures almost weekly for the past few weeks.  X-ray and CT of the thoracic, lumbar, and cervical spine were negative for an acute fracture.  A CT of the head was also negative for an acute intracranial process.        History of Present Illness  Review of Systems   Constitutional: Negative for chills and fever.   HENT: Negative for congestion and sore throat.    Eyes: Negative for photophobia.   Respiratory: Negative for cough and shortness of breath.    Cardiovascular: Positive for palpitations. Negative for chest pain and leg swelling.   Gastrointestinal: Positive for nausea and vomiting. Negative for abdominal pain.   Endocrine: Negative for polydipsia, polyphagia and polyuria.   Genitourinary: Negative for decreased urine volume, dysuria, frequency, hematuria and urgency.   Musculoskeletal: Positive for arthralgias, back pain and neck pain.   Skin: Negative for rash and wound.   Neurological: Positive for weakness (BLE).  Negative for dizziness, speech difficulty, light-headedness, numbness and headaches.        Personal History     Past Medical History:   Diagnosis Date   • Abnormal Pap smear of cervix    • Ankle fracture    • H/O Elevated liver enzymes    • History of chronic back pain    • History of migraine    • History of urinary tract infection    • Injury of back    • PONV (postoperative nausea and vomiting)    • Seasonal allergies    • Seizure (HCC)     Takes Keppra     Past Surgical History:   Procedure Laterality Date   • BACK SURGERY      fusion L4, L5     • CHOLECYSTECTOMY     • DILATATION AND CURETTAGE     • ENDOSCOPY N/A 2022    Procedure: ESOPHAGOGASTRODUODENOSCOPY with biopsy;  Surgeon: Christiana Mann MD;  Location: [facility] ENDOSCOPY;  Service: Gastroenterology;  Laterality: N/A;  gastritis, duodenitis, irregular z line   • ERCP N/A 6/3/2021    Procedure: ENDOSCOPIC RETROGRADE CHOLANGIOPANCREATOGRAPHY WITH SPHINCTEROTOMY, DILATION WITH BALLOON CLEARANCE  (12MM-15MM);  Surgeon: Bjorn Flannery MD;  Location: [facility] ENDOSCOPY;  Service: Gastroenterology;  Laterality: N/A;  DILATED COMMON BILE DUCT   • SKIN SURGERY     • TUBAL ABDOMINAL LIGATION     • WISDOM TOOTH EXTRACTION  2018    x2     Family History   Problem Relation Age of Onset   • Stroke Mother    • Allergic rhinitis Mother    • Allergic rhinitis Sister    • Breast cancer Maternal Aunt    • Cancer Maternal Grandmother    • Allergic rhinitis Paternal Grandfather    • Cancer Paternal Grandfather    • Prostate cancer Paternal Grandfather      Social History     Tobacco Use   • Smoking status: Some Days     Packs/day: 0.50     Types: Cigarettes     Last attempt to quit: 2020     Years since quittin.0   • Smokeless tobacco: Never   Vaping Use   • Vaping Use: Never used   Substance Use Topics   • Alcohol use: Not Currently   • Drug use: Not Currently     Medications Prior to Admission   Medication Sig Dispense Refill Last  Dose   • amitriptyline (ELAVIL) 25 MG tablet Take 25 mg by mouth Every Night.      • apixaban (ELIQUIS) 5 MG tablet tablet Take 5 mg by mouth 2 (Two) Times a Day. Last filled 4/12/21 - pt states she was instructed to stop taking medication prior to ERCP ~1 month ago and was not instructed to restart medication.   Indication: Splenic infarct      • Calcium Carbonate-Vitamin D 600-200 MG-UNIT tablet Take 1 tablet by mouth Daily.      • dicyclomine (BENTYL) 20 MG tablet Take 1 tablet by mouth Every 6 (Six) Hours. 20 tablet 0    • divalproex (DEPAKOTE) 500 MG DR tablet Take 1 tablet by mouth Every 12 (Twelve) Hours for 30 days. 60 tablet 0    • FLUoxetine (PROzac) 40 MG capsule Take 40 mg by mouth Every Night.      • folic acid (FOLVITE) 1 MG tablet Take 1 tablet by mouth Daily. 30 tablet 0    • gabapentin (NEURONTIN) 800 MG tablet Take 800 mg by mouth 4 (Four) Times a Day.      • hydrOXYzine (ATARAX) 25 MG tablet Take 1-2 tablets by mouth 3 (Three) Times a Day As Needed for Anxiety.      • loratadine (CLARITIN) 10 MG tablet Take 10 mg by mouth 2 (Two) Times a Day.      • LORazepam (ATIVAN) 0.5 MG tablet Take 0.25-0.5 mg by mouth.      • melatonin 5 MG tablet tablet Take 10 mg by mouth Every Night.      • methocarbamol (ROBAXIN) 750 MG tablet Take 1 tablet by mouth 3 (Three) Times a Day As Needed for Muscle Spasms (Abdominal wall pain). 21 tablet 0    • ondansetron ODT (ZOFRAN-ODT) 8 MG disintegrating tablet Place 1 tablet on the tongue Every 8 (Eight) Hours As Needed for Nausea or Vomiting. 15 tablet 0    • pantoprazole (PROTONIX) 40 MG EC tablet Take 1 tablet by mouth 2 (Two) Times a Day. 60 tablet 0    • polyethylene glycol (polyethylene glycol) 17 g packet Take 17 g by mouth Daily.      • sucralfate (CARAFATE) 1 g tablet Take 1 tablet by mouth 4 (Four) Times a Day. 20 tablet 0    • SUMAtriptan (IMITREX) 100 MG tablet Take 50 mg by mouth Every 2 (Two) Hours As Needed for Migraine. Take one tablet at onset of  headache. May repeat dose one time in 2 hours if headache not relieved.      • traZODone (DESYREL) 50 MG tablet Take 100 mg by mouth At Night As Needed for Sleep. Take 1 to 2 tablets by mouth every  Night as needed for sleep      • vitamin D (ERGOCALCIFEROL) 1.25 MG (31532 UT) capsule capsule Take 50,000 Units by mouth 1 (One) Time Per Week.        Allergies:    Allergies   Allergen Reactions   • Lidocaine Rash     Pt reports lidocaine patches make her breakout in a rash       Objective     Objective     Vital Signs  Temp:  [97.7 °F (36.5 °C)-99.1 °F (37.3 °C)] 97.7 °F (36.5 °C)  Heart Rate:  [] 95  Resp:  [18] 18  BP: (128-142)/(89-98) 128/89  SpO2:  [97 %-98 %] 97 %  on  Flow (L/min):  [2] 2;   Device (Oxygen Therapy): room air  Body mass index is 37.05 kg/m².    Physical Exam  Vitals and nursing note reviewed.   Constitutional:       Appearance: She is obese.   HENT:      Head: Normocephalic and atraumatic.      Nose: Nose normal.      Mouth/Throat:      Mouth: Mucous membranes are moist.      Pharynx: Oropharynx is clear.   Eyes:      Extraocular Movements: Extraocular movements intact.      Conjunctiva/sclera: Conjunctivae normal.   Cardiovascular:      Rate and Rhythm: Normal rate and regular rhythm.      Pulses: Normal pulses.      Heart sounds: Normal heart sounds.   Pulmonary:      Effort: Pulmonary effort is normal.      Breath sounds: Normal breath sounds.   Abdominal:      General: Bowel sounds are normal.      Palpations: Abdomen is soft.   Musculoskeletal:         General: Tenderness (She is TTP from her cervical spine down to her lubar spine. Tenderness to palpation is worse in the thoracic spine.) present.      Cervical back: Normal range of motion and neck supple.      Right lower leg: No edema.      Left lower leg: No edema.   Skin:     General: Skin is warm and dry.   Neurological:      General: No focal deficit present.      Mental Status: She is alert and oriented to person, place, and  time.   Psychiatric:         Mood and Affect: Mood normal.         Results Review:  I reviewed the patient's new clinical results.  I reviewed the patient's new imaging results and agree with the interpretation.  I reviewed the patient's other test results and agree with the interpretation  I personally viewed and interpreted the patient's EKG/Telemetry data  Discussed with ED provider.    Lab Results (last 24 hours)     Procedure Component Value Units Date/Time    BASIC METABOLIC PANEL [[id number]]  (Abnormal) Collected: 12/17/22 0149     Updated: 12/17/22 1603    CBC AND DIFFERENTIAL [[id number]]  (Abnormal) Collected: 12/17/22 0149     Updated: 12/17/22 1603    AUTODIFF [[id number]]  (Abnormal) Collected: 12/17/22 0149    Specimen: Blood Updated: 12/17/22 1603     Neutrophil % 47.9 %      Lymphocyte % 44.4 %      Monocyte % 6.0 %      Eosinophil % 1.3 %      Basophil % 0.4 %      Neutrophils Absolute 4.50 x10(3)/ul      Lymphocytes Absolute 4.1 x10(3)/ul      Monocytes Absolute 0.6 x10(3)/ul      Eosinophils Absolute 0.1 x10(3)/ul      Basophils Absolute 0.0 x10(3)/ul     Narrative:       Ordered by Discern Expert.    CBC & Differential [[id number]]  (Normal) Collected: 12/17/22 1628    Specimen: Blood Updated: 12/17/22 1639    Narrative:      The following orders were created for panel order CBC & Differential.  Procedure                               Abnormality         Status                     ---------                               -----------         ------                     CBC Auto Differential[[id number]]        Normal              Final result                 Please view results for these tests on the individual orders.    Comprehensive Metabolic Panel [[id number]]  (Abnormal) Collected: 12/17/22 1628    Specimen: Blood Updated: 12/17/22 1733     Glucose 106 mg/dL      BUN 4 mg/dL      Creatinine 0.69 mg/dL      Sodium 143 mmol/L      Potassium 3.5 mmol/L      Comment: Slight hemolysis detected by  analyzer. Results may be affected.        Chloride 108 mmol/L      CO2 27.0 mmol/L      Calcium 8.4 mg/dL      Total Protein 5.8 g/dL      Albumin 3.70 g/dL      ALT (SGPT) 31 U/L      AST (SGOT) 23 U/L      Alkaline Phosphatase 111 U/L      Total Bilirubin 0.2 mg/dL      Globulin 2.1 gm/dL      A/G Ratio 1.8 g/dL      BUN/Creatinine Ratio 5.8     Anion Gap 8.0 mmol/L      eGFR 112.0 mL/min/1.73      Comment: National Kidney Foundation and American Society of Nephrology (ASN) Task Force recommended calculation based on the Chronic Kidney Disease Epidemiology Collaboration (CKD-EPI) equation refit without adjustment for race.       Narrative:      GFR Normal >60  Chronic Kidney Disease <60  Kidney Failure <15      Valproic Acid Level, Total [[id number]]  (Abnormal) Collected: 12/17/22 1628    Specimen: Blood Updated: 12/17/22 1657     Valproic Acid <2.8 mcg/mL     Narrative:      Therapeutic Ranges for Valproic Acid    Epilepsy:       mcg/ml  Bipolar/Lucretia  up to 125 mcg/ml      CBC Auto Differential [[id number]]  (Normal) Collected: 12/17/22 1628    Specimen: Blood Updated: 12/17/22 1639     WBC 7.16 10*3/mm3      RBC 3.81 10*6/mm3      Hemoglobin 12.4 g/dL      Hematocrit 36.4 %      MCV 95.5 fL      MCH 32.5 pg      MCHC 34.1 g/dL      RDW 13.6 %      RDW-SD 47.9 fl      MPV 10.2 fL      Platelets 284 10*3/mm3      Neutrophil % 61.6 %      Lymphocyte % 29.6 %      Monocyte % 7.1 %      Eosinophil % 1.0 %      Basophil % 0.4 %      Immature Grans % 0.3 %      Neutrophils, Absolute 4.41 10*3/mm3      Lymphocytes, Absolute 2.12 10*3/mm3      Monocytes, Absolute 0.51 10*3/mm3      Eosinophils, Absolute 0.07 10*3/mm3      Basophils, Absolute 0.03 10*3/mm3      Immature Grans, Absolute 0.02 10*3/mm3      nRBC 0.1 /100 WBC     Ethanol [[id number]] Collected: 12/17/22 1628    Specimen: Blood Updated: 12/17/22 2125    CK [[id number]] Collected: 12/17/22 1628    Specimen: Blood Updated: 12/17/22 2125    Magnesium  [[id number]] Collected: 12/17/22 1628    Specimen: Blood Updated: 12/17/22 2125          Imaging Results (Last 24 Hours)     Procedure Component Value Units Date/Time    CT Chest Without Contrast Diagnostic [[id number]] Resulted: 12/17/22 2112     Updated: 12/17/22 2113    CT Head Without Contrast [[id number]] Collected: 12/17/22 1920     Updated: 12/17/22 1921    Narrative:      EMERGENCY NONCONTRAST HEAD CT AND NONCONTRAST CERVICAL SPINE CT  12/17/2022     CLINICAL HISTORY: Patient had a seizure, has headaches. Patient is on  Eliquis.     HEAD CT TECHNIQUE: Spiral CT images were obtained from the base of the  skull to the vertex without intravenous contrast. The images were  reformatted and submitted in 3 mm thick axial, sagittal and coronal CT  sections with brain algorithm and 2 mm thick axial CT sections with  high-resolution bone algorithm.     This is correlated to a prior MRI of the brain from [facility] on 01/04/2022 and a noncontrast head CT 01/01/2022.     FINDINGS: The brain parenchyma is normal in attenuation. The ventricles  are normal in size. I see no focal mass effect. There is no midline  shift. No extraaxial fluid collections are identified. There is no  evidence of acute intracranial hemorrhage. No acute skull fracture is  identified. The calvarium and the skull base are normal in appearance.  The paranasal sinuses and the mastoid air cells and the middle ear  cavities are clear.       Impression:      Normal head CT with no acute skull fracture or intracranial  hemorrhage identified.     CERVICAL SPINE CT TECHNIQUE: Spiral CT images were obtained from the  skull base down to the T2-3 thoracic level and the images were  reformatted and submitted in 2 mm thick axial and sagittal CT sections  with soft tissue algorithm and 1 mm thick axial, sagittal and coronal CT  sections with high-resolution bone algorithm.     FINDINGS: The images are extremely grainy from C4 down to T2  which  limits evaluation of bony detail and limits evaluation of the posterior  disc margins.     The atlantooccipital articulation is normal in appearance.     At C1-C2 there are mild arthritic changes at the atlantodental interval  with mild marginal spurring off the anterior ring of C1 and the  odontoid. Otherwise the C1-2 level is normal in appearance.     At C2-3 the posterior disc margin, facets and uncovertebral joints are  within normal limits with no canal or foraminal narrowing.     At C3-4 the disc space, facets and uncovertebral joints are within  normal limits with no canal or foraminal narrowing.     At C4-5 the posterior disc margin, facets and uncovertebral joints are  within normal limits with no canal or foraminal narrowing.     At C5-6 the posterior endplates, facets and uncovertebral joints are  within normal limits with no canal or foraminal narrowing.     At C6-7 the posterior endplates, facets and uncovertebral joints are  within normal limits with no canal or foraminal narrowing.     At C7-T1 there are prominent left anterior marginal bridging  osteophytes. The posterior endplates and facets are normal with no canal  or foraminal narrowing.     No acute fracture is seen in the cervical spine.     IMPRESSION: No acute fracture is seen in the cervical spine. The images  are extremely grainy from C4 to T2 which limits evaluation of bony  detail and limits evaluation of the posterior disc margins. There is  prominent left anterior marginal osteophytic spurring at C7-T1. The  remainder of the cervical spine CT is unremarkable.     Radiation dose reduction techniques were utilized, including automated  exposure control and exposure modulation based on body size.          CT Cervical Spine Without Contrast [[id number]] Collected: 12/17/22 1920     Updated: 12/17/22 1920    Narrative:      EMERGENCY NONCONTRAST HEAD CT AND NONCONTRAST CERVICAL SPINE CT  12/17/2022     CLINICAL HISTORY: Patient had a  seizure, has headaches. Patient is on  Eliquis.     HEAD CT TECHNIQUE: Spiral CT images were obtained from the base of the  skull to the vertex without intravenous contrast. The images were  reformatted and submitted in 3 mm thick axial, sagittal and coronal CT  sections with brain algorithm and 2 mm thick axial CT sections with  high-resolution bone algorithm.     This is correlated to a prior MRI of the brain from [facility] on 01/04/2022 and a noncontrast head CT 01/01/2022.     FINDINGS: The brain parenchyma is normal in attenuation. The ventricles  are normal in size. I see no focal mass effect. There is no midline  shift. No extraaxial fluid collections are identified. There is no  evidence of acute intracranial hemorrhage. No acute skull fracture is  identified. The calvarium and the skull base are normal in appearance.  The paranasal sinuses and the mastoid air cells and the middle ear  cavities are clear.       Impression:      Normal head CT with no acute skull fracture or intracranial  hemorrhage identified.     CERVICAL SPINE CT TECHNIQUE: Spiral CT images were obtained from the  skull base down to the T2-3 thoracic level and the images were  reformatted and submitted in 2 mm thick axial and sagittal CT sections  with soft tissue algorithm and 1 mm thick axial, sagittal and coronal CT  sections with high-resolution bone algorithm.     FINDINGS: The images are extremely grainy from C4 down to T2 which  limits evaluation of bony detail and limits evaluation of the posterior  disc margins.     The atlantooccipital articulation is normal in appearance.     At C1-C2 there are mild arthritic changes at the atlantodental interval  with mild marginal spurring off the anterior ring of C1 and the  odontoid. Otherwise the C1-2 level is normal in appearance.     At C2-3 the posterior disc margin, facets and uncovertebral joints are  within normal limits with no canal or foraminal narrowing.     At  C3-4 the disc space, facets and uncovertebral joints are within  normal limits with no canal or foraminal narrowing.     At C4-5 the posterior disc margin, facets and uncovertebral joints are  within normal limits with no canal or foraminal narrowing.     At C5-6 the posterior endplates, facets and uncovertebral joints are  within normal limits with no canal or foraminal narrowing.     At C6-7 the posterior endplates, facets and uncovertebral joints are  within normal limits with no canal or foraminal narrowing.     At C7-T1 there are prominent left anterior marginal bridging  osteophytes. The posterior endplates and facets are normal with no canal  or foraminal narrowing.     No acute fracture is seen in the cervical spine.     IMPRESSION: No acute fracture is seen in the cervical spine. The images  are extremely grainy from C4 to T2 which limits evaluation of bony  detail and limits evaluation of the posterior disc margins. There is  prominent left anterior marginal osteophytic spurring at C7-T1. The  remainder of the cervical spine CT is unremarkable.     Radiation dose reduction techniques were utilized, including automated  exposure control and exposure modulation based on body size.          XR Spine Lumbar Complete 4+VW [[id number]] Collected: 12/17/22 1837     Updated: 12/17/22 1845    Narrative:      Emergency 3 views of thoracic spine and lumbar spine plain film series  complete 4+ views 12/17/2022     CLINICAL HISTORY: Back pain after seizure     Thoracic spine: 3 views of thoracic spine including AP and lateral views  of the entire thoracic spine and swimmer's lateral view of lower  cervical and upper thoracic spine are submitted for interpretation. On  the lateral view there is evidence of anterior marginal endplate  spurring at all levels from T3 to T12. Overall the thoracic vertebral  body heights are well-maintained. On the AP view, the pedicles and  spinous processes are intact.       Impression:       1. No convincing acute fracture is seen in the thoracic spine. There is  anterior marginal endplate spurring at all levels from T3 to T12.     Lumbar spine plain films: A total 5 views lumbar spine including AP  bilateral oblique and lateral views of the entire lumbar spine and coned  down lateral view of lumbosacral junction are submitted for  interpretation. Patient's had previous lumbosacral surgery with  bilateral posterior rods bridging the posterior elements at L5-S1  anchored by bilateral pedicle screws at L5 and S1. There is a disc  implants in the L5-S1 disc space. Patient has mild levoscoliotic  curvature lumbar spine with its apex at the L3-4 lumbar level. Overall,  the lumbar vertebral body heights are well-maintained. The disc heights  from T12 to L5 are well-maintained. On the oblique views the pars  interarticularis are intact. On the AP view, the pedicles, transverse  and spinous processes are intact.     IMPRESSION:  1. No acute fracture is seen lumbar spine.  2. Patient had previous bilateral ramses and pedicle screw fixation L5-S1  disc implant L5-S1 disc space is mild levoscoliotic curvature lumbar  spine apex at L3-4.     This report was finalized on 12/17/2022 6:42 PM by Dr. Robert Valdivia M.D.       XR Spine Thoracic 3 View [[id number]] Collected: 12/17/22 1837     Updated: 12/17/22 1845    Narrative:      Emergency 3 views of thoracic spine and lumbar spine plain film series  complete 4+ views 12/17/2022     CLINICAL HISTORY: Back pain after seizure     Thoracic spine: 3 views of thoracic spine including AP and lateral views  of the entire thoracic spine and swimmer's lateral view of lower  cervical and upper thoracic spine are submitted for interpretation. On  the lateral view there is evidence of anterior marginal endplate  spurring at all levels from T3 to T12. Overall the thoracic vertebral  body heights are well-maintained. On the AP view, the pedicles and  spinous processes are intact.        Impression:      1. No convincing acute fracture is seen in the thoracic spine. There is  anterior marginal endplate spurring at all levels from T3 to T12.     Lumbar spine plain films: A total 5 views lumbar spine including AP  bilateral oblique and lateral views of the entire lumbar spine and coned  down lateral view of lumbosacral junction are submitted for  interpretation. Patient's had previous lumbosacral surgery with  bilateral posterior rods bridging the posterior elements at L5-S1  anchored by bilateral pedicle screws at L5 and S1. There is a disc  implants in the L5-S1 disc space. Patient has mild levoscoliotic  curvature lumbar spine with its apex at the L3-4 lumbar level. Overall,  the lumbar vertebral body heights are well-maintained. The disc heights  from T12 to L5 are well-maintained. On the oblique views the pars  interarticularis are intact. On the AP view, the pedicles, transverse  and spinous processes are intact.     IMPRESSION:  1. No acute fracture is seen lumbar spine.  2. Patient had previous bilateral ramses and pedicle screw fixation L5-S1  disc implant L5-S1 disc space is mild levoscoliotic curvature lumbar  spine apex at L3-4.     This report was finalized on 12/17/2022 6:42 PM by Dr. Robert Valdivia M.D.             Results for orders placed during the hospital encounter of 11/08/20    Adult Transesophageal Echo (DENIS) W/ Cont if Necessary Per Protocol    Interpretation Summary  · Left ventricular ejection fraction appears to be 61 - 65%. Left ventricular systolic function is normal.  · Saline test results are negative.      No orders to display       Assessment/Plan     Active Hospital Problems    Diagnosis  POA   • **Intractable back pain [M54.9]  Unknown   • Seizures (HCC) [R56.9]  Yes   • GERD without esophagitis [K21.9]  Yes   • Elevated LFTs [R79.89]  Yes   • Bilateral leg weakness [R29.898]  Yes   • Anxiety disorder [F41.9]  Yes   • Obesity (BMI 30-39.9) [E66.9]  Yes        Intractable Back Pain  -Admit to a telemetry unit for monitoring  -She has history of L5-S1 decompression/fusion in   -Neurosurgery consult  -Neurovascular checks  -PRN analgesia  -PT consult  -Will defer any further imaging to neurology group    Seizure Disorder  -Neurology consult  -Valproic acid level low. She reported to her RN that she has not been taking divalproex since February and has been taking Keppra, but is unsure of the dose. A discharge summary from [facility] showed she was on divalproex 500 mg q 12 H on 22. Reviewed a noted from neurology from 1/3/22 that reports she should avoid Keppra due to anxiety, depression, and mood issues  -Will defer seizure medications and any further imaging to neurology  -Seizure precautions    History of Splenic Thrombus  -Continue Eliquis    GERD  -Continue Protonix and Carafate    Anxiety Disorder  -Continue Fluoxetine     -I discussed the patients findings and my recommendations with patient and family.    VTE Prophylaxis - SCDs.  Code Status - Full code.       NELSON Kelly  [facility]  22  21:43 EST      Electronically signed by Sen Salguero MD at 22 0119            Physician Progress Notes (last 48 hours)      Indra Whitman MD at 22 1649              Name: Belen Allen ADMIT: 2022   : 1980  PCP: Sahil Cornelius MD    MRN: [id number] LOS: 0 days   AGE/SEX: 42 y.o. female  ROOM: 12/1     Subjective   Subjective   Patient continues to complain of severe low back pain and right lower extremity pain associated with tingling and weakness.  Patient is grading her pain levels 10/10.  Nurse reported that the patient requires IV Dilaudid regularly..  No urinary incontinence.  No dysuria or hematuria.  Normal urine stream.      Review of Systems  Cardiovascular/respiratory.  No chest pain.  No palpitation.  No shortness of breath.  GI.  No abdominal  pain or nausea or vomiting.  Symptoms.  Resolved headache.  No focal neurological symptoms.  No seizures.    Objective   Objective   Vital Signs  Temp:  [97.4 °F (36.3 °C)-98.4 °F (36.9 °C)] 97.4 °F (36.3 °C)  Heart Rate:  [71-99] 99  Resp:  [16-18] 18  BP: (119-138)/(67-89) 138/87  SpO2:  [92 %-98 %] 95 %  on   ;   Device (Oxygen Therapy): room air    Intake/Output Summary (Last 24 hours) at 12/19/2022 1650  Last data filed at 12/19/2022 0719  Gross per 24 hour   Intake 0 ml   Output --   Net 0 ml     Body mass index is 37.05 kg/m².      12/17/22 1622 12/17/22 2046   Weight: 98 kg (216 lb) 97.9 kg (215 lb 13.3 oz)     Physical Exam    General.  Middle-aged female.  She is alert and oriented x3.  In pain during the examination.  No respiratory distress.    Eyes.  Pupils equal round and reactive.  Intact extraocular musculature.  No pallor or jaundice.  Oral cavity.  Moist mucous membrane.  Neck.  Supple.  No JVD.  No lymphadenopathy or thyromegaly.  Cardiovascular.  Regular rate and rhythm with no gallops or murmurs.  Chest.  Poor bilateral air entry with no added sounds..  Abdomen.  Soft lax.  No tenderness.  No organomegaly.  No guarding or rebound.  Extremities.  No clubbing/cyanosis/edema.  Decreased power and sensation and 5 S1 distribution on the right.  Decreased 5 strength in phasing..  CNS.  No acute focal neurological deficits except as above    Results Review:      Results from last 7 days   Lab Units 12/19/22  0934 12/19/22  0614 12/18/22  0627 12/17/22  1628 12/13/22 2025   SODIUM mmol/L 139 140 141 143 139   POTASSIUM mmol/L 5.0 5.5* 3.2* 3.5 2.8*   CHLORIDE mmol/L 107 107 106 108* 105   CO2 mmol/L 24.0 27.2 29.0 27.0 24.0   BUN mg/dL 6 6 6 4* 8   CREATININE mg/dL 0.77 0.79 0.75 0.69 0.78   GLUCOSE mg/dL 158* 117* 82 106* 105*   CALCIUM mg/dL 8.7 8.7 8.5* 8.4* 8.9   AST (SGOT) U/L 18  --   --  23 26   ALT (SGPT) U/L 22  --   --  31 83*     Estimated Creatinine Clearance: 109.3 mL/min (by C-G formula  based on SCr of 0.77 mg/dL).          Results from last 7 days   Lab Units 12/17/22  1628 12/13/22 1918   CK TOTAL U/L 38  --    TROPONIN T ng/mL  --  <0.010             Results from last 7 days   Lab Units 12/18/22  0627 12/17/22 1628 12/13/22 2025   MAGNESIUM mg/dL 2.3 2.3 2.1           Invalid input(s): LDLCALC  Results from last 7 days   Lab Units 12/19/22  0614 12/18/22  0627 12/17/22  1628 12/15/22  2132 12/13/22  1918   WBC 10*3/mm3 6.13 8.02 7.16  --  14.57*   HEMOGLOBIN g/dL 12.2 11.3* 12.4  --  14.6   HEMATOCRIT % 36.2 34.4 36.4  --  43.7   PLATELETS 10*3/mm3 252 230 284  --  347   MCV fL 92.6 95.6 95.5  --  92.0   MCH pg 31.2 31.4 32.5  --  30.7   MCHC g/dL 33.7 32.8 34.1  --  33.4   RDW % 12.8 13.4 13.6  --  13.2   RDW-SD fl 43.9 47.4 47.9  --  44.4   MPV fL 10.2 10.1 10.2  --  9.9   NEUTROPHIL % % 85.9*  --  61.6  --  62.2   LYMPHOCYTE % % 11.7*  --  29.6  --  30.9   MONOCYTES % % 1.8*  --  7.1  --  5.5   EOSINOPHIL % % 0.0*  --  1.0  --  0.5   BASOPHIL % % 0.3  --  0.4  --  0.4   IMM GRAN % % 0.3  --  0.3  --  0.5   NEUTROS ABS 10*3/mm3 5.26  --  4.41   < > 9.06*   LYMPHS ABS 10*3/mm3 0.72  --  2.12  --  4.50*   MONOS ABS 10*3/mm3 0.11  --  0.51  --  0.80   EOS ABS 10*3/mm3 0.00  --  0.07   < > 0.08   BASOS ABS 10*3/mm3 0.02  --  0.03   < > 0.06   IMMATURE GRANS (ABS) 10*3/mm3 0.02  --  0.02  --  0.07*   NRBC /100 WBC 0.0  --  0.1  --  0.0    < > = values in this interval not displayed.     Results from last 7 days   Lab Units 12/13/22  1918   INR  0.87*   APTT seconds 25.5                 Results from last 7 days   Lab Units 12/15/22  2132   LIPASE Units/Liter 36     Results from last 7 days   Lab Units 12/17/22  2147   URINECX  >100,000 CFU/mL Mixed Kaylin Isolated         Results from last 7 days   Lab Units 12/17/22  2147   NITRITE UA  Negative   WBC UA /HPF 21-30*   BACTERIA UA /HPF None Seen   SQUAM EPITHEL UA /HPF 3-6*   URINECX  >100,000 CFU/mL Mixed Kaylin Isolated           Imaging:  Imaging  Results (Last 24 Hours)     Procedure Component Value Units Date/Time    MRI Thoracic Spine Without Contrast [[id number]] Collected: 12/18/22 1128     Updated: 12/19/22 0728    Narrative:      MRI OF THE THORACIC SPINE WITHOUT CONTRAST 12/18/2022     CLINICAL HISTORY: Patient fell following a seizure yesterday, has mid  back pain.     TECHNIQUE: Sagittal T1, proton density and fat-suppressed T2-weighted  images were obtained of the thoracic spine. In addition axial T1 and  T2-weighted images were obtained from C7 to L1.     FINDINGS: The thoracic cord is normal in signal intensity. The conus  terminates at the L1 lumbar level which is normal.     The thoracic posterior disc margins and facets are within normal limits  with no thoracic disc herniation, no thoracic central canal or foraminal  narrowing identified.     There are anterior marginal bridging osteophytes at all levels from T3  down to T12 with some flowing ossification anterior to the vertebrae and  the findings are most consistent with diffuse idiopathic skeletal  hyperostosis. There is some faint T2 high signal in the anterior aspect  of the vertebra from T3 to T12, likely reactive marrow edema from the  diffuse idiopathic skeletal hyperostosis. Overall the thoracic vertebral  body heights are well-maintained.  No convincing acute compression  fracture is identified.       Impression:      1. There are anterior marginal bridging osteophytes and some flowing  ossification anterior to the vertebra from T3 down to T12 compatible  with diffuse idiopathic skeletal hyperostosis and there are thin rinds  of marrow edema in the very anterior aspect of the vertebrae from the  inferior body of T3 down to the superior body of T12 precisely where  there is flowing ossification and anterior marginal endplate spurring  and this is most probably reactive marrow changes due to the diffuse  idiopathic skeletal hyperostosis.  The remainder of the thoracic spine  MRI is  within normal limits.     This report was finalized on 12/19/2022 7:25 AM by Dr. Robert Valdivia M.D.       MRI Lumbar Spine With & Without Contrast [[id number]] Collected: 12/18/22 1449     Updated: 12/19/22 0726    Narrative:      MRI OF THE LUMBAR SPINE WITH AND WITHOUT CONTRAST 12/18/2022     CLINICAL HISTORY: Patient has had previous lumbar spine surgery and  fusion of L5-S1. Patient fell and has low back pain.     TECHNIQUE: Sagittal T1, proton density fast spin echo T2, STIR and  postcontrast sagittal T1 and postcontrast sagittal fat-suppressed  T1-weighted images were obtained of the lumbar spine.  In addition axial  T2 weighted images were obtained from L1 to S2 and thin cut precontrast  and postcontrast axial T1-weighted images were obtained angled through  the interspaces from L2 to S1.     This is correlated to a prior MRI of the lumbar spine from [facility] on 11/27/2022.     FINDINGS: The distal thoracic cord and the conus is normal in signal  intensity.  The conus terminates at the level of the mid body of L2  which is normal.     At T12-L1 the disc space and facets are normal in appearance with no  canal or foraminal narrowing.     At L1-2 the disc space and facets are normal in appearance with no canal  or foraminal narrowing.     At L2-3 the disc space and facets are normal in appearance with no canal  or foraminal narrowing.     At L3-4 the disc space and facets are normal in appearance with no canal  or foraminal narrowing.     At L4-5 the disc space and facets are normal in appearance with no canal  or foraminal narrowing.     At L5-S1 the patient has had prior surgery with bilateral laminectomies  at L5 with medial facetectomies at L5-S1.  There are bilateral rods  bridging the posterior elements at L5-S1 anchored by bilateral pedicle  screws at L5 and S1.  There are disc implants in the L5-S1 disc space. I  see no solid bony fusion. I see no canal or lateral recess narrowing.    There is minimal spurring into the foramina and there is minimal left  and there is mild right bony foraminal narrowing.     The marrow signal intensity in the lumbar spine is normal with no  discernible posttraumatic osseous abnormality seen in the lumbar spine.       Impression:      1. No significant change when compared to prior lumbar spine MRI from  [facility] on 11/27/2022.  2. Patient has had surgery at L5-S1 with bilateral laminectomies at L5  and medial facetectomies at L5-S1 with bilateral posterior bridging rods  anchored by bilateral pedicle screws at L5 and S1.  There is a disc  implant in the L5-S1 disc space and there is no evidence of solid bony  fusion.  There remains minimal posterior spurring.  There is no residual  canal or lateral recess narrowing.  There is minimal left and mild right  bony foraminal narrowing.  3. The remainder of the lumbar spine MRI is normal.  Specifically no  disc herniation, canal or foraminal narrowing is seen from T12 to L5 and  no bone marrow edema is seen with no acute posttraumatic abnormality  identified in the lumbar spine.     This report was finalized on 12/19/2022 7:23 AM by Dr. Robert Valdivia M.D.                I reviewed the patient's new clinical results / labs / tests / procedures      Assessment/Plan     Active Hospital Problems    Diagnosis  POA   • **Intractable back pain [M54.9]  Yes   • Hypokalemia [E87.6]  No   • Uncontrolled pain [R52]  Yes   • Seizures (HCC) [R56.9]  Yes   • GERD without esophagitis [K21.9]  Yes   • Bilateral leg weakness [R29.898]  Yes   • On anticoagulant therapy [Z79.01]  Not Applicable   • Anxiety disorder [F41.9]  Yes   • Obesity (BMI 30-39.9) [E66.9]  Yes      Resolved Hospital Problems   No resolved problems to display.           · Exacerbation of degenerative disc disease of the lumbar spine with acute exacerbation and right sciatica in a patient with a history of degenerative disc disease of the lumbar spine  status post L5-S1 decompression and fusion surgery.  MRI with no acute disease or significant changes.  Positive for radiculopathy and myelopathy.  No fever..  Pain level is still significant.  Will increase Neurontin and Percocet.  We will continue p.o. Decadron and IV Toradol and baclofen.  She refused Lidoderm patch as she states this causes her to have skin rash.  Neurosurgery does not recommend any surgical intervention and recommends pain management and physical therapy.  Will await for final recommendation of physical therapy.  I explained to the patient that we do not have to wean her off the IV Dilaudid at this time and that there is nothing much else to offer her except outpatient pain clinic/neurosurgery follow-up/physical therapy follow-up.  Patient has a walker at home and will need a bedside commode at discharge.   · History of seizures disorder/migraine.  There is no relapse of the seizure.  Neurological examination is stable.  Improved migraine.  Neurology consult noted and appreciated.  Patient to continue Depakote/Neurontin/magnesium/riboflavin/Benadryl and Compazine as needed Will need neurology follow-up as an outpatient.  · Splenic thrombus/infarction.  Continue Eliquis.  · GERD. Continue Protonix and Carafate.  · Anxiety.  Continue fluoxetine.  · Hypokalemia.  Normal magnesium.  Will substitute  · Mild anemia.  Hemoglobin is stable.  Will monitor.  · UTI.  Culture is negative.  Mariano SKAGGS'hazel.    Discussed my findings and plan of treatment with the patient.  Disposition.  Hopefully home tomorrow with neurosurgery/neurology/pain clinic/physical therapy follow-up.      nIdra Whitman MD  [facility]  22  16:50 EST          Electronically signed by Indra Whitman MD at 22 [phone]     Nisha Valles APRN at 22 1302          DOS: 2022  NAME: Belen Marinvirginia   : 1980  PCP: Sahil Cornelius MD  Chief Complaint   Patient presents  with   • Seizures   • Head Injury     Neurology    Subjective: She reports headache is much better after Compazine/Benadryl cocktail but she feels like it starting to come back.  No further seizure activity since admission.  She states she would like to come off Depakote as she feels it is causing hair loss.  Complaining of ongoing back pain with associated right leg weakness and paresthesias. .Pt seen in follow up today, however the problem is new to the examiner.      Objective:  Vital signs: /67 (BP Location: Right arm, Patient Position: Lying)   Pulse 71   Temp 97.4 °F (36.3 °C) (Oral)   Resp 16   Ht 162.6 cm (64\")   Wt 97.9 kg (215 lb 13.3 oz)   SpO2 92%   BMI 37.05 kg/m²       GEN: Well-developed, well-nourished.  NAD.  Vital signs reviewed.  HEENT: Normocephalic, atraumatic.  Poor dentition  COR: RRR  Resp: Even and unlabored, clear to auscultation bilaterally  Extremities: Equal radial pulses, no edema    Neurological:   MS: AO x3. Language normal. No neglect. Higher integrative function normal  CN: II-XII normal  Motor: 5/5 in bilateral upper and left lower extremities, right lower extremity 3 out of 5, antalgic, normal tone  Reflexes:.  No ankle clonus.  Sensory: Decreased to light touch in right lower extremity.  Coordination: Normal finger-to-nose bilaterally    Scheduled Meds:apixaban, 5 mg, Oral, Q12H  baclofen, 10 mg, Oral, Q8H  cefTRIAXone, 2 g, Intravenous, Q24H  cetirizine, 10 mg, Oral, Daily  dexamethasone, 4 mg, Oral, Q8H  dicyclomine, 20 mg, Oral, 4x Daily  divalproex, 750 mg, Oral, Q12H  FLUoxetine, 60 mg, Oral, Nightly  gabapentin, 400 mg, Oral, Q8H  ketorolac, 30 mg, Intravenous, Q6H  magnesium oxide, 400 mg, Oral, Daily  melatonin, 10 mg, Oral, Nightly  pantoprazole, 40 mg, Oral, BID  polyethylene glycol, 17 g, Oral, Daily  sodium chloride, 10 mL, Intravenous, Q12H  sucralfate, 1 g, Oral, 4x Daily  traZODone, 100 mg, Oral, Nightly  Vitamin B-2, 400 mg, Oral,  Daily      Continuous Infusions:   PRN Meds:.•  acetaminophen **OR** acetaminophen **OR** acetaminophen  •  calcium carbonate  •  diphenhydrAMINE  •  HYDROcodone-acetaminophen  •  HYDROmorphone  •  hydrOXYzine  •  ondansetron **OR** ondansetron  •  potassium chloride **OR** potassium chloride **OR** potassium chloride  •  [COMPLETED] Insert Peripheral IV **AND** sodium chloride  •  sodium chloride  •  sodium chloride    Laboratory results:  Lab Results   Component Value Date    GLUCOSE 158 (H) 12/19/2022    CALCIUM 8.7 12/19/2022     12/19/2022    K 5.0 12/19/2022    CO2 24.0 12/19/2022     12/19/2022    BUN 6 12/19/2022    CREATININE 0.77 12/19/2022    EGFRIFNONA 94 02/02/2022    BCR 7.8 12/19/2022    ANIONGAP 8.0 12/19/2022     Lab Results   Component Value Date    WBC 6.13 12/19/2022    HGB 12.2 12/19/2022    HCT 36.2 12/19/2022    MCV 92.6 12/19/2022     12/19/2022     No results found for: CHOL  Lab Results   Component Value Date    HDL 40 (L) 03/12/2019     Lab Results   Component Value Date    LDL 93 03/12/2019     Lab Results   Component Value Date    TRIG 200 (H) 03/12/2019          Review and interpretation of imaging:  EEG    Result Date: 12/18/2022  Date of study: 12/18/2022 Technician: Alana Indication: Seizure Technical information: This is an inpatient EEG performed using the standard International 10-20 system electrode placement.  Photic stimulation was performed.  Hyperventilation was also performed. Report: Throughout the entire study most predominant background rhythm is approximately 9 Hz.  This rhythm is seen in both posterior head regions symmetrically and does attenuate to eye opening and closure.  Photic stimulation was performed which did not elicit any epileptiform abnormalities.  Good photic driving response was seen.  Hyperventilation was also performed which did not reproduce any abnormal buildup.  Throughout the entire study there were no electrographic seizures  recorded.  Nor were there any independent epileptiform abnormality seen.  Sleep was not recorded during the study. Interpretation: This is a normal EEG.  A normal EEG does not rule out the possibility of a seizure disorder.  Clinical correlation is advised. Total duration of study: 1 hour and 17 minutes.    XR Spine Thoracic 3 View    Result Date: 12/17/2022  Emergency 3 views of thoracic spine and lumbar spine plain film series complete 4+ views 12/17/2022  CLINICAL HISTORY: Back pain after seizure  Thoracic spine: 3 views of thoracic spine including AP and lateral views of the entire thoracic spine and swimmer's lateral view of lower cervical and upper thoracic spine are submitted for interpretation. On the lateral view there is evidence of anterior marginal endplate spurring at all levels from T3 to T12. Overall the thoracic vertebral body heights are well-maintained. On the AP view, the pedicles and spinous processes are intact.      1. No convincing acute fracture is seen in the thoracic spine. There is anterior marginal endplate spurring at all levels from T3 to T12.  Lumbar spine plain films: A total 5 views lumbar spine including AP bilateral oblique and lateral views of the entire lumbar spine and coned down lateral view of lumbosacral junction are submitted for interpretation. Patient's had previous lumbosacral surgery with bilateral posterior rods bridging the posterior elements at L5-S1 anchored by bilateral pedicle screws at L5 and S1. There is a disc implants in the L5-S1 disc space. Patient has mild levoscoliotic curvature lumbar spine with its apex at the L3-4 lumbar level. Overall, the lumbar vertebral body heights are well-maintained. The disc heights from T12 to L5 are well-maintained. On the oblique views the pars interarticularis are intact. On the AP view, the pedicles, transverse and spinous processes are intact.  IMPRESSION: 1. No acute fracture is seen lumbar spine. 2. Patient had previous  bilateral ramses and pedicle screw fixation L5-S1 disc implant L5-S1 disc space is mild levoscoliotic curvature lumbar spine apex at L3-4.  This report was finalized on 12/17/2022 6:42 PM by Dr. Robert Valdivia M.D.      CT Head Without Contrast    Result Date: 12/18/2022  EMERGENCY NONCONTRAST HEAD CT AND NONCONTRAST CERVICAL SPINE CT 12/17/2022  CLINICAL HISTORY: Patient had a seizure, has headaches. Patient is on Eliquis.  HEAD CT TECHNIQUE: Spiral CT images were obtained from the base of the skull to the vertex without intravenous contrast. The images were reformatted and submitted in 3 mm thick axial, sagittal and coronal CT sections with brain algorithm and 2 mm thick axial CT sections with high-resolution bone algorithm.  This is correlated to a prior MRI of the brain from [facility] on 01/04/2022 and a noncontrast head CT 01/01/2022.  FINDINGS: The brain parenchyma is normal in attenuation. The ventricles are normal in size. I see no focal mass effect. There is no midline shift. No extraaxial fluid collections are identified. There is no evidence of acute intracranial hemorrhage. No acute skull fracture is identified. The calvarium and the skull base are normal in appearance. The paranasal sinuses and the mastoid air cells and the middle ear cavities are clear.      1.  Normal head CT with no acute skull fracture or intracranial hemorrhage identified.  CERVICAL SPINE CT TECHNIQUE: Spiral CT images were obtained from the skull base down to the T2-3 thoracic level and the images were reformatted and submitted in 2 mm thick axial and sagittal CT sections with soft tissue algorithm and 1 mm thick axial, sagittal and coronal CT sections with high-resolution bone algorithm.  FINDINGS: The images are extremely grainy from C4 down to T2 which limits evaluation of bony detail and limits evaluation of the posterior disc margins.  The atlantooccipital articulation is normal in appearance.  At C1-C2 there are  mild arthritic changes at the atlantodental interval with mild marginal spurring off the anterior ring of C1 and the odontoid. Otherwise the C1-2 level is normal in appearance.  At C2-3 the posterior disc margin, facets and uncovertebral joints are within normal limits with no canal or foraminal narrowing.  At C3-4 the disc space, facets and uncovertebral joints are within normal limits with no canal or foraminal narrowing.  At C4-5 the posterior disc margin, facets and uncovertebral joints are within normal limits with no canal or foraminal narrowing.  At C5-6 the posterior endplates, facets and uncovertebral joints are within normal limits with no canal or foraminal narrowing.  At C6-7 the posterior endplates, facets and uncovertebral joints are within normal limits with no canal or foraminal narrowing.  At C7-T1 there are prominent left anterior marginal bridging osteophytes. The posterior endplates and facets are normal with no canal or foraminal narrowing.  No acute fracture is seen in the cervical spine.  IMPRESSION: 1. No acute fracture is seen in the cervical spine. The images are extremely grainy from C4 to T2 which limits evaluation of bony detail and limits evaluation of the posterior disc margins. There is prominent left anterior marginal osteophytic spurring at C7-T1. The remainder of the cervical spine CT is unremarkable.  Radiation dose reduction techniques were utilized, including automated exposure control and exposure modulation based on body size.  This report was finalized on 12/18/2022 10:16 AM by Dr. Robert Valdivia M.D.      CT Head Without Contrast    Result Date: 12/17/2022  INDICATION:  Trauma, witnessed fall in ER.  Nausea and vomiting.  Additional History:  Seizures. TECHNIQUE:  CT of the brain was performed without contrast.   Automated mA/kV exposure control was utilized and patient examination was performed in strict accordance with principles of ALARA. RADIATION AMOUNT:  1176.71 mGy-cm.  COMPARISON:  None Available. FINDINGS: There is no acute intracranial hemorrhage, midline shift, mass effect or extra-axial fluid collection.  Gray-white differentiation is preserved.   Brain volume and ventricular system are within normal limits for age.   The skull base and calvarium are intact.  The visualized paranasal sinuses are unremarkable.  The visualized orbits, globes, and mastoid air cells are unremarkable.   IMPRESSION: No acute intracranial findings.   Signed by Karthikeyan Moreno MD ##### Final ##### Dictated by:    KARTHIKEYAN MORENO RAD-RAD Dictated DT/TM: 12/17/2022 3:23 am Interpreted and electronically signed by:  KARTHIKEYAN MORENO RAD-RAD Signed DT/TM:  12/17/2022 3:59 am    CT Chest Without Contrast Diagnostic    Result Date: 12/17/2022  Patient: CARSON MULLEN  Time Out: 22:02 Exam(s): CT CHEST Without Contrast EXAM:   CT Chest Without Intravenous Contrast CLINICAL HISTORY:    Reason for exam: eval for rib fracture. TECHNIQUE:   Axial computed tomography images of the chest without intravenous contrast.  CTDI is 14.2 mGy and DLP is 538.6 mGy-cm.  This CT exam was performed according to the principle of ALARA (As Low As Reasonably Achievable) by using one or more of the following dose reduction techniques: automated exposure control, adjustment of the mA and or kV according to patient size, and or use of iterative reconstruction technique. COMPARISON:   No relevant prior studies available. FINDINGS:   Lungs:  Unremarkable.  No mass.  No consolidation.   Pleural space:  Unremarkable.  No pneumothorax.  No significant effusion.   Heart:  Unremarkable.  No cardiomegaly.  No significant pericardial effusion.  No significant coronary artery calcifications.   Bones joints:  Unremarkable.  No acute fracture.  No dislocation.   Soft tissues:  Unremarkable.   Vasculature:  Unremarkable.  No thoracic aortic aneurysm.   Lymph nodes:  Unremarkable.  No enlarged lymph nodes.   Gallbladder and bile ducts:   Cholecystectomy.   Kidneys and ureters:  Atrophy of the left kidney. IMPRESSION:       No acute findings in the chest.     Electronically signed by Mina Veronica MD on 12-17-22 at 2202    CT Cervical Spine Without Contrast    Result Date: 12/18/2022  EMERGENCY NONCONTRAST HEAD CT AND NONCONTRAST CERVICAL SPINE CT 12/17/2022  CLINICAL HISTORY: Patient had a seizure, has headaches. Patient is on Eliquis.  HEAD CT TECHNIQUE: Spiral CT images were obtained from the base of the skull to the vertex without intravenous contrast. The images were reformatted and submitted in 3 mm thick axial, sagittal and coronal CT sections with brain algorithm and 2 mm thick axial CT sections with high-resolution bone algorithm.  This is correlated to a prior MRI of the brain from [facility] on 01/04/2022 and a noncontrast head CT 01/01/2022.  FINDINGS: The brain parenchyma is normal in attenuation. The ventricles are normal in size. I see no focal mass effect. There is no midline shift. No extraaxial fluid collections are identified. There is no evidence of acute intracranial hemorrhage. No acute skull fracture is identified. The calvarium and the skull base are normal in appearance. The paranasal sinuses and the mastoid air cells and the middle ear cavities are clear.      1.  Normal head CT with no acute skull fracture or intracranial hemorrhage identified.  CERVICAL SPINE CT TECHNIQUE: Spiral CT images were obtained from the skull base down to the T2-3 thoracic level and the images were reformatted and submitted in 2 mm thick axial and sagittal CT sections with soft tissue algorithm and 1 mm thick axial, sagittal and coronal CT sections with high-resolution bone algorithm.  FINDINGS: The images are extremely grainy from C4 down to T2 which limits evaluation of bony detail and limits evaluation of the posterior disc margins.  The atlantooccipital articulation is normal in appearance.  At C1-C2 there are mild arthritic  changes at the atlantodental interval with mild marginal spurring off the anterior ring of C1 and the odontoid. Otherwise the C1-2 level is normal in appearance.  At C2-3 the posterior disc margin, facets and uncovertebral joints are within normal limits with no canal or foraminal narrowing.  At C3-4 the disc space, facets and uncovertebral joints are within normal limits with no canal or foraminal narrowing.  At C4-5 the posterior disc margin, facets and uncovertebral joints are within normal limits with no canal or foraminal narrowing.  At C5-6 the posterior endplates, facets and uncovertebral joints are within normal limits with no canal or foraminal narrowing.  At C6-7 the posterior endplates, facets and uncovertebral joints are within normal limits with no canal or foraminal narrowing.  At C7-T1 there are prominent left anterior marginal bridging osteophytes. The posterior endplates and facets are normal with no canal or foraminal narrowing.  No acute fracture is seen in the cervical spine.  IMPRESSION: 1. No acute fracture is seen in the cervical spine. The images are extremely grainy from C4 to T2 which limits evaluation of bony detail and limits evaluation of the posterior disc margins. There is prominent left anterior marginal osteophytic spurring at C7-T1. The remainder of the cervical spine CT is unremarkable.  Radiation dose reduction techniques were utilized, including automated exposure control and exposure modulation based on body size.  This report was finalized on 12/18/2022 10:16 AM by Dr. Robert Valdivia M.D.      MRI Thoracic Spine Without Contrast    Result Date: 12/19/2022  MRI OF THE THORACIC SPINE WITHOUT CONTRAST 12/18/2022  CLINICAL HISTORY: Patient fell following a seizure yesterday, has mid back pain.  TECHNIQUE: Sagittal T1, proton density and fat-suppressed T2-weighted images were obtained of the thoracic spine. In addition axial T1 and T2-weighted images were obtained from C7 to L1.   FINDINGS: The thoracic cord is normal in signal intensity. The conus terminates at the L1 lumbar level which is normal.  The thoracic posterior disc margins and facets are within normal limits with no thoracic disc herniation, no thoracic central canal or foraminal narrowing identified.  There are anterior marginal bridging osteophytes at all levels from T3 down to T12 with some flowing ossification anterior to the vertebrae and the findings are most consistent with diffuse idiopathic skeletal hyperostosis. There is some faint T2 high signal in the anterior aspect of the vertebra from T3 to T12, likely reactive marrow edema from the diffuse idiopathic skeletal hyperostosis. Overall the thoracic vertebral body heights are well-maintained.  No convincing acute compression fracture is identified.      1. There are anterior marginal bridging osteophytes and some flowing ossification anterior to the vertebra from T3 down to T12 compatible with diffuse idiopathic skeletal hyperostosis and there are thin rinds of marrow edema in the very anterior aspect of the vertebrae from the inferior body of T3 down to the superior body of T12 precisely where there is flowing ossification and anterior marginal endplate spurring and this is most probably reactive marrow changes due to the diffuse idiopathic skeletal hyperostosis.  The remainder of the thoracic spine MRI is within normal limits.  This report was finalized on 12/19/2022 7:25 AM by Dr. Robert Valdivia M.D.      CT Abdomen Pelvis With Contrast    Result Date: 12/15/2022  INDICATION:  Severe abdominal pain, nausea and vomiting.  Additional History: Biliary duct sweep.  Pancreatic duct stent.  Pancreatitis.  Cholecystectomy.  Tubal ligation. TECHNIQUE:  CT of the abdomen and pelvis was performed with intravenous contrast. Isovue 370 70 mL was administered intravenously.   Automated mA/kV exposure control was utilized and patient examination was performed in strict accordance  with principles of ALARA. RADIATION AMOUNT:  1109.48 mGy-cm. COMPARISON:  CT AP 12/21/2019. FINDINGS: Abdomen: The lung bases are clear.  Hepatic steatosis is present.  No suspicious hepatic lesion is demonstrated.    The pancreas, spleen, adrenal glands, and kidneys demonstrate no acute pathology. No biliary dilatation is demonstrated. Patient is status post cholecystectomy. There is no free air or lymph node enlargement.  Abdominal aorta is not aneurysmal. Pelvis: There is no bowel wall thickening or obstruction.  There is no free fluid.  Lymph nodes are not enlarged.  LEFT renal cortical scarring is present.  Simple cyst about the medial aspect LEFT kidney is present measuring 2.2 cm in size. There is no evidence of acute appendicitis.  Urinary bladder is poorly distended and not well assessed. Skeleton:   Postoperative changes of the lumbosacral junction noted.  There are no acute fractures.  No suspicious bony lesions. IMPRESSION: 1.Hepatic steatosis is present. No suspicious hepatic lesion is demonstrated. No biliary dilatation is demonstrated. Patient is status post cholecystectomy. 2.LEFT renal cortical scarring. LEFT renal cyst. Signed by Haris Ramirez II, MD ##### Final ##### Dictated by:    HARIS RAMIREZ MD-RAD Dictated DT/TM: 12/15/2022 10:15 pm Interpreted and electronically signed by:  HARIS RAMIREZ MD-RAD Signed DT/TM:  12/15/2022 10:28 pm    XR Spine Lumbar Complete 4+VW    Result Date: 12/17/2022  Emergency 3 views of thoracic spine and lumbar spine plain film series complete 4+ views 12/17/2022  CLINICAL HISTORY: Back pain after seizure  Thoracic spine: 3 views of thoracic spine including AP and lateral views of the entire thoracic spine and swimmer's lateral view of lower cervical and upper thoracic spine are submitted for interpretation. On the lateral view there is evidence of anterior marginal endplate spurring at all levels from T3 to T12. Overall the thoracic vertebral body  heights are well-maintained. On the AP view, the pedicles and spinous processes are intact.      1. No convincing acute fracture is seen in the thoracic spine. There is anterior marginal endplate spurring at all levels from T3 to T12.  Lumbar spine plain films: A total 5 views lumbar spine including AP bilateral oblique and lateral views of the entire lumbar spine and coned down lateral view of lumbosacral junction are submitted for interpretation. Patient's had previous lumbosacral surgery with bilateral posterior rods bridging the posterior elements at L5-S1 anchored by bilateral pedicle screws at L5 and S1. There is a disc implants in the L5-S1 disc space. Patient has mild levoscoliotic curvature lumbar spine with its apex at the L3-4 lumbar level. Overall, the lumbar vertebral body heights are well-maintained. The disc heights from T12 to L5 are well-maintained. On the oblique views the pars interarticularis are intact. On the AP view, the pedicles, transverse and spinous processes are intact.  IMPRESSION: 1. No acute fracture is seen lumbar spine. 2. Patient had previous bilateral ramses and pedicle screw fixation L5-S1 disc implant L5-S1 disc space is mild levoscoliotic curvature lumbar spine apex at L3-4.  This report was finalized on 2022 6:42 PM by Dr. Robert Valdivia M.D.      US pelvis transvaginal non-ob    Result Date: 2022  INDICATION:  Severe RLQ and pelvic pain x 3 weeks. Nausea/vomiting x 3 weeks. Additional History: LMP: Current.  A1. Tubal ligation. Bilateral oophorectomy x 8 years. TECHNIQUE:  Transvaginal ultrasonography of the pelvis was performed with spectral Doppler evaluation of the ovaries. COMPARISON:  CT A/P 12/15/2022, 2019. FINDINGS:     The uterus is anteverted in position and measures 7.3 x 4.1 x 3.8 cm.  The uterus demonstrates a normal, homogeneous echotexture.  There are no discrete fibroids present.  The endometrium measures 0.8 cm.   The bilateral ovaries are  surgically absent. No significant fluid is present within the cul-de-sac. IMPRESSION: Unremarkable ultrasound of the pelvis. Surgically absent bilateral ovaries. Signed by Haris Ramirez II, MD ##### Final ##### Dictated by:    HARIS RAMIREZ MD-RAD Dictated DT/TM: 12/16/2022 1:27 am Interpreted and electronically signed by:  HARIS RAMIREZ MD-RAD Signed DT/TM:  12/16/2022 1:37 am    MRI Lumbar Spine With & Without Contrast    Result Date: 12/19/2022  MRI OF THE LUMBAR SPINE WITH AND WITHOUT CONTRAST 12/18/2022  CLINICAL HISTORY: Patient has had previous lumbar spine surgery and fusion of L5-S1. Patient fell and has low back pain.  TECHNIQUE: Sagittal T1, proton density fast spin echo T2, STIR and postcontrast sagittal T1 and postcontrast sagittal fat-suppressed T1-weighted images were obtained of the lumbar spine.  In addition axial T2 weighted images were obtained from L1 to S2 and thin cut precontrast and postcontrast axial T1-weighted images were obtained angled through the interspaces from L2 to S1.  This is correlated to a prior MRI of the lumbar spine from [facility] on 11/27/2022.  FINDINGS: The distal thoracic cord and the conus is normal in signal intensity.  The conus terminates at the level of the mid body of L2 which is normal.  At T12-L1 the disc space and facets are normal in appearance with no canal or foraminal narrowing.  At L1-2 the disc space and facets are normal in appearance with no canal or foraminal narrowing.  At L2-3 the disc space and facets are normal in appearance with no canal or foraminal narrowing.  At L3-4 the disc space and facets are normal in appearance with no canal or foraminal narrowing.  At L4-5 the disc space and facets are normal in appearance with no canal or foraminal narrowing.  At L5-S1 the patient has had prior surgery with bilateral laminectomies at L5 with medial facetectomies at L5-S1.  There are bilateral rods bridging the posterior elements  at L5-S1 anchored by bilateral pedicle screws at L5 and S1.  There are disc implants in the L5-S1 disc space. I see no solid bony fusion. I see no canal or lateral recess narrowing. There is minimal spurring into the foramina and there is minimal left and there is mild right bony foraminal narrowing.  The marrow signal intensity in the lumbar spine is normal with no discernible posttraumatic osseous abnormality seen in the lumbar spine.      1. No significant change when compared to prior lumbar spine MRI from [facility] on 11/27/2022. 2. Patient has had surgery at L5-S1 with bilateral laminectomies at L5 and medial facetectomies at L5-S1 with bilateral posterior bridging rods anchored by bilateral pedicle screws at L5 and S1.  There is a disc implant in the L5-S1 disc space and there is no evidence of solid bony fusion.  There remains minimal posterior spurring.  There is no residual canal or lateral recess narrowing.  There is minimal left and mild right bony foraminal narrowing. 3. The remainder of the lumbar spine MRI is normal.  Specifically no disc herniation, canal or foraminal narrowing is seen from T12 to L5 and no bone marrow edema is seen with no acute posttraumatic abnormality identified in the lumbar spine.  This report was finalized on 12/19/2022 7:23 AM by Dr. Robert Valdivia M.D.        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  Neurosurgery is following for back pain and right leg weakness.      Neurology was consulted for seizure.  She has had a history of generalized seizure since her mid 20s.  She reports she currently has 1-3 seizures per month which consist of vague sensation of dizziness followed by loss of consciousness and generalized shaking  with occasional tongue bite.  About a year prior she was seen for seizure by Dr. Antony and he started her on Depakote 500 mg twice daily which she reports she is still taking however dose was less than 2.8 on admission.  She is unsure what dose of Depacon she was taking at home and it is not listed on her home medication list.  She is not established with a neurologist outpatient.  She also has history of migraines for which she is on Imitrex.  She had a headache yesterday so she was treated with Compazine/Benadryl cocktail.  Dr. Melendez also started her on magnesium oxide and riboflavin.    Work-up:  CT head completed 12/17/2022 at [facility] showed no acute findings per radiology report.  Repeat CT head same day here was also negative.  MRI thoracic spine without contrast: Marrow edema noted in anterior aspect of vertebrae from inferior body of T3 down to superior body of T12 probably reactive marrow changes due to diffuse idiopathic skeletal hyper ptosis.  MRI lumbar spine with and without contrast: Shows evidence of previous surgeries with associated hardware but unchanged from previous MRI from 11/27/2022.  Labs: CK level 38,Magnesium level 2.3, UA showed 3+ blood, 1+ protein, 1+ leukoesterase, too numerous to count RBCs, 21-30 WBCs, no bacteria.  Urine culture showed greater than 100 K mixed connor.  UDS positive for opiates (prescribed) and THC.    Diagnoses:  Seizure disorder  History of migraine  History of splenic infarct, on chronic anticoagulation  Exacerbation of degenerative disc disease of the lumbar spine    Plan:  Depakote restarted at 750 mg DR tablets twice daily.  She is interested in coming off of this because she feels that it is causing hair loss.  She is interested in Keppra.  Topamax also a consideration though she has a history of previous kidney stones.  Discussed with Dr. Cali.  We recommend continuing Depakote at current dose for now.  Can further discuss switching to a different AED as  outpatient once established with epileptologist, will try to arrange outpatient follow-up.  Magnesium oxide and riboflavin started this admission for migraine prophylaxis, prescribed Imitrex as outpatient.  She also has additional doses of Compazine/Benadryl cocktails available.    No further inpatient work-up.  Discussed with Dr. Byers.  We will sign off but please call if further questions or concerns.      Electronically signed by Nisha Valles APRN at 12/19/22 1436     Chelsy Ku APRN at 12/19/22 1107          [facility] NEUROSURGERY PROGRESS NOTE      CC: Back and right leg pain, weakness after fall at home      Subjective     Interval History: Sitting up in bed. Continues to report severe back pain that radiates from her neck to her low back as well as right leg pain, tingling, and weakness. Reports improvement in mobility in right foot, ankle, and toes. Asking for an increase in dilaudid dosing. Was able to ambulate some with nursing but reports that her right leg began shaking and was returned to bed. No new complaints. No acute events reported overnight.     ROS:  Constitutional: No fever, chills  GI: No nausea, vomiting  MS: +back, RLE pain  Neuro: +RLE weakness, numbness, tingling.  +balance difficulties  : No difficulty voiding, no incontinence    Objective     Vital signs in last 24 hours:  Temp:  [97.4 °F (36.3 °C)-98.4 °F (36.9 °C)] 97.4 °F (36.3 °C)  Heart Rate:  [89-96] 89  Resp:  [16-18] 16  BP: (119-128)/(67-89) 122/67    Intake/Output this shift:  No intake/output data recorded.    LABS:  Results from last 7 days   Lab Units 12/19/22  0614 12/18/22  0627 12/17/22  1628   WBC 10*3/mm3 6.13 8.02 7.16   HEMOGLOBIN g/dL 12.2 11.3* 12.4   HEMATOCRIT % 36.2 34.4 36.4   PLATELETS 10*3/mm3 252 230 284     Results from last 7 days   Lab Units 12/19/22  0934 12/19/22  0614 12/18/22  0627 12/17/22  1628 12/13/22 2025   SODIUM mmol/L 139 140 141 143 139   POTASSIUM mmol/L 5.0 5.5* 3.2* 3.5  2.8*   CHLORIDE mmol/L 107 107 106 108* 105   CO2 mmol/L 24.0 27.2 29.0 27.0 24.0   BUN mg/dL 6 6 6 4* 8   CREATININE mg/dL 0.77 0.79 0.75 0.69 0.78   CALCIUM mg/dL 8.7 8.7 8.5* 8.4* 8.9   BILIRUBIN mg/dL 0.2  --   --  0.2 0.2   ALK PHOS U/L 98  --   --  111 126*   ALT (SGPT) U/L 22  --   --  31 83*   AST (SGOT) U/L 18  --   --  23 26   GLUCOSE mg/dL 158* 117* 82 106* 105*         IMAGING STUDIES:  XR Spine Thoracic 3 View    Result Date: 12/17/2022  1. No convincing acute fracture is seen in the thoracic spine. There is anterior marginal endplate spurring at all levels from T3 to T12.  Lumbar spine plain films: A total 5 views lumbar spine including AP bilateral oblique and lateral views of the entire lumbar spine and coned down lateral view of lumbosacral junction are submitted for interpretation. Patient's had previous lumbosacral surgery with bilateral posterior rods bridging the posterior elements at L5-S1 anchored by bilateral pedicle screws at L5 and S1. There is a disc implants in the L5-S1 disc space. Patient has mild levoscoliotic curvature lumbar spine with its apex at the L3-4 lumbar level. Overall, the lumbar vertebral body heights are well-maintained. The disc heights from T12 to L5 are well-maintained. On the oblique views the pars interarticularis are intact. On the AP view, the pedicles, transverse and spinous processes are intact.  IMPRESSION: 1. No acute fracture is seen lumbar spine. 2. Patient had previous bilateral ramses and pedicle screw fixation L5-S1 disc implant L5-S1 disc space is mild levoscoliotic curvature lumbar spine apex at L3-4.  This report was finalized on 12/17/2022 6:42 PM by Dr. Robert Valdivia M.D.      CT Head Without Contrast    Result Date: 12/18/2022  1.  Normal head CT with no acute skull fracture or intracranial hemorrhage identified.  CERVICAL SPINE CT TECHNIQUE: Spiral CT images were obtained from the skull base down to the T2-3 thoracic level and the images were  reformatted and submitted in 2 mm thick axial and sagittal CT sections with soft tissue algorithm and 1 mm thick axial, sagittal and coronal CT sections with high-resolution bone algorithm.  FINDINGS: The images are extremely grainy from C4 down to T2 which limits evaluation of bony detail and limits evaluation of the posterior disc margins.  The atlantooccipital articulation is normal in appearance.  At C1-C2 there are mild arthritic changes at the atlantodental interval with mild marginal spurring off the anterior ring of C1 and the odontoid. Otherwise the C1-2 level is normal in appearance.  At C2-3 the posterior disc margin, facets and uncovertebral joints are within normal limits with no canal or foraminal narrowing.  At C3-4 the disc space, facets and uncovertebral joints are within normal limits with no canal or foraminal narrowing.  At C4-5 the posterior disc margin, facets and uncovertebral joints are within normal limits with no canal or foraminal narrowing.  At C5-6 the posterior endplates, facets and uncovertebral joints are within normal limits with no canal or foraminal narrowing.  At C6-7 the posterior endplates, facets and uncovertebral joints are within normal limits with no canal or foraminal narrowing.  At C7-T1 there are prominent left anterior marginal bridging osteophytes. The posterior endplates and facets are normal with no canal or foraminal narrowing.  No acute fracture is seen in the cervical spine.  IMPRESSION: 1. No acute fracture is seen in the cervical spine. The images are extremely grainy from C4 to T2 which limits evaluation of bony detail and limits evaluation of the posterior disc margins. There is prominent left anterior marginal osteophytic spurring at C7-T1. The remainder of the cervical spine CT is unremarkable.  Radiation dose reduction techniques were utilized, including automated exposure control and exposure modulation based on body size.  This report was finalized on  12/18/2022 10:16 AM by Dr. Robert Valdivia M.D.      CT Chest Without Contrast Diagnostic    Result Date: 12/17/2022  Electronically signed by Mina Veronica MD on 12-17-22 at 2202    CT Cervical Spine Without Contrast    Result Date: 12/18/2022  1.  Normal head CT with no acute skull fracture or intracranial hemorrhage identified.  CERVICAL SPINE CT TECHNIQUE: Spiral CT images were obtained from the skull base down to the T2-3 thoracic level and the images were reformatted and submitted in 2 mm thick axial and sagittal CT sections with soft tissue algorithm and 1 mm thick axial, sagittal and coronal CT sections with high-resolution bone algorithm.  FINDINGS: The images are extremely grainy from C4 down to T2 which limits evaluation of bony detail and limits evaluation of the posterior disc margins.  The atlantooccipital articulation is normal in appearance.  At C1-C2 there are mild arthritic changes at the atlantodental interval with mild marginal spurring off the anterior ring of C1 and the odontoid. Otherwise the C1-2 level is normal in appearance.  At C2-3 the posterior disc margin, facets and uncovertebral joints are within normal limits with no canal or foraminal narrowing.  At C3-4 the disc space, facets and uncovertebral joints are within normal limits with no canal or foraminal narrowing.  At C4-5 the posterior disc margin, facets and uncovertebral joints are within normal limits with no canal or foraminal narrowing.  At C5-6 the posterior endplates, facets and uncovertebral joints are within normal limits with no canal or foraminal narrowing.  At C6-7 the posterior endplates, facets and uncovertebral joints are within normal limits with no canal or foraminal narrowing.  At C7-T1 there are prominent left anterior marginal bridging osteophytes. The posterior endplates and facets are normal with no canal or foraminal narrowing.  No acute fracture is seen in the cervical spine.  IMPRESSION: 1. No acute fracture is  seen in the cervical spine. The images are extremely grainy from C4 to T2 which limits evaluation of bony detail and limits evaluation of the posterior disc margins. There is prominent left anterior marginal osteophytic spurring at C7-T1. The remainder of the cervical spine CT is unremarkable.  Radiation dose reduction techniques were utilized, including automated exposure control and exposure modulation based on body size.  This report was finalized on 12/18/2022 10:16 AM by Dr. Robert Valdivia M.D.      MRI Thoracic Spine Without Contrast    Result Date: 12/19/2022  1. There are anterior marginal bridging osteophytes and some flowing ossification anterior to the vertebra from T3 down to T12 compatible with diffuse idiopathic skeletal hyperostosis and there are thin rinds of marrow edema in the very anterior aspect of the vertebrae from the inferior body of T3 down to the superior body of T12 precisely where there is flowing ossification and anterior marginal endplate spurring and this is most probably reactive marrow changes due to the diffuse idiopathic skeletal hyperostosis.  The remainder of the thoracic spine MRI is within normal limits.  This report was finalized on 12/19/2022 7:25 AM by Dr. Robert Valdivia M.D.      XR Spine Lumbar Complete 4+VW    Result Date: 12/17/2022  1. No convincing acute fracture is seen in the thoracic spine. There is anterior marginal endplate spurring at all levels from T3 to T12.  Lumbar spine plain films: A total 5 views lumbar spine including AP bilateral oblique and lateral views of the entire lumbar spine and coned down lateral view of lumbosacral junction are submitted for interpretation. Patient's had previous lumbosacral surgery with bilateral posterior rods bridging the posterior elements at L5-S1 anchored by bilateral pedicle screws at L5 and S1. There is a disc implants in the L5-S1 disc space. Patient has mild levoscoliotic curvature lumbar spine with its apex at the L3-4  lumbar level. Overall, the lumbar vertebral body heights are well-maintained. The disc heights from T12 to L5 are well-maintained. On the oblique views the pars interarticularis are intact. On the AP view, the pedicles, transverse and spinous processes are intact.  IMPRESSION: 1. No acute fracture is seen lumbar spine. 2. Patient had previous bilateral ramses and pedicle screw fixation L5-S1 disc implant L5-S1 disc space is mild levoscoliotic curvature lumbar spine apex at L3-4.  This report was finalized on 12/17/2022 6:42 PM by Dr. Robert Valdivia M.D.      MRI Lumbar Spine With & Without Contrast    Result Date: 12/19/2022  1. No significant change when compared to prior lumbar spine MRI from [facility] on 11/27/2022. 2. Patient has had surgery at L5-S1 with bilateral laminectomies at L5 and medial facetectomies at L5-S1 with bilateral posterior bridging rods anchored by bilateral pedicle screws at L5 and S1.  There is a disc implant in the L5-S1 disc space and there is no evidence of solid bony fusion.  There remains minimal posterior spurring.  There is no residual canal or lateral recess narrowing.  There is minimal left and mild right bony foraminal narrowing. 3. The remainder of the lumbar spine MRI is normal.  Specifically no disc herniation, canal or foraminal narrowing is seen from T12 to L5 and no bone marrow edema is seen with no acute posttraumatic abnormality identified in the lumbar spine.  This report was finalized on 12/19/2022 7:23 AM by Dr. Robert Valdivia M.D.        I personally viewed and interpreted the patient's clinical data and spinal imaging and discussed the patient with Dr. Brock who also reviewed the imaging.    Meds reviewed/changed: Yes    Scheduled Meds:apixaban, 5 mg, Oral, Q12H  baclofen, 10 mg, Oral, Q8H  cefTRIAXone, 2 g, Intravenous, Q24H  cetirizine, 10 mg, Oral, Daily  dexamethasone, 4 mg, Oral, Q8H  dicyclomine, 20 mg, Oral, 4x Daily  divalproex, 750 mg, Oral,  Q12H  FLUoxetine, 60 mg, Oral, Nightly  gabapentin, 400 mg, Oral, Q8H  ketorolac, 30 mg, Intravenous, Q6H  magnesium oxide, 400 mg, Oral, Daily  melatonin, 10 mg, Oral, Nightly  pantoprazole, 40 mg, Oral, BID  polyethylene glycol, 17 g, Oral, Daily  sodium chloride, 10 mL, Intravenous, Q12H  sucralfate, 1 g, Oral, 4x Daily  traZODone, 100 mg, Oral, Nightly  Vitamin B-2, 400 mg, Oral, Daily      Continuous Infusions:   PRN Meds:.•  acetaminophen **OR** acetaminophen **OR** acetaminophen  •  calcium carbonate  •  diphenhydrAMINE  •  HYDROcodone-acetaminophen  •  HYDROmorphone  •  hydrOXYzine  •  ondansetron **OR** ondansetron  •  potassium chloride **OR** potassium chloride **OR** potassium chloride  •  prochlorperazine  •  [COMPLETED] Insert Peripheral IV **AND** sodium chloride  •  sodium chloride  •  sodium chloride      Physical Exam:    General:   Awake, alert, oriented x3. Speech clear with no aphasia  Back:    +LBP     Motor: Difficulty with RLE assessment 2/2 increased back pain with exam. Normal bulk and tone in upper and lower extremities.  No fasciculations, rigidity, spasticity, or abnormal movements.  Sensation: Normal to light touch  Station and Gait:             Able to minimally ambulate with nursing staff.  Extremities:   Wearing SCD      Assessment & Plan     ASSESSMENT:      Intractable back pain    Anxiety disorder    Bilateral leg weakness    On anticoagulant therapy    Obesity (BMI 30-39.9)    GERD without esophagitis    Seizures (HCC)    Hypokalemia    42-year-old patient with a remote history of L5-S1 decompression and fusion with Dr. Wilkins 2005 who presents to the ED diffuse back pain with radiation to the right lower extremity after an apparent seizure and fall at home.    Right lower extremity exam was somewhat limited due to pain as she reported an increase in back pain with movement of this extremity, but patient reports improved movement in the leg, foot, and toes.     There are no  structural findings on thoracic or lumbar spine MRI imaging to explain patient's RLE symptomology. There is no evidence of disc herniation, canal or foraminal narrowing. There is no evidence of recurrent or residual canal or lateral recess narrowing at L5-S1, area of previous surgery. On XR and CT imaging, the hardware at this level is intact as well with no evidence of vertebral fracture. The patient tells me that she was told by Dr. Wilkins \"years ago\" that one of the screws in her low back were \"loose\" and that she hadn't fused, but states that she never followed up with him concerning these findings. Advised her of the spinal imaging and our recommendations of continued PT, pain management and follow-up with Dr. Wilkins for further treatment recommendations.     PLAN:   -Pain management  -PT  -Follow-up with Dr. Wilkins for further treatment recommendations.   -No neurosurgical intervention warranted    I discussed the patient's findings and my recommendations with patient, nursing staff and Dr. Brock       LOS: 0 days       NELSON Blair  2022  11:07 EST    \"Dictated utilizing Dragon dictation\".      Electronically signed by Chelsy Ku APRN at 22 1440     Indra Whitman MD at 22 5514              Name: Belen Allen ADMIT: 2022   : 1980  PCP: Sahil Cornelius MD    MRN: [id number] LOS: 0 days   AGE/SEX: 42 y.o. female  ROOM: [facility]     Subjective   Subjective   Mild improvement of the low back pain in the right lower extremity pain.  Continues with tingling and weakness of the right lower extremity.  Positive dysuria but no hematuria or urinary incontinence.  Normal urine stream.  Pain level today is 8/10.    Review of Systems  Cardiovascular/respiratory.  No chest pain.  No palpitation.  No shortness of breath.  GI.  No abdominal pain or nausea or vomiting.  Symptoms.  Improved headache.  No focal neurological symptoms.  No seizures.    Objective    Objective   Vital Signs  Temp:  [97.7 °F (36.5 °C)-98.1 °F (36.7 °C)] 98 °F (36.7 °C)  Heart Rate:  [] 94  Resp:  [18] 18  BP: (108-128)/(65-89) 125/87  SpO2:  [95 %-100 %] 97 %  on   ;   Device (Oxygen Therapy): room air  No intake or output data in the 24 hours ending 12/18/22 1736  Body mass index is 37.05 kg/m².      12/17/22 1622 12/17/22 2046   Weight: 98 kg (216 lb) 97.9 kg (215 lb 13.3 oz)     Physical Exam  General.  Middle-aged female.  She is alert and oriented x3.  In pain during the examination.  No respiratory distress.    Eyes.  Pupils equal round and reactive.  Intact extraocular musculature.  No pallor or jaundice.  Oral cavity.  Moist mucous membrane.  Neck.  Supple.  No JVD.  No lymphadenopathy or thyromegaly.  Cardiovascular.  Regular rate and rhythm with no gallops or murmurs.  Chest.  Clear to auscultation bilaterally with no added sounds.  Abdomen.  Soft lax.  No tenderness.  No organomegaly.  No guarding or rebound.  Extremities.  No clubbing/cyanosis/edema.  Decreased power and sensation and 5 S1 distribution.  CNS.  No acute focal neurological deficits except as above    Results Review:      Results from last 7 days   Lab Units 12/18/22 0627 12/17/22 1628 12/13/22 2025   SODIUM mmol/L 141 143 139   POTASSIUM mmol/L 3.2* 3.5 2.8*   CHLORIDE mmol/L 106 108* 105   CO2 mmol/L 29.0 27.0 24.0   BUN mg/dL 6 4* 8   CREATININE mg/dL 0.75 0.69 0.78   GLUCOSE mg/dL 82 106* 105*   CALCIUM mg/dL 8.5* 8.4* 8.9   AST (SGOT) U/L  --  23 26   ALT (SGPT) U/L  --  31 83*     Estimated Creatinine Clearance: 112.2 mL/min (by C-G formula based on SCr of 0.75 mg/dL).          Results from last 7 days   Lab Units 12/17/22 1628 12/13/22  1918   CK TOTAL U/L 38  --    TROPONIN T ng/mL  --  <0.010             Results from last 7 days   Lab Units 12/18/22  0627 12/17/22  1628 12/13/22 2025   MAGNESIUM mg/dL 2.3 2.3 2.1           Invalid input(s): LDLCALC  Results from last 7 days   Lab Units  12/18/22  0627 12/17/22  1628 12/15/22  2132 12/13/22  1918   WBC 10*3/mm3 8.02 7.16  --  14.57*   HEMOGLOBIN g/dL 11.3* 12.4  --  14.6   HEMATOCRIT % 34.4 36.4  --  43.7   PLATELETS 10*3/mm3 230 284  --  347   MCV fL 95.6 95.5  --  92.0   MCH pg 31.4 32.5  --  30.7   MCHC g/dL 32.8 34.1  --  33.4   RDW % 13.4 13.6  --  13.2   RDW-SD fl 47.4 47.9  --  44.4   MPV fL 10.1 10.2  --  9.9   NEUTROPHIL % %  --  61.6  --  62.2   LYMPHOCYTE % %  --  29.6  --  30.9   MONOCYTES % %  --  7.1  --  5.5   EOSINOPHIL % %  --  1.0  --  0.5   BASOPHIL % %  --  0.4  --  0.4   IMM GRAN % %  --  0.3  --  0.5   NEUTROS ABS 10*3/mm3  --  4.41   < > 9.06*   LYMPHS ABS 10*3/mm3  --  2.12  --  4.50*   MONOS ABS 10*3/mm3  --  0.51  --  0.80   EOS ABS 10*3/mm3  --  0.07   < > 0.08   BASOS ABS 10*3/mm3  --  0.03   < > 0.06   IMMATURE GRANS (ABS) 10*3/mm3  --  0.02  --  0.07*   NRBC /100 WBC  --  0.1  --  0.0    < > = values in this interval not displayed.     Results from last 7 days   Lab Units 12/13/22 1918   INR  0.87*   APTT seconds 25.5                 Results from last 7 days   Lab Units 12/15/22  2132   LIPASE Units/Liter 36     Results from last 7 days   Lab Units 12/17/22 2147   URINECX  Growth present, too young to evaluate         Results from last 7 days   Lab Units 12/17/22 2147   NITRITE UA  Negative   WBC UA /HPF 21-30*   BACTERIA UA /HPF None Seen   SQUAM EPITHEL UA /HPF 3-6*   URINECX  Growth present, too young to evaluate           Imaging:  Imaging Results (Last 24 Hours)     Procedure Component Value Units Date/Time    MRI Lumbar Spine With & Without Contrast [[id number]] Collected: 12/18/22 1449     Updated: 12/18/22 1449    Narrative:      MRI OF THE LUMBAR SPINE WITH AND WITHOUT CONTRAST 12/18/2022     CLINICAL HISTORY: Patient has had previous lumbar spine surgery and  fusion of L5-S1. Patient fell and has low back pain.     TECHNIQUE: Sagittal T1, proton density fast spin echo T2, STIR and  postcontrast sagittal T1  and postcontrast sagittal fat-suppressed  T1-weighted images were obtained of the lumbar spine.  In addition axial  T2 weighted images were obtained from L1 to S2 and thin cut precontrast  and postcontrast axial T1-weighted images were obtained angled through  the interspaces from L2 to S1.     This is correlated to a prior MRI of the lumbar spine from [facility] on 11/27/2022.     FINDINGS: The distal thoracic cord and the conus is normal in signal  intensity.  The conus terminates at the level of the mid body of L2  which is normal.     At T12-L1 the disc space and facets are normal in appearance with no  canal or foraminal narrowing.     At L1-2 the disc space and facets are normal in appearance with no canal  or foraminal narrowing.     At L2-3 the disc space and facets are normal in appearance with no canal  or foraminal narrowing.     At L3-4 the disc space and facets are normal in appearance with no canal  or foraminal narrowing.     At L4-5 the disc space and facets are normal in appearance with no canal  or foraminal narrowing.     At L5-S1 the patient has had prior surgery with bilateral laminectomies  at L5 with medial facetectomies at L5-S1.  There are bilateral rods  bridging the posterior elements at L5-S1 anchored by bilateral pedicle  screws at L5 and S1.  There are disc implants in the L5-S1 disc space. I  see no solid bony fusion. I see no canal or lateral recess narrowing.   There is minimal spurring into the foramina and there is minimal left  and there is mild right bony foraminal narrowing.     The marrow signal intensity in the lumbar spine is normal with no  discernible posttraumatic osseous abnormality seen in the lumbar spine.       Impression:      1. No significant change when compared to prior lumbar spine MRI from  [facility] on 11/27/2022.  2. Patient has had surgery at L5-S1 with bilateral laminectomies at L5  and medial facetectomies at L5-S1 with  bilateral posterior bridging rods  anchored by bilateral pedicle screws at L5 and S1.  There is a disc  implant in the L5-S1 disc space and there is no evidence of solid bony  fusion.  There remains minimal posterior spurring.  There is no residual  canal or lateral recess narrowing.  There is minimal left and mild right  bony foraminal narrowing.  3. The remainder of the lumbar spine MRI is normal.  Specifically no  disc herniation, canal or foraminal narrowing is seen from T12 to L5 and  no bone marrow edema is seen with no acute posttraumatic abnormality  identified in the lumbar spine.       MRI Thoracic Spine Without Contrast [[id number]] Collected: 12/18/22 1128     Updated: 12/18/22 1128    Narrative:      MRI OF THE THORACIC SPINE WITHOUT CONTRAST 12/18/2022     CLINICAL HISTORY: Patient fell following a seizure yesterday, has mid  back pain.     TECHNIQUE: Sagittal T1, proton density and fat-suppressed T2-weighted  images were obtained of the thoracic spine. In addition axial T1 and  T2-weighted images were obtained from C7 to L1.     FINDINGS: The thoracic cord is normal in signal intensity. The conus  terminates at the L1 lumbar level which is normal.     The thoracic posterior disc margins and facets are within normal limits  with no thoracic disc herniation, no thoracic central canal or foraminal  narrowing identified.     There are anterior marginal bridging osteophytes at all levels from T3  down to T12 with some flowing ossification anterior to the vertebrae and  the findings are most consistent with diffuse idiopathic skeletal  hyperostosis. There is some faint T2 high signal in the anterior aspect  of the vertebra from T3 to T12, likely reactive marrow edema from the  diffuse idiopathic skeletal hyperostosis. Overall the thoracic vertebral  body heights are well-maintained.  No convincing acute compression  fracture is identified.       Impression:      There is anterior marginal bridging  osteophytes and some  flowing ossification anterior to the vertebra from T3 down to T12  compatible with diffuse idiopathic skeletal hyperostosis and there are  thin rinds of marrow edema in the very anterior aspect of the vertebrae  from the inferior body of T3 down to the superior body of T12 precisely  where there is flowing ossification.  Anterior marginal endplate  spurring is likely reactive marrow changes due to the diffuse idiopathic  skeletal hyperostosis.  The remainder of the thoracic spine MRI is  within normal limits.       CT Head Without Contrast [[id number]] Collected: 12/17/22 1920     Updated: 12/18/22 1019    Narrative:      EMERGENCY NONCONTRAST HEAD CT AND NONCONTRAST CERVICAL SPINE CT  12/17/2022     CLINICAL HISTORY: Patient had a seizure, has headaches. Patient is on  Eliquis.     HEAD CT TECHNIQUE: Spiral CT images were obtained from the base of the  skull to the vertex without intravenous contrast. The images were  reformatted and submitted in 3 mm thick axial, sagittal and coronal CT  sections with brain algorithm and 2 mm thick axial CT sections with  high-resolution bone algorithm.     This is correlated to a prior MRI of the brain from [facility] on 01/04/2022 and a noncontrast head CT 01/01/2022.     FINDINGS: The brain parenchyma is normal in attenuation. The ventricles  are normal in size. I see no focal mass effect. There is no midline  shift. No extraaxial fluid collections are identified. There is no  evidence of acute intracranial hemorrhage. No acute skull fracture is  identified. The calvarium and the skull base are normal in appearance.  The paranasal sinuses and the mastoid air cells and the middle ear  cavities are clear.       Impression:      1.  Normal head CT with no acute skull fracture or intracranial  hemorrhage identified.     CERVICAL SPINE CT TECHNIQUE: Spiral CT images were obtained from the  skull base down to the T2-3 thoracic level and the images  were  reformatted and submitted in 2 mm thick axial and sagittal CT sections  with soft tissue algorithm and 1 mm thick axial, sagittal and coronal CT  sections with high-resolution bone algorithm.     FINDINGS: The images are extremely grainy from C4 down to T2 which  limits evaluation of bony detail and limits evaluation of the posterior  disc margins.     The atlantooccipital articulation is normal in appearance.     At C1-C2 there are mild arthritic changes at the atlantodental interval  with mild marginal spurring off the anterior ring of C1 and the  odontoid. Otherwise the C1-2 level is normal in appearance.     At C2-3 the posterior disc margin, facets and uncovertebral joints are  within normal limits with no canal or foraminal narrowing.     At C3-4 the disc space, facets and uncovertebral joints are within  normal limits with no canal or foraminal narrowing.     At C4-5 the posterior disc margin, facets and uncovertebral joints are  within normal limits with no canal or foraminal narrowing.     At C5-6 the posterior endplates, facets and uncovertebral joints are  within normal limits with no canal or foraminal narrowing.     At C6-7 the posterior endplates, facets and uncovertebral joints are  within normal limits with no canal or foraminal narrowing.     At C7-T1 there are prominent left anterior marginal bridging  osteophytes. The posterior endplates and facets are normal with no canal  or foraminal narrowing.     No acute fracture is seen in the cervical spine.     IMPRESSION:   1. No acute fracture is seen in the cervical spine. The images are  extremely grainy from C4 to T2 which limits evaluation of bony detail  and limits evaluation of the posterior disc margins. There is prominent  left anterior marginal osteophytic spurring at C7-T1. The remainder of  the cervical spine CT is unremarkable.     Radiation dose reduction techniques were utilized, including automated  exposure control and exposure  modulation based on body size.     This report was finalized on 12/18/2022 10:16 AM by Dr. Robert Valdivia M.D.       CT Cervical Spine Without Contrast [[id number]] Collected: 12/17/22 1920     Updated: 12/18/22 1019    Narrative:      EMERGENCY NONCONTRAST HEAD CT AND NONCONTRAST CERVICAL SPINE CT  12/17/2022     CLINICAL HISTORY: Patient had a seizure, has headaches. Patient is on  Eliquis.     HEAD CT TECHNIQUE: Spiral CT images were obtained from the base of the  skull to the vertex without intravenous contrast. The images were  reformatted and submitted in 3 mm thick axial, sagittal and coronal CT  sections with brain algorithm and 2 mm thick axial CT sections with  high-resolution bone algorithm.     This is correlated to a prior MRI of the brain from [facility] [facility] on 01/04/2022 and a noncontrast head CT 01/01/2022.     FINDINGS: The brain parenchyma is normal in attenuation. The ventricles  are normal in size. I see no focal mass effect. There is no midline  shift. No extraaxial fluid collections are identified. There is no  evidence of acute intracranial hemorrhage. No acute skull fracture is  identified. The calvarium and the skull base are normal in appearance.  The paranasal sinuses and the mastoid air cells and the middle ear  cavities are clear.       Impression:      1.  Normal head CT with no acute skull fracture or intracranial  hemorrhage identified.     CERVICAL SPINE CT TECHNIQUE: Spiral CT images were obtained from the  skull base down to the T2-3 thoracic level and the images were  reformatted and submitted in 2 mm thick axial and sagittal CT sections  with soft tissue algorithm and 1 mm thick axial, sagittal and coronal CT  sections with high-resolution bone algorithm.     FINDINGS: The images are extremely grainy from C4 down to T2 which  limits evaluation of bony detail and limits evaluation of the posterior  disc margins.     The atlantooccipital articulation is normal in  appearance.     At C1-C2 there are mild arthritic changes at the atlantodental interval  with mild marginal spurring off the anterior ring of C1 and the  odontoid. Otherwise the C1-2 level is normal in appearance.     At C2-3 the posterior disc margin, facets and uncovertebral joints are  within normal limits with no canal or foraminal narrowing.     At C3-4 the disc space, facets and uncovertebral joints are within  normal limits with no canal or foraminal narrowing.     At C4-5 the posterior disc margin, facets and uncovertebral joints are  within normal limits with no canal or foraminal narrowing.     At C5-6 the posterior endplates, facets and uncovertebral joints are  within normal limits with no canal or foraminal narrowing.     At C6-7 the posterior endplates, facets and uncovertebral joints are  within normal limits with no canal or foraminal narrowing.     At C7-T1 there are prominent left anterior marginal bridging  osteophytes. The posterior endplates and facets are normal with no canal  or foraminal narrowing.     No acute fracture is seen in the cervical spine.     IMPRESSION:   1. No acute fracture is seen in the cervical spine. The images are  extremely grainy from C4 to T2 which limits evaluation of bony detail  and limits evaluation of the posterior disc margins. There is prominent  left anterior marginal osteophytic spurring at C7-T1. The remainder of  the cervical spine CT is unremarkable.     Radiation dose reduction techniques were utilized, including automated  exposure control and exposure modulation based on body size.     This report was finalized on 12/18/2022 10:16 AM by Dr. Robert Valdivia M.D.       CT Chest Without Contrast Diagnostic [[id number]] Collected: 12/17/22 2202     Updated: 12/17/22 2202    Narrative:        Patient: CARSON MULLEN  Time Out: 22:02  Exam(s): CT CHEST Without Contrast     EXAM:    CT Chest Without Intravenous Contrast    CLINICAL HISTORY:     Reason for exam:  eval for rib fracture.    TECHNIQUE:    Axial computed tomography images of the chest without intravenous   contrast.  CTDI is 14.2 mGy and DLP is 538.6 mGy-cm.  This CT exam was   performed according to the principle of ALARA (As Low As Reasonably   Achievable) by using one or more of the following dose reduction   techniques: automated exposure control, adjustment of the mA and or kV   according to patient size, and or use of iterative reconstruction   technique.    COMPARISON:    No relevant prior studies available.    FINDINGS:    Lungs:  Unremarkable.  No mass.  No consolidation.    Pleural space:  Unremarkable.  No pneumothorax.  No significant   effusion.    Heart:  Unremarkable.  No cardiomegaly.  No significant pericardial   effusion.  No significant coronary artery calcifications.    Bones joints:  Unremarkable.  No acute fracture.  No dislocation.    Soft tissues:  Unremarkable.    Vasculature:  Unremarkable.  No thoracic aortic aneurysm.    Lymph nodes:  Unremarkable.  No enlarged lymph nodes.    Gallbladder and bile ducts:  Cholecystectomy.    Kidneys and ureters:  Atrophy of the left kidney.    IMPRESSION:         No acute findings in the chest.      Impression:          Electronically signed by Mina Veronica MD on 12-17-22 at 2202    XR Spine Lumbar Complete 4+VW [[id number]] Collected: 12/17/22 1837     Updated: 12/17/22 1845    Narrative:      Emergency 3 views of thoracic spine and lumbar spine plain film series  complete 4+ views 12/17/2022     CLINICAL HISTORY: Back pain after seizure     Thoracic spine: 3 views of thoracic spine including AP and lateral views  of the entire thoracic spine and swimmer's lateral view of lower  cervical and upper thoracic spine are submitted for interpretation. On  the lateral view there is evidence of anterior marginal endplate  spurring at all levels from T3 to T12. Overall the thoracic vertebral  body heights are well-maintained. On the AP view, the pedicles  and  spinous processes are intact.       Impression:      1. No convincing acute fracture is seen in the thoracic spine. There is  anterior marginal endplate spurring at all levels from T3 to T12.     Lumbar spine plain films: A total 5 views lumbar spine including AP  bilateral oblique and lateral views of the entire lumbar spine and coned  down lateral view of lumbosacral junction are submitted for  interpretation. Patient's had previous lumbosacral surgery with  bilateral posterior rods bridging the posterior elements at L5-S1  anchored by bilateral pedicle screws at L5 and S1. There is a disc  implants in the L5-S1 disc space. Patient has mild levoscoliotic  curvature lumbar spine with its apex at the L3-4 lumbar level. Overall,  the lumbar vertebral body heights are well-maintained. The disc heights  from T12 to L5 are well-maintained. On the oblique views the pars  interarticularis are intact. On the AP view, the pedicles, transverse  and spinous processes are intact.     IMPRESSION:  1. No acute fracture is seen lumbar spine.  2. Patient had previous bilateral ramses and pedicle screw fixation L5-S1  disc implant L5-S1 disc space is mild levoscoliotic curvature lumbar  spine apex at L3-4.     This report was finalized on 12/17/2022 6:42 PM by Dr. Robert Valdivia M.D.       XR Spine Thoracic 3 View [[id number]] Collected: 12/17/22 1837     Updated: 12/17/22 1845    Narrative:      Emergency 3 views of thoracic spine and lumbar spine plain film series  complete 4+ views 12/17/2022     CLINICAL HISTORY: Back pain after seizure     Thoracic spine: 3 views of thoracic spine including AP and lateral views  of the entire thoracic spine and swimmer's lateral view of lower  cervical and upper thoracic spine are submitted for interpretation. On  the lateral view there is evidence of anterior marginal endplate  spurring at all levels from T3 to T12. Overall the thoracic vertebral  body heights are well-maintained. On the AP  view, the pedicles and  spinous processes are intact.       Impression:      1. No convincing acute fracture is seen in the thoracic spine. There is  anterior marginal endplate spurring at all levels from T3 to T12.     Lumbar spine plain films: A total 5 views lumbar spine including AP  bilateral oblique and lateral views of the entire lumbar spine and coned  down lateral view of lumbosacral junction are submitted for  interpretation. Patient's had previous lumbosacral surgery with  bilateral posterior rods bridging the posterior elements at L5-S1  anchored by bilateral pedicle screws at L5 and S1. There is a disc  implants in the L5-S1 disc space. Patient has mild levoscoliotic  curvature lumbar spine with its apex at the L3-4 lumbar level. Overall,  the lumbar vertebral body heights are well-maintained. The disc heights  from T12 to L5 are well-maintained. On the oblique views the pars  interarticularis are intact. On the AP view, the pedicles, transverse  and spinous processes are intact.     IMPRESSION:  1. No acute fracture is seen lumbar spine.  2. Patient had previous bilateral ramses and pedicle screw fixation L5-S1  disc implant L5-S1 disc space is mild levoscoliotic curvature lumbar  spine apex at L3-4.     This report was finalized on 12/17/2022 6:42 PM by Dr. Robert Valdivia M.D.                I reviewed the patient's new clinical results / labs / tests / procedures      Assessment/Plan     Active Hospital Problems    Diagnosis  POA   • **Intractable back pain [M54.9]  Unknown   • Seizures (HCC) [R56.9]  Yes   • GERD without esophagitis [K21.9]  Yes   • Bilateral leg weakness [R29.898]  Yes   • On anticoagulant therapy [Z79.01]  Not Applicable   • Anxiety disorder [F41.9]  Yes   • Obesity (BMI 30-39.9) [E66.9]  Yes      Resolved Hospital Problems   No resolved problems to display.           · Exacerbation of degenerative disc disease of the lumbar spine with acute exacerbation and right sciatica in a patient  with a history of degenerative disc disease of the lumbar spine status post L5-S1 decompression and fusion surgery.  MRI with no acute disease or significant changes.  Positive for radiculopathy and myelopathy.  No fever.  We will continue Neurontin and Percocet and add baclofen/p.o. Decadron/IV Toradol.  Appreciate neurosurgery consultation.  · History of seizures disorder/migraine.  There is no relapse of the seizure.  Neurological examination is stable.  Neurology consult noted and appreciated.  Patient to continue Depakote.  Benadryl and Compazine treatment of the migraine helped.  Currently on magnesium and riboflavin.  · Splenic thrombus/infarction.  Continue Eliquis.  · G ERD. Continue Protonix and Carafate.  · Anxiety.  Continue fluoxetine.  · Hypokalemia.  Normal magnesium.  Will substitute  · Mild anemia.  Hemoglobin is stable.  Will monitor.  · UTI.  Currently not on antibiotics.  We will stop Rocephin awaiting urine culture.    Discussed my findings and plan of treatment with the patient.  DVT prophylaxis.  Patient anticoagulated.      Indra Whitman MD  [facility]  12/18/22  17:36 EST          Electronically signed by Indra Whitman MD at 12/18/22 1743          Consult Notes (last 48 hours)      Bjorn Fox MD at 12/18/22 1221      Consult Orders    1. Inpatient Neurology Consult General [[id number]] ordered by Beatriz Hernandez APRN at 12/17/22 1920               Neurology Consult Note    Consult Date: 12/18/2022    Referring MD: Sen Salguero MD    Reason for Consult I have been asked to see the patient in neurological consultation to render advice and opinion regarding seizure    Belen Allen is a 42 y.o. female with a reported history of generalized seizure since her mid 20s.  She reports that currently she has 1-3 seizures per month.  These consist of a vague sensation of dizziness followed by loss of consciousness and generalized shaking.   She occasionally bites her tongue.  She was brought in for a similar spell but did not sustain any injuries.  She was seen by my colleague Dr. Antony about a year ago and he started her on Depakote.  She reports that she is still taking Depakote.  It was prescribed as 3 times daily but she typically only takes it twice daily.  Her level was basically undetectable on admission.  I have restarted it today.  No further seizures since admission.  She has not establish care with a neurologist as of yet.    She also complains of a long history of migraines.  She currently complains of bitemporal headache with photophobia and nausea.    Past Medical History:   Diagnosis Date   • Abnormal Pap smear of cervix    • Ankle fracture 2013   • H/O Elevated liver enzymes    • History of chronic back pain    • History of migraine    • History of urinary tract infection    • Injury of back    • PONV (postoperative nausea and vomiting)    • Seasonal allergies    • Seizure (HCC)     Takes Keppra       ROS:  No fevers, chills  No weakness, numbness, + headache    Exam  /89 (BP Location: Right arm, Patient Position: Lying)   Pulse 82   Temp 97.7 °F (36.5 °C) (Oral)   Resp 18   Ht 162.6 cm (64\")   Wt 97.9 kg (215 lb 13.3 oz)   SpO2 100%   BMI 37.05 kg/m²   Gen: NAD, vitals reviewed  MS: oriented x3, recent/remote memory intact, normal attention/concentration, language intact, no neglect.  CN: visual acuity grossly normal, PERRL, EOMI, no facial droop, no dysarthria  Motor: 5/5 throughout upper and lower extremities, normal tone    DATA:    Lab Results   Component Value Date    GLUCOSE 82 12/18/2022    CALCIUM 8.5 (L) 12/18/2022     12/18/2022    K 3.2 (L) 12/18/2022    CO2 29.0 12/18/2022     12/18/2022    BUN 6 12/18/2022    CREATININE 0.75 12/18/2022    EGFRIFNONA 94 02/02/2022    BCR 8.0 12/18/2022    ANIONGAP 6.0 12/18/2022     Lab Results   Component Value Date    WBC 8.02 12/18/2022    HGB 11.3 (L)  2022    HCT 34.4 2022    MCV 95.6 2022     2022       Lab review: CBC, BMP unremarkable, VPA level undetectable on admission.    Diagnoses:  Epilepsy, unclear if focal or generalized, not intractable, not in status  Migraine without aura, without status migrainosus, not intractable  Chronic low back pain    Comment: Depakote restarted as 750 DR tablets twice daily.  She should continue this on discharge.  I will try to arrange follow-up care with one of our epileptologist.  I think she probably needs to be referred to EMU at some point.    I will treat her migraine with Benadryl and Compazine.  We will start magnesium and riboflavin prophylactically for migraine    PLAN:  Would observe until 24 hours seizure-free  I have no opposition to discharge today if Depakote can be restarted    Our office will call her in the next 1 to 2 weeks to arrange follow-up care.        Electronically signed by Bjorn Fox MD at 22 1228     Dennys Oliveros IV, MD at 22 1212            [facility]   Consult Note    Patient Name: Belen Allen  : 1980  MRN: [id number]  Primary Care Physician:  Sahil Cornelius MD  Referring Physician: Sen Salguero MD  Date of admission: 2022    Subjective   Subjective     Reason for Consult/ Chief Complaint: Back and leg pain    HPI:  Belen Allen is a 42 y.o. female with a remote history of L5-S1 decompression and fusion who had a fall and developed severe back pain as well as pain radiating down her right lower extremity.  Patient describes the pain is banding across her low back and extending up into her thoracic region.  The pain also radiates from her buttock down to her foot.  She has noticed some numbness in the same area as well as weakness and dorsiflexion and plantarflexion of her foot.  No incontinence.     Review of Systems   All systems were reviewed and negative except for: Low back pain, leg pain,  leg numbness, leg weakness    Personal History     Past Medical History:   Diagnosis Date   • Abnormal Pap smear of cervix    • Ankle fracture 2013   • H/O Elevated liver enzymes    • History of chronic back pain    • History of migraine    • History of urinary tract infection    • Injury of back    • PONV (postoperative nausea and vomiting)    • Seasonal allergies    • Seizure (HCC)     Takes Keppra       Past Surgical History:   Procedure Laterality Date   • BACK SURGERY  2006    fusion L4, L5     • CHOLECYSTECTOMY  2014   • DILATATION AND CURETTAGE  2009   • ENDOSCOPY N/A 11/27/2022    Procedure: ESOPHAGOGASTRODUODENOSCOPY with biopsy;  Surgeon: Christiana Mann MD;  Location: [facility] ENDOSCOPY;  Service: Gastroenterology;  Laterality: N/A;  gastritis, duodenitis, irregular z line   • ERCP N/A 6/3/2021    Procedure: ENDOSCOPIC RETROGRADE CHOLANGIOPANCREATOGRAPHY WITH SPHINCTEROTOMY, DILATION WITH BALLOON CLEARANCE  (12MM-15MM);  Surgeon: Bjorn Flannery MD;  Location: [facility] ENDOSCOPY;  Service: Gastroenterology;  Laterality: N/A;  DILATED COMMON BILE DUCT   • SKIN SURGERY     • TUBAL ABDOMINAL LIGATION  2013   • WISDOM TOOTH EXTRACTION  2018    x2       Family History: family history includes Allergic rhinitis in her mother, paternal grandfather, and sister; Breast cancer in her maternal aunt; Cancer in her maternal grandmother and paternal grandfather; Prostate cancer in her paternal grandfather; Stroke in her mother. Otherwise pertinent FHx was reviewed and not pertinent to current issue.    Social History:  reports that she has been smoking cigarettes. She has been smoking an average of .5 packs per day. She has never used smokeless tobacco. She reports that she does not currently use alcohol. She reports that she does not currently use drugs.    Home Medications:  Calcium Carbonate-Vitamin D, FLUoxetine, LORazepam, SUMAtriptan, amitriptyline, apixaban, dicyclomine, folic acid, gabapentin, hydrOXYzine,  loratadine, melatonin, methocarbamol, ondansetron ODT, pantoprazole, polyethylene glycol, sucralfate, traZODone, and vitamin D    Allergies:  Allergies   Allergen Reactions   • Lidocaine Rash     Pt reports lidocaine patches make her breakout in a rash       Objective    Objective     Vitals:   Temp:  [97.7 °F (36.5 °C)-99.1 °F (37.3 °C)] 97.7 °F (36.5 °C)  Heart Rate:  [] 82  Resp:  [18] 18  BP: (108-142)/(65-98) 125/89  Flow (L/min):  [2] 2    Physical Exam  Awake and alert  Extraocular movements intact  Left lower extremity full strength  Right lower extremity full strength except EHL/TA and gastroc 4- out of 5  Decreased sensation over L5 and S1 dermatomes on the right  No lower extremity edema  No respiratory distress  Skin intact    Result Review    Result Review:  I have personally reviewed the results from the time of this admission to 12/18/2022 12:12 EST and agree with these findings:  []  Laboratory list / accordion  []  Microbiology  [x]  Radiology  []  EKG/Telemetry   []  Cardiology/Vascular   []  Pathology  []  Old records  []  Other:  Most notable findings include: MRI thoracic spine: No significant central or foraminal stenosis no cord signal change  X-ray lumbar spine: Degenerative changes and demonstration of L5-S1 hardware  CT cervical spine: No evidence of fracture or other acute process      Assessment & Plan   Assessment / Plan     Brief Patient Summary:  Belen Allen is a 42 y.o. female who presents status post fall with acute low back pain and radiculopathy with weakness in dorsiflexion and plantarflexion on the right concerning for nerve compression.    Active Hospital Problems:  Active Hospital Problems    Diagnosis    • **Intractable back pain    • Seizures (HCC)    • GERD without esophagitis    • Bilateral leg weakness    • On anticoagulant therapy    • Anxiety disorder    • Obesity (BMI 30-39.9)      Plan:   -No emergent neurosurgical intervention indicated at this  time  -Lower extremity symptoms concerning for severe radiculopathy.  Recommend MRI lumbar spine to further evaluate  -We will follow-up after MRI is complete with a plan  -Recommend steroids for treatment of symptoms  -Care per primary team      Dennys Oliveros IV, MD    Electronically signed by Dennys Oliveros IV, MD at 12/18/22 1777

## 2022-12-19 NOTE — PROGRESS NOTES
[facility] NEUROSURGERY PROGRESS NOTE      CC: Back and right leg pain, weakness after fall at home      Subjective     Interval History: Sitting up in bed. Continues to report severe back pain that radiates from her neck to her low back as well as right leg pain, tingling, and weakness. Reports improvement in mobility in right foot, ankle, and toes. Asking for an increase in dilaudid dosing. Was able to ambulate some with nursing but reports that her right leg began shaking and was returned to bed. No new complaints. No acute events reported overnight.     ROS:  Constitutional: No fever, chills  GI: No nausea, vomiting  MS: +back, RLE pain  Neuro: +RLE weakness, numbness, tingling.  +balance difficulties  : No difficulty voiding, no incontinence    Objective     Vital signs in last 24 hours:  Temp:  [97.4 °F (36.3 °C)-98.4 °F (36.9 °C)] 97.4 °F (36.3 °C)  Heart Rate:  [89-96] 89  Resp:  [16-18] 16  BP: (119-128)/(67-89) 122/67    Intake/Output this shift:  No intake/output data recorded.    LABS:  Results from last 7 days   Lab Units 12/19/22  0614 12/18/22  0627 12/17/22  1628   WBC 10*3/mm3 6.13 8.02 7.16   HEMOGLOBIN g/dL 12.2 11.3* 12.4   HEMATOCRIT % 36.2 34.4 36.4   PLATELETS 10*3/mm3 252 230 284     Results from last 7 days   Lab Units 12/19/22  0934 12/19/22  0614 12/18/22  0627 12/17/22  1628 12/13/22 2025   SODIUM mmol/L 139 140 141 143 139   POTASSIUM mmol/L 5.0 5.5* 3.2* 3.5 2.8*   CHLORIDE mmol/L 107 107 106 108* 105   CO2 mmol/L 24.0 27.2 29.0 27.0 24.0   BUN mg/dL 6 6 6 4* 8   CREATININE mg/dL 0.77 0.79 0.75 0.69 0.78   CALCIUM mg/dL 8.7 8.7 8.5* 8.4* 8.9   BILIRUBIN mg/dL 0.2  --   --  0.2 0.2   ALK PHOS U/L 98  --   --  111 126*   ALT (SGPT) U/L 22  --   --  31 83*   AST (SGOT) U/L 18  --   --  23 26   GLUCOSE mg/dL 158* 117* 82 106* 105*         IMAGING STUDIES:  XR Spine Thoracic 3 View    Result Date: 12/17/2022  1. No convincing acute fracture is seen in the thoracic spine. There is anterior  marginal endplate spurring at all levels from T3 to T12.  Lumbar spine plain films: A total 5 views lumbar spine including AP bilateral oblique and lateral views of the entire lumbar spine and coned down lateral view of lumbosacral junction are submitted for interpretation. Patient's had previous lumbosacral surgery with bilateral posterior rods bridging the posterior elements at L5-S1 anchored by bilateral pedicle screws at L5 and S1. There is a disc implants in the L5-S1 disc space. Patient has mild levoscoliotic curvature lumbar spine with its apex at the L3-4 lumbar level. Overall, the lumbar vertebral body heights are well-maintained. The disc heights from T12 to L5 are well-maintained. On the oblique views the pars interarticularis are intact. On the AP view, the pedicles, transverse and spinous processes are intact.  IMPRESSION: 1. No acute fracture is seen lumbar spine. 2. Patient had previous bilateral ramses and pedicle screw fixation L5-S1 disc implant L5-S1 disc space is mild levoscoliotic curvature lumbar spine apex at L3-4.  This report was finalized on 12/17/2022 6:42 PM by Dr. Robert Valdivia M.D.      CT Head Without Contrast    Result Date: 12/18/2022  1.  Normal head CT with no acute skull fracture or intracranial hemorrhage identified.  CERVICAL SPINE CT TECHNIQUE: Spiral CT images were obtained from the skull base down to the T2-3 thoracic level and the images were reformatted and submitted in 2 mm thick axial and sagittal CT sections with soft tissue algorithm and 1 mm thick axial, sagittal and coronal CT sections with high-resolution bone algorithm.  FINDINGS: The images are extremely grainy from C4 down to T2 which limits evaluation of bony detail and limits evaluation of the posterior disc margins.  The atlantooccipital articulation is normal in appearance.  At C1-C2 there are mild arthritic changes at the atlantodental interval with mild marginal spurring off the anterior ring of C1 and the  odontoid. Otherwise the C1-2 level is normal in appearance.  At C2-3 the posterior disc margin, facets and uncovertebral joints are within normal limits with no canal or foraminal narrowing.  At C3-4 the disc space, facets and uncovertebral joints are within normal limits with no canal or foraminal narrowing.  At C4-5 the posterior disc margin, facets and uncovertebral joints are within normal limits with no canal or foraminal narrowing.  At C5-6 the posterior endplates, facets and uncovertebral joints are within normal limits with no canal or foraminal narrowing.  At C6-7 the posterior endplates, facets and uncovertebral joints are within normal limits with no canal or foraminal narrowing.  At C7-T1 there are prominent left anterior marginal bridging osteophytes. The posterior endplates and facets are normal with no canal or foraminal narrowing.  No acute fracture is seen in the cervical spine.  IMPRESSION: 1. No acute fracture is seen in the cervical spine. The images are extremely grainy from C4 to T2 which limits evaluation of bony detail and limits evaluation of the posterior disc margins. There is prominent left anterior marginal osteophytic spurring at C7-T1. The remainder of the cervical spine CT is unremarkable.  Radiation dose reduction techniques were utilized, including automated exposure control and exposure modulation based on body size.  This report was finalized on 12/18/2022 10:16 AM by Dr. Robert Valdivia M.D.      CT Chest Without Contrast Diagnostic    Result Date: 12/17/2022  Electronically signed by Mina Veronica MD on 12-17-22 at 2202    CT Cervical Spine Without Contrast    Result Date: 12/18/2022  1.  Normal head CT with no acute skull fracture or intracranial hemorrhage identified.  CERVICAL SPINE CT TECHNIQUE: Spiral CT images were obtained from the skull base down to the T2-3 thoracic level and the images were reformatted and submitted in 2 mm thick axial and sagittal CT sections with soft  tissue algorithm and 1 mm thick axial, sagittal and coronal CT sections with high-resolution bone algorithm.  FINDINGS: The images are extremely grainy from C4 down to T2 which limits evaluation of bony detail and limits evaluation of the posterior disc margins.  The atlantooccipital articulation is normal in appearance.  At C1-C2 there are mild arthritic changes at the atlantodental interval with mild marginal spurring off the anterior ring of C1 and the odontoid. Otherwise the C1-2 level is normal in appearance.  At C2-3 the posterior disc margin, facets and uncovertebral joints are within normal limits with no canal or foraminal narrowing.  At C3-4 the disc space, facets and uncovertebral joints are within normal limits with no canal or foraminal narrowing.  At C4-5 the posterior disc margin, facets and uncovertebral joints are within normal limits with no canal or foraminal narrowing.  At C5-6 the posterior endplates, facets and uncovertebral joints are within normal limits with no canal or foraminal narrowing.  At C6-7 the posterior endplates, facets and uncovertebral joints are within normal limits with no canal or foraminal narrowing.  At C7-T1 there are prominent left anterior marginal bridging osteophytes. The posterior endplates and facets are normal with no canal or foraminal narrowing.  No acute fracture is seen in the cervical spine.  IMPRESSION: 1. No acute fracture is seen in the cervical spine. The images are extremely grainy from C4 to T2 which limits evaluation of bony detail and limits evaluation of the posterior disc margins. There is prominent left anterior marginal osteophytic spurring at C7-T1. The remainder of the cervical spine CT is unremarkable.  Radiation dose reduction techniques were utilized, including automated exposure control and exposure modulation based on body size.  This report was finalized on 12/18/2022 10:16 AM by Dr. Robert Valdivia M.D.      MRI Thoracic Spine Without  Contrast    Result Date: 12/19/2022  1. There are anterior marginal bridging osteophytes and some flowing ossification anterior to the vertebra from T3 down to T12 compatible with diffuse idiopathic skeletal hyperostosis and there are thin rinds of marrow edema in the very anterior aspect of the vertebrae from the inferior body of T3 down to the superior body of T12 precisely where there is flowing ossification and anterior marginal endplate spurring and this is most probably reactive marrow changes due to the diffuse idiopathic skeletal hyperostosis.  The remainder of the thoracic spine MRI is within normal limits.  This report was finalized on 12/19/2022 7:25 AM by Dr. Robert Valdivia M.D.      XR Spine Lumbar Complete 4+VW    Result Date: 12/17/2022  1. No convincing acute fracture is seen in the thoracic spine. There is anterior marginal endplate spurring at all levels from T3 to T12.  Lumbar spine plain films: A total 5 views lumbar spine including AP bilateral oblique and lateral views of the entire lumbar spine and coned down lateral view of lumbosacral junction are submitted for interpretation. Patient's had previous lumbosacral surgery with bilateral posterior rods bridging the posterior elements at L5-S1 anchored by bilateral pedicle screws at L5 and S1. There is a disc implants in the L5-S1 disc space. Patient has mild levoscoliotic curvature lumbar spine with its apex at the L3-4 lumbar level. Overall, the lumbar vertebral body heights are well-maintained. The disc heights from T12 to L5 are well-maintained. On the oblique views the pars interarticularis are intact. On the AP view, the pedicles, transverse and spinous processes are intact.  IMPRESSION: 1. No acute fracture is seen lumbar spine. 2. Patient had previous bilateral ramses and pedicle screw fixation L5-S1 disc implant L5-S1 disc space is mild levoscoliotic curvature lumbar spine apex at L3-4.  This report was finalized on 12/17/2022 6:42 PM by   Robert Valdivia M.D.      MRI Lumbar Spine With & Without Contrast    Result Date: 12/19/2022  1. No significant change when compared to prior lumbar spine MRI from [facility] on 11/27/2022. 2. Patient has had surgery at L5-S1 with bilateral laminectomies at L5 and medial facetectomies at L5-S1 with bilateral posterior bridging rods anchored by bilateral pedicle screws at L5 and S1.  There is a disc implant in the L5-S1 disc space and there is no evidence of solid bony fusion.  There remains minimal posterior spurring.  There is no residual canal or lateral recess narrowing.  There is minimal left and mild right bony foraminal narrowing. 3. The remainder of the lumbar spine MRI is normal.  Specifically no disc herniation, canal or foraminal narrowing is seen from T12 to L5 and no bone marrow edema is seen with no acute posttraumatic abnormality identified in the lumbar spine.  This report was finalized on 12/19/2022 7:23 AM by Dr. Robert Valdivia M.D.        I personally viewed and interpreted the patient's clinical data and spinal imaging and discussed the patient with Dr. Brock who also reviewed the imaging.    Meds reviewed/changed: Yes    Scheduled Meds:apixaban, 5 mg, Oral, Q12H  baclofen, 10 mg, Oral, Q8H  cefTRIAXone, 2 g, Intravenous, Q24H  cetirizine, 10 mg, Oral, Daily  dexamethasone, 4 mg, Oral, Q8H  dicyclomine, 20 mg, Oral, 4x Daily  divalproex, 750 mg, Oral, Q12H  FLUoxetine, 60 mg, Oral, Nightly  gabapentin, 400 mg, Oral, Q8H  ketorolac, 30 mg, Intravenous, Q6H  magnesium oxide, 400 mg, Oral, Daily  melatonin, 10 mg, Oral, Nightly  pantoprazole, 40 mg, Oral, BID  polyethylene glycol, 17 g, Oral, Daily  sodium chloride, 10 mL, Intravenous, Q12H  sucralfate, 1 g, Oral, 4x Daily  traZODone, 100 mg, Oral, Nightly  Vitamin B-2, 400 mg, Oral, Daily      Continuous Infusions:   PRN Meds:.•  acetaminophen **OR** acetaminophen **OR** acetaminophen  •  calcium carbonate  •  diphenhydrAMINE  •   HYDROcodone-acetaminophen  •  HYDROmorphone  •  hydrOXYzine  •  ondansetron **OR** ondansetron  •  potassium chloride **OR** potassium chloride **OR** potassium chloride  •  prochlorperazine  •  [COMPLETED] Insert Peripheral IV **AND** sodium chloride  •  sodium chloride  •  sodium chloride      Physical Exam:    General:   Awake, alert, oriented x3. Speech clear with no aphasia  Back:    +LBP     Motor: Difficulty with RLE assessment 2/2 increased back pain with exam. Normal bulk and tone in upper and lower extremities.  No fasciculations, rigidity, spasticity, or abnormal movements.  Sensation: Normal to light touch  Station and Gait:             Able to minimally ambulate with nursing staff.  Extremities:   Wearing SCD      Assessment & Plan     ASSESSMENT:      Intractable back pain    Anxiety disorder    Bilateral leg weakness    On anticoagulant therapy    Obesity (BMI 30-39.9)    GERD without esophagitis    Seizures (HCC)    Hypokalemia    42-year-old patient with a remote history of L5-S1 decompression and fusion with Dr. Wilkins 2005 who presents to the ED diffuse back pain with radiation to the right lower extremity after an apparent seizure and fall at home.    Right lower extremity exam was somewhat limited due to pain as she reported an increase in back pain with movement of this extremity, but patient reports improved movement in the leg, foot, and toes.     There are no structural findings on thoracic or lumbar spine MRI imaging to explain patient's RLE symptomology. There is no evidence of disc herniation, canal or foraminal narrowing. There is no evidence of recurrent or residual canal or lateral recess narrowing at L5-S1, area of previous surgery. On XR and CT imaging, the hardware at this level is intact as well with no evidence of vertebral fracture. The patient tells me that she was told by Dr. Wilkins \"years ago\" that one of the screws in her low back were \"loose\" and that she hadn't fused, but  states that she never followed up with him concerning these findings. Advised her of the spinal imaging and our recommendations of continued PT, pain management and follow-up with Dr. Wilkins for further treatment recommendations.     PLAN:   -Pain management  -PT  -Follow-up with Dr. Wilkins for further treatment recommendations.   -No neurosurgical intervention warranted    I discussed the patient's findings and my recommendations with patient, nursing staff and Dr. Brock       LOS: 0 days       Chelsy Ku, APRN  12/19/2022  11:07 EST    \"Dictated utilizing Dragon dictation\".

## 2022-12-19 NOTE — NURSING NOTE
Placed a call out to Gemini Vaughan pt requesting for her Hydrocodone to be increased and asking for sleeping medication. Melatonin given and Trazdone      N.O orders at this time Per Gemini Vaughan

## 2022-12-19 NOTE — CASE MANAGEMENT/SOCIAL WORK
Discharge Planning Assessment  [facility]     Patient Name: Belen Allen  MRN: [id number]  Today's Date: 12/19/2022    Admit Date: 12/17/2022    Plan: Home with family.  Pt requests outpt PT and a BSC for home.   Discharge Needs Assessment     Row Name 12/19/22 1529       Living Environment    People in Home significant other;child(manuel), dependent    Current Living Arrangements home    Primary Care Provided by self;spouse/significant other    Provides Primary Care For child(manuel)    Family Caregiver if Needed significant other    Quality of Family Relationships helpful;supportive    Able to Return to Prior Arrangements yes       Transition Planning    Patient/Family Anticipates Transition to home with help/services    Patient/Family Anticipated Services at Transition outpatient care    Transportation Anticipated family or friend will provide       Discharge Needs Assessment    Equipment Currently Used at Home walker, rolling    Concerns to be Addressed discharge planning    Equipment Needed After Discharge commode    Outpatient/Agency/Support Group Needs outpatient therapy    Discharge Facility/Level of Care Needs outpatient therapy    Provided Post Acute Provider List? Yes    Post Acute Provider List Outpatient Therapy               Discharge Plan     Row Name 12/19/22 7182       Plan    Plan Home with family.  Pt requests outpt PT and a BSC for home.    Plan Comments S/W pt at bedside.  Facesheet and pharmacy info confirmed.  Pt enrolled in Meds to Bed.  Pt lives in a 1 story house w/ her significant other Ced and 2 dependent children ages 10 and 12.  She has a walker at home.  She has not driven in 3 months - Ced provides transport as needed.  No hx of  or SNF.  Pt requests outpt PT upon DC and would like to go to [facility].  She states she is having trouble getting to the bathroom and requests a bedside commode for home. CCP will conrtinue to follow and assist as needed.  ...........Vanita BERMAN/ OLU              Continued Care and Services - Admitted Since 12/17/2022    Coordination has not been started for this encounter.          Demographic Summary     Row Name 12/19/22 1528       General Information    Arrived From home    Referral Source admission list    Reason for Consult discharge planning    Preferred Language English               Functional Status     Row Name 12/19/22 1528       Functional Status    Usual Activity Tolerance moderate    Current Activity Tolerance fair       Functional Status, IADL    Medications independent    Meal Preparation assistive person    Housekeeping assistive person    Laundry assistive person    Shopping assistive person       Mental Status    General Appearance WDL WDL       Employment/    Employment Status unemployed                         Vanita Dawn RN

## 2022-12-19 NOTE — PLAN OF CARE
Problem: Adult Inpatient Plan of Care  Goal: Absence of Hospital-Acquired Illness or Injury  Outcome: Ongoing, Progressing  Intervention: Identify and Manage Fall Risk  Recent Flowsheet Documentation  Taken 12/19/2022 0000 by Laura Dobbs RN  Safety Promotion/Fall Prevention:   safety round/check completed   room organization consistent   activity supervised   lighting adjusted  Taken 12/18/2022 2002 by Laura Dobbs RN  Safety Promotion/Fall Prevention:   activity supervised   lighting adjusted   room organization consistent   safety round/check completed   clutter free environment maintained  Intervention: Prevent Skin Injury  Recent Flowsheet Documentation  Taken 12/19/2022 0000 by Laura Dobbs RN  Body Position: position changed independently  Skin Protection: adhesive use limited  Taken 12/18/2022 2002 by Laura Dobbs RN  Body Position: position changed independently  Skin Protection: adhesive use limited  Intervention: Prevent and Manage VTE (Venous Thromboembolism) Risk  Recent Flowsheet Documentation  Taken 12/19/2022 0000 by Laura Dobbs RN  Activity Management: activity adjusted per tolerance  VTE Prevention/Management: sequential compression devices off  Taken 12/18/2022 2002 by Laura Dobbs RN  Activity Management: activity adjusted per tolerance  VTE Prevention/Management: sequential compression devices off  Intervention: Prevent Infection  Recent Flowsheet Documentation  Taken 12/19/2022 0000 by Laura Dobbs RN  Infection Prevention:   single patient room provided   rest/sleep promoted  Taken 12/18/2022 2002 by Laura Dobbs RN  Infection Prevention:   single patient room provided   rest/sleep promoted   hand hygiene promoted   environmental surveillance performed     Problem: Adult Inpatient Plan of Care  Goal: Absence of Hospital-Acquired Illness or Injury  Intervention: Prevent Skin Injury  Recent Flowsheet Documentation  Taken 12/19/2022 0000 by Laura Dobbs RN  Body Position:  position changed independently  Skin Protection: adhesive use limited  Taken 12/18/2022 2002 by Laura Dobbs RN  Body Position: position changed independently  Skin Protection: adhesive use limited     Problem: Adult Inpatient Plan of Care  Goal: Absence of Hospital-Acquired Illness or Injury  Intervention: Prevent Infection  Recent Flowsheet Documentation  Taken 12/19/2022 0000 by Laura Dobbs RN  Infection Prevention:   single patient room provided   rest/sleep promoted  Taken 12/18/2022 2002 by Laura Dobbs RN  Infection Prevention:   single patient room provided   rest/sleep promoted   hand hygiene promoted   environmental surveillance performed   Goal Outcome Evaluation:

## 2022-12-19 NOTE — PLAN OF CARE
Goal Outcome Evaluation:  Plan of Care Reviewed With: patient        Progress: no change  Outcome Evaluation: VSS. AXO. RA. Pain poorly controlled per patient. Pain 8-10/10 consistetly with current regimen. PT needed. CTM

## 2022-12-20 ENCOUNTER — READMISSION MANAGEMENT (OUTPATIENT)
Dept: CALL CENTER | Facility: HOSPITAL | Age: 42
End: 2022-12-20

## 2022-12-20 VITALS
WEIGHT: 215.83 LBS | RESPIRATION RATE: 18 BRPM | HEART RATE: 81 BPM | BODY MASS INDEX: 36.85 KG/M2 | SYSTOLIC BLOOD PRESSURE: 154 MMHG | DIASTOLIC BLOOD PRESSURE: 98 MMHG | TEMPERATURE: 98.2 F | HEIGHT: 64 IN | OXYGEN SATURATION: 93 %

## 2022-12-20 PROBLEM — M54.31 SCIATICA OF RIGHT SIDE: Status: ACTIVE | Noted: 2022-12-20

## 2022-12-20 PROBLEM — G43.909 MIGRAINE: Status: ACTIVE | Noted: 2022-12-20

## 2022-12-20 PROBLEM — E87.6 HYPOKALEMIA: Status: RESOLVED | Noted: 2022-12-18 | Resolved: 2022-12-20

## 2022-12-20 PROBLEM — Z74.09 MOBILITY IMPAIRED: Status: ACTIVE | Noted: 2022-12-20

## 2022-12-20 LAB
ANION GAP SERPL CALCULATED.3IONS-SCNC: 6.9 MMOL/L (ref 5–15)
BASOPHILS # BLD AUTO: 0.01 10*3/MM3 (ref 0–0.2)
BASOPHILS NFR BLD AUTO: 0.1 % (ref 0–1.5)
BUN SERPL-MCNC: 10 MG/DL (ref 6–20)
BUN/CREAT SERPL: 13.5 (ref 7–25)
CALCIUM SPEC-SCNC: 9.2 MG/DL (ref 8.6–10.5)
CHLORIDE SERPL-SCNC: 106 MMOL/L (ref 98–107)
CO2 SERPL-SCNC: 29.1 MMOL/L (ref 22–29)
CREAT SERPL-MCNC: 0.74 MG/DL (ref 0.57–1)
DEPRECATED RDW RBC AUTO: 46 FL (ref 37–54)
EGFRCR SERPLBLD CKD-EPI 2021: 104.4 ML/MIN/1.73
EOSINOPHIL # BLD AUTO: 0 10*3/MM3 (ref 0–0.4)
EOSINOPHIL NFR BLD AUTO: 0 % (ref 0.3–6.2)
ERYTHROCYTE [DISTWIDTH] IN BLOOD BY AUTOMATED COUNT: 13.1 % (ref 12.3–15.4)
GLUCOSE SERPL-MCNC: 128 MG/DL (ref 65–99)
HCT VFR BLD AUTO: 33.6 % (ref 34–46.6)
HGB BLD-MCNC: 10.9 G/DL (ref 12–15.9)
IMM GRANULOCYTES # BLD AUTO: 0.05 10*3/MM3 (ref 0–0.05)
IMM GRANULOCYTES NFR BLD AUTO: 0.5 % (ref 0–0.5)
LYMPHOCYTES # BLD AUTO: 1.17 10*3/MM3 (ref 0.7–3.1)
LYMPHOCYTES NFR BLD AUTO: 10.6 % (ref 19.6–45.3)
MCH RBC QN AUTO: 30.5 PG (ref 26.6–33)
MCHC RBC AUTO-ENTMCNC: 32.4 G/DL (ref 31.5–35.7)
MCV RBC AUTO: 94.1 FL (ref 79–97)
MONOCYTES # BLD AUTO: 0.46 10*3/MM3 (ref 0.1–0.9)
MONOCYTES NFR BLD AUTO: 4.2 % (ref 5–12)
NEUTROPHILS NFR BLD AUTO: 84.6 % (ref 42.7–76)
NEUTROPHILS NFR BLD AUTO: 9.36 10*3/MM3 (ref 1.7–7)
NRBC BLD AUTO-RTO: 0 /100 WBC (ref 0–0.2)
PLATELET # BLD AUTO: 258 10*3/MM3 (ref 140–450)
PMV BLD AUTO: 10.1 FL (ref 6–12)
POTASSIUM SERPL-SCNC: 5 MMOL/L (ref 3.5–5.2)
RBC # BLD AUTO: 3.57 10*6/MM3 (ref 3.77–5.28)
SODIUM SERPL-SCNC: 142 MMOL/L (ref 136–145)
WBC NRBC COR # BLD: 11.05 10*3/MM3 (ref 3.4–10.8)

## 2022-12-20 PROCEDURE — 25010000002 KETOROLAC TROMETHAMINE PER 15 MG: Performed by: INTERNAL MEDICINE

## 2022-12-20 PROCEDURE — 63710000001 ONDANSETRON PER 8 MG: Performed by: NURSE PRACTITIONER

## 2022-12-20 PROCEDURE — 97530 THERAPEUTIC ACTIVITIES: CPT

## 2022-12-20 PROCEDURE — 97116 GAIT TRAINING THERAPY: CPT

## 2022-12-20 PROCEDURE — 85025 COMPLETE CBC W/AUTO DIFF WBC: CPT | Performed by: INTERNAL MEDICINE

## 2022-12-20 PROCEDURE — 63710000001 DEXAMETHASONE PER 0.25 MG: Performed by: INTERNAL MEDICINE

## 2022-12-20 PROCEDURE — 25010000002 ONDANSETRON PER 1 MG: Performed by: NURSE PRACTITIONER

## 2022-12-20 PROCEDURE — 80048 BASIC METABOLIC PNL TOTAL CA: CPT | Performed by: INTERNAL MEDICINE

## 2022-12-20 RX ORDER — BACLOFEN 10 MG/1
10 TABLET ORAL EVERY 8 HOURS SCHEDULED
Qty: 90 TABLET | Refills: 3 | Status: SHIPPED | OUTPATIENT
Start: 2022-12-20 | End: 2023-01-18 | Stop reason: HOSPADM

## 2022-12-20 RX ORDER — DEXAMETHASONE 4 MG/1
4 TABLET ORAL
Qty: 5 TABLET | Refills: 0 | Status: SHIPPED | OUTPATIENT
Start: 2022-12-25 | End: 2022-12-30

## 2022-12-20 RX ORDER — TRAZODONE HYDROCHLORIDE 50 MG/1
50-100 TABLET ORAL NIGHTLY PRN
Qty: 30 TABLET | Refills: 3 | Status: SHIPPED | OUTPATIENT
Start: 2022-12-20

## 2022-12-20 RX ORDER — ONDANSETRON 8 MG/1
8 TABLET, ORALLY DISINTEGRATING ORAL EVERY 8 HOURS PRN
Qty: 30 TABLET | Refills: 0 | Status: SHIPPED | OUTPATIENT
Start: 2022-12-20

## 2022-12-20 RX ORDER — DEXAMETHASONE 4 MG/1
4 TABLET ORAL 2 TIMES DAILY WITH MEALS
Qty: 15 TABLET | Refills: 0 | Status: SHIPPED | OUTPATIENT
Start: 2022-12-20 | End: 2022-12-30

## 2022-12-20 RX ORDER — OXYCODONE AND ACETAMINOPHEN 7.5; 325 MG/1; MG/1
1 TABLET ORAL EVERY 4 HOURS PRN
Qty: 20 TABLET | Refills: 0 | Status: SHIPPED | OUTPATIENT
Start: 2022-12-20 | End: 2022-12-26

## 2022-12-20 RX ORDER — DIVALPROEX SODIUM 250 MG/1
750 TABLET, DELAYED RELEASE ORAL EVERY 12 HOURS SCHEDULED
Qty: 60 TABLET | Refills: 3 | Status: SHIPPED | OUTPATIENT
Start: 2022-12-20 | End: 2022-12-26 | Stop reason: SDUPTHER

## 2022-12-20 RX ADMIN — DICYCLOMINE HYDROCHLORIDE 20 MG: 10 CAPSULE ORAL at 08:14

## 2022-12-20 RX ADMIN — KETOROLAC TROMETHAMINE 30 MG: 30 INJECTION, SOLUTION INTRAMUSCULAR; INTRAVENOUS at 05:19

## 2022-12-20 RX ADMIN — MAGNESIUM OXIDE 400 MG (241.3 MG MAGNESIUM) TABLET 400 MG: TABLET at 08:14

## 2022-12-20 RX ADMIN — SUCRALFATE 1 G: 1 TABLET ORAL at 11:09

## 2022-12-20 RX ADMIN — ONDANSETRON HYDROCHLORIDE 4 MG: 4 TABLET, FILM COATED ORAL at 12:57

## 2022-12-20 RX ADMIN — OXYCODONE HYDROCHLORIDE AND ACETAMINOPHEN 1 TABLET: 7.5; 325 TABLET ORAL at 09:04

## 2022-12-20 RX ADMIN — SUCRALFATE 1 G: 1 TABLET ORAL at 08:13

## 2022-12-20 RX ADMIN — ONDANSETRON 4 MG: 2 INJECTION INTRAMUSCULAR; INTRAVENOUS at 11:09

## 2022-12-20 RX ADMIN — Medication 400 MG: at 08:13

## 2022-12-20 RX ADMIN — OXYCODONE HYDROCHLORIDE AND ACETAMINOPHEN 1 TABLET: 7.5; 325 TABLET ORAL at 12:57

## 2022-12-20 RX ADMIN — OXYCODONE HYDROCHLORIDE AND ACETAMINOPHEN 1 TABLET: 7.5; 325 TABLET ORAL at 05:19

## 2022-12-20 RX ADMIN — DEXAMETHASONE 4 MG: 4 TABLET ORAL at 05:19

## 2022-12-20 RX ADMIN — Medication 10 ML: at 08:15

## 2022-12-20 RX ADMIN — ONDANSETRON 4 MG: 2 INJECTION INTRAMUSCULAR; INTRAVENOUS at 05:35

## 2022-12-20 RX ADMIN — KETOROLAC TROMETHAMINE 30 MG: 30 INJECTION, SOLUTION INTRAMUSCULAR; INTRAVENOUS at 11:09

## 2022-12-20 RX ADMIN — APIXABAN 5 MG: 5 TABLET, FILM COATED ORAL at 08:14

## 2022-12-20 RX ADMIN — PANTOPRAZOLE SODIUM 40 MG: 40 TABLET, DELAYED RELEASE ORAL at 08:14

## 2022-12-20 RX ADMIN — HYDROXYZINE HYDROCHLORIDE 25 MG: 25 TABLET ORAL at 09:04

## 2022-12-20 RX ADMIN — CETIRIZINE HYDROCHLORIDE 10 MG: 10 TABLET ORAL at 08:14

## 2022-12-20 RX ADMIN — BACLOFEN 10 MG: 10 TABLET ORAL at 05:19

## 2022-12-20 RX ADMIN — DIVALPROEX SODIUM 750 MG: 500 TABLET, DELAYED RELEASE ORAL at 08:13

## 2022-12-20 RX ADMIN — GABAPENTIN 600 MG: 300 CAPSULE ORAL at 05:19

## 2022-12-20 RX ADMIN — DICYCLOMINE HYDROCHLORIDE 20 MG: 10 CAPSULE ORAL at 11:09

## 2022-12-20 NOTE — PLAN OF CARE
Goal Outcome Evaluation:  Plan of Care Reviewed With: patient        Progress: improving  Outcome Evaluation: Pt seen for PT this AM, tolerated progressed gait distance well. Pt transferred to EOB with mod I and completed LE exercises - impaired AROM on RLE 2/2 painful muscle spasms. Attempted PROM but pt unable to fully relax, reporting feeling like she doesn't have control of RLE. Pt stood with SBA and ambulated 120ft with rwx and SBA-CGA. Pt requiring heavy cues throughout session to increase B knee flexion to improve heel strike - intermittent B toe dragging. Pt with forward flexed posture and heavy reliance on rwx for support. Pt with a couple instances of RLE muscle spasms during ambulation - requiring 1 seated rest break. Pt reports 4 falls in the last 6 months 2/2 these uncontrolled muscle spasms, very fearful of falls at home. Encouraged use of rwx and having chairs placed throughout the house for seated rest when needed. Pt has 3-4 SOMMER - discussed sequencing/safety with steps at home, pt verbalized understanding. Pt reports also getting BSC for home. Pt denies any further concerns/needs for home. Agreeble to OPPT at DC - requesting [facility] in [facility]. No further acute PT needs, noted DC orders. PT will sign-off.

## 2022-12-20 NOTE — PLAN OF CARE
Goal Outcome Evaluation:           Progress: no change  Outcome Evaluation: vss, pain controlled by prn pain meds. pt had some nausea after taking meds zofran given. pt slept all night. labs in am . will continue to monitor.

## 2022-12-20 NOTE — DISCHARGE SUMMARY
Patient Name: Belen Allen  : 1980  MRN: [id number]    Date of Admission: 2022  Date of Discharge:  2022  Primary Care Physician: Sahil Cornelius MD      Discharge Diagnoses     Active Hospital Problems    Diagnosis  POA   • **Intractable back pain [M54.9]  Yes   • Sciatica of right side [M54.31]  Yes   • Migraine [G43.909]  Yes   • Mobility impaired [Z74.09]  Yes   • Uncontrolled pain [R52]  Yes   • Seizures (HCC) [R56.9]  Yes   • GERD without esophagitis [K21.9]  Yes   • On anticoagulant therapy [Z79.01]  Not Applicable   • Anxiety disorder [F41.9]  Yes   • Obesity (BMI 30-39.9) [E66.9]  Yes      Resolved Hospital Problems    Diagnosis Date Resolved POA   • Hypokalemia [E87.6] 2022 No        Hospital Course     Brief admission history and physical.  Is refer to the H&P for full details.  A 42 years old white female with a past history of splenic infarction and thrombus/chronic back pain/degenerative disc disease of the lumbar spine status post decompression surgery/seizure disorder/obesity/GERD who presents complaining of a seizure attack and increasing low back pain.  No was noncompliance with her seizure medication.  There is positive right lower extremity radiculopathy.  Pain restricted daily activity.  Positive nausea and vomiting.  Positive palpitation.  Physical examination remarkable for a temperature of 97.7 a pulse of 95 respiratory rate of 18 blood pressure 128/89 O2 sats of 97% on 2 L rest of the examination is remarkable for obesity/L-spine tenderness and thoracic spine tenderness/right lower extremity weakness.  Hospital course.  Initial ER evaluation included a CMP that was normal except a random blood sugar of 106, chloride 108, calcium 8.4, total protein 5.8.  Valproic acid level was subtherapeutic at less than 2.8.  CBC was normal.  Ethanol level was negative.  CK was normal.  Magnesium was normal.  UA revealed positive RBCs (patient 1.)  And 21-30 white  blood cells.  Urine tox screen positive for opioids and THC.  CT scan of the chest without contrast revealed no acute disease.  ET scan of the brain was negative.  CT scan of the C-spine revealed no fractures with C7-T1 anterior spurring on the left.  Thoracic lumbar spine x-ray revealed no acute fracture with evidence of previous bilateral ramses and pedicle screw fixation at L5-S1 with disc implant.  Patient was admitted with a relapse of the seizure secondary to noncompliance with her medication and migraine.  Neurology was consulted and started the patient on migraine cocktail and this has improved.  Neurologically she remained stable except weakness of the right lower extremity.  Neurology started magnesium and riboflavin with continuation of Depakote/anesthetic/Neurontin.  They will follow-up with the patient as an outpatient.  She had no relapse of the seizure.  Her second issue was exacerbation of degenerative disc disease of the lumbar spine with acute exacerbation and right sciatica in a patient with a history of degenerative disc disease status post L5-S1 decompression surgery.  MRI I was done and revealed no significant changes.  Patient had radiculopathy and myelopathy of the right lower extremity.  There was no fever.  Neurontin was maintained.  Pain regimen instituted with Percocet/Toradol/Decadron/muscle relaxant.  She has failed epidural blocks before.  She is allergic to Lidoderm patch.  Neurosurgery consult was obtained and they did not recommend any surgical intervention and recommended physical therapy and pain control.Pain in the back and the right lower extremity together with weakness of the right lower extremity persisted but improved by the time of discharge.  She was ambulating with a walker.  Physical therapy saw the patient and they will follow-up as an outpatient.  She has a history of splenic thrombus and infarction and will continue with her Eliquis.  She has a history of GERD that  remained stable with benign GI examination on Protonix and Carafate.  As far as her anxiety we continued her on fluoxetine.  She developed hypokalemia during this admission that has resolved with substitution.  Magnesium was normal.  She developed mild anemia that needs to be followed up as an outpatient.  There was a question about a UTI and urine culture was negative and she was initially placed on Rocephin and this was DC'd.  At the time of discharge she was hemodynamically stable and she will follow up as an outpatient with neurology/neurosurgery/pain clinic/primary MD/physical.    Consultants     Consult Orders (all) (From admission, onward)     Start     Ordered    12/17/22 1919  Inpatient Neurosurgery Consult  Once,   Status:  Canceled        Specialty:  Neurosurgery  Provider:  Brian Brock MD    12/17/22 1920 12/17/22 1918  Inpatient Neurology Consult General  Once        Specialty:  Neurology  Provider:  Dennys Kumar MD    12/17/22 1920 12/17/22 1909  [facility] (on-call MD unless specified) Details  Once        Specialty:  Hospitalist  Provider:  (Not yet assigned)    12/17/22 1909              Procedures     Imaging Results (All)     Procedure Component Value Units Date/Time    MRI Thoracic Spine Without Contrast [[id number]] Collected: 12/18/22 1128     Updated: 12/19/22 0728    Narrative:      MRI OF THE THORACIC SPINE WITHOUT CONTRAST 12/18/2022     CLINICAL HISTORY: Patient fell following a seizure yesterday, has mid  back pain.     TECHNIQUE: Sagittal T1, proton density and fat-suppressed T2-weighted  images were obtained of the thoracic spine. In addition axial T1 and  T2-weighted images were obtained from C7 to L1.     FINDINGS: The thoracic cord is normal in signal intensity. The conus  terminates at the L1 lumbar level which is normal.     The thoracic posterior disc margins and facets are within normal limits  with no thoracic disc herniation, no thoracic central canal or  foraminal  narrowing identified.     There are anterior marginal bridging osteophytes at all levels from T3  down to T12 with some flowing ossification anterior to the vertebrae and  the findings are most consistent with diffuse idiopathic skeletal  hyperostosis. There is some faint T2 high signal in the anterior aspect  of the vertebra from T3 to T12, likely reactive marrow edema from the  diffuse idiopathic skeletal hyperostosis. Overall the thoracic vertebral  body heights are well-maintained.  No convincing acute compression  fracture is identified.       Impression:      1. There are anterior marginal bridging osteophytes and some flowing  ossification anterior to the vertebra from T3 down to T12 compatible  with diffuse idiopathic skeletal hyperostosis and there are thin rinds  of marrow edema in the very anterior aspect of the vertebrae from the  inferior body of T3 down to the superior body of T12 precisely where  there is flowing ossification and anterior marginal endplate spurring  and this is most probably reactive marrow changes due to the diffuse  idiopathic skeletal hyperostosis.  The remainder of the thoracic spine  MRI is within normal limits.     This report was finalized on 12/19/2022 7:25 AM by Dr. Robert Valdivia M.D.       MRI Lumbar Spine With & Without Contrast [[id number]] Collected: 12/18/22 1449     Updated: 12/19/22 0726    Narrative:      MRI OF THE LUMBAR SPINE WITH AND WITHOUT CONTRAST 12/18/2022     CLINICAL HISTORY: Patient has had previous lumbar spine surgery and  fusion of L5-S1. Patient fell and has low back pain.     TECHNIQUE: Sagittal T1, proton density fast spin echo T2, STIR and  postcontrast sagittal T1 and postcontrast sagittal fat-suppressed  T1-weighted images were obtained of the lumbar spine.  In addition axial  T2 weighted images were obtained from L1 to S2 and thin cut precontrast  and postcontrast axial T1-weighted images were obtained angled through  the interspaces  from L2 to S1.     This is correlated to a prior MRI of the lumbar spine from [facility] on 11/27/2022.     FINDINGS: The distal thoracic cord and the conus is normal in signal  intensity.  The conus terminates at the level of the mid body of L2  which is normal.     At T12-L1 the disc space and facets are normal in appearance with no  canal or foraminal narrowing.     At L1-2 the disc space and facets are normal in appearance with no canal  or foraminal narrowing.     At L2-3 the disc space and facets are normal in appearance with no canal  or foraminal narrowing.     At L3-4 the disc space and facets are normal in appearance with no canal  or foraminal narrowing.     At L4-5 the disc space and facets are normal in appearance with no canal  or foraminal narrowing.     At L5-S1 the patient has had prior surgery with bilateral laminectomies  at L5 with medial facetectomies at L5-S1.  There are bilateral rods  bridging the posterior elements at L5-S1 anchored by bilateral pedicle  screws at L5 and S1.  There are disc implants in the L5-S1 disc space. I  see no solid bony fusion. I see no canal or lateral recess narrowing.   There is minimal spurring into the foramina and there is minimal left  and there is mild right bony foraminal narrowing.     The marrow signal intensity in the lumbar spine is normal with no  discernible posttraumatic osseous abnormality seen in the lumbar spine.       Impression:      1. No significant change when compared to prior lumbar spine MRI from  [facility] on 11/27/2022.  2. Patient has had surgery at L5-S1 with bilateral laminectomies at L5  and medial facetectomies at L5-S1 with bilateral posterior bridging rods  anchored by bilateral pedicle screws at L5 and S1.  There is a disc  implant in the L5-S1 disc space and there is no evidence of solid bony  fusion.  There remains minimal posterior spurring.  There is no residual  canal or lateral recess  narrowing.  There is minimal left and mild right  bony foraminal narrowing.  3. The remainder of the lumbar spine MRI is normal.  Specifically no  disc herniation, canal or foraminal narrowing is seen from T12 to L5 and  no bone marrow edema is seen with no acute posttraumatic abnormality  identified in the lumbar spine.     This report was finalized on 12/19/2022 7:23 AM by Dr. Robert Valdivia M.D.       CT Head Without Contrast [[id number]] Collected: 12/17/22 1920     Updated: 12/18/22 1019    Narrative:      EMERGENCY NONCONTRAST HEAD CT AND NONCONTRAST CERVICAL SPINE CT  12/17/2022     CLINICAL HISTORY: Patient had a seizure, has headaches. Patient is on  Eliquis.     HEAD CT TECHNIQUE: Spiral CT images were obtained from the base of the  skull to the vertex without intravenous contrast. The images were  reformatted and submitted in 3 mm thick axial, sagittal and coronal CT  sections with brain algorithm and 2 mm thick axial CT sections with  high-resolution bone algorithm.     This is correlated to a prior MRI of the brain from [facility] on 01/04/2022 and a noncontrast head CT 01/01/2022.     FINDINGS: The brain parenchyma is normal in attenuation. The ventricles  are normal in size. I see no focal mass effect. There is no midline  shift. No extraaxial fluid collections are identified. There is no  evidence of acute intracranial hemorrhage. No acute skull fracture is  identified. The calvarium and the skull base are normal in appearance.  The paranasal sinuses and the mastoid air cells and the middle ear  cavities are clear.       Impression:      1.  Normal head CT with no acute skull fracture or intracranial  hemorrhage identified.     CERVICAL SPINE CT TECHNIQUE: Spiral CT images were obtained from the  skull base down to the T2-3 thoracic level and the images were  reformatted and submitted in 2 mm thick axial and sagittal CT sections  with soft tissue algorithm and 1 mm thick axial, sagittal  and coronal CT  sections with high-resolution bone algorithm.     FINDINGS: The images are extremely grainy from C4 down to T2 which  limits evaluation of bony detail and limits evaluation of the posterior  disc margins.     The atlantooccipital articulation is normal in appearance.     At C1-C2 there are mild arthritic changes at the atlantodental interval  with mild marginal spurring off the anterior ring of C1 and the  odontoid. Otherwise the C1-2 level is normal in appearance.     At C2-3 the posterior disc margin, facets and uncovertebral joints are  within normal limits with no canal or foraminal narrowing.     At C3-4 the disc space, facets and uncovertebral joints are within  normal limits with no canal or foraminal narrowing.     At C4-5 the posterior disc margin, facets and uncovertebral joints are  within normal limits with no canal or foraminal narrowing.     At C5-6 the posterior endplates, facets and uncovertebral joints are  within normal limits with no canal or foraminal narrowing.     At C6-7 the posterior endplates, facets and uncovertebral joints are  within normal limits with no canal or foraminal narrowing.     At C7-T1 there are prominent left anterior marginal bridging  osteophytes. The posterior endplates and facets are normal with no canal  or foraminal narrowing.     No acute fracture is seen in the cervical spine.     IMPRESSION:   1. No acute fracture is seen in the cervical spine. The images are  extremely grainy from C4 to T2 which limits evaluation of bony detail  and limits evaluation of the posterior disc margins. There is prominent  left anterior marginal osteophytic spurring at C7-T1. The remainder of  the cervical spine CT is unremarkable.     Radiation dose reduction techniques were utilized, including automated  exposure control and exposure modulation based on body size.     This report was finalized on 12/18/2022 10:16 AM by Dr. Robert Valdivia M.D.       CT Cervical Spine  Without Contrast [[id number]] Collected: 12/17/22 1920     Updated: 12/18/22 1019    Narrative:      EMERGENCY NONCONTRAST HEAD CT AND NONCONTRAST CERVICAL SPINE CT  12/17/2022     CLINICAL HISTORY: Patient had a seizure, has headaches. Patient is on  Eliquis.     HEAD CT TECHNIQUE: Spiral CT images were obtained from the base of the  skull to the vertex without intravenous contrast. The images were  reformatted and submitted in 3 mm thick axial, sagittal and coronal CT  sections with brain algorithm and 2 mm thick axial CT sections with  high-resolution bone algorithm.     This is correlated to a prior MRI of the brain from [facility] on 01/04/2022 and a noncontrast head CT 01/01/2022.     FINDINGS: The brain parenchyma is normal in attenuation. The ventricles  are normal in size. I see no focal mass effect. There is no midline  shift. No extraaxial fluid collections are identified. There is no  evidence of acute intracranial hemorrhage. No acute skull fracture is  identified. The calvarium and the skull base are normal in appearance.  The paranasal sinuses and the mastoid air cells and the middle ear  cavities are clear.       Impression:      1.  Normal head CT with no acute skull fracture or intracranial  hemorrhage identified.     CERVICAL SPINE CT TECHNIQUE: Spiral CT images were obtained from the  skull base down to the T2-3 thoracic level and the images were  reformatted and submitted in 2 mm thick axial and sagittal CT sections  with soft tissue algorithm and 1 mm thick axial, sagittal and coronal CT  sections with high-resolution bone algorithm.     FINDINGS: The images are extremely grainy from C4 down to T2 which  limits evaluation of bony detail and limits evaluation of the posterior  disc margins.     The atlantooccipital articulation is normal in appearance.     At C1-C2 there are mild arthritic changes at the atlantodental interval  with mild marginal spurring off the anterior ring of  C1 and the  odontoid. Otherwise the C1-2 level is normal in appearance.     At C2-3 the posterior disc margin, facets and uncovertebral joints are  within normal limits with no canal or foraminal narrowing.     At C3-4 the disc space, facets and uncovertebral joints are within  normal limits with no canal or foraminal narrowing.     At C4-5 the posterior disc margin, facets and uncovertebral joints are  within normal limits with no canal or foraminal narrowing.     At C5-6 the posterior endplates, facets and uncovertebral joints are  within normal limits with no canal or foraminal narrowing.     At C6-7 the posterior endplates, facets and uncovertebral joints are  within normal limits with no canal or foraminal narrowing.     At C7-T1 there are prominent left anterior marginal bridging  osteophytes. The posterior endplates and facets are normal with no canal  or foraminal narrowing.     No acute fracture is seen in the cervical spine.     IMPRESSION:   1. No acute fracture is seen in the cervical spine. The images are  extremely grainy from C4 to T2 which limits evaluation of bony detail  and limits evaluation of the posterior disc margins. There is prominent  left anterior marginal osteophytic spurring at C7-T1. The remainder of  the cervical spine CT is unremarkable.     Radiation dose reduction techniques were utilized, including automated  exposure control and exposure modulation based on body size.     This report was finalized on 12/18/2022 10:16 AM by Dr. Robert Valdivia M.D.       CT Chest Without Contrast Diagnostic [[id number]] Collected: 12/17/22 2202     Updated: 12/17/22 2202    Narrative:        Patient: CARSON MULLEN  Time Out: 22:02  Exam(s): CT CHEST Without Contrast     EXAM:    CT Chest Without Intravenous Contrast    CLINICAL HISTORY:     Reason for exam: eval for rib fracture.    TECHNIQUE:    Axial computed tomography images of the chest without intravenous   contrast.  CTDI is 14.2 mGy and  DLP is 538.6 mGy-cm.  This CT exam was   performed according to the principle of ALARA (As Low As Reasonably   Achievable) by using one or more of the following dose reduction   techniques: automated exposure control, adjustment of the mA and or kV   according to patient size, and or use of iterative reconstruction   technique.    COMPARISON:    No relevant prior studies available.    FINDINGS:    Lungs:  Unremarkable.  No mass.  No consolidation.    Pleural space:  Unremarkable.  No pneumothorax.  No significant   effusion.    Heart:  Unremarkable.  No cardiomegaly.  No significant pericardial   effusion.  No significant coronary artery calcifications.    Bones joints:  Unremarkable.  No acute fracture.  No dislocation.    Soft tissues:  Unremarkable.    Vasculature:  Unremarkable.  No thoracic aortic aneurysm.    Lymph nodes:  Unremarkable.  No enlarged lymph nodes.    Gallbladder and bile ducts:  Cholecystectomy.    Kidneys and ureters:  Atrophy of the left kidney.    IMPRESSION:         No acute findings in the chest.      Impression:          Electronically signed by Mnia Veronica MD on 12-17-22 at 2202    XR Spine Lumbar Complete 4+VW [[id number]] Collected: 12/17/22 1837     Updated: 12/17/22 1845    Narrative:      Emergency 3 views of thoracic spine and lumbar spine plain film series  complete 4+ views 12/17/2022     CLINICAL HISTORY: Back pain after seizure     Thoracic spine: 3 views of thoracic spine including AP and lateral views  of the entire thoracic spine and swimmer's lateral view of lower  cervical and upper thoracic spine are submitted for interpretation. On  the lateral view there is evidence of anterior marginal endplate  spurring at all levels from T3 to T12. Overall the thoracic vertebral  body heights are well-maintained. On the AP view, the pedicles and  spinous processes are intact.       Impression:      1. No convincing acute fracture is seen in the thoracic spine. There is  anterior  marginal endplate spurring at all levels from T3 to T12.     Lumbar spine plain films: A total 5 views lumbar spine including AP  bilateral oblique and lateral views of the entire lumbar spine and coned  down lateral view of lumbosacral junction are submitted for  interpretation. Patient's had previous lumbosacral surgery with  bilateral posterior rods bridging the posterior elements at L5-S1  anchored by bilateral pedicle screws at L5 and S1. There is a disc  implants in the L5-S1 disc space. Patient has mild levoscoliotic  curvature lumbar spine with its apex at the L3-4 lumbar level. Overall,  the lumbar vertebral body heights are well-maintained. The disc heights  from T12 to L5 are well-maintained. On the oblique views the pars  interarticularis are intact. On the AP view, the pedicles, transverse  and spinous processes are intact.     IMPRESSION:  1. No acute fracture is seen lumbar spine.  2. Patient had previous bilateral ramses and pedicle screw fixation L5-S1  disc implant L5-S1 disc space is mild levoscoliotic curvature lumbar  spine apex at L3-4.     This report was finalized on 12/17/2022 6:42 PM by Dr. Robert Valdivia M.D.       XR Spine Thoracic 3 View [[id number]] Collected: 12/17/22 1837     Updated: 12/17/22 1845    Narrative:      Emergency 3 views of thoracic spine and lumbar spine plain film series  complete 4+ views 12/17/2022     CLINICAL HISTORY: Back pain after seizure     Thoracic spine: 3 views of thoracic spine including AP and lateral views  of the entire thoracic spine and swimmer's lateral view of lower  cervical and upper thoracic spine are submitted for interpretation. On  the lateral view there is evidence of anterior marginal endplate  spurring at all levels from T3 to T12. Overall the thoracic vertebral  body heights are well-maintained. On the AP view, the pedicles and  spinous processes are intact.       Impression:      1. No convincing acute fracture is seen in the thoracic spine.  There is  anterior marginal endplate spurring at all levels from T3 to T12.     Lumbar spine plain films: A total 5 views lumbar spine including AP  bilateral oblique and lateral views of the entire lumbar spine and coned  down lateral view of lumbosacral junction are submitted for  interpretation. Patient's had previous lumbosacral surgery with  bilateral posterior rods bridging the posterior elements at L5-S1  anchored by bilateral pedicle screws at L5 and S1. There is a disc  implants in the L5-S1 disc space. Patient has mild levoscoliotic  curvature lumbar spine with its apex at the L3-4 lumbar level. Overall,  the lumbar vertebral body heights are well-maintained. The disc heights  from T12 to L5 are well-maintained. On the oblique views the pars  interarticularis are intact. On the AP view, the pedicles, transverse  and spinous processes are intact.     IMPRESSION:  1. No acute fracture is seen lumbar spine.  2. Patient had previous bilateral ramses and pedicle screw fixation L5-S1  disc implant L5-S1 disc space is mild levoscoliotic curvature lumbar  spine apex at L3-4.     This report was finalized on 12/17/2022 6:42 PM by Dr. Robert Valdivia M.D.             Pertinent Labs     Results from last 7 days   Lab Units 12/20/22  0513 12/19/22  0614 12/18/22  0627 12/17/22  1628   WBC 10*3/mm3 11.05* 6.13 8.02 7.16   HEMOGLOBIN g/dL 10.9* 12.2 11.3* 12.4   PLATELETS 10*3/mm3 258 252 230 284     Results from last 7 days   Lab Units 12/20/22  0513 12/19/22  0934 12/19/22  0614 12/18/22  0627   SODIUM mmol/L 142 139 140 141   POTASSIUM mmol/L 5.0 5.0 5.5* 3.2*   CHLORIDE mmol/L 106 107 107 106   CO2 mmol/L 29.1* 24.0 27.2 29.0   BUN mg/dL 10 6 6 6   CREATININE mg/dL 0.74 0.77 0.79 0.75   GLUCOSE mg/dL 128* 158* 117* 82   Estimated Creatinine Clearance: 113.7 mL/min (by C-G formula based on SCr of 0.74 mg/dL).  Results from last 7 days   Lab Units 12/19/22  0934 12/17/22  1628 12/13/22 2025   ALBUMIN g/dL 3.50 3.70 3.90    BILIRUBIN mg/dL 0.2 0.2 0.2   ALK PHOS U/L 98 111 126*   AST (SGOT) U/L 18 23 26   ALT (SGPT) U/L 22 31 83*     Results from last 7 days   Lab Units 12/20/22  0513 12/19/22  0934 12/19/22  0614 12/18/22  0627 12/17/22  1628 12/13/22  2025   CALCIUM mg/dL 9.2 8.7 8.7 8.5* 8.4* 8.9   ALBUMIN g/dL  --  3.50  --   --  3.70 3.90   MAGNESIUM mg/dL  --   --   --  2.3 2.3 2.1     Results from last 7 days   Lab Units 12/15/22  2132   LIPASE Units/Liter 36     Results from last 7 days   Lab Units 12/17/22  1628 12/13/22  1918   CK TOTAL U/L 38  --    TROPONIN T ng/mL  --  <0.010           Invalid input(s): LDLCALC  Results from last 7 days   Lab Units 12/17/22  2147   URINECX  >100,000 CFU/mL Mixed Kaylin Isolated     Imaging Results (Last 24 Hours)     ** No results found for the last 24 hours. **          Test Results Pending at Discharge         Discharge Exam   Physical Exam  Vitals.  Temperature 98.12 pulse of 81 respiratory rate of 18 blood pressure 154/98 and O2 sats of 93% on room air  General.  Middle-aged female.  She is alert and oriented x3.  In mild pain during the examination.  No respiratory distress.    Eyes.  Pupils equal round and reactive.  Intact extraocular musculature.  No pallor or jaundice.  Oral cavity.  Moist mucous membrane.  Neck.  Supple.  No JVD.  No lymphadenopathy or thyromegaly.  Cardiovascular.  Regular rate and rhythm with no gallops or murmurs.  Chest.  Clear to auscultation bilaterally with no added sounds.  Abdomen.  Soft lax.  No tenderness.  No organomegaly.  No guarding or rebound.  Extremities.  No clubbing/cyanosis/edema.  Decreased power and sensation L5 S1 distribution (improved).  Decreased right straight leg raising (improved from yesterday).  CNS.  No acute focal neurological deficits except as above  Discharge Details        Discharge Medications      New Medications      Instructions Start Date   baclofen 10 MG tablet  Commonly known as: LIORESAL   10 mg, Oral, Every 8 Hours  Scheduled      dexamethasone 4 MG tablet  Commonly known as: DECADRON   4 mg, Oral, 2 Times Daily With Meals      dexamethasone 4 MG tablet  Commonly known as: DECADRON   4 mg, Oral, Daily With Breakfast   Start Date: December 25, 2022     divalproex 250 MG DR tablet  Commonly known as: DEPAKOTE   750 mg, Oral, Every 12 Hours Scheduled      magnesium oxide 400 tablet tablet  Commonly known as: MAG-OX   400 mg, Oral, Daily   Start Date: December 21, 2022     oxyCODONE-acetaminophen 7.5-325 MG per tablet  Commonly known as: PERCOCET   1 tablet, Oral, Every 4 Hours PRN      Vitamin B-2 100 MG tablet tablet  Commonly known as: RIBOFLAVIN   400 mg, Oral, Daily   Start Date: December 21, 2022        Continue These Medications      Instructions Start Date   apixaban 5 MG tablet tablet  Commonly known as: ELIQUIS   5 mg, Oral, 2 Times Daily, Last filled 4/12/21 - pt states she was instructed to stop taking medication prior to ERCP ~1 month ago and was not instructed to restart medication.  Indication: Splenic infarct       Calcium Carbonate-Vitamin D 600-200 MG-UNIT tablet   1 tablet, Oral, Daily      dicyclomine 20 MG tablet  Commonly known as: BENTYL   20 mg, Oral, Every 6 Hours      FLUoxetine 40 MG capsule  Commonly known as: PROzac   60 mg, Oral, Nightly      folic acid 1 MG tablet  Commonly known as: FOLVITE   1 mg, Oral, Daily      gabapentin 800 MG tablet  Commonly known as: NEURONTIN   800 mg, Oral, 4 Times Daily      hydrOXYzine 25 MG tablet  Commonly known as: ATARAX   1-2 tablets, Oral, 3 Times Daily PRN      loratadine 10 MG tablet  Commonly known as: CLARITIN   10 mg, Oral, Daily      LORazepam 0.5 MG tablet  Commonly known as: ATIVAN   0.25-0.5 mg, Oral      melatonin 5 MG tablet tablet   10 mg, Oral, Nightly, Patient taes 20 mg at home      ondansetron ODT 8 MG disintegrating tablet  Commonly known as: ZOFRAN-ODT   8 mg, Translingual, Every 8 Hours PRN      pantoprazole 40 MG EC tablet  Commonly known as:  PROTONIX   40 mg, Oral, 2 Times Daily      polyethylene glycol 17 g packet  Commonly known as: MIRALAX   17 g, Oral, Daily      sucralfate 1 g tablet  Commonly known as: CARAFATE   1 g, Oral, 4 Times Daily      SUMAtriptan 100 MG tablet  Commonly known as: IMITREX   50 mg, Oral, Every 2 Hours PRN, Take one tablet at onset of headache. May repeat dose one time in 2 hours if headache not relieved.       traZODone 50 MG tablet  Commonly known as: DESYREL   100 mg, Oral, Nightly, Take 1 to 2 tablets by mouth every  Night as needed for sleep      vitamin D 1.25 MG (73543 UT) capsule capsule  Commonly known as: ERGOCALCIFEROL   50,000 Units, Oral, Weekly         Stop These Medications    amitriptyline 25 MG tablet  Commonly known as: ELAVIL     methocarbamol 750 MG tablet  Commonly known as: ROBAXIN            Allergies   Allergen Reactions   • Lidocaine Rash     Pt reports lidocaine patches make her breakout in a rash         Discharge Disposition:  Condition: Stable    Diet:   Diet Order   Procedures   • Diet: Regular/House Diet; Texture: Regular Texture (IDDSI 7); Fluid Consistency: Thin (IDDSI 0)       Activity:   Activity Instructions     Activity as Tolerated      Driving Restrictions      Type of Restriction: Driving    Driving Restrictions: No Driving Until Next Appointment    Lifting Restrictions      Type of Restriction:  Lifting  Driving       Driving Restrictions: No Driving Until Next Appointment    Lifting Restrictions: No Lifting Until Cleared By Provider    Other Activity Instructions      Activity Instructions: Use your walker with ambulation.  Follow-up recommendation of physical therapy    Up WIth Assist                CODE STATUS:    Code Status and Medical Interventions:   Ordered at: 12/17/22 1920     Code Status (Patient has no pulse and is not breathing):    CPR (Attempt to Resuscitate)     Medical Interventions (Patient has pulse or is breathing):    Full Support       No future  appointments.  Additional Instructions for the Follow-ups that You Need to Schedule     Ambulatory Referral to Physical Therapy Evaluate and treat; Heat; Strengthening, ROM, Stretching; Right   As directed      Specialty needed: Evaluate and treat    Modalities: Heat    Exercises: Strengthening ROM Stretching    Gait Training: Right    Follow-up needed: Yes         Call MD With Problems / Concerns   As directed      Instructions: Call MD or return to ER if increasing pain/increasing weakness of the right lower extremity/urinary incontinence/fever or chills/chest pain or shortness of breath    Order Comments: Instructions: Call MD or return to ER if increasing pain/increasing weakness of the right lower extremity/urinary incontinence/fever or chills/chest pain or shortness of breath          Discharge Follow-up with PCP   As directed       Currently Documented PCP:    Sahil Cornelius MD    PCP Phone Number:    [phone]     Follow Up Details: Primary MD.  1 week.  Chronic low back pain syndrome/degenerative disc disease/sciatica/seizures/migraine/splenic infarction and anticoagulation/anemia/GERD/anxiety         Discharge Follow-up with Specified Provider: Neurology; 2 Weeks   As directed      To: Neurology    Follow Up: 2 Weeks    Follow Up Details: Migraine/seizure         Discharge Follow-up with Specified Provider: Neurosurgery.; 2 Weeks   As directed      To: Neurosurgery.    Follow Up: 2 Weeks    Follow Up Details: Degenerative disc disease of the lumbar spine/right sciatica         Discharge Follow-up with Specified Provider: Pain clinic; 1 Week   As directed      To: Pain clinic    Follow Up: 1 Week    Follow Up Details: Degenerative disc disease of the lumbar spine/chronic low back pain syndrome/right sciatic            Follow-up Information     Sahil Cornelius MD .    Specialty: Internal Medicine  Why: Primary MD.  1 week.  Chronic low back pain syndrome/degenerative disc  disease/sciatica/seizures/migraine/splenic infarction and anticoagulation/anemia/GERD/anxiety  Contact information:  [address]  [facility] [address] [phone]                           Time Spent on Discharge:  Greater than 30 minutes      Indra Whitman MD  [facility]  12/20/22  08:56 EST

## 2022-12-20 NOTE — PLAN OF CARE
Goal Outcome Evaluation:  Plan of Care Reviewed With: patient        Progress: no change  Outcome Evaluation: Cleared for DC. Fiance coming to pick patient up. Meds to beds for prescriptions. Awaiting med rec.

## 2022-12-20 NOTE — THERAPY DISCHARGE NOTE
Patient Name: Belen Allen  : 1980    MRN: [id number]                              Today's Date: 2022       Admit Date: 2022    Visit Dx:     ICD-10-CM ICD-9-CM   1. Uncontrolled pain  R52 780.96   2. Seizure (HCC)  R56.9 780.39   3. Acute midline thoracic back pain  M54.6 724.1   4. Mobility impaired  Z74.09 799.89   5. Sciatica of right side  M54.31 724.3   6. Intractable back pain  M54.9 724.5     Patient Active Problem List   Diagnosis   • Splenic infarct   • Anxiety disorder   • Functional asplenia   • Generalized abdominal pain   • Acute bilateral low back pain   • Antiphospholipid antibody positive   • On anticoagulant therapy   • Acute pain of left knee   • Vitamin D deficiency   • Folate deficiency   • Pain of back and left lower extremity   • Common bile duct dilatation   • Elevated LFTs   • Right upper quadrant abdominal pain   • Obesity (BMI 30-39.9)   • Tobacco abuse   • GERD without esophagitis   • Intractable abdominal pain   • Obesity, Class III, BMI 40-49.9 (morbid obesity) (HCC)   • Constipation   • History of ERCP   • Other chronic pain   • Seizures (HCC)   • Syncope   • Lumbar back pain with radiculopathy affecting left lower extremity   • Right upper quadrant pain   • Uncontrolled pain   • Intractable back pain   • Sciatica of right side   • Migraine   • Mobility impaired     Past Medical History:   Diagnosis Date   • Abnormal Pap smear of cervix    • Ankle fracture    • H/O Elevated liver enzymes    • History of chronic back pain    • History of migraine    • History of urinary tract infection    • Injury of back    • PONV (postoperative nausea and vomiting)    • Seasonal allergies    • Seizure (HCC)     Takes Keppra     Past Surgical History:   Procedure Laterality Date   • BACK SURGERY      fusion L4, L5     • CHOLECYSTECTOMY     • DILATATION AND CURETTAGE     • ENDOSCOPY N/A 2022    Procedure: ESOPHAGOGASTRODUODENOSCOPY with biopsy;  Surgeon:  Christiana Mann MD;  Location: [facility] ENDOSCOPY;  Service: Gastroenterology;  Laterality: N/A;  gastritis, duodenitis, irregular z line   • ERCP N/A 6/3/2021    Procedure: ENDOSCOPIC RETROGRADE CHOLANGIOPANCREATOGRAPHY WITH SPHINCTEROTOMY, DILATION WITH BALLOON CLEARANCE  (12MM-15MM);  Surgeon: Bjorn Flannery MD;  Location: [facility] ENDOSCOPY;  Service: Gastroenterology;  Laterality: N/A;  DILATED COMMON BILE DUCT   • SKIN SURGERY     • TUBAL ABDOMINAL LIGATION  2013   • WISDOM TOOTH EXTRACTION  2018    x2      General Information     Row Name 12/20/22 1003          Physical Therapy Time and Intention    Document Type therapy note (daily note);discharge treatment  -     Mode of Treatment individual therapy;physical therapy  -     Row Name 12/20/22 1003          General Information    Patient Profile Reviewed yes  -     Existing Precautions/Restrictions fall  -     Row Name 12/20/22 1003          Cognition    Orientation Status (Cognition) oriented x 4  -     Row Name 12/20/22 1003          Safety Issues, Functional Mobility    Impairments Affecting Function (Mobility) strength;endurance/activity tolerance;balance  -           User Key  (r) = Recorded By, (t) = Taken By, (c) = Cosigned By    Initials Name Provider Type     Fe Cabrera PT Physical Therapist               Mobility     Row Name 12/20/22 1004          Bed Mobility    Bed Mobility supine-sit;sit-supine  -     Supine-Sit [place] (Bed Mobility) modified independence  -     Sit-Supine [place] (Bed Mobility) modified independence  -     Assistive Device (Bed Mobility) bed rails  -     Row Name 12/20/22 1004          Sit-Stand Transfer    Sit-Stand [place] (Transfers) standby assist;verbal cues  [facility]     Assistive Device (Sit-Stand Transfers) walker, front-wheeled  -     Row Name 12/20/22 1004          Gait/Stairs (Locomotion)    [place] Level (Gait) contact guard;standby assist;verbal cues  [facility]      Assistive Device (Gait) walker, front-wheeled  -     Distance in Feet (Gait) 120ft  -     Deviations/Abnormal Patterns (Gait) antalgic;nubia decreased;gait speed decreased;stride length decreased  -     Bilateral Gait Deviations forward flexed posture;heel strike decreased  -     Right Sided Gait Deviations weight shift ability decreased  -     Comment, (Gait/Stairs) Cues for increased B knee flexion to improve heel strike, cues for upright posture, heavy reliance on rwx for support. 1 seated rest break 2/2 RLE muscle spasms  -           User Key  (r) = Recorded By, (t) = Taken By, (c) = Cosigned By    Initials Name Provider Type     Fe Cabrera PT Physical Therapist               Obj/Interventions     [facility] Name 12/20/22 1006          Motor Skills    Therapeutic Exercise --  10 reps B AP/LAQ/seated marches - impaired ROM on RLE 2/2 muscle spasms  -           User Key  (r) = Recorded By, (t) = Taken By, (c) = Cosigned By    Initials Name Provider Type     Fe Cabrera PT Physical Therapist               Goals/Plan    No documentation.                Clinical Impression     [facility] Name 12/20/22 1006          Pain    Pre/Posttreatment Pain Comment C/o RLE pain and back pain, no numerical value given, increased with muscle spasms  -     Pain Intervention(s) Repositioned;Ambulation/increased activity;Rest  [facility] Name 12/20/22 1006          Plan of Care Review    Plan of Care Reviewed With patient  -     Progress improving  -     Outcome Evaluation Pt seen for PT this AM, tolerated progressed gait distance well. Pt transferred to B with mod I and completed LE exercises - impaired AROM on RLE 2/2 painful muscle spasms. Attempted PROM but pt unable to fully relax, reporting feeling like she doesn't have control of RLE. Pt stood with SBA and ambulated 120ft with rwx and SBA-CGA. Pt requiring heavy cues throughout session to increase B knee flexion to improve heel strike - intermittent  B toe dragging. Pt with forward flexed posture and heavy reliance on rwx for support. Pt with a couple instances of RLE muscle spasms during ambulation - requiring 1 seated rest break. Pt reports 4 falls in the last 6 months 2/2 these uncontrolled muscle spasms, very fearful of falls at home. Encouraged use of rwx and having chairs placed throughout the house for seated rest when needed. Pt has 3-4 SOMMER - discussed sequencing/safety with steps at home, pt verbalized understanding. Pt reports also getting BSC for home. Pt denies any further concerns/needs for home. Agreeble to OPPT at DC - requesting [facility] in [facility]. No further acute PT needs, noted DC orders. PT will sign-off.  -     Row Name 12/20/22 1006          Vital Signs    O2 Delivery Pre Treatment room air  -     O2 Delivery Intra Treatment room air  -     O2 Delivery Post Treatment room air  -     Row Name 12/20/22 1006          Positioning and Restraints    Pre-Treatment Position in bed  -     Post Treatment Position bed  -     In Bed supine;call light within reach;encouraged to call for assist;exit alarm on  -           User Key  (r) = Recorded By, (t) = Taken By, (c) = Cosigned By    Initials Name Provider Type     Fe Cabrera, PT Physical Therapist               Outcome Measures     Row Name 12/20/22 1016          How much help from another person do you currently need...    Turning from your back to your side while in flat bed without using bedrails? 4  -BH     Moving from lying on back to sitting on the side of a flat bed without bedrails? 4  -BH     Moving to and from a bed to a chair (including a wheelchair)? 4  -BH     Standing up from a chair using your arms (e.g., wheelchair, bedside chair)? 4  -BH     Climbing 3-5 steps with a railing? 3  -BH     To walk in hospital room? 3  -BH     AM-PAC 6 Clicks Score (PT) 22  -     Highest level of mobility 7 --> Walked 25 feet or more  -     Row Name 12/20/22 1016           Functional Assessment    Outcome Measure Options AM-PAC 6 Clicks Basic Mobility (PT)  -           User Key  (r) = Recorded By, (t) = Taken By, (c) = Cosigned By    Initials Name Provider Type     Fe Cabrera PT Physical Therapist              Physical Therapy Education     Title: PT OT SLP Therapies (Done)     Topic: Physical Therapy (Done)     Point: Mobility training (Done)     Learning Progress Summary           Patient Acceptance, E,TB,D, VU by  at 12/20/2022 1016    Acceptance, E,D, DU,VU by  at 12/18/2022 1514                   Point: Home exercise program (Done)     Learning Progress Summary           Patient Acceptance, E,TB,D, VU by  at 12/20/2022 1016    Acceptance, E,D, DU,VU by  at 12/18/2022 1514                   Point: Body mechanics (Done)     Learning Progress Summary           Patient Acceptance, E,TB,D, VU by  at 12/20/2022 1016    Acceptance, E,D, DU,VU by  at 12/18/2022 1514                   Point: Precautions (Done)     Learning Progress Summary           Patient Acceptance, E,TB,D, VU by  at 12/20/2022 1016    Acceptance, E,D, DU,VU by  at 12/18/2022 1514                               User Key     Initials Effective Dates Name Provider Type Discipline     07/02/20 -  Darek Cabrera, PT DPT Physical Therapist PT     04/08/22 -  Fe Cabrera PT Physical Therapist PT              PT Recommendation and Plan     Plan of Care Reviewed With: patient  Progress: improving  Outcome Evaluation: Pt seen for PT this AM, tolerated progressed gait distance well. Pt transferred to EOB with mod I and completed LE exercises - impaired AROM on RLE 2/2 painful muscle spasms. Attempted PROM but pt unable to fully relax, reporting feeling like she doesn't have control of RLE. Pt stood with SBA and ambulated 120ft with rwx and SBA-CGA. Pt requiring heavy cues throughout session to increase B knee flexion to improve heel strike - intermittent B toe dragging. Pt with forward flexed  posture and heavy reliance on rwx for support. Pt with a couple instances of RLE muscle spasms during ambulation - requiring 1 seated rest break. Pt reports 4 falls in the last 6 months 2/2 these uncontrolled muscle spasms, very fearful of falls at home. Encouraged use of rwx and having chairs placed throughout the house for seated rest when needed. Pt has 3-4 SOMMER - discussed sequencing/safety with steps at home, pt verbalized understanding. Pt reports also getting BSC for home. Pt denies any further concerns/needs for home. Agreeble to OPPT at DC - requesting [facility] in [facility]. No further acute PT needs, noted DC orders. PT will sign-off.     Time Calculation:    PT Charges     Row Name 12/20/22 1016             Time Calculation    Start Time 0917  -      Stop Time 0957  -      Time Calculation (min) 40 min  -      PT Received On 12/20/22  -         Time Calculation- PT    Total Timed Code Minutes- PT 40 minute(s)  -         Timed Charges    35232 - Gait Training Minutes  30  -      87107 - PT Therapeutic Activity Minutes 10  -         Total Minutes    Timed Charges Total Minutes 40  -       Total Minutes 40  -            User Key  (r) = Recorded By, (t) = Taken By, (c) = Cosigned By    Initials Name Provider Type     Fe Cabrera, PT Physical Therapist              Therapy Charges for Today     Code Description Service Date Service Provider Modifiers Qty    64330326203 HC GAIT TRAINING EA 15 MIN 12/20/2022 Fe Cabrera, PT GP 2    [id number]  PT THERAPEUTIC ACT EA 15 MIN 12/20/2022 Fe Cabrera, PT GP 1          PT G-Codes  Outcome Measure Options: AM-PAC 6 Clicks Basic Mobility (PT)  AM-PAC 6 Clicks Score (PT): 22    PT Discharge Summary  Anticipated Discharge Disposition (PT): home with assist, home with outpatient therapy services    Fe Cabrera PT  12/20/2022

## 2022-12-20 NOTE — CASE MANAGEMENT/SOCIAL WORK
Continued Stay Note  [facility]     Patient Name: Belen Allen  MRN: [id number]  Today's Date: 12/20/2022    Admit Date: 12/17/2022    Plan: Home with family.  Pt requests outpt PT and a BSC for home.   Discharge Plan     Row Name 12/20/22 1117       Plan    Plan Comments Per RN, 3in1 commode has been delivered at bedside. Waiting on meds to beds. Family to transport. Patient to do outpatient PT at [facility].  RONALD, CSW               Discharge Codes    No documentation.               Expected Discharge Date and Time     Expected Discharge Date Expected Discharge Time    Dec 20, 2022             ZACHARY Cui

## 2022-12-20 NOTE — NURSING NOTE
Patient transported off unit via wheelchair. Stable at DC. With Fiance. States understanding of all AVS instructions.

## 2022-12-21 NOTE — OUTREACH NOTE
Prep Survey    Flowsheet Row Responses   [facility] facility patient discharged from? [facility]   Is LACE score < 7 ? No   Eligibility Readm Mgmt   Discharge diagnosis Intractable back pain   Does the patient have one of the following disease processes/diagnoses(primary or secondary)? Other   Does the patient have Home health ordered? No   Is there a DME ordered? Yes   What DME was ordered? [facility] for  3in1 commode    Prep survey completed? Yes          LIN MARQUEZ - Registered Nurse

## 2022-12-21 NOTE — CASE MANAGEMENT/SOCIAL WORK
Case Management Discharge Note      Final Note: Home w/ family & 3in1 commode from [facility]    Provided Post Acute Provider List?: Yes  Post Acute Provider List: Outpatient Therapy    Selected Continued Care - Discharged on 12/20/2022 Admission date: 12/17/2022 - Discharge disposition: Home or Self Care    Destination    No services have been selected for the patient.              Durable Medical Equipment    No services have been selected for the patient.              Dialysis/Infusion    No services have been selected for the patient.              Home Medical Care    No services have been selected for the patient.              Therapy    No services have been selected for the patient.              Community Resources    No services have been selected for the patient.              Community & DME    No services have been selected for the patient.                  Transportation Services  Private: Car    Final Discharge Disposition Code: 01 - home or self-care

## 2022-12-22 LAB — CREAT BLDA-MCNC: 0.9 MG/DL (ref 0.6–1.3)

## 2022-12-24 ENCOUNTER — HOSPITAL ENCOUNTER (EMERGENCY)
Facility: HOSPITAL | Age: 42
Discharge: HOME | End: 2022-12-24
Attending: EMERGENCY MEDICINE | Admitting: EMERGENCY MEDICINE

## 2022-12-24 VITALS
OXYGEN SATURATION: 100 % | DIASTOLIC BLOOD PRESSURE: 104 MMHG | HEART RATE: 84 BPM | TEMPERATURE: 97.4 F | RESPIRATION RATE: 18 BRPM | BODY MASS INDEX: 36.7 KG/M2 | HEIGHT: 64 IN | WEIGHT: 215 LBS | SYSTOLIC BLOOD PRESSURE: 135 MMHG

## 2022-12-24 DIAGNOSIS — G43.909 MIGRAINE WITHOUT STATUS MIGRAINOSUS, NOT INTRACTABLE, UNSPECIFIED MIGRAINE TYPE: ICD-10-CM

## 2022-12-24 DIAGNOSIS — G89.29 CHRONIC LOW BACK PAIN WITH RIGHT-SIDED SCIATICA, UNSPECIFIED BACK PAIN LATERALITY: ICD-10-CM

## 2022-12-24 DIAGNOSIS — M54.41 CHRONIC LOW BACK PAIN WITH RIGHT-SIDED SCIATICA, UNSPECIFIED BACK PAIN LATERALITY: ICD-10-CM

## 2022-12-24 DIAGNOSIS — Z86.69 HISTORY OF SEIZURE DISORDER: Primary | ICD-10-CM

## 2022-12-24 LAB
ALBUMIN SERPL-MCNC: 3.5 G/DL (ref 3.5–5.2)
ALBUMIN/GLOB SERPL: 1.8 G/DL
ALP SERPL-CCNC: 95 U/L (ref 39–117)
ALT SERPL W P-5'-P-CCNC: 24 U/L (ref 1–33)
AMPHET+METHAMPHET UR QL: NEGATIVE
ANION GAP SERPL CALCULATED.3IONS-SCNC: 5 MMOL/L (ref 5–15)
AST SERPL-CCNC: 19 U/L (ref 1–32)
B-HCG UR QL: NEGATIVE
BACTERIA UR QL AUTO: ABNORMAL /HPF
BARBITURATES UR QL SCN: NEGATIVE
BASOPHILS # BLD AUTO: 0.02 10*3/MM3 (ref 0–0.2)
BASOPHILS NFR BLD AUTO: 0.2 % (ref 0–1.5)
BENZODIAZ UR QL SCN: NEGATIVE
BILIRUB SERPL-MCNC: <0.2 MG/DL (ref 0–1.2)
BILIRUB UR QL STRIP: NEGATIVE
BUN SERPL-MCNC: 13 MG/DL (ref 6–20)
BUN/CREAT SERPL: 17.8 (ref 7–25)
CALCIUM SPEC-SCNC: 7.8 MG/DL (ref 8.6–10.5)
CANNABINOIDS SERPL QL: POSITIVE
CHLORIDE SERPL-SCNC: 106 MMOL/L (ref 98–107)
CK SERPL-CCNC: 73 U/L (ref 20–180)
CLARITY UR: CLEAR
CO2 SERPL-SCNC: 29 MMOL/L (ref 22–29)
COCAINE UR QL: NEGATIVE
COLOR UR: YELLOW
CREAT SERPL-MCNC: 0.73 MG/DL (ref 0.57–1)
D-LACTATE SERPL-SCNC: 1 MMOL/L (ref 0.5–2)
DEPRECATED RDW RBC AUTO: 49.9 FL (ref 37–54)
EGFRCR SERPLBLD CKD-EPI 2021: 106.1 ML/MIN/1.73
EOSINOPHIL # BLD AUTO: 0.01 10*3/MM3 (ref 0–0.4)
EOSINOPHIL NFR BLD AUTO: 0.1 % (ref 0.3–6.2)
ERYTHROCYTE [DISTWIDTH] IN BLOOD BY AUTOMATED COUNT: 13.9 % (ref 12.3–15.4)
ETHANOL BLD-MCNC: <10 MG/DL (ref 0–10)
ETHANOL UR QL: <0.01 %
GLOBULIN UR ELPH-MCNC: 1.9 GM/DL
GLUCOSE SERPL-MCNC: 104 MG/DL (ref 65–99)
GLUCOSE UR STRIP-MCNC: NEGATIVE MG/DL
HCT VFR BLD AUTO: 38.1 % (ref 34–46.6)
HGB BLD-MCNC: 12.5 G/DL (ref 12–15.9)
HGB UR QL STRIP.AUTO: ABNORMAL
HYALINE CASTS UR QL AUTO: ABNORMAL /LPF
KETONES UR QL STRIP: NEGATIVE
LEUKOCYTE ESTERASE UR QL STRIP.AUTO: ABNORMAL
LIPASE SERPL-CCNC: 8 U/L (ref 13–60)
LYMPHOCYTES # BLD AUTO: 1.88 10*3/MM3 (ref 0.7–3.1)
LYMPHOCYTES NFR BLD AUTO: 14.9 % (ref 19.6–45.3)
MAGNESIUM SERPL-MCNC: 2.2 MG/DL (ref 1.6–2.6)
MCH RBC QN AUTO: 32 PG (ref 26.6–33)
MCHC RBC AUTO-ENTMCNC: 32.8 G/DL (ref 31.5–35.7)
MCV RBC AUTO: 97.4 FL (ref 79–97)
METHADONE UR QL SCN: NEGATIVE
MONOCYTES # BLD AUTO: 0.99 10*3/MM3 (ref 0.1–0.9)
MONOCYTES NFR BLD AUTO: 7.8 % (ref 5–12)
NEUTROPHILS NFR BLD AUTO: 76.3 % (ref 42.7–76)
NEUTROPHILS NFR BLD AUTO: 9.64 10*3/MM3 (ref 1.7–7)
NITRITE UR QL STRIP: NEGATIVE
OPIATES UR QL: NEGATIVE
OXYCODONE UR QL SCN: POSITIVE
PH UR STRIP.AUTO: 6 [PH] (ref 5–8)
PLAT MORPH BLD: NORMAL
PLATELET # BLD AUTO: 299 10*3/MM3 (ref 140–450)
PMV BLD AUTO: 10.2 FL (ref 6–12)
POTASSIUM SERPL-SCNC: 4.6 MMOL/L (ref 3.5–5.2)
PROT SERPL-MCNC: 5.4 G/DL (ref 6–8.5)
PROT UR QL STRIP: ABNORMAL
QT INTERVAL: 363 MS
RBC # BLD AUTO: 3.91 10*6/MM3 (ref 3.77–5.28)
RBC # UR STRIP: ABNORMAL /HPF
RBC MORPH BLD: NORMAL
REF LAB TEST METHOD: ABNORMAL
SODIUM SERPL-SCNC: 140 MMOL/L (ref 136–145)
SP GR UR STRIP: 1.03 (ref 1–1.03)
SQUAMOUS #/AREA URNS HPF: ABNORMAL /HPF
UROBILINOGEN UR QL STRIP: ABNORMAL
VALPROATE SERPL-MCNC: 21 MCG/ML (ref 50–125)
WBC # UR STRIP: ABNORMAL /HPF
WBC MORPH BLD: NORMAL
WBC NRBC COR # BLD: 12.63 10*3/MM3 (ref 3.4–10.8)

## 2022-12-24 PROCEDURE — 93010 ELECTROCARDIOGRAM REPORT: CPT | Performed by: INTERNAL MEDICINE

## 2022-12-24 PROCEDURE — 83690 ASSAY OF LIPASE: CPT | Performed by: PHYSICIAN ASSISTANT

## 2022-12-24 PROCEDURE — 93005 ELECTROCARDIOGRAM TRACING: CPT | Performed by: PHYSICIAN ASSISTANT

## 2022-12-24 PROCEDURE — 80307 DRUG TEST PRSMV CHEM ANLYZR: CPT | Performed by: PHYSICIAN ASSISTANT

## 2022-12-24 PROCEDURE — 83735 ASSAY OF MAGNESIUM: CPT | Performed by: PHYSICIAN ASSISTANT

## 2022-12-24 PROCEDURE — 96375 TX/PRO/DX INJ NEW DRUG ADDON: CPT

## 2022-12-24 PROCEDURE — 82077 ASSAY SPEC XCP UR&BREATH IA: CPT | Performed by: PHYSICIAN ASSISTANT

## 2022-12-24 PROCEDURE — 25010000002 ONDANSETRON PER 1 MG: Performed by: PHYSICIAN ASSISTANT

## 2022-12-24 PROCEDURE — 85007 BL SMEAR W/DIFF WBC COUNT: CPT | Performed by: PHYSICIAN ASSISTANT

## 2022-12-24 PROCEDURE — 81025 URINE PREGNANCY TEST: CPT | Performed by: PHYSICIAN ASSISTANT

## 2022-12-24 PROCEDURE — 80053 COMPREHEN METABOLIC PANEL: CPT | Performed by: PHYSICIAN ASSISTANT

## 2022-12-24 PROCEDURE — 81001 URINALYSIS AUTO W/SCOPE: CPT | Performed by: PHYSICIAN ASSISTANT

## 2022-12-24 PROCEDURE — 83605 ASSAY OF LACTIC ACID: CPT | Performed by: PHYSICIAN ASSISTANT

## 2022-12-24 PROCEDURE — 99285 EMERGENCY DEPT VISIT HI MDM: CPT

## 2022-12-24 PROCEDURE — 82550 ASSAY OF CK (CPK): CPT | Performed by: PHYSICIAN ASSISTANT

## 2022-12-24 PROCEDURE — 36415 COLL VENOUS BLD VENIPUNCTURE: CPT

## 2022-12-24 PROCEDURE — 96374 THER/PROPH/DIAG INJ IV PUSH: CPT

## 2022-12-24 PROCEDURE — 85025 COMPLETE CBC W/AUTO DIFF WBC: CPT | Performed by: PHYSICIAN ASSISTANT

## 2022-12-24 PROCEDURE — P9612 CATHETERIZE FOR URINE SPEC: HCPCS

## 2022-12-24 PROCEDURE — 25010000002 MORPHINE PER 10 MG: Performed by: EMERGENCY MEDICINE

## 2022-12-24 PROCEDURE — 80164 ASSAY DIPROPYLACETIC ACD TOT: CPT | Performed by: PHYSICIAN ASSISTANT

## 2022-12-24 RX ORDER — ONDANSETRON 2 MG/ML
4 INJECTION INTRAMUSCULAR; INTRAVENOUS ONCE
Status: COMPLETED | OUTPATIENT
Start: 2022-12-24 | End: 2022-12-24

## 2022-12-24 RX ORDER — SODIUM CHLORIDE 0.9 % (FLUSH) 0.9 %
10 SYRINGE (ML) INJECTION AS NEEDED
Status: DISCONTINUED | OUTPATIENT
Start: 2022-12-24 | End: 2022-12-24 | Stop reason: HOSPADM

## 2022-12-24 RX ORDER — MORPHINE SULFATE 2 MG/ML
2 INJECTION, SOLUTION INTRAMUSCULAR; INTRAVENOUS ONCE
Status: COMPLETED | OUTPATIENT
Start: 2022-12-24 | End: 2022-12-24

## 2022-12-24 RX ADMIN — MORPHINE SULFATE 2 MG: 2 INJECTION, SOLUTION INTRAMUSCULAR; INTRAVENOUS at 01:14

## 2022-12-24 RX ADMIN — SODIUM CHLORIDE 1000 ML: 9 INJECTION, SOLUTION INTRAVENOUS at 01:08

## 2022-12-24 RX ADMIN — ONDANSETRON 4 MG: 2 INJECTION INTRAMUSCULAR; INTRAVENOUS at 01:14

## 2022-12-24 NOTE — ED PROVIDER NOTES
MD ATTESTATION NOTE    The RAY and I have discussed this patient's history, physical exam, and treatment plan.    I provided a substantive portion of the care of this patient. I personally performed the physical exam, in its entirety. The attached note describes my personal findings.      Belen Allen is a 42 y.o. female who presents to the ED c/o seizure tonight.  Patient's  reports that she had 3 or 4 seizures today.  Patient with recent admission negative EEG.  Denies any recently missed doses of seizure meds.      On exam:  GENERAL: not distressed  HENT: nares patent  EYES: no scleral icterus  CV: regular rhythm, regular rate  RESPIRATORY: normal effort  ABDOMEN: soft  MUSCULOSKELETAL: no deformity  NEURO: alert, moves all extremities, follows commands  SKIN: warm, dry    Labs  Recent Results (from the past 24 hour(s))   Comprehensive Metabolic Panel    Collection Time: 12/24/22  1:08 AM    Specimen: Blood   Result Value Ref Range    Glucose 104 (H) 65 - 99 mg/dL    BUN 13 6 - 20 mg/dL    Creatinine 0.73 0.57 - 1.00 mg/dL    Sodium 140 136 - 145 mmol/L    Potassium 4.6 3.5 - 5.2 mmol/L    Chloride 106 98 - 107 mmol/L    CO2 29.0 22.0 - 29.0 mmol/L    Calcium 7.8 (L) 8.6 - 10.5 mg/dL    Total Protein 5.4 (L) 6.0 - 8.5 g/dL    Albumin 3.50 3.50 - 5.20 g/dL    ALT (SGPT) 24 1 - 33 U/L    AST (SGOT) 19 1 - 32 U/L    Alkaline Phosphatase 95 39 - 117 U/L    Total Bilirubin <0.2 0.0 - 1.2 mg/dL    Globulin 1.9 gm/dL    A/G Ratio 1.8 g/dL    BUN/Creatinine Ratio 17.8 7.0 - 25.0    Anion Gap 5.0 5.0 - 15.0 mmol/L    eGFR 106.1 >60.0 mL/min/1.73   Lipase    Collection Time: 12/24/22  1:08 AM    Specimen: Blood   Result Value Ref Range    Lipase 8 (L) 13 - 60 U/L   Ethanol    Collection Time: 12/24/22  1:08 AM    Specimen: Blood   Result Value Ref Range    Ethanol <10 0 - 10 mg/dL    Ethanol % <0.010 %   CK    Collection Time: 12/24/22  1:08 AM    Specimen: Blood   Result Value Ref Range    Creatine Kinase 73  20 - 180 U/L   Magnesium    Collection Time: 12/24/22  1:08 AM    Specimen: Blood   Result Value Ref Range    Magnesium 2.2 1.6 - 2.6 mg/dL   Valproic Acid Level, Total    Collection Time: 12/24/22  1:08 AM    Specimen: Blood   Result Value Ref Range    Valproic Acid 21.0 (L) 50.0 - 125.0 mcg/mL   CBC Auto Differential    Collection Time: 12/24/22  1:08 AM    Specimen: Blood   Result Value Ref Range    WBC 12.63 (H) 3.40 - 10.80 10*3/mm3    RBC 3.91 3.77 - 5.28 10*6/mm3    Hemoglobin 12.5 12.0 - 15.9 g/dL    Hematocrit 38.1 34.0 - 46.6 %    MCV 97.4 (H) 79.0 - 97.0 fL    MCH 32.0 26.6 - 33.0 pg    MCHC 32.8 31.5 - 35.7 g/dL    RDW 13.9 12.3 - 15.4 %    RDW-SD 49.9 37.0 - 54.0 fl    MPV 10.2 6.0 - 12.0 fL    Platelets 299 140 - 450 10*3/mm3    Neutrophil % 76.3 (H) 42.7 - 76.0 %    Lymphocyte % 14.9 (L) 19.6 - 45.3 %    Monocyte % 7.8 5.0 - 12.0 %    Eosinophil % 0.1 (L) 0.3 - 6.2 %    Basophil % 0.2 0.0 - 1.5 %    Neutrophils, Absolute 9.64 (H) 1.70 - 7.00 10*3/mm3    Lymphocytes, Absolute 1.88 0.70 - 3.10 10*3/mm3    Monocytes, Absolute 0.99 (H) 0.10 - 0.90 10*3/mm3    Eosinophils, Absolute 0.01 0.00 - 0.40 10*3/mm3    Basophils, Absolute 0.02 0.00 - 0.20 10*3/mm3   Scan Slide    Collection Time: 12/24/22  1:08 AM    Specimen: Blood   Result Value Ref Range    RBC Morphology Normal Normal    WBC Morphology Normal Normal    Platelet Morphology Normal Normal   ECG 12 Lead Syncope    Collection Time: 12/24/22  1:18 AM   Result Value Ref Range    QT Interval 363 ms   Lactic Acid, Plasma    Collection Time: 12/24/22  2:16 AM    Specimen: Arm, Right; Blood   Result Value Ref Range    Lactate 1.0 0.5 - 2.0 mmol/L   Pregnancy, Urine - Straight Cath    Collection Time: 12/24/22  2:32 AM    Specimen: Straight Cath; Urine   Result Value Ref Range    HCG, Urine QL Negative Negative   Urinalysis With Microscopic If Indicated (No Culture) - Straight Cath    Collection Time: 12/24/22  2:32 AM    Specimen: Straight Cath; Urine    Result Value Ref Range    Color, UA Yellow Yellow, Straw    Appearance, UA Clear Clear    pH, UA 6.0 5.0 - 8.0    Specific Gravity, UA 1.028 1.005 - 1.030    Glucose, UA Negative Negative    Ketones, UA Negative Negative    Bilirubin, UA Negative Negative    Blood, UA Large (3+) (A) Negative    Protein, UA 30 mg/dL (1+) (A) Negative    Leuk Esterase, UA Trace (A) Negative    Nitrite, UA Negative Negative    Urobilinogen, UA 0.2 E.U./dL 0.2 - 1.0 E.U./dL   Urine Drug Screen - Straight Cath    Collection Time: 12/24/22  2:32 AM    Specimen: Straight Cath; Urine   Result Value Ref Range    Amphet/Methamphet, Screen Negative Negative    Barbiturates Screen, Urine Negative Negative    Benzodiazepine Screen, Urine Negative Negative    Cocaine Screen, Urine Negative Negative    Opiate Screen Negative Negative    THC, Screen, Urine Positive (A) Negative    Methadone Screen, Urine Negative Negative    Oxycodone Screen, Urine Positive (A) Negative   Urinalysis, Microscopic Only - Straight Cath    Collection Time: 12/24/22  2:32 AM    Specimen: Straight Cath; Urine   Result Value Ref Range    RBC, UA Too Numerous to Count (A) None Seen, 0-2 /HPF    WBC, UA 3-5 (A) None Seen, 0-2 /HPF    Bacteria, UA None Seen None Seen /HPF    Squamous Epithelial Cells, UA 0-2 None Seen, 0-2 /HPF    Hyaline Casts, UA 0-2 None Seen /LPF    Methodology Automated Microscopy        Radiology  No Radiology Exams Resulted Within Past 24 Hours    Medical Decision Making:  ED Course as of 12/24/22 0626   Sat Dec 24, 2022   0054 Medical records reviewed, patient was admitted from 1217 3 12/20/2022.  She was admitted for seizures.  At that time she had a seizure in the bathroom and injured her back, she has a history of chronic back pain, she was kept in the hospital for 3 days, had a normal EEG as well as negative imaging of her head, C-spine, and L-spine.  Patient was seen and consulted by neurology and will be followed up on as an outpatient.   Patient also had a normal EEG in January 2022.  Patient was also evaluated 10 days prior for chronic abdominal pain and chronic nausea and vomiting and found to have a normal work-up including evaluation by GI.. [BANDAR]   0134 Patient rechecked, resting in bed, sleeping with sonorous respirations. [BANDAR]   0134 EKG          EKG time: 1:18 AM  Rhythm/Rate: Sinus rhythm at 86 bpm  P waves and AR: Normal  QRS, axis: Normal QT interval  ST and T waves: No acute ST/T wave changes    Interpreted Contemporaneously by me, independently viewed  Unchanged compared to prior EKG of 12/13/2022   [BANDAR]   0147 WBC(!): 12.63 [BANDAR]   0147 Hemoglobin: 12.5 [BANDAR]   0147 Hematocrit: 38.1 [BANDAR]   0147 Platelets: 299 [BANDAR]   0147 Glucose(!): 104 [BANDAR]   0147 BUN: 13 [BANDAR]   0147 Creatinine: 0.73 [BANDAR]   0147 Sodium: 140 [BANDAR]   0147 Potassium: 4.6 [BANDAR]   0147 Magnesium: 2.2 [BANDAR]   0147 Chloride: 106 [BANDAR]   0147 CO2: 29.0 [BANDAR]   0148 Creatine Kinase: 73 [BANDAR]   0148 Lipase(!): 8 [BANDAR]   0148 Valproic Acid(!): 21.0 [BANDAR]   0148 Patient's Depakote is still subtherapeutic. [BANDAR]   0230 Patient slept through catheterization for urine. [BANDAR]   0257 Lactate: 1.0 [BANDAR]   0258 HCG, Urine QL: Negative [BANDAR]   0305 THC Screen, Urine(!): Positive [BANDAR]   0305 Oxycodone Screen, Urine(!): Positive [BANDAR]   0515 Patient sleeping, aroused with loud voice and gentle sternal rub.  Discussed results, diagnosis, and treatment plan.  Had a shared decision-making discussion with the patient including her recent hospitalization in which she had a normal EEG and an unremarkable MRI of her lumbar spine and discussed our plan for discharge.  Patient expressed understanding and agrees with plan. [BANDAR]      ED Course User Index  [BANDAR] Dennys Sawyer PA           PPE: Both the patient and I wore a surgical mask throughout the entire patient encounter. I wore protective goggles.     Diagnosis  Final diagnoses:   History of seizure disorder   Chronic low back pain with right-sided sciatica,  unspecified back pain laterality   Migraine without status migrainosus, not intractable, unspecified migraine type        Christian Willams MD  12/24/22 3636

## 2022-12-24 NOTE — ED NOTES
Pt to triage from home via [employer] [phone] with c/o seizure, witnessed by family.  Pt not post-ictal upon ems arrival. Pt c/o pain to her low back.  Pt wearing mask in triage. Triage personnel wore appropriate PPE

## 2022-12-24 NOTE — ED NOTES
Pt sleeping soundly, pt slept through blood collection. Pt placed on O2 at 2 LPM for drop in sats while sleeping.

## 2022-12-24 NOTE — ED PROVIDER NOTES
EMERGENCY DEPARTMENT ENCOUNTER    Room Number:  03/03  Date of encounter:  12/24/2022  PCP: Sahil Cornelius MD  Historian: Patient      HPI:  Chief Complaint: Seizure  A complete HPI/ROS/PMH/PSH/SH/FH are unobtainable due to: N/A    Context: Belen Allen is a 42 y.o. female with past medical history of chronic pain, right-sided sciatica, migraine headaches, and seizure disorder who presents to the ED c/o having had multiple seizures tonight.  Patient states that she was told by her  that she had 3 or 4 seizures including 1 where she fell in the bathroom and hurt her back.  Patient states that she is currently having nausea, vomiting, headache which is common for her after a seizure.  Denies any chest pain, shortness of breath, recent illness, fever or chills, UTI symptoms, missed doses of her seizure medicine, illicit drug use, or abnormal sleep patterns.      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   • Generalized abdominal pain 11/29/2020   • Acute bilateral low back pain 11/29/2020   • Antiphospholipid antibody positive 11/29/2020   • On anticoagulant therapy 11/29/2020   • Acute pain of left knee 11/29/2020   • Vitamin D deficiency 11/30/2020   • Folate deficiency 12/01/2020   • Pain of back and left lower extremity 12/02/2020   • Common bile duct dilatation 05/31/2021   • Elevated LFTs 06/01/2021   • Right upper quadrant abdominal pain 06/01/2021   • Obesity (BMI 30-39.9) 04/18/2019   • Tobacco abuse 06/02/2021   • GERD without esophagitis 06/02/2021   • Intractable abdominal pain 07/05/2021   • Obesity, Class III, BMI 40-49.9 (morbid obesity) ([facility]) 07/05/2021   • Constipation 07/05/2021   • History of ERCP 07/05/2021   • Other chronic pain 07/05/2021   • Seizures ([facility]) 01/01/2022   • Syncope 01/01/2022   • Lumbar back pain with radiculopathy affecting left  lower extremity 01/05/2022   • Right upper quadrant pain 11/23/2022   • Uncontrolled pain 12/17/2022   • Intractable back pain 12/17/2022   • Sciatica of right side 12/20/2022   • Migraine 12/20/2022   • Mobility impaired 12/20/2022     Resolved Ambulatory Problems     Diagnosis Date Noted   • Choledocholithiasis 06/02/2021   • Rhabdomyolysis 01/01/2022   • Encephalopathy 01/01/2022   • Hypokalemia 12/18/2022     Past Medical History:   Diagnosis Date   • Abnormal Pap smear of cervix    • Ankle fracture 2013   • H/O Elevated liver enzymes    • History of chronic back pain    • History of migraine    • History of urinary tract infection    • Injury of back    • PONV (postoperative nausea and vomiting)    • Seasonal allergies    • Seizure (HCC)          PAST SURGICAL HISTORY  Past Surgical History:   Procedure Laterality Date   • BACK SURGERY  2006    fusion L4, L5     • CHOLECYSTECTOMY  2014   • DILATATION AND CURETTAGE  2009   • ENDOSCOPY N/A 11/27/2022    Procedure: ESOPHAGOGASTRODUODENOSCOPY with biopsy;  Surgeon: Christiana Mann MD;  Location: [facility] ENDOSCOPY;  Service: Gastroenterology;  Laterality: N/A;  gastritis, duodenitis, irregular z line   • ERCP N/A 6/3/2021    Procedure: ENDOSCOPIC RETROGRADE CHOLANGIOPANCREATOGRAPHY WITH SPHINCTEROTOMY, DILATION WITH BALLOON CLEARANCE  (12MM-15MM);  Surgeon: Bjorn Flannery MD;  Location: [facility] ENDOSCOPY;  Service: Gastroenterology;  Laterality: N/A;  DILATED COMMON BILE DUCT   • SKIN SURGERY     • TUBAL ABDOMINAL LIGATION  2013   • WISDOM TOOTH EXTRACTION  2018    x2         FAMILY HISTORY  Family History   Problem Relation Age of Onset   • Stroke Mother    • Allergic rhinitis Mother    • Allergic rhinitis Sister    • Breast cancer Maternal Aunt    • Cancer Maternal Grandmother    • Allergic rhinitis Paternal Grandfather    • Cancer Paternal Grandfather    • Prostate cancer Paternal Grandfather          SOCIAL HISTORY  Social History     Socioeconomic  History   • Marital status: Single   • Number of children: 2   Tobacco Use   • Smoking status: Some Days     Packs/day: 0.50     Types: Cigarettes     Last attempt to quit: 2020     Years since quittin.0   • Smokeless tobacco: Never   Vaping Use   • Vaping Use: Never used   Substance and Sexual Activity   • Alcohol use: Not Currently   • Drug use: Not Currently   • Sexual activity: Defer         ALLERGIES  Lidocaine        REVIEW OF SYSTEMS  Review of Systems     All systems reviewed and negative except for those discussed in HPI.       PHYSICAL EXAM    I have reviewed the triage vital signs and nursing notes.    ED Triage Vitals [22 0025]   Temp Heart Rate Resp BP SpO2   97.4 °F (36.3 °C) 88 18 149/73 98 %      Temp src Heart Rate Source Patient Position BP Location FiO2 (%)   Tympanic Monitor -- -- --       Physical Exam  GENERAL: Alert and orient x4, patient having episodes of nausea and vomiting while in the room alternating with falling asleep while speaking to me   HENT: moist mucous membranes  EYES: no scleral icterus, PERRL, EOMI  CV: regular rhythm, regular rate  RESPIRATORY: normal effort  ABDOMEN: soft/nontender  MUSCULOSKELETAL: no deformity, diffuse tenderness to the lumbar spine  NEURO: alert, moves all extremities, no focal neuro deficits, follows commands  SKIN: warm, dry, no rashes        LAB RESULTS  Recent Results (from the past 24 hour(s))   Comprehensive Metabolic Panel    Collection Time: 22  1:08 AM    Specimen: Blood   Result Value Ref Range    Glucose 104 (H) 65 - 99 mg/dL    BUN 13 6 - 20 mg/dL    Creatinine 0.73 0.57 - 1.00 mg/dL    Sodium 140 136 - 145 mmol/L    Potassium 4.6 3.5 - 5.2 mmol/L    Chloride 106 98 - 107 mmol/L    CO2 29.0 22.0 - 29.0 mmol/L    Calcium 7.8 (L) 8.6 - 10.5 mg/dL    Total Protein 5.4 (L) 6.0 - 8.5 g/dL    Albumin 3.50 3.50 - 5.20 g/dL    ALT (SGPT) 24 1 - 33 U/L    AST (SGOT) 19 1 - 32 U/L    Alkaline Phosphatase 95 39 - 117 U/L    Total  Bilirubin <0.2 0.0 - 1.2 mg/dL    Globulin 1.9 gm/dL    A/G Ratio 1.8 g/dL    BUN/Creatinine Ratio 17.8 7.0 - 25.0    Anion Gap 5.0 5.0 - 15.0 mmol/L    eGFR 106.1 >60.0 mL/min/1.73   Lipase    Collection Time: 12/24/22  1:08 AM    Specimen: Blood   Result Value Ref Range    Lipase 8 (L) 13 - 60 U/L   Ethanol    Collection Time: 12/24/22  1:08 AM    Specimen: Blood   Result Value Ref Range    Ethanol <10 0 - 10 mg/dL    Ethanol % <0.010 %   CK    Collection Time: 12/24/22  1:08 AM    Specimen: Blood   Result Value Ref Range    Creatine Kinase 73 20 - 180 U/L   Magnesium    Collection Time: 12/24/22  1:08 AM    Specimen: Blood   Result Value Ref Range    Magnesium 2.2 1.6 - 2.6 mg/dL   Valproic Acid Level, Total    Collection Time: 12/24/22  1:08 AM    Specimen: Blood   Result Value Ref Range    Valproic Acid 21.0 (L) 50.0 - 125.0 mcg/mL   CBC Auto Differential    Collection Time: 12/24/22  1:08 AM    Specimen: Blood   Result Value Ref Range    WBC 12.63 (H) 3.40 - 10.80 10*3/mm3    RBC 3.91 3.77 - 5.28 10*6/mm3    Hemoglobin 12.5 12.0 - 15.9 g/dL    Hematocrit 38.1 34.0 - 46.6 %    MCV 97.4 (H) 79.0 - 97.0 fL    MCH 32.0 26.6 - 33.0 pg    MCHC 32.8 31.5 - 35.7 g/dL    RDW 13.9 12.3 - 15.4 %    RDW-SD 49.9 37.0 - 54.0 fl    MPV 10.2 6.0 - 12.0 fL    Platelets 299 140 - 450 10*3/mm3    Neutrophil % 76.3 (H) 42.7 - 76.0 %    Lymphocyte % 14.9 (L) 19.6 - 45.3 %    Monocyte % 7.8 5.0 - 12.0 %    Eosinophil % 0.1 (L) 0.3 - 6.2 %    Basophil % 0.2 0.0 - 1.5 %    Neutrophils, Absolute 9.64 (H) 1.70 - 7.00 10*3/mm3    Lymphocytes, Absolute 1.88 0.70 - 3.10 10*3/mm3    Monocytes, Absolute 0.99 (H) 0.10 - 0.90 10*3/mm3    Eosinophils, Absolute 0.01 0.00 - 0.40 10*3/mm3    Basophils, Absolute 0.02 0.00 - 0.20 10*3/mm3   Scan Slide    Collection Time: 12/24/22  1:08 AM    Specimen: Blood   Result Value Ref Range    RBC Morphology Normal Normal    WBC Morphology Normal Normal    Platelet Morphology Normal Normal   ECG 12 Lead  Syncope    Collection Time: 12/24/22  1:18 AM   Result Value Ref Range    QT Interval 363 ms   Lactic Acid, Plasma    Collection Time: 12/24/22  2:16 AM    Specimen: Arm, Right; Blood   Result Value Ref Range    Lactate 1.0 0.5 - 2.0 mmol/L   Pregnancy, Urine - Straight Cath    Collection Time: 12/24/22  2:32 AM    Specimen: Straight Cath; Urine   Result Value Ref Range    HCG, Urine QL Negative Negative   Urinalysis With Microscopic If Indicated (No Culture) - Straight Cath    Collection Time: 12/24/22  2:32 AM    Specimen: Straight Cath; Urine   Result Value Ref Range    Color, UA Yellow Yellow, Straw    Appearance, UA Clear Clear    pH, UA 6.0 5.0 - 8.0    Specific Gravity, UA 1.028 1.005 - 1.030    Glucose, UA Negative Negative    Ketones, UA Negative Negative    Bilirubin, UA Negative Negative    Blood, UA Large (3+) (A) Negative    Protein, UA 30 mg/dL (1+) (A) Negative    Leuk Esterase, UA Trace (A) Negative    Nitrite, UA Negative Negative    Urobilinogen, UA 0.2 E.U./dL 0.2 - 1.0 E.U./dL   Urine Drug Screen - Straight Cath    Collection Time: 12/24/22  2:32 AM    Specimen: Straight Cath; Urine   Result Value Ref Range    Amphet/Methamphet, Screen Negative Negative    Barbiturates Screen, Urine Negative Negative    Benzodiazepine Screen, Urine Negative Negative    Cocaine Screen, Urine Negative Negative    Opiate Screen Negative Negative    THC, Screen, Urine Positive (A) Negative    Methadone Screen, Urine Negative Negative    Oxycodone Screen, Urine Positive (A) Negative   Urinalysis, Microscopic Only - Straight Cath    Collection Time: 12/24/22  2:32 AM    Specimen: Straight Cath; Urine   Result Value Ref Range    RBC, UA Too Numerous to Count (A) None Seen, 0-2 /HPF    WBC, UA 3-5 (A) None Seen, 0-2 /HPF    Bacteria, UA None Seen None Seen /HPF    Squamous Epithelial Cells, UA 0-2 None Seen, 0-2 /HPF    Hyaline Casts, UA 0-2 None Seen /LPF    Methodology Automated Microscopy        Ordered the above labs  and independently reviewed the results.        RADIOLOGY  No Radiology Exams Resulted Within Past 24 Hours    I ordered the above noted radiological studies. Reviewed by me and discussed with radiologist.  See dictation for official radiology interpretation.      PROCEDURES    Procedures      MEDICATIONS GIVEN IN ER    Medications   sodium chloride 0.9 % flush 10 mL (has no administration in time range)   sodium chloride 0.9 % bolus 1,000 mL (0 mL Intravenous Stopped 12/24/22 0320)   ondansetron (ZOFRAN) injection 4 mg (4 mg Intravenous Given 12/24/22 0114)   morphine injection 2 mg (2 mg Intravenous Given 12/24/22 0114)         PROGRESS, DATA ANALYSIS, CONSULTS, AND MEDICAL DECISION MAKING    All labs have been independently reviewed by me.  All radiology studies have been reviewed by me and discussed with radiologist dictating the report.   EKG's independently viewed and interpreted by me.  Discussion below represents my analysis of pertinent findings related to patient's condition, differential diagnosis, treatment plan and final disposition.    DDx: Includes but limited to seizure, nonepileptic movement disorder, migraine headache, complex migraine    ED Course as of 12/24/22 0540   Sat Dec 24, 2022   0054 Medical records reviewed, patient was admitted from 1217 3 12/20/2022.  She was admitted for seizures.  At that time she had a seizure in the bathroom and injured her back, she has a history of chronic back pain, she was kept in the hospital for 3 days, had a normal EEG as well as negative imaging of her head, C-spine, and L-spine.  Patient was seen and consulted by neurology and will be followed up on as an outpatient.  Patient also had a normal EEG in January 2022.  Patient was also evaluated 10 days prior for chronic abdominal pain and chronic nausea and vomiting and found to have a normal work-up including evaluation by GI.. [BANDAR]   7222 Patient rechecked, resting in bed, sleeping with sonorous  respirations. [BANDAR]   0134 EKG          EKG time: 1:18 AM  Rhythm/Rate: Sinus rhythm at 86 bpm  P waves and IL: Normal  QRS, axis: Normal QT interval  ST and T waves: No acute ST/T wave changes    Interpreted Contemporaneously by me, independently viewed  Unchanged compared to prior EKG of 12/13/2022   [BANDAR]   0147 WBC(!): 12.63 [BANDAR]   0147 Hemoglobin: 12.5 [BANDAR]   0147 Hematocrit: 38.1 [BANDAR]   0147 Platelets: 299 [BANDAR]   0147 Glucose(!): 104 [BANDAR]   0147 BUN: 13 [BANDAR]   0147 Creatinine: 0.73 [BANDAR]   0147 Sodium: 140 [BANDAR]   0147 Potassium: 4.6 [BANDAR]   0147 Magnesium: 2.2 [BANDAR]   0147 Chloride: 106 [BANDAR]   0147 CO2: 29.0 [BANDAR]   0148 Creatine Kinase: 73 [BANDAR]   0148 Lipase(!): 8 [BANDAR]   0148 Valproic Acid(!): 21.0 [BANDAR]   0148 Patient's Depakote is still subtherapeutic. [BANDAR]   0230 Patient slept through catheterization for urine. [BANDAR]   0257 Lactate: 1.0 [BANDAR]   0258 HCG, Urine QL: Negative [BANDAR]   0305 THC Screen, Urine(!): Positive [BANDAR]   0305 Oxycodone Screen, Urine(!): Positive [BANDAR]   0515 Patient sleeping, aroused with loud voice and gentle sternal rub.  Discussed results, diagnosis, and treatment plan.  Had a shared decision-making discussion with the patient including her recent hospitalization in which she had a normal EEG and an unremarkable MRI of her lumbar spine and discussed our plan for discharge.  Patient expressed understanding and agrees with plan. [BANDAR]      ED Course User Index  [BANDAR] Dennys Sawyer PA       MDM: Patient's work-up is unremarkable and there is no evidence that the patient was in status, her lactic acid is normal at 1.0, electrolytes are normal, creatinine kinase is normal, there is no evidence for urinary tract infection.  Patient's drug screen was positive for oxycodone and marijuana.  The patient has been monitored in the ER for over 5 hours, she is remained seizure-free and is at her baseline neurologically.  Patient just had extensive work-up for her seizures last week and has appropriate  outpatient follow-up with neurology already established.  Vital signs are stable, patient is safe for discharge home.    PPE: The patient wore a surgical mask throughout the entire patient encounter. I wore an N95.    AS OF 05:40 EST VITALS:    BP - (!) 135/104  HR - 84  TEMP - 97.4 °F (36.3 °C) (Tympanic)  O2 SATS - 100%        DIAGNOSIS  Final diagnoses:   History of seizure disorder   Chronic low back pain with right-sided sciatica, unspecified back pain laterality   Migraine without status migrainosus, not intractable, unspecified migraine type         DISPOSITION  DISCHARGE    Patient discharged in stable condition.    Reviewed implications of results, diagnosis, meds, responsibility to follow up, warning signs and symptoms of possible worsening, potential complications and reasons to return to ER.    Patient/Family voiced understanding of above instructions.    Discussed plan for discharge, as there is no emergent indication for admission. Patient referred to primary care provider for BP management due to today's BP. Pt/family is agreeable and understands need for follow up and repeat testing.  Pt is aware that discharge does not mean that nothing is wrong but it indicates no emergency is present that requires admission and they must continue care with follow-up as given below or physician of their choice.     FOLLOW-UP  Bjorn Fox MD  [address] [id number] [phone]    Schedule an appointment as soon as possible for a visit       Sahil Cornelius MD  [address] [facility] [address] [phone]    Schedule an appointment as soon as possible for a visit            Medication List      No changes were made to your prescriptions during this visit.         Note Disclaimer: At [facility], we believe that sharing information builds trust and better relationships. You are receiving this note because you recently visited [facility]. It is  possible you will see health information before a provider has talked with you about it. This kind of information can be easy to misunderstand. To help you fully understand what it means for your health, we urge you to discuss this note with your provider.       Dennys Sawyer, PA  12/24/22 0526       Dennys Sawyer PA  12/24/22 0540

## 2022-12-24 NOTE — ED NOTES
"Pt appears very sedated, awakens to tactile stimulation. Pt asked if she has taken any medication prior to coming in tonight that might cause her to be drowsy. Pt states she took \"a sleeping pill that I bought over the counter at the [email or web address]. It was 40mg whatever it was.\" Rails on stretcher padded as per protocol.  "

## 2022-12-24 NOTE — ED NOTES
Straight cath urine performed while pt sleeping. Pt did not wake up during procedure, attempts to wake pt prior to cath were unsuccessful. Breathing is regular and nonlabored, v/s WNL.

## 2022-12-24 NOTE — DISCHARGE INSTRUCTIONS
Home, rest, home medicine as prescribed, follow up with your neurologist as scheduled and and further evaluation PCP for recheck. Return to care with further concerns.

## 2022-12-26 ENCOUNTER — HOSPITAL ENCOUNTER (EMERGENCY)
Facility: HOSPITAL | Age: 42
Discharge: HOME | End: 2022-12-26
Attending: EMERGENCY MEDICINE | Admitting: EMERGENCY MEDICINE

## 2022-12-26 ENCOUNTER — APPOINTMENT (OUTPATIENT)
Dept: GENERAL RADIOLOGY | Facility: HOSPITAL | Age: 42
End: 2022-12-26

## 2022-12-26 VITALS
RESPIRATION RATE: 16 BRPM | BODY MASS INDEX: 30.73 KG/M2 | DIASTOLIC BLOOD PRESSURE: 70 MMHG | HEIGHT: 64 IN | SYSTOLIC BLOOD PRESSURE: 130 MMHG | HEART RATE: 74 BPM | WEIGHT: 180 LBS | OXYGEN SATURATION: 96 % | TEMPERATURE: 98.2 F

## 2022-12-26 DIAGNOSIS — R56.9 SEIZURE-LIKE ACTIVITY: Primary | ICD-10-CM

## 2022-12-26 LAB
ALBUMIN SERPL-MCNC: 3.7 G/DL (ref 3.5–5.2)
ALBUMIN/GLOB SERPL: 1.9 G/DL
ALP SERPL-CCNC: 108 U/L (ref 39–117)
ALT SERPL W P-5'-P-CCNC: 22 U/L (ref 1–33)
AMPHET+METHAMPHET UR QL: NEGATIVE
ANION GAP SERPL CALCULATED.3IONS-SCNC: 5 MMOL/L (ref 5–15)
AST SERPL-CCNC: 23 U/L (ref 1–32)
BARBITURATES UR QL SCN: NEGATIVE
BASOPHILS # BLD AUTO: 0.01 10*3/MM3 (ref 0–0.2)
BASOPHILS NFR BLD AUTO: 0.1 % (ref 0–1.5)
BENZODIAZ UR QL SCN: NEGATIVE
BILIRUB SERPL-MCNC: 0.3 MG/DL (ref 0–1.2)
BILIRUB UR QL STRIP: NEGATIVE
BUN SERPL-MCNC: 14 MG/DL (ref 6–20)
BUN/CREAT SERPL: 15.1 (ref 7–25)
CALCIUM SPEC-SCNC: 8.7 MG/DL (ref 8.6–10.5)
CANNABINOIDS SERPL QL: POSITIVE
CHLORIDE SERPL-SCNC: 100 MMOL/L (ref 98–107)
CLARITY UR: CLEAR
CO2 SERPL-SCNC: 32 MMOL/L (ref 22–29)
COCAINE UR QL: NEGATIVE
COLOR UR: YELLOW
CREAT SERPL-MCNC: 0.93 MG/DL (ref 0.57–1)
DEPRECATED RDW RBC AUTO: 46.6 FL (ref 37–54)
EGFRCR SERPLBLD CKD-EPI 2021: 79.4 ML/MIN/1.73
EOSINOPHIL # BLD AUTO: 0.04 10*3/MM3 (ref 0–0.4)
EOSINOPHIL NFR BLD AUTO: 0.5 % (ref 0.3–6.2)
ERYTHROCYTE [DISTWIDTH] IN BLOOD BY AUTOMATED COUNT: 14.1 % (ref 12.3–15.4)
ETHANOL BLD-MCNC: <10 MG/DL (ref 0–10)
ETHANOL UR QL: <0.01 %
GLOBULIN UR ELPH-MCNC: 1.9 GM/DL
GLUCOSE SERPL-MCNC: 108 MG/DL (ref 65–99)
GLUCOSE UR STRIP-MCNC: NEGATIVE MG/DL
HCG SERPL QL: NEGATIVE
HCT VFR BLD AUTO: 32.9 % (ref 34–46.6)
HGB BLD-MCNC: 11 G/DL (ref 12–15.9)
HGB UR QL STRIP.AUTO: NEGATIVE
IMM GRANULOCYTES # BLD AUTO: 0.05 10*3/MM3 (ref 0–0.05)
IMM GRANULOCYTES NFR BLD AUTO: 0.6 % (ref 0–0.5)
KETONES UR QL STRIP: NEGATIVE
LEUKOCYTE ESTERASE UR QL STRIP.AUTO: NEGATIVE
LYMPHOCYTES # BLD AUTO: 0.49 10*3/MM3 (ref 0.7–3.1)
LYMPHOCYTES NFR BLD AUTO: 5.6 % (ref 19.6–45.3)
MCH RBC QN AUTO: 31.2 PG (ref 26.6–33)
MCHC RBC AUTO-ENTMCNC: 33.4 G/DL (ref 31.5–35.7)
MCV RBC AUTO: 93.2 FL (ref 79–97)
METHADONE UR QL SCN: NEGATIVE
MONOCYTES # BLD AUTO: 0.4 10*3/MM3 (ref 0.1–0.9)
MONOCYTES NFR BLD AUTO: 4.6 % (ref 5–12)
NEUTROPHILS NFR BLD AUTO: 7.71 10*3/MM3 (ref 1.7–7)
NEUTROPHILS NFR BLD AUTO: 88.6 % (ref 42.7–76)
NITRITE UR QL STRIP: NEGATIVE
NRBC BLD AUTO-RTO: 0 /100 WBC (ref 0–0.2)
NT-PROBNP SERPL-MCNC: 315 PG/ML (ref 0–450)
OPIATES UR QL: NEGATIVE
OXYCODONE UR QL SCN: NEGATIVE
PH UR STRIP.AUTO: 8.5 [PH] (ref 5–8)
PLATELET # BLD AUTO: 267 10*3/MM3 (ref 140–450)
PMV BLD AUTO: 9.8 FL (ref 6–12)
POTASSIUM SERPL-SCNC: 5.2 MMOL/L (ref 3.5–5.2)
PROT SERPL-MCNC: 5.6 G/DL (ref 6–8.5)
PROT UR QL STRIP: NEGATIVE
RBC # BLD AUTO: 3.53 10*6/MM3 (ref 3.77–5.28)
SODIUM SERPL-SCNC: 137 MMOL/L (ref 136–145)
SP GR UR STRIP: 1.01 (ref 1–1.03)
UROBILINOGEN UR QL STRIP: ABNORMAL
VALPROATE SERPL-MCNC: 40 MCG/ML (ref 50–125)
WBC NRBC COR # BLD: 8.7 10*3/MM3 (ref 3.4–10.8)

## 2022-12-26 PROCEDURE — 82077 ASSAY SPEC XCP UR&BREATH IA: CPT | Performed by: PHYSICIAN ASSISTANT

## 2022-12-26 PROCEDURE — 25010000002 ONDANSETRON PER 1 MG: Performed by: PHYSICIAN ASSISTANT

## 2022-12-26 PROCEDURE — 81003 URINALYSIS AUTO W/O SCOPE: CPT | Performed by: PHYSICIAN ASSISTANT

## 2022-12-26 PROCEDURE — 80307 DRUG TEST PRSMV CHEM ANLYZR: CPT | Performed by: PHYSICIAN ASSISTANT

## 2022-12-26 PROCEDURE — 80164 ASSAY DIPROPYLACETIC ACD TOT: CPT | Performed by: PHYSICIAN ASSISTANT

## 2022-12-26 PROCEDURE — 84703 CHORIONIC GONADOTROPIN ASSAY: CPT | Performed by: PHYSICIAN ASSISTANT

## 2022-12-26 PROCEDURE — 99284 EMERGENCY DEPT VISIT MOD MDM: CPT

## 2022-12-26 PROCEDURE — 80053 COMPREHEN METABOLIC PANEL: CPT | Performed by: PHYSICIAN ASSISTANT

## 2022-12-26 PROCEDURE — 85025 COMPLETE CBC W/AUTO DIFF WBC: CPT | Performed by: PHYSICIAN ASSISTANT

## 2022-12-26 PROCEDURE — 96374 THER/PROPH/DIAG INJ IV PUSH: CPT

## 2022-12-26 PROCEDURE — 83880 ASSAY OF NATRIURETIC PEPTIDE: CPT | Performed by: PHYSICIAN ASSISTANT

## 2022-12-26 PROCEDURE — 71045 X-RAY EXAM CHEST 1 VIEW: CPT

## 2022-12-26 RX ORDER — ONDANSETRON 2 MG/ML
4 INJECTION INTRAMUSCULAR; INTRAVENOUS ONCE
Status: COMPLETED | OUTPATIENT
Start: 2022-12-26 | End: 2022-12-26

## 2022-12-26 RX ORDER — DIVALPROEX SODIUM 500 MG/1
1000 TABLET, DELAYED RELEASE ORAL EVERY 12 HOURS SCHEDULED
Qty: 120 TABLET | Refills: 0 | Status: SHIPPED | OUTPATIENT
Start: 2022-12-26 | End: 2023-01-03 | Stop reason: HOSPADM

## 2022-12-26 RX ORDER — DIVALPROEX SODIUM 500 MG/1
1500 TABLET, DELAYED RELEASE ORAL ONCE
Status: COMPLETED | OUTPATIENT
Start: 2022-12-26 | End: 2022-12-26

## 2022-12-26 RX ADMIN — DIVALPROEX SODIUM 1500 MG: 500 TABLET, DELAYED RELEASE ORAL at 23:40

## 2022-12-26 RX ADMIN — ONDANSETRON 4 MG: 2 INJECTION INTRAMUSCULAR; INTRAVENOUS at 20:57

## 2022-12-27 NOTE — ED NOTES
Patient presents to ED from home via EMS with report of breakthrough seizure activity. She reports that her daughter witnessed her having tonic clonic seizure activity with foaming at the mouth and her eyes rolling back in her head tonight. She reports that she has been having an increase in abdominal pain and back pain as well as bilateral lower extremity pain which is chronic but has been worse the last several days. She reports that she has been taking her seizure medication daily and has been having breakthrough seizures daily for the last 3 days.     Patient placed in mask by EMS, this RN wearing appropriate PPE during encounter.

## 2022-12-27 NOTE — ED TRIAGE NOTES
"EMS reports that pt called for abdominal pain and sciatic pain, she did not mention anything about her \"seizure\" to EMS.  Per EMS pt was sitting on the side of her bed smoking a cigarette when they arrived and walked out the the ambulance with her walker.   "

## 2022-12-27 NOTE — ED PROVIDER NOTES
EMERGENCY DEPARTMENT ENCOUNTER    Room Number: 03/03  Date seen: 12/26/2022  Time seen: 19:52 EST  PCP: Sahil Cornelius MD    Spoken Language:  English  Language interpretation services not needed     CHIEF COMPLAINT: seizure-like activity    Neurologist: Appointment to establish care with Dr. Sandoval on 05/18/2022    HPI: Belen Allen is a 42 y.o. female with PMH of seizure disorder presenting to the ED for evaluation following a seizure at home. The history is being obtained by the patient and by review of the medical chart.  The patient states that she had an episode of seizure-like activity prior to presentation to the ED today.  The patient states that the seizure tonight occurred in front of her children.  She states that her daughter said that her eyes rolled back in her head and she was foaming at the mouth.  The patient states that this is the third seizure event that she has had in the past 3 days.  She was seen in the emergency department after her seizure on 12/24/2022.  She did not, however, come to the emergency department yesterday because it was [place].  The patient states that she has been compliant with taking her Depakote.  She states that she does hope that she can transition to Keppra instead of Depakote at some point, because the Depakote is making her hair fall out.  Prior to the past 3 days, she states that she was having approximately 1 seizure per week.    MEDICAL RECORD REVIEW:  Reviewed in EPIC.  The patient was seen in consultation by Dr. Melendez, neurologist, on 12/18/2022 for evaluation of seizure-like activity.  Her Depakote level was noted to be undetectable upon admission.  He instructed her to restart Depakote 750 DR tablets twice daily.  She was instructed to follow-up with one of their epileptologists and thought that she would likely benefit from EMU monitoring at some point.    The patient was also seen in the emergency department on 12/24/2022 after having a  seizure.  Her work-up in the ED was benign and she was recommended to follow-up outpatient with neurology as previously scheduled.    PAST MEDICAL HISTORY  Past Medical History:   Diagnosis Date   • Abnormal Pap smear of cervix    • Ankle fracture 2013   • H/O Elevated liver enzymes    • History of chronic back pain    • History of migraine    • History of urinary tract infection    • Injury of back    • PONV (postoperative nausea and vomiting)    • Seasonal allergies    • Seizure (HCC)     Takes Keppra       PAST SURGICAL HISTORY  Past Surgical History:   Procedure Laterality Date   • BACK SURGERY  2006    fusion L4, L5     • CHOLECYSTECTOMY  2014   • DILATATION AND CURETTAGE  2009   • ENDOSCOPY N/A 11/27/2022    Procedure: ESOPHAGOGASTRODUODENOSCOPY with biopsy;  Surgeon: Christiana Mann MD;  Location: [facility] ENDOSCOPY;  Service: Gastroenterology;  Laterality: N/A;  gastritis, duodenitis, irregular z line   • ERCP N/A 6/3/2021    Procedure: ENDOSCOPIC RETROGRADE CHOLANGIOPANCREATOGRAPHY WITH SPHINCTEROTOMY, DILATION WITH BALLOON CLEARANCE  (12MM-15MM);  Surgeon: Bjorn Flannery MD;  Location: [facility] ENDOSCOPY;  Service: Gastroenterology;  Laterality: N/A;  DILATED COMMON BILE DUCT   • SKIN SURGERY     • TUBAL ABDOMINAL LIGATION  2013   • WISDOM TOOTH EXTRACTION  2018    x2       FAMILY HISTORY  Family History   Problem Relation Age of Onset   • Stroke Mother    • Allergic rhinitis Mother    • Allergic rhinitis Sister    • Breast cancer Maternal Aunt    • Cancer Maternal Grandmother    • Allergic rhinitis Paternal Grandfather    • Cancer Paternal Grandfather    • Prostate cancer Paternal Grandfather      SOCIAL HISTORY  Social History     Socioeconomic History   • Marital status: Single   • Number of children: 2   Tobacco Use   • Smoking status: Every Day     Packs/day: 0.50     Types: Cigarettes   • Smokeless tobacco: Never   Vaping Use   • Vaping Use: Never used   Substance and Sexual Activity   • Alcohol  use: Not Currently   • Drug use: Not Currently   • Sexual activity: Defer     CURRENT MEDICATIONS  Prior to Admission medications    Medication Sig Start Date End Date Taking? Authorizing Provider   apixaban (ELIQUIS) 5 MG tablet tablet Take 5 mg by mouth 2 (Two) Times a Day. Last filled 4/12/21 - pt states she was instructed to stop taking medication prior to ERCP ~1 month ago and was not instructed to restart medication.   Indication: Splenic infarct    Michele Chandler MD   baclofen (LIORESAL) 10 MG tablet Take 1 tablet by mouth Every 8 (Eight) Hours. 12/20/22   Indra Whitman MD   Calcium Carbonate-Vitamin D 600-200 MG-UNIT tablet Take 1 tablet by mouth Daily. 11/11/21   Michele Chandler MD   dexamethasone (DECADRON) 4 MG tablet Take 1 tablet by mouth 2 (Two) Times a Day With Meals for 5 days then take 1 tablet by mouth every morning with breakfast for 5 days 12/20/22 12/30/22  Indra Whitman MD   dexamethasone (DECADRON) 4 MG tablet Take 1 tablet by mouth Daily With Breakfast for 5 days. 12/25/22 12/30/22  Indra Whitman MD   dicyclomine (BENTYL) 20 MG tablet Take 1 tablet by mouth Every 6 (Six) Hours. 11/23/22   Vadim Glez III, PA   divalproex (DEPAKOTE) 250 MG DR tablet Take 3 tablets by mouth Every 12 (Twelve) Hours. 12/20/22   Indra Whitman MD   FLUoxetine (PROzac) 40 MG capsule Take 60 mg by mouth Every Night.    Michele Chandler MD   folic acid (FOLVITE) 1 MG tablet Take 1 tablet by mouth Daily. 12/3/20   Dennys Carranza MD   gabapentin (NEURONTIN) 800 MG tablet Take 800 mg by mouth 4 (Four) Times a Day.    Michele Chandler MD   hydrOXYzine (ATARAX) 25 MG tablet Take 1-2 tablets by mouth 3 (Three) Times a Day As Needed for Anxiety. 8/6/20   Michele Chandler MD   loratadine (CLARITIN) 10 MG tablet Take 10 mg by mouth Daily. 12/21/21   Michele Chandler MD   LORazepam (ATIVAN) 0.5 MG tablet Take 0.25-0.5 mg by mouth. 12/17/21   Provider, MD Michele    magnesium oxide (MAG-OX) 400 tablet tablet Take 1 tablet by mouth Daily. 12/21/22   Indra Whitman MD   melatonin 5 MG tablet tablet Take 10 mg by mouth Every Night. Patient taes 20 mg at home    ProviderMichele MD   ondansetron ODT (ZOFRAN-ODT) 8 MG disintegrating tablet Place 1 tablet on the tongue Every 8 (Eight) Hours As Needed for Nausea or Vomiting. 12/20/22   Indra Whitman MD   oxyCODONE-acetaminophen (PERCOCET) 7.5-325 MG per tablet Take 1 tablet by mouth Every 4 (Four) Hours As Needed for Moderate Pain for up to 6 days. 12/20/22 12/26/22  Indra Whitman MD   pantoprazole (PROTONIX) 40 MG EC tablet Take 1 tablet by mouth 2 (Two) Times a Day. 12/13/22   Arturo Srinivasan MD   polyethylene glycol (polyethylene glycol) 17 g packet Take 17 g by mouth Daily. 7/8/21   Qasim Schmidt MD   sucralfate (CARAFATE) 1 g tablet Take 1 tablet by mouth 4 (Four) Times a Day. 12/13/22   Arturo Srinivasan MD   SUMAtriptan (IMITREX) 100 MG tablet Take 50 mg by mouth Every 2 (Two) Hours As Needed for Migraine. Take one tablet at onset of headache. May repeat dose one time in 2 hours if headache not relieved.    Provider, MD Michele   traZODone (DESYREL) 50 MG tablet Take 1-2 tablets by mouth At Night As Needed for sleep 12/20/22   Indra Whitman MD   Vitamin B-2 (RIBOFLAVIN) 100 MG tablet tablet Take 4 tablets by mouth Daily. 12/21/22   Indra Whitman MD   vitamin D (ERGOCALCIFEROL) 1.25 MG (47662 UT) capsule capsule Take 50,000 Units by mouth 1 (One) Time Per Week.    ProviderMichele MD     ALLERGIES  Lidocaine    REVIEW OF SYSTEMS  All systems reviewed and negative except for those discussed in HPI.     PHYSICAL EXAM  ED Triage Vitals [12/26/22 1942]   Temp Heart Rate Resp BP SpO2   98.2 °F (36.8 °C) 88 16 142/68 97 %      Temp src Heart Rate Source Patient Position BP Location FiO2 (%)   Oral Monitor Lying -- --     Physical Exam  Constitutional:       Appearance: Normal appearance.    HENT:      Head: Normocephalic and atraumatic.      Mouth/Throat:      Mouth: Mucous membranes are moist.   Eyes:      Extraocular Movements: Extraocular movements intact.      Pupils: Pupils are equal, round, and reactive to light.   Cardiovascular:      Rate and Rhythm: Normal rate and regular rhythm.   Pulmonary:      Effort: Pulmonary effort is normal.      Breath sounds: Normal breath sounds.   Abdominal:      General: There is no distension.      Palpations: Abdomen is soft.      Tenderness: There is no abdominal tenderness.   Musculoskeletal:      Cervical back: Normal range of motion.   Skin:     General: Skin is warm and dry.   Neurological:      Mental Status: She is alert.      Comments: Mental Status: The patient is awake, alert and oriented x 3. Recent and remote memory functions are normal. The patient is attentive with normal concentration.  Language is fluent. Speech is clear. The speech is non-dysarthric. Fund of knowledge is normal.  Cranial Nerve I: Not tested.  Cranial Nerve II: The pupils are 3 mm, equally round and reactive to light.   Cranial Nerves III, IV, VI: The extraocular movements are full in all directions of gaze without nystagmus.  Cranial Nerve VII: Facial movements are symmetric  Motor: Tone is normal in all four extremities without fasciculations, atrophy, or myoclonus. There are no involuntary movements.   Muscle Strength: 4/5 muscle strength in right lower extremity. Otherwise, 5/5 muscle strength in bilateral upper and left lower extremities.   Sensory: The sensory examination is normal for light touch bilaterally and symmetrically.     Psychiatric:         Mood and Affect: Mood normal.         Behavior: Behavior normal.         Thought Content: Thought content normal.         Judgment: Judgment normal.       PROCEDURES  Procedures  None    LABS  Recent Results (from the past 24 hour(s))   Valproic Acid Level, Total    Collection Time: 12/26/22  8:30 PM    Specimen: Blood    Result Value Ref Range    Valproic Acid 40.0 (L) 50.0 - 125.0 mcg/mL   Comprehensive Metabolic Panel    Collection Time: 12/26/22  8:30 PM    Specimen: Blood   Result Value Ref Range    Glucose 108 (H) 65 - 99 mg/dL    BUN 14 6 - 20 mg/dL    Creatinine 0.93 0.57 - 1.00 mg/dL    Sodium 137 136 - 145 mmol/L    Potassium 5.2 3.5 - 5.2 mmol/L    Chloride 100 98 - 107 mmol/L    CO2 32.0 (H) 22.0 - 29.0 mmol/L    Calcium 8.7 8.6 - 10.5 mg/dL    Total Protein 5.6 (L) 6.0 - 8.5 g/dL    Albumin 3.7 3.5 - 5.2 g/dL    ALT (SGPT) 22 1 - 33 U/L    AST (SGOT) 23 1 - 32 U/L    Alkaline Phosphatase 108 39 - 117 U/L    Total Bilirubin 0.3 0.0 - 1.2 mg/dL    Globulin 1.9 gm/dL    A/G Ratio 1.9 g/dL    BUN/Creatinine Ratio 15.1 7.0 - 25.0    Anion Gap 5.0 5.0 - 15.0 mmol/L    eGFR 79.4 >60.0 mL/min/1.73   hCG, Serum, Qualitative    Collection Time: 12/26/22  8:30 PM    Specimen: Blood   Result Value Ref Range    HCG Qualitative Negative Negative   BNP    Collection Time: 12/26/22  8:30 PM    Specimen: Blood   Result Value Ref Range    proBNP 315.0 0.0 - 450.0 pg/mL   CBC Auto Differential    Collection Time: 12/26/22  8:30 PM    Specimen: Blood   Result Value Ref Range    WBC 8.70 3.40 - 10.80 10*3/mm3    RBC 3.53 (L) 3.77 - 5.28 10*6/mm3    Hemoglobin 11.0 (L) 12.0 - 15.9 g/dL    Hematocrit 32.9 (L) 34.0 - 46.6 %    MCV 93.2 79.0 - 97.0 fL    MCH 31.2 26.6 - 33.0 pg    MCHC 33.4 31.5 - 35.7 g/dL    RDW 14.1 12.3 - 15.4 %    RDW-SD 46.6 37.0 - 54.0 fl    MPV 9.8 6.0 - 12.0 fL    Platelets 267 140 - 450 10*3/mm3    Neutrophil % 88.6 (H) 42.7 - 76.0 %    Lymphocyte % 5.6 (L) 19.6 - 45.3 %    Monocyte % 4.6 (L) 5.0 - 12.0 %    Eosinophil % 0.5 0.3 - 6.2 %    Basophil % 0.1 0.0 - 1.5 %    Immature Grans % 0.6 (H) 0.0 - 0.5 %    Neutrophils, Absolute 7.71 (H) 1.70 - 7.00 10*3/mm3    Lymphocytes, Absolute 0.49 (L) 0.70 - 3.10 10*3/mm3    Monocytes, Absolute 0.40 0.10 - 0.90 10*3/mm3    Eosinophils, Absolute 0.04 0.00 - 0.40 10*3/mm3     Basophils, Absolute 0.01 0.00 - 0.20 10*3/mm3    Immature Grans, Absolute 0.05 0.00 - 0.05 10*3/mm3    nRBC 0.0 0.0 - 0.2 /100 WBC   Ethanol    Collection Time: 12/26/22  8:30 PM    Specimen: Blood   Result Value Ref Range    Ethanol <10 0 - 10 mg/dL    Ethanol % <0.010 %   Urinalysis With Microscopic If Indicated (No Culture) - Urine, Clean Catch    Collection Time: 12/26/22  8:31 PM    Specimen: Urine, Clean Catch   Result Value Ref Range    Color, UA Yellow Yellow, Straw    Appearance, UA Clear Clear    pH, UA 8.5 (H) 5.0 - 8.0    Specific Gravity, UA 1.011 1.005 - 1.030    Glucose, UA Negative Negative    Ketones, UA Negative Negative    Bilirubin, UA Negative Negative    Blood, UA Negative Negative    Protein, UA Negative Negative    Leuk Esterase, UA Negative Negative    Nitrite, UA Negative Negative    Urobilinogen, UA 0.2 E.U./dL 0.2 - 1.0 E.U./dL   Urine Drug Screen - Urine, Clean Catch    Collection Time: 12/26/22  8:31 PM    Specimen: Urine, Clean Catch   Result Value Ref Range    Amphet/Methamphet, Screen Negative Negative    Barbiturates Screen, Urine Negative Negative    Benzodiazepine Screen, Urine Negative Negative    Cocaine Screen, Urine Negative Negative    Opiate Screen Negative Negative    THC, Screen, Urine Positive (A) Negative    Methadone Screen, Urine Negative Negative    Oxycodone Screen, Urine Negative Negative       RADIOLOGY  XR Chest 1 View   Final Result   No acute findings.       This report was finalized on 12/26/2022 9:01 PM by Dr. Marli Eastman M.D.              MEDICATIONS GIVEN IN THE ER  Medications   divalproex (DEPAKOTE) DR tablet 1,500 mg (has no administration in time range)   ondansetron (ZOFRAN) injection 4 mg (4 mg Intravenous Given 12/26/22 2057)       MEDICAL DECISION MAKING, CONSULTS AND PROGRESS NOTES  All labs and all radiology studies were have been viewed and interpreted by me.   Discussion below represents my analysis of pertinent findings related to  patient's condition, differential diagnosis, treatment plan and final disposition.    PPE: The patient was placed in a face mask in first look. Patient was wearing facemask when I entered the room and throughout our encounter. I wore full protective equipment throughout this patient encounter including a face mask, eye protection and gloves. Hand hygiene was performed before donning protective equipment and after removal when leaving the room.    AS OF 23:18 EST VITALS:    BP - 130/70  HR - 74  TEMP - 98.2 °F (36.8 °C) (Oral)  O2 SATS - 96%    ED Course as of 12/26/22 2318   Mon Dec 26, 2022   1956 Patient ambulatory to the bathroom independently. [AR]   2227 The patient's ED work-up is reassuring.  Awaiting return page from neurology for recommendations for seizure management. [AR]   2312 I discussed the patient's overall care with Dr. Melendez, neurologist on-call.  He recommends loading the patient with Depakote 1500 mg tonight, and increasing her outpatient dose to 1000 mg twice daily.  He also recommends that she call their office tomorrow to make an appointment to be seen soother than currently scheduled. [AR]      ED Course User Index  [AR] Angeles Blanchard PA     The patient's history, physical exam, and lab findings were discussed with the physician, who also performed a face to face history and physical exam.  I discussed all results and noted any abnormalities with patient.  Discussed absoute need to recheck abnormalities with their family physician.  I answered any of the patient's questions.  Discussed plan for discharge, as there is no emergent indication for admission.  Pt is agreeable and understands need for follow up and repeat testing.  Pt is aware that discharge does not mean that nothing is wrong but it indicates no emergency is present and they must continue care with their family physician.  Pt is discharged with instructions to follow up with primary care doctor to have their blood  pressure rechecked.     DIAGNOSIS   Diagnosis Plan   1. Seizure-like activity (HCC)            DISPOSITION  ED Disposition     ED Disposition   Discharge    Condition   Stable    Comment   --             FOLLOW UP  Cm Sandoval MD  [address]  [place] [id number] [phone]      Call tomorrow to make a sooner appointment to be seen      RX  Medications   divalproex (DEPAKOTE) DR tablet 1,500 mg (has no administration in time range)   ondansetron (ZOFRAN) injection 4 mg (4 mg Intravenous Given 12/26/22 2057)          Medication List      Changed    divalproex 500 MG DR tablet  Commonly known as: DEPAKOTE  Take 2 tablets by mouth Every 12 (Twelve) Hours.  What changed:   · medication strength  · how much to take           Where to Get Your Medications      These medications were sent to [id number] - [place], [place] - [address] [address] [address] AT [facility] [address] [place] [address] & [facility] [address] - [phone]  - [phone] [facility] [address] [address], [facility] [address]    Phone: [phone]   · divalproex 500 MG DR tablet       Provider Attestation:  I personally reviewed the past medical history, past surgical history, social history, family history, current medications and allergies as they appear in the chart. I reviewed the patient's history, physical, lab/imaging results and overall care with Dr. Medina who is in agreement with the patient's treatment plan.    EMR Dragon/Transcription disclaimer:  Dictated using Dragon dictation    Provider note signed by:         Angeles Blanchrad PA  12/26/22 1075

## 2022-12-27 NOTE — ED TRIAGE NOTES
"Pt presents to the ER from home via EMS with multiple complaints.  Pt states she had a \"witnessed seizure\" that was witnessed by her 4 children.  Pt states that her daughter stated \"her eyes rolled back in her head, and she was foaming at the mouth x 15 minutes\", EMS reports no confusion or postictal period upon their arrival.  Pt states she does have a hx of seizures and takes Depakote and has not missed any doses.  Pt is also c/o BLE edema x months, as well as abdominal pain x 2 weeks.     This RN in appropriate PPE for all patient care interactions. Pt masked and redirected for proper mask use when necessary. Hand hygiene performed before and after all patient care interactions.  "

## 2022-12-29 ENCOUNTER — APPOINTMENT (OUTPATIENT)
Dept: CT IMAGING | Facility: HOSPITAL | Age: 42
End: 2022-12-29
Payer: COMMERCIAL

## 2022-12-29 ENCOUNTER — HOSPITAL ENCOUNTER (EMERGENCY)
Facility: HOSPITAL | Age: 42
Discharge: HOME | End: 2022-12-29
Attending: EMERGENCY MEDICINE | Admitting: EMERGENCY MEDICINE
Payer: COMMERCIAL

## 2022-12-29 ENCOUNTER — APPOINTMENT (OUTPATIENT)
Dept: GENERAL RADIOLOGY | Facility: HOSPITAL | Age: 42
End: 2022-12-29
Payer: COMMERCIAL

## 2022-12-29 ENCOUNTER — READMISSION MANAGEMENT (OUTPATIENT)
Dept: CALL CENTER | Facility: HOSPITAL | Age: 42
End: 2022-12-29

## 2022-12-29 VITALS
SYSTOLIC BLOOD PRESSURE: 131 MMHG | DIASTOLIC BLOOD PRESSURE: 107 MMHG | TEMPERATURE: 98.1 F | HEART RATE: 74 BPM | RESPIRATION RATE: 15 BRPM | OXYGEN SATURATION: 100 %

## 2022-12-29 DIAGNOSIS — G40.909 SEIZURE DISORDER: Primary | ICD-10-CM

## 2022-12-29 DIAGNOSIS — Z79.01 CHRONIC ANTICOAGULATION: ICD-10-CM

## 2022-12-29 DIAGNOSIS — K62.5 BRBPR (BRIGHT RED BLOOD PER RECTUM): ICD-10-CM

## 2022-12-29 LAB
ABO GROUP BLD: NORMAL
ALBUMIN SERPL-MCNC: 3.9 G/DL (ref 3.5–5.2)
ALBUMIN/GLOB SERPL: 1.9 G/DL
ALP SERPL-CCNC: 103 U/L (ref 39–117)
ALT SERPL W P-5'-P-CCNC: 18 U/L (ref 1–33)
ANION GAP SERPL CALCULATED.3IONS-SCNC: 7 MMOL/L (ref 5–15)
APTT PPP: 23 SECONDS (ref 22.7–35.4)
AST SERPL-CCNC: 16 U/L (ref 1–32)
BASOPHILS # BLD AUTO: 0.01 10*3/MM3 (ref 0–0.2)
BASOPHILS NFR BLD AUTO: 0.1 % (ref 0–1.5)
BILIRUB SERPL-MCNC: <0.2 MG/DL (ref 0–1.2)
BLD GP AB SCN SERPL QL: NEGATIVE
BUN SERPL-MCNC: 10 MG/DL (ref 6–20)
BUN/CREAT SERPL: 12 (ref 7–25)
CALCIUM SPEC-SCNC: 8.7 MG/DL (ref 8.6–10.5)
CHLORIDE SERPL-SCNC: 105 MMOL/L (ref 98–107)
CO2 SERPL-SCNC: 29 MMOL/L (ref 22–29)
CREAT SERPL-MCNC: 0.83 MG/DL (ref 0.57–1)
DEPRECATED RDW RBC AUTO: 47.9 FL (ref 37–54)
EGFRCR SERPLBLD CKD-EPI 2021: 91 ML/MIN/1.73
EOSINOPHIL # BLD AUTO: 0.02 10*3/MM3 (ref 0–0.4)
EOSINOPHIL NFR BLD AUTO: 0.2 % (ref 0.3–6.2)
ERYTHROCYTE [DISTWIDTH] IN BLOOD BY AUTOMATED COUNT: 14.2 % (ref 12.3–15.4)
GLOBULIN UR ELPH-MCNC: 2.1 GM/DL
GLUCOSE SERPL-MCNC: 105 MG/DL (ref 65–99)
HCT VFR BLD AUTO: 36.4 % (ref 34–46.6)
HGB BLD-MCNC: 12.1 G/DL (ref 12–15.9)
IMM GRANULOCYTES # BLD AUTO: 0.05 10*3/MM3 (ref 0–0.05)
IMM GRANULOCYTES NFR BLD AUTO: 0.6 % (ref 0–0.5)
INR PPP: 0.87 (ref 0.9–1.1)
LYMPHOCYTES # BLD AUTO: 1.41 10*3/MM3 (ref 0.7–3.1)
LYMPHOCYTES NFR BLD AUTO: 16.2 % (ref 19.6–45.3)
MCH RBC QN AUTO: 31.1 PG (ref 26.6–33)
MCHC RBC AUTO-ENTMCNC: 33.2 G/DL (ref 31.5–35.7)
MCV RBC AUTO: 93.6 FL (ref 79–97)
MONOCYTES # BLD AUTO: 0.65 10*3/MM3 (ref 0.1–0.9)
MONOCYTES NFR BLD AUTO: 7.5 % (ref 5–12)
NEUTROPHILS NFR BLD AUTO: 6.58 10*3/MM3 (ref 1.7–7)
NEUTROPHILS NFR BLD AUTO: 75.4 % (ref 42.7–76)
NRBC BLD AUTO-RTO: 0 /100 WBC (ref 0–0.2)
PLATELET # BLD AUTO: 262 10*3/MM3 (ref 140–450)
PMV BLD AUTO: 10.7 FL (ref 6–12)
POTASSIUM SERPL-SCNC: 4.5 MMOL/L (ref 3.5–5.2)
PROT SERPL-MCNC: 6 G/DL (ref 6–8.5)
PROTHROMBIN TIME: 11.9 SECONDS (ref 11.7–14.2)
RBC # BLD AUTO: 3.89 10*6/MM3 (ref 3.77–5.28)
RH BLD: POSITIVE
SODIUM SERPL-SCNC: 141 MMOL/L (ref 136–145)
T&S EXPIRATION DATE: NORMAL
VALPROATE SERPL-MCNC: 36 MCG/ML (ref 50–125)
WBC NRBC COR # BLD: 8.72 10*3/MM3 (ref 3.4–10.8)

## 2022-12-29 PROCEDURE — 73110 X-RAY EXAM OF WRIST: CPT

## 2022-12-29 PROCEDURE — 86850 RBC ANTIBODY SCREEN: CPT | Performed by: PHYSICIAN ASSISTANT

## 2022-12-29 PROCEDURE — 85610 PROTHROMBIN TIME: CPT | Performed by: PHYSICIAN ASSISTANT

## 2022-12-29 PROCEDURE — 80164 ASSAY DIPROPYLACETIC ACD TOT: CPT | Performed by: PHYSICIAN ASSISTANT

## 2022-12-29 PROCEDURE — 85730 THROMBOPLASTIN TIME PARTIAL: CPT | Performed by: PHYSICIAN ASSISTANT

## 2022-12-29 PROCEDURE — 70450 CT HEAD/BRAIN W/O DYE: CPT

## 2022-12-29 PROCEDURE — 99284 EMERGENCY DEPT VISIT MOD MDM: CPT

## 2022-12-29 PROCEDURE — 86901 BLOOD TYPING SEROLOGIC RH(D): CPT | Performed by: PHYSICIAN ASSISTANT

## 2022-12-29 PROCEDURE — 86900 BLOOD TYPING SEROLOGIC ABO: CPT | Performed by: PHYSICIAN ASSISTANT

## 2022-12-29 PROCEDURE — 85025 COMPLETE CBC W/AUTO DIFF WBC: CPT | Performed by: PHYSICIAN ASSISTANT

## 2022-12-29 PROCEDURE — 80053 COMPREHEN METABOLIC PANEL: CPT | Performed by: PHYSICIAN ASSISTANT

## 2022-12-29 PROCEDURE — 72125 CT NECK SPINE W/O DYE: CPT

## 2022-12-29 RX ORDER — CYCLOBENZAPRINE HCL 10 MG
10 TABLET ORAL ONCE
Status: COMPLETED | OUTPATIENT
Start: 2022-12-29 | End: 2022-12-29

## 2022-12-29 RX ORDER — ACETAMINOPHEN 500 MG
1000 TABLET ORAL ONCE
Status: COMPLETED | OUTPATIENT
Start: 2022-12-29 | End: 2022-12-29

## 2022-12-29 RX ORDER — VALPROIC ACID 250 MG/1
500 CAPSULE, LIQUID FILLED ORAL ONCE
Status: COMPLETED | OUTPATIENT
Start: 2022-12-29 | End: 2022-12-29

## 2022-12-29 RX ADMIN — CYCLOBENZAPRINE 10 MG: 10 TABLET, FILM COATED ORAL at 15:27

## 2022-12-29 RX ADMIN — VALPROIC ACID 500 MG: 250 CAPSULE ORAL at 15:27

## 2022-12-29 RX ADMIN — ACETAMINOPHEN 1000 MG: 500 TABLET, FILM COATED ORAL at 15:27

## 2022-12-29 NOTE — ED PROVIDER NOTES
EMERGENCY DEPARTMENT ENCOUNTER    Room Number:  27/27  Date seen:  12/29/2022  PCP: Sahil Cornelius MD  Historian: Patient      HPI:  Chief Complaint: Seizures, rectal bleeding  A complete HPI/ROS/PMH/PSH/SH/FH are unobtainable due to: Nothing  Context: Belen Allen is a 42 y.o. female who presents to the ED c/o multiple seizures in the last 24 hours.  Patient reports a history of seizures.  She reports compliance with her Depakote.  She reports that her neurologist had mentioned switching her from Depakote to Keppra.  She reports that last night she was told that she had 3 or 4 seizures at home that were witnessed by her family.  Today she reportedly had 1 when she was walking it caused her to fall.  She is complaining of pain in her right wrist and also her neck.  She denies fever or chills.  She denies nausea or vomiting.  She does not know if she hit her head.  She does take Eliquis due to history of a splenic infarct.  Patient also reports that recently she had some bright red blood from her rectum.  It was only one episode.  She denies known history of hemorrhoids.  She denies abdominal pain.  She has been having some pain in her right hip rating down her right leg recently as well.              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   • Generalized abdominal pain 11/29/2020   • Acute bilateral low back pain 11/29/2020   • Antiphospholipid antibody positive 11/29/2020   • On anticoagulant therapy 11/29/2020   • Acute pain of left knee 11/29/2020   • Vitamin D deficiency 11/30/2020   • Folate deficiency 12/01/2020   • Pain of back and left lower extremity 12/02/2020   • Common bile duct dilatation 05/31/2021   • Elevated LFTs 06/01/2021   • Right upper quadrant abdominal pain 06/01/2021   • Obesity (BMI 30-39.9) 04/18/2019   • Tobacco abuse 06/02/2021   • GERD  without esophagitis 06/02/2021   • Intractable abdominal pain 07/05/2021   • Obesity, Class III, BMI 40-49.9 (morbid obesity) ([facility]) 07/05/2021   • Constipation 07/05/2021   • History of ERCP 07/05/2021   • Other chronic pain 07/05/2021   • Seizures ([facility]) 01/01/2022   • Syncope 01/01/2022   • Lumbar back pain with radiculopathy affecting left lower extremity 01/05/2022   • Right upper quadrant pain 11/23/2022   • Uncontrolled pain 12/17/2022   • Intractable back pain 12/17/2022   • Sciatica of right side 12/20/2022   • Migraine 12/20/2022   • Mobility impaired 12/20/2022     Resolved Ambulatory Problems     Diagnosis Date Noted   • Choledocholithiasis 06/02/2021   • Rhabdomyolysis 01/01/2022   • Encephalopathy 01/01/2022   • Hypokalemia 12/18/2022     Past Medical History:   Diagnosis Date   • Abnormal Pap smear of cervix    • Ankle fracture 2013   • H/O Elevated liver enzymes    • History of chronic back pain    • History of migraine    • History of urinary tract infection    • Injury of back    • PONV (postoperative nausea and vomiting)    • Seasonal allergies    • Seizure (Former[facility])          PAST SURGICAL HISTORY  Past Surgical History:   Procedure Laterality Date   • BACK SURGERY  2006    fusion L4, L5     • CHOLECYSTECTOMY  2014   • DILATATION AND CURETTAGE  2009   • ENDOSCOPY N/A 11/27/2022    Procedure: ESOPHAGOGASTRODUODENOSCOPY with biopsy;  Surgeon: Christiana Mann MD;  Location: [facility] ENDOSCOPY;  Service: Gastroenterology;  Laterality: N/A;  gastritis, duodenitis, irregular z line   • ERCP N/A 6/3/2021    Procedure: ENDOSCOPIC RETROGRADE CHOLANGIOPANCREATOGRAPHY WITH SPHINCTEROTOMY, DILATION WITH BALLOON CLEARANCE  (12MM-15MM);  Surgeon: Bjorn Flannery MD;  Location: [facility] ENDOSCOPY;  Service: Gastroenterology;  Laterality: N/A;  DILATED COMMON BILE DUCT   • SKIN SURGERY     • TUBAL ABDOMINAL LIGATION  2013   • WISDOM TOOTH EXTRACTION  2018    x2         FAMILY HISTORY  Family History   Problem  Relation Age of Onset   • Stroke Mother    • Allergic rhinitis Mother    • Allergic rhinitis Sister    • Breast cancer Maternal Aunt    • Cancer Maternal Grandmother    • Allergic rhinitis Paternal Grandfather    • Cancer Paternal Grandfather    • Prostate cancer Paternal Grandfather          SOCIAL HISTORY  Social History     Socioeconomic History   • Marital status: Single   • Number of children: 2   Tobacco Use   • Smoking status: Every Day     Packs/day: 0.50     Types: Cigarettes   • Smokeless tobacco: Never   Vaping Use   • Vaping Use: Never used   Substance and Sexual Activity   • Alcohol use: Not Currently   • Drug use: Not Currently   • Sexual activity: Defer         ALLERGIES  Lidocaine        REVIEW OF SYSTEMS  Review of Systems   Review of all 14 systems is negative other than stated in the HPI above.      PHYSICAL EXAM  ED Triage Vitals [12/29/22 1050]   Temp Heart Rate Resp BP SpO2   98.7 °F (37.1 °C) 83 18 149/86 98 %      Temp src Heart Rate Source Patient Position BP Location FiO2 (%)   Oral Monitor Lying Right arm --       Physical Exam      GENERAL: Awake and alert, well-appearing, no acute distress  HENT: nares patent, no scalp hematoma, no tongue trauma  EYES: no scleral icterus, pupils 3 mm reactive bilaterally, EOMI  CV: regular rhythm, normal rate  RESPIRATORY: normal effort, lungs clear bilaterally  ABDOMEN: soft, nondistended, nontender throughout  MUSCULOSKELETAL: no deformity.  There is lower cervical spine tenderness without step-off.  There is mild point tenderness over the dorsum of the right wrist, no snuffbox tenderness.  2+ right radial pulse.  NEURO: alert, moves all extremities, follows commands, cranial nerves II through XII grossly intact, speech fluent and clear  PSYCH:  calm, cooperative  SKIN: warm, dry, normal to inspection, no rash    Vital signs and nursing notes reviewed.          LAB RESULTS  Recent Results (from the past 24 hour(s))   Comprehensive Metabolic Panel     Collection Time: 12/29/22 12:45 PM    Specimen: Blood   Result Value Ref Range    Glucose 105 (H) 65 - 99 mg/dL    BUN 10 6 - 20 mg/dL    Creatinine 0.83 0.57 - 1.00 mg/dL    Sodium 141 136 - 145 mmol/L    Potassium 4.5 3.5 - 5.2 mmol/L    Chloride 105 98 - 107 mmol/L    CO2 29.0 22.0 - 29.0 mmol/L    Calcium 8.7 8.6 - 10.5 mg/dL    Total Protein 6.0 6.0 - 8.5 g/dL    Albumin 3.9 3.5 - 5.2 g/dL    ALT (SGPT) 18 1 - 33 U/L    AST (SGOT) 16 1 - 32 U/L    Alkaline Phosphatase 103 39 - 117 U/L    Total Bilirubin <0.2 0.0 - 1.2 mg/dL    Globulin 2.1 gm/dL    A/G Ratio 1.9 g/dL    BUN/Creatinine Ratio 12.0 7.0 - 25.0    Anion Gap 7.0 5.0 - 15.0 mmol/L    eGFR 91.0 >60.0 mL/min/1.73   Valproic Acid Level, Total    Collection Time: 12/29/22 12:45 PM    Specimen: Blood   Result Value Ref Range    Valproic Acid 36.0 (L) 50.0 - 125.0 mcg/mL   CBC Auto Differential    Collection Time: 12/29/22 12:45 PM    Specimen: Blood   Result Value Ref Range    WBC 8.72 3.40 - 10.80 10*3/mm3    RBC 3.89 3.77 - 5.28 10*6/mm3    Hemoglobin 12.1 12.0 - 15.9 g/dL    Hematocrit 36.4 34.0 - 46.6 %    MCV 93.6 79.0 - 97.0 fL    MCH 31.1 26.6 - 33.0 pg    MCHC 33.2 31.5 - 35.7 g/dL    RDW 14.2 12.3 - 15.4 %    RDW-SD 47.9 37.0 - 54.0 fl    MPV 10.7 6.0 - 12.0 fL    Platelets 262 140 - 450 10*3/mm3    Neutrophil % 75.4 42.7 - 76.0 %    Lymphocyte % 16.2 (L) 19.6 - 45.3 %    Monocyte % 7.5 5.0 - 12.0 %    Eosinophil % 0.2 (L) 0.3 - 6.2 %    Basophil % 0.1 0.0 - 1.5 %    Immature Grans % 0.6 (H) 0.0 - 0.5 %    Neutrophils, Absolute 6.58 1.70 - 7.00 10*3/mm3    Lymphocytes, Absolute 1.41 0.70 - 3.10 10*3/mm3    Monocytes, Absolute 0.65 0.10 - 0.90 10*3/mm3    Eosinophils, Absolute 0.02 0.00 - 0.40 10*3/mm3    Basophils, Absolute 0.01 0.00 - 0.20 10*3/mm3    Immature Grans, Absolute 0.05 0.00 - 0.05 10*3/mm3    nRBC 0.0 0.0 - 0.2 /100 WBC   aPTT    Collection Time: 12/29/22 12:45 PM    Specimen: Blood   Result Value Ref Range    PTT 23.0 22.7 - 35.4  seconds   Protime-INR    Collection Time: 12/29/22 12:45 PM    Specimen: Blood   Result Value Ref Range    Protime 11.9 11.7 - 14.2 Seconds    INR 0.87 (L) 0.90 - 1.10   Type & Screen    Collection Time: 12/29/22 12:45 PM    Specimen: Blood   Result Value Ref Range    ABO Type O     RH type Positive     Antibody Screen Negative     T&S Expiration Date 1/1/2023 11:59:59 PM        Ordered the above labs and reviewed the results.        RADIOLOGY  XR Wrist 3+ View Right    Result Date: 12/29/2022  XR WRIST 3+ VW RIGHT-  INDICATIONS: Trauma  TECHNIQUE: 3 views of the right wrist  COMPARISON: None available  FINDINGS:  No acute fracture, erosion, or dislocation is identified. If further imaging evaluation is indicated, MRI could be considered.       As described.  This report was finalized on 12/29/2022 1:01 PM by Dr. Yonathan Neal M.D.      CT Head Without Contrast, CT Cervical Spine Without Contrast    Result Date: 12/29/2022  CT HEAD AND CERVICAL SPINE WITHOUT CONTRAST  CLINICAL HISTORY: Fall with neck pain. Unclear if there was head trauma. Seizures. Anticoagulated.  TECHNIQUE: CT scan of the head was obtained with 3 mm axial soft tissue and 2 mm bone algorithm images. No intravenous contrast was administered. Sagittal and coronal reconstructions were obtained.  COMPARISON: CT head dated 12/17/2022.  FINDINGS:   There is no evidence for a calvarial fracture. There is no evidence for an acute extra-axial hemorrhage.  The ventricles, sulci, and cisterns are age-appropriate. The basal ganglia and thalami are unremarkable in appearance. The posterior fossa structures are within normal limits.       No evidence for acute traumatic intracranial pathology.    TECHNIQUE: CT scan of the cervical spine was obtained with 1 mm axial bone algorithm and 2 mm axial soft tissue algorithm images. Sagittal and coronal reconstructed images were obtained.  FINDINGS:  There is loss of the usual lordotic curvature of the cervical  spine. Otherwise, there is no evidence for acute fracture or bony malalignment.  There is no significant degree of bony canal or foraminal stenosis within the cervical spine. Prominent anteriorly exophytic osteophytic changes are seen at C7-T1.  IMPRESSION:  No evidence for acute fracture or bony malalignment involving the cervical spine.   Radiation dose reduction techniques were utilized, including automated exposure control and exposure modulation based on body size.  This report was finalized on 12/29/2022 2:57 PM by Dr. Vinicius Muse M.D.        Ordered the above noted radiological studies. Reviewed by me in PACS.            PROCEDURES  Procedures              MEDICATIONS GIVEN IN ER  Medications   valproic acid (DEPAKENE) capsule 500 mg (has no administration in time range)   acetaminophen (TYLENOL) tablet 1,000 mg (has no administration in time range)   cyclobenzaprine (FLEXERIL) tablet 10 mg (has no administration in time range)                   MEDICAL DECISION MAKING, PROGRESS, and CONSULTS    All labs have been independently reviewed by me.  All radiology studies have been reviewed by me and I have also reviewed the radiology report.   EKG's independently viewed and interpreted by me.  Discussion below represents my analysis of pertinent findings related to patient's condition, differential diagnosis, treatment plan and final disposition.      Additional sources:    - External (non-ED) record review: I reviewed recent neurology notes, neurosurgery notes regarding her lumbar back pain with sciatica, recent discharge summary.        Orders placed during this visit:  Orders Placed This Encounter   Procedures   • XR Wrist 3+ View Right   • CT Head Without Contrast   • CT Cervical Spine Without Contrast   • Comprehensive Metabolic Panel   • Valproic Acid Level, Total   • CBC Auto Differential   • aPTT   • Protime-INR   • Ambulatory Referral to Gastroenterology   • Type & Screen   • CBC & Differential              Differential diagnosis:    My differential diagnosis for seizure includes but is not limited to:   Seizure  Syncope  Transient ischemic attack (particularly in older adults)  Migraine  Panic attack and anxiety  Psychogenic nonepileptic seizure  Transient global amnesia (rare before the age of 50 years)  Narcolepsy with cataplexy  Paroxysmal movement disorders              Independent interpretation of labs, radiology studies, and discussions with consultants:  ED Course as of 12/29/22 1526   Thu Dec 29, 2022   1332 Hemoglobin: 12.1 [JR]   1332 WBC: 8.72 [JR]   1332 Valproic Acid(!): 36.0 [JR]   1332 INR(!): 0.87 [JR]   1332 PTT: 23.0 [JR]   1332 Creatinine: 0.83 [JR]   1332 Glucose(!): 105 [JR]   1333 Plain films of the right wrist demonstrate no acute fracture or malalignment. [JR]   1424 CT brain appears negative for intracranial hemorrhage or hydrocephalus. [JR]      ED Course User Index  [JR] Barrera Hauser MD     Patient presents for evaluation of multiple seizures last night and another 1 this morning causing her to fall.  She has a known history of seizures.  Her Depakote level is slightly low today.  I recommended a load with IV Depacon however there is a national shortage of this medication therefore I alternatively ordered a dose of immediate release oral Depakote here and recommended that she resume her usual dose this evening of the extended release Depakote.  She is chronically anticoagulated.  CT brain is negative for intracranial hemorrhage.  She complained of neck pain however CT cervical spine demonstrates no acute fracture or malalignment.  She has a normal neurologic examination.  She complains of right wrist pain however plain films are negative for acute fracture.  Patient also mentioned that she had a recent episode of bright red blood per rectum.  Her hemoglobin is normal today, actually improved from recent.  Given her anticoagulation, this probably does warrant further  evaluation as outpatient with GI.  I have placed a referral.  Return precautions were discussed.  Patient was given Tylenol and Flexeril for her pain prior to discharge.        I wore an N95 mask, face shield, and gloves during this patient encounter.  Patient also wearing a surgical mask.  Hand hygeine performed before and after seeing the patient.    DIAGNOSIS  Final diagnoses:   Seizure disorder (HCC)   BRBPR (bright red blood per rectum)   Chronic anticoagulation         DISPOSITION  DISCHARGE    Patient discharged in stable condition.    Reviewed implications of results, diagnosis, meds, responsibility to follow up, warning signs and symptoms of possible worsening, potential complications and reasons to return to ER.    Patient/Family voiced understanding of above instructions.    Discussed plan for discharge, as there is no emergent indication for admission. Patient referred to primary care provider for BP management due to today's BP. Pt/family is agreeable and understands need for follow up and repeat testing.  Pt is aware that discharge does not mean that nothing is wrong but it indicates no emergency is present that requires admission and they must continue care with follow-up as given below or physician of their choice.     FOLLOW-UP  Sahil Cornelius MD  [address] [facility] [address] [phone]    Schedule an appointment as soon as possible for a visit       Christiana Mann MD  [address] [id number] [phone]    Schedule an appointment as soon as possible for a visit            Medication List      No changes were made to your prescriptions during this visit.                   Latest Documented Vital Signs:  As of 15:26 EST  BP- 109/76 HR- 72 Temp- 98.7 °F (37.1 °C) (Oral) O2 sat- 99%              --    Please note that portions of this were completed with a voice recognition program.       Note Disclaimer: At [facility], we believe that  sharing information builds trust and better relationships. You are receiving this note because you are receiving care at [facility] or recently visited. It is possible you will see health information before a provider has talked with you about it. This kind of information can be easy to misunderstand. To help you fully understand what it means for your health, we urge you to discuss this note with your provider.           Barrera Hauser MD  12/29/22 2372

## 2022-12-29 NOTE — DISCHARGE INSTRUCTIONS
Please follow-up with your neurologist and PCP.  You have also been referred to GI for further evaluation of your reported rectal bleeding.  Your hemoglobin is normal today.  Continue taking your medications as prescribed.

## 2022-12-29 NOTE — ED NOTES
Pt arrived by EMS from home. C/o multiple seizure today per family, no witness by EMS, pt was not postictal on ems arrival  , c/o back pain and rectal bleeding. Pt denies fall, denies hitting head. Pt is on blood thinners     Patient was placed in face mask during first look triage.  Patient was wearing a face mask throughout encounter.  I wore personal protective equipment throughout the encounter.  Hand hygiene was performed before and after patient encounter.

## 2022-12-29 NOTE — ED PROVIDER NOTES
TRIAGE PROVIDER NOTE    I personally performed a brief face-to-face medical evaluation of the patient upon their arrival to the emergency department.  This exam was performed in an effort to decrease the time from intake to seeing a provider and to decrease delays in care.  The history of present illness is condensed.  Other providers may see the patient as well if deemed necessary after this evaluation.    HPI: Belen Allen is a 42 y.o. female with a PMH significant for seizures and splenic infarct on Eliquis who presents to the ED c/o seizures at home \"all night long\".  Patient states she had several witnessed seizures at home throughout the night.  She does believe she fell and is having increased right wrist pain.  She also has ongoing back and extremity pain from previous falls related to seizures.  She states she was seen here 3 days ago and was recommended to double her Depakote dose which she states she has been doing.  She has not been able to follow-up with her neurologist after discharge from the hospital.  Patient confirms she is taking Eliquis.  She also reports concern due to some bright red blood per rectum.  She denies constipation.  She denies melena.  She denies upper abdominal pain, increased NSAID use or significant alcohol use.      FOCUSED PHYSICAL EXAM:  General: No acute distress, no external evidence of head trauma  Lungs: Clear to auscultation bilaterally, no wheezes  Heart: Regular rate and rhythm, no murmur  Abdomen: Soft, nontender, nondistended  Extremities: Mild swelling and tenderness to right wrist with full range of motion.  Neuro: Alert and oriented, no facial droop, speech clear, no dysarthria or aphasia, moves all extremities well, sensation intact light touch all extremities, no focal deficits      ASSESSMENT/PLAN:  Patient overall well-appearing but complains of persistent seizures at home witnessed by family.  Will check basic labs, Depakote level.  Patient does take  Eliquis and cannot say whether she fell or not at home.  Will check CT head and obtain x-ray of her right wrist which she complains of increased pain today.  Patient complaining of some bright red blood per rectum.  Will check CBC, coags, and go ahead and type and screen due to patient taking eliquis.      Provider Attestation:  I personally reviewed the past medical history, past surgical history, social history, family history, current medications, and allergies as they appear in the chart.       Karen Martinez PA  12/29/22 [id number]

## 2022-12-29 NOTE — OUTREACH NOTE
Medical Week 2 Survey    Flowsheet Row Responses   [facility] patient discharged from? [place]   Does the patient have one of the following disease processes/diagnoses(primary or secondary)? Other   Week 2 attempt successful? No  [Patient answered, and in ambulance on her way to ER currently.]   Unsuccessful attempts Attempt 1          NADIYA VARGAS - Registered Nurse

## 2022-12-30 ENCOUNTER — TELEPHONE (OUTPATIENT)
Dept: NEUROLOGY | Facility: CLINIC | Age: 42
End: 2022-12-30

## 2022-12-30 NOTE — TELEPHONE ENCOUNTER
----- Message from Bjorn Fox MD sent at 12/28/2022  1:00 PM EST -----  Regarding: follow up appt, folic acid  Monica Miramontes,    This patient has epilepsy and takes Depakote. I saw her recently in the hospital and she came in overnight in the ED this week with breakthrough seizure. I had them increase Depakote to 1000mg bid.    Can you check and see when she can get in with one of the seizure docs? She had an appointment with Cm but it looks like it got pushed back to May.    Could you also call the patient and make sure she's taking folic acid? Women on Depakote need to take 4mg a day to prevent neural tube defects in case they get pregnant. I doubt she's planning on getting pregnant at 42 but I got a message from Dr. Gill recently that we haven't done a good job checking on these sorts of things on the inpatient side and she's seeing a lot of our patients on Depakote without folic acid.    Thanks  Mango

## 2022-12-31 ENCOUNTER — HOSPITAL ENCOUNTER (OUTPATIENT)
Facility: HOSPITAL | Age: 42
Setting detail: OBSERVATION
Discharge: HOME | End: 2023-01-03
Attending: EMERGENCY MEDICINE | Admitting: STUDENT IN AN ORGANIZED HEALTH CARE EDUCATION/TRAINING PROGRAM
Payer: COMMERCIAL

## 2022-12-31 ENCOUNTER — APPOINTMENT (OUTPATIENT)
Dept: CT IMAGING | Facility: HOSPITAL | Age: 42
End: 2022-12-31
Payer: COMMERCIAL

## 2022-12-31 ENCOUNTER — READMISSION MANAGEMENT (OUTPATIENT)
Dept: CALL CENTER | Facility: HOSPITAL | Age: 42
End: 2022-12-31

## 2022-12-31 DIAGNOSIS — E66.9 OBESITY (BMI 30-39.9): ICD-10-CM

## 2022-12-31 DIAGNOSIS — M25.562 ACUTE PAIN OF LEFT KNEE: ICD-10-CM

## 2022-12-31 DIAGNOSIS — Z74.09 MOBILITY IMPAIRED: ICD-10-CM

## 2022-12-31 DIAGNOSIS — G89.29 OTHER CHRONIC PAIN: ICD-10-CM

## 2022-12-31 DIAGNOSIS — M54.9 INTRACTABLE BACK PAIN: ICD-10-CM

## 2022-12-31 DIAGNOSIS — R56.9 SEIZURE: Primary | ICD-10-CM

## 2022-12-31 DIAGNOSIS — R10.11 RIGHT UPPER QUADRANT PAIN: ICD-10-CM

## 2022-12-31 DIAGNOSIS — M54.16 LUMBAR BACK PAIN WITH RADICULOPATHY AFFECTING LEFT LOWER EXTREMITY: ICD-10-CM

## 2022-12-31 DIAGNOSIS — M54.42 ACUTE BILATERAL LOW BACK PAIN WITH LEFT-SIDED SCIATICA: ICD-10-CM

## 2022-12-31 LAB
ALBUMIN SERPL-MCNC: 3.4 G/DL (ref 3.5–5.2)
ALBUMIN/GLOB SERPL: 1.3 G/DL
ALP SERPL-CCNC: 88 U/L (ref 39–117)
ALT SERPL W P-5'-P-CCNC: 17 U/L (ref 1–33)
ANION GAP SERPL CALCULATED.3IONS-SCNC: 8 MMOL/L (ref 5–15)
AST SERPL-CCNC: 20 U/L (ref 1–32)
BASOPHILS # BLD AUTO: 0.02 10*3/MM3 (ref 0–0.2)
BASOPHILS NFR BLD AUTO: 0.2 % (ref 0–1.5)
BILIRUB SERPL-MCNC: 0.3 MG/DL (ref 0–1.2)
BILIRUB UR QL STRIP: NEGATIVE
BUN SERPL-MCNC: 6 MG/DL (ref 6–20)
BUN/CREAT SERPL: 7.3 (ref 7–25)
CALCIUM SPEC-SCNC: 8.9 MG/DL (ref 8.6–10.5)
CHLORIDE SERPL-SCNC: 104 MMOL/L (ref 98–107)
CLARITY UR: CLEAR
CO2 SERPL-SCNC: 27 MMOL/L (ref 22–29)
COLOR UR: YELLOW
CREAT SERPL-MCNC: 0.82 MG/DL (ref 0.57–1)
DEPRECATED RDW RBC AUTO: 52.8 FL (ref 37–54)
EGFRCR SERPLBLD CKD-EPI 2021: 92.3 ML/MIN/1.73
EOSINOPHIL # BLD AUTO: 0.13 10*3/MM3 (ref 0–0.4)
EOSINOPHIL NFR BLD AUTO: 1.5 % (ref 0.3–6.2)
ERYTHROCYTE [DISTWIDTH] IN BLOOD BY AUTOMATED COUNT: 14.6 % (ref 12.3–15.4)
GLOBULIN UR ELPH-MCNC: 2.7 GM/DL
GLUCOSE SERPL-MCNC: 81 MG/DL (ref 65–99)
GLUCOSE UR STRIP-MCNC: NEGATIVE MG/DL
HCT VFR BLD AUTO: 38.5 % (ref 34–46.6)
HGB BLD-MCNC: 12.9 G/DL (ref 12–15.9)
HGB UR QL STRIP.AUTO: NEGATIVE
IMM GRANULOCYTES # BLD AUTO: 0.04 10*3/MM3 (ref 0–0.05)
IMM GRANULOCYTES NFR BLD AUTO: 0.5 % (ref 0–0.5)
KETONES UR QL STRIP: NEGATIVE
LEUKOCYTE ESTERASE UR QL STRIP.AUTO: NEGATIVE
LYMPHOCYTES # BLD AUTO: 2.36 10*3/MM3 (ref 0.7–3.1)
LYMPHOCYTES NFR BLD AUTO: 27.3 % (ref 19.6–45.3)
MCH RBC QN AUTO: 33.7 PG (ref 26.6–33)
MCHC RBC AUTO-ENTMCNC: 33.5 G/DL (ref 31.5–35.7)
MCV RBC AUTO: 100.5 FL (ref 79–97)
MONOCYTES # BLD AUTO: 0.72 10*3/MM3 (ref 0.1–0.9)
MONOCYTES NFR BLD AUTO: 8.3 % (ref 5–12)
NEUTROPHILS NFR BLD AUTO: 5.37 10*3/MM3 (ref 1.7–7)
NEUTROPHILS NFR BLD AUTO: 62.2 % (ref 42.7–76)
NITRITE UR QL STRIP: NEGATIVE
NRBC BLD AUTO-RTO: 0 /100 WBC (ref 0–0.2)
PH UR STRIP.AUTO: >=9 [PH] (ref 5–8)
PLATELET # BLD AUTO: 242 10*3/MM3 (ref 140–450)
PMV BLD AUTO: 10.4 FL (ref 6–12)
POTASSIUM SERPL-SCNC: 4.2 MMOL/L (ref 3.5–5.2)
PROT SERPL-MCNC: 6.1 G/DL (ref 6–8.5)
PROT UR QL STRIP: NEGATIVE
RBC # BLD AUTO: 3.83 10*6/MM3 (ref 3.77–5.28)
SODIUM SERPL-SCNC: 139 MMOL/L (ref 136–145)
SP GR UR STRIP: 1.01 (ref 1–1.03)
UROBILINOGEN UR QL STRIP: ABNORMAL
VALPROATE SERPL-MCNC: 6 MCG/ML (ref 50–125)
WBC NRBC COR # BLD: 8.64 10*3/MM3 (ref 3.4–10.8)

## 2022-12-31 PROCEDURE — G0378 HOSPITAL OBSERVATION PER HR: HCPCS

## 2022-12-31 PROCEDURE — 80053 COMPREHEN METABOLIC PANEL: CPT | Performed by: EMERGENCY MEDICINE

## 2022-12-31 PROCEDURE — 25010000002 HYDROMORPHONE 1 MG/ML SOLUTION: Performed by: EMERGENCY MEDICINE

## 2022-12-31 PROCEDURE — 96374 THER/PROPH/DIAG INJ IV PUSH: CPT

## 2022-12-31 PROCEDURE — 72131 CT LUMBAR SPINE W/O DYE: CPT

## 2022-12-31 PROCEDURE — 71250 CT THORAX DX C-: CPT

## 2022-12-31 PROCEDURE — 85025 COMPLETE CBC W/AUTO DIFF WBC: CPT | Performed by: EMERGENCY MEDICINE

## 2022-12-31 PROCEDURE — 72125 CT NECK SPINE W/O DYE: CPT

## 2022-12-31 PROCEDURE — 70450 CT HEAD/BRAIN W/O DYE: CPT

## 2022-12-31 PROCEDURE — 25010000002 ONDANSETRON PER 1 MG: Performed by: EMERGENCY MEDICINE

## 2022-12-31 PROCEDURE — 72128 CT CHEST SPINE W/O DYE: CPT

## 2022-12-31 PROCEDURE — 96375 TX/PRO/DX INJ NEW DRUG ADDON: CPT

## 2022-12-31 PROCEDURE — 25010000002 KETOROLAC TROMETHAMINE PER 15 MG: Performed by: EMERGENCY MEDICINE

## 2022-12-31 PROCEDURE — 81003 URINALYSIS AUTO W/O SCOPE: CPT | Performed by: EMERGENCY MEDICINE

## 2022-12-31 PROCEDURE — 25010000002 PROCHLORPERAZINE 10 MG/2ML SOLUTION: Performed by: EMERGENCY MEDICINE

## 2022-12-31 PROCEDURE — 80164 ASSAY DIPROPYLACETIC ACD TOT: CPT | Performed by: EMERGENCY MEDICINE

## 2022-12-31 PROCEDURE — 82550 ASSAY OF CK (CPK): CPT | Performed by: NURSE PRACTITIONER

## 2022-12-31 PROCEDURE — 99285 EMERGENCY DEPT VISIT HI MDM: CPT

## 2022-12-31 RX ORDER — DIVALPROEX SODIUM 500 MG/1
1500 TABLET, DELAYED RELEASE ORAL EVERY 8 HOURS SCHEDULED
Status: DISCONTINUED | OUTPATIENT
Start: 2022-12-31 | End: 2022-12-31

## 2022-12-31 RX ORDER — ONDANSETRON 2 MG/ML
4 INJECTION INTRAMUSCULAR; INTRAVENOUS ONCE
Status: COMPLETED | OUTPATIENT
Start: 2022-12-31 | End: 2022-12-31

## 2022-12-31 RX ORDER — LORAZEPAM 2 MG/ML
2 INJECTION INTRAMUSCULAR EVERY 4 HOURS PRN
Status: DISCONTINUED | OUTPATIENT
Start: 2022-12-31 | End: 2023-01-03 | Stop reason: HOSPADM

## 2022-12-31 RX ORDER — DIVALPROEX SODIUM 500 MG/1
1500 TABLET, DELAYED RELEASE ORAL EVERY 12 HOURS SCHEDULED
Status: DISCONTINUED | OUTPATIENT
Start: 2022-12-31 | End: 2023-01-01

## 2022-12-31 RX ORDER — KETOROLAC TROMETHAMINE 15 MG/ML
15 INJECTION, SOLUTION INTRAMUSCULAR; INTRAVENOUS ONCE
Status: COMPLETED | OUTPATIENT
Start: 2022-12-31 | End: 2022-12-31

## 2022-12-31 RX ORDER — SODIUM CHLORIDE 0.9 % (FLUSH) 0.9 %
10 SYRINGE (ML) INJECTION AS NEEDED
Status: DISCONTINUED | OUTPATIENT
Start: 2022-12-31 | End: 2023-01-03 | Stop reason: HOSPADM

## 2022-12-31 RX ORDER — HYDROCODONE BITARTRATE AND ACETAMINOPHEN 5; 325 MG/1; MG/1
1 TABLET ORAL ONCE AS NEEDED
Status: COMPLETED | OUTPATIENT
Start: 2022-12-31 | End: 2022-12-31

## 2022-12-31 RX ORDER — PROCHLORPERAZINE EDISYLATE 5 MG/ML
10 INJECTION INTRAMUSCULAR; INTRAVENOUS ONCE
Status: COMPLETED | OUTPATIENT
Start: 2022-12-31 | End: 2022-12-31

## 2022-12-31 RX ADMIN — KETOROLAC TROMETHAMINE 15 MG: 15 INJECTION, SOLUTION INTRAMUSCULAR; INTRAVENOUS at 21:24

## 2022-12-31 RX ADMIN — DIVALPROEX SODIUM 1500 MG: 500 TABLET, DELAYED RELEASE ORAL at 20:58

## 2022-12-31 RX ADMIN — PROCHLORPERAZINE EDISYLATE 10 MG: 5 INJECTION INTRAMUSCULAR; INTRAVENOUS at 20:58

## 2022-12-31 RX ADMIN — ONDANSETRON 4 MG: 2 INJECTION INTRAMUSCULAR; INTRAVENOUS at 17:44

## 2022-12-31 RX ADMIN — HYDROMORPHONE HYDROCHLORIDE 1 MG: 1 INJECTION, SOLUTION INTRAMUSCULAR; INTRAVENOUS; SUBCUTANEOUS at 17:44

## 2022-12-31 RX ADMIN — SODIUM CHLORIDE, POTASSIUM CHLORIDE, SODIUM LACTATE AND CALCIUM CHLORIDE 1000 ML: 600; 310; 30; 20 INJECTION, SOLUTION INTRAVENOUS at 17:55

## 2022-12-31 RX ADMIN — HYDROCODONE BITARTRATE AND ACETAMINOPHEN 1 TABLET: 5; 325 TABLET ORAL at 22:18

## 2022-12-31 NOTE — Clinical Note
Level of Care: Telemetry [5]   Diagnosis: Seizure (HCC) [205090]   Admitting Physician: FRANCISCA KELLOGG [[id number]]   Attending Physician: FRANCISCA KELLOGG [[id number]]  
Pulses equal bilaterally, no edema present.

## 2022-12-31 NOTE — ED PROVIDER NOTES
EMERGENCY DEPARTMENT ENCOUNTER    Room Number:  18/18  Date seen:  12/31/2022  PCP: Sahil Cornelius MD  Historian: Patient, EMS      HPI:  Chief Complaint: Seizure  A complete HPI/ROS/PMH/PSH/SH/FH are unobtainable due to: None amnesia to the events  Context: Belen Allen is a 42 y.o. female who presents to the ED c/o seizure.  Patient reports that she has been having daily seizures for the past several weeks.  In fact, she states that she usually has multiple seizures a day.  Most of these are brief but today she had a seizure for that lasted for about 15 to 20 minutes that was witnessed by her daughter.  She fell forward onto her walker and had 4 extremity jerking.  This occurred just prior to arrival.  She states that she knocked out one of her teeth that she urinated on herself.  She states that she is still waiting to get into neurology clinic.  She is currently on Depakote and reports good compliance with her medications.  She reports that she has had some emesis recently, however.  No fever, urinary symptoms.  Patient states that she is usually weak in her right leg since her recent hospitalization.          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   • Generalized abdominal pain 11/29/2020   • Acute bilateral low back pain 11/29/2020   • Antiphospholipid antibody positive 11/29/2020   • On anticoagulant therapy 11/29/2020   • Acute pain of left knee 11/29/2020   • Vitamin D deficiency 11/30/2020   • Folate deficiency 12/01/2020   • Pain of back and left lower extremity 12/02/2020   • Common bile duct dilatation 05/31/2021   • Elevated LFTs 06/01/2021   • Right upper quadrant abdominal pain 06/01/2021   • Obesity (BMI 30-39.9) 04/18/2019   • Tobacco abuse 06/02/2021   • GERD without esophagitis 06/02/2021   • Intractable abdominal pain 07/05/2021   • Obesity, Class  III, BMI 40-49.9 (morbid obesity) ([facility]) 07/05/2021   • Constipation 07/05/2021   • History of ERCP 07/05/2021   • Other chronic pain 07/05/2021   • Seizures ([facility]) 01/01/2022   • Syncope 01/01/2022   • Lumbar back pain with radiculopathy affecting left lower extremity 01/05/2022   • Right upper quadrant pain 11/23/2022   • Uncontrolled pain 12/17/2022   • Intractable back pain 12/17/2022   • Sciatica of right side 12/20/2022   • Migraine 12/20/2022   • Mobility impaired 12/20/2022     Resolved Ambulatory Problems     Diagnosis Date Noted   • Choledocholithiasis 06/02/2021   • Rhabdomyolysis 01/01/2022   • Encephalopathy 01/01/2022   • Hypokalemia 12/18/2022     Past Medical History:   Diagnosis Date   • Abnormal Pap smear of cervix    • Ankle fracture 2013   • H/O Elevated liver enzymes    • History of chronic back pain    • History of migraine    • History of urinary tract infection    • Injury of back    • PONV (postoperative nausea and vomiting)    • Seasonal allergies    • Seizure ([facility])          PAST SURGICAL HISTORY  Past Surgical History:   Procedure Laterality Date   • BACK SURGERY  2006    fusion L4, L5     • CHOLECYSTECTOMY  2014   • DILATATION AND CURETTAGE  2009   • ENDOSCOPY N/A 11/27/2022    Procedure: ESOPHAGOGASTRODUODENOSCOPY with biopsy;  Surgeon: Christiana Mann MD;  Location: [facility] ENDOSCOPY;  Service: Gastroenterology;  Laterality: N/A;  gastritis, duodenitis, irregular z line   • ERCP N/A 6/3/2021    Procedure: ENDOSCOPIC RETROGRADE CHOLANGIOPANCREATOGRAPHY WITH SPHINCTEROTOMY, DILATION WITH BALLOON CLEARANCE  (12MM-15MM);  Surgeon: Bjorn Flannery MD;  Location: [facility] ENDOSCOPY;  Service: Gastroenterology;  Laterality: N/A;  DILATED COMMON BILE DUCT   • SKIN SURGERY     • TUBAL ABDOMINAL LIGATION  2013   • WISDOM TOOTH EXTRACTION  2018    x2         FAMILY HISTORY  Family History   Problem Relation Age of Onset   • Stroke Mother    • Allergic rhinitis Mother    • Allergic rhinitis  Sister    • Breast cancer Maternal Aunt    • Cancer Maternal Grandmother    • Allergic rhinitis Paternal Grandfather    • Cancer Paternal Grandfather    • Prostate cancer Paternal Grandfather          SOCIAL HISTORY  Social History     Socioeconomic History   • Marital status: Single   • Number of children: 2   Tobacco Use   • Smoking status: Every Day     Packs/day: 0.50     Types: Cigarettes   • Smokeless tobacco: Never   Vaping Use   • Vaping Use: Never used   Substance and Sexual Activity   • Alcohol use: Not Currently   • Drug use: Not Currently   • Sexual activity: Defer         ALLERGIES  Lidocaine        REVIEW OF SYSTEMS  Review of Systems     All systems reviewed and negative except for those discussed in HPI.       PHYSICAL EXAM  ED Triage Vitals [12/31/22 1632]   Temp Heart Rate Resp BP SpO2   97.5 °F (36.4 °C) 80 16 150/85 97 %      Temp src Heart Rate Source Patient Position BP Location FiO2 (%)   Oral -- -- -- --       Physical Exam      GENERAL: no acute distress  HENT: nares patent, fractured tooth to right lateral incisor  EYES: no scleral icterus  CV: regular rhythm, normal rate  RESPIRATORY: normal effort, clear to auscultation bilaterally  ABDOMEN: soft, obese abdomen, nontender  MUSCULOSKELETAL: no deformity right-sided chest wall tenderness, diffuse cervical spinal tenderness, she also has diffuse T and L-spine tenderness  NEURO: alert, symmetric smile, EOMI, PERRL follows commands, patient has 5/5 strength bilateral upper extremities, she has 3/5 strength to her right leg it is barely able to get her right heel up off the bed.  She has 4/5 strength to her left leg, normal sensation to light touch bilaterally, she has mild intention tremor to bilateral hands  PSYCH:  calm, cooperative  SKIN: warm, dry    Vital signs and nursing notes reviewed.          LAB RESULTS  No results found for this or any previous visit (from the past 24 hour(s)).    Ordered the above labs and reviewed the  results.        RADIOLOGY  No Radiology Exams Resulted Within Past 24 Hours    Ordered the above noted radiological studies. Reviewed by me in PACS.            PROCEDURES  Procedures              MEDICATIONS GIVEN IN ER  Medications   sodium chloride 0.9 % flush 10 mL (has no administration in time range)   lactated ringers bolus 1,000 mL (has no administration in time range)   HYDROmorphone (DILAUDID) injection 1 mg (has no administration in time range)   ondansetron (ZOFRAN) injection 4 mg (has no administration in time range)               MEDICAL DECISION MAKING, PROGRESS, and CONSULTS    All labs have been independently reviewed by me.  All radiology studies have been reviewed by me and discussed with radiologist dictating the report.   EKG's independently viewed and interpreted by me.  Discussion below represents my analysis of pertinent findings related to patient's condition, differential diagnosis, treatment plan and final disposition.      Additional sources:    - External (non-ED) record review: On medical chart review, patient was discharged in the hospital on 12/20/2022.  She was admitted for intractable back pain and sciatica.  Has a history of splenic infarction and thrombus with chronic back pain.  She presented complaining of a seizure attack and increasing low back pain.  Patient had no relapse of seizure while in the hospital.  Patient has then been seen 3 times in the last 10 days in the emergency department.        Orders placed during this visit:  Orders Placed This Encounter   Procedures   • CT Head Without Contrast   • CT Cervical Spine Without Contrast   • CT Chest Without Contrast Diagnostic   • CT Thoracic Spine Without Contrast   • CT Lumbar Spine Without Contrast   • Comprehensive Metabolic Panel   • Urinalysis With Microscopic If Indicated (No Culture) - Urine, Clean Catch   • Valproic Acid Level, Total   • CBC Auto Differential   • Insert Peripheral IV   • CBC & Differential              Differential diagnosis:    My differential diagnosis for seizure includes but is not limited to:   Seizure  Syncope  Transient ischemic attack (particularly in older adults)  Migraine  Panic attack and anxiety  Psychogenic nonepileptic seizure  Transient global amnesia (rare before the age of 50 years)  Narcolepsy with cataplexy  Paroxysmal movement disorders              Independent interpretation of labs, radiology studies, and discussions with consultants:  ED Course as of 01/02/23 1343   Sat Dec 31, 2022   1836 WBC: 8.64 [TD]   1836 Hemoglobin: 12.9 [TD]   1836 I discussed the case with Dr. Kumar, general neurology.  We reviewed patient's history and exam.  He recommends giving the patient Depakote 1500 mg twice daily with first dose now.  He also recommends ordering Ativan 2 mg IV every few hours as needed for seizure. [TD]   1906 Leukocytes, UA: Negative [TD]   1906 Protein, UA: Negative [TD]      ED Course User Index  [TD] Christian Thurston II, MD                  PPE: The patient wore a mask throughout the entire encounter. I wore a well-fitting mask.    DIAGNOSIS  Final diagnoses:   Seizure (HCC)         DISPOSITION  Admit      Latest Documented Vital Signs:  As of 17:17 EST  BP- 150/85 HR- 80 Temp- 97.5 °F (36.4 °C) (Oral) O2 sat- 97%      --    Please note that portions of this were completed with a voice recognition program.       Note Disclaimer: At [facility], we believe that sharing information builds trust and better relationships. You are receiving this note because you are receiving care at [facility] or recently visited. It is possible you will see health information before a provider has talked with you about it. This kind of information can be easy to misunderstand. To help you fully understand what it means for your health, we urge you to discuss this note with your provider.       Christian Thurston II, MD  01/02/23 [id number]

## 2022-12-31 NOTE — ED NOTES
Pt arrives via ems from home.  Pt had a witnessed seizure at home. Pt now c/o pain in back, legs and face from fall.  Depakote - adjusted dose 4 days ago.

## 2023-01-01 ENCOUNTER — APPOINTMENT (OUTPATIENT)
Dept: NEUROLOGY | Facility: HOSPITAL | Age: 43
End: 2023-01-01
Payer: COMMERCIAL

## 2023-01-01 LAB
ANION GAP SERPL CALCULATED.3IONS-SCNC: 7.6 MMOL/L (ref 5–15)
BASOPHILS # BLD AUTO: 0.01 10*3/MM3 (ref 0–0.2)
BASOPHILS NFR BLD AUTO: 0.2 % (ref 0–1.5)
BUN SERPL-MCNC: 6 MG/DL (ref 6–20)
BUN/CREAT SERPL: 7.1 (ref 7–25)
CALCIUM SPEC-SCNC: 8.4 MG/DL (ref 8.6–10.5)
CHLORIDE SERPL-SCNC: 107 MMOL/L (ref 98–107)
CK SERPL-CCNC: 14 U/L (ref 20–180)
CK SERPL-CCNC: 18 U/L (ref 20–180)
CO2 SERPL-SCNC: 26.4 MMOL/L (ref 22–29)
CREAT SERPL-MCNC: 0.84 MG/DL (ref 0.57–1)
D-LACTATE SERPL-SCNC: 1.4 MMOL/L (ref 0.5–2)
DEPRECATED RDW RBC AUTO: 51.7 FL (ref 37–54)
EGFRCR SERPLBLD CKD-EPI 2021: 89.7 ML/MIN/1.73
EOSINOPHIL # BLD AUTO: 0.13 10*3/MM3 (ref 0–0.4)
EOSINOPHIL NFR BLD AUTO: 2 % (ref 0.3–6.2)
ERYTHROCYTE [DISTWIDTH] IN BLOOD BY AUTOMATED COUNT: 14.2 % (ref 12.3–15.4)
GLUCOSE SERPL-MCNC: 113 MG/DL (ref 65–99)
HCT VFR BLD AUTO: 32.8 % (ref 34–46.6)
HGB BLD-MCNC: 10.7 G/DL (ref 12–15.9)
IMM GRANULOCYTES # BLD AUTO: 0.04 10*3/MM3 (ref 0–0.05)
IMM GRANULOCYTES NFR BLD AUTO: 0.6 % (ref 0–0.5)
INR PPP: 1.03 (ref 0.9–1.1)
LYMPHOCYTES # BLD AUTO: 2.84 10*3/MM3 (ref 0.7–3.1)
LYMPHOCYTES NFR BLD AUTO: 44 % (ref 19.6–45.3)
MCH RBC QN AUTO: 32.6 PG (ref 26.6–33)
MCHC RBC AUTO-ENTMCNC: 32.6 G/DL (ref 31.5–35.7)
MCV RBC AUTO: 100 FL (ref 79–97)
MONOCYTES # BLD AUTO: 0.45 10*3/MM3 (ref 0.1–0.9)
MONOCYTES NFR BLD AUTO: 7 % (ref 5–12)
NEUTROPHILS NFR BLD AUTO: 2.99 10*3/MM3 (ref 1.7–7)
NEUTROPHILS NFR BLD AUTO: 46.2 % (ref 42.7–76)
NRBC BLD AUTO-RTO: 0 /100 WBC (ref 0–0.2)
PLATELET # BLD AUTO: 221 10*3/MM3 (ref 140–450)
PMV BLD AUTO: 10.1 FL (ref 6–12)
POTASSIUM SERPL-SCNC: 3.7 MMOL/L (ref 3.5–5.2)
PROTHROMBIN TIME: 13.6 SECONDS (ref 11.7–14.2)
RBC # BLD AUTO: 3.28 10*6/MM3 (ref 3.77–5.28)
SODIUM SERPL-SCNC: 141 MMOL/L (ref 136–145)
WBC NRBC COR # BLD: 6.46 10*3/MM3 (ref 3.4–10.8)

## 2023-01-01 PROCEDURE — 97166 OT EVAL MOD COMPLEX 45 MIN: CPT

## 2023-01-01 PROCEDURE — G0378 HOSPITAL OBSERVATION PER HR: HCPCS

## 2023-01-01 PROCEDURE — 85025 COMPLETE CBC W/AUTO DIFF WBC: CPT | Performed by: NURSE PRACTITIONER

## 2023-01-01 PROCEDURE — 83605 ASSAY OF LACTIC ACID: CPT | Performed by: NURSE PRACTITIONER

## 2023-01-01 PROCEDURE — 97535 SELF CARE MNGMENT TRAINING: CPT

## 2023-01-01 PROCEDURE — 80048 BASIC METABOLIC PNL TOTAL CA: CPT | Performed by: NURSE PRACTITIONER

## 2023-01-01 PROCEDURE — 95714 VEEG EA 12-26 HR UNMNTR: CPT

## 2023-01-01 PROCEDURE — 82550 ASSAY OF CK (CPK): CPT | Performed by: NURSE PRACTITIONER

## 2023-01-01 PROCEDURE — 85610 PROTHROMBIN TIME: CPT | Performed by: NURSE PRACTITIONER

## 2023-01-01 PROCEDURE — 99214 OFFICE O/P EST MOD 30 MIN: CPT | Performed by: PSYCHIATRY & NEUROLOGY

## 2023-01-01 PROCEDURE — 95720 EEG PHY/QHP EA INCR W/VEEG: CPT | Performed by: PSYCHIATRY & NEUROLOGY

## 2023-01-01 PROCEDURE — 97110 THERAPEUTIC EXERCISES: CPT

## 2023-01-01 PROCEDURE — 97162 PT EVAL MOD COMPLEX 30 MIN: CPT

## 2023-01-01 RX ORDER — NITROGLYCERIN 0.4 MG/1
0.4 TABLET SUBLINGUAL
Status: DISCONTINUED | OUTPATIENT
Start: 2023-01-01 | End: 2023-01-03 | Stop reason: HOSPADM

## 2023-01-01 RX ORDER — ONDANSETRON 2 MG/ML
4 INJECTION INTRAMUSCULAR; INTRAVENOUS EVERY 6 HOURS PRN
Status: DISCONTINUED | OUTPATIENT
Start: 2023-01-01 | End: 2023-01-03 | Stop reason: HOSPADM

## 2023-01-01 RX ORDER — HYDROCODONE BITARTRATE AND ACETAMINOPHEN 5; 325 MG/1; MG/1
1 TABLET ORAL EVERY 4 HOURS PRN
Status: DISCONTINUED | OUTPATIENT
Start: 2023-01-01 | End: 2023-01-01

## 2023-01-01 RX ORDER — SODIUM CHLORIDE 9 MG/ML
40 INJECTION, SOLUTION INTRAVENOUS AS NEEDED
Status: DISCONTINUED | OUTPATIENT
Start: 2023-01-01 | End: 2023-01-03 | Stop reason: HOSPADM

## 2023-01-01 RX ORDER — GABAPENTIN 400 MG/1
800 CAPSULE ORAL EVERY 8 HOURS SCHEDULED
Status: DISCONTINUED | OUTPATIENT
Start: 2023-01-01 | End: 2023-01-03 | Stop reason: HOSPADM

## 2023-01-01 RX ORDER — CHOLECALCIFEROL (VITAMIN D3) 125 MCG
10 CAPSULE ORAL NIGHTLY
Status: DISCONTINUED | OUTPATIENT
Start: 2023-01-01 | End: 2023-01-03 | Stop reason: HOSPADM

## 2023-01-01 RX ORDER — SODIUM CHLORIDE 0.9 % (FLUSH) 0.9 %
10 SYRINGE (ML) INJECTION AS NEEDED
Status: DISCONTINUED | OUTPATIENT
Start: 2023-01-01 | End: 2023-01-03 | Stop reason: HOSPADM

## 2023-01-01 RX ORDER — FOLIC ACID 1 MG/1
1 TABLET ORAL DAILY
Status: DISCONTINUED | OUTPATIENT
Start: 2023-01-01 | End: 2023-01-03 | Stop reason: HOSPADM

## 2023-01-01 RX ORDER — BACLOFEN 10 MG/1
10 TABLET ORAL EVERY 8 HOURS SCHEDULED
Status: DISCONTINUED | OUTPATIENT
Start: 2023-01-01 | End: 2023-01-03 | Stop reason: HOSPADM

## 2023-01-01 RX ORDER — SUMATRIPTAN 50 MG/1
50 TABLET, FILM COATED ORAL
Status: DISCONTINUED | OUTPATIENT
Start: 2023-01-01 | End: 2023-01-03 | Stop reason: HOSPADM

## 2023-01-01 RX ORDER — CETIRIZINE HYDROCHLORIDE 10 MG/1
10 TABLET ORAL DAILY
Status: DISCONTINUED | OUTPATIENT
Start: 2023-01-01 | End: 2023-01-03 | Stop reason: HOSPADM

## 2023-01-01 RX ORDER — SODIUM CHLORIDE 9 MG/ML
75 INJECTION, SOLUTION INTRAVENOUS CONTINUOUS
Status: DISCONTINUED | OUTPATIENT
Start: 2023-01-01 | End: 2023-01-03 | Stop reason: HOSPADM

## 2023-01-01 RX ORDER — FLUOXETINE HYDROCHLORIDE 20 MG/1
60 CAPSULE ORAL NIGHTLY
Status: DISCONTINUED | OUTPATIENT
Start: 2023-01-01 | End: 2023-01-03 | Stop reason: HOSPADM

## 2023-01-01 RX ORDER — HYDROCODONE BITARTRATE AND ACETAMINOPHEN 5; 325 MG/1; MG/1
1 TABLET ORAL ONCE
Status: COMPLETED | OUTPATIENT
Start: 2023-01-01 | End: 2023-01-01

## 2023-01-01 RX ORDER — SODIUM CHLORIDE 0.9 % (FLUSH) 0.9 %
10 SYRINGE (ML) INJECTION EVERY 12 HOURS SCHEDULED
Status: DISCONTINUED | OUTPATIENT
Start: 2023-01-01 | End: 2023-01-03 | Stop reason: HOSPADM

## 2023-01-01 RX ORDER — HYDROCODONE BITARTRATE AND ACETAMINOPHEN 7.5; 325 MG/1; MG/1
1 TABLET ORAL EVERY 4 HOURS PRN
Status: DISCONTINUED | OUTPATIENT
Start: 2023-01-01 | End: 2023-01-03 | Stop reason: HOSPADM

## 2023-01-01 RX ORDER — PANTOPRAZOLE SODIUM 40 MG/1
40 TABLET, DELAYED RELEASE ORAL 2 TIMES DAILY
Status: DISCONTINUED | OUTPATIENT
Start: 2023-01-01 | End: 2023-01-03 | Stop reason: HOSPADM

## 2023-01-01 RX ORDER — HYDROXYZINE HYDROCHLORIDE 25 MG/1
25 TABLET, FILM COATED ORAL 3 TIMES DAILY PRN
Status: DISCONTINUED | OUTPATIENT
Start: 2023-01-01 | End: 2023-01-03 | Stop reason: HOSPADM

## 2023-01-01 RX ORDER — DICYCLOMINE HYDROCHLORIDE 10 MG/1
20 CAPSULE ORAL 3 TIMES DAILY
Status: DISCONTINUED | OUTPATIENT
Start: 2023-01-01 | End: 2023-01-03 | Stop reason: HOSPADM

## 2023-01-01 RX ADMIN — Medication 10 MG: at 01:44

## 2023-01-01 RX ADMIN — FOLIC ACID 1 MG: 1 TABLET ORAL at 10:35

## 2023-01-01 RX ADMIN — HYDROXYZINE HYDROCHLORIDE 25 MG: 25 TABLET ORAL at 18:22

## 2023-01-01 RX ADMIN — PANTOPRAZOLE SODIUM 40 MG: 40 TABLET, DELAYED RELEASE ORAL at 10:28

## 2023-01-01 RX ADMIN — APIXABAN 5 MG: 5 TABLET, FILM COATED ORAL at 20:24

## 2023-01-01 RX ADMIN — HYDROCODONE BITARTRATE AND ACETAMINOPHEN 1 TABLET: 7.5; 325 TABLET ORAL at 22:31

## 2023-01-01 RX ADMIN — PANTOPRAZOLE SODIUM 40 MG: 40 TABLET, DELAYED RELEASE ORAL at 20:24

## 2023-01-01 RX ADMIN — GABAPENTIN 800 MG: 400 CAPSULE ORAL at 22:31

## 2023-01-01 RX ADMIN — DICYCLOMINE HYDROCHLORIDE 20 MG: 10 CAPSULE ORAL at 10:29

## 2023-01-01 RX ADMIN — APIXABAN 5 MG: 5 TABLET, FILM COATED ORAL at 10:29

## 2023-01-01 RX ADMIN — HYDROCODONE BITARTRATE AND ACETAMINOPHEN 1 TABLET: 7.5; 325 TABLET ORAL at 18:22

## 2023-01-01 RX ADMIN — BACLOFEN 10 MG: 10 TABLET ORAL at 05:07

## 2023-01-01 RX ADMIN — APIXABAN 5 MG: 5 TABLET, FILM COATED ORAL at 02:41

## 2023-01-01 RX ADMIN — GABAPENTIN 800 MG: 400 CAPSULE ORAL at 05:07

## 2023-01-01 RX ADMIN — GABAPENTIN 800 MG: 400 CAPSULE ORAL at 13:27

## 2023-01-01 RX ADMIN — HYDROCODONE BITARTRATE AND ACETAMINOPHEN 1 TABLET: 7.5; 325 TABLET ORAL at 14:37

## 2023-01-01 RX ADMIN — Medication 10 ML: at 01:45

## 2023-01-01 RX ADMIN — CETIRIZINE HYDROCHLORIDE 10 MG: 10 TABLET ORAL at 10:29

## 2023-01-01 RX ADMIN — BACLOFEN 10 MG: 10 TABLET ORAL at 13:27

## 2023-01-01 RX ADMIN — HYDROCODONE BITARTRATE AND ACETAMINOPHEN 1 TABLET: 5; 325 TABLET ORAL at 01:44

## 2023-01-01 RX ADMIN — Medication 10 ML: at 20:24

## 2023-01-01 RX ADMIN — HYDROXYZINE HYDROCHLORIDE 25 MG: 25 TABLET ORAL at 10:29

## 2023-01-01 RX ADMIN — Medication 10 MG: at 20:24

## 2023-01-01 RX ADMIN — HYDROCODONE BITARTRATE AND ACETAMINOPHEN 1 TABLET: 5; 325 TABLET ORAL at 10:28

## 2023-01-01 RX ADMIN — MAGNESIUM OXIDE 400 MG (241.3 MG MAGNESIUM) TABLET 400 MG: TABLET at 10:35

## 2023-01-01 RX ADMIN — DICYCLOMINE HYDROCHLORIDE 20 MG: 10 CAPSULE ORAL at 20:24

## 2023-01-01 RX ADMIN — FLUOXETINE HYDROCHLORIDE 60 MG: 20 CAPSULE ORAL at 20:24

## 2023-01-01 RX ADMIN — SODIUM CHLORIDE 100 ML/HR: 9 INJECTION, SOLUTION INTRAVENOUS at 01:44

## 2023-01-01 RX ADMIN — DICYCLOMINE HYDROCHLORIDE 20 MG: 10 CAPSULE ORAL at 17:03

## 2023-01-01 RX ADMIN — SUMATRIPTAN SUCCINATE 50 MG: 50 TABLET ORAL at 19:51

## 2023-01-01 RX ADMIN — BACLOFEN 10 MG: 10 TABLET ORAL at 22:31

## 2023-01-01 NOTE — THERAPY EVALUATION
Around 2215, 1 episode of seizure like activity. Per mom \"patient threw arms up & eyes deviated to the left. \" This episode lasted for a few seconds. Patient alert. Will continue to monitor. Overnight, there were no other episodes reported by parents. Patient Name: Belen Allen  : 1980    MRN: [id number]                              Today's Date: 2023       Admit Date: 2022    Visit Dx:     ICD-10-CM ICD-9-CM   1. Seizure (HCC)  R56.9 780.39     Patient Active Problem List   Diagnosis   • Splenic infarct   • Anxiety disorder   • Functional asplenia   • Generalized abdominal pain   • Acute bilateral low back pain   • Antiphospholipid antibody positive   • On anticoagulant therapy   • Acute pain of left knee   • Vitamin D deficiency   • Folate deficiency   • Pain of back and left lower extremity   • Common bile duct dilatation   • Elevated LFTs   • Right upper quadrant abdominal pain   • Obesity (BMI 30-39.9)   • Tobacco abuse   • GERD without esophagitis   • Intractable abdominal pain   • Obesity, Class III, BMI 40-49.9 (morbid obesity) (HCC)   • Constipation   • History of ERCP   • Other chronic pain   • Seizures (HCC)   • Syncope   • Lumbar back pain with radiculopathy affecting left lower extremity   • Right upper quadrant pain   • Uncontrolled pain   • Intractable back pain   • Sciatica of right side   • Migraine   • Mobility impaired   • Seizure (HCC)     Past Medical History:   Diagnosis Date   • Abnormal Pap smear of cervix    • Ankle fracture    • H/O Elevated liver enzymes    • History of chronic back pain    • History of migraine    • History of urinary tract infection    • Injury of back    • PONV (postoperative nausea and vomiting)    • Seasonal allergies    • Seizure (HCC)     Takes Keppra     Past Surgical History:   Procedure Laterality Date   • BACK SURGERY      fusion L4, L5     • CHOLECYSTECTOMY     • DILATATION AND CURETTAGE     • ENDOSCOPY N/A 2022    Procedure: ESOPHAGOGASTRODUODENOSCOPY with biopsy;  Surgeon: Christiana Mann MD;  Location: [facility] ENDOSCOPY;  Service: Gastroenterology;  Laterality: N/A;  gastritis, duodenitis, irregular z line   • ERCP N/A 6/3/2021    Procedure: ENDOSCOPIC  RETROGRADE CHOLANGIOPANCREATOGRAPHY WITH SPHINCTEROTOMY, DILATION WITH BALLOON CLEARANCE  (12MM-15MM);  Surgeon: Bjorn Flannery MD;  Location: [facility] ENDOSCOPY;  Service: Gastroenterology;  Laterality: N/A;  DILATED COMMON BILE DUCT   • SKIN SURGERY     • TUBAL ABDOMINAL LIGATION  2013   • WISDOM TOOTH EXTRACTION  2018    x2      General Information     Row Name 01/01/23 1504          Physical Therapy Time and Intention    Document Type evaluation  -AR     Mode of Treatment physical therapy  -AR     Row Name 01/01/23 1504          General Information    Patient Profile Reviewed yes  -AR     Prior Level of Function independent:  started using walker after seizure 1.5 wks ago, denies falls but has significantly limited activity in last wk, normally indeepndent  -AR     Existing Precautions/Restrictions fall;seizures  -AR     Barriers to Rehab none identified  -AR     Row Name 01/01/23 1504          Living Environment    People in Home child(manuel), dependent;significant other  -AR     Row Name 01/01/23 1504          Home Main Entrance    Number of Stairs, Main Entrance four  -AR     Stair Railings, Main Entrance railings safe and in good condition  -AR     Row Name 01/01/23 1504          Cognition    Orientation Status (Cognition) oriented x 3  -AR     Row Name 01/01/23 1504          Safety Issues, Functional Mobility    Impairments Affecting Function (Mobility) balance;strength;endurance/activity tolerance;pain;coordination  -AR           User Key  (r) = Recorded By, (t) = Taken By, (c) = Cosigned By    Initials Name Provider Type    AR Malinda Fierro PT Physical Therapist               Mobility     Row Name 01/01/23 1505          Bed Mobility    Supine-Sit [place] (Bed Mobility) supervision  -AR     Sit-Supine [place] (Bed Mobility) not tested  -AR     Assistive Device (Bed Mobility) bed rails;head of bed elevated  -AR     Row Name 01/01/23 1505          Sit-Stand Transfer    Sit-Stand  [place] (Transfers) contact guard  -AR     Assistive Device (Sit-Stand Transfers) walker, front-wheeled  -AR     Row Name 01/01/23 1505          Gait/Stairs (Locomotion)    [place] Level (Gait) minimum assist (75% patient effort)  -AR     Assistive Device (Gait) walker, front-wheeled  -AR     Distance in Feet (Gait) 20' slower and appeared more shaky last few steps, held further activity for safety  -AR     Deviations/Abnormal Patterns (Gait) gait speed decreased;festinating/shuffling  -AR     Bilateral Gait Deviations forward flexed posture;heel strike decreased  -AR           User Key  (r) = Recorded By, (t) = Taken By, (c) = Cosigned By    Initials Name Provider Type    AR Malinda Fierro, PT Physical Therapist               Obj/Interventions     Row Name 01/01/23 1506          Range of Motion Comprehensive    Comment, General Range of Motion B LE WFL during mobility  -AR     Row Name 01/01/23 1506          Strength Comprehensive (MMT)    Comment, General Manual Muscle Testing (MMT) Assessment R LE -3/5; LLE 4/5  -AR     Row Name 01/01/23 1506          Motor Skills    Therapeutic Exercise --  B AP, LAQ 5x  -AR     Row Name 01/01/23 1506          Balance    Dynamic Sitting Balance supervision  -AR     Dynamic Standing Balance minimal assist  -AR     Position/Device Used, Standing Balance walker, rolling  -AR           User Key  (r) = Recorded By, (t) = Taken By, (c) = Cosigned By    Initials Name Provider Type    AR Malinda Fierro, PT Physical Therapist               Goals/Plan     Row Name 01/01/23 1511          Transfer Goal 1 (PT)    Activity/Assistive Device (Transfer Goal 1, PT) sit-to-stand/stand-to-sit;bed-to-chair/chair-to-bed;walker, rolling  -AR     [place] Level/Cues Needed (Transfer Goal 1, PT) standby assist  -AR     Row Name 01/01/23 1511          Gait Training Goal 1 (PT)    Activity/Assistive Device (Gait Training Goal 1, PT) gait (walking locomotion);walker, rolling  -AR      [place] Level (Gait Training Goal 1, PT) standby assist  -AR     Distance (Gait Training Goal 1, PT) 150  -AR     Time Frame (Gait Training Goal 1, PT) 1 week  -AR     Row Name 01/01/23 1511          Therapy Assessment/Plan (PT)    Planned Therapy Interventions (PT) balance training;bed mobility training;gait training;home exercise program;stair training;strengthening;transfer training  -AR           User Key  (r) = Recorded By, (t) = Taken By, (c) = Cosigned By    Initials Name Provider Type    AR Malinda Fierro, PT Physical Therapist               Clinical Impression     Row Name 01/01/23 1509          Pain    Pretreatment Pain Rating 3/10  -AR     Posttreatment Pain Rating 3/10  -AR     Pain Location generalized  -AR     Pre/Posttreatment Pain Comment multiple painful spots from recent falls during seizures, doesn't provifde number but indicates some discomfort  -AR     Pain Intervention(s) Repositioned  -AR     Row Name 01/01/23 1502          Plan of Care Review    Outcome Evaluation Pt admitted after another seizure w/ fall,  knocking out a tooth, urinary incontinence, h/o seizures w/ 1 a week and half ago.  Pt reports R LE still weak/shaky since then, requiring use of walker at home.  Today pt demonstrates R LE weaknesss, limited activity tolerance and ind. w/ moiblity but able to ambulate 20' w/ min A using RW, limited by increased shaking and slower gait last step or 2; held furhter activity due to concern for having another seizure.  Carly recommend home PT, use of her walker.  Also recommend sitting in chair at least 3-4 hours /day and ambulating with staff throughout the day.  -AR     Row Name 01/01/23 1505          Therapy Assessment/Plan (PT)    Rehab Potential (PT) good, to achieve stated therapy goals  -AR     Criteria for Skilled Interventions Met (PT) yes  -AR     Therapy Frequency (PT) 5 times/wk  -AR     Row Name 01/01/23 1505          Vital Signs    O2 Delivery Pre Treatment room air   -AR     Row Name 01/01/23 1508          Positioning and Restraints    Pre-Treatment Position in bed  -AR     Post Treatment Position chair  -AR     In Chair notified nsg;reclined;sitting;call light within reach;encouraged to call for assist;exit alarm on  -AR           User Key  (r) = Recorded By, (t) = Taken By, (c) = Cosigned By    Initials Name Provider Type    AR Malinda Fierro, PT Physical Therapist               Outcome Measures     Row Name 01/01/23 1515          How much help from another person do you currently need...    Turning from your back to your side while in flat bed without using bedrails? 4  -AR     Moving from lying on back to sitting on the side of a flat bed without bedrails? 3  -AR     Moving to and from a bed to a chair (including a wheelchair)? 3  -AR     Standing up from a chair using your arms (e.g., wheelchair, bedside chair)? 3  -AR     Climbing 3-5 steps with a railing? 2  -AR     To walk in hospital room? 3  -AR     AM-PAC 6 Clicks Score (PT) 18  -AR     Highest level of mobility 6 --> Walked 10 steps or more  -AR     Row Name 01/01/23 1433          Modified [place] Scale    Modified [place] Scale 4 - Moderately severe disability.  Unable to walk without assistance, and unable to attend to own bodily needs without assistance.  -RB     Row Name 01/01/23 1515 01/01/23 1433       Functional Assessment    Outcome Measure Options AM-PAC 6 Clicks Basic Mobility (PT)  -AR AM-PAC 6 Clicks Daily Activity (OT);Modified [place]  -RB          User Key  (r) = Recorded By, (t) = Taken By, (c) = Cosigned By    Initials Name Provider Type    Malinda Duffy, AUGIE Physical Therapist    Leoan Hancock OT Occupational Therapist                             Physical Therapy Education     Title: PT OT SLP Therapies (In Progress)     Topic: Physical Therapy (In Progress)     Point: Mobility training (In Progress)     Learning Progress Summary           Patient Acceptance, E, NR by AR at 1/1/2023 4633                    Point: Home exercise program (In Progress)     Learning Progress Summary           Patient Acceptance, E, NR by AR at 1/1/2023 1515                   Point: Body mechanics (In Progress)     Learning Progress Summary           Patient Acceptance, E, NR by AR at 1/1/2023 1515                   Point: Precautions (In Progress)     Learning Progress Summary           Patient Acceptance, E, NR by AR at 1/1/2023 1515                               User Key     Initials Effective Dates Name Provider Type Discipline    AR 06/16/21 -  Malinda Fierro, PT Physical Therapist PT              PT Recommendation and Plan  Planned Therapy Interventions (PT): balance training, bed mobility training, gait training, home exercise program, stair training, strengthening, transfer training  Outcome Evaluation: Pt admitted after another seizure w/ fall,  knocking out a tooth, urinary incontinence, h/o seizures w/ 1 a week and half ago.  Pt reports R LE still weak/shaky since then, requiring use of walker at home.  Today pt demonstrates R LE weaknesss, limited activity tolerance and ind. w/ moiblity but able to ambulate 20' w/ min A using RW, limited by increased shaking and slower gait last step or 2; held furhter activity due to concern for having another seizure.  Carly recommend home PT, use of her walker.  Also recommend sitting in chair at least 3-4 hours /day and ambulating with staff throughout the day.     Time Calculation:    PT Charges     Row Name 01/01/23 1502             Time Calculation    Start Time 1350  -AR      Stop Time 1412  -AR      Time Calculation (min) 22 min  -AR      PT Received On 01/01/23  -AR      PT - Next Appointment 01/03/23  -AR      PT Goal Re-Cert Due Date 01/08/23  -AR            User Key  (r) = Recorded By, (t) = Taken By, (c) = Cosigned By    Initials Name Provider Type    AR Malinda Fierro, PT Physical Therapist              Therapy Charges for Today     Code Description  Service Date Service Provider Modifiers Qty    42490764619 HC PT EVAL MOD COMPLEXITY 4 1/1/2023 Malinda Fierro, PT GP 1    92197540570 HC PT THER PROC EA 15 MIN 1/1/2023 Malinda Fierro, PT GP 1          PT G-Codes  Outcome Measure Options: AM-PAC 6 Clicks Basic Mobility (PT)  AM-PAC 6 Clicks Score (PT): 18  AM-PAC 6 Clicks Score (OT): 15  Modified [place]bekah Scale: 4 - Moderately severe disability.  Unable to walk without assistance, and unable to attend to own bodily needs without assistance.  PT Discharge Summary  Anticipated Discharge Disposition (PT): home with home health    Malinda Fierro PT  1/1/2023

## 2023-01-01 NOTE — ED NOTES
Nursing report ED to floor  Belen Allen  42 y.o.  female    HPI :   Chief Complaint   Patient presents with    Seizures       Admitting doctor:   Ton Agarwal MD    Admitting diagnosis:   The encounter diagnosis was Seizure (HCC).    Code status:   Current Code Status       Date Active Code Status Order ID Comments User Context       Prior            Allergies:   Lidocaine    Isolation:   No active isolations    Intake and Output    Intake/Output Summary (Last 24 hours) at 12/31/2022 2206  Last data filed at 12/31/2022 2205  Gross per 24 hour   Intake 1000 ml   Output --   Net 1000 ml       Weight:   There were no vitals filed for this visit.    Most recent vitals:   Vitals:    12/31/22 1901 12/31/22 2104 12/31/22 2105 12/31/22 2131   BP: 135/81  138/91 147/89   Pulse: 72  72 70   Resp:       Temp:       TempSrc:       SpO2:  94%  91%       Active LDAs/IV Access:   Lines, Drains & Airways       Active LDAs       Name Placement date Placement time Site Days    Peripheral IV 12/31/22 2151 Right;Upper Antecubital 12/31/22 2151  Antecubital  less than 1                    Labs (abnormal labs have a star):   Labs Reviewed   COMPREHENSIVE METABOLIC PANEL - Abnormal; Notable for the following components:       Result Value    Albumin 3.4 (*)     All other components within normal limits    Narrative:     GFR Normal >60  Chronic Kidney Disease <60  Kidney Failure <15     URINALYSIS W/ MICROSCOPIC IF INDICATED (NO CULTURE) - Abnormal; Notable for the following components:    pH, UA >=9.0 (*)     All other components within normal limits    Narrative:     Urine microscopic not indicated.   VALPROIC ACID LEVEL, TOTAL - Abnormal; Notable for the following components:    Valproic Acid 6.0 (*)     All other components within normal limits    Narrative:     Therapeutic Ranges for Valproic Acid    Epilepsy:       mcg/ml  Bipolar/Lucretia  up to 125 mcg/ml     CBC WITH AUTO DIFFERENTIAL - Abnormal; Notable  for the following components:    .5 (*)     MCH 33.7 (*)     All other components within normal limits   CBC AND DIFFERENTIAL    Narrative:     The following orders were created for panel order CBC & Differential.  Procedure                               Abnormality         Status                     ---------                               -----------         ------                     CBC Auto Differential[[id number]]        Abnormal            Final result                 Please view results for these tests on the individual orders.       EKG:   No orders to display       Meds given in ED:   Medications   sodium chloride 0.9 % flush 10 mL (has no administration in time range)   divalproex (DEPAKOTE) DR tablet 1,500 mg (1,500 mg Oral Given 12/31/22 2058)   LORazepam (ATIVAN) injection 2 mg (has no administration in time range)   HYDROcodone-acetaminophen (NORCO) 5-325 MG per tablet 1 tablet (has no administration in time range)   lactated ringers bolus 1,000 mL (0 mL Intravenous Stopped 12/31/22 2205)   HYDROmorphone (DILAUDID) injection 1 mg (1 mg Intravenous Given 12/31/22 1744)   ondansetron (ZOFRAN) injection 4 mg (4 mg Intravenous Given 12/31/22 1744)   prochlorperazine (COMPAZINE) injection 10 mg (10 mg Intravenous Given 12/31/22 2058)   ketorolac (TORADOL) injection 15 mg (15 mg Intravenous Given 12/31/22 2124)       Imaging results:  CT Head Without Contrast    Result Date: 12/31/2022  Impression: 1.  No findings of acute intracranial abnormality.  No cervical spine, thoracic or lumbar fracture. 2.  No noncontrast CT findings of acute cardiopulmonary pathology. 3.  The common bile duct is more distended when compared with 12/05/2022, measuring up to 1.1 cm. Findings raise concern for biliary ductal obstruction and further evaluation with MRCP is recommended. 4.  Other findings as above  This report was finalized on 12/31/2022 8:57 PM by Dr. Lloyd Martinez M.D.      CT Chest Without Contrast  Diagnostic    Result Date: 12/31/2022  Impression: 1.  No findings of acute intracranial abnormality.  No cervical spine, thoracic or lumbar fracture. 2.  No noncontrast CT findings of acute cardiopulmonary pathology. 3.  The common bile duct is more distended when compared with 12/05/2022, measuring up to 1.1 cm. Findings raise concern for biliary ductal obstruction and further evaluation with MRCP is recommended. 4.  Other findings as above  This report was finalized on 12/31/2022 8:57 PM by Dr. Lloyd Martinez M.D.      CT Cervical Spine Without Contrast    Result Date: 12/31/2022  Impression: 1.  No findings of acute intracranial abnormality.  No cervical spine, thoracic or lumbar fracture. 2.  No noncontrast CT findings of acute cardiopulmonary pathology. 3.  The common bile duct is more distended when compared with 12/05/2022, measuring up to 1.1 cm. Findings raise concern for biliary ductal obstruction and further evaluation with MRCP is recommended. 4.  Other findings as above  This report was finalized on 12/31/2022 8:57 PM by Dr. Lloyd Martinez M.D.      CT Thoracic Spine Without Contrast    Result Date: 12/31/2022  Impression: 1.  No findings of acute intracranial abnormality.  No cervical spine, thoracic or lumbar fracture. 2.  No noncontrast CT findings of acute cardiopulmonary pathology. 3.  The common bile duct is more distended when compared with 12/05/2022, measuring up to 1.1 cm. Findings raise concern for biliary ductal obstruction and further evaluation with MRCP is recommended. 4.  Other findings as above  This report was finalized on 12/31/2022 8:57 PM by Dr. Lloyd Martinez M.D.      CT Lumbar Spine Without Contrast    Result Date: 12/31/2022  Impression: 1.  No findings of acute intracranial abnormality.  No cervical spine, thoracic or lumbar fracture. 2.  No noncontrast CT findings of acute cardiopulmonary pathology. 3.  The common bile duct is more distended when compared with 12/05/2022,  measuring up to 1.1 cm. Findings raise concern for biliary ductal obstruction and further evaluation with MRCP is recommended. 4.  Other findings as above  This report was finalized on 12/31/2022 8:57 PM by Dr. Lloyd Martinez M.D.       Ambulatory status:   - as tolerated    Social issues:   Social History     Socioeconomic History    Marital status: Single    Number of children: 2   Tobacco Use    Smoking status: Every Day     Packs/day: 0.50     Types: Cigarettes    Smokeless tobacco: Never   Vaping Use    Vaping Use: Never used   Substance and Sexual Activity    Alcohol use: Not Currently    Drug use: Not Currently    Sexual activity: Defer       NIH Stroke Scale:         Robert Milan RN  12/31/22 22:06 EST

## 2023-01-01 NOTE — PLAN OF CARE
Problem: Adult Inpatient Plan of Care  Goal: Plan of Care Review  Outcome: Ongoing, Progressing     Problem: Adult Inpatient Plan of Care  Goal: Absence of Hospital-Acquired Illness or Injury  Intervention: Identify and Manage Fall Risk  Recent Flowsheet Documentation  Taken 1/1/2023 0040 by Raven Juares RN  Safety Promotion/Fall Prevention:   activity supervised   safety round/check completed   clutter free environment maintained   assistive device/personal items within reach   room organization consistent   nonskid shoes/slippers when out of bed     Problem: Adult Inpatient Plan of Care  Goal: Absence of Hospital-Acquired Illness or Injury  Intervention: Prevent Skin Injury  Recent Flowsheet Documentation  Taken 1/1/2023 0040 by Raven Juares RN  Body Position: sitting up in bed  Skin Protection:   adhesive use limited   tubing/devices free from skin contact   transparent dressing maintained   incontinence pads utilized     Problem: Adult Inpatient Plan of Care  Goal: Optimal Comfort and Wellbeing  Intervention: Monitor Pain and Promote Comfort  Recent Flowsheet Documentation  Taken 1/1/2023 0040 by Raven Juares RN  Pain Management Interventions:   see MAR   position adjusted   pillow support provided     Problem: Adult Inpatient Plan of Care  Goal: Optimal Comfort and Wellbeing  Outcome: Ongoing, Progressing  Intervention: Monitor Pain and Promote Comfort  Recent Flowsheet Documentation  Taken 1/1/2023 0040 by Raven Juares RN  Pain Management Interventions:   see MAR   position adjusted   pillow support provided  Intervention: Provide Person-Centered Care  Recent Flowsheet Documentation  Taken 1/1/2023 0040 by Raven Juares RN  Trust Relationship/Rapport:   care explained   reassurance provided   thoughts/feelings acknowledged   empathic listening provided   questions answered     Problem: Adult Inpatient Plan of Care  Goal: Absence of Hospital-Acquired Illness or Injury  Intervention:  Prevent Infection  Recent Flowsheet Documentation  Taken 1/1/2023 0040 by Raven Juares RN  Infection Prevention:   environmental surveillance performed   rest/sleep promoted   single patient room provided   equipment surfaces disinfected   cohorting utilized   hand hygiene promoted   personal protective equipment utilized   visitors restricted/screened     Problem: Adult Inpatient Plan of Care  Goal: Readiness for Transition of Care  Intervention: Mutually Develop Transition Plan  Recent Flowsheet Documentation  Taken 1/1/2023 0035 by Raven Juares RN  Equipment Currently Used at Home: walker, rolling  Taken 1/1/2023 0033 by Raven Juares RN  Equipment Currently Used at Home: (toilet chair)   walker, rolling   other (see comments)  Taken 1/1/2023 0029 by Raven Juares RN  Transportation Anticipated: family or friend will provide  Patient/Family Anticipated Services at Transition: none  Patient/Family Anticipates Transition to: home with family     Problem: Fall Injury Risk  Goal: Absence of Fall and Fall-Related Injury  Outcome: Ongoing, Progressing  Intervention: Identify and Manage Contributors  Recent Flowsheet Documentation  Taken 1/1/2023 0040 by Raven Juares RN  Medication Review/Management: medications reviewed  Self-Care Promotion:   independence encouraged   safe use of adaptive equipment encouraged  Intervention: Promote Injury-Free Environment  Recent Flowsheet Documentation  Taken 1/1/2023 0040 by Raven Juares RN  Safety Promotion/Fall Prevention:   activity supervised   safety round/check completed   clutter free environment maintained   assistive device/personal items within reach   room organization consistent   nonskid shoes/slippers when out of bed     Problem: Fall Injury Risk  Goal: Absence of Fall and Fall-Related Injury  Intervention: Identify and Manage Contributors  Recent Flowsheet Documentation  Taken 1/1/2023 0040 by Raven Juares RN  Medication  Review/Management: medications reviewed  Self-Care Promotion:   independence encouraged   safe use of adaptive equipment encouraged     Problem: Fall Injury Risk  Goal: Absence of Fall and Fall-Related Injury  Intervention: Promote Injury-Free Environment  Recent Flowsheet Documentation  Taken 1/1/2023 0040 by Raven Juares RN  Safety Promotion/Fall Prevention:   activity supervised   safety round/check completed   clutter free environment maintained   assistive device/personal items within reach   room organization consistent   nonskid shoes/slippers when out of bed     Problem: Pain Chronic (Persistent) (Comorbidity Management)  Goal: Acceptable Pain Control and Functional Ability  Outcome: Ongoing, Progressing  Intervention: Manage Persistent Pain  Recent Flowsheet Documentation  Taken 1/1/2023 0040 by Raven Juares RN  Sleep/Rest Enhancement: room darkened  Medication Review/Management: medications reviewed  Intervention: Develop Pain Management Plan  Recent Flowsheet Documentation  Taken 1/1/2023 0040 by Raven Juares RN  Pain Management Interventions:   see MAR   position adjusted   pillow support provided  Intervention: Optimize Psychosocial Wellbeing  Recent Flowsheet Documentation  Taken 1/1/2023 0040 by Raven Juares RN  Supportive Measures:   active listening utilized   relaxation techniques promoted  Diversional Activities: television  Family/Support System Care: support provided     Problem: Pain Chronic (Persistent) (Comorbidity Management)  Goal: Acceptable Pain Control and Functional Ability  Intervention: Manage Persistent Pain  Recent Flowsheet Documentation  Taken 1/1/2023 0040 by Raven Juares RN  Sleep/Rest Enhancement: room darkened  Medication Review/Management: medications reviewed   Goal Outcome Evaluation:

## 2023-01-01 NOTE — PLAN OF CARE
The pt was admitted to [facility] 2/2 to seizure activity. She has a history of seizure activity - this last seizure lasted 15-20 minutes and resulted in RLE weakness and balance deficits. She reports BUE coordination deficits with droppage of items in her home environment. She lives with her  and two children and reports independence with ADL's and mobility until recently. Today, the pt was oriented x3. Min A for OOB mobility. She has difficulty with LBD due to RLE weakness/impaired AROM. She became weak and off balance with functional mobility and was lowered into a bedside chair. S/S grooming tasks. EEG arrived to the room and she was very unsteady transferring back to the bed. She declines a rehab referral and wishes to d/c home with HH vs. OP OT/PT.     Patient was wearing a face mask during this therapy encounter. Therapist used appropriate personal protective equipment including mask and gloves. Mask used was standard procedure mask. Appropriate PPE was worn during the entire therapy session. Hand hygiene was completed before and after therapy session. Patient is not in enhanced droplet precautions.

## 2023-01-01 NOTE — H&P
Patient Name:  Belen Allen  YOB: 1980  MRN:  [id number]  Admit Date:  12/31/2022  Patient Care Team:  Sahil Cornelius MD as PCP - General (Internal Medicine)  Code, Bjorn HERRERA II, MD as Consulting Physician (Hematology and Oncology)  Dennys Carranza MD as Referring Physician (Hospitalist)      Subjective   History Present Illness     Chief Complaint   Patient presents with   • Seizures     History of Present Illness   Mrs. 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  Patient was actually admitted to the hospital from 12/17/2022 to 12/20/2022 with similar complaints of seizure and back pain.  Patient states she has had multiple seizures daily for the past few weeks, she states she has been compliant with her medication although her Depakote level here in the emergency room is only 6.0 which is very subtherapeutic.  Patient states that usually her seizures lasted for just a few minutes, but this evening she had an episode of about 15 to 20 minutes seizure, where she knocked out one of her teeth and urinated on herself, this was witnessed by her daughter and she was brought to the emergency room after that episode.  Since admission patient has had multiple visits to the emergency room and sent home.  She has been unable to follow-up with neurology as outpatient.  In the emergency room a CK was not done, that is pending, glucose 81, sodium 139, potassium 4.2, creatinine 0.82, BUN 6, ALT 17, AST 20, white blood cell count 8.6, hemoglobin 12.9, hematocrit 38.5, platelets 242.  Valproic acid level 6 down from 36 on the 29th.  CT scan of her head and spine showed no acute intracranial abnormalities, no cervical spine thoracic or lumbar fractures, the common bile duct is more distended when compared to 12/5/2022, findings raise concern  for biliary ductal obstruction, patient denies any abdominal pain.    Review of Systems   Unable to perform ROS: Mental status change        Personal History     Past Medical History:   Diagnosis Date   • Abnormal Pap smear of cervix    • Ankle fracture 2013   • H/O Elevated liver enzymes    • History of chronic back pain    • History of migraine    • History of urinary tract infection    • Injury of back    • PONV (postoperative nausea and vomiting)    • Seasonal allergies    • Seizure (HCC)     Takes Keppra     Past Surgical History:   Procedure Laterality Date   • BACK SURGERY  2006    fusion L4, L5     • CHOLECYSTECTOMY  2014   • DILATATION AND CURETTAGE  2009   • ENDOSCOPY N/A 11/27/2022    Procedure: ESOPHAGOGASTRODUODENOSCOPY with biopsy;  Surgeon: Christiana Mann MD;  Location: [facility] ENDOSCOPY;  Service: Gastroenterology;  Laterality: N/A;  gastritis, duodenitis, irregular z line   • ERCP N/A 6/3/2021    Procedure: ENDOSCOPIC RETROGRADE CHOLANGIOPANCREATOGRAPHY WITH SPHINCTEROTOMY, DILATION WITH BALLOON CLEARANCE  (12MM-15MM);  Surgeon: Bjorn Flannery MD;  Location: [facility] ENDOSCOPY;  Service: Gastroenterology;  Laterality: N/A;  DILATED COMMON BILE DUCT   • SKIN SURGERY     • TUBAL ABDOMINAL LIGATION  2013   • WISDOM TOOTH EXTRACTION  2018    x2     Family History   Problem Relation Age of Onset   • Stroke Mother    • Allergic rhinitis Mother    • Allergic rhinitis Sister    • Breast cancer Maternal Aunt    • Cancer Maternal Grandmother    • Allergic rhinitis Paternal Grandfather    • Cancer Paternal Grandfather    • Prostate cancer Paternal Grandfather      Social History     Tobacco Use   • Smoking status: Every Day     Packs/day: 0.50     Types: Cigarettes   • Smokeless tobacco: Never   Vaping Use   • Vaping Use: Never used   Substance Use Topics   • Alcohol use: Not Currently   • Drug use: Not Currently     No current facility-administered medications on file prior to encounter.     Current  Outpatient Medications on File Prior to Encounter   Medication Sig Dispense Refill   • apixaban (ELIQUIS) 5 MG tablet tablet Take 5 mg by mouth 2 (Two) Times a Day. Last filled 4/12/21 - pt states she was instructed to stop taking medication prior to ERCP ~1 month ago and was not instructed to restart medication.   Indication: Splenic infarct     • baclofen (LIORESAL) 10 MG tablet Take 1 tablet by mouth Every 8 (Eight) Hours. 90 tablet 3   • Calcium Carbonate-Vitamin D 600-200 MG-UNIT tablet Take 1 tablet by mouth Daily.     • dicyclomine (BENTYL) 20 MG tablet Take 1 tablet by mouth Every 6 (Six) Hours. 20 tablet 0   • divalproex (DEPAKOTE) 500 MG DR tablet Take 2 tablets by mouth Every 12 (Twelve) Hours. 120 tablet 0   • FLUoxetine (PROzac) 40 MG capsule Take 60 mg by mouth Every Night.     • folic acid (FOLVITE) 1 MG tablet Take 1 tablet by mouth Daily. 30 tablet 0   • gabapentin (NEURONTIN) 800 MG tablet Take 800 mg by mouth 4 (Four) Times a Day.     • hydrOXYzine (ATARAX) 25 MG tablet Take 1-2 tablets by mouth 3 (Three) Times a Day As Needed for Anxiety.     • loratadine (CLARITIN) 10 MG tablet Take 10 mg by mouth Daily.     • LORazepam (ATIVAN) 0.5 MG tablet Take 0.25-0.5 mg by mouth.     • magnesium oxide (MAG-OX) 400 tablet tablet Take 1 tablet by mouth Daily. 30 tablet 3   • melatonin 5 MG tablet tablet Take 10 mg by mouth Every Night. Patient taes 20 mg at home     • ondansetron ODT (ZOFRAN-ODT) 8 MG disintegrating tablet Place 1 tablet on the tongue Every 8 (Eight) Hours As Needed for Nausea or Vomiting. 30 tablet 0   • pantoprazole (PROTONIX) 40 MG EC tablet Take 1 tablet by mouth 2 (Two) Times a Day. 60 tablet 0   • SUMAtriptan (IMITREX) 100 MG tablet Take 50 mg by mouth Every 2 (Two) Hours As Needed for Migraine. Take one tablet at onset of headache. May repeat dose one time in 2 hours if headache not relieved.     • traZODone (DESYREL) 50 MG tablet Take 1-2 tablets by mouth At Night As Needed for sleep  30 tablet 3   • polyethylene glycol (polyethylene glycol) 17 g packet Take 17 g by mouth Daily.     • sucralfate (CARAFATE) 1 g tablet Take 1 tablet by mouth 4 (Four) Times a Day. 20 tablet 0   • Vitamin B-2 (RIBOFLAVIN) 100 MG tablet tablet Take 4 tablets by mouth Daily. 30 tablet 3   • vitamin D (ERGOCALCIFEROL) 1.25 MG (35935 UT) capsule capsule Take 50,000 Units by mouth 1 (One) Time Per Week.       Allergies   Allergen Reactions   • Lidocaine Rash     Pt reports lidocaine patches make her breakout in a rash       Objective    Objective     Vital Signs  Temp:  [97.5 °F (36.4 °C)-98 °F (36.7 °C)] 98 °F (36.7 °C)  Heart Rate:  [70-80] 76  Resp:  [16-18] 18  BP: (107-150)/(66-91) 107/66  SpO2:  [91 %-100 %] 92 %  on   ;   Device (Oxygen Therapy): room air  There is no height or weight on file to calculate BMI.    Physical Exam  Vitals and nursing note reviewed.   Constitutional:       General: She is not in acute distress.     Appearance: She is well-developed.   HENT:      Head: Normocephalic.   Neck:      Vascular: No JVD.   Cardiovascular:      Rate and Rhythm: Normal rate and regular rhythm.      Heart sounds: Normal heart sounds.      Comments: Normal sinus rhythm on the monitor with heart rate 90 during my exam  Pulmonary:      Effort: Pulmonary effort is normal.      Breath sounds: Normal breath sounds.      Comments: Lung sounds clear, sats 95% on room air  Abdominal:      General: Bowel sounds are normal. There is no distension.      Palpations: Abdomen is soft.      Tenderness: There is no abdominal tenderness.   Musculoskeletal:         General: Normal range of motion.      Cervical back: Normal range of motion.      Right lower leg: No edema.      Left lower leg: No edema.      Comments: Chronic back pain   Skin:     General: Skin is warm and dry.      Capillary Refill: Capillary refill takes less than 2 seconds.   Neurological:      Mental Status: She is oriented to person, place, and time. She is  lethargic.      Comments: Patient is lethargic, arouses, she is oriented but repetitive in speech, will follow commands but falls asleep and gets off subject very easily.   Psychiatric:         Attention and Perception: She is inattentive.         Mood and Affect: Mood is anxious.         Behavior: Behavior normal.         Results Review:  I reviewed the patient's new clinical results.  I reviewed the patient's new imaging results and agree with the interpretation.  I reviewed the patient's other test results and agree with the interpretation  I personally viewed and interpreted the patient's EKG/Telemetry data  Discussed with ED provider.    Lab Results (last 24 hours)     Procedure Component Value Units Date/Time    CBC & Differential [[id number]]  (Abnormal) Collected: 12/31/22 1752    Specimen: Blood Updated: 12/31/22 1831    Narrative:      The following orders were created for panel order CBC & Differential.  Procedure                               Abnormality         Status                     ---------                               -----------         ------                     CBC Auto Differential[[id number]]        Abnormal            Final result                 Please view results for these tests on the individual orders.    Comprehensive Metabolic Panel [[id number]]  (Abnormal) Collected: 12/31/22 1752    Specimen: Blood Updated: 12/31/22 1903     Glucose 81 mg/dL      BUN 6 mg/dL      Creatinine 0.82 mg/dL      Sodium 139 mmol/L      Potassium 4.2 mmol/L      Comment: Slight hemolysis detected by analyzer. Results may be affected.        Chloride 104 mmol/L      CO2 27.0 mmol/L      Calcium 8.9 mg/dL      Total Protein 6.1 g/dL      Albumin 3.4 g/dL      ALT (SGPT) 17 U/L      AST (SGOT) 20 U/L      Comment: Slight hemolysis detected by analyzer. Results may be affected.        Alkaline Phosphatase 88 U/L      Total Bilirubin 0.3 mg/dL      Globulin 2.7 gm/dL      A/G Ratio 1.3 g/dL      BUN/Creatinine  Ratio 7.3     Anion Gap 8.0 mmol/L      eGFR 92.3 mL/min/1.73      Comment: National Kidney Foundation and American Society of Nephrology (ASN) Task Force recommended calculation based on the Chronic Kidney Disease Epidemiology Collaboration (CKD-EPI) equation refit without adjustment for race.       Narrative:      GFR Normal >60  Chronic Kidney Disease <60  Kidney Failure <15      Valproic Acid Level, Total [[id number]]  (Abnormal) Collected: 12/31/22 1752    Specimen: Blood Updated: 12/31/22 1851     Valproic Acid 6.0 mcg/mL     Narrative:      Therapeutic Ranges for Valproic Acid    Epilepsy:       mcg/ml  Bipolar/Lucretia  up to 125 mcg/ml      CBC Auto Differential [[id number]]  (Abnormal) Collected: 12/31/22 1752    Specimen: Blood Updated: 12/31/22 1831     WBC 8.64 10*3/mm3      RBC 3.83 10*6/mm3      Hemoglobin 12.9 g/dL      Hematocrit 38.5 %      .5 fL      MCH 33.7 pg      MCHC 33.5 g/dL      RDW 14.6 %      RDW-SD 52.8 fl      MPV 10.4 fL      Platelets 242 10*3/mm3      Neutrophil % 62.2 %      Lymphocyte % 27.3 %      Monocyte % 8.3 %      Eosinophil % 1.5 %      Basophil % 0.2 %      Immature Grans % 0.5 %      Neutrophils, Absolute 5.37 10*3/mm3      Lymphocytes, Absolute 2.36 10*3/mm3      Monocytes, Absolute 0.72 10*3/mm3      Eosinophils, Absolute 0.13 10*3/mm3      Basophils, Absolute 0.02 10*3/mm3      Immature Grans, Absolute 0.04 10*3/mm3      nRBC 0.0 /100 WBC     Urinalysis With Microscopic If Indicated (No Culture) - Urine, Clean Catch [[id number]]  (Abnormal) Collected: 12/31/22 1817    Specimen: Urine, Clean Catch Updated: 12/31/22 1834     Color, UA Yellow     Appearance, UA Clear     pH, UA >=9.0     Specific Gravity, UA 1.007     Glucose, UA Negative     Ketones, UA Negative     Bilirubin, UA Negative     Blood, UA Negative     Protein, UA Negative     Leuk Esterase, UA Negative     Nitrite, UA Negative     Urobilinogen, UA 0.2 E.U./dL    Narrative:      Urine microscopic not  indicated.          Imaging Results (Last 24 Hours)     Procedure Component Value Units Date/Time    CT Head Without Contrast [[id number]] Collected: 12/31/22 2002     Updated: 12/31/22 2100    Narrative:      CT HEAD WITHOUT INTRAVENOUS CONTRAST; CERVICAL, THORACIC AND LUMBAR  SPINE WITHOUT INTRAVENOUS CONTRAST; CT CHEST WITHOUT INTRAVENOUS  CONTRAST     HISTORY: Seizure, hit head, neck pain, right rib pain and thoracic pain     COMPARISON: CT head and cervical spine 12/29/2022 and 12/17/2022; MRI of  thoracic and lumbar spine 12/18/2022, CT lumbar spine 01/01/2022 and CT  chest 11/30/2022 an 12/17/2022     TECHNIQUE: CT was performed of the head. CT was performed of the  cervical spine, thoracic and lumbar with axial, coronal, and sagittal  reformatted images. CT of the chest was also performed without  intravenous contrast. No intravenous contrast was administered.  Radiation dose reduction techniques were utilized, including automated  exposure control and exposure modulation based on body size.     FINDINGS:     CT head:  There is no finding of acute infarct, hemorrhage, contusion or abnormal  extra-axial collection. No hydrocephalus is present.     CT cervical spine:  There is no fracture of the cervical spine. Straightening of the normal  cervical lordosis is present, as before. There is no prevertebral soft  tissue swelling.     CT Thoracic and lumbar spine:  For the purposes of this dictation on last well-formed disc space be  referred to as L5-S1. Postsurgical changes from fusion of L5-S1 with  posterior rods and pedicle screws are present, grossly unchanged since  12/05/2022. No thoracic or lumbar spine fracture is seen.     CT CHEST:  Trace pericardial effusion is present, some of that seen on 11/30/2022  but increased since 12/17/2022. The gallbladder is surgically absent.  The common bile duct measures 1.1 cm (previously 0.6 m in 12/05/2022.  There appears to be increased intrahepatic biliary ductal  dilation as  well. Hypodense left renal lesions imaging density less than 15  Hounsfield units is likely benign per Bosniak 2019 criteria.     No mediastinal or axillary adenopathy is present by size criteria. No  pulmonary consolidation, pleural effusion or pneumothorax is present. A  few scattered sub-6 mm pulmonary nodules are grossly unchanged since  11/08/2020.       Impression:      Impression:  1.  No findings of acute intracranial abnormality.  No cervical spine,  thoracic or lumbar fracture.  2.  No noncontrast CT findings of acute cardiopulmonary pathology.  3.  The common bile duct is more distended when compared with  12/05/2022, measuring up to 1.1 cm. Findings raise concern for biliary  ductal obstruction and further evaluation with MRCP is recommended.  4.  Other findings as above     This report was finalized on 12/31/2022 8:57 PM by Dr. Lloyd Martinez M.D.       CT Cervical Spine Without Contrast [[id number]] Collected: 12/31/22 2002     Updated: 12/31/22 2100    Narrative:      CT HEAD WITHOUT INTRAVENOUS CONTRAST; CERVICAL, THORACIC AND LUMBAR  SPINE WITHOUT INTRAVENOUS CONTRAST; CT CHEST WITHOUT INTRAVENOUS  CONTRAST     HISTORY: Seizure, hit head, neck pain, right rib pain and thoracic pain     COMPARISON: CT head and cervical spine 12/29/2022 and 12/17/2022; MRI of  thoracic and lumbar spine 12/18/2022, CT lumbar spine 01/01/2022 and CT  chest 11/30/2022 an 12/17/2022     TECHNIQUE: CT was performed of the head. CT was performed of the  cervical spine, thoracic and lumbar with axial, coronal, and sagittal  reformatted images. CT of the chest was also performed without  intravenous contrast. No intravenous contrast was administered.  Radiation dose reduction techniques were utilized, including automated  exposure control and exposure modulation based on body size.     FINDINGS:     CT head:  There is no finding of acute infarct, hemorrhage, contusion or abnormal  extra-axial collection. No  hydrocephalus is present.     CT cervical spine:  There is no fracture of the cervical spine. Straightening of the normal  cervical lordosis is present, as before. There is no prevertebral soft  tissue swelling.     CT Thoracic and lumbar spine:  For the purposes of this dictation on last well-formed disc space be  referred to as L5-S1. Postsurgical changes from fusion of L5-S1 with  posterior rods and pedicle screws are present, grossly unchanged since  12/05/2022. No thoracic or lumbar spine fracture is seen.     CT CHEST:  Trace pericardial effusion is present, some of that seen on 11/30/2022  but increased since 12/17/2022. The gallbladder is surgically absent.  The common bile duct measures 1.1 cm (previously 0.6 m in 12/05/2022.  There appears to be increased intrahepatic biliary ductal dilation as  well. Hypodense left renal lesions imaging density less than 15  Hounsfield units is likely benign per Bosniak 2019 criteria.     No mediastinal or axillary adenopathy is present by size criteria. No  pulmonary consolidation, pleural effusion or pneumothorax is present. A  few scattered sub-6 mm pulmonary nodules are grossly unchanged since  11/08/2020.       Impression:      Impression:  1.  No findings of acute intracranial abnormality.  No cervical spine,  thoracic or lumbar fracture.  2.  No noncontrast CT findings of acute cardiopulmonary pathology.  3.  The common bile duct is more distended when compared with  12/05/2022, measuring up to 1.1 cm. Findings raise concern for biliary  ductal obstruction and further evaluation with MRCP is recommended.  4.  Other findings as above     This report was finalized on 12/31/2022 8:57 PM by Dr. Lloyd Martinez M.D.       CT Chest Without Contrast Diagnostic [[id number]] Collected: 12/31/22 2002     Updated: 12/31/22 2100    Narrative:      CT HEAD WITHOUT INTRAVENOUS CONTRAST; CERVICAL, THORACIC AND LUMBAR  SPINE WITHOUT INTRAVENOUS CONTRAST; CT CHEST WITHOUT  INTRAVENOUS  CONTRAST     HISTORY: Seizure, hit head, neck pain, right rib pain and thoracic pain     COMPARISON: CT head and cervical spine 12/29/2022 and 12/17/2022; MRI of  thoracic and lumbar spine 12/18/2022, CT lumbar spine 01/01/2022 and CT  chest 11/30/2022 an 12/17/2022     TECHNIQUE: CT was performed of the head. CT was performed of the  cervical spine, thoracic and lumbar with axial, coronal, and sagittal  reformatted images. CT of the chest was also performed without  intravenous contrast. No intravenous contrast was administered.  Radiation dose reduction techniques were utilized, including automated  exposure control and exposure modulation based on body size.     FINDINGS:     CT head:  There is no finding of acute infarct, hemorrhage, contusion or abnormal  extra-axial collection. No hydrocephalus is present.     CT cervical spine:  There is no fracture of the cervical spine. Straightening of the normal  cervical lordosis is present, as before. There is no prevertebral soft  tissue swelling.     CT Thoracic and lumbar spine:  For the purposes of this dictation on last well-formed disc space be  referred to as L5-S1. Postsurgical changes from fusion of L5-S1 with  posterior rods and pedicle screws are present, grossly unchanged since  12/05/2022. No thoracic or lumbar spine fracture is seen.     CT CHEST:  Trace pericardial effusion is present, some of that seen on 11/30/2022  but increased since 12/17/2022. The gallbladder is surgically absent.  The common bile duct measures 1.1 cm (previously 0.6 m in 12/05/2022.  There appears to be increased intrahepatic biliary ductal dilation as  well. Hypodense left renal lesions imaging density less than 15  Hounsfield units is likely benign per Bosniak 2019 criteria.     No mediastinal or axillary adenopathy is present by size criteria. No  pulmonary consolidation, pleural effusion or pneumothorax is present. A  few scattered sub-6 mm pulmonary nodules are  grossly unchanged since  11/08/2020.       Impression:      Impression:  1.  No findings of acute intracranial abnormality.  No cervical spine,  thoracic or lumbar fracture.  2.  No noncontrast CT findings of acute cardiopulmonary pathology.  3.  The common bile duct is more distended when compared with  12/05/2022, measuring up to 1.1 cm. Findings raise concern for biliary  ductal obstruction and further evaluation with MRCP is recommended.  4.  Other findings as above     This report was finalized on 12/31/2022 8:57 PM by Dr. Lloyd Martinez M.D.       CT Thoracic Spine Without Contrast [[id number]] Collected: 12/31/22 2002     Updated: 12/31/22 2100    Narrative:      CT HEAD WITHOUT INTRAVENOUS CONTRAST; CERVICAL, THORACIC AND LUMBAR  SPINE WITHOUT INTRAVENOUS CONTRAST; CT CHEST WITHOUT INTRAVENOUS  CONTRAST     HISTORY: Seizure, hit head, neck pain, right rib pain and thoracic pain     COMPARISON: CT head and cervical spine 12/29/2022 and 12/17/2022; MRI of  thoracic and lumbar spine 12/18/2022, CT lumbar spine 01/01/2022 and CT  chest 11/30/2022 an 12/17/2022     TECHNIQUE: CT was performed of the head. CT was performed of the  cervical spine, thoracic and lumbar with axial, coronal, and sagittal  reformatted images. CT of the chest was also performed without  intravenous contrast. No intravenous contrast was administered.  Radiation dose reduction techniques were utilized, including automated  exposure control and exposure modulation based on body size.     FINDINGS:     CT head:  There is no finding of acute infarct, hemorrhage, contusion or abnormal  extra-axial collection. No hydrocephalus is present.     CT cervical spine:  There is no fracture of the cervical spine. Straightening of the normal  cervical lordosis is present, as before. There is no prevertebral soft  tissue swelling.     CT Thoracic and lumbar spine:  For the purposes of this dictation on last well-formed disc space be  referred to as  L5-S1. Postsurgical changes from fusion of L5-S1 with  posterior rods and pedicle screws are present, grossly unchanged since  12/05/2022. No thoracic or lumbar spine fracture is seen.     CT CHEST:  Trace pericardial effusion is present, some of that seen on 11/30/2022  but increased since 12/17/2022. The gallbladder is surgically absent.  The common bile duct measures 1.1 cm (previously 0.6 m in 12/05/2022.  There appears to be increased intrahepatic biliary ductal dilation as  well. Hypodense left renal lesions imaging density less than 15  Hounsfield units is likely benign per Bosniak 2019 criteria.     No mediastinal or axillary adenopathy is present by size criteria. No  pulmonary consolidation, pleural effusion or pneumothorax is present. A  few scattered sub-6 mm pulmonary nodules are grossly unchanged since  11/08/2020.       Impression:      Impression:  1.  No findings of acute intracranial abnormality.  No cervical spine,  thoracic or lumbar fracture.  2.  No noncontrast CT findings of acute cardiopulmonary pathology.  3.  The common bile duct is more distended when compared with  12/05/2022, measuring up to 1.1 cm. Findings raise concern for biliary  ductal obstruction and further evaluation with MRCP is recommended.  4.  Other findings as above     This report was finalized on 12/31/2022 8:57 PM by Dr. Lloyd Martinez M.D.       CT Lumbar Spine Without Contrast [[id number]] Collected: 12/31/22 2002     Updated: 12/31/22 2100    Narrative:      CT HEAD WITHOUT INTRAVENOUS CONTRAST; CERVICAL, THORACIC AND LUMBAR  SPINE WITHOUT INTRAVENOUS CONTRAST; CT CHEST WITHOUT INTRAVENOUS  CONTRAST     HISTORY: Seizure, hit head, neck pain, right rib pain and thoracic pain     COMPARISON: CT head and cervical spine 12/29/2022 and 12/17/2022; MRI of  thoracic and lumbar spine 12/18/2022, CT lumbar spine 01/01/2022 and CT  chest 11/30/2022 an 12/17/2022     TECHNIQUE: CT was performed of the head. CT was performed of  the  cervical spine, thoracic and lumbar with axial, coronal, and sagittal  reformatted images. CT of the chest was also performed without  intravenous contrast. No intravenous contrast was administered.  Radiation dose reduction techniques were utilized, including automated  exposure control and exposure modulation based on body size.     FINDINGS:     CT head:  There is no finding of acute infarct, hemorrhage, contusion or abnormal  extra-axial collection. No hydrocephalus is present.     CT cervical spine:  There is no fracture of the cervical spine. Straightening of the normal  cervical lordosis is present, as before. There is no prevertebral soft  tissue swelling.     CT Thoracic and lumbar spine:  For the purposes of this dictation on last well-formed disc space be  referred to as L5-S1. Postsurgical changes from fusion of L5-S1 with  posterior rods and pedicle screws are present, grossly unchanged since  12/05/2022. No thoracic or lumbar spine fracture is seen.     CT CHEST:  Trace pericardial effusion is present, some of that seen on 11/30/2022  but increased since 12/17/2022. The gallbladder is surgically absent.  The common bile duct measures 1.1 cm (previously 0.6 m in 12/05/2022.  There appears to be increased intrahepatic biliary ductal dilation as  well. Hypodense left renal lesions imaging density less than 15  Hounsfield units is likely benign per Bosniak 2019 criteria.     No mediastinal or axillary adenopathy is present by size criteria. No  pulmonary consolidation, pleural effusion or pneumothorax is present. A  few scattered sub-6 mm pulmonary nodules are grossly unchanged since  11/08/2020.       Impression:      Impression:  1.  No findings of acute intracranial abnormality.  No cervical spine,  thoracic or lumbar fracture.  2.  No noncontrast CT findings of acute cardiopulmonary pathology.  3.  The common bile duct is more distended when compared with  12/05/2022, measuring up to 1.1 cm.  Findings raise concern for biliary  ductal obstruction and further evaluation with MRCP is recommended.  4.  Other findings as above     This report was finalized on 12/31/2022 8:57 PM by Dr. Lloyd Martinez M.D.             Results for orders placed during the hospital encounter of 11/08/20    Adult Transesophageal Echo ([facility]) W/ Cont if Necessary Per Protocol    Interpretation Summary  · Left ventricular ejection fraction appears to be 61 - 65%. Left ventricular systolic function is normal.  · Saline test results are negative.      No orders to display        Assessment/Plan     Active Hospital Problems    Diagnosis  POA   • **Seizure (HCC) [R56.9]  Yes   • Tobacco abuse [Z72.0]  Yes   • GERD without esophagitis [K21.9]  Yes   • On anticoagulant therapy [Z79.01]  Not Applicable   • Anxiety disorder [F41.9]  Yes   • Splenic infarct [D73.5]  Yes   • Obesity (BMI 30-39.9) [E66.9]  Yes   ·   Mrs. 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     Seizure disorder with breakthrough seizures  -Depakote level 6.0, down from 36.0 on 1229 when she was discharged although she says she has been compliant with her Depakote  -1500 mg Depakote given in the emergency room per neurology recommendation, will continue that twice daily per the recommendation  -Neurology is consulted  -May need EEG, will defer to neurology for further testing  -Seizure precautions  -Neurochecks every 4 hours  -Safety precautions    Splenic infarct/anticoagulation  -Continue anticoagulation  -Neurochecks every 4 hours  -Telemetry unit for monitoring    GERD/anxiety disorder  -Continue patient home medication when med rec is completed    Chronic back pain  -Continue home medications when med rec is complete    · I discussed the patient's findings and my recommendations with patient.    VTE Prophylaxis  - SCDs.  Code Status - Full code.       NELSON Elliott  [facility]  01/01/23  04:22 EST

## 2023-01-01 NOTE — CONSULTS
Neurology Consult Note    Consult Date: 1/1/2023    Referring MD: Ton Agarwal*    Reason for Consult I have been asked to see the patient in neurological consultation to render advice and opinion regarding seizure    Belen Allen is a 42 y.o. female with migraine, longstanding history of seizures.  I saw her most recently 2 weeks ago.  She has continued to have frequent breakthrough seizures despite reported compliance with Depakote.  She was readmitted yesterday with a very subtherapeutic Depakote level.  Her Depakote had recently been increased to 1500 twice daily but despite that her level remains subtherapeutic and she reports multiple seizures on a daily basis.  She endorses occasional urinary incontinence but denies tongue biting.  She reports childhood onset of seizures.  She is scheduled to see Dr. Sandoval in May of this year.    She complains of alopecia with Depakote    Past Medical History:   Diagnosis Date   • Abnormal Pap smear of cervix    • Ankle fracture 2013   • H/O Elevated liver enzymes    • History of chronic back pain    • History of migraine    • History of urinary tract infection    • Injury of back    • PONV (postoperative nausea and vomiting)    • Seasonal allergies    • Seizure (HCC)     Takes Keppra       ROS:  No fevers, chills  No weakness, numbness, + seizures    Exam  /64 (BP Location: Left arm, Patient Position: Lying)   Pulse 78   Temp 98.3 °F (36.8 °C) (Oral)   Resp 16   Ht 162.6 cm (64\")   Wt 90.7 kg (200 lb)   LMP 12/24/2022 (Approximate)   SpO2 90%   BMI 34.33 kg/m²   Gen: NAD, vitals reviewed  MS: oriented x3, recent/remote memory intact, normal attention/concentration, language intact, no neglect.  CN: visual acuity grossly normal, PERRL, EOMI, no facial droop, no dysarthria  Motor: 5/5 throughout upper and lower extremities, normal tone    DATA:    Lab Results   Component Value Date    GLUCOSE 113 (H) 01/01/2023    CALCIUM 8.4 (L) 01/01/2023    NA  141 01/01/2023    K 3.7 01/01/2023    CO2 26.4 01/01/2023     01/01/2023    BUN 6 01/01/2023    CREATININE 0.84 01/01/2023    EGFRIFNONA 94 02/02/2022    BCR 7.1 01/01/2023    ANIONGAP 7.6 01/01/2023     Lab Results   Component Value Date    WBC 6.46 01/01/2023    HGB 10.7 (L) 01/01/2023    HCT 32.8 (L) 01/01/2023    .0 (H) 01/01/2023     01/01/2023       Lab review: Hemoglobin 10.7, Depakote level 6    Imaging review: CT head performed in the emergency department shows no acute abnormality.  Radiology report reviewed.    Continuous EEG ordered    Diagnoses:  Epilepsy, unclear whether focal, generalized or nonepileptic, with frequent breakthrough seizures  Subtherapeutic Depakote level  Long-term use of anticoagulation  History of DVT    Comment: Readmitted for breakthrough seizures.  She has had multiple ED visits since I last saw her for seizures.  She reports compliance with Depakote the level remains subtherapeutic, possibly due to drug interactions with her anticoagulation.  Given her situation with frequent seizures I think she should be observed on continuous EEG monitoring to try to further characterize her epilepsy and determine a better plan.  I will stop Depakote due to persistent failure to obtain therapeutic level and we will attempt to capture a seizure on EEG.    PLAN:  -Discontinue Depakote  -Continue gabapentin 800 times daily  -Likely needs outpatient psychiatry follow-up for depression.  Currently on 60 of Prozac  -Continuous EEG with video, goal is to capture a seizure and characterize her epilepsy.  I do have some suspicion for nonepileptic spells  -Continue folic acid  -Seizure precautions

## 2023-01-01 NOTE — PLAN OF CARE
Goal Outcome Evaluation:              Outcome Evaluation: Pt admitted after another seizure w/ fall,  knocking out a tooth, urinary incontinence, h/o seizures w/ 1 a week and half ago.  Pt reports R LE still weak/shaky since then, requiring use of walker at home.  Today pt demonstrates R LE weaknesss, limited activity tolerance and ind. w/ moiblity but able to ambulate 20' w/ min A using RW, limited by increased shaking and slower gait last step or 2; held furhter activity due to concern for having another seizure.  Carly recommend home PT, use of her walker.  Also recommend sitting in chair at least 3-4 hours /day and ambulating with staff throughout the day.

## 2023-01-01 NOTE — THERAPY EVALUATION
Patient Name: Belen Allen  : 1980    MRN: [id number]                              Today's Date: 2023       Admit Date: 2022    Visit Dx:     ICD-10-CM ICD-9-CM   1. Seizure (HCC)  R56.9 780.39     Patient Active Problem List   Diagnosis   • Splenic infarct   • Anxiety disorder   • Functional asplenia   • Generalized abdominal pain   • Acute bilateral low back pain   • Antiphospholipid antibody positive   • On anticoagulant therapy   • Acute pain of left knee   • Vitamin D deficiency   • Folate deficiency   • Pain of back and left lower extremity   • Common bile duct dilatation   • Elevated LFTs   • Right upper quadrant abdominal pain   • Obesity (BMI 30-39.9)   • Tobacco abuse   • GERD without esophagitis   • Intractable abdominal pain   • Obesity, Class III, BMI 40-49.9 (morbid obesity) (HCC)   • Constipation   • History of ERCP   • Other chronic pain   • Seizures (HCC)   • Syncope   • Lumbar back pain with radiculopathy affecting left lower extremity   • Right upper quadrant pain   • Uncontrolled pain   • Intractable back pain   • Sciatica of right side   • Migraine   • Mobility impaired   • Seizure (HCC)     Past Medical History:   Diagnosis Date   • Abnormal Pap smear of cervix    • Ankle fracture    • H/O Elevated liver enzymes    • History of chronic back pain    • History of migraine    • History of urinary tract infection    • Injury of back    • PONV (postoperative nausea and vomiting)    • Seasonal allergies    • Seizure (HCC)     Takes Keppra     Past Surgical History:   Procedure Laterality Date   • BACK SURGERY      fusion L4, L5     • CHOLECYSTECTOMY     • DILATATION AND CURETTAGE     • ENDOSCOPY N/A 2022    Procedure: ESOPHAGOGASTRODUODENOSCOPY with biopsy;  Surgeon: Christiana Mann MD;  Location: [facility] ENDOSCOPY;  Service: Gastroenterology;  Laterality: N/A;  gastritis, duodenitis, irregular z line   • ERCP N/A 6/3/2021    Procedure: ENDOSCOPIC  RETROGRADE CHOLANGIOPANCREATOGRAPHY WITH SPHINCTEROTOMY, DILATION WITH BALLOON CLEARANCE  (12MM-15MM);  Surgeon: Bjorn Flannery MD;  Location: [facility] ENDOSCOPY;  Service: Gastroenterology;  Laterality: N/A;  DILATED COMMON BILE DUCT   • SKIN SURGERY     • TUBAL ABDOMINAL LIGATION  2013   • WISDOM TOOTH EXTRACTION  2018    x2      General Information     Row Name 01/01/23 1427          OT Time and Intention    Document Type evaluation  -RB     Mode of Treatment occupational therapy  -RB     Row Name 01/01/23 1427          General Information    Patient Profile Reviewed yes  -RB     Prior Level of Function independent:;ADL's;transfer  increased assistance x2 weeks with seizure activity. prior independence and no walker used  -RB     Existing Precautions/Restrictions fall;seizures  -RB     Barriers to Rehab medically complex  -RB     Row Name 01/01/23 1427          Living Environment    People in Home child(manuel), dependent;significant other  -RB     Row Name 01/01/23 1427          Home Main Entrance    Number of Stairs, Main Entrance three  -RB     Row Name 01/01/23 1427          Cognition    Orientation Status (Cognition) oriented x 3  -RB     Row Name 01/01/23 1427          Safety Issues, Functional Mobility    Safety Issues Affecting Function (Mobility) safety precautions follow-through/compliance;safety precaution awareness;awareness of need for assistance;insight into deficits/self-awareness;judgment;problem-solving  -RB     Impairments Affecting Function (Mobility) balance;strength;endurance/activity tolerance;pain  -RB           User Key  (r) = Recorded By, (t) = Taken By, (c) = Cosigned By    Initials Name Provider Type    RB Leona Thompson OT Occupational Therapist                 Mobility/ADL's     Row Name 01/01/23 1429          Bed Mobility    Bed Mobility supine-sit;sit-supine  -RB     Supine-Sit [place] (Bed Mobility) supervision  -RB     Sit-Supine [place] (Bed Mobility) supervision   -RB     Assistive Device (Bed Mobility) bed rails  -RB     Row Name 01/01/23 1429          Transfers    Transfers sit-stand transfer;stand-sit transfer;bed-chair transfer  -RB     Row Name 01/01/23 1429          Bed-Chair Transfer    Bed-Chair [place] (Transfers) minimum assist (75% patient effort);1 person assist;verbal cues  -RB     Assistive Device (Bed-Chair Transfers) walker, front-wheeled  -RB     Comment, (Bed-Chair Transfer) walker used bed-> chair, chair->back to bed Min A no walker  -RB     Row Name 01/01/23 1429          Sit-Stand Transfer    Sit-Stand [place] (Transfers) minimum assist (75% patient effort);1 person assist  -RB     Assistive Device (Sit-Stand Transfers) walker, front-wheeled  -RB     Row Name 01/01/23 1429          Stand-Sit Transfer    Stand-Sit [place] (Transfers) minimum assist (75% patient effort);1 person assist;verbal cues  -RB     Assistive Device (Stand-Sit Transfers) walker, front-wheeled  -RB     Row Name 01/01/23 1429          Functional Mobility    Functional Mobility- Ind. Level minimum assist (75% patient effort);1 person;verbal cues required  -RB     Functional Mobility- Safety Issues balance decreased during turns  -RB     Functional Mobility- Comment became weak at the hospital room door and assisted into a chair.  -RB     Row Name 01/01/23 1429          Activities of Daily Living    BADL Assessment/Intervention grooming  -RB     Row Name 01/01/23 1429          Grooming Assessment/Training    [place] Level (Grooming) grooming skills;set up  -RB     Position (Grooming) supported sitting  -RB           User Key  (r) = Recorded By, (t) = Taken By, (c) = Cosigned By    Initials Name Provider Type    RB Leona Thompson OT Occupational Therapist               Obj/Interventions     Row Name 01/01/23 1431          Sensory Assessment (Somatosensory)    Sensory Assessment (Somatosensory) sensation intact  -RB     Row Name 01/01/23 1431          Vision  Assessment/Intervention    Visual Impairment/Limitations WFL  -RB     Row Name 01/01/23 1431          Range of Motion Comprehensive    Comment, General Range of Motion Impaired AROM to full range in RLE.  -RB     Row Name 01/01/23 1431          Strength Comprehensive (MMT)    Comment, General Manual Muscle Testing (MMT) Assessment Generalized weakness from seizure activity. RLE weakness present  -RB     Row Name 01/01/23 1431          Balance    Balance Assessment sitting static balance;sitting dynamic balance;standing static balance;standing dynamic balance  -RB     Static Sitting Balance supervision  -RB     Dynamic Sitting Balance supervision  -RB     Position, Sitting Balance sitting in chair;sitting edge of bed  -RB     Static Standing Balance contact guard  -RB     Dynamic Standing Balance minimal assist  -RB     Position/Device Used, Standing Balance walker, front-wheeled  -RB     Balance Interventions occupation based/functional task  -RB     Comment, Balance Impaired balance and unsteady with standing. Walker used for BUE support.  -RB           User Key  (r) = Recorded By, (t) = Taken By, (c) = Cosigned By    Initials Name Provider Type    RB Leona Thompson OT Occupational Therapist               Goals/Plan     Row Name 01/01/23 1433          Bed Mobility Goal 1 (OT)    Activity/Assistive Device (Bed Mobility Goal 1, OT) bed mobility activities, all  -RB     [place] Level/Cues Needed (Bed Mobility Goal 1, OT) modified independence  -RB     Time Frame (Bed Mobility Goal 1, OT) short term goal (STG);2 weeks  -RB     Progress/Outcomes (Bed Mobility Goal 1, OT) continuing progress toward goal  -RB     Row Name 01/01/23 1433          Transfer Goal 1 (OT)    Activity/Assistive Device (Transfer Goal 1, OT) transfers, all  -RB     [place] Level/Cues Needed (Transfer Goal 1, OT) modified independence  -RB     Time Frame (Transfer Goal 1, OT) short term goal (STG);2 weeks  -RB     Progress/Outcome  (Transfer Goal 1, OT) continuing progress toward goal  -RB     Row Name 01/01/23 1433          Dressing Goal 1 (OT)    Activity/Device (Dressing Goal 1, OT) dressing skills, all  -RB     [place]/Cues Needed (Dressing Goal 1, OT) modified independence  -RB     Time Frame (Dressing Goal 1, OT) short term goal (STG);2 weeks  -RB     Progress/Outcome (Dressing Goal 1, OT) continuing progress toward goal  -RB     Row Name 01/01/23 1433          Toileting Goal 1 (OT)    Activity/Device (Toileting Goal 1, OT) toileting skills, all  -RB     [place] Level/Cues Needed (Toileting Goal 1, OT) modified independence  -RB     Time Frame (Toileting Goal 1, OT) short term goal (STG);2 weeks  -RB     Progress/Outcome (Toileting Goal 1, OT) continuing progress toward goal  -RB     Row Name 01/01/23 1433          Grooming Goal 1 (OT)    Activity/Device (Grooming Goal 1, OT) grooming skills, all  -RB     [place] (Grooming Goal 1, OT) modified independence  -RB     Time Frame (Grooming Goal 1, OT) short term goal (STG);2 weeks  -RB     Progress/Outcome (Grooming Goal 1, OT) continuing progress toward goal  -RB     Row Name 01/01/23 1433          Therapy Assessment/Plan (OT)    Planned Therapy Interventions (OT) transfer/mobility retraining;strengthening exercise;ROM/therapeutic exercise;activity tolerance training;adaptive equipment training;BADL retraining;occupation/activity based interventions;functional balance retraining;neuromuscular control/coordination retraining;patient/caregiver education/training  -RB           User Key  (r) = Recorded By, (t) = Taken By, (c) = Cosigned By    Initials Name Provider Type    Leona Hancock, OT Occupational Therapist               Clinical Impression     Row Name 01/01/23 1432          Pain Assessment    Pretreatment Pain Rating 0/10 - no pain  -RB     Posttreatment Pain Rating 0/10 - no pain  -RB     Row Name 01/01/23 [facility]          Plan of Care Review    Plan of Care  Reviewed With patient  -RB     Progress no change  -RB     Row Name 01/01/23 1432          Therapy Assessment/Plan (OT)    Rehab Potential (OT) good, to achieve stated therapy goals  -RB     Criteria for Skilled Therapeutic Interventions Met (OT) yes;skilled treatment is necessary  -RB     Therapy Frequency (OT) 5 times/wk  -RB     Row Name 01/01/23 1432          Therapy Plan Review/Discharge Plan (OT)    Anticipated Discharge Disposition (OT) home with 24/7 care;home with home health  reports she is not interested in rehab - she wishes to pursue HH vs. OP  -RB     Row Name 01/01/23 1432          Vital Signs    O2 Delivery Pre Treatment room air  -RB     O2 Delivery Intra Treatment room air  -RB     O2 Delivery Post Treatment room air  -RB     Pre Patient Position Supine  -RB     Intra Patient Position Standing  -RB     Post Patient Position Supine  -RB     Row Name 01/01/23 1432          Positioning and Restraints    Pre-Treatment Position in bed  -RB     Post Treatment Position bed  -RB     In Bed notified nsg;supine;call light within reach;encouraged to call for assist;exit alarm on;legs elevated;fowlers  -RB           User Key  (r) = Recorded By, (t) = Taken By, (c) = Cosigned By    Initials Name Provider Type    RB Leona Thompson, OT Occupational Therapist               Outcome Measures     Row Name 01/01/23 1433          How much help from another is currently needed...    Putting on and taking off regular lower body clothing? 2  -RB     Bathing (including washing, rinsing, and drying) 2  -RB     Toileting (which includes using toilet bed pan or urinal) 2  -RB     Putting on and taking off regular upper body clothing 3  -RB     Taking care of personal grooming (such as brushing teeth) 3  -RB     Eating meals 3  -RB     AM-PAC 6 Clicks Score (OT) 15  -RB     Row Name 01/01/23 1433          Modified Gallia Scale    Modified Rebekah Scale 4 - Moderately severe disability.  Unable to walk without assistance, and  unable to attend to own bodily needs without assistance.  -RB     Row Name 01/01/23 1433          Functional Assessment    Outcome Measure Options AM-PAC 6 Clicks Daily Activity (OT);Modified [place]  -RB           User Key  (r) = Recorded By, (t) = Taken By, (c) = Cosigned By    Initials Name Provider Type    Leona Hancock OT Occupational Therapist                Occupational Therapy Education     Title: PT OT SLP Therapies (In Progress)     Topic: Occupational Therapy (Not Started)     Point: ADL training (Not Started)     Description:   Instruct learner(s) on proper safety adaptation and remediation techniques during self care or transfers.   Instruct in proper use of assistive devices.              Learner Progress:  Not documented in this visit.          Point: Home exercise program (Not Started)     Description:   Instruct learner(s) on appropriate technique for monitoring, assisting and/or progressing therapeutic exercises/activities.              Learner Progress:  Not documented in this visit.          Point: Precautions (Not Started)     Description:   Instruct learner(s) on prescribed precautions during self-care and functional transfers.              Learner Progress:  Not documented in this visit.          Point: Body mechanics (Not Started)     Description:   Instruct learner(s) on proper positioning and spine alignment during self-care, functional mobility activities and/or exercises.              Learner Progress:  Not documented in this visit.                          OT Recommendation and Plan  Planned Therapy Interventions (OT): transfer/mobility retraining, strengthening exercise, ROM/therapeutic exercise, activity tolerance training, adaptive equipment training, BADL retraining, occupation/activity based interventions, functional balance retraining, neuromuscular control/coordination retraining, patient/caregiver education/training  Therapy Frequency (OT): 5 times/wk  Plan of Care  Review  Plan of Care Reviewed With: patient  Progress: no change     Time Calculation:    Time Calculation- OT     Row Name 01/01/23 1426             Time Calculation- OT    OT Start Time 1358  -RB      OT Stop Time 1415  -RB      OT Time Calculation (min) 17 min  -RB      Total Timed Code Minutes- OT 9 minute(s)  -RB      OT Received On 01/01/23  -RB      OT - Next Appointment 01/02/23  -RB      OT Goal Re-Cert Due Date 01/15/23  -RB         Timed Charges    88593 - OT Self Care/Mgmt Minutes 9  -RB         Untimed Charges    OT Eval/Re-eval Minutes 8  -RB         Total Minutes    Timed Charges Total Minutes 9  -RB      Untimed Charges Total Minutes 8  -RB       Total Minutes 17  -RB            User Key  (r) = Recorded By, (t) = Taken By, (c) = Cosigned By    Initials Name Provider Type    RB Leona Thompson OT Occupational Therapist              Therapy Charges for Today     Code Description Service Date Service Provider Modifiers Qty    98117510699 HC OT EVAL MOD COMPLEXITY 2 1/1/2023 Leona Thompson OT GO 1    92057526717 HC OT SELF CARE/MGMT/TRAIN EA 15 MIN 1/1/2023 Leona Thompson OT GO 1               Leona Thompson OT  1/1/2023

## 2023-01-01 NOTE — ED PROVIDER NOTES
I assumed care of this patient from Dr. Thurston at time of shift change.  All of her radiology studies were still pending at that time.  Anticipated plan of care was to admit patient to [facility] service for management of repeated seizure problem.  All of the CT studies have not been completed and read by radiology.  I have reviewed the reports independently.      RADIOLOGY        Study: CT head, cervical spine, thoracic spine, lumbar spine, chest without contrast    Findings: IMPRESSION:  Impression:  1.  No findings of acute intracranial abnormality.  No cervical spine,  thoracic or lumbar fracture.  2.  No noncontrast CT findings of acute cardiopulmonary pathology.  3.  The common bile duct is more distended when compared with  12/05/2022, measuring up to 1.1 cm. Findings raise concern for biliary  ductal obstruction and further evaluation with MRCP is recommended.  4.  Other findings as above    Interpreted contemporaneously with treatment by Dr. Prasad, independently viewed by me      Fortunately, there does not appear to be any intracranial abnormality or spinal injury or concern.  Patient has been given antiepileptics as directed by Dr. Kumar in consultation.  I will now page [facility] service to request observation overnight for further management of her symptoms.  Also giving her a dose of Toradol for her back and musculoskeletal pain complaints.      Diagnosis:  Seizure disorder  Neck pain  Back pain       Shaan Hernandez MD  12/31/22 2115        Just spoke with Nisha from [facility].  She agrees to accept patient on behalf of Dr. Agarwal for further care Shaan Palacios MD  12/31/22 2132

## 2023-01-01 NOTE — PLAN OF CARE
Problem: Adult Inpatient Plan of Care  Goal: Plan of Care Review  Outcome: Ongoing, Progressing  Flowsheets (Taken 1/1/2023 1825)  Progress: improving  Plan of Care Reviewed With: patient  Goal: Patient-Specific Goal (Individualized)  Outcome: Ongoing, Progressing  Goal: Absence of Hospital-Acquired Illness or Injury  Outcome: Ongoing, Progressing  Intervention: Identify and Manage Fall Risk  Recent Flowsheet Documentation  Taken 1/1/2023 1800 by Kenrick Luna RN  Safety Promotion/Fall Prevention:   activity supervised   assistive device/personal items within reach   clutter free environment maintained   toileting scheduled   safety round/check completed   room organization consistent   nonskid shoes/slippers when out of bed   muscle strengthening facilitated   fall prevention program maintained  Taken 1/1/2023 1645 by Kenrick Luna RN  Safety Promotion/Fall Prevention:   activity supervised   assistive device/personal items within reach   clutter free environment maintained   toileting scheduled   safety round/check completed   room organization consistent   nonskid shoes/slippers when out of bed   muscle strengthening facilitated   fall prevention program maintained  Taken 1/1/2023 1422 by Kenrick Luna RN  Safety Promotion/Fall Prevention:   activity supervised   assistive device/personal items within reach   clutter free environment maintained   toileting scheduled   safety round/check completed   room organization consistent   nonskid shoes/slippers when out of bed   muscle strengthening facilitated   fall prevention program maintained  Taken 1/1/2023 1200 by Kenrick Luna RN  Safety Promotion/Fall Prevention:   activity supervised   assistive device/personal items within reach   clutter free environment maintained   toileting scheduled   safety round/check completed   room organization consistent   nonskid shoes/slippers when out of bed   fall prevention program maintained  Taken 1/1/2023 1006 by  Luna, Kenrick, RN  Safety Promotion/Fall Prevention:   activity supervised   assistive device/personal items within reach   clutter free environment maintained   toileting scheduled   safety round/check completed   room organization consistent   nonskid shoes/slippers when out of bed   muscle strengthening facilitated   fall prevention program maintained  Taken 1/1/2023 0841 by Kenrick Luna RN  Safety Promotion/Fall Prevention:   activity supervised   clutter free environment maintained   assistive device/personal items within reach   toileting scheduled   safety round/check completed   room organization consistent   nonskid shoes/slippers when out of bed   muscle strengthening facilitated   mobility aid in reach   fall prevention program maintained  Intervention: Prevent Skin Injury  Recent Flowsheet Documentation  Taken 1/1/2023 1800 by Kenrick Luna RN  Body Position:   position changed independently   sitting up in bed  Taken 1/1/2023 1645 by Kenrick Luna RN  Body Position:   position changed independently   sitting up in bed  Taken 1/1/2023 1422 by Kenrick Luna RN  Body Position:   position changed independently   sitting up in bed  Taken 1/1/2023 1200 by Kenrick Luna RN  Body Position:   position changed independently   sitting up in bed  Taken 1/1/2023 1006 by Kenrick Luna RN  Body Position:   position changed independently   sitting up in bed  Taken 1/1/2023 0841 by Kenrick Luna RN  Body Position:   position changed independently   sitting up in bed  Intervention: Prevent and Manage VTE (Venous Thromboembolism) Risk  Recent Flowsheet Documentation  Taken 1/1/2023 0841 by Kenrick Luna RN  VTE Prevention/Management:   sequential compression devices on   bilateral  Range of Motion: active ROM (range of motion) encouraged  Intervention: Prevent Infection  Recent Flowsheet Documentation  Taken 1/1/2023 1800 by Kenrick Luna RN  Infection Prevention:   hand hygiene promoted    rest/sleep promoted  Taken 1/1/2023 1645 by Kenrick Luna RN  Infection Prevention:   hand hygiene promoted   rest/sleep promoted  Taken 1/1/2023 1422 by Kenrick Luna RN  Infection Prevention:   hand hygiene promoted   rest/sleep promoted  Taken 1/1/2023 1200 by Kenrick Luna RN  Infection Prevention:   hand hygiene promoted   rest/sleep promoted  Taken 1/1/2023 1006 by Kenrick Luna RN  Infection Prevention:   hand hygiene promoted   rest/sleep promoted  Taken 1/1/2023 0841 by Kenrick Luna RN  Infection Prevention:   hand hygiene promoted   rest/sleep promoted  Goal: Optimal Comfort and Wellbeing  Outcome: Ongoing, Progressing  Goal: Readiness for Transition of Care  Outcome: Ongoing, Progressing     Problem: Fall Injury Risk  Goal: Absence of Fall and Fall-Related Injury  Outcome: Ongoing, Progressing  Intervention: Identify and Manage Contributors  Recent Flowsheet Documentation  Taken 1/1/2023 1800 by Kenrick Luna RN  Medication Review/Management: medications reviewed  Taken 1/1/2023 1645 by Kenrick Luna RN  Medication Review/Management: medications reviewed  Taken 1/1/2023 1422 by Kenrick Luna RN  Medication Review/Management: medications reviewed  Taken 1/1/2023 1200 by Kenrick Luna RN  Medication Review/Management: medications reviewed  Taken 1/1/2023 1006 by Kenrick Luna RN  Medication Review/Management: medications reviewed  Taken 1/1/2023 0841 by Kenrick Luna RN  Medication Review/Management: medications reviewed  Intervention: Promote Injury-Free Environment  Recent Flowsheet Documentation  Taken 1/1/2023 1800 by Kenrick Luna RN  Safety Promotion/Fall Prevention:   activity supervised   assistive device/personal items within reach   clutter free environment maintained   toileting scheduled   safety round/check completed   room organization consistent   nonskid shoes/slippers when out of bed   muscle strengthening facilitated   fall prevention program  maintained  Taken 1/1/2023 1645 by Kenrick Luna RN  Safety Promotion/Fall Prevention:   activity supervised   assistive device/personal items within reach   clutter free environment maintained   toileting scheduled   safety round/check completed   room organization consistent   nonskid shoes/slippers when out of bed   muscle strengthening facilitated   fall prevention program maintained  Taken 1/1/2023 1422 by Kenrick Luna RN  Safety Promotion/Fall Prevention:   activity supervised   assistive device/personal items within reach   clutter free environment maintained   toileting scheduled   safety round/check completed   room organization consistent   nonskid shoes/slippers when out of bed   muscle strengthening facilitated   fall prevention program maintained  Taken 1/1/2023 1200 by Kenrick Luna RN  Safety Promotion/Fall Prevention:   activity supervised   assistive device/personal items within reach   clutter free environment maintained   toileting scheduled   safety round/check completed   room organization consistent   nonskid shoes/slippers when out of bed   fall prevention program maintained  Taken 1/1/2023 1006 by Kenrick Luna RN  Safety Promotion/Fall Prevention:   activity supervised   assistive device/personal items within reach   clutter free environment maintained   toileting scheduled   safety round/check completed   room organization consistent   nonskid shoes/slippers when out of bed   muscle strengthening facilitated   fall prevention program maintained  Taken 1/1/2023 0841 by Kenrick Luna RN  Safety Promotion/Fall Prevention:   activity supervised   clutter free environment maintained   assistive device/personal items within reach   toileting scheduled   safety round/check completed   room organization consistent   nonskid shoes/slippers when out of bed   muscle strengthening facilitated   mobility aid in reach   fall prevention program maintained     Problem: Behavioral Health  Comorbidity  Goal: Maintenance of Behavioral Health Symptom Control  Outcome: Ongoing, Progressing  Intervention: Maintain Behavioral Health Symptom Control  Recent Flowsheet Documentation  Taken 1/1/2023 1800 by Kenrick Luna RN  Medication Review/Management: medications reviewed  Taken 1/1/2023 1645 by Kenrick Luna RN  Medication Review/Management: medications reviewed  Taken 1/1/2023 1422 by Kenrick Luna RN  Medication Review/Management: medications reviewed  Taken 1/1/2023 1200 by Kenrick Luna RN  Medication Review/Management: medications reviewed  Taken 1/1/2023 1006 by Kenrick Luna RN  Medication Review/Management: medications reviewed  Taken 1/1/2023 0841 by Kenrick Luna RN  Medication Review/Management: medications reviewed     Problem: Hypertension Comorbidity  Goal: Blood Pressure in Desired Range  Outcome: Ongoing, Progressing  Intervention: Maintain Blood Pressure Management  Recent Flowsheet Documentation  Taken 1/1/2023 1800 by Kenrick Luna RN  Medication Review/Management: medications reviewed  Taken 1/1/2023 1645 by Kenrick Luna RN  Medication Review/Management: medications reviewed  Taken 1/1/2023 1422 by Kenrick Luna RN  Medication Review/Management: medications reviewed  Taken 1/1/2023 1200 by Kenrick Luna RN  Medication Review/Management: medications reviewed  Taken 1/1/2023 1006 by Kenrick Luna RN  Medication Review/Management: medications reviewed  Taken 1/1/2023 0841 by Kenrick Luna RN  Medication Review/Management: medications reviewed     Problem: Pain Chronic (Persistent) (Comorbidity Management)  Goal: Acceptable Pain Control and Functional Ability  Outcome: Ongoing, Progressing  Intervention: Manage Persistent Pain  Recent Flowsheet Documentation  Taken 1/1/2023 1800 by Kenrick Luna RN  Medication Review/Management: medications reviewed  Taken 1/1/2023 1645 by Kenrick Luna RN  Medication Review/Management: medications reviewed  Taken  1/1/2023 1422 by Kenrick Luna, RN  Medication Review/Management: medications reviewed  Taken 1/1/2023 1200 by Kenrick Luna RN  Medication Review/Management: medications reviewed  Taken 1/1/2023 1006 by Kenrick Luna RN  Medication Review/Management: medications reviewed  Taken 1/1/2023 0841 by Kenrick Luna, RN  Medication Review/Management: medications reviewed     Problem: Seizure Disorder Comorbidity  Goal: Maintenance of Seizure Control  Outcome: Ongoing, Progressing   Goal Outcome Evaluation:  Plan of Care Reviewed With: patient        Progress: improving

## 2023-01-02 LAB
ADV 40+41 DNA STL QL NAA+NON-PROBE: NOT DETECTED
ASTRO TYP 1-8 RNA STL QL NAA+NON-PROBE: NOT DETECTED
C CAYETANENSIS DNA STL QL NAA+NON-PROBE: NOT DETECTED
C COLI+JEJ+UPSA DNA STL QL NAA+NON-PROBE: NOT DETECTED
C DIFF GDH + TOXINS A+B STL QL IA.RAPID: NEGATIVE
C DIFF TOX GENS STL QL NAA+PROBE: POSITIVE
CRYPTOSP DNA STL QL NAA+NON-PROBE: NOT DETECTED
E HISTOLYT DNA STL QL NAA+NON-PROBE: NOT DETECTED
EAEC PAA PLAS AGGR+AATA ST NAA+NON-PRB: NOT DETECTED
EC STX1+STX2 GENES STL QL NAA+NON-PROBE: NOT DETECTED
EPEC EAE GENE STL QL NAA+NON-PROBE: NOT DETECTED
ETEC LTA+ST1A+ST1B TOX ST NAA+NON-PROBE: NOT DETECTED
G LAMBLIA DNA STL QL NAA+NON-PROBE: NOT DETECTED
NOROVIRUS GI+II RNA STL QL NAA+NON-PROBE: NOT DETECTED
P SHIGELLOIDES DNA STL QL NAA+NON-PROBE: NOT DETECTED
RVA RNA STL QL NAA+NON-PROBE: NOT DETECTED
S ENT+BONG DNA STL QL NAA+NON-PROBE: NOT DETECTED
SAPO I+II+IV+V RNA STL QL NAA+NON-PROBE: NOT DETECTED
SHIGELLA SP+EIEC IPAH ST NAA+NON-PROBE: NOT DETECTED
V CHOL+PARA+VUL DNA STL QL NAA+NON-PROBE: NOT DETECTED
V CHOLERAE DNA STL QL NAA+NON-PROBE: NOT DETECTED
Y ENTEROCOL DNA STL QL NAA+NON-PROBE: NOT DETECTED

## 2023-01-02 PROCEDURE — 87449 NOS EACH ORGANISM AG IA: CPT | Performed by: NURSE PRACTITIONER

## 2023-01-02 PROCEDURE — 96376 TX/PRO/DX INJ SAME DRUG ADON: CPT

## 2023-01-02 PROCEDURE — 99214 OFFICE O/P EST MOD 30 MIN: CPT | Performed by: NURSE PRACTITIONER

## 2023-01-02 PROCEDURE — 87507 IADNA-DNA/RNA PROBE TQ 12-25: CPT | Performed by: NURSE PRACTITIONER

## 2023-01-02 PROCEDURE — 25010000002 ONDANSETRON PER 1 MG: Performed by: NURSE PRACTITIONER

## 2023-01-02 PROCEDURE — 87493 C DIFF AMPLIFIED PROBE: CPT | Performed by: NURSE PRACTITIONER

## 2023-01-02 PROCEDURE — G0378 HOSPITAL OBSERVATION PER HR: HCPCS

## 2023-01-02 RX ORDER — LEVETIRACETAM 500 MG/1
1000 TABLET ORAL 2 TIMES DAILY
Status: DISCONTINUED | OUTPATIENT
Start: 2023-01-02 | End: 2023-01-03 | Stop reason: HOSPADM

## 2023-01-02 RX ORDER — OXYCODONE AND ACETAMINOPHEN 10; 325 MG/1; MG/1
1 TABLET ORAL EVERY 4 HOURS PRN
Status: DISCONTINUED | OUTPATIENT
Start: 2023-01-02 | End: 2023-01-03 | Stop reason: HOSPADM

## 2023-01-02 RX ADMIN — PANTOPRAZOLE SODIUM 40 MG: 40 TABLET, DELAYED RELEASE ORAL at 10:23

## 2023-01-02 RX ADMIN — FOLIC ACID 1 MG: 1 TABLET ORAL at 10:23

## 2023-01-02 RX ADMIN — BACLOFEN 10 MG: 10 TABLET ORAL at 06:16

## 2023-01-02 RX ADMIN — Medication 10 MG: at 22:46

## 2023-01-02 RX ADMIN — SODIUM CHLORIDE 75 ML/HR: 9 INJECTION, SOLUTION INTRAVENOUS at 23:04

## 2023-01-02 RX ADMIN — BACLOFEN 10 MG: 10 TABLET ORAL at 15:42

## 2023-01-02 RX ADMIN — LEVETIRACETAM 1000 MG: 500 TABLET, FILM COATED ORAL at 19:00

## 2023-01-02 RX ADMIN — CETIRIZINE HYDROCHLORIDE 10 MG: 10 TABLET ORAL at 10:23

## 2023-01-02 RX ADMIN — OXYCODONE HYDROCHLORIDE AND ACETAMINOPHEN 1 TABLET: 10; 325 TABLET ORAL at 20:24

## 2023-01-02 RX ADMIN — LEVETIRACETAM 1000 MG: 500 TABLET, FILM COATED ORAL at 22:45

## 2023-01-02 RX ADMIN — BACLOFEN 10 MG: 10 TABLET ORAL at 22:45

## 2023-01-02 RX ADMIN — FLUOXETINE HYDROCHLORIDE 60 MG: 20 CAPSULE ORAL at 22:47

## 2023-01-02 RX ADMIN — DICYCLOMINE HYDROCHLORIDE 20 MG: 10 CAPSULE ORAL at 15:41

## 2023-01-02 RX ADMIN — ONDANSETRON 4 MG: 2 INJECTION INTRAMUSCULAR; INTRAVENOUS at 15:42

## 2023-01-02 RX ADMIN — GABAPENTIN 800 MG: 400 CAPSULE ORAL at 15:42

## 2023-01-02 RX ADMIN — HYDROCODONE BITARTRATE AND ACETAMINOPHEN 1 TABLET: 7.5; 325 TABLET ORAL at 02:22

## 2023-01-02 RX ADMIN — APIXABAN 5 MG: 5 TABLET, FILM COATED ORAL at 22:46

## 2023-01-02 RX ADMIN — HYDROCODONE BITARTRATE AND ACETAMINOPHEN 1 TABLET: 7.5; 325 TABLET ORAL at 12:09

## 2023-01-02 RX ADMIN — GABAPENTIN 800 MG: 400 CAPSULE ORAL at 22:45

## 2023-01-02 RX ADMIN — HYDROXYZINE HYDROCHLORIDE 25 MG: 25 TABLET ORAL at 10:23

## 2023-01-02 RX ADMIN — PANTOPRAZOLE SODIUM 40 MG: 40 TABLET, DELAYED RELEASE ORAL at 22:45

## 2023-01-02 RX ADMIN — DICYCLOMINE HYDROCHLORIDE 20 MG: 10 CAPSULE ORAL at 22:45

## 2023-01-02 RX ADMIN — APIXABAN 5 MG: 5 TABLET, FILM COATED ORAL at 10:23

## 2023-01-02 RX ADMIN — MAGNESIUM OXIDE 400 MG (241.3 MG MAGNESIUM) TABLET 400 MG: TABLET at 10:23

## 2023-01-02 RX ADMIN — SUMATRIPTAN SUCCINATE 50 MG: 50 TABLET ORAL at 22:51

## 2023-01-02 RX ADMIN — HYDROXYZINE HYDROCHLORIDE 25 MG: 25 TABLET ORAL at 20:24

## 2023-01-02 RX ADMIN — GABAPENTIN 800 MG: 400 CAPSULE ORAL at 06:16

## 2023-01-02 RX ADMIN — HYDROCODONE BITARTRATE AND ACETAMINOPHEN 1 TABLET: 7.5; 325 TABLET ORAL at 06:16

## 2023-01-02 RX ADMIN — Medication 10 ML: at 22:53

## 2023-01-02 RX ADMIN — DICYCLOMINE HYDROCHLORIDE 20 MG: 10 CAPSULE ORAL at 10:23

## 2023-01-02 RX ADMIN — OXYCODONE HYDROCHLORIDE AND ACETAMINOPHEN 1 TABLET: 10; 325 TABLET ORAL at 15:42

## 2023-01-02 NOTE — PROGRESS NOTES
Name: Belen Allen ADMIT: 2022   : 1980  PCP: Sahil Cornelius MD    MRN: [id number] LOS: 0 days   AGE/SEX: 42 y.o. female  ROOM: [facility]     Subjective   Subjective   Patient was complaining of diarrhea but she is taken multiple suppositories.  Her C. difficile toxin PCR was positive but the antigen test was actually negative.  Otherwise she is currently undergoing a EEG for breakthrough seizure on antiepileptics despite reported compliance with the depakote      Objective   Objective   Vital Signs  Temp:  [97.5 °F (36.4 °C)-98.8 °F (37.1 °C)] 98.5 °F (36.9 °C)  Heart Rate:  [75-90] 85  Resp:  [16-17] 16  BP: (115-140)/(63-98) 140/98  SpO2:  [92 %-94 %] 94 %  on   ;   Device (Oxygen Therapy): room air  Body mass index is 34.33 kg/m².  Physical Exam  Constitutional:       Appearance: Normal appearance.   Cardiovascular:      Rate and Rhythm: Normal rate and regular rhythm.   Pulmonary:      Effort: Pulmonary effort is normal. No respiratory distress.   Abdominal:      General: There is no distension.      Palpations: Abdomen is soft.      Tenderness: There is no abdominal tenderness.   Skin:     General: Skin is warm and dry.   Neurological:      General: No focal deficit present.      Mental Status: She is alert and oriented to person, place, and time.         Results Review     I reviewed the patient's new clinical results.  Results from last 7 days   Lab Units 23   WBC 10*3/mm3 6.46 8.64 8.72 8.70   HEMOGLOBIN g/dL 10.7* 12.9 12.1 11.0*   PLATELETS 10*3/mm3 221 242 262 267     Results from last 7 days   Lab Units 2347 22   SODIUM mmol/L 141 139 141 137   POTASSIUM mmol/L 3.7 4.2 4.5 5.2   CHLORIDE mmol/L 107 104 105 100   CO2 mmol/L 26.4 27.0 29.0 32.0*   BUN mg/dL 6 6 10 14   CREATININE mg/dL 0.84 0.82 0.83 0.93   GLUCOSE mg/dL 113* 81 105* 108*   Estimated Creatinine  Clearance: 96.1 mL/min (by C-G formula based on SCr of 0.84 mg/dL).  Results from last 7 days   Lab Units 12/31/22  1752 12/29/22  1245 12/26/22 2030   ALBUMIN g/dL 3.4* 3.9 3.7   BILIRUBIN mg/dL 0.3 <0.2 0.3   ALK PHOS U/L 88 103 108   AST (SGOT) U/L 20 16 23   ALT (SGPT) U/L 17 18 22     Results from last 7 days   Lab Units 01/01/23  0547 12/31/22  1752 12/29/22  1245 12/26/22 2030   CALCIUM mg/dL 8.4* 8.9 8.7 8.7   ALBUMIN g/dL  --  3.4* 3.9 3.7     Results from last 7 days   Lab Units 01/01/23  0549   LACTATE mmol/L 1.4     COVID19   Date Value Ref Range Status   11/23/2022 Not Detected Not Detected - Ref. Range Final   01/01/2022 Not Detected Not Detected - Ref. Range Final     No results found for: HGBA1C, POCGLU    CT Head Without Contrast, CT Cervical Spine Without Contrast, CT Chest Without Contrast Diagnostic, CT Thoracic Spine Without Contrast, CT Lumbar Spine Without Contrast  Narrative: CT HEAD WITHOUT INTRAVENOUS CONTRAST; CERVICAL, THORACIC AND LUMBAR  SPINE WITHOUT INTRAVENOUS CONTRAST; CT CHEST WITHOUT INTRAVENOUS  CONTRAST     HISTORY: Seizure, hit head, neck pain, right rib pain and thoracic pain     COMPARISON: CT head and cervical spine 12/29/2022 and 12/17/2022; MRI of  thoracic and lumbar spine 12/18/2022, CT lumbar spine 01/01/2022 and CT  chest 11/30/2022 an 12/17/2022     TECHNIQUE: CT was performed of the head. CT was performed of the  cervical spine, thoracic and lumbar with axial, coronal, and sagittal  reformatted images. CT of the chest was also performed without  intravenous contrast. No intravenous contrast was administered.  Radiation dose reduction techniques were utilized, including automated  exposure control and exposure modulation based on body size.     FINDINGS:     CT head:  There is no finding of acute infarct, hemorrhage, contusion or abnormal  extra-axial collection. No hydrocephalus is present.     CT cervical spine:  There is no fracture of the cervical spine.  Straightening of the normal  cervical lordosis is present, as before. There is no prevertebral soft  tissue swelling.     CT Thoracic and lumbar spine:  For the purposes of this dictation on last well-formed disc space be  referred to as L5-S1. Postsurgical changes from fusion of L5-S1 with  posterior rods and pedicle screws are present, grossly unchanged since  12/05/2022. No thoracic or lumbar spine fracture is seen.     CT CHEST:  Trace pericardial effusion is present, some of that seen on 11/30/2022  but increased since 12/17/2022. The gallbladder is surgically absent.  The common bile duct measures 1.1 cm (previously 0.6 m in 12/05/2022.  There appears to be increased intrahepatic biliary ductal dilation as  well. Hypodense left renal lesions imaging density less than 15  Hounsfield units is likely benign per Bosniak 2019 criteria.     No mediastinal or axillary adenopathy is present by size criteria. No  pulmonary consolidation, pleural effusion or pneumothorax is present. A  few scattered sub-6 mm pulmonary nodules are grossly unchanged since  11/08/2020.     Impression: Impression:  1.  No findings of acute intracranial abnormality.  No cervical spine,  thoracic or lumbar fracture.  2.  No noncontrast CT findings of acute cardiopulmonary pathology.  3.  The common bile duct is more distended when compared with  12/05/2022, measuring up to 1.1 cm. Findings raise concern for biliary  ductal obstruction and further evaluation with MRCP is recommended.  4.  Other findings as above     This report was finalized on 12/31/2022 8:57 PM by Dr. Lloyd Martinez M.D.       Scheduled Medications  apixaban, 5 mg, Oral, Q12H  baclofen, 10 mg, Oral, Q8H  cetirizine, 10 mg, Oral, Daily  dicyclomine, 20 mg, Oral, TID  FLUoxetine, 60 mg, Oral, Nightly  folic acid, 1 mg, Oral, Daily  gabapentin, 800 mg, Oral, Q8H  levETIRAcetam, 1,000 mg, Oral, BID  magnesium oxide, 400 mg, Oral, Daily  melatonin, 10 mg, Oral,  Nightly  pantoprazole, 40 mg, Oral, BID  sodium chloride, 10 mL, Intravenous, Q12H    Infusions  sodium chloride, 75 mL/hr, Last Rate: 100 mL/hr (01/01/23 0144)    Diet  Diet: Cardiac Diets; Healthy Heart (2-3 Na+); Texture: Regular Texture (IDDSI 7); Fluid Consistency: Thin (IDDSI 0)       Assessment/Plan     Active Hospital Problems    Diagnosis  POA   • **Seizure (HCC) [R56.9]  Yes   • Tobacco abuse [Z72.0]  Yes   • GERD without esophagitis [K21.9]  Yes   • On anticoagulant therapy [Z79.01]  Not Applicable   • Anxiety disorder [F41.9]  Yes   • Splenic infarct [D73.5]  Yes   • Obesity (BMI 30-39.9) [E66.9]  Yes      Resolved Hospital Problems   No resolved problems to display.       42 y.o. female admitted with Seizure (HCC).    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    Breakthrough seizure  Depakote discontinued, Keppra has been started instead.  Neurology is following  EEG today    Diarrhea  Has reportedly taken a lot of medication for constipation.  C. difficile PCR was positive but antigen test was negative, consistent with C. difficile colonization.  We will hold off on antibiotics for now.  If continues to have diarrhea, okay for Imodium    History of splenic infarct  Continue Eliquis    High-level MDM- patient is high risk for complication and on multiple medications that require inpatient monitoring    · SCDs for DVT prophylaxis.  · Full code.  · Discussed with patient and nursing staff.  · Anticipate discharge home later this week.      Robbin Cali MD  [facility]  01/02/23  12:53 EST    Patient was wearing facemask when I entered the room and throughout our encounter.  I wore protective equipment throughout this patient encounter including a face mask, gloves and protective eyewear.  Hand hygiene was performed before donning protective equipment and  after removal when leaving the room.

## 2023-01-02 NOTE — PROGRESS NOTES
DOS: 2023  NAME: Belen Allen   : 1980  PCP: Sahil Cornelius MD  Chief Complaint   Patient presents with   • Seizures     Neurology    Subjective: Hooked up to continuous EEG overnight, 24-hour gina is around 3 PM.  No clinical seizures overnight.  Patient complaining of headache, 3-4 out of 10, and back pain.  No family at the bedside.        Objective:  Vital signs: /89 (BP Location: Left arm, Patient Position: Lying)   Pulse 90   Temp 98.3 °F (36.8 °C) (Oral)   Resp 16   Ht 162.6 cm (64\")   Wt 90.7 kg (200 lb)   LMP 2022 (Approximate)   SpO2 94%   BMI 34.33 kg/m²       GEN: NAD, vital signs reviewed  HEENT: Normocephalic, atraumatic   COR: RRR  Resp: Even and unlabored, clear to auscultation bilaterally  Extremities: Equal radial pulses, no edema    Neurological:   MS: AO x3. Language normal. No neglect. Higher integrative function normal  CN: II-XII normal  Motor: 5/5 except right lower extremity is antalgic, 4 out of 5,, normal tone  Sensory: Intact to light touch in arms and legs finger-to-nose bilaterally  Coordination: Normal    Scheduled Meds:apixaban, 5 mg, Oral, Q12H  baclofen, 10 mg, Oral, Q8H  cetirizine, 10 mg, Oral, Daily  dicyclomine, 20 mg, Oral, TID  FLUoxetine, 60 mg, Oral, Nightly  folic acid, 1 mg, Oral, Daily  gabapentin, 800 mg, Oral, Q8H  magnesium oxide, 400 mg, Oral, Daily  melatonin, 10 mg, Oral, Nightly  pantoprazole, 40 mg, Oral, BID  sodium chloride, 10 mL, Intravenous, Q12H      Continuous Infusions:sodium chloride, 75 mL/hr, Last Rate: 100 mL/hr (23 0144)      PRN Meds:.•  HYDROcodone-acetaminophen  •  hydrOXYzine  •  LORazepam  •  nitroglycerin  •  ondansetron  •  [COMPLETED] Insert Peripheral IV **AND** sodium chloride  •  sodium chloride  •  sodium chloride  •  SUMAtriptan    Laboratory results:  Lab Results   Component Value Date    GLUCOSE 113 (H) 2023    CALCIUM 8.4 (L) 2023     2023    K 3.7 2023     CO2 26.4 01/01/2023     01/01/2023    BUN 6 01/01/2023    CREATININE 0.84 01/01/2023    EGFRIFNONA 94 02/02/2022    BCR 7.1 01/01/2023    ANIONGAP 7.6 01/01/2023     Lab Results   Component Value Date    WBC 6.46 01/01/2023    HGB 10.7 (L) 01/01/2023    HCT 32.8 (L) 01/01/2023    .0 (H) 01/01/2023     01/01/2023     No results found for: CHOL  Lab Results   Component Value Date    HDL 40 (L) 03/12/2019     Lab Results   Component Value Date    LDL 93 03/12/2019     Lab Results   Component Value Date    TRIG 200 (H) 03/12/2019          Review and interpretation of imaging:  CT Head Without Contrast    Result Date: 12/31/2022  Impression: 1.  No findings of acute intracranial abnormality.  No cervical spine, thoracic or lumbar fracture. 2.  No noncontrast CT findings of acute cardiopulmonary pathology. 3.  The common bile duct is more distended when compared with 12/05/2022, measuring up to 1.1 cm. Findings raise concern for biliary ductal obstruction and further evaluation with MRCP is recommended. 4.  Other findings as above  This report was finalized on 12/31/2022 8:57 PM by Dr. Lloyd Martinez M.D.      CT Chest Without Contrast Diagnostic    Result Date: 12/31/2022  Impression: 1.  No findings of acute intracranial abnormality.  No cervical spine, thoracic or lumbar fracture. 2.  No noncontrast CT findings of acute cardiopulmonary pathology. 3.  The common bile duct is more distended when compared with 12/05/2022, measuring up to 1.1 cm. Findings raise concern for biliary ductal obstruction and further evaluation with MRCP is recommended. 4.  Other findings as above  This report was finalized on 12/31/2022 8:57 PM by Dr. Lloyd Martinez M.D.      CT Cervical Spine Without Contrast    Result Date: 12/31/2022  Impression: 1.  No findings of acute intracranial abnormality.  No cervical spine, thoracic or lumbar fracture. 2.  No noncontrast CT findings of acute cardiopulmonary pathology. 3.  The  common bile duct is more distended when compared with 12/05/2022, measuring up to 1.1 cm. Findings raise concern for biliary ductal obstruction and further evaluation with MRCP is recommended. 4.  Other findings as above  This report was finalized on 12/31/2022 8:57 PM by Dr. Lloyd Martinez M.D.      CT Thoracic Spine Without Contrast    Result Date: 12/31/2022  Impression: 1.  No findings of acute intracranial abnormality.  No cervical spine, thoracic or lumbar fracture. 2.  No noncontrast CT findings of acute cardiopulmonary pathology. 3.  The common bile duct is more distended when compared with 12/05/2022, measuring up to 1.1 cm. Findings raise concern for biliary ductal obstruction and further evaluation with MRCP is recommended. 4.  Other findings as above  This report was finalized on 12/31/2022 8:57 PM by Dr. Lloyd Martinez M.D.      CT Lumbar Spine Without Contrast    Result Date: 12/31/2022  Impression: 1.  No findings of acute intracranial abnormality.  No cervical spine, thoracic or lumbar fracture. 2.  No noncontrast CT findings of acute cardiopulmonary pathology. 3.  The common bile duct is more distended when compared with 12/05/2022, measuring up to 1.1 cm. Findings raise concern for biliary ductal obstruction and further evaluation with MRCP is recommended. 4.  Other findings as above  This report was finalized on 12/31/2022 8:57 PM by Dr. Lloyd Martinez M.D.        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  Depakote was recently increased to 1500mg twice daily on 12/26 when she presented to the ED with breakthrough seizure but despite that her level remains subtherapeutic with multiple seizures occurring on a daily basis.   Valproic acid level was 6 on presentation.    Dr. Fox stopped her Depakote due to subtherapeutic level and concern for drug interactions with her anticoagulation and ordered continuous EEG with a goal to capture a seizure to help further characterize her seizures and determine a better plan.  He did note some concern for nonepileptic spells as well. She continues to take gabapentin 800 mg 4x daily.     Additional work-up:  CT head: No acute findings.    Diagnoses:  Seizure disorder  C. difficile colitis  History of splenic infarct, on chronic anticoagulation  Chronic back pain  History of migraine  History of kidney stones    Plan:  Depakote stopped this admission.  We will start Keppra 1000 mg twice daily  Follow-up continuous EEG  Discussed with Dr. Antony today.  Further recommendations after EEG.  She does have an outpatient neurology follow-up scheduled in May.

## 2023-01-02 NOTE — PLAN OF CARE
Problem: Adult Inpatient Plan of Care  Goal: Plan of Care Review  Outcome: Ongoing, Progressing  Flowsheets (Taken 1/1/2023 2141)  Plan of Care Reviewed With: patient  Goal: Patient-Specific Goal (Individualized)  Outcome: Ongoing, Progressing  Goal: Absence of Hospital-Acquired Illness or Injury  Outcome: Ongoing, Progressing  Intervention: Identify and Manage Fall Risk  Recent Flowsheet Documentation  Taken 1/1/2023 2000 by Tamara Godfrey RN  Safety Promotion/Fall Prevention: safety round/check completed  Intervention: Prevent Skin Injury  Recent Flowsheet Documentation  Taken 1/1/2023 2000 by Tamara Godfrey RN  Body Position: position changed independently  Skin Protection: adhesive use limited  Intervention: Prevent and Manage VTE (Venous Thromboembolism) Risk  Recent Flowsheet Documentation  Taken 1/1/2023 2000 by Tamara Godfrey RN  VTE Prevention/Management:   bilateral   sequential compression devices on  Range of Motion: active ROM (range of motion) encouraged  Intervention: Prevent Infection  Recent Flowsheet Documentation  Taken 1/1/2023 2000 by Tamara Godfrey RN  Infection Prevention: single patient room provided  Goal: Optimal Comfort and Wellbeing  Outcome: Ongoing, Progressing  Intervention: Provide Person-Centered Care  Recent Flowsheet Documentation  Taken 1/1/2023 2000 by Tamara Godfrey RN  Trust Relationship/Rapport:   care explained   choices provided  Goal: Readiness for Transition of Care  Outcome: Ongoing, Progressing     Problem: Fall Injury Risk  Goal: Absence of Fall and Fall-Related Injury  Outcome: Ongoing, Progressing  Intervention: Identify and Manage Contributors  Recent Flowsheet Documentation  Taken 1/1/2023 2000 by Tamara Godfrey RN  Medication Review/Management: medications reviewed  Intervention: Promote Injury-Free Environment  Recent Flowsheet Documentation  Taken 1/1/2023 2000 by Tamara Godfrey RN  Safety Promotion/Fall Prevention: safety round/check completed      Problem: Behavioral Health Comorbidity  Goal: Maintenance of Behavioral Health Symptom Control  Outcome: Ongoing, Progressing  Intervention: Maintain Behavioral Health Symptom Control  Recent Flowsheet Documentation  Taken 1/1/2023 2000 by Tamara Godfrey RN  Medication Review/Management: medications reviewed     Problem: Hypertension Comorbidity  Goal: Blood Pressure in Desired Range  Outcome: Ongoing, Progressing  Intervention: Maintain Blood Pressure Management  Recent Flowsheet Documentation  Taken 1/1/2023 2000 by Tamara Godfrey RN  Medication Review/Management: medications reviewed     Problem: Pain Chronic (Persistent) (Comorbidity Management)  Goal: Acceptable Pain Control and Functional Ability  Outcome: Ongoing, Progressing  Intervention: Manage Persistent Pain  Recent Flowsheet Documentation  Taken 1/1/2023 2000 by Tamara Godfrey RN  Medication Review/Management: medications reviewed  Intervention: Optimize Psychosocial Wellbeing  Recent Flowsheet Documentation  Taken 1/1/2023 2000 by Tamara Godfrey RN  Supportive Measures: active listening utilized  Diversional Activities: television  Family/Support System Care: self-care encouraged     Problem: Seizure Disorder Comorbidity  Goal: Maintenance of Seizure Control  Outcome: Ongoing, Progressing   Goal Outcome Evaluation:  Plan of Care Reviewed With: patient

## 2023-01-02 NOTE — CONSULTS
Pt welcomed visit and engaged in conversation about her health and family. Pt is [employer] and does not have a Voodoo family. She relies on God and welcomed prayer.

## 2023-01-02 NOTE — PLAN OF CARE
Problem: Adult Inpatient Plan of Care  Goal: Plan of Care Review  Outcome: Ongoing, Progressing  Flowsheets (Taken 1/2/2023 1724)  Progress: improving  Plan of Care Reviewed With: patient  Goal: Patient-Specific Goal (Individualized)  Outcome: Ongoing, Progressing  Goal: Absence of Hospital-Acquired Illness or Injury  Outcome: Ongoing, Progressing  Intervention: Identify and Manage Fall Risk  Recent Flowsheet Documentation  Taken 1/2/2023 1600 by Kenrick Luna, RN  Safety Promotion/Fall Prevention:   assistive device/personal items within reach   clutter free environment maintained   toileting scheduled   safety round/check completed   room organization consistent   nonskid shoes/slippers when out of bed  Taken 1/2/2023 1404 by Kenrick Luna, RN  Safety Promotion/Fall Prevention:   toileting scheduled   safety round/check completed   room organization consistent   assistive device/personal items within reach   clutter free environment maintained   nonskid shoes/slippers when out of bed  Taken 1/2/2023 1201 by Kenrick Luna, RN  Safety Promotion/Fall Prevention:   assistive device/personal items within reach   clutter free environment maintained   toileting scheduled   safety round/check completed   room organization consistent   nonskid shoes/slippers when out of bed  Taken 1/2/2023 1005 by Kenrick Luna, RN  Safety Promotion/Fall Prevention:   assistive device/personal items within reach   clutter free environment maintained   toileting scheduled   safety round/check completed   room organization consistent   nonskid shoes/slippers when out of bed  Taken 1/2/2023 0809 by Kenrick Luna, RN  Safety Promotion/Fall Prevention:   assistive device/personal items within reach   clutter free environment maintained   toileting scheduled   safety round/check completed   room organization consistent   nonskid shoes/slippers when out of bed  Intervention: Prevent Skin Injury  Recent Flowsheet Documentation  Taken  1/2/2023 1600 by Kenrick Luna RN  Body Position:   position changed independently   sitting up in bed  Taken 1/2/2023 1404 by Kenrick Luna RN  Body Position:   position changed independently   sitting up in bed  Taken 1/2/2023 1201 by Kenrick Luna RN  Body Position:   position changed independently   sitting up in bed  Taken 1/2/2023 1005 by Kenrick Luna RN  Body Position:   position changed independently   sitting up in bed  Taken 1/2/2023 0809 by Kenrick Luna RN  Body Position:   position changed independently   sitting up in bed  Intervention: Prevent Infection  Recent Flowsheet Documentation  Taken 1/2/2023 1600 by Kenrick Luna RN  Infection Prevention:   hand hygiene promoted   rest/sleep promoted  Taken 1/2/2023 1404 by Kenrick Luna RN  Infection Prevention:   hand hygiene promoted   rest/sleep promoted  Taken 1/2/2023 1201 by Kenrick Luna RN  Infection Prevention:   hand hygiene promoted   rest/sleep promoted  Taken 1/2/2023 1005 by Kenrick Luna RN  Infection Prevention:   hand hygiene promoted   rest/sleep promoted  Taken 1/2/2023 0809 by Kenrick Luna RN  Infection Prevention:   hand hygiene promoted   rest/sleep promoted  Goal: Optimal Comfort and Wellbeing  Outcome: Ongoing, Progressing  Goal: Readiness for Transition of Care  Outcome: Ongoing, Progressing     Problem: Fall Injury Risk  Goal: Absence of Fall and Fall-Related Injury  Outcome: Ongoing, Progressing  Intervention: Identify and Manage Contributors  Recent Flowsheet Documentation  Taken 1/2/2023 1600 by Kenrick Luna RN  Medication Review/Management: medications reviewed  Taken 1/2/2023 1404 by Kenrick Luna RN  Medication Review/Management: medications reviewed  Taken 1/2/2023 1201 by Kenrick Luna RN  Medication Review/Management: medications reviewed  Taken 1/2/2023 1005 by Kenrick Luna RN  Medication Review/Management: medications reviewed  Taken 1/2/2023 0809 by Kenrick Luna  RN  Medication Review/Management: medications reviewed  Intervention: Promote Injury-Free Environment  Recent Flowsheet Documentation  Taken 1/2/2023 1600 by Kenrick Luna RN  Safety Promotion/Fall Prevention:   assistive device/personal items within reach   clutter free environment maintained   toileting scheduled   safety round/check completed   room organization consistent   nonskid shoes/slippers when out of bed  Taken 1/2/2023 1404 by Kenrick Luna RN  Safety Promotion/Fall Prevention:   toileting scheduled   safety round/check completed   room organization consistent   assistive device/personal items within reach   clutter free environment maintained   nonskid shoes/slippers when out of bed  Taken 1/2/2023 1201 by Kenrick Luna RN  Safety Promotion/Fall Prevention:   assistive device/personal items within reach   clutter free environment maintained   toileting scheduled   safety round/check completed   room organization consistent   nonskid shoes/slippers when out of bed  Taken 1/2/2023 1005 by Kenrick Luna RN  Safety Promotion/Fall Prevention:   assistive device/personal items within reach   clutter free environment maintained   toileting scheduled   safety round/check completed   room organization consistent   nonskid shoes/slippers when out of bed  Taken 1/2/2023 0809 by Kenrick Luna RN  Safety Promotion/Fall Prevention:   assistive device/personal items within reach   clutter free environment maintained   toileting scheduled   safety round/check completed   room organization consistent   nonskid shoes/slippers when out of bed     Problem: Behavioral Health Comorbidity  Goal: Maintenance of Behavioral Health Symptom Control  Outcome: Ongoing, Progressing  Intervention: Maintain Behavioral Health Symptom Control  Recent Flowsheet Documentation  Taken 1/2/2023 1600 by Kenrick Luna RN  Medication Review/Management: medications reviewed  Taken 1/2/2023 1404 by Kenrick Luna  RN  Medication Review/Management: medications reviewed  Taken 1/2/2023 1201 by Kenrick Luna RN  Medication Review/Management: medications reviewed  Taken 1/2/2023 1005 by Kenrick Luna RN  Medication Review/Management: medications reviewed  Taken 1/2/2023 0809 by Kenrick Luna RN  Medication Review/Management: medications reviewed     Problem: Hypertension Comorbidity  Goal: Blood Pressure in Desired Range  Outcome: Ongoing, Progressing  Intervention: Maintain Blood Pressure Management  Recent Flowsheet Documentation  Taken 1/2/2023 1600 by Kenrick Luna RN  Medication Review/Management: medications reviewed  Taken 1/2/2023 1404 by Kenrick Luna RN  Medication Review/Management: medications reviewed  Taken 1/2/2023 1201 by Kenrick Luna RN  Medication Review/Management: medications reviewed  Taken 1/2/2023 1005 by Kenrick Luna RN  Medication Review/Management: medications reviewed  Taken 1/2/2023 0809 by Kenrick Luna RN  Medication Review/Management: medications reviewed     Problem: Pain Chronic (Persistent) (Comorbidity Management)  Goal: Acceptable Pain Control and Functional Ability  Outcome: Ongoing, Progressing  Intervention: Manage Persistent Pain  Recent Flowsheet Documentation  Taken 1/2/2023 1600 by Kenrick Luna RN  Medication Review/Management: medications reviewed  Taken 1/2/2023 1404 by Kenrick Luna RN  Medication Review/Management: medications reviewed  Taken 1/2/2023 1201 by Kenrick Luna RN  Medication Review/Management: medications reviewed  Taken 1/2/2023 1005 by Kenrick Luna RN  Medication Review/Management: medications reviewed  Taken 1/2/2023 0809 by Kenrick Luna RN  Medication Review/Management: medications reviewed     Problem: Seizure Disorder Comorbidity  Goal: Maintenance of Seizure Control  Outcome: Ongoing, Progressing   Goal Outcome Evaluation:  Plan of Care Reviewed With: patient        Progress: improving

## 2023-01-02 NOTE — NURSING NOTE
Delgado DAMON notified of frequent episodes of diarrhea and patient asking for hemorrhoid cream, new orders noted.

## 2023-01-02 NOTE — NURSING NOTE
Beatriz DAMON notified that patient is requesting additional pain medications. At this time Beatriz DAMON is not ordering additional medications. No new orders.

## 2023-01-03 VITALS
RESPIRATION RATE: 16 BRPM | TEMPERATURE: 98.5 F | WEIGHT: 200 LBS | HEIGHT: 64 IN | DIASTOLIC BLOOD PRESSURE: 87 MMHG | HEART RATE: 95 BPM | SYSTOLIC BLOOD PRESSURE: 100 MMHG | BODY MASS INDEX: 34.15 KG/M2 | OXYGEN SATURATION: 94 %

## 2023-01-03 LAB
ANION GAP SERPL CALCULATED.3IONS-SCNC: 7 MMOL/L (ref 5–15)
BUN SERPL-MCNC: 11 MG/DL (ref 6–20)
BUN/CREAT SERPL: 12.8 (ref 7–25)
CALCIUM SPEC-SCNC: 9.1 MG/DL (ref 8.6–10.5)
CHLORIDE SERPL-SCNC: 105 MMOL/L (ref 98–107)
CO2 SERPL-SCNC: 28 MMOL/L (ref 22–29)
CREAT SERPL-MCNC: 0.86 MG/DL (ref 0.57–1)
DEPRECATED RDW RBC AUTO: 49.9 FL (ref 37–54)
EGFRCR SERPLBLD CKD-EPI 2021: 87.2 ML/MIN/1.73
ERYTHROCYTE [DISTWIDTH] IN BLOOD BY AUTOMATED COUNT: 13.8 % (ref 12.3–15.4)
GLUCOSE SERPL-MCNC: 84 MG/DL (ref 65–99)
HCT VFR BLD AUTO: 40.1 % (ref 34–46.6)
HGB BLD-MCNC: 13 G/DL (ref 12–15.9)
MAGNESIUM SERPL-MCNC: 2.1 MG/DL (ref 1.6–2.6)
MCH RBC QN AUTO: 31.6 PG (ref 26.6–33)
MCHC RBC AUTO-ENTMCNC: 32.4 G/DL (ref 31.5–35.7)
MCV RBC AUTO: 97.6 FL (ref 79–97)
PHOSPHATE SERPL-MCNC: 4.7 MG/DL (ref 2.5–4.5)
PLATELET # BLD AUTO: 264 10*3/MM3 (ref 140–450)
PMV BLD AUTO: 9.9 FL (ref 6–12)
POTASSIUM SERPL-SCNC: 4 MMOL/L (ref 3.5–5.2)
RBC # BLD AUTO: 4.11 10*6/MM3 (ref 3.77–5.28)
SODIUM SERPL-SCNC: 140 MMOL/L (ref 136–145)
WBC NRBC COR # BLD: 7.8 10*3/MM3 (ref 3.4–10.8)

## 2023-01-03 PROCEDURE — 83735 ASSAY OF MAGNESIUM: CPT | Performed by: STUDENT IN AN ORGANIZED HEALTH CARE EDUCATION/TRAINING PROGRAM

## 2023-01-03 PROCEDURE — 85027 COMPLETE CBC AUTOMATED: CPT | Performed by: STUDENT IN AN ORGANIZED HEALTH CARE EDUCATION/TRAINING PROGRAM

## 2023-01-03 PROCEDURE — G0378 HOSPITAL OBSERVATION PER HR: HCPCS

## 2023-01-03 PROCEDURE — 80048 BASIC METABOLIC PNL TOTAL CA: CPT | Performed by: STUDENT IN AN ORGANIZED HEALTH CARE EDUCATION/TRAINING PROGRAM

## 2023-01-03 PROCEDURE — 96376 TX/PRO/DX INJ SAME DRUG ADON: CPT

## 2023-01-03 PROCEDURE — 84100 ASSAY OF PHOSPHORUS: CPT | Performed by: STUDENT IN AN ORGANIZED HEALTH CARE EDUCATION/TRAINING PROGRAM

## 2023-01-03 PROCEDURE — 25010000002 ONDANSETRON PER 1 MG: Performed by: NURSE PRACTITIONER

## 2023-01-03 RX ORDER — HYDROCODONE BITARTRATE AND ACETAMINOPHEN 7.5; 325 MG/1; MG/1
1 TABLET ORAL EVERY 4 HOURS PRN
Qty: 12 TABLET | Refills: 0 | Status: SHIPPED | OUTPATIENT
Start: 2023-01-03 | End: 2023-01-08

## 2023-01-03 RX ORDER — HYDROCODONE BITARTRATE AND ACETAMINOPHEN 7.5; 325 MG/1; MG/1
1 TABLET ORAL EVERY 4 HOURS PRN
Qty: 12 TABLET | Refills: 0 | Status: SHIPPED | OUTPATIENT
Start: 2023-01-03 | End: 2023-01-03 | Stop reason: SDUPTHER

## 2023-01-03 RX ORDER — LEVETIRACETAM 1000 MG/1
1000 TABLET ORAL 2 TIMES DAILY
Qty: 60 TABLET | Refills: 0 | Status: SHIPPED | OUTPATIENT
Start: 2023-01-03 | End: 2023-01-18 | Stop reason: HOSPADM

## 2023-01-03 RX ORDER — LEVETIRACETAM 1000 MG/1
1000 TABLET ORAL 2 TIMES DAILY
Qty: 60 TABLET | Refills: 0 | Status: SHIPPED | OUTPATIENT
Start: 2023-01-03 | End: 2023-01-03 | Stop reason: SDUPTHER

## 2023-01-03 RX ADMIN — OXYCODONE HYDROCHLORIDE AND ACETAMINOPHEN 1 TABLET: 10; 325 TABLET ORAL at 00:13

## 2023-01-03 RX ADMIN — LEVETIRACETAM 1000 MG: 500 TABLET, FILM COATED ORAL at 08:03

## 2023-01-03 RX ADMIN — GABAPENTIN 800 MG: 400 CAPSULE ORAL at 13:27

## 2023-01-03 RX ADMIN — BACLOFEN 10 MG: 10 TABLET ORAL at 13:27

## 2023-01-03 RX ADMIN — FOLIC ACID 1 MG: 1 TABLET ORAL at 08:03

## 2023-01-03 RX ADMIN — APIXABAN 5 MG: 5 TABLET, FILM COATED ORAL at 08:03

## 2023-01-03 RX ADMIN — ONDANSETRON 4 MG: 2 INJECTION INTRAMUSCULAR; INTRAVENOUS at 08:02

## 2023-01-03 RX ADMIN — MAGNESIUM OXIDE 400 MG (241.3 MG MAGNESIUM) TABLET 400 MG: TABLET at 08:03

## 2023-01-03 RX ADMIN — HYDROCODONE BITARTRATE AND ACETAMINOPHEN 1 TABLET: 7.5; 325 TABLET ORAL at 12:00

## 2023-01-03 RX ADMIN — DICYCLOMINE HYDROCHLORIDE 20 MG: 10 CAPSULE ORAL at 08:03

## 2023-01-03 RX ADMIN — OXYCODONE HYDROCHLORIDE AND ACETAMINOPHEN 1 TABLET: 10; 325 TABLET ORAL at 08:03

## 2023-01-03 RX ADMIN — OXYCODONE HYDROCHLORIDE AND ACETAMINOPHEN 1 TABLET: 10; 325 TABLET ORAL at 04:06

## 2023-01-03 RX ADMIN — GABAPENTIN 800 MG: 400 CAPSULE ORAL at 06:14

## 2023-01-03 RX ADMIN — BACLOFEN 10 MG: 10 TABLET ORAL at 06:14

## 2023-01-03 RX ADMIN — PANTOPRAZOLE SODIUM 40 MG: 40 TABLET, DELAYED RELEASE ORAL at 08:03

## 2023-01-03 NOTE — NURSING NOTE
Vital signs stable. Patient voices pain to her back, legs and hips. Percocet given times 2 with good relief. Patient up to the BSC per self. Diarrhea on day shift has slowed down says patient.NSR. No seizure activity noted. Numbness from right thigh down to her toes. Good pulses. Will continue to monitor.

## 2023-01-03 NOTE — DISCHARGE SUMMARY
Patient Name: Belen Allen  : 1980  MRN: [id number]    Date of Admission: 2022  Date of Discharge:  1/3/2023  Primary Care Physician: Sahil Cornelius MD      Chief Complaint:   Seizures      Discharge Diagnoses     Active Hospital Problems    Diagnosis  POA   • **Seizure (HCC) [R56.9]  Yes   • Tobacco abuse [Z72.0]  Yes   • GERD without esophagitis [K21.9]  Yes   • On anticoagulant therapy [Z79.01]  Not Applicable   • Anxiety disorder [F41.9]  Yes   • Splenic infarct [D73.5]  Yes   • Obesity (BMI 30-39.9) [E66.9]  Yes      Resolved Hospital Problems   No resolved problems to display.        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  She presented to the hospital on  with breakthrough seizures despite reported compliance with Depakote which was actually increased prior to this episode to 1500 mg twice daily on .  She reported compliance but in the ER her levels remain subtherapeutic with multiple seizures occurring on a daily basis.  Depakote was discontinued and she was started on Keppra 1000 mg twice daily.  An EEG was done while she was admitted which did not show any epileptic foci.  She will be discharged on Keppra 1000 mg twice daily and should continue to take folic acid.  She will follow-up with her outpatient neurologist on May 18 2023      Day of Discharge     Physical Exam:  Temp:  [98.1 °F (36.7 °C)-99.4 °F (37.4 °C)] 98.1 °F (36.7 °C)  Heart Rate:  [83-94] 86  Resp:  [16] 16  BP: ()/() 95/76  Body mass index is 34.33 kg/m².  Physical Exam  Constitutional:       Appearance: Normal appearance.   HENT:      Head: Normocephalic and atraumatic.   Cardiovascular:      Rate and Rhythm: Normal rate and regular rhythm.   Pulmonary:      Effort: Pulmonary effort is normal. No respiratory  distress.   Abdominal:      General: There is no distension.      Palpations: Abdomen is soft.      Tenderness: There is no abdominal tenderness.   Skin:     General: Skin is warm and dry.   Neurological:      General: No focal deficit present.      Mental Status: She is alert and oriented to person, place, and time.         Consultants     Consult Orders (all) (From admission, onward)     Start     Ordered    01/01/23 0320  Inpatient Spiritual Care Consult  Once        Provider:  (Not yet assigned)    01/01/23 0320    01/01/23 0109  Inpatient Neurology Consult General  Once        Specialty:  Neurology  Provider:  Dennys Kumar MD    01/01/23 0111    12/31/22 2102  [facility] (on-call MD unless specified) Details  Once        Specialty:  Hospitalist  Provider:  (Not yet assigned)    12/31/22 2101 12/31/22 1725  Inpatient Neurology Consult General  Once        Specialty:  Neurology  Provider:  Dennys Kumar MD    12/31/22 1724              Procedures     Imaging Results (All)     Procedure Component Value Units Date/Time    CT Head Without Contrast [[id number]] Collected: 12/31/22 2002     Updated: 12/31/22 2100    Narrative:      CT HEAD WITHOUT INTRAVENOUS CONTRAST; CERVICAL, THORACIC AND LUMBAR  SPINE WITHOUT INTRAVENOUS CONTRAST; CT CHEST WITHOUT INTRAVENOUS  CONTRAST     HISTORY: Seizure, hit head, neck pain, right rib pain and thoracic pain     COMPARISON: CT head and cervical spine 12/29/2022 and 12/17/2022; MRI of  thoracic and lumbar spine 12/18/2022, CT lumbar spine 01/01/2022 and CT  chest 11/30/2022 an 12/17/2022     TECHNIQUE: CT was performed of the head. CT was performed of the  cervical spine, thoracic and lumbar with axial, coronal, and sagittal  reformatted images. CT of the chest was also performed without  intravenous contrast. No intravenous contrast was administered.  Radiation dose reduction techniques were utilized, including automated  exposure control and exposure modulation based on body  size.     FINDINGS:     CT head:  There is no finding of acute infarct, hemorrhage, contusion or abnormal  extra-axial collection. No hydrocephalus is present.     CT cervical spine:  There is no fracture of the cervical spine. Straightening of the normal  cervical lordosis is present, as before. There is no prevertebral soft  tissue swelling.     CT Thoracic and lumbar spine:  For the purposes of this dictation on last well-formed disc space be  referred to as L5-S1. Postsurgical changes from fusion of L5-S1 with  posterior rods and pedicle screws are present, grossly unchanged since  12/05/2022. No thoracic or lumbar spine fracture is seen.     CT CHEST:  Trace pericardial effusion is present, some of that seen on 11/30/2022  but increased since 12/17/2022. The gallbladder is surgically absent.  The common bile duct measures 1.1 cm (previously 0.6 m in 12/05/2022.  There appears to be increased intrahepatic biliary ductal dilation as  well. Hypodense left renal lesions imaging density less than 15  Hounsfield units is likely benign per Bosniak 2019 criteria.     No mediastinal or axillary adenopathy is present by size criteria. No  pulmonary consolidation, pleural effusion or pneumothorax is present. A  few scattered sub-6 mm pulmonary nodules are grossly unchanged since  11/08/2020.       Impression:      Impression:  1.  No findings of acute intracranial abnormality.  No cervical spine,  thoracic or lumbar fracture.  2.  No noncontrast CT findings of acute cardiopulmonary pathology.  3.  The common bile duct is more distended when compared with  12/05/2022, measuring up to 1.1 cm. Findings raise concern for biliary  ductal obstruction and further evaluation with MRCP is recommended.  4.  Other findings as above     This report was finalized on 12/31/2022 8:57 PM by Dr. Lloyd Martinez M.D.       CT Cervical Spine Without Contrast [[id number]] Collected: 12/31/22 2002     Updated: 12/31/22 2100    Narrative:       CT HEAD WITHOUT INTRAVENOUS CONTRAST; CERVICAL, THORACIC AND LUMBAR  SPINE WITHOUT INTRAVENOUS CONTRAST; CT CHEST WITHOUT INTRAVENOUS  CONTRAST     HISTORY: Seizure, hit head, neck pain, right rib pain and thoracic pain     COMPARISON: CT head and cervical spine 12/29/2022 and 12/17/2022; MRI of  thoracic and lumbar spine 12/18/2022, CT lumbar spine 01/01/2022 and CT  chest 11/30/2022 an 12/17/2022     TECHNIQUE: CT was performed of the head. CT was performed of the  cervical spine, thoracic and lumbar with axial, coronal, and sagittal  reformatted images. CT of the chest was also performed without  intravenous contrast. No intravenous contrast was administered.  Radiation dose reduction techniques were utilized, including automated  exposure control and exposure modulation based on body size.     FINDINGS:     CT head:  There is no finding of acute infarct, hemorrhage, contusion or abnormal  extra-axial collection. No hydrocephalus is present.     CT cervical spine:  There is no fracture of the cervical spine. Straightening of the normal  cervical lordosis is present, as before. There is no prevertebral soft  tissue swelling.     CT Thoracic and lumbar spine:  For the purposes of this dictation on last well-formed disc space be  referred to as L5-S1. Postsurgical changes from fusion of L5-S1 with  posterior rods and pedicle screws are present, grossly unchanged since  12/05/2022. No thoracic or lumbar spine fracture is seen.     CT CHEST:  Trace pericardial effusion is present, some of that seen on 11/30/2022  but increased since 12/17/2022. The gallbladder is surgically absent.  The common bile duct measures 1.1 cm (previously 0.6 m in 12/05/2022.  There appears to be increased intrahepatic biliary ductal dilation as  well. Hypodense left renal lesions imaging density less than 15  Hounsfield units is likely benign per Bosniak 2019 criteria.     No mediastinal or axillary adenopathy is present by size criteria.  No  pulmonary consolidation, pleural effusion or pneumothorax is present. A  few scattered sub-6 mm pulmonary nodules are grossly unchanged since  11/08/2020.       Impression:      Impression:  1.  No findings of acute intracranial abnormality.  No cervical spine,  thoracic or lumbar fracture.  2.  No noncontrast CT findings of acute cardiopulmonary pathology.  3.  The common bile duct is more distended when compared with  12/05/2022, measuring up to 1.1 cm. Findings raise concern for biliary  ductal obstruction and further evaluation with MRCP is recommended.  4.  Other findings as above     This report was finalized on 12/31/2022 8:57 PM by Dr. Lloyd Martinez M.D.       CT Chest Without Contrast Diagnostic [[id number]] Collected: 12/31/22 2002     Updated: 12/31/22 2100    Narrative:      CT HEAD WITHOUT INTRAVENOUS CONTRAST; CERVICAL, THORACIC AND LUMBAR  SPINE WITHOUT INTRAVENOUS CONTRAST; CT CHEST WITHOUT INTRAVENOUS  CONTRAST     HISTORY: Seizure, hit head, neck pain, right rib pain and thoracic pain     COMPARISON: CT head and cervical spine 12/29/2022 and 12/17/2022; MRI of  thoracic and lumbar spine 12/18/2022, CT lumbar spine 01/01/2022 and CT  chest 11/30/2022 an 12/17/2022     TECHNIQUE: CT was performed of the head. CT was performed of the  cervical spine, thoracic and lumbar with axial, coronal, and sagittal  reformatted images. CT of the chest was also performed without  intravenous contrast. No intravenous contrast was administered.  Radiation dose reduction techniques were utilized, including automated  exposure control and exposure modulation based on body size.     FINDINGS:     CT head:  There is no finding of acute infarct, hemorrhage, contusion or abnormal  extra-axial collection. No hydrocephalus is present.     CT cervical spine:  There is no fracture of the cervical spine. Straightening of the normal  cervical lordosis is present, as before. There is no prevertebral soft  tissue swelling.      CT Thoracic and lumbar spine:  For the purposes of this dictation on last well-formed disc space be  referred to as L5-S1. Postsurgical changes from fusion of L5-S1 with  posterior rods and pedicle screws are present, grossly unchanged since  12/05/2022. No thoracic or lumbar spine fracture is seen.     CT CHEST:  Trace pericardial effusion is present, some of that seen on 11/30/2022  but increased since 12/17/2022. The gallbladder is surgically absent.  The common bile duct measures 1.1 cm (previously 0.6 m in 12/05/2022.  There appears to be increased intrahepatic biliary ductal dilation as  well. Hypodense left renal lesions imaging density less than 15  Hounsfield units is likely benign per Bosniak 2019 criteria.     No mediastinal or axillary adenopathy is present by size criteria. No  pulmonary consolidation, pleural effusion or pneumothorax is present. A  few scattered sub-6 mm pulmonary nodules are grossly unchanged since  11/08/2020.       Impression:      Impression:  1.  No findings of acute intracranial abnormality.  No cervical spine,  thoracic or lumbar fracture.  2.  No noncontrast CT findings of acute cardiopulmonary pathology.  3.  The common bile duct is more distended when compared with  12/05/2022, measuring up to 1.1 cm. Findings raise concern for biliary  ductal obstruction and further evaluation with MRCP is recommended.  4.  Other findings as above     This report was finalized on 12/31/2022 8:57 PM by Dr. Lloyd Martinez M.D.       CT Thoracic Spine Without Contrast [[id number]] Collected: 12/31/22 2002     Updated: 12/31/22 2100    Narrative:      CT HEAD WITHOUT INTRAVENOUS CONTRAST; CERVICAL, THORACIC AND LUMBAR  SPINE WITHOUT INTRAVENOUS CONTRAST; CT CHEST WITHOUT INTRAVENOUS  CONTRAST     HISTORY: Seizure, hit head, neck pain, right rib pain and thoracic pain     COMPARISON: CT head and cervical spine 12/29/2022 and 12/17/2022; MRI of  thoracic and lumbar spine 12/18/2022, CT lumbar  spine 01/01/2022 and CT  chest 11/30/2022 an 12/17/2022     TECHNIQUE: CT was performed of the head. CT was performed of the  cervical spine, thoracic and lumbar with axial, coronal, and sagittal  reformatted images. CT of the chest was also performed without  intravenous contrast. No intravenous contrast was administered.  Radiation dose reduction techniques were utilized, including automated  exposure control and exposure modulation based on body size.     FINDINGS:     CT head:  There is no finding of acute infarct, hemorrhage, contusion or abnormal  extra-axial collection. No hydrocephalus is present.     CT cervical spine:  There is no fracture of the cervical spine. Straightening of the normal  cervical lordosis is present, as before. There is no prevertebral soft  tissue swelling.     CT Thoracic and lumbar spine:  For the purposes of this dictation on last well-formed disc space be  referred to as L5-S1. Postsurgical changes from fusion of L5-S1 with  posterior rods and pedicle screws are present, grossly unchanged since  12/05/2022. No thoracic or lumbar spine fracture is seen.     CT CHEST:  Trace pericardial effusion is present, some of that seen on 11/30/2022  but increased since 12/17/2022. The gallbladder is surgically absent.  The common bile duct measures 1.1 cm (previously 0.6 m in 12/05/2022.  There appears to be increased intrahepatic biliary ductal dilation as  well. Hypodense left renal lesions imaging density less than 15  Hounsfield units is likely benign per Bosniak 2019 criteria.     No mediastinal or axillary adenopathy is present by size criteria. No  pulmonary consolidation, pleural effusion or pneumothorax is present. A  few scattered sub-6 mm pulmonary nodules are grossly unchanged since  11/08/2020.       Impression:      Impression:  1.  No findings of acute intracranial abnormality.  No cervical spine,  thoracic or lumbar fracture.  2.  No noncontrast CT findings of acute  cardiopulmonary pathology.  3.  The common bile duct is more distended when compared with  12/05/2022, measuring up to 1.1 cm. Findings raise concern for biliary  ductal obstruction and further evaluation with MRCP is recommended.  4.  Other findings as above     This report was finalized on 12/31/2022 8:57 PM by Dr. Lloyd Martinez M.D.       CT Lumbar Spine Without Contrast [[id number]] Collected: 12/31/22 2002     Updated: 12/31/22 2100    Narrative:      CT HEAD WITHOUT INTRAVENOUS CONTRAST; CERVICAL, THORACIC AND LUMBAR  SPINE WITHOUT INTRAVENOUS CONTRAST; CT CHEST WITHOUT INTRAVENOUS  CONTRAST     HISTORY: Seizure, hit head, neck pain, right rib pain and thoracic pain     COMPARISON: CT head and cervical spine 12/29/2022 and 12/17/2022; MRI of  thoracic and lumbar spine 12/18/2022, CT lumbar spine 01/01/2022 and CT  chest 11/30/2022 an 12/17/2022     TECHNIQUE: CT was performed of the head. CT was performed of the  cervical spine, thoracic and lumbar with axial, coronal, and sagittal  reformatted images. CT of the chest was also performed without  intravenous contrast. No intravenous contrast was administered.  Radiation dose reduction techniques were utilized, including automated  exposure control and exposure modulation based on body size.     FINDINGS:     CT head:  There is no finding of acute infarct, hemorrhage, contusion or abnormal  extra-axial collection. No hydrocephalus is present.     CT cervical spine:  There is no fracture of the cervical spine. Straightening of the normal  cervical lordosis is present, as before. There is no prevertebral soft  tissue swelling.     CT Thoracic and lumbar spine:  For the purposes of this dictation on last well-formed disc space be  referred to as L5-S1. Postsurgical changes from fusion of L5-S1 with  posterior rods and pedicle screws are present, grossly unchanged since  12/05/2022. No thoracic or lumbar spine fracture is seen.     CT CHEST:  Trace pericardial  effusion is present, some of that seen on 11/30/2022  but increased since 12/17/2022. The gallbladder is surgically absent.  The common bile duct measures 1.1 cm (previously 0.6 m in 12/05/2022.  There appears to be increased intrahepatic biliary ductal dilation as  well. Hypodense left renal lesions imaging density less than 15  Hounsfield units is likely benign per Bosniak 2019 criteria.     No mediastinal or axillary adenopathy is present by size criteria. No  pulmonary consolidation, pleural effusion or pneumothorax is present. A  few scattered sub-6 mm pulmonary nodules are grossly unchanged since  11/08/2020.       Impression:      Impression:  1.  No findings of acute intracranial abnormality.  No cervical spine,  thoracic or lumbar fracture.  2.  No noncontrast CT findings of acute cardiopulmonary pathology.  3.  The common bile duct is more distended when compared with  12/05/2022, measuring up to 1.1 cm. Findings raise concern for biliary  ductal obstruction and further evaluation with MRCP is recommended.  4.  Other findings as above     This report was finalized on 12/31/2022 8:57 PM by Dr. Lloyd Martinez M.D.             Pertinent Labs     Results from last 7 days   Lab Units 01/03/23  0613 01/01/23  0547 12/31/22  1752 12/29/22  1245   WBC 10*3/mm3 7.80 6.46 8.64 8.72   HEMOGLOBIN g/dL 13.0 10.7* 12.9 12.1   PLATELETS 10*3/mm3 264 221 242 262     Results from last 7 days   Lab Units 01/03/23  0613 01/01/23  0547 12/31/22  1752 12/29/22  1245   SODIUM mmol/L 140 141 139 141   POTASSIUM mmol/L 4.0 3.7 4.2 4.5   CHLORIDE mmol/L 105 107 104 105   CO2 mmol/L 28.0 26.4 27.0 29.0   BUN mg/dL 11 6 6 10   CREATININE mg/dL 0.86 0.84 0.82 0.83   GLUCOSE mg/dL 84 113* 81 105*   Estimated Creatinine Clearance: 93.9 mL/min (by C-G formula based on SCr of 0.86 mg/dL).  Results from last 7 days   Lab Units 12/31/22  1752 12/29/22  1245   ALBUMIN g/dL 3.4* 3.9   BILIRUBIN mg/dL 0.3 <0.2   ALK PHOS U/L 88 103   AST  (SGOT) U/L 20 16   ALT (SGPT) U/L 17 18     Results from last 7 days   Lab Units 01/03/23  0613 01/01/23  0547 12/31/22  1752 12/29/22  1245   CALCIUM mg/dL 9.1 8.4* 8.9 8.7   ALBUMIN g/dL  --   --  3.4* 3.9   MAGNESIUM mg/dL 2.1  --   --   --    PHOSPHORUS mg/dL 4.7*  --   --   --        Results from last 7 days   Lab Units 01/01/23  0547 12/31/22  1752   CK TOTAL U/L 14* 18*           Invalid input(s): LDLCALC        Test Results Pending at Discharge       Discharge Details        Discharge Medications      New Medications      Instructions Start Date   HYDROcodone-acetaminophen 7.5-325 MG per tablet  Commonly known as: NORCO   1 tablet, Oral, Every 4 Hours PRN      levETIRAcetam 1000 MG tablet  Commonly known as: KEPPRA   1,000 mg, Oral, 2 Times Daily         Continue These Medications      Instructions Start Date   apixaban 5 MG tablet tablet  Commonly known as: ELIQUIS   5 mg, Oral, 2 Times Daily, Last filled 4/12/21 - pt states she was instructed to stop taking medication prior to ERCP ~1 month ago and was not instructed to restart medication.  Indication: Splenic infarct       baclofen 10 MG tablet  Commonly known as: LIORESAL   10 mg, Oral, Every 8 Hours Scheduled      Calcium Carbonate-Vitamin D 600-200 MG-UNIT tablet   1 tablet, Oral, Daily      dicyclomine 20 MG tablet  Commonly known as: BENTYL   20 mg, Oral, Every 6 Hours      FLUoxetine 40 MG capsule  Commonly known as: PROzac   60 mg, Oral, Nightly      folic acid 1 MG tablet  Commonly known as: FOLVITE   1 mg, Oral, Daily      gabapentin 800 MG tablet  Commonly known as: NEURONTIN   800 mg, Oral, 4 Times Daily      hydrOXYzine 25 MG tablet  Commonly known as: ATARAX   1-2 tablets, Oral, 3 Times Daily PRN      loratadine 10 MG tablet  Commonly known as: CLARITIN   10 mg, Oral, Daily      LORazepam 0.5 MG tablet  Commonly known as: ATIVAN   0.25-0.5 mg, Oral      magnesium oxide 400 MG tablet  Commonly known as: MAG-OX   400 mg, Oral, Daily       melatonin 5 MG tablet tablet   10 mg, Oral, Nightly, Patient taes 20 mg at home      ondansetron ODT 8 MG disintegrating tablet  Commonly known as: ZOFRAN-ODT   8 mg, Translingual, Every 8 Hours PRN      pantoprazole 40 MG EC tablet  Commonly known as: PROTONIX   40 mg, Oral, 2 Times Daily      polyethylene glycol 17 g packet  Commonly known as: MIRALAX   17 g, Oral, Daily      sucralfate 1 g tablet  Commonly known as: CARAFATE   1 g, Oral, 4 Times Daily      SUMAtriptan 100 MG tablet  Commonly known as: IMITREX   50 mg, Oral, Every 2 Hours PRN, Take one tablet at onset of headache. May repeat dose one time in 2 hours if headache not relieved.       traZODone 50 MG tablet  Commonly known as: DESYREL   Take 1-2 tablets by mouth At Night As Needed for sleep      Vitamin B-2 100 MG tablet tablet  Commonly known as: RIBOFLAVIN   400 mg, Oral, Daily      vitamin D 1.25 MG (03331 UT) capsule capsule  Commonly known as: ERGOCALCIFEROL   50,000 Units, Oral, Weekly         Stop These Medications    dexamethasone 4 MG tablet  Commonly known as: DECADRON     divalproex 500 MG DR tablet  Commonly known as: DEPAKOTE            Allergies   Allergen Reactions   • Lidocaine Rash     Pt reports lidocaine patches make her breakout in a rash         Discharge Disposition:  Home or Self Care    Discharge Diet:  Diet Order   Procedures   • Diet: Cardiac Diets; Healthy Heart (2-3 Na+); Texture: Regular Texture (IDDSI 7); Fluid Consistency: Thin (IDDSI 0)       Discharge Activity: as tolerated       CODE STATUS:    Code Status and Medical Interventions:   Ordered at: 01/01/23 0111     Code Status (Patient has no pulse and is not breathing):    CPR (Attempt to Resuscitate)     Medical Interventions (Patient has pulse or is breathing):    Full Support       Future Appointments   Date Time Provider Department Center   5/18/2023  1:00 PM Cm Sandoval MD [facility]      Follow-up Information     Sahil Cornelius MD .     Specialty: Internal Medicine  Why: Make an appointment within 2 weeks of discharge for general hospital follow-up  Contact information:  [address]  [facility] [address] [phone]                         Time Spent on Discharge:  Greater than 30 minutes      Robbin Cali MD  [facility]  01/03/23  11:48 EST

## 2023-01-03 NOTE — PROGRESS NOTES
Case Management Discharge Note      Final Note: Pt discharging home and requests outpatient PT/OT orders (provided at bedside). Pt plans to schedule outpatient therapy at [facility], verbal instructions provided. No additional needs identified. Jazlyn Bearedn LCSW         Selected Continued Care - Admitted Since 12/31/2022     Destination    No services have been selected for the patient.              Durable Medical Equipment    No services have been selected for the patient.              Dialysis/Infusion    No services have been selected for the patient.              Home Medical Care    No services have been selected for the patient.              Therapy    No services have been selected for the patient.              Community Resources    No services have been selected for the patient.              Community & DME    No services have been selected for the patient.                  Transportation Services  Private: Car    Final Discharge Disposition Code: 01 - home or self-care

## 2023-01-03 NOTE — PROGRESS NOTES
Patient seen briefly while Dr. ALETHA Cali was seeing her. No seizure activity overnight. 24h EEG normal/negative in awake/sleep states. She has been started on keppra 1g BID, thus far no S/Es. Continue folic acid on D/C. OK for d/c from neurology standpoint. F/u with Dr Sandoval as outpatient as scheduled.

## 2023-01-16 ENCOUNTER — HOSPITAL ENCOUNTER (OUTPATIENT)
Facility: HOSPITAL | Age: 43
Setting detail: OBSERVATION
Discharge: HOME | End: 2023-01-18
Attending: EMERGENCY MEDICINE | Admitting: HOSPITALIST
Payer: COMMERCIAL

## 2023-01-16 ENCOUNTER — APPOINTMENT (OUTPATIENT)
Dept: CT IMAGING | Facility: HOSPITAL | Age: 43
End: 2023-01-16
Payer: COMMERCIAL

## 2023-01-16 DIAGNOSIS — R56.9 SEIZURE: Primary | ICD-10-CM

## 2023-01-16 PROBLEM — F11.20 OPIOID DEPENDENCE (HCC): Status: ACTIVE | Noted: 2023-01-16

## 2023-01-16 LAB
ALBUMIN SERPL-MCNC: 3.7 G/DL (ref 3.5–5.2)
ALBUMIN/GLOB SERPL: 1.9 G/DL
ALP SERPL-CCNC: 129 U/L (ref 39–117)
ALT SERPL W P-5'-P-CCNC: 21 U/L (ref 1–33)
AMPHET+METHAMPHET UR QL: POSITIVE
ANION GAP SERPL CALCULATED.3IONS-SCNC: 7.1 MMOL/L (ref 5–15)
AST SERPL-CCNC: 19 U/L (ref 1–32)
BARBITURATES UR QL SCN: NEGATIVE
BASOPHILS # BLD AUTO: 0.03 10*3/MM3 (ref 0–0.2)
BASOPHILS NFR BLD AUTO: 0.5 % (ref 0–1.5)
BENZODIAZ UR QL SCN: NEGATIVE
BILIRUB SERPL-MCNC: <0.2 MG/DL (ref 0–1.2)
BILIRUB UR QL STRIP: NEGATIVE
BUN SERPL-MCNC: 9 MG/DL (ref 6–20)
BUN/CREAT SERPL: 12.3 (ref 7–25)
CALCIUM SPEC-SCNC: 8.6 MG/DL (ref 8.6–10.5)
CANNABINOIDS SERPL QL: POSITIVE
CHLORIDE SERPL-SCNC: 107 MMOL/L (ref 98–107)
CLARITY UR: CLEAR
CO2 SERPL-SCNC: 27.9 MMOL/L (ref 22–29)
COCAINE UR QL: NEGATIVE
COLOR UR: ABNORMAL
CREAT SERPL-MCNC: 0.73 MG/DL (ref 0.57–1)
DEPRECATED RDW RBC AUTO: 49.8 FL (ref 37–54)
EGFRCR SERPLBLD CKD-EPI 2021: 106.1 ML/MIN/1.73
EOSINOPHIL # BLD AUTO: 0.17 10*3/MM3 (ref 0–0.4)
EOSINOPHIL NFR BLD AUTO: 2.8 % (ref 0.3–6.2)
ERYTHROCYTE [DISTWIDTH] IN BLOOD BY AUTOMATED COUNT: 14.4 % (ref 12.3–15.4)
ETHANOL BLD-MCNC: <10 MG/DL (ref 0–10)
ETHANOL UR QL: <0.01 %
GLOBULIN UR ELPH-MCNC: 1.9 GM/DL
GLUCOSE SERPL-MCNC: 88 MG/DL (ref 65–99)
GLUCOSE UR STRIP-MCNC: NEGATIVE MG/DL
HCT VFR BLD AUTO: 36.3 % (ref 34–46.6)
HGB BLD-MCNC: 12 G/DL (ref 12–15.9)
HGB UR QL STRIP.AUTO: NEGATIVE
IMM GRANULOCYTES # BLD AUTO: 0.01 10*3/MM3 (ref 0–0.05)
IMM GRANULOCYTES NFR BLD AUTO: 0.2 % (ref 0–0.5)
KETONES UR QL STRIP: NEGATIVE
LEUKOCYTE ESTERASE UR QL STRIP.AUTO: NEGATIVE
LYMPHOCYTES # BLD AUTO: 2.24 10*3/MM3 (ref 0.7–3.1)
LYMPHOCYTES NFR BLD AUTO: 36.7 % (ref 19.6–45.3)
MCH RBC QN AUTO: 31.8 PG (ref 26.6–33)
MCHC RBC AUTO-ENTMCNC: 33.1 G/DL (ref 31.5–35.7)
MCV RBC AUTO: 96.3 FL (ref 79–97)
METHADONE UR QL SCN: NEGATIVE
MONOCYTES # BLD AUTO: 0.38 10*3/MM3 (ref 0.1–0.9)
MONOCYTES NFR BLD AUTO: 6.2 % (ref 5–12)
NEUTROPHILS NFR BLD AUTO: 3.28 10*3/MM3 (ref 1.7–7)
NEUTROPHILS NFR BLD AUTO: 53.6 % (ref 42.7–76)
NITRITE UR QL STRIP: NEGATIVE
NRBC BLD AUTO-RTO: 0 /100 WBC (ref 0–0.2)
OPIATES UR QL: NEGATIVE
OXYCODONE UR QL SCN: NEGATIVE
PH UR STRIP.AUTO: 6 [PH] (ref 5–8)
PLATELET # BLD AUTO: 238 10*3/MM3 (ref 140–450)
PMV BLD AUTO: 9.9 FL (ref 6–12)
POTASSIUM SERPL-SCNC: 4.1 MMOL/L (ref 3.5–5.2)
PROT SERPL-MCNC: 5.6 G/DL (ref 6–8.5)
PROT UR QL STRIP: NEGATIVE
RBC # BLD AUTO: 3.77 10*6/MM3 (ref 3.77–5.28)
SODIUM SERPL-SCNC: 142 MMOL/L (ref 136–145)
SP GR UR STRIP: 1.02 (ref 1–1.03)
UROBILINOGEN UR QL STRIP: ABNORMAL
WBC NRBC COR # BLD: 6.11 10*3/MM3 (ref 3.4–10.8)

## 2023-01-16 PROCEDURE — 96376 TX/PRO/DX INJ SAME DRUG ADON: CPT

## 2023-01-16 PROCEDURE — 25010000002 ONDANSETRON PER 1 MG: Performed by: EMERGENCY MEDICINE

## 2023-01-16 PROCEDURE — 80053 COMPREHEN METABOLIC PANEL: CPT | Performed by: EMERGENCY MEDICINE

## 2023-01-16 PROCEDURE — 80307 DRUG TEST PRSMV CHEM ANLYZR: CPT | Performed by: EMERGENCY MEDICINE

## 2023-01-16 PROCEDURE — P9612 CATHETERIZE FOR URINE SPEC: HCPCS

## 2023-01-16 PROCEDURE — G0378 HOSPITAL OBSERVATION PER HR: HCPCS

## 2023-01-16 PROCEDURE — 25010000002 LEVETRIRACETAM PER 10 MG: Performed by: EMERGENCY MEDICINE

## 2023-01-16 PROCEDURE — 70450 CT HEAD/BRAIN W/O DYE: CPT

## 2023-01-16 PROCEDURE — 81003 URINALYSIS AUTO W/O SCOPE: CPT | Performed by: EMERGENCY MEDICINE

## 2023-01-16 PROCEDURE — 99285 EMERGENCY DEPT VISIT HI MDM: CPT

## 2023-01-16 PROCEDURE — 85025 COMPLETE CBC W/AUTO DIFF WBC: CPT | Performed by: EMERGENCY MEDICINE

## 2023-01-16 PROCEDURE — 80177 DRUG SCRN QUAN LEVETIRACETAM: CPT | Performed by: EMERGENCY MEDICINE

## 2023-01-16 PROCEDURE — 96374 THER/PROPH/DIAG INJ IV PUSH: CPT

## 2023-01-16 PROCEDURE — 82077 ASSAY SPEC XCP UR&BREATH IA: CPT | Performed by: EMERGENCY MEDICINE

## 2023-01-16 PROCEDURE — 96375 TX/PRO/DX INJ NEW DRUG ADDON: CPT

## 2023-01-16 RX ORDER — SODIUM CHLORIDE 0.9 % (FLUSH) 0.9 %
10 SYRINGE (ML) INJECTION AS NEEDED
Status: DISCONTINUED | OUTPATIENT
Start: 2023-01-16 | End: 2023-01-19 | Stop reason: HOSPADM

## 2023-01-16 RX ORDER — BACLOFEN 10 MG/1
10 TABLET ORAL EVERY 8 HOURS SCHEDULED
Status: DISCONTINUED | OUTPATIENT
Start: 2023-01-17 | End: 2023-01-17

## 2023-01-16 RX ORDER — FLUOXETINE HYDROCHLORIDE 20 MG/1
60 CAPSULE ORAL NIGHTLY
Status: DISCONTINUED | OUTPATIENT
Start: 2023-01-16 | End: 2023-01-19 | Stop reason: HOSPADM

## 2023-01-16 RX ORDER — VANCOMYCIN HYDROCHLORIDE 125 MG/1
125 CAPSULE ORAL EVERY 12 HOURS
Status: DISCONTINUED | OUTPATIENT
Start: 2023-01-26 | End: 2023-01-19 | Stop reason: HOSPADM

## 2023-01-16 RX ORDER — OXYCODONE HYDROCHLORIDE AND ACETAMINOPHEN 5; 325 MG/1; MG/1
1 TABLET ORAL ONCE
Status: COMPLETED | OUTPATIENT
Start: 2023-01-16 | End: 2023-01-16

## 2023-01-16 RX ORDER — LEVETIRACETAM 500 MG/5ML
1000 INJECTION, SOLUTION, CONCENTRATE INTRAVENOUS ONCE
Status: COMPLETED | OUTPATIENT
Start: 2023-01-16 | End: 2023-01-16

## 2023-01-16 RX ORDER — SODIUM CHLORIDE 0.9 % (FLUSH) 0.9 %
10 SYRINGE (ML) INJECTION EVERY 12 HOURS SCHEDULED
Status: DISCONTINUED | OUTPATIENT
Start: 2023-01-16 | End: 2023-01-19 | Stop reason: HOSPADM

## 2023-01-16 RX ORDER — HYDROXYZINE HYDROCHLORIDE 25 MG/1
25-50 TABLET, FILM COATED ORAL 4 TIMES DAILY
COMMUNITY
Start: 2022-10-09 | End: 2023-01-18 | Stop reason: HOSPADM

## 2023-01-16 RX ORDER — MELATONIN 10 MG
30 CAPSULE ORAL DAILY
COMMUNITY
End: 2023-01-18 | Stop reason: HOSPADM

## 2023-01-16 RX ORDER — NITROGLYCERIN 0.4 MG/1
0.4 TABLET SUBLINGUAL
Status: DISCONTINUED | OUTPATIENT
Start: 2023-01-16 | End: 2023-01-19 | Stop reason: HOSPADM

## 2023-01-16 RX ORDER — SUCRALFATE 1 G/1
1 TABLET ORAL 4 TIMES DAILY
Status: DISCONTINUED | OUTPATIENT
Start: 2023-01-17 | End: 2023-01-19 | Stop reason: HOSPADM

## 2023-01-16 RX ORDER — FOLIC ACID 1 MG/1
1 TABLET ORAL DAILY
Status: DISCONTINUED | OUTPATIENT
Start: 2023-01-17 | End: 2023-01-19 | Stop reason: HOSPADM

## 2023-01-16 RX ORDER — HYDROXYZINE HYDROCHLORIDE 25 MG/1
25 TABLET, FILM COATED ORAL 3 TIMES DAILY PRN
Status: DISCONTINUED | OUTPATIENT
Start: 2023-01-16 | End: 2023-01-19 | Stop reason: HOSPADM

## 2023-01-16 RX ORDER — VANCOMYCIN HYDROCHLORIDE 125 MG/1
125 CAPSULE ORAL EVERY OTHER DAY
Status: DISCONTINUED | OUTPATIENT
Start: 2023-02-09 | End: 2023-01-19 | Stop reason: HOSPADM

## 2023-01-16 RX ORDER — ONDANSETRON 2 MG/ML
4 INJECTION INTRAMUSCULAR; INTRAVENOUS ONCE
Status: COMPLETED | OUTPATIENT
Start: 2023-01-16 | End: 2023-01-16

## 2023-01-16 RX ORDER — VANCOMYCIN HYDROCHLORIDE 125 MG/1
125 CAPSULE ORAL EVERY 6 HOURS SCHEDULED
Status: DISCONTINUED | OUTPATIENT
Start: 2023-01-17 | End: 2023-01-19 | Stop reason: HOSPADM

## 2023-01-16 RX ORDER — ONDANSETRON 4 MG/1
8 TABLET, ORALLY DISINTEGRATING ORAL EVERY 8 HOURS PRN
Status: DISCONTINUED | OUTPATIENT
Start: 2023-01-16 | End: 2023-01-19 | Stop reason: HOSPADM

## 2023-01-16 RX ORDER — GABAPENTIN 300 MG/1
600 CAPSULE ORAL EVERY 8 HOURS SCHEDULED
Status: DISCONTINUED | OUTPATIENT
Start: 2023-01-16 | End: 2023-01-19 | Stop reason: HOSPADM

## 2023-01-16 RX ORDER — FLUOXETINE HYDROCHLORIDE 20 MG/1
60 CAPSULE ORAL DAILY
Status: DISCONTINUED | OUTPATIENT
Start: 2023-01-17 | End: 2023-01-16 | Stop reason: SDUPTHER

## 2023-01-16 RX ORDER — METHOCARBAMOL 500 MG/1
250-500 TABLET, FILM COATED ORAL
COMMUNITY
Start: 2022-10-27 | End: 2023-01-18 | Stop reason: HOSPADM

## 2023-01-16 RX ORDER — DICYCLOMINE HYDROCHLORIDE 10 MG/1
20 CAPSULE ORAL 3 TIMES DAILY
Status: DISCONTINUED | OUTPATIENT
Start: 2023-01-17 | End: 2023-01-19 | Stop reason: HOSPADM

## 2023-01-16 RX ORDER — GABAPENTIN 600 MG/1
800 TABLET ORAL 4 TIMES DAILY
COMMUNITY
Start: 2022-12-22 | End: 2023-01-18 | Stop reason: HOSPADM

## 2023-01-16 RX ORDER — TRAZODONE HYDROCHLORIDE 100 MG/1
100-200 TABLET ORAL NIGHTLY PRN
COMMUNITY
Start: 2022-12-06 | End: 2023-01-18 | Stop reason: HOSPADM

## 2023-01-16 RX ORDER — TRAZODONE HYDROCHLORIDE 50 MG/1
50 TABLET ORAL NIGHTLY PRN
Status: DISCONTINUED | OUTPATIENT
Start: 2023-01-16 | End: 2023-01-19 | Stop reason: HOSPADM

## 2023-01-16 RX ORDER — SODIUM CHLORIDE 9 MG/ML
40 INJECTION, SOLUTION INTRAVENOUS AS NEEDED
Status: DISCONTINUED | OUTPATIENT
Start: 2023-01-16 | End: 2023-01-19 | Stop reason: HOSPADM

## 2023-01-16 RX ORDER — KETOROLAC TROMETHAMINE 15 MG/ML
15 INJECTION, SOLUTION INTRAMUSCULAR; INTRAVENOUS ONCE
Status: DISCONTINUED | OUTPATIENT
Start: 2023-01-16 | End: 2023-01-19 | Stop reason: HOSPADM

## 2023-01-16 RX ORDER — VANCOMYCIN HYDROCHLORIDE 125 MG/1
125 CAPSULE ORAL EVERY 24 HOURS
Status: DISCONTINUED | OUTPATIENT
Start: 2023-02-02 | End: 2023-01-19 | Stop reason: HOSPADM

## 2023-01-16 RX ORDER — FLUOXETINE HYDROCHLORIDE 20 MG/1
60 CAPSULE ORAL DAILY
COMMUNITY
Start: 2022-10-27 | End: 2023-01-18 | Stop reason: HOSPADM

## 2023-01-16 RX ORDER — CETIRIZINE HYDROCHLORIDE 10 MG/1
10 TABLET ORAL DAILY
Status: DISCONTINUED | OUTPATIENT
Start: 2023-01-17 | End: 2023-01-19 | Stop reason: HOSPADM

## 2023-01-16 RX ORDER — LEVETIRACETAM 500 MG/1
2000 TABLET ORAL EVERY 12 HOURS SCHEDULED
Status: DISCONTINUED | OUTPATIENT
Start: 2023-01-17 | End: 2023-01-17

## 2023-01-16 RX ORDER — PANTOPRAZOLE SODIUM 40 MG/1
40 TABLET, DELAYED RELEASE ORAL 2 TIMES DAILY
Status: DISCONTINUED | OUTPATIENT
Start: 2023-01-16 | End: 2023-01-17

## 2023-01-16 RX ADMIN — Medication 10 ML: at 23:13

## 2023-01-16 RX ADMIN — OXYCODONE AND ACETAMINOPHEN 1 TABLET: 5; 325 TABLET ORAL at 19:08

## 2023-01-16 RX ADMIN — LEVETIRACETAM 1000 MG: 100 INJECTION, SOLUTION INTRAVENOUS at 19:21

## 2023-01-16 RX ADMIN — LEVETIRACETAM 1000 MG: 100 INJECTION INTRAVENOUS at 17:36

## 2023-01-16 RX ADMIN — ONDANSETRON 4 MG: 2 INJECTION INTRAMUSCULAR; INTRAVENOUS at 18:04

## 2023-01-16 NOTE — ED TRIAGE NOTES
Pt comes from home for multiple seizures since last night. Pt states she was recently switched from Depakote to keppra. Pt alert/oriented in triage.     Pt and RN wearing mask upon triage.

## 2023-01-16 NOTE — ED PROVIDER NOTES
EMERGENCY DEPARTMENT ENCOUNTER    Room Number:  P579/1  Date seen:  1/16/2023  PCP: Sahil Cornelius MD      HPI:  Chief Complaint: Seizure  A complete HPI/ROS/PMH/PSH/SH/FH are unobtainable due to: None  Context: Belen Allen is a 42 y.o. female who presents to the ED c/o seizure.  She states that she had a generalized tonic-clonic seizure last night.  She then states that she had 3 small seizures today that are not well characterized.  These were witnessed by her .  During her last hospitalization, she was switched from Depakote to Keppra.  No fever.          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   • Generalized abdominal pain 11/29/2020   • Acute bilateral low back pain 11/29/2020   • Antiphospholipid antibody positive 11/29/2020   • On anticoagulant therapy 11/29/2020   • Acute pain of left knee 11/29/2020   • Vitamin D deficiency 11/30/2020   • Folate deficiency 12/01/2020   • Pain of back and left lower extremity 12/02/2020   • Common bile duct dilatation 05/31/2021   • Elevated LFTs 06/01/2021   • Right upper quadrant abdominal pain 06/01/2021   • Obesity (BMI 30-39.9) 04/18/2019   • Tobacco abuse 06/02/2021   • GERD without esophagitis 06/02/2021   • Intractable abdominal pain 07/05/2021   • Obesity, Class III, BMI 40-49.9 (morbid obesity) ([facility]) 07/05/2021   • Constipation 07/05/2021   • History of ERCP 07/05/2021   • Other chronic pain 07/05/2021   • Seizures ([facility]) 01/01/2022   • Syncope 01/01/2022   • Lumbar back pain with radiculopathy affecting left lower extremity 01/05/2022   • Right upper quadrant pain 11/23/2022   • Uncontrolled pain 12/17/2022   • Intractable back pain 12/17/2022   • Sciatica of right side 12/20/2022   • Migraine 12/20/2022   • Mobility impaired 12/20/2022   • Seizure ([facility]) 12/31/2022     Resolved Ambulatory Problems     Diagnosis  Date Noted   • Choledocholithiasis 06/02/2021   • Rhabdomyolysis 01/01/2022   • Encephalopathy 01/01/2022   • Hypokalemia 12/18/2022     Past Medical History:   Diagnosis Date   • Abnormal Pap smear of cervix    • Ankle fracture 2013   • H/O Elevated liver enzymes    • History of chronic back pain    • History of migraine    • History of urinary tract infection    • Injury of back    • Opioid dependence (HCC) 1/16/2023   • PONV (postoperative nausea and vomiting)    • Seasonal allergies          PAST SURGICAL HISTORY  Past Surgical History:   Procedure Laterality Date   • BACK SURGERY  2006    fusion L4, L5     • CHOLECYSTECTOMY  2014   • DILATATION AND CURETTAGE  2009   • ENDOSCOPY N/A 11/27/2022    Procedure: ESOPHAGOGASTRODUODENOSCOPY with biopsy;  Surgeon: Christiana Mann MD;  Location: [facility] ENDOSCOPY;  Service: Gastroenterology;  Laterality: N/A;  gastritis, duodenitis, irregular z line   • ERCP N/A 6/3/2021    Procedure: ENDOSCOPIC RETROGRADE CHOLANGIOPANCREATOGRAPHY WITH SPHINCTEROTOMY, DILATION WITH BALLOON CLEARANCE  (12MM-15MM);  Surgeon: Bjorn Flannery MD;  Location: [facility] ENDOSCOPY;  Service: Gastroenterology;  Laterality: N/A;  DILATED COMMON BILE DUCT   • SKIN SURGERY     • TUBAL ABDOMINAL LIGATION  2013   • WISDOM TOOTH EXTRACTION  2018    x2         FAMILY HISTORY  Family History   Problem Relation Age of Onset   • Stroke Mother    • Allergic rhinitis Mother    • Allergic rhinitis Sister    • Breast cancer Maternal Aunt    • Cancer Maternal Grandmother    • Allergic rhinitis Paternal Grandfather    • Cancer Paternal Grandfather    • Prostate cancer Paternal Grandfather          SOCIAL HISTORY  Social History     Socioeconomic History   • Marital status: Single   • Number of children: 2   Tobacco Use   • Smoking status: Every Day     Packs/day: 0.50     Types: Cigarettes   • Smokeless tobacco: Never   Vaping Use   • Vaping Use: Never used   Substance and Sexual Activity   • Alcohol use:  Not Currently   • Drug use: Not Currently   • Sexual activity: Defer         ALLERGIES  Lidocaine        REVIEW OF SYSTEMS  Review of Systems     All systems reviewed and negative except for those discussed in HPI.       PHYSICAL EXAM  ED Triage Vitals [01/16/23 1655]   Temp Heart Rate Resp BP SpO2   98.1 °F (36.7 °C) 78 18 100/55 99 %      Temp src Heart Rate Source Patient Position BP Location FiO2 (%)   -- -- -- -- --       Physical Exam      GENERAL: no acute distress  HENT: nares patent  EYES: no scleral icterus  CV: regular rhythm, normal rate  RESPIRATORY: normal effort, clear to auscultation bilaterally  ABDOMEN: soft, nontender  MUSCULOSKELETAL: no deformity  NEURO:   Recent and remote memory functions are normal. The patient is attentive with normal concentration. Language is fluent. Speech is clear. The speech is non-dysarthric. Fund of knowledge is normal.   Symmetric smile with no facial droop.  Eyes close shut strongly bilaterally.  Symmetric eyebrow raise bilaterally.  EOMI, PERRL  CN II-XII grossly normal otherwise.  5/5 strength to extremities.  No pronator drift.  Intact FNF.  No meningismus.  PSYCH:  calm, cooperative  SKIN: warm, dry    Vital signs and nursing notes reviewed.          LAB RESULTS  Recent Results (from the past 24 hour(s))   Comprehensive Metabolic Panel    Collection Time: 01/16/23  5:15 PM    Specimen: Arm, Right; Blood   Result Value Ref Range    Glucose 88 65 - 99 mg/dL    BUN 9 6 - 20 mg/dL    Creatinine 0.73 0.57 - 1.00 mg/dL    Sodium 142 136 - 145 mmol/L    Potassium 4.1 3.5 - 5.2 mmol/L    Chloride 107 98 - 107 mmol/L    CO2 27.9 22.0 - 29.0 mmol/L    Calcium 8.6 8.6 - 10.5 mg/dL    Total Protein 5.6 (L) 6.0 - 8.5 g/dL    Albumin 3.7 3.5 - 5.2 g/dL    ALT (SGPT) 21 1 - 33 U/L    AST (SGOT) 19 1 - 32 U/L    Alkaline Phosphatase 129 (H) 39 - 117 U/L    Total Bilirubin <0.2 0.0 - 1.2 mg/dL    Globulin 1.9 gm/dL    A/G Ratio 1.9 g/dL    BUN/Creatinine Ratio 12.3 7.0 - 25.0     Anion Gap 7.1 5.0 - 15.0 mmol/L    eGFR 106.1 >60.0 mL/min/1.73   CBC Auto Differential    Collection Time: 01/16/23  5:15 PM    Specimen: Arm, Right; Blood   Result Value Ref Range    WBC 6.11 3.40 - 10.80 10*3/mm3    RBC 3.77 3.77 - 5.28 10*6/mm3    Hemoglobin 12.0 12.0 - 15.9 g/dL    Hematocrit 36.3 34.0 - 46.6 %    MCV 96.3 79.0 - 97.0 fL    MCH 31.8 26.6 - 33.0 pg    MCHC 33.1 31.5 - 35.7 g/dL    RDW 14.4 12.3 - 15.4 %    RDW-SD 49.8 37.0 - 54.0 fl    MPV 9.9 6.0 - 12.0 fL    Platelets 238 140 - 450 10*3/mm3    Neutrophil % 53.6 42.7 - 76.0 %    Lymphocyte % 36.7 19.6 - 45.3 %    Monocyte % 6.2 5.0 - 12.0 %    Eosinophil % 2.8 0.3 - 6.2 %    Basophil % 0.5 0.0 - 1.5 %    Immature Grans % 0.2 0.0 - 0.5 %    Neutrophils, Absolute 3.28 1.70 - 7.00 10*3/mm3    Lymphocytes, Absolute 2.24 0.70 - 3.10 10*3/mm3    Monocytes, Absolute 0.38 0.10 - 0.90 10*3/mm3    Eosinophils, Absolute 0.17 0.00 - 0.40 10*3/mm3    Basophils, Absolute 0.03 0.00 - 0.20 10*3/mm3    Immature Grans, Absolute 0.01 0.00 - 0.05 10*3/mm3    nRBC 0.0 0.0 - 0.2 /100 WBC   Ethanol    Collection Time: 01/16/23  5:15 PM    Specimen: Arm, Right; Blood   Result Value Ref Range    Ethanol <10 0 - 10 mg/dL    Ethanol % <0.010 %   Urinalysis With Microscopic If Indicated (No Culture) - Straight Cath    Collection Time: 01/16/23  5:28 PM    Specimen: Straight Cath; Urine   Result Value Ref Range    Color, UA Dark Yellow (A) Yellow, Straw    Appearance, UA Clear Clear    pH, UA 6.0 5.0 - 8.0    Specific Gravity, UA 1.022 1.005 - 1.030    Glucose, UA Negative Negative    Ketones, UA Negative Negative    Bilirubin, UA Negative Negative    Blood, UA Negative Negative    Protein, UA Negative Negative    Leuk Esterase, UA Negative Negative    Nitrite, UA Negative Negative    Urobilinogen, UA 0.2 E.U./dL 0.2 - 1.0 E.U./dL   Urine Drug Screen - Straight Cath    Collection Time: 01/16/23  5:28 PM    Specimen: Straight Cath; Urine   Result Value Ref Range     Amphet/Methamphet, Screen Positive (A) Negative    Barbiturates Screen, Urine Negative Negative    Benzodiazepine Screen, Urine Negative Negative    Cocaine Screen, Urine Negative Negative    Opiate Screen Negative Negative    THC, Screen, Urine Positive (A) Negative    Methadone Screen, Urine Negative Negative    Oxycodone Screen, Urine Negative Negative       Ordered the above labs and reviewed the results.        RADIOLOGY  CT Head Without Contrast    Result Date: 1/16/2023  HEAD CT WITHOUT CONTRAST  HISTORY: Seizure with a fall.  TECHNIQUE: Noncontrast head CT correlated with head CT 12/31/2022.  Radiation dose reduction techniques were utilized, including automated exposure control and exposure modulation based on body size.  FINDINGS: The ventricles are normal in caliber. The brain parenchyma, extra-axial spaces, and the bones of the skull appear normal. Paranasal sinuses, mastoid air cells, middle ears, and orbital structures appear normal. No soft tissue contusion at the scalp.      Negative.  This report was finalized on 1/16/2023 7:45 PM by Dr. Dennys Eckert M.D.        Ordered the above noted radiological studies. Reviewed by me in PACS.          PROCEDURES  Procedures        MEDICATIONS GIVEN IN ER  Medications   sodium chloride 0.9 % flush 10 mL (has no administration in time range)   ketorolac (TORADOL) injection 15 mg (15 mg Intravenous Not Given 1/16/23 1804)   sodium chloride 0.9 % flush 10 mL (has no administration in time range)   sodium chloride 0.9 % flush 10 mL (has no administration in time range)   sodium chloride 0.9 % infusion 40 mL (has no administration in time range)   nitroglycerin (NITROSTAT) SL tablet 0.4 mg (has no administration in time range)   levETIRAcetam (KEPPRA) injection 1,000 mg (1,000 mg Intravenous Given 1/16/23 1736)   ondansetron (ZOFRAN) injection 4 mg (4 mg Intravenous Given 1/16/23 1804)   oxyCODONE-acetaminophen (PERCOCET) 5-325 MG per tablet 1 tablet (1 tablet  Oral Given 1/16/23 1908)   levETIRAcetam (KEPPRA) injection 1,000 mg (1,000 mg Intravenous Given 1/16/23 1921)           MEDICAL DECISION MAKING, PROGRESS, and CONSULTS    All labs have been independently reviewed by me.  All radiology studies have been reviewed by me and discussed with radiologist dictating the report.   EKG's independently viewed and interpreted by me.  Discussion below represents my analysis of pertinent findings related to patient's condition, differential diagnosis, treatment plan and final disposition.      Orders placed during this visit:  Orders Placed This Encounter   Procedures   • CT Head Without Contrast   • Comprehensive Metabolic Panel   • Urinalysis With Microscopic If Indicated (No Culture) - Urine, Clean Catch   • CBC Auto Differential   • Urine Drug Screen - Urine, Clean Catch   • Levetiracetam Level (Keppra)   • Ethanol   • Potassium   • Magnesium   • Troponin   • Blood Gas, Arterial -   • Basic Metabolic Panel   • Diet: Regular/House Diet; Texture: Regular Texture (IDDSI 7); Fluid Consistency: Thin (IDDSI 0)   • Monitor Blood Pressure   • Vital Signs   • Intake & Output   • Weigh Patient   • Oral Care   • Place Sequential Compression Device   • Maintain Sequential Compression Device   • Telemetry - Maintain IV Access   • May Be Off Telemetry for Tests   • Notify Provider if ACLS Protocol Activated   • Cardiac Monitoring   • Pulse Oximetry, Continuous   • Up With Assistance   • Neuro Checks   • Daily Weights   • Code Status and Medical Interventions:   • Inpatient Neurology Consult General   • [facility] (on-call MD unless specified) Details   • Oxygen Therapy- Nasal Cannula; Titrate for SPO2: 90% - 95%   • Oxygen Therapy- Nasal Cannula; Titrate for SPO2: 90% - 95%   • ECG 12 Lead Other; Acute Chest Pain or Dysrhythmia   • Insert Peripheral IV   • Insert Peripheral IV   • Initiate Observation Status   • Seizure Precautions   • Fall Precautions   • CBC & Differential   • CBC &  Differential         Additional sources:  - Discussed/obtained information from independent historians:  at bedside  Additional information was obtained to confirm the patient's history.    - External (non-ED) record review: See summary below          Differential diagnosis:    Differential diagnosis for seizure includes:  - vital sign abnormalities  - toxic/metabolic   - infectious etiology  - intracranial pathology such as stroke, seizure, intracranial mass, intracranial hemorrhage  - epilepsy/psych          Independent interpretation of labs, radiology studies, and discussions with consultants:  ED Course as of 01/16/23 2135   Mon Jan 16, 2023   1711 On medical chart review, patient was seen for seizures on December 31.  She was discharged on 1/3/2023.  Patient reported compliance but in the ER her levels were subtherapeutic with multiple seizures occurring on a daily basis.  Patient transition from Depakote to Keppra.  EEG was done that did not show any epileptic foci.  Discharged on Keppra 1000 mg twice daily. [TD]   1911 I discussed the case with on-call neurology.  Recommendation is to admit.  Recommends 2 g Keppra load.  He leaves the patient ultimately need a video EEG. [TD]   1912 THC Screen, Urine(!): Positive [TD]   1912 Amphet/Methamphet, Screen(!): Positive [TD]   1912 Ethanol: <10 [TD]   1912 WBC: 6.11 [TD]   1912 Hemoglobin: 12.0 [TD]      ED Course User Index  [TD] Christian Thurston II, MD       I discussed the case with NELSON Singletary for hospitalist medicine.  We reviewed patient's labs, history, imaging.  She will admit.           PPE: The patient wore a mask throughout the entire encounter. I wore a well-fitting mask.    DIAGNOSIS  Final diagnoses:   Seizure (HCC)         DISPOSITION  Admit      Latest Documented Vital Signs:  As of 21:35 EST  BP- 105/68 HR- 77 Temp- 97.3 °F (36.3 °C) (Tympanic) O2 sat- 97%      --    Please note that portions of this were completed with a voice  recognition program.       Note Disclaimer: At [facility], we believe that sharing information builds trust and better relationships. You are receiving this note because you are receiving care at [facility] or recently visited. It is possible you will see health information before a provider has talked with you about it. This kind of information can be easy to misunderstand. To help you fully understand what it means for your health, we urge you to discuss this note with your provider.       Christian Thurston II, MD  01/16/23 9672

## 2023-01-17 ENCOUNTER — APPOINTMENT (OUTPATIENT)
Dept: NEUROLOGY | Facility: HOSPITAL | Age: 43
End: 2023-01-17
Payer: COMMERCIAL

## 2023-01-17 ENCOUNTER — APPOINTMENT (OUTPATIENT)
Dept: MRI IMAGING | Facility: HOSPITAL | Age: 43
End: 2023-01-17
Payer: COMMERCIAL

## 2023-01-17 LAB
ANION GAP SERPL CALCULATED.3IONS-SCNC: 7.9 MMOL/L (ref 5–15)
BUN SERPL-MCNC: 10 MG/DL (ref 6–20)
BUN/CREAT SERPL: 14.1 (ref 7–25)
CALCIUM SPEC-SCNC: 8.8 MG/DL (ref 8.6–10.5)
CHLORIDE SERPL-SCNC: 106 MMOL/L (ref 98–107)
CO2 SERPL-SCNC: 26.1 MMOL/L (ref 22–29)
CREAT SERPL-MCNC: 0.71 MG/DL (ref 0.57–1)
DEPRECATED RDW RBC AUTO: 49.2 FL (ref 37–54)
EGFRCR SERPLBLD CKD-EPI 2021: 109.7 ML/MIN/1.73
EOSINOPHIL # BLD MANUAL: 0.23 10*3/MM3 (ref 0–0.4)
EOSINOPHIL NFR BLD MANUAL: 5.2 % (ref 0.3–6.2)
ERYTHROCYTE [DISTWIDTH] IN BLOOD BY AUTOMATED COUNT: 13.8 % (ref 12.3–15.4)
GLUCOSE SERPL-MCNC: 106 MG/DL (ref 65–99)
HCT VFR BLD AUTO: 34.4 % (ref 34–46.6)
HGB BLD-MCNC: 11 G/DL (ref 12–15.9)
LYMPHOCYTES # BLD MANUAL: 2.02 10*3/MM3 (ref 0.7–3.1)
MCH RBC QN AUTO: 31 PG (ref 26.6–33)
MCHC RBC AUTO-ENTMCNC: 32 G/DL (ref 31.5–35.7)
MCV RBC AUTO: 96.9 FL (ref 79–97)
MICROCYTES BLD QL: ABNORMAL
NEUTROPHILS # BLD AUTO: 2.16 10*3/MM3 (ref 1.7–7)
NEUTROPHILS NFR BLD MANUAL: 49 % (ref 42.7–76)
PLAT MORPH BLD: NORMAL
PLATELET # BLD AUTO: 237 10*3/MM3 (ref 140–450)
PMV BLD AUTO: 9.9 FL (ref 6–12)
POTASSIUM SERPL-SCNC: 4.5 MMOL/L (ref 3.5–5.2)
RBC # BLD AUTO: 3.55 10*6/MM3 (ref 3.77–5.28)
SODIUM SERPL-SCNC: 140 MMOL/L (ref 136–145)
VARIANT LYMPHS NFR BLD MANUAL: 45.8 % (ref 19.6–45.3)
WBC MORPH BLD: NORMAL
WBC NRBC COR # BLD: 4.41 10*3/MM3 (ref 3.4–10.8)

## 2023-01-17 PROCEDURE — 95819 EEG AWAKE AND ASLEEP: CPT

## 2023-01-17 PROCEDURE — 36415 COLL VENOUS BLD VENIPUNCTURE: CPT | Performed by: NURSE PRACTITIONER

## 2023-01-17 PROCEDURE — 25010000002 LORAZEPAM PER 2 MG: Performed by: HOSPITALIST

## 2023-01-17 PROCEDURE — 0 GADOBENATE DIMEGLUMINE 529 MG/ML SOLUTION: Performed by: HOSPITALIST

## 2023-01-17 PROCEDURE — 70553 MRI BRAIN STEM W/O & W/DYE: CPT

## 2023-01-17 PROCEDURE — 96375 TX/PRO/DX INJ NEW DRUG ADDON: CPT

## 2023-01-17 PROCEDURE — 99214 OFFICE O/P EST MOD 30 MIN: CPT | Performed by: PSYCHIATRY & NEUROLOGY

## 2023-01-17 PROCEDURE — G0378 HOSPITAL OBSERVATION PER HR: HCPCS

## 2023-01-17 PROCEDURE — 80048 BASIC METABOLIC PNL TOTAL CA: CPT | Performed by: NURSE PRACTITIONER

## 2023-01-17 PROCEDURE — 85025 COMPLETE CBC W/AUTO DIFF WBC: CPT | Performed by: NURSE PRACTITIONER

## 2023-01-17 PROCEDURE — 95819 EEG AWAKE AND ASLEEP: CPT | Performed by: PSYCHIATRY & NEUROLOGY

## 2023-01-17 PROCEDURE — 85007 BL SMEAR W/DIFF WBC COUNT: CPT | Performed by: NURSE PRACTITIONER

## 2023-01-17 PROCEDURE — A9577 INJ MULTIHANCE: HCPCS | Performed by: HOSPITALIST

## 2023-01-17 RX ORDER — OXYCODONE AND ACETAMINOPHEN 7.5; 325 MG/1; MG/1
1 TABLET ORAL ONCE
Status: COMPLETED | OUTPATIENT
Start: 2023-01-17 | End: 2023-01-17

## 2023-01-17 RX ORDER — OXYCODONE AND ACETAMINOPHEN 7.5; 325 MG/1; MG/1
1 TABLET ORAL EVERY 6 HOURS PRN
Status: DISCONTINUED | OUTPATIENT
Start: 2023-01-17 | End: 2023-01-19 | Stop reason: HOSPADM

## 2023-01-17 RX ORDER — OXYCODONE AND ACETAMINOPHEN 7.5; 325 MG/1; MG/1
1 TABLET ORAL EVERY 8 HOURS PRN
Status: DISCONTINUED | OUTPATIENT
Start: 2023-01-17 | End: 2023-01-17

## 2023-01-17 RX ORDER — BACLOFEN 10 MG/1
5 TABLET ORAL EVERY 8 HOURS SCHEDULED
Status: DISCONTINUED | OUTPATIENT
Start: 2023-01-17 | End: 2023-01-19 | Stop reason: HOSPADM

## 2023-01-17 RX ORDER — LORAZEPAM 2 MG/ML
1 INJECTION INTRAMUSCULAR ONCE AS NEEDED
Status: COMPLETED | OUTPATIENT
Start: 2023-01-17 | End: 2023-01-17

## 2023-01-17 RX ORDER — LEVETIRACETAM 500 MG/1
1500 TABLET ORAL EVERY 12 HOURS SCHEDULED
Status: DISCONTINUED | OUTPATIENT
Start: 2023-01-17 | End: 2023-01-19 | Stop reason: HOSPADM

## 2023-01-17 RX ADMIN — GADOBENATE DIMEGLUMINE 20 ML: 529 INJECTION, SOLUTION INTRAVENOUS at 22:18

## 2023-01-17 RX ADMIN — CETIRIZINE HYDROCHLORIDE 10 MG: 10 TABLET ORAL at 09:17

## 2023-01-17 RX ADMIN — BACLOFEN 5 MG: 10 TABLET ORAL at 21:15

## 2023-01-17 RX ADMIN — HYDROXYZINE HYDROCHLORIDE 25 MG: 25 TABLET ORAL at 09:18

## 2023-01-17 RX ADMIN — TRAZODONE HYDROCHLORIDE 50 MG: 50 TABLET ORAL at 23:07

## 2023-01-17 RX ADMIN — SUCRALFATE 1 G: 1 TABLET ORAL at 03:30

## 2023-01-17 RX ADMIN — OXYCODONE AND ACETAMINOPHEN 1 TABLET: 7.5; 325 TABLET ORAL at 21:14

## 2023-01-17 RX ADMIN — DICYCLOMINE HYDROCHLORIDE 20 MG: 10 CAPSULE ORAL at 21:15

## 2023-01-17 RX ADMIN — FLUOXETINE HYDROCHLORIDE 60 MG: 20 CAPSULE ORAL at 21:14

## 2023-01-17 RX ADMIN — GABAPENTIN 600 MG: 300 CAPSULE ORAL at 07:00

## 2023-01-17 RX ADMIN — OXYCODONE HYDROCHLORIDE AND ACETAMINOPHEN 1 TABLET: 7.5; 325 TABLET ORAL at 00:53

## 2023-01-17 RX ADMIN — BACLOFEN 10 MG: 10 TABLET ORAL at 03:30

## 2023-01-17 RX ADMIN — VANCOMYCIN HYDROCHLORIDE 125 MG: 125 CAPSULE ORAL at 13:49

## 2023-01-17 RX ADMIN — DICYCLOMINE HYDROCHLORIDE 20 MG: 10 CAPSULE ORAL at 09:17

## 2023-01-17 RX ADMIN — HYDROXYZINE HYDROCHLORIDE 25 MG: 25 TABLET ORAL at 00:53

## 2023-01-17 RX ADMIN — GABAPENTIN 600 MG: 300 CAPSULE ORAL at 23:02

## 2023-01-17 RX ADMIN — APIXABAN 5 MG: 5 TABLET, FILM COATED ORAL at 21:15

## 2023-01-17 RX ADMIN — LEVETIRACETAM 1500 MG: 500 TABLET, FILM COATED ORAL at 21:15

## 2023-01-17 RX ADMIN — APIXABAN 5 MG: 5 TABLET, FILM COATED ORAL at 09:17

## 2023-01-17 RX ADMIN — OXYCODONE AND ACETAMINOPHEN 1 TABLET: 7.5; 325 TABLET ORAL at 15:21

## 2023-01-17 RX ADMIN — FLUOXETINE HYDROCHLORIDE 60 MG: 20 CAPSULE ORAL at 03:33

## 2023-01-17 RX ADMIN — Medication 10 ML: at 09:28

## 2023-01-17 RX ADMIN — VANCOMYCIN HYDROCHLORIDE 125 MG: 125 CAPSULE ORAL at 23:03

## 2023-01-17 RX ADMIN — OXYCODONE HYDROCHLORIDE AND ACETAMINOPHEN 1 TABLET: 7.5; 325 TABLET ORAL at 09:18

## 2023-01-17 RX ADMIN — APIXABAN 5 MG: 5 TABLET, FILM COATED ORAL at 03:30

## 2023-01-17 RX ADMIN — PANTOPRAZOLE SODIUM 40 MG: 40 TABLET, DELAYED RELEASE ORAL at 09:18

## 2023-01-17 RX ADMIN — PANTOPRAZOLE SODIUM 40 MG: 40 TABLET, DELAYED RELEASE ORAL at 00:53

## 2023-01-17 RX ADMIN — LORAZEPAM 1 MG: 2 INJECTION INTRAMUSCULAR; INTRAVENOUS at 21:17

## 2023-01-17 RX ADMIN — FOLIC ACID 1 MG: 1 TABLET ORAL at 09:17

## 2023-01-17 RX ADMIN — Medication 10 ML: at 21:15

## 2023-01-17 RX ADMIN — OXYCODONE HYDROCHLORIDE AND ACETAMINOPHEN 1 TABLET: 7.5; 325 TABLET ORAL at 06:59

## 2023-01-17 RX ADMIN — VANCOMYCIN HYDROCHLORIDE 125 MG: 125 CAPSULE ORAL at 06:58

## 2023-01-17 RX ADMIN — SUCRALFATE 1 G: 1 TABLET ORAL at 21:15

## 2023-01-17 RX ADMIN — SUCRALFATE 1 G: 1 TABLET ORAL at 09:18

## 2023-01-17 RX ADMIN — DICYCLOMINE HYDROCHLORIDE 20 MG: 10 CAPSULE ORAL at 03:30

## 2023-01-17 RX ADMIN — BACLOFEN 5 MG: 10 TABLET ORAL at 13:49

## 2023-01-17 RX ADMIN — GABAPENTIN 600 MG: 300 CAPSULE ORAL at 00:53

## 2023-01-17 RX ADMIN — HYDROXYZINE HYDROCHLORIDE 25 MG: 25 TABLET ORAL at 18:27

## 2023-01-17 RX ADMIN — GABAPENTIN 600 MG: 300 CAPSULE ORAL at 13:50

## 2023-01-17 RX ADMIN — MAGNESIUM OXIDE 400 MG (241.3 MG MAGNESIUM) TABLET 400 MG: TABLET at 09:16

## 2023-01-17 RX ADMIN — VANCOMYCIN HYDROCHLORIDE 125 MG: 125 CAPSULE ORAL at 17:10

## 2023-01-17 RX ADMIN — LEVETIRACETAM 2000 MG: 500 TABLET, FILM COATED ORAL at 09:17

## 2023-01-17 RX ADMIN — BACLOFEN 10 MG: 10 TABLET ORAL at 09:18

## 2023-01-17 RX ADMIN — SUCRALFATE 1 G: 1 TABLET ORAL at 17:11

## 2023-01-17 RX ADMIN — DICYCLOMINE HYDROCHLORIDE 20 MG: 10 CAPSULE ORAL at 17:10

## 2023-01-17 NOTE — H&P
"    Patient Name:  Belen Allen  YOB: 1980  MRN:  [id number]  Admit Date:  1/16/2023  Patient Care Team:  Sahil Cornelius MD as PCP - General (Internal Medicine)  Code, Bjorn HERRERA II, MD as Consulting Physician (Hematology and Oncology)  Dennys Carranza MD as Referring Physician (Hospitalist)      Subjective   History Present Illness     Chief Complaint   Patient presents with   • Seizures     History of Present Illness   Ms. 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  Patient is a vague historian at this time, cannot give me very many details of seizures, but states she had 1 tonic-clonic seizure last night witnessed by her  and 3 other \"small seizures\" today that she cannot describe.  Patient previously been on Depakote, but had breakthrough seizures and subtherapeutic Depakote level although she states she was taking her Depakote, so patient was switched to Keppra on last admission about 2 weeks ago.  She states she has been compliant with her Keppra at home.  On previous admission she was also diagnosed with C. difficile, her PCR test was positive for C. difficile, but her antigen chest was negative.  She states she continues to have some diarrhea and mild abdominal pain.  In the emergency room patient's labs were all unremarkable, she was positive for methamphetamine and THC, she denies any illicit drug use.  CT of her head was negative.  ED provider did speak with neurology on-call who recommended increasing patient's Keppra to 2000 mg twice daily, they will see in the a.m., they also suggested she may need a video EEG.    Review of Systems   Constitutional: Negative for appetite change and fever.   HENT: Negative for nosebleeds and trouble swallowing.    Eyes: Negative for photophobia, redness and visual disturbance.   Respiratory: " Negative for cough, chest tightness, shortness of breath and wheezing.    Cardiovascular: Negative for chest pain, palpitations and leg swelling.   Gastrointestinal: Positive for abdominal pain and diarrhea. Negative for abdominal distention, nausea and vomiting.   Endocrine: Negative.    Genitourinary: Negative.    Musculoskeletal: Negative for gait problem and joint swelling.   Skin: Negative.    Neurological: Positive for seizures. Negative for dizziness, speech difficulty, light-headedness and headaches.   Hematological: Negative.    Psychiatric/Behavioral: Negative for behavioral problems and confusion.        Personal History     Past Medical History:   Diagnosis Date   • Abnormal Pap smear of cervix    • Ankle fracture 2013   • H/O Elevated liver enzymes    • History of chronic back pain    • History of migraine    • History of urinary tract infection    • Injury of back    • Opioid dependence (HCC) 1/16/2023   • PONV (postoperative nausea and vomiting)    • Seasonal allergies    • Seizure (HCC)     Takes Keppra     Past Surgical History:   Procedure Laterality Date   • BACK SURGERY  2006    fusion L4, L5     • CHOLECYSTECTOMY  2014   • DILATATION AND CURETTAGE  2009   • ENDOSCOPY N/A 11/27/2022    Procedure: ESOPHAGOGASTRODUODENOSCOPY with biopsy;  Surgeon: Christiana Mann MD;  Location: [facility] ENDOSCOPY;  Service: Gastroenterology;  Laterality: N/A;  gastritis, duodenitis, irregular z line   • ERCP N/A 6/3/2021    Procedure: ENDOSCOPIC RETROGRADE CHOLANGIOPANCREATOGRAPHY WITH SPHINCTEROTOMY, DILATION WITH BALLOON CLEARANCE  (12MM-15MM);  Surgeon: Bjorn Flannery MD;  Location: [facility] ENDOSCOPY;  Service: Gastroenterology;  Laterality: N/A;  DILATED COMMON BILE DUCT   • SKIN SURGERY     • TUBAL ABDOMINAL LIGATION  2013   • WISDOM TOOTH EXTRACTION  2018    x2     Family History   Problem Relation Age of Onset   • Stroke Mother    • Allergic rhinitis Mother    • Allergic rhinitis Sister    • Breast  cancer Maternal Aunt    • Cancer Maternal Grandmother    • Allergic rhinitis Paternal Grandfather    • Cancer Paternal Grandfather    • Prostate cancer Paternal Grandfather      Social History     Tobacco Use   • Smoking status: Every Day     Packs/day: 0.50     Types: Cigarettes   • Smokeless tobacco: Never   Vaping Use   • Vaping Use: Never used   Substance Use Topics   • Alcohol use: Not Currently   • Drug use: Not Currently     No current facility-administered medications on file prior to encounter.     Current Outpatient Medications on File Prior to Encounter   Medication Sig Dispense Refill   • apixaban (ELIQUIS) 5 MG tablet tablet Take 5 mg by mouth 2 (Two) Times a Day. Last filled 4/12/21 - pt states she was instructed to stop taking medication prior to ERCP ~1 month ago and was not instructed to restart medication.   Indication: Splenic infarct     • baclofen (LIORESAL) 10 MG tablet Take 1 tablet by mouth Every 8 (Eight) Hours. 90 tablet 3   • Calcium Carbonate-Vitamin D 600-200 MG-UNIT tablet Take 1 tablet by mouth Daily.     • dicyclomine (BENTYL) 20 MG tablet Take 1 tablet by mouth Every 6 (Six) Hours. 20 tablet 0   • FLUoxetine (PROzac) 20 MG capsule Take 60 mg by mouth Daily.     • FLUoxetine (PROzac) 40 MG capsule Take 60 mg by mouth Every Night.     • folic acid (FOLVITE) 1 MG tablet Take 1 tablet by mouth Daily. 30 tablet 0   • gabapentin (NEURONTIN) 600 MG tablet Take 800 mg by mouth 4 (Four) Times a Day.     • gabapentin (NEURONTIN) 800 MG tablet Take 800 mg by mouth 4 (Four) Times a Day.     • hydrOXYzine (ATARAX) 25 MG tablet Take 1-2 tablets by mouth 3 (Three) Times a Day As Needed for Anxiety.     • hydrOXYzine (ATARAX) 25 MG tablet Take 25-50 mg by mouth 4 (Four) Times a Day.     • levETIRAcetam (KEPPRA) 1000 MG tablet Take 1 tablet by mouth 2 (Two) Times a Day. 60 tablet 0   • loratadine (CLARITIN) 10 MG tablet Take 10 mg by mouth Daily.     • LORazepam (ATIVAN) 0.5 MG tablet Take 0.25-0.5  mg by mouth.     • magnesium oxide (MAG-OX) 400 tablet tablet Take 1 tablet by mouth Daily. 30 tablet 3   • melatonin 5 MG tablet tablet Take 10 mg by mouth Every Night. Patient taes 20 mg at home     • methocarbamol (ROBAXIN) 500 MG tablet Take 250-500 mg by mouth.     • ondansetron ODT (ZOFRAN-ODT) 8 MG disintegrating tablet Place 1 tablet on the tongue Every 8 (Eight) Hours As Needed for Nausea or Vomiting. 30 tablet 0   • pantoprazole (PROTONIX) 40 MG EC tablet Take 1 tablet by mouth 2 (Two) Times a Day. 60 tablet 0   • sucralfate (CARAFATE) 1 g tablet Take 1 tablet by mouth 4 (Four) Times a Day. 20 tablet 0   • SUMAtriptan (IMITREX) 100 MG tablet Take 50 mg by mouth Every 2 (Two) Hours As Needed for Migraine. Take one tablet at onset of headache. May repeat dose one time in 2 hours if headache not relieved.     • traZODone (DESYREL) 100 MG tablet Take 100-200 mg by mouth At Night As Needed.     • traZODone (DESYREL) 50 MG tablet Take 1-2 tablets by mouth At Night As Needed for sleep 30 tablet 3   • Vitamin B-2 (RIBOFLAVIN) 100 MG tablet tablet Take 4 tablets by mouth Daily. 30 tablet 3   • vitamin D (ERGOCALCIFEROL) 1.25 MG (24131 UT) capsule capsule Take 50,000 Units by mouth 1 (One) Time Per Week.     • Melatonin 10 MG capsule Take 30 mg by mouth Daily.     • polyethylene glycol (polyethylene glycol) 17 g packet Take 17 g by mouth Daily.       Allergies   Allergen Reactions   • Lidocaine Rash     Pt reports lidocaine patches make her breakout in a rash       Objective    Objective     Vital Signs  Temp:  [97.3 °F (36.3 °C)-98.1 °F (36.7 °C)] 97.3 °F (36.3 °C)  Heart Rate:  [72-89] 77  Resp:  [16-18] 18  BP: ()/(51-97) 105/68  SpO2:  [93 %-99 %] 97 %  on   ;   Device (Oxygen Therapy): room air  Body mass index is 37.35 kg/m².    Physical Exam  Vitals and nursing note reviewed.   Constitutional:       General: She is not in acute distress.     Appearance: She is well-developed.   HENT:      Head:  Normocephalic.   Neck:      Vascular: No JVD.   Cardiovascular:      Rate and Rhythm: Normal rate and regular rhythm.   Pulmonary:      Effort: Pulmonary effort is normal.      Breath sounds: Normal breath sounds.   Abdominal:      General: There is no distension.      Palpations: Abdomen is soft.      Tenderness: There is no abdominal tenderness.   Musculoskeletal:         General: Normal range of motion.      Cervical back: Normal range of motion.   Skin:     General: Skin is warm and dry.      Capillary Refill: Capillary refill takes less than 2 seconds.   Neurological:      General: No focal deficit present.      Mental Status: She is alert and oriented to person, place, and time.   Psychiatric:         Attention and Perception: Attention normal.         Mood and Affect: Affect is flat.         Behavior: Behavior normal.         Cognition and Memory: Memory is impaired.         Results Review:  I reviewed the patient's new clinical results.  I reviewed the patient's new imaging results and agree with the interpretation.  I reviewed the patient's other test results and agree with the interpretation  I personally viewed and interpreted the patient's EKG/Telemetry data  Discussed with ED provider.    Lab Results (last 24 hours)     Procedure Component Value Units Date/Time    Levetiracetam Level (Keppra) [[id number]] Collected: 01/16/23 1705    Specimen: Blood Updated: 01/16/23 1747    CBC & Differential [[id number]]  (Normal) Collected: 01/16/23 1715    Specimen: Blood from Arm, Right Updated: 01/16/23 1738    Narrative:      The following orders were created for panel order CBC & Differential.  Procedure                               Abnormality         Status                     ---------                               -----------         ------                     CBC Auto Differential[[id number]]        Normal              Final result                 Please view results for these tests on the individual orders.     Comprehensive Metabolic Panel [[id number]]  (Abnormal) Collected: 01/16/23 1715    Specimen: Blood from Arm, Right Updated: 01/16/23 1758     Glucose 88 mg/dL      BUN 9 mg/dL      Creatinine 0.73 mg/dL      Sodium 142 mmol/L      Potassium 4.1 mmol/L      Chloride 107 mmol/L      CO2 27.9 mmol/L      Calcium 8.6 mg/dL      Total Protein 5.6 g/dL      Albumin 3.7 g/dL      ALT (SGPT) 21 U/L      AST (SGOT) 19 U/L      Alkaline Phosphatase 129 U/L      Total Bilirubin <0.2 mg/dL      Globulin 1.9 gm/dL      A/G Ratio 1.9 g/dL      BUN/Creatinine Ratio 12.3     Anion Gap 7.1 mmol/L      eGFR 106.1 mL/min/1.73      Comment: National Kidney Foundation and American Society of Nephrology (ASN) Task Force recommended calculation based on the Chronic Kidney Disease Epidemiology Collaboration (CKD-EPI) equation refit without adjustment for race.       Narrative:      GFR Normal >60  Chronic Kidney Disease <60  Kidney Failure <15      CBC Auto Differential [[id number]]  (Normal) Collected: 01/16/23 1715    Specimen: Blood from Arm, Right Updated: 01/16/23 1738     WBC 6.11 10*3/mm3      RBC 3.77 10*6/mm3      Hemoglobin 12.0 g/dL      Hematocrit 36.3 %      MCV 96.3 fL      MCH 31.8 pg      MCHC 33.1 g/dL      RDW 14.4 %      RDW-SD 49.8 fl      MPV 9.9 fL      Platelets 238 10*3/mm3      Neutrophil % 53.6 %      Lymphocyte % 36.7 %      Monocyte % 6.2 %      Eosinophil % 2.8 %      Basophil % 0.5 %      Immature Grans % 0.2 %      Neutrophils, Absolute 3.28 10*3/mm3      Lymphocytes, Absolute 2.24 10*3/mm3      Monocytes, Absolute 0.38 10*3/mm3      Eosinophils, Absolute 0.17 10*3/mm3      Basophils, Absolute 0.03 10*3/mm3      Immature Grans, Absolute 0.01 10*3/mm3      nRBC 0.0 /100 WBC     Ethanol [[id number]] Collected: 01/16/23 1715    Specimen: Blood from Arm, Right Updated: 01/16/23 1758     Ethanol <10 mg/dL      Ethanol % <0.010 %     Urinalysis With Microscopic If Indicated (No Culture) - Straight Cath [[id number]]   (Abnormal) Collected: 01/16/23 1728    Specimen: Urine from Straight Cath Updated: 01/16/23 1745     Color, UA Dark Yellow     Appearance, UA Clear     pH, UA 6.0     Specific Gravity, UA 1.022     Glucose, UA Negative     Ketones, UA Negative     Bilirubin, UA Negative     Blood, UA Negative     Protein, UA Negative     Leuk Esterase, UA Negative     Nitrite, UA Negative     Urobilinogen, UA 0.2 E.U./dL    Narrative:      Urine microscopic not indicated.    Urine Drug Screen - Straight Cath [[id number]]  (Abnormal) Collected: 01/16/23 1728    Specimen: Urine from Straight Cath Updated: 01/16/23 1825     Amphet/Methamphet, Screen Positive     Barbiturates Screen, Urine Negative     Benzodiazepine Screen, Urine Negative     Cocaine Screen, Urine Negative     Opiate Screen Negative     THC, Screen, Urine Positive     Methadone Screen, Urine Negative     Oxycodone Screen, Urine Negative    Narrative:      Negative Thresholds Per Drugs Screened:    Amphetamines                 500 ng/ml  Barbiturates                 200 ng/ml  Benzodiazepines              100 ng/ml  Cocaine                      300 ng/ml  Methadone                    300 ng/ml  Opiates                      300 ng/ml  Oxycodone                    100 ng/ml  THC                           50 ng/ml    The Normal Value for all drugs tested is negative. This report includes final unconfirmed screening results to be used for medical treatment purposes only. Unconfirmed results must not be used for non-medical purposes such as employment or legal testing. Clinical consideration should be applied to any drug of abuse test, particularly when unconfirmed results are used.                  Imaging Results (Last 24 Hours)     Procedure Component Value Units Date/Time    CT Head Without Contrast [[id number]] Collected: 01/16/23 1920     Updated: 01/16/23 1948    Narrative:      HEAD CT WITHOUT CONTRAST     HISTORY: Seizure with a fall.     TECHNIQUE: Noncontrast head  CT correlated with head CT 12/31/2022.     Radiation dose reduction techniques were utilized, including automated  exposure control and exposure modulation based on body size.     FINDINGS: The ventricles are normal in caliber. The brain parenchyma,  extra-axial spaces, and the bones of the skull appear normal. Paranasal  sinuses, mastoid air cells, middle ears, and orbital structures appear  normal. No soft tissue contusion at the scalp.       Impression:      Negative.     This report was finalized on 1/16/2023 7:45 PM by Dr. Dennys Eckert M.D.             Results for orders placed during the hospital encounter of 11/08/20    Adult Transesophageal Echo (DENIS) W/ Cont if Necessary Per Protocol    Interpretation Summary  · Left ventricular ejection fraction appears to be 61 - 65%. Left ventricular systolic function is normal.  · Saline test results are negative.      No orders to display        Assessment/Plan     Active Hospital Problems    Diagnosis  POA   • **Seizure (HCC) [R56.9]  Yes   • Opioid dependence (HCC) [F11.20]  Yes   • Intractable back pain [M54.9]  Yes   • Tobacco abuse [Z72.0]  Yes   • GERD without esophagitis [K21.9]  Yes   • On anticoagulant therapy [Z79.01]  Not Applicable   • Obesity (BMI 30-39.9) [E66.9]  Yes   ·   Ms. 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     Seizure/breakthrough seizures  -Increase Keppra per neurology recommendation to 2000 mg twice daily  -Consult neurology  -Seizure precautions  -Repeat CBC, BMP in a.m.  -Telemetry unit for monitoring  -Neurology recommendations possible video EEG, will defer to neurology for further testing    C. difficile colitis  -recently diagnosed  - started on p.o. vancomycin  -Awaiting GI PCR    Opiate dependence  -Continue home medications    Splenic infarct  -Continue Eliquis  -Telemetry unit for  monitoring    · I discussed the patient's findings and my recommendations with patient.    VTE Prophylaxis - Eliquis (home med).  Code Status - Full code.       NELSON Elliott  [facility]  01/16/23  22:23 EST

## 2023-01-17 NOTE — CONSULTS
This consult is at the request of Dr. Ton Valencia MD.  Thank you for involving me in the care of this patient.        As you well know this is a 42-year-old female with a history of epilepsy she had been on Keppra 1000 mg p.o. twice daily.  She states that she had a couple seizures yesterday.  She states that she had an event where she lost consciousness and had generalized shaking I was not able to discuss this with any of the family members there was no family was at the bedside she does not recall this event well.  She states that after this she had several other events.  She does not recall what these were she states that she is felt like she was going to pass out.  She does state that before she has seizures she typically has a sensation of vibration all over her body.  There is been some question whether the seizures were epileptic or nonepileptic she has had long-term monitoring in the past which has been normal although no event captured.  She was loaded with Keppra 2000 mg in the ED.        Of note she was positive for THC she was also positive for methamphetamines.        On examination today she is alert and oriented x3.  Her speech is fluent there is no dysarthria no aphasia.  The content of her speech is appropriate and goal-directed.  Cranial nerves II through XII are intact.  She has 5 out of 5 strength in bilateral upper and lower extremities she has some giveaway weakness on the right leg but with encouragement and distraction this is 5 out of 5 her reflexes are 2+ and symmetric there are no upper motor neuron signs no ataxia finger-nose or heel-to-shin.      I reviewed the CT of the head yesterday which showed no acute abnormality.  CT of the spine showed no acute intercranial abnormality no cervical spine or thoracic lumbar fracture.  As mentioned previously she was positive for methamphetamines and THC.  I reviewed an MRI of the brain done on 1/4/2022 which was essentially normal.  I  reviewed an EEG at 24-hour EEG done on 1/2/2023 which was normal.        I am not convinced these spells are epileptic they possibly could be something related to her drug use it is reassuring that she had 24 hours of EEG without abnormalities.  Although we can occasionally see methamphetamine trigger seizures.  I would recommend we repeat the MRI of the brain and a routine EEG.  I am going to increase her Keppra to 1500 mg p.o. twice daily.  If all of her testing is again normal I would recommend a formal EMU admission which can be arranged by her home neurologist.  We will follow-up on the testing.

## 2023-01-17 NOTE — PROGRESS NOTES
Name: Belen Allen ADMIT: 2023   : 1980  PCP: Sahil Cornelius MD    MRN: [id number] LOS: 0 days   AGE/SEX: 42 y.o. female  ROOM: [facility]     Subjective   Subjective   Complains of pain in upper back and neck, says that is where she fell with her convulsive episode yesterday.    Patient reports that diarrhea persisted even after discharge on vancomycin.  Note that she was C. difficile toxin positive but antigen negative during prior hospital stay.    Review of Systems     Objective   Objective   Vital Signs  Temp:  [97.3 °F (36.3 °C)-98.4 °F (36.9 °C)] 97.7 °F (36.5 °C)  Heart Rate:  [71-89] 71  Resp:  [16-18] 18  BP: ()/(51-97) 121/81  SpO2:  [93 %-99 %] 93 %  on   ;   Device (Oxygen Therapy): room air  Body mass index is 37.35 kg/m².  Physical Exam  Vitals and nursing note reviewed.   Constitutional:       General: She is not in acute distress.     Appearance: She is obese.   HENT:      Head: Normocephalic and atraumatic.   Eyes:      General: No scleral icterus.  Neck:      Vascular: No JVD.   Cardiovascular:      Rate and Rhythm: Normal rate and regular rhythm.      Pulses: Normal pulses.      Heart sounds: Normal heart sounds. No murmur heard.  Pulmonary:      Effort: Pulmonary effort is normal. No respiratory distress.      Breath sounds: Normal breath sounds.   Abdominal:      General: Bowel sounds are normal. There is no distension.      Palpations: Abdomen is soft.      Tenderness: There is no abdominal tenderness.   Musculoskeletal:         General: Tenderness (Left upper back) present. No swelling.      Cervical back: Neck supple.   Skin:     General: Skin is warm and dry.      Coloration: Skin is not jaundiced.      Findings: No rash.      Comments: Small bruise at the left upper back   Neurological:      General: No focal deficit present.      Mental Status: She is alert and oriented to person, place, and time.   Psychiatric:         Mood and Affect: Mood normal.          Behavior: Behavior normal.       Results Review     I reviewed the patient's new clinical results.  Results from last 7 days   Lab Units 01/17/23  0936 01/16/23  1715   WBC 10*3/mm3 4.41 6.11   HEMOGLOBIN g/dL 11.0* 12.0   PLATELETS 10*3/mm3 237 238     Results from last 7 days   Lab Units 01/17/23  0558 01/16/23  1715   SODIUM mmol/L 140 142   POTASSIUM mmol/L 4.5 4.1   CHLORIDE mmol/L 106 107   CO2 mmol/L 26.1 27.9   BUN mg/dL 10 9   CREATININE mg/dL 0.71 0.73   GLUCOSE mg/dL 106* 88   EGFR mL/min/1.73 109.7 106.1     Results from last 7 days   Lab Units 01/16/23  1715   ALBUMIN g/dL 3.7   BILIRUBIN mg/dL <0.2   ALK PHOS U/L 129*   AST (SGOT) U/L 19   ALT (SGPT) U/L 21     Results from last 7 days   Lab Units 01/17/23  0558 01/16/23  1715   CALCIUM mg/dL 8.8 8.6   ALBUMIN g/dL  --  3.7       No results found for: HGBA1C, POCGLU    CT Head Without Contrast    Result Date: 1/16/2023  Negative.  This report was finalized on 1/16/2023 7:45 PM by Dr. Dennys Eckert M.D.      Scheduled Medications  apixaban, 5 mg, Oral, Q12H  baclofen, 5 mg, Oral, Q8H  cetirizine, 10 mg, Oral, Daily  dicyclomine, 20 mg, Oral, TID  FLUoxetine, 60 mg, Oral, Nightly  folic acid, 1 mg, Oral, Daily  gabapentin, 600 mg, Oral, Q8H  ketorolac, 15 mg, Intravenous, Once  levETIRAcetam, 1,500 mg, Oral, Q12H  magnesium oxide, 400 mg, Oral, Daily  pantoprazole, 40 mg, Oral, BID  sodium chloride, 10 mL, Intravenous, Q12H  sucralfate, 1 g, Oral, 4x Daily  vancomycin, 125 mg, Oral, Q6H  [START ON 1/26/2023] vancomycin, 125 mg, Oral, Q12H  [START ON 2/2/2023] vancomycin, 125 mg, Oral, Q24H  [START ON 2/9/2023] vancomycin, 125 mg, Oral, Every Other Day    Infusions   Diet  Diet: Regular/House Diet; Texture: Regular Texture (IDDSI 7); Fluid Consistency: Thin (IDDSI 0)       Assessment/Plan     Active Hospital Problems    Diagnosis  POA   • **Seizure (HCC) [R56.9]  Yes   • Opioid dependence (HCC) [F11.20]  Yes   • Intractable back pain [M54.9]  Yes   •  Tobacco abuse [Z72.0]  Yes   • GERD without esophagitis [K21.9]  Yes   • On anticoagulant therapy [Z79.01]  Not Applicable   • Obesity (BMI 30-39.9) [E66.9]  Yes      Resolved Hospital Problems   No resolved problems to display.       42 y.o. female admitted with apparent breakthrough Seizure (HCC).    Recent admission with transition from Depakote to Keppra due to concerns regarding drug interactions and persistently subtherapeutic Depakote level.  - Neuro to see.  Consult reviewed  - 24-hour EEG 2 weeks ago without seizure activity.  Repeat EEG ordered now.  - MRI ordered  - Defer to neuro regarding adjustments to Keppra dosing.  Keppra level pending from admission  - Polypharmacy likely playing a role.  We will try to wean off baclofen and simplify med list is much as possible.  She admits that she does not even know what some of the medications are that are listed on her medication list  - Also positive for both amphetamines and THC on UDS which patient denies.    Persistent diarrhea.  Apparently had several stools overnight but none obtained for stool culture/C. difficile.  Vancomycin restarted empirically.  She has already completed the prior course.    Complains of back pain, acute on chronic.  CTs of the entire spine without acute abnormality.  Will not increase pain medication from current dosing. Concerns regarding her extensive polypharmacy and potential secondary gain here      · SCDs for DVT prophylaxis.  · Disposition-home with neuro work-up complete.  Medically stable anytime.      Ton Agarwal MD  [facility]  01/17/23  13:27 EST

## 2023-01-17 NOTE — ED NOTES
.Nursing report ED to floor  Belen Allen  42 y.o.  female    HPI :   Chief Complaint   Patient presents with    Seizures       Admitting doctor:   Mane Urrutia MD    Admitting diagnosis:   The encounter diagnosis was Seizure (HCC).    Code status:   Current Code Status       Date Active Code Status Order ID Comments User Context       1/16/2023 2009 CPR (Attempt to Resuscitate) [id number]  Mera Ramirez APRN ED        Question Answer    Code Status (Patient has no pulse and is not breathing) CPR (Attempt to Resuscitate)    Medical Interventions (Patient has pulse or is breathing) Full Support                    Allergies:   Lidocaine    Isolation:   Contact Spore    Intake and Output  No intake or output data in the 24 hours ending 01/16/23 2016    Weight:       01/16/23  1717   Weight: 90.7 kg (200 lb)       Most recent vitals:   Vitals:    01/16/23 1900 01/16/23 1930 01/16/23 2001 01/16/23 2016   BP: 110/70 129/80 106/74    Pulse: 76 72 77    Resp:    16   Temp:    97.3 °F (36.3 °C)   TempSrc:    Tympanic   SpO2: 95% 97% 97%    Weight:       Height:           Active LDAs/IV Access:   Lines, Drains & Airways       Active LDAs       Name Placement date Placement time Site Days    Peripheral IV 01/16/23 Anterior;Proximal;Right Forearm 01/16/23  --  Forearm  less than 1                    Labs (abnormal labs have a star):   Labs Reviewed   COMPREHENSIVE METABOLIC PANEL - Abnormal; Notable for the following components:       Result Value    Total Protein 5.6 (*)     Alkaline Phosphatase 129 (*)     All other components within normal limits    Narrative:     GFR Normal >60  Chronic Kidney Disease <60  Kidney Failure <15     URINALYSIS W/ MICROSCOPIC IF INDICATED (NO CULTURE) - Abnormal; Notable for the following components:    Color, UA Dark Yellow (*)     All other components within normal limits    Narrative:     Urine microscopic not indicated.   URINE DRUG SCREEN - Abnormal; Notable for the  following components:    Amphet/Methamphet, Screen Positive (*)     THC, Screen, Urine Positive (*)     All other components within normal limits    Narrative:     Negative Thresholds Per Drugs Screened:    Amphetamines                 500 ng/ml  Barbiturates                 200 ng/ml  Benzodiazepines              100 ng/ml  Cocaine                      300 ng/ml  Methadone                    300 ng/ml  Opiates                      300 ng/ml  Oxycodone                    100 ng/ml  THC                           50 ng/ml    The Normal Value for all drugs tested is negative. This report includes final unconfirmed screening results to be used for medical treatment purposes only. Unconfirmed results must not be used for non-medical purposes such as employment or legal testing. Clinical consideration should be applied to any drug of abuse test, particularly when unconfirmed results are used.           CBC WITH AUTO DIFFERENTIAL - Normal   ETHANOL   LEVETIRACETAM LEVEL   CBC AND DIFFERENTIAL    Narrative:     The following orders were created for panel order CBC & Differential.  Procedure                               Abnormality         Status                     ---------                               -----------         ------                     CBC Auto Differential[[id number]]        Normal              Final result                 Please view results for these tests on the individual orders.       EKG:   No orders to display       Meds given in ED:   Medications   sodium chloride 0.9 % flush 10 mL (has no administration in time range)   ketorolac (TORADOL) injection 15 mg (15 mg Intravenous Not Given 1/16/23 1804)   sodium chloride 0.9 % flush 10 mL (has no administration in time range)   sodium chloride 0.9 % flush 10 mL (has no administration in time range)   sodium chloride 0.9 % infusion 40 mL (has no administration in time range)   nitroglycerin (NITROSTAT) SL tablet 0.4 mg (has no administration in time range)    levETIRAcetam (KEPPRA) injection 1,000 mg (1,000 mg Intravenous Given 1/16/23 1736)   ondansetron (ZOFRAN) injection 4 mg (4 mg Intravenous Given 1/16/23 1804)   oxyCODONE-acetaminophen (PERCOCET) 5-325 MG per tablet 1 tablet (1 tablet Oral Given 1/16/23 1908)   levETIRAcetam (KEPPRA) injection 1,000 mg (1,000 mg Intravenous Given 1/16/23 1921)       Imaging results:  CT Head Without Contrast    Result Date: 1/16/2023  Negative.  This report was finalized on 1/16/2023 7:45 PM by Dr. Dennys Eckert M.D.       Ambulatory status:   - Up with assistance     Social issues:   Social History     Socioeconomic History    Marital status: Single    Number of children: 2   Tobacco Use    Smoking status: Every Day     Packs/day: 0.50     Types: Cigarettes    Smokeless tobacco: Never   Vaping Use    Vaping Use: Never used   Substance and Sexual Activity    Alcohol use: Not Currently    Drug use: Not Currently    Sexual activity: Defer       NIH Stroke Scale:         Kathy Craig RN  01/16/23 20:16 EST

## 2023-01-17 NOTE — PROGRESS NOTES
Discharge Planning Assessment  [facility]     Patient Name: Belen Allen  MRN: [id number]  Today's Date: 1/17/2023    Admit Date: 1/16/2023    Plan: Home with family and outpatient therapy   Discharge Needs Assessment     Row Name 01/17/23 1708       Living Environment    People in Home significant other;child(manuel), dependent    Name(s) of People in Home SO Ced, two children    Current Living Arrangements home    Primary Care Provided by self    Family Caregiver if Needed significant other    Quality of Family Relationships helpful;involved;supportive    Able to Return to Prior Arrangements yes       Resource/Environmental Concerns    Resource/Environmental Concerns financial    Financial Concerns rent or mortgage, unable to afford       Transition Planning    Patient/Family Anticipates Transition to home with family    Patient/Family Anticipated Services at Transition none    Transportation Anticipated family or friend will provide       Discharge Needs Assessment    Current Outpatient/Agency/Support Group other (see comments)    Equipment Currently Used at Home walker, rolling;commode    Concerns to be Addressed discharge planning    Outpatient/Agency/Support Group Needs outpatient therapy    Discharge Facility/Level of Care Needs outpatient therapy    Discharge Coordination/Progress outpatient therapy               Discharge Plan     Row Name 01/17/23 4971       Plan    Plan Home with family and outpatient therapy    Patient/Family in Agreement with Plan yes    Plan Comments CCP spoke with pt re: d/c planning. Pt resides in a single level home with her SO and two children, uses walker and bedside commode as needed, and denies past HH/SNF. Pt recently discharged from [facility] 1/3/23 and was in the process of initiating outpatient therapy ordered at that time. Pt plans to initiate outpatient therapy at  in [facility] and states her SO can transport her. Pt agreeable to Meds to Beds enrollment. Pt denies  concerns over medication/food cost/access, but does state frequent work absence gives her some concern about her mortgage payments. CCP to follow for additional needs. Jazlyn Bearden LCSW              Continued Care and Services - Admitted Since 1/16/2023    Coordination has not been started for this encounter.       Expected Discharge Date and Time     Expected Discharge Date Expected Discharge Time    Jan 19, 2023          Demographic Summary     Row Name 01/17/23 1649       General Information    Admission Type observation    Arrived From home    Referral Source admission list    Reason for Consult discharge planning    Preferred Language English               Functional Status     Row Name 01/17/23 1650       Functional Status    Usual Activity Tolerance good    Current Activity Tolerance moderate       Physical Activity    On average, how many days per week do you engage in moderate to strenuous exercise (like a brisk walk)? 7 days    On average, how many minutes do you engage in exercise at this level? 120 min    Number of minutes of exercise per week 840       Assessment of Health Literacy    How often do you have someone help you read hospital materials? Never    How often do you have problems learning about your medical condition because of difficulty understanding written information? Never    How often do you have a problem understanding what is told to you about your medical condition? Never    How confident are you filling out medical forms by yourself? Extremely    Health Literacy Good       Functional Status, IADL    Medications independent    Meal Preparation independent    Housekeeping independent    Laundry independent    Shopping independent               Psychosocial    No documentation.                Abuse/Neglect    No documentation.                Legal    No documentation.                Substance Abuse    No documentation.                Patient Forms    No documentation.                    Belen Bearden, [facility]

## 2023-01-17 NOTE — PLAN OF CARE
Problem: Adult Inpatient Plan of Care  Goal: Plan of Care Review  Outcome: Ongoing, Progressing  Goal: Patient-Specific Goal (Individualized)  Outcome: Ongoing, Progressing  Goal: Absence of Hospital-Acquired Illness or Injury  Outcome: Ongoing, Progressing  Goal: Optimal Comfort and Wellbeing  Outcome: Ongoing, Progressing  Goal: Readiness for Transition of Care  Outcome: Ongoing, Progressing     Problem: Pain Chronic (Persistent) (Comorbidity Management)  Goal: Acceptable Pain Control and Functional Ability  Outcome: Ongoing, Progressing     Problem: Seizure Disorder Comorbidity  Goal: Maintenance of Seizure Control  Outcome: Ongoing, Progressing   Goal Outcome Evaluation:

## 2023-01-18 ENCOUNTER — APPOINTMENT (OUTPATIENT)
Dept: CT IMAGING | Facility: HOSPITAL | Age: 43
End: 2023-01-18
Payer: COMMERCIAL

## 2023-01-18 VITALS
SYSTOLIC BLOOD PRESSURE: 81 MMHG | TEMPERATURE: 98.4 F | HEART RATE: 78 BPM | WEIGHT: 217.59 LBS | DIASTOLIC BLOOD PRESSURE: 57 MMHG | RESPIRATION RATE: 20 BRPM | HEIGHT: 64 IN | BODY MASS INDEX: 37.15 KG/M2 | OXYGEN SATURATION: 97 %

## 2023-01-18 PROBLEM — A04.72 CLOSTRIDIOIDES DIFFICILE DIARRHEA: Status: ACTIVE | Noted: 2023-01-18

## 2023-01-18 LAB — LEVETIRACETAM SERPL-MCNC: 42.6 UG/ML (ref 10–40)

## 2023-01-18 PROCEDURE — 72128 CT CHEST SPINE W/O DYE: CPT

## 2023-01-18 PROCEDURE — 63710000001 ONDANSETRON ODT 4 MG TABLET DISPERSIBLE: Performed by: NURSE PRACTITIONER

## 2023-01-18 PROCEDURE — 72125 CT NECK SPINE W/O DYE: CPT

## 2023-01-18 PROCEDURE — 99214 OFFICE O/P EST MOD 30 MIN: CPT | Performed by: NURSE PRACTITIONER

## 2023-01-18 PROCEDURE — G0378 HOSPITAL OBSERVATION PER HR: HCPCS

## 2023-01-18 RX ORDER — LEVETIRACETAM 750 MG/1
1500 TABLET ORAL EVERY 12 HOURS SCHEDULED
Qty: 120 TABLET | Refills: 0 | Status: SHIPPED | OUTPATIENT
Start: 2023-01-18 | End: 2023-01-24 | Stop reason: SDUPTHER

## 2023-01-18 RX ORDER — VANCOMYCIN HYDROCHLORIDE 125 MG/1
125 CAPSULE ORAL EVERY 6 HOURS SCHEDULED
Qty: 35 CAPSULE | Refills: 0 | Status: SHIPPED | OUTPATIENT
Start: 2023-01-18 | End: 2023-01-18 | Stop reason: SDUPTHER

## 2023-01-18 RX ORDER — VANCOMYCIN HYDROCHLORIDE 125 MG/1
125 CAPSULE ORAL EVERY 6 HOURS SCHEDULED
Qty: 35 CAPSULE | Refills: 0 | Status: SHIPPED | OUTPATIENT
Start: 2023-01-18 | End: 2023-01-27

## 2023-01-18 RX ORDER — LEVETIRACETAM 750 MG/1
1500 TABLET ORAL EVERY 12 HOURS SCHEDULED
Qty: 120 TABLET | Refills: 0 | Status: SHIPPED | OUTPATIENT
Start: 2023-01-18 | End: 2023-01-18 | Stop reason: SDUPTHER

## 2023-01-18 RX ADMIN — VANCOMYCIN HYDROCHLORIDE 125 MG: 125 CAPSULE ORAL at 21:35

## 2023-01-18 RX ADMIN — HYDROXYZINE HYDROCHLORIDE 25 MG: 25 TABLET ORAL at 15:34

## 2023-01-18 RX ADMIN — MAGNESIUM OXIDE 400 MG (241.3 MG MAGNESIUM) TABLET 400 MG: TABLET at 08:04

## 2023-01-18 RX ADMIN — OXYCODONE AND ACETAMINOPHEN 1 TABLET: 7.5; 325 TABLET ORAL at 09:47

## 2023-01-18 RX ADMIN — LEVETIRACETAM 1500 MG: 500 TABLET, FILM COATED ORAL at 08:03

## 2023-01-18 RX ADMIN — OXYCODONE AND ACETAMINOPHEN 1 TABLET: 7.5; 325 TABLET ORAL at 15:34

## 2023-01-18 RX ADMIN — VANCOMYCIN HYDROCHLORIDE 125 MG: 125 CAPSULE ORAL at 17:04

## 2023-01-18 RX ADMIN — LEVETIRACETAM 1500 MG: 500 TABLET, FILM COATED ORAL at 21:16

## 2023-01-18 RX ADMIN — OXYCODONE AND ACETAMINOPHEN 1 TABLET: 7.5; 325 TABLET ORAL at 21:16

## 2023-01-18 RX ADMIN — DICYCLOMINE HYDROCHLORIDE 20 MG: 10 CAPSULE ORAL at 17:03

## 2023-01-18 RX ADMIN — HYDROXYZINE HYDROCHLORIDE 25 MG: 25 TABLET ORAL at 08:04

## 2023-01-18 RX ADMIN — SUCRALFATE 1 G: 1 TABLET ORAL at 08:03

## 2023-01-18 RX ADMIN — BACLOFEN 5 MG: 10 TABLET ORAL at 21:17

## 2023-01-18 RX ADMIN — GABAPENTIN 600 MG: 300 CAPSULE ORAL at 08:04

## 2023-01-18 RX ADMIN — FLUOXETINE HYDROCHLORIDE 60 MG: 20 CAPSULE ORAL at 21:16

## 2023-01-18 RX ADMIN — OXYCODONE AND ACETAMINOPHEN 1 TABLET: 7.5; 325 TABLET ORAL at 03:22

## 2023-01-18 RX ADMIN — GABAPENTIN 600 MG: 300 CAPSULE ORAL at 21:16

## 2023-01-18 RX ADMIN — GABAPENTIN 600 MG: 300 CAPSULE ORAL at 13:02

## 2023-01-18 RX ADMIN — SUCRALFATE 1 G: 1 TABLET ORAL at 12:58

## 2023-01-18 RX ADMIN — APIXABAN 5 MG: 5 TABLET, FILM COATED ORAL at 21:16

## 2023-01-18 RX ADMIN — VANCOMYCIN HYDROCHLORIDE 125 MG: 125 CAPSULE ORAL at 12:58

## 2023-01-18 RX ADMIN — FOLIC ACID 1 MG: 1 TABLET ORAL at 08:03

## 2023-01-18 RX ADMIN — BACLOFEN 5 MG: 10 TABLET ORAL at 13:01

## 2023-01-18 RX ADMIN — ONDANSETRON 8 MG: 4 TABLET, ORALLY DISINTEGRATING ORAL at 08:14

## 2023-01-18 RX ADMIN — DICYCLOMINE HYDROCHLORIDE 20 MG: 10 CAPSULE ORAL at 21:17

## 2023-01-18 RX ADMIN — CETIRIZINE HYDROCHLORIDE 10 MG: 10 TABLET ORAL at 08:02

## 2023-01-18 RX ADMIN — SUCRALFATE 1 G: 1 TABLET ORAL at 21:17

## 2023-01-18 RX ADMIN — DICYCLOMINE HYDROCHLORIDE 20 MG: 10 CAPSULE ORAL at 08:03

## 2023-01-18 RX ADMIN — BACLOFEN 5 MG: 10 TABLET ORAL at 08:03

## 2023-01-18 RX ADMIN — VANCOMYCIN HYDROCHLORIDE 125 MG: 125 CAPSULE ORAL at 08:04

## 2023-01-18 RX ADMIN — SUCRALFATE 1 G: 1 TABLET ORAL at 17:03

## 2023-01-18 RX ADMIN — APIXABAN 5 MG: 5 TABLET, FILM COATED ORAL at 08:04

## 2023-01-18 NOTE — DISCHARGE SUMMARY
Patient Name: Belen Allen  : 1980  MRN: [id number]    Date of Admission: 2023  Date of Discharge:  2023  Primary Care Physician: Sahil Cornelius MD      Chief Complaint:   Seizures      Discharge Diagnoses     Active Hospital Problems    Diagnosis  POA   • **Seizure (HCC) [R56.9]  Yes   • Clostridioides difficile diarrhea [A04.72]  Yes   • Opioid dependence (HCC) [F11.20]  Yes   • Intractable back pain [M54.9]  Yes   • Tobacco abuse [Z72.0]  Yes   • GERD without esophagitis [K21.9]  Yes   • On anticoagulant therapy [Z79.01]  Not Applicable   • Obesity (BMI 30-39.9) [E66.9]  Yes      Resolved Hospital Problems   No resolved problems to display.        Hospital Course     Ms. Allen is a 42 y.o. 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  She was switched over to Keppra and discharged home.  During the hospital stay she had some diarrhea and a sample was sent which came back positive for C. difficile toxin but negative for antigen.  She presented to [facility] now complaining of similar issues.  She had done well at home up until a day or 2 prior to admission when she started to have seizures again.  She said that she fell onto her back on some concrete with the seizure episode.  She reports compliance with Keppra.  A level was sent and came back marginaly elevated.  She was admitted and neurology saw her and increased her Keppra to 1500 mg twice a day.  An EEG was repeated which was negative.  Notably her 24-hour EEG while here the first admission was also negative.  They did an MRI which showed no acute issues as detailed below.  They are planning outpatient follow up and potential further EEG study in that setting. I confirmed with neuro APRN the 1500 BID  dose of Keppra prior to dc.    She did continue to have some diarrhea the first day of admission.  A C. difficile sample was ordered but never collected.  She was started empirically on vancomycin and actually had resolution of diarrhea almost immediately.  Even though her initial labs last time look like she is more of a carrier, given continued symptoms and response to vancomycin I think reasonable to treat with a course of vancomycin at this time.      Her main complaint throughout the time she has been here has been of upper back pain.  She has requested continually increasing doses of narcotics for this.  She is on gabapentin chronically at home.  Notably she had a urine drug screen positive for both amphetamine and THC on admission but she adamantly denies both.  UDS has been positive for THC every time she has been here over the past month. CT last admission was negative for injury but given continued complaints I orderd CT today of the spine to be done prior to dc. If negative, will dc home. No opiates will be given unless evidence for bony injury is present.        Day of Discharge     Subjective:  Continued complaints of upper back and neck pain    Physical Exam:  Temp:  [97.6 °F (36.4 °C)-98.7 °F (37.1 °C)] 97.6 °F (36.4 °C)  Heart Rate:  [70-84] 84  Resp:  [16-20] 16  BP: (100-109)/(56-73) 105/66  Body mass index is 37.35 kg/m².  Physical Exam  Vitals reviewed.   Constitutional:       Appearance: She is obese. She is not ill-appearing.   Cardiovascular:      Rate and Rhythm: Normal rate.   Pulmonary:      Effort: Pulmonary effort is normal.   Neurological:      Mental Status: She is alert and oriented to person, place, and time.   Psychiatric:         Mood and Affect: Mood normal.         Thought Content: Thought content normal.         Consultants     Consult Orders (all) (From admission, onward)     Start     Ordered    01/16/23 1958  [facility] (on-call MD unless specified) Details  Once        Specialty:   Hospitalist  Provider:  (Not yet assigned)    01/16/23 1957 01/16/23 1855  Inpatient Neurology Consult General  Once,   Status:  Canceled        Specialty:  Neurology  Provider:  Ronnell Purdy DO    01/16/23 1854              Procedures       Imaging Results (All)     Procedure Component Value Units Date/Time    MRI Brain With & Without Contrast [[id number]] Collected: 01/17/23 2310     Updated: 01/17/23 2310    Narrative:        Patient: CARSON MULLEN  Time Out: 23:10  Exam(s): MRI HEAD W WO Contrast     EXAM:    MR Head Without and With Intravenous Contrast    CLINICAL HISTORY:     Reason for exam: Seizure, new-onset, no history of trauma.    TECHNIQUE:    Magnetic resonance images of the head brain without and with   intravenous contrast in multiple planes.    COMPARISON:  Comparison made to noncontrasted CT from January 16, 2023.    FINDINGS:    Brain: Minimal nonspecific white matter changes.  No mass.  No   hemorrhage.  No acute infarct.  The flow voids at the base of the brain   are intact.  No evidence of abnormal enhancement.    Ventricles:  Unremarkable.  No ventriculomegaly.    Bones joints:  Unremarkable.    Sinuses:  Unremarkable as visualized.  No acute sinusitis.    Mastoid air cells:  Unremarkable as visualized.  No mastoid effusion.    Orbits:  Unremarkable as visualized.    IMPRESSION:       No evidence of acute intracranial pathology.    Minimal nonspecific white matter changes.      Impression:          Electronically signed by Elizabeth Ramsey MD on 01-17-23 at 2310    CT Head Without Contrast [[id number]] Collected: 01/16/23 1920     Updated: 01/16/23 1948    Narrative:      HEAD CT WITHOUT CONTRAST     HISTORY: Seizure with a fall.     TECHNIQUE: Noncontrast head CT correlated with head CT 12/31/2022.     Radiation dose reduction techniques were utilized, including automated  exposure control and exposure modulation based on body size.     FINDINGS: The ventricles are normal in  caliber. The brain parenchyma,  extra-axial spaces, and the bones of the skull appear normal. Paranasal  sinuses, mastoid air cells, middle ears, and orbital structures appear  normal. No soft tissue contusion at the scalp.       Impression:      Negative.     This report was finalized on 1/16/2023 7:45 PM by Dr. Dennys Eckert M.D.               Pertinent Labs     Results from last 7 days   Lab Units 01/17/23  0936 01/16/23  1715   WBC 10*3/mm3 4.41 6.11   HEMOGLOBIN g/dL 11.0* 12.0   PLATELETS 10*3/mm3 237 238     Results from last 7 days   Lab Units 01/17/23  0558 01/16/23  1715   SODIUM mmol/L 140 142   POTASSIUM mmol/L 4.5 4.1   CHLORIDE mmol/L 106 107   CO2 mmol/L 26.1 27.9   BUN mg/dL 10 9   CREATININE mg/dL 0.71 0.73   GLUCOSE mg/dL 106* 88   EGFR mL/min/1.73 109.7 106.1     Results from last 7 days   Lab Units 01/16/23  1715   ALBUMIN g/dL 3.7   BILIRUBIN mg/dL <0.2   ALK PHOS U/L 129*   AST (SGOT) U/L 19   ALT (SGPT) U/L 21     Results from last 7 days   Lab Units 01/17/23  0558 01/16/23  1715   CALCIUM mg/dL 8.8 8.6   ALBUMIN g/dL  --  3.7               Invalid input(s): LDLCALC          Test Results Pending at Discharge     Pending Labs     Order Current Status    Levetiracetam Level (Keppra) In process          Discharge Details        Discharge Medications      New Medications      Instructions Start Date   vancomycin 125 MG capsule  Commonly known as: VANCOCIN   125 mg, Oral, Every 6 Hours Scheduled         Changes to Medications      Instructions Start Date   FLUoxetine 40 MG capsule  Commonly known as: PROzac  What changed: Another medication with the same name was removed. Continue taking this medication, and follow the directions you see here.   60 mg, Oral, Nightly      gabapentin 800 MG tablet  Commonly known as: NEURONTIN  What changed: Another medication with the same name was removed. Continue taking this medication, and follow the directions you see here.   800 mg, Oral, 4 Times Daily       hydrOXYzine 25 MG tablet  Commonly known as: ATARAX  What changed: Another medication with the same name was removed. Continue taking this medication, and follow the directions you see here.   1-2 tablets, Oral, 3 Times Daily PRN      levETIRAcetam 750 MG tablet  Commonly known as: KEPPRA  What changed:   · medication strength  · how much to take  · when to take this   1,500 mg, Oral, Every 12 Hours Scheduled      melatonin 5 MG tablet tablet  What changed: Another medication with the same name was removed. Continue taking this medication, and follow the directions you see here.   10 mg, Oral, Nightly, Patient taes 20 mg at home      traZODone 50 MG tablet  Commonly known as: DESYREL  What changed: Another medication with the same name was removed. Continue taking this medication, and follow the directions you see here.   Take 1-2 tablets by mouth At Night As Needed for sleep         Continue These Medications      Instructions Start Date   apixaban 5 MG tablet tablet  Commonly known as: ELIQUIS   5 mg, Oral, 2 Times Daily, Last filled 4/12/21 - pt states she was instructed to stop taking medication prior to ERCP ~1 month ago and was not instructed to restart medication.  Indication: Splenic infarct       Calcium Carbonate-Vitamin D 600-200 MG-UNIT tablet   1 tablet, Oral, Daily      dicyclomine 20 MG tablet  Commonly known as: BENTYL   20 mg, Oral, Every 6 Hours      folic acid 1 MG tablet  Commonly known as: FOLVITE   1 mg, Oral, Daily      loratadine 10 MG tablet  Commonly known as: CLARITIN   10 mg, Oral, Daily      magnesium oxide 400 MG tablet  Commonly known as: MAG-OX   400 mg, Oral, Daily      ondansetron ODT 8 MG disintegrating tablet  Commonly known as: ZOFRAN-ODT   8 mg, Translingual, Every 8 Hours PRN      sucralfate 1 g tablet  Commonly known as: CARAFATE   1 g, Oral, 4 Times Daily      SUMAtriptan 100 MG tablet  Commonly known as: IMITREX   50 mg, Oral, Every 2 Hours PRN, Take one tablet at onset of  headache. May repeat dose one time in 2 hours if headache not relieved.       Vitamin B-2 100 MG tablet tablet  Commonly known as: RIBOFLAVIN   400 mg, Oral, Daily      vitamin D 1.25 MG (58499 UT) capsule capsule  Commonly known as: ERGOCALCIFEROL   50,000 Units, Oral, Weekly         Stop These Medications    baclofen 10 MG tablet  Commonly known as: LIORESAL     LORazepam 0.5 MG tablet  Commonly known as: ATIVAN     methocarbamol 500 MG tablet  Commonly known as: ROBAXIN     pantoprazole 40 MG EC tablet  Commonly known as: PROTONIX     polyethylene glycol 17 g packet  Commonly known as: MIRALAX            Allergies   Allergen Reactions   • Lidocaine Rash     Pt reports lidocaine patches make her breakout in a rash       Discharge Disposition:  Home or Self Care      Discharge Diet:  Diet Order   Procedures   • Diet: Regular/House Diet; Texture: Regular Texture (IDDSI 7); Fluid Consistency: Thin (IDDSI 0)       Discharge Activity:       CODE STATUS:    Code Status and Medical Interventions:   Ordered at: 01/16/23 2009     Code Status (Patient has no pulse and is not breathing):    CPR (Attempt to Resuscitate)     Medical Interventions (Patient has pulse or is breathing):    Full Support       Future Appointments   Date Time Provider Department Center   5/18/2023  1:00 PM Cm Sandoval MD [facility]      Follow-up Information     Sahil Cornelius MD .    Specialty: Internal Medicine  Contact information:  [address] \[facility].\"" [id number] [phone]                         Time Spent on Discharge:  Greater than 30 minutes      Ton Agarwal MD  [facility]  01/18/23  09:48 EST

## 2023-01-18 NOTE — PROGRESS NOTES
DOS: 2023  NAME: Belen Allen   : 1980  PCP: Sahil Cornelius MD    Chief Complaint   Patient presents with   • Seizures        Subjective: Pt seen in follow up, however the problem is new to me.  Patient sitting up in bed with her significant other at bedside.  RN was attempting to place an IV.  She states she feels okay.  She thinks she may have had a seizure last night.  No further seizure activity this morning.  She denies any new weakness, numbness, speech or visual disturbances, or headaches.  She states she was diagnosed with C. difficile.    Objective:  Vital signs:      Vitals:    23 1223 23 1808 23 0525 23 0851   BP: 109/73 100/56 106/57 105/66   BP Location: Right arm Right arm Right arm Right arm   Patient Position: Lying Lying Lying Lying   Pulse: 80 75 70 84   Resp: 20 20 16 16   Temp: 98 °F (36.7 °C) 97.7 °F (36.5 °C) 98.7 °F (37.1 °C) 97.6 °F (36.4 °C)   TempSrc: Oral Oral Oral Oral   SpO2: 98% 98% 91% 94%   Weight:       Height:           Current Facility-Administered Medications:   •  apixaban (ELIQUIS) tablet 5 mg, 5 mg, Oral, Q12H, Nisha Ribera APRN, 5 mg at 23 0804  •  baclofen (LIORESAL) tablet 5 mg, 5 mg, Oral, Q8H, Ton Agarwal MD, 5 mg at 23 1301  •  cetirizine (zyrTEC) tablet 10 mg, 10 mg, Oral, Daily, Nisha Ribera, APRN, 10 mg at 23 0802  •  dicyclomine (BENTYL) capsule 20 mg, 20 mg, Oral, TID, Nisha Ribera, APRN, 20 mg at 23 0803  •  FLUoxetine (PROzac) capsule 60 mg, 60 mg, Oral, Nightly, Nisha Ribera APRN, 60 mg at 23  •  folic acid (FOLVITE) tablet 1 mg, 1 mg, Oral, Daily, Nisha Ribera APRN, 1 mg at 23 0803  •  gabapentin (NEURONTIN) capsule 600 mg, 600 mg, Oral, Q8H, Nisha Ribera APRN, 600 mg at 23 1302  •  hydrOXYzine (ATARAX) tablet 25 mg, 25 mg, Oral, TID PRN, Nisha Ribera APRN, 25 mg at 23 0804  •  ketorolac  (TORADOL) injection 15 mg, 15 mg, Intravenous, Once, Christian Thurston II, MD  •  levETIRAcetam (KEPPRA) tablet 1,500 mg, 1,500 mg, Oral, Q12H, Ronnell Purdy DO, 1,500 mg at 01/18/23 0803  •  magnesium oxide (MAG-OX) tablet 400 mg, 400 mg, Oral, Daily, Nisha Ribera APRN, 400 mg at 01/18/23 0804  •  nitroglycerin (NITROSTAT) SL tablet 0.4 mg, 0.4 mg, Sublingual, Q5 Min PRN, Mera Ramirez APRN  •  ondansetron ODT (ZOFRAN-ODT) disintegrating tablet 8 mg, 8 mg, Oral, Q8H PRN, Nisha Ribera APRN, 8 mg at 01/18/23 0814  •  oxyCODONE-acetaminophen (PERCOCET) 7.5-325 MG per tablet 1 tablet, 1 tablet, Oral, Q6H PRN, Ton Agarwal MD, 1 tablet at 01/18/23 0947  •  [COMPLETED] Insert Peripheral IV, , , Once **AND** sodium chloride 0.9 % flush 10 mL, 10 mL, Intravenous, PRN, Christian Thurston II, MD  •  sodium chloride 0.9 % flush 10 mL, 10 mL, Intravenous, Q12H, Mera Ramirez APRN, 10 mL at 01/17/23 2115  •  sodium chloride 0.9 % flush 10 mL, 10 mL, Intravenous, PRN, Mera Ramirez APRN  •  sodium chloride 0.9 % infusion 40 mL, 40 mL, Intravenous, PRN, Mera Ramirez APRN  •  sucralfate (CARAFATE) tablet 1 g, 1 g, Oral, 4x Daily, Nisha Ribera APRN, 1 g at 01/18/23 1258  •  traZODone (DESYREL) tablet 50 mg, 50 mg, Oral, Nightly PRN, Nisha Ribera APRN, 50 mg at 01/17/23 2307  •  vancomycin (VANCOCIN) capsule 125 mg, 125 mg, Oral, Q6H, Mane Urrutia MD, 125 mg at 01/18/23 1258  •  [START ON 1/26/2023] vancomycin (VANCOCIN) capsule 125 mg, 125 mg, Oral, Q12H, Mane Urrutia MD  •  [START ON 2/2/2023] vancomycin (VANCOCIN) capsule 125 mg, 125 mg, Oral, Q24H, Mane Urrutia MD  •  [START ON 2/9/2023] vancomycin (VANCOCIN) capsule 125 mg, 125 mg, Oral, Every Other Day, Mane Urrutia MD    PRN meds  •  hydrOXYzine  •  nitroglycerin  •  ondansetron ODT  •  oxyCODONE-acetaminophen  •  [COMPLETED] Insert Peripheral  IV **AND** sodium chloride  •  sodium chloride  •  sodium chloride  •  traZODone    No current facility-administered medications on file prior to encounter.     Current Outpatient Medications on File Prior to Encounter   Medication Sig   • apixaban (ELIQUIS) 5 MG tablet tablet Take 5 mg by mouth 2 (Two) Times a Day. Last filled 4/12/21 - pt states she was instructed to stop taking medication prior to ERCP ~1 month ago and was not instructed to restart medication.   Indication: Splenic infarct   • baclofen (LIORESAL) 10 MG tablet Take 1 tablet by mouth Every 8 (Eight) Hours.   • Calcium Carbonate-Vitamin D 600-200 MG-UNIT tablet Take 1 tablet by mouth Daily.   • dicyclomine (BENTYL) 20 MG tablet Take 1 tablet by mouth Every 6 (Six) Hours.   • FLUoxetine (PROzac) 20 MG capsule Take 60 mg by mouth Daily.   • FLUoxetine (PROzac) 40 MG capsule Take 60 mg by mouth Every Night.   • folic acid (FOLVITE) 1 MG tablet Take 1 tablet by mouth Daily.   • gabapentin (NEURONTIN) 600 MG tablet Take 800 mg by mouth 4 (Four) Times a Day.   • gabapentin (NEURONTIN) 800 MG tablet Take 800 mg by mouth 4 (Four) Times a Day.   • hydrOXYzine (ATARAX) 25 MG tablet Take 1-2 tablets by mouth 3 (Three) Times a Day As Needed for Anxiety.   • hydrOXYzine (ATARAX) 25 MG tablet Take 25-50 mg by mouth 4 (Four) Times a Day.   • levETIRAcetam (KEPPRA) 1000 MG tablet Take 1 tablet by mouth 2 (Two) Times a Day.   • loratadine (CLARITIN) 10 MG tablet Take 10 mg by mouth Daily.   • LORazepam (ATIVAN) 0.5 MG tablet Take 0.25-0.5 mg by mouth.   • magnesium oxide (MAG-OX) 400 tablet tablet Take 1 tablet by mouth Daily.   • melatonin 5 MG tablet tablet Take 10 mg by mouth Every Night. Patient taes 20 mg at home   • methocarbamol (ROBAXIN) 500 MG tablet Take 250-500 mg by mouth.   • ondansetron ODT (ZOFRAN-ODT) 8 MG disintegrating tablet Place 1 tablet on the tongue Every 8 (Eight) Hours As Needed for Nausea or Vomiting.   • pantoprazole (PROTONIX) 40 MG EC  tablet Take 1 tablet by mouth 2 (Two) Times a Day.   • sucralfate (CARAFATE) 1 g tablet Take 1 tablet by mouth 4 (Four) Times a Day.   • SUMAtriptan (IMITREX) 100 MG tablet Take 50 mg by mouth Every 2 (Two) Hours As Needed for Migraine. Take one tablet at onset of headache. May repeat dose one time in 2 hours if headache not relieved.   • traZODone (DESYREL) 100 MG tablet Take 100-200 mg by mouth At Night As Needed.   • traZODone (DESYREL) 50 MG tablet Take 1-2 tablets by mouth At Night As Needed for sleep   • Vitamin B-2 (RIBOFLAVIN) 100 MG tablet tablet Take 4 tablets by mouth Daily.   • vitamin D (ERGOCALCIFEROL) 1.25 MG (94413 UT) capsule capsule Take 50,000 Units by mouth 1 (One) Time Per Week.   • Melatonin 10 MG capsule Take 30 mg by mouth Daily.   • polyethylene glycol (polyethylene glycol) 17 g packet Take 17 g by mouth Daily.       General appearance: Young female, appears older than stated age, NAD, alert and cooperative  HEENT: Normocephalic, atraumatic, no masses or tenderness  Resp: Even and unlabored  Extremities: No obvious edema  Skin: warm, dry    Neurological:   MS: oriented x3, recent/remote memory intact, normal attention/concentration, language intact, no neglect, normal fund of knowledge  CN: visual acuity grossly normal, visual fields full, EOMI, facial sensation equal, no facial droop, tongue midline  Motor: 5/5 in all 4 ext., normal tone  Sensory: light touch sensation intact in all 4 ext.  Coordination: Normal finger to nose test  Gait and station: Deferred, patient was getting IV placed  Rapid alternating movements: normal finger to thumb tap    Laboratory results:  Lab Results   Component Value Date    TSH 4.000 11/30/2022     No results found for: HGBA1C  Lab Results   Component Value Date    PLVCYBDB63 255 11/10/2020     Lab Results   Component Value Date    CHLPL 173 03/12/2019     Lab Results   Component Value Date    TRIG 200 (H) 03/12/2019     Lab Results   Component Value Date     HDL 40 (L) 03/12/2019     Lab Results   Component Value Date    LDL 93 03/12/2019     Lab Results   Component Value Date    WBC 4.41 01/17/2023    HGB 11.0 (L) 01/17/2023    HCT 34.4 01/17/2023    MCV 96.9 01/17/2023     01/17/2023     Lab Results   Component Value Date    GLUCOSE 106 (H) 01/17/2023    BUN 10 01/17/2023    CREATININE 0.71 01/17/2023    EGFRIFNONA 94 02/02/2022    BCR 14.1 01/17/2023    K 4.5 01/17/2023    CO2 26.1 01/17/2023    CALCIUM 8.8 01/17/2023    PROTENTOTREF 4.8 (L) 11/25/2022    ALBUMIN 3.7 01/16/2023    LABIL2 1.3 11/25/2022    AST 19 01/16/2023    ALT 21 01/16/2023     Lab Results   Component Value Date    PTT 23.0 12/29/2022     Lab Results   Component Value Date    INR 1.03 01/01/2023    INR 0.87 (L) 12/29/2022    INR 0.87 (L) 12/13/2022    PROTIME 13.6 01/01/2023    PROTIME 11.9 12/29/2022    PROTIME 11.9 12/13/2022     Brief Urine Lab Results  (Last result in the past 365 days)      Color   Clarity   Blood   Leuk Est   Nitrite   Protein   CREAT   Urine HCG        01/16/23 1728 Dark Yellow   Clear   Negative   Negative   Negative   Negative                 Review and interpretation of imaging:  CT Head Without Contrast    Result Date: 1/16/2023  Negative.  This report was finalized on 1/16/2023 7:45 PM by Dr. Dennys Eckert M.D.      MRI Brain With & Without Contrast    Result Date: 1/17/2023  Electronically signed by Elizabeth Ramsey MD on 01-17-23 at 2310    Results for orders placed during the hospital encounter of 11/08/20    Adult Transesophageal Echo ([facility]) W/ Cont if Necessary Per Protocol    Interpretation Summary  · Left ventricular ejection fraction appears to be 61 - 65%. Left ventricular systolic function is normal.  · Saline test results are negative.      Impression/Assessment:  This is a 42-year-old female with past medical history of seizure disorder, opiate dependence, migraine headache who presented to the hospital on 1/16/2023 with complaints of suffering a  seizure.  She has had some recent recurrent admissions over the last month for complaints of suffering a seizure.  During her most recent admission on 1/2 she underwent continuous EEG monitoring for 22 hours that was normal.  Prior to that she had a routine EEG performed on 12/18 that was also normal.  Prior to this admission she had 3 separate CT heads last month that were all negative.  She was previously on Depakote and had reported being compliant however her Depakote levels were subtherapeutic during her admission in December therefore she was transitioned to Keppra 1 g twice daily during her admission earlier this month.  She is followed by Dr. Sandoval in the outpatient setting at our office with her neck scheduled appointment appearing to be on 5/18.    BP on arrival 100/55, HR 78.  Temp 98.1.  BS 88.    Of note, she tested positive for amphetamine/methamphetamine and THC on her urine tox this admission.  Prior admissions in December she tested positive for oxycodone, opiates, and THC.    Diagnosis:  Seizure disorder  Substance abuse, UDS + amphetamine/methamphetamine and THC    Work up to date:  CTH WO: No acute intracranial abnormalities.  MRI Brain W/WO: No acute intracranial abnormalities.  EEG: Normal.  Labs: Levetiracetam 42.6, amphetamine/methamphetamine positive, THC positive, ethanol level <10    Plan:  MRI and EEG both unremarkable.    Continue with plans of increased Keppra dose of 1500 twice daily.  Discussed importance of compliance.  Discussed with her that we recommend an EMU admission in the outpatient setting which can be arranged by Dr. Sandoval or possibly even an ambulatory EEG.  She was a little apprehensive to this but was willing to discuss this with Dr. Sandoval.  She needs to abstain from all substance abuse.  THC is known to lower the seizure threshold as well as methamphetamine can trigger seizures.  I am going to send a message to the office to see if the patient can get scheduled  follow-up with Dr. Sandoval much sooner than May.  Seizure precautions.  We will sign off, will see again per request.    Case discussed with patient, significant other, RN, and Dr. Purdy, and he agrees with plan above.     NELSON Wu

## 2023-01-18 NOTE — PLAN OF CARE
Goal Outcome Evaluation:  Plan of Care Reviewed With: patient        Progress: improving  Outcome Evaluation: MRI completed overnight. Continues to complain of back pain, meds given per MAR. No seizure activity this shift, precautions in place. VSS. Continue plan of care.

## 2023-01-19 NOTE — PROGRESS NOTES
Case Management Discharge Note      Final Note: Home with family         Selected Continued Care - Discharged on 1/18/2023 Admission date: 1/16/2023 - Discharge disposition: Home or Self Care    Destination    No services have been selected for the patient.              Durable Medical Equipment    No services have been selected for the patient.              Dialysis/Infusion    No services have been selected for the patient.              Home Medical Care    No services have been selected for the patient.              Therapy    No services have been selected for the patient.              Community Resources    No services have been selected for the patient.              Community & DME    No services have been selected for the patient.                  Transportation Services  Private: Car    Final Discharge Disposition Code: 01 - home or self-care

## 2023-01-24 ENCOUNTER — HOSPITAL ENCOUNTER (EMERGENCY)
Facility: HOSPITAL | Age: 43
Discharge: HOME | End: 2023-01-24
Attending: EMERGENCY MEDICINE | Admitting: EMERGENCY MEDICINE
Payer: COMMERCIAL

## 2023-01-24 ENCOUNTER — APPOINTMENT (OUTPATIENT)
Dept: CT IMAGING | Facility: HOSPITAL | Age: 43
End: 2023-01-24
Payer: COMMERCIAL

## 2023-01-24 VITALS
HEART RATE: 75 BPM | WEIGHT: 217 LBS | SYSTOLIC BLOOD PRESSURE: 152 MMHG | HEIGHT: 64 IN | OXYGEN SATURATION: 95 % | RESPIRATION RATE: 16 BRPM | BODY MASS INDEX: 37.05 KG/M2 | DIASTOLIC BLOOD PRESSURE: 92 MMHG | TEMPERATURE: 98 F

## 2023-01-24 DIAGNOSIS — R56.9 SEIZURES: Primary | ICD-10-CM

## 2023-01-24 DIAGNOSIS — L98.491 SKIN ULCER, LIMITED TO BREAKDOWN OF SKIN: ICD-10-CM

## 2023-01-24 DIAGNOSIS — S09.90XA CLOSED HEAD INJURY, INITIAL ENCOUNTER: ICD-10-CM

## 2023-01-24 LAB
ALBUMIN SERPL-MCNC: 3.6 G/DL (ref 3.5–5.2)
ALBUMIN/GLOB SERPL: 1.6 G/DL
ALP SERPL-CCNC: 135 U/L (ref 39–117)
ALT SERPL W P-5'-P-CCNC: 29 U/L (ref 1–33)
ANION GAP SERPL CALCULATED.3IONS-SCNC: 5 MMOL/L (ref 5–15)
AST SERPL-CCNC: 28 U/L (ref 1–32)
BASOPHILS # BLD AUTO: 0.03 10*3/MM3 (ref 0–0.2)
BASOPHILS NFR BLD AUTO: 0.5 % (ref 0–1.5)
BILIRUB SERPL-MCNC: 0.2 MG/DL (ref 0–1.2)
BUN SERPL-MCNC: 7 MG/DL (ref 6–20)
BUN/CREAT SERPL: 10 (ref 7–25)
CALCIUM SPEC-SCNC: 9 MG/DL (ref 8.6–10.5)
CHLORIDE SERPL-SCNC: 107 MMOL/L (ref 98–107)
CO2 SERPL-SCNC: 29 MMOL/L (ref 22–29)
CREAT SERPL-MCNC: 0.7 MG/DL (ref 0.57–1)
DEPRECATED RDW RBC AUTO: 46.6 FL (ref 37–54)
EGFRCR SERPLBLD CKD-EPI 2021: 111.6 ML/MIN/1.73
EOSINOPHIL # BLD AUTO: 0.09 10*3/MM3 (ref 0–0.4)
EOSINOPHIL NFR BLD AUTO: 1.4 % (ref 0.3–6.2)
ERYTHROCYTE [DISTWIDTH] IN BLOOD BY AUTOMATED COUNT: 13.3 % (ref 12.3–15.4)
ETHANOL BLD-MCNC: <10 MG/DL (ref 0–10)
ETHANOL UR QL: <0.01 %
GLOBULIN UR ELPH-MCNC: 2.3 GM/DL
GLUCOSE SERPL-MCNC: 82 MG/DL (ref 65–99)
HCT VFR BLD AUTO: 34.4 % (ref 34–46.6)
HGB BLD-MCNC: 11.7 G/DL (ref 12–15.9)
IMM GRANULOCYTES # BLD AUTO: 0.02 10*3/MM3 (ref 0–0.05)
IMM GRANULOCYTES NFR BLD AUTO: 0.3 % (ref 0–0.5)
LYMPHOCYTES # BLD AUTO: 2.51 10*3/MM3 (ref 0.7–3.1)
LYMPHOCYTES NFR BLD AUTO: 40.1 % (ref 19.6–45.3)
MCH RBC QN AUTO: 32.5 PG (ref 26.6–33)
MCHC RBC AUTO-ENTMCNC: 34 G/DL (ref 31.5–35.7)
MCV RBC AUTO: 95.6 FL (ref 79–97)
MONOCYTES # BLD AUTO: 0.5 10*3/MM3 (ref 0.1–0.9)
MONOCYTES NFR BLD AUTO: 8 % (ref 5–12)
NEUTROPHILS NFR BLD AUTO: 3.11 10*3/MM3 (ref 1.7–7)
NEUTROPHILS NFR BLD AUTO: 49.7 % (ref 42.7–76)
NRBC BLD AUTO-RTO: 0 /100 WBC (ref 0–0.2)
PLATELET # BLD AUTO: 234 10*3/MM3 (ref 140–450)
PMV BLD AUTO: 9.9 FL (ref 6–12)
POTASSIUM SERPL-SCNC: 4.2 MMOL/L (ref 3.5–5.2)
PROT SERPL-MCNC: 5.9 G/DL (ref 6–8.5)
RBC # BLD AUTO: 3.6 10*6/MM3 (ref 3.77–5.28)
SODIUM SERPL-SCNC: 141 MMOL/L (ref 136–145)
WBC NRBC COR # BLD: 6.26 10*3/MM3 (ref 3.4–10.8)

## 2023-01-24 PROCEDURE — 25010000002 LEVETRIRACETAM PER 10 MG: Performed by: EMERGENCY MEDICINE

## 2023-01-24 PROCEDURE — 80053 COMPREHEN METABOLIC PANEL: CPT | Performed by: EMERGENCY MEDICINE

## 2023-01-24 PROCEDURE — 99284 EMERGENCY DEPT VISIT MOD MDM: CPT

## 2023-01-24 PROCEDURE — 82077 ASSAY SPEC XCP UR&BREATH IA: CPT | Performed by: EMERGENCY MEDICINE

## 2023-01-24 PROCEDURE — 70450 CT HEAD/BRAIN W/O DYE: CPT

## 2023-01-24 PROCEDURE — 96375 TX/PRO/DX INJ NEW DRUG ADDON: CPT

## 2023-01-24 PROCEDURE — 25010000002 LORAZEPAM PER 2 MG: Performed by: EMERGENCY MEDICINE

## 2023-01-24 PROCEDURE — 96374 THER/PROPH/DIAG INJ IV PUSH: CPT

## 2023-01-24 PROCEDURE — 85025 COMPLETE CBC W/AUTO DIFF WBC: CPT | Performed by: EMERGENCY MEDICINE

## 2023-01-24 PROCEDURE — 25010000002 DROPERIDOL PER 5 MG: Performed by: EMERGENCY MEDICINE

## 2023-01-24 RX ORDER — SODIUM CHLORIDE 0.9 % (FLUSH) 0.9 %
10 SYRINGE (ML) INJECTION AS NEEDED
Status: DISCONTINUED | OUTPATIENT
Start: 2023-01-24 | End: 2023-01-25 | Stop reason: HOSPADM

## 2023-01-24 RX ORDER — LEVETIRACETAM 1000 MG/1
2000 TABLET ORAL EVERY 12 HOURS SCHEDULED
Qty: 120 TABLET | Refills: 0 | Status: SHIPPED | OUTPATIENT
Start: 2023-01-24 | End: 2023-02-23

## 2023-01-24 RX ORDER — LORAZEPAM 2 MG/ML
1 INJECTION INTRAMUSCULAR ONCE
Status: COMPLETED | OUTPATIENT
Start: 2023-01-24 | End: 2023-01-24

## 2023-01-24 RX ORDER — LEVETIRACETAM 500 MG/5ML
1000 INJECTION, SOLUTION, CONCENTRATE INTRAVENOUS ONCE
Status: COMPLETED | OUTPATIENT
Start: 2023-01-24 | End: 2023-01-24

## 2023-01-24 RX ORDER — DROPERIDOL 2.5 MG/ML
2.5 INJECTION, SOLUTION INTRAMUSCULAR; INTRAVENOUS ONCE
Status: COMPLETED | OUTPATIENT
Start: 2023-01-24 | End: 2023-01-24

## 2023-01-24 RX ADMIN — LEVETIRACETAM 1000 MG: 500 INJECTION, SOLUTION, CONCENTRATE INTRAVENOUS at 21:54

## 2023-01-24 RX ADMIN — DROPERIDOL 2.5 MG: 2.5 INJECTION, SOLUTION INTRAMUSCULAR; INTRAVENOUS at 20:45

## 2023-01-24 RX ADMIN — LORAZEPAM 1 MG: 2 INJECTION INTRAMUSCULAR; INTRAVENOUS at 20:43

## 2023-01-25 NOTE — ED TRIAGE NOTES
Pt to ED from home via ambulance. Pt reports hx of seizures and having several episodes yesterday and today. Pt reports she fell out of her bed after a seizure yesterday and hit her L brow. Pt reports being on Keppra and having the dosage adjusted approx 2 weeks ago. Pt is Aox4 in triage. Pt also reports skin sloughing on her buttocks that started a few days ago, pt denies recent antibiotic use.     Pt in mask, this RN in PPE.

## 2023-01-25 NOTE — DISCHARGE INSTRUCTIONS
Apply the Bactroban ointment to your skin ulcers on your buttocks.  Start the increased dose of Keppra.  Follow-up with your neurologist tomorrow for further evaluation.  Return here immediately for any further seizures or head injury.

## 2023-01-25 NOTE — ED PROVIDER NOTES
" EMERGENCY DEPARTMENT ENCOUNTER    Room Number:  23/23  Date of encounter:  1/24/2023  PCP: Sahil Cornelius MD  Patient Care Team:  Sahil Cornelius MD as PCP - General (Internal Medicine)  Code, Bjorn HERRERA II, MD as Consulting Physician (Hematology and Oncology)  Dennys Carranza MD as Referring Physician (Hospitalist)   Independent Historians: Patient, EMS    HPI:  Chief Complaint: Seizures, \"skin coming off of my bottom\"  A complete HPI/ROS/PMH/PSH/SH/FH are unobtainable due to: Nothing    Chronic or social conditions impacting patient care (social determinants of health): None    Context: Belen Allen is a 42 y.o. female who presents to the ED c/o having multiple seizures.  She reports that she started having seizures 2 days ago.  She reports that she recently was switched from Depakote to Keppra.  She states she has been compliant with her medication.  She reports they increased her dose of Keppra.  She states that she had multiple seizures today.  She reports that she hit her left brow during one of the seizures.  She reports total body pain.  She also reports that she has developed lesions on her bottom and she feels that they are producing purulence.  She denies any drug use.    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  She was having breakthrough seizures on Depakote previously on a discharge of 1/3/2023.  Her dose of Keppra was increased to 1500 twice daily.  She had a C. 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  She is on chronic gabapentin at home for her back pain.  Her urine was positive for amphetamine and THC.  The final note of the discharge summary was that no opiates will be given unless evidence of bony injury is present.    Review of prior external test results outside of this encounter: MRI of the brain dated 1/17/2023 showed minimal " nonspecific white matter changes.    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   • Generalized abdominal pain 11/29/2020   • Acute bilateral low back pain 11/29/2020   • Antiphospholipid antibody positive 11/29/2020   • On anticoagulant therapy 11/29/2020   • Acute pain of left knee 11/29/2020   • Vitamin D deficiency 11/30/2020   • Folate deficiency 12/01/2020   • Pain of back and left lower extremity 12/02/2020   • Common bile duct dilatation 05/31/2021   • Elevated LFTs 06/01/2021   • Right upper quadrant abdominal pain 06/01/2021   • Obesity (BMI 30-39.9) 04/18/2019   • Tobacco abuse 06/02/2021   • GERD without esophagitis 06/02/2021   • Intractable abdominal pain 07/05/2021   • Obesity, Class III, BMI 40-49.9 (morbid obesity) ([facility]) 07/05/2021   • Constipation 07/05/2021   • History of ERCP 07/05/2021   • Other chronic pain 07/05/2021   • Seizures ([facility]) 01/01/2022   • Syncope 01/01/2022   • Lumbar back pain with radiculopathy affecting left lower extremity 01/05/2022   • Right upper quadrant pain 11/23/2022   • Uncontrolled pain 12/17/2022   • Intractable back pain 12/17/2022   • Sciatica of right side 12/20/2022   • Migraine 12/20/2022   • Mobility impaired 12/20/2022   • Seizure ([facility]) 12/31/2022   • Opioid dependence ([facility]) 01/16/2023   • Clostridioides difficile diarrhea 01/18/2023     Resolved Ambulatory Problems     Diagnosis Date Noted   • Choledocholithiasis 06/02/2021   • Rhabdomyolysis 01/01/2022   • Encephalopathy 01/01/2022   • Hypokalemia 12/18/2022     Past Medical History:   Diagnosis Date   • Abnormal Pap smear of cervix    • Ankle fracture 2013   • H/O Elevated liver enzymes    • History of chronic back pain    • History of migraine    • History of urinary tract infection    • Injury of back    • PONV (postoperative nausea and vomiting)    • Seasonal allergies         The patient qualifies to receive the vaccine, but they have not yet received it.    PAST SURGICAL HISTORY  Past Surgical History:   Procedure Laterality Date   • BACK SURGERY  2006    fusion L4, L5     • CHOLECYSTECTOMY  2014   • DILATATION AND CURETTAGE  2009   • ENDOSCOPY N/A 11/27/2022    Procedure: ESOPHAGOGASTRODUODENOSCOPY with biopsy;  Surgeon: Christiana Mann MD;  Location: [facility] ENDOSCOPY;  Service: Gastroenterology;  Laterality: N/A;  gastritis, duodenitis, irregular z line   • ERCP N/A 6/3/2021    Procedure: ENDOSCOPIC RETROGRADE CHOLANGIOPANCREATOGRAPHY WITH SPHINCTEROTOMY, DILATION WITH BALLOON CLEARANCE  (12MM-15MM);  Surgeon: Bjorn Flannery MD;  Location: [facility] ENDOSCOPY;  Service: Gastroenterology;  Laterality: N/A;  DILATED COMMON BILE DUCT   • SKIN SURGERY     • TUBAL ABDOMINAL LIGATION  2013   • WISDOM TOOTH EXTRACTION  2018    x2         FAMILY HISTORY  Family History   Problem Relation Age of Onset   • Stroke Mother    • Allergic rhinitis Mother    • Allergic rhinitis Sister    • Breast cancer Maternal Aunt    • Cancer Maternal Grandmother    • Allergic rhinitis Paternal Grandfather    • Cancer Paternal Grandfather    • Prostate cancer Paternal Grandfather          SOCIAL HISTORY  Social History     Socioeconomic History   • Marital status: Single   • Number of children: 2   Tobacco Use   • Smoking status: Every Day     Packs/day: 0.50     Types: Cigarettes   • Smokeless tobacco: Never   Vaping Use   • Vaping Use: Never used   Substance and Sexual Activity   • Alcohol use: Not Currently   • Drug use: Not Currently   • Sexual activity: Defer         ALLERGIES  Lidocaine        REVIEW OF SYSTEMS  Review of Systems   No chest pain, no shortness of breath, no abdominal pain, positive nausea, no vomiting, no fever, no chills  All systems reviewed and negative except for those discussed in HPI.       PHYSICAL EXAM    I have reviewed the triage vital signs and nursing notes.    ED  Triage Vitals [01/24/23 1926]   Temp Heart Rate Resp BP SpO2   98 °F (36.7 °C) 99 16 118/68 95 %      Temp src Heart Rate Source Patient Position BP Location FiO2 (%)   Oral -- -- -- --       Physical Exam  GENERAL: Awake, anxious, alert, no acute distress  SKIN: Warm, dry.  She has about 4 shallow-based ulcers on the skin of her buttocks without surrounding erythema or drainage.  These are about the size of a pencil eraser.  HENT: Normocephalic, ecchymosis to the left brow without open wounds  EYES: no scleral icterus  CV: regular rhythm, regular rate  RESPIRATORY: normal effort, lungs clear  ABDOMEN: soft, nontender, nondistended  MUSCULOSKELETAL: no deformity  NEURO: alert, moves all extremities, follows commands          LAB RESULTS  Recent Results (from the past 24 hour(s))   Comprehensive Metabolic Panel    Collection Time: 01/24/23  8:39 PM    Specimen: Blood   Result Value Ref Range    Glucose 82 65 - 99 mg/dL    BUN 7 6 - 20 mg/dL    Creatinine 0.70 0.57 - 1.00 mg/dL    Sodium 141 136 - 145 mmol/L    Potassium 4.2 3.5 - 5.2 mmol/L    Chloride 107 98 - 107 mmol/L    CO2 29.0 22.0 - 29.0 mmol/L    Calcium 9.0 8.6 - 10.5 mg/dL    Total Protein 5.9 (L) 6.0 - 8.5 g/dL    Albumin 3.6 3.5 - 5.2 g/dL    ALT (SGPT) 29 1 - 33 U/L    AST (SGOT) 28 1 - 32 U/L    Alkaline Phosphatase 135 (H) 39 - 117 U/L    Total Bilirubin 0.2 0.0 - 1.2 mg/dL    Globulin 2.3 gm/dL    A/G Ratio 1.6 g/dL    BUN/Creatinine Ratio 10.0 7.0 - 25.0    Anion Gap 5.0 5.0 - 15.0 mmol/L    eGFR 111.6 >60.0 mL/min/1.73   Ethanol    Collection Time: 01/24/23  8:39 PM    Specimen: Blood   Result Value Ref Range    Ethanol <10 0 - 10 mg/dL    Ethanol % <0.010 %   CBC Auto Differential    Collection Time: 01/24/23  8:39 PM    Specimen: Blood   Result Value Ref Range    WBC 6.26 3.40 - 10.80 10*3/mm3    RBC 3.60 (L) 3.77 - 5.28 10*6/mm3    Hemoglobin 11.7 (L) 12.0 - 15.9 g/dL    Hematocrit 34.4 34.0 - 46.6 %    MCV 95.6 79.0 - 97.0 fL    MCH 32.5 26.6 -  33.0 pg    MCHC 34.0 31.5 - 35.7 g/dL    RDW 13.3 12.3 - 15.4 %    RDW-SD 46.6 37.0 - 54.0 fl    MPV 9.9 6.0 - 12.0 fL    Platelets 234 140 - 450 10*3/mm3    Neutrophil % 49.7 42.7 - 76.0 %    Lymphocyte % 40.1 19.6 - 45.3 %    Monocyte % 8.0 5.0 - 12.0 %    Eosinophil % 1.4 0.3 - 6.2 %    Basophil % 0.5 0.0 - 1.5 %    Immature Grans % 0.3 0.0 - 0.5 %    Neutrophils, Absolute 3.11 1.70 - 7.00 10*3/mm3    Lymphocytes, Absolute 2.51 0.70 - 3.10 10*3/mm3    Monocytes, Absolute 0.50 0.10 - 0.90 10*3/mm3    Eosinophils, Absolute 0.09 0.00 - 0.40 10*3/mm3    Basophils, Absolute 0.03 0.00 - 0.20 10*3/mm3    Immature Grans, Absolute 0.02 0.00 - 0.05 10*3/mm3    nRBC 0.0 0.0 - 0.2 /100 WBC       Ordered the above labs and independently reviewed the results.        RADIOLOGY  CT Head Without Contrast    Result Date: 1/24/2023  CT HEAD WITHOUT CONTRAST  HISTORY: Seizure  COMPARISON: 01/16/2023  TECHNIQUE: Axial CT imaging was obtained through the brain. No IV contrast was administered.  FINDINGS: No acute intracranial hemorrhage is seen. There is some stable mild asymmetry of the ventricles, with the left being larger than the right. There is no midline shift or mass effect. The paranasal sinuses and mastoid air cells appear clear. No calvarial fracture is seen. Patient does have some atrophy. This is relatively advanced for the age of 42.      No acute intracranial findings.  Radiation dose reduction techniques were utilized, including automated exposure control and exposure modulation based on body size.  This report was finalized on 1/24/2023 9:54 PM by Dr. Marli Eastman M.D.        I ordered the above noted radiological studies. Reviewed by me and discussed with radiologist.  See dictation for official radiology interpretation.      PROCEDURES    Critical Care  Performed by: Jose Yee MD  Authorized by: Jose Yee MD     Critical care provider statement:     Critical care time (minutes): 30-74.    Critical  care time was exclusive of:  Separately billable procedures and treating other patients    Critical care was necessary to treat or prevent imminent or life-threatening deterioration of the following conditions:  CNS failure or compromise    Critical care was time spent personally by me on the following activities:  Development of treatment plan with patient or surrogate, discussions with consultants, evaluation of patient's response to treatment, examination of patient, obtaining history from patient or surrogate, ordering and performing treatments and interventions, ordering and review of laboratory studies, ordering and review of radiographic studies, re-evaluation of patient's condition, pulse oximetry and review of old charts          MEDICATIONS GIVEN IN ER    Medications   sodium chloride 0.9 % flush 10 mL (has no administration in time range)   LORazepam (ATIVAN) injection 1 mg (1 mg Intravenous Given 1/24/23 2043)   droperidol (INAPSINE) injection 2.5 mg (2.5 mg Intravenous Given 1/24/23 2045)   levETIRAcetam (KEPPRA) injection 1,000 mg (1,000 mg Intravenous Given 1/24/23 2154)         PROGRESS, DATA ANALYSIS, CONSULTS, AND MEDICAL DECISION MAKING    All labs have been independently reviewed by me.  All radiology studies have been reviewed by me and discussed with radiologist dictating the report.   EKG's independently viewed and interpreted by me.  Discussion below represents my analysis of pertinent findings related to patient's condition, differential diagnosis, treatment plan and final disposition.    Differential diagnosis includes but is not limited to breakthrough seizures, substance abuse, chronic pain, meningitis, intracranial hemorrhage, skin ulcers, picking.    ED Course as of 01/24/23 2204 Tue Jan 24, 2023 2112 I have given her IV Ativan to raise her seizure threshold.  We will CT her head to rule out intracranial hemorrhage.  I will give her droperidol to help with her with her nausea. [TR]    2122 CT Head Without Contrast  My independent interpretation of the head CT is no intracranial hemorrhage [TR]   2129 During the admission the patient had a negative EEG and normal MRI.  Her Keppra was increased to 1500 twice daily.  She did have amphetamines and THC in her urine at that time.  These ulcers on the buttocks of her skin are shallow and do not show any evidence of infection.  Given that she is a C. difficile carrier I am hesitant to put her on any oral antibiotics.  I will put her on Bactroban ointment.  I am suspicious that she was positive for amphetamines, has these new skin lesions that are consistent with picking.  I have a call out to neurology to discuss whether we need to adjust her Keppra dosing. [TR]   2138 Discussing with Dr. Kumar with neurology.  He recommends giving a gram of Keppra IV here.  He recommends increasing her dose to 2000 mg twice a day. [TR]   2201 I reviewed the work-up and findings with the patient at the bedside.  Answered all questions.  Plan to discharge her home on increased dose of Keppra per neurology recommendations and Bactroban ointment.  She is agreeable.  She states she does have a ride home [TR]      ED Course User Index  [TR] Jose Yee MD           PPE: The patient wore a mask and I wore an N95 mask throughout the entire patient encounter.       AS OF 22:04 EST VITALS:    BP - 149/82  HR - 77  TEMP - 98 °F (36.7 °C) (Oral)  O2 SATS - 90%        DIAGNOSIS  Final diagnoses:   Seizures (HCC)   Skin ulcer, limited to breakdown of skin (HCC)   Closed head injury, initial encounter         DISPOSITION  ED Disposition     ED Disposition   Discharge    Condition   Stable    Comment   --                Note Disclaimer: At [facility], we believe that sharing information builds trust and better relationships. You are receiving this note because you recently visited [facility]. It is possible you will see health information before a provider has talked  with you about it. This kind of information can be easy to misunderstand. To help you fully understand what it means for your health, we urge you to discuss this note with your provider.       Jose Yee MD  01/24/23 [id number]

## 2023-04-26 ENCOUNTER — HOSPITAL ENCOUNTER (EMERGENCY)
Facility: HOSPITAL | Age: 43
Discharge: HOME | End: 2023-04-26
Attending: EMERGENCY MEDICINE
Payer: COMMERCIAL

## 2023-04-26 ENCOUNTER — APPOINTMENT (OUTPATIENT)
Dept: CT IMAGING | Facility: HOSPITAL | Age: 43
End: 2023-04-26
Payer: COMMERCIAL

## 2023-04-26 VITALS
HEIGHT: 64 IN | DIASTOLIC BLOOD PRESSURE: 74 MMHG | SYSTOLIC BLOOD PRESSURE: 127 MMHG | TEMPERATURE: 99.2 F | RESPIRATION RATE: 18 BRPM | OXYGEN SATURATION: 97 % | WEIGHT: 200 LBS | HEART RATE: 92 BPM | BODY MASS INDEX: 34.15 KG/M2

## 2023-04-26 DIAGNOSIS — M54.50 ACUTE EXACERBATION OF CHRONIC LOW BACK PAIN: Primary | ICD-10-CM

## 2023-04-26 DIAGNOSIS — G89.29 ACUTE EXACERBATION OF CHRONIC LOW BACK PAIN: Primary | ICD-10-CM

## 2023-04-26 LAB
ALBUMIN SERPL-MCNC: 3.8 G/DL (ref 3.5–5.2)
ALBUMIN/GLOB SERPL: 1.5 G/DL
ALP SERPL-CCNC: 178 U/L (ref 39–117)
ALT SERPL W P-5'-P-CCNC: 70 U/L (ref 1–33)
ANION GAP SERPL CALCULATED.3IONS-SCNC: 7 MMOL/L (ref 5–15)
AST SERPL-CCNC: 55 U/L (ref 1–32)
BASOPHILS # BLD AUTO: 0.03 10*3/MM3 (ref 0–0.2)
BASOPHILS NFR BLD AUTO: 0.5 % (ref 0–1.5)
BILIRUB SERPL-MCNC: 0.2 MG/DL (ref 0–1.2)
BUN SERPL-MCNC: 13 MG/DL (ref 6–20)
BUN/CREAT SERPL: 16.5 (ref 7–25)
CALCIUM SPEC-SCNC: 9 MG/DL (ref 8.6–10.5)
CHLORIDE SERPL-SCNC: 104 MMOL/L (ref 98–107)
CO2 SERPL-SCNC: 27 MMOL/L (ref 22–29)
CREAT SERPL-MCNC: 0.79 MG/DL (ref 0.57–1)
DEPRECATED RDW RBC AUTO: 42.7 FL (ref 37–54)
EGFRCR SERPLBLD CKD-EPI 2021: 96.5 ML/MIN/1.73
EOSINOPHIL # BLD AUTO: 0.19 10*3/MM3 (ref 0–0.4)
EOSINOPHIL NFR BLD AUTO: 3.4 % (ref 0.3–6.2)
ERYTHROCYTE [DISTWIDTH] IN BLOOD BY AUTOMATED COUNT: 13.1 % (ref 12.3–15.4)
GLOBULIN UR ELPH-MCNC: 2.5 GM/DL
GLUCOSE SERPL-MCNC: 90 MG/DL (ref 65–99)
HCT VFR BLD AUTO: 39.1 % (ref 34–46.6)
HGB BLD-MCNC: 13.2 G/DL (ref 12–15.9)
IMM GRANULOCYTES # BLD AUTO: 0.01 10*3/MM3 (ref 0–0.05)
IMM GRANULOCYTES NFR BLD AUTO: 0.2 % (ref 0–0.5)
LYMPHOCYTES # BLD AUTO: 2.04 10*3/MM3 (ref 0.7–3.1)
LYMPHOCYTES NFR BLD AUTO: 36.2 % (ref 19.6–45.3)
MCH RBC QN AUTO: 30.2 PG (ref 26.6–33)
MCHC RBC AUTO-ENTMCNC: 33.8 G/DL (ref 31.5–35.7)
MCV RBC AUTO: 89.5 FL (ref 79–97)
MONOCYTES # BLD AUTO: 0.4 10*3/MM3 (ref 0.1–0.9)
MONOCYTES NFR BLD AUTO: 7.1 % (ref 5–12)
NEUTROPHILS NFR BLD AUTO: 2.96 10*3/MM3 (ref 1.7–7)
NEUTROPHILS NFR BLD AUTO: 52.6 % (ref 42.7–76)
NRBC BLD AUTO-RTO: 0 /100 WBC (ref 0–0.2)
PLATELET # BLD AUTO: 233 10*3/MM3 (ref 140–450)
PMV BLD AUTO: 9.7 FL (ref 6–12)
POTASSIUM SERPL-SCNC: 4.5 MMOL/L (ref 3.5–5.2)
PROT SERPL-MCNC: 6.3 G/DL (ref 6–8.5)
RBC # BLD AUTO: 4.37 10*6/MM3 (ref 3.77–5.28)
SODIUM SERPL-SCNC: 138 MMOL/L (ref 136–145)
WBC NRBC COR # BLD: 5.63 10*3/MM3 (ref 3.4–10.8)

## 2023-04-26 PROCEDURE — 96374 THER/PROPH/DIAG INJ IV PUSH: CPT

## 2023-04-26 PROCEDURE — 96375 TX/PRO/DX INJ NEW DRUG ADDON: CPT

## 2023-04-26 PROCEDURE — 25010000002 KETOROLAC TROMETHAMINE PER 15 MG: Performed by: EMERGENCY MEDICINE

## 2023-04-26 PROCEDURE — 72131 CT LUMBAR SPINE W/O DYE: CPT

## 2023-04-26 PROCEDURE — 25010000002 METHYLPREDNISOLONE PER 125 MG: Performed by: EMERGENCY MEDICINE

## 2023-04-26 PROCEDURE — 85025 COMPLETE CBC W/AUTO DIFF WBC: CPT | Performed by: EMERGENCY MEDICINE

## 2023-04-26 PROCEDURE — 25010000002 MORPHINE PER 10 MG: Performed by: EMERGENCY MEDICINE

## 2023-04-26 PROCEDURE — 80053 COMPREHEN METABOLIC PANEL: CPT | Performed by: EMERGENCY MEDICINE

## 2023-04-26 PROCEDURE — 99283 EMERGENCY DEPT VISIT LOW MDM: CPT

## 2023-04-26 RX ORDER — METHYLPREDNISOLONE 4 MG/1
TABLET ORAL
Qty: 21 TABLET | Refills: 0 | Status: SHIPPED | OUTPATIENT
Start: 2023-04-26

## 2023-04-26 RX ORDER — METHYLPREDNISOLONE SODIUM SUCCINATE 125 MG/2ML
125 INJECTION, POWDER, LYOPHILIZED, FOR SOLUTION INTRAMUSCULAR; INTRAVENOUS ONCE
Status: COMPLETED | OUTPATIENT
Start: 2023-04-26 | End: 2023-04-26

## 2023-04-26 RX ORDER — MORPHINE SULFATE 2 MG/ML
4 INJECTION, SOLUTION INTRAMUSCULAR; INTRAVENOUS ONCE
Status: COMPLETED | OUTPATIENT
Start: 2023-04-26 | End: 2023-04-26

## 2023-04-26 RX ORDER — OXYCODONE HYDROCHLORIDE AND ACETAMINOPHEN 5; 325 MG/1; MG/1
1 TABLET ORAL ONCE
Status: COMPLETED | OUTPATIENT
Start: 2023-04-26 | End: 2023-04-26

## 2023-04-26 RX ORDER — NALOXONE HYDROCHLORIDE 4 MG/.1ML
SPRAY NASAL
Qty: 2 EACH | Refills: 0 | Status: SHIPPED | OUTPATIENT
Start: 2023-04-26

## 2023-04-26 RX ORDER — KETOROLAC TROMETHAMINE 15 MG/ML
15 INJECTION, SOLUTION INTRAMUSCULAR; INTRAVENOUS ONCE
Status: COMPLETED | OUTPATIENT
Start: 2023-04-26 | End: 2023-04-26

## 2023-04-26 RX ORDER — SODIUM CHLORIDE 0.9 % (FLUSH) 0.9 %
10 SYRINGE (ML) INJECTION AS NEEDED
Status: DISCONTINUED | OUTPATIENT
Start: 2023-04-26 | End: 2023-04-26 | Stop reason: HOSPADM

## 2023-04-26 RX ORDER — OXYCODONE HYDROCHLORIDE AND ACETAMINOPHEN 5; 325 MG/1; MG/1
1 TABLET ORAL EVERY 6 HOURS PRN
Qty: 12 TABLET | Refills: 0 | Status: SHIPPED | OUTPATIENT
Start: 2023-04-26 | End: 2023-04-29

## 2023-04-26 RX ADMIN — KETOROLAC TROMETHAMINE 15 MG: 15 INJECTION, SOLUTION INTRAMUSCULAR; INTRAVENOUS at 16:33

## 2023-04-26 RX ADMIN — METHYLPREDNISOLONE SODIUM SUCCINATE 125 MG: 125 INJECTION, POWDER, FOR SOLUTION INTRAMUSCULAR; INTRAVENOUS at 16:33

## 2023-04-26 RX ADMIN — OXYCODONE AND ACETAMINOPHEN 1 TABLET: 5; 325 TABLET ORAL at 17:54

## 2023-04-26 RX ADMIN — MORPHINE SULFATE 4 MG: 2 INJECTION, SOLUTION INTRAMUSCULAR; INTRAVENOUS at 16:33

## 2023-04-26 NOTE — ED TRIAGE NOTES
Pt c/o right sided back pain that started approx 1 wk ago. Onset when pt was walking in store. Pain radiates into right leg

## 2023-04-26 NOTE — DISCHARGE INSTRUCTIONS
Rest and apply heating pad to the low back area as needed.  Must follow-up with spine specialist for further evaluation as we discussed.  Please return to the emergency room for any worsening pain, fevers, weakness, bowel or bladder incontinence or any other concerns.

## 2023-04-26 NOTE — ED PROVIDER NOTES
" EMERGENCY DEPARTMENT ENCOUNTER    Room Number:  33/33  Date seen:  4/26/2023  PCP: Sahil Cornelius MD  Historian(s): Patient      HPI:  Chief Complaint: Low back pain  A complete HPI/ROS/PMH/PSH/SH/FH are unobtainable / limited due to: N/A  Context: Belen Allen is a 42 y.o. female who presents to the ED for evaluation of worsening pain throughout the lower back area that is radiating into the legs bilaterally.  Patient has a history of chronic back pain.  She has had spine surgery in the past for her chronic back pain issues as well.  She has been taking muscle relaxers and over-the-counter medications for her back pain recently.  She denies any falls or recent injuries to explain this most recent flareup.  The pain has been particularly bad for the past week and somehow started while she was simply ambulating in the store.  She complains of a \"knot\" in the right mid back area that is new in comparison to her typical back pain symptoms she says the pain radiates into both legs to some degree but seems to be worse into the right leg.  She also has some numbness into her legs.  She denies any fevers.  She denies any bowel or bladder incontinence.  Denies saddle anesthesia.        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   • Generalized abdominal pain 11/29/2020   • Acute bilateral low back pain 11/29/2020   • Antiphospholipid antibody positive 11/29/2020   • On anticoagulant therapy 11/29/2020   • Acute pain of left knee 11/29/2020   • Vitamin D deficiency 11/30/2020   • Folate deficiency 12/01/2020   • Pain of back and left lower extremity 12/02/2020   • Common bile duct dilatation 05/31/2021   • Elevated LFTs 06/01/2021   • Right upper quadrant abdominal pain 06/01/2021   • Obesity (BMI 30-39.9) 04/18/2019   • Tobacco abuse 06/02/2021   • GERD without esophagitis " 06/02/2021   • Intractable abdominal pain 07/05/2021   • Obesity, Class III, BMI 40-49.9 (morbid obesity) 07/05/2021   • Constipation 07/05/2021   • History of ERCP 07/05/2021   • Other chronic pain 07/05/2021   • Seizures 01/01/2022   • Syncope 01/01/2022   • Lumbar back pain with radiculopathy affecting left lower extremity 01/05/2022   • Right upper quadrant pain 11/23/2022   • Uncontrolled pain 12/17/2022   • Intractable back pain 12/17/2022   • Sciatica of right side 12/20/2022   • Migraine 12/20/2022   • Mobility impaired 12/20/2022   • Seizure 12/31/2022   • Opioid dependence 01/16/2023   • Clostridioides difficile diarrhea 01/18/2023     Resolved Ambulatory Problems     Diagnosis Date Noted   • Choledocholithiasis 06/02/2021   • Rhabdomyolysis 01/01/2022   • Encephalopathy 01/01/2022   • Hypokalemia 12/18/2022     Past Medical History:   Diagnosis Date   • Abnormal Pap smear of cervix    • Ankle fracture 2013   • H/O Elevated liver enzymes    • History of chronic back pain    • History of migraine    • History of urinary tract infection    • Injury of back    • PONV (postoperative nausea and vomiting)    • Seasonal allergies          PAST SURGICAL HISTORY  Past Surgical History:   Procedure Laterality Date   • BACK SURGERY  2006    fusion L4, L5     • CHOLECYSTECTOMY  2014   • DILATATION AND CURETTAGE  2009   • ENDOSCOPY N/A 11/27/2022    Procedure: ESOPHAGOGASTRODUODENOSCOPY with biopsy;  Surgeon: Christiana Mann MD;  Location: [facility] ENDOSCOPY;  Service: Gastroenterology;  Laterality: N/A;  gastritis, duodenitis, irregular z line   • ERCP N/A 6/3/2021    Procedure: ENDOSCOPIC RETROGRADE CHOLANGIOPANCREATOGRAPHY WITH SPHINCTEROTOMY, DILATION WITH BALLOON CLEARANCE  (12MM-15MM);  Surgeon: Bjorn Flannery MD;  Location: [facility] ENDOSCOPY;  Service: Gastroenterology;  Laterality: N/A;  DILATED COMMON BILE DUCT   • SKIN SURGERY     • TUBAL ABDOMINAL LIGATION  2013   • WISDOM TOOTH EXTRACTION  2018    x2          FAMILY HISTORY  Family History   Problem Relation Age of Onset   • Stroke Mother    • Allergic rhinitis Mother    • Allergic rhinitis Sister    • Breast cancer Maternal Aunt    • Cancer Maternal Grandmother    • Allergic rhinitis Paternal Grandfather    • Cancer Paternal Grandfather    • Prostate cancer Paternal Grandfather          SOCIAL HISTORY  Social History     Socioeconomic History   • Marital status: Single   • Number of children: 2   Tobacco Use   • Smoking status: Every Day     Packs/day: 0.50     Types: Cigarettes   • Smokeless tobacco: Never   Vaping Use   • Vaping Use: Never used   Substance and Sexual Activity   • Alcohol use: Not Currently   • Drug use: Not Currently   • Sexual activity: Defer         ALLERGIES  Lidocaine        REVIEW OF SYSTEMS  Review of Systems   Constitutional: Negative for activity change and fever.   HENT: Negative.    Eyes: Negative for pain and visual disturbance.   Respiratory: Negative for cough and shortness of breath.    Cardiovascular: Negative for chest pain.   Gastrointestinal: Negative for abdominal pain.   Genitourinary: Negative for dysuria and enuresis.   Musculoskeletal: Positive for back pain and myalgias.   Skin: Negative for color change.   Neurological: Positive for numbness. Negative for syncope and headaches.   All other systems reviewed and are negative.           PHYSICAL EXAM  ED Triage Vitals   Temp Heart Rate Resp BP SpO2   04/26/23 1528 04/26/23 1528 04/26/23 1541 04/26/23 1541 04/26/23 1528   99.2 °F (37.3 °C) 102 18 127/74 97 %      Temp src Heart Rate Source Patient Position BP Location FiO2 (%)   04/26/23 1528 04/26/23 1528 04/26/23 1541 04/26/23 1541 --   Tympanic Monitor Sitting Right arm        Physical Exam      GENERAL: Patient standing up at the bedside when I walk in the room.  Appears uncomfortable but no acute distress, no diaphoresis  HENT: nares patent, normocephalic and atraumatic  EYES: no scleral icterus, EOMI, normal  conjunctiva  CV: regular rhythm, normal rate, normal distal pulses  RESPIRATORY: normal effort, no stridor  ABDOMEN: soft, nontender in all 4 quadrants  MUSCULOSKELETAL: no deformity, no asymmetry notable to the extremities.  Moderate diffuse tenderness to palpation noted throughout the entire lumbosacral back soft tissues, greatest in the paravertebral region bilaterally at L4, L5, S1 region.  No erythema or induration notable.  No dermatologic lesions notable.  Remote surgical scars noted.  NEURO: alert, moves all extremities, follows commands  PSYCH:  calm, cooperative  SKIN: warm, dry    Vital signs and nursing notes reviewed.        LAB RESULTS  Recent Results (from the past 24 hour(s))   Comprehensive Metabolic Panel    Collection Time: 04/26/23  4:32 PM    Specimen: Blood   Result Value Ref Range    Glucose 90 65 - 99 mg/dL    BUN 13 6 - 20 mg/dL    Creatinine 0.79 0.57 - 1.00 mg/dL    Sodium 138 136 - 145 mmol/L    Potassium 4.5 3.5 - 5.2 mmol/L    Chloride 104 98 - 107 mmol/L    CO2 27.0 22.0 - 29.0 mmol/L    Calcium 9.0 8.6 - 10.5 mg/dL    Total Protein 6.3 6.0 - 8.5 g/dL    Albumin 3.8 3.5 - 5.2 g/dL    ALT (SGPT) 70 (H) 1 - 33 U/L    AST (SGOT) 55 (H) 1 - 32 U/L    Alkaline Phosphatase 178 (H) 39 - 117 U/L    Total Bilirubin 0.2 0.0 - 1.2 mg/dL    Globulin 2.5 gm/dL    A/G Ratio 1.5 g/dL    BUN/Creatinine Ratio 16.5 7.0 - 25.0    Anion Gap 7.0 5.0 - 15.0 mmol/L    eGFR 96.5 >60.0 mL/min/1.73   CBC Auto Differential    Collection Time: 04/26/23  4:32 PM    Specimen: Blood   Result Value Ref Range    WBC 5.63 3.40 - 10.80 10*3/mm3    RBC 4.37 3.77 - 5.28 10*6/mm3    Hemoglobin 13.2 12.0 - 15.9 g/dL    Hematocrit 39.1 34.0 - 46.6 %    MCV 89.5 79.0 - 97.0 fL    MCH 30.2 26.6 - 33.0 pg    MCHC 33.8 31.5 - 35.7 g/dL    RDW 13.1 12.3 - 15.4 %    RDW-SD 42.7 37.0 - 54.0 fl    MPV 9.7 6.0 - 12.0 fL    Platelets 233 140 - 450 10*3/mm3    Neutrophil % 52.6 42.7 - 76.0 %    Lymphocyte % 36.2 19.6 - 45.3 %     Monocyte % 7.1 5.0 - 12.0 %    Eosinophil % 3.4 0.3 - 6.2 %    Basophil % 0.5 0.0 - 1.5 %    Immature Grans % 0.2 0.0 - 0.5 %    Neutrophils, Absolute 2.96 1.70 - 7.00 10*3/mm3    Lymphocytes, Absolute 2.04 0.70 - 3.10 10*3/mm3    Monocytes, Absolute 0.40 0.10 - 0.90 10*3/mm3    Eosinophils, Absolute 0.19 0.00 - 0.40 10*3/mm3    Basophils, Absolute 0.03 0.00 - 0.20 10*3/mm3    Immature Grans, Absolute 0.01 0.00 - 0.05 10*3/mm3    nRBC 0.0 0.0 - 0.2 /100 WBC       Ordered the above labs and reviewed the results.        RADIOLOGY  CT Lumbar Spine Without Contrast    Result Date: 4/26/2023  CT LUMBAR SPINE WITHOUT CONTRAST  INDICATION: Low back pain for one week radiating to the right leg, history of back surgery  TECHNIQUE: CT scan of the lumbar spine was performed without IV contrast. Coronal and sagittal reformatted images were obtained.  COMPARISON: CT lumbar spine 12/31/2022  FINDINGS: The vertebral body heights are maintained. Prior posterior ramses and screw fixation at L5-S1. Hardware intact and stable in positioning. Stable appearance of disc spacer at L5-S1. The alignment is stable. No evidence of fracture. Visualized soft tissue structures of the abdomen and pelvis show stable left renal cyst, otherwise unremarkable.      Stable postoperative changes at L5-S1. No acute findings.  Radiation dose reduction techniques were utilized, including automated exposure control and exposure modulation based on body size.  This report was finalized on 4/26/2023 5:09 PM by Dr. Gerry Rodriguez M.D.        Ordered the above noted radiological studies. Reviewed by me in PACS.          PROCEDURES  Procedures      MEDICATIONS GIVEN IN ER  Medications   methylPREDNISolone sodium succinate (SOLU-Medrol) injection 125 mg (125 mg Intravenous Given 4/26/23 1633)   ketorolac (TORADOL) injection 15 mg (15 mg Intravenous Given 4/26/23 1633)   morphine injection 4 mg (4 mg Intravenous Given 4/26/23 1633)   oxyCODONE-acetaminophen  (PERCOCET) 5-325 MG per tablet 1 tablet (1 tablet Oral Given 4/26/23 7902)           MEDICAL DECISION MAKING, PROGRESS, and CONSULTS    All labs have been independently reviewed by me.  All radiology studies have been reviewed by me and I have also reviewed the radiology report.   EKG's independently viewed and interpreted by me.  Discussion below represents my analysis of pertinent findings related to patient's condition, differential diagnosis, treatment plan and final disposition.      Additional sources:  - Discussed/ obtained information from independent historians: None    - External (non-ED) record review: I reviewed the most recent hospital discharge summary from January 18, 2023 when patient was admitted here for seizure problems and C. difficile diarrhea.    - Chronic or social conditions impacting care: Chronic back pain problem.  Patient had previously received care from a neurosurgery specialist at the [facility] spine care clinic.  However he is retired now.  Patient is not currently receiving any ongoing care from spine surgery specialist at any location.    Additional orders considered but not ordered:  I considered ordering an MRI of the lumbosacral spine without contrast for further evaluation of her symptoms, however since the patient was afebrile with no particularly worrisome red flags in the history of physical exam I did not feel that that test was warranted emergently here in the department today.  He can be ordered in the outpatient setting from a follow-up standpoint.    Orders placed during this visit:  Orders Placed This Encounter   Procedures   • CT Lumbar Spine Without Contrast   • Comprehensive Metabolic Panel   • CBC Auto Differential   • CBC & Differential           Differential diagnosis includes but is not limited to:    Acute exacerbation of chronic back pain, degenerative disc disease, sciatica, cauda equina syndrome      Independent interpretation of labs, radiology  studies, and discussions with consultants:  ED Course as of 04/26/23 2300 Wed Apr 26, 2023   1721 I independently interpreted the CT lumbar spine without contrast and my findings are: No obvious acute osseous injury present.  Multilevel degenerative changes are stable. [THERESA]   1730 WBC: 5.63 [THERESA]   1730 Patient is afebrile.  She is standing at the bedside and ambulating in the room independently when I first came to evaluate her.  There are no neurological deficits evident on physical exam.  She denies saddle anesthesia.  There is no report of bowel or bladder incontinence.  Certainly she does have a complicated history of previous lumbar spine problems and has previously been cared for by neurosurgery specialist at [facility].  She is not currently established with a spine surgery specialist. [THERESA]      ED Course User Index  [THERESA] Shaan Hernandez MD         DIAGNOSIS  Final diagnoses:   Acute exacerbation of chronic low back pain         DISPOSITION  Discharge home        Latest Documented Vital Signs:  As of 23:00 EDT  BP- 127/74 HR- 92 Temp- 99.2 °F (37.3 °C) (Tympanic) O2 sat- 97%      AMARJIT reviewed by Shaan Hernandez MD       --    Please note that portions of this were completed with a voice recognition program.       Note Disclaimer: At [facility], we believe that sharing information builds trust and better relationships. You are receiving this note because you are receiving care at [facility] or recently visited. It is possible you will see health information before a provider has talked with you about it. This kind of information can be easy to misunderstand. To help you fully understand what it means for your health, we urge you to discuss this note with your provider.           Shaan Hernandez MD  04/26/23 2301

## 2023-05-12 ENCOUNTER — OFFICE VISIT (OUTPATIENT)
Dept: NEUROSURGERY | Facility: CLINIC | Age: 43
End: 2023-05-12
Payer: COMMERCIAL

## 2023-05-12 VITALS — BODY MASS INDEX: 33.8 KG/M2 | TEMPERATURE: 98.4 F | HEIGHT: 64 IN | WEIGHT: 198 LBS

## 2023-05-12 DIAGNOSIS — Z98.1 STATUS POST LUMBAR SPINAL FUSION: Primary | ICD-10-CM

## 2023-05-12 NOTE — PROGRESS NOTES
"Subjective   History of Present Illness: Belen Allen is a 42 y.o. female status post L5-S1 decompression and fusion performed in 2008.  Apparently after the surgery she was told that she did not fuse, but did not undergo any further surgery.  Patient complains of low back pain and right worse than left leg pain.  She has done therapy in the past without improvement she has not undergone any recent injections or other pain management interventions.           Previous Treatment: PT    The following portions of the patient's history were reviewed and updated as appropriate: allergies, current medications, past family history, past medical history, past social history, past surgical history and problem list.    Review of Systems   Constitutional: Positive for fatigue.   Eyes: Negative.    Respiratory: Negative.    Cardiovascular: Negative.    Gastrointestinal: Negative.    Endocrine: Negative.    Genitourinary: Negative.    Musculoskeletal: Positive for back pain.   Skin: Negative.    Allergic/Immunologic: Negative.    Neurological: Positive for dizziness, weakness and headaches.   Hematological: Negative.    Psychiatric/Behavioral: Negative.        Objective     Temp 98.4 °F (36.9 °C)   Ht 162.6 cm (64\")   Wt 89.8 kg (198 lb)   LMP 04/16/2023   BMI 33.99 kg/m²    Body mass index is 33.99 kg/m².  Vitals:    05/12/23 1507   PainSc:   9   PainLoc: Back           Neurologic Exam     Mental Status   Oriented to person, place, and time.     Motor Exam     Strength   Strength 5/5 throughout.     Sensory Exam   Light touch normal.       Assessment & Plan   Independent Review of Radiographic Studies:      I personally reviewed and interpreted the images from the following studies.    CT lumbar spine: Patient has solid fusion across the L5-S1 disc space and some extent the posterior elements    MRI lumbar spine: Redemonstration of fusion at L5-S1.  No other significant disc degeneration or central or foraminal " stenosis    Medical Decision Making:      Belen Allen is a 42 y.o. female status post L5-S1 decompression and fusion performed about 15 years ago with evidence of solid fusion at the L5-S1 level.  MRI does not demonstrate any significant degenerative changes or central or foraminal stenosis that could explain the patient's current symptoms.  No surgery indicated.  We will refer the patient to pain management for possible epidural steroid injections or medical management.      Diagnoses and all orders for this visit:    1. Status post lumbar spinal fusion (Primary)      No follow-ups on file.    This patient was examined wearing appropriate personal protective equipment.                      Dr. Dennys Oliveros IV    05/12/23  15:30 EDT

## 2023-06-09 ENCOUNTER — TELEPHONE (OUTPATIENT)
Dept: NEUROLOGY | Facility: CLINIC | Age: 43
End: 2023-06-09
Payer: COMMERCIAL

## 2023-07-10 ENCOUNTER — HOSPITAL ENCOUNTER (EMERGENCY)
Facility: HOSPITAL | Age: 43
Discharge: HOME | DRG: 897 | End: 2023-07-10
Attending: EMERGENCY MEDICINE | Admitting: EMERGENCY MEDICINE
Payer: COMMERCIAL

## 2023-07-10 VITALS
HEART RATE: 59 BPM | BODY MASS INDEX: 32.44 KG/M2 | WEIGHT: 190 LBS | SYSTOLIC BLOOD PRESSURE: 160 MMHG | HEIGHT: 64 IN | DIASTOLIC BLOOD PRESSURE: 88 MMHG | TEMPERATURE: 98.2 F | OXYGEN SATURATION: 100 % | RESPIRATION RATE: 16 BRPM

## 2023-07-10 DIAGNOSIS — F11.93 OPIOID WITHDRAWAL: Primary | ICD-10-CM

## 2023-07-10 LAB
ALBUMIN SERPL-MCNC: 4.2 G/DL (ref 3.5–5.2)
ALBUMIN/GLOB SERPL: 1.5 G/DL
ALP SERPL-CCNC: 129 U/L (ref 39–117)
ALT SERPL W P-5'-P-CCNC: 9 U/L (ref 1–33)
AMORPH URATE CRY URNS QL MICRO: ABNORMAL /HPF
AMPHET+METHAMPHET UR QL: NEGATIVE
ANION GAP SERPL CALCULATED.3IONS-SCNC: 14 MMOL/L (ref 5–15)
APAP SERPL-MCNC: <5 MCG/ML (ref 0–30)
AST SERPL-CCNC: 17 U/L (ref 1–32)
BACTERIA UR QL AUTO: ABNORMAL /HPF
BARBITURATES UR QL SCN: NEGATIVE
BASOPHILS # BLD AUTO: 0.04 10*3/MM3 (ref 0–0.2)
BASOPHILS NFR BLD AUTO: 0.4 % (ref 0–1.5)
BENZODIAZ UR QL SCN: NEGATIVE
BILIRUB SERPL-MCNC: 0.5 MG/DL (ref 0–1.2)
BILIRUB UR QL STRIP: NEGATIVE
BUN SERPL-MCNC: 5 MG/DL (ref 6–20)
BUN/CREAT SERPL: 6.6 (ref 7–25)
CALCIUM SPEC-SCNC: 9.5 MG/DL (ref 8.6–10.5)
CANNABINOIDS SERPL QL: NEGATIVE
CHLORIDE SERPL-SCNC: 103 MMOL/L (ref 98–107)
CLARITY UR: ABNORMAL
CO2 SERPL-SCNC: 20 MMOL/L (ref 22–29)
COCAINE UR QL: NEGATIVE
COLOR UR: ABNORMAL
CREAT SERPL-MCNC: 0.76 MG/DL (ref 0.57–1)
DEPRECATED RDW RBC AUTO: 41.5 FL (ref 37–54)
EGFRCR SERPLBLD CKD-EPI 2021: 101.1 ML/MIN/1.73
EOSINOPHIL # BLD AUTO: 0.01 10*3/MM3 (ref 0–0.4)
EOSINOPHIL NFR BLD AUTO: 0.1 % (ref 0.3–6.2)
ERYTHROCYTE [DISTWIDTH] IN BLOOD BY AUTOMATED COUNT: 12.9 % (ref 12.3–15.4)
FENTANYL UR-MCNC: POSITIVE NG/ML
GLOBULIN UR ELPH-MCNC: 2.8 GM/DL
GLUCOSE SERPL-MCNC: 113 MG/DL (ref 65–99)
GLUCOSE UR STRIP-MCNC: NEGATIVE MG/DL
HCT VFR BLD AUTO: 44 % (ref 34–46.6)
HGB BLD-MCNC: 15.2 G/DL (ref 12–15.9)
HGB UR QL STRIP.AUTO: NEGATIVE
HOLD SPECIMEN: NORMAL
HOLD SPECIMEN: NORMAL
HYALINE CASTS UR QL AUTO: ABNORMAL /LPF
IMM GRANULOCYTES # BLD AUTO: 0.04 10*3/MM3 (ref 0–0.05)
IMM GRANULOCYTES NFR BLD AUTO: 0.4 % (ref 0–0.5)
KETONES UR QL STRIP: ABNORMAL
LEUKOCYTE ESTERASE UR QL STRIP.AUTO: ABNORMAL
LYMPHOCYTES # BLD AUTO: 1.79 10*3/MM3 (ref 0.7–3.1)
LYMPHOCYTES NFR BLD AUTO: 15.8 % (ref 19.6–45.3)
MCH RBC QN AUTO: 30.6 PG (ref 26.6–33)
MCHC RBC AUTO-ENTMCNC: 34.5 G/DL (ref 31.5–35.7)
MCV RBC AUTO: 88.7 FL (ref 79–97)
METHADONE UR QL SCN: NEGATIVE
MONOCYTES # BLD AUTO: 0.7 10*3/MM3 (ref 0.1–0.9)
MONOCYTES NFR BLD AUTO: 6.2 % (ref 5–12)
NEUTROPHILS NFR BLD AUTO: 77.1 % (ref 42.7–76)
NEUTROPHILS NFR BLD AUTO: 8.75 10*3/MM3 (ref 1.7–7)
NITRITE UR QL STRIP: NEGATIVE
NRBC BLD AUTO-RTO: 0 /100 WBC (ref 0–0.2)
OPIATES UR QL: NEGATIVE
OXYCODONE UR QL SCN: NEGATIVE
PH UR STRIP.AUTO: 6 [PH] (ref 5–8)
PLATELET # BLD AUTO: 311 10*3/MM3 (ref 140–450)
PMV BLD AUTO: 9.7 FL (ref 6–12)
POTASSIUM SERPL-SCNC: 3.5 MMOL/L (ref 3.5–5.2)
PROT SERPL-MCNC: 7 G/DL (ref 6–8.5)
PROT UR QL STRIP: ABNORMAL
RBC # BLD AUTO: 4.96 10*6/MM3 (ref 3.77–5.28)
RBC # UR STRIP: ABNORMAL /HPF
REF LAB TEST METHOD: ABNORMAL
S PYO AG THROAT QL: NEGATIVE
SALICYLATES SERPL-MCNC: <0.3 MG/DL
SODIUM SERPL-SCNC: 137 MMOL/L (ref 136–145)
SP GR UR STRIP: 1.03 (ref 1–1.03)
SQUAMOUS #/AREA URNS HPF: ABNORMAL /HPF
UROBILINOGEN UR QL STRIP: ABNORMAL
WBC # UR STRIP: ABNORMAL /HPF
WBC NRBC COR # BLD: 11.33 10*3/MM3 (ref 3.4–10.8)
WHOLE BLOOD HOLD COAG: NORMAL
WHOLE BLOOD HOLD SPECIMEN: NORMAL

## 2023-07-10 PROCEDURE — 96361 HYDRATE IV INFUSION ADD-ON: CPT

## 2023-07-10 PROCEDURE — 80307 DRUG TEST PRSMV CHEM ANLYZR: CPT | Performed by: PHYSICIAN ASSISTANT

## 2023-07-10 PROCEDURE — 25010000002 KETOROLAC TROMETHAMINE PER 15 MG: Performed by: PHYSICIAN ASSISTANT

## 2023-07-10 PROCEDURE — 87147 CULTURE TYPE IMMUNOLOGIC: CPT | Performed by: PHYSICIAN ASSISTANT

## 2023-07-10 PROCEDURE — 85025 COMPLETE CBC W/AUTO DIFF WBC: CPT | Performed by: PHYSICIAN ASSISTANT

## 2023-07-10 PROCEDURE — 80143 DRUG ASSAY ACETAMINOPHEN: CPT | Performed by: PHYSICIAN ASSISTANT

## 2023-07-10 PROCEDURE — 87081 CULTURE SCREEN ONLY: CPT | Performed by: PHYSICIAN ASSISTANT

## 2023-07-10 PROCEDURE — 25010000002 ONDANSETRON PER 1 MG: Performed by: PHYSICIAN ASSISTANT

## 2023-07-10 PROCEDURE — 80053 COMPREHEN METABOLIC PANEL: CPT | Performed by: PHYSICIAN ASSISTANT

## 2023-07-10 PROCEDURE — 87880 STREP A ASSAY W/OPTIC: CPT | Performed by: PHYSICIAN ASSISTANT

## 2023-07-10 PROCEDURE — 90791 PSYCH DIAGNOSTIC EVALUATION: CPT

## 2023-07-10 PROCEDURE — 25010000002 METOCLOPRAMIDE PER 10 MG: Performed by: PHYSICIAN ASSISTANT

## 2023-07-10 PROCEDURE — 96376 TX/PRO/DX INJ SAME DRUG ADON: CPT

## 2023-07-10 PROCEDURE — 99284 EMERGENCY DEPT VISIT MOD MDM: CPT

## 2023-07-10 PROCEDURE — 96375 TX/PRO/DX INJ NEW DRUG ADDON: CPT

## 2023-07-10 PROCEDURE — 96374 THER/PROPH/DIAG INJ IV PUSH: CPT

## 2023-07-10 PROCEDURE — 81001 URINALYSIS AUTO W/SCOPE: CPT | Performed by: PHYSICIAN ASSISTANT

## 2023-07-10 PROCEDURE — 80179 DRUG ASSAY SALICYLATE: CPT | Performed by: PHYSICIAN ASSISTANT

## 2023-07-10 RX ORDER — ONDANSETRON 4 MG/1
4 TABLET, ORALLY DISINTEGRATING ORAL 4 TIMES DAILY PRN
Qty: 30 TABLET | Refills: 0 | Status: ON HOLD | OUTPATIENT
Start: 2023-07-10 | End: 2023-07-17 | Stop reason: SDUPTHER

## 2023-07-10 RX ORDER — NALOXONE HYDROCHLORIDE 4 MG/.1ML
1 SPRAY NASAL AS NEEDED
Qty: 2 EACH | Refills: 0 | Status: ON HOLD | OUTPATIENT
Start: 2023-07-10 | End: 2023-07-27

## 2023-07-10 RX ORDER — KETOROLAC TROMETHAMINE 15 MG/ML
15 INJECTION, SOLUTION INTRAMUSCULAR; INTRAVENOUS ONCE
Status: COMPLETED | OUTPATIENT
Start: 2023-07-10 | End: 2023-07-10

## 2023-07-10 RX ORDER — NAPROXEN 500 MG/1
500 TABLET ORAL 2 TIMES DAILY PRN
Qty: 15 TABLET | Refills: 0 | Status: SHIPPED | OUTPATIENT
Start: 2023-07-10 | End: 2023-07-17 | Stop reason: HOSPADM

## 2023-07-10 RX ORDER — ONDANSETRON 2 MG/ML
4 INJECTION INTRAMUSCULAR; INTRAVENOUS ONCE
Status: COMPLETED | OUTPATIENT
Start: 2023-07-10 | End: 2023-07-10

## 2023-07-10 RX ORDER — HYDROXYZINE HYDROCHLORIDE 25 MG/1
25 TABLET, FILM COATED ORAL EVERY 6 HOURS PRN
Qty: 20 TABLET | Refills: 0 | Status: SHIPPED | OUTPATIENT
Start: 2023-07-10 | End: 2023-07-17 | Stop reason: HOSPADM

## 2023-07-10 RX ORDER — LORAZEPAM 2 MG/ML
1 INJECTION INTRAMUSCULAR ONCE
Status: DISCONTINUED | OUTPATIENT
Start: 2023-07-10 | End: 2023-07-10

## 2023-07-10 RX ORDER — CLONIDINE HYDROCHLORIDE 0.1 MG/1
0.2 TABLET ORAL ONCE
Status: COMPLETED | OUTPATIENT
Start: 2023-07-10 | End: 2023-07-10

## 2023-07-10 RX ORDER — METOCLOPRAMIDE HYDROCHLORIDE 5 MG/ML
10 INJECTION INTRAMUSCULAR; INTRAVENOUS ONCE
Status: COMPLETED | OUTPATIENT
Start: 2023-07-10 | End: 2023-07-10

## 2023-07-10 RX ADMIN — ONDANSETRON 4 MG: 2 INJECTION INTRAMUSCULAR; INTRAVENOUS at 19:00

## 2023-07-10 RX ADMIN — KETOROLAC TROMETHAMINE 15 MG: 15 INJECTION, SOLUTION INTRAMUSCULAR; INTRAVENOUS at 21:22

## 2023-07-10 RX ADMIN — KETOROLAC TROMETHAMINE 15 MG: 15 INJECTION, SOLUTION INTRAMUSCULAR; INTRAVENOUS at 19:01

## 2023-07-10 RX ADMIN — METOCLOPRAMIDE HYDROCHLORIDE 10 MG: 5 INJECTION INTRAMUSCULAR; INTRAVENOUS at 21:22

## 2023-07-10 RX ADMIN — CLONIDINE HYDROCHLORIDE 0.2 MG: 0.1 TABLET ORAL at 19:03

## 2023-07-10 RX ADMIN — SODIUM CHLORIDE 1000 ML: 9 INJECTION, SOLUTION INTRAVENOUS at 18:58

## 2023-07-10 NOTE — ED NOTES
Pt presents to facility with reports of taking 4-5 percocet 30's x3 weeks that did not belong to her, and was told they could possibley have fentanyl in them so she stopped taking them 3 days ago. Pt reports she has had previous back surgery, and was having pain 1 month ago as her reason for starting to take these pills from a friend. Pt currently reports abd pain and N/V. Pt reprots hx of seizures, and unsure when she last took her Keppra. Pt is A&OX4 right now but has obvious tremor at triage. Primary RN made aware.     Lyric Thurston RN

## 2023-07-10 NOTE — ED PROVIDER NOTES
EMERGENCY DEPARTMENT ENCOUNTER    Room Number:  08/08  Date of encounter:  7/10/2023  PCP: Sahil Cornelius MD  Historian: Patient  Full history not obtainable due to: None    HPI:  Chief Complaint: Tremulous    Context: Belen Allen is a 42 y.o. female with a PMH significant for seizures, opioid dependence, anxiety who presents to the ED c/o tremulousness, nausea, anxiety, chills, body aches which she attributes to withdrawal from opioids.  The patient has chronic back pain and has been taking an unknown medication for the past 1 month that she has gotten from a friend.  Over the past couple of days she has tried discontinuing the medication but has become very sick with the symptoms.  Admits to several episodes of vomiting through the day today and increased body aches.  After speaking with her friend she believes that she was taking Percocet 30mg up to 4-5 times per day.  Denies fever, chills, changes to bowel habits or urination.      MEDICAL RECORD REVIEW:    Upon review of the medical record it appears the patient was evaluated in the office with neurosurgery on May 12, 2023 status post lumbar spinal fusion.  The patient had a normal CBC on 4/26/2023 and a normal CBC on 1/16/2023.    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    Generalized abdominal pain 11/29/2020    Acute bilateral low back pain 11/29/2020    Antiphospholipid antibody positive 11/29/2020    On anticoagulant therapy 11/29/2020    Acute pain of left knee 11/29/2020    Vitamin D deficiency 11/30/2020    Folate deficiency 12/01/2020    Pain of back and left lower extremity 12/02/2020    Common bile duct dilatation 05/31/2021    Elevated LFTs 06/01/2021    Right upper quadrant abdominal pain 06/01/2021    Obesity (BMI 30-39.9) 04/18/2019    Tobacco abuse 06/02/2021    GERD without esophagitis 06/02/2021     Intractable abdominal pain 07/05/2021    Obesity, Class III, BMI 40-49.9 (morbid obesity) 07/05/2021    Constipation 07/05/2021    History of ERCP 07/05/2021    Other chronic pain 07/05/2021    Seizures 01/01/2022    Syncope 01/01/2022    Lumbar back pain with radiculopathy affecting left lower extremity 01/05/2022    Right upper quadrant pain 11/23/2022    Uncontrolled pain 12/17/2022    Intractable back pain 12/17/2022    Sciatica of right side 12/20/2022    Migraine 12/20/2022    Mobility impaired 12/20/2022    Seizure 12/31/2022    Opioid dependence 01/16/2023    Clostridioides difficile diarrhea 01/18/2023     Resolved Ambulatory Problems     Diagnosis Date Noted    Choledocholithiasis 06/02/2021    Rhabdomyolysis 01/01/2022    Encephalopathy 01/01/2022    Hypokalemia 12/18/2022     Past Medical History:   Diagnosis Date    Abnormal Pap smear of cervix     Ankle fracture 2013    H/O Elevated liver enzymes     History of chronic back pain     History of migraine     History of urinary tract infection     Injury of back     PONV (postoperative nausea and vomiting)     Seasonal allergies          PAST SURGICAL HISTORY  Past Surgical History:   Procedure Laterality Date    BACK SURGERY  2006    fusion L4, L5      CHOLECYSTECTOMY  2014    DILATATION AND CURETTAGE  2009    ENDOSCOPY N/A 11/27/2022    Procedure: ESOPHAGOGASTRODUODENOSCOPY with biopsy;  Surgeon: Christiana Mann MD;  Location: [facility] ENDOSCOPY;  Service: Gastroenterology;  Laterality: N/A;  gastritis, duodenitis, irregular z line    ERCP N/A 6/3/2021    Procedure: ENDOSCOPIC RETROGRADE CHOLANGIOPANCREATOGRAPHY WITH SPHINCTEROTOMY, DILATION WITH BALLOON CLEARANCE  (12MM-15MM);  Surgeon: Bjorn Flannery MD;  Location: [facility] ENDOSCOPY;  Service: Gastroenterology;  Laterality: N/A;  DILATED COMMON BILE DUCT    SKIN SURGERY      TUBAL ABDOMINAL LIGATION  2013    WISDOM TOOTH EXTRACTION  2018    x2         FAMILY HISTORY  Family History   Problem  Relation Age of Onset    Stroke Mother     Allergic rhinitis Mother     Allergic rhinitis Sister     Breast cancer Maternal Aunt     Cancer Maternal Grandmother     Allergic rhinitis Paternal Grandfather     Cancer Paternal Grandfather     Prostate cancer Paternal Grandfather          SOCIAL HISTORY  Social History     Socioeconomic History    Marital status: Single    Number of children: 2   Tobacco Use    Smoking status: Every Day     Packs/day: 0.50     Types: Cigarettes    Smokeless tobacco: Never   Vaping Use    Vaping Use: Never used   Substance and Sexual Activity    Alcohol use: Not Currently    Drug use: Not Currently    Sexual activity: Defer         ALLERGIES  Lidocaine        REVIEW OF SYSTEMS    All systems reviewed and marked as negative except as listed in HPI     PHYSICAL EXAM    I have reviewed the triage vital signs and nursing notes.    ED Triage Vitals [07/10/23 1758]   Temp Heart Rate Resp BP SpO2   98.2 °F (36.8 °C) 85 22 180/60 100 %      Temp src Heart Rate Source Patient Position BP Location FiO2 (%)   Tympanic Monitor Sitting Right arm --       Physical Exam  Constitutional:       General: She is not in acute distress.     Appearance: She is well-developed.   HENT:      Head: Normocephalic and atraumatic.   Eyes:      General: No scleral icterus.     Conjunctiva/sclera: Conjunctivae normal.   Neck:      Trachea: No tracheal deviation.   Cardiovascular:      Rate and Rhythm: Normal rate and regular rhythm.   Pulmonary:      Effort: Pulmonary effort is normal.      Breath sounds: Normal breath sounds.   Abdominal:      Palpations: Abdomen is soft.      Tenderness: There is no abdominal tenderness.   Musculoskeletal:         General: No deformity.      Cervical back: Normal range of motion.   Lymphadenopathy:      Cervical: No cervical adenopathy.   Skin:     General: Skin is warm and dry.   Neurological:      Mental Status: She is alert and oriented to person, place, and time.      Comments:  Tremulous on exam.   Psychiatric:         Mood and Affect: Mood is anxious. Affect is tearful.         Behavior: Behavior normal.       Vital signs and nursing notes reviewed.            LAB RESULTS  Recent Results (from the past 24 hour(s))   Green Top (Gel)    Collection Time: 07/10/23  6:19 PM   Result Value Ref Range    Extra Tube Hold for add-ons.    Lavender Top    Collection Time: 07/10/23  6:19 PM   Result Value Ref Range    Extra Tube hold for add-on    Gold Top - SST    Collection Time: 07/10/23  6:19 PM   Result Value Ref Range    Extra Tube Hold for add-ons.    Light Blue Top    Collection Time: 07/10/23  6:19 PM   Result Value Ref Range    Extra Tube Hold for add-ons.    Comprehensive Metabolic Panel    Collection Time: 07/10/23  6:19 PM    Specimen: Blood   Result Value Ref Range    Glucose 113 (H) 65 - 99 mg/dL    BUN 5 (L) 6 - 20 mg/dL    Creatinine 0.76 0.57 - 1.00 mg/dL    Sodium 137 136 - 145 mmol/L    Potassium 3.5 3.5 - 5.2 mmol/L    Chloride 103 98 - 107 mmol/L    CO2 20.0 (L) 22.0 - 29.0 mmol/L    Calcium 9.5 8.6 - 10.5 mg/dL    Total Protein 7.0 6.0 - 8.5 g/dL    Albumin 4.2 3.5 - 5.2 g/dL    ALT (SGPT) 9 1 - 33 U/L    AST (SGOT) 17 1 - 32 U/L    Alkaline Phosphatase 129 (H) 39 - 117 U/L    Total Bilirubin 0.5 0.0 - 1.2 mg/dL    Globulin 2.8 gm/dL    A/G Ratio 1.5 g/dL    BUN/Creatinine Ratio 6.6 (L) 7.0 - 25.0    Anion Gap 14.0 5.0 - 15.0 mmol/L    eGFR 101.1 >60.0 mL/min/1.73   CBC Auto Differential    Collection Time: 07/10/23  6:19 PM    Specimen: Blood   Result Value Ref Range    WBC 11.33 (H) 3.40 - 10.80 10*3/mm3    RBC 4.96 3.77 - 5.28 10*6/mm3    Hemoglobin 15.2 12.0 - 15.9 g/dL    Hematocrit 44.0 34.0 - 46.6 %    MCV 88.7 79.0 - 97.0 fL    MCH 30.6 26.6 - 33.0 pg    MCHC 34.5 31.5 - 35.7 g/dL    RDW 12.9 12.3 - 15.4 %    RDW-SD 41.5 37.0 - 54.0 fl    MPV 9.7 6.0 - 12.0 fL    Platelets 311 140 - 450 10*3/mm3    Neutrophil % 77.1 (H) 42.7 - 76.0 %    Lymphocyte % 15.8 (L) 19.6 - 45.3  %    Monocyte % 6.2 5.0 - 12.0 %    Eosinophil % 0.1 (L) 0.3 - 6.2 %    Basophil % 0.4 0.0 - 1.5 %    Immature Grans % 0.4 0.0 - 0.5 %    Neutrophils, Absolute 8.75 (H) 1.70 - 7.00 10*3/mm3    Lymphocytes, Absolute 1.79 0.70 - 3.10 10*3/mm3    Monocytes, Absolute 0.70 0.10 - 0.90 10*3/mm3    Eosinophils, Absolute 0.01 0.00 - 0.40 10*3/mm3    Basophils, Absolute 0.04 0.00 - 0.20 10*3/mm3    Immature Grans, Absolute 0.04 0.00 - 0.05 10*3/mm3    nRBC 0.0 0.0 - 0.2 /100 WBC   Acetaminophen Level    Collection Time: 07/10/23  6:19 PM    Specimen: Blood   Result Value Ref Range    Acetaminophen <5.0 0.0 - 30.0 mcg/mL   Salicylate Level    Collection Time: 07/10/23  6:19 PM    Specimen: Blood   Result Value Ref Range    Salicylate <0.3 <=30.0 mg/dL   Urinalysis With Culture If Indicated - Urine, Clean Catch    Collection Time: 07/10/23  7:02 PM    Specimen: Urine, Clean Catch   Result Value Ref Range    Color, UA Dark Yellow (A) Yellow, Straw    Appearance, UA Cloudy (A) Clear    pH, UA 6.0 5.0 - 8.0    Specific Gravity, UA 1.028 1.005 - 1.030    Glucose, UA Negative Negative    Ketones, UA Trace (A) Negative    Bilirubin, UA Negative Negative    Blood, UA Negative Negative    Protein, UA 30 mg/dL (1+) (A) Negative    Leuk Esterase, UA Trace (A) Negative    Nitrite, UA Negative Negative    Urobilinogen, UA 1.0 E.U./dL 0.2 - 1.0 E.U./dL   Urinalysis, Microscopic Only - Urine, Clean Catch    Collection Time: 07/10/23  7:02 PM    Specimen: Urine, Clean Catch   Result Value Ref Range    RBC, UA 0-2 None Seen, 0-2 /HPF    WBC, UA 3-5 (A) None Seen, 0-2 /HPF    Bacteria, UA 4+ (A) None Seen /HPF    Squamous Epithelial Cells, UA 7-12 (A) None Seen, 0-2 /HPF    Hyaline Casts, UA 0-2 None Seen /LPF    Amorphous Crystals, UA Small/1+ None Seen /HPF    Methodology Manual Light Microscopy    Urine Drug Screen - Urine, Clean Catch    Collection Time: 07/10/23  7:02 PM    Specimen: Urine, Clean Catch   Result Value Ref Range     Amphet/Methamphet, Screen Negative Negative    Barbiturates Screen, Urine Negative Negative    Benzodiazepine Screen, Urine Negative Negative    Cocaine Screen, Urine Negative Negative    Opiate Screen Negative Negative    THC, Screen, Urine Negative Negative    Methadone Screen, Urine Negative Negative    Oxycodone Screen, Urine Negative Negative    Fentanyl, Urine Positive (A) Negative   Rapid Strep A Screen - Swab, Throat    Collection Time: 07/10/23  7:29 PM    Specimen: Throat; Swab   Result Value Ref Range    Strep A Ag Negative Negative       Ordered the above labs and independently reviewed the results.        RADIOLOGY  No Radiology Exams Resulted Within Past 24 Hours    I ordered the above noted radiological studies. Independently reviewed by me and discussed with radiologist.  See dictation above for official radiology interpretation.      PROCEDURES    Procedures        MEDICATIONS GIVEN IN ER    Medications   sodium chloride 0.9 % bolus 1,000 mL (1,000 mL Intravenous New Bag 7/10/23 1858)   ondansetron (ZOFRAN) injection 4 mg (4 mg Intravenous Given 7/10/23 1900)   ketorolac (TORADOL) injection 15 mg (15 mg Intravenous Given 7/10/23 1901)   cloNIDine (CATAPRES) tablet 0.2 mg (0.2 mg Oral Given 7/10/23 1903)   ketorolac (TORADOL) injection 15 mg (15 mg Intravenous Given 7/10/23 2122)   metoclopramide (REGLAN) injection 10 mg (10 mg Intravenous Given 7/10/23 2122)         PROGRESS, DATA ANALYSIS, CONSULTS, AND MEDICAL DECISION MAKING    All labs have been independently interpreted by me.  All radiology studies have been interpreted by me.  Discussion below represents my analysis of pertinent findings related to patient's condition, differential diagnosis, treatment plan and final disposition.    Patient presentation and evaluation consistent with uncomplicated opioid withdrawal.  It seems that she was incidentally taking fentanyl at home for pain control over the past month and now has decided to stop  taking it altogether.  She continues with some nausea and anxiety but after evaluation with the access team the recommendation is for outpatient therapy and symptom control through opioid withdrawal.  She will be given pain management as a follow-up contact as well.  Close return precautions given at this time.  She tolerates p.o. intake at this time and has had symptom improvement throughout ED course.    - Chronic or social conditions impacting care: None      DIFFERENTIAL DIAGNOSIS INCLUDE BUT NOT LIMITED TO:     Anxiety, substance abuse, opioid withdrawal, alcohol withdrawal      Orders placed during this visit:  Orders Placed This Encounter   Procedures    Rapid Strep A Screen - Swab, Throat    Beta Strep Culture, Throat - Swab, Throat    [place] Draw    Comprehensive Metabolic Panel    Urinalysis With Culture If Indicated - Urine, Clean Catch    CBC Auto Differential    Acetaminophen Level    Salicylate Level    Urinalysis, Microscopic Only - Urine, Clean Catch    Urine Drug Screen - Urine, Clean Catch    Psych / Access to See    Insert Peripheral IV    Green Top (Gel)    Lavender Top    Gold Top - SST    Light Blue Top    CBC & Differential              AS OF 21:25 EDT VITALS:    BP - 160/88  HR - 59  TEMP - 98.2 °F (36.8 °C) (Tympanic)  02 SATS - 100%    2126 I rechecked the patient.  I discussed the patient's labs, diagnosis, and plan for discharge.  A repeat exam reveals no new worrisome changes from my initial exam findings.  The patient understands that the fact that they are being discharged does not denote that nothing is abnormal, it indicates that no clinical emergency is present and that they must follow-up as directed in order to properly maintain their health.  Follow-up instructions (specifically listed below) and return to ER precautions were given at this time.  I specifically instructed the patient to follow-up with their PCP.  The patient understands and agrees with the plan, and is ready for  discharge.  All questions answered.      DIAGNOSIS  Final diagnoses:   Opioid withdrawal         DISPOSITION  D/c    Pt masked in first look. I wore a surgical mask throughout my encounters with the pt. I performed hand hygiene on entry into the pt room and upon exit.     Dictated utilizing Dragon dictation     Note Disclaimer: At [facility], we believe that sharing information builds trust and better relationships. You are receiving this note because you recently visited [facility]. It is possible you will see health information before a provider has talked with you about it. This kind of information can be easy to misunderstand. To help you fully understand what it means for your health, we urge you to discuss this note with your provider.      Gold Garcia PA  07/10/23 5416

## 2023-07-10 NOTE — ED PROVIDER NOTES
"The RAY and I have discussed this patient's history, physical exam, and treatment plan.  I have reviewed the documentation and personally had a face to face interaction with the patient. I affirm the documentation and agree with the treatment and plan.  The attached note describes my personal findings.      I provided a substantive portion of the care of the patient.  I personally performed the physical exam in its entirety, and below are my findings.     Brief history of present illness: 42-year-old female complaining of tremulousness nausea and whole body pains secondary to withdrawal from opiates.    Physical exam:    /92   Pulse 84   Temp 98.2 °F (36.8 °C) (Tympanic)   Resp 22   Ht 162.6 cm (64\")   Wt 86.2 kg (190 lb)   SpO2 99%   BMI 32.61 kg/m²       Physical Exam   Constitutional: Uncomfortable appearing but not overtly toxic  HENT:  Head: Normocephalic and atraumatic.   Oropharynx: Mucous membranes are moist.   Eyes: . No scleral icterus. No conjunctival pallor.  Neck: Normal range of motion. Neck supple.   Cardiovascular: Pink warm and well perfused throughout.    Pulmonary/Chest: No respiratory distress.   Musculoskeletal: Moves all extremities equally.    Neurological: Alert and oriented.    Skin: Skin is pink, warm, and dry.   Psychiatric: Mood and affect normal.   Nursing note and vitals reviewed.        MDM: Agree with plan treatment and evaluation       Sarath Rodriguez MD  07/10/23 0312    "

## 2023-07-11 NOTE — CONSULTS
"41 yo female evaluated in ED (Room#8).     Patient quiet when I entered room but then began moaning and c/o feeling terrible. States she has chronic back pain and had back surgery in 2008 that was not effective. States she participated in pain management \" a long time ago\".     Patient states she has been taking 4-5 percocet 30mg for approximately 3 weeks initially but then later states \" a month\". States they are not prescribed for her and that she got them from someone else. Patient states she heard that they might have fentanyl in them so she stopped taking them 3 days ago. States she started taking them because she was having increased back pain for the past month.     Patient lives with her finalini Coughlin; states they have been together for 8 years. States she shares custody of her 2 children ages 9yo and 13yo with their biological father. States children are currently safe and with their father. Patient does not work.     Patient denies abusing drugs in the past; states \"this is the first time\". Denies psychiatric history. Denies SI. Denies HI. No overt psychosis.     Patient c/o \"body jumpy and jerky\". C/o nausea.     Spoke with patient at length re dangers of taking any medication that is not prescribed for her. Talked about what to expect with narcotic withdrawal. Spoke with patient re importance of following up with her PCP and then with pain management for appropriate treatment of her chronic back pain. Patient verbalized understanding.     Noted per medical records that patient does have hx of seizure disorder.     Patient receiving IVF's in the ED and has also been given ondansetron, lorazepam, ketorolac tromethamine, reglan, naproxen and hydroxyzine.     Patient's UDS positive for fentanyl.     Patient is not suicidal. Patient is not homicidal.patient is not overtly psychotic. Encouraged patient to follow up with pain management and she verbalized that she would.   "

## 2023-07-12 ENCOUNTER — HOSPITAL ENCOUNTER (INPATIENT)
Facility: HOSPITAL | Age: 43
LOS: 5 days | Discharge: HOME | DRG: 897 | End: 2023-07-17
Attending: INTERNAL MEDICINE | Admitting: INTERNAL MEDICINE
Payer: COMMERCIAL

## 2023-07-12 DIAGNOSIS — R11.2 NAUSEA AND VOMITING, UNSPECIFIED VOMITING TYPE: ICD-10-CM

## 2023-07-12 DIAGNOSIS — E86.0 DEHYDRATION: ICD-10-CM

## 2023-07-12 DIAGNOSIS — F11.93 OPIOID WITHDRAWAL: Primary | ICD-10-CM

## 2023-07-12 LAB
ALBUMIN SERPL-MCNC: 3.7 G/DL (ref 3.5–5.2)
ALBUMIN/GLOB SERPL: 1.6 G/DL
ALP SERPL-CCNC: 109 U/L (ref 39–117)
ALT SERPL W P-5'-P-CCNC: 10 U/L (ref 1–33)
ANION GAP SERPL CALCULATED.3IONS-SCNC: 14.1 MMOL/L (ref 5–15)
AST SERPL-CCNC: 17 U/L (ref 1–32)
BACTERIA SPEC AEROBE CULT: ABNORMAL
BACTERIA UR QL AUTO: ABNORMAL /HPF
BASOPHILS # BLD AUTO: 0.02 10*3/MM3 (ref 0–0.2)
BASOPHILS NFR BLD AUTO: 0.1 % (ref 0–1.5)
BILIRUB SERPL-MCNC: 0.7 MG/DL (ref 0–1.2)
BILIRUB UR QL STRIP: NEGATIVE
BUN SERPL-MCNC: 8 MG/DL (ref 6–20)
BUN/CREAT SERPL: 9.4 (ref 7–25)
CALCIUM SPEC-SCNC: 8.8 MG/DL (ref 8.6–10.5)
CHLORIDE SERPL-SCNC: 111 MMOL/L (ref 98–107)
CK SERPL-CCNC: 64 U/L (ref 20–180)
CLARITY UR: CLEAR
CO2 SERPL-SCNC: 18.9 MMOL/L (ref 22–29)
COLOR UR: ABNORMAL
CREAT SERPL-MCNC: 0.85 MG/DL (ref 0.57–1)
DEPRECATED RDW RBC AUTO: 42.9 FL (ref 37–54)
EGFRCR SERPLBLD CKD-EPI 2021: 88.4 ML/MIN/1.73
EOSINOPHIL # BLD AUTO: 0 10*3/MM3 (ref 0–0.4)
EOSINOPHIL NFR BLD AUTO: 0 % (ref 0.3–6.2)
ERYTHROCYTE [DISTWIDTH] IN BLOOD BY AUTOMATED COUNT: 13 % (ref 12.3–15.4)
GLOBULIN UR ELPH-MCNC: 2.3 GM/DL
GLUCOSE SERPL-MCNC: 108 MG/DL (ref 65–99)
GLUCOSE UR STRIP-MCNC: NEGATIVE MG/DL
HCT VFR BLD AUTO: 42 % (ref 34–46.6)
HGB BLD-MCNC: 14.4 G/DL (ref 12–15.9)
HGB UR QL STRIP.AUTO: NEGATIVE
HYALINE CASTS UR QL AUTO: ABNORMAL /LPF
IMM GRANULOCYTES # BLD AUTO: 0.05 10*3/MM3 (ref 0–0.05)
IMM GRANULOCYTES NFR BLD AUTO: 0.4 % (ref 0–0.5)
KETONES UR QL STRIP: ABNORMAL
LEUKOCYTE ESTERASE UR QL STRIP.AUTO: ABNORMAL
LYMPHOCYTES # BLD AUTO: 0.98 10*3/MM3 (ref 0.7–3.1)
LYMPHOCYTES NFR BLD AUTO: 7.2 % (ref 19.6–45.3)
MAGNESIUM SERPL-MCNC: 1.6 MG/DL (ref 1.6–2.6)
MCH RBC QN AUTO: 30.6 PG (ref 26.6–33)
MCHC RBC AUTO-ENTMCNC: 34.3 G/DL (ref 31.5–35.7)
MCV RBC AUTO: 89.4 FL (ref 79–97)
MONOCYTES # BLD AUTO: 0.71 10*3/MM3 (ref 0.1–0.9)
MONOCYTES NFR BLD AUTO: 5.2 % (ref 5–12)
NEUTROPHILS NFR BLD AUTO: 11.81 10*3/MM3 (ref 1.7–7)
NEUTROPHILS NFR BLD AUTO: 87.1 % (ref 42.7–76)
NITRITE UR QL STRIP: NEGATIVE
NRBC BLD AUTO-RTO: 0 /100 WBC (ref 0–0.2)
PH UR STRIP.AUTO: 6.5 [PH] (ref 5–8)
PLATELET # BLD AUTO: 287 10*3/MM3 (ref 140–450)
PMV BLD AUTO: 10 FL (ref 6–12)
POTASSIUM SERPL-SCNC: 3.4 MMOL/L (ref 3.5–5.2)
PROT SERPL-MCNC: 6 G/DL (ref 6–8.5)
PROT UR QL STRIP: ABNORMAL
RBC # BLD AUTO: 4.7 10*6/MM3 (ref 3.77–5.28)
RBC # UR STRIP: ABNORMAL /HPF
REF LAB TEST METHOD: ABNORMAL
SODIUM SERPL-SCNC: 144 MMOL/L (ref 136–145)
SP GR UR STRIP: 1.03 (ref 1–1.03)
SQUAMOUS #/AREA URNS HPF: ABNORMAL /HPF
UROBILINOGEN UR QL STRIP: ABNORMAL
WBC # UR STRIP: ABNORMAL /HPF
WBC NRBC COR # BLD: 13.57 10*3/MM3 (ref 3.4–10.8)

## 2023-07-12 PROCEDURE — 99285 EMERGENCY DEPT VISIT HI MDM: CPT

## 2023-07-12 PROCEDURE — 82550 ASSAY OF CK (CPK): CPT | Performed by: EMERGENCY MEDICINE

## 2023-07-12 PROCEDURE — 25010000002 DROPERIDOL PER 5 MG: Performed by: EMERGENCY MEDICINE

## 2023-07-12 PROCEDURE — G0378 HOSPITAL OBSERVATION PER HR: HCPCS

## 2023-07-12 PROCEDURE — 25010000002 HYDROMORPHONE 1 MG/ML SOLUTION: Performed by: EMERGENCY MEDICINE

## 2023-07-12 PROCEDURE — 83735 ASSAY OF MAGNESIUM: CPT | Performed by: EMERGENCY MEDICINE

## 2023-07-12 PROCEDURE — 80053 COMPREHEN METABOLIC PANEL: CPT | Performed by: EMERGENCY MEDICINE

## 2023-07-12 PROCEDURE — 25010000002 ONDANSETRON PER 1 MG: Performed by: INTERNAL MEDICINE

## 2023-07-12 PROCEDURE — 25010000002 KETOROLAC TROMETHAMINE PER 15 MG: Performed by: EMERGENCY MEDICINE

## 2023-07-12 PROCEDURE — 25010000002 METOCLOPRAMIDE PER 10 MG: Performed by: EMERGENCY MEDICINE

## 2023-07-12 PROCEDURE — 81001 URINALYSIS AUTO W/SCOPE: CPT | Performed by: EMERGENCY MEDICINE

## 2023-07-12 PROCEDURE — 25010000002 ENOXAPARIN PER 10 MG: Performed by: INTERNAL MEDICINE

## 2023-07-12 PROCEDURE — 85025 COMPLETE CBC W/AUTO DIFF WBC: CPT | Performed by: EMERGENCY MEDICINE

## 2023-07-12 PROCEDURE — 25010000002 ONDANSETRON PER 1 MG: Performed by: EMERGENCY MEDICINE

## 2023-07-12 RX ORDER — GABAPENTIN 300 MG/1
300 CAPSULE ORAL EVERY 8 HOURS PRN
Status: DISCONTINUED | OUTPATIENT
Start: 2023-07-12 | End: 2023-07-12

## 2023-07-12 RX ORDER — CLONIDINE HYDROCHLORIDE 0.1 MG/1
0.1 TABLET ORAL 2 TIMES DAILY PRN
Status: DISCONTINUED | OUTPATIENT
Start: 2023-07-15 | End: 2023-07-16

## 2023-07-12 RX ORDER — DROPERIDOL 2.5 MG/ML
1.25 INJECTION, SOLUTION INTRAMUSCULAR; INTRAVENOUS ONCE
Status: COMPLETED | OUTPATIENT
Start: 2023-07-12 | End: 2023-07-12

## 2023-07-12 RX ORDER — CLONIDINE HYDROCHLORIDE 0.1 MG/1
0.2 TABLET ORAL ONCE
Status: COMPLETED | OUTPATIENT
Start: 2023-07-12 | End: 2023-07-12

## 2023-07-12 RX ORDER — ONDANSETRON 2 MG/ML
4 INJECTION INTRAMUSCULAR; INTRAVENOUS EVERY 6 HOURS PRN
Status: DISCONTINUED | OUTPATIENT
Start: 2023-07-12 | End: 2023-07-17 | Stop reason: HOSPADM

## 2023-07-12 RX ORDER — UREA 10 %
3 LOTION (ML) TOPICAL NIGHTLY PRN
Status: DISCONTINUED | OUTPATIENT
Start: 2023-07-12 | End: 2023-07-15

## 2023-07-12 RX ORDER — ENOXAPARIN SODIUM 100 MG/ML
40 INJECTION SUBCUTANEOUS EVERY 24 HOURS
Status: DISCONTINUED | OUTPATIENT
Start: 2023-07-12 | End: 2023-07-17 | Stop reason: HOSPADM

## 2023-07-12 RX ORDER — BISACODYL 10 MG
10 SUPPOSITORY, RECTAL RECTAL DAILY PRN
Status: DISCONTINUED | OUTPATIENT
Start: 2023-07-12 | End: 2023-07-17 | Stop reason: HOSPADM

## 2023-07-12 RX ORDER — POLYETHYLENE GLYCOL 3350 17 G/17G
17 POWDER, FOR SOLUTION ORAL DAILY PRN
Status: DISCONTINUED | OUTPATIENT
Start: 2023-07-12 | End: 2023-07-17 | Stop reason: HOSPADM

## 2023-07-12 RX ORDER — AMOXICILLIN 250 MG
2 CAPSULE ORAL 2 TIMES DAILY
Status: DISCONTINUED | OUTPATIENT
Start: 2023-07-12 | End: 2023-07-17 | Stop reason: HOSPADM

## 2023-07-12 RX ORDER — SCOLOPAMINE TRANSDERMAL SYSTEM 1 MG/1
1 PATCH, EXTENDED RELEASE TRANSDERMAL
Status: DISCONTINUED | OUTPATIENT
Start: 2023-07-12 | End: 2023-07-17

## 2023-07-12 RX ORDER — KETOROLAC TROMETHAMINE 15 MG/ML
15 INJECTION, SOLUTION INTRAMUSCULAR; INTRAVENOUS ONCE
Status: COMPLETED | OUTPATIENT
Start: 2023-07-12 | End: 2023-07-12

## 2023-07-12 RX ORDER — CLONIDINE HYDROCHLORIDE 0.1 MG/1
0.1 TABLET ORAL 4 TIMES DAILY PRN
Status: DISPENSED | OUTPATIENT
Start: 2023-07-13 | End: 2023-07-14

## 2023-07-12 RX ORDER — CLONIDINE HYDROCHLORIDE 0.1 MG/1
0.1 TABLET ORAL 3 TIMES DAILY PRN
Status: DISPENSED | OUTPATIENT
Start: 2023-07-14 | End: 2023-07-15

## 2023-07-12 RX ORDER — DICYCLOMINE HYDROCHLORIDE 10 MG/1
10 CAPSULE ORAL 4 TIMES DAILY PRN
Status: DISCONTINUED | OUTPATIENT
Start: 2023-07-12 | End: 2023-07-17 | Stop reason: HOSPADM

## 2023-07-12 RX ORDER — BISACODYL 5 MG/1
5 TABLET, DELAYED RELEASE ORAL DAILY PRN
Status: DISCONTINUED | OUTPATIENT
Start: 2023-07-12 | End: 2023-07-17 | Stop reason: HOSPADM

## 2023-07-12 RX ORDER — CLONIDINE HYDROCHLORIDE 0.1 MG/1
0.1 TABLET ORAL 4 TIMES DAILY PRN
Status: ACTIVE | OUTPATIENT
Start: 2023-07-12 | End: 2023-07-13

## 2023-07-12 RX ORDER — ONDANSETRON 4 MG/1
4 TABLET, FILM COATED ORAL EVERY 6 HOURS PRN
Status: DISCONTINUED | OUTPATIENT
Start: 2023-07-12 | End: 2023-07-17 | Stop reason: HOSPADM

## 2023-07-12 RX ORDER — HYDROMORPHONE HYDROCHLORIDE 1 MG/ML
0.5 INJECTION, SOLUTION INTRAMUSCULAR; INTRAVENOUS; SUBCUTANEOUS ONCE
Status: COMPLETED | OUTPATIENT
Start: 2023-07-12 | End: 2023-07-12

## 2023-07-12 RX ORDER — PRAMIPEXOLE DIHYDROCHLORIDE 0.5 MG/1
0.5 TABLET ORAL EVERY 8 HOURS PRN
Status: DISCONTINUED | OUTPATIENT
Start: 2023-07-12 | End: 2023-07-17 | Stop reason: HOSPADM

## 2023-07-12 RX ORDER — SODIUM CHLORIDE 9 MG/ML
75 INJECTION, SOLUTION INTRAVENOUS CONTINUOUS
Status: DISCONTINUED | OUTPATIENT
Start: 2023-07-12 | End: 2023-07-16

## 2023-07-12 RX ORDER — ONDANSETRON 2 MG/ML
4 INJECTION INTRAMUSCULAR; INTRAVENOUS ONCE
Status: COMPLETED | OUTPATIENT
Start: 2023-07-12 | End: 2023-07-12

## 2023-07-12 RX ORDER — ACETAMINOPHEN 325 MG/1
650 TABLET ORAL EVERY 4 HOURS PRN
Status: DISCONTINUED | OUTPATIENT
Start: 2023-07-12 | End: 2023-07-17 | Stop reason: HOSPADM

## 2023-07-12 RX ORDER — CLONIDINE HYDROCHLORIDE 0.1 MG/1
0.1 TABLET ORAL ONCE AS NEEDED
Status: DISCONTINUED | OUTPATIENT
Start: 2023-07-16 | End: 2023-07-16

## 2023-07-12 RX ORDER — METOCLOPRAMIDE HYDROCHLORIDE 5 MG/ML
10 INJECTION INTRAMUSCULAR; INTRAVENOUS ONCE
Status: COMPLETED | OUTPATIENT
Start: 2023-07-12 | End: 2023-07-12

## 2023-07-12 RX ADMIN — ENOXAPARIN SODIUM 40 MG: 100 INJECTION SUBCUTANEOUS at 21:18

## 2023-07-12 RX ADMIN — KETOROLAC TROMETHAMINE 15 MG: 15 INJECTION, SOLUTION INTRAMUSCULAR; INTRAVENOUS at 12:31

## 2023-07-12 RX ADMIN — KETOROLAC TROMETHAMINE 15 MG: 15 INJECTION, SOLUTION INTRAMUSCULAR; INTRAVENOUS at 13:11

## 2023-07-12 RX ADMIN — SODIUM CHLORIDE 75 ML/HR: 9 INJECTION, SOLUTION INTRAVENOUS at 21:12

## 2023-07-12 RX ADMIN — SODIUM CHLORIDE 1000 ML: 9 INJECTION, SOLUTION INTRAVENOUS at 16:22

## 2023-07-12 RX ADMIN — SODIUM CHLORIDE 1000 ML: 9 INJECTION, SOLUTION INTRAVENOUS at 12:31

## 2023-07-12 RX ADMIN — ONDANSETRON 4 MG: 2 INJECTION INTRAMUSCULAR; INTRAVENOUS at 12:31

## 2023-07-12 RX ADMIN — DROPERIDOL 1.25 MG: 2.5 INJECTION, SOLUTION INTRAMUSCULAR; INTRAVENOUS at 16:22

## 2023-07-12 RX ADMIN — HYDROMORPHONE HYDROCHLORIDE 0.5 MG: 1 INJECTION, SOLUTION INTRAMUSCULAR; INTRAVENOUS; SUBCUTANEOUS at 14:32

## 2023-07-12 RX ADMIN — CLONIDINE HYDROCHLORIDE 0.2 MG: 0.1 TABLET ORAL at 13:11

## 2023-07-12 RX ADMIN — ONDANSETRON 4 MG: 2 INJECTION INTRAMUSCULAR; INTRAVENOUS at 21:16

## 2023-07-12 RX ADMIN — SCOPALAMINE 1 PATCH: 1 PATCH, EXTENDED RELEASE TRANSDERMAL at 23:36

## 2023-07-12 RX ADMIN — HYDROMORPHONE HYDROCHLORIDE 1 MG: 1 INJECTION, SOLUTION INTRAMUSCULAR; INTRAVENOUS; SUBCUTANEOUS at 17:15

## 2023-07-12 RX ADMIN — METOCLOPRAMIDE 10 MG: 5 INJECTION, SOLUTION INTRAMUSCULAR; INTRAVENOUS at 14:49

## 2023-07-12 RX ADMIN — PRAMIPEXOLE DIHYDROCHLORIDE 0.5 MG: 0.5 TABLET ORAL at 23:58

## 2023-07-12 NOTE — H&P
HISTORY AND PHYSICAL   [facility]        Date of Admission: 2023  Patient Identification:  Name: Belen Allen  Age: 42 y.o.  Sex: female  :  1980  MRN: [id number]                     Primary Care Physician: Sahil Cornelius MD    Chief Complaint:  42 year old female who presented to the emergency room with nausea, vomiting, aches and restless legs; she is coming off percocet and took narcan this morning thinking that it was a scheudled medication    History of Present Illness:   As above    Past Medical History:  Past Medical History:   Diagnosis Date    Abnormal Pap smear of cervix     Ankle fracture     H/O Elevated liver enzymes     History of chronic back pain     History of migraine     History of urinary tract infection     Injury of back     Opioid dependence 2023    PONV (postoperative nausea and vomiting)     Seasonal allergies     Seizure     Takes Keppra     Past Surgical History:  Past Surgical History:   Procedure Laterality Date    BACK SURGERY  2006    fusion L4, L5      CHOLECYSTECTOMY      DILATATION AND CURETTAGE      ENDOSCOPY N/A 2022    Procedure: ESOPHAGOGASTRODUODENOSCOPY with biopsy;  Surgeon: Christiana Mann MD;  Location: [facility] ENDOSCOPY;  Service: Gastroenterology;  Laterality: N/A;  gastritis, duodenitis, irregular z line    ERCP N/A 6/3/2021    Procedure: ENDOSCOPIC RETROGRADE CHOLANGIOPANCREATOGRAPHY WITH SPHINCTEROTOMY, DILATION WITH BALLOON CLEARANCE  (12MM-15MM);  Surgeon: Bjorn Flannery MD;  Location: [facility] ENDOSCOPY;  Service: Gastroenterology;  Laterality: N/A;  DILATED COMMON BILE DUCT    SKIN SURGERY      TUBAL ABDOMINAL LIGATION  2013    WISDOM TOOTH EXTRACTION  2018    x2      Home Meds:  (Not in a hospital admission)      Allergies:  Allergies   Allergen Reactions    Morphine Hives    Lidocaine Rash     Pt reports lidocaine patches make her breakout in a rash     Immunizations:  Immunization History    Administered Date(s) Administered    FluLaval/Fluzone >6mos 10/14/2020, 10/27/2021, 10/27/2022    Hib (PRP-T) 2020    Influenza, Unspecified 10/14/2020, 10/27/2021, 10/27/2022    Meningococcal B,(Bexsero) 2020    Meningococcal Conjugate 2020    Pneumococcal Conjugate 13-Valent (PCV13) 2020     Social History:   Social History     Social History Narrative    Not on file     Social History     Socioeconomic History    Marital status: Single    Number of children: 2   Tobacco Use    Smoking status: Every Day     Packs/day: 0.50     Types: Cigarettes    Smokeless tobacco: Never   Vaping Use    Vaping Use: Never used   Substance and Sexual Activity    Alcohol use: Not Currently    Drug use: Not Currently    Sexual activity: Defer       Family History:  Family History   Problem Relation Age of Onset    Stroke Mother     Allergic rhinitis Mother     Allergic rhinitis Sister     Breast cancer Maternal Aunt     Cancer Maternal Grandmother     Allergic rhinitis Paternal Grandfather     Cancer Paternal Grandfather     Prostate cancer Paternal Grandfather         Review of Systems  See history of present illness and past medical history.  Patient denies headache, dizziness, syncope, falls, trauma, change in vision, change in hearing, change in taste, changes in weight, changes in appetite, focal weakness, numbness, or paresthesia.  Patient denies chest pain, palpitations, dyspnea, orthopnea, PND, cough, sinus pressure, rhinorrhea, epistaxis, hemoptysis, nausea, vomiting,hematemesis, diarrhea, constipation or hematochezia.  Denies cold or heat intolerance, polydipsia, polyuria, polyphagia. Denies hematuria, pyuria, dysuria, hesitancy, frequency or urgency. Denies consumption of raw and under cooked meats foods or change in water source.  Denies fever, chills, sweats, night sweats.  Denies missing any routine medications. Remainder of ROS is negative.    Objective:  T Max 24 hrs: Temp (24hrs), Av.1  "°F (36.7 °C), Min:98.1 °F (36.7 °C), Max:98.1 °F (36.7 °C)    Vitals Ranges:   Temp:  [98.1 °F (36.7 °C)] 98.1 °F (36.7 °C)  Heart Rate:  [56-82] 63  Resp:  [18-24] 18  BP: (117-173)/() 152/100      Exam:  /100   Pulse 63   Temp 98.1 °F (36.7 °C)   Resp 18   Ht 162.6 cm (64\")   Wt 86.2 kg (190 lb)   SpO2 94%   BMI 32.61 kg/m²     General Appearance:    Alert, cooperative, no distress, appears stated age   Head:    Normocephalic, without obvious abnormality, atraumatic   Eyes:    PERRL, conjunctivae/corneas clear, EOM's intact, both eyes   Ears:    Normal external ear canals, both ears   Nose:   Nares normal, septum midline, mucosa normal, no drainage    or sinus tenderness   Throat:   Lips, mucosa, and tongue normal   Neck:   Supple, symmetrical, trachea midline, no adenopathy;     thyroid:  no enlargement/tenderness/nodules; no carotid    bruit or JVD   Back:     Symmetric, no curvature, ROM normal, no CVA tenderness   Lungs:     Decreased breath sounds bilaterally, respirations unlabored   Chest Wall:    No tenderness or deformity    Heart:    Regular rate and rhythm, S1 and S2 normal, no murmur, rub   or gallop   Abdomen:     Soft, nontender, bowel sounds active all four quadrants,     no masses, no hepatomegaly, no splenomegaly   Extremities:   Extremities normal, atraumatic, no cyanosis or edema   Pulses:   2+ and symmetric all extremities   Skin:   Skin color, texture, turgor normal, no rashes or lesions               .    Data Review:  Labs in chart were reviewed.  WBC   Date Value Ref Range Status   07/12/2023 13.57 (H) 3.40 - 10.80 10*3/mm3 Final     Hemoglobin   Date Value Ref Range Status   07/12/2023 14.4 12.0 - 15.9 g/dL Final     Hematocrit   Date Value Ref Range Status   07/12/2023 42.0 34.0 - 46.6 % Final     Platelets   Date Value Ref Range Status   07/12/2023 287 140 - 450 10*3/mm3 Final     Sodium   Date Value Ref Range Status   07/12/2023 144 136 - 145 mmol/L Final "     Potassium   Date Value Ref Range Status   07/12/2023 3.4 (L) 3.5 - 5.2 mmol/L Final     Comment:     Slight hemolysis detected by analyzer. Results may be affected.     Chloride   Date Value Ref Range Status   07/12/2023 111 (H) 98 - 107 mmol/L Final     CO2   Date Value Ref Range Status   07/12/2023 18.9 (L) 22.0 - 29.0 mmol/L Final     BUN   Date Value Ref Range Status   07/12/2023 8 6 - 20 mg/dL Final     Creatinine   Date Value Ref Range Status   07/12/2023 0.85 0.57 - 1.00 mg/dL Final     Glucose   Date Value Ref Range Status   07/12/2023 108 (H) 65 - 99 mg/dL Final     Calcium   Date Value Ref Range Status   07/12/2023 8.8 8.6 - 10.5 mg/dL Final     Magnesium   Date Value Ref Range Status   07/12/2023 1.6 1.6 - 2.6 mg/dL Final     AST (SGOT)   Date Value Ref Range Status   07/12/2023 17 1 - 32 U/L Final     Comment:     Slight hemolysis detected by analyzer. Results may be affected.     ALT (SGPT)   Date Value Ref Range Status   07/12/2023 10 1 - 33 U/L Final     Alkaline Phosphatase   Date Value Ref Range Status   07/12/2023 109 39 - 117 U/L Final                Imaging Results (All)       None              Assessment:  Active Hospital Problems    Diagnosis  POA    **Opioid withdrawal [F11.93]  Yes      Resolved Hospital Problems   No resolved problems to display.   Nausea and vomiting  Restless legs  Hypokalemia  Obesity     Plan:  Continue opioid withdrawal protocol  Replace potassium  Mirapex for restless legs  Fluids  Prn gabapentin      Kathy Maciel Kim MD  7/12/2023  18:42 EDT

## 2023-07-12 NOTE — ED NOTES
"Pt to ED via EMS from home. Pt states she took herself off her percocet 4 days ago. Accidentally took her narcan with morning meds. States she didn't know she wasn't supposed to take it. Pt educated on use of narcan. Delay in initial eval of pt d/t pt was taken by bernadette HOWE to shower, ms noted bites on pt's body, pt informed her she thought she had bed bugs at one time.   Pt reports chronic pain to low back, hx of back sx. States she thought the percocets were helping her but \"just thought they were a bandaid\". Pt reports abd discomfort and chills since taking her meds this am. + vomit on the floor when entering her room.   Pt would not be still long enough to allow me to eval with stethoscope.   "

## 2023-07-12 NOTE — ED NOTES
"Nursing report ED to floor  Belen Allen  42 y.o.  female    HPI :   Chief Complaint   Patient presents with    Withdrawal       Admitting doctor:   Kathy Kim MD    Admitting diagnosis:   The primary encounter diagnosis was Opioid withdrawal. Diagnoses of Dehydration and Nausea and vomiting, unspecified vomiting type were also pertinent to this visit.    Code status:   Current Code Status       Date Active Code Status Order ID Comments User Context       7/12/2023 1731 CPR (Attempt to Resuscitate) [id number]  Kathy Kim MD ED        Question Answer    Code Status (Patient has no pulse and is not breathing) CPR (Attempt to Resuscitate)    Medical Interventions (Patient has pulse or is breathing) Full                    Allergies:   Morphine and Lidocaine    Isolation:   No active isolations    Intake and Output  No intake or output data in the 24 hours ending 07/12/23 1737    Weight:       07/12/23  1719   Weight: 86.2 kg (190 lb)       Most recent vitals:   Vitals:    07/12/23 1600 07/12/23 1624 07/12/23 1719 07/12/23 1720   BP:  (!) 157/103  152/100   Pulse: 65 63  63   Resp:  18  18   Temp:       SpO2: 99%   94%   Weight:   86.2 kg (190 lb)    Height:   162.6 cm (64\")        Active LDAs/IV Access:   Lines, Drains & Airways       Active LDAs       Name Placement date Placement time Site Days    Peripheral IV 07/12/23 1103 Left Antecubital 07/12/23  1103  Antecubital  less than 1                    Labs (abnormal labs have a star):   Labs Reviewed   COMPREHENSIVE METABOLIC PANEL - Abnormal; Notable for the following components:       Result Value    Glucose 108 (*)     Potassium 3.4 (*)     Chloride 111 (*)     CO2 18.9 (*)     All other components within normal limits    Narrative:     GFR Normal >60  Chronic Kidney Disease <60  Kidney Failure <15     URINALYSIS W/ MICROSCOPIC IF INDICATED (NO CULTURE) - Abnormal; Notable for the following components:    Color, UA Dark Yellow (*)     " Ketones, UA 80 mg/dL (3+) (*)     Protein, UA 30 mg/dL (1+) (*)     Leuk Esterase, UA Trace (*)     All other components within normal limits   CBC WITH AUTO DIFFERENTIAL - Abnormal; Notable for the following components:    WBC 13.57 (*)     Neutrophil % 87.1 (*)     Lymphocyte % 7.2 (*)     Eosinophil % 0.0 (*)     Neutrophils, Absolute 11.81 (*)     All other components within normal limits   URINALYSIS, MICROSCOPIC ONLY - Abnormal; Notable for the following components:    WBC, UA 3-5 (*)     Bacteria, UA 2+ (*)     Squamous Epithelial Cells, UA 7-12 (*)     All other components within normal limits   CK - Normal   MAGNESIUM - Normal   CBC AND DIFFERENTIAL    Narrative:     The following orders were created for panel order CBC & Differential.  Procedure                               Abnormality         Status                     ---------                               -----------         ------                     CBC Auto Differential[[id number]]        Abnormal            Final result                 Please view results for these tests on the individual orders.       EKG:   No orders to display       Meds given in ED:   Medications   acetaminophen (TYLENOL) tablet 650 mg (has no administration in time range)   sennosides-docusate (PERICOLACE) 8.6-50 MG per tablet 2 tablet (has no administration in time range)     And   polyethylene glycol (MIRALAX) packet 17 g (has no administration in time range)     And   bisacodyl (DULCOLAX) EC tablet 5 mg (has no administration in time range)     And   bisacodyl (DULCOLAX) suppository 10 mg (has no administration in time range)   ondansetron (ZOFRAN) tablet 4 mg (has no administration in time range)     Or   ondansetron (ZOFRAN) injection 4 mg (has no administration in time range)   melatonin tablet 3 mg (has no administration in time range)   Pharmacy to Dose enoxaparin (LOVENOX) (has no administration in time range)   cloNIDine (CATAPRES) tablet 0.1 mg (has no  administration in time range)     Followed by   cloNIDine (CATAPRES) tablet 0.1 mg (has no administration in time range)     Followed by   cloNIDine (CATAPRES) tablet 0.1 mg (has no administration in time range)     Followed by   cloNIDine (CATAPRES) tablet 0.1 mg (has no administration in time range)     Followed by   cloNIDine (CATAPRES) tablet 0.1 mg (has no administration in time range)   sodium chloride 0.9 % bolus 1,000 mL (0 mL Intravenous Stopped 7/12/23 1428)   ondansetron (ZOFRAN) injection 4 mg (4 mg Intravenous Given 7/12/23 1231)   ketorolac (TORADOL) injection 15 mg (15 mg Intravenous Given 7/12/23 1231)   cloNIDine (CATAPRES) tablet 0.2 mg (0.2 mg Oral Given 7/12/23 1311)   ketorolac (TORADOL) injection 15 mg (15 mg Intravenous Given 7/12/23 1311)   HYDROmorphone (DILAUDID) injection 0.5 mg (0.5 mg Intravenous Given 7/12/23 1432)   metoclopramide (REGLAN) injection 10 mg (10 mg Intravenous Given 7/12/23 1449)   droperidol (INAPSINE) injection 1.25 mg (1.25 mg Intravenous Given 7/12/23 1622)   sodium chloride 0.9 % bolus 1,000 mL (1,000 mL Intravenous New Bag 7/12/23 1622)   HYDROmorphone (DILAUDID) injection 1 mg (1 mg Intravenous Given 7/12/23 1715)       Imaging results:  No radiology results for the last day    Ambulatory status:   - bedrest     Social issues:   Social History     Socioeconomic History    Marital status: Single    Number of children: 2   Tobacco Use    Smoking status: Every Day     Packs/day: 0.50     Types: Cigarettes    Smokeless tobacco: Never   Vaping Use    Vaping Use: Never used   Substance and Sexual Activity    Alcohol use: Not Currently    Drug use: Not Currently    Sexual activity: Defer       NIH Stroke Scale:       Beatriz Urbano RN  07/12/23 17:37 EDT

## 2023-07-12 NOTE — ED PROVIDER NOTES
EMERGENCY DEPARTMENT ENCOUNTER    Room Number:  19/19  Date of encounter:  7/12/2023  PCP: Sahil Cornelius MD  Historian: Patient      HPI:  Chief Complaint: Body aches, nausea, vomiting  A complete HPI/ROS/PMH/PSH/SH/FH are unobtainable due to: None    Context: Belen Allen is a 42 y.o. female who presents to the ED via EMS after she accidentally took her Narcan this morning thinking that she had to take it every day.  She has been trying to get off of pain pills, states that her last dose of Percocet was 4 days ago.  Has been having some nausea and vomiting and body aches which accelerated after taking the Narcan today.      MEDICAL RECORD REVIEW    External (non-ED) record review: Medical allergies listed for lidocaine on chart review in epic      Other chart review shows an ER visit on July 10, 2023 complaining of body aches, tremors, nausea and vomiting which she attributed to withdrawing off of opiate pain pills at that time.  Apparently she was taking Percocet 30 mg up to 4-5 times a day.  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    Generalized abdominal pain 11/29/2020    Acute bilateral low back pain 11/29/2020    Antiphospholipid antibody positive 11/29/2020    On anticoagulant therapy 11/29/2020    Acute pain of left knee 11/29/2020    Vitamin D deficiency 11/30/2020    Folate deficiency 12/01/2020    Pain of back and left lower extremity 12/02/2020    Common bile duct dilatation 05/31/2021    Elevated LFTs 06/01/2021    Right upper quadrant abdominal pain 06/01/2021    Obesity (BMI 30-39.9) 04/18/2019    Tobacco abuse 06/02/2021    GERD without esophagitis 06/02/2021    Intractable abdominal pain 07/05/2021    Obesity, Class III, BMI 40-49.9 (morbid obesity) 07/05/2021    Constipation 07/05/2021    History of ERCP  07/05/2021    Other chronic pain 07/05/2021    Seizures 01/01/2022    Syncope 01/01/2022    Lumbar back pain with radiculopathy affecting left lower extremity 01/05/2022    Right upper quadrant pain 11/23/2022    Uncontrolled pain 12/17/2022    Intractable back pain 12/17/2022    Sciatica of right side 12/20/2022    Migraine 12/20/2022    Mobility impaired 12/20/2022    Seizure 12/31/2022    Opioid dependence 01/16/2023    Clostridioides difficile diarrhea 01/18/2023     Resolved Ambulatory Problems     Diagnosis Date Noted    Choledocholithiasis 06/02/2021    Rhabdomyolysis 01/01/2022    Encephalopathy 01/01/2022    Hypokalemia 12/18/2022     Past Medical History:   Diagnosis Date    Abnormal Pap smear of cervix     Ankle fracture 2013    H/O Elevated liver enzymes     History of chronic back pain     History of migraine     History of urinary tract infection     Injury of back     PONV (postoperative nausea and vomiting)     Seasonal allergies          PAST SURGICAL HISTORY  Past Surgical History:   Procedure Laterality Date    BACK SURGERY  2006    fusion L4, L5      CHOLECYSTECTOMY  2014    DILATATION AND CURETTAGE  2009    ENDOSCOPY N/A 11/27/2022    Procedure: ESOPHAGOGASTRODUODENOSCOPY with biopsy;  Surgeon: Christiana Mann MD;  Location: [facility] ENDOSCOPY;  Service: Gastroenterology;  Laterality: N/A;  gastritis, duodenitis, irregular z line    ERCP N/A 6/3/2021    Procedure: ENDOSCOPIC RETROGRADE CHOLANGIOPANCREATOGRAPHY WITH SPHINCTEROTOMY, DILATION WITH BALLOON CLEARANCE  (12MM-15MM);  Surgeon: Bjorn Flannery MD;  Location: [facility] ENDOSCOPY;  Service: Gastroenterology;  Laterality: N/A;  DILATED COMMON BILE DUCT    SKIN SURGERY      TUBAL ABDOMINAL LIGATION  2013    WISDOM TOOTH EXTRACTION  2018    x2         FAMILY HISTORY  Family History   Problem Relation Age of Onset    Stroke Mother     Allergic rhinitis Mother     Allergic rhinitis Sister     Breast cancer Maternal Aunt     Cancer Maternal  Grandmother     Allergic rhinitis Paternal Grandfather     Cancer Paternal Grandfather     Prostate cancer Paternal Grandfather          SOCIAL HISTORY  Social History     Socioeconomic History    Marital status: Single    Number of children: 2   Tobacco Use    Smoking status: Every Day     Packs/day: 0.50     Types: Cigarettes    Smokeless tobacco: Never   Vaping Use    Vaping Use: Never used   Substance and Sexual Activity    Alcohol use: Not Currently    Drug use: Not Currently    Sexual activity: Defer         ALLERGIES  Morphine and Lidocaine        REVIEW OF SYSTEMS  Review of Systems     All systems reviewed and negative except for those discussed in HPI.       PHYSICAL EXAM    I have reviewed the triage vital signs and nursing notes.    ED Triage Vitals   Temp Heart Rate Resp BP SpO2   07/12/23 1104 07/12/23 1104 07/12/23 1104 07/12/23 1104 07/12/23 1104   98.1 °F (36.7 °C) 82 22 117/80 96 %      Temp src Heart Rate Source Patient Position BP Location FiO2 (%)   -- 07/12/23 1136 -- -- --    Monitor          Physical Exam  General: Appears uncomfortable, diaphoretic  HEENT: Mucous membranes moist, atraumatic, EOMI  Neck: Full ROM  Pulm: Symmetric chest rise, nonlabored, lungs CTAB  Cardiovascular: Regular rate and rhythm, intact distal pulses  GI: Soft, nontender, nondistended, no rebound, no guarding, bowel sounds present  MSK: Full ROM, no deformity  Skin: Warm, dry  Neuro: Awake, alert, oriented x 4, GCS 15, moving all extremities, no focal deficits  Psych: Agitated, cooperative        LAB RESULTS  Recent Results (from the past 24 hour(s))   Comprehensive Metabolic Panel    Collection Time: 07/12/23  3:39 PM    Specimen: Blood   Result Value Ref Range    Glucose 108 (H) 65 - 99 mg/dL    BUN 8 6 - 20 mg/dL    Creatinine 0.85 0.57 - 1.00 mg/dL    Sodium 144 136 - 145 mmol/L    Potassium 3.4 (L) 3.5 - 5.2 mmol/L    Chloride 111 (H) 98 - 107 mmol/L    CO2 18.9 (L) 22.0 - 29.0 mmol/L    Calcium 8.8 8.6 - 10.5  mg/dL    Total Protein 6.0 6.0 - 8.5 g/dL    Albumin 3.7 3.5 - 5.2 g/dL    ALT (SGPT) 10 1 - 33 U/L    AST (SGOT) 17 1 - 32 U/L    Alkaline Phosphatase 109 39 - 117 U/L    Total Bilirubin 0.7 0.0 - 1.2 mg/dL    Globulin 2.3 gm/dL    A/G Ratio 1.6 g/dL    BUN/Creatinine Ratio 9.4 7.0 - 25.0    Anion Gap 14.1 5.0 - 15.0 mmol/L    eGFR 88.4 >60.0 mL/min/1.73   CK    Collection Time: 07/12/23  3:39 PM    Specimen: Blood   Result Value Ref Range    Creatine Kinase 64 20 - 180 U/L   Magnesium    Collection Time: 07/12/23  3:39 PM    Specimen: Blood   Result Value Ref Range    Magnesium 1.6 1.6 - 2.6 mg/dL   CBC Auto Differential    Collection Time: 07/12/23  3:39 PM    Specimen: Blood   Result Value Ref Range    WBC 13.57 (H) 3.40 - 10.80 10*3/mm3    RBC 4.70 3.77 - 5.28 10*6/mm3    Hemoglobin 14.4 12.0 - 15.9 g/dL    Hematocrit 42.0 34.0 - 46.6 %    MCV 89.4 79.0 - 97.0 fL    MCH 30.6 26.6 - 33.0 pg    MCHC 34.3 31.5 - 35.7 g/dL    RDW 13.0 12.3 - 15.4 %    RDW-SD 42.9 37.0 - 54.0 fl    MPV 10.0 6.0 - 12.0 fL    Platelets 287 140 - 450 10*3/mm3    Neutrophil % 87.1 (H) 42.7 - 76.0 %    Lymphocyte % 7.2 (L) 19.6 - 45.3 %    Monocyte % 5.2 5.0 - 12.0 %    Eosinophil % 0.0 (L) 0.3 - 6.2 %    Basophil % 0.1 0.0 - 1.5 %    Immature Grans % 0.4 0.0 - 0.5 %    Neutrophils, Absolute 11.81 (H) 1.70 - 7.00 10*3/mm3    Lymphocytes, Absolute 0.98 0.70 - 3.10 10*3/mm3    Monocytes, Absolute 0.71 0.10 - 0.90 10*3/mm3    Eosinophils, Absolute 0.00 0.00 - 0.40 10*3/mm3    Basophils, Absolute 0.02 0.00 - 0.20 10*3/mm3    Immature Grans, Absolute 0.05 0.00 - 0.05 10*3/mm3    nRBC 0.0 0.0 - 0.2 /100 WBC   Urinalysis With Microscopic If Indicated (No Culture) - Urine, Clean Catch    Collection Time: 07/12/23  4:36 PM    Specimen: Urine, Clean Catch   Result Value Ref Range    Color, UA Dark Yellow (A) Yellow, Straw    Appearance, UA Clear Clear    pH, UA 6.5 5.0 - 8.0    Specific Gravity, UA 1.029 1.005 - 1.030    Glucose, UA Negative  Negative    Ketones, UA 80 mg/dL (3+) (A) Negative    Bilirubin, UA Negative Negative    Blood, UA Negative Negative    Protein, UA 30 mg/dL (1+) (A) Negative    Leuk Esterase, UA Trace (A) Negative    Nitrite, UA Negative Negative    Urobilinogen, UA 1.0 E.U./dL 0.2 - 1.0 E.U./dL   Urinalysis, Microscopic Only - Urine, Clean Catch    Collection Time: 07/12/23  4:36 PM    Specimen: Urine, Clean Catch   Result Value Ref Range    RBC, UA 0-2 None Seen, 0-2 /HPF    WBC, UA 3-5 (A) None Seen, 0-2 /HPF    Bacteria, UA 2+ (A) None Seen /HPF    Squamous Epithelial Cells, UA 7-12 (A) None Seen, 0-2 /HPF    Hyaline Casts, UA 0-2 None Seen /LPF    Methodology Manual Light Microscopy        Ordered the above labs and independently interpreted results. My findings will be discussed in the medical decision making section below        RADIOLOGY  No Radiology Exams Resulted Within Past 24 Hours    Ordered the above noted radiological studies.  Independently interpreted by me and my independent review of findings can be found in the ED Course.  See dictation for official radiology interpretation.      PROCEDURES    Procedures      MEDICATIONS GIVEN IN ER    Medications   sodium chloride 0.9 % bolus 1,000 mL (0 mL Intravenous Stopped 7/12/23 1428)   ondansetron (ZOFRAN) injection 4 mg (4 mg Intravenous Given 7/12/23 1231)   ketorolac (TORADOL) injection 15 mg (15 mg Intravenous Given 7/12/23 1231)   cloNIDine (CATAPRES) tablet 0.2 mg (0.2 mg Oral Given 7/12/23 1311)   ketorolac (TORADOL) injection 15 mg (15 mg Intravenous Given 7/12/23 1311)   HYDROmorphone (DILAUDID) injection 0.5 mg (0.5 mg Intravenous Given 7/12/23 1432)   metoclopramide (REGLAN) injection 10 mg (10 mg Intravenous Given 7/12/23 1449)   droperidol (INAPSINE) injection 1.25 mg (1.25 mg Intravenous Given 7/12/23 1622)   sodium chloride 0.9 % bolus 1,000 mL (1,000 mL Intravenous New Bag 7/12/23 1622)   HYDROmorphone (DILAUDID) injection 1 mg (1 mg Intravenous Given  7/12/23 1715)         PROGRESS, DATA ANALYSIS, CONSULTS, AND MEDICAL DECISION MAKING    Please note that this section constitutes my independent interpretation of clinical data including lab results, radiology, EKG's.  This constitutes my independent professional opinion regarding differential diagnosis and management of this patient.  It may include any factors such as history from outside sources, review of external records, social determinants of health, management of medications, response to those treatments, and discussions with other providers.    Differential Diagnosis and Plan: Initial concern for opioid withdrawal given that she was already here for similar and then took Narcan on top of that today.  Plan for IV fluids, symptomatic control, and reevaluation with results.    Additional sources:  - Discussed/ obtained information from independent historians:       - Chronic or social conditions impacting care:      - Shared decision making:  Patient fully updated on and in agreement with the course and plan moving forward    ED Course as of 07/12/23 1730   Wed Jul 12, 2023   1406 On reevaluation the patient is still uncomfortable, she states that she has gotten rid of all of her home pain medications.  I discussed that given the fact that she is trying to get off of the opioids I would hesitate to give her some now although it may help take the edge off and we gave her some.  At this point she states that she needs something to help her get comfortable enough to go home [DC]   1559 WBC(!): 13.57 [DC]   1559 Hemoglobin: 14.4 [DC]   1559 Platelets: 287 [DC]   1626 Creatine Kinase: 64 [DC]   1650 Magnesium: 1.6 [DC]   1650 Glucose(!): 108 [DC]   1650 BUN: 8 [DC]   1650 Creatinine: 0.85 [DC]   1650 Sodium: 144 [DC]   1650 Potassium(!): 3.4 [DC]   1650 Calcium: 8.8 [DC]   1650 ALT (SGPT): 10 [DC]   1650 AST (SGOT): 17 [DC]   1650 Alkaline Phosphatase: 109 [DC]   1650 Total Bilirubin: 0.7 [DC]   1706 Nitrite, UA:  Negative [DC]   1706 Leukocytes, UA(!): Trace [DC]   1706 Ketones, UA(!): 80 mg/dL (3+) [DC]   1713 Patient is still quite uncomfortable, I suspect probably opioid withdrawal given her recent cessation of chronic use of opioid pain pills in addition to using the Narcan this morning.  Plan for hospitalization for further symptom management overnight.  Reassuring work-up otherwise with signs of dehydration [DC]   1729 Discussed with Dr. Kim [facility], discussed patient's clinical course and findings today, treatment modalities, and need for hospitalization. [DC]      ED Course User Index  [DC] Constantine Mujica MD       Hospitalization Considered?: yes    Orders Placed During This Visit:  Orders Placed This Encounter   Procedures    Comprehensive Metabolic Panel    Urinalysis With Microscopic If Indicated (No Culture) - Urine, Clean Catch    CK    Magnesium    CBC Auto Differential    Urinalysis, Microscopic Only - Urine, Clean Catch    [facility] (on-call MD unless specified) Details    Initiate Observation Status    CBC & Differential       Additional orders considered but not placed:      Independent interpretation of labs, radiology studies, and discussions with consultants: See ED Course        AS OF 17:30 EDT VITALS:    BP - 152/100  HR - 63  TEMP - 98.1 °F (36.7 °C)  02 SATS - 94%        DIAGNOSIS  Final diagnoses:   Opioid withdrawal   Dehydration   Nausea and vomiting, unspecified vomiting type         DISPOSITION  HOSPITALIZATION    Discussed treatment plan and reason for hospitalization with pt/family and hospitalizing physician.  Pt/family voiced understanding of the plan for hospitalization for further testing/treatment as needed.         AMARJIT reviewed by Kathy Kim MD, Constantine Mujica MD       --    Please note that portions of this were completed with a voice recognition program.       Note Disclaimer: At [facility], we believe that sharing information builds trust and better relationships. You  are receiving this note because you are receiving care at [facility] or recently visited. It is possible you will see health information before a provider has talked with you about it. This kind of information can be easy to misunderstand. To help you fully understand what it means for your health, we urge you to discuss this note with your provider.           Constantine Mujica MD  07/12/23 6737

## 2023-07-12 NOTE — ED NOTES
Patient to ER via ems from home  Patient was recently dx with narcan and patient reports that she woke up and took the narcan because she thought she was supposed to take it daily with her other medications    When patient was here last she was detoxing off perocet

## 2023-07-12 NOTE — PROGRESS NOTES
"[facility] Clinical Pharmacy Services: Enoxaparin Consult    Belen Allen has a pharmacy consult to dose prophylactic enoxaparin per Dr. Kim's request.     Indication: VTE Prophylaxis  Home Anticoagulation: none     Relevant clinical data and objective history reviewed:  42 y.o. female 162.6 cm (64\") 86.2 kg (190 lb)   Body mass index is 32.61 kg/m².   Results from last 7 days   Lab Units 07/12/23  1539   PLATELETS 10*3/mm3 287     Estimated Creatinine Clearance: 92.5 mL/min (by C-G formula based on SCr of 0.85 mg/dL).    Assessment/Plan    Will start patient on 40mg subcutaneous every 24 hours, adjusted for renal function. Consult order will be discontinued but pharmacy will continue to follow.     Malinda Lew, [facility]  Clinical Pharmacist    "

## 2023-07-13 LAB
ANION GAP SERPL CALCULATED.3IONS-SCNC: 11 MMOL/L (ref 5–15)
BUN SERPL-MCNC: 8 MG/DL (ref 6–20)
BUN/CREAT SERPL: 10.4 (ref 7–25)
CALCIUM SPEC-SCNC: 8.2 MG/DL (ref 8.6–10.5)
CHLORIDE SERPL-SCNC: 111 MMOL/L (ref 98–107)
CO2 SERPL-SCNC: 18 MMOL/L (ref 22–29)
CREAT SERPL-MCNC: 0.77 MG/DL (ref 0.57–1)
DEPRECATED RDW RBC AUTO: 44.8 FL (ref 37–54)
EGFRCR SERPLBLD CKD-EPI 2021: 99.5 ML/MIN/1.73
ERYTHROCYTE [DISTWIDTH] IN BLOOD BY AUTOMATED COUNT: 13.3 % (ref 12.3–15.4)
GLUCOSE SERPL-MCNC: 99 MG/DL (ref 65–99)
HCT VFR BLD AUTO: 36.6 % (ref 34–46.6)
HGB BLD-MCNC: 12.2 G/DL (ref 12–15.9)
MCH RBC QN AUTO: 30.6 PG (ref 26.6–33)
MCHC RBC AUTO-ENTMCNC: 33.3 G/DL (ref 31.5–35.7)
MCV RBC AUTO: 91.7 FL (ref 79–97)
PLATELET # BLD AUTO: 229 10*3/MM3 (ref 140–450)
PMV BLD AUTO: 10.3 FL (ref 6–12)
POTASSIUM SERPL-SCNC: 3 MMOL/L (ref 3.5–5.2)
POTASSIUM SERPL-SCNC: 3.3 MMOL/L (ref 3.5–5.2)
RBC # BLD AUTO: 3.99 10*6/MM3 (ref 3.77–5.28)
SODIUM SERPL-SCNC: 140 MMOL/L (ref 136–145)
WBC NRBC COR # BLD: 10.19 10*3/MM3 (ref 3.4–10.8)

## 2023-07-13 PROCEDURE — 85027 COMPLETE CBC AUTOMATED: CPT | Performed by: INTERNAL MEDICINE

## 2023-07-13 PROCEDURE — 25010000002 LORAZEPAM PER 2 MG: Performed by: INTERNAL MEDICINE

## 2023-07-13 PROCEDURE — 25010000002 ENOXAPARIN PER 10 MG: Performed by: INTERNAL MEDICINE

## 2023-07-13 PROCEDURE — 25010000002 ONDANSETRON PER 1 MG: Performed by: INTERNAL MEDICINE

## 2023-07-13 PROCEDURE — 36415 COLL VENOUS BLD VENIPUNCTURE: CPT | Performed by: INTERNAL MEDICINE

## 2023-07-13 PROCEDURE — 84132 ASSAY OF SERUM POTASSIUM: CPT | Performed by: INTERNAL MEDICINE

## 2023-07-13 PROCEDURE — G0378 HOSPITAL OBSERVATION PER HR: HCPCS

## 2023-07-13 PROCEDURE — 80048 BASIC METABOLIC PNL TOTAL CA: CPT | Performed by: INTERNAL MEDICINE

## 2023-07-13 RX ORDER — POTASSIUM CHLORIDE 750 MG/1
40 TABLET, FILM COATED, EXTENDED RELEASE ORAL EVERY 4 HOURS
Status: COMPLETED | OUTPATIENT
Start: 2023-07-13 | End: 2023-07-13

## 2023-07-13 RX ORDER — HYDROXYZINE HYDROCHLORIDE 25 MG/1
25 TABLET, FILM COATED ORAL EVERY 6 HOURS PRN
Status: DISCONTINUED | OUTPATIENT
Start: 2023-07-13 | End: 2023-07-17

## 2023-07-13 RX ORDER — FLUOXETINE HYDROCHLORIDE 20 MG/1
60 CAPSULE ORAL NIGHTLY
Status: DISCONTINUED | OUTPATIENT
Start: 2023-07-13 | End: 2023-07-17 | Stop reason: HOSPADM

## 2023-07-13 RX ORDER — NAPROXEN 500 MG/1
500 TABLET ORAL 2 TIMES DAILY PRN
Status: DISCONTINUED | OUTPATIENT
Start: 2023-07-13 | End: 2023-07-16

## 2023-07-13 RX ORDER — FOLIC ACID 1 MG/1
1 TABLET ORAL DAILY
Status: DISCONTINUED | OUTPATIENT
Start: 2023-07-13 | End: 2023-07-17 | Stop reason: HOSPADM

## 2023-07-13 RX ORDER — OLANZAPINE 5 MG/1
5 TABLET ORAL DAILY
Status: DISCONTINUED | OUTPATIENT
Start: 2023-07-13 | End: 2023-07-14

## 2023-07-13 RX ORDER — LORAZEPAM 2 MG/ML
1 INJECTION INTRAMUSCULAR EVERY 4 HOURS PRN
Status: DISCONTINUED | OUTPATIENT
Start: 2023-07-13 | End: 2023-07-14

## 2023-07-13 RX ORDER — LOPERAMIDE HYDROCHLORIDE 2 MG/1
2 CAPSULE ORAL 3 TIMES DAILY PRN
Status: DISCONTINUED | OUTPATIENT
Start: 2023-07-13 | End: 2023-07-15

## 2023-07-13 RX ORDER — GABAPENTIN 400 MG/1
800 CAPSULE ORAL EVERY 8 HOURS SCHEDULED
Status: DISCONTINUED | OUTPATIENT
Start: 2023-07-13 | End: 2023-07-17 | Stop reason: HOSPADM

## 2023-07-13 RX ORDER — TRAZODONE HYDROCHLORIDE 50 MG/1
50 TABLET ORAL NIGHTLY PRN
Status: DISCONTINUED | OUTPATIENT
Start: 2023-07-13 | End: 2023-07-17 | Stop reason: HOSPADM

## 2023-07-13 RX ORDER — POTASSIUM CHLORIDE 750 MG/1
40 TABLET, FILM COATED, EXTENDED RELEASE ORAL EVERY 4 HOURS
Status: COMPLETED | OUTPATIENT
Start: 2023-07-14 | End: 2023-07-14

## 2023-07-13 RX ORDER — DICYCLOMINE HYDROCHLORIDE 10 MG/1
20 CAPSULE ORAL 3 TIMES DAILY
Status: DISCONTINUED | OUTPATIENT
Start: 2023-07-13 | End: 2023-07-17 | Stop reason: HOSPADM

## 2023-07-13 RX ADMIN — DICYCLOMINE HYDROCHLORIDE 10 MG: 10 CAPSULE ORAL at 12:11

## 2023-07-13 RX ADMIN — DICYCLOMINE HYDROCHLORIDE 20 MG: 10 CAPSULE ORAL at 18:02

## 2023-07-13 RX ADMIN — TRAZODONE HYDROCHLORIDE 50 MG: 50 TABLET ORAL at 21:30

## 2023-07-13 RX ADMIN — HYDROXYZINE HYDROCHLORIDE 25 MG: 25 TABLET ORAL at 12:11

## 2023-07-13 RX ADMIN — ACETAMINOPHEN 650 MG: 325 TABLET, FILM COATED ORAL at 08:48

## 2023-07-13 RX ADMIN — GABAPENTIN 800 MG: 400 CAPSULE ORAL at 14:30

## 2023-07-13 RX ADMIN — FLUOXETINE HYDROCHLORIDE 60 MG: 20 CAPSULE ORAL at 20:39

## 2023-07-13 RX ADMIN — LORAZEPAM 1 MG: 2 INJECTION INTRAMUSCULAR; INTRAVENOUS at 20:38

## 2023-07-13 RX ADMIN — FOLIC ACID 1 MG: 1 TABLET ORAL at 08:48

## 2023-07-13 RX ADMIN — NAPROXEN 500 MG: 500 TABLET ORAL at 12:11

## 2023-07-13 RX ADMIN — CLONIDINE HYDROCHLORIDE 0.1 MG: 0.1 TABLET ORAL at 20:38

## 2023-07-13 RX ADMIN — OLANZAPINE 5 MG: 5 TABLET, FILM COATED ORAL at 10:42

## 2023-07-13 RX ADMIN — LORAZEPAM 1 MG: 2 INJECTION INTRAMUSCULAR; INTRAVENOUS at 15:31

## 2023-07-13 RX ADMIN — GABAPENTIN 800 MG: 400 CAPSULE ORAL at 22:29

## 2023-07-13 RX ADMIN — ACETAMINOPHEN 650 MG: 325 TABLET, FILM COATED ORAL at 19:23

## 2023-07-13 RX ADMIN — SODIUM CHLORIDE 75 ML/HR: 9 INJECTION, SOLUTION INTRAVENOUS at 10:42

## 2023-07-13 RX ADMIN — POTASSIUM CHLORIDE 40 MEQ: 750 TABLET, EXTENDED RELEASE ORAL at 05:06

## 2023-07-13 RX ADMIN — Medication 400 MG: at 08:48

## 2023-07-13 RX ADMIN — ONDANSETRON 4 MG: 2 INJECTION INTRAMUSCULAR; INTRAVENOUS at 08:48

## 2023-07-13 RX ADMIN — DICYCLOMINE HYDROCHLORIDE 20 MG: 10 CAPSULE ORAL at 20:38

## 2023-07-13 RX ADMIN — ONDANSETRON 4 MG: 2 INJECTION INTRAMUSCULAR; INTRAVENOUS at 23:56

## 2023-07-13 RX ADMIN — CLONIDINE HYDROCHLORIDE 0.1 MG: 0.1 TABLET ORAL at 13:36

## 2023-07-13 RX ADMIN — ONDANSETRON 4 MG: 2 INJECTION INTRAMUSCULAR; INTRAVENOUS at 03:27

## 2023-07-13 RX ADMIN — HYDROXYZINE HYDROCHLORIDE 25 MG: 25 TABLET ORAL at 18:03

## 2023-07-13 RX ADMIN — POTASSIUM CHLORIDE 40 MEQ: 750 TABLET, EXTENDED RELEASE ORAL at 09:00

## 2023-07-13 RX ADMIN — POTASSIUM CHLORIDE 40 MEQ: 750 TABLET, EXTENDED RELEASE ORAL at 14:30

## 2023-07-13 RX ADMIN — ENOXAPARIN SODIUM 40 MG: 100 INJECTION SUBCUTANEOUS at 20:39

## 2023-07-13 RX ADMIN — PRAMIPEXOLE DIHYDROCHLORIDE 0.5 MG: 0.5 TABLET ORAL at 08:48

## 2023-07-13 RX ADMIN — ONDANSETRON 4 MG: 2 INJECTION INTRAMUSCULAR; INTRAVENOUS at 14:30

## 2023-07-13 RX ADMIN — GABAPENTIN 800 MG: 400 CAPSULE ORAL at 05:06

## 2023-07-13 RX ADMIN — LOPERAMIDE HYDROCHLORIDE 2 MG: 2 CAPSULE ORAL at 15:31

## 2023-07-13 NOTE — PROGRESS NOTES
"Nutrition Services    Patient Name:  Belen Allen  YOB: 1980  MRN: [id number]  Admit Date:  7/12/2023    Assessment Date:  07/13/23    NUTRITION SCREENING      Reason for Encounter MST 2, unsure    Diagnosis/Problem Opioid withdrawal     Patient in  opiod withdrawal. CIWA 17, COWS 11. 18# (8%) wt loss x 6 months. Patient has been nauseous and vomiting. RD assumes poor po intake at this time but will monitor meal intakes in EMR.        PO Diet Diet: Cardiac Diets; Healthy Heart (2-3 Na+); Texture: Regular Texture (IDDSI 7); Fluid Consistency: Thin (IDDSI 0)   PO Supplements/Snacks    PO Intake % Insufficient data        Labs  Listed below, reviewed   Physical Findings CIWA 17, COWS 11, alert, oriented, obese    GI Function Last BM 7/12   Skin Status Skin intact        Height:  Weight:  BMI:   Category  Weight Trend Height: 162.6 cm (64.02\")  Weight: 90.5 kg (199 lb 9.6 oz) (07/13/23 0148)  Body mass index is 34.24 kg/m².  Obese, Class I (30 - 34.9)  Stable  Documented weights    07/12/23 1719 07/12/23 2007 07/13/23 0148   Weight: 86.2 kg (190 lb) 93.5 kg (206 lb 3.2 oz) 90.5 kg (199 lb 9.6 oz)           Intervention/Plan Will continue to follow          Results from last 7 days   Lab Units 07/13/23  0356 07/12/23  1539 07/10/23  1819   SODIUM mmol/L 140 144 137   POTASSIUM mmol/L 3.0* 3.4* 3.5   CHLORIDE mmol/L 111* 111* 103   CO2 mmol/L 18.0* 18.9* 20.0*   BUN mg/dL 8 8 5*   CREATININE mg/dL 0.77 0.85 0.76   CALCIUM mg/dL 8.2* 8.8 9.5   BILIRUBIN mg/dL  --  0.7 0.5   ALK PHOS U/L  --  109 129*   ALT (SGPT) U/L  --  10 9   AST (SGOT) U/L  --  17 17   GLUCOSE mg/dL 99 108* 113*     Results from last 7 days   Lab Units 07/13/23  0356 07/12/23  1539   MAGNESIUM mg/dL  --  1.6   HEMOGLOBIN g/dL 12.2 14.4   HEMATOCRIT % 36.6 42.0     COVID19   Date Value Ref Range Status   11/23/2022 Not Detected Not Detected - Ref. Range Final     No results found for: HGBA1C    RD to follow up per " protocol.    Electronically signed by:  Kimmie Arnold RD  07/13/23 17:38 EDT

## 2023-07-13 NOTE — PLAN OF CARE
Problem: Adult Inpatient Plan of Care  Goal: Plan of Care Review  Outcome: Ongoing, Progressing  Goal: Patient-Specific Goal (Individualized)  Outcome: Ongoing, Progressing  Goal: Absence of Hospital-Acquired Illness or Injury  Outcome: Ongoing, Progressing  Intervention: Identify and Manage Fall Risk  Recent Flowsheet Documentation  Taken 7/13/2023 1400 by Pieter Morton RN  Safety Promotion/Fall Prevention:   safety round/check completed   room organization consistent   clutter free environment maintained   assistive device/personal items within reach   activity supervised   fall prevention program maintained  Taken 7/13/2023 1000 by Pieter Morton RN  Safety Promotion/Fall Prevention:   fall prevention program maintained   clutter free environment maintained   assistive device/personal items within reach   activity supervised   lighting adjusted   safety round/check completed   room organization consistent  Taken 7/13/2023 0800 by Pieter Morton RN  Safety Promotion/Fall Prevention:   safety round/check completed   room organization consistent   lighting adjusted   clutter free environment maintained   assistive device/personal items within reach   activity supervised   fall prevention program maintained  Intervention: Prevent Skin Injury  Recent Flowsheet Documentation  Taken 7/13/2023 1400 by Pieter Morton RN  Body Position: sitting up in bed  Taken 7/13/2023 1000 by Pieter Morton RN  Body Position: position changed independently  Taken 7/13/2023 0800 by Pieter Morton RN  Body Position: position changed independently  Skin Protection:   adhesive use limited   tubing/devices free from skin contact   transparent dressing maintained  Intervention: Prevent and Manage VTE (Venous Thromboembolism) Risk  Recent Flowsheet Documentation  Taken 7/13/2023 1400 by Pieter Morton RN  Activity Management: activity  encouraged  Taken 7/13/2023 1000 by Pieter Morton RN  Activity Management: activity encouraged  Taken 7/13/2023 0800 by Pieter Morton RN  Activity Management: activity encouraged  Intervention: Prevent Infection  Recent Flowsheet Documentation  Taken 7/13/2023 1400 by Pieter Morton RN  Infection Prevention:   single patient room provided   hand hygiene promoted   equipment surfaces disinfected   environmental surveillance performed  Taken 7/13/2023 1000 by Pieter Morton RN  Infection Prevention:   single patient room provided   hand hygiene promoted   equipment surfaces disinfected   environmental surveillance performed  Taken 7/13/2023 0800 by Pieter Morton RN  Infection Prevention:   single patient room provided   equipment surfaces disinfected   environmental surveillance performed   hand hygiene promoted  Goal: Optimal Comfort and Wellbeing  Outcome: Ongoing, Progressing  Intervention: Monitor Pain and Promote Comfort  Recent Flowsheet Documentation  Taken 7/13/2023 1000 by Pieter Morton RN  Pain Management Interventions: see MAR  Taken 7/13/2023 0800 by Pieter Morton RN  Pain Management Interventions: see MAR  Intervention: Provide Person-Centered Care  Recent Flowsheet Documentation  Taken 7/13/2023 1400 by Pieter Morton RN  Trust Relationship/Rapport:   care explained   choices provided   questions answered   questions encouraged  Taken 7/13/2023 0800 by Pieter Morton RN  Trust Relationship/Rapport:   care explained   choices provided   questions answered   questions encouraged  Goal: Readiness for Transition of Care  Outcome: Ongoing, Progressing   Goal Outcome Evaluation:      Pt Alert, oriented x4, VSS, room air, normal sinus rhythm, pt c/o severe pain, PRN Tylenol and naproxen given, no effective. Pt with anxiety this morning, she felt better after olanzapine dose. This afternoon  pt is restless, agitated, vomiting x2, diarrhea x2, and c/o tingling to lips, and upper extremities,  PRN Zofran IV administrated. Call placed to attending and order Imodium, Ativan 1 mg IV x1 and Atarax x1. Potassium replacement protocol given.

## 2023-07-13 NOTE — PROGRESS NOTES
[facility] Medicine Services  PROGRESS NOTE    Patient Name: Belen Allen  : 1980  MRN: [id number]    Date of Admission: 2023  Primary Care Physician: Sahil Cornelius MD    Subjective   Subjective     CC:  Albuterol    Subjective:  Patient reports she is still going through opiate withdrawal.  She notes she is having issues with agitation and anxiety.  She is requesting something to help stabilize her mood.  She is adamant she does not want to go back onto narcotics for her chronic back pain issues.  She wishes to avoid pain management in the future and so she will not be going back to pain management as well.  Nausea has improved with nausea medicine.    Review of Systems  No current fevers or chills  No current shortness of breath or cough  No current chest pain or palpitations      Objective   Objective     Vital Signs:   Temp:  [98.8 °F (37.1 °C)-99 °F (37.2 °C)] 99 °F (37.2 °C)  Heart Rate:  [52-72] 72  Resp:  [18-22] 20  BP: (119-173)/() 119/104        Physical Exam:  Constitutional:Awake, alert  HENT: NCAT, mucous membranes moist, neck supple  Respiratory: No cough or wheezing, respiratory effort normal, nonlabored breathing   Cardiovascular: Pulse rate normal, normal radial pulses  Gastrointestinal: Positive bowel sounds, soft, nontender, nondistended  Musculoskeletal: Lumbar region with tenderness, normal musculature for age, no lower extremity edema, BMI 34 obese  Psychiatric: Agitated anxious affect, cooperative, conversational  Neurologic: No slurred speech or facial droop, follows commands  Skin: No rashes or jaundice, warm      Results Reviewed:  Results from last 7 days   Lab Units 23  0356 23  1539 07/10/23  1819   WBC 10*3/mm3 10.19 13.57* 11.33*   HEMOGLOBIN g/dL 12.2 14.4 15.2   HEMATOCRIT % 36.6 42.0 44.0   PLATELETS 10*3/mm3 229 287 311     Results from last 7 days   Lab Units 23  0356 23  1539 07/10/23  1819   SODIUM mmol/L 140 144  137   POTASSIUM mmol/L 3.0* 3.4* 3.5   CHLORIDE mmol/L 111* 111* 103   CO2 mmol/L 18.0* 18.9* 20.0*   BUN mg/dL 8 8 5*   CREATININE mg/dL 0.77 0.85 0.76   GLUCOSE mg/dL 99 108* 113*   CALCIUM mg/dL 8.2* 8.8 9.5   ALT (SGPT) U/L  --  10 9   AST (SGOT) U/L  --  17 17     Estimated Creatinine Clearance: 104.7 mL/min (by C-G formula based on SCr of 0.77 mg/dL).    Microbiology Results Abnormal       None            Imaging Results (Last 24 Hours)       ** No results found for the last 24 hours. **            Results for orders placed during the hospital encounter of 20    Adult Transesophageal Echo (DENIS) W/ Cont if Necessary Per Protocol    Interpretation Summary  · Left ventricular ejection fraction appears to be 61 - 65%. Left ventricular systolic function is normal.  · Saline test results are negative.      I have reviewed the medications:  Scheduled Meds:enoxaparin, 40 mg, Subcutaneous, Q24H  FLUoxetine, 60 mg, Oral, Nightly  folic acid, 1 mg, Oral, Daily  gabapentin, 800 mg, Oral, Q8H  OLANZapine, 5 mg, Oral, Daily  potassium chloride ER, 40 mEq, Oral, Q4H  Scopolamine, 1 patch, Transdermal, Q72H  senna-docusate sodium, 2 tablet, Oral, BID  Vitamin B-2, 400 mg, Oral, Daily      Continuous Infusions:sodium chloride, 75 mL/hr, Last Rate: 75 mL/hr (23 1042)      PRN Meds:.  acetaminophen    senna-docusate sodium **AND** polyethylene glycol **AND** bisacodyl **AND** bisacodyl    [] cloNIDine **FOLLOWED BY** cloNIDine **FOLLOWED BY** [START ON 2023] cloNIDine **FOLLOWED BY** [START ON 7/15/2023] cloNIDine **FOLLOWED BY** [START ON 2023] cloNIDine    dicyclomine    hydrOXYzine    melatonin    naproxen    ondansetron **OR** ondansetron    Potassium Replacement - Follow Nurse / BPA Driven Protocol    pramipexole    traZODone    Assessment & Plan   Assessment & Plan     Active Hospital Problems    Diagnosis  POA    **Opioid withdrawal [F11.93]  Yes      Resolved Hospital Problems   No resolved  problems to display.        Brief Hospital Course to date:  Belen Allen is a 42 y.o. female presents with opiate withdrawal    Opiate withdrawal  Nausea and vomiting secondary to opiate withdrawal  Restless leg syndrome  Hypokalemia  Obesity  Agitation  Insomnia    Discussion/plan:  Continue to monitor off opiate withdrawal.  Due to significant symptoms with agitation and anxiety patient requesting for something to help stabilize her mood.  Plan to try low-dose mood stabilizer olanzapine and monitor response.  I discussed risk versus benefits and potential side effects of this medication with the patient including but not limited to risk of sedation.  She agrees and is eager to try something new.    At this time patient wishes to continue gabapentin for neuropathic pain.  Plan to further discuss eventual weaning off this that is something patient is interested in.    Continue as needed clonidine for withdrawal.  Continue medication for restless leg.    Replete potassium levels as needed.  Monitor electrolytes laboratory studies while hospitalized.    Trazodone for insomnia.    Recommend gradual healthy weight loss and improve diet and exercise for obesity.    Zofran for nausea which seems to be effective.    Potential discharge tomorrow pending clinical course.    Case discussed on multidisciplinary rounds with case management, nursing, and pharmacist.    CODE STATUS:   Code Status and Medical Interventions:   Ordered at: 07/12/23 1731     Code Status (Patient has no pulse and is not breathing):    CPR (Attempt to Resuscitate)     Medical Interventions (Patient has pulse or is breathing):    Full       David Padilla MD  07/13/23

## 2023-07-13 NOTE — PLAN OF CARE
Problem: Adult Inpatient Plan of Care  Goal: Plan of Care Review  Outcome: Ongoing, Not Progressing  Flowsheets (Taken 7/13/2023 0525)  Progress: no change  Goal: Patient-Specific Goal (Individualized)  Outcome: Ongoing, Not Progressing  Goal: Absence of Hospital-Acquired Illness or Injury  Outcome: Ongoing, Not Progressing  Intervention: Identify and Manage Fall Risk  Recent Flowsheet Documentation  Taken 7/13/2023 0400 by Reny Saunders RN  Safety Promotion/Fall Prevention:   clutter free environment maintained   assistive device/personal items within reach   activity supervised   lighting adjusted   nonskid shoes/slippers when out of bed   safety round/check completed  Taken 7/13/2023 0200 by Reny Saunders RN  Safety Promotion/Fall Prevention:   clutter free environment maintained   assistive device/personal items within reach   activity supervised   lighting adjusted   nonskid shoes/slippers when out of bed   safety round/check completed  Taken 7/13/2023 0031 by Reny Saunders RN  Safety Promotion/Fall Prevention:   clutter free environment maintained   assistive device/personal items within reach   activity supervised   lighting adjusted   nonskid shoes/slippers when out of bed   safety round/check completed  Taken 7/12/2023 2201 by Reny Saunders RN  Safety Promotion/Fall Prevention:   assistive device/personal items within reach   activity supervised   clutter free environment maintained   lighting adjusted   nonskid shoes/slippers when out of bed   safety round/check completed  Taken 7/12/2023 2004 by Reny Saunders, RN  Safety Promotion/Fall Prevention:   clutter free environment maintained   assistive device/personal items within reach   activity supervised   lighting adjusted   nonskid shoes/slippers when out of bed   safety round/check completed  Intervention: Prevent Skin Injury  Recent Flowsheet Documentation  Taken 7/13/2023 0400 by Reny Saunders, RN  Body Position: position changed  independently  Taken 7/13/2023 0200 by Reny Saunders RN  Body Position: position changed independently  Taken 7/13/2023 0031 by Reny Saunders RN  Body Position: position changed independently  Skin Protection:   adhesive use limited   tubing/devices free from skin contact   transparent dressing maintained  Taken 7/12/2023 2201 by Reny Saunders RN  Body Position:   position changed independently   side-lying  Taken 7/12/2023 2004 by Reny Saunders RN  Body Position: position changed independently  Skin Protection:   adhesive use limited   tubing/devices free from skin contact   transparent dressing maintained  Intervention: Prevent and Manage VTE (Venous Thromboembolism) Risk  Recent Flowsheet Documentation  Taken 7/13/2023 0400 by Reny Saunders RN  Activity Management: activity encouraged  Taken 7/13/2023 0200 by Reny Saunders RN  Activity Management: activity encouraged  Taken 7/13/2023 0031 by Reny Saunders RN  Activity Management: activity encouraged  VTE Prevention/Management:   bilateral   patient refused intervention  Range of Motion: active ROM (range of motion) encouraged  Taken 7/12/2023 2201 by Reny Saunders RN  Activity Management: activity encouraged  Taken 7/12/2023 2004 by Reny Saunders RN  Activity Management: activity encouraged  VTE Prevention/Management:   bilateral   patient refused intervention  Range of Motion: active ROM (range of motion) encouraged  Goal: Optimal Comfort and Wellbeing  Outcome: Ongoing, Not Progressing  Intervention: Monitor Pain and Promote Comfort  Recent Flowsheet Documentation  Taken 7/13/2023 0031 by Reny Saunders RN  Pain Management Interventions:   care clustered   position adjusted   pillow support provided  Taken 7/12/2023 2201 by Reny Saunders RN  Pain Management Interventions: (Pt does not want tylenol)   position adjusted   pillow support provided  Taken 7/12/2023 2004 by Reny Saunders RN  Pain Management Interventions: pillow  support provided  Intervention: Provide Person-Centered Care  Recent Flowsheet Documentation  Taken 7/13/2023 0031 by Reny Saunders RN  Trust Relationship/Rapport:   care explained   choices provided   questions encouraged   questions answered  Taken 7/12/2023 2004 by Reny Saunders RN  Trust Relationship/Rapport:   care explained   choices provided   questions answered   questions encouraged  Goal: Readiness for Transition of Care  Outcome: Ongoing, Not Progressing  Intervention: Mutually Develop Transition Plan  Recent Flowsheet Documentation  Taken 7/12/2023 2004 by Reny Saunders RN  Equipment Currently Used at Home: none  Transportation Anticipated: agency  Transportation Concerns: none  Patient/Family Anticipated Services at Transition: none  Patient/Family Anticipates Transition to: home with family     Problem: Fall Injury Risk  Goal: Absence of Fall and Fall-Related Injury  Outcome: Ongoing, Not Progressing  Intervention: Identify and Manage Contributors  Recent Flowsheet Documentation  Taken 7/13/2023 0031 by Reny Saunders RN  Medication Review/Management: medications reviewed  Taken 7/12/2023 2201 by Reny Saunders RN  Medication Review/Management: medications reviewed  Taken 7/12/2023 2004 by Reny Saunders RN  Medication Review/Management: medications reviewed  Intervention: Promote Injury-Free Environment  Recent Flowsheet Documentation  Taken 7/13/2023 0400 by Reny Saunders RN  Safety Promotion/Fall Prevention:   clutter free environment maintained   assistive device/personal items within reach   activity supervised   lighting adjusted   nonskid shoes/slippers when out of bed   safety round/check completed  Taken 7/13/2023 0200 by Reny Saunders RN  Safety Promotion/Fall Prevention:   clutter free environment maintained   assistive device/personal items within reach   activity supervised   lighting adjusted   nonskid shoes/slippers when out of bed   safety round/check  completed  Taken 7/13/2023 0031 by Reny Saunders, RN  Safety Promotion/Fall Prevention:   clutter free environment maintained   assistive device/personal items within reach   activity supervised   lighting adjusted   nonskid shoes/slippers when out of bed   safety round/check completed  Taken 7/12/2023 2201 by Reny Saunders, RN  Safety Promotion/Fall Prevention:   assistive device/personal items within reach   activity supervised   clutter free environment maintained   lighting adjusted   nonskid shoes/slippers when out of bed   safety round/check completed  Taken 7/12/2023 2004 by Reny Saunders, RN  Safety Promotion/Fall Prevention:   clutter free environment maintained   assistive device/personal items within reach   activity supervised   lighting adjusted   nonskid shoes/slippers when out of bed   safety round/check completed   Goal Outcome Evaluation:           Progress: no change       K+ replacement started, A/Ox4, bedside commode near pt.

## 2023-07-14 LAB
ANION GAP SERPL CALCULATED.3IONS-SCNC: 8.4 MMOL/L (ref 5–15)
BUN SERPL-MCNC: 5 MG/DL (ref 6–20)
BUN/CREAT SERPL: 6.9 (ref 7–25)
CALCIUM SPEC-SCNC: 8.6 MG/DL (ref 8.6–10.5)
CHLORIDE SERPL-SCNC: 111 MMOL/L (ref 98–107)
CO2 SERPL-SCNC: 20.6 MMOL/L (ref 22–29)
CREAT SERPL-MCNC: 0.72 MG/DL (ref 0.57–1)
DEPRECATED RDW RBC AUTO: 43.6 FL (ref 37–54)
EGFRCR SERPLBLD CKD-EPI 2021: 107.9 ML/MIN/1.73
ERYTHROCYTE [DISTWIDTH] IN BLOOD BY AUTOMATED COUNT: 13.2 % (ref 12.3–15.4)
GLUCOSE SERPL-MCNC: 91 MG/DL (ref 65–99)
HCT VFR BLD AUTO: 37.2 % (ref 34–46.6)
HGB BLD-MCNC: 12.7 G/DL (ref 12–15.9)
MCH RBC QN AUTO: 31 PG (ref 26.6–33)
MCHC RBC AUTO-ENTMCNC: 34.1 G/DL (ref 31.5–35.7)
MCV RBC AUTO: 90.7 FL (ref 79–97)
PLATELET # BLD AUTO: 194 10*3/MM3 (ref 140–450)
PMV BLD AUTO: 10.3 FL (ref 6–12)
POTASSIUM SERPL-SCNC: 4.7 MMOL/L (ref 3.5–5.2)
RBC # BLD AUTO: 4.1 10*6/MM3 (ref 3.77–5.28)
SODIUM SERPL-SCNC: 140 MMOL/L (ref 136–145)
WBC NRBC COR # BLD: 7.03 10*3/MM3 (ref 3.4–10.8)

## 2023-07-14 PROCEDURE — 90791 PSYCH DIAGNOSTIC EVALUATION: CPT

## 2023-07-14 PROCEDURE — 80048 BASIC METABOLIC PNL TOTAL CA: CPT | Performed by: INTERNAL MEDICINE

## 2023-07-14 PROCEDURE — 25010000002 LORAZEPAM PER 2 MG: Performed by: INTERNAL MEDICINE

## 2023-07-14 PROCEDURE — 25010000002 ONDANSETRON PER 1 MG: Performed by: INTERNAL MEDICINE

## 2023-07-14 PROCEDURE — 25010000002 PROCHLORPERAZINE 10 MG/2ML SOLUTION: Performed by: INTERNAL MEDICINE

## 2023-07-14 PROCEDURE — 25010000002 ENOXAPARIN PER 10 MG: Performed by: INTERNAL MEDICINE

## 2023-07-14 PROCEDURE — 85027 COMPLETE CBC AUTOMATED: CPT | Performed by: INTERNAL MEDICINE

## 2023-07-14 PROCEDURE — G0378 HOSPITAL OBSERVATION PER HR: HCPCS

## 2023-07-14 RX ORDER — LORAZEPAM 2 MG/ML
0.5 INJECTION INTRAMUSCULAR EVERY 4 HOURS PRN
Status: DISCONTINUED | OUTPATIENT
Start: 2023-07-14 | End: 2023-07-16

## 2023-07-14 RX ORDER — OLANZAPINE 5 MG/1
5 TABLET ORAL 2 TIMES DAILY
Status: DISCONTINUED | OUTPATIENT
Start: 2023-07-14 | End: 2023-07-17 | Stop reason: HOSPADM

## 2023-07-14 RX ORDER — PROCHLORPERAZINE EDISYLATE 5 MG/ML
5 INJECTION INTRAMUSCULAR; INTRAVENOUS 2 TIMES DAILY
Status: DISCONTINUED | OUTPATIENT
Start: 2023-07-14 | End: 2023-07-17 | Stop reason: HOSPADM

## 2023-07-14 RX ADMIN — ACETAMINOPHEN 650 MG: 325 TABLET, FILM COATED ORAL at 03:47

## 2023-07-14 RX ADMIN — LORAZEPAM 0.5 MG: 2 INJECTION INTRAMUSCULAR; INTRAVENOUS at 14:06

## 2023-07-14 RX ADMIN — POTASSIUM CHLORIDE 40 MEQ: 750 TABLET, EXTENDED RELEASE ORAL at 05:30

## 2023-07-14 RX ADMIN — Medication 400 MG: at 09:45

## 2023-07-14 RX ADMIN — PROCHLORPERAZINE EDISYLATE 5 MG: 5 INJECTION INTRAMUSCULAR; INTRAVENOUS at 14:06

## 2023-07-14 RX ADMIN — ENOXAPARIN SODIUM 40 MG: 100 INJECTION SUBCUTANEOUS at 21:30

## 2023-07-14 RX ADMIN — CLONIDINE HYDROCHLORIDE 0.1 MG: 0.1 TABLET ORAL at 14:05

## 2023-07-14 RX ADMIN — GABAPENTIN 800 MG: 400 CAPSULE ORAL at 05:30

## 2023-07-14 RX ADMIN — FLUOXETINE HYDROCHLORIDE 60 MG: 20 CAPSULE ORAL at 21:25

## 2023-07-14 RX ADMIN — LOPERAMIDE HYDROCHLORIDE 2 MG: 2 CAPSULE ORAL at 09:45

## 2023-07-14 RX ADMIN — HYDROXYZINE HYDROCHLORIDE 25 MG: 25 TABLET ORAL at 00:58

## 2023-07-14 RX ADMIN — FOLIC ACID 1 MG: 1 TABLET ORAL at 09:45

## 2023-07-14 RX ADMIN — OLANZAPINE 5 MG: 5 TABLET, FILM COATED ORAL at 09:45

## 2023-07-14 RX ADMIN — ACETAMINOPHEN 650 MG: 325 TABLET, FILM COATED ORAL at 21:25

## 2023-07-14 RX ADMIN — ONDANSETRON 4 MG: 2 INJECTION INTRAMUSCULAR; INTRAVENOUS at 09:45

## 2023-07-14 RX ADMIN — GABAPENTIN 800 MG: 400 CAPSULE ORAL at 22:45

## 2023-07-14 RX ADMIN — SODIUM CHLORIDE 75 ML/HR: 9 INJECTION, SOLUTION INTRAVENOUS at 19:37

## 2023-07-14 RX ADMIN — PROCHLORPERAZINE EDISYLATE 5 MG: 5 INJECTION INTRAMUSCULAR; INTRAVENOUS at 22:42

## 2023-07-14 RX ADMIN — LORAZEPAM 1 MG: 2 INJECTION INTRAMUSCULAR; INTRAVENOUS at 03:42

## 2023-07-14 RX ADMIN — GABAPENTIN 800 MG: 400 CAPSULE ORAL at 14:10

## 2023-07-14 RX ADMIN — ACETAMINOPHEN 650 MG: 325 TABLET, FILM COATED ORAL at 09:44

## 2023-07-14 RX ADMIN — HYDROXYZINE HYDROCHLORIDE 25 MG: 25 TABLET ORAL at 09:45

## 2023-07-14 RX ADMIN — Medication 3 MG: at 00:59

## 2023-07-14 RX ADMIN — OLANZAPINE 5 MG: 5 TABLET ORAL at 21:27

## 2023-07-14 RX ADMIN — DICYCLOMINE HYDROCHLORIDE 20 MG: 10 CAPSULE ORAL at 09:44

## 2023-07-14 RX ADMIN — DICYCLOMINE HYDROCHLORIDE 20 MG: 10 CAPSULE ORAL at 18:00

## 2023-07-14 RX ADMIN — POTASSIUM CHLORIDE 40 MEQ: 750 TABLET, EXTENDED RELEASE ORAL at 00:17

## 2023-07-14 NOTE — NURSING NOTE
RN found vape and lighter found in pts room while pt was looking for her cigarettes. Vape and lighter placed in a security bag in the narcotics.

## 2023-07-14 NOTE — CONSULTS
"  Access center consult. Sitter at bedside.    Primary RN reports pt. discontinued IV this am, restless, and continues with intermittent confusion.     Met with patient in room #422. Introduced self and role. Patient agreed to be evaluated. Patient is a 42 y.o. S/W/F. She is alert and oriented x2(person, place). Patient lives at home with s/o. Advent: none. Children: 2. Occupation: unemployed. Hobbies: none. Education: high school. Legal: denies. : JUNIOR AGUAYO(s/o)[phone]. : na. Support system: friends. History of violence/trauma/abuse: denies. Patient feels her home is a safe environment.     Access consulted for drugs. Patient presented to the ED with opiate withdrawal. UDS +fentanyl. COWS 10-12. Pt. reported she stopped taking \" percocet 30s\" over the past few days. She reports was wanting to abstain from substance use, stating \"I didn't know it was going to do this to me.\" Past medical history Hx. UTI, seizure, Hx. chronic back pain, Hx. migraines, opioid dependence, Hx., of elevated liver enzymes.     Patient seen in the ED and by Access 7/10/23 for drugs. She denies any past substance use disorder treatment programs. Last illicit drug use \"5 days ago.\" She reports has been taking \"4 pills\" daily referring to percocet. Patient reports goal is to abstain stating \"that's the reason I stopped.\" Patient reports illicit drug use for 1 year. She denies any IV drug use. Current craving : \"it's high because I'm in so much pain.\"  Patient uses tobacco.     Patient denies any past inpatient psychiatric care. She denies any current mental health providers. Current medications: prozac, zyprexa, neurontin, ativan, trazodone, melatonin. Patient reports psychiatric medication managed by her PCP. Patient denies SI/HI/A/V/H. Denies wish to be dead. Sleep: poor. Appetite: poor. Depression: 6/10. Anxiety: 10/10. Patient undecided if will pursue substance use treatment. She accepted recovery resources. " Access available to further discuss options prior to discharge. Report given to primary RN. Primary RN reports ativan and zyprexa have been minimally effective. Advised may need to consult attending or consult Dr. Serra for medication management. Access will follow.

## 2023-07-14 NOTE — PLAN OF CARE
Goal Outcome Evaluation:      Pt alert, oriented x3, disoriented in time, denied diarrhea this shift, continues with nausea, PRN Zofran was given and effective.  Pt sleeping between care, sitter at bedside, psych consult on schedule for tomorrow.

## 2023-07-14 NOTE — PROGRESS NOTES
[facility] Medicine Services  PROGRESS NOTE    Patient Name: Belen Allen  : 1980  MRN: [id number]    Date of Admission: 2023  Primary Care Physician: Sahil Cornelius MD    Subjective   Subjective     CC:  Albuterol    Subjective:  Patient still reports going through alcohol withdrawal.  She feels she responded decently well to Zyprexa yesterday and is asking if this can be increased to twice daily.  She again states she no longer wants to narcotics.  She says she does need to continue gabapentin and feels benefits from this long-term.        Review of Systems  No current fevers or chills  No current shortness of breath or cough  No current chest pain or palpitations      Objective   Objective     Vital Signs:   Temp:  [98.2 °F (36.8 °C)-98.6 °F (37 °C)] 98.6 °F (37 °C)  Heart Rate:  [49-70] 54  Resp:  [18-24] 20  BP: (137-166)/() 149/85        Physical Exam:  Constitutional:Awake, alert  HENT: NCAT, mucous membranes moist, neck supple  Respiratory: No cough or wheezing, respiratory effort normal, nonlabored breathing   Cardiovascular: Pulse rate normal, normal radial pulses  Gastrointestinal:  soft, nontender, nondistended  Musculoskeletal: Lumbar region with tenderness, normal musculature for age, no lower extremity edema, BMI 34 obese  Psychiatric: Agitated anxious affect, cooperative, conversational  Neurologic: No slurred speech or facial droop, follows commands  Skin: No rashes or jaundice, warm      Results Reviewed:  Results from last 7 days   Lab Units 23  0829 23  0356 23  1539   WBC 10*3/mm3 7.03 10.19 13.57*   HEMOGLOBIN g/dL 12.7 12.2 14.4   HEMATOCRIT % 37.2 36.6 42.0   PLATELETS 10*3/mm3 194 229 287       Results from last 7 days   Lab Units 23  0829 238 23  0356 23  1539 07/10/23  1819   SODIUM mmol/L 140  --  140 144 137   POTASSIUM mmol/L 4.7 3.3* 3.0* 3.4* 3.5   CHLORIDE mmol/L 111*  --  111* 111* 103   CO2  mmol/L 20.6*  --  18.0* 18.9* 20.0*   BUN mg/dL 5*  --  8 8 5*   CREATININE mg/dL 0.72  --  0.77 0.85 0.76   GLUCOSE mg/dL 91  --  99 108* 113*   CALCIUM mg/dL 8.6  --  8.2* 8.8 9.5   ALT (SGPT) U/L  --   --   --  10 9   AST (SGOT) U/L  --   --   --  17 17       Estimated Creatinine Clearance: 112.5 mL/min (by C-G formula based on SCr of 0.72 mg/dL).    Microbiology Results Abnormal       None            Imaging Results (Last 24 Hours)       ** No results found for the last 24 hours. **            Results for orders placed during the hospital encounter of 20    Adult Transesophageal Echo (DENIS) W/ Cont if Necessary Per Protocol    Interpretation Summary  · Left ventricular ejection fraction appears to be 61 - 65%. Left ventricular systolic function is normal.  · Saline test results are negative.      I have reviewed the medications:  Scheduled Meds:dicyclomine, 20 mg, Oral, TID  enoxaparin, 40 mg, Subcutaneous, Q24H  FLUoxetine, 60 mg, Oral, Nightly  folic acid, 1 mg, Oral, Daily  gabapentin, 800 mg, Oral, Q8H  OLANZapine, 5 mg, Oral, BID  prochlorperazine, 5 mg, Intravenous, BID  Scopolamine, 1 patch, Transdermal, Q72H  senna-docusate sodium, 2 tablet, Oral, BID  Vitamin B-2, 400 mg, Oral, Daily      Continuous Infusions:sodium chloride, 75 mL/hr, Last Rate: 75 mL/hr (23 1042)      PRN Meds:.•  acetaminophen  •  senna-docusate sodium **AND** polyethylene glycol **AND** bisacodyl **AND** bisacodyl  •  [] cloNIDine **FOLLOWED BY** [] cloNIDine **FOLLOWED BY** cloNIDine **FOLLOWED BY** [START ON 7/15/2023] cloNIDine **FOLLOWED BY** [START ON 2023] cloNIDine  •  dicyclomine  •  hydrOXYzine  •  loperamide  •  LORazepam  •  melatonin  •  naproxen  •  ondansetron **OR** ondansetron  •  Potassium Replacement - Follow Nurse / BPA Driven Protocol  •  pramipexole  •  traZODone    Assessment & Plan   Assessment & Plan     Active Hospital Problems    Diagnosis  POA   • **Opioid withdrawal [F11.93]   Yes      Resolved Hospital Problems   No resolved problems to display.        Brief Hospital Course to date:  Belen Allen is a 42 y.o. female presents with opiate withdrawal    Opiate withdrawal  Nausea and vomiting secondary to opiate withdrawal  Restless leg syndrome  Hypokalemia  Obesity  Agitation  Insomnia    Discussion/plan:  Continue same complicated opiate withdrawal.  Patient responded to mood stabilizers olanzapine yesterday.  She is requesting increase it to twice daily and I have will added twice daily low-dose per her request.      Consult psychiatry to assist with management for anxiety and depression.  These are acutely exacerbated due to opiate withdrawal.      I feel we are possibly moving to this process as patient's diarrhea has resolved.  She is still having some mild confusion but this is improved since yesterday.      Low-dose lorazepam also added for acute anxiety.  I am only planning to use the short-term and patient agreement with this plan.      At this time patient wishes to continue gabapentin for neuropathic pain.  Recommend patient continue discussions regarding this medication with her prescribing physician    Continue as needed clonidine for withdrawal.  Blood pressure improving.  Continue medication for restless leg.    Potassium levels improved.  Monitor electrolytes laboratory studies while hospitalized.    Trazodone for insomnia.    Recommend gradual healthy weight loss and improve diet and exercise for obesity.    Zofran for nausea which seems to be effective.  Low-dose Compazine added additionally for nausea    Potential discharge in 1 to 2 days pending clinical course as patient recovering from her withdrawal    Case discussed on multidisciplinary rounds with case management, nursing, and pharmacist.    CODE STATUS:   Code Status and Medical Interventions:   Ordered at: 07/12/23 6622     Code Status (Patient has no pulse and is not breathing):    CPR (Attempt to  Resuscitate)     Medical Interventions (Patient has pulse or is breathing):    Full       David Padilla MD  07/14/23

## 2023-07-14 NOTE — PLAN OF CARE
Problem: Adult Inpatient Plan of Care  Goal: Plan of Care Review  7/14/2023 0616 by Reny Saunders RN  Outcome: Ongoing, Not Progressing  Flowsheets (Taken 7/14/2023 0613)  Outcome Evaluation: VSS, A/OX4, RA, SR-SB, ativan given x2, atarax given x1, K+ replacement given recheck at 0800.  7/14/2023 0613 by Reny Saunders RN  Outcome: Ongoing, Not Progressing  Flowsheets (Taken 7/14/2023 0613)  Progress: no change  Outcome Evaluation: VSS, A/OX4, RA, SR-SB, ativan given x2, atarax given x1, K+ replacement given recheck at 0800.  7/14/2023 0613 by Reny Saunders RN  Outcome: Ongoing, Not Progressing  Flowsheets (Taken 7/14/2023 0613)  Progress: no change  Outcome Evaluation: VSS, A/OX4, RA, SR-SB, ativan given x2, atarax given x1, K+ replacement given recheck at 0800.  Goal: Patient-Specific Goal (Individualized)  7/14/2023 0616 by Reny Saunders RN  Outcome: Ongoing, Not Progressing  7/14/2023 0613 by Reny Saunders RN  Outcome: Ongoing, Not Progressing  7/14/2023 0613 by Reny Saunders RN  Outcome: Ongoing, Not Progressing  Goal: Absence of Hospital-Acquired Illness or Injury  7/14/2023 0616 by Reny Saunders RN  Outcome: Ongoing, Not Progressing  7/14/2023 0613 by Reny Saunders RN  Outcome: Ongoing, Not Progressing  7/14/2023 0613 by Reny Saunders RN  Outcome: Ongoing, Not Progressing  Intervention: Identify and Manage Fall Risk  Recent Flowsheet Documentation  Taken 7/14/2023 0420 by Reny Saunders RN  Safety Promotion/Fall Prevention:   assistive device/personal items within reach   activity supervised   clutter free environment maintained   lighting adjusted   nonskid shoes/slippers when out of bed   safety round/check completed  Taken 7/14/2023 0211 by Reny Saunders RN  Safety Promotion/Fall Prevention:   assistive device/personal items within reach   activity supervised   clutter free environment maintained   lighting adjusted   nonskid shoes/slippers when out of bed   safety  round/check completed  Taken 7/14/2023 0026 by Reny Saunders RN  Safety Promotion/Fall Prevention:   assistive device/personal items within reach   activity supervised   clutter free environment maintained   lighting adjusted   nonskid shoes/slippers when out of bed   safety round/check completed  Taken 7/13/2023 2232 by Reny Saunders RN  Safety Promotion/Fall Prevention:   activity supervised   assistive device/personal items within reach   clutter free environment maintained   lighting adjusted   nonskid shoes/slippers when out of bed   safety round/check completed  Taken 7/13/2023 2145 by Reny Saunders RN  Safety Promotion/Fall Prevention:   assistive device/personal items within reach   activity supervised   clutter free environment maintained   lighting adjusted   nonskid shoes/slippers when out of bed   safety round/check completed  Taken 7/13/2023 2038 by Reny Saunders RN  Safety Promotion/Fall Prevention:   assistive device/personal items within reach   activity supervised   clutter free environment maintained   lighting adjusted   nonskid shoes/slippers when out of bed   safety round/check completed  Intervention: Prevent Skin Injury  Recent Flowsheet Documentation  Taken 7/14/2023 0420 by Reny Saunders RN  Body Position: position changed independently  Taken 7/14/2023 0211 by Reny Saunders RN  Body Position: position changed independently  Taken 7/14/2023 0026 by Reny Saunders RN  Body Position: position changed independently  Skin Protection:   adhesive use limited   tubing/devices free from skin contact   transparent dressing maintained  Taken 7/13/2023 2232 by Reny Saunders RN  Body Position: position changed independently  Taken 7/13/2023 2145 by Reny Saunders RN  Body Position: position changed independently  Taken 7/13/2023 2038 by Reny Saunders RN  Body Position:   position changed independently   turned   left  Skin Protection:   adhesive use limited   transparent  dressing maintained   tubing/devices free from skin contact  Intervention: Prevent and Manage VTE (Venous Thromboembolism) Risk  Recent Flowsheet Documentation  Taken 7/14/2023 0420 by Reny Saunders RN  Activity Management: activity encouraged  Taken 7/14/2023 0211 by Reny Saunders RN  Activity Management: activity encouraged  Taken 7/14/2023 0026 by Reny Saunders RN  Activity Management: activity encouraged  VTE Prevention/Management: (lovenox) --  Range of Motion: active ROM (range of motion) encouraged  Taken 7/13/2023 2145 by Reny Saunders RN  Activity Management: activity encouraged  Taken 7/13/2023 2038 by Reny Saunders RN  Activity Management: activity encouraged  VTE Prevention/Management: (LOVENOX)   bilateral   patient refused intervention  Range of Motion: active ROM (range of motion) encouraged  Goal: Optimal Comfort and Wellbeing  7/14/2023 0616 by Reny Saunders RN  Outcome: Ongoing, Not Progressing  7/14/2023 0613 by Reny Saunders RN  Outcome: Ongoing, Not Progressing  7/14/2023 0613 by Reny Saunders RN  Outcome: Ongoing, Not Progressing  Intervention: Monitor Pain and Promote Comfort  Recent Flowsheet Documentation  Taken 7/14/2023 0347 by Reny Saunders RN  Pain Management Interventions: see MAR  Taken 7/14/2023 0026 by Reny Saunders RN  Pain Management Interventions: (pt refused tylenol stating the only thing that works for her is dilaudid and norco)   pillow support provided   position adjusted  Taken 7/13/2023 2232 by Reny Saunders RN  Pain Management Interventions: (gabapentin given)   pillow support provided   position adjusted   see MAR  Taken 7/13/2023 2038 by Reny Saunders RN  Pain Management Interventions:   pillow support provided   position adjusted  Intervention: Provide Person-Centered Care  Recent Flowsheet Documentation  Taken 7/14/2023 0026 by Reny Saunders RN  Trust Relationship/Rapport:   care explained   choices provided   questions  encouraged   questions answered  Taken 7/13/2023 2038 by Reny Saunders RN  Trust Relationship/Rapport:   choices provided   care explained   questions answered   questions encouraged  Goal: Readiness for Transition of Care  7/14/2023 0616 by Reny Saunders RN  Outcome: Ongoing, Not Progressing  7/14/2023 0613 by Reny Saunders RN  Outcome: Ongoing, Not Progressing  7/14/2023 0613 by Reny Saunders RN  Outcome: Ongoing, Not Progressing     Problem: Fall Injury Risk  Goal: Absence of Fall and Fall-Related Injury  7/14/2023 0616 by Reny Saunders RN  Outcome: Ongoing, Not Progressing  7/14/2023 0613 by Reny Saunders RN  Outcome: Ongoing, Not Progressing  7/14/2023 0613 by Reny Saunders RN  Outcome: Ongoing, Not Progressing  Intervention: Identify and Manage Contributors  Recent Flowsheet Documentation  Taken 7/14/2023 0026 by Reny Saunders RN  Medication Review/Management: medications reviewed  Taken 7/13/2023 2232 by Reny Saunders RN  Medication Review/Management: medications reviewed  Taken 7/13/2023 2145 by Reny Saunders RN  Medication Review/Management: medications reviewed  Taken 7/13/2023 2038 by Reny Saunders RN  Medication Review/Management: medications reviewed  Intervention: Promote Injury-Free Environment  Recent Flowsheet Documentation  Taken 7/14/2023 0420 by Reny Saunders RN  Safety Promotion/Fall Prevention:   assistive device/personal items within reach   activity supervised   clutter free environment maintained   lighting adjusted   nonskid shoes/slippers when out of bed   safety round/check completed  Taken 7/14/2023 0211 by Reny Saunders RN  Safety Promotion/Fall Prevention:   assistive device/personal items within reach   activity supervised   clutter free environment maintained   lighting adjusted   nonskid shoes/slippers when out of bed   safety round/check completed  Taken 7/14/2023 0026 by Reny Saunders RN  Safety Promotion/Fall Prevention:   assistive  device/personal items within reach   activity supervised   clutter free environment maintained   lighting adjusted   nonskid shoes/slippers when out of bed   safety round/check completed  Taken 7/13/2023 2232 by Reny Saunders RN  Safety Promotion/Fall Prevention:   activity supervised   assistive device/personal items within reach   clutter free environment maintained   lighting adjusted   nonskid shoes/slippers when out of bed   safety round/check completed  Taken 7/13/2023 2145 by Reny Saunders RN  Safety Promotion/Fall Prevention:   assistive device/personal items within reach   activity supervised   clutter free environment maintained   lighting adjusted   nonskid shoes/slippers when out of bed   safety round/check completed  Taken 7/13/2023 2038 by Reny Saunders RN  Safety Promotion/Fall Prevention:   assistive device/personal items within reach   activity supervised   clutter free environment maintained   lighting adjusted   nonskid shoes/slippers when out of bed   safety round/check completed   Goal Outcome Evaluation:           Progress: no change

## 2023-07-15 PROBLEM — G89.4 CHRONIC PAIN SYNDROME: Status: ACTIVE | Noted: 2021-07-05

## 2023-07-15 LAB
ANION GAP SERPL CALCULATED.3IONS-SCNC: 10.1 MMOL/L (ref 5–15)
BUN SERPL-MCNC: 7 MG/DL (ref 6–20)
BUN/CREAT SERPL: 9.9 (ref 7–25)
CALCIUM SPEC-SCNC: 8.9 MG/DL (ref 8.6–10.5)
CHLORIDE SERPL-SCNC: 108 MMOL/L (ref 98–107)
CO2 SERPL-SCNC: 20.9 MMOL/L (ref 22–29)
CREAT SERPL-MCNC: 0.71 MG/DL (ref 0.57–1)
DEPRECATED RDW RBC AUTO: 45.6 FL (ref 37–54)
EGFRCR SERPLBLD CKD-EPI 2021: 109.7 ML/MIN/1.73
ERYTHROCYTE [DISTWIDTH] IN BLOOD BY AUTOMATED COUNT: 13.6 % (ref 12.3–15.4)
GLUCOSE SERPL-MCNC: 155 MG/DL (ref 65–99)
HCT VFR BLD AUTO: 42.3 % (ref 34–46.6)
HGB BLD-MCNC: 14.3 G/DL (ref 12–15.9)
MCH RBC QN AUTO: 31 PG (ref 26.6–33)
MCHC RBC AUTO-ENTMCNC: 33.8 G/DL (ref 31.5–35.7)
MCV RBC AUTO: 91.8 FL (ref 79–97)
PLATELET # BLD AUTO: 217 10*3/MM3 (ref 140–450)
PMV BLD AUTO: 10.5 FL (ref 6–12)
POTASSIUM SERPL-SCNC: 3.9 MMOL/L (ref 3.5–5.2)
RBC # BLD AUTO: 4.61 10*6/MM3 (ref 3.77–5.28)
SODIUM SERPL-SCNC: 139 MMOL/L (ref 136–145)
WBC NRBC COR # BLD: 9.23 10*3/MM3 (ref 3.4–10.8)

## 2023-07-15 PROCEDURE — 25010000002 LORAZEPAM PER 2 MG: Performed by: INTERNAL MEDICINE

## 2023-07-15 PROCEDURE — 85027 COMPLETE CBC AUTOMATED: CPT | Performed by: INTERNAL MEDICINE

## 2023-07-15 PROCEDURE — 25010000002 ENOXAPARIN PER 10 MG: Performed by: INTERNAL MEDICINE

## 2023-07-15 PROCEDURE — 80048 BASIC METABOLIC PNL TOTAL CA: CPT | Performed by: INTERNAL MEDICINE

## 2023-07-15 PROCEDURE — 25010000002 ONDANSETRON PER 1 MG: Performed by: INTERNAL MEDICINE

## 2023-07-15 PROCEDURE — 25010000002 PROCHLORPERAZINE 10 MG/2ML SOLUTION: Performed by: INTERNAL MEDICINE

## 2023-07-15 RX ORDER — NITROGLYCERIN 0.4 MG/1
0.4 TABLET SUBLINGUAL
Status: DISCONTINUED | OUTPATIENT
Start: 2023-07-15 | End: 2023-07-17 | Stop reason: HOSPADM

## 2023-07-15 RX ORDER — SUMATRIPTAN 50 MG/1
50 TABLET, FILM COATED ORAL ONCE
Status: COMPLETED | OUTPATIENT
Start: 2023-07-15 | End: 2023-07-15

## 2023-07-15 RX ORDER — NICOTINE 21 MG/24HR
1 PATCH, TRANSDERMAL 24 HOURS TRANSDERMAL
Status: DISCONTINUED | OUTPATIENT
Start: 2023-07-15 | End: 2023-07-17 | Stop reason: HOSPADM

## 2023-07-15 RX ORDER — CLONIDINE HYDROCHLORIDE 0.1 MG/1
0.1 TABLET ORAL ONCE
Status: DISCONTINUED | OUTPATIENT
Start: 2023-07-15 | End: 2023-07-15

## 2023-07-15 RX ORDER — UREA 10 %
3 LOTION (ML) TOPICAL NIGHTLY
Status: DISCONTINUED | OUTPATIENT
Start: 2023-07-15 | End: 2023-07-17 | Stop reason: HOSPADM

## 2023-07-15 RX ORDER — PROCHLORPERAZINE EDISYLATE 5 MG/ML
5 INJECTION INTRAMUSCULAR; INTRAVENOUS ONCE
Status: COMPLETED | OUTPATIENT
Start: 2023-07-15 | End: 2023-07-15

## 2023-07-15 RX ADMIN — DICYCLOMINE HYDROCHLORIDE 20 MG: 10 CAPSULE ORAL at 16:21

## 2023-07-15 RX ADMIN — LORAZEPAM 0.5 MG: 2 INJECTION INTRAMUSCULAR; INTRAVENOUS at 21:04

## 2023-07-15 RX ADMIN — ACETAMINOPHEN 650 MG: 325 TABLET, FILM COATED ORAL at 04:59

## 2023-07-15 RX ADMIN — PRAMIPEXOLE DIHYDROCHLORIDE 0.5 MG: 0.5 TABLET ORAL at 16:21

## 2023-07-15 RX ADMIN — DICYCLOMINE HYDROCHLORIDE 20 MG: 10 CAPSULE ORAL at 00:17

## 2023-07-15 RX ADMIN — LORAZEPAM 0.5 MG: 2 INJECTION INTRAMUSCULAR; INTRAVENOUS at 14:55

## 2023-07-15 RX ADMIN — ONDANSETRON 4 MG: 2 INJECTION INTRAMUSCULAR; INTRAVENOUS at 18:39

## 2023-07-15 RX ADMIN — ONDANSETRON 4 MG: 2 INJECTION INTRAMUSCULAR; INTRAVENOUS at 13:14

## 2023-07-15 RX ADMIN — ONDANSETRON 4 MG: 2 INJECTION INTRAMUSCULAR; INTRAVENOUS at 01:14

## 2023-07-15 RX ADMIN — Medication 400 MG: at 08:00

## 2023-07-15 RX ADMIN — HYDROXYZINE HYDROCHLORIDE 25 MG: 25 TABLET ORAL at 08:00

## 2023-07-15 RX ADMIN — GABAPENTIN 800 MG: 400 CAPSULE ORAL at 14:15

## 2023-07-15 RX ADMIN — NAPROXEN 500 MG: 500 TABLET ORAL at 14:15

## 2023-07-15 RX ADMIN — ENOXAPARIN SODIUM 40 MG: 100 INJECTION SUBCUTANEOUS at 21:11

## 2023-07-15 RX ADMIN — LORAZEPAM 0.5 MG: 2 INJECTION INTRAMUSCULAR; INTRAVENOUS at 01:20

## 2023-07-15 RX ADMIN — FOLIC ACID 1 MG: 1 TABLET ORAL at 08:00

## 2023-07-15 RX ADMIN — PROCHLORPERAZINE EDISYLATE 5 MG: 5 INJECTION INTRAMUSCULAR; INTRAVENOUS at 14:55

## 2023-07-15 RX ADMIN — GABAPENTIN 800 MG: 400 CAPSULE ORAL at 21:57

## 2023-07-15 RX ADMIN — FLUOXETINE HYDROCHLORIDE 60 MG: 20 CAPSULE ORAL at 21:06

## 2023-07-15 RX ADMIN — SCOPALAMINE 1 PATCH: 1 PATCH, EXTENDED RELEASE TRANSDERMAL at 22:01

## 2023-07-15 RX ADMIN — PROCHLORPERAZINE EDISYLATE 5 MG: 5 INJECTION INTRAMUSCULAR; INTRAVENOUS at 21:57

## 2023-07-15 RX ADMIN — LORAZEPAM 0.5 MG: 2 INJECTION INTRAMUSCULAR; INTRAVENOUS at 06:56

## 2023-07-15 RX ADMIN — DICYCLOMINE HYDROCHLORIDE 20 MG: 10 CAPSULE ORAL at 08:00

## 2023-07-15 RX ADMIN — OLANZAPINE 5 MG: 5 TABLET ORAL at 21:06

## 2023-07-15 RX ADMIN — ACETAMINOPHEN 650 MG: 325 TABLET, FILM COATED ORAL at 11:05

## 2023-07-15 RX ADMIN — SUMATRIPTAN SUCCINATE 50 MG: 50 TABLET ORAL at 16:21

## 2023-07-15 RX ADMIN — SODIUM CHLORIDE 75 ML/HR: 9 INJECTION, SOLUTION INTRAVENOUS at 21:12

## 2023-07-15 RX ADMIN — Medication 3 MG: at 21:58

## 2023-07-15 RX ADMIN — OLANZAPINE 5 MG: 5 TABLET ORAL at 08:00

## 2023-07-15 RX ADMIN — PROCHLORPERAZINE EDISYLATE 5 MG: 5 INJECTION INTRAMUSCULAR; INTRAVENOUS at 08:00

## 2023-07-15 RX ADMIN — GABAPENTIN 800 MG: 400 CAPSULE ORAL at 06:10

## 2023-07-15 RX ADMIN — Medication 1 PATCH: at 14:14

## 2023-07-15 RX ADMIN — SODIUM CHLORIDE 75 ML/HR: 9 INJECTION, SOLUTION INTRAVENOUS at 06:44

## 2023-07-15 RX ADMIN — LORAZEPAM 0.5 MG: 2 INJECTION INTRAMUSCULAR; INTRAVENOUS at 11:05

## 2023-07-15 RX ADMIN — PRAMIPEXOLE DIHYDROCHLORIDE 0.5 MG: 0.5 TABLET ORAL at 06:11

## 2023-07-15 RX ADMIN — DICYCLOMINE HYDROCHLORIDE 20 MG: 10 CAPSULE ORAL at 21:08

## 2023-07-15 NOTE — PROGRESS NOTES
I was notified by nurse that patient is having more episodes of nausea and vomiting this afternoon.  She ate a very large breakfast which may have been more than she was ready to tolerate at this point.  We will give an extra dose of Compazine and continue Zofran this afternoon.  We will try Imitrex for not migraine per patient request.  As she makes clinical progress these type of setbacks are not unexpected.    Electronically signed by David Padilla MD, 07/15/23, 4:14 PM EDT.

## 2023-07-15 NOTE — PLAN OF CARE
Goal Outcome Evaluation:            Outcome Evaluation: VSS. pt disoriented to time. NSR-SB on monitor. Assist X1 to BSC. COWS scores are recorded on flowsheet. Ativan X2 given. pt continues to complain of pain. Tylenol given for pain. PRN Zofran given for nausea. Pt receiving IV fluids as ordered.

## 2023-07-15 NOTE — CONSULTS
"IDENTIFYING INFORMATION: The patient is a 42-year-old white female admitted with complaints of opioid withdrawal.    CHIEF COMPLAINT: Because of something stupid I did    INFORMANT: Patient and chart    RELIABILITY: Fair    HISTORY OF PRESENT ILLNESS: The patient is a 42-year-old white female who reports that she has been taking 4 30 mg Percocet tablets on a daily basis for \"a while\".  She reports that she takes cities related to history of chronic pain and not to \"get high\".  The patient reports that a couple of days prior to admission she decided to \"flush them all down the toilet and has since developed symptoms consistent with opioid withdrawal including restless leg nausea or vomiting and achiness.  The patient reports that she received the Percocet from her friends and that they were not prescribed for her.  The patient vehemently denies any suicidal or homicidal ideation.  She does report a history of treatment for depression by her primary care physician with a combination of Zoloft and Wellbutrin.  She denies abuse of any other psychoactive substances.    PAST PSYCHIATRIC HISTORY: As above next field    PAST MEDICAL HISTORY: Significant for history of chronic pain and obesity    MEDICATIONS:   Current Facility-Administered Medications   Medication Dose Route Frequency Provider Last Rate Last Admin    acetaminophen (TYLENOL) tablet 650 mg  650 mg Oral Q4H PRN Kathy Kim MD   650 mg at 07/15/23 1105    sennosides-docusate (PERICOLACE) 8.6-50 MG per tablet 2 tablet  2 tablet Oral BID Kathy Kim MD        And    polyethylene glycol (MIRALAX) packet 17 g  17 g Oral Daily PRN Kathy Kim MD        And    bisacodyl (DULCOLAX) EC tablet 5 mg  5 mg Oral Daily PRN Kathy Kim MD        And    bisacodyl (DULCOLAX) suppository 10 mg  10 mg Rectal Daily PRN Kathy Kim MD        cloNIDine (CATAPRES) tablet 0.1 mg  0.1 mg Oral BID PRN Kathy Kim MD     "    Followed by    [START ON 7/16/2023] cloNIDine (CATAPRES) tablet 0.1 mg  0.1 mg Oral Once PRN Kathy Kim MD        dicyclomine (BENTYL) capsule 10 mg  10 mg Oral 4x Daily PRN Kathy Kim MD   10 mg at 07/13/23 1211    dicyclomine (BENTYL) capsule 20 mg  20 mg Oral TID David Padilla MD   20 mg at 07/15/23 0800    Enoxaparin Sodium (LOVENOX) syringe 40 mg  40 mg Subcutaneous Q24H Kathy Kim MD   40 mg at 07/14/23 2130    FLUoxetine (PROzac) capsule 60 mg  60 mg Oral Nightly Kathy Kim MD   60 mg at 07/14/23 2125    folic acid (FOLVITE) tablet 1 mg  1 mg Oral Daily StingKathy lazar MD   1 mg at 07/15/23 0800    gabapentin (NEURONTIN) capsule 800 mg  800 mg Oral Q8H Kathy Kim MD   800 mg at 07/15/23 1415    hydrOXYzine (ATARAX) tablet 25 mg  25 mg Oral Q6H PRN Kathy Kim MD   25 mg at 07/15/23 0800    LORazepam (ATIVAN) injection 0.5 mg  0.5 mg Intravenous Q4H PRN David Padilla MD   0.5 mg at 07/15/23 1455    melatonin tablet 3 mg  3 mg Oral Nightly David Padilla MD        naproxen (NAPROSYN) tablet 500 mg  500 mg Oral BID PRN Kathy Kim MD   500 mg at 07/15/23 1415    nicotine (NICODERM CQ) 14 MG/24HR patch 1 patch  1 patch Transdermal Q24H David Padilla MD   1 patch at 07/15/23 1414    nitroglycerin (NITROSTAT) SL tablet 0.4 mg  0.4 mg Sublingual Q5 Min PRN David Padilla MD        OLANZapine (zyPREXA) tablet 5 mg  5 mg Oral BID Davdi Padilla MD   5 mg at 07/15/23 0800    ondansetron (ZOFRAN) tablet 4 mg  4 mg Oral Q6H PRN Kathy Kim MD        Or    ondansetron (ZOFRAN) injection 4 mg  4 mg Intravenous Q6H PRN Kathy Kim MD   4 mg at 07/15/23 1314    Potassium Replacement - Follow Nurse / BPA Driven Protocol   Does not apply PRN Nisha Ribera, NELSON        pramipexole (MIRAPEX) tablet 0.5 mg  0.5 mg Oral Q8H PRN Kathy Kim,  MD   0.5 mg at 07/15/23 0611    prochlorperazine (COMPAZINE) injection 5 mg  5 mg Intravenous BID David Padilla MD   5 mg at 07/15/23 0800    scopolamine patch 1 mg/72 hr  1 patch Transdermal Q72H Mera Ramirez APRN   1 patch at 07/12/23 2336    sodium chloride 0.9 % infusion  75 mL/hr Intravenous Continuous Kathy Kim MD 75 mL/hr at 07/15/23 0644 75 mL/hr at 07/15/23 0644    SUMAtriptan (IMITREX) tablet 50 mg  50 mg Oral Once David Padilla MD        traZODone (DESYREL) tablet 50 mg  50 mg Oral Nightly PRN Kathy Kim MD   50 mg at 07/13/23 2130    Vitamin B-2 (RIBOFLAVIN) tablet 400 mg  400 mg Oral Daily Kathy Kim MD   400 mg at 07/15/23 0800     Field morphine lidocaine    ALLERGIES: Morphine lidocaine    FAMILY HISTORY: Noncontributory    SOCIAL HISTORY: Substance use as noted previously    MENTAL STATUS EXAM: The patient is an obese white female appearing her stated age.  She peers to be in mild physical distress at the time of examination.  She is awake alert and oriented all spheres.  Her mood is euthymic her affect congruent.  Speech is relevant and coherent there are no deficits in memory or cognition noted.  The patient is cooperative throughout the interview.  Intelligence is judged to be in the average range based on fund of knowledge, she denies suicidal or homicidal ideation or psychotic features.  Judgement and insight are intact    IMP: Opioid use disorder, medical problems as noted previousl field    PLAN: The patient has been placed on a COWS based detox protocol which should address symptoms of opioid withdrawal.  She is uncertain as to whether she wishes to pursue post discharge chemical dependence treatment.  I will ask [facility] to provide her with information regarding local resources for maintenance of sobriety.  Thank you very much for the opportunity to see this patient.

## 2023-07-15 NOTE — PROGRESS NOTES
[facility] Medicine Services  PROGRESS NOTE    Patient Name: Belen Allen  : 1980  MRN: [id number]    Date of Admission: 2023  Primary Care Physician: Sahil Cornelius MD    Subjective   Subjective     CC:  Opiate withdrawal    Subjective:  Patient says she feels a little better today.  She is still quite anxious.  She still notes a sensation of crawling over her skin she relates to her withdrawal.  She denies any focal neurologic deficits.  She continues to be motivated to get through her withdrawal.      Review of Systems  No current fevers or chills  No current shortness of breath or cough  No current chest pain or palpitations      Objective   Objective     Vital Signs:   Temp:  [97.5 °F (36.4 °C)-98.6 °F (37 °C)] 97.7 °F (36.5 °C)  Heart Rate:  [54-72] 57  Resp:  [20] 20  BP: (118-158)/() 149/93        Physical Exam:  Constitutional:Awake, alert  HENT: NCAT, mucous membranes moist, neck supple  Respiratory: No cough or wheezing, respiratory effort normal, nonlabored breathing   Cardiovascular: Pulse rate normal, normal radial pulses  Gastrointestinal:  soft, nontender, nondistended  Musculoskeletal: Lumbar region with tenderness, normal musculature for age, no lower extremity edema, BMI 34 obese  Psychiatric: Agitated anxious affect, cooperative, conversational  Neurologic: No slurred speech or facial droop, follows commands  Skin: No rashes or jaundice, warm      Results Reviewed:  Results from last 7 days   Lab Units 07/15/23  0339 23  0829 23  0356   WBC 10*3/mm3 9.23 7.03 10.19   HEMOGLOBIN g/dL 14.3 12.7 12.2   HEMATOCRIT % 42.3 37.2 36.6   PLATELETS 10*3/mm3 217 194 229     Results from last 7 days   Lab Units 07/15/23  0858 23  0829 23  2238 23  0356 23  1539 07/10/23  1819   SODIUM mmol/L 139 140  --  140 144 137   POTASSIUM mmol/L 3.9 4.7 3.3* 3.0* 3.4* 3.5   CHLORIDE mmol/L 108* 111*  --  111* 111* 103   CO2 mmol/L 20.9* 20.6*   --  18.0* 18.9* 20.0*   BUN mg/dL 7 5*  --  8 8 5*   CREATININE mg/dL 0.71 0.72  --  0.77 0.85 0.76   GLUCOSE mg/dL 155* 91  --  99 108* 113*   CALCIUM mg/dL 8.9 8.6  --  8.2* 8.8 9.5   ALT (SGPT) U/L  --   --   --   --  10 9   AST (SGOT) U/L  --   --   --   --  17 17     Estimated Creatinine Clearance: 114.4 mL/min (by C-G formula based on SCr of 0.71 mg/dL).    Microbiology Results Abnormal       None            Imaging Results (Last 24 Hours)       ** No results found for the last 24 hours. **            Results for orders placed during the hospital encounter of 20    Adult Transesophageal Echo (DENIS) W/ Cont if Necessary Per Protocol    Interpretation Summary  · Left ventricular ejection fraction appears to be 61 - 65%. Left ventricular systolic function is normal.  · Saline test results are negative.      I have reviewed the medications:  Scheduled Meds:cloNIDine, 0.1 mg, Oral, Once  dicyclomine, 20 mg, Oral, TID  enoxaparin, 40 mg, Subcutaneous, Q24H  FLUoxetine, 60 mg, Oral, Nightly  folic acid, 1 mg, Oral, Daily  gabapentin, 800 mg, Oral, Q8H  melatonin, 3 mg, Oral, Nightly  OLANZapine, 5 mg, Oral, BID  prochlorperazine, 5 mg, Intravenous, BID  Scopolamine, 1 patch, Transdermal, Q72H  senna-docusate sodium, 2 tablet, Oral, BID  Vitamin B-2, 400 mg, Oral, Daily      Continuous Infusions:sodium chloride, 75 mL/hr, Last Rate: 75 mL/hr (07/15/23 0644)      PRN Meds:.  acetaminophen    senna-docusate sodium **AND** polyethylene glycol **AND** bisacodyl **AND** bisacodyl    [] cloNIDine **FOLLOWED BY** [] cloNIDine **FOLLOWED BY** [] cloNIDine **FOLLOWED BY** cloNIDine **FOLLOWED BY** [START ON 2023] cloNIDine    dicyclomine    hydrOXYzine    loperamide    LORazepam    naproxen    ondansetron **OR** ondansetron    Potassium Replacement - Follow Nurse / BPA Driven Protocol    pramipexole    traZODone    Assessment & Plan   Assessment & Plan     Active Hospital Problems    Diagnosis   POA    **Opioid withdrawal [F11.93]  Yes      Resolved Hospital Problems   No resolved problems to display.        Brief Hospital Course to date:  Belen Allen is a 42 y.o. female presents with opiate withdrawal    Opiate withdrawal  Nausea and vomiting secondary to opiate withdrawal  Restless leg syndrome  Hypokalemia  Obesity  Agitation  Insomnia    Discussion/plan:  Psychiatry consult to weigh in on anxiety.      Patient appears to be responding well to mood stabilizer olanzapine which is currently at low-dose twice daily.  Continue this for now.      Lorazepam for now at low-dose for breakthrough anxiety which seems to be effective.  Give clonidine today.    Continue counseling regarding opiate withdrawal.      Diarrhea has resolved.      Clinically patient seems to be going through the opiate withdrawal process well and clinically appears quite improved today compared to yesterday or the day before.      At this time patient wishes to continue gabapentin for neuropathic pain.  Recommend patient continue discussions regarding this medication with her prescribing physician     Blood pressure improving but still elevated related to withdrawal.  Continue medication for restless leg.    Potassium levels improved.  Monitor electrolytes laboratory studies while hospitalized.    Trazodone for insomnia.  Melatonin also added as scheduled medicine    Recommend gradual healthy weight loss and improve diet and exercise for obesity.    Zofran for nausea which seems to be effective.  Low-dose Compazine added additionally for nausea    Potential discharge in 1 to 2 days pending clinical course as patient recovering from her withdrawal    Case discussed on multidisciplinary rounds with case management, nursing, and pharmacist.    CODE STATUS:   Code Status and Medical Interventions:   Ordered at: 07/12/23 6412     Code Status (Patient has no pulse and is not breathing):    CPR (Attempt to Resuscitate)     Medical  Interventions (Patient has pulse or is breathing):    Full       David Padilla MD  07/15/23

## 2023-07-15 NOTE — NURSING NOTE
"[facility] follow-up d/t drugs. Last COWS 10.    Patient RIB with sitter at bedside. The patient stated she was not feeling well and was \"going through it.\" The patient stated her withdrawal symptoms are chills, stomach pain, back pain radiating down to her legs and her skin crawling. The patient stated despite this she was able to get some sleep last night. The patient voiced craving r/t pain. The patient felt she was not ready to discuss treatment options right now but did stated she wants to do some type of treatment, stating \"I didn't realize I was addicted.\" Access following and will discuss treatment options when patient feels better.   "

## 2023-07-16 LAB
ANION GAP SERPL CALCULATED.3IONS-SCNC: 11 MMOL/L (ref 5–15)
BUN SERPL-MCNC: 5 MG/DL (ref 6–20)
BUN/CREAT SERPL: 6.6 (ref 7–25)
CALCIUM SPEC-SCNC: 8.7 MG/DL (ref 8.6–10.5)
CHLORIDE SERPL-SCNC: 109 MMOL/L (ref 98–107)
CO2 SERPL-SCNC: 23 MMOL/L (ref 22–29)
CREAT SERPL-MCNC: 0.76 MG/DL (ref 0.57–1)
DEPRECATED RDW RBC AUTO: 43.2 FL (ref 37–54)
EGFRCR SERPLBLD CKD-EPI 2021: 101.1 ML/MIN/1.73
ERYTHROCYTE [DISTWIDTH] IN BLOOD BY AUTOMATED COUNT: 13.4 % (ref 12.3–15.4)
GLUCOSE SERPL-MCNC: 85 MG/DL (ref 65–99)
HCT VFR BLD AUTO: 37.8 % (ref 34–46.6)
HGB BLD-MCNC: 12.9 G/DL (ref 12–15.9)
MCH RBC QN AUTO: 30.6 PG (ref 26.6–33)
MCHC RBC AUTO-ENTMCNC: 34.1 G/DL (ref 31.5–35.7)
MCV RBC AUTO: 89.6 FL (ref 79–97)
PLATELET # BLD AUTO: 214 10*3/MM3 (ref 140–450)
PMV BLD AUTO: 10.7 FL (ref 6–12)
POTASSIUM SERPL-SCNC: 3.8 MMOL/L (ref 3.5–5.2)
RBC # BLD AUTO: 4.22 10*6/MM3 (ref 3.77–5.28)
SODIUM SERPL-SCNC: 143 MMOL/L (ref 136–145)
WBC NRBC COR # BLD: 8.71 10*3/MM3 (ref 3.4–10.8)

## 2023-07-16 PROCEDURE — 25010000002 ONDANSETRON PER 1 MG: Performed by: INTERNAL MEDICINE

## 2023-07-16 PROCEDURE — 25010000002 LORAZEPAM PER 2 MG: Performed by: INTERNAL MEDICINE

## 2023-07-16 PROCEDURE — 25010000002 PROCHLORPERAZINE 10 MG/2ML SOLUTION: Performed by: INTERNAL MEDICINE

## 2023-07-16 PROCEDURE — 85027 COMPLETE CBC AUTOMATED: CPT | Performed by: INTERNAL MEDICINE

## 2023-07-16 PROCEDURE — 25010000002 ENOXAPARIN PER 10 MG: Performed by: INTERNAL MEDICINE

## 2023-07-16 PROCEDURE — 80048 BASIC METABOLIC PNL TOTAL CA: CPT | Performed by: INTERNAL MEDICINE

## 2023-07-16 RX ORDER — PANTOPRAZOLE SODIUM 40 MG/1
40 TABLET, DELAYED RELEASE ORAL
Status: DISCONTINUED | OUTPATIENT
Start: 2023-07-16 | End: 2023-07-16

## 2023-07-16 RX ORDER — PANTOPRAZOLE SODIUM 40 MG/1
40 TABLET, DELAYED RELEASE ORAL
Status: DISCONTINUED | OUTPATIENT
Start: 2023-07-16 | End: 2023-07-17 | Stop reason: HOSPADM

## 2023-07-16 RX ORDER — PROCHLORPERAZINE EDISYLATE 5 MG/ML
5 INJECTION INTRAMUSCULAR; INTRAVENOUS ONCE
Status: COMPLETED | OUTPATIENT
Start: 2023-07-16 | End: 2023-07-16

## 2023-07-16 RX ORDER — FAMOTIDINE 20 MG/1
20 TABLET, FILM COATED ORAL
Status: DISCONTINUED | OUTPATIENT
Start: 2023-07-16 | End: 2023-07-16

## 2023-07-16 RX ORDER — NAPROXEN 250 MG/1
250 TABLET ORAL 2 TIMES DAILY PRN
Status: DISCONTINUED | OUTPATIENT
Start: 2023-07-16 | End: 2023-07-16

## 2023-07-16 RX ORDER — LORAZEPAM 2 MG/ML
0.25 INJECTION INTRAMUSCULAR EVERY 4 HOURS PRN
Status: DISCONTINUED | OUTPATIENT
Start: 2023-07-16 | End: 2023-07-17

## 2023-07-16 RX ADMIN — ONDANSETRON 4 MG: 2 INJECTION INTRAMUSCULAR; INTRAVENOUS at 10:56

## 2023-07-16 RX ADMIN — LORAZEPAM 0.25 MG: 2 INJECTION INTRAMUSCULAR; INTRAVENOUS at 12:45

## 2023-07-16 RX ADMIN — ENOXAPARIN SODIUM 40 MG: 100 INJECTION SUBCUTANEOUS at 20:18

## 2023-07-16 RX ADMIN — LORAZEPAM 0.25 MG: 2 INJECTION INTRAMUSCULAR; INTRAVENOUS at 21:32

## 2023-07-16 RX ADMIN — PANTOPRAZOLE SODIUM 40 MG: 40 TABLET, DELAYED RELEASE ORAL at 14:01

## 2023-07-16 RX ADMIN — PROCHLORPERAZINE EDISYLATE 5 MG: 5 INJECTION INTRAMUSCULAR; INTRAVENOUS at 08:20

## 2023-07-16 RX ADMIN — OLANZAPINE 5 MG: 5 TABLET ORAL at 08:21

## 2023-07-16 RX ADMIN — GABAPENTIN 800 MG: 400 CAPSULE ORAL at 22:54

## 2023-07-16 RX ADMIN — Medication 3 MG: at 20:20

## 2023-07-16 RX ADMIN — DICYCLOMINE HYDROCHLORIDE 20 MG: 10 CAPSULE ORAL at 21:32

## 2023-07-16 RX ADMIN — DICYCLOMINE HYDROCHLORIDE 20 MG: 10 CAPSULE ORAL at 17:04

## 2023-07-16 RX ADMIN — Medication 1 PATCH: at 08:25

## 2023-07-16 RX ADMIN — DICYCLOMINE HYDROCHLORIDE 20 MG: 10 CAPSULE ORAL at 08:21

## 2023-07-16 RX ADMIN — FLUOXETINE HYDROCHLORIDE 60 MG: 20 CAPSULE ORAL at 20:20

## 2023-07-16 RX ADMIN — PROCHLORPERAZINE EDISYLATE 5 MG: 5 INJECTION INTRAMUSCULAR; INTRAVENOUS at 20:19

## 2023-07-16 RX ADMIN — PRAMIPEXOLE DIHYDROCHLORIDE 0.5 MG: 0.5 TABLET ORAL at 14:59

## 2023-07-16 RX ADMIN — ONDANSETRON 4 MG: 2 INJECTION INTRAMUSCULAR; INTRAVENOUS at 17:05

## 2023-07-16 RX ADMIN — LORAZEPAM 0.25 MG: 2 INJECTION INTRAMUSCULAR; INTRAVENOUS at 17:05

## 2023-07-16 RX ADMIN — FOLIC ACID 1 MG: 1 TABLET ORAL at 08:21

## 2023-07-16 RX ADMIN — Medication 400 MG: at 08:21

## 2023-07-16 RX ADMIN — PROCHLORPERAZINE EDISYLATE 5 MG: 5 INJECTION INTRAMUSCULAR; INTRAVENOUS at 14:01

## 2023-07-16 RX ADMIN — NAPROXEN SODIUM 250 MG: 250 TABLET ORAL at 14:59

## 2023-07-16 RX ADMIN — GABAPENTIN 800 MG: 400 CAPSULE ORAL at 14:00

## 2023-07-16 RX ADMIN — LORAZEPAM 0.5 MG: 2 INJECTION INTRAMUSCULAR; INTRAVENOUS at 08:20

## 2023-07-16 RX ADMIN — OLANZAPINE 5 MG: 5 TABLET ORAL at 20:21

## 2023-07-16 RX ADMIN — NAPROXEN 500 MG: 500 TABLET ORAL at 03:14

## 2023-07-16 RX ADMIN — ONDANSETRON 4 MG: 2 INJECTION INTRAMUSCULAR; INTRAVENOUS at 03:17

## 2023-07-16 RX ADMIN — FAMOTIDINE 20 MG: 20 TABLET, FILM COATED ORAL at 12:45

## 2023-07-16 RX ADMIN — LORAZEPAM 0.5 MG: 2 INJECTION INTRAMUSCULAR; INTRAVENOUS at 03:11

## 2023-07-16 RX ADMIN — PRAMIPEXOLE DIHYDROCHLORIDE 0.5 MG: 0.5 TABLET ORAL at 22:54

## 2023-07-16 RX ADMIN — SENNOSIDES AND DOCUSATE SODIUM 2 TABLET: 50; 8.6 TABLET ORAL at 08:20

## 2023-07-16 RX ADMIN — PRAMIPEXOLE DIHYDROCHLORIDE 0.5 MG: 0.5 TABLET ORAL at 00:40

## 2023-07-16 RX ADMIN — PANTOPRAZOLE SODIUM 40 MG: 40 TABLET, DELAYED RELEASE ORAL at 17:04

## 2023-07-16 RX ADMIN — GABAPENTIN 800 MG: 400 CAPSULE ORAL at 06:10

## 2023-07-16 NOTE — PROGRESS NOTES
[facility] Medicine Services  PROGRESS NOTE    Patient Name: Belen Allen  : 1980  MRN: [id number]    Date of Admission: 2023  Primary Care Physician: Sahil Cornelius MD    Subjective   Subjective     CC:  Opiate withdrawal    Subjective:  Patient still feels she is going through withdrawal but better today.  She is able to eat more breakfast and is eating and drinking more.  She is still complaining of some symptoms of nausea but that is improving with Compazine and Zofran.  Diarrhea is resolved.  Patient does not feel ready to go home today but thinks she will be ready to go home tomorrow.      Review of Systems  No current fevers or chills  No current shortness of breath or cough  No current chest pain or palpitations      Objective   Objective     Vital Signs:   Temp:  [97.2 °F (36.2 °C)-98.6 °F (37 °C)] 98.6 °F (37 °C)  Heart Rate:  [61-83] 80  Resp:  [16-20] 18  BP: (114-175)/(63-97) 144/88        Physical Exam:  Constitutional:Awake, alert  HENT: NCAT, mucous membranes moist, neck supple  Respiratory: No cough or wheezing, respiratory effort normal, nonlabored breathing   Cardiovascular: Pulse rate normal, normal radial pulses  Gastrointestinal:  soft, nontender, nondistended  Musculoskeletal: Lumbar region with tenderness, normal musculature for age, no lower extremity edema, BMI 34 obese  Psychiatric: More calm, less anxious affect, cooperative, conversational  Neurologic: No slurred speech or facial droop, follows commands  Skin: No rashes or jaundice, warm      Results Reviewed:  Results from last 7 days   Lab Units 23  0351 07/15/23  0339 23  0829   WBC 10*3/mm3 8.71 9.23 7.03   HEMOGLOBIN g/dL 12.9 14.3 12.7   HEMATOCRIT % 37.8 42.3 37.2   PLATELETS 10*3/mm3 214 217 194       Results from last 7 days   Lab Units 23  0351 07/15/23  0858 23  0829 23  0356 23  1539 07/10/23  1819   SODIUM mmol/L 143 139 140   < > 144 137   POTASSIUM  mmol/L 3.8 3.9 4.7   < > 3.4* 3.5   CHLORIDE mmol/L 109* 108* 111*   < > 111* 103   CO2 mmol/L 23.0 20.9* 20.6*   < > 18.9* 20.0*   BUN mg/dL 5* 7 5*   < > 8 5*   CREATININE mg/dL 0.76 0.71 0.72   < > 0.85 0.76   GLUCOSE mg/dL 85 155* 91   < > 108* 113*   CALCIUM mg/dL 8.7 8.9 8.6   < > 8.8 9.5   ALT (SGPT) U/L  --   --   --   --  10 9   AST (SGOT) U/L  --   --   --   --  17 17    < > = values in this interval not displayed.       Estimated Creatinine Clearance: 107.2 mL/min (by C-G formula based on SCr of 0.76 mg/dL).    Microbiology Results Abnormal       None            Imaging Results (Last 24 Hours)       ** No results found for the last 24 hours. **            Results for orders placed during the hospital encounter of 20    Adult Transesophageal Echo (DENIS) W/ Cont if Necessary Per Protocol    Interpretation Summary  · Left ventricular ejection fraction appears to be 61 - 65%. Left ventricular systolic function is normal.  · Saline test results are negative.      I have reviewed the medications:  Scheduled Meds:dicyclomine, 20 mg, Oral, TID  enoxaparin, 40 mg, Subcutaneous, Q24H  FLUoxetine, 60 mg, Oral, Nightly  folic acid, 1 mg, Oral, Daily  gabapentin, 800 mg, Oral, Q8H  melatonin, 3 mg, Oral, Nightly  nicotine, 1 patch, Transdermal, Q24H  OLANZapine, 5 mg, Oral, BID  prochlorperazine, 5 mg, Intravenous, BID  Scopolamine, 1 patch, Transdermal, Q72H  senna-docusate sodium, 2 tablet, Oral, BID  Vitamin B-2, 400 mg, Oral, Daily      Continuous Infusions:sodium chloride, 75 mL/hr, Last Rate: 75 mL/hr (07/15/23 2112)      PRN Meds:.  acetaminophen    senna-docusate sodium **AND** polyethylene glycol **AND** bisacodyl **AND** bisacodyl    [] cloNIDine **FOLLOWED BY** [] cloNIDine **FOLLOWED BY** [] cloNIDine **FOLLOWED BY** [] cloNIDine **FOLLOWED BY** cloNIDine    dicyclomine    hydrOXYzine    LORazepam    naproxen    nitroglycerin    ondansetron **OR** ondansetron    Potassium  Replacement - Follow Nurse / BPA Driven Protocol    pramipexole    traZODone    Assessment & Plan   Assessment & Plan     Active Hospital Problems    Diagnosis  POA    **Opioid withdrawal [F11.93]  Yes    Chronic pain syndrome [G89.4]  Yes    Tobacco abuse [Z72.0]  Yes    Anxiety disorder [F41.9]  Yes      Resolved Hospital Problems   No resolved problems to display.        Brief Hospital Course to date:  Belen Allen is a 42 y.o. female presents with opiate withdrawal    Opiate withdrawal  Nausea and vomiting secondary to opiate withdrawal  Restless leg syndrome  Hypokalemia  Obesity  Agitation  Insomnia    Discussion/plan:  Appreciate psychiatry recommendations.      Patient is responding to mood stabilizers.  Patient feels she is benefiting greatly from these medications.  We discussed risk versus benefits of these medications and she says she would like to continue them at discharge as they help with her agitation and anxiety.    Lorazepam dose decreased.  Receiving as needed Atarax for anxiety.    Add famotidine for heartburn.  Due to heartburn we will decrease NSAID dosage.    Discontinue IV fluid today as patient appears adequately hydrated and we will monitor oral intake off IV fluid.  Patient's ability to eat has increased.    Continue counseling regarding opiate withdrawal.      Clinically patient seems to be going through the opiate withdrawal process well and clinically appears quite improved today compared to yesterday or the day before.      At this time patient wishes to continue gabapentin for neuropathic pain.  Recommend patient continue discussions regarding this medication with her prescribing physician     Blood pressure improving but still elevated related to withdrawal.  Continue medication for restless leg.    Potassium levels improved.  Monitor electrolytes laboratory studies while hospitalized.    Trazodone for insomnia.  Melatonin also added as scheduled medicine    Recommend gradual  healthy weight loss and improve diet and exercise for obesity.    Zofran for nausea which seems to be effective.  Low-dose Compazine added additionally for nausea    Potential discharge tomorrow pending clinical course    Case discussed on multidisciplinary rounds with case management, nursing, and pharmacist.    CODE STATUS:   Code Status and Medical Interventions:   Ordered at: 07/12/23 1731     Code Status (Patient has no pulse and is not breathing):    CPR (Attempt to Resuscitate)     Medical Interventions (Patient has pulse or is breathing):    Full       David Padilla MD  07/16/23

## 2023-07-16 NOTE — PROGRESS NOTES
[facility] follow up d/t drugs; this writer reviewed chart and spoke with RN Farideh. Per RN, patient's withdrawal symptoms seem to be improving; sitter remains at bedside for patient safety due to her confusion (time), wandering, etc.  Nursing reports patient slept some overnight; discharge possible in 1 to 2 days.  Last COWS 11 at 00:38; [facility] will provide AOD[facility] treatment resources once patient is feeling better.  No further needs/concerns noted at this time per RN and/or medical team; [facility] to continue following.

## 2023-07-17 VITALS
DIASTOLIC BLOOD PRESSURE: 94 MMHG | OXYGEN SATURATION: 100 % | HEIGHT: 64 IN | HEART RATE: 84 BPM | RESPIRATION RATE: 18 BRPM | WEIGHT: 200.7 LBS | BODY MASS INDEX: 34.27 KG/M2 | SYSTOLIC BLOOD PRESSURE: 147 MMHG | TEMPERATURE: 99 F

## 2023-07-17 PROBLEM — F11.93 OPIOID WITHDRAWAL: Status: RESOLVED | Noted: 2023-07-12 | Resolved: 2023-07-17

## 2023-07-17 PROBLEM — E66.812 CLASS 2 SEVERE OBESITY WITH SERIOUS COMORBIDITY IN ADULT: Status: ACTIVE | Noted: 2021-07-05

## 2023-07-17 PROCEDURE — 25010000002 PROCHLORPERAZINE 10 MG/2ML SOLUTION: Performed by: INTERNAL MEDICINE

## 2023-07-17 PROCEDURE — 25010000002 LORAZEPAM PER 2 MG: Performed by: INTERNAL MEDICINE

## 2023-07-17 PROCEDURE — 63710000001 ONDANSETRON PER 8 MG: Performed by: INTERNAL MEDICINE

## 2023-07-17 PROCEDURE — 25010000002 ONDANSETRON PER 1 MG: Performed by: INTERNAL MEDICINE

## 2023-07-17 RX ORDER — BUSPIRONE HYDROCHLORIDE 5 MG/1
5 TABLET ORAL 2 TIMES DAILY
Qty: 60 TABLET | Refills: 0 | Status: SHIPPED | OUTPATIENT
Start: 2023-07-17

## 2023-07-17 RX ORDER — PANTOPRAZOLE SODIUM 40 MG/1
40 TABLET, DELAYED RELEASE ORAL DAILY
Qty: 30 TABLET | Refills: 0 | Status: SHIPPED | OUTPATIENT
Start: 2023-07-17 | End: 2023-08-03 | Stop reason: HOSPADM

## 2023-07-17 RX ORDER — OLANZAPINE 10 MG/1
5 TABLET ORAL 2 TIMES DAILY
Qty: 30 TABLET | Refills: 0 | Status: SHIPPED | OUTPATIENT
Start: 2023-07-17

## 2023-07-17 RX ORDER — BUSPIRONE HYDROCHLORIDE 5 MG/1
5 TABLET ORAL 2 TIMES DAILY
Status: DISCONTINUED | OUTPATIENT
Start: 2023-07-17 | End: 2023-07-17 | Stop reason: HOSPADM

## 2023-07-17 RX ORDER — ACETAMINOPHEN 325 MG/1
650 TABLET ORAL EVERY 6 HOURS PRN
Start: 2023-07-17

## 2023-07-17 RX ORDER — ONDANSETRON 4 MG/1
4 TABLET, ORALLY DISINTEGRATING ORAL 4 TIMES DAILY PRN
Qty: 30 TABLET | Refills: 0 | Status: ON HOLD | OUTPATIENT
Start: 2023-07-17 | End: 2023-08-03 | Stop reason: SDUPTHER

## 2023-07-17 RX ADMIN — LORAZEPAM 0.25 MG: 2 INJECTION INTRAMUSCULAR; INTRAVENOUS at 01:48

## 2023-07-17 RX ADMIN — ONDANSETRON HYDROCHLORIDE 4 MG: 4 TABLET, FILM COATED ORAL at 14:02

## 2023-07-17 RX ADMIN — ACETAMINOPHEN 650 MG: 325 TABLET, FILM COATED ORAL at 01:48

## 2023-07-17 RX ADMIN — PANTOPRAZOLE SODIUM 40 MG: 40 TABLET, DELAYED RELEASE ORAL at 16:06

## 2023-07-17 RX ADMIN — PANTOPRAZOLE SODIUM 40 MG: 40 TABLET, DELAYED RELEASE ORAL at 06:20

## 2023-07-17 RX ADMIN — LORAZEPAM 0.25 MG: 2 INJECTION INTRAMUSCULAR; INTRAVENOUS at 06:20

## 2023-07-17 RX ADMIN — HYDROXYZINE HYDROCHLORIDE 25 MG: 25 TABLET ORAL at 08:12

## 2023-07-17 RX ADMIN — LORAZEPAM 0.25 MG: 2 INJECTION INTRAMUSCULAR; INTRAVENOUS at 10:17

## 2023-07-17 RX ADMIN — ACETAMINOPHEN 650 MG: 325 TABLET, FILM COATED ORAL at 10:17

## 2023-07-17 RX ADMIN — BUSPIRONE HYDROCHLORIDE 5 MG: 5 TABLET ORAL at 12:49

## 2023-07-17 RX ADMIN — OLANZAPINE 5 MG: 5 TABLET ORAL at 08:09

## 2023-07-17 RX ADMIN — GABAPENTIN 800 MG: 400 CAPSULE ORAL at 13:06

## 2023-07-17 RX ADMIN — FOLIC ACID 1 MG: 1 TABLET ORAL at 08:09

## 2023-07-17 RX ADMIN — HYDROXYZINE HYDROCHLORIDE 25 MG: 25 TABLET ORAL at 01:48

## 2023-07-17 RX ADMIN — GABAPENTIN 800 MG: 400 CAPSULE ORAL at 06:20

## 2023-07-17 RX ADMIN — DICYCLOMINE HYDROCHLORIDE 20 MG: 10 CAPSULE ORAL at 08:09

## 2023-07-17 RX ADMIN — ONDANSETRON HYDROCHLORIDE 4 MG: 4 TABLET, FILM COATED ORAL at 00:29

## 2023-07-17 RX ADMIN — DICYCLOMINE HYDROCHLORIDE 20 MG: 10 CAPSULE ORAL at 16:07

## 2023-07-17 RX ADMIN — Medication 400 MG: at 08:09

## 2023-07-17 RX ADMIN — Medication 1 PATCH: at 08:12

## 2023-07-17 RX ADMIN — PROCHLORPERAZINE EDISYLATE 5 MG: 5 INJECTION INTRAMUSCULAR; INTRAVENOUS at 08:09

## 2023-07-17 RX ADMIN — ONDANSETRON 4 MG: 2 INJECTION INTRAMUSCULAR; INTRAVENOUS at 06:20

## 2023-07-17 NOTE — PAYOR COMM NOTE
"Carson Mullen (42 y.o. Female)     REQUESTING INPATIENT AUTHORIZATION: ID#  [id number]    PATIENT WAS IN OBSERVATION AND CONVERTED TO INPATIENT 07/15/2023    PLEASE REPLY: LI TAPIA RN/CCP: [phone], [phone]        [facility]: NPI [id number]  [facility]# [id number]            Date of Birth   1980    Social Security Number       Address   [address] [place] [address] [address] [facility] [address]    Home Phone   [phone]    MRN   [id number]       Jehovah's witness   None    Marital Status   Single                            Admission Date   7/12/23    Admission Type   Emergency    Admitting Provider   Kathy Kim MD    Attending Provider   David Padilla MD    Department, Room/Bed   [address], S422/1       Discharge Date       Discharge Disposition   Home or Self Care    Discharge Destination                                 Attending Provider: David Padilla MD    Allergies: Morphine, Lidocaine    Isolation: None   Infection: None   Code Status: CPR    Ht: 162.6 cm (64.02\")   Wt: 91 kg (200 lb 11.2 oz)    Admission Cmt: None   Principal Problem: Opioid withdrawal [F11.93]                   Active Insurance as of 7/12/2023       Primary Coverage       Payor Plan Insurance Group Employer/Plan Group    [facility] BY ALYSHA [facility] BY ALYSHA [id number]       Payor Plan Address Payor Plan Phone Number Payor Plan Fax Number Effective Dates    [address] [address]   1/1/2021 - None Entered    [facility] [id number]         Subscriber Name Subscriber Birth Date Member ID       CARSON MULLEN 1980 [id number]                     Emergency Contacts        (Rel.) Home Phone Work Phone Mobile Phone    JOSÉ MANUEL AGUYAORY (Significant Other) [phone] -- [phone]    MONTRELLALEX (Sister) [phone] -- --                 History & Physical        Kathy Kim MD at 07/12/23 1842          HISTORY AND PHYSICAL   " [facility]        Date of Admission: 2023  Patient Identification:  Name: Belen Allen  Age: 42 y.o.  Sex: female  :  1980  MRN: [id number]                     Primary Care Physician: Sahil Cornelius MD    Chief Complaint:  42 year old female who presented to the emergency room with nausea, vomiting, aches and restless legs; she is coming off percocet and took narcan this morning thinking that it was a scheudled medication    History of Present Illness:   As above    Past Medical History:  Past Medical History:   Diagnosis Date    Abnormal Pap smear of cervix     Ankle fracture     H/O Elevated liver enzymes     History of chronic back pain     History of migraine     History of urinary tract infection     Injury of back     Opioid dependence 2023    PONV (postoperative nausea and vomiting)     Seasonal allergies     Seizure     Takes Keppra     Past Surgical History:  Past Surgical History:   Procedure Laterality Date    BACK SURGERY      fusion L4, L5      CHOLECYSTECTOMY      DILATATION AND CURETTAGE      ENDOSCOPY N/A 2022    Procedure: ESOPHAGOGASTRODUODENOSCOPY with biopsy;  Surgeon: Christiana Mann MD;  Location: [facility] ENDOSCOPY;  Service: Gastroenterology;  Laterality: N/A;  gastritis, duodenitis, irregular z line    ERCP N/A 6/3/2021    Procedure: ENDOSCOPIC RETROGRADE CHOLANGIOPANCREATOGRAPHY WITH SPHINCTEROTOMY, DILATION WITH BALLOON CLEARANCE  (12MM-15MM);  Surgeon: Bjorn Flannery MD;  Location: [facility] ENDOSCOPY;  Service: Gastroenterology;  Laterality: N/A;  DILATED COMMON BILE DUCT    SKIN SURGERY      TUBAL ABDOMINAL LIGATION      WISDOM TOOTH EXTRACTION  2018    x2      Home Meds:  (Not in a hospital admission)      Allergies:  Allergies   Allergen Reactions    Morphine Hives    Lidocaine Rash     Pt reports lidocaine patches make her breakout in a rash     Immunizations:  Immunization History   Administered Date(s)  Administered    FluLaval/Fluzone >6mos 10/14/2020, 10/27/2021, 10/27/2022    Hib (PRP-T) 2020    Influenza, Unspecified 10/14/2020, 10/27/2021, 10/27/2022    Meningococcal B,(Bexsero) 2020    Meningococcal Conjugate 2020    Pneumococcal Conjugate 13-Valent (PCV13) 2020     Social History:   Social History     Social History Narrative    Not on file     Social History     Socioeconomic History    Marital status: Single    Number of children: 2   Tobacco Use    Smoking status: Every Day     Packs/day: 0.50     Types: Cigarettes    Smokeless tobacco: Never   Vaping Use    Vaping Use: Never used   Substance and Sexual Activity    Alcohol use: Not Currently    Drug use: Not Currently    Sexual activity: Defer       Family History:  Family History   Problem Relation Age of Onset    Stroke Mother     Allergic rhinitis Mother     Allergic rhinitis Sister     Breast cancer Maternal Aunt     Cancer Maternal Grandmother     Allergic rhinitis Paternal Grandfather     Cancer Paternal Grandfather     Prostate cancer Paternal Grandfather         Review of Systems  See history of present illness and past medical history.  Patient denies headache, dizziness, syncope, falls, trauma, change in vision, change in hearing, change in taste, changes in weight, changes in appetite, focal weakness, numbness, or paresthesia.  Patient denies chest pain, palpitations, dyspnea, orthopnea, PND, cough, sinus pressure, rhinorrhea, epistaxis, hemoptysis, nausea, vomiting,hematemesis, diarrhea, constipation or hematochezia.  Denies cold or heat intolerance, polydipsia, polyuria, polyphagia. Denies hematuria, pyuria, dysuria, hesitancy, frequency or urgency. Denies consumption of raw and under cooked meats foods or change in water source.  Denies fever, chills, sweats, night sweats.  Denies missing any routine medications. Remainder of ROS is negative.    Objective:  T Max 24 hrs: Temp (24hrs), Av.1 °F (36.7 °C), Min:98.1  "°F (36.7 °C), Max:98.1 °F (36.7 °C)    Vitals Ranges:   Temp:  [98.1 °F (36.7 °C)] 98.1 °F (36.7 °C)  Heart Rate:  [56-82] 63  Resp:  [18-24] 18  BP: (117-173)/() 152/100      Exam:  /100   Pulse 63   Temp 98.1 °F (36.7 °C)   Resp 18   Ht 162.6 cm (64\")   Wt 86.2 kg (190 lb)   SpO2 94%   BMI 32.61 kg/m²     General Appearance:    Alert, cooperative, no distress, appears stated age   Head:    Normocephalic, without obvious abnormality, atraumatic   Eyes:    PERRL, conjunctivae/corneas clear, EOM's intact, both eyes   Ears:    Normal external ear canals, both ears   Nose:   Nares normal, septum midline, mucosa normal, no drainage    or sinus tenderness   Throat:   Lips, mucosa, and tongue normal   Neck:   Supple, symmetrical, trachea midline, no adenopathy;     thyroid:  no enlargement/tenderness/nodules; no carotid    bruit or JVD   Back:     Symmetric, no curvature, ROM normal, no CVA tenderness   Lungs:     Decreased breath sounds bilaterally, respirations unlabored   Chest Wall:    No tenderness or deformity    Heart:    Regular rate and rhythm, S1 and S2 normal, no murmur, rub   or gallop   Abdomen:     Soft, nontender, bowel sounds active all four quadrants,     no masses, no hepatomegaly, no splenomegaly   Extremities:   Extremities normal, atraumatic, no cyanosis or edema   Pulses:   2+ and symmetric all extremities   Skin:   Skin color, texture, turgor normal, no rashes or lesions               .    Data Review:  Labs in chart were reviewed.  WBC   Date Value Ref Range Status   07/12/2023 13.57 (H) 3.40 - 10.80 10*3/mm3 Final     Hemoglobin   Date Value Ref Range Status   07/12/2023 14.4 12.0 - 15.9 g/dL Final     Hematocrit   Date Value Ref Range Status   07/12/2023 42.0 34.0 - 46.6 % Final     Platelets   Date Value Ref Range Status   07/12/2023 287 140 - 450 10*3/mm3 Final     Sodium   Date Value Ref Range Status   07/12/2023 144 136 - 145 mmol/L Final     Potassium   Date Value Ref " Range Status   07/12/2023 3.4 (L) 3.5 - 5.2 mmol/L Final     Comment:     Slight hemolysis detected by analyzer. Results may be affected.     Chloride   Date Value Ref Range Status   07/12/2023 111 (H) 98 - 107 mmol/L Final     CO2   Date Value Ref Range Status   07/12/2023 18.9 (L) 22.0 - 29.0 mmol/L Final     BUN   Date Value Ref Range Status   07/12/2023 8 6 - 20 mg/dL Final     Creatinine   Date Value Ref Range Status   07/12/2023 0.85 0.57 - 1.00 mg/dL Final     Glucose   Date Value Ref Range Status   07/12/2023 108 (H) 65 - 99 mg/dL Final     Calcium   Date Value Ref Range Status   07/12/2023 8.8 8.6 - 10.5 mg/dL Final     Magnesium   Date Value Ref Range Status   07/12/2023 1.6 1.6 - 2.6 mg/dL Final     AST (SGOT)   Date Value Ref Range Status   07/12/2023 17 1 - 32 U/L Final     Comment:     Slight hemolysis detected by analyzer. Results may be affected.     ALT (SGPT)   Date Value Ref Range Status   07/12/2023 10 1 - 33 U/L Final     Alkaline Phosphatase   Date Value Ref Range Status   07/12/2023 109 39 - 117 U/L Final                Imaging Results (All)       None              Assessment:  Active Hospital Problems    Diagnosis  POA    **Opioid withdrawal [F11.93]  Yes      Resolved Hospital Problems   No resolved problems to display.   Nausea and vomiting  Restless legs  Hypokalemia  Obesity     Plan:  Continue opioid withdrawal protocol  Replace potassium  Mirapex for restless legs  Fluids  Prn gabapentin      Kathy Kim MD  7/12/2023  18:42 EDT    Electronically signed by Kathy Kim MD at 07/12/23 1850          Emergency Department Notes        Beatriz Urbano, RN at 07/12/23 1858           and kids here at bedside     Electronically signed by Beatriz Urbano RN at 07/12/23 1858       Beatriz Urbano, RN at 07/12/23 1737          Nursing report ED to floor  Belen Allen  42 y.o.  female    HPI :   Chief Complaint   Patient presents with    Withdrawal  "      Admitting doctor:   Kathy Kim MD    Admitting diagnosis:   The primary encounter diagnosis was Opioid withdrawal. Diagnoses of Dehydration and Nausea and vomiting, unspecified vomiting type were also pertinent to this visit.    Code status:   Current Code Status       Date Active Code Status Order ID Comments User Context       7/12/2023 1731 CPR (Attempt to Resuscitate) [id number]  Kathy Kim MD ED        Question Answer    Code Status (Patient has no pulse and is not breathing) CPR (Attempt to Resuscitate)    Medical Interventions (Patient has pulse or is breathing) Full                    Allergies:   Morphine and Lidocaine    Isolation:   No active isolations    Intake and Output  No intake or output data in the 24 hours ending 07/12/23 1737    Weight:       07/12/23  1719   Weight: 86.2 kg (190 lb)       Most recent vitals:   Vitals:    07/12/23 1600 07/12/23 1624 07/12/23 1719 07/12/23 1720   BP:  (!) 157/103  152/100   Pulse: 65 63  63   Resp:  18  18   Temp:       SpO2: 99%   94%   Weight:   86.2 kg (190 lb)    Height:   162.6 cm (64\")        Active LDAs/IV Access:   Lines, Drains & Airways       Active LDAs       Name Placement date Placement time Site Days    Peripheral IV 07/12/23 1103 Left Antecubital 07/12/23  1103  Antecubital  less than 1                    Labs (abnormal labs have a star):   Labs Reviewed   COMPREHENSIVE METABOLIC PANEL - Abnormal; Notable for the following components:       Result Value    Glucose 108 (*)     Potassium 3.4 (*)     Chloride 111 (*)     CO2 18.9 (*)     All other components within normal limits    Narrative:     GFR Normal >60  Chronic Kidney Disease <60  Kidney Failure <15     URINALYSIS W/ MICROSCOPIC IF INDICATED (NO CULTURE) - Abnormal; Notable for the following components:    Color, UA Dark Yellow (*)     Ketones, UA 80 mg/dL (3+) (*)     Protein, UA 30 mg/dL (1+) (*)     Leuk Esterase, UA Trace (*)     All other components within " normal limits   CBC WITH AUTO DIFFERENTIAL - Abnormal; Notable for the following components:    WBC 13.57 (*)     Neutrophil % 87.1 (*)     Lymphocyte % 7.2 (*)     Eosinophil % 0.0 (*)     Neutrophils, Absolute 11.81 (*)     All other components within normal limits   URINALYSIS, MICROSCOPIC ONLY - Abnormal; Notable for the following components:    WBC, UA 3-5 (*)     Bacteria, UA 2+ (*)     Squamous Epithelial Cells, UA 7-12 (*)     All other components within normal limits   CK - Normal   MAGNESIUM - Normal   CBC AND DIFFERENTIAL    Narrative:     The following orders were created for panel order CBC & Differential.  Procedure                               Abnormality         Status                     ---------                               -----------         ------                     CBC Auto Differential[[id number]]        Abnormal            Final result                 Please view results for these tests on the individual orders.       EKG:   No orders to display       Meds given in ED:   Medications   acetaminophen (TYLENOL) tablet 650 mg (has no administration in time range)   sennosides-docusate (PERICOLACE) 8.6-50 MG per tablet 2 tablet (has no administration in time range)     And   polyethylene glycol (MIRALAX) packet 17 g (has no administration in time range)     And   bisacodyl (DULCOLAX) EC tablet 5 mg (has no administration in time range)     And   bisacodyl (DULCOLAX) suppository 10 mg (has no administration in time range)   ondansetron (ZOFRAN) tablet 4 mg (has no administration in time range)     Or   ondansetron (ZOFRAN) injection 4 mg (has no administration in time range)   melatonin tablet 3 mg (has no administration in time range)   Pharmacy to Dose enoxaparin (LOVENOX) (has no administration in time range)   cloNIDine (CATAPRES) tablet 0.1 mg (has no administration in time range)     Followed by   cloNIDine (CATAPRES) tablet 0.1 mg (has no administration in time range)     Followed by    cloNIDine (CATAPRES) tablet 0.1 mg (has no administration in time range)     Followed by   cloNIDine (CATAPRES) tablet 0.1 mg (has no administration in time range)     Followed by   cloNIDine (CATAPRES) tablet 0.1 mg (has no administration in time range)   sodium chloride 0.9 % bolus 1,000 mL (0 mL Intravenous Stopped 7/12/23 1428)   ondansetron (ZOFRAN) injection 4 mg (4 mg Intravenous Given 7/12/23 1231)   ketorolac (TORADOL) injection 15 mg (15 mg Intravenous Given 7/12/23 1231)   cloNIDine (CATAPRES) tablet 0.2 mg (0.2 mg Oral Given 7/12/23 1311)   ketorolac (TORADOL) injection 15 mg (15 mg Intravenous Given 7/12/23 1311)   HYDROmorphone (DILAUDID) injection 0.5 mg (0.5 mg Intravenous Given 7/12/23 1432)   metoclopramide (REGLAN) injection 10 mg (10 mg Intravenous Given 7/12/23 1449)   droperidol (INAPSINE) injection 1.25 mg (1.25 mg Intravenous Given 7/12/23 1622)   sodium chloride 0.9 % bolus 1,000 mL (1,000 mL Intravenous New Bag 7/12/23 1622)   HYDROmorphone (DILAUDID) injection 1 mg (1 mg Intravenous Given 7/12/23 1715)       Imaging results:  No radiology results for the last day    Ambulatory status:   - bedrest     Social issues:   Social History     Socioeconomic History    Marital status: Single    Number of children: 2   Tobacco Use    Smoking status: Every Day     Packs/day: 0.50     Types: Cigarettes    Smokeless tobacco: Never   Vaping Use    Vaping Use: Never used   Substance and Sexual Activity    Alcohol use: Not Currently    Drug use: Not Currently    Sexual activity: Defer       NIH Stroke Scale:       Beatriz Urbano RN  07/12/23 17:37 EDT          Electronically signed by Beatriz Urbano RN at 07/12/23 1497       Beatriz Urbano RN at 07/12/23 1713          Pt called out again saying she felt like she was going to vomit again. Md aware, going to see pt     Electronically signed by Beatriz Urbano RN at 07/12/23 1713       Beatriz Urbano, RN at 07/12/23 1450          Pt now  "c/o restless legs, states pain meds not helping     Electronically signed by Beatriz Urbano RN at 07/12/23 1450       Beatriz Urbano RN at 07/12/23 1348          Spoke to pt's , states she took intranasal narcan.     Electronically signed by Beatriz Urbano RN at 07/12/23 1349       Beatriz Urbano RN at 07/12/23 1250          Pt to ED via EMS from home. Pt states she took herself off her percocet 4 days ago. Accidentally took her narcan with morning meds. States she didn't know she wasn't supposed to take it. Pt educated on use of narcan. Delay in initial eval of pt d/t pt was taken by bernadette HOWE to shower, ms noted bites on pt's body, pt informed her she thought she had bed bugs at one time.   Pt reports chronic pain to low back, hx of back sx. States she thought the percocets were helping her but \"just thought they were a bandaid\". Pt reports abd discomfort and chills since taking her meds this am. + vomit on the floor when entering her room.   Pt would not be still long enough to allow me to eval with stethoscope.     Electronically signed by Beatriz Urbano RN at 07/12/23 1259       Constantine Mujica MD at 07/12/23 1211           EMERGENCY DEPARTMENT ENCOUNTER    Room Number:  19/19  Date of encounter:  7/12/2023  PCP: Sahil Cornelius MD  Historian: Patient      HPI:  Chief Complaint: Body aches, nausea, vomiting  A complete HPI/ROS/PMH/PSH/SH/FH are unobtainable due to: None    Context: Belen Allen is a 42 y.o. female who presents to the ED via EMS after she accidentally took her Narcan this morning thinking that she had to take it every day.  She has been trying to get off of pain pills, states that her last dose of Percocet was 4 days ago.  Has been having some nausea and vomiting and body aches which accelerated after taking the Narcan today.      MEDICAL RECORD REVIEW    External (non-ED) record review: Medical allergies listed for lidocaine on chart review in " epic      Other chart review shows an ER visit on July 10, 2023 complaining of body aches, tremors, nausea and vomiting which she attributed to withdrawing off of opiate pain pills at that time.  Apparently she was taking Percocet 30 mg up to 4-5 times a day.  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    Generalized abdominal pain 11/29/2020    Acute bilateral low back pain 11/29/2020    Antiphospholipid antibody positive 11/29/2020    On anticoagulant therapy 11/29/2020    Acute pain of left knee 11/29/2020    Vitamin D deficiency 11/30/2020    Folate deficiency 12/01/2020    Pain of back and left lower extremity 12/02/2020    Common bile duct dilatation 05/31/2021    Elevated LFTs 06/01/2021    Right upper quadrant abdominal pain 06/01/2021    Obesity (BMI 30-39.9) 04/18/2019    Tobacco abuse 06/02/2021    GERD without esophagitis 06/02/2021    Intractable abdominal pain 07/05/2021    Obesity, Class III, BMI 40-49.9 (morbid obesity) 07/05/2021    Constipation 07/05/2021    History of ERCP 07/05/2021    Other chronic pain 07/05/2021    Seizures 01/01/2022    Syncope 01/01/2022    Lumbar back pain with radiculopathy affecting left lower extremity 01/05/2022    Right upper quadrant pain 11/23/2022    Uncontrolled pain 12/17/2022    Intractable back pain 12/17/2022    Sciatica of right side 12/20/2022    Migraine 12/20/2022    Mobility impaired 12/20/2022    Seizure 12/31/2022    Opioid dependence 01/16/2023    Clostridioides difficile diarrhea 01/18/2023     Resolved Ambulatory Problems     Diagnosis Date Noted    Choledocholithiasis 06/02/2021    Rhabdomyolysis 01/01/2022    Encephalopathy 01/01/2022    Hypokalemia 12/18/2022     Past Medical History:   Diagnosis Date    Abnormal Pap smear of cervix     Ankle fracture 2013    H/O Elevated  liver enzymes     History of chronic back pain     History of migraine     History of urinary tract infection     Injury of back     PONV (postoperative nausea and vomiting)     Seasonal allergies          PAST SURGICAL HISTORY  Past Surgical History:   Procedure Laterality Date    BACK SURGERY  2006    fusion L4, L5      CHOLECYSTECTOMY  2014    DILATATION AND CURETTAGE  2009    ENDOSCOPY N/A 11/27/2022    Procedure: ESOPHAGOGASTRODUODENOSCOPY with biopsy;  Surgeon: Christiana Mann MD;  Location: [facility] ENDOSCOPY;  Service: Gastroenterology;  Laterality: N/A;  gastritis, duodenitis, irregular z line    ERCP N/A 6/3/2021    Procedure: ENDOSCOPIC RETROGRADE CHOLANGIOPANCREATOGRAPHY WITH SPHINCTEROTOMY, DILATION WITH BALLOON CLEARANCE  (12MM-15MM);  Surgeon: Bjorn Flannery MD;  Location: [facility] ENDOSCOPY;  Service: Gastroenterology;  Laterality: N/A;  DILATED COMMON BILE DUCT    SKIN SURGERY      TUBAL ABDOMINAL LIGATION  2013    WISDOM TOOTH EXTRACTION  2018    x2         FAMILY HISTORY  Family History   Problem Relation Age of Onset    Stroke Mother     Allergic rhinitis Mother     Allergic rhinitis Sister     Breast cancer Maternal Aunt     Cancer Maternal Grandmother     Allergic rhinitis Paternal Grandfather     Cancer Paternal Grandfather     Prostate cancer Paternal Grandfather          SOCIAL HISTORY  Social History     Socioeconomic History    Marital status: Single    Number of children: 2   Tobacco Use    Smoking status: Every Day     Packs/day: 0.50     Types: Cigarettes    Smokeless tobacco: Never   Vaping Use    Vaping Use: Never used   Substance and Sexual Activity    Alcohol use: Not Currently    Drug use: Not Currently    Sexual activity: Defer         ALLERGIES  Morphine and Lidocaine        REVIEW OF SYSTEMS  Review of Systems     All systems reviewed and negative except for those discussed in HPI.       PHYSICAL EXAM    I have reviewed the triage vital signs and nursing notes.    ED Triage  Vitals   Temp Heart Rate Resp BP SpO2   07/12/23 1104 07/12/23 1104 07/12/23 1104 07/12/23 1104 07/12/23 1104   98.1 °F (36.7 °C) 82 22 117/80 96 %      Temp src Heart Rate Source Patient Position BP Location FiO2 (%)   -- 07/12/23 1136 -- -- --    Monitor          Physical Exam  General: Appears uncomfortable, diaphoretic  HEENT: Mucous membranes moist, atraumatic, EOMI  Neck: Full ROM  Pulm: Symmetric chest rise, nonlabored, lungs CTAB  Cardiovascular: Regular rate and rhythm, intact distal pulses  GI: Soft, nontender, nondistended, no rebound, no guarding, bowel sounds present  MSK: Full ROM, no deformity  Skin: Warm, dry  Neuro: Awake, alert, oriented x 4, GCS 15, moving all extremities, no focal deficits  Psych: Agitated, cooperative        LAB RESULTS  Recent Results (from the past 24 hour(s))   Comprehensive Metabolic Panel    Collection Time: 07/12/23  3:39 PM    Specimen: Blood   Result Value Ref Range    Glucose 108 (H) 65 - 99 mg/dL    BUN 8 6 - 20 mg/dL    Creatinine 0.85 0.57 - 1.00 mg/dL    Sodium 144 136 - 145 mmol/L    Potassium 3.4 (L) 3.5 - 5.2 mmol/L    Chloride 111 (H) 98 - 107 mmol/L    CO2 18.9 (L) 22.0 - 29.0 mmol/L    Calcium 8.8 8.6 - 10.5 mg/dL    Total Protein 6.0 6.0 - 8.5 g/dL    Albumin 3.7 3.5 - 5.2 g/dL    ALT (SGPT) 10 1 - 33 U/L    AST (SGOT) 17 1 - 32 U/L    Alkaline Phosphatase 109 39 - 117 U/L    Total Bilirubin 0.7 0.0 - 1.2 mg/dL    Globulin 2.3 gm/dL    A/G Ratio 1.6 g/dL    BUN/Creatinine Ratio 9.4 7.0 - 25.0    Anion Gap 14.1 5.0 - 15.0 mmol/L    eGFR 88.4 >60.0 mL/min/1.73   CK    Collection Time: 07/12/23  3:39 PM    Specimen: Blood   Result Value Ref Range    Creatine Kinase 64 20 - 180 U/L   Magnesium    Collection Time: 07/12/23  3:39 PM    Specimen: Blood   Result Value Ref Range    Magnesium 1.6 1.6 - 2.6 mg/dL   CBC Auto Differential    Collection Time: 07/12/23  3:39 PM    Specimen: Blood   Result Value Ref Range    WBC 13.57 (H) 3.40 - 10.80 10*3/mm3    RBC 4.70  3.77 - 5.28 10*6/mm3    Hemoglobin 14.4 12.0 - 15.9 g/dL    Hematocrit 42.0 34.0 - 46.6 %    MCV 89.4 79.0 - 97.0 fL    MCH 30.6 26.6 - 33.0 pg    MCHC 34.3 31.5 - 35.7 g/dL    RDW 13.0 12.3 - 15.4 %    RDW-SD 42.9 37.0 - 54.0 fl    MPV 10.0 6.0 - 12.0 fL    Platelets 287 140 - 450 10*3/mm3    Neutrophil % 87.1 (H) 42.7 - 76.0 %    Lymphocyte % 7.2 (L) 19.6 - 45.3 %    Monocyte % 5.2 5.0 - 12.0 %    Eosinophil % 0.0 (L) 0.3 - 6.2 %    Basophil % 0.1 0.0 - 1.5 %    Immature Grans % 0.4 0.0 - 0.5 %    Neutrophils, Absolute 11.81 (H) 1.70 - 7.00 10*3/mm3    Lymphocytes, Absolute 0.98 0.70 - 3.10 10*3/mm3    Monocytes, Absolute 0.71 0.10 - 0.90 10*3/mm3    Eosinophils, Absolute 0.00 0.00 - 0.40 10*3/mm3    Basophils, Absolute 0.02 0.00 - 0.20 10*3/mm3    Immature Grans, Absolute 0.05 0.00 - 0.05 10*3/mm3    nRBC 0.0 0.0 - 0.2 /100 WBC   Urinalysis With Microscopic If Indicated (No Culture) - Urine, Clean Catch    Collection Time: 07/12/23  4:36 PM    Specimen: Urine, Clean Catch   Result Value Ref Range    Color, UA Dark Yellow (A) Yellow, Straw    Appearance, UA Clear Clear    pH, UA 6.5 5.0 - 8.0    Specific Gravity, UA 1.029 1.005 - 1.030    Glucose, UA Negative Negative    Ketones, UA 80 mg/dL (3+) (A) Negative    Bilirubin, UA Negative Negative    Blood, UA Negative Negative    Protein, UA 30 mg/dL (1+) (A) Negative    Leuk Esterase, UA Trace (A) Negative    Nitrite, UA Negative Negative    Urobilinogen, UA 1.0 E.U./dL 0.2 - 1.0 E.U./dL   Urinalysis, Microscopic Only - Urine, Clean Catch    Collection Time: 07/12/23  4:36 PM    Specimen: Urine, Clean Catch   Result Value Ref Range    RBC, UA 0-2 None Seen, 0-2 /HPF    WBC, UA 3-5 (A) None Seen, 0-2 /HPF    Bacteria, UA 2+ (A) None Seen /HPF    Squamous Epithelial Cells, UA 7-12 (A) None Seen, 0-2 /HPF    Hyaline Casts, UA 0-2 None Seen /LPF    Methodology Manual Light Microscopy        Ordered the above labs and independently interpreted results. My findings will  be discussed in the medical decision making section below        RADIOLOGY  No Radiology Exams Resulted Within Past 24 Hours    Ordered the above noted radiological studies.  Independently interpreted by me and my independent review of findings can be found in the ED Course.  See dictation for official radiology interpretation.      PROCEDURES    Procedures      MEDICATIONS GIVEN IN ER    Medications   sodium chloride 0.9 % bolus 1,000 mL (0 mL Intravenous Stopped 7/12/23 1428)   ondansetron (ZOFRAN) injection 4 mg (4 mg Intravenous Given 7/12/23 1231)   ketorolac (TORADOL) injection 15 mg (15 mg Intravenous Given 7/12/23 1231)   cloNIDine (CATAPRES) tablet 0.2 mg (0.2 mg Oral Given 7/12/23 1311)   ketorolac (TORADOL) injection 15 mg (15 mg Intravenous Given 7/12/23 1311)   HYDROmorphone (DILAUDID) injection 0.5 mg (0.5 mg Intravenous Given 7/12/23 1432)   metoclopramide (REGLAN) injection 10 mg (10 mg Intravenous Given 7/12/23 1449)   droperidol (INAPSINE) injection 1.25 mg (1.25 mg Intravenous Given 7/12/23 1622)   sodium chloride 0.9 % bolus 1,000 mL (1,000 mL Intravenous New Bag 7/12/23 1622)   HYDROmorphone (DILAUDID) injection 1 mg (1 mg Intravenous Given 7/12/23 1715)         PROGRESS, DATA ANALYSIS, CONSULTS, AND MEDICAL DECISION MAKING    Please note that this section constitutes my independent interpretation of clinical data including lab results, radiology, EKG's.  This constitutes my independent professional opinion regarding differential diagnosis and management of this patient.  It may include any factors such as history from outside sources, review of external records, social determinants of health, management of medications, response to those treatments, and discussions with other providers.    Differential Diagnosis and Plan: Initial concern for opioid withdrawal given that she was already here for similar and then took Narcan on top of that today.  Plan for IV fluids, symptomatic control, and  reevaluation with results.    Additional sources:  - Discussed/ obtained information from independent historians:       - Chronic or social conditions impacting care:      - Shared decision making:  Patient fully updated on and in agreement with the course and plan moving forward    ED Course as of 07/12/23 1730   Wed Jul 12, 2023   1406 On reevaluation the patient is still uncomfortable, she states that she has gotten rid of all of her home pain medications.  I discussed that given the fact that she is trying to get off of the opioids I would hesitate to give her some now although it may help take the edge off and we gave her some.  At this point she states that she needs something to help her get comfortable enough to go home [DC]   1559 WBC(!): 13.57 [DC]   1559 Hemoglobin: 14.4 [DC]   1559 Platelets: 287 [DC]   1626 Creatine Kinase: 64 [DC]   1650 Magnesium: 1.6 [DC]   1650 Glucose(!): 108 [DC]   1650 BUN: 8 [DC]   1650 Creatinine: 0.85 [DC]   1650 Sodium: 144 [DC]   1650 Potassium(!): 3.4 [DC]   1650 Calcium: 8.8 [DC]   1650 ALT (SGPT): 10 [DC]   1650 AST (SGOT): 17 [DC]   1650 Alkaline Phosphatase: 109 [DC]   1650 Total Bilirubin: 0.7 [DC]   1706 Nitrite, UA: Negative [DC]   1706 Leukocytes, UA(!): Trace [DC]   1706 Ketones, UA(!): 80 mg/dL (3+) [DC]   1713 Patient is still quite uncomfortable, I suspect probably opioid withdrawal given her recent cessation of chronic use of opioid pain pills in addition to using the Narcan this morning.  Plan for hospitalization for further symptom management overnight.  Reassuring work-up otherwise with signs of dehydration [DC]   1729 Discussed with Dr. Kim [facility] discussed patient's clinical course and findings today, treatment modalities, and need for hospitalization. [DC]      ED Course User Index  [DC] Constantine Mujica MD       Hospitalization Considered?: yes    Orders Placed During This Visit:  Orders Placed This Encounter   Procedures    Comprehensive Metabolic  Panel    Urinalysis With Microscopic If Indicated (No Culture) - Urine, Clean Catch    CK    Magnesium    CBC Auto Differential    Urinalysis, Microscopic Only - Urine, Clean Catch    [facility] (on-call MD unless specified) Details    Initiate Observation Status    CBC & Differential       Additional orders considered but not placed:      Independent interpretation of labs, radiology studies, and discussions with consultants: See ED Course        AS OF 17:30 EDT VITALS:    BP - 152/100  HR - 63  TEMP - 98.1 °F (36.7 °C)  02 SATS - 94%        DIAGNOSIS  Final diagnoses:   Opioid withdrawal   Dehydration   Nausea and vomiting, unspecified vomiting type         DISPOSITION  HOSPITALIZATION    Discussed treatment plan and reason for hospitalization with pt/family and hospitalizing physician.  Pt/family voiced understanding of the plan for hospitalization for further testing/treatment as needed.         AMARJIT reviewed by Kathy Kim MD, Constantine Mujica MD       --    Please note that portions of this were completed with a voice recognition program.       Note Disclaimer: At [facility], we believe that sharing information builds trust and better relationships. You are receiving this note because you are receiving care at [facility] or recently visited. It is possible you will see health information before a provider has talked with you about it. This kind of information can be easy to misunderstand. To help you fully understand what it means for your health, we urge you to discuss this note with your provider.           Constantine Mujica MD  07/12/23 1804      Electronically signed by Constantine Mujica MD at 07/12/23 1804       Chelsy Pa, RN at 07/12/23 1101          Patient to ER via ems from home  Patient was recently dx with narcan and patient reports that she woke up and took the narcan because she thought she was supposed to take it daily with her other medications    When patient was here last she  was detoxing off perocet    Electronically signed by Chelsy Pa, RN at 07/12/23 1104       Vital Signs (last 7 days)       Date/Time Temp Temp src Pulse Resp BP Patient Position SpO2    07/17/23 0805 98.2 (36.8) Oral 89 18 158/95 Lying 100    07/17/23 0700 -- -- -- -- -- -- 95    07/17/23 0028 -- -- 89 -- -- -- 100    07/16/23 2337 98.6 (37) Oral 71 18 123/72 Lying 91    07/16/23 2001 -- -- 87 -- -- -- --    07/16/23 1932 98.2 (36.8) Oral 89 18 146/72 Lying 96    07/16/23 1500 -- -- -- -- 145/85 -- --    07/16/23 1449 99 (37.2) Oral 81 18 158/93 Lying --    07/16/23 1430 99 (37.2) Oral 82 18 158/93 Lying --    07/16/23 0737 98.6 (37) Oral 80 18 144/88 Lying 97    07/16/23 0609 -- -- 83 -- 128/94 -- --    07/16/23 0312 -- -- 68 18 -- -- 99    07/16/23 0305 -- -- -- -- 151/88 Lying --    07/16/23 0300 -- -- 80 -- -- -- --    07/16/23 0038 97.2 (36.2) Oral 72 18 129/84 Lying 95    07/15/23 2100 -- -- 78 18 114/63 Lying 94    07/15/23 1900 97.6 (36.4) Oral 65 16 175/92 Lying 98    07/15/23 1319 97.9 (36.6) Oral 61 20 162/97 Lying 100    07/15/23 0729 -- -- -- -- 149/93 -- --    07/15/23 0708 97.7 (36.5) Oral 57 20 -- Lying 100    07/15/23 0647 97.5 (36.4) Oral 56 20 154/111 Lying 100    07/14/23 2323 97.9 (36.6) Oral 72 20 158/84 Lying 97    07/14/23 2118 -- -- 60 -- -- -- --    07/14/23 2012 98.1 (36.7) Oral 62 20 118/79 Lying 96    07/14/23 1328 98.6 (37) Oral 54 20 149/85 Lying 99    07/14/23 0759 98.2 (36.8) Oral -- 24 164/87 Lying --    07/14/23 0146 -- -- 49 -- -- -- --    07/13/23 2255 98.4 (36.9) Oral 70 22 137/86 Lying 96    07/13/23 2042 -- -- 68 -- 166/102  Lying --    BP: CLONIDINE GIVEN at 07/13/23 2042 07/13/23 2038 -- -- 64 -- -- -- --    07/13/23 1956 98.2 (36.8) Oral 56 18 159/134 Sitting --    07/13/23 1259 97.9 (36.6) Oral 89 18 150/78 Lying --    07/13/23 0750 99 (37.2) Oral 72 20 119/104  Lying 100    BP: Patient in alot of pain, RN notified at 07/13/23 0750    07/12/23 2339 -- -- -- --  162/88  Lying --    BP: checked manually at 07/12/23 2339    07/12/23 2307 99 (37.2) Oral 69 18 172/112  Lying 94    BP: RN notified at 07/12/23 2307 07/12/23 2010 99 (37.2) Oral 65 18 170/83 Lying 98    07/12/23 19:40:22 98.8 (37.1) Oral 55 22 157/87 -- 99    07/12/23 1939 -- -- 52 -- -- -- 98    07/12/23 1857 -- -- -- -- 142/73 -- --    07/12/23 17:20:09 -- -- 63 18 152/100 -- 94    07/12/23 16:24:07 -- -- 63 18 157/103 -- --    07/12/23 1600 -- -- 65 -- -- -- 99    07/12/23 14:28:31 -- -- 56 18 173/89 -- 100    07/12/23 1311 -- -- 62 -- 161/103 -- --    07/12/23 12:32:22 -- -- 62 24 153/94 -- 100    07/12/23 1136 -- -- 70 -- -- -- 98    07/12/23 1104 98.1 (36.7) -- 82 22 117/80 -- 96          Oxygen Therapy (since admission)       Date/Time SpO2 Device (Oxygen Therapy) Flow (L/min) Oxygen Concentration (%) ETCO2 (mmHg)    07/17/23 0805 100 -- -- -- --    07/17/23 0700 95 room air -- -- --    07/17/23 0028 100 room air -- -- --    07/16/23 2337 91 room air -- -- --    07/16/23 1932 96 room air -- -- --    07/16/23 1420 -- room air -- -- --    07/16/23 0833 -- room air -- -- --    07/16/23 0737 97 room air -- -- --    07/16/23 0312 99 room air -- -- --    07/16/23 0038 95 room air -- -- --    07/15/23 2100 94 room air -- -- --    07/15/23 1900 98 room air -- -- --    07/15/23 1415 -- room air -- -- --    07/15/23 1319 100 room air -- -- --    07/15/23 0800 -- room air -- -- --    07/15/23 0708 100 room air -- -- --    07/15/23 0647 100 room air -- -- --    07/14/23 2323 97 room air -- -- --    07/14/23 2118 -- room air -- -- --    07/14/23 2012 96 room air -- -- --    07/14/23 1400 -- room air -- -- --    07/14/23 1328 99 room air -- -- --    07/14/23 0800 -- room air -- -- --    07/13/23 2255 96 room air -- -- --    07/13/23 2042 -- room air -- -- --    07/13/23 2038 -- room air -- -- --    07/13/23 1259 -- room air -- -- --    07/13/23 0800 -- room air -- -- --    07/13/23 0750 100 room air -- -- --     07/13/23 0031 -- room air -- -- --    07/12/23 2307 94 room air -- -- --    07/12/23 2010 98 room air -- -- --    07/12/23 2004 -- room air -- -- --    07/12/23 19:40:22 99 -- -- -- --    07/12/23 1939 98 -- -- -- --    07/12/23 17:20:09 94 room air -- -- --    07/12/23 1600 99 -- -- -- --    07/12/23 14:28:31 100 room air -- -- --    07/12/23 12:32:22 100 room air -- -- --    07/12/23 1136 98 room air -- -- --    07/12/23 1104 96 -- -- -- --          Intake & Output (last 7 days)         07/10 0701 07/11 0700 07/11 0701  07/12 0700 07/12 0701  07/13 0700 07/13 0701  07/14 0700 07/14 0701  07/15 0700 07/15 0701  07/16 0700 07/16 0701  07/17 0700 07/17 0701  07/18 0700    P.O.   210 220 820 780 700 240    I.V. (mL/kg)   597.5 (6.6)         Total Intake(mL/kg)   807.5 (8.9) 220 (2.4) 820 (8.9) 780 (8.5) 700 (7.7) 240 (2.6)    Net   +807.5 +220 +820 +780 +700 +240                Urine Unmeasured Occurrence    10 x 5 x 11 x 4 x 2 x    Stool Unmeasured Occurrence   2 x 3 x 0 x 2 x      Emesis Unmeasured Occurrence   2 x   1 x            Lines, Drains & Airways       Active LDAs       Name Placement date Placement time Site Days    Peripheral IV 07/16/23 2308 Left Antecubital 07/16/23 2308  Antecubital  less than 1                  CIWA (since admission)       Date/Time CIWA-Ar Score    07/12/23 15:34:17 17           Medication Administration Report for Belen Allen as of 07/17/23 1200     Legend:    Given Hold Not Given Due Canceled Entry Other Actions    Time Time (Time) Time Time-Action         Discontinued     Completed     Future     MAR Hold     Linked             Medications 07/11/23 07/12/23 07/13/23 07/14/23 07/15/23 07/16/23 07/17/23      acetaminophen (TYLENOL) tablet 650 mg  Dose: 650 mg  Freq: Every 4 Hours PRN Route: PO  PRN Reason: Mild Pain  Start: 07/12/23 6819   Admin Instructions:   Do not exceed 4 grams of acetaminophen in a 24 hr period.    If given for pain, use the following pain scale:    Mild Pain = Pain Score of 1-3, CPOT 1-2  Moderate Pain = Pain Score of 4-6, CPOT 3-4  Severe Pain = Pain Score of 7-10, CPOT 5-8  Do not exceed 4 grams of acetaminophen in a 24 hr period. Max dose of 2gm for AST/ALT greater than 120 units/L    If given for fever, use fever parameter: fever greater than 100.4 °F.    If given for pain, use the following pain scale:   Mild Pain = Pain Score of 1-3, CPOT 1-2  Moderate Pain = Pain Score of 4-6, CPOT 3-4  Severe Pain = Pain Score of 7-10, CPOT 5-8      0848-Given       1923-Given        0347-Given [C]       0944-Given       2125-Given        0459-Given       1105-Given        (0040)-Not Given        0148-Given       1017-Given           sennosides-docusate (PERICOLACE) 8.6-50 MG per tablet 2 tablet  Dose: 2 tablet  Freq: 2 Times Daily Route: PO  Start: 07/12/23 2100   Admin Instructions:   HOLD MEDICATION IF PATIENT HAS HAD BOWEL MOVEMENT. Start bowel management regimen if patient has not had a bowel movement after 12 hours.     (2036)-Not Given        (0849)-Not Given [C]       (2039)-Not Given        (0945)-Not Given       (2128)-Not Given        (0800)-Not Given       (2106)-Not Given        0820-Given       (2021)-Not Given        (0810)-Not Given       2100          And  polyethylene glycol (MIRALAX) packet 17 g  Dose: 17 g  Freq: Daily PRN Route: PO  PRN Reason: Constipation  PRN Comment: Use if senna-docusate is ineffective  Start: 07/12/23 1731   Admin Instructions:   Use if no bowel movement after 12 hours. Mix in 6-8 ounces of water.  Use 4-8 ounces of water, tea, or juice for each 17 gram dose.             And  bisacodyl (DULCOLAX) EC tablet 5 mg  Dose: 5 mg  Freq: Daily PRN Route: PO  PRN Reason: Constipation  PRN Comment: Use if polyethylene glycol is ineffective  Start: 07/12/23 1731   Admin Instructions:   Use if no bowel movement after 12 hours.  Swallow whole. Do not crush, split, or chew tablet.             And  bisacodyl (DULCOLAX) suppository 10  mg  Dose: 10 mg  Freq: Daily PRN Route: RE  PRN Reason: Constipation  PRN Comment: Use if bisacodyl oral is ineffective  Start: 07/12/23 1731   Admin Instructions:   Use if no bowel movement after 12 hours.  Hold for diarrhea              busPIRone (BUSPAR) tablet 5 mg  Dose: 5 mg  Freq: 2 Times Daily Route: PO  Start: 07/17/23 1130   Admin Instructions:   Caution: Look alike/sound alike drug alert. Take with food.  Avoid grapefruit juice.          1130 2100           dicyclomine (BENTYL) capsule 10 mg  Dose: 10 mg  Freq: 4 Times Daily PRN Route: PO  PRN Comment: cramping  Start: 07/12/23 1853      1211-Given               dicyclomine (BENTYL) capsule 20 mg  Dose: 20 mg  Freq: 3 Times Daily Route: PO  Start: 07/13/23 1600      1802-Given       2038-Given        0944-Given       1800-Given        0017-Given       0800-Given       1621-Given       2108-Given        0821-Given       1704-Given       2132-Given        0809-Given       1600 2100           Enoxaparin Sodium (LOVENOX) syringe 40 mg  Dose: 40 mg  Freq: Every 24 Hours Route: SC  Indications of Use: PROPHYLAXIS OF VENOUS THROMBOEMBOLISM  Start: 07/12/23 2000   Admin Instructions:   Give subcutaneous in abdomen only. Do not massage site after injection.     2118-Given        2039-Given        2130-Given        2111-Given        2018-Given        2000           FLUoxetine (PROzac) capsule 60 mg  Dose: 60 mg  Freq: Nightly Route: PO  Start: 07/13/23 2100   Admin Instructions:   Caution: Look alike/sound alike drug alert      2039-Given        2125-Given        2106-Given        2020-Given        2100           folic acid (FOLVITE) tablet 1 mg  Dose: 1 mg  Freq: Daily Route: PO  Start: 07/13/23 0900      0848-Given        0945-Given        0800-Given        0821-Given        0809-Given           gabapentin (NEURONTIN) capsule 800 mg  Dose: 800 mg  Freq: Every 8 Hours Scheduled Route: PO  Start: 07/13/23 0600   Admin Instructions:              0506-Given       1430-Given       2229-Given        0530-Given       1410-Given       2245-Given        0610-Given       1415-Given       2157-Given        0610-Given       1400-Given       2254-Given        0620-Given       1400       2200           melatonin tablet 3 mg  Dose: 3 mg  Freq: Nightly Route: PO  Start: 07/15/23 2100        2158-Given        2020-Given        2100           nicotine (NICODERM CQ) 14 MG/24HR patch 1 patch  Dose: 1 patch  Freq: Every 24 Hours Scheduled Route: TD  Start: 07/15/23 1230   Admin Instructions:   Apply to clean, dry, nonhairy area of skin (typically upper arm or shoulder)  Dispose of nicotine replacement therapies and their wrappers in non-hazardous pharmaceutical waste or in regular trash.        1414-Medication Applied        0821-Medication Removed       0825-Medication Applied        0810-Medication Removed       0812-Medication Applied           nitroglycerin (NITROSTAT) SL tablet 0.4 mg  Dose: 0.4 mg  Freq: Every 5 Minutes PRN Route: SL  PRN Reason: Chest Pain  PRN Comment: Only if SBP Greater Than 100  Start: 07/15/23 1446   Admin Instructions:   If Pain Unrelieved After 3 Doses Notify MD              OLANZapine (zyPREXA) tablet 5 mg  Dose: 5 mg  Freq: 2 Times Daily Route: PO  Start: 07/14/23 2100   Admin Instructions:   Caution: Look alike/sound alike drug alert       2127-Given        0800-Given       2106-Given        0821-Given       2021-Given        0809-Given       2100           ondansetron (ZOFRAN) tablet 4 mg  Dose: 4 mg  Freq: Every 6 Hours PRN Route: PO  PRN Reasons: Nausea,Vomiting  Start: 07/12/23 1731     2116-Not Given:  See Alt        0327-Not Given:  See Alt       0848-Not Given:  See Alt       1430-Not Given:  See Alt       2356-Not Given:  See Alt        0945-Not Given:  See Alt        0114-Not Given:  See Alt       1314-Not Given:  See Alt       1839-Not Given:  See Alt        0317-Not Given:  See Alt       1056-Not Given:  See Alt       1705-Not  "Given:  See Alt        0029-Given       0620-Not Given:  See Alt          Or  ondansetron (ZOFRAN) injection 4 mg  Dose: 4 mg  Freq: Every 6 Hours PRN Route: IV  PRN Reasons: Nausea,Vomiting  Start: 07/12/23 1731     2116-Given        0327-Given       0848-Given       1430-Given       2356-Given        0945-Given        0114-Given       1314-Given       1839-Given        0317-Given       1056-Given       1705-Given        0029-Not Given:  See Alt       0620-Given           pantoprazole (PROTONIX) EC tablet 40 mg  Dose: 40 mg  Freq: 2 Times Daily Before Meals Route: PO  Start: 07/16/23 1730   Admin Instructions:   Swallow whole; do not crush, split, or chew.         1704-Given        0620-Given       0730-Hold [C]       1730           Potassium Replacement - Follow Nurse / BPA Driven Protocol  Freq: As Needed Route: XX  PRN Reason: Other  Start: 07/13/23 0501   Admin Instructions:   Open Order & Select \"[facility] Electrolyte Replacement Protocol Algorithm\" to View Details              pramipexole (MIRAPEX) tablet 0.5 mg  Dose: 0.5 mg  Freq: Every 8 Hours PRN Route: PO  PRN Comment: restless legs  Start: 07/12/23 1852     2358-Given        0848-Given         0611-Given       1621-Given        0040-Given       1459-Given       2254-Given            prochlorperazine (COMPAZINE) injection 5 mg  Dose: 5 mg  Freq: 2 Times Daily Route: IV  Start: 07/14/23 1015   Admin Instructions:   \"If multiple N/V medications ordered, use in the following order: Ondansetron, Prochlorperazine, Promethazine. Use PO unless patient refuses or patient unable to swallow.\"         1406-Given       2242-Given        0800-Given       2157-Given        0820-Given       2019-Given        0809-Given       2100           traZODone (DESYREL) tablet 50 mg  Dose: 50 mg  Freq: Nightly PRN Route: PO  PRN Reason: Sleep  Start: 07/13/23 0351   Admin Instructions:   Take with food.  Caution: Look alike/sound alike drug alert      2130-Given               Vitamin " B-2 (RIBOFLAVIN) tablet 400 mg  Dose: 400 mg  Freq: Daily Route: PO  Start: 07/13/23 0900      0848-Given        0945-Given        0800-Given        0821-Given        0809-Given          Completed Medications  Medications 07/11/23 07/12/23 07/13/23 07/14/23 07/15/23 07/16/23 07/17/23       cloNIDine (CATAPRES) tablet 0.2 mg  Dose: 0.2 mg  Freq: Once Route: PO  Start: 07/12/23 1322   End: 07/12/23 1311   Admin Instructions:   Hold for SBP less than 100, DBP less than 60, or heart rate less than 50  Caution: Look alike/sound alike drug alert.     1311-Given                droperidol (INAPSINE) injection 1.25 mg  Dose: 1.25 mg  Freq: Once Route: IV  Start: 07/12/23 1557   End: 07/12/23 1622     1622-Given                HYDROmorphone (DILAUDID) injection 0.5 mg  Dose: 0.5 mg  Freq: Once Route: IV  Start: 07/12/23 1422   End: 07/12/23 1432   Admin Instructions:   Based on patient request - if ordered for moderate or severe pain, provider allows for administration of a medication prescribed for a lower pain scale.  If given for pain, use the following pain scale:  Mild Pain = Pain Score of 1-3, CPOT 1-2  Moderate Pain = Pain Score of 4-6, CPOT 3-4  Severe Pain = Pain Score of 7-10, CPOT 5-8     1432-Given                HYDROmorphone (DILAUDID) injection 1 mg  Dose: 1 mg  Freq: Once Route: IV  Start: 07/12/23 1726   End: 07/12/23 1715   Admin Instructions:   Based on patient request - if ordered for moderate or severe pain, provider allows for administration of a medication prescribed for a lower pain scale.        Caution: Look alike/sound alike drug alert    If given for pain, use the following pain scale:  Mild Pain = Pain Score of 1-3, CPOT 1-2  Moderate Pain = Pain Score of 4-6, CPOT 3-4  Severe Pain = Pain Score of 7-10, CPOT 5-8     1715-Given                ketorolac (TORADOL) injection 15 mg  Dose: 15 mg  Freq: Once Route: IV  Start: 07/12/23 1322   End: 07/12/23 1311   Admin Instructions:         If given for  "pain, use the following pain scale:  Mild Pain = Pain Score of 1-3, CPOT 1-2  Moderate Pain = Pain Score of 4-6, CPOT 3-4  Severe Pain = Pain Score of 7-10, CPOT 5-8     1311-Given                ketorolac (TORADOL) injection 15 mg  Dose: 15 mg  Freq: Once Route: IV  Start: 07/12/23 1227   End: 07/12/23 1231   Admin Instructions:         If given for pain, use the following pain scale:  Mild Pain = Pain Score of 1-3, CPOT 1-2  Moderate Pain = Pain Score of 4-6, CPOT 3-4  Severe Pain = Pain Score of 7-10, CPOT 5-8     1231-Given                metoclopramide (REGLAN) injection 10 mg  Dose: 10 mg  Freq: Once Route: IV  Start: 07/12/23 1449   End: 07/12/23 1449   Admin Instructions:   Doses of 10 mg or less can be given IV push undiluted over 1 to 2 minutes     1449-Given                ondansetron (ZOFRAN) injection 4 mg  Dose: 4 mg  Freq: Once Route: IV  Start: 07/12/23 1227   End: 07/12/23 1231   Admin Instructions:   \"If multiple N/V medications ordered, use in the following order: Ondansetron, Prochlorperazine, Promethazine. Use PO unless patient refuses or patient unable to swallow.\"       1231-Given                potassium chloride (K-DUR,KLOR-CON) ER tablet 40 mEq  Dose: 40 mEq  Freq: Every 4 Hours Route: PO  Start: 07/14/23 0030   End: 07/14/23 0530   Admin Instructions:   Swallow whole; do not crush, split, or chew.       0017-Given       0530-Given              potassium chloride (K-DUR,KLOR-CON) ER tablet 40 mEq  Dose: 40 mEq  Freq: Every 4 Hours Route: PO  Start: 07/13/23 0600   End: 07/13/23 1430   Admin Instructions:   Swallow whole; do not crush, split, or chew.      0506-Given [C]       0900-Given       1430-Given               prochlorperazine (COMPAZINE) injection 5 mg  Dose: 5 mg  Freq: Once Route: IV  Start: 07/16/23 1345   End: 07/16/23 140   Admin Instructions:   \"If multiple N/V medications ordered, use in the following order: Ondansetron, Prochlorperazine, Promethazine. Use PO unless patient " "refuses or patient unable to swallow.\"           1401-Given            prochlorperazine (COMPAZINE) injection 5 mg  Dose: 5 mg  Freq: Once Route: IV  Start: 07/15/23 1545   End: 07/15/23 1455   Admin Instructions:   \"If multiple N/V medications ordered, use in the following order: Ondansetron, Prochlorperazine, Promethazine. Use PO unless patient refuses or patient unable to swallow.\"          1455-Given             sodium chloride 0.9 % bolus 1,000 mL  Dose: 1,000 mL  Freq: Once Route: IV  Start: 07/12/23 1615   End: 07/12/23 1858     1622-New Bag       1858-Stopped                sodium chloride 0.9 % bolus 1,000 mL  Dose: 1,000 mL  Freq: Once Route: IV  Start: 07/12/23 1227   End: 07/12/23 1428     1231-New Bag       1428-Stopped                SUMAtriptan (IMITREX) tablet 50 mg  Dose: 50 mg  Freq: Once Route: PO  Start: 07/15/23 1545   End: 07/15/23 1621   Admin Instructions:   May repeat dose once in 2 hours if unresolved.  Maximum single dose of 100 mg. Do not exceed 200 mg in 24 hours.     Caution: Look alike/sound alike drug alert        1621-Given            Discontinued Medications  Medications 07/11/23 07/12/23 07/13/23 07/14/23 07/15/23 07/16/23 07/17/23       cloNIDine (CATAPRES) tablet 0.1 mg  Dose: 0.1 mg  Freq: Once Route: PO  Start: 07/15/23 1230   End: 07/15/23 1141   Admin Instructions:   Hold for SBP less than 100, DBP less than 60, or heart rate less than 50  Caution: Look alike/sound alike drug alert.              cloNIDine (CATAPRES) tablet 0.1 mg  Dose: 0.1 mg  Freq: 4 Times Daily PRN Route: PO  PRN Comment: See Administration Instructions  Start: 07/12/23 1732   End: 07/13/23 0001   Admin Instructions:   May Give For Any of The Following Reasons:  - Hypertension (SBP >140 or DBP >90)  - COWS 10 or Greater  - Tachycardia (HR >100)    Do NOT Give if SBP <90, DBP <60 or HR <60  Caution: Look alike/sound alike drug alert.             Followed by  cloNIDine (CATAPRES) tablet 0.1 mg  Dose: 0.1 " mg  Freq: 4 Times Daily PRN Route: PO  PRN Comment: See Administration Instructions  Start: 07/13/23 0002   End: 07/14/23 0001   Admin Instructions:   May Give For Any of The Following Reasons:  - Hypertension (SBP >140 or DBP >90)  - COWS 10 or Greater  - Tachycardia (HR >100)    Do NOT Give if SBP <90, DBP <60 or HR <60  Caution: Look alike/sound alike drug alert.      1336-Given       2038-Given              Followed by  cloNIDine (CATAPRES) tablet 0.1 mg  Dose: 0.1 mg  Freq: 3 Times Daily PRN Route: PO  PRN Comment: See Administration Instructions  Start: 07/14/23 0002   End: 07/15/23 0001   Admin Instructions:   May Give For Any of The Following Reasons:  - Hypertension (SBP >140 or DBP >90)  - COWS 10 or Greater  - Tachycardia (HR >100)    Do NOT Give if SBP <90, DBP <60 or HR <60  Caution: Look alike/sound alike drug alert.       1405-Given             Followed by  cloNIDine (CATAPRES) tablet 0.1 mg  Dose: 0.1 mg  Freq: 2 Times Daily PRN Route: PO  PRN Comment: See Administration Instructions  Start: 07/15/23 0002   End: 07/16/23 0001   Admin Instructions:   May Give For Any of The Following Reasons:  - Hypertension (SBP >140 or DBP >90)  - COWS 10 or Greater  - Tachycardia (HR >100)    Do NOT Give if SBP <90, DBP <60 or HR <60  Caution: Look alike/sound alike drug alert.             Followed by  cloNIDine (CATAPRES) tablet 0.1 mg  Dose: 0.1 mg  Freq: Once As Needed Route: PO  PRN Comment: See Administration Instructions  Start: 07/16/23 0002   End: 07/16/23 1605   Admin Instructions:   May Give For Any of The Following Reasons:  - Hypertension (SBP >140 or DBP >90)  - COWS 10 or Greater  - Tachycardia (HR >100)    Do NOT Give if SBP <90, DBP <60 or HR <60  Caution: Look alike/sound alike drug alert.              famotidine (PEPCID) tablet 20 mg  Dose: 20 mg  Freq: 2 Times Daily Before Meals Route: PO  Start: 07/16/23 1300   End: 07/16/23 1605         1245-Given            gabapentin (NEURONTIN) capsule 300  mg  Dose: 300 mg  Freq: Every 8 Hours PRN Route: PO  PRN Comment: burning  Start: 07/12/23 1851   End: 07/12/23 1856   Admin Instructions:                   hydrOXYzine (ATARAX) tablet 25 mg  Dose: 25 mg  Freq: Every 6 Hours PRN Route: PO  PRN Reason: Itching  Start: 07/13/23 0351   End: 07/17/23 1037   Admin Instructions:   Caution: Look alike/sound alike drug alert      1211-Given       1803-Given        0058-Given       0945-Given        0800-Given         0148-Given       0812-Given           loperamide (IMODIUM) capsule 2 mg  Dose: 2 mg  Freq: 3 Times Daily PRN Route: PO  PRN Reason: Diarrhea  Start: 07/13/23 1453   End: 07/15/23 1615   Admin Instructions:   Maximum recommended 16 mg / 24 hours.          1531-Given        0945-Given              LORazepam (ATIVAN) injection 0.25 mg  Dose: 0.25 mg  Freq: Every 4 Hours PRN Route: IV  PRN Reason: Anxiety  Start: 07/16/23 1205   End: 07/17/23 1037   Admin Instructions:      Caution: Look alike/sound alike drug alert. Dilute 1:1 with normal saline.         1245-Given       1705-Given       2132-Given        0148-Given       0620-Given       1017-Given           LORazepam (ATIVAN) injection 0.5 mg  Dose: 0.5 mg  Freq: Every 4 Hours PRN Route: IV  PRN Reason: Anxiety  Start: 07/14/23 1338   End: 07/16/23 1205   Admin Instructions:      Caution: Look alike/sound alike drug alert. Dilute 1:1 with normal saline.       1406-Given        0120-Given       0656-Given       1105-Given       1455-Given       2104-Given        0311-Given       0820-Given            LORazepam (ATIVAN) injection 1 mg  Dose: 1 mg  Freq: Every 4 Hours PRN Route: IV  PRN Reason: Anxiety  Start: 07/13/23 1452   End: 07/14/23 1338   Admin Instructions:      Caution: Look alike/sound alike drug alert. Dilute 1:1 with normal saline.      1531-Given       2038-Given        0342-Given              melatonin tablet 3 mg  Dose: 3 mg  Freq: Nightly PRN Route: PO  PRN Reason: Sleep  Start: 07/12/23 1730    End: 07/15/23 1131       0059-Given        (0116)-Not Given             naproxen (NAPROSYN) tablet 250 mg  Dose: 250 mg  Freq: 2 Times Daily PRN Route: PO  PRN Reason: Mild Pain  Start: 07/16/23 1207   End: 07/16/23 1605   Admin Instructions:   Take with food. Based on patient request - if ordered for moderate or severe pain, provider allows for administration of a medication prescribed for a lower pain scale.  If given for pain, use the following pain scale:  Mild Pain = Pain Score of 1-3, CPOT 1-2  Moderate Pain = Pain Score of 4-6, CPOT 3-4  Severe Pain = Pain Score of 7-10, CPOT 5-8         1459-Given            naproxen (NAPROSYN) tablet 500 mg  Dose: 500 mg  Freq: 2 Times Daily PRN Route: PO  PRN Reason: Mild Pain  Start: 07/13/23 0351   End: 07/16/23 1207   Admin Instructions:   Take with food. Based on patient request - if ordered for moderate or severe pain, provider allows for administration of a medication prescribed for a lower pain scale.  If given for pain, use the following pain scale:  Mild Pain = Pain Score of 1-3, CPOT 1-2  Moderate Pain = Pain Score of 4-6, CPOT 3-4  Severe Pain = Pain Score of 7-10, CPOT 5-8      1211-Given         1415-Given        0314-Given            OLANZapine (zyPREXA) tablet 5 mg  Dose: 5 mg  Freq: Daily Route: PO  Start: 07/13/23 1030   End: 07/14/23 1038   Admin Instructions:   Caution: Look alike/sound alike drug alert      1042-Given        0945-Given              pantoprazole (PROTONIX) EC tablet 40 mg  Dose: 40 mg  Freq: Every Early Morning Route: PO  Start: 07/16/23 1345   End: 07/16/23 1605   Admin Instructions:   Swallow whole; do not crush, split, or chew.         1401-Given            Pharmacy to Dose enoxaparin (LOVENOX)  Freq: Continuous PRN Route: XX  PRN Reason: Consult  Indications Comment: Prophylaxis of Venous Thromboembolism  Start: 07/12/23 1731   End: 07/13/23 1218   Order specific questions:   Indication of use Prophylaxis                scopolamine  patch 1 mg/72 hr  Dose: 1 patch  Freq: Every 72 Hours Route: TD  Start: 07/12/23 2200   End: 07/17/23 1037   Admin Instructions:   Do not apply if patient older than 65 or has history of glaucoma.         2336-Medication Applied          2159-Medication Removed       2201-Medication Applied         1037 (Medication Removed) [C]           sodium chloride 0.9 % infusion  Rate: 75 mL/hr Dose: 75 mL/hr  Freq: Continuous Route: IV  Start: 07/12/23 1909   End: 07/16/23 1207     2112-New Bag       2340-Rate/Dose Verify        0510-Rate/Dose Verify       1042-New Bag        1937-New Bag        0018-Currently Infusing       0644-New Bag       2112-New Bag        1316-Stopped                 Lab Results (all)       Procedure Component Value Units Date/Time    Basic Metabolic Panel [[id number]]  (Abnormal) Collected: 07/16/23 0351    Specimen: Blood Updated: 07/16/23 0559     Glucose 85 mg/dL      BUN 5 mg/dL      Creatinine 0.76 mg/dL      Sodium 143 mmol/L      Potassium 3.8 mmol/L      Chloride 109 mmol/L      CO2 23.0 mmol/L      Calcium 8.7 mg/dL      BUN/Creatinine Ratio 6.6     Anion Gap 11.0 mmol/L      eGFR 101.1 mL/min/1.73     Narrative:      GFR Normal >60  Chronic Kidney Disease <60  Kidney Failure <15      CBC (No Diff) [[id number]]  (Normal) Collected: 07/16/23 0351    Specimen: Blood Updated: 07/16/23 0528     WBC 8.71 10*3/mm3      RBC 4.22 10*6/mm3      Hemoglobin 12.9 g/dL      Hematocrit 37.8 %      MCV 89.6 fL      MCH 30.6 pg      MCHC 34.1 g/dL      RDW 13.4 %      RDW-SD 43.2 fl      MPV 10.7 fL      Platelets 214 10*3/mm3     Basic Metabolic Panel [[id number]]  (Abnormal) Collected: 07/15/23 0858    Specimen: Blood Updated: 07/15/23 0931     Glucose 155 mg/dL      BUN 7 mg/dL      Creatinine 0.71 mg/dL      Sodium 139 mmol/L      Potassium 3.9 mmol/L      Chloride 108 mmol/L      CO2 20.9 mmol/L      Calcium 8.9 mg/dL      BUN/Creatinine Ratio 9.9     Anion Gap 10.1 mmol/L      eGFR 109.7 mL/min/1.73      Narrative:      GFR Normal >60  Chronic Kidney Disease <60  Kidney Failure <15      CBC (No Diff) [[id number]]  (Normal) Collected: 07/15/23 0339    Specimen: Blood Updated: 07/15/23 0355     WBC 9.23 10*3/mm3      RBC 4.61 10*6/mm3      Hemoglobin 14.3 g/dL      Hematocrit 42.3 %      MCV 91.8 fL      MCH 31.0 pg      MCHC 33.8 g/dL      RDW 13.6 %      RDW-SD 45.6 fl      MPV 10.5 fL      Platelets 217 10*3/mm3     Basic Metabolic Panel [[id number]]  (Abnormal) Collected: 07/14/23 0829    Specimen: Blood Updated: 07/14/23 0904     Glucose 91 mg/dL      BUN 5 mg/dL      Creatinine 0.72 mg/dL      Sodium 140 mmol/L      Potassium 4.7 mmol/L      Chloride 111 mmol/L      CO2 20.6 mmol/L      Calcium 8.6 mg/dL      BUN/Creatinine Ratio 6.9     Anion Gap 8.4 mmol/L      eGFR 107.9 mL/min/1.73     Narrative:      GFR Normal >60  Chronic Kidney Disease <60  Kidney Failure <15      CBC (No Diff) [[id number]]  (Normal) Collected: 07/14/23 0829    Specimen: Blood Updated: 07/14/23 0844     WBC 7.03 10*3/mm3      RBC 4.10 10*6/mm3      Hemoglobin 12.7 g/dL      Hematocrit 37.2 %      MCV 90.7 fL      MCH 31.0 pg      MCHC 34.1 g/dL      RDW 13.2 %      RDW-SD 43.6 fl      MPV 10.3 fL      Platelets 194 10*3/mm3     Potassium [[id number]]  (Abnormal) Collected: 07/13/23 2238    Specimen: Blood Updated: 07/13/23 2330     Potassium 3.3 mmol/L     CBC (No Diff) [[id number]]  (Normal) Collected: 07/13/23 0356    Specimen: Blood Updated: 07/13/23 0543     WBC 10.19 10*3/mm3      RBC 3.99 10*6/mm3      Hemoglobin 12.2 g/dL      Hematocrit 36.6 %      MCV 91.7 fL      MCH 30.6 pg      MCHC 33.3 g/dL      RDW 13.3 %      RDW-SD 44.8 fl      MPV 10.3 fL      Platelets 229 10*3/mm3     Basic Metabolic Panel [[id number]]  (Abnormal) Collected: 07/13/23 0356    Specimen: Blood Updated: 07/13/23 0432     Glucose 99 mg/dL      BUN 8 mg/dL      Creatinine 0.77 mg/dL      Sodium 140 mmol/L      Potassium 3.0 mmol/L      Chloride 111 mmol/L       CO2 18.0 mmol/L      Calcium 8.2 mg/dL      BUN/Creatinine Ratio 10.4     Anion Gap 11.0 mmol/L      eGFR 99.5 mL/min/1.73     Narrative:      GFR Normal >60  Chronic Kidney Disease <60  Kidney Failure <15      Urinalysis, Microscopic Only - Urine, Clean Catch [[id number]]  (Abnormal) Collected: 07/12/23 1636    Specimen: Urine, Clean Catch Updated: 07/12/23 1726     RBC, UA 0-2 /HPF      WBC, UA 3-5 /HPF      Bacteria, UA 2+ /HPF      Squamous Epithelial Cells, UA 7-12 /HPF      Hyaline Casts, UA 0-2 /LPF      Methodology Manual Light Microscopy    Urinalysis With Microscopic If Indicated (No Culture) - Urine, Clean Catch [[id number]]  (Abnormal) Collected: 07/12/23 1636    Specimen: Urine, Clean Catch Updated: 07/12/23 1657     Color, UA Dark Yellow     Appearance, UA Clear     pH, UA 6.5     Specific Gravity, UA 1.029     Glucose, UA Negative     Ketones, UA 80 mg/dL (3+)     Bilirubin, UA Negative     Blood, UA Negative     Protein, UA 30 mg/dL (1+)     Leuk Esterase, UA Trace     Nitrite, UA Negative     Urobilinogen, UA 1.0 E.U./dL    Magnesium [[id number]]  (Normal) Collected: 07/12/23 1539    Specimen: Blood Updated: 07/12/23 1640     Magnesium 1.6 mg/dL     Comprehensive Metabolic Panel [[id number]]  (Abnormal) Collected: 07/12/23 1539    Specimen: Blood Updated: 07/12/23 1640     Glucose 108 mg/dL      BUN 8 mg/dL      Creatinine 0.85 mg/dL      Sodium 144 mmol/L      Potassium 3.4 mmol/L      Comment: Slight hemolysis detected by analyzer. Results may be affected.        Chloride 111 mmol/L      CO2 18.9 mmol/L      Calcium 8.8 mg/dL      Total Protein 6.0 g/dL      Albumin 3.7 g/dL      ALT (SGPT) 10 U/L      AST (SGOT) 17 U/L      Comment: Slight hemolysis detected by analyzer. Results may be affected.        Alkaline Phosphatase 109 U/L      Total Bilirubin 0.7 mg/dL      Globulin 2.3 gm/dL      A/G Ratio 1.6 g/dL      BUN/Creatinine Ratio 9.4     Anion Gap 14.1 mmol/L      eGFR 88.4 mL/min/1.73      Narrative:      GFR Normal >60  Chronic Kidney Disease <60  Kidney Failure <15      CK [[id number]]  (Normal) Collected: 07/12/23 1539    Specimen: Blood Updated: 07/12/23 1621     Creatine Kinase 64 U/L     CBC & Differential [[id number]]  (Abnormal) Collected: 07/12/23 1539    Specimen: Blood Updated: 07/12/23 1556    Narrative:      The following orders were created for panel order CBC & Differential.  Procedure                               Abnormality         Status                     ---------                               -----------         ------                     CBC Auto Differential[[id number]]        Abnormal            Final result                 Please view results for these tests on the individual orders.    CBC Auto Differential [[id number]]  (Abnormal) Collected: 07/12/23 1539    Specimen: Blood Updated: 07/12/23 1556     WBC 13.57 10*3/mm3      RBC 4.70 10*6/mm3      Hemoglobin 14.4 g/dL      Hematocrit 42.0 %      MCV 89.4 fL      MCH 30.6 pg      MCHC 34.3 g/dL      RDW 13.0 %      RDW-SD 42.9 fl      MPV 10.0 fL      Platelets 287 10*3/mm3      Neutrophil % 87.1 %      Lymphocyte % 7.2 %      Monocyte % 5.2 %      Eosinophil % 0.0 %      Basophil % 0.1 %      Immature Grans % 0.4 %      Neutrophils, Absolute 11.81 10*3/mm3      Lymphocytes, Absolute 0.98 10*3/mm3      Monocytes, Absolute 0.71 10*3/mm3      Eosinophils, Absolute 0.00 10*3/mm3      Basophils, Absolute 0.02 10*3/mm3      Immature Grans, Absolute 0.05 10*3/mm3      nRBC 0.0 /100 WBC             ECG/EMG Results (all)       Procedure Component Value Units Date/Time    SCANNED - TELEMETRY   [[id number]] Resulted: 07/12/23     Updated: 07/12/23 2245    SCANNED - TELEMETRY   [[id number]] Resulted: 07/12/23     Updated: 07/13/23 0346    SCANNED - TELEMETRY   [[id number]] Resulted: 07/12/23     Updated: 07/13/23 0916    SCANNED - TELEMETRY   [[id number]] Resulted: 07/12/23     Updated: 07/13/23 1515    SCANNED - TELEMETRY   [[id number]]  Resulted: 07/12/23     Updated: 07/13/23 1547    SCANNED - TELEMETRY   [[id number]] Resulted: 07/12/23     Updated: 07/14/23 0428    SCANNED - TELEMETRY   [[id number]] Resulted: 07/12/23     Updated: 07/14/23 0517    SCANNED - TELEMETRY   [[id number]] Resulted: 07/12/23     Updated: 07/14/23 2228    SCANNED - TELEMETRY   [[id number]] Resulted: 07/12/23     Updated: 07/15/23 0757    SCANNED - TELEMETRY   [[id number]] Resulted: 07/12/23     Updated: 07/16/23 0811    SCANNED - TELEMETRY   [[id number]] Resulted: 07/12/23     Updated: 07/17/23 0238    SCANNED - TELEMETRY   [[id number]] Resulted: 07/12/23     Updated: 07/17/23 0425    SCANNED - TELEMETRY   [[id number]] Resulted: 07/12/23     Updated: 07/17/23 0508    SCANNED - TELEMETRY   [[id number]] Resulted: 07/12/23     Updated: 07/17/23 0614    SCANNED - TELEMETRY   [[id number]] Resulted: 07/12/23     Updated: 07/17/23 0640    SCANNED - TELEMETRY   [[id number]] Resulted: 07/12/23     Updated: 07/17/23 0734    SCANNED - TELEMETRY   [[id number]] Resulted: 07/12/23     Updated: 07/17/23 0741    SCANNED - TELEMETRY   [[id number]] Resulted: 07/12/23     Updated: 07/17/23 0847          Orders (all)        Start     Ordered    07/17/23 1130  busPIRone (BUSPAR) tablet 5 mg  2 Times Daily         07/17/23 1037    07/17/23 0845  Discharge patient  Once         07/17/23 0845    07/16/23 1730  pantoprazole (PROTONIX) EC tablet 40 mg  2 Times Daily Before Meals         07/16/23 1605    07/16/23 1345  pantoprazole (PROTONIX) EC tablet 40 mg  Every Early Morning,   Status:  Discontinued         07/16/23 1257    07/16/23 1345  prochlorperazine (COMPAZINE) injection 5 mg  Once         07/16/23 1257    07/16/23 1300  famotidine (PEPCID) tablet 20 mg  2 Times Daily Before Meals,   Status:  Discontinued         07/16/23 1206    07/16/23 1207  naproxen (NAPROSYN) tablet 250 mg  2 Times Daily PRN,   Status:  Discontinued         07/16/23 1207    07/16/23 1205  LORazepam (ATIVAN) injection 0.25 mg   Every 4 Hours PRN,   Status:  Discontinued         07/16/23 1205    07/16/23 0002  cloNIDine (CATAPRES) tablet 0.1 mg  Once As Needed,   Status:  Discontinued        See Hyperspace for full Linked Orders Report.    07/12/23 1732    07/15/23 2100  melatonin tablet 3 mg  Nightly         07/15/23 1131    07/15/23 1631  Inpatient Admission  Once         07/15/23 1630    07/15/23 1545  SUMAtriptan (IMITREX) tablet 50 mg  Once         07/15/23 1445    07/15/23 1545  prochlorperazine (COMPAZINE) injection 5 mg  Once         07/15/23 1445    07/15/23 1447  Continuous Cardiac Monitoring  Continuous        Comments: Follow Standing Orders As Outlined in Process Instructions (Open Order Report to View Full Instructions)    07/15/23 1447    07/15/23 1447  Telemetry - Maintain IV Access  Continuous         07/15/23 1447    07/15/23 1447  Telemetry - Place Orders & Notify Provider of Results When Patient Experiences Acute Chest Pain, Dysrhythmia or Respiratory Distress  Until Discontinued         07/15/23 1447    07/15/23 1447  May Be Off Telemetry for Tests  Continuous         07/15/23 1447    07/15/23 1446  nitroglycerin (NITROSTAT) SL tablet 0.4 mg  Every 5 Minutes PRN         07/15/23 1447    07/15/23 1230  cloNIDine (CATAPRES) tablet 0.1 mg  Once,   Status:  Discontinued         07/15/23 1130    07/15/23 1230  nicotine (NICODERM CQ) 14 MG/24HR patch 1 patch  Every 24 Hours Scheduled         07/15/23 1137    07/15/23 1212  Transfer Patient  Once,   Status:  Canceled         07/15/23 1212    07/15/23 1132  Discontinue Telemetry Monitoring  Once,   Status:  Canceled         07/15/23 1131    07/15/23 0002  cloNIDine (CATAPRES) tablet 0.1 mg  2 Times Daily PRN,   Status:  Discontinued        See Hyperspace for full Linked Orders Report.    07/12/23 1732    07/14/23 2100  OLANZapine (zyPREXA) tablet 5 mg  2 Times Daily         07/14/23 1038    07/14/23 1338  LORazepam (ATIVAN) injection 0.5 mg  Every 4 Hours PRN,   Status:   Discontinued         07/14/23 1338    07/14/23 1335  Inpatient Psychiatrist Consult  Once        Specialty:  Psychiatry  Provider:  Pola Serra III, MD    07/14/23 1334    07/14/23 1015  prochlorperazine (COMPAZINE) injection 5 mg  2 Times Daily         07/14/23 0929    07/14/23 0832  Potassium  Timed,   Status:  Canceled         07/13/23 2331    07/14/23 0600  CBC (No Diff)  Daily       07/13/23 1309    07/14/23 0600  Basic Metabolic Panel  Daily       07/13/23 1309    07/14/23 0126  Inpatient Access Center Consult  Once        Provider:  (Not yet assigned)    07/14/23 0126    07/14/23 0030  potassium chloride (K-DUR,KLOR-CON) ER tablet 40 mEq  Every 4 Hours         07/13/23 2331    07/14/23 0002  cloNIDine (CATAPRES) tablet 0.1 mg  3 Times Daily PRN        See Hyperspace for full Linked Orders Report.    07/12/23 1732    07/13/23 2230  Potassium  Timed         07/13/23 0502    07/13/23 2201  Sitter At Bedside  Continuous,   Status:  Canceled         07/13/23 2200    07/13/23 2100  FLUoxetine (PROzac) capsule 60 mg  Nightly         07/13/23 0351    07/13/23 1600  dicyclomine (BENTYL) capsule 20 mg  3 Times Daily         07/13/23 1453    07/13/23 1453  loperamide (IMODIUM) capsule 2 mg  3 Times Daily PRN,   Status:  Discontinued         07/13/23 1454    07/13/23 1452  LORazepam (ATIVAN) injection 1 mg  Every 4 Hours PRN,   Status:  Discontinued         07/13/23 1453    07/13/23 1030  OLANZapine (zyPREXA) tablet 5 mg  Daily,   Status:  Discontinued         07/13/23 0911    07/13/23 0900  folic acid (FOLVITE) tablet 1 mg  Daily         07/13/23 0351    07/13/23 0900  Vitamin B-2 (RIBOFLAVIN) tablet 400 mg  Daily         07/13/23 0351    07/13/23 0600  Basic Metabolic Panel  Morning Draw         07/12/23 1731    07/13/23 0600  CBC (No Diff)  Morning Draw         07/12/23 1731    07/13/23 0600  gabapentin (NEURONTIN) capsule 800 mg  Every 8 Hours Scheduled         07/13/23 0351    07/13/23 0600  potassium  chloride (K-DUR,KLOR-CON) ER tablet 40 mEq  Every 4 Hours         07/13/23 0502    07/13/23 0501  Potassium Replacement - Follow Nurse / BPA Driven Protocol  As Needed         07/13/23 0501    07/13/23 0351  hydrOXYzine (ATARAX) tablet 25 mg  Every 6 Hours PRN,   Status:  Discontinued         07/13/23 0351    07/13/23 0351  naproxen (NAPROSYN) tablet 500 mg  2 Times Daily PRN,   Status:  Discontinued         07/13/23 0351    07/13/23 0351  traZODone (DESYREL) tablet 50 mg  Nightly PRN         07/13/23 0351    07/13/23 0002  cloNIDine (CATAPRES) tablet 0.1 mg  4 Times Daily PRN        See Hyperspace for full Linked Orders Report.    07/12/23 1732    07/12/23 2200  scopolamine patch 1 mg/72 hr  Every 72 Hours,   Status:  Discontinued         07/12/23 2101    07/12/23 2100  sennosides-docusate (PERICOLACE) 8.6-50 MG per tablet 2 tablet  2 Times Daily        See Hyperspace for full Linked Orders Report.    07/12/23 1731    07/12/23 2000  Vital Signs  Every 4 Hours      Comments: Per per hospital policy    07/12/23 1731    07/12/23 2000  Enoxaparin Sodium (LOVENOX) syringe 40 mg  Every 24 Hours         07/12/23 1809    07/12/23 1909  sodium chloride 0.9 % infusion  Continuous,   Status:  Discontinued         07/12/23 1853    07/12/23 1853  dicyclomine (BENTYL) capsule 10 mg  4 Times Daily PRN         07/12/23 1853    07/12/23 1852  pramipexole (MIRAPEX) tablet 0.5 mg  Every 8 Hours PRN         07/12/23 1852    07/12/23 1851  gabapentin (NEURONTIN) capsule 300 mg  Every 8 Hours PRN,   Status:  Discontinued         07/12/23 1852    07/12/23 1800  Oral Care  2 Times Daily       07/12/23 1731    07/12/23 1733  Initiate Protocol: Clonidine (Catapres) Opiate Detoxification  Once         07/12/23 1732    07/12/23 1733  Vital Signs - Blood Pressure & Pulse Prior to & 30 Minutes After Each Clonidine Dose  Per Hospital Policy         07/12/23 1732    07/12/23 1733  Notify Provider - Clonidine Detox  Until Discontinued          07/12/23 1732    07/12/23 1732  cloNIDine (CATAPRES) tablet 0.1 mg  4 Times Daily PRN        See Hyperspace for full Linked Orders Report.    07/12/23 1732    07/12/23 1732  Code Status and Medical Interventions:  Continuous         07/12/23 1731    07/12/23 1732  Diet: Cardiac Diets; Healthy Heart (2-3 Na+); Texture: Regular Texture (IDDSI 7); Fluid Consistency: Thin (IDDSI 0)  Diet Effective Now         07/12/23 1731    07/12/23 1732  Daily Weights  Daily       07/12/23 1731    07/12/23 1732  Strict Intake & Output  Every Shift       07/12/23 1731    07/12/23 1731  acetaminophen (TYLENOL) tablet 650 mg  Every 4 Hours PRN         07/12/23 1731    07/12/23 1731  polyethylene glycol (MIRALAX) packet 17 g  Daily PRN        See Hyperspace for full Linked Orders Report.    07/12/23 1731    07/12/23 1731  bisacodyl (DULCOLAX) EC tablet 5 mg  Daily PRN        See Hyperspace for full Linked Orders Report.    07/12/23 1731    07/12/23 1731  bisacodyl (DULCOLAX) suppository 10 mg  Daily PRN        See Hyperspace for full Linked Orders Report.    07/12/23 1731    07/12/23 1731  ondansetron (ZOFRAN) tablet 4 mg  Every 6 Hours PRN        See Hyperspace for full Linked Orders Report.    07/12/23 1731    07/12/23 1731  ondansetron (ZOFRAN) injection 4 mg  Every 6 Hours PRN        See Hyperspace for full Linked Orders Report.    07/12/23 1731    07/12/23 1731  melatonin tablet 3 mg  Nightly PRN,   Status:  Discontinued         07/12/23 1731    07/12/23 1731  Pharmacy to Dose enoxaparin (LOVENOX)  Continuous PRN,   Status:  Discontinued         07/12/23 1731    07/12/23 1731  Initiate Observation Status  Once         07/12/23 1730    07/12/23 1731  Cardiac Monitoring  Continuous,   Status:  Canceled        Comments: Follow Standing Orders As Outlined in Process Instructions (Open Order Report to View Full Instructions)    07/12/23 1730    07/12/23 1726  HYDROmorphone (DILAUDID) injection 1 mg  Once         07/12/23 1710    07/12/23  1711  A (on-call MD unless specified) Details  Once        Specialty:  Hospitalist  Provider:  Kathy Kim MD    07/12/23 1710    07/12/23 1651  Urinalysis, Microscopic Only - Urine, Clean Catch  Once         07/12/23 1650    07/12/23 1615  sodium chloride 0.9 % bolus 1,000 mL  Once         07/12/23 1559    07/12/23 1557  droperidol (INAPSINE) injection 1.25 mg  Once         07/12/23 1541    07/12/23 1534  CBC & Differential  Once         07/12/23 1533    07/12/23 1534  Comprehensive Metabolic Panel  Once         07/12/23 1533    07/12/23 1534  Urinalysis With Microscopic If Indicated (No Culture) - Urine, Clean Catch  Once         07/12/23 1533    07/12/23 1534  CK  Once         07/12/23 1533    07/12/23 1534  Magnesium  Once         07/12/23 1533    07/12/23 1534  CBC Auto Differential  PROCEDURE ONCE         07/12/23 1533    07/12/23 1449  metoclopramide (REGLAN) injection 10 mg  Once         07/12/23 1433    07/12/23 1422  HYDROmorphone (DILAUDID) injection 0.5 mg  Once         07/12/23 1406    07/12/23 1322  cloNIDine (CATAPRES) tablet 0.2 mg  Once         07/12/23 1306    07/12/23 1322  ketorolac (TORADOL) injection 15 mg  Once         07/12/23 1306    07/12/23 1227  sodium chloride 0.9 % bolus 1,000 mL  Once         07/12/23 1211    07/12/23 1227  ondansetron (ZOFRAN) injection 4 mg  Once         07/12/23 1211    07/12/23 1227  ketorolac (TORADOL) injection 15 mg  Once         07/12/23 1211    Unscheduled  Up with assistance  As Needed       07/12/23 1731    --  SCANNED - TELEMETRY           07/12/23 0000    --  SCANNED - TELEMETRY           07/12/23 0000    --  SCANNED - TELEMETRY           07/12/23 0000    --  SCANNED - TELEMETRY           07/12/23 0000    --  SCANNED - TELEMETRY           07/12/23 0000    --  SCANNED - TELEMETRY           07/12/23 0000    --  SCANNED - TELEMETRY           07/12/23 0000    --  SCANNED - TELEMETRY           07/12/23 0000    --  SCANNED - TELEMETRY            23 0000    --  SCANNED - TELEMETRY           23 0000    --  SCANNED - TELEMETRY           23 0000    --  SCANNED - TELEMETRY           23 0000    --  SCANNED - TELEMETRY           23 0000    --  SCANNED - TELEMETRY           23 0000    --  SCANNED - TELEMETRY           23 0000    --  SCANNED - TELEMETRY           23 0000    --  SCANNED - TELEMETRY           23 0000    --  SCANNED - TELEMETRY           23 0000                Physician Progress Notes (all)        David Padilla MD at 23 1203              [facility] Medicine Services  PROGRESS NOTE    Patient Name: Belen Allen  : 1980  MRN: [id number]    Date of Admission: 2023  Primary Care Physician: Sahil Cornelius MD    Subjective   Subjective     CC:  Opiate withdrawal    Subjective:  Patient still feels she is going through withdrawal but better today.  She is able to eat more breakfast and is eating and drinking more.  She is still complaining of some symptoms of nausea but that is improving with Compazine and Zofran.  Diarrhea is resolved.  Patient does not feel ready to go home today but thinks she will be ready to go home tomorrow.      Review of Systems  No current fevers or chills  No current shortness of breath or cough  No current chest pain or palpitations      Objective   Objective     Vital Signs:   Temp:  [97.2 °F (36.2 °C)-98.6 °F (37 °C)] 98.6 °F (37 °C)  Heart Rate:  [61-83] 80  Resp:  [16-20] 18  BP: (114-175)/(63-97) 144/88        Physical Exam:  Constitutional:Awake, alert  HENT: NCAT, mucous membranes moist, neck supple  Respiratory: No cough or wheezing, respiratory effort normal, nonlabored breathing   Cardiovascular: Pulse rate normal, normal radial pulses  Gastrointestinal:  soft, nontender, nondistended  Musculoskeletal: Lumbar region with tenderness, normal musculature for age, no lower extremity edema, BMI 34 obese  Psychiatric: More  calm, less anxious affect, cooperative, conversational  Neurologic: No slurred speech or facial droop, follows commands  Skin: No rashes or jaundice, warm      Results Reviewed:  Results for orders placed during the hospital encounter of 20    Adult Transesophageal Echo (DENIS) W/ Cont if Necessary Per Protocol    Interpretation Summary  · Left ventricular ejection fraction appears to be 61 - 65%. Left ventricular systolic function is normal.  · Saline test results are negative.      I have reviewed the medications:  Scheduled Meds:dicyclomine, 20 mg, Oral, TID  enoxaparin, 40 mg, Subcutaneous, Q24H  FLUoxetine, 60 mg, Oral, Nightly  folic acid, 1 mg, Oral, Daily  gabapentin, 800 mg, Oral, Q8H  melatonin, 3 mg, Oral, Nightly  nicotine, 1 patch, Transdermal, Q24H  OLANZapine, 5 mg, Oral, BID  prochlorperazine, 5 mg, Intravenous, BID  Scopolamine, 1 patch, Transdermal, Q72H  senna-docusate sodium, 2 tablet, Oral, BID  Vitamin B-2, 400 mg, Oral, Daily      Continuous Infusions:sodium chloride, 75 mL/hr, Last Rate: 75 mL/hr (07/15/23 2112)      PRN Meds:.  acetaminophen    senna-docusate sodium **AND** polyethylene glycol **AND** bisacodyl **AND** bisacodyl    [] cloNIDine **FOLLOWED BY** [] cloNIDine **FOLLOWED BY** [] cloNIDine **FOLLOWED BY** [] cloNIDine **FOLLOWED BY** cloNIDine    dicyclomine    hydrOXYzine    LORazepam    naproxen    nitroglycerin    ondansetron **OR** ondansetron    Potassium Replacement - Follow Nurse / BPA Driven Protocol    pramipexole    traZODone    Assessment & Plan   Assessment & Plan     Active Hospital Problems    Diagnosis  POA    **Opioid withdrawal [F11.93]  Yes    Chronic pain syndrome [G89.4]  Yes    Tobacco abuse [Z72.0]  Yes    Anxiety disorder [F41.9]  Yes      Resolved Hospital Problems   No resolved problems to display.        Brief Hospital Course to date:  Belen Allen is a 42 y.o. female presents with opiate withdrawal    Opiate  withdrawal  Nausea and vomiting secondary to opiate withdrawal  Restless leg syndrome  Hypokalemia  Obesity  Agitation  Insomnia    Discussion/plan:  Appreciate psychiatry recommendations.      Patient is responding to mood stabilizers.  Patient feels she is benefiting greatly from these medications.  We discussed risk versus benefits of these medications and she says she would like to continue them at discharge as they help with her agitation and anxiety.    Lorazepam dose decreased.  Receiving as needed Atarax for anxiety.    Add famotidine for heartburn.  Due to heartburn we will decrease NSAID dosage.    Discontinue IV fluid today as patient appears adequately hydrated and we will monitor oral intake off IV fluid.  Patient's ability to eat has increased.    Continue counseling regarding opiate withdrawal.      Clinically patient seems to be going through the opiate withdrawal process well and clinically appears quite improved today compared to yesterday or the day before.      At this time patient wishes to continue gabapentin for neuropathic pain.  Recommend patient continue discussions regarding this medication with her prescribing physician     Blood pressure improving but still elevated related to withdrawal.  Continue medication for restless leg.    Potassium levels improved.  Monitor electrolytes laboratory studies while hospitalized.    Trazodone for insomnia.  Melatonin also added as scheduled medicine    Recommend gradual healthy weight loss and improve diet and exercise for obesity.    Zofran for nausea which seems to be effective.  Low-dose Compazine added additionally for nausea    Potential discharge tomorrow pending clinical course    Case discussed on multidisciplinary rounds with case management, nursing, and pharmacist.    CODE STATUS:   Code Status and Medical Interventions:   Ordered at: 07/12/23 4204     Code Status (Patient has no pulse and is not breathing):    CPR (Attempt to Resuscitate)      Medical Interventions (Patient has pulse or is breathing):    Full       David Padilla MD  23      Electronically signed by David Padilla MD at 23 1208       David Padilla MD at 07/15/23 1613          I was notified by nurse that patient is having more episodes of nausea and vomiting this afternoon.  She ate a very large breakfast which may have been more than she was ready to tolerate at this point.  We will give an extra dose of Compazine and continue Zofran this afternoon.  We will try Imitrex for not migraine per patient request.  As she makes clinical progress these type of setbacks are not unexpected.    Electronically signed by David Padilla MD, 07/15/23, 4:14 PM EDT.      Electronically signed by David Padilla MD at 07/15/23 1614       David Padilla MD at 07/15/23 1127              [facility] Medicine Services  PROGRESS NOTE    Patient Name: Belen Allen  : 1980  MRN: [id number]    Date of Admission: 2023  Primary Care Physician: Sahil Cornelius MD    Subjective   Subjective     CC:  Opiate withdrawal    Subjective:  Patient says she feels a little better today.  She is still quite anxious.  She still notes a sensation of crawling over her skin she relates to her withdrawal.  She denies any focal neurologic deficits.  She continues to be motivated to get through her withdrawal.      Review of Systems  No current fevers or chills  No current shortness of breath or cough  No current chest pain or palpitations      Objective   Objective     Vital Signs:   Temp:  [97.5 °F (36.4 °C)-98.6 °F (37 °C)] 97.7 °F (36.5 °C)  Heart Rate:  [54-72] 57  Resp:  [20] 20  BP: (118-158)/() 149/93        Physical Exam:  Constitutional:Awake, alert  HENT: NCAT, mucous membranes moist, neck supple  Respiratory: No cough or wheezing, respiratory effort normal, nonlabored breathing   Cardiovascular: Pulse rate normal,  normal radial pulses  Gastrointestinal:  soft, nontender, nondistended  Musculoskeletal: Lumbar region with tenderness, normal musculature for age, no lower extremity edema, BMI 34 obese  Psychiatric: Agitated anxious affect, cooperative, conversational  Neurologic: No slurred speech or facial droop, follows commands  Skin: No rashes or jaundice, warm      Results Reviewed:        Results for orders placed during the hospital encounter of 20    Adult Transesophageal Echo (DENIS) W/ Cont if Necessary Per Protocol    Interpretation Summary  · Left ventricular ejection fraction appears to be 61 - 65%. Left ventricular systolic function is normal.  · Saline test results are negative.      I have reviewed the medications:  Scheduled Meds:cloNIDine, 0.1 mg, Oral, Once  dicyclomine, 20 mg, Oral, TID  enoxaparin, 40 mg, Subcutaneous, Q24H  FLUoxetine, 60 mg, Oral, Nightly  folic acid, 1 mg, Oral, Daily  gabapentin, 800 mg, Oral, Q8H  melatonin, 3 mg, Oral, Nightly  OLANZapine, 5 mg, Oral, BID  prochlorperazine, 5 mg, Intravenous, BID  Scopolamine, 1 patch, Transdermal, Q72H  senna-docusate sodium, 2 tablet, Oral, BID  Vitamin B-2, 400 mg, Oral, Daily      Continuous Infusions:sodium chloride, 75 mL/hr, Last Rate: 75 mL/hr (07/15/23 0644)      PRN Meds:.  acetaminophen    senna-docusate sodium **AND** polyethylene glycol **AND** bisacodyl **AND** bisacodyl    [] cloNIDine **FOLLOWED BY** [] cloNIDine **FOLLOWED BY** [] cloNIDine **FOLLOWED BY** cloNIDine **FOLLOWED BY** [START ON 2023] cloNIDine    dicyclomine    hydrOXYzine    loperamide    LORazepam    naproxen    ondansetron **OR** ondansetron    Potassium Replacement - Follow Nurse / BPA Driven Protocol    pramipexole    traZODone    Assessment & Plan   Assessment & Plan     Active Hospital Problems    Diagnosis  POA    **Opioid withdrawal [F11.93]  Yes      Resolved Hospital Problems   No resolved problems to display.        Brief Hospital  Course to date:  Belen Allen is a 42 y.o. female presents with opiate withdrawal    Opiate withdrawal  Nausea and vomiting secondary to opiate withdrawal  Restless leg syndrome  Hypokalemia  Obesity  Agitation  Insomnia    Discussion/plan:  Psychiatry consult to weigh in on anxiety.      Patient appears to be responding well to mood stabilizer olanzapine which is currently at low-dose twice daily.  Continue this for now.      Lorazepam for now at low-dose for breakthrough anxiety which seems to be effective.  Give clonidine today.    Continue counseling regarding opiate withdrawal.      Diarrhea has resolved.      Clinically patient seems to be going through the opiate withdrawal process well and clinically appears quite improved today compared to yesterday or the day before.      At this time patient wishes to continue gabapentin for neuropathic pain.  Recommend patient continue discussions regarding this medication with her prescribing physician     Blood pressure improving but still elevated related to withdrawal.  Continue medication for restless leg.    Potassium levels improved.  Monitor electrolytes laboratory studies while hospitalized.    Trazodone for insomnia.  Melatonin also added as scheduled medicine    Recommend gradual healthy weight loss and improve diet and exercise for obesity.    Zofran for nausea which seems to be effective.  Low-dose Compazine added additionally for nausea    Potential discharge in 1 to 2 days pending clinical course as patient recovering from her withdrawal    Case discussed on multidisciplinary rounds with case management, nursing, and pharmacist.    CODE STATUS:   Code Status and Medical Interventions:   Ordered at: 07/12/23 1737     Code Status (Patient has no pulse and is not breathing):    CPR (Attempt to Resuscitate)     Medical Interventions (Patient has pulse or is breathing):    Full       David Padilla MD  07/15/23      Electronically signed by  David Padilla MD at 07/15/23 1131       David Padilla MD at 23 1334              [facility] Medicine Services  PROGRESS NOTE    Patient Name: Belen Allen  : 1980  MRN: [id number]    Date of Admission: 2023  Primary Care Physician: Sahil Cornelius MD    Subjective   Subjective     CC:  Albuterol    Subjective:  Patient still reports going through alcohol withdrawal.  She feels she responded decently well to Zyprexa yesterday and is asking if this can be increased to twice daily.  She again states she no longer wants to narcotics.  She says she does need to continue gabapentin and feels benefits from this long-term.        Review of Systems  No current fevers or chills  No current shortness of breath or cough  No current chest pain or palpitations      Objective   Objective     Vital Signs:   Temp:  [98.2 °F (36.8 °C)-98.6 °F (37 °C)] 98.6 °F (37 °C)  Heart Rate:  [49-70] 54  Resp:  [18-24] 20  BP: (137-166)/() 149/85        Physical Exam:  Constitutional:Awake, alert  HENT: NCAT, mucous membranes moist, neck supple  Respiratory: No cough or wheezing, respiratory effort normal, nonlabored breathing   Cardiovascular: Pulse rate normal, normal radial pulses  Gastrointestinal:  soft, nontender, nondistended  Musculoskeletal: Lumbar region with tenderness, normal musculature for age, no lower extremity edema, BMI 34 obese  Psychiatric: Agitated anxious affect, cooperative, conversational  Neurologic: No slurred speech or facial droop, follows commands  Skin: No rashes or jaundice, warm      Results Reviewed:    Results for orders placed during the hospital encounter of 20    Adult Transesophageal Echo (DENIS) W/ Cont if Necessary Per Protocol    Interpretation Summary  · Left ventricular ejection fraction appears to be 61 - 65%. Left ventricular systolic function is normal.  · Saline test results are negative.      I have reviewed the  medications:  Scheduled Meds:dicyclomine, 20 mg, Oral, TID  enoxaparin, 40 mg, Subcutaneous, Q24H  FLUoxetine, 60 mg, Oral, Nightly  folic acid, 1 mg, Oral, Daily  gabapentin, 800 mg, Oral, Q8H  OLANZapine, 5 mg, Oral, BID  prochlorperazine, 5 mg, Intravenous, BID  Scopolamine, 1 patch, Transdermal, Q72H  senna-docusate sodium, 2 tablet, Oral, BID  Vitamin B-2, 400 mg, Oral, Daily      Continuous Infusions:sodium chloride, 75 mL/hr, Last Rate: 75 mL/hr (23 1042)      PRN Meds:.  acetaminophen    senna-docusate sodium **AND** polyethylene glycol **AND** bisacodyl **AND** bisacodyl    [] cloNIDine **FOLLOWED BY** [] cloNIDine **FOLLOWED BY** cloNIDine **FOLLOWED BY** [START ON 7/15/2023] cloNIDine **FOLLOWED BY** [START ON 2023] cloNIDine    dicyclomine    hydrOXYzine    loperamide    LORazepam    melatonin    naproxen    ondansetron **OR** ondansetron    Potassium Replacement - Follow Nurse / BPA Driven Protocol    pramipexole    traZODone    Assessment & Plan   Assessment & Plan     Active Hospital Problems    Diagnosis  POA    **Opioid withdrawal [F11.93]  Yes      Resolved Hospital Problems   No resolved problems to display.        Brief Hospital Course to date:  Belen Allen is a 42 y.o. female presents with opiate withdrawal    Opiate withdrawal  Nausea and vomiting secondary to opiate withdrawal  Restless leg syndrome  Hypokalemia  Obesity  Agitation  Insomnia    Discussion/plan:  Continue same complicated opiate withdrawal.  Patient responded to mood stabilizers olanzapine yesterday.  She is requesting increase it to twice daily and I have will added twice daily low-dose per her request.      Consult psychiatry to assist with management for anxiety and depression.  These are acutely exacerbated due to opiate withdrawal.      I feel we are possibly moving to this process as patient's diarrhea has resolved.  She is still having some mild confusion but this is improved since  yesterday.      Low-dose lorazepam also added for acute anxiety.  I am only planning to use the short-term and patient agreement with this plan.      At this time patient wishes to continue gabapentin for neuropathic pain.  Recommend patient continue discussions regarding this medication with her prescribing physician    Continue as needed clonidine for withdrawal.  Blood pressure improving.  Continue medication for restless leg.    Potassium levels improved.  Monitor electrolytes laboratory studies while hospitalized.    Trazodone for insomnia.    Recommend gradual healthy weight loss and improve diet and exercise for obesity.    Zofran for nausea which seems to be effective.  Low-dose Compazine added additionally for nausea    Potential discharge in 1 to 2 days pending clinical course as patient recovering from her withdrawal    Case discussed on multidisciplinary rounds with case management, nursing, and pharmacist.    CODE STATUS:   Code Status and Medical Interventions:   Ordered at: 23 1732     Code Status (Patient has no pulse and is not breathing):    CPR (Attempt to Resuscitate)     Medical Interventions (Patient has pulse or is breathing):    Full       David Padilla MD  23      Electronically signed by David Padilla MD at 07/15/23 1127       David Padilla MD at 23 1304              [facility] Medicine Services  PROGRESS NOTE    Patient Name: Belen Allen  : 1980  MRN: [id number]    Date of Admission: 2023  Primary Care Physician: Sahil Cornelius MD    Subjective   Subjective     CC:  Albuterol    Subjective:  Patient reports she is still going through opiate withdrawal.  She notes she is having issues with agitation and anxiety.  She is requesting something to help stabilize her mood.  She is adamant she does not want to go back onto narcotics for her chronic back pain issues.  She wishes to avoid pain management in the future  and so she will not be going back to pain management as well.  Nausea has improved with nausea medicine.    Review of Systems  No current fevers or chills  No current shortness of breath or cough  No current chest pain or palpitations      Objective   Objective     Vital Signs:   Temp:  [98.8 °F (37.1 °C)-99 °F (37.2 °C)] 99 °F (37.2 °C)  Heart Rate:  [52-72] 72  Resp:  [18-22] 20  BP: (119-173)/() 119/104        Physical Exam:  Constitutional:Awake, alert  HENT: NCAT, mucous membranes moist, neck supple  Respiratory: No cough or wheezing, respiratory effort normal, nonlabored breathing   Cardiovascular: Pulse rate normal, normal radial pulses  Gastrointestinal: Positive bowel sounds, soft, nontender, nondistended  Musculoskeletal: Lumbar region with tenderness, normal musculature for age, no lower extremity edema, BMI 34 obese  Psychiatric: Agitated anxious affect, cooperative, conversational  Neurologic: No slurred speech or facial droop, follows commands  Skin: No rashes or jaundice, warm      Results Reviewed:        Results for orders placed during the hospital encounter of 11/08/20    Adult Transesophageal Echo (DENIS) W/ Cont if Necessary Per Protocol    Interpretation Summary  · Left ventricular ejection fraction appears to be 61 - 65%. Left ventricular systolic function is normal.  · Saline test results are negative.      I have reviewed the medications:  Scheduled Meds:enoxaparin, 40 mg, Subcutaneous, Q24H  FLUoxetine, 60 mg, Oral, Nightly  folic acid, 1 mg, Oral, Daily  gabapentin, 800 mg, Oral, Q8H  OLANZapine, 5 mg, Oral, Daily  potassium chloride ER, 40 mEq, Oral, Q4H  Scopolamine, 1 patch, Transdermal, Q72H  senna-docusate sodium, 2 tablet, Oral, BID  Vitamin B-2, 400 mg, Oral, Daily      Continuous Infusions:sodium chloride, 75 mL/hr, Last Rate: 75 mL/hr (07/13/23 1042)      PRN Meds:.  acetaminophen    senna-docusate sodium **AND** polyethylene glycol **AND** bisacodyl **AND** bisacodyl     [] cloNIDine **FOLLOWED BY** cloNIDine **FOLLOWED BY** [START ON 2023] cloNIDine **FOLLOWED BY** [START ON 7/15/2023] cloNIDine **FOLLOWED BY** [START ON 2023] cloNIDine    dicyclomine    hydrOXYzine    melatonin    naproxen    ondansetron **OR** ondansetron    Potassium Replacement - Follow Nurse / BPA Driven Protocol    pramipexole    traZODone    Assessment & Plan   Assessment & Plan     Active Hospital Problems    Diagnosis  POA    **Opioid withdrawal [F11.93]  Yes      Resolved Hospital Problems   No resolved problems to display.        Brief Hospital Course to date:  Belen Allen is a 42 y.o. female presents with opiate withdrawal    Opiate withdrawal  Nausea and vomiting secondary to opiate withdrawal  Restless leg syndrome  Hypokalemia  Obesity  Agitation  Insomnia    Discussion/plan:  Continue to monitor off opiate withdrawal.  Due to significant symptoms with agitation and anxiety patient requesting for something to help stabilize her mood.  Plan to try low-dose mood stabilizer olanzapine and monitor response.  I discussed risk versus benefits and potential side effects of this medication with the patient including but not limited to risk of sedation.  She agrees and is eager to try something new.    At this time patient wishes to continue gabapentin for neuropathic pain.  Plan to further discuss eventual weaning off this that is something patient is interested in.    Continue as needed clonidine for withdrawal.  Continue medication for restless leg.    Replete potassium levels as needed.  Monitor electrolytes laboratory studies while hospitalized.    Trazodone for insomnia.    Recommend gradual healthy weight loss and improve diet and exercise for obesity.    Zofran for nausea which seems to be effective.    Potential discharge tomorrow pending clinical course.    Case discussed on multidisciplinary rounds with case management, nursing, and pharmacist.    CODE STATUS:   Code Status  "and Medical Interventions:   Ordered at: 07/12/23 1731     Code Status (Patient has no pulse and is not breathing):    CPR (Attempt to Resuscitate)     Medical Interventions (Patient has pulse or is breathing):    Full       David Padilla MD  07/13/23      Electronically signed by David Padilla MD at 07/13/23 8263          Consult Notes (all)        Pola Serra III, MD at 07/15/23 1618        Consult Orders    1. Inpatient Psychiatrist Consult [[id number]] ordered by David Padilla MD at 07/14/23 1695                 IDENTIFYING INFORMATION: The patient is a 42-year-old white female admitted with complaints of opioid withdrawal.    CHIEF COMPLAINT: Because of something stupid I did    INFORMANT: Patient and chart    RELIABILITY: Fair    HISTORY OF PRESENT ILLNESS: The patient is a 42-year-old white female who reports that she has been taking 4 30 mg Percocet tablets on a daily basis for \"a while\".  She reports that she takes cities related to history of chronic pain and not to \"get high\".  The patient reports that a couple of days prior to admission she decided to \"flush them all down the toilet and has since developed symptoms consistent with opioid withdrawal including restless leg nausea or vomiting and achiness.  The patient reports that she received the Percocet from her friends and that they were not prescribed for her.  The patient vehemently denies any suicidal or homicidal ideation.  She does report a history of treatment for depression by her primary care physician with a combination of Zoloft and Wellbutrin.  She denies abuse of any other psychoactive substances.    PAST PSYCHIATRIC HISTORY: As above next field    PAST MEDICAL HISTORY: Significant for history of chronic pain and obesity    MEDICATIONS:   Current Facility-Administered Medications   Medication Dose Route Frequency Provider Last Rate Last Admin    acetaminophen (TYLENOL) tablet 650 mg  650 mg Oral " Q4H PRN Kathy Kim MD   650 mg at 07/15/23 1105    sennosides-docusate (PERICOLACE) 8.6-50 MG per tablet 2 tablet  2 tablet Oral BID Kathy Kim MD        And    polyethylene glycol (MIRALAX) packet 17 g  17 g Oral Daily PRN Kathy Kim MD        And    bisacodyl (DULCOLAX) EC tablet 5 mg  5 mg Oral Daily PRN Kathy Kim MD        And    bisacodyl (DULCOLAX) suppository 10 mg  10 mg Rectal Daily PRN Kathy Kim MD        cloNIDine (CATAPRES) tablet 0.1 mg  0.1 mg Oral BID PRN Kathy Kim MD        Followed by    [START ON 7/16/2023] cloNIDine (CATAPRES) tablet 0.1 mg  0.1 mg Oral Once PRN Kathy Kim MD        dicyclomine (BENTYL) capsule 10 mg  10 mg Oral 4x Daily PRN Kathy Kim MD   10 mg at 07/13/23 1211    dicyclomine (BENTYL) capsule 20 mg  20 mg Oral TID David Padilla MD   20 mg at 07/15/23 0800    Enoxaparin Sodium (LOVENOX) syringe 40 mg  40 mg Subcutaneous Q24H Kathy Kim MD   40 mg at 07/14/23 2130    FLUoxetine (PROzac) capsule 60 mg  60 mg Oral Nightly Kathy Kim MD   60 mg at 07/14/23 2125    folic acid (FOLVITE) tablet 1 mg  1 mg Oral Daily Kathy Kim MD   1 mg at 07/15/23 0800    gabapentin (NEURONTIN) capsule 800 mg  800 mg Oral Q8H Kathy Kim MD   800 mg at 07/15/23 1415    hydrOXYzine (ATARAX) tablet 25 mg  25 mg Oral Q6H PRN Kathy Kim MD   25 mg at 07/15/23 0800    LORazepam (ATIVAN) injection 0.5 mg  0.5 mg Intravenous Q4H PRN David Padilla MD   0.5 mg at 07/15/23 1455    melatonin tablet 3 mg  3 mg Oral Nightly David Padilla MD        naproxen (NAPROSYN) tablet 500 mg  500 mg Oral BID PRN Stingl, Kathy St MD   500 mg at 07/15/23 1415    nicotine (NICODERM CQ) 14 MG/24HR patch 1 patch  1 patch Transdermal Q24H David Padilla MD   1 patch at 07/15/23 1414    nitroglycerin (NITROSTAT) SL tablet 0.4 mg   0.4 mg Sublingual Q5 Min PRN David Padilla MD        OLANZapine (zyPREXA) tablet 5 mg  5 mg Oral BID David Padilla MD   5 mg at 07/15/23 0800    ondansetron (ZOFRAN) tablet 4 mg  4 mg Oral Q6H PRN Kathy Kim MD        Or    ondansetron (ZOFRAN) injection 4 mg  4 mg Intravenous Q6H PRN Kathy Kim MD   4 mg at 07/15/23 1314    Potassium Replacement - Follow Nurse / BPA Driven Protocol   Does not apply PRN Nisha Ribera, APRN        pramipexole (MIRAPEX) tablet 0.5 mg  0.5 mg Oral Q8H PRN Kathy Kim MD   0.5 mg at 07/15/23 0611    prochlorperazine (COMPAZINE) injection 5 mg  5 mg Intravenous BID David Padilla MD   5 mg at 07/15/23 0800    scopolamine patch 1 mg/72 hr  1 patch Transdermal Q72H Mera Ramirez, APRN   1 patch at 07/12/23 2336    sodium chloride 0.9 % infusion  75 mL/hr Intravenous Continuous Kathy Kim MD 75 mL/hr at 07/15/23 0644 75 mL/hr at 07/15/23 0644    SUMAtriptan (IMITREX) tablet 50 mg  50 mg Oral Once David Padilla MD        traZODone (DESYREL) tablet 50 mg  50 mg Oral Nightly PRN Kathy Kim MD   50 mg at 07/13/23 2130    Vitamin B-2 (RIBOFLAVIN) tablet 400 mg  400 mg Oral Daily Kathy Kim MD   400 mg at 07/15/23 0800     Field morphine lidocaine    ALLERGIES: Morphine lidocaine    FAMILY HISTORY: Noncontributory    SOCIAL HISTORY: Substance use as noted previously    MENTAL STATUS EXAM: The patient is an obese white female appearing her stated age.  She peers to be in mild physical distress at the time of examination.  She is awake alert and oriented all spheres.  Her mood is euthymic her affect congruent.  Speech is relevant and coherent there are no deficits in memory or cognition noted.  The patient is cooperative throughout the interview.  Intelligence is judged to be in the average range based on fund of knowledge, she denies suicidal or homicidal ideation or  psychotic features.  Judgement and insight are intact    IMP: Opioid use disorder, medical problems as noted previousl field    PLAN: The patient has been placed on a COWS based detox protocol which should address symptoms of opioid withdrawal.  She is uncertain as to whether she wishes to pursue post discharge chemical dependence treatment.  I will ask [facility] center to provide her with information regarding local resources for maintenance of sobriety.  Thank you very much for the opportunity to see this patient.    Electronically signed by Pola Serra III, MD at 07/15/23 4027

## 2023-07-17 NOTE — PLAN OF CARE
Goal Outcome Evaluation:      Plan to d/c home.  picking up around 6 pm. Meds to bed ordered and in place.

## 2023-07-17 NOTE — DISCHARGE SUMMARY
[facility] Medicine Services  DISCHARGE SUMMARY    Patient Name: Belen Allen  : 1980  MRN: [id number]    Date of Admission: 2023 11:04 AM  Date of Discharge:  2023  Primary Care Physician: Sahil Cornelius MD    Consults       Date and Time Order Name Status Description    2023  1:34 PM Inpatient Psychiatrist Consult Completed     [facility] unless specified[facility] Details              Hospital Course       Active Hospital Problems    Diagnosis  POA    Chronic pain syndrome [G89.4]  Yes    Class 2 severe obesity with serious comorbidity in adult [E66.01]  Yes    Tobacco abuse [Z72.0]  Yes    Anxiety disorder [F41.9]  Yes      Resolved Hospital Problems    Diagnosis Date Resolved POA    Opioid withdrawal [F11.93] 2023 Yes        Opiate withdrawal  Nausea and vomiting secondary to opiate withdrawal  Restless leg syndrome  Hypokalemia  Obesity  Agitation  Insomnia    Hospital Course:  Belen Allen is a 42 y.o. female presented to the hospital with complicated opiate withdrawal.  Patient reported she had been taking Percocet 30 mg 4 times daily for total of 120 mg of oxycodone per day prior to admission.  Is not exactly clear where she was getting the oxycodone but she decided she wanted to stop narcotics and flushed all her narcotics down the toilet.  She developed nausea vomiting diarrhea and anxiety.  She was monitored carefully for opiate withdrawal in the hospital.  She had continued nausea vomiting and poor appetite for several days and required IV fluid resuscitation and IV fluid maintenance to avoid dehydration.  She required IV medications for symptom management.  Patient had episodes of severe agitation and was placed on olanzapine as a mood stabilizer in the hospital.  She was seen by psychiatry who felt she was doing well on current psychiatric medications and recommended she continue current therapy.  He also consulted Access team who  brought her resources for sobriety.  I discussed olanzapine which she felt she was benefiting from that helped her anxiety and agitation significantly.  We discussed risk versus benefits of this medication including risk of tardive dyskinesia oversedation and various other issues.  At the end today she appears to be tolerating this low-dose very well and strongly wishes to continue this at discharge.  She is also asking for something else for anxiety and was agreeable to try low-dose BuSpar.  She plans to follow-up with her primary care provider within week for general hospital follow-up.  She is now eating and drinking well and seems adequately stabilized.  I will prescribe her some additional Zofran for nausea at discharge.  Patient is agreeable and plans to discharge home today.  I spoke to her about whether she wanted to wean off of her gabapentin and patient stated she was not interested in weaning off gabapentin.  I recommended she discuss with her prescribing provider if she decides she would like to.  I am also recommending patient consider seeing outpatient psychiatrist at discharge.  She agrees to discuss this also with her primary care provider.    At the time of discharge take all medications as prescribed, keep all follow-up appointments, and call your doctor or return to the hospital if you have any worsening or concerning symptoms.    Please note that this note was made using Dragon voice recognition software            Day of Discharge     Subjective:  Patient said she was able to eat all of her breakfast this morning.  She is drinking well.  Her nausea has improved.  She wishes to continue olanzapine at discharge.  She is also wanting something additional for anxiety and we discussed risk versus benefits of BuSpar and she wanted to try this.  She agrees to call her primary care provider today to schedule a follow-up appointment.  She understands she needs to be seen within approximately 1 week to  make sure she is doing well at follow-up.  She denies any other new complaints.  She wants to discharge home today.    Vital Signs:   Temp:  [98.2 °F (36.8 °C)-99 °F (37.2 °C)] 99 °F (37.2 °C)  Heart Rate:  [71-89] 89  Resp:  [18] 18  BP: (123-158)/(72-95) 147/94     Physical Exam:    Constitutional:Awake, alert  HENT: NCAT, mucous membranes moist, neck supple  Respiratory: No cough or wheezing, respiratory effort normal, nonlabored breathing   Cardiovascular: Pulse rate normal, normal radial pulses  Gastrointestinal:  soft, nontender, nondistended  Musculoskeletal: Lumbar region with tenderness, normal musculature for age, no lower extremity edema, BMI 34 obese  Psychiatric: More calm, less anxious affect, cooperative, conversational  Neurologic: No slurred speech or facial droop, follows commands  Skin: No rashes or jaundice, warm       Pertinent  and/or Most Recent Results     Results from last 7 days   Lab Units 07/16/23  0351 07/15/23  0858 07/15/23  0339 07/14/23  0829 07/13/23  2238 07/13/23  0356 07/12/23  1539 07/10/23  1819   WBC 10*3/mm3 8.71  --  9.23 7.03  --  10.19 13.57* 11.33*   HEMOGLOBIN g/dL 12.9  --  14.3 12.7  --  12.2 14.4 15.2   HEMATOCRIT % 37.8  --  42.3 37.2  --  36.6 42.0 44.0   PLATELETS 10*3/mm3 214  --  217 194  --  229 287 311   SODIUM mmol/L 143 139  --  140  --  140 144 137   POTASSIUM mmol/L 3.8 3.9  --  4.7 3.3* 3.0* 3.4* 3.5   CHLORIDE mmol/L 109* 108*  --  111*  --  111* 111* 103   CO2 mmol/L 23.0 20.9*  --  20.6*  --  18.0* 18.9* 20.0*   BUN mg/dL 5* 7  --  5*  --  8 8 5*   CREATININE mg/dL 0.76 0.71  --  0.72  --  0.77 0.85 0.76   GLUCOSE mg/dL 85 155*  --  91  --  99 108* 113*   CALCIUM mg/dL 8.7 8.9  --  8.6  --  8.2* 8.8 9.5     Results from last 7 days   Lab Units 07/12/23  1539 07/10/23  1819   BILIRUBIN mg/dL 0.7 0.5   ALK PHOS U/L 109 129*   ALT (SGPT) U/L 10 9   AST (SGOT) U/L 17 17     Results for orders placed during the hospital encounter of 11/23/22    Duplex  Mesenteric Complete CAR    Interpretation Summary    No hemodynamically significant stenosis of the celiac artery is noted.    Less than 70% stenosis of the superior mesenteric artery (SMA) is noted.      Results for orders placed during the hospital encounter of 11/23/22    Duplex Mesenteric Complete CAR    Interpretation Summary    No hemodynamically significant stenosis of the celiac artery is noted.    Less than 70% stenosis of the superior mesenteric artery (SMA) is noted.      Results for orders placed during the hospital encounter of 11/08/20    Adult Transesophageal Echo (DENIS) W/ Cont if Necessary Per Protocol    Interpretation Summary  · Left ventricular ejection fraction appears to be 61 - 65%. Left ventricular systolic function is normal.  · Saline test results are negative.        Discharge Details        Discharge Medications        New Medications        Instructions Start Date   acetaminophen 325 MG tablet  Commonly known as: TYLENOL   650 mg, Oral, Every 6 Hours PRN      busPIRone 5 MG tablet  Commonly known as: BUSPAR   5 mg, Oral, 2 Times Daily, For anxiety      OLANZapine 5 MG tablet  Commonly known as: zyPREXA   5 mg, Oral, 2 Times Daily      pantoprazole 40 MG EC tablet  Commonly known as: PROTONIX   40 mg, Oral, Daily             Continue These Medications        Instructions Start Date   apixaban 5 MG tablet tablet  Commonly known as: ELIQUIS   5 mg, Oral, 2 Times Daily, Last filled 4/12/21 - pt states she was instructed to stop taking medication prior to ERCP ~1 month ago and was not instructed to restart medication.  Indication: Splenic infarct       dicyclomine 20 MG tablet  Commonly known as: BENTYL   20 mg, Oral, Every 6 Hours      FLUoxetine 40 MG capsule  Commonly known as: PROzac   60 mg, Oral, Nightly      folic acid 1 MG tablet  Commonly known as: FOLVITE   1 mg, Oral, Daily      gabapentin 800 MG tablet  Commonly known as: NEURONTIN   800 mg, Oral, 4 Times Daily      levETIRAcetam  1000 MG tablet  Commonly known as: KEPPRA   2,000 mg, Oral, Every 12 Hours Scheduled      melatonin 5 MG tablet tablet   10 mg, Oral, Nightly, Patient taes 20 mg at home      naloxone 4 MG/0.1ML nasal spray  Commonly known as: NARCAN   1 spray, Nasal, As Needed      ondansetron ODT 4 MG disintegrating tablet  Commonly known as: ZOFRAN-ODT   4 mg, Translingual, 4 Times Daily PRN      sucralfate 1 g tablet  Commonly known as: CARAFATE   1 g, Oral, 4 Times Daily      SUMAtriptan 100 MG tablet  Commonly known as: IMITREX   50 mg, Oral, Every 2 Hours PRN, Take one tablet at onset of headache. May repeat dose one time in 2 hours if headache not relieved.       traZODone 50 MG tablet  Commonly known as: DESYREL   Take 1-2 tablets by mouth At Night As Needed for sleep      Vitamin B-2 100 MG tablet tablet  Commonly known as: RIBOFLAVIN   400 mg, Oral, Daily             Stop These Medications      hydrOXYzine 25 MG tablet  Commonly known as: ATARAX     naproxen 500 MG tablet  Commonly known as: NAPROSYN              Allergies   Allergen Reactions    Morphine Hives    Lidocaine Rash     Pt reports lidocaine patches make her breakout in a rash         Discharge Disposition:  Home or Self Care    Diet:  Hospital:  Diet Order   Procedures    Diet: Cardiac Diets; Healthy Heart (2-3 Na+); Texture: Regular Texture (IDDSI 7); Fluid Consistency: Thin (IDDSI 0)       Activity:  Activity Instructions       Activity as Tolerated                   CODE STATUS:    Code Status and Medical Interventions:   Ordered at: 07/12/23 7496     Code Status (Patient has no pulse and is not breathing):    CPR (Attempt to Resuscitate)     Medical Interventions (Patient has pulse or is breathing):    Full       Additional Instructions for the Follow-ups that You Need to Schedule       Discharge Follow-up with PCP   As directed       Currently Documented PCP:    Sahil Cornelius MD    PCP Phone Number:    [phone]     Follow Up Details:  Recommend to call primary care provider today and schedule follow-up within 1 week for general hospital follow-up as discussed.         Discharge Follow-up with Specified Provider: Follow-up with your gastroenterologist for recent abdominal pain and nausea; 1 Month   As directed      To: Follow-up with your gastroenterologist for recent abdominal pain and nausea    Follow Up: 1 Month                Follow-up Information       Sahil Cornelius MD .    Specialty: Internal Medicine  Why: Recommend to call primary care provider today and schedule follow-up within 1 week for general hospital follow-up as discussed.  Contact information:  [address] [facility] [address] [phone]                                 David Padilla MD  07/17/23      Time Spent on Discharge:  I spent greater than 40 minutes on this discharge activity which included: face-to-face encounter with the patient, reviewing the data in the system, coordination of the care with the nursing staff as well as consultants, documentation, and entering orders.

## 2023-07-17 NOTE — CASE MANAGEMENT/SOCIAL WORK
Case Management Discharge Note      Final Note: Home, no additional CCP needs.         Selected Continued Care - Admitted Since 7/12/2023       Destination    No services have been selected for the patient.                Durable Medical Equipment    No services have been selected for the patient.                Dialysis/Infusion    No services have been selected for the patient.                Home Medical Care    No services have been selected for the patient.                Therapy    No services have been selected for the patient.                Community Resources    No services have been selected for the patient.                Community & DME    No services have been selected for the patient.                         Final Discharge Disposition Code: 01 - home or self-care

## 2023-07-17 NOTE — NURSING NOTE
"Access follow-up regards drug use:    Pt was found RIB. She is \"struggling\". She complains of chronic back pain. She has had this \"for a long time\". She denied any craving for drugs. She rated current depression 8/10 (10 worst), anxiety 6/10. She denied current SI/HI/AVH. Her appetite is poor due to nausea/vomiting. She has been \"tossing and turning\" so is not getting good sleep.     Regards plan after discharge, she stated she is \"done\" with the pain pills she was obtaining from a friend. She had been following with a pain management clinic in the past and may go back, although her confidence in their ability to help her is low. She denied the need for [facility] resources. She denied the need for mental health resources. Her PCP is managing her psychotropic regimen, and she has no complaints there.    Access continues to follow.  "

## 2023-07-19 NOTE — PAYOR COMM NOTE
"Carson Mullen (42 y.o. Female)        PATIENT DISCHARGED: REF#  [id number]      DEPT: [phone],  [phone]    [facility]: NPI [id number] [facility]# [id number]         Date of Birth   1980    Social Security Number       Address   [address] [place] [id number]    Home Phone   [phone]    MRN   [id number]       Episcopal   None    Marital Status   Single                            Admission Date   23    Admission Type   Emergency    Admitting Provider   Kathy Kim MD    Attending Provider       Department, Room/Bed   [address], [address]/       Discharge Date   2023    Discharge Disposition   Home or Self Care    Discharge Destination                                 Attending Provider: (none)   Allergies: Morphine, Lidocaine    Isolation: None   Infection: None   Code Status: Prior    Ht: 162.6 cm (64.02\")   Wt: 91 kg (200 lb 11.2 oz)    Admission Cmt: None   Principal Problem: None                  Active Insurance as of 2023       Primary Coverage       Payor Plan Insurance Group Employer/Plan Group    [facility] [id number]       Payor Plan Address Payor Plan Phone Number Payor Plan Fax Number Effective Dates    [address] [address]   2021 - None Entered    [facility] [id number]         Subscriber Name Subscriber Birth Date Member ID       CARSON MULLEN 1980 [id number]                     Emergency Contacts        (Rel.) Home Phone Work Phone Mobile Phone    JUNIOR AGUAYO (Significant Other) [phone] -- [phone]    MONTRELLALEX (Sister) [phone] -- --                 Discharge Summary        David Padilla MD at [address] [facility]              [facility] Medicine Services  DISCHARGE SUMMARY    Patient Name: Carson Mullen  : 1980  MRN: [id number]    Date of Admission: 2023 11:04 AM  Date of Discharge:  " 7/17/2023  Primary Care Physician: Sahil Cornelius MD    Consults       Date and Time Order Name Status Description    7/14/2023  1:34 PM Inpatient Psychiatrist Consult Completed     7/12/2023  5:10 PM [facility] (on-call MD unless specified) Details              Hospital Course       Active Hospital Problems    Diagnosis  POA    Chronic pain syndrome [G89.4]  Yes    Class 2 severe obesity with serious comorbidity in adult [E66.01]  Yes    Tobacco abuse [Z72.0]  Yes    Anxiety disorder [F41.9]  Yes      Resolved Hospital Problems    Diagnosis Date Resolved POA    Opioid withdrawal [F11.93] 07/17/2023 Yes        Opiate withdrawal  Nausea and vomiting secondary to opiate withdrawal  Restless leg syndrome  Hypokalemia  Obesity  Agitation  Insomnia    Hospital Course:  Belen Allen is a 42 y.o. female presented to the hospital with complicated opiate withdrawal.  Patient reported she had been taking Percocet 30 mg 4 times daily for total of 120 mg of oxycodone per day prior to admission.  Is not exactly clear where she was getting the oxycodone but she decided she wanted to stop narcotics and flushed all her narcotics down the toilet.  She developed nausea vomiting diarrhea and anxiety.  She was monitored carefully for opiate withdrawal in the hospital.  She had continued nausea vomiting and poor appetite for several days and required IV fluid resuscitation and IV fluid maintenance to avoid dehydration.  She required IV medications for symptom management.  Patient had episodes of severe agitation and was placed on olanzapine as a mood stabilizer in the hospital.  She was seen by psychiatry who felt she was doing well on current psychiatric medications and recommended she continue current therapy.  He also consulted Access team who brought her resources for sobriety.  I discussed olanzapine which she felt she was benefiting from that helped her anxiety and agitation significantly.  We discussed risk versus  benefits of this medication including risk of tardive dyskinesia oversedation and various other issues.  At the end today she appears to be tolerating this low-dose very well and strongly wishes to continue this at discharge.  She is also asking for something else for anxiety and was agreeable to try low-dose BuSpar.  She plans to follow-up with her primary care provider within week for general hospital follow-up.  She is now eating and drinking well and seems adequately stabilized.  I will prescribe her some additional Zofran for nausea at discharge.  Patient is agreeable and plans to discharge home today.  I spoke to her about whether she wanted to wean off of her gabapentin and patient stated she was not interested in weaning off gabapentin.  I recommended she discuss with her prescribing provider if she decides she would like to.  I am also recommending patient consider seeing outpatient psychiatrist at discharge.  She agrees to discuss this also with her primary care provider.    At the time of discharge take all medications as prescribed, keep all follow-up appointments, and call your doctor or return to the hospital if you have any worsening or concerning symptoms.    Please note that this note was made using Dragon voice recognition software            Day of Discharge     Subjective:  Patient said she was able to eat all of her breakfast this morning.  She is drinking well.  Her nausea has improved.  She wishes to continue olanzapine at discharge.  She is also wanting something additional for anxiety and we discussed risk versus benefits of BuSpar and she wanted to try this.  She agrees to call her primary care provider today to schedule a follow-up appointment.  She understands she needs to be seen within approximately 1 week to make sure she is doing well at follow-up.  She denies any other new complaints.  She wants to discharge home today.    Vital Signs:   Temp:  [98.2 °F (36.8 °C)-99 °F (37.2 °C)] 99  °F (37.2 °C)  Heart Rate:  [71-89] 89  Resp:  [18] 18  BP: (123-158)/(72-95) 147/94     Physical Exam:    Constitutional:Awake, alert  HENT: NCAT, mucous membranes moist, neck supple  Respiratory: No cough or wheezing, respiratory effort normal, nonlabored breathing   Cardiovascular: Pulse rate normal, normal radial pulses  Gastrointestinal:  soft, nontender, nondistended  Musculoskeletal: Lumbar region with tenderness, normal musculature for age, no lower extremity edema, BMI 34 obese  Psychiatric: More calm, less anxious affect, cooperative, conversational  Neurologic: No slurred speech or facial droop, follows commands  Skin: No rashes or jaundice, warm       Pertinent  and/or Most Recent Results     Results from last 7 days   Lab Units 07/16/23  0351 07/15/23  0858 07/15/23  0339 07/14/23  0829 07/13/23  2238 07/13/23  0356 07/12/23  1539 07/10/23  1819   WBC 10*3/mm3 8.71  --  9.23 7.03  --  10.19 13.57* 11.33*   HEMOGLOBIN g/dL 12.9  --  14.3 12.7  --  12.2 14.4 15.2   HEMATOCRIT % 37.8  --  42.3 37.2  --  36.6 42.0 44.0   PLATELETS 10*3/mm3 214  --  217 194  --  229 287 311   SODIUM mmol/L 143 139  --  140  --  140 144 137   POTASSIUM mmol/L 3.8 3.9  --  4.7 3.3* 3.0* 3.4* 3.5   CHLORIDE mmol/L 109* 108*  --  111*  --  111* 111* 103   CO2 mmol/L 23.0 20.9*  --  20.6*  --  18.0* 18.9* 20.0*   BUN mg/dL 5* 7  --  5*  --  8 8 5*   CREATININE mg/dL 0.76 0.71  --  0.72  --  0.77 0.85 0.76   GLUCOSE mg/dL 85 155*  --  91  --  99 108* 113*   CALCIUM mg/dL 8.7 8.9  --  8.6  --  8.2* 8.8 9.5     Results from last 7 days   Lab Units 07/12/23  1539 07/10/23  1819   BILIRUBIN mg/dL 0.7 0.5   ALK PHOS U/L 109 129*   ALT (SGPT) U/L 10 9   AST (SGOT) U/L 17 17     Results for orders placed during the hospital encounter of 11/23/22    Duplex Mesenteric Complete CAR    Interpretation Summary    No hemodynamically significant stenosis of the celiac artery is noted.    Less than 70% stenosis of the superior mesenteric artery  (SMA) is noted.      Results for orders placed during the hospital encounter of 11/23/22    Duplex Mesenteric Complete CAR    Interpretation Summary    No hemodynamically significant stenosis of the celiac artery is noted.    Less than 70% stenosis of the superior mesenteric artery (SMA) is noted.      Results for orders placed during the hospital encounter of 11/08/20    Adult Transesophageal Echo (DENIS) W/ Cont if Necessary Per Protocol    Interpretation Summary  · Left ventricular ejection fraction appears to be 61 - 65%. Left ventricular systolic function is normal.  · Saline test results are negative.        Discharge Details        Discharge Medications        New Medications        Instructions Start Date   acetaminophen 325 MG tablet  Commonly known as: TYLENOL   650 mg, Oral, Every 6 Hours PRN      busPIRone 5 MG tablet  Commonly known as: BUSPAR   5 mg, Oral, 2 Times Daily, For anxiety      OLANZapine 5 MG tablet  Commonly known as: zyPREXA   5 mg, Oral, 2 Times Daily      pantoprazole 40 MG EC tablet  Commonly known as: PROTONIX   40 mg, Oral, Daily             Continue These Medications        Instructions Start Date   apixaban 5 MG tablet tablet  Commonly known as: ELIQUIS   5 mg, Oral, 2 Times Daily, Last filled 4/12/21 - pt states she was instructed to stop taking medication prior to ERCP ~1 month ago and was not instructed to restart medication.  Indication: Splenic infarct       dicyclomine 20 MG tablet  Commonly known as: BENTYL   20 mg, Oral, Every 6 Hours      FLUoxetine 40 MG capsule  Commonly known as: PROzac   60 mg, Oral, Nightly      folic acid 1 MG tablet  Commonly known as: FOLVITE   1 mg, Oral, Daily      gabapentin 800 MG tablet  Commonly known as: NEURONTIN   800 mg, Oral, 4 Times Daily      levETIRAcetam 1000 MG tablet  Commonly known as: KEPPRA   2,000 mg, Oral, Every 12 Hours Scheduled      melatonin 5 MG tablet tablet   10 mg, Oral, Nightly, Patient taes 20 mg at home      naloxone 4  MG/0.1ML nasal spray  Commonly known as: NARCAN   1 spray, Nasal, As Needed      ondansetron ODT 4 MG disintegrating tablet  Commonly known as: ZOFRAN-ODT   4 mg, Translingual, 4 Times Daily PRN      sucralfate 1 g tablet  Commonly known as: CARAFATE   1 g, Oral, 4 Times Daily      SUMAtriptan 100 MG tablet  Commonly known as: IMITREX   50 mg, Oral, Every 2 Hours PRN, Take one tablet at onset of headache. May repeat dose one time in 2 hours if headache not relieved.       traZODone 50 MG tablet  Commonly known as: DESYREL   Take 1-2 tablets by mouth At Night As Needed for sleep      Vitamin B-2 100 MG tablet tablet  Commonly known as: RIBOFLAVIN   400 mg, Oral, Daily             Stop These Medications      hydrOXYzine 25 MG tablet  Commonly known as: ATARAX     naproxen 500 MG tablet  Commonly known as: NAPROSYN              Allergies   Allergen Reactions    Morphine Hives    Lidocaine Rash     Pt reports lidocaine patches make her breakout in a rash         Discharge Disposition:  Home or Self Care    Diet:  Hospital:  Diet Order   Procedures    Diet: Cardiac Diets; Healthy Heart (2-3 Na+); Texture: Regular Texture (IDDSI 7); Fluid Consistency: Thin (IDDSI 0)       Activity:  Activity Instructions       Activity as Tolerated                   CODE STATUS:    Code Status and Medical Interventions:   Ordered at: 07/12/23 1829     Code Status (Patient has no pulse and is not breathing):    CPR (Attempt to Resuscitate)     Medical Interventions (Patient has pulse or is breathing):    Full       Additional Instructions for the Follow-ups that You Need to Schedule       Discharge Follow-up with PCP   As directed       Currently Documented PCP:    Sahil Cornelius MD    PCP Phone Number:    [phone]     Follow Up Details: Recommend to call primary care provider today and schedule follow-up within 1 week for general hospital follow-up as discussed.         Discharge Follow-up with Specified Provider: Follow-up  with your gastroenterologist for recent abdominal pain and nausea; 1 Month   As directed      To: Follow-up with your gastroenterologist for recent abdominal pain and nausea    Follow Up: 1 Month                Follow-up Information       Sahil Cornelius MD .    Specialty: Internal Medicine  Why: Recommend to call primary care provider today and schedule follow-up within 1 week for general hospital follow-up as discussed.  Contact information:  [address] [facility] [address] [phone]                                 David Padilla MD  07/17/23      Time Spent on Discharge:  I spent greater than 40 minutes on this discharge activity which included: face-to-face encounter with the patient, reviewing the data in the system, coordination of the care with the nursing staff as well as consultants, documentation, and entering orders.        Electronically signed by David Padilla MD at 07/17/23 1435       Discharge Order (From admission, onward)       Start     Ordered    07/17/23 0845  Discharge patient  Once        Expected Discharge Date: 07/17/23    Discharge Disposition: Home or Self Care    Physician of Record for Attribution - Please select from Treatment Team: DAVID PADILLA [[id number]]    Review needed by CMO to determine Physician of Record: No       Question Answer Comment   Physician of Record for Attribution - Please select from Treatment Team DAVID PADILLA    Review needed by CMO to determine Physician of Record No        07/17/23 0820

## 2023-07-27 ENCOUNTER — HOSPITAL ENCOUNTER (INPATIENT)
Facility: HOSPITAL | Age: 43
LOS: 7 days | Discharge: HOME | DRG: 292 | End: 2023-08-03
Attending: EMERGENCY MEDICINE | Admitting: INTERNAL MEDICINE
Payer: COMMERCIAL

## 2023-07-27 ENCOUNTER — APPOINTMENT (OUTPATIENT)
Dept: CT IMAGING | Facility: HOSPITAL | Age: 43
DRG: 292 | End: 2023-07-27
Payer: COMMERCIAL

## 2023-07-27 ENCOUNTER — APPOINTMENT (OUTPATIENT)
Dept: GENERAL RADIOLOGY | Facility: HOSPITAL | Age: 43
DRG: 292 | End: 2023-07-27
Payer: COMMERCIAL

## 2023-07-27 DIAGNOSIS — R09.02 HYPOXIA: ICD-10-CM

## 2023-07-27 DIAGNOSIS — M54.31 SCIATICA OF RIGHT SIDE: ICD-10-CM

## 2023-07-27 DIAGNOSIS — M54.16 LUMBAR BACK PAIN WITH RADICULOPATHY AFFECTING LEFT LOWER EXTREMITY: ICD-10-CM

## 2023-07-27 DIAGNOSIS — I50.9 ACUTE CONGESTIVE HEART FAILURE, UNSPECIFIED HEART FAILURE TYPE: Primary | ICD-10-CM

## 2023-07-27 PROBLEM — J81.1 PULMONARY EDEMA: Status: ACTIVE | Noted: 2023-07-27

## 2023-07-27 LAB
ALBUMIN SERPL-MCNC: 3.2 G/DL (ref 3.5–5.2)
ALBUMIN/GLOB SERPL: 1.1 G/DL
ALP SERPL-CCNC: 161 U/L (ref 39–117)
ALT SERPL W P-5'-P-CCNC: 84 U/L (ref 1–33)
ANION GAP SERPL CALCULATED.3IONS-SCNC: 10.8 MMOL/L (ref 5–15)
AST SERPL-CCNC: 51 U/L (ref 1–32)
BASOPHILS # BLD AUTO: 0.04 10*3/MM3 (ref 0–0.2)
BASOPHILS NFR BLD AUTO: 0.3 % (ref 0–1.5)
BILIRUB SERPL-MCNC: 0.6 MG/DL (ref 0–1.2)
BUN SERPL-MCNC: 17 MG/DL (ref 6–20)
BUN/CREAT SERPL: 19.1 (ref 7–25)
CALCIUM SPEC-SCNC: 8.7 MG/DL (ref 8.6–10.5)
CHLORIDE SERPL-SCNC: 103 MMOL/L (ref 98–107)
CO2 SERPL-SCNC: 27.2 MMOL/L (ref 22–29)
CREAT SERPL-MCNC: 0.89 MG/DL (ref 0.57–1)
DEPRECATED RDW RBC AUTO: 44.3 FL (ref 37–54)
EGFRCR SERPLBLD CKD-EPI 2021: 83.7 ML/MIN/1.73
EOSINOPHIL # BLD AUTO: 0.37 10*3/MM3 (ref 0–0.4)
EOSINOPHIL NFR BLD AUTO: 3.1 % (ref 0.3–6.2)
ERYTHROCYTE [DISTWIDTH] IN BLOOD BY AUTOMATED COUNT: 13.2 % (ref 12.3–15.4)
GEN 5 2HR TROPONIN T REFLEX: 43 NG/L
GLOBULIN UR ELPH-MCNC: 3 GM/DL
GLUCOSE SERPL-MCNC: 146 MG/DL (ref 65–99)
HCG SERPL QL: NEGATIVE
HCT VFR BLD AUTO: 37.2 % (ref 34–46.6)
HGB BLD-MCNC: 11.9 G/DL (ref 12–15.9)
LIPASE SERPL-CCNC: 14 U/L (ref 13–60)
LYMPHOCYTES # BLD AUTO: 2.25 10*3/MM3 (ref 0.7–3.1)
LYMPHOCYTES NFR BLD AUTO: 18.8 % (ref 19.6–45.3)
MCH RBC QN AUTO: 29.6 PG (ref 26.6–33)
MCHC RBC AUTO-ENTMCNC: 32 G/DL (ref 31.5–35.7)
MCV RBC AUTO: 92.5 FL (ref 79–97)
MONOCYTES # BLD AUTO: 0.53 10*3/MM3 (ref 0.1–0.9)
MONOCYTES NFR BLD AUTO: 4.4 % (ref 5–12)
NEUTROPHILS NFR BLD AUTO: 68.8 % (ref 42.7–76)
NEUTROPHILS NFR BLD AUTO: 8.26 10*3/MM3 (ref 1.7–7)
NT-PROBNP SERPL-MCNC: 6371 PG/ML (ref 0–450)
PLATELET # BLD AUTO: 298 10*3/MM3 (ref 140–450)
PMV BLD AUTO: 10.3 FL (ref 6–12)
POTASSIUM SERPL-SCNC: 3.6 MMOL/L (ref 3.5–5.2)
PROT SERPL-MCNC: 6.2 G/DL (ref 6–8.5)
QT INTERVAL: 379 MS
RBC # BLD AUTO: 4.02 10*6/MM3 (ref 3.77–5.28)
SODIUM SERPL-SCNC: 141 MMOL/L (ref 136–145)
TROPONIN T DELTA: 2 NG/L
TROPONIN T SERPL HS-MCNC: 41 NG/L
WBC NRBC COR # BLD: 12 10*3/MM3 (ref 3.4–10.8)

## 2023-07-27 PROCEDURE — 25010000002 ENOXAPARIN PER 10 MG: Performed by: INTERNAL MEDICINE

## 2023-07-27 PROCEDURE — 83880 ASSAY OF NATRIURETIC PEPTIDE: CPT | Performed by: EMERGENCY MEDICINE

## 2023-07-27 PROCEDURE — 80053 COMPREHEN METABOLIC PANEL: CPT | Performed by: EMERGENCY MEDICINE

## 2023-07-27 PROCEDURE — 25010000002 ONDANSETRON PER 1 MG: Performed by: INTERNAL MEDICINE

## 2023-07-27 PROCEDURE — 71045 X-RAY EXAM CHEST 1 VIEW: CPT

## 2023-07-27 PROCEDURE — 36415 COLL VENOUS BLD VENIPUNCTURE: CPT

## 2023-07-27 PROCEDURE — 83690 ASSAY OF LIPASE: CPT | Performed by: EMERGENCY MEDICINE

## 2023-07-27 PROCEDURE — 99285 EMERGENCY DEPT VISIT HI MDM: CPT

## 2023-07-27 PROCEDURE — 93005 ELECTROCARDIOGRAM TRACING: CPT | Performed by: EMERGENCY MEDICINE

## 2023-07-27 PROCEDURE — 25010000002 ONDANSETRON PER 1 MG: Performed by: EMERGENCY MEDICINE

## 2023-07-27 PROCEDURE — 74176 CT ABD & PELVIS W/O CONTRAST: CPT

## 2023-07-27 PROCEDURE — 84703 CHORIONIC GONADOTROPIN ASSAY: CPT | Performed by: EMERGENCY MEDICINE

## 2023-07-27 PROCEDURE — 85025 COMPLETE CBC W/AUTO DIFF WBC: CPT | Performed by: EMERGENCY MEDICINE

## 2023-07-27 PROCEDURE — 84484 ASSAY OF TROPONIN QUANT: CPT | Performed by: EMERGENCY MEDICINE

## 2023-07-27 PROCEDURE — 25010000002 FUROSEMIDE PER 20 MG: Performed by: EMERGENCY MEDICINE

## 2023-07-27 PROCEDURE — 93010 ELECTROCARDIOGRAM REPORT: CPT | Performed by: INTERNAL MEDICINE

## 2023-07-27 RX ORDER — CLONIDINE HYDROCHLORIDE 0.1 MG/1
0.1 TABLET ORAL 2 TIMES DAILY PRN
Status: ACTIVE | OUTPATIENT
Start: 2023-07-30 | End: 2023-07-31

## 2023-07-27 RX ORDER — FOLIC ACID 1 MG/1
1 TABLET ORAL DAILY
Status: DISCONTINUED | OUTPATIENT
Start: 2023-07-28 | End: 2023-08-03 | Stop reason: HOSPADM

## 2023-07-27 RX ORDER — ENOXAPARIN SODIUM 100 MG/ML
40 INJECTION SUBCUTANEOUS EVERY 24 HOURS
Status: DISCONTINUED | OUTPATIENT
Start: 2023-07-27 | End: 2023-07-27

## 2023-07-27 RX ORDER — BISACODYL 5 MG/1
5 TABLET, DELAYED RELEASE ORAL DAILY PRN
Status: DISCONTINUED | OUTPATIENT
Start: 2023-07-27 | End: 2023-08-03 | Stop reason: HOSPADM

## 2023-07-27 RX ORDER — ONDANSETRON 2 MG/ML
4 INJECTION INTRAMUSCULAR; INTRAVENOUS ONCE
Status: COMPLETED | OUTPATIENT
Start: 2023-07-27 | End: 2023-07-27

## 2023-07-27 RX ORDER — FUROSEMIDE 10 MG/ML
40 INJECTION INTRAMUSCULAR; INTRAVENOUS ONCE
Status: COMPLETED | OUTPATIENT
Start: 2023-07-27 | End: 2023-07-27

## 2023-07-27 RX ORDER — ONDANSETRON 2 MG/ML
4 INJECTION INTRAMUSCULAR; INTRAVENOUS EVERY 6 HOURS PRN
Status: DISCONTINUED | OUTPATIENT
Start: 2023-07-27 | End: 2023-08-03 | Stop reason: HOSPADM

## 2023-07-27 RX ORDER — BUSPIRONE HYDROCHLORIDE 5 MG/1
5 TABLET ORAL 2 TIMES DAILY
Status: DISCONTINUED | OUTPATIENT
Start: 2023-07-28 | End: 2023-08-03 | Stop reason: HOSPADM

## 2023-07-27 RX ORDER — ACETAMINOPHEN 325 MG/1
650 TABLET ORAL EVERY 4 HOURS PRN
Status: DISCONTINUED | OUTPATIENT
Start: 2023-07-27 | End: 2023-08-03 | Stop reason: HOSPADM

## 2023-07-27 RX ORDER — PANTOPRAZOLE SODIUM 40 MG/1
40 TABLET, DELAYED RELEASE ORAL
Status: DISCONTINUED | OUTPATIENT
Start: 2023-07-28 | End: 2023-08-02

## 2023-07-27 RX ORDER — TRAZODONE HYDROCHLORIDE 50 MG/1
50 TABLET ORAL NIGHTLY PRN
Status: DISCONTINUED | OUTPATIENT
Start: 2023-07-27 | End: 2023-08-03 | Stop reason: HOSPADM

## 2023-07-27 RX ORDER — CLONIDINE HYDROCHLORIDE 0.1 MG/1
0.1 TABLET ORAL 3 TIMES DAILY PRN
Status: ACTIVE | OUTPATIENT
Start: 2023-07-29 | End: 2023-07-30

## 2023-07-27 RX ORDER — CLONIDINE HYDROCHLORIDE 0.1 MG/1
0.1 TABLET ORAL 4 TIMES DAILY PRN
Status: ACTIVE | OUTPATIENT
Start: 2023-07-27 | End: 2023-07-28

## 2023-07-27 RX ORDER — GABAPENTIN 400 MG/1
800 CAPSULE ORAL EVERY 8 HOURS SCHEDULED
Status: DISCONTINUED | OUTPATIENT
Start: 2023-07-28 | End: 2023-08-03 | Stop reason: HOSPADM

## 2023-07-27 RX ORDER — OLANZAPINE 5 MG/1
5 TABLET ORAL 2 TIMES DAILY
Status: DISCONTINUED | OUTPATIENT
Start: 2023-07-28 | End: 2023-08-03 | Stop reason: HOSPADM

## 2023-07-27 RX ORDER — FUROSEMIDE 10 MG/ML
40 INJECTION INTRAMUSCULAR; INTRAVENOUS EVERY 12 HOURS
Status: DISCONTINUED | OUTPATIENT
Start: 2023-07-28 | End: 2023-07-30

## 2023-07-27 RX ORDER — LEVETIRACETAM 500 MG/1
2000 TABLET ORAL EVERY 12 HOURS SCHEDULED
Status: DISCONTINUED | OUTPATIENT
Start: 2023-07-28 | End: 2023-08-03 | Stop reason: HOSPADM

## 2023-07-27 RX ORDER — CLONIDINE HYDROCHLORIDE 0.1 MG/1
0.1 TABLET ORAL ONCE AS NEEDED
Status: ACTIVE | OUTPATIENT
Start: 2023-07-31 | End: 2023-08-01

## 2023-07-27 RX ORDER — POLYETHYLENE GLYCOL 3350 17 G/17G
17 POWDER, FOR SOLUTION ORAL DAILY PRN
Status: DISCONTINUED | OUTPATIENT
Start: 2023-07-27 | End: 2023-07-30

## 2023-07-27 RX ORDER — BISACODYL 10 MG
10 SUPPOSITORY, RECTAL RECTAL DAILY PRN
Status: DISCONTINUED | OUTPATIENT
Start: 2023-07-27 | End: 2023-08-03 | Stop reason: HOSPADM

## 2023-07-27 RX ORDER — CLONIDINE HYDROCHLORIDE 0.1 MG/1
0.1 TABLET ORAL 4 TIMES DAILY PRN
Status: ACTIVE | OUTPATIENT
Start: 2023-07-28 | End: 2023-07-29

## 2023-07-27 RX ORDER — AMOXICILLIN 250 MG
2 CAPSULE ORAL 2 TIMES DAILY
Status: DISCONTINUED | OUTPATIENT
Start: 2023-07-27 | End: 2023-08-03 | Stop reason: HOSPADM

## 2023-07-27 RX ORDER — DICYCLOMINE HYDROCHLORIDE 10 MG/1
20 CAPSULE ORAL 4 TIMES DAILY
Status: DISCONTINUED | OUTPATIENT
Start: 2023-07-28 | End: 2023-08-03 | Stop reason: HOSPADM

## 2023-07-27 RX ORDER — FLUOXETINE HYDROCHLORIDE 20 MG/1
60 CAPSULE ORAL NIGHTLY
Status: DISCONTINUED | OUTPATIENT
Start: 2023-07-28 | End: 2023-08-03 | Stop reason: HOSPADM

## 2023-07-27 RX ORDER — UREA 10 %
3 LOTION (ML) TOPICAL NIGHTLY PRN
Status: DISCONTINUED | OUTPATIENT
Start: 2023-07-27 | End: 2023-08-03 | Stop reason: HOSPADM

## 2023-07-27 RX ORDER — ONDANSETRON 4 MG/1
4 TABLET, FILM COATED ORAL EVERY 6 HOURS PRN
Status: DISCONTINUED | OUTPATIENT
Start: 2023-07-27 | End: 2023-08-03 | Stop reason: HOSPADM

## 2023-07-27 RX ADMIN — SODIUM CHLORIDE, POTASSIUM CHLORIDE, SODIUM LACTATE AND CALCIUM CHLORIDE 1000 ML: 600; 310; 30; 20 INJECTION, SOLUTION INTRAVENOUS at 16:39

## 2023-07-27 RX ADMIN — FUROSEMIDE 40 MG: 20 INJECTION, SOLUTION INTRAMUSCULAR; INTRAVENOUS at 19:34

## 2023-07-27 RX ADMIN — ENOXAPARIN SODIUM 40 MG: 100 INJECTION SUBCUTANEOUS at 22:31

## 2023-07-27 RX ADMIN — ONDANSETRON 4 MG: 2 INJECTION INTRAMUSCULAR; INTRAVENOUS at 16:40

## 2023-07-27 RX ADMIN — Medication 3 MG: at 23:03

## 2023-07-27 RX ADMIN — SENNOSIDES AND DOCUSATE SODIUM 2 TABLET: 50; 8.6 TABLET ORAL at 22:31

## 2023-07-27 RX ADMIN — ONDANSETRON 4 MG: 2 INJECTION INTRAMUSCULAR; INTRAVENOUS at 20:19

## 2023-07-27 NOTE — PROGRESS NOTES
"[facility] Clinical Pharmacy Services: Enoxaparin Consult    Belen Allen has a pharmacy consult to dose prophylactic enoxaparin per Dr. Kim's request.     Indication: VTE Prophylaxis  Home Anticoagulation:   On Apixaban 5 mg  due to history of Splenic Infarct more than 1 month ago    Relevant clinical data and objective history reviewed:  42 y.o. female 162.6 cm (64\") 90.7 kg (200 lb)   Body mass index is 34.33 kg/mý.   Results from last 7 days   Lab Units 07/27/23  1612   PLATELETS 10*3/mm3 298     Estimated Creatinine Clearance: 90.7 mL/min (by C-G formula based on SCr of 0.89 mg/dL).    Assessment/Plan    Will start patient on 40mg subcutaneous every 24 hours, adjusted for renal function. Consult order will be discontinued but pharmacy will continue to follow.     Darlene Jiang [facility]  Clinical Pharmacist    "

## 2023-07-27 NOTE — ED PROVIDER NOTES
EMERGENCY DEPARTMENT ENCOUNTER    Room Number:  18/18  PCP: aShil Cornelius MD  Historian: Patient      HPI:  Chief Complaint: Nausea vomiting abdominal pain  A complete HPI/ROS/PMH/PSH/SH/FH are unobtainable due to: None  Context: Belen Allen is a 42 y.o. female who presents to the ED c/o nausea vomiting abdominal pain.  Patient has had history of opiate abuse.  Last admitted here earlier this month for opiate withdrawal.  Patient states her last use was 4 days ago.  Patient has had nausea vomiting abdominal pain.  Has had no diarrhea.  Patient is had no fevers or chills.  Patient states she cannot keep fluids down.  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    Generalized abdominal pain 11/29/2020    Acute bilateral low back pain 11/29/2020    Antiphospholipid antibody positive 11/29/2020    On anticoagulant therapy 11/29/2020    Acute pain of left knee 11/29/2020    Vitamin D deficiency 11/30/2020    Folate deficiency 12/01/2020    Pain of back and left lower extremity 12/02/2020    Common bile duct dilatation 05/31/2021    Elevated LFTs 06/01/2021    Right upper quadrant abdominal pain 06/01/2021    Obesity (BMI 30-39.9) 04/18/2019    Tobacco abuse 06/02/2021    GERD without esophagitis 06/02/2021    Intractable abdominal pain 07/05/2021    Class 2 severe obesity with serious comorbidity in adult 07/05/2021    Constipation 07/05/2021    History of ERCP 07/05/2021    Chronic pain syndrome 07/05/2021    Seizures 01/01/2022    Syncope 01/01/2022    Lumbar back pain with radiculopathy affecting left lower extremity 01/05/2022    Right upper quadrant pain 11/23/2022    Uncontrolled pain 12/17/2022    Intractable back pain 12/17/2022    Sciatica of right side 12/20/2022    Migraine 12/20/2022    Mobility impaired 12/20/2022    Seizure 12/31/2022    Opioid  dependence 01/16/2023    Clostridioides difficile diarrhea 01/18/2023     Resolved Ambulatory Problems     Diagnosis Date Noted    Choledocholithiasis 06/02/2021    Rhabdomyolysis 01/01/2022    Encephalopathy 01/01/2022    Hypokalemia 12/18/2022    Opioid withdrawal 07/12/2023     Past Medical History:   Diagnosis Date    Abnormal Pap smear of cervix     Ankle fracture 2013    H/O Elevated liver enzymes     History of chronic back pain     History of migraine     History of urinary tract infection     Injury of back     PONV (postoperative nausea and vomiting)     Seasonal allergies          PAST SURGICAL HISTORY  Past Surgical History:   Procedure Laterality Date    BACK SURGERY  2006    fusion L4, L5      CHOLECYSTECTOMY  2014    DILATATION AND CURETTAGE  2009    ENDOSCOPY N/A 11/27/2022    Procedure: ESOPHAGOGASTRODUODENOSCOPY with biopsy;  Surgeon: Christiana Mann MD;  Location: [facility] ENDOSCOPY;  Service: Gastroenterology;  Laterality: N/A;  gastritis, duodenitis, irregular z line    ERCP N/A 6/3/2021    Procedure: ENDOSCOPIC RETROGRADE CHOLANGIOPANCREATOGRAPHY WITH SPHINCTEROTOMY, DILATION WITH BALLOON CLEARANCE  (12MM-15MM);  Surgeon: Bjorn Flannery MD;  Location: [facility] ENDOSCOPY;  Service: Gastroenterology;  Laterality: N/A;  DILATED COMMON BILE DUCT    SKIN SURGERY      TUBAL ABDOMINAL LIGATION  2013    WISDOM TOOTH EXTRACTION  2018    x2         FAMILY HISTORY  Family History   Problem Relation Age of Onset    Stroke Mother     Allergic rhinitis Mother     Allergic rhinitis Sister     Breast cancer Maternal Aunt     Cancer Maternal Grandmother     Allergic rhinitis Paternal Grandfather     Cancer Paternal Grandfather     Prostate cancer Paternal Grandfather          SOCIAL HISTORY  Social History     Socioeconomic History    Marital status: Single    Number of children: 2   Tobacco Use    Smoking status: Every Day     Packs/day: 0.50     Types: Cigarettes    Smokeless tobacco: Never   Vaping Use     Vaping Use: Never used   Substance and Sexual Activity    Alcohol use: Not Currently    Drug use: Not Currently    Sexual activity: Defer         ALLERGIES  Morphine and Lidocaine        REVIEW OF SYSTEMS  Review of Systems   Nausea vomiting abdominal pain      PHYSICAL EXAM  ED Triage Vitals [07/27/23 1539]   Temp Heart Rate Resp BP SpO2   98.8 øF (37.1 øC) 117 20 146/64 96 %      Temp src Heart Rate Source Patient Position BP Location FiO2 (%)   Oral Monitor Sitting Right arm --       Physical Exam      GENERAL: no acute distress  HENT: nares patent  EYES: no scleral icterus  CV: regular rhythm, normal rate  RESPIRATORY: normal effort  ABDOMEN: soft.  Tender epigastric area  MUSCULOSKELETAL: no deformity  NEURO: alert, moves all extremities, follows commands  PSYCH:  calm, cooperative  SKIN: warm, dry    Vital signs and nursing notes reviewed.          LAB RESULTS  Recent Results (from the past 24 hour(s))   Comprehensive Metabolic Panel    Collection Time: 07/27/23  4:12 PM    Specimen: Arm, Left; Blood   Result Value Ref Range    Glucose 146 (H) 65 - 99 mg/dL    BUN 17 6 - 20 mg/dL    Creatinine 0.89 0.57 - 1.00 mg/dL    Sodium 141 136 - 145 mmol/L    Potassium 3.6 3.5 - 5.2 mmol/L    Chloride 103 98 - 107 mmol/L    CO2 27.2 22.0 - 29.0 mmol/L    Calcium 8.7 8.6 - 10.5 mg/dL    Total Protein 6.2 6.0 - 8.5 g/dL    Albumin 3.2 (L) 3.5 - 5.2 g/dL    ALT (SGPT) 84 (H) 1 - 33 U/L    AST (SGOT) 51 (H) 1 - 32 U/L    Alkaline Phosphatase 161 (H) 39 - 117 U/L    Total Bilirubin 0.6 0.0 - 1.2 mg/dL    Globulin 3.0 gm/dL    A/G Ratio 1.1 g/dL    BUN/Creatinine Ratio 19.1 7.0 - 25.0    Anion Gap 10.8 5.0 - 15.0 mmol/L    eGFR 83.7 >60.0 mL/min/1.73   Lipase    Collection Time: 07/27/23  4:12 PM    Specimen: Arm, Left; Blood   Result Value Ref Range    Lipase 14 13 - 60 U/L   BNP    Collection Time: 07/27/23  4:12 PM    Specimen: Arm, Left; Blood   Result Value Ref Range    proBNP 6,371.0 (H) 0.0 - 450.0 pg/mL   High  Sensitivity Troponin T    Collection Time: 07/27/23  4:12 PM    Specimen: Arm, Left; Blood   Result Value Ref Range    HS Troponin T 41 (H) <10 ng/L   hCG, Serum, Qualitative    Collection Time: 07/27/23  4:12 PM    Specimen: Arm, Left; Blood   Result Value Ref Range    HCG Qualitative Negative Negative   CBC Auto Differential    Collection Time: 07/27/23  4:12 PM    Specimen: Arm, Left; Blood   Result Value Ref Range    WBC 12.00 (H) 3.40 - 10.80 10*3/mm3    RBC 4.02 3.77 - 5.28 10*6/mm3    Hemoglobin 11.9 (L) 12.0 - 15.9 g/dL    Hematocrit 37.2 34.0 - 46.6 %    MCV 92.5 79.0 - 97.0 fL    MCH 29.6 26.6 - 33.0 pg    MCHC 32.0 31.5 - 35.7 g/dL    RDW 13.2 12.3 - 15.4 %    RDW-SD 44.3 37.0 - 54.0 fl    MPV 10.3 6.0 - 12.0 fL    Platelets 298 140 - 450 10*3/mm3    Neutrophil % 68.8 42.7 - 76.0 %    Lymphocyte % 18.8 (L) 19.6 - 45.3 %    Monocyte % 4.4 (L) 5.0 - 12.0 %    Eosinophil % 3.1 0.3 - 6.2 %    Basophil % 0.3 0.0 - 1.5 %    Neutrophils, Absolute 8.26 (H) 1.70 - 7.00 10*3/mm3    Lymphocytes, Absolute 2.25 0.70 - 3.10 10*3/mm3    Monocytes, Absolute 0.53 0.10 - 0.90 10*3/mm3    Eosinophils, Absolute 0.37 0.00 - 0.40 10*3/mm3    Basophils, Absolute 0.04 0.00 - 0.20 10*3/mm3   ECG 12 Lead Dyspnea    Collection Time: 07/27/23  4:46 PM   Result Value Ref Range    QT Interval 379 ms       Ordered the above labs and reviewed the results.        RADIOLOGY  CT Abdomen Pelvis Without Contrast    Result Date: 7/27/2023  CT ABDOMEN PELVIS WO CONTRAST-  INDICATIONS: Nausea, vomiting, abdominal pain  TECHNIQUE: Radiation dose reduction techniques were utilized, including automated exposure control and exposure modulation based on body size. Unenhanced ABDOMEN AND PELVIS CT  COMPARISON: 12/13/2022  FINDINGS:  The gallbladder is surgically absent. Tiny pneumobilia is apparent (less than on the prior exam).  A left renal cyst is present. Areas of focal cortical thinning in the left kidney may be result of prior infection or  infarct.  Otherwise unremarkable unenhanced appearance of the liver, spleen, adrenal glands, pancreas, kidneys, bladder.  No bowel obstruction or abnormal bowel thickening is identified. The appendix does not appear inflamed.  No free intraperitoneal gas . Mild nonspecific pelvic free fluid is present.  Scattered small mesenteric and para-aortic lymph nodes are seen that are not significant by size criteria.  Abdominal aorta is not aneurysmal.  The lung bases show diffuse groundglass opacities that suggests edema and/or pneumonia, minimal right and trace left pleural effusions. The heart is mildly enlarged.  Degenerative and surgical changes are seen in the spine. No acute fracture is identified.         1. No focal acute inflammatory process of bowel is identified. Mild nonspecific pelvic free fluid. Follow-up as indications persist. 2. No urolithiasis or hydronephrosis. 3. Diffuse groundglass opacities in the partly included lungs suggest edema and/or pneumonia, with right more than left pleural effusions, follow-up advised.  This report was finalized on 7/27/2023 6:59 PM by Dr. Yonathan Neal M.D.      XR Chest 1 View    Result Date: 7/27/2023  XR CHEST 1 VW-  HISTORY: Female who is 42 years-old,  short of breath  TECHNIQUE: Frontal view of the chest  COMPARISON: 12/26/2022  FINDINGS: The heart is enlarged with mild prominence of vascular and interstitial markings. No focal pulmonary consolidation, pleural effusion, or pneumothorax. No acute osseous process.      No focal pulmonary consolidation. Cardiomegaly with mild prominence of vascular and interstitial markings.  This report was finalized on 7/27/2023 5:20 PM by Dr. Yonathan Neal M.D.       Ordered the above noted radiological studies.  Chest x-ray independently interpreted by me and shows cardiomegaly with CHF          PROCEDURES  Procedures      EKG          EKG time: 1646  Rhythm/Rate: Normal sinus rhythm 104  P waves and OK: Normal P  waves  QRS, axis: Normal QRS  ST and T waves: Normal ST-T wave    Interpreted Contemporaneously by me, independently viewed  Unchanged compared to prior 12/24/2022      MEDICATIONS GIVEN IN ER  Medications   furosemide (LASIX) injection 40 mg (has no administration in time range)   acetaminophen (TYLENOL) tablet 650 mg (has no administration in time range)   sennosides-docusate (PERICOLACE) 8.6-50 MG per tablet 2 tablet (has no administration in time range)     And   polyethylene glycol (MIRALAX) packet 17 g (has no administration in time range)     And   bisacodyl (DULCOLAX) EC tablet 5 mg (has no administration in time range)     And   bisacodyl (DULCOLAX) suppository 10 mg (has no administration in time range)   ondansetron (ZOFRAN) tablet 4 mg (has no administration in time range)     Or   ondansetron (ZOFRAN) injection 4 mg (has no administration in time range)   melatonin tablet 3 mg (has no administration in time range)   Pharmacy to Dose enoxaparin (LOVENOX) (has no administration in time range)   lactated ringers bolus 1,000 mL (0 mL Intravenous Stopped 7/27/23 1802)   ondansetron (ZOFRAN) injection 4 mg (4 mg Intravenous Given 7/27/23 1640)                   MEDICAL DECISION MAKING, PROGRESS, and CONSULTS    Discussion below represents my analysis of pertinent findings related to patient's condition, differential diagnosis, treatment plan and final disposition.      Additional sources:  - Discussed/ obtained information from independent historians: None    - External (non-ED) record review: Epic reviewed and patient here for opiate withdrawal    - Chronic or social conditions impacting care: None    - Shared decision making: None      Orders placed during this visit:  Orders Placed This Encounter   Procedures    XR Chest 1 View    CT Abdomen Pelvis Without Contrast    Comprehensive Metabolic Panel    Lipase    BNP    High Sensitivity Troponin T    hCG, Serum, Qualitative    CBC Auto Differential    High  Sensitivity Troponin T 2Hr    Basic Metabolic Panel    CBC (No Diff)    Diet: Cardiac Diets; Healthy Heart (2-3 Na+); Texture: Regular Texture (IDDSI 7); Fluid Consistency: Thin (IDDSI 0)    Vital Signs    Up with assistance    Daily Weights    Strict Intake & Output    Oral Care    Code Status and Medical Interventions:    ECG 12 Lead Dyspnea    Inpatient Admission    CBC & Differential         Additional orders considered but not ordered:  None        Differential diagnosis includes but is not limited to:    CHF versus pneumonia      Independent interpretation of labs, radiology studies, and discussions with consultants:  ED Course as of 07/27/23 1922   Thu Jul 27, 2023 1908 19:08 EDT  Patient presents for evaluation of shortness of breath as well as abdominal pain.  Patient shortness of breath appears to be congestive heart failure.  Patient has elevated BNP.  Patient has chest x-ray with fluid.  Patient CT abdomen negative.  Patient is hypoxic.  Discussed with Dr. Kim who will admit. [SL]      ED Course User Index  [SL] Stalin Colmenares MD                 DIAGNOSIS  Final diagnoses:   Acute congestive heart failure, unspecified heart failure type   Hypoxia         DISPOSITION  admit            Latest Documented Vital Signs:  As of 19:22 EDT  BP- 119/78 HR- 101 Temp- 98.8 øF (37.1 øC) (Oral) O2 sat- 96%              --    Please note that portions of this were completed with a voice recognition program.       Note Disclaimer: At [facility], we believe that sharing information builds trust and better relationships. You are receiving this note because you are receiving care at [facility] or recently visited. It is possible you will see health information before a provider has talked with you about it. This kind of information can be easy to misunderstand. To help you fully understand what it means for your health, we urge you to discuss this note with your provider.            Stalin Colmenares,  MD  07/27/23 6316

## 2023-07-28 ENCOUNTER — APPOINTMENT (OUTPATIENT)
Dept: CT IMAGING | Facility: HOSPITAL | Age: 43
DRG: 292 | End: 2023-07-28
Payer: COMMERCIAL

## 2023-07-28 LAB
ANION GAP SERPL CALCULATED.3IONS-SCNC: 11 MMOL/L (ref 5–15)
BUN SERPL-MCNC: 12 MG/DL (ref 6–20)
BUN/CREAT SERPL: 14.8 (ref 7–25)
CALCIUM SPEC-SCNC: 8.4 MG/DL (ref 8.6–10.5)
CHLORIDE SERPL-SCNC: 99 MMOL/L (ref 98–107)
CO2 SERPL-SCNC: 30 MMOL/L (ref 22–29)
CREAT SERPL-MCNC: 0.81 MG/DL (ref 0.57–1)
DEPRECATED RDW RBC AUTO: 44.1 FL (ref 37–54)
EGFRCR SERPLBLD CKD-EPI 2021: 93.7 ML/MIN/1.73
ERYTHROCYTE [DISTWIDTH] IN BLOOD BY AUTOMATED COUNT: 13.3 % (ref 12.3–15.4)
GLUCOSE SERPL-MCNC: 126 MG/DL (ref 65–99)
HCT VFR BLD AUTO: 36.3 % (ref 34–46.6)
HGB BLD-MCNC: 12.2 G/DL (ref 12–15.9)
MCH RBC QN AUTO: 31.2 PG (ref 26.6–33)
MCHC RBC AUTO-ENTMCNC: 33.6 G/DL (ref 31.5–35.7)
MCV RBC AUTO: 92.8 FL (ref 79–97)
PLATELET # BLD AUTO: 295 10*3/MM3 (ref 140–450)
PMV BLD AUTO: 10.5 FL (ref 6–12)
POTASSIUM SERPL-SCNC: 3.1 MMOL/L (ref 3.5–5.2)
RBC # BLD AUTO: 3.91 10*6/MM3 (ref 3.77–5.28)
SODIUM SERPL-SCNC: 140 MMOL/L (ref 136–145)
TROPONIN T SERPL HS-MCNC: 44 NG/L
WBC NRBC COR # BLD: 10.47 10*3/MM3 (ref 3.4–10.8)

## 2023-07-28 PROCEDURE — 36415 COLL VENOUS BLD VENIPUNCTURE: CPT | Performed by: INTERNAL MEDICINE

## 2023-07-28 PROCEDURE — 99254 IP/OBS CNSLTJ NEW/EST MOD 60: CPT | Performed by: INTERNAL MEDICINE

## 2023-07-28 PROCEDURE — 71275 CT ANGIOGRAPHY CHEST: CPT

## 2023-07-28 PROCEDURE — 85027 COMPLETE CBC AUTOMATED: CPT | Performed by: INTERNAL MEDICINE

## 2023-07-28 PROCEDURE — 25010000002 FUROSEMIDE PER 20 MG: Performed by: INTERNAL MEDICINE

## 2023-07-28 PROCEDURE — 63710000001 ONDANSETRON PER 8 MG: Performed by: INTERNAL MEDICINE

## 2023-07-28 PROCEDURE — 80048 BASIC METABOLIC PNL TOTAL CA: CPT | Performed by: INTERNAL MEDICINE

## 2023-07-28 PROCEDURE — 84484 ASSAY OF TROPONIN QUANT: CPT | Performed by: INTERNAL MEDICINE

## 2023-07-28 PROCEDURE — 25510000001 IOPAMIDOL PER 1 ML: Performed by: INTERNAL MEDICINE

## 2023-07-28 PROCEDURE — 90791 PSYCH DIAGNOSTIC EVALUATION: CPT

## 2023-07-28 RX ORDER — NICOTINE 21 MG/24HR
1 PATCH, TRANSDERMAL 24 HOURS TRANSDERMAL
Status: DISCONTINUED | OUTPATIENT
Start: 2023-07-28 | End: 2023-08-03 | Stop reason: HOSPADM

## 2023-07-28 RX ADMIN — NICOTINE 1 PATCH: 21 PATCH, EXTENDED RELEASE TRANSDERMAL at 12:08

## 2023-07-28 RX ADMIN — LEVETIRACETAM 2000 MG: 500 TABLET, FILM COATED ORAL at 01:14

## 2023-07-28 RX ADMIN — BUSPIRONE HYDROCHLORIDE 5 MG: 5 TABLET ORAL at 08:11

## 2023-07-28 RX ADMIN — OLANZAPINE 5 MG: 5 TABLET ORAL at 08:11

## 2023-07-28 RX ADMIN — FOLIC ACID 1 MG: 1 TABLET ORAL at 08:11

## 2023-07-28 RX ADMIN — DICYCLOMINE HYDROCHLORIDE 20 MG: 10 CAPSULE ORAL at 20:29

## 2023-07-28 RX ADMIN — FUROSEMIDE 40 MG: 10 INJECTION, SOLUTION INTRAMUSCULAR; INTRAVENOUS at 12:08

## 2023-07-28 RX ADMIN — APIXABAN 5 MG: 5 TABLET, FILM COATED ORAL at 08:11

## 2023-07-28 RX ADMIN — ACETAMINOPHEN 650 MG: 325 TABLET, FILM COATED ORAL at 08:13

## 2023-07-28 RX ADMIN — GABAPENTIN 800 MG: 400 CAPSULE ORAL at 20:29

## 2023-07-28 RX ADMIN — BUSPIRONE HYDROCHLORIDE 5 MG: 5 TABLET ORAL at 01:14

## 2023-07-28 RX ADMIN — Medication 400 MG: at 08:11

## 2023-07-28 RX ADMIN — APIXABAN 5 MG: 5 TABLET, FILM COATED ORAL at 20:32

## 2023-07-28 RX ADMIN — APIXABAN 5 MG: 5 TABLET, FILM COATED ORAL at 01:15

## 2023-07-28 RX ADMIN — FLUOXETINE HYDROCHLORIDE 60 MG: 20 CAPSULE ORAL at 01:15

## 2023-07-28 RX ADMIN — DICYCLOMINE HYDROCHLORIDE 20 MG: 10 CAPSULE ORAL at 17:23

## 2023-07-28 RX ADMIN — ONDANSETRON HYDROCHLORIDE 4 MG: 4 TABLET, FILM COATED ORAL at 08:13

## 2023-07-28 RX ADMIN — BISACODYL 5 MG: 5 TABLET, COATED ORAL at 01:15

## 2023-07-28 RX ADMIN — OLANZAPINE 5 MG: 5 TABLET ORAL at 01:14

## 2023-07-28 RX ADMIN — ACETAMINOPHEN 650 MG: 325 TABLET, FILM COATED ORAL at 17:23

## 2023-07-28 RX ADMIN — PANTOPRAZOLE SODIUM 40 MG: 40 TABLET, DELAYED RELEASE ORAL at 06:59

## 2023-07-28 RX ADMIN — LEVETIRACETAM 2000 MG: 500 TABLET, FILM COATED ORAL at 20:29

## 2023-07-28 RX ADMIN — GABAPENTIN 800 MG: 400 CAPSULE ORAL at 01:15

## 2023-07-28 RX ADMIN — SENNOSIDES AND DOCUSATE SODIUM 2 TABLET: 50; 8.6 TABLET ORAL at 20:29

## 2023-07-28 RX ADMIN — GABAPENTIN 800 MG: 400 CAPSULE ORAL at 13:50

## 2023-07-28 RX ADMIN — ONDANSETRON HYDROCHLORIDE 4 MG: 4 TABLET, FILM COATED ORAL at 17:23

## 2023-07-28 RX ADMIN — POLYETHYLENE GLYCOL 3350 17 G: 17 POWDER, FOR SOLUTION ORAL at 12:08

## 2023-07-28 RX ADMIN — DICYCLOMINE HYDROCHLORIDE 20 MG: 10 CAPSULE ORAL at 08:11

## 2023-07-28 RX ADMIN — LEVETIRACETAM 2000 MG: 500 TABLET, FILM COATED ORAL at 08:11

## 2023-07-28 RX ADMIN — OLANZAPINE 5 MG: 5 TABLET ORAL at 20:29

## 2023-07-28 RX ADMIN — FUROSEMIDE 40 MG: 10 INJECTION, SOLUTION INTRAMUSCULAR; INTRAVENOUS at 01:14

## 2023-07-28 RX ADMIN — IOPAMIDOL 95 ML: 755 INJECTION, SOLUTION INTRAVENOUS at 17:04

## 2023-07-28 RX ADMIN — BUSPIRONE HYDROCHLORIDE 5 MG: 5 TABLET ORAL at 20:29

## 2023-07-28 RX ADMIN — GABAPENTIN 800 MG: 400 CAPSULE ORAL at 06:59

## 2023-07-28 RX ADMIN — FLUOXETINE HYDROCHLORIDE 60 MG: 20 CAPSULE ORAL at 20:29

## 2023-07-28 RX ADMIN — DICYCLOMINE HYDROCHLORIDE 20 MG: 10 CAPSULE ORAL at 01:15

## 2023-07-28 RX ADMIN — DICYCLOMINE HYDROCHLORIDE 20 MG: 10 CAPSULE ORAL at 12:08

## 2023-07-28 RX ADMIN — SENNOSIDES AND DOCUSATE SODIUM 2 TABLET: 50; 8.6 TABLET ORAL at 08:11

## 2023-07-28 NOTE — SIGNIFICANT NOTE
07/28/23 1239   OTHER   Discipline occupational therapist   Rehab Time/Intention   Session Not Performed other (see comments)  (OT discussed with RN, he reports he does not anticipate pt needing OT. I discussed with nsg aid who reports pt had been getting up ab js in room. Please reconsult if status changes.)

## 2023-07-28 NOTE — DISCHARGE PLACEMENT REQUEST
"Carson Mullen (42 y.o. Female)       Date of Birth   1980    Social Security Number       Address   [address] [address]    Home Phone   [phone]    MRN   [id number]       Restorationism   None    Marital Status   Single                            Admission Date   7/27/23    Admission Type   Emergency    Admitting Provider   Kathy Kim MD    Attending Provider   Nirmal Zendejas MD    Department, Room/Bed   [address], S[address]/1       Discharge Date       Discharge Disposition       Discharge Destination                                 Attending Provider: Nirmal Zendejas MD    Allergies: Morphine, Lidocaine    Isolation: None   Infection: None   Code Status: CPR    Ht: 162.6 cm (64\")   Wt: 91.8 kg (202 lb 6.1 oz)    Admission Cmt: None   Principal Problem: Pulmonary edema [J81.1]                   Active Insurance as of 7/27/2023       Primary Coverage       Payor Plan Insurance Group Employer/Plan Group    [facility] BY ALYSHA [facility] BY ALYSHA [id number]       Payor Plan Address Payor Plan Phone Number Payor Plan Fax Number Effective Dates    [address] [address]   1/1/2021 - None Entered    [facility] [address]         Subscriber Name Subscriber Birth Date Member ID       CARSON MULLEN 1980 [id number]                     Emergency Contacts        (Rel.) Home Phone Work Phone Mobile Phone    JUNIOR AGUAYO (Significant Other) [phone] -- [phone]    ALEX STOCK (Sister) [phone] -- --              "

## 2023-07-28 NOTE — CONSULTS
"  Access center consult.    Met with patient in room #521. Introduced self and role. Patient agreed to be evaluated.     Patient is a 42 y.o. S/W/F. She is alert and oriented x3. Patient lives with s/o. Worship: na. Children: 2. Occupation: unemployed. Hobbies: latch hooking, spending time with her children. Education: high school. Legal: denies. : JUNIOR AGUAYO (Significant Other)  [phone]. : denies. Support system: spouse. History of violence/trauma/abuse: denies. Patient feels her home is a safe environment.     Access consulted for drugs. Patient presented to the ED 7/27/23 with c/o N/V, abdominal pain, and SOB. Patient admitted with pulmonary edema. Past medical history abnormal pap smear, opioid dependence, H/O elevated liver enzymes, Hx. chronic back pain, seizure, Hx. of migraine, anxiety, depression.     Patient denies any past substance use treatment. Patient seen by Access 7/14/23 for drugs, resources were provided. Last illicit drug use 7/23/23 of \"percocet 30mg\" TID. She reports illicit drug use the past year. She has a Hx. of THC, amph/meth, fentanyl. Patient discussed substance use began to help manage chronic back pain. Patient denies having a substance use disorder. Current craving: denies. Patient denies any past seizures or memory loss. She reports Hx. of blackouts. Longest sobriety 1 year. Patient reports relapse occurs due to back pain. She expresses desire to abstain from substance use.     Patient denies any past inpatient psychiatric care. She reports seen by a therapist a few years ago after the death of her mother. She denies any current mental health providers. Current medications buspar, prozac, zyprexa managed by her PCP. Patient denies SI/HI/A/V/H. Denies wish to be dead. Sleep/appetite: poor. Depression: 3/10. Anxiety: 3/10. Current stressors: chronic pain. Patient discussed interest in outpatient rehab. Provided recovery resources including " inpatient/outpatient and NA support group. Patient declined outpatient psychiatry resources. Report given to primary RN. Access will follow and further discuss substance use treatment options.

## 2023-07-28 NOTE — PROGRESS NOTES
Name: Belen Allen ADMIT: 2023   : 1980  PCP: Sahil Cornelius MD    MRN: [id number] LOS: 1 days   AGE/SEX: 42 y.o. female  ROOM: [facility]     Subjective   Subjective   Patient is seen at bedside, no new complaints.       Objective   Objective   Vital Signs  Temp:  [97.6 øF (36.4 øC)-98.6 øF (37 øC)] 97.6 øF (36.4 øC)  Heart Rate:  [] 104  Resp:  [18] 18  BP: (105-139)/(60-99) 105/80  SpO2:  [93 %-97 %] 97 %  on  Flow (L/min):  [2] 2;   Device (Oxygen Therapy): nasal cannula  Body mass index is 34.74 kg/mý.  Physical Exam  General, awake and alert.  Head and ENT, normocephalic and atraumatic.  Lungs, symmetric expansion, equal air entry bilaterally.  Heart, regular rate and rhythm.  Abdomen, soft and nontender.  Extremities, no clubbing or cyanosis.  Neuro, no focal deficits.  Skin: Warm and no rash.  Psych, normal mood and affect.  Musculoskeletal, joint examination is grossly normal.      Results Review     I reviewed the patient's new clinical results.  Results from last 7 days   Lab Units 23  0452 23  1612   WBC 10*3/mm3 10.47 12.00*   HEMOGLOBIN g/dL 12.2 11.9*   PLATELETS 10*3/mm3 295 298     Results from last 7 days   Lab Units 23  0452 23  1612   SODIUM mmol/L 140 141   POTASSIUM mmol/L 3.1* 3.6   CHLORIDE mmol/L 99 103   CO2 mmol/L 30.0* 27.2   BUN mg/dL 12 17   CREATININE mg/dL 0.81 0.89   GLUCOSE mg/dL 126* 146*   EGFR mL/min/1.73 93.7 83.7     Results from last 7 days   Lab Units 23  1612   ALBUMIN g/dL 3.2*   BILIRUBIN mg/dL 0.6   ALK PHOS U/L 161*   AST (SGOT) U/L 51*   ALT (SGPT) U/L 84*     Results from last 7 days   Lab Units 23  0452 23  1612   CALCIUM mg/dL 8.4* 8.7   ALBUMIN g/dL  --  3.2*       No results found for: HGBA1C, POCGLU    CT Abdomen Pelvis Without Contrast    Result Date: 2023   1. No focal acute inflammatory process of bowel is identified. Mild nonspecific pelvic free fluid. Follow-up as indications  persist. 2. No urolithiasis or hydronephrosis. 3. Diffuse groundglass opacities in the partly included lungs suggest edema and/or pneumonia, with right more than left pleural effusions, follow-up advised.  This report was finalized on 7/27/2023 6:59 PM by Dr. Yonathan Neal M.D.      XR Chest 1 View    Result Date: 7/27/2023  No focal pulmonary consolidation. Cardiomegaly with mild prominence of vascular and interstitial markings.  This report was finalized on 7/27/2023 5:20 PM by Dr. Yonathan Neal M.D.       I have personally reviewed all medications:  Scheduled Medications  apixaban, 5 mg, Oral, Q12H  busPIRone, 5 mg, Oral, BID  dicyclomine, 20 mg, Oral, 4x Daily  FLUoxetine, 60 mg, Oral, Nightly  folic acid, 1 mg, Oral, Daily  furosemide, 40 mg, Intravenous, Q12H  gabapentin, 800 mg, Oral, Q8H  levETIRAcetam, 2,000 mg, Oral, Q12H  nicotine, 1 patch, Transdermal, Q24H  OLANZapine, 5 mg, Oral, BID  pantoprazole, 40 mg, Oral, Q AM  senna-docusate sodium, 2 tablet, Oral, BID  Vitamin B-2, 400 mg, Oral, Daily    Infusions   Diet  Diet: Cardiac Diets; Healthy Heart (2-3 Na+); Texture: Regular Texture (IDDSI 7); Fluid Consistency: Thin (IDDSI 0)    I have personally reviewed:  [x]  Laboratory   [x]  Microbiology   [x]  Radiology   [x]  EKG/Telemetry  [x]  Cardiology/Vascular   []  Pathology    []  Records       Assessment/Plan     Active Hospital Problems    Diagnosis  POA    **Pulmonary edema [J81.1]  Yes    Tobacco abuse [Z72.0]  Yes    GERD without esophagitis [K21.9]  Yes    Antiphospholipid antibody positive [R76.0]  Yes    Anxiety disorder [F41.9]  Yes      Resolved Hospital Problems   No resolved problems to display.       42 y.o. female admitted with Pulmonary edema.    Assessment and plan  1.  Acute hypoxemic respiratory failure, pulmonary edema, will repeat echo.  Last echo showed ejection fraction of 61 to 65% but it was done in 2020.  Cardiology evaluation.  Patient is currently on Lasix.  Monitor  renal function and electrolytes.    2.  Hypokalemia, replacement of electrolytes as needed.    3.  Chronic opioid use, monitor for signs of withdrawal.    4.  Obesity, BMI noted to be close to 35, she would benefit from weight loss.    5.  Tobacco abuse disorder, nicotine patch.    6.  History of antiphospholipid antibody syndrome, splenic infarct, patient is on anticoagulation.    7.  CODE STATUS is full code.  Further plans based on hospital course.      Nirmal Zendejas MD  [facility]  07/28/23  16:42 EDT

## 2023-07-28 NOTE — CASE MANAGEMENT/SOCIAL WORK
Discharge Planning Assessment  [facility]     Patient Name: Belen Allen  MRN: [id number]  Today's Date: 7/28/2023    Admit Date: 7/27/2023    Plan: Home with significant other; follow for 02 needs   Discharge Needs Assessment       Row Name 07/28/23 1309       Living Environment    People in Home significant other;child(manuel), dependent    Current Living Arrangements home    Potentially Unsafe Housing Conditions none    Primary Care Provided by self    Provides Primary Care For child(manuel)    Family Caregiver if Needed significant other    Quality of Family Relationships involved;helpful;supportive    Able to Return to Prior Arrangements yes       Resource/Environmental Concerns    Resource/Environmental Concerns none       Transition Planning    Patient/Family Anticipates Transition to home with family    Patient/Family Anticipated Services at Transition none    Transportation Anticipated family or friend will provide       Discharge Needs Assessment    Readmission Within the Last 30 Days no previous admission in last 30 days    Equipment Currently Used at Home walker, standard;commode    Concerns to be Addressed no discharge needs identified;denies needs/concerns at this time    Anticipated Changes Related to Illness none    Equipment Needed After Discharge none                   Discharge Plan       Row Name 07/28/23 0257       Plan    Plan Home with significant other; follow for 02 needs    Plan Comments CCP met with patient at bedside. CCP role explained and discharge planning discussed. Face sheet verified and patient's PCP is Dr. Cornelius. Patient lives with her fienrique‚ and two children (10 and 13 years old). Patient is independent with her ADLs. Patient does have a walker but does not typically use it. Patient has BSC. Patient denies any HH/SNF history. Patient plans to return home at discharge. If home 02 is needed; would use [facility]                  Continued Care and Services -  Admitted Since 7/27/2023    Coordination has not been started for this encounter.          Demographic Summary       Row Name 07/28/23 1309       General Information    Admission Type inpatient    Arrived From emergency department    Referral Source admission list    Reason for Consult discharge planning    Preferred Language English                   Functional Status       Row Name 07/28/23 1309       Functional Status    Usual Activity Tolerance good    Current Activity Tolerance good       Functional Status, IADL    Medications independent    Meal Preparation independent    Housekeeping independent    Laundry independent    Shopping independent       Mental Status    General Appearance WDL WDL       Mental Status Summary    Recent Changes in Mental Status/Cognitive Functioning no changes                   Psychosocial    No documentation.                  Abuse/Neglect    No documentation.                  Legal    No documentation.                  Substance Abuse    No documentation.                  Patient Forms    No documentation.                     ZACHARY Kumar

## 2023-07-28 NOTE — H&P
HISTORY AND PHYSICAL   [facility]        Date of Admission: 2023  Patient Identification:  Name: Belen Allen  Age: 42 y.o.  Sex: female  :  1980  MRN: [id number]                     Primary Care Physician: Sahil Cornelius MD    Chief Complaint:  42 year old female who presented to the emergency room with nausea, vomiting and abdominal pain; she has a history of opioid dependence; no diarrhea; no fever or chills    History of Present Illness:   As above    Past Medical History:  Past Medical History:   Diagnosis Date    Abnormal Pap smear of cervix     Ankle fracture     H/O Elevated liver enzymes     History of chronic back pain     History of migraine     History of urinary tract infection     Injury of back     Opioid dependence 2023    PONV (postoperative nausea and vomiting)     Seasonal allergies     Seizure     Takes Keppra     Past Surgical History:  Past Surgical History:   Procedure Laterality Date    BACK SURGERY      fusion L4, L5      CHOLECYSTECTOMY      DILATATION AND CURETTAGE      ENDOSCOPY N/A 2022    Procedure: ESOPHAGOGASTRODUODENOSCOPY with biopsy;  Surgeon: Christiana Mann MD;  Location: [facility] ENDOSCOPY;  Service: Gastroenterology;  Laterality: N/A;  gastritis, duodenitis, irregular z line    ERCP N/A 6/3/2021    Procedure: ENDOSCOPIC RETROGRADE CHOLANGIOPANCREATOGRAPHY WITH SPHINCTEROTOMY, DILATION WITH BALLOON CLEARANCE  (12MM-15MM);  Surgeon: Bjorn Flannery MD;  Location: [facility] ENDOSCOPY;  Service: Gastroenterology;  Laterality: N/A;  DILATED COMMON BILE DUCT    SKIN SURGERY      TUBAL ABDOMINAL LIGATION  2013    WISDOM TOOTH EXTRACTION  2018    x2      Home Meds:  Medications Prior to Admission   Medication Sig Dispense Refill Last Dose    acetaminophen (TYLENOL) 325 MG tablet Take 2 tablets by mouth Every 6 (Six) Hours As Needed for Mild Pain.       apixaban (ELIQUIS) 5 MG tablet tablet Take 1 tablet by mouth 2  (Two) Times a Day. Last filled 4/12/21 - pt states she was instructed to stop taking medication prior to ERCP ~1 month ago and was not instructed to restart medication.   Indication: Splenic infarct       busPIRone (BUSPAR) 5 MG tablet Take 1 tablet by mouth 2 (Two) Times a Day. For anxiety 60 tablet 0     dicyclomine (BENTYL) 20 MG tablet Take 1 tablet by mouth Every 6 (Six) Hours. 20 tablet 0     FLUoxetine (PROzac) 40 MG capsule Take 60 mg by mouth Every Night.       folic acid (FOLVITE) 1 MG tablet Take 1 tablet by mouth Daily. 30 tablet 0     gabapentin (NEURONTIN) 800 MG tablet Take 1 tablet by mouth 4 (Four) Times a Day.       levETIRAcetam (KEPPRA) 1000 MG tablet Take 2 tablets by mouth Every 12 (Twelve) Hours for 30 days. 120 tablet 0     melatonin 5 MG tablet tablet Take 2 tablets by mouth Every Night. Patient taes 20 mg at home       OLANZapine (zyPREXA) 10 MG tablet Take 0.5 tablets by mouth 2 (Two) Times a Day. 30 tablet 0     ondansetron ODT (ZOFRAN-ODT) 4 MG disintegrating tablet Place 1 tablet on the tongue 4 (Four) Times a Day As Needed for Nausea or Vomiting. 30 tablet 0     pantoprazole (PROTONIX) 40 MG EC tablet Take 1 tablet by mouth Daily. 30 tablet 0     sucralfate (CARAFATE) 1 g tablet Take 1 tablet by mouth 4 (Four) Times a Day. 20 tablet 0     SUMAtriptan (IMITREX) 100 MG tablet Take 0.5 tablets by mouth Every 2 (Two) Hours As Needed for Migraine. Take one tablet at onset of headache. May repeat dose one time in 2 hours if headache not relieved.       traZODone (DESYREL) 50 MG tablet Take 1-2 tablets by mouth At Night As Needed for sleep 30 tablet 3     Vitamin B-2 (RIBOFLAVIN) 100 MG tablet tablet Take 4 tablets by mouth Daily. 30 tablet 3        Allergies:  Allergies   Allergen Reactions    Morphine Hives    Lidocaine Rash     Pt reports lidocaine patches make her breakout in a rash     Immunizations:  Immunization History   Administered Date(s) Administered    FluLaval/Fluzone >6mos  10/14/2020, 10/27/2021, 10/27/2022    Hib (PRP-T) 2020    Influenza, Unspecified 10/14/2020, 10/27/2021, 10/27/2022    Meningococcal B,(Bexsero) 2020    Meningococcal Conjugate 2020    Pneumococcal Conjugate 13-Valent (PCV13) 2020     Social History:   Social History     Social History Narrative    Not on file     Social History     Socioeconomic History    Marital status: Single    Number of children: 2   Tobacco Use    Smoking status: Every Day     Packs/day: 0.50     Types: Cigarettes    Smokeless tobacco: Never   Vaping Use    Vaping Use: Never used   Substance and Sexual Activity    Alcohol use: Not Currently    Drug use: Not Currently    Sexual activity: Defer       Family History:  Family History   Problem Relation Age of Onset    Stroke Mother     Allergic rhinitis Mother     Allergic rhinitis Sister     Breast cancer Maternal Aunt     Cancer Maternal Grandmother     Allergic rhinitis Paternal Grandfather     Cancer Paternal Grandfather     Prostate cancer Paternal Grandfather         Review of Systems  See history of present illness and past medical history.  Patient denies headache, dizziness, syncope, falls, trauma, change in vision, change in hearing, change in taste, changes in weight, changes in appetite, focal weakness, numbness, or paresthesia.  Patient denies chest pain, palpitations, dyspnea, orthopnea, PND, cough, sinus pressure, rhinorrhea, epistaxis, hemoptysis,  ,hematemesis, diarrhea, constipation or hematochezia.  Denies cold or heat intolerance, polydipsia, polyuria, polyphagia. Denies hematuria, pyuria, dysuria, hesitancy, frequency or urgency. Denies consumption of raw and under cooked meats foods or change in water source.  Denies fever, chills, sweats, night sweats.  Denies missing any routine medications. Remainder of ROS is negative.    Objective:  T Max 24 hrs: Temp (24hrs), Av.8 øF (37.1 øC), Min:98.8 øF (37.1 øC), Max:98.8 øF (37.1 øC)    Vitals Ranges:  "  Temp:  [98.8 øF (37.1 øC)] 98.8 øF (37.1 øC)  Heart Rate:  [101-117] 104  Resp:  [20] 20  BP: (119-146)/(64-99) 139/99      Exam:  /99   Pulse 104   Temp 98.8 øF (37.1 øC) (Oral)   Resp 20   Ht 162.6 cm (64\")   Wt 90.7 kg (200 lb)   SpO2 94%   BMI 34.33 kg/mý     General Appearance:    Alert, cooperative, no distress, appears stated age   Head:    Normocephalic, without obvious abnormality, atraumatic   Eyes:    PERRL, conjunctivae/corneas clear, EOM's intact, both eyes   Ears:    Normal external ear canals, both ears   Nose:   Nares normal, septum midline, mucosa normal, no drainage    or sinus tenderness   Throat:   Lips, mucosa, and tongue normal   Neck:   Supple, symmetrical, trachea midline, no adenopathy;     thyroid:  no enlargement/tenderness/nodules; no carotid    bruit or JVD   Back:     Symmetric, no curvature, ROM normal, no CVA tenderness   Lungs:     Decreased breath sounds bilaterally, respirations unlabored   Chest Wall:    No tenderness or deformity    Heart:    Regular rate and rhythm, S1 and S2 normal, no murmur, rub   or gallop   Abdomen:     Soft, nontender, bowel sounds active all four quadrants,     no masses, no hepatomegaly, no splenomegaly   Extremities:   Extremities normal, atraumatic, no cyanosis or edema   Pulses:   2+ and symmetric all extremities   Skin:   Skin color, texture, turgor normal, no rashes or lesions               .    Data Review:  Labs in chart were reviewed.  WBC   Date Value Ref Range Status   07/27/2023 12.00 (H) 3.40 - 10.80 10*3/mm3 Final     Hemoglobin   Date Value Ref Range Status   07/27/2023 11.9 (L) 12.0 - 15.9 g/dL Final     Hematocrit   Date Value Ref Range Status   07/27/2023 37.2 34.0 - 46.6 % Final     Platelets   Date Value Ref Range Status   07/27/2023 298 140 - 450 10*3/mm3 Final     Sodium   Date Value Ref Range Status   07/27/2023 141 136 - 145 mmol/L Final     Potassium   Date Value Ref Range Status   07/27/2023 3.6 3.5 - 5.2 mmol/L " Final     Chloride   Date Value Ref Range Status   07/27/2023 103 98 - 107 mmol/L Final     CO2   Date Value Ref Range Status   07/27/2023 27.2 22.0 - 29.0 mmol/L Final     BUN   Date Value Ref Range Status   07/27/2023 17 6 - 20 mg/dL Final     Creatinine   Date Value Ref Range Status   07/27/2023 0.89 0.57 - 1.00 mg/dL Final     Glucose   Date Value Ref Range Status   07/27/2023 146 (H) 65 - 99 mg/dL Final     Calcium   Date Value Ref Range Status   07/27/2023 8.7 8.6 - 10.5 mg/dL Final     AST (SGOT)   Date Value Ref Range Status   07/27/2023 51 (H) 1 - 32 U/L Final     ALT (SGPT)   Date Value Ref Range Status   07/27/2023 84 (H) 1 - 33 U/L Final     Alkaline Phosphatase   Date Value Ref Range Status   07/27/2023 161 (H) 39 - 117 U/L Final                Imaging Results (All)       Procedure Component Value Units Date/Time    CT Abdomen Pelvis Without Contrast [[id number]] Collected: 07/27/23 1855     Updated: 07/27/23 1903    Narrative:      CT ABDOMEN PELVIS WO CONTRAST-     INDICATIONS: Nausea, vomiting, abdominal pain     TECHNIQUE: Radiation dose reduction techniques were utilized, including  automated exposure control and exposure modulation based on body size.  Unenhanced ABDOMEN AND PELVIS CT     COMPARISON: 12/13/2022     FINDINGS:     The gallbladder is surgically absent. Tiny pneumobilia is apparent (less  than on the prior exam).     A left renal cyst is present. Areas of focal cortical thinning in the  left kidney may be result of prior infection or infarct.     Otherwise unremarkable unenhanced appearance of the liver, spleen,  adrenal glands, pancreas, kidneys, bladder.     No bowel obstruction or abnormal bowel thickening is identified. The  appendix does not appear inflamed.     No free intraperitoneal gas . Mild nonspecific pelvic free fluid is  present.     Scattered small mesenteric and para-aortic lymph nodes are seen that are  not significant by size criteria.     Abdominal aorta is not  aneurysmal.     The lung bases show diffuse groundglass opacities that suggests edema  and/or pneumonia, minimal right and trace left pleural effusions. The  heart is mildly enlarged.     Degenerative and surgical changes are seen in the spine. No acute  fracture is identified.             Impression:         1. No focal acute inflammatory process of bowel is identified. Mild  nonspecific pelvic free fluid. Follow-up as indications persist.  2. No urolithiasis or hydronephrosis.  3. Diffuse groundglass opacities in the partly included lungs suggest  edema and/or pneumonia, with right more than left pleural effusions,  follow-up advised.     This report was finalized on 7/27/2023 6:59 PM by Dr. Yonathan Neal M.D.       XR Chest 1 View [[id number]] Collected: 07/27/23 1719     Updated: 07/27/23 1723    Narrative:      XR CHEST 1 VW-     HISTORY: Female who is 42 years-old,  short of breath     TECHNIQUE: Frontal view of the chest     COMPARISON: 12/26/2022     FINDINGS: The heart is enlarged with mild prominence of vascular and  interstitial markings. No focal pulmonary consolidation, pleural  effusion, or pneumothorax. No acute osseous process.       Impression:      No focal pulmonary consolidation. Cardiomegaly with mild  prominence of vascular and interstitial markings.     This report was finalized on 7/27/2023 5:20 PM by Dr. Yonathan Neal M.D.                 Assessment:  Active Hospital Problems    Diagnosis  POA    **Pulmonary edema [J81.1]  Yes      Resolved Hospital Problems   No resolved problems to display.   Opioid dependence  Hyperglycemia  Anemia  Obesity  Nausea and vomiting  Abdominal pain    Plan:  Will diurese a little  Check echo  Monitor on telemetry  Opioid detox protocol  Access to see  Dw patient and ed provider    Kathy Kim MD  7/27/2023  23:05 EDT

## 2023-07-28 NOTE — PLAN OF CARE
Goal Outcome Evaluation:                      Patient resting in bed, IV therapy placed new IV in AC for CT. Patient has no requests, denies needs at this time. VSS, denies pain.     RN will continue to round on patient per hospital protocol.       Problem: Adult Inpatient Plan of Care  Goal: Plan of Care Review  Outcome: Ongoing, Progressing  Goal: Patient-Specific Goal (Individualized)  Outcome: Ongoing, Progressing  Goal: Absence of Hospital-Acquired Illness or Injury  Outcome: Ongoing, Progressing  Intervention: Identify and Manage Fall Risk  Recent Flowsheet Documentation  Taken 7/28/2023 1400 by Harman Alcala RN  Safety Promotion/Fall Prevention:   safety round/check completed   room organization consistent  Taken 7/28/2023 1200 by Harman Alcala RN  Safety Promotion/Fall Prevention: safety round/check completed  Taken 7/28/2023 1000 by Harman Alcala RN  Safety Promotion/Fall Prevention:   safety round/check completed   room organization consistent   nonskid shoes/slippers when out of bed  Taken 7/28/2023 0800 by Harman Alcala RN  Safety Promotion/Fall Prevention:   safety round/check completed   room organization consistent  Intervention: Prevent Skin Injury  Recent Flowsheet Documentation  Taken 7/28/2023 1400 by Harman Alcala RN  Body Position: position changed independently  Taken 7/28/2023 0800 by Harman Alcala RN  Body Position: position changed independently  Intervention: Prevent and Manage VTE (Venous Thromboembolism) Risk  Recent Flowsheet Documentation  Taken 7/28/2023 1400 by Harman Alcala RN  Activity Management: up ad js  Intervention: Prevent Infection  Recent Flowsheet Documentation  Taken 7/28/2023 1400 by Harman Alcala RN  Infection Prevention:   single patient room provided   rest/sleep promoted  Taken 7/28/2023 1200 by Harman Alcala RN  Infection Prevention:   rest/sleep promoted   single patient room provided  Taken 7/28/2023 0800 by Harman Alcala RN  Infection  Prevention: single patient room provided  Goal: Optimal Comfort and Wellbeing  Outcome: Ongoing, Progressing  Intervention: Monitor Pain and Promote Comfort  Recent Flowsheet Documentation  Taken 7/28/2023 1400 by Harman Alcala RN  Pain Management Interventions:   position adjusted   pillow support provided  Taken 7/28/2023 0800 by Harman Alcala RN  Pain Management Interventions:   position adjusted   see MAR   pillow support provided  Intervention: Provide Person-Centered Care  Recent Flowsheet Documentation  Taken 7/28/2023 1400 by Harman Alcala RN  Trust Relationship/Rapport:   care explained   questions encouraged  Taken 7/28/2023 0800 by Harman Alcala RN  Trust Relationship/Rapport:   care explained   reassurance provided  Goal: Readiness for Transition of Care  Outcome: Ongoing, Progressing     Problem: Seizure Disorder Comorbidity  Goal: Maintenance of Seizure Control  Outcome: Ongoing, Progressing     Problem: Breathing Pattern Ineffective  Goal: Effective Breathing Pattern  Outcome: Ongoing, Progressing  Intervention: Promote Improved Breathing Pattern  Recent Flowsheet Documentation  Taken 7/28/2023 1400 by Harman Alcala RN  Head of Bed (HOB) Positioning: HOB at 20-30 degrees

## 2023-07-28 NOTE — CONSULTS
Date of Consultation: 23    Referral Provider: Kathy Kim, *     Reason for Consultation: Pulmonary edema, pleuritic chest pain, SOA.    Encounter Provider: Anthony Mccauley MD    Group of Service: [facility]     Patient Name: Belen Allen    :1980    Chief complaint: Shortness of breath, chest pain, abdominal pain    History of Present Illness:     Belen Allen is a 42 year old who does not follow with a cardiologist and has a past medical history significant for migraine, opioid dependence, and seizure. She presented to [facility] ED with complaints of nausea, vomiting, and abdominal pain. She does have a history of opiate misuse and withdrawal which she was admitted for earlier this month. She reports her last use of opiates was on .  She has been very constipated.  She has also been short of breath, and has had sharp discomfort when taking a deep breath in.  She does have some mild swelling in her right lower extremity.  Her chest x-ray showed pulmonary edema and cardiomegaly.  She does not have a prior history of congestive heart failure.  She has been given IV Lasix with some improvement, although she still feels poorly in general.  Her EKG did show sinus tachycardia with anterior T wave changes.  Her high-sensitivity troponin has been mildly elevated at 44, 43, and 41.      DENIS 11/10/20  Left ventricular ejection fraction appears to be 61 - 65%. Left ventricular systolic function is normal.  Saline test results are negative.    Past Medical History:   Diagnosis Date    Abnormal Pap smear of cervix     Ankle fracture     H/O Elevated liver enzymes     History of chronic back pain     History of migraine     History of urinary tract infection     Injury of back     Opioid dependence 2023    PONV (postoperative nausea and vomiting)     Seasonal allergies     Seizure     Takes Keppra         Past Surgical History:   Procedure Laterality  Date    BACK SURGERY  2006    fusion L4, L5      CHOLECYSTECTOMY  2014    DILATATION AND CURETTAGE  2009    ENDOSCOPY N/A 11/27/2022    Procedure: ESOPHAGOGASTRODUODENOSCOPY with biopsy;  Surgeon: Christiana Mann MD;  Location: [facility] ENDOSCOPY;  Service: Gastroenterology;  Laterality: N/A;  gastritis, duodenitis, irregular z line    ERCP N/A 6/3/2021    Procedure: ENDOSCOPIC RETROGRADE CHOLANGIOPANCREATOGRAPHY WITH SPHINCTEROTOMY, DILATION WITH BALLOON CLEARANCE  (12MM-15MM);  Surgeon: Bjorn Flannery MD;  Location: [facility] ENDOSCOPY;  Service: Gastroenterology;  Laterality: N/A;  DILATED COMMON BILE DUCT    SKIN SURGERY      TUBAL ABDOMINAL LIGATION  2013    WISDOM TOOTH EXTRACTION  2018    x2         Allergies   Allergen Reactions    Morphine Hives    Lidocaine Rash     Pt reports lidocaine patches make her breakout in a rash         No current facility-administered medications on file prior to encounter.     Current Outpatient Medications on File Prior to Encounter   Medication Sig Dispense Refill    acetaminophen (TYLENOL) 325 MG tablet Take 2 tablets by mouth Every 6 (Six) Hours As Needed for Mild Pain.      apixaban (ELIQUIS) 5 MG tablet tablet Take 1 tablet by mouth 2 (Two) Times a Day. Last filled 4/12/21 - pt states she was instructed to stop taking medication prior to ERCP ~1 month ago and was not instructed to restart medication.   Indication: Splenic infarct      busPIRone (BUSPAR) 5 MG tablet Take 1 tablet by mouth 2 (Two) Times a Day. For anxiety 60 tablet 0    dicyclomine (BENTYL) 20 MG tablet Take 1 tablet by mouth Every 6 (Six) Hours. 20 tablet 0    FLUoxetine (PROzac) 40 MG capsule Take 60 mg by mouth Every Night.      folic acid (FOLVITE) 1 MG tablet Take 1 tablet by mouth Daily. 30 tablet 0    gabapentin (NEURONTIN) 800 MG tablet Take 1 tablet by mouth 4 (Four) Times a Day.      levETIRAcetam (KEPPRA) 1000 MG tablet Take 2 tablets by mouth Every 12 (Twelve) Hours for 30 days. 120 tablet 0     melatonin 5 MG tablet tablet Take 2 tablets by mouth Every Night. Patient taes 20 mg at home      OLANZapine (zyPREXA) 10 MG tablet Take 0.5 tablets by mouth 2 (Two) Times a Day. 30 tablet 0    ondansetron ODT (ZOFRAN-ODT) 4 MG disintegrating tablet Place 1 tablet on the tongue 4 (Four) Times a Day As Needed for Nausea or Vomiting. 30 tablet 0    pantoprazole (PROTONIX) 40 MG EC tablet Take 1 tablet by mouth Daily. 30 tablet 0    sucralfate (CARAFATE) 1 g tablet Take 1 tablet by mouth 4 (Four) Times a Day. 20 tablet 0    SUMAtriptan (IMITREX) 100 MG tablet Take 0.5 tablets by mouth Every 2 (Two) Hours As Needed for Migraine. Take one tablet at onset of headache. May repeat dose one time in 2 hours if headache not relieved.      traZODone (DESYREL) 50 MG tablet Take 1-2 tablets by mouth At Night As Needed for sleep 30 tablet 3    Vitamin B-2 (RIBOFLAVIN) 100 MG tablet tablet Take 4 tablets by mouth Daily. 30 tablet 3         Social History     Socioeconomic History    Marital status: Single    Number of children: 2   Tobacco Use    Smoking status: Every Day     Packs/day: 0.50     Types: Cigarettes    Smokeless tobacco: Never   Vaping Use    Vaping Use: Never used   Substance and Sexual Activity    Alcohol use: Not Currently    Drug use: Not Currently    Sexual activity: Defer         Family History   Problem Relation Age of Onset    Stroke Mother     Allergic rhinitis Mother     Allergic rhinitis Sister     Breast cancer Maternal Aunt     Cancer Maternal Grandmother     Allergic rhinitis Paternal Grandfather     Cancer Paternal Grandfather     Prostate cancer Paternal Grandfather        REVIEW OF SYSTEMS:   Pertinent positives are noted in the HPI above.  Otherwise, all other systems were reviewed, and are negative.     Objective:     Vitals:    07/28/23 0609 07/28/23 0736 07/28/23 0838 07/28/23 1320   BP:  110/72  105/80   BP Location:  Right arm  Right arm   Patient Position:  Lying  Sitting   Pulse:  96  104  "  Resp:  18  18   Temp:   98 øF (36.7 øC) 97.6 øF (36.4 øC)   TempSrc:   Oral Oral   SpO2:  93%  97%   Weight: 91.8 kg (202 lb 6.1 oz)      Height:         Body mass index is 34.74 kg/mý.  Flowsheet Rows      Flowsheet Row First Filed Value   Admission Height 162.6 cm (64\") Documented at 07/27/2023 1539   Admission Weight 90.7 kg (200 lb) Documented at 07/27/2023 1539             General:    No acute distress, alert and oriented x4, pleasant                   Head:    Normocephalic, atraumatic.   Eyes:          Conjunctivae and sclerae normal, no icterus.   Throat:   No oral lesions, no thrush, oral mucosa moist.    Neck:   Supple, trachea midline.   Lungs:     Clear to auscultation bilaterally     Heart:    Regular rhythm and mildly tachycardic rate.  No murmurs, gallops, or rubs noted.   Abdomen:     Soft, non-tender, mildly distended, positive bowel sounds.    Extremities:   Trace edema of the RLE.     Pulses:   Pulses palpable and equal bilaterally.    Skin:   No bleeding or rash.   Neuro:   Non-focal.  Moves all extremities well.    Psychiatric:   Normal mood and affect.         Lab Review:                Results from last 7 days   Lab Units 07/28/23  0452   SODIUM mmol/L 140   POTASSIUM mmol/L 3.1*   CHLORIDE mmol/L 99   CO2 mmol/L 30.0*   BUN mg/dL 12   CREATININE mg/dL 0.81   GLUCOSE mg/dL 126*   CALCIUM mg/dL 8.4*     Results from last 7 days   Lab Units 07/28/23  0053 07/27/23 2022 07/27/23  1612   HSTROP T ng/L 44* 43* 41*     Results from last 7 days   Lab Units 07/28/23  0452   WBC 10*3/mm3 10.47   HEMOGLOBIN g/dL 12.2   HEMATOCRIT % 36.3   PLATELETS 10*3/mm3 295                       EKG (reviewed by me personally):          Assessment:   1.  Acute hypoxic respiratory failure with pulmonary edema  2.  Acute CHF, type currently unknown  3.  Abdominal pain and constipation  4.  Pleuritic chest pain  5.  Antiphospholipid antibody syndrome with prior splenic infarct  6.  Chronic opioid use, none since " 7/23/2023  7.  Sinus tachycardia  8.  Tobacco use   9.  Mildly elevated high-sensitivity troponin, nonspecific  10.  Hypokalemia, being repleted    Plan:       When I initially saw the patient earlier today, I ordered a CT angiogram of the chest given the sinus tachycardia, shortness of breath, and pleuritic chest pain.  I wanted to make sure that she did not have a pulmonary embolism.  She does take Eliquis for antiphospholipid antibody syndrome, although she also smokes.  The CT angiogram did not show evidence of a pulmonary embolism, although it did confirm pulmonary edema.    She likely has acute CHF of unknown type.  She had cardiomegaly on the chest x-ray.  An echocardiogram has been ordered and is pending.  In the meantime, I agree with diuresis with Lasix 40 mg IV every 12 hours.  She will remain on the Eliquis for her history of antiphospholipid antibody syndrome and prior splenic infarct.  The constipation may also be driving some of the shortness of breath as well.  Her blood pressure is running on the low side currently, and I did not add further medications for now.    Further plans after the echocardiogram is completed.  Thank you very much for this consult.      Jamie Mccauley MD

## 2023-07-28 NOTE — PLAN OF CARE
Goal Outcome Evaluation:  Plan of Care Reviewed With: patient        Progress: improving  Pt was admitted from the ER, alert & oriented x4 with stable vital signs. Medicated as ordered & tolerated well,  I will continue to monitor.

## 2023-07-28 NOTE — ED NOTES
Nursing report ED to floor  Belen Allen  42 y.o.  female    HPI :   Chief Complaint   Patient presents with    Nausea    Vomiting    Weakness - Generalized       Admitting doctor:   Kathy Kim MD    Admitting diagnosis:   The primary encounter diagnosis was Acute congestive heart failure, unspecified heart failure type. A diagnosis of Hypoxia was also pertinent to this visit.    Code status:   Current Code Status       Date Active Code Status Order ID Comments User Context       7/27/2023 1922 CPR (Attempt to Resuscitate) [id number]  Kathy Kim MD ED        Question Answer    Code Status (Patient has no pulse and is not breathing) CPR (Attempt to Resuscitate)    Medical Interventions (Patient has pulse or is breathing) Full    Release to patient Routine Release                    Allergies:   Morphine and Lidocaine    Isolation:   No active isolations    Intake and Output  No intake or output data in the 24 hours ending 07/27/23 2012    Weight:       07/27/23  1539   Weight: 90.7 kg (200 lb)       Most recent vitals:   Vitals:    07/27/23 1558 07/27/23 1647 07/27/23 1901 07/27/23 2001   BP: 136/86 136/86 119/78 139/99   BP Location: Left arm Left arm     Patient Position: Lying      Pulse: 110 111 101 104   Resp:       Temp:       TempSrc:       SpO2: 96% 93% 96% 94%   Weight:       Height:           Active LDAs/IV Access:   Lines, Drains & Airways       Active LDAs       Name Placement date Placement time Site Days    Peripheral IV 07/27/23 1600 Left;Posterior Hand 07/27/23  1600  Hand  less than 1    Peripheral IV 07/27/23 1929 Posterior;Right Hand 07/27/23 1929  Hand  less than 1                    Labs (abnormal labs have a star):   Labs Reviewed   COMPREHENSIVE METABOLIC PANEL - Abnormal; Notable for the following components:       Result Value    Glucose 146 (*)     Albumin 3.2 (*)     ALT (SGPT) 84 (*)     AST (SGOT) 51 (*)     Alkaline Phosphatase 161 (*)     All other  components within normal limits    Narrative:     GFR Normal >60  Chronic Kidney Disease <60  Kidney Failure <15     BNP (IN-HOUSE) - Abnormal; Notable for the following components:    proBNP 6,371.0 (*)     All other components within normal limits    Narrative:     Among patients with dyspnea, NT-proBNP is highly sensitive for the detection of acute congestive heart failure. In addition NT-proBNP of <300 pg/ml effectively rules out acute congestive heart failure with 99% negative predictive value.    Results may be falsely decreased if patient taking Biotin.     TROPONIN - Abnormal; Notable for the following components:    HS Troponin T 41 (*)     All other components within normal limits    Narrative:     High Sensitive Troponin T Reference Range:  <10.0 ng/L- Negative Female for AMI  <15.0 ng/L- Negative Male for AMI  >=10 - Abnormal Female indicating possible myocardial injury.  >=15 - Abnormal Male indicating possible myocardial injury.   Clinicians would have to utilize clinical acumen, EKG, Troponin, and serial changes to determine if it is an Acute Myocardial Infarction or myocardial injury due to an underlying chronic condition.        CBC WITH AUTO DIFFERENTIAL - Abnormal; Notable for the following components:    WBC 12.00 (*)     Hemoglobin 11.9 (*)     Lymphocyte % 18.8 (*)     Monocyte % 4.4 (*)     Neutrophils, Absolute 8.26 (*)     All other components within normal limits   LIPASE - Normal   HCG, SERUM, QUALITATIVE - Normal   HIGH SENSITIVITIY TROPONIN T 2HR   CBC AND DIFFERENTIAL    Narrative:     The following orders were created for panel order CBC & Differential.  Procedure                               Abnormality         Status                     ---------                               -----------         ------                     CBC Auto Differential[[id number]]        Abnormal            Final result                 Please view results for these tests on the individual orders.       EKG:    ECG 12 Lead Dyspnea   Preliminary Result   HEART RATE= 104  bpm   RR Interval= 577  ms   MO Interval= 118  ms   P Horizontal Axis= 23  deg   P Front Axis= 52  deg   QRSD Interval= 88  ms   QT Interval= 379  ms   QRS Axis= -9  deg   T Wave Axis= -11  deg   - ABNORMAL ECG -   Sinus tachycardia   Inferior infarct, old   Electronically Signed By:    Date and Time of Study: 2023-07-27 16:46:13          Meds given in ED:   Medications   acetaminophen (TYLENOL) tablet 650 mg (has no administration in time range)   sennosides-docusate (PERICOLACE) 8.6-50 MG per tablet 2 tablet (has no administration in time range)     And   polyethylene glycol (MIRALAX) packet 17 g (has no administration in time range)     And   bisacodyl (DULCOLAX) EC tablet 5 mg (has no administration in time range)     And   bisacodyl (DULCOLAX) suppository 10 mg (has no administration in time range)   ondansetron (ZOFRAN) tablet 4 mg (has no administration in time range)     Or   ondansetron (ZOFRAN) injection 4 mg (has no administration in time range)   melatonin tablet 3 mg (has no administration in time range)   Pharmacy to Dose enoxaparin (LOVENOX) (has no administration in time range)   Enoxaparin Sodium (LOVENOX) syringe 40 mg (has no administration in time range)   lactated ringers bolus 1,000 mL (0 mL Intravenous Stopped 7/27/23 1802)   ondansetron (ZOFRAN) injection 4 mg (4 mg Intravenous Given 7/27/23 1640)   furosemide (LASIX) injection 40 mg (40 mg Intravenous Given 7/27/23 1934)       Imaging results:  CT Abdomen Pelvis Without Contrast    Result Date: 7/27/2023   1. No focal acute inflammatory process of bowel is identified. Mild nonspecific pelvic free fluid. Follow-up as indications persist. 2. No urolithiasis or hydronephrosis. 3. Diffuse groundglass opacities in the partly included lungs suggest edema and/or pneumonia, with right more than left pleural effusions, follow-up advised.  This report was finalized on 7/27/2023 6:59 PM by  Dr. Yonathan Neal M.D.      XR Chest 1 View    Result Date: 7/27/2023  No focal pulmonary consolidation. Cardiomegaly with mild prominence of vascular and interstitial markings.  This report was finalized on 7/27/2023 5:20 PM by Dr. Yonathan Neal M.D.       Ambulatory status:   - ad js    Social issues:   Social History     Socioeconomic History    Marital status: Single    Number of children: 2   Tobacco Use    Smoking status: Every Day     Packs/day: 0.50     Types: Cigarettes    Smokeless tobacco: Never   Vaping Use    Vaping Use: Never used   Substance and Sexual Activity    Alcohol use: Not Currently    Drug use: Not Currently    Sexual activity: Defer       NIH Stroke Scale:       Julianna Desir RN  07/27/23 20:12 EDT

## 2023-07-29 ENCOUNTER — APPOINTMENT (OUTPATIENT)
Dept: CARDIOLOGY | Facility: HOSPITAL | Age: 43
DRG: 292 | End: 2023-07-29
Payer: COMMERCIAL

## 2023-07-29 PROBLEM — J96.01 ACUTE HYPOXEMIC RESPIRATORY FAILURE: Status: ACTIVE | Noted: 2023-07-29

## 2023-07-29 LAB
ANION GAP SERPL CALCULATED.3IONS-SCNC: 10.6 MMOL/L (ref 5–15)
ASCENDING AORTA: 2.3 CM
BH CV ECHO MEAS - ACS: 1.78 CM
BH CV ECHO MEAS - AO MAX PG: 8 MMHG
BH CV ECHO MEAS - AO MEAN PG: 4 MMHG
BH CV ECHO MEAS - AO ROOT DIAM: 2.6 CM
BH CV ECHO MEAS - AO V2 MAX: 141.2 CM/SEC
BH CV ECHO MEAS - AO V2 VTI: 22.7 CM
BH CV ECHO MEAS - AVA(I,D): 1.87 CM2
BH CV ECHO MEAS - EDV(CUBED): 78.5 ML
BH CV ECHO MEAS - EDV(MOD-SP2): 79 ML
BH CV ECHO MEAS - EDV(MOD-SP4): 110 ML
BH CV ECHO MEAS - EF(MOD-BP): 50.2 %
BH CV ECHO MEAS - EF(MOD-SP2): 45.6 %
BH CV ECHO MEAS - EF(MOD-SP4): 54.5 %
BH CV ECHO MEAS - ESV(CUBED): 29.5 ML
BH CV ECHO MEAS - ESV(MOD-SP2): 43 ML
BH CV ECHO MEAS - ESV(MOD-SP4): 50 ML
BH CV ECHO MEAS - FS: 27.9 %
BH CV ECHO MEAS - IVS/LVPW: 0.97 CM
BH CV ECHO MEAS - IVSD: 0.9 CM
BH CV ECHO MEAS - LAT PEAK E' VEL: 6.9 CM/SEC
BH CV ECHO MEAS - LV MASS(C)D: 123.9 GRAMS
BH CV ECHO MEAS - LV MAX PG: 3.5 MMHG
BH CV ECHO MEAS - LV MEAN PG: 1.65 MMHG
BH CV ECHO MEAS - LV V1 MAX: 93 CM/SEC
BH CV ECHO MEAS - LV V1 VTI: 16.5 CM
BH CV ECHO MEAS - LVIDD: 4.3 CM
BH CV ECHO MEAS - LVIDS: 3.1 CM
BH CV ECHO MEAS - LVOT AREA: 2.6 CM2
BH CV ECHO MEAS - LVOT DIAM: 1.81 CM
BH CV ECHO MEAS - LVPWD: 0.92 CM
BH CV ECHO MEAS - MED PEAK E' VEL: 5.7 CM/SEC
BH CV ECHO MEAS - MV A DUR: 0.12 SEC
BH CV ECHO MEAS - MV A MAX VEL: 66.4 CM/SEC
BH CV ECHO MEAS - MV DEC SLOPE: 358.1 CM/SEC2
BH CV ECHO MEAS - MV DEC TIME: 0.18 MSEC
BH CV ECHO MEAS - MV E MAX VEL: 62.1 CM/SEC
BH CV ECHO MEAS - MV E/A: 0.94
BH CV ECHO MEAS - MV MAX PG: 2.08 MMHG
BH CV ECHO MEAS - MV MEAN PG: 1.18 MMHG
BH CV ECHO MEAS - MV P1/2T: 57.1 MSEC
BH CV ECHO MEAS - MV V2 VTI: 15.8 CM
BH CV ECHO MEAS - MVA(P1/2T): 3.9 CM2
BH CV ECHO MEAS - MVA(VTI): 2.7 CM2
BH CV ECHO MEAS - PA ACC TIME: 0.15 SEC
BH CV ECHO MEAS - PA V2 MAX: 101.8 CM/SEC
BH CV ECHO MEAS - PULM A REVS DUR: 0.11 SEC
BH CV ECHO MEAS - PULM A REVS VEL: 27.6 CM/SEC
BH CV ECHO MEAS - PULM DIAS VEL: 48.6 CM/SEC
BH CV ECHO MEAS - PULM S/D: 0.89
BH CV ECHO MEAS - PULM SYS VEL: 43.3 CM/SEC
BH CV ECHO MEAS - QP/QS: 1.48
BH CV ECHO MEAS - RAP SYSTOLE: 3 MMHG
BH CV ECHO MEAS - RV MAX PG: 2.15 MMHG
BH CV ECHO MEAS - RV V1 MAX: 73.3 CM/SEC
BH CV ECHO MEAS - RV V1 VTI: 14.7 CM
BH CV ECHO MEAS - RVOT DIAM: 2.33 CM
BH CV ECHO MEAS - RVSP: 17 MMHG
BH CV ECHO MEAS - SV(LVOT): 42.5 ML
BH CV ECHO MEAS - SV(MOD-SP2): 36 ML
BH CV ECHO MEAS - SV(MOD-SP4): 60 ML
BH CV ECHO MEAS - SV(RVOT): 63 ML
BH CV ECHO MEAS - TAPSE (>1.6): 2.33 CM
BH CV ECHO MEAS - TR MAX PG: 14.6 MMHG
BH CV ECHO MEAS - TR MAX VEL: 191 CM/SEC
BH CV ECHO MEASUREMENTS AVERAGE E/E' RATIO: 9.86
BH CV XLRA - RV BASE: 3.4 CM
BH CV XLRA - RV LENGTH: 6.5 CM
BH CV XLRA - RV MID: 2.8 CM
BH CV XLRA - TDI S': 9.4 CM/SEC
BUN SERPL-MCNC: 11 MG/DL (ref 6–20)
BUN/CREAT SERPL: 12.5 (ref 7–25)
CALCIUM SPEC-SCNC: 9.1 MG/DL (ref 8.6–10.5)
CHLORIDE SERPL-SCNC: 96 MMOL/L (ref 98–107)
CO2 SERPL-SCNC: 35.4 MMOL/L (ref 22–29)
CREAT SERPL-MCNC: 0.88 MG/DL (ref 0.57–1)
DEPRECATED RDW RBC AUTO: 43.7 FL (ref 37–54)
EGFRCR SERPLBLD CKD-EPI 2021: 84.8 ML/MIN/1.73
ERYTHROCYTE [DISTWIDTH] IN BLOOD BY AUTOMATED COUNT: 13.1 % (ref 12.3–15.4)
GLUCOSE SERPL-MCNC: 114 MG/DL (ref 65–99)
HCT VFR BLD AUTO: 36.1 % (ref 34–46.6)
HGB BLD-MCNC: 11.8 G/DL (ref 12–15.9)
LEFT ATRIUM VOLUME INDEX: 10 ML/M2
LV EF 2D ECHO EST: 60 %
MCH RBC QN AUTO: 30.3 PG (ref 26.6–33)
MCHC RBC AUTO-ENTMCNC: 32.7 G/DL (ref 31.5–35.7)
MCV RBC AUTO: 92.6 FL (ref 79–97)
PLATELET # BLD AUTO: 281 10*3/MM3 (ref 140–450)
PMV BLD AUTO: 10.4 FL (ref 6–12)
POTASSIUM SERPL-SCNC: 3.4 MMOL/L (ref 3.5–5.2)
RBC # BLD AUTO: 3.9 10*6/MM3 (ref 3.77–5.28)
SINUS: 2.35 CM
SODIUM SERPL-SCNC: 142 MMOL/L (ref 136–145)
STJ: 2.27 CM
WBC NRBC COR # BLD: 7.41 10*3/MM3 (ref 3.4–10.8)

## 2023-07-29 PROCEDURE — 63710000001 ONDANSETRON PER 8 MG: Performed by: INTERNAL MEDICINE

## 2023-07-29 PROCEDURE — 80048 BASIC METABOLIC PNL TOTAL CA: CPT | Performed by: INTERNAL MEDICINE

## 2023-07-29 PROCEDURE — 85027 COMPLETE CBC AUTOMATED: CPT | Performed by: INTERNAL MEDICINE

## 2023-07-29 PROCEDURE — 93306 TTE W/DOPPLER COMPLETE: CPT | Performed by: INTERNAL MEDICINE

## 2023-07-29 PROCEDURE — 25510000001 PERFLUTREN (DEFINITY) 8.476 MG IN SODIUM CHLORIDE (PF) 0.9 % 10 ML INJECTION: Performed by: INTERNAL MEDICINE

## 2023-07-29 PROCEDURE — 93306 TTE W/DOPPLER COMPLETE: CPT

## 2023-07-29 PROCEDURE — 25010000002 FUROSEMIDE PER 20 MG: Performed by: INTERNAL MEDICINE

## 2023-07-29 RX ORDER — PROCHLORPERAZINE MALEATE 10 MG
5 TABLET ORAL EVERY 6 HOURS PRN
Status: DISCONTINUED | OUTPATIENT
Start: 2023-07-29 | End: 2023-08-03 | Stop reason: HOSPADM

## 2023-07-29 RX ORDER — PROCHLORPERAZINE EDISYLATE 5 MG/ML
5 INJECTION INTRAMUSCULAR; INTRAVENOUS EVERY 6 HOURS PRN
Status: DISCONTINUED | OUTPATIENT
Start: 2023-07-29 | End: 2023-08-03 | Stop reason: HOSPADM

## 2023-07-29 RX ORDER — PROCHLORPERAZINE 25 MG
25 SUPPOSITORY, RECTAL RECTAL EVERY 12 HOURS PRN
Status: DISCONTINUED | OUTPATIENT
Start: 2023-07-29 | End: 2023-08-03 | Stop reason: HOSPADM

## 2023-07-29 RX ADMIN — GABAPENTIN 800 MG: 400 CAPSULE ORAL at 13:54

## 2023-07-29 RX ADMIN — APIXABAN 5 MG: 5 TABLET, FILM COATED ORAL at 23:22

## 2023-07-29 RX ADMIN — LEVETIRACETAM 2000 MG: 500 TABLET, FILM COATED ORAL at 23:21

## 2023-07-29 RX ADMIN — OLANZAPINE 5 MG: 5 TABLET ORAL at 23:21

## 2023-07-29 RX ADMIN — SENNOSIDES AND DOCUSATE SODIUM 2 TABLET: 50; 8.6 TABLET ORAL at 23:30

## 2023-07-29 RX ADMIN — ACETAMINOPHEN 650 MG: 325 TABLET, FILM COATED ORAL at 07:44

## 2023-07-29 RX ADMIN — LEVETIRACETAM 2000 MG: 500 TABLET, FILM COATED ORAL at 07:39

## 2023-07-29 RX ADMIN — FUROSEMIDE 40 MG: 10 INJECTION, SOLUTION INTRAMUSCULAR; INTRAVENOUS at 04:03

## 2023-07-29 RX ADMIN — NICOTINE 1 PATCH: 21 PATCH, EXTENDED RELEASE TRANSDERMAL at 07:41

## 2023-07-29 RX ADMIN — DICYCLOMINE HYDROCHLORIDE 20 MG: 10 CAPSULE ORAL at 07:39

## 2023-07-29 RX ADMIN — FLUOXETINE HYDROCHLORIDE 60 MG: 20 CAPSULE ORAL at 07:40

## 2023-07-29 RX ADMIN — OLANZAPINE 5 MG: 5 TABLET ORAL at 07:38

## 2023-07-29 RX ADMIN — Medication 3 MG: at 23:30

## 2023-07-29 RX ADMIN — ACETAMINOPHEN 650 MG: 325 TABLET, FILM COATED ORAL at 13:54

## 2023-07-29 RX ADMIN — GABAPENTIN 800 MG: 400 CAPSULE ORAL at 23:21

## 2023-07-29 RX ADMIN — APIXABAN 5 MG: 5 TABLET, FILM COATED ORAL at 07:44

## 2023-07-29 RX ADMIN — ACETAMINOPHEN 650 MG: 325 TABLET, FILM COATED ORAL at 04:03

## 2023-07-29 RX ADMIN — ONDANSETRON HYDROCHLORIDE 4 MG: 4 TABLET, FILM COATED ORAL at 13:54

## 2023-07-29 RX ADMIN — DICYCLOMINE HYDROCHLORIDE 20 MG: 10 CAPSULE ORAL at 11:41

## 2023-07-29 RX ADMIN — BUSPIRONE HYDROCHLORIDE 5 MG: 5 TABLET ORAL at 23:22

## 2023-07-29 RX ADMIN — GABAPENTIN 800 MG: 400 CAPSULE ORAL at 04:03

## 2023-07-29 RX ADMIN — DICYCLOMINE HYDROCHLORIDE 20 MG: 10 CAPSULE ORAL at 17:00

## 2023-07-29 RX ADMIN — PERFLUTREN 2 ML: 6.52 INJECTION, SUSPENSION INTRAVENOUS at 13:37

## 2023-07-29 RX ADMIN — BISACODYL 5 MG: 5 TABLET, COATED ORAL at 13:54

## 2023-07-29 RX ADMIN — FUROSEMIDE 40 MG: 10 INJECTION, SOLUTION INTRAMUSCULAR; INTRAVENOUS at 11:41

## 2023-07-29 RX ADMIN — DICYCLOMINE HYDROCHLORIDE 20 MG: 10 CAPSULE ORAL at 23:21

## 2023-07-29 RX ADMIN — SENNOSIDES AND DOCUSATE SODIUM 2 TABLET: 50; 8.6 TABLET ORAL at 07:44

## 2023-07-29 RX ADMIN — PANTOPRAZOLE SODIUM 40 MG: 40 TABLET, DELAYED RELEASE ORAL at 04:03

## 2023-07-29 RX ADMIN — Medication 400 MG: at 07:38

## 2023-07-29 RX ADMIN — FUROSEMIDE 40 MG: 10 INJECTION, SOLUTION INTRAMUSCULAR; INTRAVENOUS at 23:22

## 2023-07-29 RX ADMIN — ONDANSETRON HYDROCHLORIDE 4 MG: 4 TABLET, FILM COATED ORAL at 07:38

## 2023-07-29 RX ADMIN — ONDANSETRON HYDROCHLORIDE 4 MG: 4 TABLET, FILM COATED ORAL at 23:30

## 2023-07-29 RX ADMIN — BUSPIRONE HYDROCHLORIDE 5 MG: 5 TABLET ORAL at 07:40

## 2023-07-29 RX ADMIN — FOLIC ACID 1 MG: 1 TABLET ORAL at 07:39

## 2023-07-29 NOTE — PLAN OF CARE
Goal Outcome Evaluation:               Patient stable, complains of nausea, is given PRN medication as available. Remains on 2LNC, VSS otherwise.     Echo completed during this shift. Patient denies pain, has no other needs at this time, states she'll use the call light if needs anything.     RN to continue to round on patient per hospital protocol.       Problem: Adult Inpatient Plan of Care  Goal: Plan of Care Review  7/29/2023 1539 by Haramn Alcala RN  Outcome: Ongoing, Progressing  7/29/2023 1539 by Harman Alcala RN  Outcome: Ongoing, Not Progressing  Goal: Patient-Specific Goal (Individualized)  7/29/2023 1539 by Harman Alcala RN  Outcome: Ongoing, Progressing  7/29/2023 1539 by Harman Alcala RN  Outcome: Ongoing, Not Progressing  Goal: Absence of Hospital-Acquired Illness or Injury  7/29/2023 1539 by Harman Alcala RN  Outcome: Ongoing, Progressing  7/29/2023 1539 by Harman Alcala RN  Outcome: Ongoing, Not Progressing  Intervention: Identify and Manage Fall Risk  Recent Flowsheet Documentation  Taken 7/29/2023 1400 by Harman Alcala RN  Safety Promotion/Fall Prevention:   safety round/check completed   room organization consistent  Taken 7/29/2023 1200 by Harman Alcala RN  Safety Promotion/Fall Prevention:   safety round/check completed   room organization consistent  Taken 7/29/2023 1000 by Harman Alcala RN  Safety Promotion/Fall Prevention: safety round/check completed  Taken 7/29/2023 0800 by Harman Alcala RN  Safety Promotion/Fall Prevention:   safety round/check completed   room organization consistent  Intervention: Prevent Skin Injury  Recent Flowsheet Documentation  Taken 7/29/2023 1400 by Harman Alcala RN  Body Position: position changed independently  Intervention: Prevent and Manage VTE (Venous Thromboembolism) Risk  Recent Flowsheet Documentation  Taken 7/29/2023 1400 by Harman Alcala RN  Activity Management: up ad js  VTE Prevention/Management: patient refused  intervention  Range of Motion: active ROM (range of motion) encouraged  Taken 7/29/2023 0800 by Harman Alcala RN  Activity Management: up ad js  VTE Prevention/Management: patient refused intervention  Range of Motion: active ROM (range of motion) encouraged  Intervention: Prevent Infection  Recent Flowsheet Documentation  Taken 7/29/2023 1400 by Harman Alcala RN  Infection Prevention: single patient room provided  Taken 7/29/2023 1200 by Harman Alcala RN  Infection Prevention: single patient room provided  Taken 7/29/2023 1000 by Harman Alcala RN  Infection Prevention: single patient room provided  Taken 7/29/2023 0800 by Harman Alcala RN  Infection Prevention:   single patient room provided   rest/sleep promoted  Goal: Optimal Comfort and Wellbeing  7/29/2023 1539 by Harman Alcala RN  Outcome: Ongoing, Progressing  7/29/2023 1539 by Harman Alcala RN  Outcome: Ongoing, Not Progressing  Intervention: Monitor Pain and Promote Comfort  Recent Flowsheet Documentation  Taken 7/29/2023 1400 by Harman Alcala RN  Pain Management Interventions:   position adjusted   pillow support provided  Taken 7/29/2023 0800 by Harman Alcala RN  Pain Management Interventions:   position adjusted   pillow support provided   see MAR  Intervention: Provide Person-Centered Care  Recent Flowsheet Documentation  Taken 7/29/2023 1400 by Harman Alcala RN  Trust Relationship/Rapport: care explained  Taken 7/29/2023 0800 by Harman Alcala RN  Trust Relationship/Rapport: care explained  Goal: Readiness for Transition of Care  7/29/2023 1539 by Harman Alcala RN  Outcome: Ongoing, Progressing  7/29/2023 1539 by Harman Alcala RN  Outcome: Ongoing, Not Progressing     Problem: Seizure Disorder Comorbidity  Goal: Maintenance of Seizure Control  7/29/2023 1539 by Harman Alcala RN  Outcome: Ongoing, Progressing  7/29/2023 1539 by Harman Alcala RN  Outcome: Ongoing, Not Progressing     Problem: Breathing Pattern  Ineffective  Goal: Effective Breathing Pattern  7/29/2023 1539 by Harman Alcala, RN  Outcome: Ongoing, Progressing  7/29/2023 1539 by Harman Alcala, RN  Outcome: Ongoing, Not Progressing  Intervention: Promote Improved Breathing Pattern  Recent Flowsheet Documentation  Taken 7/29/2023 1400 by Harman Alcala, RN  Head of Bed (HOB) Positioning: HOB at 20-30 degrees  Taken 7/29/2023 0800 by Harman Alcala, RN  Head of Bed (HOB) Positioning: HOB at 20-30 degrees

## 2023-07-29 NOTE — PLAN OF CARE
Goal Outcome Evaluation:  Plan of Care Reviewed With: patient        Progress: no change  Outcome Evaluation: Pt was found asleep each hourly round tonight throughout the shift. Pt was very drowsy and falling asleep during conversation with this nurse. Pt c/o epigastric pain. Tylenol given prn. Pt requesting more pain medication at this time. Educated pt to discuss this with the MD during rounds. Will update day shift nursing staff and continue to monitor.

## 2023-07-29 NOTE — PROGRESS NOTES
Name: Belen Allen ADMIT: 2023   : 1980  PCP: Sahil Cornelius MD    MRN: [id number] LOS: 2 days   AGE/SEX: 42 y.o. female  ROOM: [facility]     Subjective   Subjective   Patient resting in bed, about to eat lunch.  Patient complaining of nausea but RN reports that she has been eating without difficulties.  Complaining of uncontrolled abdominal pain and shortness of breath if she removes her oxygen.       Objective   Objective   Vital Signs  Temp:  [97.5 øF (36.4 øC)-98.2 øF (36.8 øC)] 98.2 øF (36.8 øC)  Heart Rate:  [88-95] 89  Resp:  [18] 18  BP: ()/(58-75) 91/58  SpO2:  [94 %-99 %] 96 %  on  Flow (L/min):  [2] 2;   Device (Oxygen Therapy): nasal cannula  Body mass index is 34.84 kg/mý.  Physical Exam  General, awake and alert.  Head and ENT, normocephalic and atraumatic.  Lungs, symmetric expansion, equal air entry bilaterally.  Heart, regular rate and rhythm.  Abdomen, soft and nontender.  Extremities, no clubbing or cyanosis.  Neuro, no focal deficits.  Skin: Warm and no rash.  Psych, normal mood and affect.  Musculoskeletal, joint examination is grossly normal.    Results Review     I reviewed the patient's new clinical results.  Results from last 7 days   Lab Units 23  0710 23  0452 23  1612   WBC 10*3/mm3 7.41 10.47 12.00*   HEMOGLOBIN g/dL 11.8* 12.2 11.9*   PLATELETS 10*3/mm3 281 295 298     Results from last 7 days   Lab Units 23  0710 23  0452 23  1612   SODIUM mmol/L 142 140 141   POTASSIUM mmol/L 3.4* 3.1* 3.6   CHLORIDE mmol/L 96* 99 103   CO2 mmol/L 35.4* 30.0* 27.2   BUN mg/dL 11 12 17   CREATININE mg/dL 0.88 0.81 0.89   GLUCOSE mg/dL 114* 126* 146*   EGFR mL/min/1.73 84.8 93.7 83.7     Results from last 7 days   Lab Units 23  1612   ALBUMIN g/dL 3.2*   BILIRUBIN mg/dL 0.6   ALK PHOS U/L 161*   AST (SGOT) U/L 51*   ALT (SGPT) U/L 84*     Results from last 7 days   Lab Units 23  0710 23  0452 23  1612    CALCIUM mg/dL 9.1 8.4* 8.7   ALBUMIN g/dL  --   --  3.2*       No results found for: HGBA1C, POCGLU    CT Abdomen Pelvis Without Contrast    Result Date: 7/27/2023   1. No focal acute inflammatory process of bowel is identified. Mild nonspecific pelvic free fluid. Follow-up as indications persist. 2. No urolithiasis or hydronephrosis. 3. Diffuse groundglass opacities in the partly included lungs suggest edema and/or pneumonia, with right more than left pleural effusions, follow-up advised.  This report was finalized on 7/27/2023 6:59 PM by Dr. Yonathan Neal M.D.      CT Angiogram Chest    Result Date: 7/28/2023  1. No evidence for pulmonary thromboembolic disease. 2. Extensive hazy bilateral interstitial and ground-glass opacities, new since the chest CT 12/31/2022 and similar to that demonstrated on CT abdomen yesterday and consistent with infiltrates or edema. Small right pleural effusion layers bilaterally. 3. Mild subcarinal and right hilar veena enlargement, potentially reactive.  This report was finalized on 7/28/2023 5:47 PM by Dr. Kristopher Arambula M.D.      XR Chest 1 View    Result Date: 7/27/2023  No focal pulmonary consolidation. Cardiomegaly with mild prominence of vascular and interstitial markings.  This report was finalized on 7/27/2023 5:20 PM by Dr. Yonathan Neal M.D.       I have personally reviewed all medications:  Scheduled Medications  apixaban, 5 mg, Oral, Q12H  busPIRone, 5 mg, Oral, BID  dicyclomine, 20 mg, Oral, 4x Daily  FLUoxetine, 60 mg, Oral, Nightly  folic acid, 1 mg, Oral, Daily  furosemide, 40 mg, Intravenous, Q12H  gabapentin, 800 mg, Oral, Q8H  levETIRAcetam, 2,000 mg, Oral, Q12H  nicotine, 1 patch, Transdermal, Q24H  OLANZapine, 5 mg, Oral, BID  pantoprazole, 40 mg, Oral, Q AM  senna-docusate sodium, 2 tablet, Oral, BID  Vitamin B-2, 400 mg, Oral, Daily    Infusions   Diet  Diet: Cardiac Diets; Healthy Heart (2-3 Na+); Texture: Regular Texture (IDDSI 7); Fluid  Consistency: Thin (IDDSI 0)    I have personally reviewed:  [x]  Laboratory   []  Microbiology   [x]  Radiology   [x]  EKG/Telemetry  [x]  Cardiology/Vascular   []  Pathology    []  Records       Assessment/Plan     Active Hospital Problems    Diagnosis  POA    **Pulmonary edema [J81.1]  Yes    Acute hypoxemic respiratory failure [J96.01]  Unknown    Tobacco abuse [Z72.0]  Yes    GERD without esophagitis [K21.9]  Yes    Antiphospholipid antibody positive [R76.0]  Yes    Generalized abdominal pain [R10.84]  Yes    Anxiety disorder [F41.9]  Yes    Obesity (BMI 30-39.9) [E66.9]  Yes      Resolved Hospital Problems   No resolved problems to display.       42 y.o. female admitted with Pulmonary edema.    Assessment and plan  Acute hypoxemic respiratory failure, pulmonary edema, concerning for new diagnosis of acute CHF.  Last echo showed ejection fraction of 61 to 65% but it was done in 2020.  Cardiology evaluation.  Patient is currently on Lasix.  Monitor renal function and electrolytes.  Echo done, pending read.    Hypokalemia, replacement of electrolytes as needed.    Abdominal pain-adjust pain medications, increase Tylenol    Chronic opioid use, monitor for signs of withdrawal.    Obesity, BMI noted to be close to 35, she would benefit from weight loss.    Tobacco abuse disorder, nicotine patch.    History of antiphospholipid antibody syndrome, splenic infarct, patient is on anticoagulation.    CODE STATUS is full code.  Further plans based on hospital course.      Isabella Finch MD  [facility]  07/29/23  13:50 EDT

## 2023-07-29 NOTE — NURSING NOTE
Patient off the floor during rounds. Labs and vitals reviewed. Will continue current management for now. Echo completed, and results pending currently.

## 2023-07-29 NOTE — NURSING NOTE
Access follow up regarding substance use: Pt declined resources but agreed to follow up. No changes reported overnight. Will follow for support briefly.

## 2023-07-30 ENCOUNTER — APPOINTMENT (OUTPATIENT)
Dept: GENERAL RADIOLOGY | Facility: HOSPITAL | Age: 43
DRG: 292 | End: 2023-07-30
Payer: COMMERCIAL

## 2023-07-30 LAB
ANION GAP SERPL CALCULATED.3IONS-SCNC: 8.8 MMOL/L (ref 5–15)
BUN SERPL-MCNC: 12 MG/DL (ref 6–20)
BUN/CREAT SERPL: 14 (ref 7–25)
CALCIUM SPEC-SCNC: 9.2 MG/DL (ref 8.6–10.5)
CHLORIDE SERPL-SCNC: 93 MMOL/L (ref 98–107)
CO2 SERPL-SCNC: 36.2 MMOL/L (ref 22–29)
CREAT SERPL-MCNC: 0.86 MG/DL (ref 0.57–1)
DEPRECATED RDW RBC AUTO: 42.7 FL (ref 37–54)
EGFRCR SERPLBLD CKD-EPI 2021: 87.2 ML/MIN/1.73
ERYTHROCYTE [DISTWIDTH] IN BLOOD BY AUTOMATED COUNT: 13 % (ref 12.3–15.4)
GEN 5 2HR TROPONIN T REFLEX: 18 NG/L
GLUCOSE SERPL-MCNC: 106 MG/DL (ref 65–99)
HCT VFR BLD AUTO: 36.8 % (ref 34–46.6)
HGB BLD-MCNC: 12 G/DL (ref 12–15.9)
MAGNESIUM SERPL-MCNC: 1.7 MG/DL (ref 1.6–2.6)
MCH RBC QN AUTO: 29.9 PG (ref 26.6–33)
MCHC RBC AUTO-ENTMCNC: 32.6 G/DL (ref 31.5–35.7)
MCV RBC AUTO: 91.8 FL (ref 79–97)
PHOSPHATE SERPL-MCNC: 3.8 MG/DL (ref 2.5–4.5)
PLATELET # BLD AUTO: 310 10*3/MM3 (ref 140–450)
PMV BLD AUTO: 10.3 FL (ref 6–12)
POTASSIUM SERPL-SCNC: 3.7 MMOL/L (ref 3.5–5.2)
QT INTERVAL: 373 MS
RBC # BLD AUTO: 4.01 10*6/MM3 (ref 3.77–5.28)
SODIUM SERPL-SCNC: 138 MMOL/L (ref 136–145)
TROPONIN T DELTA: 1 NG/L
TROPONIN T SERPL HS-MCNC: 17 NG/L
WBC NRBC COR # BLD: 7.13 10*3/MM3 (ref 3.4–10.8)

## 2023-07-30 PROCEDURE — 83735 ASSAY OF MAGNESIUM: CPT | Performed by: STUDENT IN AN ORGANIZED HEALTH CARE EDUCATION/TRAINING PROGRAM

## 2023-07-30 PROCEDURE — 25010000002 PROCHLORPERAZINE 10 MG/2ML SOLUTION: Performed by: STUDENT IN AN ORGANIZED HEALTH CARE EDUCATION/TRAINING PROGRAM

## 2023-07-30 PROCEDURE — 84484 ASSAY OF TROPONIN QUANT: CPT | Performed by: STUDENT IN AN ORGANIZED HEALTH CARE EDUCATION/TRAINING PROGRAM

## 2023-07-30 PROCEDURE — 93005 ELECTROCARDIOGRAM TRACING: CPT | Performed by: STUDENT IN AN ORGANIZED HEALTH CARE EDUCATION/TRAINING PROGRAM

## 2023-07-30 PROCEDURE — 84100 ASSAY OF PHOSPHORUS: CPT | Performed by: STUDENT IN AN ORGANIZED HEALTH CARE EDUCATION/TRAINING PROGRAM

## 2023-07-30 PROCEDURE — 80048 BASIC METABOLIC PNL TOTAL CA: CPT | Performed by: STUDENT IN AN ORGANIZED HEALTH CARE EDUCATION/TRAINING PROGRAM

## 2023-07-30 PROCEDURE — 99232 SBSQ HOSP IP/OBS MODERATE 35: CPT | Performed by: NURSE PRACTITIONER

## 2023-07-30 PROCEDURE — 71046 X-RAY EXAM CHEST 2 VIEWS: CPT

## 2023-07-30 PROCEDURE — 85027 COMPLETE CBC AUTOMATED: CPT | Performed by: STUDENT IN AN ORGANIZED HEALTH CARE EDUCATION/TRAINING PROGRAM

## 2023-07-30 PROCEDURE — 93010 ELECTROCARDIOGRAM REPORT: CPT | Performed by: STUDENT IN AN ORGANIZED HEALTH CARE EDUCATION/TRAINING PROGRAM

## 2023-07-30 RX ORDER — POLYETHYLENE GLYCOL 3350 17 G/17G
17 POWDER, FOR SOLUTION ORAL DAILY
Status: DISCONTINUED | OUTPATIENT
Start: 2023-07-30 | End: 2023-08-03 | Stop reason: HOSPADM

## 2023-07-30 RX ORDER — LUBIPROSTONE 24 UG/1
24 CAPSULE ORAL 2 TIMES DAILY WITH MEALS
Status: DISCONTINUED | OUTPATIENT
Start: 2023-07-30 | End: 2023-08-03 | Stop reason: HOSPADM

## 2023-07-30 RX ORDER — OXYCODONE HYDROCHLORIDE AND ACETAMINOPHEN 5; 325 MG/1; MG/1
1 TABLET ORAL EVERY 6 HOURS PRN
Status: DISCONTINUED | OUTPATIENT
Start: 2023-07-30 | End: 2023-08-02

## 2023-07-30 RX ORDER — FUROSEMIDE 40 MG/1
40 TABLET ORAL
Status: DISCONTINUED | OUTPATIENT
Start: 2023-07-30 | End: 2023-08-02

## 2023-07-30 RX ADMIN — LEVETIRACETAM 2000 MG: 500 TABLET, FILM COATED ORAL at 08:44

## 2023-07-30 RX ADMIN — FLUOXETINE HYDROCHLORIDE 60 MG: 20 CAPSULE ORAL at 01:29

## 2023-07-30 RX ADMIN — APIXABAN 5 MG: 5 TABLET, FILM COATED ORAL at 20:32

## 2023-07-30 RX ADMIN — PANTOPRAZOLE SODIUM 40 MG: 40 TABLET, DELAYED RELEASE ORAL at 07:02

## 2023-07-30 RX ADMIN — DICYCLOMINE HYDROCHLORIDE 20 MG: 10 CAPSULE ORAL at 08:44

## 2023-07-30 RX ADMIN — BUSPIRONE HYDROCHLORIDE 5 MG: 5 TABLET ORAL at 08:45

## 2023-07-30 RX ADMIN — NICOTINE 1 PATCH: 21 PATCH, EXTENDED RELEASE TRANSDERMAL at 08:38

## 2023-07-30 RX ADMIN — FUROSEMIDE 40 MG: 40 TABLET ORAL at 17:43

## 2023-07-30 RX ADMIN — LEVETIRACETAM 2000 MG: 500 TABLET, FILM COATED ORAL at 20:32

## 2023-07-30 RX ADMIN — OLANZAPINE 5 MG: 5 TABLET ORAL at 08:45

## 2023-07-30 RX ADMIN — SENNOSIDES AND DOCUSATE SODIUM 2 TABLET: 50; 8.6 TABLET ORAL at 08:45

## 2023-07-30 RX ADMIN — FOLIC ACID 1 MG: 1 TABLET ORAL at 08:43

## 2023-07-30 RX ADMIN — DICYCLOMINE HYDROCHLORIDE 20 MG: 10 CAPSULE ORAL at 20:32

## 2023-07-30 RX ADMIN — DICYCLOMINE HYDROCHLORIDE 20 MG: 10 CAPSULE ORAL at 13:06

## 2023-07-30 RX ADMIN — GABAPENTIN 800 MG: 400 CAPSULE ORAL at 20:34

## 2023-07-30 RX ADMIN — FLUOXETINE HYDROCHLORIDE 60 MG: 20 CAPSULE ORAL at 20:32

## 2023-07-30 RX ADMIN — BUSPIRONE HYDROCHLORIDE 5 MG: 5 TABLET ORAL at 20:32

## 2023-07-30 RX ADMIN — FUROSEMIDE 40 MG: 40 TABLET ORAL at 08:44

## 2023-07-30 RX ADMIN — APIXABAN 5 MG: 5 TABLET, FILM COATED ORAL at 08:44

## 2023-07-30 RX ADMIN — DICYCLOMINE HYDROCHLORIDE 20 MG: 10 CAPSULE ORAL at 17:43

## 2023-07-30 RX ADMIN — LUBIPROSTONE 24 MCG: 24 CAPSULE, GELATIN COATED ORAL at 17:39

## 2023-07-30 RX ADMIN — Medication 400 MG: at 08:44

## 2023-07-30 RX ADMIN — POLYETHYLENE GLYCOL 3350 17 G: 17 POWDER, FOR SOLUTION ORAL at 15:00

## 2023-07-30 RX ADMIN — PROCHLORPERAZINE EDISYLATE 5 MG: 5 INJECTION INTRAMUSCULAR; INTRAVENOUS at 19:38

## 2023-07-30 RX ADMIN — GABAPENTIN 800 MG: 400 CAPSULE ORAL at 15:00

## 2023-07-30 RX ADMIN — SENNOSIDES AND DOCUSATE SODIUM 2 TABLET: 50; 8.6 TABLET ORAL at 20:32

## 2023-07-30 RX ADMIN — OLANZAPINE 5 MG: 5 TABLET ORAL at 20:32

## 2023-07-30 RX ADMIN — OXYCODONE HYDROCHLORIDE AND ACETAMINOPHEN 1 TABLET: 5; 325 TABLET ORAL at 21:52

## 2023-07-30 RX ADMIN — OXYCODONE HYDROCHLORIDE AND ACETAMINOPHEN 1 TABLET: 5; 325 TABLET ORAL at 14:43

## 2023-07-30 NOTE — PROGRESS NOTES
Name: Belen Allen ADMIT: 2023   : 1980  PCP: Sahil Cornelius MD    MRN: [id number] LOS: 3 days   AGE/SEX: 42 y.o. female  ROOM: [facility]     Subjective   Subjective   Patient resting in bed, complaining of constipation and feeling abdominal distention. Still reporting back pain uncontrolled by tylenol.        Objective   Objective   Vital Signs  Temp:  [97.1 øF (36.2 øC)-98.1 øF (36.7 øC)] 98.1 øF (36.7 øC)  Heart Rate:  [90-98] 91  Resp:  [20-22] 20  BP: ()/(56-81) 90/60  SpO2:  [97 %-99 %] 97 %  on  Flow (L/min):  [2] 2;   Device (Oxygen Therapy): room air  Body mass index is 35.14 kg/mý.  Physical Exam  General, awake and alert.  Head and ENT, normocephalic and atraumatic.  Lungs, symmetric expansion, equal air entry bilaterally.  Heart, regular rate and rhythm.  Abdomen, soft and nontender.  Extremities, no clubbing or cyanosis.  Neuro, no focal deficits.  Skin: Warm and no rash.  Psych, normal mood and affect.  Musculoskeletal, joint examination is grossly normal.    Results Review     I reviewed the patient's new clinical results.  Results from last 7 days   Lab Units 23  0734 23  0710 23  04523  1612   WBC 10*3/mm3 7.13 7.41 10.47 12.00*   HEMOGLOBIN g/dL 12.0 11.8* 12.2 11.9*   PLATELETS 10*3/mm3 310 281 295 298       Results from last 7 days   Lab Units 23  0734 23  0710 23  0452 23  1612   SODIUM mmol/L 138 142 140 141   POTASSIUM mmol/L 3.7 3.4* 3.1* 3.6   CHLORIDE mmol/L 93* 96* 99 103   CO2 mmol/L 36.2* 35.4* 30.0* 27.2   BUN mg/dL 12 11 12 17   CREATININE mg/dL 0.86 0.88 0.81 0.89   GLUCOSE mg/dL 106* 114* 126* 146*   EGFR mL/min/1.73 87.2 84.8 93.7 83.7       Results from last 7 days   Lab Units 23  1612   ALBUMIN g/dL 3.2*   BILIRUBIN mg/dL 0.6   ALK PHOS U/L 161*   AST (SGOT) U/L 51*   ALT (SGPT) U/L 84*       Results from last 7 days   Lab Units 23  0734 23  0710 23  0452 23  1612    CALCIUM mg/dL 9.2 9.1 8.4* 8.7   ALBUMIN g/dL  --   --   --  3.2*   MAGNESIUM mg/dL 1.7  --   --   --    PHOSPHORUS mg/dL 3.8  --   --   --          No results found for: HGBA1C, POCGLU    CT Angiogram Chest    Result Date: 7/28/2023  1. No evidence for pulmonary thromboembolic disease. 2. Extensive hazy bilateral interstitial and ground-glass opacities, new since the chest CT 12/31/2022 and similar to that demonstrated on CT abdomen yesterday and consistent with infiltrates or edema. Small right pleural effusion layers bilaterally. 3. Mild subcarinal and right hilar veena enlargement, potentially reactive.  This report was finalized on 7/28/2023 5:47 PM by Dr. Kristopher Arambula M.D.       I have personally reviewed all medications:  Scheduled Medications  apixaban, 5 mg, Oral, Q12H  busPIRone, 5 mg, Oral, BID  dicyclomine, 20 mg, Oral, 4x Daily  FLUoxetine, 60 mg, Oral, Nightly  folic acid, 1 mg, Oral, Daily  furosemide, 40 mg, Oral, BID  gabapentin, 800 mg, Oral, Q8H  levETIRAcetam, 2,000 mg, Oral, Q12H  lubiprostone, 24 mcg, Oral, BID With Meals  nicotine, 1 patch, Transdermal, Q24H  OLANZapine, 5 mg, Oral, BID  pantoprazole, 40 mg, Oral, Q AM  polyethylene glycol, 17 g, Oral, Daily  senna-docusate sodium, 2 tablet, Oral, BID  Vitamin B-2, 400 mg, Oral, Daily    Infusions   Diet  Diet: Cardiac Diets; Healthy Heart (2-3 Na+); Texture: Regular Texture (IDDSI 7); Fluid Consistency: Thin (IDDSI 0)    I have personally reviewed:  [x]  Laboratory   []  Microbiology   []  Radiology   [x]  EKG/Telemetry  [x]  Cardiology/Vascular   []  Pathology    []  Records       Assessment/Plan     Active Hospital Problems    Diagnosis  POA    **Pulmonary edema [J81.1]  Yes    Acute hypoxemic respiratory failure [J96.01]  Unknown    Tobacco abuse [Z72.0]  Yes    GERD without esophagitis [K21.9]  Yes    Antiphospholipid antibody positive [R76.0]  Yes    Generalized abdominal pain [R10.84]  Yes    Anxiety disorder [F41.9]  Yes     Obesity (BMI 30-39.9) [E66.9]  Yes      Resolved Hospital Problems   No resolved problems to display.       42 y.o. female admitted with Pulmonary edema.    Assessment and plan  Acute hypoxemic respiratory failure, pulmonary edema, concerning for new diagnosis of acute CHF.  Last echo showed ejection fraction of 61 to 65% but it was done in 2020.  Cardiology evaluation.  Patient is currently on Lasix.  Monitor renal function and electrolytes.  Echo done, normal systolic and diastolic fcn. Changed to po lasix.     Hypokalemia, replacement of electrolytes as needed.    Abdominal pain-adjust pain medications, add percocet.     Constipation- add miralax and amitiza, continue doc/sen. Consider enema.     Chronic opioid use, monitor for signs of withdrawal.    Obesity, BMI noted to be close to 35, she would benefit from weight loss.    Tobacco abuse disorder, nicotine patch.    History of antiphospholipid antibody syndrome, splenic infarct, patient is on anticoagulation.    CODE STATUS is full code.  Further plans based on hospital course.      Isabella Finch MD  [facility]  07/30/23  14:05 EDT

## 2023-07-30 NOTE — PROGRESS NOTES
"[facility]    Patient Name: Belen Allen  :1980  42 y.o.  LOS: 3  Encounter Provider: NELSON Diop      Patient Care Team:  Sahil Cornelius MD as PCP - General (Internal Medicine)  Code, Bjorn HERRERA II, MD as Consulting Physician (Hematology and Oncology)  Dennys Carranza MD as Referring Physician (Hospitalist)    Chief Complaint:  Follow up pulmonary edema    Interval History: Echo yesterday with normal EF and diastolic function with no significant valvular abnormalities. Breathing better at rest, but still SOA with minimal exertion. No BM yet, but plans for suppository later today. EKG this AM very similar to prior.        Objective   Vital Signs  Temp:  [97.1 øF (36.2 øC)-98.1 øF (36.7 øC)] 98.1 øF (36.7 øC)  Heart Rate:  [90-98] 97  Resp:  [20-22] 20  BP: ()/(56-81) 92/58    Intake/Output Summary (Last 24 hours) at 2023 1443  Last data filed at 2023 2200  Gross per 24 hour   Intake --   Output 2500 ml   Net -2500 ml     Flowsheet Rows      Flowsheet Row First Filed Value   Admission Height 162.6 cm (64\") Documented at 2023 1539   Admission Weight 90.7 kg (200 lb) Documented at 2023 1539              Vitals reviewed.   Constitutional:       General: Not in acute distress.     Appearance: Well-developed and not in distress. Not diaphoretic.   HENT:      Head: Normocephalic.   Pulmonary:      Effort: Pulmonary effort is normal. No respiratory distress.      Breath sounds: Normal breath sounds. No wheezing. No rhonchi. No rales.   Cardiovascular:      Normal rate. Regular rhythm.   Pulses:     Radial: 2+ bilaterally.  Edema:     Peripheral edema absent.   Skin:     General: Skin is warm and dry. There is no cyanosis.      Findings: No rash.   Neurological:      Mental Status: Alert and oriented to person, place, and time.   Psychiatric:         Behavior: Behavior normal. Behavior is cooperative.         Thought Content: Thought content " normal.         Judgment: Judgment normal.         Pertinent Test Results:  Results from last 7 days   Lab Units 07/30/23  0734 07/29/23  0710 07/28/23  0452 07/27/23  1612   SODIUM mmol/L 138 142 140 141   POTASSIUM mmol/L 3.7 3.4* 3.1* 3.6   CHLORIDE mmol/L 93* 96* 99 103   CO2 mmol/L 36.2* 35.4* 30.0* 27.2   BUN mg/dL 12 11 12 17   CREATININE mg/dL 0.86 0.88 0.81 0.89   GLUCOSE mg/dL 106* 114* 126* 146*   CALCIUM mg/dL 9.2 9.1 8.4* 8.7   AST (SGOT) U/L  --   --   --  51*   ALT (SGPT) U/L  --   --   --  84*     Results from last 7 days   Lab Units 07/30/23  1217 07/30/23  1029 07/28/23  0053 07/27/23 2022 07/27/23  1612   HSTROP T ng/L 18* 17* 44* 43* 41*     Results from last 7 days   Lab Units 07/30/23  0734 07/29/23  0710 07/28/23  0452 07/27/23  1612   WBC 10*3/mm3 7.13 7.41 10.47 12.00*   HEMOGLOBIN g/dL 12.0 11.8* 12.2 11.9*   HEMATOCRIT % 36.8 36.1 36.3 37.2   PLATELETS 10*3/mm3 310 281 295 298         Results from last 7 days   Lab Units 07/30/23  0734   MAGNESIUM mg/dL 1.7           Invalid input(s): LDLCALC  Results from last 7 days   Lab Units 07/27/23  1612   PROBNP pg/mL 6,371.0*                 Medication Review:   apixaban, 5 mg, Oral, Q12H  busPIRone, 5 mg, Oral, BID  dicyclomine, 20 mg, Oral, 4x Daily  FLUoxetine, 60 mg, Oral, Nightly  folic acid, 1 mg, Oral, Daily  furosemide, 40 mg, Oral, BID  gabapentin, 800 mg, Oral, Q8H  levETIRAcetam, 2,000 mg, Oral, Q12H  lubiprostone, 24 mcg, Oral, BID With Meals  nicotine, 1 patch, Transdermal, Q24H  OLANZapine, 5 mg, Oral, BID  pantoprazole, 40 mg, Oral, Q AM  polyethylene glycol, 17 g, Oral, Daily  senna-docusate sodium, 2 tablet, Oral, BID  Vitamin B-2, 400 mg, Oral, Daily              Assessment & Plan     Active Hospital Problems    Diagnosis  POA    **Pulmonary edema [J81.1]  Yes    Acute hypoxemic respiratory failure [J96.01]  Unknown    Tobacco abuse [Z72.0]  Yes    GERD without esophagitis [K21.9]  Yes    Antiphospholipid antibody positive  [R76.0]  Yes    Generalized abdominal pain [R10.84]  Yes    Anxiety disorder [F41.9]  Yes    Obesity (BMI 30-39.9) [E66.9]  Yes      Resolved Hospital Problems   No resolved problems to display.        Acute hypoxic respiratory failure with pulmonary edema. Feeling better. Still with SOA off oxygen though sats are fine.  Acute CHF. EF and diastolic function normal on echo yesterday. No valvular heart disease. Will transition to oral diuretics and repeat CXR.  Abdominal pain and constipation. Suspect this is contributing to SOA. Still complaining of nausea, but eating normally. No BM yet, but plan for suppository later today.  Pleuritic chest pain.   Antiphospholipid antibody syndrome with prior splenic infarct. Remains on eliquis  Chronic opioid use, none since 7/23/2023  Sinus tachycardia mild and trending better  Tobacco use   Mildly elevated high-sensitivity troponin, nonspecific. No anginal complaints and EKG/Echo with no ischemic changes.  10.  Hypokalemia, resolved after replacement      NELSON Diop  [facility]  07/30/23  08:03 EDT

## 2023-07-30 NOTE — NURSING NOTE
"Access [facility] follow-up d/t drugs.     Patient sitting up in bed. The patient reported \"feeling really bad.\" The patient reported poor sleep. The patient complained of chest pain radiating to her back, issues with breathing and swallowing. The patient reported she had already reported symptoms to her RN. Last COWS 3. Patient had a echo completed yesterday. The patient denied any other needs at this time. Access following.   "

## 2023-07-31 LAB
ANION GAP SERPL CALCULATED.3IONS-SCNC: 9 MMOL/L (ref 5–15)
BUN SERPL-MCNC: 12 MG/DL (ref 6–20)
BUN/CREAT SERPL: 15.6 (ref 7–25)
CALCIUM SPEC-SCNC: 9.1 MG/DL (ref 8.6–10.5)
CHLORIDE SERPL-SCNC: 98 MMOL/L (ref 98–107)
CO2 SERPL-SCNC: 32 MMOL/L (ref 22–29)
CREAT SERPL-MCNC: 0.77 MG/DL (ref 0.57–1)
DEPRECATED RDW RBC AUTO: 45.4 FL (ref 37–54)
EGFRCR SERPLBLD CKD-EPI 2021: 99.5 ML/MIN/1.73
ERYTHROCYTE [DISTWIDTH] IN BLOOD BY AUTOMATED COUNT: 13.3 % (ref 12.3–15.4)
GLUCOSE SERPL-MCNC: 92 MG/DL (ref 65–99)
HCT VFR BLD AUTO: 36.2 % (ref 34–46.6)
HGB BLD-MCNC: 11.8 G/DL (ref 12–15.9)
MAGNESIUM SERPL-MCNC: 2.1 MG/DL (ref 1.6–2.6)
MCH RBC QN AUTO: 30.6 PG (ref 26.6–33)
MCHC RBC AUTO-ENTMCNC: 32.6 G/DL (ref 31.5–35.7)
MCV RBC AUTO: 94 FL (ref 79–97)
PHOSPHATE SERPL-MCNC: 3.8 MG/DL (ref 2.5–4.5)
PLATELET # BLD AUTO: 329 10*3/MM3 (ref 140–450)
PMV BLD AUTO: 10.4 FL (ref 6–12)
POTASSIUM SERPL-SCNC: 3.7 MMOL/L (ref 3.5–5.2)
RBC # BLD AUTO: 3.85 10*6/MM3 (ref 3.77–5.28)
SODIUM SERPL-SCNC: 139 MMOL/L (ref 136–145)
WBC NRBC COR # BLD: 5.96 10*3/MM3 (ref 3.4–10.8)

## 2023-07-31 PROCEDURE — 84100 ASSAY OF PHOSPHORUS: CPT | Performed by: STUDENT IN AN ORGANIZED HEALTH CARE EDUCATION/TRAINING PROGRAM

## 2023-07-31 PROCEDURE — 63710000001 ONDANSETRON PER 8 MG: Performed by: INTERNAL MEDICINE

## 2023-07-31 PROCEDURE — 99232 SBSQ HOSP IP/OBS MODERATE 35: CPT | Performed by: INTERNAL MEDICINE

## 2023-07-31 PROCEDURE — 83735 ASSAY OF MAGNESIUM: CPT | Performed by: STUDENT IN AN ORGANIZED HEALTH CARE EDUCATION/TRAINING PROGRAM

## 2023-07-31 PROCEDURE — 25010000002 PROCHLORPERAZINE 10 MG/2ML SOLUTION: Performed by: STUDENT IN AN ORGANIZED HEALTH CARE EDUCATION/TRAINING PROGRAM

## 2023-07-31 PROCEDURE — 80048 BASIC METABOLIC PNL TOTAL CA: CPT | Performed by: STUDENT IN AN ORGANIZED HEALTH CARE EDUCATION/TRAINING PROGRAM

## 2023-07-31 PROCEDURE — 85027 COMPLETE CBC AUTOMATED: CPT | Performed by: STUDENT IN AN ORGANIZED HEALTH CARE EDUCATION/TRAINING PROGRAM

## 2023-07-31 RX ADMIN — GABAPENTIN 800 MG: 400 CAPSULE ORAL at 20:23

## 2023-07-31 RX ADMIN — GABAPENTIN 800 MG: 400 CAPSULE ORAL at 05:59

## 2023-07-31 RX ADMIN — OXYCODONE HYDROCHLORIDE AND ACETAMINOPHEN 1 TABLET: 5; 325 TABLET ORAL at 16:28

## 2023-07-31 RX ADMIN — APIXABAN 5 MG: 5 TABLET, FILM COATED ORAL at 08:34

## 2023-07-31 RX ADMIN — Medication 400 MG: at 08:35

## 2023-07-31 RX ADMIN — DICYCLOMINE HYDROCHLORIDE 20 MG: 10 CAPSULE ORAL at 17:14

## 2023-07-31 RX ADMIN — FUROSEMIDE 40 MG: 40 TABLET ORAL at 17:14

## 2023-07-31 RX ADMIN — OXYCODONE HYDROCHLORIDE AND ACETAMINOPHEN 1 TABLET: 5; 325 TABLET ORAL at 03:52

## 2023-07-31 RX ADMIN — OXYCODONE HYDROCHLORIDE AND ACETAMINOPHEN 1 TABLET: 5; 325 TABLET ORAL at 22:35

## 2023-07-31 RX ADMIN — PROCHLORPERAZINE EDISYLATE 5 MG: 5 INJECTION INTRAMUSCULAR; INTRAVENOUS at 03:52

## 2023-07-31 RX ADMIN — DICYCLOMINE HYDROCHLORIDE 20 MG: 10 CAPSULE ORAL at 08:35

## 2023-07-31 RX ADMIN — FUROSEMIDE 40 MG: 40 TABLET ORAL at 08:35

## 2023-07-31 RX ADMIN — BUSPIRONE HYDROCHLORIDE 5 MG: 5 TABLET ORAL at 20:22

## 2023-07-31 RX ADMIN — LEVETIRACETAM 2000 MG: 500 TABLET, FILM COATED ORAL at 08:35

## 2023-07-31 RX ADMIN — OLANZAPINE 5 MG: 5 TABLET ORAL at 08:35

## 2023-07-31 RX ADMIN — LUBIPROSTONE 24 MCG: 24 CAPSULE, GELATIN COATED ORAL at 08:35

## 2023-07-31 RX ADMIN — LEVETIRACETAM 2000 MG: 500 TABLET, FILM COATED ORAL at 20:21

## 2023-07-31 RX ADMIN — SENNOSIDES AND DOCUSATE SODIUM 2 TABLET: 50; 8.6 TABLET ORAL at 20:22

## 2023-07-31 RX ADMIN — ONDANSETRON HYDROCHLORIDE 4 MG: 4 TABLET, FILM COATED ORAL at 09:55

## 2023-07-31 RX ADMIN — OXYCODONE HYDROCHLORIDE AND ACETAMINOPHEN 1 TABLET: 5; 325 TABLET ORAL at 09:54

## 2023-07-31 RX ADMIN — APIXABAN 5 MG: 5 TABLET, FILM COATED ORAL at 20:22

## 2023-07-31 RX ADMIN — FLUOXETINE HYDROCHLORIDE 60 MG: 20 CAPSULE ORAL at 20:21

## 2023-07-31 RX ADMIN — OLANZAPINE 5 MG: 5 TABLET ORAL at 20:22

## 2023-07-31 RX ADMIN — LUBIPROSTONE 24 MCG: 24 CAPSULE, GELATIN COATED ORAL at 17:14

## 2023-07-31 RX ADMIN — FOLIC ACID 1 MG: 1 TABLET ORAL at 08:35

## 2023-07-31 RX ADMIN — PROCHLORPERAZINE EDISYLATE 5 MG: 5 INJECTION INTRAMUSCULAR; INTRAVENOUS at 22:35

## 2023-07-31 RX ADMIN — PANTOPRAZOLE SODIUM 40 MG: 40 TABLET, DELAYED RELEASE ORAL at 05:59

## 2023-07-31 RX ADMIN — ONDANSETRON HYDROCHLORIDE 4 MG: 4 TABLET, FILM COATED ORAL at 22:41

## 2023-07-31 RX ADMIN — DICYCLOMINE HYDROCHLORIDE 20 MG: 10 CAPSULE ORAL at 20:21

## 2023-07-31 RX ADMIN — GABAPENTIN 800 MG: 400 CAPSULE ORAL at 13:32

## 2023-07-31 RX ADMIN — BUSPIRONE HYDROCHLORIDE 5 MG: 5 TABLET ORAL at 08:35

## 2023-07-31 RX ADMIN — POLYETHYLENE GLYCOL 3350 17 G: 17 POWDER, FOR SOLUTION ORAL at 08:35

## 2023-07-31 RX ADMIN — NICOTINE 1 PATCH: 21 PATCH, EXTENDED RELEASE TRANSDERMAL at 08:35

## 2023-07-31 RX ADMIN — SENNOSIDES AND DOCUSATE SODIUM 2 TABLET: 50; 8.6 TABLET ORAL at 08:34

## 2023-07-31 RX ADMIN — DICYCLOMINE HYDROCHLORIDE 20 MG: 10 CAPSULE ORAL at 12:22

## 2023-07-31 NOTE — PAYOR COMM NOTE
"Carson Mullen (42 y.o. Female)     PLEASE SEE ATTACHED FOR INPT AUTH     PT ID      [id number]     PLEASE CALL RAMÍREZ MAR RN/ DEPT @ [phone]  OR FAX  DEPARTMENT @  [phone]    THANK YOU   RAMÍREZ MAR RN    [facility]           Date of Birth   1980    Social Security Number       Address   [address] [place] [address]    Home Phone   [phone]    MRN   [id number]       Anabaptist   None    Marital Status   Single                            Admission Date   7/27/23    Admission Type   Emergency    Admitting Provider   Kathy Kim MD    Attending Provider   Isabella Finch MD    Department, Room/Bed   [address], S521/1       Discharge Date       Discharge Disposition       Discharge Destination                                 Attending Provider: Isabella Finch MD    Allergies: Morphine, Lidocaine    Isolation: None   Infection: None   Code Status: CPR    Ht: 162.6 cm (64\")   Wt: 92.1 kg (203 lb 1.3 oz)    Admission Cmt: None   Principal Problem: Pulmonary edema [J81.1]                   Active Insurance as of 7/27/2023       Primary Coverage       Payor Plan Insurance Group Employer/Plan Group    [employer] BY ALYSHA [facility] BY ALYSHA [id number]       Payor Plan Address Payor Plan Phone Number Payor Plan Fax Number Effective Dates    [address] [address]   1/1/2021 - None Entered    [facility] [id number]         Subscriber Name Subscriber Birth Date Member ID       CARSON MULLEN 1980 [id number]                     Emergency Contacts        (Rel.) Home Phone Work Phone Mobile Phone    DEDEJUNIOR (Significant Other) [phone] -- [phone]    ALEX STOCK (Sister) [phone] -- --              [place]: NPI [id number]  Tax ID [id number]     History & Physical        Kathy Kim MD at 07/27/23 8815          HISTORY AND PHYSICAL   [facility] " [place]        Date of Admission: 2023  Patient Identification:  Name: Belen Allen  Age: 42 y.o.  Sex: female  :  1980  MRN: [id number]                     Primary Care Physician: Sahil Cornelius MD    Chief Complaint:  42 year old female who presented to the emergency room with nausea, vomiting and abdominal pain; she has a history of opioid dependence; no diarrhea; no fever or chills    History of Present Illness:   As above    Past Medical History:  Past Medical History:   Diagnosis Date    Abnormal Pap smear of cervix     Ankle fracture     H/O Elevated liver enzymes     History of chronic back pain     History of migraine     History of urinary tract infection     Injury of back     Opioid dependence 2023    PONV (postoperative nausea and vomiting)     Seasonal allergies     Seizure     Takes Keppra     Past Surgical History:  Past Surgical History:   Procedure Laterality Date    BACK SURGERY      fusion L4, L5      CHOLECYSTECTOMY      DILATATION AND CURETTAGE      ENDOSCOPY N/A 2022    Procedure: ESOPHAGOGASTRODUODENOSCOPY with biopsy;  Surgeon: Christiana Mann MD;  Location: [facility] ENDOSCOPY;  Service: Gastroenterology;  Laterality: N/A;  gastritis, duodenitis, irregular z line    ERCP N/A 6/3/2021    Procedure: ENDOSCOPIC RETROGRADE CHOLANGIOPANCREATOGRAPHY WITH SPHINCTEROTOMY, DILATION WITH BALLOON CLEARANCE  (12MM-15MM);  Surgeon: Bjorn Flannery MD;  Location: [facility] ENDOSCOPY;  Service: Gastroenterology;  Laterality: N/A;  DILATED COMMON BILE DUCT    SKIN SURGERY      TUBAL ABDOMINAL LIGATION      WISDOM TOOTH EXTRACTION  2018    x2      Home Meds:  Medications Prior to Admission   Medication Sig Dispense Refill Last Dose    acetaminophen (TYLENOL) 325 MG tablet Take 2 tablets by mouth Every 6 (Six) Hours As Needed for Mild Pain.       apixaban (ELIQUIS) 5 MG tablet tablet Take 1 tablet by mouth 2 (Two) Times a Day. Last filled  4/12/21 - pt states she was instructed to stop taking medication prior to ERCP ~1 month ago and was not instructed to restart medication.   Indication: Splenic infarct       busPIRone (BUSPAR) 5 MG tablet Take 1 tablet by mouth 2 (Two) Times a Day. For anxiety 60 tablet 0     dicyclomine (BENTYL) 20 MG tablet Take 1 tablet by mouth Every 6 (Six) Hours. 20 tablet 0     FLUoxetine (PROzac) 40 MG capsule Take 60 mg by mouth Every Night.       folic acid (FOLVITE) 1 MG tablet Take 1 tablet by mouth Daily. 30 tablet 0     gabapentin (NEURONTIN) 800 MG tablet Take 1 tablet by mouth 4 (Four) Times a Day.       levETIRAcetam (KEPPRA) 1000 MG tablet Take 2 tablets by mouth Every 12 (Twelve) Hours for 30 days. 120 tablet 0     melatonin 5 MG tablet tablet Take 2 tablets by mouth Every Night. Patient taes 20 mg at home       OLANZapine (zyPREXA) 10 MG tablet Take 0.5 tablets by mouth 2 (Two) Times a Day. 30 tablet 0     ondansetron ODT (ZOFRAN-ODT) 4 MG disintegrating tablet Place 1 tablet on the tongue 4 (Four) Times a Day As Needed for Nausea or Vomiting. 30 tablet 0     pantoprazole (PROTONIX) 40 MG EC tablet Take 1 tablet by mouth Daily. 30 tablet 0     sucralfate (CARAFATE) 1 g tablet Take 1 tablet by mouth 4 (Four) Times a Day. 20 tablet 0     SUMAtriptan (IMITREX) 100 MG tablet Take 0.5 tablets by mouth Every 2 (Two) Hours As Needed for Migraine. Take one tablet at onset of headache. May repeat dose one time in 2 hours if headache not relieved.       traZODone (DESYREL) 50 MG tablet Take 1-2 tablets by mouth At Night As Needed for sleep 30 tablet 3     Vitamin B-2 (RIBOFLAVIN) 100 MG tablet tablet Take 4 tablets by mouth Daily. 30 tablet 3        Allergies:  Allergies   Allergen Reactions    Morphine Hives    Lidocaine Rash     Pt reports lidocaine patches make her breakout in a rash     Immunizations:  Immunization History   Administered Date(s) Administered    FluLaval/Fluzone >6mos 10/14/2020, 10/27/2021, 10/27/2022     Hib (PRP-T) 2020    Influenza, Unspecified 10/14/2020, 10/27/2021, 10/27/2022    Meningococcal B,(Bexsero) 2020    Meningococcal Conjugate 2020    Pneumococcal Conjugate 13-Valent (PCV13) 2020     Social History:   Social History     Social History Narrative    Not on file     Social History     Socioeconomic History    Marital status: Single    Number of children: 2   Tobacco Use    Smoking status: Every Day     Packs/day: 0.50     Types: Cigarettes    Smokeless tobacco: Never   Vaping Use    Vaping Use: Never used   Substance and Sexual Activity    Alcohol use: Not Currently    Drug use: Not Currently    Sexual activity: Defer       Family History:  Family History   Problem Relation Age of Onset    Stroke Mother     Allergic rhinitis Mother     Allergic rhinitis Sister     Breast cancer Maternal Aunt     Cancer Maternal Grandmother     Allergic rhinitis Paternal Grandfather     Cancer Paternal Grandfather     Prostate cancer Paternal Grandfather         Review of Systems  See history of present illness and past medical history.  Patient denies headache, dizziness, syncope, falls, trauma, change in vision, change in hearing, change in taste, changes in weight, changes in appetite, focal weakness, numbness, or paresthesia.  Patient denies chest pain, palpitations, dyspnea, orthopnea, PND, cough, sinus pressure, rhinorrhea, epistaxis, hemoptysis,  ,hematemesis, diarrhea, constipation or hematochezia.  Denies cold or heat intolerance, polydipsia, polyuria, polyphagia. Denies hematuria, pyuria, dysuria, hesitancy, frequency or urgency. Denies consumption of raw and under cooked meats foods or change in water source.  Denies fever, chills, sweats, night sweats.  Denies missing any routine medications. Remainder of ROS is negative.    Objective:  T Max 24 hrs: Temp (24hrs), Av.8 øF (37.1 øC), Min:98.8 øF (37.1 øC), Max:98.8 øF (37.1 øC)    Vitals Ranges:   Temp:  [98.8 øF (37.1 øC)] 98.8 øF  "(37.1 øC)  Heart Rate:  [101-117] 104  Resp:  [20] 20  BP: (119-146)/(64-99) 139/99      Exam:  /99   Pulse 104   Temp 98.8 øF (37.1 øC) (Oral)   Resp 20   Ht 162.6 cm (64\")   Wt 90.7 kg (200 lb)   SpO2 94%   BMI 34.33 kg/mý     General Appearance:    Alert, cooperative, no distress, appears stated age   Head:    Normocephalic, without obvious abnormality, atraumatic   Eyes:    PERRL, conjunctivae/corneas clear, EOM's intact, both eyes   Ears:    Normal external ear canals, both ears   Nose:   Nares normal, septum midline, mucosa normal, no drainage    or sinus tenderness   Throat:   Lips, mucosa, and tongue normal   Neck:   Supple, symmetrical, trachea midline, no adenopathy;     thyroid:  no enlargement/tenderness/nodules; no carotid    bruit or JVD   Back:     Symmetric, no curvature, ROM normal, no CVA tenderness   Lungs:     Decreased breath sounds bilaterally, respirations unlabored   Chest Wall:    No tenderness or deformity    Heart:    Regular rate and rhythm, S1 and S2 normal, no murmur, rub   or gallop   Abdomen:     Soft, nontender, bowel sounds active all four quadrants,     no masses, no hepatomegaly, no splenomegaly   Extremities:   Extremities normal, atraumatic, no cyanosis or edema   Pulses:   2+ and symmetric all extremities   Skin:   Skin color, texture, turgor normal, no rashes or lesions               .    Data Review:  Labs in chart were reviewed.  WBC   Date Value Ref Range Status   07/27/2023 12.00 (H) 3.40 - 10.80 10*3/mm3 Final     Hemoglobin   Date Value Ref Range Status   07/27/2023 11.9 (L) 12.0 - 15.9 g/dL Final     Hematocrit   Date Value Ref Range Status   07/27/2023 37.2 34.0 - 46.6 % Final     Platelets   Date Value Ref Range Status   07/27/2023 298 140 - 450 10*3/mm3 Final     Sodium   Date Value Ref Range Status   07/27/2023 141 136 - 145 mmol/L Final     Potassium   Date Value Ref Range Status   07/27/2023 3.6 3.5 - 5.2 mmol/L Final     Chloride   Date Value Ref " Range Status   07/27/2023 103 98 - 107 mmol/L Final     CO2   Date Value Ref Range Status   07/27/2023 27.2 22.0 - 29.0 mmol/L Final     BUN   Date Value Ref Range Status   07/27/2023 17 6 - 20 mg/dL Final     Creatinine   Date Value Ref Range Status   07/27/2023 0.89 0.57 - 1.00 mg/dL Final     Glucose   Date Value Ref Range Status   07/27/2023 146 (H) 65 - 99 mg/dL Final     Calcium   Date Value Ref Range Status   07/27/2023 8.7 8.6 - 10.5 mg/dL Final     AST (SGOT)   Date Value Ref Range Status   07/27/2023 51 (H) 1 - 32 U/L Final     ALT (SGPT)   Date Value Ref Range Status   07/27/2023 84 (H) 1 - 33 U/L Final     Alkaline Phosphatase   Date Value Ref Range Status   07/27/2023 161 (H) 39 - 117 U/L Final                Imaging Results (All)       Procedure Component Value Units Date/Time    CT Abdomen Pelvis Without Contrast [[id number]] Collected: 07/27/23 1855     Updated: 07/27/23 1903    Narrative:      CT ABDOMEN PELVIS WO CONTRAST-     INDICATIONS: Nausea, vomiting, abdominal pain     TECHNIQUE: Radiation dose reduction techniques were utilized, including  automated exposure control and exposure modulation based on body size.  Unenhanced ABDOMEN AND PELVIS CT     COMPARISON: 12/13/2022     FINDINGS:     The gallbladder is surgically absent. Tiny pneumobilia is apparent (less  than on the prior exam).     A left renal cyst is present. Areas of focal cortical thinning in the  left kidney may be result of prior infection or infarct.     Otherwise unremarkable unenhanced appearance of the liver, spleen,  adrenal glands, pancreas, kidneys, bladder.     No bowel obstruction or abnormal bowel thickening is identified. The  appendix does not appear inflamed.     No free intraperitoneal gas . Mild nonspecific pelvic free fluid is  present.     Scattered small mesenteric and para-aortic lymph nodes are seen that are  not significant by size criteria.     Abdominal aorta is not aneurysmal.     The lung bases show  diffuse groundglass opacities that suggests edema  and/or pneumonia, minimal right and trace left pleural effusions. The  heart is mildly enlarged.     Degenerative and surgical changes are seen in the spine. No acute  fracture is identified.             Impression:         1. No focal acute inflammatory process of bowel is identified. Mild  nonspecific pelvic free fluid. Follow-up as indications persist.  2. No urolithiasis or hydronephrosis.  3. Diffuse groundglass opacities in the partly included lungs suggest  edema and/or pneumonia, with right more than left pleural effusions,  follow-up advised.     This report was finalized on 7/27/2023 6:59 PM by Dr. Yonathan Neal M.D.       XR Chest 1 View [[id number]] Collected: 07/27/23 1719     Updated: 07/27/23 1723    Narrative:      XR CHEST 1 VW-     HISTORY: Female who is 42 years-old,  short of breath     TECHNIQUE: Frontal view of the chest     COMPARISON: 12/26/2022     FINDINGS: The heart is enlarged with mild prominence of vascular and  interstitial markings. No focal pulmonary consolidation, pleural  effusion, or pneumothorax. No acute osseous process.       Impression:      No focal pulmonary consolidation. Cardiomegaly with mild  prominence of vascular and interstitial markings.     This report was finalized on 7/27/2023 5:20 PM by Dr. Yonathan Neal M.D.                 Assessment:  Active Hospital Problems    Diagnosis  POA    **Pulmonary edema [J81.1]  Yes      Resolved Hospital Problems   No resolved problems to display.   Opioid dependence  Hyperglycemia  Anemia  Obesity  Nausea and vomiting  Abdominal pain    Plan:  Will diurese a little  Check echo  Monitor on telemetry  Opioid detox protocol  Access to see  Dw patient and ed provider    Kathy Kim MD  7/27/2023  23:05 EDT      Electronically signed by Kathy Kim MD at 07/28/23 0813          Physician Progress Notes (last 72 hours)        Isabella Finch MD at  23 1405              Name: Belen Allen ADMIT: 2023   : 1980  PCP: Sahil Cornelius MD    MRN: [id number] LOS: 3 days   AGE/SEX: 42 y.o. female  ROOM: [facility]     Subjective   Subjective   Patient resting in bed, complaining of constipation and feeling abdominal distention. Still reporting back pain uncontrolled by tylenol.       Objective   Objective   Vital Signs  Temp:  [97.1 øF (36.2 øC)-98.1 øF (36.7 øC)] 98.1 øF (36.7 øC)  Heart Rate:  [90-98] 91  Resp:  [20-22] 20  BP: ()/(56-81) 90/60  SpO2:  [97 %-99 %] 97 %  on  Flow (L/min):  [2] 2;   Device (Oxygen Therapy): room air  Body mass index is 35.14 kg/mý.  Physical Exam  General, awake and alert.  Head and ENT, normocephalic and atraumatic.  Lungs, symmetric expansion, equal air entry bilaterally.  Heart, regular rate and rhythm.  Abdomen, soft and nontender.  Extremities, no clubbing or cyanosis.  Neuro, no focal deficits.  Skin: Warm and no rash.  Psych, normal mood and affect.  Musculoskeletal, joint examination is grossly normal.    Results Review     I reviewed the patient's new clinical results.  Results from last 7 days   Lab Units 23  0734 23  0710 23  04523  1612   WBC 10*3/mm3 7.13 7.41 10.47 12.00*   HEMOGLOBIN g/dL 12.0 11.8* 12.2 11.9*   PLATELETS 10*3/mm3 310 281 295 298       Results from last 7 days   Lab Units 23  0734 23  0710 23  0452 23  1612   SODIUM mmol/L 138 142 140 141   POTASSIUM mmol/L 3.7 3.4* 3.1* 3.6   CHLORIDE mmol/L 93* 96* 99 103   CO2 mmol/L 36.2* 35.4* 30.0* 27.2   BUN mg/dL 12 11 12 17   CREATININE mg/dL 0.86 0.88 0.81 0.89   GLUCOSE mg/dL 106* 114* 126* 146*   EGFR mL/min/1.73 87.2 84.8 93.7 83.7       Results from last 7 days   Lab Units 23  1612   ALBUMIN g/dL 3.2*   BILIRUBIN mg/dL 0.6   ALK PHOS U/L 161*   AST (SGOT) U/L 51*   ALT (SGPT) U/L 84*       Results from last 7 days   Lab Units 23  0734 23  0710  07/28/23  0452 07/27/23  1612   CALCIUM mg/dL 9.2 9.1 8.4* 8.7   ALBUMIN g/dL  --   --   --  3.2*   MAGNESIUM mg/dL 1.7  --   --   --    PHOSPHORUS mg/dL 3.8  --   --   --          No results found for: HGBA1C, POCGLU    CT Angiogram Chest    Result Date: 7/28/2023  1. No evidence for pulmonary thromboembolic disease. 2. Extensive hazy bilateral interstitial and ground-glass opacities, new since the chest CT 12/31/2022 and similar to that demonstrated on CT abdomen yesterday and consistent with infiltrates or edema. Small right pleural effusion layers bilaterally. 3. Mild subcarinal and right hilar veena enlargement, potentially reactive.  This report was finalized on 7/28/2023 5:47 PM by Dr. Kristopher Arambula M.D.       I have personally reviewed all medications:  Scheduled Medications  apixaban, 5 mg, Oral, Q12H  busPIRone, 5 mg, Oral, BID  dicyclomine, 20 mg, Oral, 4x Daily  FLUoxetine, 60 mg, Oral, Nightly  folic acid, 1 mg, Oral, Daily  furosemide, 40 mg, Oral, BID  gabapentin, 800 mg, Oral, Q8H  levETIRAcetam, 2,000 mg, Oral, Q12H  lubiprostone, 24 mcg, Oral, BID With Meals  nicotine, 1 patch, Transdermal, Q24H  OLANZapine, 5 mg, Oral, BID  pantoprazole, 40 mg, Oral, Q AM  polyethylene glycol, 17 g, Oral, Daily  senna-docusate sodium, 2 tablet, Oral, BID  Vitamin B-2, 400 mg, Oral, Daily    Infusions   Diet  Diet: Cardiac Diets; Healthy Heart (2-3 Na+); Texture: Regular Texture (IDDSI 7); Fluid Consistency: Thin (IDDSI 0)    I have personally reviewed:  [x]  Laboratory   []  Microbiology   []  Radiology   [x]  EKG/Telemetry  [x]  Cardiology/Vascular   []  Pathology    []  Records      Assessment/Plan     Active Hospital Problems    Diagnosis  POA    **Pulmonary edema [J81.1]  Yes    Acute hypoxemic respiratory failure [J96.01]  Unknown    Tobacco abuse [Z72.0]  Yes    GERD without esophagitis [K21.9]  Yes    Antiphospholipid antibody positive [R76.0]  Yes    Generalized abdominal pain [R10.84]  Yes    Anxiety  disorder [F41.9]  Yes    Obesity (BMI 30-39.9) [E66.9]  Yes      Resolved Hospital Problems   No resolved problems to display.       42 y.o. female admitted with Pulmonary edema.    Assessment and plan  Acute hypoxemic respiratory failure, pulmonary edema, concerning for new diagnosis of acute CHF.  Last echo showed ejection fraction of 61 to 65% but it was done in .  Cardiology evaluation.  Patient is currently on Lasix.  Monitor renal function and electrolytes.  Echo done, normal systolic and diastolic fcn. Changed to po lasix.     Hypokalemia, replacement of electrolytes as needed.    Abdominal pain-adjust pain medications, add percocet.     Constipation- add miralax and amitiza, continue doc/sen. Consider enema.     Chronic opioid use, monitor for signs of withdrawal.    Obesity, BMI noted to be close to 35, she would benefit from weight loss.    Tobacco abuse disorder, nicotine patch.    History of antiphospholipid antibody syndrome, splenic infarct, patient is on anticoagulation.    CODE STATUS is full code.  Further plans based on hospital course.      Isabella Finch MD  [facility]  23  14:05 EDT      Electronically signed by Isabella Finch MD at 23 1408       Nisha Magaña APRN at 23 0802       Attestation signed by Chalino Segura MD at 23 1451    I have reviewed this documentation and agree.    Could the etiology of her CT findings be ? Noncardiogenic pulmonary edema vs insterstitial lung disease? Her echo is really unremarkable! Repeat CXR and c/w prior, if no change after diuretics, that would suggest some underlying lung disease.                        [facility]    Patient Name: Belen Allen  :1980  42 y.o.  LOS: 3  Encounter Provider: NELSON Diop      Patient Care Team:  Sahil Cornelius MD as PCP - General (Internal Medicine)  Code, Bjorn HERRERA II, MD as Consulting Physician (Hematology and  "Oncology)  Dennys Carranza MD as Referring Physician (Hospitalist)    Chief Complaint:  Follow up pulmonary edema    Interval History: Echo yesterday with normal EF and diastolic function with no significant valvular abnormalities. Breathing better at rest, but still SOA with minimal exertion. No BM yet, but plans for suppository later today. EKG this AM very similar to prior.        Objective   Vital Signs  Temp:  [97.1 øF (36.2 øC)-98.1 øF (36.7 øC)] 98.1 øF (36.7 øC)  Heart Rate:  [90-98] 97  Resp:  [20-22] 20  BP: ()/(56-81) 92/58    Intake/Output Summary (Last 24 hours) at 7/30/2023 1443  Last data filed at 7/29/2023 2200  Gross per 24 hour   Intake --   Output 2500 ml   Net -2500 ml     Flowsheet Rows      Flowsheet Row First Filed Value   Admission Height 162.6 cm (64\") Documented at 07/27/2023 1539   Admission Weight 90.7 kg (200 lb) Documented at 07/27/2023 1539              Vitals reviewed.   Constitutional:       General: Not in acute distress.     Appearance: Well-developed and not in distress. Not diaphoretic.   HENT:      Head: Normocephalic.   Pulmonary:      Effort: Pulmonary effort is normal. No respiratory distress.      Breath sounds: Normal breath sounds. No wheezing. No rhonchi. No rales.   Cardiovascular:      Normal rate. Regular rhythm.   Pulses:     Radial: 2+ bilaterally.  Edema:     Peripheral edema absent.   Skin:     General: Skin is warm and dry. There is no cyanosis.      Findings: No rash.   Neurological:      Mental Status: Alert and oriented to person, place, and time.   Psychiatric:         Behavior: Behavior normal. Behavior is cooperative.         Thought Content: Thought content normal.         Judgment: Judgment normal.         Pertinent Test Results:  Results from last 7 days   Lab Units 07/30/23  0734 07/29/23  0710 07/28/23  0452 07/27/23  1612   SODIUM mmol/L 138 142 140 141   POTASSIUM mmol/L 3.7 3.4* 3.1* 3.6   CHLORIDE mmol/L 93* 96* 99 103   CO2 mmol/L 36.2* 35.4* " 30.0* 27.2   BUN mg/dL 12 11 12 17   CREATININE mg/dL 0.86 0.88 0.81 0.89   GLUCOSE mg/dL 106* 114* 126* 146*   CALCIUM mg/dL 9.2 9.1 8.4* 8.7   AST (SGOT) U/L  --   --   --  51*   ALT (SGPT) U/L  --   --   --  84*     Results from last 7 days   Lab Units 07/30/23  1217 07/30/23  1029 07/28/23  0053 07/27/23  2022 07/27/23  1612   HSTROP T ng/L 18* 17* 44* 43* 41*     Results from last 7 days   Lab Units 07/30/23  0734 07/29/23  0710 07/28/23  0452 07/27/23  1612   WBC 10*3/mm3 7.13 7.41 10.47 12.00*   HEMOGLOBIN g/dL 12.0 11.8* 12.2 11.9*   HEMATOCRIT % 36.8 36.1 36.3 37.2   PLATELETS 10*3/mm3 310 281 295 298         Results from last 7 days   Lab Units 07/30/23  0734   MAGNESIUM mg/dL 1.7           Invalid input(s): LDLCALC  Results from last 7 days   Lab Units 07/27/23  1612   PROBNP pg/mL 6,371.0*                 Medication Review:   apixaban, 5 mg, Oral, Q12H  busPIRone, 5 mg, Oral, BID  dicyclomine, 20 mg, Oral, 4x Daily  FLUoxetine, 60 mg, Oral, Nightly  folic acid, 1 mg, Oral, Daily  furosemide, 40 mg, Oral, BID  gabapentin, 800 mg, Oral, Q8H  levETIRAcetam, 2,000 mg, Oral, Q12H  lubiprostone, 24 mcg, Oral, BID With Meals  nicotine, 1 patch, Transdermal, Q24H  OLANZapine, 5 mg, Oral, BID  pantoprazole, 40 mg, Oral, Q AM  polyethylene glycol, 17 g, Oral, Daily  senna-docusate sodium, 2 tablet, Oral, BID  Vitamin B-2, 400 mg, Oral, Daily              Assessment & Plan     Active Hospital Problems    Diagnosis  POA    **Pulmonary edema [J81.1]  Yes    Acute hypoxemic respiratory failure [J96.01]  Unknown    Tobacco abuse [Z72.0]  Yes    GERD without esophagitis [K21.9]  Yes    Antiphospholipid antibody positive [R76.0]  Yes    Generalized abdominal pain [R10.84]  Yes    Anxiety disorder [F41.9]  Yes    Obesity (BMI 30-39.9) [E66.9]  Yes      Resolved Hospital Problems   No resolved problems to display.        Acute hypoxic respiratory failure with pulmonary edema. Feeling better. Still with SOA off oxygen  though sats are fine.  Acute CHF. EF and diastolic function normal on echo yesterday. No valvular heart disease. Will transition to oral diuretics and repeat CXR.  Abdominal pain and constipation. Suspect this is contributing to SOA. Still complaining of nausea, but eating normally. No BM yet, but plan for suppository later today.  Pleuritic chest pain.   Antiphospholipid antibody syndrome with prior splenic infarct. Remains on eliquis  Chronic opioid use, none since 2023  Sinus tachycardia mild and trending better  Tobacco use   Mildly elevated high-sensitivity troponin, nonspecific. No anginal complaints and EKG/Echo with no ischemic changes.  10.  Hypokalemia, resolved after replacement      NELSON Diop  [facility]  23  08:03 EDT            Electronically signed by Chalino Segura MD at 23 1451       Isabella Finch MD at 23 1349              Name: Belen Allen ADMIT: 2023   : 1980  PCP: Sahil Cornelius MD    MRN: [id number] LOS: 2 days   AGE/SEX: 42 y.o. female  ROOM: [facility]     Subjective   Subjective   Patient resting in bed, about to eat lunch.  Patient complaining of nausea but RN reports that she has been eating without difficulties.  Complaining of uncontrolled abdominal pain and shortness of breath if she removes her oxygen.      Objective   Objective   Vital Signs  Temp:  [97.5 øF (36.4 øC)-98.2 øF (36.8 øC)] 98.2 øF (36.8 øC)  Heart Rate:  [88-95] 89  Resp:  [18] 18  BP: ()/(58-75) 91/58  SpO2:  [94 %-99 %] 96 %  on  Flow (L/min):  [2] 2;   Device (Oxygen Therapy): nasal cannula  Body mass index is 34.84 kg/mý.  Physical Exam  General, awake and alert.  Head and ENT, normocephalic and atraumatic.  Lungs, symmetric expansion, equal air entry bilaterally.  Heart, regular rate and rhythm.  Abdomen, soft and nontender.  Extremities, no clubbing or cyanosis.  Neuro, no focal deficits.  Skin: Warm and no rash.  Psych,  normal mood and affect.  Musculoskeletal, joint examination is grossly normal.    Results Review     I reviewed the patient's new clinical results.  Results from last 7 days   Lab Units 07/29/23  0710 07/28/23  0452 07/27/23  1612   WBC 10*3/mm3 7.41 10.47 12.00*   HEMOGLOBIN g/dL 11.8* 12.2 11.9*   PLATELETS 10*3/mm3 281 295 298     Results from last 7 days   Lab Units 07/29/23  0710 07/28/23  0452 07/27/23  1612   SODIUM mmol/L 142 140 141   POTASSIUM mmol/L 3.4* 3.1* 3.6   CHLORIDE mmol/L 96* 99 103   CO2 mmol/L 35.4* 30.0* 27.2   BUN mg/dL 11 12 17   CREATININE mg/dL 0.88 0.81 0.89   GLUCOSE mg/dL 114* 126* 146*   EGFR mL/min/1.73 84.8 93.7 83.7     Results from last 7 days   Lab Units 07/27/23  1612   ALBUMIN g/dL 3.2*   BILIRUBIN mg/dL 0.6   ALK PHOS U/L 161*   AST (SGOT) U/L 51*   ALT (SGPT) U/L 84*     Results from last 7 days   Lab Units 07/29/23  0710 07/28/23  0452 07/27/23  1612   CALCIUM mg/dL 9.1 8.4* 8.7   ALBUMIN g/dL  --   --  3.2*       No results found for: HGBA1C, POCGLU    CT Abdomen Pelvis Without Contrast    Result Date: 7/27/2023   1. No focal acute inflammatory process of bowel is identified. Mild nonspecific pelvic free fluid. Follow-up as indications persist. 2. No urolithiasis or hydronephrosis. 3. Diffuse groundglass opacities in the partly included lungs suggest edema and/or pneumonia, with right more than left pleural effusions, follow-up advised.  This report was finalized on 7/27/2023 6:59 PM by Dr. Yonathan Neal M.D.      CT Angiogram Chest    Result Date: 7/28/2023  1. No evidence for pulmonary thromboembolic disease. 2. Extensive hazy bilateral interstitial and ground-glass opacities, new since the chest CT 12/31/2022 and similar to that demonstrated on CT abdomen yesterday and consistent with infiltrates or edema. Small right pleural effusion layers bilaterally. 3. Mild subcarinal and right hilar veena enlargement, potentially reactive.  This report was finalized on  7/28/2023 5:47 PM by Dr. Kristopher Arambula M.D.      XR Chest 1 View    Result Date: 7/27/2023  No focal pulmonary consolidation. Cardiomegaly with mild prominence of vascular and interstitial markings.  This report was finalized on 7/27/2023 5:20 PM by Dr. Yonathan Neal M.D.       I have personally reviewed all medications:  Scheduled Medications  apixaban, 5 mg, Oral, Q12H  busPIRone, 5 mg, Oral, BID  dicyclomine, 20 mg, Oral, 4x Daily  FLUoxetine, 60 mg, Oral, Nightly  folic acid, 1 mg, Oral, Daily  furosemide, 40 mg, Intravenous, Q12H  gabapentin, 800 mg, Oral, Q8H  levETIRAcetam, 2,000 mg, Oral, Q12H  nicotine, 1 patch, Transdermal, Q24H  OLANZapine, 5 mg, Oral, BID  pantoprazole, 40 mg, Oral, Q AM  senna-docusate sodium, 2 tablet, Oral, BID  Vitamin B-2, 400 mg, Oral, Daily    Infusions   Diet  Diet: Cardiac Diets; Healthy Heart (2-3 Na+); Texture: Regular Texture (IDDSI 7); Fluid Consistency: Thin (IDDSI 0)    I have personally reviewed:  [x]  Laboratory   []  Microbiology   [x]  Radiology   [x]  EKG/Telemetry  [x]  Cardiology/Vascular   []  Pathology    []  Records      Assessment/Plan     Active Hospital Problems    Diagnosis  POA    **Pulmonary edema [J81.1]  Yes    Acute hypoxemic respiratory failure [J96.01]  Unknown    Tobacco abuse [Z72.0]  Yes    GERD without esophagitis [K21.9]  Yes    Antiphospholipid antibody positive [R76.0]  Yes    Generalized abdominal pain [R10.84]  Yes    Anxiety disorder [F41.9]  Yes    Obesity (BMI 30-39.9) [E66.9]  Yes      Resolved Hospital Problems   No resolved problems to display.       42 y.o. female admitted with Pulmonary edema.    Assessment and plan  Acute hypoxemic respiratory failure, pulmonary edema, concerning for new diagnosis of acute CHF.  Last echo showed ejection fraction of 61 to 65% but it was done in 2020.  Cardiology evaluation.  Patient is currently on Lasix.  Monitor renal function and electrolytes.  Echo done, pending read.    Hypokalemia,  replacement of electrolytes as needed.    Abdominal pain-adjust pain medications, increase Tylenol    Chronic opioid use, monitor for signs of withdrawal.    Obesity, BMI noted to be close to 35, she would benefit from weight loss.    Tobacco abuse disorder, nicotine patch.    History of antiphospholipid antibody syndrome, splenic infarct, patient is on anticoagulation.    CODE STATUS is full code.  Further plans based on hospital course.      Isabella Finch MD  [facility]  23  13:50 EDT      Electronically signed by Isabella Finch MD at 23 1350       Nirmal Zendejas MD at 23 1642              Name: Belen Allen ADMIT: 2023   : 1980  PCP: Sahil Cornelius MD    MRN: [id number] LOS: 1 days   AGE/SEX: 42 y.o. female  [facility]     Subjective   Subjective   Patient is seen at bedside, no new complaints.      Objective   Objective   Vital Signs  Temp:  [97.6 øF (36.4 øC)-98.6 øF (37 øC)] 97.6 øF (36.4 øC)  Heart Rate:  [] 104  Resp:  [18] 18  BP: (105-139)/(60-99) 105/80  SpO2:  [93 %-97 %] 97 %  on  Flow (L/min):  [2] 2;   Device (Oxygen Therapy): nasal cannula  Body mass index is 34.74 kg/mý.  Physical Exam  General, awake and alert.  Head and ENT, normocephalic and atraumatic.  Lungs, symmetric expansion, equal air entry bilaterally.  Heart, regular rate and rhythm.  Abdomen, soft and nontender.  Extremities, no clubbing or cyanosis.  Neuro, no focal deficits.  Skin: Warm and no rash.  Psych, normal mood and affect.  Musculoskeletal, joint examination is grossly normal.      Results Review     I reviewed the patient's new clinical results.  Results from last 7 days   Lab Units 23  0452 23  1612   WBC 10*3/mm3 10.47 12.00*   HEMOGLOBIN g/dL 12.2 11.9*   PLATELETS 10*3/mm3 295 298     Results from last 7 days   Lab Units 23  0452 07/27/23  1612   SODIUM mmol/L 140 141   POTASSIUM mmol/L 3.1* 3.6   CHLORIDE mmol/L 99  103   CO2 mmol/L 30.0* 27.2   BUN mg/dL 12 17   CREATININE mg/dL 0.81 0.89   GLUCOSE mg/dL 126* 146*   EGFR mL/min/1.73 93.7 83.7     Results from last 7 days   Lab Units 07/27/23  1612   ALBUMIN g/dL 3.2*   BILIRUBIN mg/dL 0.6   ALK PHOS U/L 161*   AST (SGOT) U/L 51*   ALT (SGPT) U/L 84*     Results from last 7 days   Lab Units 07/28/23  0452 07/27/23  1612   CALCIUM mg/dL 8.4* 8.7   ALBUMIN g/dL  --  3.2*       No results found for: HGBA1C, POCGLU    CT Abdomen Pelvis Without Contrast    Result Date: 7/27/2023   1. No focal acute inflammatory process of bowel is identified. Mild nonspecific pelvic free fluid. Follow-up as indications persist. 2. No urolithiasis or hydronephrosis. 3. Diffuse groundglass opacities in the partly included lungs suggest edema and/or pneumonia, with right more than left pleural effusions, follow-up advised.  This report was finalized on 7/27/2023 6:59 PM by Dr. Yonathan Neal M.D.      XR Chest 1 View    Result Date: 7/27/2023  No focal pulmonary consolidation. Cardiomegaly with mild prominence of vascular and interstitial markings.  This report was finalized on 7/27/2023 5:20 PM by Dr. Yonathan Neal M.D.       I have personally reviewed all medications:  Scheduled Medications  apixaban, 5 mg, Oral, Q12H  busPIRone, 5 mg, Oral, BID  dicyclomine, 20 mg, Oral, 4x Daily  FLUoxetine, 60 mg, Oral, Nightly  folic acid, 1 mg, Oral, Daily  furosemide, 40 mg, Intravenous, Q12H  gabapentin, 800 mg, Oral, Q8H  levETIRAcetam, 2,000 mg, Oral, Q12H  nicotine, 1 patch, Transdermal, Q24H  OLANZapine, 5 mg, Oral, BID  pantoprazole, 40 mg, Oral, Q AM  senna-docusate sodium, 2 tablet, Oral, BID  Vitamin B-2, 400 mg, Oral, Daily    Infusions   Diet  Diet: Cardiac Diets; Healthy Heart (2-3 Na+); Texture: Regular Texture (IDDSI 7); Fluid Consistency: Thin (IDDSI 0)    I have personally reviewed:  [x]  Laboratory   [x]  Microbiology   [x]  Radiology   [x]  EKG/Telemetry  [x]  Cardiology/Vascular   []   Pathology    []  Records      Assessment/Plan     Active Hospital Problems    Diagnosis  POA    **Pulmonary edema [J81.1]  Yes    Tobacco abuse [Z72.0]  Yes    GERD without esophagitis [K21.9]  Yes    Antiphospholipid antibody positive [R76.0]  Yes    Anxiety disorder [F41.9]  Yes      Resolved Hospital Problems   No resolved problems to display.       42 y.o. female admitted with Pulmonary edema.    Assessment and plan  1.  Acute hypoxemic respiratory failure, pulmonary edema, will repeat echo.  Last echo showed ejection fraction of 61 to 65% but it was done in .  Cardiology evaluation.  Patient is currently on Lasix.  Monitor renal function and electrolytes.    2.  Hypokalemia, replacement of electrolytes as needed.    3.  Chronic opioid use, monitor for signs of withdrawal.    4.  Obesity, BMI noted to be close to 35, she would benefit from weight loss.    5.  Tobacco abuse disorder, nicotine patch.    6.  History of antiphospholipid antibody syndrome, splenic infarct, patient is on anticoagulation.    7.  CODE STATUS is full code.  Further plans based on hospital course.      Nirmal Zendejas MD  [facility]  23  16:42 EDT      Electronically signed by Nirmal Zendejas MD at 23 1645          Consult Notes (last 72 hours)        Anthony Mccauley MD at 23 1137        Consult Orders    1. Inpatient Cardiology Consult [[id number]] ordered by Nirmal Zendejas MD at 23 1045                 Date of Consultation: 23    Referral Provider: Kathy Kim, *     Reason for Consultation: Pulmonary edema, pleuritic chest pain, SOA.    Encounter Provider: Anthony Mccauley MD    Group of Service: [facility]     Patient Name: Belen Allen    :1980    Chief complaint: Shortness of breath, chest pain, abdominal pain    History of Present Illness:     Belen Allen is a 42 year old who does not follow with a cardiologist and  has a past medical history significant for migraine, opioid dependence, and seizure. She presented to [facility] ED with complaints of nausea, vomiting, and abdominal pain. She does have a history of opiate misuse and withdrawal which she was admitted for earlier this month. She reports her last use of opiates was on 7/23.  She has been very constipated.  She has also been short of breath, and has had sharp discomfort when taking a deep breath in.  She does have some mild swelling in her right lower extremity.  Her chest x-ray showed pulmonary edema and cardiomegaly.  She does not have a prior history of congestive heart failure.  She has been given IV Lasix with some improvement, although she still feels poorly in general.  Her EKG did show sinus tachycardia with anterior T wave changes.  Her high-sensitivity troponin has been mildly elevated at 44, 43, and 41.      DENIS 11/10/20  Left ventricular ejection fraction appears to be 61 - 65%. Left ventricular systolic function is normal.  Saline test results are negative.    Past Medical History:   Diagnosis Date    Abnormal Pap smear of cervix     Ankle fracture 2013    H/O Elevated liver enzymes     History of chronic back pain     History of migraine     History of urinary tract infection     Injury of back     Opioid dependence 1/16/2023    PONV (postoperative nausea and vomiting)     Seasonal allergies     Seizure     Takes Keppra         Past Surgical History:   Procedure Laterality Date    BACK SURGERY  2006    fusion L4, L5      CHOLECYSTECTOMY  2014    DILATATION AND CURETTAGE  2009    ENDOSCOPY N/A 11/27/2022    Procedure: ESOPHAGOGASTRODUODENOSCOPY with biopsy;  Surgeon: Christiana Mann MD;  Location: [facility] ENDOSCOPY;  Service: Gastroenterology;  Laterality: N/A;  gastritis, duodenitis, irregular z line    ERCP N/A 6/3/2021    Procedure: ENDOSCOPIC RETROGRADE CHOLANGIOPANCREATOGRAPHY WITH SPHINCTEROTOMY, DILATION WITH BALLOON CLEARANCE  (12MM-15MM);   Surgeon: Bjorn Flannery MD;  Location: [facility] ENDOSCOPY;  Service: Gastroenterology;  Laterality: N/A;  DILATED COMMON BILE DUCT    SKIN SURGERY      TUBAL ABDOMINAL LIGATION  2013    WISDOM TOOTH EXTRACTION  2018    x2         Allergies   Allergen Reactions    Morphine Hives    Lidocaine Rash     Pt reports lidocaine patches make her breakout in a rash         No current facility-administered medications on file prior to encounter.     Current Outpatient Medications on File Prior to Encounter   Medication Sig Dispense Refill    acetaminophen (TYLENOL) 325 MG tablet Take 2 tablets by mouth Every 6 (Six) Hours As Needed for Mild Pain.      apixaban (ELIQUIS) 5 MG tablet tablet Take 1 tablet by mouth 2 (Two) Times a Day. Last filled 4/12/21 - pt states she was instructed to stop taking medication prior to ERCP ~1 month ago and was not instructed to restart medication.   Indication: Splenic infarct      busPIRone (BUSPAR) 5 MG tablet Take 1 tablet by mouth 2 (Two) Times a Day. For anxiety 60 tablet 0    dicyclomine (BENTYL) 20 MG tablet Take 1 tablet by mouth Every 6 (Six) Hours. 20 tablet 0    FLUoxetine (PROzac) 40 MG capsule Take 60 mg by mouth Every Night.      folic acid (FOLVITE) 1 MG tablet Take 1 tablet by mouth Daily. 30 tablet 0    gabapentin (NEURONTIN) 800 MG tablet Take 1 tablet by mouth 4 (Four) Times a Day.      levETIRAcetam (KEPPRA) 1000 MG tablet Take 2 tablets by mouth Every 12 (Twelve) Hours for 30 days. 120 tablet 0    melatonin 5 MG tablet tablet Take 2 tablets by mouth Every Night. Patient taes 20 mg at home      OLANZapine (zyPREXA) 10 MG tablet Take 0.5 tablets by mouth 2 (Two) Times a Day. 30 tablet 0    ondansetron ODT (ZOFRAN-ODT) 4 MG disintegrating tablet Place 1 tablet on the tongue 4 (Four) Times a Day As Needed for Nausea or Vomiting. 30 tablet 0    pantoprazole (PROTONIX) 40 MG EC tablet Take 1 tablet by mouth Daily. 30 tablet 0    sucralfate (CARAFATE) 1 g tablet Take 1  "tablet by mouth 4 (Four) Times a Day. 20 tablet 0    SUMAtriptan (IMITREX) 100 MG tablet Take 0.5 tablets by mouth Every 2 (Two) Hours As Needed for Migraine. Take one tablet at onset of headache. May repeat dose one time in 2 hours if headache not relieved.      traZODone (DESYREL) 50 MG tablet Take 1-2 tablets by mouth At Night As Needed for sleep 30 tablet 3    Vitamin B-2 (RIBOFLAVIN) 100 MG tablet tablet Take 4 tablets by mouth Daily. 30 tablet 3         Social History     Socioeconomic History    Marital status: Single    Number of children: 2   Tobacco Use    Smoking status: Every Day     Packs/day: 0.50     Types: Cigarettes    Smokeless tobacco: Never   Vaping Use    Vaping Use: Never used   Substance and Sexual Activity    Alcohol use: Not Currently    Drug use: Not Currently    Sexual activity: Defer         Family History   Problem Relation Age of Onset    Stroke Mother     Allergic rhinitis Mother     Allergic rhinitis Sister     Breast cancer Maternal Aunt     Cancer Maternal Grandmother     Allergic rhinitis Paternal Grandfather     Cancer Paternal Grandfather     Prostate cancer Paternal Grandfather        REVIEW OF SYSTEMS:   Pertinent positives are noted in the HPI above.  Otherwise, all other systems were reviewed, and are negative.     Objective:     Vitals:    07/28/23 0609 07/28/23 0736 07/28/23 0838 07/28/23 1320   BP:  110/72  105/80   BP Location:  Right arm  Right arm   Patient Position:  Lying  Sitting   Pulse:  96  104   Resp:  18  18   Temp:   98 øF (36.7 øC) 97.6 øF (36.4 øC)   TempSrc:   Oral Oral   SpO2:  93%  97%   Weight: 91.8 kg (202 lb 6.1 oz)      Height:         Body mass index is 34.74 kg/mý.  Flowsheet Rows      Flowsheet Row First Filed Value   Admission Height 162.6 cm (64\") Documented at 07/27/2023 1539   Admission Weight 90.7 kg (200 lb) Documented at 07/27/2023 1539             General:    No acute distress, alert and oriented x4, pleasant                   Head:    " Normocephalic, atraumatic.   Eyes:          Conjunctivae and sclerae normal, no icterus.   Throat:   No oral lesions, no thrush, oral mucosa moist.    Neck:   Supple, trachea midline.   Lungs:     Clear to auscultation bilaterally     Heart:    Regular rhythm and mildly tachycardic rate.  No murmurs, gallops, or rubs noted.   Abdomen:     Soft, non-tender, mildly distended, positive bowel sounds.    Extremities:   Trace edema of the RLE.     Pulses:   Pulses palpable and equal bilaterally.    Skin:   No bleeding or rash.   Neuro:   Non-focal.  Moves all extremities well.    Psychiatric:   Normal mood and affect.         Lab Review:                Results from last 7 days   Lab Units 07/28/23  0452   SODIUM mmol/L 140   POTASSIUM mmol/L 3.1*   CHLORIDE mmol/L 99   CO2 mmol/L 30.0*   BUN mg/dL 12   CREATININE mg/dL 0.81   GLUCOSE mg/dL 126*   CALCIUM mg/dL 8.4*     Results from last 7 days   Lab Units 07/28/23  0053 07/27/23 2022 07/27/23  1612   HSTROP T ng/L 44* 43* 41*     Results from last 7 days   Lab Units 07/28/23  0452   WBC 10*3/mm3 10.47   HEMOGLOBIN g/dL 12.2   HEMATOCRIT % 36.3   PLATELETS 10*3/mm3 295                       EKG (reviewed by me personally):          Assessment:   1.  Acute hypoxic respiratory failure with pulmonary edema  2.  Acute CHF, type currently unknown  3.  Abdominal pain and constipation  4.  Pleuritic chest pain  5.  Antiphospholipid antibody syndrome with prior splenic infarct  6.  Chronic opioid use, none since 7/23/2023  7.  Sinus tachycardia  8.  Tobacco use   9.  Mildly elevated high-sensitivity troponin, nonspecific  10.  Hypokalemia, being repleted    Plan:       When I initially saw the patient earlier today, I ordered a CT angiogram of the chest given the sinus tachycardia, shortness of breath, and pleuritic chest pain.  I wanted to make sure that she did not have a pulmonary embolism.  She does take Eliquis for antiphospholipid antibody syndrome, although she also  smokes.  The CT angiogram did not show evidence of a pulmonary embolism, although it did confirm pulmonary edema.    She likely has acute CHF of unknown type.  She had cardiomegaly on the chest x-ray.  An echocardiogram has been ordered and is pending.  In the meantime, I agree with diuresis with Lasix 40 mg IV every 12 hours.  She will remain on the Eliquis for her history of antiphospholipid antibody syndrome and prior splenic infarct.  The constipation may also be driving some of the shortness of breath as well.  Her blood pressure is running on the low side currently, and I did not add further medications for now.    Further plans after the echocardiogram is completed.  Thank you very much for this consult.      Jamie Mccauley MD            Electronically signed by Anthony Mccauley MD at 07/28/23 0923

## 2023-07-31 NOTE — PLAN OF CARE
"Goal Outcome Evaluation:                      SLP orders received. Patient complaining of stomach pain and kindly declined swallow evaluation at this time. SLP to follow up as patient is willing to participate. Although, she reports \"I don't really have difficulty swallowing, I more so just have belching and tightness (pointing to her chest).     Discussed POC with RN.  "

## 2023-07-31 NOTE — PLAN OF CARE
Goal Outcome Evaluation:                      Patient resting in bed, VSS, was given enema this AM and has since had a large BM. Patient tolerating meals despite complaining of nausea and is given PRN zofran.     She states pain has improved since medication and BM, denies any further needs at this time, RN will continue to round on patient per hospital protocol.      Problem: Adult Inpatient Plan of Care  Goal: Plan of Care Review  Outcome: Ongoing, Progressing  Goal: Patient-Specific Goal (Individualized)  Outcome: Ongoing, Progressing  Goal: Absence of Hospital-Acquired Illness or Injury  Outcome: Ongoing, Progressing  Intervention: Identify and Manage Fall Risk  Recent Flowsheet Documentation  Taken 7/31/2023 1400 by Harman Alcala RN  Safety Promotion/Fall Prevention: safety round/check completed  Taken 7/31/2023 1200 by Harman Alcala RN  Safety Promotion/Fall Prevention:   safety round/check completed   room organization consistent  Taken 7/31/2023 1000 by Harman Alcala RN  Safety Promotion/Fall Prevention:   safety round/check completed   room organization consistent  Taken 7/31/2023 0800 by Harman Alcala RN  Safety Promotion/Fall Prevention:   activity supervised   room organization consistent   safety round/check completed  Intervention: Prevent Skin Injury  Recent Flowsheet Documentation  Taken 7/31/2023 1400 by Harman Alcala RN  Body Position: position changed independently  Taken 7/31/2023 0800 by Harman Alcala RN  Body Position: position changed independently  Skin Protection: adhesive use limited  Intervention: Prevent and Manage VTE (Venous Thromboembolism) Risk  Recent Flowsheet Documentation  Taken 7/31/2023 1400 by Harman Alcala RN  Activity Management: up ad js  VTE Prevention/Management: patient refused intervention  Range of Motion: active ROM (range of motion) encouraged  Taken 7/31/2023 0800 by Harman Alcala RN  Activity Management: activity minimized  VTE  Prevention/Management:   bilateral   sequential compression devices on  Range of Motion: active ROM (range of motion) encouraged  Intervention: Prevent Infection  Recent Flowsheet Documentation  Taken 7/31/2023 1400 by Harman Alcala RN  Infection Prevention:   single patient room provided   rest/sleep promoted  Taken 7/31/2023 1200 by Harman Alcala RN  Infection Prevention: single patient room provided  Taken 7/31/2023 1000 by Harman Alcala RN  Infection Prevention: single patient room provided  Taken 7/31/2023 0800 by Harman Alcala RN  Infection Prevention: single patient room provided  Goal: Optimal Comfort and Wellbeing  Outcome: Ongoing, Progressing  Intervention: Monitor Pain and Promote Comfort  Recent Flowsheet Documentation  Taken 7/31/2023 1400 by Harman Alcala RN  Pain Management Interventions: position adjusted  Taken 7/31/2023 0800 by Harman Alcala RN  Pain Management Interventions:   pillow support provided   see MAR  Intervention: Provide Person-Centered Care  Recent Flowsheet Documentation  Taken 7/31/2023 1400 by Harman Alcala RN  Trust Relationship/Rapport: care explained  Taken 7/31/2023 0800 by Harman Alcala RN  Trust Relationship/Rapport:   care explained   questions encouraged  Goal: Readiness for Transition of Care  Outcome: Ongoing, Progressing     Problem: Seizure Disorder Comorbidity  Goal: Maintenance of Seizure Control  Outcome: Ongoing, Progressing     Problem: Breathing Pattern Ineffective  Goal: Effective Breathing Pattern  Outcome: Ongoing, Progressing  Intervention: Promote Improved Breathing Pattern  Recent Flowsheet Documentation  Taken 7/31/2023 1400 by Harman Alcala RN  Head of Bed (HOB) Positioning: HOB elevated  Taken 7/31/2023 0800 by Harman Alcala RN  Head of Bed (HOB) Positioning: HOB elevated     Problem: Skin Injury Risk Increased  Goal: Skin Health and Integrity  Outcome: Ongoing, Progressing  Intervention: Optimize Skin Protection  Recent Flowsheet  Documentation  Taken 7/31/2023 1400 by Harman Alcala, RN  Head of Bed (HOB) Positioning: HOB elevated  Taken 7/31/2023 0800 by Harman Alcala RN  Pressure Reduction Techniques: frequent weight shift encouraged  Head of Bed (HOB) Positioning: [facility] elevated  Pressure Reduction Devices: alternating pressure pump (ADD)  Skin Protection: adhesive use limited

## 2023-07-31 NOTE — PROGRESS NOTES
LOS: 4 days   Patient Care Team:  Sahil Cornelius MD as PCP - General (Internal Medicine)  Code, Bjorn HERRERA II, MD as Consulting Physician (Hematology and Oncology)  Dennys Carranza MD as Referring Physician (Hospitalist)    Chief Complaint: Follow-up pulmonary edema    Interval History: She is slowly improving.  Her shortness of breath is better.  She still has intermittent pleuritic chest pain.  Still constipated.    Vital Signs:  Temp:  [98.2 øF (36.8 øC)-98.8 øF (37.1 øC)] 98.2 øF (36.8 øC)  Heart Rate:  [] 81  Resp:  [20] 20  BP: ()/(58-76) 97/68    Intake/Output Summary (Last 24 hours) at 7/31/2023 0933  Last data filed at 7/31/2023 0605  Gross per 24 hour   Intake --   Output 1000 ml   Net -1000 ml       Physical Exam:   General Appearance:    No acute distress, alert and oriented x4   Lungs:     Clear to auscultation bilaterally     Heart:    Regular rhythm and normal rate.  No murmurs, gallops, or rubs.   Abdomen:     Soft, nontender, nondistended.    Extremities:   Trace edema of the lower extremities bilaterally.     Results Review:    Results from last 7 days   Lab Units 07/31/23  0549   SODIUM mmol/L 139   POTASSIUM mmol/L 3.7   CHLORIDE mmol/L 98   CO2 mmol/L 32.0*   BUN mg/dL 12   CREATININE mg/dL 0.77   GLUCOSE mg/dL 92   CALCIUM mg/dL 9.1     Results from last 7 days   Lab Units 07/30/23  1217 07/30/23  1029 07/28/23  0053   HSTROP T ng/L 18* 17* 44*     Results from last 7 days   Lab Units 07/31/23  0549   WBC 10*3/mm3 5.96   HEMOGLOBIN g/dL 11.8*   HEMATOCRIT % 36.2   PLATELETS 10*3/mm3 329             Results from last 7 days   Lab Units 07/31/23  0549   MAGNESIUM mg/dL 2.1           I reviewed the patient's new clinical results.        Assessment:  1.  Acute hypoxic respiratory failure with pulmonary edema  2.  Pulmonary edema, possibly acute diastolic CHF  3.  Abdominal pain and constipation  4.  Pleuritic chest pain  5.  Antiphospholipid antibody syndrome with prior splenic  infarct  6.  Chronic opioid use, none since 7/23/2023  7.  Sinus tachycardia, resolved  8.  Tobacco use   9.  Mildly elevated high-sensitivity troponin, nonspecific  10.  Hypokalemia - replaced    Plan:  -I reviewed the echocardiogram.  I agree with the reading.  Systolic and diastolic function were both normal.  I am not sure of the etiology of the pulmonary edema, although her chest x-ray yesterday had improved significantly, meaning that she has responded well to diuretics.  She may have had transient diastolic dysfunction.    -I would continue the Lasix at 40 mg p.o. twice daily today.  I will likely decrease the Lasix to 40 mg p.o. daily tomorrow.    -No evidence of pulmonary embolism on CT angiogram.  Pleuritic chest pain may be related to the pulmonary edema.  It is better, but still present.    -Continue Eliquis for hypercoagulable disorder.    -Constipation remains an issue.  Currently being addressed by Dr. Finch.    Anthony Mccauley MD  07/31/23  09:33 EDT

## 2023-07-31 NOTE — PROGRESS NOTES
Name: Belen Allen ADMIT: 2023   : 1980  PCP: Sahil Cornelius MD    MRN: [id number] LOS: 4 days   AGE/SEX: 42 y.o. female  ROOM: [facility]     Subjective   Subjective   Patient resting in bed, asleep but easily awakens to voice.  She is complaining of constipation however she had an enema this morning and is starting to have some stools.       Objective   Objective   Vital Signs  Temp:  [98.2 øF (36.8 øC)-98.8 øF (37.1 øC)] 98.2 øF (36.8 øC)  Heart Rate:  [] 81  Resp:  [20] 20  BP: ()/(58-76) 97/68  SpO2:  [97 %-100 %] 100 %  on  Flow (L/min):  [2] 2;   Device (Oxygen Therapy): nasal cannula  Body mass index is 34.86 kg/mý.  Physical Exam  General, awake and alert.  Head and ENT, normocephalic and atraumatic.  Lungs, symmetric expansion, equal air entry bilaterally.  Heart, regular rate and rhythm.  Abdomen, soft and nontender.  Extremities, no clubbing or cyanosis.  Neuro, no focal deficits.  Skin: Warm and no rash.  Psych, normal mood and affect.  Musculoskeletal, joint examination is grossly normal.    Results Review     I reviewed the patient's new clinical results.  Results from last 7 days   Lab Units 23  0549 23  0734 23  0710 23  0452   WBC 10*3/mm3 5.96 7.13 7.41 10.47   HEMOGLOBIN g/dL 11.8* 12.0 11.8* 12.2   PLATELETS 10*3/mm3 329 310 281 295       Results from last 7 days   Lab Units 23  0549 23  0734 23  0710 23  0452   SODIUM mmol/L 139 138 142 140   POTASSIUM mmol/L 3.7 3.7 3.4* 3.1*   CHLORIDE mmol/L 98 93* 96* 99   CO2 mmol/L 32.0* 36.2* 35.4* 30.0*   BUN mg/dL 12 12 11 12   CREATININE mg/dL 0.77 0.86 0.88 0.81   GLUCOSE mg/dL 92 106* 114* 126*   EGFR mL/min/1.73 99.5 87.2 84.8 93.7       Results from last 7 days   Lab Units 23  1612   ALBUMIN g/dL 3.2*   BILIRUBIN mg/dL 0.6   ALK PHOS U/L 161*   AST (SGOT) U/L 51*   ALT (SGPT) U/L 84*       Results from last 7 days   Lab Units 23  0549 23  0734  07/29/23  0710 07/28/23  0452 07/27/23  1612   CALCIUM mg/dL 9.1 9.2 9.1 8.4* 8.7   ALBUMIN g/dL  --   --   --   --  3.2*   MAGNESIUM mg/dL 2.1 1.7  --   --   --    PHOSPHORUS mg/dL 3.8 3.8  --   --   --          No results found for: HGBA1C, POCGLU    No radiology results for the last day    I have personally reviewed all medications:  Scheduled Medications  apixaban, 5 mg, Oral, Q12H  busPIRone, 5 mg, Oral, BID  dicyclomine, 20 mg, Oral, 4x Daily  FLUoxetine, 60 mg, Oral, Nightly  folic acid, 1 mg, Oral, Daily  furosemide, 40 mg, Oral, BID  gabapentin, 800 mg, Oral, Q8H  levETIRAcetam, 2,000 mg, Oral, Q12H  lubiprostone, 24 mcg, Oral, BID With Meals  nicotine, 1 patch, Transdermal, Q24H  OLANZapine, 5 mg, Oral, BID  pantoprazole, 40 mg, Oral, Q AM  polyethylene glycol, 17 g, Oral, Daily  senna-docusate sodium, 2 tablet, Oral, BID  Vitamin B-2, 400 mg, Oral, Daily    Infusions   Diet  Diet: Cardiac Diets; Healthy Heart (2-3 Na+); Texture: Regular Texture (IDDSI 7); Fluid Consistency: Thin (IDDSI 0)    I have personally reviewed:  [x]  Laboratory   []  Microbiology   []  Radiology   [x]  EKG/Telemetry  []  Cardiology/Vascular   []  Pathology    []  Records       Assessment/Plan     Active Hospital Problems    Diagnosis  POA    **Pulmonary edema [J81.1]  Yes    Acute hypoxemic respiratory failure [J96.01]  Unknown    Tobacco abuse [Z72.0]  Yes    GERD without esophagitis [K21.9]  Yes    Antiphospholipid antibody positive [R76.0]  Yes    Generalized abdominal pain [R10.84]  Yes    Anxiety disorder [F41.9]  Yes    Obesity (BMI 30-39.9) [E66.9]  Yes      Resolved Hospital Problems   No resolved problems to display.       42 y.o. female admitted with Pulmonary edema.    Assessment and plan    Acute hypoxemic respiratory failure  - pulmonary edema, concerning for new diagnosis of acute CHF. Cards consulted; echo done, normal systolic and diastolic fcn. Changed to po lasix.  Per cardiology note, possibly transient  diastolic dysfunction given her improvement with diuretics.  Per cardiology continue Lasix twice daily today, plan to decrease to once a day tomorrow.    Constipation-bowel regimen increased yesterday to include MiraLAX and Amitiza in addition to Doc senna, enema this morning with some improvement    Hypokalemia, replacement of electrolytes as needed.    Abdominal pain-adjust pain medications, continue percocet.     Chronic opioid use, monitor for signs of withdrawal.    Obesity, BMI noted to be close to 35, she would benefit from weight loss.    Tobacco abuse disorder, nicotine patch.    History of antiphospholipid antibody syndrome, splenic infarct, patient is on anticoagulation.    CODE STATUS is full code.  Further plans based on hospital course.  Hopeful discharge 1 to 2 days.      Isabella Finch MD  [facility]  07/31/23  12:17 EDT

## 2023-07-31 NOTE — PLAN OF CARE
Problem: Adult Inpatient Plan of Care  Goal: Absence of Hospital-Acquired Illness or Injury  Intervention: Prevent and Manage VTE (Venous Thromboembolism) Risk  Description: Assess for VTE (venous thromboembolism) risk.  Encourage and assist with early ambulation.  Initiate and maintain compression or other therapy, as indicated, based on identified risk in accordance with organizational protocol and provider order.  Encourage both active and passive leg exercises while in bed, if unable to ambulate.  Recent Flowsheet Documentation  Taken 7/31/2023 0010 by Russell Devine, YNES  VTE Prevention/Management:   bilateral   patient refused intervention   sequential compression devices off  Taken 7/30/2023 2048 by Russell Devine, RN  VTE Prevention/Management:   bilateral   sequential compression devices off  Range of Motion: active ROM (range of motion) encouraged   Goal Outcome Evaluation:   Patient still complaining of constipation and pain despite laxatives given. Patient requesting pain medication x2, this nurse educated patient on how these medications can further constipate her. Patient still requesting these medications. VSS, call light in reach, frequently rounded on.

## 2023-08-01 ENCOUNTER — APPOINTMENT (OUTPATIENT)
Dept: CARDIOLOGY | Facility: HOSPITAL | Age: 43
DRG: 292 | End: 2023-08-01
Payer: COMMERCIAL

## 2023-08-01 PROBLEM — R13.19 OTHER DYSPHAGIA: Status: ACTIVE | Noted: 2023-08-01

## 2023-08-01 LAB
ANION GAP SERPL CALCULATED.3IONS-SCNC: 8 MMOL/L (ref 5–15)
BH CV LOWER VASCULAR LEFT COMMON FEMORAL AUGMENT: NORMAL
BH CV LOWER VASCULAR LEFT COMMON FEMORAL COMPETENT: NORMAL
BH CV LOWER VASCULAR LEFT COMMON FEMORAL COMPRESS: NORMAL
BH CV LOWER VASCULAR LEFT COMMON FEMORAL PHASIC: NORMAL
BH CV LOWER VASCULAR LEFT COMMON FEMORAL SPONT: NORMAL
BH CV LOWER VASCULAR RIGHT COMMON FEMORAL AUGMENT: NORMAL
BH CV LOWER VASCULAR RIGHT COMMON FEMORAL COMPETENT: NORMAL
BH CV LOWER VASCULAR RIGHT COMMON FEMORAL COMPRESS: NORMAL
BH CV LOWER VASCULAR RIGHT COMMON FEMORAL PHASIC: NORMAL
BH CV LOWER VASCULAR RIGHT COMMON FEMORAL SPONT: NORMAL
BH CV LOWER VASCULAR RIGHT DISTAL FEMORAL COMPRESS: NORMAL
BH CV LOWER VASCULAR RIGHT GASTRONEMIUS COMPRESS: NORMAL
BH CV LOWER VASCULAR RIGHT GREATER SAPH AK COMPRESS: NORMAL
BH CV LOWER VASCULAR RIGHT GREATER SAPH BK COMPRESS: NORMAL
BH CV LOWER VASCULAR RIGHT LESSER SAPH COMPRESS: NORMAL
BH CV LOWER VASCULAR RIGHT MID FEMORAL AUGMENT: NORMAL
BH CV LOWER VASCULAR RIGHT MID FEMORAL COMPETENT: NORMAL
BH CV LOWER VASCULAR RIGHT MID FEMORAL COMPRESS: NORMAL
BH CV LOWER VASCULAR RIGHT MID FEMORAL PHASIC: NORMAL
BH CV LOWER VASCULAR RIGHT MID FEMORAL SPONT: NORMAL
BH CV LOWER VASCULAR RIGHT PERONEAL COMPRESS: NORMAL
BH CV LOWER VASCULAR RIGHT POPLITEAL AUGMENT: NORMAL
BH CV LOWER VASCULAR RIGHT POPLITEAL COMPETENT: NORMAL
BH CV LOWER VASCULAR RIGHT POPLITEAL COMPRESS: NORMAL
BH CV LOWER VASCULAR RIGHT POPLITEAL PHASIC: NORMAL
BH CV LOWER VASCULAR RIGHT POPLITEAL SPONT: NORMAL
BH CV LOWER VASCULAR RIGHT POSTERIOR TIBIAL COMPRESS: NORMAL
BH CV LOWER VASCULAR RIGHT PROFUNDA FEMORAL COMPRESS: NORMAL
BH CV LOWER VASCULAR RIGHT PROXIMAL FEMORAL COMPRESS: NORMAL
BH CV LOWER VASCULAR RIGHT SAPHENOFEMORAL JUNCTION COMPRESS: NORMAL
BUN SERPL-MCNC: 13 MG/DL (ref 6–20)
BUN/CREAT SERPL: 17.8 (ref 7–25)
CALCIUM SPEC-SCNC: 8.8 MG/DL (ref 8.6–10.5)
CHLORIDE SERPL-SCNC: 103 MMOL/L (ref 98–107)
CO2 SERPL-SCNC: 29 MMOL/L (ref 22–29)
CREAT SERPL-MCNC: 0.73 MG/DL (ref 0.57–1)
DEPRECATED RDW RBC AUTO: 45 FL (ref 37–54)
EGFRCR SERPLBLD CKD-EPI 2021: 106.1 ML/MIN/1.73
ERYTHROCYTE [DISTWIDTH] IN BLOOD BY AUTOMATED COUNT: 13.3 % (ref 12.3–15.4)
GLUCOSE SERPL-MCNC: 80 MG/DL (ref 65–99)
HCT VFR BLD AUTO: 37.2 % (ref 34–46.6)
HGB BLD-MCNC: 12.1 G/DL (ref 12–15.9)
MAGNESIUM SERPL-MCNC: 2.3 MG/DL (ref 1.6–2.6)
MCH RBC QN AUTO: 30.3 PG (ref 26.6–33)
MCHC RBC AUTO-ENTMCNC: 32.5 G/DL (ref 31.5–35.7)
MCV RBC AUTO: 93 FL (ref 79–97)
PHOSPHATE SERPL-MCNC: 4 MG/DL (ref 2.5–4.5)
PLATELET # BLD AUTO: 330 10*3/MM3 (ref 140–450)
PMV BLD AUTO: 10.2 FL (ref 6–12)
POTASSIUM SERPL-SCNC: 3.9 MMOL/L (ref 3.5–5.2)
RBC # BLD AUTO: 4 10*6/MM3 (ref 3.77–5.28)
SODIUM SERPL-SCNC: 140 MMOL/L (ref 136–145)
WBC NRBC COR # BLD: 6.43 10*3/MM3 (ref 3.4–10.8)

## 2023-08-01 PROCEDURE — 99232 SBSQ HOSP IP/OBS MODERATE 35: CPT | Performed by: NURSE PRACTITIONER

## 2023-08-01 PROCEDURE — 85027 COMPLETE CBC AUTOMATED: CPT | Performed by: STUDENT IN AN ORGANIZED HEALTH CARE EDUCATION/TRAINING PROGRAM

## 2023-08-01 PROCEDURE — 92610 EVALUATE SWALLOWING FUNCTION: CPT

## 2023-08-01 PROCEDURE — 84100 ASSAY OF PHOSPHORUS: CPT | Performed by: STUDENT IN AN ORGANIZED HEALTH CARE EDUCATION/TRAINING PROGRAM

## 2023-08-01 PROCEDURE — 83735 ASSAY OF MAGNESIUM: CPT | Performed by: STUDENT IN AN ORGANIZED HEALTH CARE EDUCATION/TRAINING PROGRAM

## 2023-08-01 PROCEDURE — 63710000001 ONDANSETRON PER 8 MG: Performed by: INTERNAL MEDICINE

## 2023-08-01 PROCEDURE — 93971 EXTREMITY STUDY: CPT

## 2023-08-01 PROCEDURE — 80048 BASIC METABOLIC PNL TOTAL CA: CPT | Performed by: STUDENT IN AN ORGANIZED HEALTH CARE EDUCATION/TRAINING PROGRAM

## 2023-08-01 RX ADMIN — FUROSEMIDE 40 MG: 40 TABLET ORAL at 08:29

## 2023-08-01 RX ADMIN — APIXABAN 5 MG: 5 TABLET, FILM COATED ORAL at 08:28

## 2023-08-01 RX ADMIN — LUBIPROSTONE 24 MCG: 24 CAPSULE, GELATIN COATED ORAL at 16:43

## 2023-08-01 RX ADMIN — OXYCODONE HYDROCHLORIDE AND ACETAMINOPHEN 1 TABLET: 5; 325 TABLET ORAL at 05:04

## 2023-08-01 RX ADMIN — ONDANSETRON HYDROCHLORIDE 4 MG: 4 TABLET, FILM COATED ORAL at 05:04

## 2023-08-01 RX ADMIN — Medication 3 MG: at 22:47

## 2023-08-01 RX ADMIN — BUSPIRONE HYDROCHLORIDE 5 MG: 5 TABLET ORAL at 21:14

## 2023-08-01 RX ADMIN — LEVETIRACETAM 2000 MG: 500 TABLET, FILM COATED ORAL at 08:29

## 2023-08-01 RX ADMIN — OXYCODONE HYDROCHLORIDE AND ACETAMINOPHEN 1 TABLET: 5; 325 TABLET ORAL at 22:47

## 2023-08-01 RX ADMIN — FLUOXETINE HYDROCHLORIDE 60 MG: 20 CAPSULE ORAL at 21:13

## 2023-08-01 RX ADMIN — DICYCLOMINE HYDROCHLORIDE 20 MG: 10 CAPSULE ORAL at 16:41

## 2023-08-01 RX ADMIN — DICYCLOMINE HYDROCHLORIDE 20 MG: 10 CAPSULE ORAL at 08:29

## 2023-08-01 RX ADMIN — FUROSEMIDE 40 MG: 40 TABLET ORAL at 16:41

## 2023-08-01 RX ADMIN — PANTOPRAZOLE SODIUM 40 MG: 40 TABLET, DELAYED RELEASE ORAL at 05:04

## 2023-08-01 RX ADMIN — OXYCODONE HYDROCHLORIDE AND ACETAMINOPHEN 1 TABLET: 5; 325 TABLET ORAL at 11:07

## 2023-08-01 RX ADMIN — ONDANSETRON HYDROCHLORIDE 4 MG: 4 TABLET, FILM COATED ORAL at 11:08

## 2023-08-01 RX ADMIN — OLANZAPINE 5 MG: 5 TABLET ORAL at 08:28

## 2023-08-01 RX ADMIN — OXYCODONE HYDROCHLORIDE AND ACETAMINOPHEN 1 TABLET: 5; 325 TABLET ORAL at 16:41

## 2023-08-01 RX ADMIN — DICYCLOMINE HYDROCHLORIDE 20 MG: 10 CAPSULE ORAL at 21:13

## 2023-08-01 RX ADMIN — ONDANSETRON HYDROCHLORIDE 4 MG: 4 TABLET, FILM COATED ORAL at 16:41

## 2023-08-01 RX ADMIN — SENNOSIDES AND DOCUSATE SODIUM 2 TABLET: 50; 8.6 TABLET ORAL at 08:28

## 2023-08-01 RX ADMIN — OLANZAPINE 5 MG: 5 TABLET ORAL at 21:13

## 2023-08-01 RX ADMIN — SENNOSIDES AND DOCUSATE SODIUM 2 TABLET: 50; 8.6 TABLET ORAL at 21:14

## 2023-08-01 RX ADMIN — GABAPENTIN 800 MG: 400 CAPSULE ORAL at 21:13

## 2023-08-01 RX ADMIN — Medication 400 MG: at 08:29

## 2023-08-01 RX ADMIN — GABAPENTIN 800 MG: 400 CAPSULE ORAL at 05:04

## 2023-08-01 RX ADMIN — BUSPIRONE HYDROCHLORIDE 5 MG: 5 TABLET ORAL at 08:29

## 2023-08-01 RX ADMIN — POLYETHYLENE GLYCOL 3350 17 G: 17 POWDER, FOR SOLUTION ORAL at 08:28

## 2023-08-01 RX ADMIN — GABAPENTIN 800 MG: 400 CAPSULE ORAL at 13:03

## 2023-08-01 RX ADMIN — FOLIC ACID 1 MG: 1 TABLET ORAL at 08:29

## 2023-08-01 RX ADMIN — DICYCLOMINE HYDROCHLORIDE 20 MG: 10 CAPSULE ORAL at 13:03

## 2023-08-01 RX ADMIN — LEVETIRACETAM 2000 MG: 500 TABLET, FILM COATED ORAL at 21:13

## 2023-08-01 RX ADMIN — LUBIPROSTONE 24 MCG: 24 CAPSULE, GELATIN COATED ORAL at 08:29

## 2023-08-01 RX ADMIN — APIXABAN 5 MG: 5 TABLET, FILM COATED ORAL at 21:14

## 2023-08-01 RX ADMIN — NICOTINE 1 PATCH: 21 PATCH, EXTENDED RELEASE TRANSDERMAL at 08:29

## 2023-08-01 NOTE — PROGRESS NOTES
Name: Belen Allen ADMIT: 2023   : 1980  PCP: Sahil Cornelius MD    MRN: [id number] LOS: 5 days   AGE/SEX: 42 y.o. female  ROOM: [facility]     Subjective   Subjective   Patient sitting up in bed this morning.  Reports she is feeling tired and somewhat weak.  Has been up and walking around the unit.  She also endorses some right lower extremity pain.  In addition, she endorses feeling of difficulty swallowing with associated chest tightness.  Speech is at bedside on my exam.    Objective   Objective   Vital Signs  Temp:  [98.1 øF (36.7 øC)-99.3 øF (37.4 øC)] 98.2 øF (36.8 øC)  Heart Rate:  [78-98] 83  Resp:  [20] 20  BP: ()/(51-75) 103/56  SpO2:  [95 %-98 %] 98 %  on  Flow (L/min):  [0-2] 0;   Device (Oxygen Therapy): room air  Body mass index is 37.52 kg/mý.    Physical Exam  General, awake and alert.  Head and ENT, normocephalic and atraumatic.  Lungs, symmetric expansion, equal air entry bilaterally.  Heart, regular rate and rhythm.  Abdomen, soft and nontender.  Extremities, no clubbing or cyanosis.  Neuro, no focal deficits.  Skin: Warm and no rash.  Psych, normal mood and affect.  Musculoskeletal, joint examination is grossly normal.    Results Review     I reviewed the patient's new clinical results.  Results from last 7 days   Lab Units 23  0549 23  0734 23  0710   WBC 10*3/mm3 6.43 5.96 7.13 7.41   HEMOGLOBIN g/dL 12.1 11.8* 12.0 11.8*   PLATELETS 10*3/mm3 330 329 310 281     Results from last 7 days   Lab Units 23  0549 23  0734 23  0710   SODIUM mmol/L 140 139 138 142   POTASSIUM mmol/L 3.9 3.7 3.7 3.4*   CHLORIDE mmol/L 103 98 93* 96*   CO2 mmol/L 29.0 32.0* 36.2* 35.4*   BUN mg/dL 13 12 12 11   CREATININE mg/dL 0.73 0.77 0.86 0.88   GLUCOSE mg/dL 80 92 106* 114*   EGFR mL/min/1.73 106.1 99.5 87.2 84.8     Results from last 7 days   Lab Units 23  1612   ALBUMIN g/dL 3.2*   BILIRUBIN mg/dL 0.6   ALK  PHOS U/L 161*   AST (SGOT) U/L 51*   ALT (SGPT) U/L 84*     Results from last 7 days   Lab Units 08/01/23  0624 07/31/23  0549 07/30/23  0734 07/29/23  0710 07/28/23  0452 07/27/23  1612   CALCIUM mg/dL 8.8 9.1 9.2 9.1   < > 8.7   ALBUMIN g/dL  --   --   --   --   --  3.2*   MAGNESIUM mg/dL 2.3 2.1 1.7  --   --   --    PHOSPHORUS mg/dL 4.0 3.8 3.8  --   --   --     < > = values in this interval not displayed.       No results found for: HGBA1C, POCGLU    No radiology results for the last day    I have personally reviewed all medications:  Scheduled Medications  apixaban, 5 mg, Oral, Q12H  busPIRone, 5 mg, Oral, BID  dicyclomine, 20 mg, Oral, 4x Daily  FLUoxetine, 60 mg, Oral, Nightly  folic acid, 1 mg, Oral, Daily  furosemide, 40 mg, Oral, BID  gabapentin, 800 mg, Oral, Q8H  levETIRAcetam, 2,000 mg, Oral, Q12H  lubiprostone, 24 mcg, Oral, BID With Meals  nicotine, 1 patch, Transdermal, Q24H  OLANZapine, 5 mg, Oral, BID  pantoprazole, 40 mg, Oral, Q AM  polyethylene glycol, 17 g, Oral, Daily  senna-docusate sodium, 2 tablet, Oral, BID  Vitamin B-2, 400 mg, Oral, Daily    Infusions   Diet  Diet: Cardiac Diets; Healthy Heart (2-3 Na+); Texture: Regular Texture (IDDSI 7); Fluid Consistency: Thin (IDDSI 0)    I have personally reviewed:  [x]  Laboratory   []  Microbiology   []  Radiology   []  EKG/Telemetry  []  Cardiology/Vascular   []  Pathology    []  Records       Assessment/Plan     Active Hospital Problems    Diagnosis  POA    **Pulmonary edema [J81.1]  Yes    Other dysphagia [R13.19]  Unknown    Acute hypoxemic respiratory failure [J96.01]  Unknown    Tobacco abuse [Z72.0]  Yes    GERD without esophagitis [K21.9]  Yes    Antiphospholipid antibody positive [R76.0]  Yes    Generalized abdominal pain [R10.84]  Yes    Anxiety disorder [F41.9]  Yes    Obesity (BMI 30-39.9) [E66.9]  Yes      Resolved Hospital Problems   No resolved problems to display.     42 y.o. female admitted with Pulmonary edema.    Assessment and  Plan    Acute hypoxemic respiratory failure, pulmonary edema on initial CXR, cards consulted; echo done, normal systolic and diastolic fcn. Per cardiology note, possibly transient diastolic dysfunction given her improvement with diuretics.  Currently on Lasix 40 mg oral twice daily.  Will possibly transition to once daily today.    Constipation-on MiraLAX, Nancy-Colace, Dulcolax; has now had 2 bowel movements in past 24 hours, will send home with bowel regimen    Dysphagia, Subjective difficulty swallowing and chest tightness with numerous consistencies, speech therapy consulted and following, recommending video swallow study tomorrow    Right lower extremity pain, right lower extremity calf tenderness with subjective edema, will obtain duplex of the lower extremity to rule out DVT    Hypokalemia, replacement of electrolytes as needed.    Abdominal pain-adjust pain medications, continue percocet.  Management of constipation as above.    Chronic opioid use, monitor for signs of withdrawal.    Obesity, BMI noted to be close to 35, she would benefit from weight loss.    Tobacco abuse disorder, nicotine patch.    History of antiphospholipid antibody syndrome, splenic infarct, patient is on anticoagulation.    CODE STATUS is full code.  Further plans based on hospital course.    Christiana Casper MD  [facility]  08/01/23  13:32 EDT

## 2023-08-01 NOTE — PROGRESS NOTES
"    Patient Name: Belen Allen  :1980  42 y.o.      Patient Care Team:  Sahil Cornelius MD as PCP - General (Internal Medicine)  Code, Bjorn HERRERA II, MD as Consulting Physician (Hematology and Oncology)  Dennys Carranza MD as Referring Physician (Hospitalist)    Chief Complaint: follow up pulmonary edema    Interval History: sharp squeezing pain with deep breath. Otherwise no complaints. Shortness of breath improved. No edema.        Objective   Vital Signs  Temp:  [98.1 øF (36.7 øC)-99.1 øF (37.3 øC)] 98.2 øF (36.8 øC)  Heart Rate:  [78-98] 83  Resp:  [20] 20  BP: ()/(51-68) 103/56    Intake/Output Summary (Last 24 hours) at 2023 1400  Last data filed at 2023 0800  Gross per 24 hour   Intake 240 ml   Output --   Net 240 ml     Flowsheet Rows      Flowsheet Row First Filed Value   Admission Height 162.6 cm (64\") Documented at 2023 1539   Admission Weight 90.7 kg (200 lb) Documented at 2023 1539            Physical Exam:   General Appearance:    Alert, cooperative, in no acute distress   Lungs:     Clear to auscultation.  Normal respiratory effort and rate.      Heart:    Regular rhythm and normal rate, normal S1 and S2, no murmurs, gallops or rubs.     Chest Wall:    No abnormalities observed   Abdomen:     Soft, nontender, positive bowel sounds.     Extremities:   no cyanosis, clubbing or edema.  No marked joint deformities.  Adequate musculoskeletal strength.       Results Review:    Results from last 7 days   Lab Units 23  0624   SODIUM mmol/L 140   POTASSIUM mmol/L 3.9   CHLORIDE mmol/L 103   CO2 mmol/L 29.0   BUN mg/dL 13   CREATININE mg/dL 0.73   GLUCOSE mg/dL 80   CALCIUM mg/dL 8.8     Results from last 7 days   Lab Units 23  1217 23  1029 23  0053   HSTROP T ng/L 18* 17* 44*     Results from last 7 days   Lab Units 23  0624   WBC 10*3/mm3 6.43   HEMOGLOBIN g/dL 12.1   HEMATOCRIT % 37.2   PLATELETS 10*3/mm3 330         Results from " last 7 days   Lab Units 08/01/23  0624   MAGNESIUM mg/dL 2.3                   Medication Review:   apixaban, 5 mg, Oral, Q12H  busPIRone, 5 mg, Oral, BID  dicyclomine, 20 mg, Oral, 4x Daily  FLUoxetine, 60 mg, Oral, Nightly  folic acid, 1 mg, Oral, Daily  furosemide, 40 mg, Oral, BID  gabapentin, 800 mg, Oral, Q8H  levETIRAcetam, 2,000 mg, Oral, Q12H  lubiprostone, 24 mcg, Oral, BID With Meals  nicotine, 1 patch, Transdermal, Q24H  OLANZapine, 5 mg, Oral, BID  pantoprazole, 40 mg, Oral, Q AM  polyethylene glycol, 17 g, Oral, Daily  senna-docusate sodium, 2 tablet, Oral, BID  Vitamin B-2, 400 mg, Oral, Daily              Assessment & Plan   Acute hypoxic respiratory failure secondary to pulmonary edema  Acute diastolic CHF  Abdominal pain, constipation  Pleuritic chest pain - still present today  Antiphospholipid antibody syndrome with prior splenic infarct  Chronic opoid use, non since 7/23/23  Sinus tachycardia, resolved  Tobacco use, nicotine patch in place  Mildly elevated high sensitivity troponin , nonspecific. No ACS/MI  Hypokalemia     - improving. Decrease lasix to daily.   - still with pleuritic chest pain. Non cardiac. May benefit from NSAIDS.     NELSON Zaragoza  [facility]  08/01/23  14:00 EDT

## 2023-08-01 NOTE — PLAN OF CARE
Goal Outcome Evaluation:            Patient resting in bed, denies pain at the moment. States she has trouble swallowing thin liquids, is scheduled for video swallow tomorrow. Also states her calves are painful at touch, will be getting US per MD.     No needs at this time, patient states she wishes to sleep.     RN will continue to round per hospital policy.       Problem: Adult Inpatient Plan of Care  Goal: Plan of Care Review  Outcome: Ongoing, Progressing  Goal: Patient-Specific Goal (Individualized)  Outcome: Ongoing, Progressing  Goal: Absence of Hospital-Acquired Illness or Injury  Outcome: Ongoing, Progressing  Intervention: Identify and Manage Fall Risk  Recent Flowsheet Documentation  Taken 8/1/2023 1400 by Harman Alcala RN  Safety Promotion/Fall Prevention:   room organization consistent   safety round/check completed  Taken 8/1/2023 0800 by Harman Alcala RN  Safety Promotion/Fall Prevention:   safety round/check completed   room organization consistent  Intervention: Prevent Skin Injury  Recent Flowsheet Documentation  Taken 8/1/2023 1400 by Harman Alcala RN  Body Position: position changed independently  Skin Protection: adhesive use limited  Taken 8/1/2023 0800 by Harman Alcala RN  Body Position: position changed independently  Skin Protection: adhesive use limited  Intervention: Prevent and Manage VTE (Venous Thromboembolism) Risk  Recent Flowsheet Documentation  Taken 8/1/2023 1400 by Harman Alcala RN  VTE Prevention/Management:   bilateral   sequential compression devices on  Range of Motion: active ROM (range of motion) encouraged  Taken 8/1/2023 0800 by Harman Alcala RN  Activity Management: activity encouraged  VTE Prevention/Management:   bilateral   patient refused intervention  Range of Motion: active ROM (range of motion) encouraged  Intervention: Prevent Infection  Recent Flowsheet Documentation  Taken 8/1/2023 1400 by Harman Alcala RN  Infection Prevention: single patient room  provided  Taken 8/1/2023 0800 by Harman Alcala RN  Infection Prevention:   rest/sleep promoted   single patient room provided  Goal: Optimal Comfort and Wellbeing  Outcome: Ongoing, Progressing  Intervention: Monitor Pain and Promote Comfort  Recent Flowsheet Documentation  Taken 8/1/2023 1400 by Harman Alcala RN  Pain Management Interventions: position adjusted  Taken 8/1/2023 0800 by Harman Alcala RN  Pain Management Interventions: position adjusted  Intervention: Provide Person-Centered Care  Recent Flowsheet Documentation  Taken 8/1/2023 1400 by Harman Alcala RN  Trust Relationship/Rapport:   care explained   questions encouraged  Taken 8/1/2023 0800 by Harman Alcala RN  Trust Relationship/Rapport:   care explained   questions encouraged  Goal: Readiness for Transition of Care  Outcome: Ongoing, Progressing     Problem: Seizure Disorder Comorbidity  Goal: Maintenance of Seizure Control  Outcome: Ongoing, Progressing     Problem: Breathing Pattern Ineffective  Goal: Effective Breathing Pattern  Outcome: Ongoing, Progressing  Intervention: Promote Improved Breathing Pattern  Recent Flowsheet Documentation  Taken 8/1/2023 1400 by Harman Alcala RN  Head of Bed (HOB) Positioning: HOB at 30-45 degrees  Taken 8/1/2023 0800 by Harman Alcala RN  Head of Bed (HOB) Positioning: HOB at 30-45 degrees     Problem: Skin Injury Risk Increased  Goal: Skin Health and Integrity  Outcome: Ongoing, Progressing  Intervention: Optimize Skin Protection  Recent Flowsheet Documentation  Taken 8/1/2023 1400 by Harman Alcala RN  Pressure Reduction Techniques:   frequent weight shift encouraged   weight shift assistance provided  Head of Bed (HOB) Positioning: HOB at 30-45 degrees  Pressure Reduction Devices: positioning supports utilized  Skin Protection: adhesive use limited  Taken 8/1/2023 0800 by Harman Alcala RN  Pressure Reduction Techniques:   frequent weight shift encouraged   weight shift assistance provided  Head  of Bed (HOB) Positioning: HOB at 30-45 degrees  Pressure Reduction Devices: positioning supports utilized  Skin Protection: adhesive use limited

## 2023-08-01 NOTE — PLAN OF CARE
Goal Outcome Evaluation:  Plan of Care Reviewed With: patient           Outcome Evaluation: Patient seen for clinical swallow assessment. Pt initially denied dysphagia and c/o difficulty breathing, but then reported dysphagia with liquids. Oral mech exam was unremarkable. No overt s/s of aspiration with puree, mixed, or regular solids. Pt with difficulty initiating a swallow with thins and c/o obstruction and/or backflow with thins only. SLP recs continuing current diet, will follow for VFSS to further assess.

## 2023-08-01 NOTE — PLAN OF CARE
Problem: Adult Inpatient Plan of Care  Goal: Absence of Hospital-Acquired Illness or Injury  Intervention: Prevent Skin Injury  Description: Perform a screening for skin injury risk, such as pressure or moisture associated skin damage on admission and at regular intervals throughout hospital stay.  Keep all areas of skin (especially folds) clean and dry.  Maintain adequate skin hydration.  Relieve and redistribute pressure and protect bony prominences; implement measures based on patient-specific risk factors.  Match turning and repositioning schedule to clinical condition.  Encourage weight shift frequently; assist with reposition if unable to complete independently.  Float heels off bed; avoid pressure on the Achilles tendon.  Keep skin free from extended contact with medical devices.  Encourage functional activity and mobility, as early as tolerated.  Use aids (e.g., slide boards, mechanical lift) during transfer.  Recent Flowsheet Documentation  Taken 8/1/2023 0420 by Russell Devine RN  Body Position:   position changed independently   neutral head position   neutral body alignment  Taken 8/1/2023 0020 by Russell Devine RN  Body Position: position changed independently  Skin Protection: adhesive use limited  Taken 7/31/2023 2248 by Russell Devine RN  Body Position: side-lying  Taken 7/31/2023 2055 by Russell Devine RN  Body Position: sitting up in bed  Skin Protection: adhesive use limited   Goal Outcome Evaluation:      Patient having some abdominal pain this shift, pain medication given. Patient able to have a bowel movement this shift. VSS, call light in reach.

## 2023-08-01 NOTE — NURSING NOTE
Access consulted and following d/t drugs.     Patient previously seen on 7/14/23 for drug use and resources provided at that time. Patient given [facility] resources including inpatient/outpatient and NA support group when seen in consult on 7/28/23 also.       Last COWS is 2. Continues to c/o abdominal pain and anxiety. Prn zofran and percocet given. Patient appeared asleep, resting comfortably when follow up completed this morning.     Access will continue to follow and encourage either inpatient or outpatient substance use disorder treatment.

## 2023-08-01 NOTE — THERAPY EVALUATION
Acute Care - Speech Language Pathology   Swallow Initial Evaluation [facility]     Patient Name: Belen Allen  : 1980  MRN: [id number]  Today's Date: 2023               Admit Date: 2023    Visit Dx:     ICD-10-CM ICD-9-CM   1. Acute congestive heart failure, unspecified heart failure type  I50.9 428.0   2. Hypoxia  R09.02 799.02     Patient Active Problem List   Diagnosis    Splenic infarct    Anxiety disorder    Functional asplenia    Generalized abdominal pain    Acute bilateral low back pain    Antiphospholipid antibody positive    On anticoagulant therapy    Acute pain of left knee    Vitamin D deficiency    Folate deficiency    Pain of back and left lower extremity    Common bile duct dilatation    Elevated LFTs    Right upper quadrant abdominal pain    Obesity (BMI 30-39.9)    Tobacco abuse    GERD without esophagitis    Intractable abdominal pain    Class 2 severe obesity with serious comorbidity in adult    Constipation    History of ERCP    Chronic pain syndrome    Seizures    Syncope    Lumbar back pain with radiculopathy affecting left lower extremity    Right upper quadrant pain    Uncontrolled pain    Intractable back pain    Sciatica of right side    Migraine    Mobility impaired    Seizure    Opioid dependence    Clostridioides difficile diarrhea    Pulmonary edema    Acute hypoxemic respiratory failure     Past Medical History:   Diagnosis Date    Abnormal Pap smear of cervix     Ankle fracture     H/O Elevated liver enzymes     History of chronic back pain     History of migraine     History of urinary tract infection     Injury of back     Opioid dependence 2023    PONV (postoperative nausea and vomiting)     Seasonal allergies     Seizure     Takes Keppra     Past Surgical History:   Procedure Laterality Date    BACK SURGERY  2006    fusion L4, L5      CHOLECYSTECTOMY  2014    DILATATION AND CURETTAGE  2009    ENDOSCOPY N/A 2022    Procedure:  ESOPHAGOGASTRODUODENOSCOPY with biopsy;  Surgeon: Christiana Mann MD;  [facility];  Service: Gastroenterology;  Laterality: N/A;  gastritis, duodenitis, irregular z line    ERCP N/A 6/3/2021    Procedure: ENDOSCOPIC RETROGRADE CHOLANGIOPANCREATOGRAPHY WITH SPHINCTEROTOMY, DILATION WITH BALLOON CLEARANCE  (12MM-15MM);  Surgeon: Bjorn Flannery MD;  Location: [facility] ENDOSCOPY;  Service: Gastroenterology;  Laterality: N/A;  DILATED COMMON BILE DUCT    SKIN SURGERY      TUBAL ABDOMINAL LIGATION  2013    WISDOM TOOTH EXTRACTION  2018    x2       SLP Recommendation and Plan  SLP Swallowing Diagnosis: R/O pharyngeal dysphagia (08/01/23 1300)  SLP Diet Recommendation: regular textures, thin liquids (08/01/23 1300)  Recommended Precautions and Strategies: upright posture during/after eating, small bites of food and sips of liquid (08/01/23 1300)  SLP Rec. for Method of Medication Administration: meds whole, as tolerated (08/01/23 1300)     Monitor for Signs of Aspiration: yes, notify SLP if any concerns (08/01/23 1300)  Recommended Diagnostics: reassess via VFSS ([facility]) (08/01/23 1300)     Anticipated Discharge Disposition (SLP): unknown (08/01/23 1300)     Therapy Frequency (Swallow): PRN (08/01/23 1300)  Predicted Duration Therapy Intervention (Days): until discharge (08/01/23 1300)  Oral Care Recommendations: Oral Care BID/PRN (08/01/23 1300)                                      Oral Care Recommendations: Oral Care BID/PRN (08/01/23 1300)    Plan of Care Reviewed With: patient  Outcome Evaluation: Patient seen for clinical swallow assessment. Pt initially denied dysphagia and c/o difficulty breathing, but then reported dysphagia with liquids. [facility]. No overt s/s of aspiration with puree, mixed, or regular solids. Pt with difficulty initiating a swallow with thins and c/o obstruction and/or backflow with thins only. SLP recs continuing current diet, will follow for VFSS to  further assess.      SWALLOW EVALUATION (last 72 hours)       SLP Adult Swallow Evaluation       Row Name 08/01/23 1300                   Rehab Evaluation    Document Type evaluation  -        Patient Effort fair  -           General Information    Patient Profile Reviewed yes  -        Pertinent History Of Current Problem Acute hypoxemic respiratory failure  -        Current Method of Nutrition regular textures;thin liquids  -        Precautions/Limitations, Vision WFL;for purposes of eval  -        Precautions/Limitations, Hearing WFL;for purposes of eval  -        Prior Level of Function-Communication WFL  -        Prior Level of Function-Swallowing no diet consistency restrictions  -        Plans/Goals Discussed with patient;agreed upon  -        Barriers to Rehab none identified  -           Pain    Additional Documentation Pain Scale: FACES Pre/Post-Treatment (Group)  -           Pain Scale: FACES Pre/Post-Treatment    Pain: FACES Scale, Pretreatment 10-->hurts worst  -        Posttreatment Pain Rating 10-->hurts worst  -        Pre/Posttreatment Pain Comment RN notified  -           Oral Motor Structure and Function    Dentition Assessment natural, present and adequate  -           Oral Musculature and Cranial Nerve Assessment    Oral Motor General Assessment WFL  -           Clinical Swallow Eval    Clinical Swallow Evaluation Summary Patient seen for clinical swallow assessment. Pt initially denied dysphagia and c/o difficulty breathing, but then reported dysphagia with liquids. Oral mech exam was unremarkable. No overt s/s of aspiration with puree, mixed, or regular solids. Pt with difficulty initiating a swallow with thins and c/o obstruction and/or backflow with thins only. SLP recs continuing current diet, will follow for VFSS to further assess.  -           SLP Evaluation Clinical Impression    SLP Swallowing Diagnosis R/O pharyngeal dysphagia  -            Recommendations    Therapy Frequency (Swallow) PRN  -        Predicted Duration Therapy Intervention (Days) until discharge  -        SLP Diet Recommendation regular textures;thin liquids  -        Recommended Diagnostics reassess via VFSS ([facility])  -        Recommended Precautions and Strategies upright posture during/after eating;small bites of food and sips of liquid  -        Oral Care Recommendations Oral Care BID/PRN  -        SLP Rec. for Method of Medication Administration meds whole;as tolerated  -        Monitor for Signs of Aspiration yes;notify SLP if any concerns  -        Anticipated Discharge Disposition (SLP) unknown  -                  User Key  (r) = Recorded By, (t) = Taken By, (c) = Cosigned By      Initials Name Effective Dates     Christiana Herbert MS CCC-SLP 06/16/21 -                     EDUCATION  The patient has been educated in the following areas:   Dysphagia (Swallowing Impairment).              Time Calculation:    Time Calculation- SLP       Row Name 08/01/23 1314             Time Calculation- [facility]    SLP Start Time 1000  -      SLP Received On 08/01/23  -         Untimed Charges    [id number] Eval Oral Pharyng Swallow Minutes 60  -         Total Minutes    Untimed Charges Total Minutes 60  -       Total Minutes 60  -                User Key  (r) = Recorded By, (t) = Taken By, (c) = Cosigned By      Initials Name Provider Type     Christiana Herbert MS CCC-SLP Speech and Language Pathologist                    Therapy Charges for Today       Code Description Service Date Service Provider Modifiers Qty    [id number]  ST EVAL ORAL PHARYNG SWALLOW 4 8/1/2023 Christiana Herbert MS CCC-SLP GN 1                 Christiana Herbert MS CCC-YANI  8/1/2023

## 2023-08-02 ENCOUNTER — APPOINTMENT (OUTPATIENT)
Dept: GENERAL RADIOLOGY | Facility: HOSPITAL | Age: 43
DRG: 292 | End: 2023-08-02
Payer: COMMERCIAL

## 2023-08-02 PROBLEM — R11.10 VOMITING: Status: ACTIVE | Noted: 2023-08-02

## 2023-08-02 PROBLEM — R10.9 ABDOMINAL PAIN: Status: ACTIVE | Noted: 2020-11-29

## 2023-08-02 LAB
ANION GAP SERPL CALCULATED.3IONS-SCNC: 7.7 MMOL/L (ref 5–15)
BUN SERPL-MCNC: 14 MG/DL (ref 6–20)
BUN/CREAT SERPL: 16.3 (ref 7–25)
CALCIUM SPEC-SCNC: 9.2 MG/DL (ref 8.6–10.5)
CHLORIDE SERPL-SCNC: 102 MMOL/L (ref 98–107)
CO2 SERPL-SCNC: 30.3 MMOL/L (ref 22–29)
CREAT SERPL-MCNC: 0.86 MG/DL (ref 0.57–1)
DEPRECATED RDW RBC AUTO: 42.9 FL (ref 37–54)
EGFRCR SERPLBLD CKD-EPI 2021: 87.2 ML/MIN/1.73
ERYTHROCYTE [DISTWIDTH] IN BLOOD BY AUTOMATED COUNT: 13.1 % (ref 12.3–15.4)
GLUCOSE SERPL-MCNC: 87 MG/DL (ref 65–99)
HCT VFR BLD AUTO: 35.1 % (ref 34–46.6)
HGB BLD-MCNC: 11.5 G/DL (ref 12–15.9)
MAGNESIUM SERPL-MCNC: 2.1 MG/DL (ref 1.6–2.6)
MCH RBC QN AUTO: 29.9 PG (ref 26.6–33)
MCHC RBC AUTO-ENTMCNC: 32.8 G/DL (ref 31.5–35.7)
MCV RBC AUTO: 91.4 FL (ref 79–97)
PHOSPHATE SERPL-MCNC: 4.7 MG/DL (ref 2.5–4.5)
PLATELET # BLD AUTO: 356 10*3/MM3 (ref 140–450)
PMV BLD AUTO: 10 FL (ref 6–12)
POTASSIUM SERPL-SCNC: 4.2 MMOL/L (ref 3.5–5.2)
RBC # BLD AUTO: 3.84 10*6/MM3 (ref 3.77–5.28)
SODIUM SERPL-SCNC: 140 MMOL/L (ref 136–145)
WBC NRBC COR # BLD: 5.02 10*3/MM3 (ref 3.4–10.8)

## 2023-08-02 PROCEDURE — 80048 BASIC METABOLIC PNL TOTAL CA: CPT | Performed by: STUDENT IN AN ORGANIZED HEALTH CARE EDUCATION/TRAINING PROGRAM

## 2023-08-02 PROCEDURE — 92611 MOTION FLUOROSCOPY/SWALLOW: CPT

## 2023-08-02 PROCEDURE — 83735 ASSAY OF MAGNESIUM: CPT | Performed by: STUDENT IN AN ORGANIZED HEALTH CARE EDUCATION/TRAINING PROGRAM

## 2023-08-02 PROCEDURE — 85027 COMPLETE CBC AUTOMATED: CPT | Performed by: STUDENT IN AN ORGANIZED HEALTH CARE EDUCATION/TRAINING PROGRAM

## 2023-08-02 PROCEDURE — 74018 RADEX ABDOMEN 1 VIEW: CPT

## 2023-08-02 PROCEDURE — 99231 SBSQ HOSP IP/OBS SF/LOW 25: CPT | Performed by: NURSE PRACTITIONER

## 2023-08-02 PROCEDURE — 84100 ASSAY OF PHOSPHORUS: CPT | Performed by: STUDENT IN AN ORGANIZED HEALTH CARE EDUCATION/TRAINING PROGRAM

## 2023-08-02 PROCEDURE — 74230 X-RAY XM SWLNG FUNCJ C+: CPT

## 2023-08-02 PROCEDURE — 63710000001 ONDANSETRON PER 8 MG: Performed by: INTERNAL MEDICINE

## 2023-08-02 RX ORDER — ALUMINA, MAGNESIA, AND SIMETHICONE 2400; 2400; 240 MG/30ML; MG/30ML; MG/30ML
15 SUSPENSION ORAL ONCE
Status: COMPLETED | OUTPATIENT
Start: 2023-08-02 | End: 2023-08-02

## 2023-08-02 RX ORDER — PANTOPRAZOLE SODIUM 40 MG/1
40 TABLET, DELAYED RELEASE ORAL
Status: DISCONTINUED | OUTPATIENT
Start: 2023-08-02 | End: 2023-08-03 | Stop reason: HOSPADM

## 2023-08-02 RX ORDER — KETOROLAC TROMETHAMINE 15 MG/ML
15 INJECTION, SOLUTION INTRAMUSCULAR; INTRAVENOUS EVERY 8 HOURS PRN
Status: DISCONTINUED | OUTPATIENT
Start: 2023-08-02 | End: 2023-08-02

## 2023-08-02 RX ORDER — FUROSEMIDE 40 MG/1
40 TABLET ORAL DAILY
Status: DISCONTINUED | OUTPATIENT
Start: 2023-08-02 | End: 2023-08-02

## 2023-08-02 RX ORDER — FUROSEMIDE 20 MG/1
20 TABLET ORAL DAILY
Status: DISCONTINUED | OUTPATIENT
Start: 2023-08-03 | End: 2023-08-03 | Stop reason: HOSPADM

## 2023-08-02 RX ORDER — HYDROXYZINE HYDROCHLORIDE 25 MG/1
25 TABLET, FILM COATED ORAL 3 TIMES DAILY PRN
Status: DISCONTINUED | OUTPATIENT
Start: 2023-08-02 | End: 2023-08-03 | Stop reason: HOSPADM

## 2023-08-02 RX ADMIN — DICLOFENAC SODIUM 2 G: 10 GEL TOPICAL at 14:00

## 2023-08-02 RX ADMIN — DICYCLOMINE HYDROCHLORIDE 20 MG: 10 CAPSULE ORAL at 12:15

## 2023-08-02 RX ADMIN — GABAPENTIN 800 MG: 400 CAPSULE ORAL at 22:10

## 2023-08-02 RX ADMIN — ACETAMINOPHEN 650 MG: 325 TABLET, FILM COATED ORAL at 12:15

## 2023-08-02 RX ADMIN — FUROSEMIDE 40 MG: 40 TABLET ORAL at 08:18

## 2023-08-02 RX ADMIN — Medication 400 MG: at 08:18

## 2023-08-02 RX ADMIN — OLANZAPINE 5 MG: 5 TABLET ORAL at 20:53

## 2023-08-02 RX ADMIN — OLANZAPINE 5 MG: 5 TABLET ORAL at 08:18

## 2023-08-02 RX ADMIN — FOLIC ACID 1 MG: 1 TABLET ORAL at 08:18

## 2023-08-02 RX ADMIN — LUBIPROSTONE 24 MCG: 24 CAPSULE, GELATIN COATED ORAL at 08:18

## 2023-08-02 RX ADMIN — SENNOSIDES AND DOCUSATE SODIUM 2 TABLET: 50; 8.6 TABLET ORAL at 08:18

## 2023-08-02 RX ADMIN — ACETAMINOPHEN 650 MG: 325 TABLET, FILM COATED ORAL at 18:51

## 2023-08-02 RX ADMIN — BUSPIRONE HYDROCHLORIDE 5 MG: 5 TABLET ORAL at 20:53

## 2023-08-02 RX ADMIN — DICLOFENAC SODIUM 2 G: 10 GEL TOPICAL at 20:54

## 2023-08-02 RX ADMIN — PANTOPRAZOLE SODIUM 40 MG: 40 TABLET, DELAYED RELEASE ORAL at 05:50

## 2023-08-02 RX ADMIN — Medication 3 MG: at 20:52

## 2023-08-02 RX ADMIN — LEVETIRACETAM 2000 MG: 500 TABLET, FILM COATED ORAL at 20:53

## 2023-08-02 RX ADMIN — LUBIPROSTONE 24 MCG: 24 CAPSULE, GELATIN COATED ORAL at 18:50

## 2023-08-02 RX ADMIN — NICOTINE 1 PATCH: 21 PATCH, EXTENDED RELEASE TRANSDERMAL at 08:19

## 2023-08-02 RX ADMIN — BUSPIRONE HYDROCHLORIDE 5 MG: 5 TABLET ORAL at 08:18

## 2023-08-02 RX ADMIN — BARIUM SULFATE 55 ML: 0.81 POWDER, FOR SUSPENSION ORAL at 09:05

## 2023-08-02 RX ADMIN — ONDANSETRON HYDROCHLORIDE 4 MG: 4 TABLET, FILM COATED ORAL at 08:18

## 2023-08-02 RX ADMIN — DICYCLOMINE HYDROCHLORIDE 20 MG: 10 CAPSULE ORAL at 22:09

## 2023-08-02 RX ADMIN — ACETAMINOPHEN 650 MG: 325 TABLET, FILM COATED ORAL at 22:10

## 2023-08-02 RX ADMIN — BARIUM SULFATE 1 TEASPOON(S): 0.6 CREAM ORAL at 09:05

## 2023-08-02 RX ADMIN — DICYCLOMINE HYDROCHLORIDE 20 MG: 10 CAPSULE ORAL at 18:50

## 2023-08-02 RX ADMIN — DICYCLOMINE HYDROCHLORIDE 20 MG: 10 CAPSULE ORAL at 08:18

## 2023-08-02 RX ADMIN — DICLOFENAC SODIUM 2 G: 10 GEL TOPICAL at 18:51

## 2023-08-02 RX ADMIN — GABAPENTIN 800 MG: 400 CAPSULE ORAL at 05:50

## 2023-08-02 RX ADMIN — TRAZODONE HYDROCHLORIDE 50 MG: 50 TABLET ORAL at 22:09

## 2023-08-02 RX ADMIN — LEVETIRACETAM 2000 MG: 500 TABLET, FILM COATED ORAL at 08:18

## 2023-08-02 RX ADMIN — BARIUM SULFATE 4 ML: 980 POWDER, FOR SUSPENSION ORAL at 09:05

## 2023-08-02 RX ADMIN — PANTOPRAZOLE SODIUM 40 MG: 40 TABLET, DELAYED RELEASE ORAL at 18:51

## 2023-08-02 RX ADMIN — FLUOXETINE HYDROCHLORIDE 60 MG: 20 CAPSULE ORAL at 20:53

## 2023-08-02 RX ADMIN — OXYCODONE HYDROCHLORIDE AND ACETAMINOPHEN 1 TABLET: 5; 325 TABLET ORAL at 05:50

## 2023-08-02 RX ADMIN — APIXABAN 5 MG: 5 TABLET, FILM COATED ORAL at 20:54

## 2023-08-02 RX ADMIN — APIXABAN 5 MG: 5 TABLET, FILM COATED ORAL at 08:18

## 2023-08-02 RX ADMIN — ALUMINUM HYDROXIDE, MAGNESIUM HYDROXIDE, AND DIMETHICONE 15 ML: 400; 400; 40 SUSPENSION ORAL at 14:00

## 2023-08-02 RX ADMIN — GABAPENTIN 800 MG: 400 CAPSULE ORAL at 14:00

## 2023-08-02 NOTE — NURSING NOTE
Report called to [facility], wheelchair transport placed for patient. Belongings gathered by patient and taken, RN sent stocked meds with transporter.

## 2023-08-02 NOTE — PLAN OF CARE
Goal Outcome Evaluation:                   Patient resting in bed, complains of nausea and AB. Pain after barium swallow this AM. MD ordered medications, KUB, and clear liquid diet.     Patient upset, wants to keep regular diet, wants to eat despite complaints, was educated on need for clears.     Complains of back pain, medical ointment applied per MD.     Patient has no need for telemetry or pulse ox per MD, ordered to remove and transfer patient to med surg.     Still awaiting KUB. Will continue to round on patient per hospital protocol and through transfer process.       Problem: Adult Inpatient Plan of Care  Goal: Plan of Care Review  Outcome: Ongoing, Progressing  Goal: Patient-Specific Goal (Individualized)  Outcome: Ongoing, Progressing  Goal: Absence of Hospital-Acquired Illness or Injury  Outcome: Ongoing, Progressing  Intervention: Identify and Manage Fall Risk  Recent Flowsheet Documentation  Taken 8/2/2023 1200 by Harman Alcala RN  Safety Promotion/Fall Prevention:   safety round/check completed   room organization consistent  Taken 8/2/2023 1000 by Harman Alcala RN  Safety Promotion/Fall Prevention:   safety round/check completed   room organization consistent  Taken 8/2/2023 0800 by Harman Alcala RN  Safety Promotion/Fall Prevention:   room organization consistent   safety round/check completed  Intervention: Prevent Skin Injury  Recent Flowsheet Documentation  Taken 8/2/2023 1400 by Harman Alcala RN  Body Position: position changed independently  Taken 8/2/2023 0800 by Harman Alcala RN  Body Position: position changed independently  Skin Protection: adhesive use limited  Intervention: Prevent and Manage VTE (Venous Thromboembolism) Risk  Recent Flowsheet Documentation  Taken 8/2/2023 1400 by Harman Alcala RN  Activity Management: up ad js  VTE Prevention/Management:   bilateral   patient refused intervention  Intervention: Prevent Infection  Recent Flowsheet Documentation  Taken 8/2/2023  1400 by Harman Alcala RN  Infection Prevention:   single patient room provided   rest/sleep promoted  Taken 8/2/2023 1200 by Harman Alcala RN  Infection Prevention: single patient room provided  Taken 8/2/2023 1000 by Harman Alcala RN  Infection Prevention:   single patient room provided   rest/sleep promoted  Taken 8/2/2023 0800 by Harman Alcala RN  Infection Prevention:   single patient room provided   rest/sleep promoted  Goal: Optimal Comfort and Wellbeing  Outcome: Ongoing, Progressing  Intervention: Monitor Pain and Promote Comfort  Recent Flowsheet Documentation  Taken 8/2/2023 1400 by Harman Alcala RN  Pain Management Interventions:   pillow support provided   see MAR  Taken 8/2/2023 0800 by Harman Alcala RN  Pain Management Interventions:   pillow support provided   position adjusted  Intervention: Provide Person-Centered Care  Recent Flowsheet Documentation  Taken 8/2/2023 1400 by Harman Alcala RN  Trust Relationship/Rapport:   care explained   questions encouraged  Taken 8/2/2023 0800 by Harman Alcala RN  Trust Relationship/Rapport:   care explained   questions encouraged  Goal: Readiness for Transition of Care  Outcome: Ongoing, Progressing     Problem: Seizure Disorder Comorbidity  Goal: Maintenance of Seizure Control  Outcome: Ongoing, Progressing     Problem: Breathing Pattern Ineffective  Goal: Effective Breathing Pattern  Outcome: Ongoing, Progressing  Intervention: Promote Improved Breathing Pattern  Recent Flowsheet Documentation  Taken 8/2/2023 1400 by Harman Alcala RN  Head of Bed (HOB) Positioning: HOB at 30-45 degrees  Taken 8/2/2023 0800 by Harman Alcala RN  Head of Bed (HOB) Positioning: HOB at 20-30 degrees     Problem: Skin Injury Risk Increased  Goal: Skin Health and Integrity  Outcome: Ongoing, Progressing  Intervention: Optimize Skin Protection  Recent Flowsheet Documentation  Taken 8/2/2023 1400 by Harman Alcala RN  Head of Bed (HOB) Positioning: HOB at 30-45  degrees  Taken 8/2/2023 0800 by Harman Alcala RN  Pressure Reduction Techniques: frequent weight shift encouraged  Head of Bed (HOB) Positioning: HOB at 20-30 degrees  Pressure Reduction Devices: positioning supports utilized  Skin Protection: adhesive use limited

## 2023-08-02 NOTE — CASE MANAGEMENT/SOCIAL WORK
Continued Stay Note  [facility]     Patient Name: Belen Allen  MRN: [id number]  Today's Date: 8/2/2023    Admit Date: 7/27/2023    Plan: Home with significant other   Discharge Plan       Row Name 08/02/23 1500       Plan    Plan Home with significant other    Plan Comments S/W pt at bedside who confirms plan to return home upon DC.  She denies any DC needs at this time.  CCP will continue to follow. .........Vanita BERMAN/ CCP                   Discharge Codes    No documentation.                 Expected Discharge Date and Time       Expected Discharge Date Expected Discharge Time    Aug 2, 2023               Vanita Dawn RN

## 2023-08-02 NOTE — PROGRESS NOTES
"    Patient Name: Belen Allen  :1980  42 y.o.      Patient Care Team:  Sahil Cornelius MD as PCP - General (Internal Medicine)  Code, Bjorn HERRERA II, MD as Consulting Physician (Hematology and Oncology)  Dennys Carranza MD as Referring Physician (Hospitalist)    Chief Complaint: follow up pulmonary edema    Interval History:  doesn't feel well this morning. Vomiting.     Objective   Vital Signs  Temp:  [98 øF (36.7 øC)-98.8 øF (37.1 øC)] 98.8 øF (37.1 øC)  Heart Rate:  [84-90] 90  Resp:  [18-20] 18  BP: ()/(51-64) 104/51    Intake/Output Summary (Last 24 hours) at 2023 0821  Last data filed at 2023 1400  Gross per 24 hour   Intake 240 ml   Output --   Net 240 ml     Flowsheet Rows      Flowsheet Row First Filed Value   Admission Height 162.6 cm (64\") Documented at 2023 1539   Admission Weight 90.7 kg (200 lb) Documented at 2023 1539            Physical Exam:   General Appearance:    Alert, cooperative, in no acute distress   Lungs:     Clear to auscultation.  Normal respiratory effort and rate.      Heart:    Regular rhythm and normal rate, normal S1 and S2, no murmurs, gallops or rubs.     Chest Wall:    No abnormalities observed   Abdomen:     Soft, nontender, positive bowel sounds.     Extremities:   no cyanosis, clubbing or edema.  No marked joint deformities.  Adequate musculoskeletal strength.       Results Review:    Results from last 7 days   Lab Units 23  0703   SODIUM mmol/L 140   POTASSIUM mmol/L 4.2   CHLORIDE mmol/L 102   CO2 mmol/L 30.3*   BUN mg/dL 14   CREATININE mg/dL 0.86   GLUCOSE mg/dL 87   CALCIUM mg/dL 9.2     Results from last 7 days   Lab Units 23  1217 23  1029 23  0053   HSTROP T ng/L 18* 17* 44*     Results from last 7 days   Lab Units 23  0703   WBC 10*3/mm3 5.02   HEMOGLOBIN g/dL 11.5*   HEMATOCRIT % 35.1   PLATELETS 10*3/mm3 356         Results from last 7 days   Lab Units 23  0703   MAGNESIUM mg/dL 2.1 "                   Medication Review:   apixaban, 5 mg, Oral, Q12H  busPIRone, 5 mg, Oral, BID  dicyclomine, 20 mg, Oral, 4x Daily  FLUoxetine, 60 mg, Oral, Nightly  folic acid, 1 mg, Oral, Daily  furosemide, 40 mg, Oral, Daily  gabapentin, 800 mg, Oral, Q8H  levETIRAcetam, 2,000 mg, Oral, Q12H  lubiprostone, 24 mcg, Oral, BID With Meals  nicotine, 1 patch, Transdermal, Q24H  OLANZapine, 5 mg, Oral, BID  pantoprazole, 40 mg, Oral, Q AM  polyethylene glycol, 17 g, Oral, Daily  senna-docusate sodium, 2 tablet, Oral, BID  Vitamin B-2, 400 mg, Oral, Daily              Assessment & Plan   Acute hypoxic respiratory failure secondary to pulmonary edema  Acute diastolic CHF  Abdominal pain, constipation  Pleuritic chest pain - still present today  Antiphospholipid antibody syndrome with prior splenic infarct. On apixaban.   Chronic opoid use, non since 7/23/23  Sinus tachycardia, resolved  Tobacco use, nicotine patch in place  Mildly elevated high sensitivity troponin , nonspecific. No ACS/MI  Hypokalemia     - improving. Tolerating oral diuretics. Will reduce further to 20 mg daily and plan to continue this until seen in office in a few weeks to reassess   - nausea/vomiting per internal medicine.   - nothing further to add from cardiac standpoint. Will see as needed.     NELSON Zaragoza  [facility]  08/02/23  08:21 EDT

## 2023-08-02 NOTE — MBS/VFSS/FEES
Acute Care - Speech Language Pathology   Swallow Initial Evaluation & Discharge [facility]     Patient Name: Belen Allen  : 1980  MRN: [id number]  Today's Date: 2023               Admit Date: 2023    Visit Dx:     ICD-10-CM ICD-9-CM   1. Acute congestive heart failure, unspecified heart failure type  I50.9 428.0   2. Hypoxia  R09.02 799.02     Patient Active Problem List   Diagnosis    Splenic infarct    Anxiety disorder    Functional asplenia    Generalized abdominal pain    Acute bilateral low back pain    Antiphospholipid antibody positive    On anticoagulant therapy    Acute pain of left knee    Vitamin D deficiency    Folate deficiency    Pain of back and left lower extremity    Common bile duct dilatation    Elevated LFTs    Right upper quadrant abdominal pain    Obesity (BMI 30-39.9)    Tobacco abuse    GERD without esophagitis    Intractable abdominal pain    Class 2 severe obesity with serious comorbidity in adult    Constipation    History of ERCP    Chronic pain syndrome    Seizures    Syncope    Lumbar back pain with radiculopathy affecting left lower extremity    Right upper quadrant pain    Uncontrolled pain    Intractable back pain    Sciatica of right side    Migraine    Mobility impaired    Seizure    Opioid dependence    Clostridioides difficile diarrhea    Pulmonary edema    Acute hypoxemic respiratory failure    Other dysphagia     Past Medical History:   Diagnosis Date    Abnormal Pap smear of cervix     Ankle fracture     H/O Elevated liver enzymes     History of chronic back pain     History of migraine     History of urinary tract infection     Injury of back     Opioid dependence 2023    PONV (postoperative nausea and vomiting)     Seasonal allergies     Seizure     Takes Keppra     Past Surgical History:   Procedure Laterality Date    BACK SURGERY  2006    fusion L4, L5      CHOLECYSTECTOMY      DILATATION AND CURETTAGE  2009    ENDOSCOPY N/A  11/27/2022    Procedure: ESOPHAGOGASTRODUODENOSCOPY with biopsy;  Surgeon: Christiana Mann MD;  Location: [facility] ENDOSCOPY;  Service: Gastroenterology;  Laterality: N/A;  gastritis, duodenitis, irregular z line    ERCP N/A 6/3/2021    Procedure: ENDOSCOPIC RETROGRADE CHOLANGIOPANCREATOGRAPHY WITH SPHINCTEROTOMY, DILATION WITH BALLOON CLEARANCE  (12MM-15MM);  Surgeon: Bjorn Flannery MD;  Location: [facility] ENDOSCOPY;  Service: Gastroenterology;  Laterality: N/A;  DILATED COMMON BILE DUCT    SKIN SURGERY      TUBAL ABDOMINAL LIGATION  2013    WISDOM TOOTH EXTRACTION  2018    x2       SLP Recommendation and Plan  SLP Swallowing Diagnosis: swallow WFL/no suspected pharyngeal impairment (08/02/23 0900)  SLP Diet Recommendation: regular textures, thin liquids (08/02/23 0900)  Recommended Precautions and Strategies: upright posture during/after eating (08/02/23 0900)  SLP Rec. for Method of Medication Administration: meds whole, as tolerated (08/02/23 0900)     Monitor for Signs of Aspiration: yes, notify SLP if any concerns (08/02/23 0900)  Recommended Diagnostics: reass[place] [place] (08/01/23 1300)     Anticipated Discharge Disposition (SLP): unknown (08/02/23 0900)     Therapy Frequency (Swallow): evaluation only (08/02/23 0900)  Predicted Duration Therapy Intervention (Days): until discharge (08/01/23 1300)  Oral Care Recommendations: Oral Care BID/PRN (08/02/23 0900)                                      Oral Care Recommendations: Oral Care BID/PRN (08/02/23 0900)    Plan of Care Reviewed With: patient  Outcome Evaluation: VFSS completed. Pt exhibited a functional swallow. SLP to sign off.      SWALLOW EVALUATION (last 72 hours)       SLP Adult Swallow Evaluation       Row Name 08/02/23 0900 08/01/23 1300                Rehab Evaluation    Document Type evaluation  -SH evaluation  -SH       Patient Effort adequate  -SH fair  -SH          General Information    Patient Profile Reviewed yes  -SH yes  -SH        Pertinent History Of Current Problem Resp failure  - Acute hypoxemic respiratory failure  -       Current Method of Nutrition regular textures;thin liquids  - regular textures;thin liquids  -       Precautions/Limitations, Vision WFL;for purposes of eval  -SH WFL;for purposes of eval  -       Precautions/Limitations, Hearing WFL;for purposes of eval  -SH WFL;for purposes of eval  -       Prior Level of Function-Communication WFL  - WFL  -       Prior Level of Function-Swallowing no diet consistency restrictions  - no diet consistency restrictions  -       Plans/Goals Discussed with patient;agreed upon  - patient;agreed upon  -       Barriers to Rehab none identified  - none identified  -          Pain    Additional Documentation Pain Scale: FACES Pre/Post-Treatment (Group)  - Pain Scale: FACES Pre/Post-Treatment (Group)  -          Pain Scale: FACES Pre/Post-Treatment    Pain: FACES Scale, Pretreatment 2-->hurts little bit  - 10-->hurts worst  -       Posttreatment Pain Rating -- 10-->hurts worst  -       Pre/Posttreatment Pain Comment -- RN notified  -          Oral Motor Structure and Function    Dentition Assessment -- natural, present and adequate  -          Oral Musculature and Cranial Nerve Assessment    Oral Motor General Assessment -- WFL  -          Clinical Swallow Eval    Clinical Swallow Evaluation Summary -- Patient seen for clinical swallow assessment. Pt initially denied dysphagia and c/o difficulty breathing, but then reported dysphagia with liquids. Oral mech exam was unremarkable. No overt s/s of aspiration with puree, mixed, or regular solids. Pt with difficulty initiating a swallow with thins and c/o obstruction and/or backflow with thins only. SLP recs continuing current diet, will follow for VFSS to further assess.  -          MBS/VFSS Interpretation    VFSS Summary Patient presents a functional swallow. Slow oral transit and piece meal swallow  demonstrated with solids. Slight spill to the pyriforms before the swallow intermittently with thins. Transient, shallow penetration noted at the end of the eval with thins via straw. Patient was observed to raise shoulders and appeared tense/tight before the swallow. Lewis swallow initiation with puree once bolus propelled. Munching mastication with mech soft and regular solids. Pt reported requiring extended time for bolus preparation. Spill to the pyriforms before the swallow with mech soft. Transient, shallow penetration during the swallow. Again, piece meal swallow demonstrated with regular solids. No pharyngeal residue post swallow.  - --          SLP Communication to Radiology    Summary Statement Radiologist present: Dr. Romero. Functional swallow demonstrated. No aspiration visualized across consistencies.  - --          SLP Evaluation Clinical Impression    SLP Swallowing Diagnosis swallow WFL/no suspected pharyngeal impairment  - R/O pharyngeal dysphagia  -          Recommendations    Therapy Frequency (Swallow) evaluation only  - PRN  -       Predicted Duration Therapy Intervention (Days) -- until discharge  -       SLP Diet Recommendation regular textures;thin liquids  - regular textures;thin liquids  -       Recommended Diagnostics -- reassess via VFSS (MBS)  -       Recommended Precautions and Strategies upright posture during/after eating  - upright posture during/after eating;small bites of food and sips of liquid  -       Oral Care Recommendations Oral Care BID/PRN  - Oral Care BID/PRN  -       SLP Rec. for Method of Medication Administration meds whole;as tolerated  - meds whole;as tolerated  -       Monitor for Signs of Aspiration yes;notify SLP if any concerns  - yes;notify SLP if any concerns  -       Anticipated Discharge Disposition (SLP) unknown  - unknown  -                 User Key  (r) = Recorded By, (t) = Taken By, (c) = Cosigned By      Initials  Name Effective Dates     Christiana HerbertMS SHRESTHA-SLP 06/16/21 -                     EDUCATION  The patient has been educated in the following areas:   Dysphagia (Swallowing Impairment).              Time Calculation:    Time Calculation- SLP       Row Name 08/02/23 0922             Time Calculation- SLP    SLP Start Time 0815  -      SLP Received On 08/02/23  -         Untimed Charges    35766-SS Motion Fluoro Eval Swallow Minutes 60  -SH         Total Minutes    Untimed Charges Total Minutes 60  -       Total Minutes 60  -SH                User Key  (r) = Recorded By, (t) = Taken By, (c) = Cosigned By      Initials Name Provider Type     Christiana Herbert MS AIDA CCC-SLP Speech and Language Pathologist                    Therapy Charges for Today       Code Description Service Date Service Provider Modifiers Qty    19531204800 HC ST EVAL ORAL PHARYNG SWALLOW 4 8/1/2023 Christiana Herbert MS CCC-SLP GN 1    [id number] HC ST MOTION FLUORO EVAL SWALLOW 4 8/2/2023 Christiana Herbert MS CCC-SLP GN 1                 Christiana Herbert MS CCC-YANI  8/2/2023

## 2023-08-02 NOTE — PLAN OF CARE
Problem: Adult Inpatient Plan of Care  Goal: Optimal Comfort and Wellbeing  Intervention: Monitor Pain and Promote Comfort  Recent Flowsheet Documentation  Taken 8/1/2023 2030 by Natalie Maravilla RN  Pain Management Interventions:   breathing exercises   diversional activity provided   care clustered   pain management plan reviewed with patient/caregiver  Intervention: Provide Person-Centered Care  Recent Flowsheet Documentation  Taken 8/2/2023 0000 by Natalie Maravilla RN  Trust Relationship/Rapport:   care explained   choices provided  Taken 8/1/2023 2030 by Natalie Maravilla RN  Trust Relationship/Rapport:   care explained   choices provided   Goal Outcome Evaluation:      Patient is alert and oriented x4. Patient ambulated in room and was stable on her feet. Patient had occasional low back pain, medication administered. Vital signs are stable, call light within her reach and attended accordingly.

## 2023-08-02 NOTE — PROGRESS NOTES
Name: Belen Allen ADMIT: 2023   : 1980  PCP: Sahil Cornelius MD    MRN: [id number] LOS: 6 days   AGE/SEX: 42 y.o. female  ROOM: [facility]     Subjective   Subjective   Patient sitting in bed endorsing abdominal pain and nausea this morning.  States that she has been vomiting and unable to tolerate any oral intake.  Swallow study was completed without difficulty.  Also endorsing some back pain.    Objective   Objective   Vital Signs  Temp:  [98.6 øF (37 øC)-98.8 øF (37.1 øC)] 98.6 øF (37 øC)  Heart Rate:  [73-90] 73  Resp:  [18-20] 20  BP: ()/(51-70) 113/70  SpO2:  [95 %-98 %] 98 %  on   ;   Device (Oxygen Therapy): room air  Body mass index is 37.18 kg/mý.    Physical Exam  General: Lying in bed, no acute distress.  Cooperative with exam.  ENT: No conjunctival injection or scleral icterus. Moist mucous membranes. No JVD.  Nasal cannula no longer in place.  Neuro: Eyes open and moving in all directions, strength normal in all extremities, no focal deficits.   Lungs: Clear to auscultation bilaterally. No wheeze or crackles. No distress.   Heart: RRR, no murmurs. No edema.  Abdomen: Soft, nondistended, normal bowel sounds.  Mild diffuse tenderness to palpation that improves with distractibility, no rebound or guarding.  Ext: Warm and well-perfused. No edema.   Skin: No rashes or lesions. IV site without swelling or erythema.     Results Review     I reviewed the patient's new clinical results.  Results from last 7 days   Lab Units 23  0703 23  0624 23  0549 23  0734   WBC 10*3/mm3 5.02 6.43 5.96 7.13   HEMOGLOBIN g/dL 11.5* 12.1 11.8* 12.0   PLATELETS 10*3/mm3 356 330 329 310     Results from last 7 days   Lab Units 23  0703 23  0624 23  0549 23  0734   SODIUM mmol/L 140 140 139 138   POTASSIUM mmol/L 4.2 3.9 3.7 3.7   CHLORIDE mmol/L 102 103 98 93*   CO2 mmol/L 30.3* 29.0 32.0* 36.2*   BUN mg/dL 14 13 12 12   CREATININE mg/dL 0.86 0.73  0.77 0.86   GLUCOSE mg/dL 87 80 92 106*   EGFR mL/min/1.73 87.2 106.1 99.5 87.2     Results from last 7 days   Lab Units 07/27/23  1612   ALBUMIN g/dL 3.2*   BILIRUBIN mg/dL 0.6   ALK PHOS U/L 161*   AST (SGOT) U/L 51*   ALT (SGPT) U/L 84*     Results from last 7 days   Lab Units 08/02/23  0703 08/01/23  0624 07/31/23  0549 07/30/23  0734 07/28/23  0452 07/27/23  1612   CALCIUM mg/dL 9.2 8.8 9.1 9.2   < > 8.7   ALBUMIN g/dL  --   --   --   --   --  3.2*   MAGNESIUM mg/dL 2.1 2.3 2.1 1.7  --   --    PHOSPHORUS mg/dL 4.7* 4.0 3.8 3.8  --   --     < > = values in this interval not displayed.       No results found for: HGBA1C, POCGLU    No radiology results for the last day    I have personally reviewed all medications:  Scheduled Medications  aluminum-magnesium hydroxide-simethicone, 15 mL, Oral, Once  apixaban, 5 mg, Oral, Q12H  busPIRone, 5 mg, Oral, BID  Diclofenac Sodium, 2 g, Topical, 4x Daily  dicyclomine, 20 mg, Oral, 4x Daily  FLUoxetine, 60 mg, Oral, Nightly  folic acid, 1 mg, Oral, Daily  [START ON 8/3/2023] furosemide, 20 mg, Oral, Daily  gabapentin, 800 mg, Oral, Q8H  levETIRAcetam, 2,000 mg, Oral, Q12H  lubiprostone, 24 mcg, Oral, BID With Meals  nicotine, 1 patch, Transdermal, Q24H  OLANZapine, 5 mg, Oral, BID  pantoprazole, 40 mg, Oral, Q AM  polyethylene glycol, 17 g, Oral, Daily  senna-docusate sodium, 2 tablet, Oral, BID  Vitamin B-2, 400 mg, Oral, Daily    Infusions   Diet  Diet: Cardiac Diets; Healthy Heart (2-3 Na+); Texture: Regular Texture (IDDSI 7); Fluid Consistency: Thin (IDDSI 0)    I have personally reviewed:  [x]  Laboratory   []  Microbiology   [x]  Radiology   []  EKG/Telemetry  []  Cardiology/Vascular   []  Pathology    []  Records       Assessment/Plan     Active Hospital Problems    Diagnosis  POA    **Pulmonary edema [J81.1]  Yes    Vomiting [R11.10]  Unknown    Other dysphagia [R13.19]  Unknown    Acute hypoxemic respiratory failure [J96.01]  Unknown    Tobacco abuse [Z72.0]  Yes     GERD without esophagitis [K21.9]  Yes    Antiphospholipid antibody positive [R76.0]  Yes    Abdominal pain [R10.9]  Yes    Anxiety disorder [F41.9]  Yes    Obesity (BMI 30-39.9) [E66.9]  Yes      Resolved Hospital Problems   No resolved problems to display.     42 y.o. female admitted with Pulmonary edema.    Assessment and Plan    Acute hypoxemic respiratory failure, pulmonary edema on initial CXR, cards consulted; echo done, normal systolic and diastolic fcn. Per cardiology note, possibly transient diastolic dysfunction given her improvement with diuretics.  Lasix has now been changed to once daily.  Cardiology plans to decrease further to 20 mg once daily.  This dose should be continued until follow-up with outpatient cardiology in a few weeks.    Abdominal pain, vomiting- Patient with new onset abdominal pain, nausea, vomiting.  Exam is overall very reassuring.  Will obtain KUB to rule out obstruction.  Will also trial a dose of Maalox, as she states she is having some reflux symptoms.  If continues, will make NPO and start IV fluids.    Constipation-on MiraLAX, Nancy-Colace, Dulcolax; will discharge with bowel regimen.    Dysphagia, Subjective difficulty swallowing and chest tightness with numerous consistencies, speech therapy consulted and following, video swallow study done this morning and normal, SLP signed off.    Right lower extremity pain, right lower extremity calf tenderness with subjective edema, duplex of lower extremities without DVT, will continue to monitor    Hypokalemia, replacement of electrolytes as needed.    Chronic opioid use, will discontinue Percocet today.  Patient endorsed she had been getting them from friends.  Will try conservative measures with heat therapy and topical diclofenac gel.  Will also place an hydroxyzine as needed.    Obesity, BMI noted to be close to 35, she would benefit from weight loss.    Tobacco abuse disorder, nicotine patch.    History of antiphospholipid  antibody syndrome, splenic infarct, patient is on anticoagulation.    Christiana Casper MD  [facility]  08/02/23  13:08 EDT

## 2023-08-02 NOTE — PLAN OF CARE
Goal Outcome Evaluation:  Plan of Care Reviewed With: patient           Outcome Evaluation: VFSS completed. Pt exhibited a functional swallow. SLP to sign off.

## 2023-08-02 NOTE — NURSING NOTE
Access center follow up regarding drug use: chart reviewed. Pt progressing towards goal. Pt has been provided with [facility] resources. No changes overnight, no needs or concerns reported at this time. Will follow.

## 2023-08-03 ENCOUNTER — READMISSION MANAGEMENT (OUTPATIENT)
Dept: CALL CENTER | Facility: HOSPITAL | Age: 43
End: 2023-08-03
Payer: COMMERCIAL

## 2023-08-03 VITALS
BODY MASS INDEX: 36.92 KG/M2 | SYSTOLIC BLOOD PRESSURE: 109 MMHG | WEIGHT: 216.27 LBS | HEIGHT: 64 IN | DIASTOLIC BLOOD PRESSURE: 71 MMHG | TEMPERATURE: 97.5 F | OXYGEN SATURATION: 99 % | HEART RATE: 77 BPM | RESPIRATION RATE: 18 BRPM

## 2023-08-03 LAB
ANION GAP SERPL CALCULATED.3IONS-SCNC: 7 MMOL/L (ref 5–15)
BUN SERPL-MCNC: 9 MG/DL (ref 6–20)
BUN/CREAT SERPL: 13.2 (ref 7–25)
CALCIUM SPEC-SCNC: 9.1 MG/DL (ref 8.6–10.5)
CHLORIDE SERPL-SCNC: 105 MMOL/L (ref 98–107)
CO2 SERPL-SCNC: 29 MMOL/L (ref 22–29)
CREAT SERPL-MCNC: 0.68 MG/DL (ref 0.57–1)
DEPRECATED RDW RBC AUTO: 44.4 FL (ref 37–54)
EGFRCR SERPLBLD CKD-EPI 2021: 112.4 ML/MIN/1.73
ERYTHROCYTE [DISTWIDTH] IN BLOOD BY AUTOMATED COUNT: 13.4 % (ref 12.3–15.4)
GLUCOSE SERPL-MCNC: 123 MG/DL (ref 65–99)
HCT VFR BLD AUTO: 35.3 % (ref 34–46.6)
HGB BLD-MCNC: 11.8 G/DL (ref 12–15.9)
MAGNESIUM SERPL-MCNC: 2.5 MG/DL (ref 1.6–2.6)
MCH RBC QN AUTO: 30.9 PG (ref 26.6–33)
MCHC RBC AUTO-ENTMCNC: 33.4 G/DL (ref 31.5–35.7)
MCV RBC AUTO: 92.4 FL (ref 79–97)
PHOSPHATE SERPL-MCNC: 4.1 MG/DL (ref 2.5–4.5)
PLATELET # BLD AUTO: 340 10*3/MM3 (ref 140–450)
PMV BLD AUTO: 10.1 FL (ref 6–12)
POTASSIUM SERPL-SCNC: 4.1 MMOL/L (ref 3.5–5.2)
RBC # BLD AUTO: 3.82 10*6/MM3 (ref 3.77–5.28)
SODIUM SERPL-SCNC: 141 MMOL/L (ref 136–145)
WBC NRBC COR # BLD: 5.23 10*3/MM3 (ref 3.4–10.8)

## 2023-08-03 PROCEDURE — 85027 COMPLETE CBC AUTOMATED: CPT | Performed by: STUDENT IN AN ORGANIZED HEALTH CARE EDUCATION/TRAINING PROGRAM

## 2023-08-03 PROCEDURE — 63710000001 ONDANSETRON PER 8 MG: Performed by: INTERNAL MEDICINE

## 2023-08-03 PROCEDURE — 80048 BASIC METABOLIC PNL TOTAL CA: CPT | Performed by: STUDENT IN AN ORGANIZED HEALTH CARE EDUCATION/TRAINING PROGRAM

## 2023-08-03 PROCEDURE — 83735 ASSAY OF MAGNESIUM: CPT | Performed by: STUDENT IN AN ORGANIZED HEALTH CARE EDUCATION/TRAINING PROGRAM

## 2023-08-03 PROCEDURE — 84100 ASSAY OF PHOSPHORUS: CPT | Performed by: STUDENT IN AN ORGANIZED HEALTH CARE EDUCATION/TRAINING PROGRAM

## 2023-08-03 RX ORDER — GABAPENTIN 800 MG/1
800 TABLET ORAL 3 TIMES DAILY
Qty: 9 TABLET | Refills: 0 | Status: SHIPPED | OUTPATIENT
Start: 2023-08-03 | End: 2023-08-06

## 2023-08-03 RX ORDER — PANTOPRAZOLE SODIUM 40 MG/1
40 TABLET, DELAYED RELEASE ORAL
Qty: 28 TABLET | Refills: 0 | Status: SHIPPED | OUTPATIENT
Start: 2023-08-03 | End: 2023-08-17

## 2023-08-03 RX ORDER — ONDANSETRON 4 MG/1
4 TABLET, ORALLY DISINTEGRATING ORAL EVERY 8 HOURS PRN
Qty: 10 TABLET | Refills: 0 | Status: SHIPPED | OUTPATIENT
Start: 2023-08-03

## 2023-08-03 RX ORDER — GABAPENTIN 400 MG/1
800 CAPSULE ORAL EVERY 8 HOURS SCHEDULED
Qty: 18 CAPSULE | Refills: 0 | Status: SHIPPED | OUTPATIENT
Start: 2023-08-03 | End: 2023-08-03 | Stop reason: HOSPADM

## 2023-08-03 RX ORDER — FUROSEMIDE 20 MG/1
20 TABLET ORAL DAILY
Qty: 30 TABLET | Refills: 0 | Status: SHIPPED | OUTPATIENT
Start: 2023-08-04 | End: 2023-09-03

## 2023-08-03 RX ORDER — POLYETHYLENE GLYCOL 3350 17 G/17G
17 POWDER, FOR SOLUTION ORAL DAILY
Qty: 510 G | Refills: 0 | Status: SHIPPED | OUTPATIENT
Start: 2023-08-04 | End: 2023-09-03

## 2023-08-03 RX ORDER — LUBIPROSTONE 24 UG/1
24 CAPSULE ORAL 2 TIMES DAILY WITH MEALS
Qty: 60 CAPSULE | Refills: 0 | Status: SHIPPED | OUTPATIENT
Start: 2023-08-03 | End: 2023-09-02

## 2023-08-03 RX ADMIN — LEVETIRACETAM 2000 MG: 500 TABLET, FILM COATED ORAL at 09:12

## 2023-08-03 RX ADMIN — ACETAMINOPHEN 650 MG: 325 TABLET, FILM COATED ORAL at 05:17

## 2023-08-03 RX ADMIN — APIXABAN 5 MG: 5 TABLET, FILM COATED ORAL at 09:13

## 2023-08-03 RX ADMIN — Medication 400 MG: at 09:12

## 2023-08-03 RX ADMIN — GABAPENTIN 800 MG: 400 CAPSULE ORAL at 05:17

## 2023-08-03 RX ADMIN — OLANZAPINE 5 MG: 5 TABLET ORAL at 09:12

## 2023-08-03 RX ADMIN — FOLIC ACID 1 MG: 1 TABLET ORAL at 09:13

## 2023-08-03 RX ADMIN — LUBIPROSTONE 24 MCG: 24 CAPSULE, GELATIN COATED ORAL at 09:14

## 2023-08-03 RX ADMIN — DICYCLOMINE HYDROCHLORIDE 20 MG: 10 CAPSULE ORAL at 09:12

## 2023-08-03 RX ADMIN — DICYCLOMINE HYDROCHLORIDE 20 MG: 10 CAPSULE ORAL at 12:13

## 2023-08-03 RX ADMIN — DICLOFENAC SODIUM 2 G: 10 GEL TOPICAL at 12:13

## 2023-08-03 RX ADMIN — ONDANSETRON HYDROCHLORIDE 4 MG: 4 TABLET, FILM COATED ORAL at 10:20

## 2023-08-03 RX ADMIN — BUSPIRONE HYDROCHLORIDE 5 MG: 5 TABLET ORAL at 09:12

## 2023-08-03 RX ADMIN — FUROSEMIDE 20 MG: 20 TABLET ORAL at 09:13

## 2023-08-03 RX ADMIN — DICLOFENAC SODIUM 2 G: 10 GEL TOPICAL at 09:13

## 2023-08-03 RX ADMIN — NICOTINE 1 PATCH: 21 PATCH, EXTENDED RELEASE TRANSDERMAL at 09:17

## 2023-08-03 RX ADMIN — PANTOPRAZOLE SODIUM 40 MG: 40 TABLET, DELAYED RELEASE ORAL at 06:37

## 2023-08-03 NOTE — DISCHARGE SUMMARY
Patient Name: Belen Allen  : 1980  MRN: [id number]    Date of Admission: 2023  Date of Discharge:  8/3/2023  Primary Care Physician: Sahil Cornelius MD      Chief Complaint:   Nausea, Vomiting, and Weakness - Generalized      Discharge Diagnoses     Active Hospital Problems    Diagnosis  POA    **Pulmonary edema [J81.1]  Yes    Vomiting [R11.10]  Unknown    Other dysphagia [R13.19]  Unknown    Acute hypoxemic respiratory failure [J96.01]  Unknown    Tobacco abuse [Z72.0]  Yes    GERD without esophagitis [K21.9]  Yes    Antiphospholipid antibody positive [R76.0]  Yes    Abdominal pain [R10.9]  Yes    Anxiety disorder [F41.9]  Yes    Obesity (BMI 30-39.9) [E66.9]  Yes      Resolved Hospital Problems   No resolved problems to display.        Hospital Course     Ms. Allen is a 42 y.o. female with a history of chronic back pain, migraine, opioid dependence, seizures, unspecified psychiatric disorder who presented to [facility] initially complaining of abdominal pain, nausea, and vomiting.  Please see the admitting history and physical for further details.  She was found to have CXR with evidence of pulmonary edema, and concern for possible new onset heart failure.  Also had an elevated BNP.  And was admitted to the hospital for further evaluation and treatment.     Acute hypoxemic respiratory failure, pulmonary edema  Initial chest x-ray with evidence of pulmonary edema.  Patient required oxygen via nasal cannula initially, but this was weaned prior to discharge.  Was found to have a mildly elevated high sensitive troponin, though EKG did not reveal any ischemic changes. a CT angiogram of the chest was completed with no evidence of pulmonary embolism.  An echo was completed on .  Systolic function was normal with an EF of 60%.  No evidence of diastolic dysfunction.  The patient was started on IV Lasix and cardiology was consulted.  She had significant clinical  improvement with Lasix, and it was thought she possibly had a temporary diastolic dysfunction leading to her pulmonary edema.  The Lasix was weaned each day.  Electrolytes were replaced as needed and monitored daily.  She was stable on 20 mg Lasix oral 1 time daily at time of discharge.  Cardiology has plans to follow-up within 2 to 3 weeks of discharge.  Educated patient on this and answered questions.  She is appropriate for discharge and comfortable with the plan.  Recommend repeat BMP to monitor electrolytes at PCP follow-up.    Substance use disorder, unspecified mood disorder  Has history of polysubstance abuse and is currently been using unprescribed opiates to manage chronic back pain.  She was on opiates for a short duration during her hospitalization, but these were discontinued prior to discharge.  Her pain was well controlled with supportive therapy and gabapentin.  Her gabapentin is being prescribed by her primary care provider.  3 days prescribed at time of discharge after AMARJIT was checked.  Met with access team and psychiatry throughout hospitalization.  She is on numerous psychiatric medications but denies any psychiatric history.  Psychiatrist evaluated patient and elected to keep medication regimen the same as she is not currently having any adverse effects.    Dysphagia  Patient complained of difficulty swallowing, also endorsed associated chest tightness with swallowing.  SLP was consulted.  Patient did okay on bedside swallow but still noted the symptoms.  A video-assisted swallow study was completed and normal.  She was cleared by SLP for regular diet and thin liquid consistencies.  Did increase her PPI to twice daily, as she endorsed reflux symptoms.  This could possibly be contributing to her dysphagia.  This can be continued BID for 2 weeks, but recommended that she decrease back to once daily at that time.    Constipation  Patient found to be constipated on admission.  She was started on  MiraLAX and Amitiza.  Was also given an enema.  This regimen seems to help.  Will discharge on MiraLAX and Amitiza.  Can follow-up with PCP for further titration of bowel regimen.    Antiphospholipid antibody syndrome  Her home Eliquis was continued throughout hospitalization.  This should be continued at discharge.  Management per PCP.    Right lower extremity pain  Patient endorsed right lower extremity pain and subjective edema.  A duplex ultrasound was obtained to rule out DVT.  This was negative.    Day of Discharge     Subjective:  Patient seen this morning and doing well.  Tolerated her breakfast.  She states she feels ready to go home today.    Physical Exam:  Temp:  [97 øF (36.1 øC)-99.3 øF (37.4 øC)] 97.5 øF (36.4 øC)  Heart Rate:  [73-92] 77  Resp:  [18-20] 18  BP: ()/(50-71) 109/71  Body mass index is 37.12 kg/mý.  Physical Exam  General: Laying in bed no acute distress.  ENT: No conjunctival injection or scleral icterus. Moist mucous membranes. No JVD.  Nasal cannula no longer in place.  Neuro: Eyes open and moving in all directions, strength normal in all extremities, no focal deficits.   Lungs: Clear to auscultation bilaterally. No wheeze or crackles. No distress.   Heart: RRR, no murmurs. No edema.  Abdomen: Soft, nondistended, normal bowel sounds.  Nontender to palpation.  Ext: Warm and well-perfused. No edema.   Skin: No rashes or lesions. IV site without swelling or erythema.     Consultants     Consult Orders (all) (From admission, onward)       Start     Ordered    08/02/23 1305  Inpatient Psychiatrist Consult  Once        Specialty:  Psychiatry  Provider:  Pola Serra III, MD    08/02/23 1305    07/28/23 1046  Inpatient Cardiology Consult  Once        Specialty:  Cardiology  Provider:  Kevin Waters III, MD    07/28/23 1045    07/27/23 2320  Inpatient Access Center Consult  Once        Provider:  (Not yet assigned)    07/27/23 231    07/27/23 2245  Inpatient Case  Management  Consult  Once        Provider:  (Not yet assigned)    07/27/23 2245                  Procedures     * Surgery not found *    Imaging Results (All)       Procedure Component Value Units Date/Time    XR Abdomen KUB [[id number]] Collected: 08/02/23 1703     Updated: 08/02/23 1707    Narrative:      KUB     HISTORY: Abdominal pain and nausea     COMPARISON: 11/29/2022     FINDINGS: There is contrast in the colon and distal small bowel. The  bowel gas pattern is nonobstructed. Postop changes are seen of the  lumbar spine. Right upper quadrant surgical clips.       Impression:      Nonobstructed bowel gas pattern     This report was finalized on 8/2/2023 5:04 PM by Dr. Gerry Rodriguez M.D.       FL Video Swallow Single Contrast [[id number]] Collected: 08/02/23 1048     Updated: 08/02/23 1052    Narrative:      VIDEO SWALLOWING EXAMINATION BY SPEECH PATHOLOGY     Clinical: Dysphasia     Video swallowing examination performed under the direction of speech  pathology. Imaging reviewed by radiologist who concurs with the  findings.     Speech pathology summary:Radiologist present: Dr. Romero. Functional  swallow demonstrated. No aspiration visualized across consistencies         FLUOROSCOPY TIME: 1 minute 18 seconds, 2215 images.     This report was finalized on 8/2/2023 10:49 AM by Dr. Matias Romero M.D.       XR Chest PA & Lateral [[id number]] Collected: 07/30/23 1644     Updated: 07/30/23 1702    Narrative:      CHEST: 2 VIEWS     HISTORY: Shortness of air, vomiting, constipation     COMPARISON: CT angiogram chest 07/28/2023, AP chest 07/27/2023     FINDINGS:Heart size is within normal limits. The hazy peripheral  interstitial and ground glass opacity demonstrated on the chest CT 2  days ago are not evident radiographically. No focal airspace disease is  demonstrated. There is no evidence for perihilar edema or pleural  effusion or further evidence to suggest active disease in the chest.      This report was finalized on 7/30/2023 4:59 PM by Dr. Kristopher Arambula M.D.       CT Angiogram Chest [[id number]] Collected: 07/28/23 1741     Updated: 07/28/23 1750    Narrative:      CT ANGIOGRAM OF THE CHEST. MULTIPLE CORONAL, SAGITTAL, AND 3-D  RECONSTRUCTIONS.     HISTORY: Chest pain and upper abdominal pain.     TECHNIQUE: Radiation dose reduction techniques were utilized, including  automated exposure control and exposure modulation based on body size.   CT angiogram of the chest was performed following the administration of  IV contrast. Coronal, sagittal, and 3-D reconstruction images were  obtained.     COMPARISON:  CT abdomen and pelvis 07/27/2023, CT chest 12/31/2022.     FINDINGS: There is no intrapulmonary arterial filling defect to diagnose  a pulmonary embolus. There is no evidence for mediastinal or hilar or  axillary veena enlargement. There is a small right pleural effusion that  layers dependently. Diffuse patchy bilateral ground-glass opacities are  present associated with edema or infiltrates with appearance of the lung  base similar to the abdominal CT yesterday. These areas of increased  interstitial markings and ground-glass opacity are new compared to a CT  chest 12/31/2022. No endotracheal or central endobronchial lesion is  demonstrated. There is a mildly enlarged subcarinal lymph node measuring  1.5 cm. Mild right hilar veena enlargement is present. There is no  axillary veena enlargement. Imaging through the upper abdomen  demonstrates left upper pole renal parenchymal thinning. This  low-density lesion within the medial left renal upper pole that is most  likely a cyst and measures 2.6 cm, and this was also present on previous  CT 12/31/2022. Cholecystectomy clips are present. There is multilevel  bridging endplate spur formation throughout the thoracic spine,  compatible with DISH.       Impression:      1. No evidence for pulmonary thromboembolic disease.  2. Extensive hazy  bilateral interstitial and ground-glass opacities, new  since the chest CT 12/31/2022 and similar to that demonstrated on CT  abdomen yesterday and consistent with infiltrates or edema. Small right  pleural effusion layers bilaterally.  3. Mild subcarinal and right hilar veena enlargement, potentially  reactive.     This report was finalized on 7/28/2023 5:47 PM by Dr. Kristopher Arambula M.D.       CT Abdomen Pelvis Without Contrast [[id number]] Collected: 07/27/23 1855     Updated: 07/27/23 1903    Narrative:      CT ABDOMEN PELVIS WO CONTRAST-     INDICATIONS: Nausea, vomiting, abdominal pain     TECHNIQUE: Radiation dose reduction techniques were utilized, including  automated exposure control and exposure modulation based on body size.  Unenhanced ABDOMEN AND PELVIS CT     COMPARISON: 12/13/2022     FINDINGS:     The gallbladder is surgically absent. Tiny pneumobilia is apparent (less  than on the prior exam).     A left renal cyst is present. Areas of focal cortical thinning in the  left kidney may be result of prior infection or infarct.     Otherwise unremarkable unenhanced appearance of the liver, spleen,  adrenal glands, pancreas, kidneys, bladder.     No bowel obstruction or abnormal bowel thickening is identified. The  appendix does not appear inflamed.     No free intraperitoneal gas . Mild nonspecific pelvic free fluid is  present.     Scattered small mesenteric and para-aortic lymph nodes are seen that are  not significant by size criteria.     Abdominal aorta is not aneurysmal.     The lung bases show diffuse groundglass opacities that suggests edema  and/or pneumonia, minimal right and trace left pleural effusions. The  heart is mildly enlarged.     Degenerative and surgical changes are seen in the spine. No acute  fracture is identified.             Impression:         1. No focal acute inflammatory process of bowel is identified. Mild  nonspecific pelvic free fluid. Follow-up as indications  persist.  2. No urolithiasis or hydronephrosis.  3. Diffuse groundglass opacities in the partly included lungs suggest  edema and/or pneumonia, with right more than left pleural effusions,  follow-up advised.     This report was finalized on 7/27/2023 6:59 PM by Dr. Yonathan Neal M.D.       XR Chest 1 View [[id number]] Collected: 07/27/23 1719     Updated: 07/27/23 1723    Narrative:      XR CHEST 1 VW-     HISTORY: Female who is 42 years-old,  short of breath     TECHNIQUE: Frontal view of the chest     COMPARISON: 12/26/2022     FINDINGS: The heart is enlarged with mild prominence of vascular and  interstitial markings. No focal pulmonary consolidation, pleural  effusion, or pneumothorax. No acute osseous process.       Impression:      No focal pulmonary consolidation. Cardiomegaly with mild  prominence of vascular and interstitial markings.     This report was finalized on 7/27/2023 5:20 PM by Dr. Yonathan Neal M.D.             Results for orders placed during the hospital encounter of 07/27/23    Duplex Venous Lower Extremity - Right CAR    Interpretation Summary    Normal right lower extremity venous duplex scan.    Results for orders placed during the hospital encounter of 07/27/23    Adult Transthoracic Echo Complete W/ Cont if Necessary Per Protocol    Interpretation Summary    Left ventricular systolic function is normal. Estimated left ventricular EF = 60% Normal left ventricular cavity size and wall thickness noted. All left ventricular wall segments contract normally. Left ventricular diastolic function was normal.    Pertinent Labs     Results from last 7 days   Lab Units 08/03/23  0654 08/02/23  0703 08/01/23 0624 07/31/23  0549   WBC 10*3/mm3 5.23 5.02 6.43 5.96   HEMOGLOBIN g/dL 11.8* 11.5* 12.1 11.8*   PLATELETS 10*3/mm3 340 356 330 329     Results from last 7 days   Lab Units 08/03/23  0654 08/02/23  0703 08/01/23  0624 07/31/23  0549   SODIUM mmol/L 141 140 140 139   POTASSIUM mmol/L  4.1 4.2 3.9 3.7   CHLORIDE mmol/L 105 102 103 98   CO2 mmol/L 29.0 30.3* 29.0 32.0*   BUN mg/dL 9 14 13 12   CREATININE mg/dL 0.68 0.86 0.73 0.77   GLUCOSE mg/dL 123* 87 80 92   EGFR mL/min/1.73 112.4 87.2 106.1 99.5     Results from last 7 days   Lab Units 07/27/23  1612   ALBUMIN g/dL 3.2*   BILIRUBIN mg/dL 0.6   ALK PHOS U/L 161*   AST (SGOT) U/L 51*   ALT (SGPT) U/L 84*     Results from last 7 days   Lab Units 08/03/23  0654 08/02/23  0703 08/01/23  0624 07/31/23  0549 07/28/23  0452 07/27/23  1612   CALCIUM mg/dL 9.1 9.2 8.8 9.1   < > 8.7   ALBUMIN g/dL  --   --   --   --   --  3.2*   MAGNESIUM mg/dL 2.5 2.1 2.3 2.1   < >  --    PHOSPHORUS mg/dL 4.1 4.7* 4.0 3.8   < >  --     < > = values in this interval not displayed.     Results from last 7 days   Lab Units 07/27/23  1612   LIPASE U/L 14     Results from last 7 days   Lab Units 07/30/23  1217 07/30/23  1029 07/28/23  0053 07/27/23 2022 07/27/23  1612   HSTROP T ng/L 18* 17* 44* 43* 41*   PROBNP pg/mL  --   --   --   --  6,371.0*           Invalid input(s): LDLCALC          Test Results Pending at Discharge   No pending tests at time of discharge.    Discharge Details        Discharge Medications        New Medications        Instructions Start Date   Amitiza 24 MCG capsule  Generic drug: lubiprostone   24 mcg, Oral, 2 Times Daily With Meals      furosemide 20 MG tablet  Commonly known as: LASIX   20 mg, Oral, Daily   Start Date: August 4, 2023     polyethylene glycol 17 GM/SCOOP powder  Commonly known as: MIRALAX   17 g, Oral, Daily   Start Date: August 4, 2023            Changes to Medications        Instructions Start Date   gabapentin 800 MG tablet  Commonly known as: NEURONTIN  What changed: when to take this   800 mg, Oral, 3 Times Daily      ondansetron ODT 4 MG disintegrating tablet  Commonly known as: ZOFRAN-ODT  What changed: when to take this   4 mg, Translingual, Every 8 Hours PRN      pantoprazole 40 MG EC tablet  Commonly known as:  PROTONIX  What changed: when to take this   40 mg, Oral, 2 Times Daily Before Meals             Continue These Medications        Instructions Start Date   acetaminophen 325 MG tablet  Commonly known as: TYLENOL   650 mg, Oral, Every 6 Hours PRN      apixaban 5 MG tablet tablet  Commonly known as: ELIQUIS   5 mg, Oral, 2 Times Daily, Last filled 4/12/21 - pt states she was instructed to stop taking medication prior to ERCP ~1 month ago and was not instructed to restart medication.  Indication: Splenic infarct       busPIRone 5 MG tablet  Commonly known as: BUSPAR   5 mg, Oral, 2 Times Daily, For anxiety      dicyclomine 20 MG tablet  Commonly known as: BENTYL   20 mg, Oral, Every 6 Hours      FLUoxetine 40 MG capsule  Commonly known as: PROzac   60 mg, Oral, Nightly      folic acid 1 MG tablet  Commonly known as: FOLVITE   1 mg, Oral, Daily      levETIRAcetam 1000 MG tablet  Commonly known as: KEPPRA   2,000 mg, Oral, Every 12 Hours Scheduled      melatonin 5 MG tablet tablet   10 mg, Oral, Nightly, Patient taes 20 mg at home      OLANZapine 10 MG tablet  Commonly known as: zyPREXA   5 mg, Oral, 2 Times Daily      traZODone 50 MG tablet  Commonly known as: DESYREL   Take 1-2 tablets by mouth At Night As Needed for sleep      Vitamin B-2 100 MG tablet tablet  Commonly known as: RIBOFLAVIN   400 mg, Oral, Daily             Stop These Medications      sucralfate 1 g tablet  Commonly known as: CARAFATE     SUMAtriptan 100 MG tablet  Commonly known as: IMITREX              Allergies   Allergen Reactions    Morphine Hives    Lidocaine Rash     Pt reports lidocaine patches make her breakout in a rash       Discharge Disposition:  Home or Self Care      Discharge Diet:  Diet Order   Procedures    Diet: Regular/House Diet; Texture: Regular Texture (IDDSI 7); Fluid Consistency: Thin (IDDSI 0)       Discharge Activity:   Activity Instructions       Activity as Tolerated              CODE STATUS:    Code Status and Medical  Interventions:   Ordered at: 07/27/23 1922     Code Status (Patient has no pulse and is not breathing):    CPR (Attempt to Resuscitate)     Medical Interventions (Patient has pulse or is breathing):    Full     Release to patient:    Routine Release       Future Appointments   Date Time Provider Department Center   8/16/2023  2:00 PM Nisha Magaña APRN [facility]      Follow-up Information       Lianet Paulino APRN Follow up in 2 week(s).    Specialty: Cardiology  Contact information:  [address]  [facility] [id number] [phone]               Sahil Cornelius MD Follow up in 2 day(s).    Specialty: Internal Medicine  Contact information:  [address] [facility] [address] [phone]                             Time Spent on Discharge:  Greater than 30 minutes      Christiana Casper MD  [facility]  08/03/23  12:24 EDT

## 2023-08-03 NOTE — CONSULTS
"IDENTIFYING INFORMATION: The patient is a 42-year-old white female admitted on 7/27/2023 with complaints of nausea and vomiting and abdominal pain most likely related to acute opioid withdrawal.    CHIEF COMPLAINT: None given    INFORMANT: Patient and chart    RELIABILITY: Fair    HISTORY OF PRESENT ILLNESS: The patient is a 42-year-old white female admitted on 7/27/2023 with complaints of nausea and vomiting and abdominal pain.  The patient had been seen by the access center on 7/14/2023 related to a history of opioid dependence.  The patient reports that she has been taking 5 to 6 30 mg Percocet tablets daily for some time related to a history of back pain for which she has undergone previous surgery.  The patient reports that she has never taken the pills \"to get high\".  Notably, her drug screen was positive for fentanyl on admission.  The patient denies current suicidal or homicidal ideation or recent changes in mood sleep or appetite.  She did not follow through with recommended resources provided by the access center earlier this month related to her opioid dependence, but now expresses interest in possible NA follow-up.  She denies current suicidal or homicidal ideation or psychotic features.  She lives with her \"fianc‚\" and 2 children.  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  She is currently prescribed Prozac and olanzapine, apparently by her primary care physician    PAST PSYCHIATRIC HISTORY: As noted previously the patient is currently prescribed olanzapine and Prozac apparently from her primary care physician.  She also takes hydroxyzine.    PAST MEDICAL HISTORY: Significant for chronic back pain, migraine headache, seasonal allergies, seizure disorder and obesity    MEDICATIONS:   Current Facility-Administered Medications   Medication Dose Route Frequency Provider Last Rate Last Admin    acetaminophen (TYLENOL) tablet 650 mg  650 mg Oral Q4H PRN Stingl, Kathy " MD Mcaiel   650 mg at 08/03/23 0517    apixaban (ELIQUIS) tablet 5 mg  5 mg Oral Q12H Kathy Kim MD   5 mg at 08/03/23 0913    sennosides-docusate (PERICOLACE) 8.6-50 MG per tablet 2 tablet  2 tablet Oral BID Kathy Kim MD   2 tablet at 08/02/23 0818    And    bisacodyl (DULCOLAX) EC tablet 5 mg  5 mg Oral Daily PRN Kathy Kim MD   5 mg at 07/29/23 1354    And    bisacodyl (DULCOLAX) suppository 10 mg  10 mg Rectal Daily PRN Kathy Kim MD        busPIRone (BUSPAR) tablet 5 mg  5 mg Oral BID Kathy Kim MD   5 mg at 08/03/23 0912    Diclofenac Sodium (VOLTAREN) 1 % gel 2 g  2 g Topical 4x Daily Christiana Casper MD   2 g at 08/03/23 1213    dicyclomine (BENTYL) capsule 20 mg  20 mg Oral 4x Daily Kathy Kim MD   20 mg at 08/03/23 1213    FLUoxetine (PROzac) capsule 60 mg  60 mg Oral Nightly Kathy Kim MD   60 mg at 08/02/23 2053    folic acid (FOLVITE) tablet 1 mg  1 mg Oral Daily Kathy Kim MD   1 mg at 08/03/23 0913    furosemide (LASIX) tablet 20 mg  20 mg Oral Daily Lianet Paulino, APRN   20 mg at 08/03/23 0913    gabapentin (NEURONTIN) capsule 800 mg  800 mg Oral Q8H Kathy Kim MD   800 mg at 08/03/23 0517    hydrOXYzine (ATARAX) tablet 25 mg  25 mg Oral TID PRN Christiana Casper MD        levETIRAcetam (KEPPRA) tablet 2,000 mg  2,000 mg Oral Q12H Kathy Kim MD   2,000 mg at 08/03/23 0912    lubiprostone (AMITIZA) capsule 24 mcg  24 mcg Oral BID With Meals Isabella Finch MD   24 mcg at 08/03/23 0914    melatonin tablet 3 mg  3 mg Oral Nightly PRN Kathy Kim MD   3 mg at 08/02/23 2052    nicotine (NICODERM CQ) 21 MG/24HR patch 1 patch  1 patch Transdermal Q24H Nirmal Zendejas MD   1 patch at 08/03/23 0917    OLANZapine (zyPREXA) tablet 5 mg  5 mg Oral BID Kathy Kim MD   5 mg at 08/03/23 0912    ondansetron (ZOFRAN) tablet 4 mg  4 mg Oral Q6H PRN Kathy Kim  MD Maciel   4 mg at 08/03/23 1020    Or    ondansetron (ZOFRAN) injection 4 mg  4 mg Intravenous Q6H PRN Kathy Kim MD   4 mg at 07/27/23 2019    pantoprazole (PROTONIX) EC tablet 40 mg  40 mg Oral BID Christiana Salazar MD   40 mg at 08/03/23 0637    polyethylene glycol (MIRALAX) packet 17 g  17 g Oral Daily Isabella Finch MD   17 g at 08/01/23 0828    prochlorperazine (COMPAZINE) injection 5 mg  5 mg Intravenous Q6H PRN Isabella Finch MD   5 mg at 07/31/23 2235    Or    prochlorperazine (COMPAZINE) tablet 5 mg  5 mg Oral Q6H PRN Isabella Finch MD        Or    prochlorperazine (COMPAZINE) suppository 25 mg  25 mg Rectal Q12H PRN Isabella Finch MD        traZODone (DESYREL) tablet 50 mg  50 mg Oral Nightly PRN Kathy Kim MD   50 mg at 08/02/23 2209    Vitamin B-2 (RIBOFLAVIN) tablet 400 mg  400 mg Oral Daily Kathy Kim MD   400 mg at 08/03/23 0912         ALLERGIES: Morphine lidocaine    FAMILY HISTORY: Noncontributory    SOCIAL HISTORY: The patient lives with her enrique‚ and 2 children.  She does not work outside the home.  Her substance use history is as noted previously    MENTAL STATUS EXAM: Patient is an obese white female appearing her stated age.  She has no apparent physical distress at the time of examination.  She is awake alert and oriented all spheres.  Her mood is euthymic her affect congruent.  Speech is well coherent.  There are no deficits memory or cognition noted.  Intelligence is judged to be in the average range based on fund of knowledge, the patient is cooperative throughout the interview.  She denies suicidal or homicidal ideation or psychotic features.  Judgement and insight are intact.    ASSETS/LIABILITIES: To be assessed    DIAGNOSTIC IMPRESSION: Opioid use disorder, medical problems as described previously, depressive disorder unspecified    PLAN: The patient now expresses some interest in possible NA follow-up.  I will ask the  access center to provide her with information regarding local resources.  It is interesting that she is on aggressive doses of Prozac and Zyprexa but reports no past psychiatric history, but she reports no acute mood or thoughts related symptoms so I will make no changes to this unusually aggressive pharmacotherapeutic regimen.  Thank you for the opportunity to see this patient.

## 2023-08-03 NOTE — PLAN OF CARE
Goal Outcome Evaluation:A&Ox4. Up ad js. Pt educated regarding staying on the unit when when she ambulates outside of room. Remains on clear liquid diet. PRN tylenol given x2 for pain. No nausea or vomiting overnight. Care continuing.

## 2023-08-03 NOTE — NURSING NOTE
Access follow up regarding drug use: chart reviewed. No changes reported. Pt has received [facility] resources. Will follow for support.

## 2023-08-03 NOTE — PAYOR COMM NOTE
"Carson Mullen (42 y.o. Female)       PLEASE SEE ATTACHED FOR DC NOTICE    REF #    [id number]     THANK YOU  RAMÍREZ MAR RN/ DEPT  [facility]   [phone]  [phone]        Date of Birth   1980    Social Security Number       Address   [address] [address]    Home Phone   [phone]    MRN   [id number]       [employer]   None    Marital Status   Single                            Admission Date   23    Admission Type   Emergency    Admitting Provider   Kathy Kim MD    Attending Provider       Department, Room/Bed   [facility] [address] [place], P482/1       Discharge Date   8/3/2023    Discharge Disposition   Home or Self Care    Discharge Destination                                 Attending Provider: (none)   Allergies: Morphine, Lidocaine    Isolation: None   Infection: None   Code Status: CPR    Ht: 162.6 cm (64\")   Wt: 98.1 kg (216 lb 4.3 oz)    Admission Cmt: None   Principal Problem: Pulmonary edema [J81.1]                   Active Insurance as of 2023       Primary Coverage       Payor Plan Insurance Group Employer/Plan Group    [facility] BY ENCARNACION [id number]       Payor Plan Address Payor Plan Phone Number Payor Plan Fax Number Effective Dates    [address] [address]   2021 - None Entered    [facility] [phone]         Subscriber Name Subscriber Birth Date Member ID       CARSON MULLEN 1980 [id number]                     Emergency Contacts        (Rel.) Home Phone Work Phone Mobile Phone    JUNIOR AGUAYO (Significant Other) [phone] -- [phone]    ALEX STOCK (Sister) [phone] -- --              [place]: [facility] [id number]  Tax ID [id number]     Discharge Summary        Christiana Casper MD at 23 1224              Patient Name: Carson Mullen  : 1980  MRN: [id number]    Date of Admission: 2023  Date of Discharge:  " 8/3/2023  Primary Care Physician: Sahil Cornelius MD      Chief Complaint:   Nausea, Vomiting, and Weakness - Generalized      Discharge Diagnoses     Active Hospital Problems    Diagnosis  POA    **Pulmonary edema [J81.1]  Yes    Vomiting [R11.10]  Unknown    Other dysphagia [R13.19]  Unknown    Acute hypoxemic respiratory failure [J96.01]  Unknown    Tobacco abuse [Z72.0]  Yes    GERD without esophagitis [K21.9]  Yes    Antiphospholipid antibody positive [R76.0]  Yes    Abdominal pain [R10.9]  Yes    Anxiety disorder [F41.9]  Yes    Obesity (BMI 30-39.9) [E66.9]  Yes      Resolved Hospital Problems   No resolved problems to display.        Hospital Course     Ms. Allen is a 42 y.o. female with a history of chronic back pain, migraine, opioid dependence, seizures, unspecified psychiatric disorder who presented to [facility] initially complaining of abdominal pain, nausea, and vomiting.  Please see the admitting history and physical for further details.  She was found to have CXR with evidence of pulmonary edema, and concern for possible new onset heart failure.  Also had an elevated BNP.  And was admitted to the hospital for further evaluation and treatment.     Acute hypoxemic respiratory failure, pulmonary edema  Initial chest x-ray with evidence of pulmonary edema.  Patient required oxygen via nasal cannula initially, but this was weaned prior to discharge.  Was found to have a mildly elevated high sensitive troponin, though EKG did not reveal any ischemic changes. a CT angiogram of the chest was completed with no evidence of pulmonary embolism.  An echo was completed on 7/29.  Systolic function was normal with an EF of 60%.  No evidence of diastolic dysfunction.  The patient was started on IV Lasix and cardiology was consulted.  She had significant clinical improvement with Lasix, and it was thought she possibly had a temporary diastolic dysfunction leading to her pulmonary edema.  The  Lasix was weaned each day.  Electrolytes were replaced as needed and monitored daily.  She was stable on 20 mg Lasix oral 1 time daily at time of discharge.  Cardiology has plans to follow-up within 2 to 3 weeks of discharge.  Educated patient on this and answered questions.  She is appropriate for discharge and comfortable with the plan.  Recommend repeat BMP to monitor electrolytes at PCP follow-up.    Substance use disorder, unspecified mood disorder  Has history of polysubstance abuse and is currently been using unprescribed opiates to manage chronic back pain.  She was on opiates for a short duration during her hospitalization, but these were discontinued prior to discharge.  Her pain was well controlled with supportive therapy and gabapentin.  Her gabapentin is being prescribed by her primary care provider.  3 days prescribed at time of discharge after AMARJIT was checked.  Met with access team and psychiatry throughout hospitalization.  She is on numerous psychiatric medications but denies any psychiatric history.  Psychiatrist evaluated patient and elected to keep medication regimen the same as she is not currently having any adverse effects.    Dysphagia  Patient complained of difficulty swallowing, also endorsed associated chest tightness with swallowing.  SLP was consulted.  Patient did okay on bedside swallow but still noted the symptoms.  A video-assisted swallow study was completed and normal.  She was cleared by SLP for regular diet and thin liquid consistencies.  Did increase her PPI to twice daily, as she endorsed reflux symptoms.  This could possibly be contributing to her dysphagia.  This can be continued BID for 2 weeks, but recommended that she decrease back to once daily at that time.    Constipation  Patient found to be constipated on admission.  She was started on MiraLAX and Amitiza.  Was also given an enema.  This regimen seems to help.  Will discharge on MiraLAX and Amitiza.  Can follow-up  with PCP for further titration of bowel regimen.    Antiphospholipid antibody syndrome  Her home Eliquis was continued throughout hospitalization.  This should be continued at discharge.  Management per PCP.    Right lower extremity pain  Patient endorsed right lower extremity pain and subjective edema.  A duplex ultrasound was obtained to rule out DVT.  This was negative.    Day of Discharge     Subjective:  Patient seen this morning and doing well.  Tolerated her breakfast.  She states she feels ready to go home today.    Physical Exam:  Temp:  [97 øF (36.1 øC)-99.3 øF (37.4 øC)] 97.5 øF (36.4 øC)  Heart Rate:  [73-92] 77  Resp:  [18-20] 18  BP: ()/(50-71) 109/71  Body mass index is 37.12 kg/mý.  Physical Exam  General: Laying in bed no acute distress.  ENT: No conjunctival injection or scleral icterus. Moist mucous membranes. No JVD.  Nasal cannula no longer in place.  Neuro: Eyes open and moving in all directions, strength normal in all extremities, no focal deficits.   Lungs: Clear to auscultation bilaterally. No wheeze or crackles. No distress.   Heart: RRR, no murmurs. No edema.  Abdomen: Soft, nondistended, normal bowel sounds.  Nontender to palpation.  Ext: Warm and well-perfused. No edema.   Skin: No rashes or lesions. IV site without swelling or erythema.     Consultants     Consult Orders (all) (From admission, onward)       Start     Ordered    08/02/23 1305  Inpatient Psychiatrist Consult  Once        Specialty:  Psychiatry  Provider:  Pola Serra III, MD    08/02/23 1305    07/28/23 1046  Inpatient Cardiology Consult  Once        Specialty:  Cardiology  Provider:  Kevin Waters III, MD    07/28/23 1045    07/27/23 2320  Inpatient Access Center Consult  Once        Provider:  (Not yet assigned)    07/27/23 2319    07/27/23 2245  Inpatient Case Management  Consult  Once        Provider:  (Not yet assigned)    07/27/23 2245                  Procedures     * Surgery  not found *    Imaging Results (All)       Procedure Component Value Units Date/Time    XR Abdomen KUB [[id number]] Collected: 08/02/23 1703     Updated: 08/02/23 1707    Narrative:      KUB     HISTORY: Abdominal pain and nausea     COMPARISON: 11/29/2022     FINDINGS: There is contrast in the colon and distal small bowel. The  bowel gas pattern is nonobstructed. Postop changes are seen of the  lumbar spine. Right upper quadrant surgical clips.       Impression:      Nonobstructed bowel gas pattern     This report was finalized on 8/2/2023 5:04 PM by Dr. Gerry Rodriguez M.D.       FL Video Swallow Single Contrast [[id number]] Collected: 08/02/23 1048     Updated: 08/02/23 1052    Narrative:      VIDEO SWALLOWING EXAMINATION BY SPEECH PATHOLOGY     Clinical: Dysphasia     Video swallowing examination performed under the direction of speech  pathology. Imaging reviewed by radiologist who concurs with the  findings.     Speech pathology summary:Radiologist present: Dr. Romero. Functional  swallow demonstrated. No aspiration visualized across consistencies         FLUOROSCOPY TIME: 1 minute 18 seconds, 2215 images.     This report was finalized on 8/2/2023 10:49 AM by Dr. Matias Romero M.D.       XR Chest PA & Lateral [[id number]] Collected: 07/30/23 1644     Updated: 07/30/23 1702    Narrative:      CHEST: 2 VIEWS     HISTORY: Shortness of air, vomiting, constipation     COMPARISON: CT angiogram chest 07/28/2023, AP chest 07/27/2023     FINDINGS:Heart size is within normal limits. The hazy peripheral  interstitial and ground glass opacity demonstrated on the chest CT 2  days ago are not evident radiographically. No focal airspace disease is  demonstrated. There is no evidence for perihilar edema or pleural  effusion or further evidence to suggest active disease in the chest.     This report was finalized on 7/30/2023 4:59 PM by Dr. Kristopher Arambula M.D.       CT Angiogram Chest [[id number]] Collected: 07/28/23 7515      Updated: 07/28/23 3546    Narrative:      CT ANGIOGRAM OF THE CHEST. MULTIPLE CORONAL, SAGITTAL, AND 3-D  RECONSTRUCTIONS.     HISTORY: Chest pain and upper abdominal pain.     TECHNIQUE: Radiation dose reduction techniques were utilized, including  automated exposure control and exposure modulation based on body size.   CT angiogram of the chest was performed following the administration of  IV contrast. Coronal, sagittal, and 3-D reconstruction images were  obtained.     COMPARISON:  CT abdomen and pelvis 07/27/2023, CT chest 12/31/2022.     FINDINGS: There is no intrapulmonary arterial filling defect to diagnose  a pulmonary embolus. There is no evidence for mediastinal or hilar or  axillary veena enlargement. There is a small right pleural effusion that  layers dependently. Diffuse patchy bilateral ground-glass opacities are  present associated with edema or infiltrates with appearance of the lung  base similar to the abdominal CT yesterday. These areas of increased  interstitial markings and ground-glass opacity are new compared to a CT  chest 12/31/2022. No endotracheal or central endobronchial lesion is  demonstrated. There is a mildly enlarged subcarinal lymph node measuring  1.5 cm. Mild right hilar veena enlargement is present. There is no  axillary veena enlargement. Imaging through the upper abdomen  demonstrates left upper pole renal parenchymal thinning. This  low-density lesion within the medial left renal upper pole that is most  likely a cyst and measures 2.6 cm, and this was also present on previous  CT 12/31/2022. Cholecystectomy clips are present. There is multilevel  bridging endplate spur formation throughout the thoracic spine,  compatible with DISH.       Impression:      1. No evidence for pulmonary thromboembolic disease.  2. Extensive hazy bilateral interstitial and ground-glass opacities, new  since the chest CT 12/31/2022 and similar to that demonstrated on CT  abdomen yesterday and  consistent with infiltrates or edema. Small right  pleural effusion layers bilaterally.  3. Mild subcarinal and right hilar veena enlargement, potentially  reactive.     This report was finalized on 7/28/2023 5:47 PM by Dr. Kristopher Arambula M.D.       CT Abdomen Pelvis Without Contrast [[id number]] Collected: 07/27/23 1855     Updated: 07/27/23 1903    Narrative:      CT ABDOMEN PELVIS WO CONTRAST-     INDICATIONS: Nausea, vomiting, abdominal pain     TECHNIQUE: Radiation dose reduction techniques were utilized, including  automated exposure control and exposure modulation based on body size.  Unenhanced ABDOMEN AND PELVIS CT     COMPARISON: 12/13/2022     FINDINGS:     The gallbladder is surgically absent. Tiny pneumobilia is apparent (less  than on the prior exam).     A left renal cyst is present. Areas of focal cortical thinning in the  left kidney may be result of prior infection or infarct.     Otherwise unremarkable unenhanced appearance of the liver, spleen,  adrenal glands, pancreas, kidneys, bladder.     No bowel obstruction or abnormal bowel thickening is identified. The  appendix does not appear inflamed.     No free intraperitoneal gas . Mild nonspecific pelvic free fluid is  present.     Scattered small mesenteric and para-aortic lymph nodes are seen that are  not significant by size criteria.     Abdominal aorta is not aneurysmal.     The lung bases show diffuse groundglass opacities that suggests edema  and/or pneumonia, minimal right and trace left pleural effusions. The  heart is mildly enlarged.     Degenerative and surgical changes are seen in the spine. No acute  fracture is identified.             Impression:         1. No focal acute inflammatory process of bowel is identified. Mild  nonspecific pelvic free fluid. Follow-up as indications persist.  2. No urolithiasis or hydronephrosis.  3. Diffuse groundglass opacities in the partly included lungs suggest  edema and/or pneumonia, with right  more than left pleural effusions,  follow-up advised.     This report was finalized on 7/27/2023 6:59 PM by Dr. Yonathan Neal M.D.       XR Chest 1 View [[id number]] Collected: 07/27/23 1719     Updated: 07/27/23 1723    Narrative:      XR CHEST 1 VW-     HISTORY: Female who is 42 years-old,  short of breath     TECHNIQUE: Frontal view of the chest     COMPARISON: 12/26/2022     FINDINGS: The heart is enlarged with mild prominence of vascular and  interstitial markings. No focal pulmonary consolidation, pleural  effusion, or pneumothorax. No acute osseous process.       Impression:      No focal pulmonary consolidation. Cardiomegaly with mild  prominence of vascular and interstitial markings.     This report was finalized on 7/27/2023 5:20 PM by Dr. Yonathan Neal M.D.             Results for orders placed during the hospital encounter of 07/27/23    Duplex Venous Lower Extremity - Right CAR    Interpretation Summary    Normal right lower extremity venous duplex scan.    Results for orders placed during the hospital encounter of 07/27/23    Adult Transthoracic Echo Complete W/ Cont if Necessary Per Protocol    Interpretation Summary    Left ventricular systolic function is normal. Estimated left ventricular EF = 60% Normal left ventricular cavity size and wall thickness noted. All left ventricular wall segments contract normally. Left ventricular diastolic function was normal.    Pertinent Labs     Results from last 7 days   Lab Units 08/03/23  0654 08/02/23  0703 08/01/23 0624 07/31/23  0549   WBC 10*3/mm3 5.23 5.02 6.43 5.96   HEMOGLOBIN g/dL 11.8* 11.5* 12.1 11.8*   PLATELETS 10*3/mm3 340 356 330 329     Results from last 7 days   Lab Units 08/03/23  0654 08/02/23  0703 08/01/23  0624 07/31/23  0549   SODIUM mmol/L 141 140 140 139   POTASSIUM mmol/L 4.1 4.2 3.9 3.7   CHLORIDE mmol/L 105 102 103 98   CO2 mmol/L 29.0 30.3* 29.0 32.0*   BUN mg/dL 9 14 13 12   CREATININE mg/dL 0.68 0.86 0.73 0.77   GLUCOSE  mg/dL 123* 87 80 92   EGFR mL/min/1.73 112.4 87.2 106.1 99.5     Results from last 7 days   Lab Units 07/27/23  1612   ALBUMIN g/dL 3.2*   BILIRUBIN mg/dL 0.6   ALK PHOS U/L 161*   AST (SGOT) U/L 51*   ALT (SGPT) U/L 84*     Results from last 7 days   Lab Units 08/03/23  0654 08/02/23  0703 08/01/23  0624 07/31/23  0549 07/28/23  0452 07/27/23  1612   CALCIUM mg/dL 9.1 9.2 8.8 9.1   < > 8.7   ALBUMIN g/dL  --   --   --   --   --  3.2*   MAGNESIUM mg/dL 2.5 2.1 2.3 2.1   < >  --    PHOSPHORUS mg/dL 4.1 4.7* 4.0 3.8   < >  --     < > = values in this interval not displayed.     Results from last 7 days   Lab Units 07/27/23  1612   LIPASE U/L 14     Results from last 7 days   Lab Units 07/30/23  1217 07/30/23  1029 07/28/23  0053 07/27/23 2022 07/27/23  1612   HSTROP T ng/L 18* 17* 44* 43* 41*   PROBNP pg/mL  --   --   --   --  6,371.0*           Invalid input(s): LDLCALC          Test Results Pending at Discharge   No pending tests at time of discharge.    Discharge Details        Discharge Medications        New Medications        Instructions Start Date   Amitiza 24 MCG capsule  Generic drug: lubiprostone   24 mcg, Oral, 2 Times Daily With Meals      furosemide 20 MG tablet  Commonly known as: LASIX   20 mg, Oral, Daily   Start Date: August 4, 2023     polyethylene glycol 17 GM/SCOOP powder  Commonly known as: MIRALAX   17 g, Oral, Daily   Start Date: August 4, 2023            Changes to Medications        Instructions Start Date   gabapentin 800 MG tablet  Commonly known as: NEURONTIN  What changed: when to take this   800 mg, Oral, 3 Times Daily      ondansetron ODT 4 MG disintegrating tablet  Commonly known as: ZOFRAN-ODT  What changed: when to take this   4 mg, Translingual, Every 8 Hours PRN      pantoprazole 40 MG EC tablet  Commonly known as: PROTONIX  What changed: when to take this   40 mg, Oral, 2 Times Daily Before Meals             Continue These Medications        Instructions Start Date    acetaminophen 325 MG tablet  Commonly known as: TYLENOL   650 mg, Oral, Every 6 Hours PRN      apixaban 5 MG tablet tablet  Commonly known as: ELIQUIS   5 mg, Oral, 2 Times Daily, Last filled 4/12/21 - pt states she was instructed to stop taking medication prior to ERCP ~1 month ago and was not instructed to restart medication.  Indication: Splenic infarct       busPIRone 5 MG tablet  Commonly known as: BUSPAR   5 mg, Oral, 2 Times Daily, For anxiety      dicyclomine 20 MG tablet  Commonly known as: BENTYL   20 mg, Oral, Every 6 Hours      FLUoxetine 40 MG capsule  Commonly known as: PROzac   60 mg, Oral, Nightly      folic acid 1 MG tablet  Commonly known as: FOLVITE   1 mg, Oral, Daily      levETIRAcetam 1000 MG tablet  Commonly known as: KEPPRA   2,000 mg, Oral, Every 12 Hours Scheduled      melatonin 5 MG tablet tablet   10 mg, Oral, Nightly, Patient taes 20 mg at home      OLANZapine 10 MG tablet  Commonly known as: zyPREXA   5 mg, Oral, 2 Times Daily      traZODone 50 MG tablet  Commonly known as: DESYREL   Take 1-2 tablets by mouth At Night As Needed for sleep      Vitamin B-2 100 MG tablet tablet  Commonly known as: RIBOFLAVIN   400 mg, Oral, Daily             Stop These Medications      sucralfate 1 g tablet  Commonly known as: CARAFATE     SUMAtriptan 100 MG tablet  Commonly known as: IMITREX              Allergies   Allergen Reactions    Morphine Hives    Lidocaine Rash     Pt reports lidocaine patches make her breakout in a rash       Discharge Disposition:  Home or Self Care      Discharge Diet:  Diet Order   Procedures    Diet: Regular/House Diet; Texture: Regular Texture (IDDSI 7); Fluid Consistency: Thin (IDDSI 0)       Discharge Activity:   Activity Instructions       Activity as Tolerated              CODE STATUS:    Code Status and Medical Interventions:   Ordered at: 07/27/23 1922     Code Status (Patient has no pulse and is not breathing):    CPR (Attempt to Resuscitate)     Medical  Interventions (Patient has pulse or is breathing):    Full     Release to patient:    Routine Release       Future Appointments   Date Time Provider Department Center   8/16/2023  2:00 PM Nisha Magaña APRN [facility]      Follow-up Information       Lianet Paulino APRN Follow up in 2 week(s).    Specialty: Cardiology  Contact information:  [address] [facility] [address]  [facility] [id number] [phone]               Sahil Cornelius MD Follow up in 2 day(s).    Specialty: Internal Medicine  Contact information:  [address]  [facility] [id number] [phone]                             Time Spent on Discharge:  Greater than 30 minutes      Christiana Casper MD  [facility]  08/03/23  12:24 EDT                Electronically signed by Christiana Casper MD at 08/03/23 3743

## 2023-08-04 NOTE — PROGRESS NOTES
Case Management Discharge Note      Final Note: Discharged to home on 8/3/23. WAYNE Gill RN, CCP         Selected Continued Care - Discharged on 8/3/2023 Admission date: 7/27/2023 - Discharge disposition: Home or Self Care      Destination    No services have been selected for the patient.                Durable Medical Equipment    No services have been selected for the patient.                Dialysis/Infusion    No services have been selected for the patient.                Home Medical Care    No services have been selected for the patient.                Therapy    No services have been selected for the patient.                Community Resources    No services have been selected for the patient.                Community & DME    No services have been selected for the patient.                    Transportation Services  Private: Car    Final Discharge Disposition Code: 01 - home or self-care

## 2023-08-04 NOTE — PROGRESS NOTES
Discharge Planning Assessment  [facility]     Patient Name: Belen Allen  MRN: [id number]  Today's Date: 8/4/2023    Admit Date: 7/27/2023    Plan: Home with significant other   Discharge Needs Assessment    No documentation.                  Discharge Plan       Row Name 08/04/23 1339       Plan    Plan Comments [place] [facility] [place] [address] [place] [address] on 8/2/23. WAYNE Gill RN, CCP    Final Discharge Disposition Code 01 - home or self-care    Final Note Discharged to home on 8/3/23. WAYNE Gill RN, CCP                  Continued Care and Services - Discharged on 8/3/2023 Admission date: 7/27/2023 - Discharge disposition: Home or Self Care      Durable Medical Equipment       Service Provider Request Status Selected Services Address Phone Fax Patient Preferred    [facility] N/A [address] [address] [phone] --                  Expected Discharge Date and Time       Expected Discharge Date Expected Discharge Time    Aug 3, 2023            Demographic Summary    No documentation.                  Functional Status    No documentation.                  Psychosocial    No documentation.                  Abuse/Neglect    No documentation.                  Legal    No documentation.                  Substance Abuse    No documentation.                  Patient Forms    No documentation.                     Cassie Gill RN

## 2023-08-07 ENCOUNTER — READMISSION MANAGEMENT (OUTPATIENT)
Dept: CALL CENTER | Facility: HOSPITAL | Age: 43
End: 2023-08-07
Payer: COMMERCIAL

## 2023-08-07 NOTE — OUTREACH NOTE
Medical Week 1 Survey      Flowsheet Row Responses   [facility] patient discharged from? [place]   Does the patient have one of the following disease processes/diagnoses(primary or secondary)? Other   Week 1 attempt successful? No   Unsuccessful attempts Attempt 1            Sharda SEARS - Registered Nurse

## 2023-08-09 ENCOUNTER — READMISSION MANAGEMENT (OUTPATIENT)
Dept: CALL CENTER | Facility: HOSPITAL | Age: 43
End: 2023-08-09
Payer: COMMERCIAL

## 2023-08-09 NOTE — OUTREACH NOTE
Medical Week 1 Survey      Flowsheet Row Responses   [facility] patient discharged from? [place]   Does the patient have one of the following disease processes/diagnoses(primary or secondary)? Other   Week 1 attempt successful? No   Unsuccessful attempts Attempt 2  [attempted pt and all contacts]            QUENTIN CUBA - Registered Nurse

## 2023-08-14 ENCOUNTER — READMISSION MANAGEMENT (OUTPATIENT)
Dept: CALL CENTER | Facility: HOSPITAL | Age: 43
End: 2023-08-14
Payer: COMMERCIAL

## 2023-08-14 NOTE — OUTREACH NOTE
Medical Week 2 Survey      Flowsheet Row Responses   [facility] patient discharged from? [facility]   Does the patient have one of the following disease processes/diagnoses(primary or secondary)? Other   Week 2 attempt successful? No   Unsuccessful attempts Attempt 1            Jenny MARQUEZ - Registered Nurse

## 2023-08-15 ENCOUNTER — READMISSION MANAGEMENT (OUTPATIENT)
Dept: CALL CENTER | Facility: HOSPITAL | Age: 43
End: 2023-08-15
Payer: COMMERCIAL

## 2023-08-15 NOTE — OUTREACH NOTE
Medical Week 2 Survey      Flowsheet Row Responses   [facility] patient discharged from? [place]   Does the patient have one of the following disease processes/diagnoses(primary or secondary)? Other   Week 2 attempt successful? No   Unsuccessful attempts Attempt 2            Perlita HOLGUIN - Registered Nurse

## 2023-08-24 ENCOUNTER — READMISSION MANAGEMENT (OUTPATIENT)
Dept: CALL CENTER | Facility: HOSPITAL | Age: 43
End: 2023-08-24
Payer: COMMERCIAL

## 2023-08-24 NOTE — OUTREACH NOTE
Medical Week 3 Survey      Flowsheet Row Responses   [facility] patient discharged from? [facility]   Does the patient have one of the following disease processes/diagnoses(primary or secondary)? Other   Week 3 attempt successful? No   Unsuccessful attempts Attempt 1   Revoke Decline to participate            Tamara MARQUEZ - Registered Nurse

## 2023-11-16 NOTE — PLAN OF CARE
Goal Outcome Evaluation:           
Goal Outcome Evaluation:  Plan of Care Reviewed With: patient  Progress: no change  Outcome Summary: pt c/o pain gave prn meds, did not rest well, been NPO since midnight for procedure today @ 8  
Statement Selected

## 2024-01-31 ENCOUNTER — APPOINTMENT (OUTPATIENT)
Dept: CT IMAGING | Facility: HOSPITAL | Age: 44
End: 2024-01-31
Payer: COMMERCIAL

## 2024-01-31 ENCOUNTER — HOSPITAL ENCOUNTER (EMERGENCY)
Facility: HOSPITAL | Age: 44
Discharge: HOME | End: 2024-01-31
Attending: EMERGENCY MEDICINE | Admitting: EMERGENCY MEDICINE
Payer: COMMERCIAL

## 2024-01-31 VITALS
OXYGEN SATURATION: 96 % | DIASTOLIC BLOOD PRESSURE: 78 MMHG | HEIGHT: 64 IN | RESPIRATION RATE: 16 BRPM | SYSTOLIC BLOOD PRESSURE: 113 MMHG | BODY MASS INDEX: 36.92 KG/M2 | WEIGHT: 216.27 LBS | HEART RATE: 88 BPM | TEMPERATURE: 98.8 F

## 2024-01-31 DIAGNOSIS — M54.16 LUMBAR RADICULOPATHY: Primary | ICD-10-CM

## 2024-01-31 PROCEDURE — 96375 TX/PRO/DX INJ NEW DRUG ADDON: CPT

## 2024-01-31 PROCEDURE — 25010000002 HYDROMORPHONE PER 4 MG: Performed by: EMERGENCY MEDICINE

## 2024-01-31 PROCEDURE — 25010000002 KETOROLAC TROMETHAMINE PER 15 MG: Performed by: EMERGENCY MEDICINE

## 2024-01-31 PROCEDURE — 96374 THER/PROPH/DIAG INJ IV PUSH: CPT

## 2024-01-31 PROCEDURE — 72131 CT LUMBAR SPINE W/O DYE: CPT

## 2024-01-31 PROCEDURE — 25010000002 ONDANSETRON PER 1 MG: Performed by: EMERGENCY MEDICINE

## 2024-01-31 PROCEDURE — 25010000002 METHYLPREDNISOLONE PER 125 MG: Performed by: EMERGENCY MEDICINE

## 2024-01-31 PROCEDURE — 96376 TX/PRO/DX INJ SAME DRUG ADON: CPT

## 2024-01-31 PROCEDURE — 25010000002 HYDROMORPHONE 1 MG/ML SOLUTION: Performed by: EMERGENCY MEDICINE

## 2024-01-31 PROCEDURE — 99284 EMERGENCY DEPT VISIT MOD MDM: CPT

## 2024-01-31 RX ORDER — METHYLPREDNISOLONE 4 MG/1
TABLET ORAL
Qty: 21 EACH | Refills: 0 | Status: SHIPPED | OUTPATIENT
Start: 2024-01-31

## 2024-01-31 RX ORDER — METHYLPREDNISOLONE SODIUM SUCCINATE 125 MG/2ML
125 INJECTION, POWDER, LYOPHILIZED, FOR SOLUTION INTRAMUSCULAR; INTRAVENOUS ONCE
Status: COMPLETED | OUTPATIENT
Start: 2024-01-31 | End: 2024-01-31

## 2024-01-31 RX ORDER — ONDANSETRON 2 MG/ML
4 INJECTION INTRAMUSCULAR; INTRAVENOUS ONCE
Status: COMPLETED | OUTPATIENT
Start: 2024-01-31 | End: 2024-01-31

## 2024-01-31 RX ORDER — PROMETHAZINE HYDROCHLORIDE 25 MG/1
25 TABLET ORAL EVERY 6 HOURS PRN
Qty: 10 TABLET | Refills: 0 | Status: SHIPPED | OUTPATIENT
Start: 2024-01-31

## 2024-01-31 RX ORDER — HYDROMORPHONE HYDROCHLORIDE 1 MG/ML
0.5 INJECTION, SOLUTION INTRAMUSCULAR; INTRAVENOUS; SUBCUTANEOUS ONCE
Status: COMPLETED | OUTPATIENT
Start: 2024-01-31 | End: 2024-01-31

## 2024-01-31 RX ORDER — OXYCODONE HYDROCHLORIDE AND ACETAMINOPHEN 5; 325 MG/1; MG/1
1-2 TABLET ORAL EVERY 6 HOURS PRN
Qty: 12 TABLET | Refills: 0 | Status: SHIPPED | OUTPATIENT
Start: 2024-01-31

## 2024-01-31 RX ORDER — KETOROLAC TROMETHAMINE 15 MG/ML
15 INJECTION, SOLUTION INTRAMUSCULAR; INTRAVENOUS ONCE
Status: COMPLETED | OUTPATIENT
Start: 2024-01-31 | End: 2024-01-31

## 2024-01-31 RX ADMIN — KETOROLAC TROMETHAMINE 15 MG: 15 INJECTION, SOLUTION INTRAMUSCULAR; INTRAVENOUS at 12:48

## 2024-01-31 RX ADMIN — HYDROMORPHONE HYDROCHLORIDE 1 MG: 1 INJECTION, SOLUTION INTRAMUSCULAR; INTRAVENOUS; SUBCUTANEOUS at 12:49

## 2024-01-31 RX ADMIN — METHYLPREDNISOLONE SODIUM SUCCINATE 125 MG: 125 INJECTION, POWDER, FOR SOLUTION INTRAMUSCULAR; INTRAVENOUS at 12:46

## 2024-01-31 RX ADMIN — ONDANSETRON 4 MG: 2 INJECTION INTRAMUSCULAR; INTRAVENOUS at 12:44

## 2024-01-31 RX ADMIN — HYDROMORPHONE HYDROCHLORIDE 0.5 MG: 1 INJECTION, SOLUTION INTRAMUSCULAR; INTRAVENOUS; SUBCUTANEOUS at 14:18

## 2024-01-31 NOTE — ED TRIAGE NOTES
Patient to ED per EMS from home w/ reports of bilateral low back pain radiating up her spine, down both legs, and wrapping around right side. Patient denies injury prior to pain; denies bowel or bladder incontinence. Patient reports recent falls due to leg pain.

## 2024-02-19 NOTE — PAYOR COMM NOTE
"Carson Mullen (40 y.o. Female)     INITIAL REQUEST FOR IP AUTH FOR MEMBER [id number]  OBS 7/4 AND CONVERTED TO IP 7/6    Please note - Pt has hx of splenic infarction and is admitted for abd pain.  Pt is on Iv fluids and not tolerating a lot of PO.  Pt did get 5 doses of iv pain meds thus far and 4 doses of iv nausea meds.  This is a fence case.  If the case is denied or if the pt leaves before final auth, please send the case back to me.      CONTACT ESVIN DELCID  P# [phone]  F# [phone]    Date of Birth Social Security Number Address Home Phone MRN    1980  [address]  [place] [id number] [phone] [id number]    Latter-day Marital Status          None Single       Admission Date Admission Type Admitting Provider Attending Provider Department, Room/Bed    7/4/21 Emergency Rogelio Arnold MD Jackson, Alan David, MD [address], 78/[address]    Discharge Date Discharge Disposition Discharge Destination                       Attending Provider: Rogelio Arnold MD    Allergies: No Known Allergies    Isolation: None   Infection: None   Code Status: CPR    Ht: 162.6 cm (64\")   Wt: 109 kg (239 lb 14.4 oz)    Admission Cmt: None   Principal Problem: None                Active Insurance as of 7/4/2021     Primary Coverage     Payor Plan Insurance Group Employer/Plan Group    [facility] [facility] [id number]     Payor Plan Address Payor Plan Phone Number Payor Plan Fax Number Effective Dates    [address]   1/1/2021 - None Entered    [place] [address]       Subscriber Name Subscriber Birth Date Member ID       CARSON MULLEN 1980 [id number]                 Emergency Contacts      (Rel.) Home Phone Work Phone Mobile Phone    JUNIOR AGUAYO (Significant Other) [phone] -- [phone]               History & Physical      Lisa Almodovar APRN at 07/05/21 0902     Attestation signed by Rogelio Arnold " Lov 12/18/23, next 6/18/24   MD Gold at 07/05/21 1525    I supervised care provided by the APRN. We have discussed the case. I have reviewed  the note and agree with the plan of treatment. I personally interviewed the patient and examined the patient. Exam with 41 yo. Obese, no resp distress. Abd soft, no guarding.    Plan as listed in APRN note. GI has seen and plans for MRCP. So far workup is fairly un-remarkable. Will monitor and follow up on testing and treatment. Discussed with her the importance of close outpatient follow up with providers.     Rogelio Arnold MD  [facility]  07/05/21  15:24 EDT                        Patient Name:  Belen Allen  YOB: 1980  MRN:  [id number]  Admit Date:  7/4/2021  Patient Care Team:  Sahil Cornelius MD as PCP - General (Internal Medicine)  Code, Bjorn HERRERA II, MD as Consulting Physician (Hematology and Oncology)  Dennys Carranza MD as Referring Physician (Hospitalist)      Subjective   History Present Illness     Chief Complaint   Patient presents with   • Flank Pain     R side   • Leg Swelling     bilateral        Ms. Allen is a 40 y.o. smoker with a history of migraines, chronic back pain on home neurontin and narcotics, splenic infarct that presents to [facility] complaining of right sided abfominal pain. Pain is described as severe and radiating to her back.  Pain has been intermittent for several weeks but worse in the last several days.  He has nausea with several episodes of nonbloody emesis.  She has not followed up with gastroenterologist notified PCP of this pain.  Her home medication is not effective in treating discomfort.  She denies diarrhea, rash, change in stools, LUTS, fever, chills, chest pain, cough, shortness of breath.  Last bowel movement this morning.  She was treated here in June for abdominal pain but we are unable to do MRCP states she was sent to [facility]  On 6/3/2020 when she underwent ERCP with  sphincterotomy and dilation, cholangiogram demonstrated widely patent sphincterotomy and dilated bile ducts with no filling abnormality.   She was here in ED 3 days ago and had CTA of the abdomen without evolving infarct, no acute intraabdominal  process.  Patient  was on Eliquis for splenic infarct that was stopped prior to ERCP but she has not yet resumed it.       History of Present Illness  Review of Systems   Constitutional: Negative for appetite change and unexpected weight change.   HENT: Negative for trouble swallowing.    Eyes: Negative for visual disturbance.   Respiratory: Negative for cough, choking, chest tightness and shortness of breath.    Cardiovascular: Negative for chest pain.   Gastrointestinal: Positive for abdominal pain, nausea and vomiting. Negative for abdominal distention, blood in stool, constipation and diarrhea.   Genitourinary: Negative for dysuria.   Musculoskeletal: Negative for back pain.   Skin: Negative for color change.   Neurological: Negative for dizziness.   Hematological: Does not bruise/bleed easily.   Psychiatric/Behavioral: Negative.  Negative for confusion.        Personal History     Past Medical History:   Diagnosis Date   • Abnormal Pap smear of cervix    • Ankle fracture 2013   • H/O Elevated liver enzymes    • History of chronic back pain    • History of migraine    • History of urinary tract infection    • Injury of back    • PONV (postoperative nausea and vomiting)    • Seasonal allergies      Past Surgical History:   Procedure Laterality Date   • BACK SURGERY  2006    fusion L4, L5     • CHOLECYSTECTOMY  2014   • DILATATION AND CURETTAGE  2009   • ERCP N/A 6/3/2021    Procedure: ENDOSCOPIC RETROGRADE CHOLANGIOPANCREATOGRAPHY WITH SPHINCTEROTOMY, DILATION WITH BALLOON CLEARANCE  (12MM-15MM);  Surgeon: Bjorn Flannery MD;  Location: [facility] ENDOSCOPY;  Service: Gastroenterology;  Laterality: N/A;  DILATED COMMON BILE DUCT   • SKIN SURGERY     • TUBAL ABDOMINAL  LIGATION  2013   • WISDOM TOOTH EXTRACTION  2018    x2     Family History   Problem Relation Age of Onset   • Stroke Mother    • Allergic rhinitis Mother    • Allergic rhinitis Sister    • Breast cancer Maternal Aunt    • Cancer Maternal Grandmother    • Allergic rhinitis Paternal Grandfather    • Cancer Paternal Grandfather    • Prostate cancer Paternal Grandfather      Social History     Tobacco Use   • Smoking status: Current Some Day Smoker     Packs/day: 0.50     Last attempt to quit: 2020     Years since quittin.6   • Smokeless tobacco: Never Used   Vaping Use   • Vaping Use: Never used   Substance Use Topics   • Alcohol use: Not Currently   • Drug use: Not Currently     No current facility-administered medications on file prior to encounter.     Current Outpatient Medications on File Prior to Encounter   Medication Sig Dispense Refill   • amitriptyline (ELAVIL) 25 MG tablet Take 25 mg by mouth Every Night.     • apixaban (ELIQUIS) 5 MG tablet tablet Take 5 mg by mouth 2 (Two) Times a Day. Last filled 21 - pt states she was instructed to stop taking medication prior to ERCP ~1 month ago and was not instructed to restart medication.   Indication: Splenic infarct     • cyclobenzaprine (FLEXERIL) 10 MG tablet Take 1 tablet by mouth 2 (Two) Times a Day As Needed for Muscle Spasms. 20 tablet 0   • famotidine (PEPCID) 20 MG tablet Take 1 tablet by mouth 2 (Two) Times a Day Before Meals.     • FLUoxetine (PROzac) 40 MG capsule Take 40 mg by mouth Every Night.     • folic acid (FOLVITE) 1 MG tablet Take 1 tablet by mouth Daily. 30 tablet 0   • gabapentin (NEURONTIN) 800 MG tablet Take 800 mg by mouth 4 (Four) Times a Day.     • hydrOXYzine (ATARAX) 25 MG tablet Take 1-2 tablets by mouth 3 (Three) Times a Day As Needed for Anxiety.     • melatonin 5 MG tablet tablet Take 10 mg by mouth Every Night.     • SUMAtriptan (IMITREX) 100 MG tablet Take 50 mg by mouth Every 2 (Two) Hours As Needed for Migraine.  Take one tablet at onset of headache. May repeat dose one time in 2 hours if headache not relieved.     • traZODone (DESYREL) 50 MG tablet Take 25-50 mg by mouth At Night As Needed for Sleep.     • vitamin D (ERGOCALCIFEROL) 1.25 MG (27870 UT) capsule capsule Take 50,000 Units by mouth 1 (One) Time Per Week.     • HYDROcodone-acetaminophen (NORCO) 7.5-325 MG per tablet Take 1 tablet by mouth Every 6 (Six) Hours As Needed for Moderate Pain . 12 tablet 0   • [DISCONTINUED] cyclobenzaprine (FLEXERIL) 10 MG tablet Take 1 tablet by mouth 3 (Three) Times a Day.       No Known Allergies    Objective    Objective     Vital Signs  Temp:  [97 °F (36.1 °C)-98.7 °F (37.1 °C)] 97.2 °F (36.2 °C)  Heart Rate:  [] 80  Resp:  [16-18] 16  BP: (107-135)/() 113/67  SpO2:  [96 %-99 %] 96 %  on   ;   Device (Oxygen Therapy): room air  Body mass index is 41.18 kg/m².    Physical Exam  Vitals and nursing note reviewed.   Constitutional:       General: She is not in acute distress.     Appearance: She is well-developed. She is obese.      Comments: Patient sleeping snoring loudly upon entering room.  When I wake her up she starts shaking and moaning complaining of abdominal pain   HENT:      Head: Normocephalic and atraumatic.   Eyes:      Pupils: Pupils are equal, round, and reactive to light.   Cardiovascular:      Rate and Rhythm: Normal rate and regular rhythm.      Heart sounds: Normal heart sounds.   Pulmonary:      Effort: Pulmonary effort is normal.      Breath sounds: Normal breath sounds.   Abdominal:      General: Bowel sounds are normal. There is no distension or abdominal bruit.      Palpations: Abdomen is soft. Abdomen is not rigid. There is no shifting dullness, fluid wave, mass or pulsatile mass.      Tenderness: There is abdominal tenderness in the right upper quadrant. There is no guarding or rebound.      Hernia: No hernia is present.      Comments: Patient sleeping snoring loudly upon entering room.  Pain  with abdominal palpation but no pain when pushing with stethoscope in right upper quadrant.  Soft round obese   Musculoskeletal:         General: Normal range of motion.   Skin:     General: Skin is warm and dry.   Neurological:      Mental Status: She is alert and oriented to person, place, and time.   Psychiatric:         Behavior: Behavior normal.         Thought Content: Thought content normal.         Results Review:  I reviewed the patient's new clinical results.  I reviewed the patient's new imaging results and agree with the interpretation.  I reviewed the patient's other test results and agree with the interpretation  I personally viewed and interpreted the patient's EKG/Telemetry data  Discussed with ED provider.    Lab Results (last 24 hours)     Procedure Component Value Units Date/Time    CBC & Differential [[id number]]  (Normal) Collected: 07/04/21 2048    Specimen: Blood Updated: 07/04/21 2101    Narrative:      The following orders were created for panel order CBC & Differential.  Procedure                               Abnormality         Status                     ---------                               -----------         ------                     CBC Auto Differential[[id number]]        Normal              Final result                 Please view results for these tests on the individual orders.    Comprehensive Metabolic Panel [[id number]]  (Abnormal) Collected: 07/04/21 2048    Specimen: Blood Updated: 07/04/21 2125     Glucose 94 mg/dL      BUN 6 mg/dL      Creatinine 0.79 mg/dL      Sodium 135 mmol/L      Potassium 3.9 mmol/L      Chloride 103 mmol/L      CO2 23.4 mmol/L      Calcium 8.8 mg/dL      Total Protein 6.2 g/dL      Albumin 3.70 g/dL      ALT (SGPT) 23 U/L      AST (SGOT) 26 U/L      Alkaline Phosphatase 139 U/L      Total Bilirubin 0.2 mg/dL      eGFR Non African Amer 81 mL/min/1.73      Globulin 2.5 gm/dL      A/G Ratio 1.5 g/dL      BUN/Creatinine Ratio 7.6     Anion Gap 8.6 mmol/L      Narrative:      GFR Normal >60  Chronic Kidney Disease <60  Kidney Failure <15      Lipase [[id number]]  (Normal) Collected: 07/04/21 2048    Specimen: Blood Updated: 07/04/21 2125     Lipase 14 U/L     hCG, Serum, Qualitative [[id number]]  (Normal) Collected: 07/04/21 2048    Specimen: Blood Updated: 07/04/21 2114     HCG Qualitative Negative    CBC Auto Differential [[id number]]  (Normal) Collected: 07/04/21 2048    Specimen: Blood Updated: 07/04/21 2101     WBC 8.35 10*3/mm3      RBC 4.00 10*6/mm3      Hemoglobin 12.5 g/dL      Hematocrit 37.2 %      MCV 93.0 fL      MCH 31.3 pg      MCHC 33.6 g/dL      RDW 13.7 %      RDW-SD 45.9 fl      MPV 9.6 fL      Platelets 320 10*3/mm3     Manual Differential [[id number]]  (Abnormal) Collected: 07/04/21 2048    Specimen: Blood Updated: 07/04/21 2126     Neutrophil % 61.6 %      Lymphocyte % 27.3 %      Monocyte % 4.0 %      Eosinophil % 5.1 %      Basophil % 2.0 %      Neutrophils Absolute 5.14 10*3/mm3      Lymphocytes Absolute 2.28 10*3/mm3      Monocytes Absolute 0.33 10*3/mm3      Eosinophils Absolute 0.43 10*3/mm3      Basophils Absolute 0.17 10*3/mm3      RBC Morphology Normal     WBC Morphology Normal     Platelet Morphology Normal    Urinalysis With Microscopic If Indicated (No Culture) - Urine, Clean Catch [[id number]]  (Normal) Collected: 07/04/21 2120    Specimen: Urine, Clean Catch Updated: 07/04/21 2139     Color, UA Yellow     Appearance, UA Clear     pH, UA 8.0     Specific Gravity, UA 1.013     Glucose, UA Negative     Ketones, UA Negative     Bilirubin, UA Negative     Blood, UA Negative     Protein, UA Negative     Leuk Esterase, UA Negative     Nitrite, UA Negative     Urobilinogen, UA 0.2 E.U./dL    Narrative:      Urine microscopic not indicated.    Urine Drug Screen - Urine, Clean Catch [[id number]]  (Abnormal) Collected: 07/04/21 2120    Specimen: Urine, Clean Catch Updated: 07/04/21 2202     Amphet/Methamphet, Screen Negative     Barbiturates Screen,  Urine Negative     Benzodiazepine Screen, Urine Negative     Cocaine Screen, Urine Negative     Opiate Screen Positive     THC, Screen, Urine Negative     Methadone Screen, Urine Negative     Oxycodone Screen, Urine Negative    Narrative:      Negative Thresholds Per Drugs Screened:    Amphetamines                 500 ng/ml  Barbiturates                 200 ng/ml  Benzodiazepines              100 ng/ml  Cocaine                      300 ng/ml  Methadone                    300 ng/ml  Opiates                      300 ng/ml  Oxycodone                    100 ng/ml  THC                           50 ng/ml    The Normal Value for all drugs tested is negative. This report includes final unconfirmed screening results to be used for medical treatment purposes only. Unconfirmed results must not be used for non-medical purposes such as employment or legal testing. Clinical consideration should be applied to any drug of abuse test, particularly when unconfirmed results are used.            COVID PRE-OP / PRE-PROCEDURE SCREENING ORDER (NO ISOLATION) - Swab, Nasopharynx [[id number]]  (Normal) Collected: 07/05/21 0141    Specimen: Swab from Nasopharynx Updated: 07/05/21 0333    Narrative:      The following orders were created for panel order COVID PRE-OP / PRE-PROCEDURE SCREENING ORDER (NO ISOLATION) - Swab, Nasopharynx.  Procedure                               Abnormality         Status                     ---------                               -----------         ------                     COVID-19,[facility] IN-HOUSE...[[id number]]  Normal              Final result                 Please view results for these tests on the individual orders.    COVID-19,[facility] IN-HOUSE CEPHEID/MIGNON NP SWAB IN TRANSPORT MEDIA 8-12 HR TAT - Swab, Nasopharynx [[id number]]  (Normal) Collected: 07/05/21 0141    Specimen: Swab from Nasopharynx Updated: 07/05/21 0333     COVID19 Not Detected    Narrative:      Fact sheet for providers:  https://www.fda.gov/media/796314/download     Fact sheet for patients: https://www.fda.gov/media/248022/download    CBC Auto Differential [[id number]]  (Abnormal) Collected: 07/05/21 0645    Specimen: Blood Updated: 07/05/21 0740     WBC 6.08 10*3/mm3      RBC 3.68 10*6/mm3      Hemoglobin 11.4 g/dL      Hematocrit 35.5 %      MCV 96.5 fL      MCH 31.0 pg      MCHC 32.1 g/dL      RDW 13.9 %      RDW-SD 48.5 fl      MPV 9.7 fL      Platelets 290 10*3/mm3      Neutrophil % 42.9 %      Lymphocyte % 47.4 %      Monocyte % 5.8 %      Eosinophil % 3.1 %      Basophil % 0.3 %      Immature Grans % 0.5 %      Neutrophils, Absolute 2.61 10*3/mm3      Lymphocytes, Absolute 2.88 10*3/mm3      Monocytes, Absolute 0.35 10*3/mm3      Eosinophils, Absolute 0.19 10*3/mm3      Basophils, Absolute 0.02 10*3/mm3      Immature Grans, Absolute 0.03 10*3/mm3      nRBC 0.0 /100 WBC     Basic Metabolic Panel [[id number]]  (Normal) Collected: 07/05/21 0645    Specimen: Blood Updated: 07/05/21 0803     Glucose 76 mg/dL      BUN 6 mg/dL      Creatinine 0.74 mg/dL      Sodium 137 mmol/L      Potassium 4.1 mmol/L      Chloride 106 mmol/L      CO2 23.9 mmol/L      Calcium 8.6 mg/dL      eGFR Non African Amer 87 mL/min/1.73      BUN/Creatinine Ratio 8.1     Anion Gap 7.1 mmol/L     Narrative:      GFR Normal >60  Chronic Kidney Disease <60  Kidney Failure <15      Protime-INR [[id number]]  (Normal) Collected: 07/05/21 0645    Specimen: Blood Updated: 07/05/21 0750     Protime 12.7 Seconds      INR 0.97    Alkaline Phosphatase, Isoenzymes [[id number]] Collected: 07/05/21 1112    Specimen: Blood Updated: 07/05/21 1146        Results from last 7 days   Lab Units 07/05/21  0645 07/04/21 2048 07/02/21 2037   SODIUM mmol/L 137 135* 141   POTASSIUM mmol/L 4.1 3.9 3.7   CHLORIDE mmol/L 106 103 105   CO2 mmol/L 23.9 23.4 27.8   BUN mg/dL 6 6 7   CREATININE mg/dL 0.74 0.79 0.72   CALCIUM mg/dL 8.6 8.8 8.8   BILIRUBIN mg/dL  --  0.2 0.3   ALK PHOS U/L  --   139* 132*   ALT (SGPT) U/L  --  23 26   AST (SGOT) U/L  --  26 28   GLUCOSE mg/dL 76 94 109*         Imaging Results (Last 24 Hours)     Procedure Component Value Units Date/Time    US Abdomen Limited [[id number]] Collected: 07/05/21 0010     Updated: 07/05/21 0010    Narrative:        Patient: CARSON MULLEN  Time Out: 00:10  Exam(s): US ABDOMEN LIMITED     EXAM:    US Abdomen Limited, Right Upper Quadrant    CLINICAL HISTORY:     Reason for exam: Right upper quadrant pain; History cholecystectomy-  eval common bile duct and liver please.    TECHNIQUE:    Real-time ultrasound of the right upper quadrant with image   documentation.    COMPARISON:    No relevant prior studies available.    FINDINGS:    Liver:  Unremarkable.    Gallbladder:  Cholecystectomy.    Common bile duct:  CBD 6 mm.  No definite choledocholithiasis.    Pancreas:  Unremarkable as visualized.    Right kidney:  No hydronephrosis.    IMPRESSION:         CBD 6 mm.  No definite choledocholithiasis.      Impression:          Electronically signed by Brad Galloway M.D. on 07-05-21 at 0010    CT Abdomen Pelvis Without Contrast [[id number]] Collected: 07/04/21 2355     Updated: 07/04/21 2355    Narrative:        Patient: CARSON MULLEN  Time Out: 23:54  Exam(s): CT ABDOMEN + PELVIS Without Contrast     EXAM:    CT Abdomen and Pelvis Without Intravenous Contrast    CLINICAL HISTORY:     Reason for exam: R flank pain.    TECHNIQUE:    Axial computed tomography images of the abdomen and pelvis without   intravenous contrast.  CTDI is 31.9 mGy and DLP is 1711.3 mGy-cm.  This   CT exam was performed according to the principle of ALARA (As Low As   Reasonably Achievable) by using one or more of the following dose   reduction techniques: automated exposure control, adjustment of the mA   and or kV according to patient size, and or use of iterative   reconstruction technique.    COMPARISON:    07 02 2021.    FINDINGS:    Limitations:  Limited due to metallic  artifact.    Lung bases:  Unremarkable.     ABDOMEN:    Liver:  Unremarkable.    Gallbladder and bile ducts:  Cholecystectomy.    Pancreas:  Unremarkable.    Spleen:  Unremarkable.    Adrenals:  Unremarkable.    Kidneys and ureters:  No hydronephrosis or ureteral calculus.  Left   renal scarring.    Stomach and bowel:  Moderate colonic stool which may be   ileus constipation.  No mechanical bowel obstruction.  No diverticulitis.     PELVIS:    Appendix:  No findings to suggest acute appendicitis.    Bladder:  Distended bladder.    Reproductive:  Unremarkable as visualized.     ABDOMEN and PELVIS:    Intraperitoneal space:  No free air.    Bones joints:  Lumbar spine postop changes.    Soft tissues:  Unremarkable.    Vasculature:  Unremarkable.    Lymph nodes:  Unremarkable.    IMPRESSION:       1.  No hydronephrosis or ureteral calculus.  2.  Moderate colonic stool which may be ileus constipation.  No   mechanical bowel obstruction.  No diverticulitis.      Impression:          Electronically signed by Brad Galloway M.D. on 07-04-21 at 2354          Results for orders placed during the hospital encounter of 11/08/20    Adult Transesophageal Echo (DENIS) W/ Cont if Necessary Per Protocol    Interpretation Summary  · Left ventricular ejection fraction appears to be 61 - 65%. Left ventricular systolic function is normal.  · Saline test results are negative.      No orders to display        Assessment/Plan     Active Hospital Problems    Diagnosis  POA   • Intractable abdominal pain [R10.9]  Yes   • Obesity, Class III, BMI 40-49.9 (morbid obesity) (CMS/HCC) [E66.01]  Yes   • Constipation [K59.00]  Yes   • History of ERCP [Z98.890]  Not Applicable   • Other chronic pain [G89.29]  Yes   • Tobacco abuse [Z72.0]  Yes   • Vitamin D deficiency [E55.9]  Yes   • Generalized abdominal pain [R10.84]  Yes   • Antiphospholipid antibody positive [R76.0]  Yes      Resolved Hospital Problems   No resolved problems to display.       Ms.  Alejandro is a 40 y.o. presents with right upper quadrant pain status post ERCP with sphincterotomy 6/3/2021. He is afebrile without leukocytosis. Normal LFTs except mild alkaline phosphatase elevation.  Normal lipase.  CT abdomen pelvis without abnormality and ultrasound abdomen unrevealing except possible ileus but her bowels are moving.    · Appreciate GI assistance  · MRCP ordered  · Continue as needed Toradol, Norco, Flexeril, and home gabapentin.  She is asking for Dilaudid which was administered in the ER but given lack of  evidence of acute intra-abdominal process it is not appropriate.   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  Seen by hematology at that time.     · Started on eliquis BID which she stopped prior to ERCP and has not resumed. Per EMR, she has not followed up with [facility] hematology as directed. She is asking for hematology consult now.     ·    · I discussed the patient's findings and my recommendations with patient, nursing staff, consulting provider and Dr. Arnold .    VTE Prophylaxis - SCDs.  Code Status - Full code.       NELSON Dunbar  [facility]  07/05/21  12:56 EDT    Electronically signed by Rogelio Arnold MD at 07/05/21 1525          Emergency Department Notes      Conchis Chabmerlain, RN at 07/04/21 1957        Pt to ER from home via PV with c/o R flank pain and BLE swelling. Pt states she was seen here 2 days ago for the same. Pt states symptoms have been worsening in severity since discharge. Pt states R flank pain wraps around to RLQ, denies any dysuria but reports nausea and diarrhea. Pt reports diarrhea. Pt reports feet as being very tender to touch.       Pt masked in triage, staff in appropriate ppe.      Electronically signed by Conchis Chamberlain RN at 07/04/21 1958     Sarath Rodriguez MD at 07/04/21 2002            EMERGENCY DEPARTMENT ENCOUNTER    Room Number:  18/18  Date of encounter:  7/5/2021  PCP: Sahil Cornelius MD  Historian: Patient    Patient was placed in face mask during triage process. Patient was wearing facemask when I entered the room and throughout our encounter. I wore full protective equipment throughout this patient encounter including a face mask, eye protection, and gloves. Hand hygiene was performed before donning protective equipment and again following doffing of PPE after leaving the room.    HPI:  Chief Complaint: Right flank pain  A complete HPI/ROS/PMH/PSH/SH/FH are unobtainable due to: N/A   Context: Belen Allen is a 40 y.o. female who presents to the ED c/o right flank pain that radiated to the right upper quadrant.  Worsening over several days to weeks.  Patient reports nausea without vomiting.  Some loose stool which nonblack and nonbloody.  No reported dysuria or hematuria.  No reported fevers.  No shortness of breath or chest pain reported.  No clear exacerbating relieving factors identified.  Discomfort described as moderately severe at this time.      MEDICAL HISTORY REVIEW    CTA abdomen/pelvis  7/2/21  IMPRESSION:     1.  Suboptimal CT angiogram evaluation as contrast was not optimally time   for arterial phase.  2.  No evolving splenic infarct identified.  3.  No acute intra-abdominal process.  4.  Additional findings, as described above.     IMPRESSION:        Electronically signed by Mega Hugo M.D. on 07-02-21 at 2328  ---------------------------------------------------------------------  [facility], [place]  Date of Admission: 6/2/2021  Date of Discharge:  6/3/2021  Length of stay:  LOS: 0 days      Admission Diagnosis:   RUQ abdominal pain [R10.11]     Discharge Diagnosis:   Acute RUQ pain with elevated LFTs and dilated common bile duct s/p ERCP with sphincterotomy and dilation 6/3/2021  - CT abd pel- mild intrahepatic/extrahepatic biliary dilatation  - MRCP  reviewed  - tolerating diet  - follow up with GI as needed   Chronic pain  - Continue gabapentin (INSPECT verified)   Hypertension  - ? Related to pain  - discussed with patient about need for outpatient follow up, diet and exercise   Folic acid deficiency  - Continue folic acid   GERD  - Continue pepcid  Anxiety  - Stable  - Continue Prozac, trazodone, and elavil    Obesity  - BMI 35.1  - Enourage lifestyle modifications as appropriate   Tobacco abuse  - nicotine patch  - encourage cessation         Active Hospital Problems     Diagnosis   POA   • Tobacco abuse [Z72.0]   Yes   • GERD without esophagitis [K21.9]   Yes   • Right upper quadrant abdominal pain [R10.11]   Yes   • Common bile duct dilatation [K83.8]   Yes   • Folate deficiency [E53.8]   Yes   • Anxiety disorder [F41.9]   Yes   • Obesity (BMI 35.0-39.9 without comorbidity) [E66.9]            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   • Generalized abdominal pain 11/29/2020   • Bilateral leg weakness 11/29/2020   • Acute bilateral low back pain 11/29/2020   • Antiphospholipid antibody positive 11/29/2020   • On anticoagulant therapy 11/29/2020   • Acute pain of left knee 11/29/2020   • Vitamin D deficiency 11/30/2020   • Folate deficiency 12/01/2020   • Pain of back and left lower extremity 12/02/2020   • Common bile duct dilatation 05/31/2021   • Right upper quadrant abdominal pain 06/01/2021   • Obesity (BMI 35.0-39.9 without comorbidity) 04/18/2019   • Tobacco abuse 06/02/2021   • GERD without esophagitis 06/02/2021     Resolved Ambulatory Problems     Diagnosis Date Noted   • LFTs abnormal 06/01/2021   • Choledocholithiasis 06/02/2021     Past Medical History:   Diagnosis Date   • Abnormal Pap smear of cervix    • Ankle fracture 2013   • H/O Elevated liver enzymes    • History of chronic back pain    • History of migraine     • History of urinary tract infection    • Injury of back    • PONV (postoperative nausea and vomiting)    • Seasonal allergies          PAST SURGICAL HISTORY  Past Surgical History:   Procedure Laterality Date   • BACK SURGERY  2006    fusion L4, L5     • CHOLECYSTECTOMY     • DILATATION AND CURETTAGE     • ERCP N/A 6/3/2021    Procedure: ENDOSCOPIC RETROGRADE CHOLANGIOPANCREATOGRAPHY WITH SPHINCTEROTOMY, DILATION WITH BALLOON CLEARANCE  (12MM-15MM);  Surgeon: Bjorn Flannery MD;  Location: [facility] ENDOSCOPY;  Service: Gastroenterology;  Laterality: N/A;  DILATED COMMON BILE DUCT   • SKIN SURGERY     • TUBAL ABDOMINAL LIGATION     • WISDOM TOOTH EXTRACTION  2018    x2         FAMILY HISTORY  Family History   Problem Relation Age of Onset   • Stroke Mother    • Allergic rhinitis Mother    • Allergic rhinitis Sister    • Breast cancer Maternal Aunt    • Cancer Maternal Grandmother    • Allergic rhinitis Paternal Grandfather    • Cancer Paternal Grandfather    • Prostate cancer Paternal Grandfather          SOCIAL HISTORY  Social History     Socioeconomic History   • Marital status: Single     Spouse name: Not on file   • Number of children: 2   • Years of education: Not on file   • Highest education level: Not on file   Tobacco Use   • Smoking status: Current Some Day Smoker     Packs/day: 0.50     Last attempt to quit: 2020     Years since quittin.6   • Smokeless tobacco: Never Used   Vaping Use   • Vaping Use: Never used   Substance and Sexual Activity   • Alcohol use: Not Currently   • Drug use: Not Currently   • Sexual activity: Defer         ALLERGIES  Patient has no known allergies.        REVIEW OF SYSTEMS  Review of Systems     All systems reviewed and negative except for those discussed in HPI.       PHYSICAL EXAM    I have reviewed the triage vital signs and nursing notes.    ED Triage Vitals [21]   Temp Heart Rate Resp BP SpO2   98.7 °F (37.1 °C) 104 18 (!) 135/102  98 %      Temp src Heart Rate Source Patient Position BP Location FiO2 (%)   -- -- Sitting Left arm --       Physical Exam    Physical Exam   Constitutional: No distress.  Uncomfortable appearing though not overtly toxic  HENT:  Head: Normocephalic and atraumatic.   Oropharynx: Mucous membranes are moist.   Eyes: No scleral icterus. No conjunctival pallor.  Neck: Painless range of motion noted. Neck supple.   Cardiovascular: Normal rate, regular rhythm and intact distal pulses.  Pulmonary/Chest: No respiratory distress.  No tachypnea or increased work of breathing noted.    Abdominal: Soft, large habitus. There is some right upper quadrant tenderness. There is no rebound and no guarding.  There is significant soft tissue tenderness in the right flank with no external findings of trauma, rash, or ecchymosis.  Musculoskeletal: Moves all extremities equally.  Trace pitting edema bilateral shins with no significant calf tenderness, erythema, or palpable cords.  Neurological: Alert.  Baseline strength and sensation noted.   Skin: Skin is pink, warm, and dry. No pallor.   Psychiatric: Mood and affect normal.   Nursing note and vitals reviewed.    LAB RESULTS  Recent Results (from the past 24 hour(s))   Comprehensive Metabolic Panel    Collection Time: 07/04/21  8:48 PM    Specimen: Blood   Result Value Ref Range    Glucose 94 65 - 99 mg/dL    BUN 6 6 - 20 mg/dL    Creatinine 0.79 0.57 - 1.00 mg/dL    Sodium 135 (L) 136 - 145 mmol/L    Potassium 3.9 3.5 - 5.2 mmol/L    Chloride 103 98 - 107 mmol/L    CO2 23.4 22.0 - 29.0 mmol/L    Calcium 8.8 8.6 - 10.5 mg/dL    Total Protein 6.2 6.0 - 8.5 g/dL    Albumin 3.70 3.50 - 5.20 g/dL    ALT (SGPT) 23 1 - 33 U/L    AST (SGOT) 26 1 - 32 U/L    Alkaline Phosphatase 139 (H) 39 - 117 U/L    Total Bilirubin 0.2 0.0 - 1.2 mg/dL    eGFR Non African Amer 81 >60 mL/min/1.73    Globulin 2.5 gm/dL    A/G Ratio 1.5 g/dL    BUN/Creatinine Ratio 7.6 7.0 - 25.0    Anion Gap 8.6 5.0 - 15.0 mmol/L    Lipase    Collection Time: 07/04/21  8:48 PM    Specimen: Blood   Result Value Ref Range    Lipase 14 13 - 60 U/L   hCG, Serum, Qualitative    Collection Time: 07/04/21  8:48 PM    Specimen: Blood   Result Value Ref Range    HCG Qualitative Negative Negative   CBC Auto Differential    Collection Time: 07/04/21  8:48 PM    Specimen: Blood   Result Value Ref Range    WBC 8.35 3.40 - 10.80 10*3/mm3    RBC 4.00 3.77 - 5.28 10*6/mm3    Hemoglobin 12.5 12.0 - 15.9 g/dL    Hematocrit 37.2 34.0 - 46.6 %    MCV 93.0 79.0 - 97.0 fL    MCH 31.3 26.6 - 33.0 pg    MCHC 33.6 31.5 - 35.7 g/dL    RDW 13.7 12.3 - 15.4 %    RDW-SD 45.9 37.0 - 54.0 fl    MPV 9.6 6.0 - 12.0 fL    Platelets 320 140 - 450 10*3/mm3   Manual Differential    Collection Time: 07/04/21  8:48 PM    Specimen: Blood   Result Value Ref Range    Neutrophil % 61.6 42.7 - 76.0 %    Lymphocyte % 27.3 19.6 - 45.3 %    Monocyte % 4.0 (L) 5.0 - 12.0 %    Eosinophil % 5.1 0.3 - 6.2 %    Basophil % 2.0 (H) 0.0 - 1.5 %    Neutrophils Absolute 5.14 1.70 - 7.00 10*3/mm3    Lymphocytes Absolute 2.28 0.70 - 3.10 10*3/mm3    Monocytes Absolute 0.33 0.10 - 0.90 10*3/mm3    Eosinophils Absolute 0.43 (H) 0.00 - 0.40 10*3/mm3    Basophils Absolute 0.17 0.00 - 0.20 10*3/mm3    RBC Morphology Normal Normal    WBC Morphology Normal Normal    Platelet Morphology Normal Normal   Urinalysis With Microscopic If Indicated (No Culture) - Urine, Clean Catch    Collection Time: 07/04/21  9:20 PM    Specimen: Urine, Clean Catch   Result Value Ref Range    Color, UA Yellow Yellow, Straw    Appearance, UA Clear Clear    pH, UA 8.0 5.0 - 8.0    Specific Gravity, UA 1.013 1.005 - 1.030    Glucose, UA Negative Negative    Ketones, UA Negative Negative    Bilirubin, UA Negative Negative    Blood, UA Negative Negative    Protein, UA Negative Negative    Leuk Esterase, UA Negative Negative    Nitrite, UA Negative Negative    Urobilinogen, UA 0.2 E.U./dL 0.2 - 1.0 E.U./dL   Urine Drug Screen - Urine,  Clean Catch    Collection Time: 07/04/21  9:20 PM    Specimen: Urine, Clean Catch   Result Value Ref Range    Amphet/Methamphet, Screen Negative Negative    Barbiturates Screen, Urine Negative Negative    Benzodiazepine Screen, Urine Negative Negative    Cocaine Screen, Urine Negative Negative    Opiate Screen Positive (A) Negative    THC, Screen, Urine Negative Negative    Methadone Screen, Urine Negative Negative    Oxycodone Screen, Urine Negative Negative       Ordered the above labs and independently reviewed the results.        RADIOLOGY  CT Abdomen Pelvis Without Contrast    Result Date: 7/4/2021  Patient: CARSON MULLEN  Time Out: 23:54 Exam(s): CT ABDOMEN + PELVIS Without Contrast EXAM:   CT Abdomen and Pelvis Without Intravenous Contrast CLINICAL HISTORY:    Reason for exam: R flank pain. TECHNIQUE:   Axial computed tomography images of the abdomen and pelvis without intravenous contrast.  CTDI is 31.9 mGy and DLP is 1711.3 mGy-cm.  This CT exam was performed according to the principle of ALARA (As Low As Reasonably Achievable) by using one or more of the following dose reduction techniques: automated exposure control, adjustment of the mA and or kV according to patient size, and or use of iterative reconstruction technique. COMPARISON:   07 02 2021. FINDINGS:   Limitations:  Limited due to metallic artifact.   Lung bases:  Unremarkable.  ABDOMEN:   Liver:  Unremarkable.   Gallbladder and bile ducts:  Cholecystectomy.   Pancreas:  Unremarkable.   Spleen:  Unremarkable.   Adrenals:  Unremarkable.   Kidneys and ureters:  No hydronephrosis or ureteral calculus.  Left renal scarring.   Stomach and bowel:  Moderate colonic stool which may be ileus constipation.  No mechanical bowel obstruction.  No diverticulitis.  PELVIS:   Appendix:  No findings to suggest acute appendicitis.   Bladder:  Distended bladder.   Reproductive:  Unremarkable as visualized.  ABDOMEN and PELVIS:   Intraperitoneal space:  No free  air.   Bones joints:  Lumbar spine postop changes.   Soft tissues:  Unremarkable.   Vasculature:  Unremarkable.   Lymph nodes:  Unremarkable. IMPRESSION:     1.  No hydronephrosis or ureteral calculus. 2.  Moderate colonic stool which may be ileus constipation.  No mechanical bowel obstruction.  No diverticulitis.     Electronically signed by Brad Galloway M.D. on 07-04-21 at 2354    US Abdomen Limited    Result Date: 7/5/2021  Patient: CARSON MULLEN  Time Out: 00:10 Exam(s): US ABDOMEN LIMITED EXAM:   US Abdomen Limited, Right Upper Quadrant CLINICAL HISTORY:    Reason for exam: Right upper quadrant pain; History cholecystectomy- eval common bile duct and liver please. TECHNIQUE:   Real-time ultrasound of the right upper quadrant with image documentation. COMPARISON:   No relevant prior studies available. FINDINGS:   Liver:  Unremarkable.   Gallbladder:  Cholecystectomy.   Common bile duct:  CBD 6 mm.  No definite choledocholithiasis.   Pancreas:  Unremarkable as visualized.   Right kidney:  No hydronephrosis. IMPRESSION:       CBD 6 mm.  No definite choledocholithiasis.     Electronically signed by Brad Galloway M.D. on 07-05-21 at 0010      I ordered the above noted radiological studies. Reviewed by me and discussed with radiologist.  See dictation for official radiology interpretation.      PROCEDURES    Procedures        MEDICATIONS GIVEN IN ER    Medications   sodium chloride 0.9 % flush 10 mL (has no administration in time range)   ketorolac (TORADOL) injection 15 mg (15 mg Intravenous Given 7/4/21 2117)   ondansetron (ZOFRAN) injection 4 mg (4 mg Intravenous Given 7/4/21 2117)   HYDROmorphone (DILAUDID) injection 1 mg (1 mg Intravenous Given 7/4/21 2229)         PROGRESS, DATA ANALYSIS, CONSULTS, AND MEDICAL DECISION MAKING    My differential diagnosis for abdominal pain includes but is not limited to:  Gastritis, gastroenteritis, peptic ulcer disease, GERD, esophageal perforation, acute appendicitis,  mesenteric adenitis, Meckel’s diverticulum, epiploic appendagitis, diverticulitis, colon cancer, ulcerative colitis, Crohn’s disease, intussusception, small bowel obstruction, adhesions, ischemic bowel, perforated viscus, ileus, obstipation, biliary colic, cholecystitis, cholelithiasis, Jarad-Mo Travon, hepatitis, pancreatitis, common bile duct obstruction, cholangitis, bile leak, splenic trauma, splenic rupture, splenic infarction, splenic abscess, abdominal abscess, ascites, spontaneous bacterial peritonitis, hernia, UTI, cystitis,ureterolithiasis, urinary obstruction, ovarian cyst, torsion, pregnancy, ectopic pregnancy, PID, pelvic abscess, mittelschmerz, endometriosis, AAA, myocardial infarction, pneumonia, cancer, porphyria, DKA, medications, sickle cell, viral syndrome, zoster        All labs have been independently reviewed by me.  All radiology studies have been reviewed by me and discussed with radiologist dictating the report.   EKG's independently viewed and interpreted by me.  Discussion below represents my analysis of pertinent findings related to patient's condition, differential diagnosis, treatment plan and final disposition.      ED Course as of Jul 05 0137   Sun Jul 04, 2021   2316 My plan was to not use narcotics in this patient.  Ultimately, our work-up was taking longer than anticipated and I did order some narcotics to help her during her wait.    [RS]   2317 Lyn with ultrasound provides preliminary right upper quadrant ultrasound-no acute abnormal findings with CBD measuring 6 mm with no identified obstruction    [RS]   Mon Jul 05, 2021   0017 I have reviewed labs, CT result, and ultrasound result with patient.  Patient reports that her pain was transiently improved with Dilaudid but now is returning.  She reports intractable pain that she cannot live with.  She reports no relief with her narcotics and Zofran at home.  While I have not identified any acute life threat, I have also not  identified any explanation of the patient's pain.  She reports that this pain is intractable and intolerable.  Admission for further evaluation is offered, and patient eagerly accepts.    [RS]   0136 CONSULT        Provider: ESE ScottAIDA    Discussion: Viewed patient history, ED presentation and evaluation as well as therapies that have been initiated in the ED and patient's history of ERCP with sphincterotomy.  She is agreeable to accept the patient to the [facility] floor for observation on behalf of Dr. Urrutia.    Agreeable c treatment and planned disposition.            [RS]      ED Course User Index  [RS] Sarath Rodriguez MD       AS OF 01:37 EDT VITALS:    BP - 122/94  HR - 104  TEMP - 98.7 °F (37.1 °C)  O2 SATS - 98%        DIAGNOSIS  Final diagnoses:   Intractable abdominal pain         DISPOSITION  ADMISSION    Discussed treatment plan and reason for admission with pt/family and admitting physician.  Pt/family voiced understanding of the plan for admission for further testing/treatment as needed.          Sarath Rodriguez MD  07/05/21 0137      Electronically signed by Sarath Rodriguez MD at 07/05/21 0137     Sheridan Romero, RN at 07/04/21 2200        Pt asking for pain meds - Dr Rodriguez went in to speak with patient, and agreed to a single dose of dilaudid IVP.  Pt agreeable to this plan.      Sheridan Romero RN  07/05/21 0023      Electronically signed by Sheridan Romero RN at 07/05/21 0023     Sheridan Romero RN at 07/05/21 0023        Checking on patient, she states her pain is coming back. Dr Rodriguez notified.     Sheridan Romero RN  07/05/21 0023      Electronically signed by Sheridan Romero, RN at 07/05/21 0023     Conchis Chamberlain, RN at 07/05/21 0209          Nursing report ED to floor  Belen Allen  40 y.o.  female    HPI (triage note):   Chief Complaint   Patient presents with   • Flank Pain     R side   • Leg Swelling     bilateral       Admitting doctor:   Mane  "Rubia Urrutia MD    Admitting diagnosis:   The encounter diagnosis was Intractable abdominal pain.    Code status:   Current Code Status     Date Active Code Status Order ID Comments User Context       Prior    Advance Care Planning Activity          Allergies:   Patient has no known allergies.    Weight:       07/04/21 2212   Weight: 106 kg (234 lb)       Most recent vitals:   Vitals:    07/04/21 1956 07/04/21 2212 07/05/21 0019   BP: (!) 135/102  122/94   BP Location: Left arm     Patient Position: Sitting     Pulse: 104     Resp: 18     Temp: 98.7 °F (37.1 °C)     SpO2: 98%     Weight:  106 kg (234 lb)    Height: 162.6 cm (64\")         Active LDAs/IV Access:   Lines, Drains & Airways    Active LDAs     Name:   Placement date:   Placement time:   Site:   Days:    Peripheral IV 07/04/21 2045 Left;Distal Forearm   07/04/21 2045    Forearm   less than 1                Labs (abnormal labs have a star):   Labs Reviewed   COMPREHENSIVE METABOLIC PANEL - Abnormal; Notable for the following components:       Result Value    Sodium 135 (*)     Alkaline Phosphatase 139 (*)     All other components within normal limits    Narrative:     GFR Normal >60  Chronic Kidney Disease <60  Kidney Failure <15     URINE DRUG SCREEN - Abnormal; Notable for the following components:    Opiate Screen Positive (*)     All other components within normal limits    Narrative:     Negative Thresholds Per Drugs Screened:    Amphetamines                 500 ng/ml  Barbiturates                 200 ng/ml  Benzodiazepines              100 ng/ml  Cocaine                      300 ng/ml  Methadone                    300 ng/ml  Opiates                      300 ng/ml  Oxycodone                    100 ng/ml  THC                           50 ng/ml    The Normal Value for all drugs tested is negative. This report includes final unconfirmed screening results to be used for medical treatment purposes only. Unconfirmed results must not be used for " non-medical purposes such as employment or legal testing. Clinical consideration should be applied to any drug of abuse test, particularly when unconfirmed results are used.           MANUAL DIFFERENTIAL - Abnormal; Notable for the following components:    Monocyte % 4.0 (*)     Basophil % 2.0 (*)     Eosinophils Absolute 0.43 (*)     All other components within normal limits   LIPASE - Normal   URINALYSIS W/ MICROSCOPIC IF INDICATED (NO CULTURE) - Normal    Narrative:     Urine microscopic not indicated.   HCG, SERUM, QUALITATIVE - Normal   CBC WITH AUTO DIFFERENTIAL - Normal   COVID PRE-OP / PRE-PROCEDURE SCREENING ORDER (NO ISOLATION)    Narrative:     The following orders were created for panel order COVID PRE-OP / PRE-PROCEDURE SCREENING ORDER (NO ISOLATION) - Swab, Nasopharynx.  Procedure                               Abnormality         Status                     ---------                               -----------         ------                     COVID-19, LETY IN-HOUSE...[[id number]]                                                   Please view results for these tests on the individual orders.   COVID-19, LETY IN-HOUSE CEPHEID/MIGNON, NP SWAB IN TRANSPORT MEDIA 8-12 HR TAT   CBC AND DIFFERENTIAL    Narrative:     The following orders were created for panel order CBC & Differential.  Procedure                               Abnormality         Status                     ---------                               -----------         ------                     CBC Auto Differential[[id number]]        Normal              Final result                 Please view results for these tests on the individual orders.       EKG:   No orders to display       Meds given in ED:   Medications   sodium chloride 0.9 % flush 10 mL (has no administration in time range)   ketorolac (TORADOL) injection 15 mg (15 mg Intravenous Given 7/4/21 2117)   ondansetron (ZOFRAN) injection 4 mg (4 mg Intravenous Given 7/4/21 2117)    HYDROmorphone (DILAUDID) injection 1 mg (1 mg Intravenous Given 21)       Imaging results:  CT Abdomen Pelvis Without Contrast    Result Date: 2021  Electronically signed by Brad Galloway M.D. on 21 at 2354    US Abdomen Limited    Result Date: 2021  Electronically signed by Brad Galloway M.D. on 21 at 0010      Ambulatory status:   - assist    Social issues:   Social History     Socioeconomic History   • Marital status: Single     Spouse name: Not on file   • Number of children: 2   • Years of education: Not on file   • Highest education level: Not on file   Tobacco Use   • Smoking status: Current Some Day Smoker     Packs/day: 0.50     Last attempt to quit: 2020     Years since quittin.6   • Smokeless tobacco: Never Used   Vaping Use   • Vaping Use: Never used   Substance and Sexual Activity   • Alcohol use: Not Currently   • Drug use: Not Currently   • Sexual activity: Defer    Nursing report ED to floor       Conchis Chamberlain RN  21      Electronically signed by Conchis Chamberlain RN at 21 0210          Physician Progress Notes (last 48 hours) (Notes from 21 1202 through 21 1202)      Mariusz Rashid MD at 21 1027          Gastroenterology   Inpatient Progress Note    Reason for Follow Up: Elevated alkaline phosphatase and right upper quadrant pain    Subjective     Interval History:   Patient says she feels about the same today with significant pain in the right upper abdomen to right flank area radiating around to the right side of the back.    Current Facility-Administered Medications:   •  acetaminophen (TYLENOL) tablet 650 mg, 650 mg, Oral, Q4H PRN **OR** acetaminophen (TYLENOL) 160 MG/5ML solution 650 mg, 650 mg, Oral, Q4H PRN **OR** acetaminophen (TYLENOL) suppository 650 mg, 650 mg, Rectal, Q4H PRN, Nisha Ribera APRN  •  cyclobenzaprine (FLEXERIL) tablet 10 mg, 10 mg, Oral, BID PRN, Rogelio Arnold MD, 10 mg at  07/05/21 1141  •  docusate sodium (COLACE) capsule 100 mg, 100 mg, Oral, BID, Nisha Ribera APRN, 100 mg at 07/05/21 2009  •  enoxaparin (LOVENOX) syringe 40 mg, 40 mg, Subcutaneous, Q24H, Rogelio Arnold MD, 40 mg at 07/05/21 1634  •  famotidine (PEPCID) injection 20 mg, 20 mg, Intravenous, Q12H, Rogelio Arnold MD, 20 mg at 07/06/21 0828  •  gabapentin (NEURONTIN) capsule 800 mg, 800 mg, Oral, 4x Daily, Rogelio Arnold MD, 800 mg at 07/06/21 0828  •  HYDROcodone-acetaminophen (NORCO) 7.5-325 MG per tablet 1 tablet, 1 tablet, Oral, Q6H PRN, Rogelio Arnold MD, 1 tablet at 07/06/21 0837  •  ketorolac (TORADOL) injection 15 mg, 15 mg, Intravenous, Q6H PRN, Rogelio Arnold MD, 15 mg at 07/05/21 2015  •  LORazepam (ATIVAN) injection 0.5 mg, 0.5 mg, Intravenous, Once, Nisha Ribera APRN  •  nicotine (NICODERM CQ) 14 MG/24HR patch 1 patch, 1 patch, Transdermal, Q24H, Rogelio Arnold MD, 1 patch at 07/05/21 2009  •  ondansetron (ZOFRAN) injection 4 mg, 4 mg, Intravenous, Q6H PRN, Nisha Ribera APRN, 4 mg at 07/06/21 0837  •  polyethylene glycol (MIRALAX) packet 17 g, 17 g, Oral, Daily, Nisha Ribera APRN, 17 g at 07/05/21 0833  •  [COMPLETED] Insert peripheral IV, , , Once **AND** sodium chloride 0.9 % flush 10 mL, 10 mL, Intravenous, PRN, Sarath Rodriguez MD  •  sodium chloride 0.9 % flush 10 mL, 10 mL, Intravenous, Q12H, Nisha Ribera APRN, 10 mL at 07/06/21 0829  •  sodium chloride 0.9 % flush 10 mL, 10 mL, Intravenous, PRN, Nisha Ribera APRN  •  sodium chloride 0.9 % infusion, 100 mL/hr, Intravenous, Continuous, Nisha Ribera APRN, Last Rate: 100 mL/hr at 07/06/21 1011, 100 mL/hr at 07/06/21 1011  Review of Systems:    All systems were reviewed and negative except for:  Gastrointestinal: positive for  pain    Objective     Vital Signs  Temp:  [97.3 °F (36.3 °C)-98.9 °F (37.2 °C)] 97.3 °F (36.3 °C)  Heart Rate:  [82-89] 88  Resp:  [16]  16  BP: (112-131)/(70-83) 131/81  Body mass index is 41.18 kg/m².    Intake/Output Summary (Last 24 hours) at 7/6/2021 1027  Last data filed at 7/6/2021 1011  Gross per 24 hour   Intake 3350.33 ml   Output 2200 ml   Net 1150.33 ml     I/O this shift:  In: 1000 [I.V.:1000]  Out: -      Physical Exam:   General: patient awake, alert and cooperative   Eyes: no scleral icterus   Skin: warm and dry, not jaundiced   Abdomen: soft, mild tenderness to palpation in the right mid abdomen but no guarding or rebound, nondistended; normal bowel sounds, no masses palpated, no periumbical lymphadenopathy   Psychiatric: Appropriate affect and behavior     Results Review:     I reviewed the patient's new clinical results.    Results from last 7 days   Lab Units 07/06/21 0532 07/05/21 0645 07/04/21 2048   WBC 10*3/mm3 7.39 6.08 8.35   HEMOGLOBIN g/dL 11.7* 11.4* 12.5   HEMATOCRIT % 35.6 35.5 37.2   PLATELETS 10*3/mm3 277 290 320     Results from last 7 days   Lab Units 07/06/21  0532 07/05/21 0645 07/04/21 2048 07/02/21 2037   SODIUM mmol/L 141 137 135* 141   POTASSIUM mmol/L 3.8 4.1 3.9 3.7   CHLORIDE mmol/L 110* 106 103 105   CO2 mmol/L 24.0 23.9 23.4 27.8   BUN mg/dL 5* 6 6 7   CREATININE mg/dL 0.72 0.74 0.79 0.72   CALCIUM mg/dL 8.3* 8.6 8.8 8.8   BILIRUBIN mg/dL 0.2  --  0.2 0.3   ALK PHOS U/L 100  --  139* 132*   ALT (SGPT) U/L 20  --  23 26   AST (SGOT) U/L 22  --  26 28   GLUCOSE mg/dL 89 76 94 109*     Results from last 7 days   Lab Units 07/05/21 0645   INR  0.97     Lab Results   Lab Value Date/Time    LIPASE 14 07/04/2021 2048    LIPASE 12 (L) 07/02/2021 2037    LIPASE 24 05/31/2021 1401    LIPASE 33 11/28/2020 2203    LIPASE 19 11/08/2020 1851    LIPASE 44 12/30/2019 0208    LIPASE 25 05/25/2018 1202    LIPASE 63 03/03/2018 2058       Radiology:  US Abdomen Limited   Final Result         Electronically signed by Brad Galloway M.D. on 07-05-21 at 0010      CT Abdomen Pelvis Without Contrast   Final Result          Electronically signed by Brad Galloway M.D. on 07-04-21 at 2354      MRI abdomen w wo contrast mrcp    (Results Pending)       Assessment/Plan     Patient Active Problem List   Diagnosis   • Splenic infarct   • Anxiety disorder   • Functional asplenia   • Generalized abdominal pain   • Bilateral leg weakness   • Acute bilateral low back pain   • Antiphospholipid antibody positive   • On anticoagulant therapy   • Acute pain of left knee   • Vitamin D deficiency   • Folate deficiency   • Pain of back and left lower extremity   • Common bile duct dilatation   • Right upper quadrant abdominal pain   • Obesity (BMI 35.0-39.9 without comorbidity)   • Tobacco abuse   • GERD without esophagitis   • Intractable abdominal pain   • Obesity, Class III, BMI 40-49.9 (morbid obesity) (CMS/HCC)   • Constipation   • History of ERCP   • Other chronic pain     These problems are new to me  Assessment:  1. Right upper quadrant pain.  Pain complex is stable per patient's report.  No improvement or worsening.  2. Elevated alkaline phosphatase.  This has now normalized.  3. S post ERCP with sphincterotomy  4. Nausea.  Improved.      Plan:  · Follow-up results of MRCP.  · Monitor liver enzymes with alkaline phosphatase now normal.  Await results of fractionated alkaline phosphatase.  · Doubt need for endoscopic evaluation at this point.  I discussed the patients findings and my recommendations with patient and nursing staff.    MD Mariusz Gee M.D.  [facility] [place] [address]  Office: [phone]    Electronically signed by Mariusz Rashid MD at 07/06/21 1030          Consult Notes (last 48 hours) (Notes from 07/04/21 1202 through 07/06/21 1202)      Elvin Antunez MD at 07/05/21 1005      Consult Orders    1. Inpatient Gastroenterology Consult [[id number]] ordered by Nisha Ribera APRN at 07/05/21 8408                Gastroenterology   Initial Inpatient Consult Note    Referring Provider: Nisha Ribera    Reason for Consultation: Abdominal pain    Subjective     History of present illness:    40 y.o. female who we are asked to see for evaluation of abdominal pain.  The patient reports that she has severe abdominal pain that is in the right upper quadrant with radiation to her back.  She reports that it has been going on for several weeks but has been worsening over the last several days.  She had a couple of episodes of vomiting but she has not vomited in some time.  She does have nausea she reports the pain medication being given to her here at the hospital is working effectively for her as is the antinausea medication and with both of these she is not nauseous or having significant abdominal pain.  She has no urinary changes no fevers she has no chest pain or shortness of breath    Her liver tests are normal with an ALT of 23 and AST of 26 a total bilirubin of 0.2 a lipase of 14 and alkaline phosphatase slightly elevated at 139    Recent history significant for ERCP performed 6/3/2021 with Dr. Flannery noted the intrahepatic biliary tree mildly dilated the proximal common bile duct was dilated there was a normal taper to the papilla the cystic duct stump was unremarkable no filling defects or stricture a 10 mm biliary sphincterotomy was performed then a 12 out of 15 mm stone extraction balloon was passed over the wire may be some yellowish debris but no stones were removed a cholangiogram was done consistent with a widely patent sphincterotomy and occlusion cholangiogram was obtained and revealed a dilated bile duct with no filling defects    Past Medical History:  Past Medical History:   Diagnosis Date   • Abnormal Pap smear of cervix    • Ankle fracture 2013   • H/O Elevated liver enzymes    • History of chronic back pain    • History of migraine    • History of urinary tract infection    • Injury of back    • PONV  (postoperative nausea and vomiting)    • Seasonal allergies      Past Surgical History:  Past Surgical History:   Procedure Laterality Date   • BACK SURGERY  2006    fusion L4, L5     • CHOLECYSTECTOMY     • DILATATION AND CURETTAGE     • ERCP N/A 6/3/2021    Procedure: ENDOSCOPIC RETROGRADE CHOLANGIOPANCREATOGRAPHY WITH SPHINCTEROTOMY, DILATION WITH BALLOON CLEARANCE  (12MM-15MM);  Surgeon: Bjorn Flannery MD;  Location: [facility] ENDOSCOPY;  Service: Gastroenterology;  Laterality: N/A;  DILATED COMMON BILE DUCT   • SKIN SURGERY     • TUBAL ABDOMINAL LIGATION     • WISDOM TOOTH EXTRACTION  2018    x2      Social History:   Social History     Tobacco Use   • Smoking status: Current Some Day Smoker     Packs/day: 0.50     Last attempt to quit: 2020     Years since quittin.6   • Smokeless tobacco: Never Used   Substance Use Topics   • Alcohol use: Not Currently      Family History:  Family History   Problem Relation Age of Onset   • Stroke Mother    • Allergic rhinitis Mother    • Allergic rhinitis Sister    • Breast cancer Maternal Aunt    • Cancer Maternal Grandmother    • Allergic rhinitis Paternal Grandfather    • Cancer Paternal Grandfather    • Prostate cancer Paternal Grandfather        Home Meds:  Medications Prior to Admission   Medication Sig Dispense Refill Last Dose   • amitriptyline (ELAVIL) 25 MG tablet Take 25 mg by mouth Every Night.   Past Week at Unknown time   • cyclobenzaprine (FLEXERIL) 10 MG tablet Take 1 tablet by mouth 2 (Two) Times a Day As Needed for Muscle Spasms. 20 tablet 0 Past Week at Unknown time   • famotidine (PEPCID) 20 MG tablet Take 1 tablet by mouth 2 (Two) Times a Day Before Meals.   Past Week at Unknown time   • FLUoxetine (PROzac) 40 MG capsule Take 40 mg by mouth Every Night.   Past Week at Unknown time   • folic acid (FOLVITE) 1 MG tablet Take 1 tablet by mouth Daily. 30 tablet 0 Past Week at Unknown time   • gabapentin (NEURONTIN) 800 MG tablet Take  800 mg by mouth 4 (Four) Times a Day.   7/4/2021 at Unknown time   • hydrOXYzine (ATARAX) 25 MG tablet Take 4 tablets by mouth every night at bedtime.   Past Week at Unknown time   • melatonin 5 MG tablet tablet Take 10 mg by mouth Every Night.   Past Week at Unknown time   • traZODone (DESYREL) 50 MG tablet Take 50 mg by mouth Every Night.   Past Week at Unknown time   • cyclobenzaprine (FLEXERIL) 10 MG tablet Take 1 tablet by mouth 3 (Three) Times a Day.      • HYDROcodone-acetaminophen (NORCO) 7.5-325 MG per tablet Take 1 tablet by mouth Every 6 (Six) Hours As Needed for Moderate Pain . 12 tablet 0      Current Meds:   docusate sodium, 100 mg, Oral, BID  famotidine, 20 mg, Intravenous, Q12H  gabapentin, 800 mg, Oral, 4x Daily  polyethylene glycol, 17 g, Oral, Daily  sodium chloride, 10 mL, Intravenous, Q12H      Allergies:  No Known Allergies  Review of Systems  Pertinent items are noted in HPI, all other systems reviewed and negative    Objective     Vital Signs  Temp:  [97 °F (36.1 °C)-98.7 °F (37.1 °C)] 97.2 °F (36.2 °C)  Heart Rate:  [] 80  Resp:  [16-18] 16  BP: (107-135)/() 113/67    Physical Exam:  CONSTITUTIONAL:  today's vital signs reviewed  EARS NOSE THROAT: trachea midline and no deformity of the nares  EYES: no scleral icterus  GASTROINTESTINAL: abdomen is soft, mild tenderness to palpation of the right upper quadrant, nondistended with normal active bowel sounds, no masses are appreciated  PSYCHIATRIC: appropriate mood and affect  RESPIRATORY: normal inspiratory effort with no increased work of breathing  NEUROLOGIC: patient is awake and alert  DERMATOLOGIC: skin is warm with no cyanosis  LYMPHATIC: no periumbilical lymphadenopathy     Results Review:              I reviewed the patient's new clinical results.    Results from last 7 days   Lab Units 07/05/21  0645 07/04/21 2048 07/02/21 2037   WBC 10*3/mm3 6.08 8.35 8.46   HEMOGLOBIN g/dL 11.4* 12.5 11.9*   HEMATOCRIT % 35.5 37.2 36.0    PLATELETS 10*3/mm3 290 320 316     Results from last 7 days   Lab Units 07/05/21  0645 07/04/21 2048 07/02/21 2037   SODIUM mmol/L 137 135* 141   POTASSIUM mmol/L 4.1 3.9 3.7   CHLORIDE mmol/L 106 103 105   CO2 mmol/L 23.9 23.4 27.8   BUN mg/dL 6 6 7   CREATININE mg/dL 0.74 0.79 0.72   CALCIUM mg/dL 8.6 8.8 8.8   BILIRUBIN mg/dL  --  0.2 0.3   ALK PHOS U/L  --  139* 132*   ALT (SGPT) U/L  --  23 26   AST (SGOT) U/L  --  26 28   GLUCOSE mg/dL 76 94 109*     Results from last 7 days   Lab Units 07/05/21  0645   INR  0.97     Lab Results   Lab Value Date/Time    LIPASE 14 07/04/2021 2048    LIPASE 12 (L) 07/02/2021 2037    LIPASE 24 05/31/2021 1401    LIPASE 33 11/28/2020 2203    LIPASE 19 11/08/2020 1851    LIPASE 44 12/30/2019 0208    LIPASE 25 05/25/2018 1202    LIPASE 63 03/03/2018 2058       Radiology:  US Abdomen Limited   Final Result         Electronically signed by Brad Galloway M.D. on 07-05-21 at 0010      CT Abdomen Pelvis Without Contrast   Final Result         Electronically signed by Brad Galloway M.D. on 07-04-21 at 2354          Assessment/Plan   Patient Active Problem List   Diagnosis   • Splenic infarct   • Anxiety disorder   • Functional asplenia   • Generalized abdominal pain   • Bilateral leg weakness   • Acute bilateral low back pain   • Antiphospholipid antibody positive   • On anticoagulant therapy   • Acute pain of left knee   • Vitamin D deficiency   • Folate deficiency   • Pain of back and left lower extremity   • Common bile duct dilatation   • Right upper quadrant abdominal pain   • Obesity (BMI 35.0-39.9 without comorbidity)   • Tobacco abuse   • GERD without esophagitis   • Intractable abdominal pain   • Obesity, Class III, BMI 40-49.9 (morbid obesity) (CMS/HCC)   • Constipation   • History of ERCP   • Other chronic pain       Assessment:  5. Right upper quadrant abdominal pain  6. Status post ERCP with sphincterotomy  7. Normal LFTs other than a mild alkaline phosphatase  elevation  8. Normal pancreas tests  9. Nausea  These problems are new to me.  Plan:  · To new supportive therapy with antiemetics and pain control this seems to be helping the patient  · Slow but advancement of p.o. when able  · MRCP to evaluate the ductal structures in this area noninvasively  · Fractionate alkaline phosphatase      I discussed the patients findings and my recommendations with patient and nursing staff.           Elvin Antunez M.D.  [facility] [place] [address]  Office: [phone]      Electronically signed by Elvin Antunez MD at 07/05/21 1011      All medication doses during the admission are shown, including meds that are no longer on order.  Scheduled Meds Sorted by Name  for Belen Allen as of 7/4/21 through 7/6/21    1 Day 3 Days 7 Days 10 Days < Today >    Legend:                          Inactive     Active     Other Encounter     Linked                 Medications 07/04/21 07/05/21 07/06/21   docusate sodium (COLACE) capsule 100 mg   Dose: 100 mg  Freq: 2 Times Daily Route: PO  Start: 07/05/21 0900    Admin Instructions:   Swallow whole. Do not open, crush, or chew capsule.     0833 2009        (3101)   2100          enoxaparin (LOVENOX) syringe 40 mg   Dose: 40 mg  Freq: Every 24 Hours Route: SC  Indications of Use: PROPHYLAXIS OF VENOUS THROMBOEMBOLISM  Start: 07/05/21 1615    Admin Instructions:   Give subcutaneous in abdomen only. Do not massage site after injection.     1633          1612            famotidine (PEPCID) injection 20 mg   Dose: 20 mg  Freq: Every 12 Hours Scheduled Route: IV  Start: 07/05/21 0945    Admin Instructions:   Dilute to 10 mL total volume and give IV push over 2 minutes.     1141 2008 0828 2100          gabapentin (NEURONTIN) capsule 800 mg   Dose: 800 mg  Freq: 4 Times Daily Route: PO  Start: 07/05/21 1200    Admin Instructions:        0900 1845 3661      2355 [C]          0828   1159   1800     2100            HYDROmorphone (DILAUDID) injection 1 mg   Dose: 1 mg  Freq: Once Route: IV  Start: 07/04/21 2225 End: 07/04/21 2229    Admin Instructions:       Caution: Look alike/sound alike drug alert    If given for pain, use the following pain scale:  Mild Pain = Pain Score of 1-3, CPOT 1-2  Moderate Pain = Pain Score of 4-6, CPOT 3-4  Severe Pain = Pain Score of 7-10, CPOT 5-8    2229              ketorolac (TORADOL) injection 15 mg   Dose: 15 mg  Freq: Once Route: IV  Start: 07/04/21 2113 End: 07/04/21 2117    Admin Instructions:       If given for pain, use the following pain scale:  Mild Pain = Pain Score of 1-3, CPOT 1-2  Moderate Pain = Pain Score of 4-6, CPOT 3-4  Severe Pain = Pain Score of 7-10, CPOT 5-8    2117              LORazepam (ATIVAN) injection 0.5 mg   Dose: 0.5 mg  Freq: Once Route: IV  Start: 07/06/21 0130    Admin Instructions:   Prior to mri   Caution: Look alike/sound alike drug alert. Dilute 1:1 with normal saline.      0130            nicotine (NICODERM CQ) 14 MG/24HR patch 1 patch   Dose: 1 patch  Freq: Every 24 Hours Scheduled Route: TD  Start: 07/05/21 1815    Admin Instructions:   Apply to clean, dry, nonhairy area of skin (typically upper arm or shoulder)   Acutely Hazardous. Waste BOTH Residual Medication and/or Empty Package.     2009 0859 0900          ondansetron (ZOFRAN) injection 4 mg   Dose: 4 mg  Freq: Once Route: IV  Start: 07/04/21 2113 End: 07/04/21 2117 2117              polyethylene glycol (MIRALAX) packet 17 g   Dose: 17 g  Freq: Daily Route: PO  Start: 07/05/21 0900    Admin Instructions:   Use 4-8 ounces of water, tea, or juice for each 17 gram dose.     0833          (0818)            sodium chloride 0.9 % flush 10 mL   Dose: 10 mL  Freq: Every 12 Hours Scheduled Route: IV  Start: 07/05/21 0900 2008 0829 2100          Medications 07/04/21 07/05/21 07/06/21       Continuous Meds Sorted by  Name  for Belen Allen as of 7/4/21 through 7/6/21   Legend:                          Inactive     Active     Other Encounter     Linked                 Medications 07/04/21 07/05/21 07/06/21   sodium chloride 0.9 % infusion   Rate: 100 mL/hr Dose: 100 mL/hr  Freq: Continuous Route: IV  Last Dose: 100 mL/hr (07/06/21 1011)  Start: 07/05/21 0415     0347   0650   1149     1413   1414   1846      0018   0019   0518     0818   1011              PRN Meds Sorted by Name  for Belen Allen as of 7/4/21 through 7/6/21   Legend:                  Inactive     Active     Other Encounter     Linked                 Medications 07/04/21 07/05/21 07/06/21   acetaminophen (TYLENOL) tablet 650 mg   Dose: 650 mg  Freq: Every 4 Hours PRN Route: PO  PRN Reason: Mild Pain   Start: 07/05/21 0315    Admin Instructions:   Do not exceed 4 grams of acetaminophen in a 24 hr period. Max dose of 2gm for AST/ALT greater than 120 units/L      If given for pain, use the following pain scale:   Mild Pain = Pain Score of 1-3, CPOT 1-2  Moderate Pain = Pain Score of 4-6, CPOT 3-4  Severe Pain = Pain Score of 7-10, CPOT 5-8         Or  acetaminophen (TYLENOL) 160 MG/5ML solution 650 mg   Dose: 650 mg  Freq: Every 4 Hours PRN Route: PO  PRN Reason: Mild Pain   Start: 07/05/21 0315    Admin Instructions:   Do not exceed 4 grams of acetaminophen in a 24 hr period. Max dose of 2gm for AST/ALT greater than 120 units/L      If given for pain, use the following pain scale:   Mild Pain = Pain Score of 1-3, CPOT 1-2  Moderate Pain = Pain Score of 4-6, CPOT 3-4  Severe Pain = Pain Score of 7-10, CPOT 5-8         Or  acetaminophen (TYLENOL) suppository 650 mg   Dose: 650 mg  Freq: Every 4 Hours PRN Route: RE  PRN Reason: Mild Pain   Start: 07/05/21 0315    Admin Instructions:   Do not exceed 4 grams of acetaminophen in a 24 hr period. Max dose of 2gm for AST/ALT greater than 120 units/L      If given for pain, use the following pain scale:   Mild  Pain = Pain Score of 1-3, CPOT 1-2  Moderate Pain = Pain Score of 4-6, CPOT 3-4  Severe Pain = Pain Score of 7-10, CPOT 5-8         cyclobenzaprine (FLEXERIL) tablet 10 mg   Dose: 10 mg  Freq: 2 Times Daily PRN Route: PO  PRN Reason: Muscle Spasms  Start: 07/05/21 0846     1141             HYDROcodone-acetaminophen (NORCO) 7.5-325 MG per tablet 1 tablet   Dose: 1 tablet  Freq: Every 6 Hours PRN Route: PO  PRN Reason: Moderate Pain   Start: 07/05/21 0846    Admin Instructions:   [JED]    Do not exceed 4 grams of acetaminophen in a 24 hr period. Max dose of 2gm for AST/ALT greater than 120 units/L        If given for pain, use the following pain scale:   Mild Pain = Pain Score of 1-3, CPOT 1-2  Moderate Pain = Pain Score of 4-6, CPOT 3-4  Severe Pain = Pain Score of 7-10, CPOT 5-8     0925   1634   6755      0837            HYDROmorphone (DILAUDID) injection 0.5 mg   Dose: 0.5 mg  Freq: Every 4 Hours PRN Route: IV  PRN Reasons: Moderate Pain ,Severe Pain   Start: 07/05/21 0312 End: 07/05/21 0845    Admin Instructions:   If given for pain, use the following pain scale:  Mild Pain = Pain Score of 1-3, CPOT 1-2  Moderate Pain = Pain Score of 4-6, CPOT 3-4  Severe Pain = Pain Score of 7-10, CPOT 5-8     0343   0845-D/C'd         ketorolac (TORADOL) injection 15 mg   Dose: 15 mg  Freq: Every 6 Hours PRN Route: IV  PRN Reason: Severe Pain   Start: 07/05/21 1207 End: 07/07/21 2359    Admin Instructions:       If given for pain, use the following pain scale:  Mild Pain = Pain Score of 1-3, CPOT 1-2  Moderate Pain = Pain Score of 4-6, CPOT 3-4  Severe Pain = Pain Score of 7-10, CPOT 5-8     8245 [C]   1418 1876      1200            ondansetron (ZOFRAN) injection 4 mg   Dose: 4 mg  Freq: Every 6 Hours PRN Route: IV  PRN Reasons: Nausea,Vomiting  Start: 07/05/21 0315    Admin Instructions:   If BOTH ondansetron (ZOFRAN) and promethazine (PHENERGAN) are ordered use ondansetron first and THEN promethazine IF ondansetron is  ineffective.     0343   1634        0837            sodium chloride 0.9 % flush 10 mL   Dose: 10 mL  Freq: As Needed Route: IV  PRN Reason: Line Care  Start: 07/05/21 0312         sodium chloride 0.9 % flush 10 mL   Dose: 10 mL  Freq: As Needed Route: IV  PRN Reason: Line Care  Start: 07/04/21 2014         Medications 07/04/21 07/05/21 07/06/21

## 2024-03-23 ENCOUNTER — HOSPITAL ENCOUNTER (OUTPATIENT)
Facility: HOSPITAL | Age: 44
Setting detail: OBSERVATION
Discharge: HOME | End: 2024-03-29
Attending: EMERGENCY MEDICINE | Admitting: INTERNAL MEDICINE
Payer: COMMERCIAL

## 2024-03-23 ENCOUNTER — APPOINTMENT (OUTPATIENT)
Dept: CT IMAGING | Facility: HOSPITAL | Age: 44
End: 2024-03-23
Payer: COMMERCIAL

## 2024-03-23 DIAGNOSIS — M79.89 LEG SWELLING: ICD-10-CM

## 2024-03-23 DIAGNOSIS — L03.116 CELLULITIS OF LEFT LOWER EXTREMITY: ICD-10-CM

## 2024-03-23 DIAGNOSIS — K04.7 DENTAL ABSCESS: ICD-10-CM

## 2024-03-23 DIAGNOSIS — A41.9 SEPSIS, DUE TO UNSPECIFIED ORGANISM, UNSPECIFIED WHETHER ACUTE ORGAN DYSFUNCTION PRESENT: Primary | ICD-10-CM

## 2024-03-23 DIAGNOSIS — M54.16 LUMBAR BACK PAIN WITH RADICULOPATHY AFFECTING LEFT LOWER EXTREMITY: ICD-10-CM

## 2024-03-23 DIAGNOSIS — G89.4 CHRONIC PAIN SYNDROME: ICD-10-CM

## 2024-03-23 LAB
ALBUMIN SERPL-MCNC: 3.5 G/DL (ref 3.5–5.2)
ALBUMIN/GLOB SERPL: 1.5 G/DL
ALP SERPL-CCNC: 179 U/L (ref 39–117)
ALT SERPL W P-5'-P-CCNC: 44 U/L (ref 1–33)
AMPHET+METHAMPHET UR QL: NEGATIVE
ANION GAP SERPL CALCULATED.3IONS-SCNC: 9.5 MMOL/L (ref 5–15)
AST SERPL-CCNC: 29 U/L (ref 1–32)
BARBITURATES UR QL SCN: NEGATIVE
BASOPHILS # BLD AUTO: 0.01 10*3/MM3 (ref 0–0.2)
BASOPHILS NFR BLD AUTO: 0.1 % (ref 0–1.5)
BENZODIAZ UR QL SCN: NEGATIVE
BILIRUB SERPL-MCNC: 0.5 MG/DL (ref 0–1.2)
BUN SERPL-MCNC: 7 MG/DL (ref 6–20)
BUN/CREAT SERPL: 10.6 (ref 7–25)
CALCIUM SPEC-SCNC: 8.5 MG/DL (ref 8.6–10.5)
CANNABINOIDS SERPL QL: NEGATIVE
CHLORIDE SERPL-SCNC: 102 MMOL/L (ref 98–107)
CK SERPL-CCNC: 68 U/L (ref 20–180)
CO2 SERPL-SCNC: 26.5 MMOL/L (ref 22–29)
COCAINE UR QL: NEGATIVE
CREAT SERPL-MCNC: 0.66 MG/DL (ref 0.57–1)
CRP SERPL-MCNC: 11.49 MG/DL (ref 0–0.5)
D DIMER PPP FEU-MCNC: 0.99 MCGFEU/ML (ref 0–0.5)
D-LACTATE SERPL-SCNC: 1.1 MMOL/L (ref 0.5–2)
DEPRECATED RDW RBC AUTO: 48.4 FL (ref 37–54)
EGFRCR SERPLBLD CKD-EPI 2021: 112.5 ML/MIN/1.73
EOSINOPHIL # BLD AUTO: 0.15 10*3/MM3 (ref 0–0.4)
EOSINOPHIL NFR BLD AUTO: 1.6 % (ref 0.3–6.2)
ERYTHROCYTE [DISTWIDTH] IN BLOOD BY AUTOMATED COUNT: 14 % (ref 12.3–15.4)
ERYTHROCYTE [SEDIMENTATION RATE] IN BLOOD: 37 MM/HR (ref 0–20)
FENTANYL UR-MCNC: POSITIVE NG/ML
GLOBULIN UR ELPH-MCNC: 2.3 GM/DL
GLUCOSE SERPL-MCNC: 114 MG/DL (ref 65–99)
HCT VFR BLD AUTO: 31.3 % (ref 34–46.6)
HGB BLD-MCNC: 9.8 G/DL (ref 12–15.9)
IMM GRANULOCYTES # BLD AUTO: 0.04 10*3/MM3 (ref 0–0.05)
IMM GRANULOCYTES NFR BLD AUTO: 0.4 % (ref 0–0.5)
LIPASE SERPL-CCNC: 9 U/L (ref 13–60)
LYMPHOCYTES # BLD AUTO: 1.16 10*3/MM3 (ref 0.7–3.1)
LYMPHOCYTES NFR BLD AUTO: 12.2 % (ref 19.6–45.3)
MCH RBC QN AUTO: 30.1 PG (ref 26.6–33)
MCHC RBC AUTO-ENTMCNC: 31.3 G/DL (ref 31.5–35.7)
MCV RBC AUTO: 96 FL (ref 79–97)
METHADONE UR QL SCN: NEGATIVE
MONOCYTES # BLD AUTO: 0.93 10*3/MM3 (ref 0.1–0.9)
MONOCYTES NFR BLD AUTO: 9.8 % (ref 5–12)
NEUTROPHILS NFR BLD AUTO: 7.18 10*3/MM3 (ref 1.7–7)
NEUTROPHILS NFR BLD AUTO: 75.9 % (ref 42.7–76)
NRBC BLD AUTO-RTO: 0 /100 WBC (ref 0–0.2)
NT-PROBNP SERPL-MCNC: 683 PG/ML (ref 0–450)
OPIATES UR QL: NEGATIVE
OXYCODONE UR QL SCN: NEGATIVE
PLATELET # BLD AUTO: 286 10*3/MM3 (ref 140–450)
PMV BLD AUTO: 9.7 FL (ref 6–12)
POTASSIUM SERPL-SCNC: 4.3 MMOL/L (ref 3.5–5.2)
PROCALCITONIN SERPL-MCNC: 0.15 NG/ML (ref 0–0.25)
PROT SERPL-MCNC: 5.8 G/DL (ref 6–8.5)
RBC # BLD AUTO: 3.26 10*6/MM3 (ref 3.77–5.28)
SODIUM SERPL-SCNC: 138 MMOL/L (ref 136–145)
TROPONIN T SERPL HS-MCNC: <6 NG/L
WBC NRBC COR # BLD AUTO: 9.47 10*3/MM3 (ref 3.4–10.8)

## 2024-03-23 PROCEDURE — 80307 DRUG TEST PRSMV CHEM ANLYZR: CPT | Performed by: EMERGENCY MEDICINE

## 2024-03-23 PROCEDURE — 25010000002 PIPERACILLIN SOD-TAZOBACTAM PER 1 G: Performed by: EMERGENCY MEDICINE

## 2024-03-23 PROCEDURE — 83690 ASSAY OF LIPASE: CPT | Performed by: EMERGENCY MEDICINE

## 2024-03-23 PROCEDURE — 96365 THER/PROPH/DIAG IV INF INIT: CPT

## 2024-03-23 PROCEDURE — 25810000003 LACTATED RINGERS SOLUTION: Performed by: EMERGENCY MEDICINE

## 2024-03-23 PROCEDURE — 83880 ASSAY OF NATRIURETIC PEPTIDE: CPT | Performed by: EMERGENCY MEDICINE

## 2024-03-23 PROCEDURE — 25010000002 ONDANSETRON PER 1 MG: Performed by: EMERGENCY MEDICINE

## 2024-03-23 PROCEDURE — G0378 HOSPITAL OBSERVATION PER HR: HCPCS

## 2024-03-23 PROCEDURE — 25510000001 IOPAMIDOL PER 1 ML: Performed by: EMERGENCY MEDICINE

## 2024-03-23 PROCEDURE — 74177 CT ABD & PELVIS W/CONTRAST: CPT

## 2024-03-23 PROCEDURE — 85652 RBC SED RATE AUTOMATED: CPT | Performed by: EMERGENCY MEDICINE

## 2024-03-23 PROCEDURE — 83605 ASSAY OF LACTIC ACID: CPT | Performed by: EMERGENCY MEDICINE

## 2024-03-23 PROCEDURE — 96376 TX/PRO/DX INJ SAME DRUG ADON: CPT

## 2024-03-23 PROCEDURE — 25010000002 HYDROMORPHONE 1 MG/ML SOLUTION: Performed by: EMERGENCY MEDICINE

## 2024-03-23 PROCEDURE — 85379 FIBRIN DEGRADATION QUANT: CPT | Performed by: EMERGENCY MEDICINE

## 2024-03-23 PROCEDURE — 84484 ASSAY OF TROPONIN QUANT: CPT | Performed by: EMERGENCY MEDICINE

## 2024-03-23 PROCEDURE — 99285 EMERGENCY DEPT VISIT HI MDM: CPT

## 2024-03-23 PROCEDURE — 36415 COLL VENOUS BLD VENIPUNCTURE: CPT | Performed by: EMERGENCY MEDICINE

## 2024-03-23 PROCEDURE — 71275 CT ANGIOGRAPHY CHEST: CPT

## 2024-03-23 PROCEDURE — 25810000003 SODIUM CHLORIDE 0.9 % SOLUTION: Performed by: EMERGENCY MEDICINE

## 2024-03-23 PROCEDURE — 87040 BLOOD CULTURE FOR BACTERIA: CPT | Performed by: EMERGENCY MEDICINE

## 2024-03-23 PROCEDURE — 86140 C-REACTIVE PROTEIN: CPT | Performed by: EMERGENCY MEDICINE

## 2024-03-23 PROCEDURE — 96361 HYDRATE IV INFUSION ADD-ON: CPT

## 2024-03-23 PROCEDURE — 84145 PROCALCITONIN (PCT): CPT | Performed by: EMERGENCY MEDICINE

## 2024-03-23 PROCEDURE — 96375 TX/PRO/DX INJ NEW DRUG ADDON: CPT

## 2024-03-23 PROCEDURE — 25010000002 VANCOMYCIN 10 G RECONSTITUTED SOLUTION: Performed by: EMERGENCY MEDICINE

## 2024-03-23 PROCEDURE — 80053 COMPREHEN METABOLIC PANEL: CPT | Performed by: EMERGENCY MEDICINE

## 2024-03-23 PROCEDURE — 85025 COMPLETE CBC W/AUTO DIFF WBC: CPT | Performed by: EMERGENCY MEDICINE

## 2024-03-23 PROCEDURE — 82550 ASSAY OF CK (CPK): CPT | Performed by: EMERGENCY MEDICINE

## 2024-03-23 RX ORDER — ONDANSETRON 4 MG/1
4 TABLET, ORALLY DISINTEGRATING ORAL EVERY 6 HOURS PRN
Status: DISCONTINUED | OUTPATIENT
Start: 2024-03-23 | End: 2024-03-29 | Stop reason: HOSPADM

## 2024-03-23 RX ORDER — ONDANSETRON 2 MG/ML
4 INJECTION INTRAMUSCULAR; INTRAVENOUS EVERY 6 HOURS PRN
Status: DISCONTINUED | OUTPATIENT
Start: 2024-03-23 | End: 2024-03-29 | Stop reason: HOSPADM

## 2024-03-23 RX ORDER — AMOXICILLIN 250 MG
2 CAPSULE ORAL 2 TIMES DAILY PRN
Status: DISCONTINUED | OUTPATIENT
Start: 2024-03-23 | End: 2024-03-29 | Stop reason: HOSPADM

## 2024-03-23 RX ORDER — SODIUM CHLORIDE 0.9 % (FLUSH) 0.9 %
10 SYRINGE (ML) INJECTION AS NEEDED
Status: DISCONTINUED | OUTPATIENT
Start: 2024-03-23 | End: 2024-03-29 | Stop reason: HOSPADM

## 2024-03-23 RX ORDER — ACETAMINOPHEN 500 MG
1000 TABLET ORAL ONCE
Status: COMPLETED | OUTPATIENT
Start: 2024-03-23 | End: 2024-03-23

## 2024-03-23 RX ORDER — ONDANSETRON 2 MG/ML
4 INJECTION INTRAMUSCULAR; INTRAVENOUS ONCE
Status: COMPLETED | OUTPATIENT
Start: 2024-03-23 | End: 2024-03-23

## 2024-03-23 RX ORDER — SODIUM CHLORIDE 9 MG/ML
40 INJECTION, SOLUTION INTRAVENOUS AS NEEDED
Status: DISCONTINUED | OUTPATIENT
Start: 2024-03-23 | End: 2024-03-29 | Stop reason: HOSPADM

## 2024-03-23 RX ORDER — BISACODYL 5 MG/1
5 TABLET, DELAYED RELEASE ORAL DAILY PRN
Status: DISCONTINUED | OUTPATIENT
Start: 2024-03-23 | End: 2024-03-29 | Stop reason: HOSPADM

## 2024-03-23 RX ORDER — NITROGLYCERIN 0.4 MG/1
0.4 TABLET SUBLINGUAL
Status: DISCONTINUED | OUTPATIENT
Start: 2024-03-23 | End: 2024-03-29 | Stop reason: HOSPADM

## 2024-03-23 RX ORDER — POLYETHYLENE GLYCOL 3350 17 G/17G
17 POWDER, FOR SOLUTION ORAL DAILY PRN
Status: DISCONTINUED | OUTPATIENT
Start: 2024-03-23 | End: 2024-03-29 | Stop reason: HOSPADM

## 2024-03-23 RX ORDER — ACETAMINOPHEN 160 MG/5ML
650 SOLUTION ORAL EVERY 4 HOURS PRN
Status: DISCONTINUED | OUTPATIENT
Start: 2024-03-23 | End: 2024-03-29 | Stop reason: HOSPADM

## 2024-03-23 RX ORDER — CALCIUM CARBONATE 500 MG/1
2 TABLET, CHEWABLE ORAL 2 TIMES DAILY PRN
Status: DISCONTINUED | OUTPATIENT
Start: 2024-03-23 | End: 2024-03-29 | Stop reason: HOSPADM

## 2024-03-23 RX ORDER — SODIUM CHLORIDE 0.9 % (FLUSH) 0.9 %
10 SYRINGE (ML) INJECTION EVERY 12 HOURS SCHEDULED
Status: DISCONTINUED | OUTPATIENT
Start: 2024-03-23 | End: 2024-03-29 | Stop reason: HOSPADM

## 2024-03-23 RX ORDER — ACETAMINOPHEN 650 MG/1
650 SUPPOSITORY RECTAL EVERY 4 HOURS PRN
Status: DISCONTINUED | OUTPATIENT
Start: 2024-03-23 | End: 2024-03-29 | Stop reason: HOSPADM

## 2024-03-23 RX ORDER — BISACODYL 10 MG
10 SUPPOSITORY, RECTAL RECTAL DAILY PRN
Status: DISCONTINUED | OUTPATIENT
Start: 2024-03-23 | End: 2024-03-29 | Stop reason: HOSPADM

## 2024-03-23 RX ORDER — ACETAMINOPHEN 325 MG/1
650 TABLET ORAL EVERY 4 HOURS PRN
Status: DISCONTINUED | OUTPATIENT
Start: 2024-03-23 | End: 2024-03-29 | Stop reason: HOSPADM

## 2024-03-23 RX ADMIN — Medication 1750 MG: at 22:35

## 2024-03-23 RX ADMIN — PIPERACILLIN AND TAZOBACTAM 3.38 G: 3; .375 INJECTION, POWDER, FOR SOLUTION INTRAVENOUS at 21:17

## 2024-03-23 RX ADMIN — IOPAMIDOL 95 ML: 755 INJECTION, SOLUTION INTRAVENOUS at 21:32

## 2024-03-23 RX ADMIN — HYDROMORPHONE HYDROCHLORIDE 1 MG: 1 INJECTION, SOLUTION INTRAMUSCULAR; INTRAVENOUS; SUBCUTANEOUS at 20:14

## 2024-03-23 RX ADMIN — ACETAMINOPHEN 1000 MG: 500 TABLET ORAL at 20:13

## 2024-03-23 RX ADMIN — ONDANSETRON 4 MG: 2 INJECTION INTRAMUSCULAR; INTRAVENOUS at 20:13

## 2024-03-23 RX ADMIN — HYDROMORPHONE HYDROCHLORIDE 1 MG: 1 INJECTION, SOLUTION INTRAMUSCULAR; INTRAVENOUS; SUBCUTANEOUS at 22:10

## 2024-03-23 RX ADMIN — SODIUM CHLORIDE, POTASSIUM CHLORIDE, SODIUM LACTATE AND CALCIUM CHLORIDE 2517 ML: 600; 310; 30; 20 INJECTION, SOLUTION INTRAVENOUS at 20:31

## 2024-03-23 NOTE — ED TRIAGE NOTES
Pt was brought in by ems from home for bilateral lower leg pain and swelling that started 3days ago

## 2024-03-24 ENCOUNTER — APPOINTMENT (OUTPATIENT)
Dept: MRI IMAGING | Facility: HOSPITAL | Age: 44
End: 2024-03-24
Payer: COMMERCIAL

## 2024-03-24 ENCOUNTER — APPOINTMENT (OUTPATIENT)
Dept: CARDIOLOGY | Facility: HOSPITAL | Age: 44
End: 2024-03-24
Payer: COMMERCIAL

## 2024-03-24 PROBLEM — L03.116 BILATERAL CELLULITIS OF LOWER LEG: Status: ACTIVE | Noted: 2024-03-24

## 2024-03-24 PROBLEM — L03.115 BILATERAL CELLULITIS OF LOWER LEG: Status: ACTIVE | Noted: 2024-03-24

## 2024-03-24 PROBLEM — J18.9 PNEUMONIA: Status: ACTIVE | Noted: 2024-03-24

## 2024-03-24 PROBLEM — K04.7 DENTAL ABSCESS: Status: ACTIVE | Noted: 2024-03-24

## 2024-03-24 LAB
ANION GAP SERPL CALCULATED.3IONS-SCNC: 5.7 MMOL/L (ref 5–15)
B PARAPERT DNA SPEC QL NAA+PROBE: NOT DETECTED
B PERT DNA SPEC QL NAA+PROBE: NOT DETECTED
BH CV LOWER VASCULAR LEFT COMMON FEMORAL AUGMENT: NORMAL
BH CV LOWER VASCULAR LEFT COMMON FEMORAL COMPETENT: NORMAL
BH CV LOWER VASCULAR LEFT COMMON FEMORAL COMPRESS: NORMAL
BH CV LOWER VASCULAR LEFT COMMON FEMORAL PHASIC: NORMAL
BH CV LOWER VASCULAR LEFT COMMON FEMORAL SPONT: NORMAL
BH CV LOWER VASCULAR LEFT DISTAL FEMORAL COMPRESS: NORMAL
BH CV LOWER VASCULAR LEFT GASTRONEMIUS COMPRESS: NORMAL
BH CV LOWER VASCULAR LEFT GREATER SAPH AK COMPRESS: NORMAL
BH CV LOWER VASCULAR LEFT GREATER SAPH BK COMPRESS: NORMAL
BH CV LOWER VASCULAR LEFT LESSER SAPH COMPRESS: NORMAL
BH CV LOWER VASCULAR LEFT MID FEMORAL AUGMENT: NORMAL
BH CV LOWER VASCULAR LEFT MID FEMORAL COMPETENT: NORMAL
BH CV LOWER VASCULAR LEFT MID FEMORAL COMPRESS: NORMAL
BH CV LOWER VASCULAR LEFT MID FEMORAL PHASIC: NORMAL
BH CV LOWER VASCULAR LEFT MID FEMORAL SPONT: NORMAL
BH CV LOWER VASCULAR LEFT PERONEAL COMPRESS: NORMAL
BH CV LOWER VASCULAR LEFT POPLITEAL AUGMENT: NORMAL
BH CV LOWER VASCULAR LEFT POPLITEAL COMPETENT: NORMAL
BH CV LOWER VASCULAR LEFT POPLITEAL COMPRESS: NORMAL
BH CV LOWER VASCULAR LEFT POPLITEAL PHASIC: NORMAL
BH CV LOWER VASCULAR LEFT POPLITEAL SPONT: NORMAL
BH CV LOWER VASCULAR LEFT POSTERIOR TIBIAL COMPRESS: NORMAL
BH CV LOWER VASCULAR LEFT PROFUNDA FEMORAL COMPRESS: NORMAL
BH CV LOWER VASCULAR LEFT PROXIMAL FEMORAL COMPRESS: NORMAL
BH CV LOWER VASCULAR LEFT SAPHENOFEMORAL JUNCTION COMPRESS: NORMAL
BH CV LOWER VASCULAR RIGHT COMMON FEMORAL AUGMENT: NORMAL
BH CV LOWER VASCULAR RIGHT COMMON FEMORAL COMPETENT: NORMAL
BH CV LOWER VASCULAR RIGHT COMMON FEMORAL COMPRESS: NORMAL
BH CV LOWER VASCULAR RIGHT COMMON FEMORAL PHASIC: NORMAL
BH CV LOWER VASCULAR RIGHT COMMON FEMORAL SPONT: NORMAL
BH CV LOWER VASCULAR RIGHT DISTAL FEMORAL COMPRESS: NORMAL
BH CV LOWER VASCULAR RIGHT GASTRONEMIUS COMPRESS: NORMAL
BH CV LOWER VASCULAR RIGHT GREATER SAPH AK COMPRESS: NORMAL
BH CV LOWER VASCULAR RIGHT GREATER SAPH BK COMPRESS: NORMAL
BH CV LOWER VASCULAR RIGHT LESSER SAPH COMPRESS: NORMAL
BH CV LOWER VASCULAR RIGHT MID FEMORAL AUGMENT: NORMAL
BH CV LOWER VASCULAR RIGHT MID FEMORAL COMPETENT: NORMAL
BH CV LOWER VASCULAR RIGHT MID FEMORAL COMPRESS: NORMAL
BH CV LOWER VASCULAR RIGHT MID FEMORAL PHASIC: NORMAL
BH CV LOWER VASCULAR RIGHT MID FEMORAL SPONT: NORMAL
BH CV LOWER VASCULAR RIGHT PERONEAL COMPRESS: NORMAL
BH CV LOWER VASCULAR RIGHT POPLITEAL AUGMENT: NORMAL
BH CV LOWER VASCULAR RIGHT POPLITEAL COMPETENT: NORMAL
BH CV LOWER VASCULAR RIGHT POPLITEAL COMPRESS: NORMAL
BH CV LOWER VASCULAR RIGHT POPLITEAL PHASIC: NORMAL
BH CV LOWER VASCULAR RIGHT POPLITEAL SPONT: NORMAL
BH CV LOWER VASCULAR RIGHT POSTERIOR TIBIAL COMPRESS: NORMAL
BH CV LOWER VASCULAR RIGHT PROFUNDA FEMORAL COMPRESS: NORMAL
BH CV LOWER VASCULAR RIGHT PROXIMAL FEMORAL COMPRESS: NORMAL
BH CV LOWER VASCULAR RIGHT SAPHENOFEMORAL JUNCTION COMPRESS: NORMAL
BUN SERPL-MCNC: 6 MG/DL (ref 6–20)
BUN/CREAT SERPL: 9 (ref 7–25)
C PNEUM DNA NPH QL NAA+NON-PROBE: NOT DETECTED
CALCIUM SPEC-SCNC: 8.4 MG/DL (ref 8.6–10.5)
CHLORIDE SERPL-SCNC: 106 MMOL/L (ref 98–107)
CO2 SERPL-SCNC: 29.3 MMOL/L (ref 22–29)
CREAT SERPL-MCNC: 0.67 MG/DL (ref 0.57–1)
DEPRECATED RDW RBC AUTO: 48 FL (ref 37–54)
EGFRCR SERPLBLD CKD-EPI 2021: 112.1 ML/MIN/1.73
ERYTHROCYTE [DISTWIDTH] IN BLOOD BY AUTOMATED COUNT: 13.3 % (ref 12.3–15.4)
FLUAV SUBTYP SPEC NAA+PROBE: NOT DETECTED
FLUBV RNA ISLT QL NAA+PROBE: NOT DETECTED
GEN 5 2HR TROPONIN T REFLEX: <6 NG/L
GLUCOSE SERPL-MCNC: 100 MG/DL (ref 65–99)
HADV DNA SPEC NAA+PROBE: NOT DETECTED
HCOV 229E RNA SPEC QL NAA+PROBE: NOT DETECTED
HCOV HKU1 RNA SPEC QL NAA+PROBE: NOT DETECTED
HCOV NL63 RNA SPEC QL NAA+PROBE: NOT DETECTED
HCOV OC43 RNA SPEC QL NAA+PROBE: NOT DETECTED
HCT VFR BLD AUTO: 30 % (ref 34–46.6)
HGB BLD-MCNC: 9.7 G/DL (ref 12–15.9)
HMPV RNA NPH QL NAA+NON-PROBE: NOT DETECTED
HPIV1 RNA ISLT QL NAA+PROBE: NOT DETECTED
HPIV2 RNA SPEC QL NAA+PROBE: NOT DETECTED
HPIV3 RNA NPH QL NAA+PROBE: NOT DETECTED
HPIV4 P GENE NPH QL NAA+PROBE: NOT DETECTED
M PNEUMO IGG SER IA-ACNC: NOT DETECTED
MCH RBC QN AUTO: 31.3 PG (ref 26.6–33)
MCHC RBC AUTO-ENTMCNC: 32.3 G/DL (ref 31.5–35.7)
MCV RBC AUTO: 96.8 FL (ref 79–97)
PLATELET # BLD AUTO: 246 10*3/MM3 (ref 140–450)
PMV BLD AUTO: 10.3 FL (ref 6–12)
POTASSIUM SERPL-SCNC: 4 MMOL/L (ref 3.5–5.2)
RBC # BLD AUTO: 3.1 10*6/MM3 (ref 3.77–5.28)
RHINOVIRUS RNA SPEC NAA+PROBE: NOT DETECTED
RSV RNA NPH QL NAA+NON-PROBE: NOT DETECTED
SARS-COV-2 RNA NPH QL NAA+NON-PROBE: NOT DETECTED
SODIUM SERPL-SCNC: 141 MMOL/L (ref 136–145)
TROPONIN T DELTA: NORMAL
WBC NRBC COR # BLD AUTO: 7.78 10*3/MM3 (ref 3.4–10.8)

## 2024-03-24 PROCEDURE — 0 GADOBENATE DIMEGLUMINE 529 MG/ML SOLUTION: Performed by: INTERNAL MEDICINE

## 2024-03-24 PROCEDURE — 25010000002 LORAZEPAM PER 2 MG: Performed by: INTERNAL MEDICINE

## 2024-03-24 PROCEDURE — 25010000002 ONDANSETRON PER 1 MG: Performed by: NURSE PRACTITIONER

## 2024-03-24 PROCEDURE — G0378 HOSPITAL OBSERVATION PER HR: HCPCS

## 2024-03-24 PROCEDURE — 0202U NFCT DS 22 TRGT SARS-COV-2: CPT | Performed by: INTERNAL MEDICINE

## 2024-03-24 PROCEDURE — 25010000002 HYDROMORPHONE PER 4 MG: Performed by: NURSE PRACTITIONER

## 2024-03-24 PROCEDURE — 80048 BASIC METABOLIC PNL TOTAL CA: CPT | Performed by: NURSE PRACTITIONER

## 2024-03-24 PROCEDURE — 72158 MRI LUMBAR SPINE W/O & W/DYE: CPT

## 2024-03-24 PROCEDURE — 25010000002 PIPERACILLIN SOD-TAZOBACTAM PER 1 G: Performed by: NURSE PRACTITIONER

## 2024-03-24 PROCEDURE — 96375 TX/PRO/DX INJ NEW DRUG ADDON: CPT

## 2024-03-24 PROCEDURE — 85027 COMPLETE CBC AUTOMATED: CPT | Performed by: NURSE PRACTITIONER

## 2024-03-24 PROCEDURE — 25010000002 ENOXAPARIN PER 10 MG: Performed by: NURSE PRACTITIONER

## 2024-03-24 PROCEDURE — 96376 TX/PRO/DX INJ SAME DRUG ADON: CPT

## 2024-03-24 PROCEDURE — 93970 EXTREMITY STUDY: CPT | Performed by: SURGERY

## 2024-03-24 PROCEDURE — 25010000002 VANCOMYCIN HCL 1.25 G RECONSTITUTED SOLUTION 1 EACH VIAL: Performed by: NURSE PRACTITIONER

## 2024-03-24 PROCEDURE — 96366 THER/PROPH/DIAG IV INF ADDON: CPT

## 2024-03-24 PROCEDURE — 96372 THER/PROPH/DIAG INJ SC/IM: CPT

## 2024-03-24 PROCEDURE — 96368 THER/DIAG CONCURRENT INF: CPT

## 2024-03-24 PROCEDURE — 93970 EXTREMITY STUDY: CPT

## 2024-03-24 PROCEDURE — 96367 TX/PROPH/DG ADDL SEQ IV INF: CPT

## 2024-03-24 PROCEDURE — 25810000003 SODIUM CHLORIDE 0.9 % SOLUTION 250 ML FLEX CONT: Performed by: NURSE PRACTITIONER

## 2024-03-24 PROCEDURE — A9577 INJ MULTIHANCE: HCPCS | Performed by: INTERNAL MEDICINE

## 2024-03-24 RX ORDER — HYDROCODONE BITARTRATE AND ACETAMINOPHEN 5; 325 MG/1; MG/1
1 TABLET ORAL EVERY 6 HOURS PRN
Status: DISCONTINUED | OUTPATIENT
Start: 2024-03-24 | End: 2024-03-29 | Stop reason: HOSPADM

## 2024-03-24 RX ORDER — CLONIDINE HYDROCHLORIDE 0.1 MG/1
0.1 TABLET ORAL DAILY
COMMUNITY
Start: 2024-03-06 | End: 2024-09-02

## 2024-03-24 RX ORDER — HYDROMORPHONE HYDROCHLORIDE 1 MG/ML
0.5 INJECTION, SOLUTION INTRAMUSCULAR; INTRAVENOUS; SUBCUTANEOUS
Status: COMPLETED | OUTPATIENT
Start: 2024-03-24 | End: 2024-03-24

## 2024-03-24 RX ORDER — TRAZODONE HYDROCHLORIDE 50 MG/1
50 TABLET ORAL NIGHTLY PRN
Status: DISCONTINUED | OUTPATIENT
Start: 2024-03-24 | End: 2024-03-29 | Stop reason: HOSPADM

## 2024-03-24 RX ORDER — LORAZEPAM 2 MG/ML
0.5 INJECTION INTRAMUSCULAR ONCE
Status: COMPLETED | OUTPATIENT
Start: 2024-03-24 | End: 2024-03-24

## 2024-03-24 RX ORDER — CLONIDINE HYDROCHLORIDE 0.1 MG/1
0.1 TABLET ORAL DAILY
Status: DISCONTINUED | OUTPATIENT
Start: 2024-03-24 | End: 2024-03-29 | Stop reason: HOSPADM

## 2024-03-24 RX ORDER — LEVETIRACETAM 500 MG/1
1000 TABLET ORAL EVERY 12 HOURS SCHEDULED
Status: DISCONTINUED | OUTPATIENT
Start: 2024-03-24 | End: 2024-03-29 | Stop reason: HOSPADM

## 2024-03-24 RX ORDER — ENOXAPARIN SODIUM 100 MG/ML
1 INJECTION SUBCUTANEOUS EVERY 12 HOURS
Status: DISCONTINUED | OUTPATIENT
Start: 2024-03-24 | End: 2024-03-24

## 2024-03-24 RX ORDER — SUMATRIPTAN 100 MG/1
100 TABLET, FILM COATED ORAL
COMMUNITY
Start: 2024-02-29

## 2024-03-24 RX ORDER — LEVOMEFOLATE CALCIUM 7.5 MG TABLET
1 TABLET DAILY
COMMUNITY
Start: 2024-03-07

## 2024-03-24 RX ORDER — FOLIC ACID 1 MG/1
1 TABLET ORAL DAILY
COMMUNITY
Start: 2024-03-06

## 2024-03-24 RX ORDER — OXYCODONE HYDROCHLORIDE AND ACETAMINOPHEN 5; 325 MG/1; MG/1
1 TABLET ORAL EVERY 6 HOURS PRN
Status: DISCONTINUED | OUTPATIENT
Start: 2024-03-24 | End: 2024-03-24

## 2024-03-24 RX ORDER — FLUOXETINE HYDROCHLORIDE 20 MG/1
60 CAPSULE ORAL NIGHTLY
Status: DISCONTINUED | OUTPATIENT
Start: 2024-03-24 | End: 2024-03-29 | Stop reason: HOSPADM

## 2024-03-24 RX ORDER — CHOLECALCIFEROL (VITAMIN D3) 125 MCG
10 CAPSULE ORAL NIGHTLY
Status: DISCONTINUED | OUTPATIENT
Start: 2024-03-24 | End: 2024-03-29 | Stop reason: HOSPADM

## 2024-03-24 RX ORDER — GABAPENTIN 100 MG/1
100 CAPSULE ORAL ONCE
Status: COMPLETED | OUTPATIENT
Start: 2024-03-24 | End: 2024-03-24

## 2024-03-24 RX ADMIN — ACETAMINOPHEN 325MG 650 MG: 325 TABLET ORAL at 01:24

## 2024-03-24 RX ADMIN — APIXABAN 5 MG: 5 TABLET, FILM COATED ORAL at 10:55

## 2024-03-24 RX ADMIN — HYDROCODONE BITARTRATE AND ACETAMINOPHEN 1 TABLET: 5; 325 TABLET ORAL at 17:31

## 2024-03-24 RX ADMIN — GADOBENATE DIMEGLUMINE 20 ML: 529 INJECTION, SOLUTION INTRAVENOUS at 16:04

## 2024-03-24 RX ADMIN — VANCOMYCIN HYDROCHLORIDE 1250 MG: 1.25 INJECTION, POWDER, LYOPHILIZED, FOR SOLUTION INTRAVENOUS at 10:56

## 2024-03-24 RX ADMIN — PIPERACILLIN AND TAZOBACTAM 3.38 G: 3; .375 INJECTION, POWDER, FOR SOLUTION INTRAVENOUS at 11:59

## 2024-03-24 RX ADMIN — Medication 10 ML: at 20:29

## 2024-03-24 RX ADMIN — Medication 10 ML: at 23:45

## 2024-03-24 RX ADMIN — PIPERACILLIN AND TAZOBACTAM 3.38 G: 3; .375 INJECTION, POWDER, FOR SOLUTION INTRAVENOUS at 20:27

## 2024-03-24 RX ADMIN — ONDANSETRON HYDROCHLORIDE 4 MG: 2 INJECTION, SOLUTION INTRAMUSCULAR; INTRAVENOUS at 14:54

## 2024-03-24 RX ADMIN — LORAZEPAM 0.5 MG: 2 INJECTION INTRAMUSCULAR; INTRAVENOUS at 15:26

## 2024-03-24 RX ADMIN — HYDROMORPHONE HYDROCHLORIDE 0.5 MG: 1 INJECTION, SOLUTION INTRAMUSCULAR; INTRAVENOUS; SUBCUTANEOUS at 06:37

## 2024-03-24 RX ADMIN — PIPERACILLIN AND TAZOBACTAM 3.38 G: 3; .375 INJECTION, POWDER, FOR SOLUTION INTRAVENOUS at 03:55

## 2024-03-24 RX ADMIN — FLUOXETINE HYDROCHLORIDE 60 MG: 20 CAPSULE ORAL at 20:27

## 2024-03-24 RX ADMIN — ONDANSETRON HYDROCHLORIDE 4 MG: 2 INJECTION, SOLUTION INTRAMUSCULAR; INTRAVENOUS at 23:45

## 2024-03-24 RX ADMIN — GABAPENTIN 100 MG: 100 CAPSULE ORAL at 01:54

## 2024-03-24 RX ADMIN — LEVETIRACETAM 1000 MG: 500 TABLET, FILM COATED ORAL at 20:27

## 2024-03-24 RX ADMIN — VANCOMYCIN HYDROCHLORIDE 1250 MG: 1.25 INJECTION, POWDER, LYOPHILIZED, FOR SOLUTION INTRAVENOUS at 23:04

## 2024-03-24 RX ADMIN — Medication 10 ML: at 10:55

## 2024-03-24 RX ADMIN — HYDROMORPHONE HYDROCHLORIDE 0.5 MG: 1 INJECTION, SOLUTION INTRAMUSCULAR; INTRAVENOUS; SUBCUTANEOUS at 11:03

## 2024-03-24 RX ADMIN — HYDROCODONE BITARTRATE AND ACETAMINOPHEN 1 TABLET: 5; 325 TABLET ORAL at 23:46

## 2024-03-24 RX ADMIN — ACETAMINOPHEN 325MG 650 MG: 325 TABLET ORAL at 10:54

## 2024-03-24 RX ADMIN — LEVETIRACETAM 1000 MG: 500 TABLET, FILM COATED ORAL at 10:55

## 2024-03-24 RX ADMIN — APIXABAN 5 MG: 5 TABLET, FILM COATED ORAL at 20:27

## 2024-03-24 RX ADMIN — HYDROMORPHONE HYDROCHLORIDE 0.5 MG: 1 INJECTION, SOLUTION INTRAMUSCULAR; INTRAVENOUS; SUBCUTANEOUS at 01:54

## 2024-03-24 RX ADMIN — ENOXAPARIN SODIUM 80 MG: 100 INJECTION SUBCUTANEOUS at 03:58

## 2024-03-24 RX ADMIN — CLONIDINE HYDROCHLORIDE 0.1 MG: 0.1 TABLET ORAL at 10:55

## 2024-03-24 RX ADMIN — Medication 10 MG: at 20:28

## 2024-03-24 NOTE — H&P
Patient Name:  Belen Allen  YOB: 1980  MRN:  [id number]  Admit Date:  3/23/2024  Patient Care Team:  Sahil Cornelius MD as PCP - General (Internal Medicine)  Code, Bjorn HERRERA II, MD as Consulting Physician (Hematology and Oncology)  Dennys Carranza MD as Referring Physician (Hospitalist)      Subjective   History Present Illness     Chief Complaint   Patient presents with    Leg Pain       Ms. Allen is a 43 y.o. smoker with a history of chronic back pain, opioid dependence, antiphospholipid antibody syndrome, and seizure disorder that presents to [facility] complaining of bilateral leg pain and low back pain.  She reports bilateral leg back pain that began three days ago.  She states the pain radiates from her thighs down to her feet, and she reports swelling in the lower legs. She describes the pain as constant, severe, and burning in nature.  She also reports right-sided low back pain that is chronic but has worsened.  She reports nausea and non-bloody emesis related to the pain.  She denies loss of bowel and bladder function.  She denies chest pain, shortness of breath, and cough.  A CT angiogram of the chest showed evidence of a multilobular bilateral pneumonia.  She also had evidence of bilateral lower extremity cellulitis in the ED and she received Zosyn and Vancomycin.      History of Present Illness  Review of Systems   Constitutional:  Positive for chills and fever.   HENT:  Negative for congestion and sore throat.    Eyes:  Negative for photophobia and visual disturbance.   Respiratory:  Negative for cough, shortness of breath and wheezing.    Cardiovascular:  Positive for leg swelling. Negative for chest pain and palpitations.   Gastrointestinal:  Positive for nausea and vomiting. Negative for abdominal pain.   Musculoskeletal:  Positive for arthralgias, back pain and myalgias.   Skin:  Positive for color change. Negative for wound.   Neurological:   Negative for dizziness, light-headedness, numbness and headaches.        Personal History     Past Medical History:   Diagnosis Date    Abnormal Pap smear of cervix     Ankle fracture 2013    H/O Elevated liver enzymes     History of chronic back pain     History of migraine     History of urinary tract infection     Injury of back     Opioid dependence 1/16/2023    PONV (postoperative nausea and vomiting)     Seasonal allergies     Seizure     Takes Keppra     Past Surgical History:   Procedure Laterality Date    BACK SURGERY  2006    fusion L4, L5      CHOLECYSTECTOMY  2014    DILATATION AND CURETTAGE  2009    ENDOSCOPY N/A 11/27/2022    Procedure: ESOPHAGOGASTRODUODENOSCOPY with biopsy;  Surgeon: Christiana Mann MD;  Location: [facility] ENDOSCOPY;  Service: Gastroenterology;  Laterality: N/A;  gastritis, duodenitis, irregular z line    ERCP N/A 6/3/2021    Procedure: ENDOSCOPIC RETROGRADE CHOLANGIOPANCREATOGRAPHY WITH SPHINCTEROTOMY, DILATION WITH BALLOON CLEARANCE  (12MM-15MM);  Surgeon: Bjorn Flannery MD;  Location: [facility] ENDOSCOPY;  Service: Gastroenterology;  Laterality: N/A;  DILATED COMMON BILE DUCT    SKIN SURGERY      TUBAL ABDOMINAL LIGATION  2013    WISDOM TOOTH EXTRACTION  2018    x2     Family History   Problem Relation Age of Onset    Stroke Mother     Allergic rhinitis Mother     Allergic rhinitis Sister     Breast cancer Maternal Aunt     Cancer Maternal Grandmother     Allergic rhinitis Paternal Grandfather     Cancer Paternal Grandfather     Prostate cancer Paternal Grandfather      Social History     Tobacco Use    Smoking status: Every Day     Current packs/day: 1.00     Types: Cigarettes    Smokeless tobacco: Never   Vaping Use    Vaping status: Never Used   Substance Use Topics    Alcohol use: Not Currently    Drug use: Not Currently     Medications Prior to Admission   Medication Sig Dispense Refill Last Dose    cloNIDine (CATAPRES) 0.1 MG tablet Take 1 tablet by mouth Daily.    3/23/2024    FLUoxetine (PROzac) 40 MG capsule Take 60 mg by mouth Every Night.   3/23/2024    folic acid (FOLVITE) 1 MG tablet Take 1 tablet by mouth Daily.   3/23/2024    l-methylfolate 7.5 MG tablet tablet Take 1 tablet by mouth Daily.   3/23/2024    melatonin 5 MG tablet tablet Take 2 tablets by mouth Every Night. Patient taes 20 mg at home   3/23/2024    SUMAtriptan (IMITREX) 100 MG tablet Take 1 tablet by mouth Every 2 (Two) Hours As Needed for Migraine.   Past Month    levETIRAcetam (KEPPRA) 1000 MG tablet Take 2 tablets by mouth Every 12 (Twelve) Hours for 30 days. (Patient not taking: Reported on 3/24/2024) 120 tablet 0 Not Taking    methylPREDNISolone (MEDROL) 4 MG dose pack Take as directed on package instructions. 21 each 0     oxyCODONE-acetaminophen (PERCOCET) 5-325 MG per tablet Take 1-2 tablets by mouth Every 6 (Six) Hours As Needed (pain). 12 tablet 0     promethazine (PHENERGAN) 25 MG tablet Take 1 tablet by mouth Every 6 (Six) Hours As Needed for Nausea or Vomiting. 10 tablet 0     traZODone (DESYREL) 50 MG tablet Take 1-2 tablets by mouth At Night As Needed for sleep 30 tablet 3     Vitamin B-2 (RIBOFLAVIN) 100 MG tablet tablet Take 4 tablets by mouth Daily. 30 tablet 3      Allergies:    Allergies   Allergen Reactions    Morphine Hives    Lidocaine Rash     Pt reports lidocaine patches make her breakout in a rash       Objective    Objective     Vital Signs  Temp:  [98.6 °F (37 °C)-99.7 °F (37.6 °C)] 98.6 °F (37 °C)  Heart Rate:  [] 97  Resp:  [18-20] 18  BP: (132-159)/(77-95) 132/87  SpO2:  [91 %-98 %] 98 %  on  Flow (L/min):  [2] 2;   Device (Oxygen Therapy): nasal cannula  Body mass index is 31.76 kg/m².    Physical Exam  Vitals and nursing note reviewed.   Constitutional:       Appearance: Normal appearance.   HENT:      Head: Normocephalic and atraumatic.      Nose: Nose normal.      Mouth/Throat:      Mouth: Mucous membranes are dry.      Pharynx: Oropharynx is clear.   Eyes:       Extraocular Movements: Extraocular movements intact.      Conjunctiva/sclera: Conjunctivae normal.   Cardiovascular:      Rate and Rhythm: Normal rate and regular rhythm.      Pulses: Normal pulses.      Heart sounds: Normal heart sounds.   Pulmonary:      Effort: Pulmonary effort is normal.      Breath sounds: Normal breath sounds.   Abdominal:      General: Bowel sounds are normal.      Palpations: Abdomen is soft.   Musculoskeletal:         General: Tenderness (BLE) present.      Cervical back: Normal range of motion and neck supple.      Right lower leg: Edema (2+ edema) present.      Left lower leg: Edema (2+ edema) present.   Skin:     General: Skin is warm and dry.      Findings: Erythema (BLE) present.      Comments: Warmth, erythema, and edema of BLE that extends from the knee down to the toes   Neurological:      General: No focal deficit present.      Mental Status: She is alert and oriented to person, place, and time.   Psychiatric:         Mood and Affect: Mood normal.         Behavior: Behavior normal.         Results Review:  I reviewed the patient's new clinical results.  I reviewed the patient's new imaging results and agree with the interpretation.  I reviewed the patient's other test results and agree with the interpretation  I personally viewed and interpreted the patient's EKG/Telemetry data  Discussed with ED provider.    Lab Results (last 24 hours)       Procedure Component Value Units Date/Time    CBC & Differential [[id number]]  (Abnormal) Collected: 03/23/24 2012    Specimen: Blood Updated: 03/23/24 2032    Narrative:      The following orders were created for panel order CBC & Differential.  Procedure                               Abnormality         Status                     ---------                               -----------         ------                     CBC Auto Differential[[id number]]        Abnormal            Final result                 Please view results for these tests on  the individual orders.    Comprehensive Metabolic Panel [[id number]]  (Abnormal) Collected: 03/23/24 2012    Specimen: Blood Updated: 03/23/24 2059     Glucose 114 mg/dL      BUN 7 mg/dL      Creatinine 0.66 mg/dL      Sodium 138 mmol/L      Potassium 4.3 mmol/L      Chloride 102 mmol/L      CO2 26.5 mmol/L      Calcium 8.5 mg/dL      Total Protein 5.8 g/dL      Albumin 3.5 g/dL      ALT (SGPT) 44 U/L      AST (SGOT) 29 U/L      Alkaline Phosphatase 179 U/L      Total Bilirubin 0.5 mg/dL      Globulin 2.3 gm/dL      A/G Ratio 1.5 g/dL      BUN/Creatinine Ratio 10.6     Anion Gap 9.5 mmol/L      eGFR 112.5 mL/min/1.73     Narrative:      GFR Normal >60  Chronic Kidney Disease <60  Kidney Failure <15      Lipase [[id number]]  (Abnormal) Collected: 03/23/24 2012    Specimen: Blood Updated: 03/23/24 2059     Lipase 9 U/L     BNP [[id number]]  (Abnormal) Collected: 03/23/24 2012    Specimen: Blood Updated: 03/23/24 2104     proBNP 683.0 pg/mL     Narrative:      This assay is used as an aid in the diagnosis of individuals suspected of having heart failure. It can be used as an aid in the diagnosis of acute decompensated heart failure (ADHF) in patients presenting with signs and symptoms of ADHF to the emergency department (ED). In addition, NT-proBNP of <300 pg/mL indicates ADHF is not likely.    Age Range Result Interpretation  NT-proBNP Concentration (pg/mL:      <50             Positive            >450                   Gray                 300-450                    Negative             <300    50-75           Positive            >900                  Gray                300-900                  Negative            <300      >75             Positive            >1800                  Gray                300-1800                  Negative            <300    High Sensitivity Troponin T [[id number]]  (Normal) Collected: 03/23/24 2012    Specimen: Blood Updated: 03/23/24 2104     HS Troponin T <6 ng/L     Narrative:      High  "Sensitive Troponin T Reference Range:  <14.0 ng/L- Negative Female for AMI  <22.0 ng/L- Negative Male for AMI  >=14 - Abnormal Female indicating possible myocardial injury.  >=22 - Abnormal Male indicating possible myocardial injury.   Clinicians would have to utilize clinical acumen, EKG, Troponin, and serial changes to determine if it is an Acute Myocardial Infarction or myocardial injury due to an underlying chronic condition.         Lactic Acid, Plasma [[id number]]  (Normal) Collected: 03/23/24 2012    Specimen: Blood Updated: 03/23/24 2047     Lactate 1.1 mmol/L     Procalcitonin [[id number]]  (Normal) Collected: 03/23/24 2012    Specimen: Blood Updated: 03/23/24 2104     Procalcitonin 0.15 ng/mL     Narrative:      As a Marker for Sepsis (Non-Neonates):    1. <0.5 ng/mL represents a low risk of severe sepsis and/or septic shock.  2. >2 ng/mL represents a high risk of severe sepsis and/or septic shock.    As a Marker for Lower Respiratory Tract Infections that require antibiotic therapy:    PCT on Admission    Antibiotic Therapy       6-12 Hrs later    >0.5                Strongly Recommended  >0.25 - <0.5        Recommended   0.1 - 0.25          Discouraged              Remeasure/reassess PCT  <0.1                Strongly Discouraged     Remeasure/reassess PCT    As 28 day mortality risk marker: \"Change in Procalcitonin Result\" (>80% or <=80%) if Day 0 (or Day 1) and Day 4 values are available. Refer to http://www.[facility]-pct-calculator.com    Change in PCT <=80%  A decrease of PCT levels below or equal to 80% defines a positive change in PCT test result representing a higher risk for 28-day all-cause mortality of patients diagnosed with severe sepsis for septic shock.    Change in PCT >80%  A decrease of PCT levels of more than 80% defines a negative change in PCT result representing a lower risk for 28-day all-cause mortality of patients diagnosed with severe sepsis or septic shock.       Sedimentation Rate " [[id number]]  (Abnormal) Collected: 03/23/24 2012    Specimen: Blood Updated: 03/23/24 2042     Sed Rate 37 mm/hr     C-reactive Protein [[id number]]  (Abnormal) Collected: 03/23/24 2012    Specimen: Blood Updated: 03/23/24 2059     C-Reactive Protein 11.49 mg/dL     CK [[id number]]  (Normal) Collected: 03/23/24 2012    Specimen: Blood Updated: 03/23/24 2059     Creatine Kinase 68 U/L     CBC Auto Differential [[id number]]  (Abnormal) Collected: 03/23/24 2012    Specimen: Blood Updated: 03/23/24 2032     WBC 9.47 10*3/mm3      RBC 3.26 10*6/mm3      Hemoglobin 9.8 g/dL      Hematocrit 31.3 %      MCV 96.0 fL      MCH 30.1 pg      MCHC 31.3 g/dL      RDW 14.0 %      RDW-SD 48.4 fl      MPV 9.7 fL      Platelets 286 10*3/mm3      Neutrophil % 75.9 %      Lymphocyte % 12.2 %      Monocyte % 9.8 %      Eosinophil % 1.6 %      Basophil % 0.1 %      Immature Grans % 0.4 %      Neutrophils, Absolute 7.18 10*3/mm3      Lymphocytes, Absolute 1.16 10*3/mm3      Monocytes, Absolute 0.93 10*3/mm3      Eosinophils, Absolute 0.15 10*3/mm3      Basophils, Absolute 0.01 10*3/mm3      Immature Grans, Absolute 0.04 10*3/mm3      nRBC 0.0 /100 WBC     D-dimer, Quantitative [[id number]]  (Abnormal) Collected: 03/23/24 2012    Specimen: Blood Updated: 03/23/24 2046     D-Dimer, Quantitative 0.99 MCGFEU/mL     Narrative:      According to the assay 's published package insert, a normal (<0.50 MCGFEU/mL) D-dimer result in conjunction with a non-high clinical probability assessment, excludes deep vein thrombosis (DVT) and pulmonary embolism (PE) with high sensitivity.    D-dimer values increase with age and this can make VTE exclusion of an older population difficult. To address this, the [employer], based on best available evidence and recent guidelines, recommends that clinicians use age-adjusted D-dimer thresholds in patients greater than 50 years of age with: a) a low probability of PE who do not meet all  "Pulmonary Embolism Rule Out Criteria, or b) in those with intermediate probability of PE.   The formula for an age-adjusted D-dimer cut-off is \"age/100\".  For example, a 60 year old patient would have an age-adjusted cut-off of 0.60 MCGFEU/mL and an 80 year old 0.80 MCGFEU/mL.    Blood Culture - Blood, Arm, Right [[id number]] Collected: 03/23/24 2022    Specimen: Blood from Arm, Right Updated: 03/23/24 2034    Blood Culture - Blood, Arm, Right [[id number]] Collected: 03/23/24 2022    Specimen: Blood from Arm, Right Updated: 03/23/24 2033    Urine Drug Screen - Urine, Clean Catch [[id number]]  (Abnormal) Collected: 03/23/24 2116    Specimen: Urine, Clean Catch Updated: 03/23/24 2150     Amphet/Methamphet, Screen Negative     Barbiturates Screen, Urine Negative     Benzodiazepine Screen, Urine Negative     Cocaine Screen, Urine Negative     Opiate Screen Negative     THC, Screen, Urine Negative     Methadone Screen, Urine Negative     Oxycodone Screen, Urine Negative     Fentanyl, Urine Positive    Narrative:      Negative Thresholds Per Drugs Screened:    Amphetamines                 500 ng/ml  Barbiturates                 200 ng/ml  Benzodiazepines              100 ng/ml  Cocaine                      300 ng/ml  Methadone                    300 ng/ml  Opiates                      300 ng/ml  Oxycodone                    100 ng/ml  THC                           50 ng/ml  Fentanyl                       5 ng/ml      The Normal Value for all drugs tested is negative. This report includes final unconfirmed screening results to be used for medical treatment purposes only. Unconfirmed results must not be used for non-medical purposes such as employment or legal testing. Clinical consideration should be applied to any drug of abuse test, particularly when unconfirmed results are used.            High Sensitivity Troponin T 2Hr [[id number]] Collected: 03/23/24 2356    Specimen: Blood Updated: 03/24/24 0125     HS Troponin T <6 " ng/L      Troponin T Delta --     Comment: Unable to calculate.       Narrative:      High Sensitive Troponin T Reference Range:  <14.0 ng/L- Negative Female for AMI  <22.0 ng/L- Negative Male for AMI  >=14 - Abnormal Female indicating possible myocardial injury.  >=22 - Abnormal Male indicating possible myocardial injury.   Clinicians would have to utilize clinical acumen, EKG, Troponin, and serial changes to determine if it is an Acute Myocardial Infarction or myocardial injury due to an underlying chronic condition.                 Imaging Results (Last 24 Hours)       Procedure Component Value Units Date/Time    CT Angiogram Chest Pulmonary Embolism [[id number]] Collected: 03/23/24 2159     Updated: 03/23/24 2339    Narrative:      EXAMINATION: CT ANGIOGRAM OF THE CHEST WITH CONTRAST     HISTORY: 43-year-old female with a history of fever and right  flank/chest pain.     TECHNIQUE: Contiguous axial images were obtained through the chest  following bolus intravenous administration of nonionic contrast. 3D  reformatted images were produced and presented for interpretation.     COMPARISON: CT of the abdomen and pelvis with contrast, 03/23/2024.     FINDINGS: No filling defects are seen within the pulmonary arterial  system to the level of the subsegmental pulmonary arteries. The heart  and great vessels have a normal appearance. There is no evidence for  mediastinal or hilar adenopathy.     Patchy ground-glass opacities are seen throughout the bilateral upper  lobes, right middle lobe, right lower lobe, left lower lobe and to a  lesser degree the lingula. A pleural effusion is not visualized.  Moderate multilevel abutting endplate osteophytic change is noted.     The heart and great vessels appear normal.       Impression:      1. There is no evidence for a pulmonary embolism.  2. Multilobar bilateral ground-glass opacities consistent with a  multilobar bilateral pneumonia.     Radiation dose reduction techniques  were utilized, including automated  exposure control and exposure modulation based on body size.        This report was finalized on 3/23/2024 11:35 PM by Dr. Glenn Lozada M.D on Workstation: [id number]       CT Abdomen Pelvis With Contrast [[id number]] Collected: 03/23/24 2158     Updated: 03/23/24 2338    Narrative:      EXAMINATION: CT OF THE ABDOMEN AND PELVIS WITH CONTRAST     HISTORY: 43-year-old female with a history of right flank pain and  fever.     TECHNIQUE: Contiguous axial images were obtained through the abdomen and  pelvis following intravenous administration of nonionic contrast. Oral  contrast was not administered. Radiation dose reduction techniques were  utilized, including automated exposure control and exposure modulation  based on body size.     COMPARISON: CT of the abdomen and pelvis without contrast, 07/27/2023.     FINDINGS: The visualized portion of the lung bases are clear of  consolidation. The visualized portion of the heart appears normal. The  liver has a normal appearance. The patient is status post  cholecystectomy. The pancreas appears normal. The right kidney appears  normal. There is a 2.2 cm left renal cyst. Scattered parenchymal  scarring of the left kidney is noted. The adrenal glands appear normal.  The spleen has a normal appearance. Moderate retention of stool is seen  throughout the colon. A normal appendix is visualized. There is no  evidence for intra-abdominal or pelvic adenopathy. Evidence of prior  L5-S1 fusion is noted.       Impression:      There is no evidence for an active or acute intra-abdominal  or pelvic process.     This report was finalized on 3/23/2024 11:35 PM by Dr. Glenn Lozada M.D on Workstation: [id number]               Results for orders placed during the hospital encounter of 07/27/23    Adult Transthoracic Echo Complete W/ Cont if Necessary Per Protocol    Interpretation Summary    Left ventricular systolic function is normal. Estimated left  ventricular EF = 60% Normal left ventricular cavity size and wall thickness noted. All left ventricular wall segments contract normally. Left ventricular diastolic function was normal.      No orders to display        Assessment/Plan     Active Hospital Problems    Diagnosis  POA    **Pneumonia [J18.9]  Unknown    Dental abscess [K04.7]  Unknown    Bilateral cellulitis of lower leg [L03.116, L03.115]  Unknown    Intractable back pain [M54.9]  Yes    Seizures [R56.9]  Yes    Chronic pain syndrome [G89.4]  Yes    Antiphospholipid antibody positive [R76.0]  Yes    Obesity (BMI 30-39.9) [E66.9]  Yes       Multilobular Pneumonia  Intractable Back Pain  Cellulitis of BLE  -She has a history of chronic back pain and opioid dependence  -Check MRI of lumbar spine  -UDS was positive for Fentanyl. She supposedly has a morphine allergy. Will order dilaudid q 2 H PRN x 3 doses  -She was on gabapentin on her previous admission. Will order gabapentin 100 mg PO x 1  -She does have warmth, erythema, and swelling of bilateral lower extremities  -Continue Zosyn and Vancomycin. Should cover for cellulitis and pneumonia  -Blood cultures are pending  -IS  -PRN Duonebs  -D dimer is elevated at 0.99. CTA chest was negative for an acute PE. Will check venous dopplers of BLE to rule out DVT  -Will order a therapeutic dose of Lovenox    Dental Abscess  -She has a history of recurrent dental abscess and is currently on Amoxicillin  -With her history of opioid/substance abuse, will check echo to rule out endocarditis    Antiphospholipid Antibody Syndrome/History of Splenic Infarct  -Her RN reports she has been non-compliant with her medications at home, including Eliquis  -Resume Eliquis in AM    Seizure Disorder  -Continue Keppra   -Seizure precautions    -I discussed the patients findings and my recommendations with patient.    VTE Prophylaxis - Pharmacy to dose Lovenox.  Code Status - Full code.       Beatriz Hernandez, NELSON Macias  [facility]  03/24/24  05:30 EDT

## 2024-03-24 NOTE — PROGRESS NOTES
[facility] Clinical Pharmacy Services: Piperacillin-Tazobactam Consult    Pt Name: Belen Allen   : 1980    Indication: Skin and Soft Tissue    Relevant clinical data and objective history reviewed:    Vitals:    24 19424   BP: 154/95  137/77    Pulse:  116 103 102   Resp:       Temp:       TempSrc:       SpO2:  91% 93% 97%      Creatinine   Date Value Ref Range Status   2024 0.66 0.57 - 1.00 mg/dL Final     BUN   Date Value Ref Range Status   2024 7 6 - 20 mg/dL Final     Estimated Creatinine Clearance: 116.4 mL/min (by C-G formula based on SCr of 0.66 mg/dL).    Lab Results   Component Value Date    WBC 9.47 2024     Temp Readings from Last 3 Encounters:   24 99.7 °F (37.6 °C) (Oral)      Assessment/Plan  Estimated CrCl >20 mL/min at this time; BMI 31.76 kg/m2  Will start piperacillin-tazobactam 3.375 g IV every 8 hours     Pharmacy will continue to follow daily while on piperacillin-tazobactam and adjust as needed. Thank you for this consult.    Elida Narvaez, Pharm.D., [facility]   Clinical Pharmacist

## 2024-03-24 NOTE — ED PROVIDER NOTES
EMERGENCY DEPARTMENT ENCOUNTER  Room Number:  09/09  PCP: Sahil Cornelius MD  Independent Historians: Patient      HPI:  Chief Complaint: had concerns including Leg Pain.     A complete HPI/ROS/PMH/PSH/SH/FH are unobtainable due to: Nothing      Context: Belen Allen is a 43 y.o. female with a medical history of antiphospholipid antibody syndrome, splenic infarct, opioid dependence, C. difficile diarrhea, who presents to the ED c/o acute bilateral leg pain.  She states that she initially developed some pain in her right lower back and noted a painful knot on her right lower back a few days ago.  She then began having pain and swelling in both legs, left greater than right.  She states that she was told by EMS that she did have a fever as well.  She states that she has been on antibiotics for months for dental infection and she needs to have multiple teeth extracted.  The pain in her legs is severe and is primarily in the left calf from the knee down.  She denies history of blood clots but does say that she has had a prior splenic infarct.  She did not state that she is on a blood thinner but from review of her chart she has been prescribed Eliquis previously.      Review of prior external notes (non-ED) -and- Review of prior external test results outside of this encounter: See ED course below for summary of prior discharge summary        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    Abdominal pain 11/29/2020    Acute bilateral low back pain 11/29/2020    Antiphospholipid antibody positive 11/29/2020    On anticoagulant therapy 11/29/2020    Acute pain of left knee 11/29/2020    Vitamin D deficiency 11/30/2020    Folate deficiency 12/01/2020    Pain of back and left lower extremity 12/02/2020    Common bile duct dilatation 05/31/2021    Elevated LFTs 06/01/2021    Right upper  quadrant abdominal pain 06/01/2021    Obesity (BMI 30-39.9) 04/18/2019    Tobacco abuse 06/02/2021    GERD without esophagitis 06/02/2021    Intractable abdominal pain 07/05/2021    Class 2 severe obesity with serious comorbidity in adult 07/05/2021    Constipation 07/05/2021    History of ERCP 07/05/2021    Chronic pain syndrome 07/05/2021    Seizures 01/01/2022    Syncope 01/01/2022    Lumbar back pain with radiculopathy affecting left lower extremity 01/05/2022    Right upper quadrant pain 11/23/2022    Uncontrolled pain 12/17/2022    Intractable back pain 12/17/2022    Sciatica of right side 12/20/2022    Migraine 12/20/2022    Mobility impaired 12/20/2022    Seizure 12/31/2022    Opioid dependence 01/16/2023    Clostridioides difficile diarrhea 01/18/2023    Pulmonary edema 07/27/2023    Acute hypoxemic respiratory failure 07/29/2023    Other dysphagia 08/01/2023    Vomiting 08/02/2023     Resolved Ambulatory Problems     Diagnosis Date Noted    Choledocholithiasis 06/02/2021    Rhabdomyolysis 01/01/2022    Encephalopathy 01/01/2022    Hypokalemia 12/18/2022    Opioid withdrawal 07/12/2023     Past Medical History:   Diagnosis Date    Abnormal Pap smear of cervix     Ankle fracture 2013    H/O Elevated liver enzymes     History of chronic back pain     History of migraine     History of urinary tract infection     Injury of back     PONV (postoperative nausea and vomiting)     Seasonal allergies          PAST SURGICAL HISTORY  Past Surgical History:   Procedure Laterality Date    BACK SURGERY  2006    fusion L4, L5      CHOLECYSTECTOMY  2014    DILATATION AND CURETTAGE  2009    ENDOSCOPY N/A 11/27/2022    Procedure: ESOPHAGOGASTRODUODENOSCOPY with biopsy;  Surgeon: Christiana Mann MD;  Location: [facility] ENDOSCOPY;  Service: Gastroenterology;  Laterality: N/A;  gastritis, duodenitis, irregular z line    ERCP N/A 6/3/2021    Procedure: ENDOSCOPIC RETROGRADE CHOLANGIOPANCREATOGRAPHY WITH SPHINCTEROTOMY, DILATION  WITH BALLOON CLEARANCE  (12MM-15MM);  Surgeon: Bjorn Flannery MD;  Location: [facility] ENDOSCOPY;  Service: Gastroenterology;  Laterality: N/A;  DILATED COMMON BILE DUCT    SKIN SURGERY      TUBAL ABDOMINAL LIGATION  2013    WISDOM TOOTH EXTRACTION  2018    x2         FAMILY HISTORY  Family History   Problem Relation Age of Onset    Stroke Mother     Allergic rhinitis Mother     Allergic rhinitis Sister     Breast cancer Maternal Aunt     Cancer Maternal Grandmother     Allergic rhinitis Paternal Grandfather     Cancer Paternal Grandfather     Prostate cancer Paternal Grandfather          SOCIAL HISTORY  Social History     Socioeconomic History    Marital status: Single    Number of children: 2   Tobacco Use    Smoking status: Every Day     Current packs/day: 0.50     Types: Cigarettes    Smokeless tobacco: Never   Vaping Use    Vaping status: Never Used   Substance and Sexual Activity    Alcohol use: Not Currently    Drug use: Not Currently    Sexual activity: Defer         ALLERGIES  Morphine and Lidocaine      REVIEW OF SYSTEMS  Review of all 14 systems is negative other than stated in the HPI above.      PHYSICAL EXAM    I have reviewed the triage vital signs and nursing notes.    ED Triage Vitals [03/23/24 1937]   Temp Heart Rate Resp BP SpO2   99.7 °F (37.6 °C) 116 20 159/91 96 %      Temp src Heart Rate Source Patient Position BP Location FiO2 (%)   Oral Monitor -- -- --         GENERAL: awake and alert, appears acutely ill  HENT: Normocephalic, atraumatic, generally poor dentition with multiple dental caries, no gingival swelling, no sublingual edema, neck is supple without cervical adenopathy   EYES: no scleral icterus, EOMI  CV: regular rhythm, tachycardic  RESPIRATORY: normal effort, tachypnea  ABDOMEN: soft, nontender throughout  MUSCULOSKELETAL: no deformity.  There is mild swelling of bilateral lower extremities, left greater than right.  There is tenderness throughout the left calf.  Intact DP  and PT pulses bilateral lower extremities with brisk cap refill in all toes.  Left leg compartments soft and compressible.  There is no point tenderness of the thighs bilaterally.  NEURO: alert, moves all extremities, follows commands  PSYCH: calm, cooperative  SKIN: Warm, dry.  Mild erythema of bilateral lower extremities below the knee, no wounds on the foot.  There are no vesicles or bullae present.  There is mild erythema present over the right lower back with mild induration of the right lumbar paraspinous soft tissues.          LAB RESULTS  Recent Results (from the past 24 hour(s))   Comprehensive Metabolic Panel    Collection Time: 03/23/24  8:12 PM    Specimen: Blood   Result Value Ref Range    Glucose 114 (H) 65 - 99 mg/dL    BUN 7 6 - 20 mg/dL    Creatinine 0.66 0.57 - 1.00 mg/dL    Sodium 138 136 - 145 mmol/L    Potassium 4.3 3.5 - 5.2 mmol/L    Chloride 102 98 - 107 mmol/L    CO2 26.5 22.0 - 29.0 mmol/L    Calcium 8.5 (L) 8.6 - 10.5 mg/dL    Total Protein 5.8 (L) 6.0 - 8.5 g/dL    Albumin 3.5 3.5 - 5.2 g/dL    ALT (SGPT) 44 (H) 1 - 33 U/L    AST (SGOT) 29 1 - 32 U/L    Alkaline Phosphatase 179 (H) 39 - 117 U/L    Total Bilirubin 0.5 0.0 - 1.2 mg/dL    Globulin 2.3 gm/dL    A/G Ratio 1.5 g/dL    BUN/Creatinine Ratio 10.6 7.0 - 25.0    Anion Gap 9.5 5.0 - 15.0 mmol/L    eGFR 112.5 >60.0 mL/min/1.73   Lipase    Collection Time: 03/23/24  8:12 PM    Specimen: Blood   Result Value Ref Range    Lipase 9 (L) 13 - 60 U/L   BNP    Collection Time: 03/23/24  8:12 PM    Specimen: Blood   Result Value Ref Range    proBNP 683.0 (H) 0.0 - 450.0 pg/mL   High Sensitivity Troponin T    Collection Time: 03/23/24  8:12 PM    Specimen: Blood   Result Value Ref Range    HS Troponin T <6 <14 ng/L   Lactic Acid, Plasma    Collection Time: 03/23/24  8:12 PM    Specimen: Blood   Result Value Ref Range    Lactate 1.1 0.5 - 2.0 mmol/L   Procalcitonin    Collection Time: 03/23/24  8:12 PM    Specimen: Blood   Result Value Ref Range     Procalcitonin 0.15 0.00 - 0.25 ng/mL   Sedimentation Rate    Collection Time: 03/23/24  8:12 PM    Specimen: Blood   Result Value Ref Range    Sed Rate 37 (H) 0 - 20 mm/hr   C-reactive Protein    Collection Time: 03/23/24  8:12 PM    Specimen: Blood   Result Value Ref Range    C-Reactive Protein 11.49 (H) 0.00 - 0.50 mg/dL   CK    Collection Time: 03/23/24  8:12 PM    Specimen: Blood   Result Value Ref Range    Creatine Kinase 68 20 - 180 U/L   CBC Auto Differential    Collection Time: 03/23/24  8:12 PM    Specimen: Blood   Result Value Ref Range    WBC 9.47 3.40 - 10.80 10*3/mm3    RBC 3.26 (L) 3.77 - 5.28 10*6/mm3    Hemoglobin 9.8 (L) 12.0 - 15.9 g/dL    Hematocrit 31.3 (L) 34.0 - 46.6 %    MCV 96.0 79.0 - 97.0 fL    MCH 30.1 26.6 - 33.0 pg    MCHC 31.3 (L) 31.5 - 35.7 g/dL    RDW 14.0 12.3 - 15.4 %    RDW-SD 48.4 37.0 - 54.0 fl    MPV 9.7 6.0 - 12.0 fL    Platelets 286 140 - 450 10*3/mm3    Neutrophil % 75.9 42.7 - 76.0 %    Lymphocyte % 12.2 (L) 19.6 - 45.3 %    Monocyte % 9.8 5.0 - 12.0 %    Eosinophil % 1.6 0.3 - 6.2 %    Basophil % 0.1 0.0 - 1.5 %    Immature Grans % 0.4 0.0 - 0.5 %    Neutrophils, Absolute 7.18 (H) 1.70 - 7.00 10*3/mm3    Lymphocytes, Absolute 1.16 0.70 - 3.10 10*3/mm3    Monocytes, Absolute 0.93 (H) 0.10 - 0.90 10*3/mm3    Eosinophils, Absolute 0.15 0.00 - 0.40 10*3/mm3    Basophils, Absolute 0.01 0.00 - 0.20 10*3/mm3    Immature Grans, Absolute 0.04 0.00 - 0.05 10*3/mm3    nRBC 0.0 0.0 - 0.2 /100 WBC   D-dimer, Quantitative    Collection Time: 03/23/24  8:12 PM    Specimen: Blood   Result Value Ref Range    D-Dimer, Quantitative 0.99 (H) 0.00 - 0.50 MCGFEU/mL   Urine Drug Screen - Urine, Clean Catch    Collection Time: 03/23/24  9:16 PM    Specimen: Urine, Clean Catch   Result Value Ref Range    Amphet/Methamphet, Screen Negative Negative    Barbiturates Screen, Urine Negative Negative    Benzodiazepine Screen, Urine Negative Negative    Cocaine Screen, Urine Negative Negative     Opiate Screen Negative Negative    THC, Screen, Urine Negative Negative    Methadone Screen, Urine Negative Negative    Oxycodone Screen, Urine Negative Negative    Fentanyl, Urine Positive (A) Negative       The above labs were ordered by me and independently reviewed by me.     RADIOLOGY  CT Angiogram Chest Pulmonary Embolism    Result Date: 3/23/2024  EXAMINATION: CT ANGIOGRAM OF THE CHEST WITH CONTRAST  HISTORY: 43-year-old female with a history of fever and right flank/chest pain.  TECHNIQUE: Contiguous axial images were obtained through the chest following bolus intravenous administration of nonionic contrast. 3D reformatted images were produced and presented for interpretation.  COMPARISON: CT of the abdomen and pelvis with contrast, 03/23/2024.  FINDINGS: No filling defects are seen within the pulmonary arterial system to the level of the subsegmental pulmonary arteries. The heart and great vessels have a normal appearance. There is no evidence for mediastinal or hilar adenopathy.  Patchy ground-glass opacities are seen throughout the bilateral upper lobes, right middle lobe, right lower lobe, left lower lobe and to a lesser degree the lingula. A pleural effusion is not visualized. Moderate multilevel abutting endplate osteophytic change is noted.  The heart and great vessels appear normal.      1. There is no evidence for a pulmonary embolism. 2. Multilobar bilateral ground-glass opacities consistent with a multilobar bilateral pneumonia.  Radiation dose reduction techniques were utilized, including automated exposure control and exposure modulation based on body size.   This report was finalized on 3/23/2024 11:35 PM by Dr. Glenn Lozada M.D on Workstation: [id number]      CT Abdomen Pelvis With Contrast    Result Date: 3/23/2024  EXAMINATION: CT OF THE ABDOMEN AND PELVIS WITH CONTRAST  HISTORY: 43-year-old female with a history of right flank pain and fever.  TECHNIQUE: Contiguous axial images were  obtained through the abdomen and pelvis following intravenous administration of nonionic contrast. Oral contrast was not administered. Radiation dose reduction techniques were utilized, including automated exposure control and exposure modulation based on body size.  COMPARISON: CT of the abdomen and pelvis without contrast, 07/27/2023.  FINDINGS: The visualized portion of the lung bases are clear of consolidation. The visualized portion of the heart appears normal. The liver has a normal appearance. The patient is status post cholecystectomy. The pancreas appears normal. The right kidney appears normal. There is a 2.2 cm left renal cyst. Scattered parenchymal scarring of the left kidney is noted. The adrenal glands appear normal. The spleen has a normal appearance. Moderate retention of stool is seen throughout the colon. A normal appendix is visualized. There is no evidence for intra-abdominal or pelvic adenopathy. Evidence of prior L5-S1 fusion is noted.      There is no evidence for an active or acute intra-abdominal or pelvic process.  This report was finalized on 3/23/2024 11:35 PM by Dr. Glenn Lozada M.D on Workstation: [id number]       The above radiology studies were ordered by me.  See ED course for independent interpretations.     MEDICATIONS GIVEN IN ER  Medications   sodium chloride 0.9 % flush 10 mL (has no administration in time range)   vancomycin 1750 mg/500 mL 0.9% NS IVPB ([facility]) (1,750 mg Intravenous New Bag 3/23/24 0474)   sodium chloride 0.9 % flush 10 mL (has no administration in time range)   sodium chloride 0.9 % flush 10 mL (has no administration in time range)   sodium chloride 0.9 % infusion 40 mL (has no administration in time range)   nitroglycerin (NITROSTAT) SL tablet 0.4 mg (has no administration in time range)   acetaminophen (TYLENOL) tablet 650 mg (has no administration in time range)     Or   acetaminophen (TYLENOL) 160 MG/5ML oral solution 650 mg (has no administration in time  range)     Or   acetaminophen (TYLENOL) suppository 650 mg (has no administration in time range)   sennosides-docusate (PERICOLACE) 8.6-50 MG per tablet 2 tablet (has no administration in time range)     And   polyethylene glycol (MIRALAX) packet 17 g (has no administration in time range)     And   bisacodyl (DULCOLAX) EC tablet 5 mg (has no administration in time range)     And   bisacodyl (DULCOLAX) suppository 10 mg (has no administration in time range)   ondansetron ODT (ZOFRAN-ODT) disintegrating tablet 4 mg (has no administration in time range)     Or   ondansetron (ZOFRAN) injection 4 mg (has no administration in time range)   calcium carbonate (TUMS) chewable tablet 500 mg (200 mg elemental) (has no administration in time range)   Pharmacy to Dose Zosyn (has no administration in time range)   Pharmacy to dose vancomycin (has no administration in time range)   piperacillin-tazobactam (ZOSYN) 3.375 g IVPB in 100 mL NS MBP (CD) (has no administration in time range)   Vancomycin HCl 1,250 mg in sodium chloride 0.9 % 250 mL VTB (has no administration in time range)   acetaminophen (TYLENOL) tablet 1,000 mg (1,000 mg Oral Given 3/23/24 2013)   HYDROmorphone (DILAUDID) injection 1 mg (1 mg Intravenous Given 3/23/24 2014)   ondansetron (ZOFRAN) injection 4 mg (4 mg Intravenous Given 3/23/24 2013)   lactated ringers bolus 2,517 mL (2,517 mL Intravenous New Bag 3/23/24 2031)   piperacillin-tazobactam (ZOSYN) 3.375 g IVPB in 100 mL NS MBP (CD) (3.375 g Intravenous New Bag 3/23/24 2117)   iopamidol (ISOVUE-370) 76 % injection 95 mL (95 mL Intravenous Given 3/23/24 2132)   HYDROmorphone (DILAUDID) injection 1 mg (1 mg Intravenous Given 3/23/24 2210)         ORDERS PLACED DURING THIS VISIT:  Orders Placed This Encounter   Procedures    Blood Culture - Blood,    Blood Culture - Blood,    CT Abdomen Pelvis With Contrast    CT Angiogram Chest Pulmonary Embolism    Comprehensive Metabolic Panel    Lipase    BNP    High  Sensitivity Troponin T    Lactic Acid, Plasma    Procalcitonin    Sedimentation Rate    C-reactive Protein    Urine Drug Screen - Urine, Clean Catch    CK    CBC Auto Differential    D-dimer, Quantitative    High Sensitivity Troponin T 2Hr    Basic Metabolic Panel    CBC (No Diff)    Vancomycin, Trough Vancomycin is scheduled at 1100. Please draw trough 60 minutes prior to hanging dose (don't draw level while medication is infusing).    Diet: Regular/House; Fluid Consistency: Thin (IDDSI 0)    Continuous Pulse Oximetry    Monitor Blood Pressure    Vital Signs    Intake & Output    Weigh Patient    Oral Care    Place Sequential Compression Device    Maintain Sequential Compression Device    Maintain IV Access    Telemetry - Place Orders & Notify Provider of Results When Patient Experiences Acute Chest Pain, Dysrhythmia or Respiratory Distress    May Be Off Telemetry for Tests    Code Status and Medical Interventions:    [facility] (on-call MD unless specified) Details    Adult Transthoracic Echo Complete W/ Cont if Necessary Per Protocol    Insert Peripheral IV    Insert Peripheral IV    Initiate Observation Status    CBC & Differential         OUTPATIENT MEDICATION MANAGEMENT:  Current Facility-Administered Medications Ordered in Epic   Medication Dose Route Frequency Provider Last Rate Last Admin    acetaminophen (TYLENOL) tablet 650 mg  650 mg Oral Q4H PRN Beatriz Hernandez APRN        Or    acetaminophen (TYLENOL) 160 MG/5ML oral solution 650 mg  650 mg Oral Q4H PRN Beatriz Hernandez APRN        Or    acetaminophen (TYLENOL) suppository 650 mg  650 mg Rectal Q4H PRN Beatriz Hernandez APRN        sennosides-docusate (PERICOLACE) 8.6-50 MG per tablet 2 tablet  2 tablet Oral BID PRN Beatriz Hernandez APRN        And    polyethylene glycol (MIRALAX) packet 17 g  17 g Oral Daily PRN Beatriz Hernandez APRN        And    bisacodyl (DULCOLAX) EC tablet 5 mg  5 mg Oral Daily PRN Beatriz Hernandez APRN         And    bisacodyl (DULCOLAX) suppository 10 mg  10 mg Rectal Daily PRN Beatriz Hernandez APRN        calcium carbonate (TUMS) chewable tablet 500 mg (200 mg elemental)  2 tablet Oral BID PRN Beatriz Hernandez APRN        nitroglycerin (NITROSTAT) SL tablet 0.4 mg  0.4 mg Sublingual Q5 Min PRN Beatriz Hernandez APRN        ondansetron ODT (ZOFRAN-ODT) disintegrating tablet 4 mg  4 mg Oral Q6H PRN Beatriz Hernandez APRN        Or    ondansetron (ZOFRAN) injection 4 mg  4 mg Intravenous Q6H PRN Beatriz Hernandez APRN        Pharmacy to dose vancomycin   Does not apply Continuous PRN Beatriz Hernandez APRN        Pharmacy to Dose Zosyn   Does not apply Continuous PRN Beatriz Hernandez APRN        [START ON 3/24/2024] piperacillin-tazobactam (ZOSYN) 3.375 g IVPB in 100 mL NS MBP (CD)  3.375 g Intravenous Q8H Beatriz Hernandez APRN        sodium chloride 0.9 % flush 10 mL  10 mL Intravenous PRN Barrera Hauser MD        sodium chloride 0.9 % flush 10 mL  10 mL Intravenous Q12H Beatriz Hernandez APRN        sodium chloride 0.9 % flush 10 mL  10 mL Intravenous PRN Beatriz Hernandez APRN        sodium chloride 0.9 % infusion 40 mL  40 mL Intravenous PRN Beatriz Hernandez APRN        vancomycin 1750 mg/500 mL 0.9% NS IVPB ([facility])  20 mg/kg Intravenous Once Barrera Hauser MD   1,750 mg at 03/23/24 2235    [START ON 3/24/2024] Vancomycin HCl 1,250 mg in sodium chloride 0.9 % 250 mL VTB  1,250 mg Intravenous Q12H Beatriz Hernandez APRN         No current Epic-ordered outpatient medications on file.         PROCEDURES  Procedures            PROGRESS, DATA ANALYSIS, CONSULTS, AND MEDICAL DECISION MAKING  All labs have been independently interpreted by me.  All radiology studies have been reviewed by me. All EKG's have been independently viewed and interpreted by me.  Discussion below represents my analysis of pertinent findings related to patient's condition,  differential diagnosis, treatment plan and final disposition.    Differential diagnosis includes but is not limited to:  Sepsis  Endocarditis  DVT  PE      Clinical Scores:                  ED Course as of 03/23/24 2348   Sat Mar 23, 2024   2002 Temp: 99.7 °F (37.6 °C) [JR]   2006 Record review: I reviewed discharge summary from 8/3/2023 where patient was admitted for respiratory failure, pulmonary edema.  She has a history of polysubstance abuse.  She also reportedly has a history of antiphospholipid antibody syndrome and is anticoagulated on Eliquis.  She had a right lower extremity venous duplex study which was negative for DVT at that time. [JR]   2035 WBC: 9.47 [JR]   2036 Hemoglobin(!): 9.8 [JR]   2051 D-Dimer, Quant(!): 0.99 [JR]   2051 Sed Rate(!): 37 [JR]   2155 Fentanyl, Urine(!): Positive [JR]   2156 C-Reactive Protein(!): 11.49 [JR]   2220 Discussed with NELSON Wong for A, who agrees to admit on behalf of Dr. Moran.  She will order Dopplers of the lower extremities to be performed tomorrow during vascular lab hours. [JR]      ED Course User Index  [JR] Barrera Hauser MD             AS OF 23:48 EDT VITALS:    BP - 137/77  HR - 102  TEMP - 99.7 °F (37.6 °C) (Oral)  O2 SATS - 97%    COMPLEXITY OF CARE  The patient requires admission.      Chronic or social conditions impacting patient care (Social Determinants of Health):     DIAGNOSIS  Final diagnoses:   Sepsis, due to unspecified organism, unspecified whether acute organ dysfunction present   Cellulitis of left lower extremity   Leg swelling           DISPOSITION  Admit      Prescription drug monitoring program review:           Please note that portions of this document were completed with a voice recognition program.    Note Disclaimer: At [facility], we believe that sharing information builds trust and better relationships. You are receiving this note because you recently visited [facility]. It is possible you will see [facility]  information before a provider has talked with you about it. This kind of information can be easy to misunderstand. To help you fully understand what it means for your health, we urge you to discuss this note with your provider.         Barrera Hauser MD  03/23/24 8230

## 2024-03-24 NOTE — ED NOTES
"Nursing report ED to floor  Belen Allen  43 y.o.  female    HPI :  HPI (Adult)  Stated Reason for Visit: leg pain and swelling    Chief Complaint  Chief Complaint   Patient presents with    Leg Pain       Admitting doctor:   Tommy Moran MD    Admitting diagnosis:   The primary encounter diagnosis was Sepsis, due to unspecified organism, unspecified whether acute organ dysfunction present. Diagnoses of Cellulitis of left lower extremity and Leg swelling were also pertinent to this visit.    Code status:   Current Code Status       Date Active Code Status Order ID Comments User Context       Prior            Allergies:   Morphine and Lidocaine    Isolation:   No active isolations    Intake and Output  No intake or output data in the 24 hours ending 03/23/24 2237    Weight:       03/23/24 1937   Weight: 83.9 kg (185 lb)       Most recent vitals:   Vitals:    03/23/24 1937 03/23/24 1945 03/23/24 1947 03/23/24 2215   BP: 159/91 154/95  137/77   Pulse: 116  116 103   Resp: 20      Temp: 99.7 °F (37.6 °C)      TempSrc: Oral      SpO2: 96%  91% 93%   Weight: 83.9 kg (185 lb)      Height: 162.6 cm (64\")          Active LDAs/IV Access:   Lines, Drains & Airways       Active LDAs       Name Placement date Placement time Site Days    Peripheral IV 03/23/24 2009 Left Antecubital 03/23/24 2009  Antecubital  less than 1    Peripheral IV 03/23/24 2108 Right Antecubital 03/23/24 2108  Antecubital  less than 1                    Labs (abnormal labs have a star):   Labs Reviewed   COMPREHENSIVE METABOLIC PANEL - Abnormal; Notable for the following components:       Result Value    Glucose 114 (*)     Calcium 8.5 (*)     Total Protein 5.8 (*)     ALT (SGPT) 44 (*)     Alkaline Phosphatase 179 (*)     All other components within normal limits    Narrative:     GFR Normal >60  Chronic Kidney Disease <60  Kidney Failure <15     LIPASE - Abnormal; Notable for the following components:    Lipase 9 (*)     All other components " within normal limits   BNP (IN-HOUSE) - Abnormal; Notable for the following components:    proBNP 683.0 (*)     All other components within normal limits    Narrative:     This assay is used as an aid in the diagnosis of individuals suspected of having heart failure. It can be used as an aid in the diagnosis of acute decompensated heart failure (ADHF) in patients presenting with signs and symptoms of ADHF to the emergency department (ED). In addition, NT-proBNP of <300 pg/mL indicates ADHF is not likely.    Age Range Result Interpretation  NT-proBNP Concentration (pg/mL:      <50             Positive            >450                   Gray                 300-450                    Negative             <300    50-75           Positive            >900                  Gray                300-900                  Negative            <300      >75             Positive            >1800                  Gray                300-1800                  Negative            <300   SEDIMENTATION RATE - Abnormal; Notable for the following components:    Sed Rate 37 (*)     All other components within normal limits   C-REACTIVE PROTEIN - Abnormal; Notable for the following components:    C-Reactive Protein 11.49 (*)     All other components within normal limits   URINE DRUG SCREEN - Abnormal; Notable for the following components:    Fentanyl, Urine Positive (*)     All other components within normal limits    Narrative:     Negative Thresholds Per Drugs Screened:    Amphetamines                 500 ng/ml  Barbiturates                 200 ng/ml  Benzodiazepines              100 ng/ml  Cocaine                      300 ng/ml  Methadone                    300 ng/ml  Opiates                      300 ng/ml  Oxycodone                    100 ng/ml  THC                           50 ng/ml  Fentanyl                       5 ng/ml      The Normal Value for all drugs tested is negative. This report includes final unconfirmed screening results  "to be used for medical treatment purposes only. Unconfirmed results must not be used for non-medical purposes such as employment or legal testing. Clinical consideration should be applied to any drug of abuse test, particularly when unconfirmed results are used.           CBC WITH AUTO DIFFERENTIAL - Abnormal; Notable for the following components:    RBC 3.26 (*)     Hemoglobin 9.8 (*)     Hematocrit 31.3 (*)     MCHC 31.3 (*)     Lymphocyte % 12.2 (*)     Neutrophils, Absolute 7.18 (*)     Monocytes, Absolute 0.93 (*)     All other components within normal limits   D-DIMER, QUANTITATIVE - Abnormal; Notable for the following components:    D-Dimer, Quantitative 0.99 (*)     All other components within normal limits    Narrative:     According to the assay 's published package insert, a normal (<0.50 MCGFEU/mL) D-dimer result in conjunction with a non-high clinical probability assessment, excludes deep vein thrombosis (DVT) and pulmonary embolism (PE) with high sensitivity.    D-dimer values increase with age and this can make VTE exclusion of an older population difficult. To address this, the [employer], based on best available evidence and recent guidelines, recommends that clinicians use age-adjusted D-dimer thresholds in patients greater than 50 years of age with: a) a low probability of PE who do not meet all Pulmonary Embolism Rule Out Criteria, or b) in those with intermediate probability of PE.   The formula for an age-adjusted D-dimer cut-off is \"age/100\".  For example, a 60 year old patient would have an age-adjusted cut-off of 0.60 MCGFEU/mL and an 80 year old 0.80 MCGFEU/mL.   TROPONIN - Normal    Narrative:     High Sensitive Troponin T Reference Range:  <14.0 ng/L- Negative Female for AMI  <22.0 ng/L- Negative Male for AMI  >=14 - Abnormal Female indicating possible myocardial injury.  >=22 - Abnormal Male indicating possible myocardial injury.   Clinicians would have to " "utilize clinical acumen, EKG, Troponin, and serial changes to determine if it is an Acute Myocardial Infarction or myocardial injury due to an underlying chronic condition.        LACTIC ACID, PLASMA - Normal   PROCALCITONIN - Normal    Narrative:     As a Marker for Sepsis (Non-Neonates):    1. <0.5 ng/mL represents a low risk of severe sepsis and/or septic shock.  2. >2 ng/mL represents a high risk of severe sepsis and/or septic shock.    As a Marker for Lower Respiratory Tract Infections that require antibiotic therapy:    PCT on Admission    Antibiotic Therapy       6-12 Hrs later    >0.5                Strongly Recommended  >0.25 - <0.5        Recommended   0.1 - 0.25          Discouraged              Remeasure/reassess PCT  <0.1                Strongly Discouraged     Remeasure/reassess PCT    As 28 day mortality risk marker: \"Change in Procalcitonin Result\" (>80% or <=80%) if Day 0 (or Day 1) and Day 4 values are available. Refer to http://[email or web address]-calculator.com    Change in PCT <=80%  A decrease of PCT levels below or equal to 80% defines a positive change in PCT test result representing a higher risk for 28-day all-cause mortality of patients diagnosed with severe sepsis for septic shock.    Change in PCT >80%  A decrease of PCT levels of more than 80% defines a negative change in PCT result representing a lower risk for 28-day all-cause mortality of patients diagnosed with severe sepsis or septic shock.      CK - Normal   BLOOD CULTURE   BLOOD CULTURE   HIGH SENSITIVITIY TROPONIN T 2HR   CBC AND DIFFERENTIAL    Narrative:     The following orders were created for panel order CBC & Differential.  Procedure                               Abnormality         Status                     ---------                               -----------         ------                     CBC Auto Differential[[id number]]        Abnormal            Final result                 Please view results for these tests on the " individual orders.       EKG:   No orders to display       Meds given in ED:   Medications   sodium chloride 0.9 % flush 10 mL (has no administration in time range)   vancomycin 1750 mg/500 mL 0.9% NS IVPB ([facility]) (1,750 mg Intravenous New Bag 3/23/24 2235)   acetaminophen (TYLENOL) tablet 1,000 mg (1,000 mg Oral Given 3/23/24 2013)   HYDROmorphone (DILAUDID) injection 1 mg (1 mg Intravenous Given 3/23/24 2014)   ondansetron (ZOFRAN) injection 4 mg (4 mg Intravenous Given 3/23/24 2013)   lactated ringers bolus 2,517 mL (2,517 mL Intravenous New Bag 3/23/24 2031)   piperacillin-tazobactam (ZOSYN) 3.375 g IVPB in 100 mL NS MBP (CD) (3.375 g Intravenous New Bag 3/23/24 2117)   iopamidol (ISOVUE-370) 76 % injection 95 mL (95 mL Intravenous Given 3/23/24 2132)   HYDROmorphone (DILAUDID) injection 1 mg (1 mg Intravenous Given 3/23/24 2210)       Imaging results:  CT Angiogram Chest Pulmonary Embolism    Result Date: 3/23/2024  1. There is no evidence for a pulmonary embolism. 2. Multilobar bilateral ground-glass opacities consistent with a multilobar bilateral pneumonia.  Radiation dose reduction techniques were utilized, including automated exposure control and exposure modulation based on body size.       CT Abdomen Pelvis With Contrast    Result Date: 3/23/2024  There is no evidence for an active or acute intra-abdominal or pelvic process.       Ambulatory status:   - stand by assist    Social issues:   Social History     Socioeconomic History    Marital status: Single    Number of children: 2   Tobacco Use    Smoking status: Every Day     Current packs/day: 0.50     Types: Cigarettes    Smokeless tobacco: Never   Vaping Use    Vaping status: Never Used   Substance and Sexual Activity    Alcohol use: Not Currently    Drug use: Not Currently    Sexual activity: Defer       Peripheral Neurovascular  Peripheral Neurovascular (Adult)  Peripheral Neurovascular WDL: WDL    Neuro Cognitive  Neuro Cognitive  (Adult)  Cognitive/Neuro/Behavioral WDL: WDL    Learning       Respiratory  Respiratory WDL  Respiratory WDL: WDL    Abdominal Pain       Pain Assessments  Pain (Adult)  (0-10) Pain Rating: Rest: 10    NIH Stroke Scale       Barrera Reyna RN  03/23/24 22:37 EDT

## 2024-03-24 NOTE — CONSULTS
Pulmonary Consultation     Patient Name: Belen Allen  Age/Sex: 43 y.o. female  : 1980  MRN: [id number]    Date of Admission: 3/23/2024  Date of Encounter Visit: 24  Encounter Provider: Soy Reynoso MD  Referring Provider: Tommy Moran MD  Place of Service: [facility]  Patient Care Team:  Sahil Cornelius MD as PCP - General (Internal Medicine)  Code, Bjorn HERRERA II, MD as Consulting Physician (Hematology and Oncology)  Dennys Carranza MD as Referring Physician (Hospitalist)      Subjective:     Consulted for: Pneumonia    Chief Complaint: Back pain and leg pain    History of Present Illness:  Belen Allen is a 43 y.o. female with history of chronic pain on chronic opioid dependency, and hypercoagulable status with antiphospholipid antibody syndrome and history of seizure disorder and presented to the hospital with worsening bilateral leg pain and low back pain.  The back pain onset was around 2 to 3 days ago and is worse with deep breathing, radiates to the sides starting from the lower back and associated with lower extremity edema and redness of the skin with clinical picture suggestive of cellulitis.  Other restriction included nausea and some vomiting but no hematochezia, no  complaints  Despite the finding on the imaging with a lobar pneumonia she denies any shortness of breath or productive cough.  Patient was started on Zosyn and vancomycin, infectious disease was consulted given the back pain and the risk for possible epidural abscess, CT of the neck arteries showed no evidence of any  MRI of the lumbar spine showed no evidence of epidural abscess, the thoracic spine was not scanned.  CT of the chest showed the bilateral groundglass opacities consistent with multilobar pneumonia, there was no evidence of any pulmonary artery filling defect, CT of the abdomen pelvis was negative for any acute intra-abdominal process on her labs, the white count was normal, she has no  "left shift, chemistry showed normal electrolytes as well with normal renal function.  The CRP was elevated and so is the ESR, procalcitonin was negative  Patient also has obstructive sleep apnea symptoms but no previous workup for that  She has daytime hypersomnia but this is multifactorial due to combination of possible sleep apnea in addition to being on multiple psychoactive medication and the medication that can all cause sleepiness  She reported having significant problem with her teeth, started 2 years ago with the procedure was supposed to have 1 tooth extracted ended up was 18 and since then she is having recurrent mouth infection and sores and bad gum disease with multiple fractured teeth and recurrent mouth infection.  She denies any aspiration pneumonia by history or recurrent pneumonia  She is a smoker 1 pack/day but denies any asthma or COPD    Pulmonary Functions Testing Results:    No results found for: \"FEV1\", \"FVC\", \"BHJ7OYP\", \"TLC\", \"DLCO\"    Review of Systems:   Review of Systems  Constitutional:  Positive for chills and fever.  Sore mouth and poor dental health  HENT:  Negative for congestion and sore throat.    Eyes:  Negative for photophobia and visual disturbance.   Respiratory:  Negative for cough, shortness of breath and wheezing.    Cardiovascular:  Positive for leg swelling. Negative for chest pain and palpitations.   Gastrointestinal:  Positive for nausea and vomiting. Negative for abdominal pain.   Musculoskeletal:  Positive for arthralgias, back pain and myalgias.   Skin:  Positive for color change. Negative for wound.   Neurological:  Negative for dizziness, light-headedness, numbness and headaches.        Past Medical History:  Past Medical History:   Diagnosis Date    Abnormal Pap smear of cervix     Ankle fracture 2013    H/O Elevated liver enzymes     History of chronic back pain     History of migraine     History of urinary tract infection     Injury of back     Opioid dependence " 1/16/2023    PONV (postoperative nausea and vomiting)     Seasonal allergies     Seizure     Takes Keppra       Past Surgical History:   Procedure Laterality Date    BACK SURGERY  2006    fusion L4, L5      CHOLECYSTECTOMY  2014    DILATATION AND CURETTAGE  2009    ENDOSCOPY N/A 11/27/2022    Procedure: ESOPHAGOGASTRODUODENOSCOPY with biopsy;  Surgeon: Christiana Mann MD;  Location: [facility] ENDOSCOPY;  Service: Gastroenterology;  Laterality: N/A;  gastritis, duodenitis, irregular z line    ERCP N/A 6/3/2021    Procedure: ENDOSCOPIC RETROGRADE CHOLANGIOPANCREATOGRAPHY WITH SPHINCTEROTOMY, DILATION WITH BALLOON CLEARANCE  (12MM-15MM);  Surgeon: Bjorn Flannery MD;  Location: [facility] ENDOSCOPY;  Service: Gastroenterology;  Laterality: N/A;  DILATED COMMON BILE DUCT    SKIN SURGERY      TUBAL ABDOMINAL LIGATION  2013    WISDOM TOOTH EXTRACTION  2018    x2       Home Medications:   Medications Prior to Admission   Medication Sig Dispense Refill Last Dose    cloNIDine (CATAPRES) 0.1 MG tablet Take 1 tablet by mouth Daily.   3/23/2024    FLUoxetine (PROzac) 40 MG capsule Take 60 mg by mouth Every Night.   3/23/2024    folic acid (FOLVITE) 1 MG tablet Take 1 tablet by mouth Daily.   3/23/2024    l-methylfolate 7.5 MG tablet tablet Take 1 tablet by mouth Daily.   3/23/2024    melatonin 5 MG tablet tablet Take 2 tablets by mouth Every Night. Patient taes 20 mg at home   3/23/2024    SUMAtriptan (IMITREX) 100 MG tablet Take 1 tablet by mouth Every 2 (Two) Hours As Needed for Migraine.   Past Month    levETIRAcetam (KEPPRA) 1000 MG tablet Take 2 tablets by mouth Every 12 (Twelve) Hours for 30 days. (Patient not taking: Reported on 3/24/2024) 120 tablet 0 Not Taking    methylPREDNISolone (MEDROL) 4 MG dose pack Take as directed on package instructions. 21 each 0     oxyCODONE-acetaminophen (PERCOCET) 5-325 MG per tablet Take 1-2 tablets by mouth Every 6 (Six) Hours As Needed (pain). 12 tablet 0     promethazine (PHENERGAN)  25 MG tablet Take 1 tablet by mouth Every 6 (Six) Hours As Needed for Nausea or Vomiting. 10 tablet 0     traZODone (DESYREL) 50 MG tablet Take 1-2 tablets by mouth At Night As Needed for sleep 30 tablet 3     Vitamin B-2 (RIBOFLAVIN) 100 MG tablet tablet Take 4 tablets by mouth Daily. 30 tablet 3        Inpatient Medications:  Scheduled Meds:apixaban, 5 mg, Oral, Q12H  cloNIDine, 0.1 mg, Oral, Daily  FLUoxetine, 60 mg, Oral, Nightly  levETIRAcetam, 1,000 mg, Oral, Q12H  melatonin, 10 mg, Oral, Nightly  piperacillin-tazobactam, 3.375 g, Intravenous, Q8H  sodium chloride, 10 mL, Intravenous, Q12H  vancomycin, 1,250 mg, Intravenous, Q12H      Continuous Infusions:Pharmacy to dose vancomycin,       PRN Meds:.  acetaminophen **OR** acetaminophen **OR** acetaminophen    senna-docusate sodium **AND** polyethylene glycol **AND** bisacodyl **AND** bisacodyl    calcium carbonate    HYDROcodone-acetaminophen    nitroglycerin    ondansetron ODT **OR** ondansetron    Pharmacy to dose vancomycin    [COMPLETED] Insert Peripheral IV **AND** sodium chloride    sodium chloride    sodium chloride    traZODone    Allergies:  Allergies   Allergen Reactions    Morphine Hives    Lidocaine Rash     Pt reports lidocaine patches make her breakout in a rash       Past Social History:  Social History     Socioeconomic History    Marital status: Single    Number of children: 2   Tobacco Use    Smoking status: Every Day     Current packs/day: 1.00     Types: Cigarettes    Smokeless tobacco: Never   Vaping Use    Vaping status: Never Used   Substance and Sexual Activity    Alcohol use: Not Currently    Drug use: Not Currently    Sexual activity: Defer       Past Family History:  Family History   Problem Relation Age of Onset    Stroke Mother     Allergic rhinitis Mother     Allergic rhinitis Sister     Breast cancer Maternal Aunt     Cancer Maternal Grandmother     Allergic rhinitis Paternal Grandfather     Cancer Paternal Grandfather      Prostate cancer Paternal Grandfather            Objective:   Temp:  [97.7 °F (36.5 °C)-99.7 °F (37.6 °C)] 97.7 °F (36.5 °C)  Heart Rate:  [] 81  Resp:  [18-20] 18  BP: ()/(63-95) 95/63  SpO2:  [91 %-100 %] 100 %  on  Flow (L/min):  [2] 2 Device (Oxygen Therapy): nasal cannula     Intake/Output Summary (Last 24 hours) at 3/24/2024 1810  Last data filed at 3/24/2024 1100  Gross per 24 hour   Intake 960 ml   Output --   Net 960 ml     Body mass index is 39.7 kg/m².      03/23/24  1937 03/23/24  2335 03/24/24  0530   Weight: 83.9 kg (185 lb) 106 kg (233 lb 0.4 oz) 105 kg (231 lb 4.2 oz)     Weight change:     Physical Exam:   Physical Exam   General:    No acute distress, alert and oriented x4, pleasant, patient is sleepy but able to wake up to question and answer question properly                   Head:    Normocephalic, atraumatic. External ears and nose are normal   Eyes:          Conjunctivae and sclerae normal, no icterus, PERRLA, no  discharge   Throat:   No oral lesions, no thrush, oral mucosa moist.  Patient has multiple missing teeth, fractured teeth with retained stumps, inflamed gum   Neck:   Supple, trachea midline. No JVD, no cervical or supraclavicular lymphadenopathy, palpable tender cervical/submandibular lymphadenopathy   Lungs:     Normal chest on inspection, very mild and expiratory wheeze otherwise normal lung exam. No rhonchi. No crackles. Respirations regular, even and unlabored.      Heart:    Regular rhythm and normal rate.  No murmurs, gallops, or rubs noted.   Abdomen:     Soft, nontender, nondistended, positive bowel sounds. No hepatosplenomegaly.  Truncal obesity   Extremities:   No clubbing, cyanosis, minimal edema and mild erythema   Pulses:   Pulses palpable and equal bilaterally.    Skin:   No bleeding or rash. No bumps, good turgor pressure    Neuro:   Nonfocal.  Moves all extremities well. Strength 5/5 and symmetrical, no sensory deficit.  Sleepy   Psychiatric:   Normal  "mood and affect.     Lab Review:   Results from last 7 days   Lab Units 03/24/24  0429 03/23/24 2012   SODIUM mmol/L 141 138   POTASSIUM mmol/L 4.0 4.3   CHLORIDE mmol/L 106 102   CO2 mmol/L 29.3* 26.5   BUN mg/dL 6 7   CREATININE mg/dL 0.67 0.66   GLUCOSE mg/dL 100* 114*   CALCIUM mg/dL 8.4* 8.5*   AST (SGOT) U/L  --  29   ALT (SGPT) U/L  --  44*   ALBUMIN g/dL  --  3.5     Results from last 7 days   Lab Units 03/23/24  2356 03/23/24 2012   CK TOTAL U/L  --  68   HSTROP T ng/L <6 <6     Results from last 7 days   Lab Units 03/24/24 0429 03/23/24 2012   WBC 10*3/mm3 7.78 9.47   HEMOGLOBIN g/dL 9.7* 9.8*   HEMATOCRIT % 30.0* 31.3*   PLATELETS 10*3/mm3 246 286   MCV fL 96.8 96.0   MCH pg 31.3 30.1   MCHC g/dL 32.3 31.3*   RDW % 13.3 14.0   RDW-SD fl 48.0 48.4   MPV fL 10.3 9.7   NEUTROPHIL % %  --  75.9   LYMPHOCYTE % %  --  12.2*   MONOCYTES % %  --  9.8   EOSINOPHIL % %  --  1.6   BASOPHIL % %  --  0.1   IMM GRAN % %  --  0.4   NEUTROS ABS 10*3/mm3  --  7.18*   LYMPHS ABS 10*3/mm3  --  1.16   MONOS ABS 10*3/mm3  --  0.93*   EOS ABS 10*3/mm3  --  0.15   BASOS ABS 10*3/mm3  --  0.01   IMMATURE GRANS (ABS) 10*3/mm3  --  0.04   NRBC /100 WBC  --  0.0                   Invalid input(s): \"LDLCALC\"  Results from last 7 days   Lab Units 03/23/24 2012   PROBNP pg/mL 683.0*   D DIMER QUANT MCGFEU/mL 0.99*             Results from last 7 days   Lab Units 03/23/24 2012   PROCALCITONIN ng/mL 0.15   LACTATE mmol/L 1.1     Results from last 7 days   Lab Units 03/23/24 2012   SED RATE mm/hr 37*   CRP mg/dL 11.49*     Results from last 7 days   Lab Units 03/23/24 2012   LIPASE U/L 9*                         Imaging:  Imaging Results (Most Recent)       Procedure Component Value Units Date/Time    MRI Lumbar Spine With & Without Contrast [[id number]] Collected: 03/24/24 1629     Updated: 03/24/24 1629    Narrative:      MRI EXAMINATION OF THE LUMBAR SPINE WITHOUT CONTRAST     HISTORY: Seizure, fall.     COMPARISON: CT lumbar " spine 01/31/2024 and MRI of the lumbar spine  12/18/2022.     FINDINGS:  The study is hampered somewhat by the patient's body habitus.     The patient has undergone fusion from L5 to S1 with bilateral  transpedicular screws, posterior rods and interbody graft material. The  alignment of the lumbar spine is within normal limits. The conus is at  L1 and caudal aspect of the spinal cord appears unremarkable.     L1-L2: There is no evidence of disc bulge or herniation.     L2-L3: There is no evidence of disc bulge or herniation.     L3-L4: Mild facet degenerative disease is present bilaterally.     L4-L5: Mild facet degenerative disease is present bilaterally. Mild  central disc bulge is present with no evidence of herniation.     L5-S1: A decompressive laminectomy is noted. There is no evidence of  canal stenosis or foraminal narrowing on the left. Mild foraminal  stenosis is present on the right secondary to extension of a small disc  osteophyte complex into the neural foramen, similar in appearance as  compared to 12/18/2022.     After contrast administration, there was no evidence of abnormal  enhancement.       Impression:      The patient is status post fusion from L5 to S1. There is no  evidence of a compression fracture, marrow edema, disc herniation or of  abnormal enhancement. See above.             CT Angiogram Chest Pulmonary Embolism [[id number]] Collected: 03/23/24 2159     Updated: 03/23/24 2339    Narrative:      EXAMINATION: CT ANGIOGRAM OF THE CHEST WITH CONTRAST     HISTORY: 43-year-old female with a history of fever and right  flank/chest pain.     TECHNIQUE: Contiguous axial images were obtained through the chest  following bolus intravenous administration of nonionic contrast. 3D  reformatted images were produced and presented for interpretation.     COMPARISON: CT of the abdomen and pelvis with contrast, 03/23/2024.     FINDINGS: No filling defects are seen within the pulmonary arterial  system to  the level of the subsegmental pulmonary arteries. The heart  and great vessels have a normal appearance. There is no evidence for  mediastinal or hilar adenopathy.     Patchy ground-glass opacities are seen throughout the bilateral upper  lobes, right middle lobe, right lower lobe, left lower lobe and to a  lesser degree the lingula. A pleural effusion is not visualized.  Moderate multilevel abutting endplate osteophytic change is noted.     The heart and great vessels appear normal.       Impression:      1. There is no evidence for a pulmonary embolism.  2. Multilobar bilateral ground-glass opacities consistent with a  multilobar bilateral pneumonia.     Radiation dose reduction techniques were utilized, including automated  exposure control and exposure modulation based on body size.        This report was finalized on 3/23/2024 11:35 PM by Dr. Glenn Lozada M.D on Workstation: [id number]       CT Abdomen Pelvis With Contrast [[id number]] Collected: 03/23/24 2158     Updated: 03/23/24 2338    Narrative:      EXAMINATION: CT OF THE ABDOMEN AND PELVIS WITH CONTRAST     HISTORY: 43-year-old female with a history of right flank pain and  fever.     TECHNIQUE: Contiguous axial images were obtained through the abdomen and  pelvis following intravenous administration of nonionic contrast. Oral  contrast was not administered. Radiation dose reduction techniques were  utilized, including automated exposure control and exposure modulation  based on body size.     COMPARISON: CT of the abdomen and pelvis without contrast, 07/27/2023.     FINDINGS: The visualized portion of the lung bases are clear of  consolidation. The visualized portion of the heart appears normal. The  liver has a normal appearance. The patient is status post  cholecystectomy. The pancreas appears normal. The right kidney appears  normal. There is a 2.2 cm left renal cyst. Scattered parenchymal  scarring of the left kidney is noted. The adrenal glands  appear normal.  The spleen has a normal appearance. Moderate retention of stool is seen  throughout the colon. A normal appendix is visualized. There is no  evidence for intra-abdominal or pelvic adenopathy. Evidence of prior  L5-S1 fusion is noted.       Impression:      There is no evidence for an active or acute intra-abdominal  or pelvic process.     This report was finalized on 3/23/2024 11:35 PM by Dr. Glenn Lozada M.D on Workstation: [id number]               I personally viewed and interpreted the patient's imaging studies.    Assessment:   Bilateral bronchopneumonia  Antiphospholipid antibody  Obesity with suspected sleep apnea  Chronic pain medication with polypharmacy, Patient is clonidine, fluoxetine, gabapentin, opioid, Keppra.  Gingivitis, with tender submandibular lymph node and long history of gum and teeth infection with multiple fractured teeth with retained teeth stumps  Chronic pain syndrome  Questionable lower extremity cellulitis    Plan:     Patient is already on antibiotic, need to check respiratory panel including COVID-19  Patient has antiphospholipid antibody need to be on prophylactic anticoagulation  No evidence of epidural abscess on the lumbar MRI, the thoracic spine was not imaged, will defer to ID if that is necessary  Will check sputum MRSA and de-escalate on the antibiotic awaiting further recommendation from ID  Patient was started on oxygen even though she denies any hypoxemia or any shortness of breath herself, she denies any wheezing or productive secretion.  Her sore mouth is of concern, she does have evidence of gum disease with recurrent dental abscesses, we will do a panoramic and consider further dental workup  Even though patient has no prior stroke but she is sleepy, she is on chronic pain medication, and she is at a significant risk of aspirating dose pathologic oral pathogens into her lungs and triggering her pneumonias.  She does have anaerobic coverage with the  Zosyn, she is also on vancomycin and she has infectious disease for further recommendations  Discussed with the patient and with the family, will follow-up during the hospital stay  Consider evaluation for sleep apnea after discharge      Thank you for allowing me to participate in the care of Belen Allen. Feel free to contact me directly with any further questions or concerns.    Soy Reynoso MD  [facility]   03/24/24  18:10 EDT    Dictated utilizing Dragon dictation

## 2024-03-24 NOTE — PROGRESS NOTES
"[facility] Clinical Pharmacy Services: Enoxaparin Consult    Belen Allen has a pharmacy consult to dose full-dose enoxaparin per NELSON Hernandez' request.     Indication: DVT/PE (active thrombosis)  Home Anticoagulation: None, but has been prescribed Eliquis in the past     Relevant clinical data and objective history reviewed:  43 y.o. female 162.6 cm (64\") 83.9 kg (185 lb)   Body mass index is 31.76 kg/m².   Results from last 7 days   Lab Units 03/23/24 2012   PLATELETS 10*3/mm3 286     Estimated Creatinine Clearance: 116.4 mL/min (by C-G formula based on SCr of 0.66 mg/dL).    Assessment/Plan    Will start patient on  80 mg (1mg/kg) subcutaneous every 12 hours, adjusted for renal function. Consult order will be discontinued but pharmacy will continue to follow.     Elida Narvaez, Pharm.D., [facility]   Clinical Pharmacist   "

## 2024-03-24 NOTE — PLAN OF CARE
Goal Outcome Evaluation:                 New admit during this shift. Patient major issue is pain control. First the pain is her legs, feet, then mouth, now it is her back. Patient has slept this shift but when you enter the room and awakes, she starts to moan, even though she will fall asleep during conversation. Patient admitted to taking med's for pain. She stated that she thought she was taking percocet 30 mg, and it turned out she had received fentyal instead.

## 2024-03-25 ENCOUNTER — APPOINTMENT (OUTPATIENT)
Dept: GENERAL RADIOLOGY | Facility: HOSPITAL | Age: 44
End: 2024-03-25
Payer: COMMERCIAL

## 2024-03-25 ENCOUNTER — APPOINTMENT (OUTPATIENT)
Dept: CARDIOLOGY | Facility: HOSPITAL | Age: 44
End: 2024-03-25
Payer: COMMERCIAL

## 2024-03-25 LAB
ALBUMIN SERPL-MCNC: 3 G/DL (ref 3.5–5.2)
ALBUMIN/GLOB SERPL: 1.3 G/DL
ALP SERPL-CCNC: 169 U/L (ref 39–117)
ALT SERPL W P-5'-P-CCNC: 33 U/L (ref 1–33)
ANION GAP SERPL CALCULATED.3IONS-SCNC: 6.5 MMOL/L (ref 5–15)
AORTIC ARCH: 2.6 CM
AORTIC DIMENSIONLESS INDEX: 0.7 (DI)
ASCENDING AORTA: 2.9 CM
AST SERPL-CCNC: 25 U/L (ref 1–32)
BASOPHILS # BLD AUTO: 0.02 10*3/MM3 (ref 0–0.2)
BASOPHILS NFR BLD AUTO: 0.4 % (ref 0–1.5)
BH CV ECHO MEAS - ACS: 1.84 CM
BH CV ECHO MEAS - AO MAX PG: 9.6 MMHG
BH CV ECHO MEAS - AO MEAN PG: 5 MMHG
BH CV ECHO MEAS - AO ROOT DIAM: 2.7 CM
BH CV ECHO MEAS - AO V2 MAX: 155 CM/SEC
BH CV ECHO MEAS - AO V2 VTI: 32.8 CM
BH CV ECHO MEAS - AVA(I,D): 1.99 CM2
BH CV ECHO MEAS - EDV(CUBED): 92.9 ML
BH CV ECHO MEAS - EDV(MOD-SP2): 108 ML
BH CV ECHO MEAS - EDV(MOD-SP4): 84 ML
BH CV ECHO MEAS - EF(MOD-BP): 61.2 %
BH CV ECHO MEAS - EF(MOD-SP2): 57.4 %
BH CV ECHO MEAS - EF(MOD-SP4): 61.9 %
BH CV ECHO MEAS - ESV(CUBED): 30.2 ML
BH CV ECHO MEAS - ESV(MOD-SP2): 46 ML
BH CV ECHO MEAS - ESV(MOD-SP4): 32 ML
BH CV ECHO MEAS - FS: 31.2 %
BH CV ECHO MEAS - IVS/LVPW: 0.97 CM
BH CV ECHO MEAS - IVSD: 1.02 CM
BH CV ECHO MEAS - LAT PEAK E' VEL: 12.1 CM/SEC
BH CV ECHO MEAS - LV MASS(C)D: 162 GRAMS
BH CV ECHO MEAS - LV MAX PG: 4.8 MMHG
BH CV ECHO MEAS - LV MEAN PG: 3 MMHG
BH CV ECHO MEAS - LV V1 MAX: 110 CM/SEC
BH CV ECHO MEAS - LV V1 VTI: 23.4 CM
BH CV ECHO MEAS - LVIDD: 4.5 CM
BH CV ECHO MEAS - LVIDS: 3.1 CM
BH CV ECHO MEAS - LVOT AREA: 2.8 CM2
BH CV ECHO MEAS - LVOT DIAM: 1.88 CM
BH CV ECHO MEAS - LVPWD: 1.05 CM
BH CV ECHO MEAS - MED PEAK E' VEL: 10.4 CM/SEC
BH CV ECHO MEAS - MR MAX PG: 26.5 MMHG
BH CV ECHO MEAS - MR MAX VEL: 257.3 CM/SEC
BH CV ECHO MEAS - MV A DUR: 0.1 SEC
BH CV ECHO MEAS - MV A MAX VEL: 75.7 CM/SEC
BH CV ECHO MEAS - MV DEC SLOPE: 668.4 CM/SEC2
BH CV ECHO MEAS - MV DEC TIME: 0.16 SEC
BH CV ECHO MEAS - MV E MAX VEL: 120 CM/SEC
BH CV ECHO MEAS - MV E/A: 1.59
BH CV ECHO MEAS - MV MAX PG: 7.4 MMHG
BH CV ECHO MEAS - MV MEAN PG: 3.6 MMHG
BH CV ECHO MEAS - MV P1/2T: 60.8 MSEC
BH CV ECHO MEAS - MV V2 VTI: 27.8 CM
BH CV ECHO MEAS - MVA(P1/2T): 3.6 CM2
BH CV ECHO MEAS - MVA(VTI): 2.34 CM2
BH CV ECHO MEAS - PA ACC TIME: 0.16 SEC
BH CV ECHO MEAS - PA V2 MAX: 98.9 CM/SEC
BH CV ECHO MEAS - PULM A REVS DUR: 0.1 SEC
BH CV ECHO MEAS - PULM A REVS VEL: 32.3 CM/SEC
BH CV ECHO MEAS - PULM DIAS VEL: 65.9 CM/SEC
BH CV ECHO MEAS - PULM S/D: 0.87
BH CV ECHO MEAS - PULM SYS VEL: 57.5 CM/SEC
BH CV ECHO MEAS - RAP SYSTOLE: 3 MMHG
BH CV ECHO MEAS - RV MAX PG: 2.06 MMHG
BH CV ECHO MEAS - RV V1 MAX: 71.8 CM/SEC
BH CV ECHO MEAS - RV V1 VTI: 17.7 CM
BH CV ECHO MEAS - RVSP: 22 MMHG
BH CV ECHO MEAS - SV(LVOT): 65.2 ML
BH CV ECHO MEAS - SV(MOD-SP2): 62 ML
BH CV ECHO MEAS - SV(MOD-SP4): 52 ML
BH CV ECHO MEAS - TAPSE (>1.6): 2.09 CM
BH CV ECHO MEAS - TR MAX PG: 18.9 MMHG
BH CV ECHO MEAS - TR MAX VEL: 217.2 CM/SEC
BH CV ECHO MEASUREMENTS AVERAGE E/E' RATIO: 10.67
BH CV XLRA - RV BASE: 3.1 CM
BH CV XLRA - RV LENGTH: 7.3 CM
BH CV XLRA - RV MID: 2.32 CM
BH CV XLRA - TDI S': 13.7 CM/SEC
BILIRUB SERPL-MCNC: 0.3 MG/DL (ref 0–1.2)
BUN SERPL-MCNC: 7 MG/DL (ref 6–20)
BUN/CREAT SERPL: 10.6 (ref 7–25)
CALCIUM SPEC-SCNC: 8.6 MG/DL (ref 8.6–10.5)
CHLORIDE SERPL-SCNC: 107 MMOL/L (ref 98–107)
CO2 SERPL-SCNC: 27.5 MMOL/L (ref 22–29)
CREAT SERPL-MCNC: 0.66 MG/DL (ref 0.57–1)
DEPRECATED RDW RBC AUTO: 50.5 FL (ref 37–54)
EGFRCR SERPLBLD CKD-EPI 2021: 112.5 ML/MIN/1.73
EOSINOPHIL # BLD AUTO: 0.18 10*3/MM3 (ref 0–0.4)
EOSINOPHIL NFR BLD AUTO: 3.5 % (ref 0.3–6.2)
ERYTHROCYTE [DISTWIDTH] IN BLOOD BY AUTOMATED COUNT: 14.3 % (ref 12.3–15.4)
GLOBULIN UR ELPH-MCNC: 2.4 GM/DL
GLUCOSE SERPL-MCNC: 88 MG/DL (ref 65–99)
HCT VFR BLD AUTO: 31 % (ref 34–46.6)
HGB BLD-MCNC: 9.8 G/DL (ref 12–15.9)
IMM GRANULOCYTES # BLD AUTO: 0.02 10*3/MM3 (ref 0–0.05)
IMM GRANULOCYTES NFR BLD AUTO: 0.4 % (ref 0–0.5)
LEFT ATRIUM VOLUME INDEX: 21.2 ML/M2
LYMPHOCYTES # BLD AUTO: 2.09 10*3/MM3 (ref 0.7–3.1)
LYMPHOCYTES NFR BLD AUTO: 40.4 % (ref 19.6–45.3)
MCH RBC QN AUTO: 31.1 PG (ref 26.6–33)
MCHC RBC AUTO-ENTMCNC: 31.6 G/DL (ref 31.5–35.7)
MCV RBC AUTO: 98.4 FL (ref 79–97)
MONOCYTES # BLD AUTO: 0.46 10*3/MM3 (ref 0.1–0.9)
MONOCYTES NFR BLD AUTO: 8.9 % (ref 5–12)
MRSA DNA SPEC QL NAA+PROBE: NORMAL
NEUTROPHILS NFR BLD AUTO: 2.4 10*3/MM3 (ref 1.7–7)
NEUTROPHILS NFR BLD AUTO: 46.4 % (ref 42.7–76)
NRBC BLD AUTO-RTO: 0 /100 WBC (ref 0–0.2)
PLATELET # BLD AUTO: 250 10*3/MM3 (ref 140–450)
PMV BLD AUTO: 10 FL (ref 6–12)
POTASSIUM SERPL-SCNC: 4.5 MMOL/L (ref 3.5–5.2)
PROCALCITONIN SERPL-MCNC: 0.07 NG/ML (ref 0–0.25)
PROT SERPL-MCNC: 5.4 G/DL (ref 6–8.5)
RBC # BLD AUTO: 3.15 10*6/MM3 (ref 3.77–5.28)
SINUS: 2.6 CM
SODIUM SERPL-SCNC: 141 MMOL/L (ref 136–145)
STJ: 2.38 CM
VANCOMYCIN TROUGH SERPL-MCNC: 13.1 MCG/ML (ref 5–20)
WBC NRBC COR # BLD AUTO: 5.17 10*3/MM3 (ref 3.4–10.8)

## 2024-03-25 PROCEDURE — 93306 TTE W/DOPPLER COMPLETE: CPT | Performed by: INTERNAL MEDICINE

## 2024-03-25 PROCEDURE — 25810000003 SODIUM CHLORIDE 0.9 % SOLUTION 500 ML FLEX CONT: Performed by: NURSE PRACTITIONER

## 2024-03-25 PROCEDURE — 85025 COMPLETE CBC W/AUTO DIFF WBC: CPT | Performed by: INTERNAL MEDICINE

## 2024-03-25 PROCEDURE — 25010000002 PIPERACILLIN SOD-TAZOBACTAM PER 1 G: Performed by: NURSE PRACTITIONER

## 2024-03-25 PROCEDURE — 84145 PROCALCITONIN (PCT): CPT | Performed by: INTERNAL MEDICINE

## 2024-03-25 PROCEDURE — G0378 HOSPITAL OBSERVATION PER HR: HCPCS

## 2024-03-25 PROCEDURE — 87641 MR-STAPH DNA AMP PROBE: CPT | Performed by: INTERNAL MEDICINE

## 2024-03-25 PROCEDURE — 90791 PSYCH DIAGNOSTIC EVALUATION: CPT

## 2024-03-25 PROCEDURE — 80053 COMPREHEN METABOLIC PANEL: CPT | Performed by: INTERNAL MEDICINE

## 2024-03-25 PROCEDURE — 93306 TTE W/DOPPLER COMPLETE: CPT

## 2024-03-25 PROCEDURE — 96376 TX/PRO/DX INJ SAME DRUG ADON: CPT

## 2024-03-25 PROCEDURE — 99223 1ST HOSP IP/OBS HIGH 75: CPT | Performed by: INTERNAL MEDICINE

## 2024-03-25 PROCEDURE — 25510000001 PERFLUTREN (DEFINITY) 8.476 MG IN SODIUM CHLORIDE (PF) 0.9 % 10 ML INJECTION: Performed by: NURSE PRACTITIONER

## 2024-03-25 PROCEDURE — 96366 THER/PROPH/DIAG IV INF ADDON: CPT

## 2024-03-25 PROCEDURE — 25010000002 VANCOMYCIN HCL 1.25 G RECONSTITUTED SOLUTION 1 EACH VIAL: Performed by: NURSE PRACTITIONER

## 2024-03-25 PROCEDURE — 80202 ASSAY OF VANCOMYCIN: CPT | Performed by: NURSE PRACTITIONER

## 2024-03-25 PROCEDURE — 25010000002 AMPICILLIN-SULBACTAM PER 1.5 G: Performed by: INTERNAL MEDICINE

## 2024-03-25 PROCEDURE — 74018 RADEX ABDOMEN 1 VIEW: CPT

## 2024-03-25 PROCEDURE — 25010000002 ONDANSETRON PER 1 MG: Performed by: NURSE PRACTITIONER

## 2024-03-25 PROCEDURE — 96367 TX/PROPH/DG ADDL SEQ IV INF: CPT

## 2024-03-25 RX ORDER — GABAPENTIN 300 MG/1
300 CAPSULE ORAL EVERY 12 HOURS SCHEDULED
Status: DISCONTINUED | OUTPATIENT
Start: 2024-03-25 | End: 2024-03-29 | Stop reason: HOSPADM

## 2024-03-25 RX ADMIN — Medication 10 MG: at 20:54

## 2024-03-25 RX ADMIN — ONDANSETRON HYDROCHLORIDE 4 MG: 2 INJECTION, SOLUTION INTRAMUSCULAR; INTRAVENOUS at 06:10

## 2024-03-25 RX ADMIN — TRAZODONE HYDROCHLORIDE 50 MG: 50 TABLET ORAL at 22:33

## 2024-03-25 RX ADMIN — ACETAMINOPHEN 325MG 650 MG: 325 TABLET ORAL at 05:04

## 2024-03-25 RX ADMIN — FLUOXETINE HYDROCHLORIDE 60 MG: 20 CAPSULE ORAL at 20:54

## 2024-03-25 RX ADMIN — HYDROCODONE BITARTRATE AND ACETAMINOPHEN 1 TABLET: 5; 325 TABLET ORAL at 12:35

## 2024-03-25 RX ADMIN — APIXABAN 5 MG: 5 TABLET, FILM COATED ORAL at 09:42

## 2024-03-25 RX ADMIN — AMPICILLIN SODIUM AND SULBACTAM SODIUM 3 G: 2; 1 INJECTION, POWDER, FOR SOLUTION INTRAMUSCULAR; INTRAVENOUS at 18:02

## 2024-03-25 RX ADMIN — VANCOMYCIN HYDROCHLORIDE 1250 MG: 1.25 INJECTION, POWDER, LYOPHILIZED, FOR SOLUTION INTRAVENOUS at 12:08

## 2024-03-25 RX ADMIN — LEVETIRACETAM 1000 MG: 500 TABLET, FILM COATED ORAL at 20:54

## 2024-03-25 RX ADMIN — HYDROCODONE BITARTRATE AND ACETAMINOPHEN 1 TABLET: 5; 325 TABLET ORAL at 19:06

## 2024-03-25 RX ADMIN — AMPICILLIN SODIUM AND SULBACTAM SODIUM 3 G: 2; 1 INJECTION, POWDER, FOR SOLUTION INTRAMUSCULAR; INTRAVENOUS at 22:34

## 2024-03-25 RX ADMIN — APIXABAN 5 MG: 5 TABLET, FILM COATED ORAL at 20:54

## 2024-03-25 RX ADMIN — HYDROCODONE BITARTRATE AND ACETAMINOPHEN 1 TABLET: 5; 325 TABLET ORAL at 06:02

## 2024-03-25 RX ADMIN — GABAPENTIN 300 MG: 300 CAPSULE ORAL at 20:54

## 2024-03-25 RX ADMIN — Medication 10 ML: at 09:00

## 2024-03-25 RX ADMIN — ACETAMINOPHEN 325MG 650 MG: 325 TABLET ORAL at 09:42

## 2024-03-25 RX ADMIN — PIPERACILLIN AND TAZOBACTAM 3.38 G: 3; .375 INJECTION, POWDER, FOR SOLUTION INTRAVENOUS at 02:57

## 2024-03-25 RX ADMIN — GABAPENTIN 300 MG: 300 CAPSULE ORAL at 12:08

## 2024-03-25 RX ADMIN — ONDANSETRON HYDROCHLORIDE 4 MG: 2 INJECTION, SOLUTION INTRAMUSCULAR; INTRAVENOUS at 19:06

## 2024-03-25 RX ADMIN — Medication 10 ML: at 20:55

## 2024-03-25 RX ADMIN — ONDANSETRON HYDROCHLORIDE 4 MG: 2 INJECTION, SOLUTION INTRAMUSCULAR; INTRAVENOUS at 14:12

## 2024-03-25 RX ADMIN — LEVETIRACETAM 1000 MG: 500 TABLET, FILM COATED ORAL at 09:42

## 2024-03-25 RX ADMIN — AMPICILLIN SODIUM AND SULBACTAM SODIUM 3 G: 2; 1 INJECTION, POWDER, FOR SOLUTION INTRAMUSCULAR; INTRAVENOUS at 10:43

## 2024-03-25 RX ADMIN — PERFLUTREN 2 ML: 6.52 INJECTION, SUSPENSION INTRAVENOUS at 08:56

## 2024-03-25 RX ADMIN — CLONIDINE HYDROCHLORIDE 0.1 MG: 0.1 TABLET ORAL at 09:42

## 2024-03-25 NOTE — PLAN OF CARE
Goal Outcome Evaluation:  Plan of Care Reviewed With: patient           Outcome Evaluation: No acute changes this shift. VSS. AOX4. Pt has had numerous complaints of pain. Pt is still having numerous complaints of pain. She is also reporting thather legs are swollen more up to her hips and Her abdomen is also hard. Mouth pain is also being reported magic mouthwash has been requested. Stomach did feel hard upon assessing. Her recent BM was large and normal. I offered Tylenol but pt refused. Pt stated that Norco 5mg does not help and requested me to contact the MD. NELSON on call provided the callback and reviewed pt chart while on the phone with me. She ordered a KUB but was unable to order more meds for pain. Request for anxiety medication was also denied. Pt had nausea and vomiting both times family came to visit [facility].

## 2024-03-25 NOTE — SIGNIFICANT NOTE
03/25/24 1610   OTHER   Discipline physical therapist   Rehab Time/Intention   Session Not Performed other (see comments)  (pt busy w other staff at time of attempt this afternoon, meeting w access. will follow up tomorrow.)   Recommendation   PT - Next Appointment 03/26/24        yes

## 2024-03-25 NOTE — PLAN OF CARE
Goal Outcome Evaluation:  Plan of Care Reviewed With: patient        Progress: no change       Patient slept well through the night. But when patient is awake she complains of pain. Tonight it was mouth pain, called on call NP for [facility] to request some Miracle mouth wash, or something with lidocaine in it, unable to get order for it. Patient does drift off to sleep mid sentence when you are talking to her and then wakes up and asks for pain medication. PRN pain medication administered as ordered. Patient asked for Neurontin this morning and explained to her that it was a one time order. Patient was upset because it is not time for pain pill and also that Neurontin was one time only. Administered Tylenol for pain until it is time for he pain tablet. Vitals have been stable all night, patient has been on room air with no problem in her oxygen saturation. WCTM

## 2024-03-25 NOTE — PROGRESS NOTES
"  PROGRESS NOTE  Patient Name: Belen Allen  Age/Sex: 43 y.o. female  : 1980  MRN: [id number]    Date of Admission: 3/23/2024  Date of Encounter Visit: 24   LOS: 0 days   Patient Care Team:  Sahil Cornelius MD as PCP - General (Internal Medicine)  Code, Bjron HERRERA II, MD as Consulting Physician (Hematology and Oncology)  Dennys Carranza MD as Referring Physician (Hospitalist)    Chief Complaint: Patient is complaining of her back pain and leg pain and leg edema with no specific respiratory issues    Hospital course: Patient came in with leg pain and back pain, was found to have pneumonia even though her white count was normal and the procalcitonin as well.  She denies any respiratory complaints  She does have obvious dental problems that has been going on for more than a year  The lumbar MRI was negative yet she continues to complain of pain and swelling in the leg.         REVIEW OF SYSTEMS:   CONSTITUTIONAL: no fever or chills  CARDIOVASCULAR: No chest pain, chest pressure or chest discomfort.  .   RESPIRATORY: No shortness of breath, cough or sputum.   GASTROINTESTINAL: No anorexia, nausea, vomiting or diarrhea. No abdominal pain or blood.   HEMATOLOGIC: No bleeding or bruising.  Pain and swelling in the legs    Ventilator/Non-Invasive Ventilation Settings (From admission, onward)      None              Vital Signs  Temp:  [97.3 °F (36.3 °C)-97.9 °F (36.6 °C)] 97.9 °F (36.6 °C)  Heart Rate:  [77-93] 77  Resp:  [18] 18  BP: (105-129)/(65-74) 114/67  SpO2:  [96 %-100 %] 99 %  on    Device (Oxygen Therapy): room air    Intake/Output Summary (Last 24 hours) at 3/25/2024 1448  Last data filed at 3/25/2024 1010  Gross per 24 hour   Intake 1830 ml   Output --   Net 1830 ml     Flowsheet Rows      Flowsheet Row First Filed Value   Admission Height 162.6 cm (64\") Documented at 2024   Admission Weight 83.9 kg (185 lb) Documented at 2024          Body mass index is 39.65 " kg/m².      03/23/24  2335 03/24/24  0530 03/25/24  0855   Weight: 106 kg (233 lb 0.4 oz) 105 kg (231 lb 4.2 oz) 105 kg (231 lb)       Physical Exam:  GEN:  No acute distress, alert, cooperative, well developed, on room air  EYES:   Sclerae clear. No icterus. PERRL. Normal EOM  ENT:   External ears/nose normal, no oral lesions, no thrush, mucous membranes moist  NECK:  Supple, midline trachea, no JVD  LUNGS: Normal chest on inspection, CTAB, no wheezes. No rhonchi. No crackles. Respirations regular, even and unlabored.   CV:  Regular rhythm and rate. Normal S1/S2. No murmurs, gallops, or rubs noted.  ABD:  Soft, nontender and nondistended. Normal bowel sounds. No guarding  EXT:  Moves all extremities well. No cyanosis. No redness.  1+ pitting edema.   Skin: Dry, intact, no bleeding    Results Review:    Results From Last 14 Days   Lab Units 03/23/24 2012   CRP mg/dL 11.49*   D DIMER QUANT MCGFEU/mL 0.99*   LACTATE mmol/L 1.1   SED RATE mm/hr 37*     Results from last 7 days   Lab Units 03/25/24  0458 03/24/24  0429 03/23/24 2012   SODIUM mmol/L 141 141 138   POTASSIUM mmol/L 4.5 4.0 4.3   CHLORIDE mmol/L 107 106 102   CO2 mmol/L 27.5 29.3* 26.5   BUN mg/dL 7 6 7   CREATININE mg/dL 0.66 0.67 0.66   CALCIUM mg/dL 8.6 8.4* 8.5*   AST (SGOT) U/L 25  --  29   ALT (SGPT) U/L 33  --  44*   ANION GAP mmol/L 6.5 5.7 9.5   ALBUMIN g/dL 3.0*  --  3.5     Results from last 7 days   Lab Units 03/23/24  2356 03/23/24 2012   CK TOTAL U/L  --  68   HSTROP T ng/L <6 <6   D DIMER QUANT MCGFEU/mL  --  0.99*         Results from last 7 days   Lab Units 03/23/24 2012   PROBNP pg/mL 683.0*     Results from last 7 days   Lab Units 03/25/24  0458 03/24/24  0429 03/23/24 2012   WBC 10*3/mm3 5.17 7.78 9.47   HEMOGLOBIN g/dL 9.8* 9.7* 9.8*   HEMATOCRIT % 31.0* 30.0* 31.3*   PLATELETS 10*3/mm3 250 246 286   MCV fL 98.4* 96.8 96.0   NEUTROPHIL % % 46.4  --  75.9   LYMPHOCYTE % % 40.4  --  12.2*   MONOCYTES % % 8.9  --  9.8   EOSINOPHIL % %  "3.5  --  1.6   BASOPHIL % % 0.4  --  0.1   IMM GRAN % % 0.4  --  0.4                   Invalid input(s): \"LDLCALC\"          No results found for: \"POCGLU\"  Results from last 7 days   Lab Units 03/25/24  0458 03/23/24 2012   PROCALCITONIN ng/mL 0.07 0.15   LACTATE mmol/L  --  1.1     Results from last 7 days   Lab Units 03/23/24  2022   BLOODCX  No growth at 24 hours  No growth at 24 hours         Results from last 7 days   Lab Units 03/24/24  1845   COVID19  Not Detected   ADENOVIRUS DETECTION BY PCR  Not Detected   CORONAVIRUS 229E  Not Detected   CORONAVIRUS HKU1  Not Detected   CORONAVIRUS NL63  Not Detected   CORONAVIRUS OC43  Not Detected   HUMAN METAPNEUMOVIRUS  Not Detected   HUMAN RHINOVIRUS/ENTEROVIRUS  Not Detected   INFLUENZA B PCR  Not Detected   PARAINFLUENZA 1  Not Detected   PARAINFLUENZA VIRUS 2  Not Detected   PARAINFLUENZA VIRUS 3  Not Detected   PARAINFLUENZA VIRUS 4  Not Detected   BORDETELLA PERTUSSIS PCR  Not Detected   BORDETELLA PARAPERTUSSIS PCR  Not Detected   CHLAMYDOPHILA PNEUMONIAE PCR  Not Detected   MYCOPLAMA PNEUMO PCR  Not Detected   RSV, PCR  Not Detected               Imaging:   Imaging Results (All)              I reviewed the patient's new clinical results.  I personally viewed and interpreted the patient's imaging results:        Medication Review:   ampicillin-sulbactam, 3 g, Intravenous, Q6H  apixaban, 5 mg, Oral, Q12H  cloNIDine, 0.1 mg, Oral, Daily  FLUoxetine, 60 mg, Oral, Nightly  gabapentin, 300 mg, Oral, Q12H  levETIRAcetam, 1,000 mg, Oral, Q12H  melatonin, 10 mg, Oral, Nightly  sodium chloride, 10 mL, Intravenous, Q12H             ASSESSMENT:   Bilateral bronchopneumonia  Antiphospholipid antibody  Obesity with suspected sleep apnea  Chronic pain medication with polypharmacy, Patient is clonidine, fluoxetine, gabapentin, opioid, Keppra.  Gingivitis, with tender submandibular lymph node and long history of gum and teeth infection with multiple fractured teeth with " retained teeth stumps  Chronic pain syndrome  Questionable lower extremity cellulitis    PLAN:  Respiratory wise patient is asymptomatic, the finding on the CAT scan or completely incidental but I will continue with the antibiotic as per the infectious disease recommendation, she was switched to Unasyn which should help better for the oral pathogens as well.  Her respiratory panel came back negative  unable to get the panoramic films today because of digestive issue with the hospital, will try to substitute with a CAT scan awaiting input from radiology as to what specific test is of better value given the fact that we are looking for any dental abscesses  Consider urology consultation given the unexplained pain in the legs.  Patient is on chronic pain medication but this current pain is different than her usual  Discussed with patient and with the primary team and with the nursing staff  Infectious disease note reviewed  Labs/Notes/films were independently reviewed and pertinent results are summarized above  The copied texts in this note were reviewed and they are accurate as of 03/25/24    Disposition: Per primary team    Soy Reynoso MD  03/25/24  14:48 EDT           Dictated utilizing Dragon dictation

## 2024-03-25 NOTE — CONSULTS
"Patient seen by [facility] d/t positive drug screen (Caitl). Patient interviewed alone in room 529 (5N). Introduced self and role. Patient agreed to be evaluated. Patient is A/OX4. Patient pleasant and laughing during evaluation but did become tearful at times. Patient seen by [facility]  in July 2023  Please see previous notes.    The patient is a 43 y.o. female living with her two children and her fiance.   Zoroastrian/Spiritual: Yazidism  Children: 2 daughters.  Occupation: Unemployed  Hobbies: Singing, Dancing, Family time, Movies  Legal: Denies  : No  Support System: Fiance, Children, Therapist  Hx of Violence: Denies  Hx of Abuse/Trauma: The patient stated she has PTSD. The patient talked about having a seizure in her shower and falling.   Feel Safe at Home: Yes    The patient presented to the ED on 3/25/24 d/t bilateral leg pain and swelling. The patient also has chronic back pain. The patient was admitted with sepsis, cellulitis, and pneumonia.     The patient's UDS was positive for Fentanyl. The patient stated she was under the impression she was taking Percocet. The patient stated she was told the pills were \"30mg Percocet.\" The patient stated she has been taking the pills daily and cuts them into quarters and takes them through out the day to equal one pill. The patient stated her last use was on 3/22/24. The patient stated she was attempting to wean herself off the pills. The patient stated she was given 10mg Percocet after a back surgery and was on the medication for 8 years before getting sick of taking the medication and \"flushing them all down the toilet.\" The patient stated she was able to quit on her own and was able to maintain sobriety for \"a long time.\" The patient stated at other times she was put back on Percocet and hydrocodone and was able to maintain on these and not abuse them. The patient stated she does not currently have a prescriber for her pain medication so she has been " "getting them from a friend. The patient stated she takes the pills for pain and denied getting \"high.\" The patient denied any substance use treatment. The patient stated \"I've always done it on my own.\"     The patient has a mental health history of anxiety, depression, and PTSD. The patient is currently prescribed Prozac and trazadone by her psych NP. The patient stated she also has a therapist. The patient stated she has talked with her therapist and psych NP about her issues with pain and unknowingly taking Fentanyl. The patient denied any other mental health treatment.     The patient rated anxiety 10/10 and related it to being in the hospital and her legs feeling like they are \"about to pop.\" The patient rated depression 4/10. The patient denied SI, wish to be dead, HI, or hallucinations.     The patient stated she needs medication for pain but is no longer going to be taking pills that aren't prescribed to her. The patient stated she did not feel like she would seek substance use treatment after discharge but is open to [facility] following her. [facility] will follow and provide resources as needed.   "

## 2024-03-25 NOTE — PROGRESS NOTES
Name: Belen Allen ADMIT: 3/23/2024   : 1980  PCP: Sahil Cornelius MD    MRN: [id number] LOS: 0 days   AGE/SEX: 43 y.o. female  ROOM: [facility]     Subjective   Subjective   Patient seen at bedside.       Objective   Objective   Vital Signs  Temp:  [97.3 °F (36.3 °C)-97.9 °F (36.6 °C)] 97.6 °F (36.4 °C)  Heart Rate:  [77-93] 77  Resp:  [18] 18  BP: ()/(63-74) 105/74  SpO2:  [96 %-100 %] 99 %  on   ;   Device (Oxygen Therapy): room air  Body mass index is 39.65 kg/m².  Physical Exam  General, awake and alert.  Head and ENT, normocephalic and atraumatic.  Lungs, symmetric expansion, equal air entry bilaterally.  Heart, regular rate and rhythm.  Abdomen, soft and nontender.  Extremities, no clubbing or cyanosis.  Neuro, no focal deficits.  Skin: Warm and no rash.  Psych, normal mood and affect.  Musculoskeletal, joint examination is grossly normal.      Copied text material from yesterday's note has been reviewed for appropriate changes and remains accurate as of 3/25/24.        Results Review     I reviewed the patient's new clinical results.  Results from last 7 days   Lab Units 24   WBC 10*3/mm3 5.17 7.78 9.47   HEMOGLOBIN g/dL 9.8* 9.7* 9.8*   PLATELETS 10*3/mm3 250 246 286     Results from last 7 days   Lab Units 24   SODIUM mmol/L 141 141 138   POTASSIUM mmol/L 4.5 4.0 4.3   CHLORIDE mmol/L 107 106 102   CO2 mmol/L 27.5 29.3* 26.5   BUN mg/dL 7 6 7   CREATININE mg/dL 0.66 0.67 0.66   GLUCOSE mg/dL 88 100* 114*   EGFR mL/min/1.73 112.5 112.1 112.5     Results from last 7 days   Lab Units 24   ALBUMIN g/dL 3.0* 3.5   BILIRUBIN mg/dL 0.3 0.5   ALK PHOS U/L 169* 179*   AST (SGOT) U/L 25 29   ALT (SGPT) U/L 33 44*     Results from last 7 days   Lab Units 24  0458 24  0429 24   CALCIUM mg/dL 8.6 8.4* 8.5*   ALBUMIN g/dL 3.0*  --  3.5     Results from last 7  "days   Lab Units 03/25/24  0458 03/23/24 2012   PROCALCITONIN ng/mL 0.07 0.15   LACTATE mmol/L  --  1.1     No results found for: \"HGBA1C\", \"POCGLU\"    MRI Lumbar Spine With & Without Contrast    Result Date: 3/24/2024  The patient is status post fusion from L5 to S1. There is no evidence of a compression fracture, marrow edema, disc herniation or of abnormal enhancement. See above.    This report was finalized on 3/24/2024 7:59 PM by Dr. David Anne M.D on Workstation: [id number]      CT Angiogram Chest Pulmonary Embolism    Result Date: 3/23/2024  1. There is no evidence for a pulmonary embolism. 2. Multilobar bilateral ground-glass opacities consistent with a multilobar bilateral pneumonia.  Radiation dose reduction techniques were utilized, including automated exposure control and exposure modulation based on body size.   This report was finalized on 3/23/2024 11:35 PM by Dr. Glenn Lozada M.D on Workstation: [id number]      CT Abdomen Pelvis With Contrast    Result Date: 3/23/2024  There is no evidence for an active or acute intra-abdominal or pelvic process.  This report was finalized on 3/23/2024 11:35 PM by Dr. Glenn Lozada M.D on Workstation: [id number]       I have personally reviewed all medications:  Scheduled Medications  ampicillin-sulbactam, 3 g, Intravenous, Q6H  apixaban, 5 mg, Oral, Q12H  cloNIDine, 0.1 mg, Oral, Daily  FLUoxetine, 60 mg, Oral, Nightly  levETIRAcetam, 1,000 mg, Oral, Q12H  melatonin, 10 mg, Oral, Nightly  sodium chloride, 10 mL, Intravenous, Q12H  vancomycin, 1,250 mg, Intravenous, Q12H    Infusions  Pharmacy to dose vancomycin,     Diet  Diet: Regular/House; Fluid Consistency: Thin (IDDSI 0)    I have personally reviewed:  [x]  Laboratory   [x]  Microbiology   [x]  Radiology   [x]  EKG/Telemetry  [x]  Cardiology/Vascular   []  Pathology    []  Records       Assessment/Plan     Active Hospital Problems    Diagnosis  POA    **Pneumonia [J18.9]  Unknown    Dental abscess [K04.7] "  Unknown    Bilateral cellulitis of lower leg [L03.116, L03.115]  Unknown    Intractable back pain [M54.9]  Yes    Seizures [R56.9]  Yes    Chronic pain syndrome [G89.4]  Yes    Antiphospholipid antibody positive [R76.0]  Yes    Obesity (BMI 30-39.9) [E66.9]  Yes      Resolved Hospital Problems   No resolved problems to display.       43 y.o. female admitted with Pneumonia.    Assessment and plan  Multilobular Pneumonia  Intractable Back Pain  Cellulitis of BLE  -She has history of chronic back pain and opioid dependence, continue pain control.  Infectious disease and pulmonary consultation for further input.     Dental Abscess  -Outpatient follow-up with dentistry.     Antiphospholipid Antibody Syndrome/History of Splenic Infarct  -Continue Eliquis.     Seizure Disorder  -Continue Keppra   -Seizure precautions     -I discussed the patients findings and my recommendations with patient.      Nirmal Zendejas MD  [facility]  03/25/24  10:49 EDT

## 2024-03-25 NOTE — CONSULTS
"Referring Provider: Dr Kamara    Reason for Consultation: \"PNA\"    History of present illness:  Belen Allen is a 43 y.o. with past medical history of antiphospholipid antibody syndrome and opioid abuse who I am asked to evaluate and give opinion for \"PNA\". History is obtained from the patient and review of the old medical records which I summarize/synthesize as follows: She presented to the emergency room on 3/23/2024 with reports of bilateral lower extremity edema and swelling that had been going on for about 3 days.  This was accompanied by lower back pain.  She has a history of lumbar fusion.  There were no alleviating factors to her symptoms.  She has been dealing with some dental infections recently and is in need of several tooth extractions.  In the Care Everywhere tab, I can see that she was recently seen at [place] [facility] on 3/18/2024 and prescribed a 10-day course of amoxicillin 500 mg p.o. every 8 hours.  She says she has been taking these faithfully.  In fact, she says she has been off and on antibiotics for the past 3 months related to tooth infection.  She is in the process of trying to get set up with dentistry as an outpatient.    In the emergency room and since admission she has been afebrile.  She has had tachycardia with some initial heart rate of 116.  She briefly required supplemental oxygen but is now on room air.  Labs were notable for normal WBC 9, relatively low procalcitonin 0.15, and urine drug screen positive for fentanyl.  Her respiratory panel was negative and her blood cultures are pending.  She had multiple imaging studies done.  Dopplers were negative for DVT, MRI of the lumbar spine was negative for infection, CT abdomen pelvis was negative for any acute process, and the CT of her chest was negative for pulmonary embolism but did show a multilobar pneumonia.  Both pulmonary medicine and infectious diseases have been consulted for this problem.  She is currently on " vancomycin and piperacillin-tazobactam.    Past Medical History:   Diagnosis Date    Abnormal Pap smear of cervix     Ankle fracture 2013    H/O Elevated liver enzymes     History of chronic back pain     History of migraine     History of urinary tract infection     Injury of back     Opioid dependence 1/16/2023    PONV (postoperative nausea and vomiting)     Seasonal allergies     Seizure     Takes Keppra       Past Surgical History:   Procedure Laterality Date    BACK SURGERY  2006    fusion L4, L5      CHOLECYSTECTOMY  2014    DILATATION AND CURETTAGE  2009    ENDOSCOPY N/A 11/27/2022    Procedure: ESOPHAGOGASTRODUODENOSCOPY with biopsy;  Surgeon: Christiana Mann MD;  Location: [facility] ENDOSCOPY;  Service: Gastroenterology;  Laterality: N/A;  gastritis, duodenitis, irregular z line    ERCP N/A 6/3/2021    Procedure: ENDOSCOPIC RETROGRADE CHOLANGIOPANCREATOGRAPHY WITH SPHINCTEROTOMY, DILATION WITH BALLOON CLEARANCE  (12MM-15MM);  Surgeon: Bjorn Flannery MD;  Location: [facility] ENDOSCOPY;  Service: Gastroenterology;  Laterality: N/A;  DILATED COMMON BILE DUCT    SKIN SURGERY      TUBAL ABDOMINAL LIGATION  2013    WISDOM TOOTH EXTRACTION  2018    x2     Antibiotic allergies and intolerances:  None    Medications:    Current Facility-Administered Medications:     acetaminophen (TYLENOL) tablet 650 mg, 650 mg, Oral, Q4H PRN, 650 mg at 03/25/24 0504 **OR** acetaminophen (TYLENOL) 160 MG/5ML oral solution 650 mg, 650 mg, Oral, Q4H PRN **OR** acetaminophen (TYLENOL) suppository 650 mg, 650 mg, Rectal, Q4H PRN, Beatriz Hernandez APRN    apixaban (ELIQUIS) tablet 5 mg, 5 mg, Oral, Q12H, Beatriz Hernandez APRN, 5 mg at 03/24/24 2027    sennosides-docusate (PERICOLACE) 8.6-50 MG per tablet 2 tablet, 2 tablet, Oral, BID PRN **AND** polyethylene glycol (MIRALAX) packet 17 g, 17 g, Oral, Daily PRN **AND** bisacodyl (DULCOLAX) EC tablet 5 mg, 5 mg, Oral, Daily PRN **AND** bisacodyl (DULCOLAX) suppository 10 mg, 10  mg, Rectal, Daily PRN, Beatriz Hernandez APRN    calcium carbonate (TUMS) chewable tablet 500 mg (200 mg elemental), 2 tablet, Oral, BID PRN, Beatriz Hernandez APRN    cloNIDine (CATAPRES) tablet 0.1 mg, 0.1 mg, Oral, Daily, Beatriz Hernandez APRN, 0.1 mg at 03/24/24 1055    FLUoxetine (PROzac) capsule 60 mg, 60 mg, Oral, Nightly, Beatriz Hernandez APRN, 60 mg at 03/24/24 2027    HYDROcodone-acetaminophen (NORCO) 5-325 MG per tablet 1 tablet, 1 tablet, Oral, Q6H PRN, Nirmal Zendejas MD, 1 tablet at 03/25/24 0602    levETIRAcetam (KEPPRA) tablet 1,000 mg, 1,000 mg, Oral, Q12H, Beatriz Hernandez APRN, 1,000 mg at 03/24/24 2027    melatonin tablet 10 mg, 10 mg, Oral, Nightly, Beatriz Hernandez APRN, 10 mg at 03/24/24 2028    nitroglycerin (NITROSTAT) SL tablet 0.4 mg, 0.4 mg, Sublingual, Q5 Min PRN, Beatriz Hernandez APRN    ondansetron ODT (ZOFRAN-ODT) disintegrating tablet 4 mg, 4 mg, Oral, Q6H PRN **OR** ondansetron (ZOFRAN) injection 4 mg, 4 mg, Intravenous, Q6H PRN, Beatriz Hernandez APRN, 4 mg at 03/25/24 0610    Pharmacy to dose vancomycin, , Does not apply, Continuous PRN, Beatriz Hernandez APRN    piperacillin-tazobactam (ZOSYN) 3.375 g IVPB in 100 mL NS MBP (CD), 3.375 g, Intravenous, Q8H, Beatriz Hernandez APRN, Last Rate: 0 mL/hr at 03/25/24 0019, 3.375 g at 03/25/24 0257    [COMPLETED] Insert Peripheral IV, , , Once **AND** sodium chloride 0.9 % flush 10 mL, 10 mL, Intravenous, PRN, Barrera Hauser MD    sodium chloride 0.9 % flush 10 mL, 10 mL, Intravenous, Q12H, Beatriz Hernandez APRN, 10 mL at 03/24/24 2029    sodium chloride 0.9 % flush 10 mL, 10 mL, Intravenous, PRN, Beatriz Hernandez APRN, 10 mL at 03/24/24 2345    sodium chloride 0.9 % infusion 40 mL, 40 mL, Intravenous, PRN, Beatriz Hernandez APRN    traZODone (DESYREL) tablet 50 mg, 50 mg, Oral, Nightly PRN, Beatriz Hernandez APRN    Vancomycin HCl 1,250 mg in sodium chloride 0.9 %  250 mL VTB, 1,250 mg, Intravenous, Q12H, Hernandez Beatriz NELSON Queen, Stopped at 03/25/24 0020      Objective   Vital Signs   Temp:  [97.3 °F (36.3 °C)-97.9 °F (36.6 °C)] 97.6 °F (36.4 °C)  Heart Rate:  [77-93] 77  Resp:  [18] 18  BP: ()/(63-74) 105/74    Physical Exam:   General: awake, alert, NAD, sitting up in bed, tearful  Eyes: no scleral icterus  ENT: no thrush, poor dentition  Cardiovascular: NR  Respiratory: normal work of breathing on ambient air; minimal rales; no wheezing  GI: Abdomen is soft, not tender, + bowel sounds in all four quadrants  :  no Best catheter  Skin: No rashes  Neurological: Alert and oriented x 3  Psychiatric: Normal mood and affect   Vasc: PIV w/o erythema    Labs:     Lab Results   Component Value Date    WBC 5.17 03/25/2024    HGB 9.8 (L) 03/25/2024    HCT 31.0 (L) 03/25/2024    MCV 98.4 (H) 03/25/2024     03/25/2024       Lab Results   Component Value Date    GLUCOSE 88 03/25/2024    BUN 7 03/25/2024    CREATININE 0.66 03/25/2024    EGFRIFNONA 94 02/02/2022    EGFRIFAFRI >60 11/09/2022    BCR 10.6 03/25/2024    CO2 27.5 03/25/2024    CALCIUM 8.6 03/25/2024    PROTENTOTREF 4.8 (L) 11/25/2022    ALBUMIN 3.0 (L) 03/25/2024    LABIL2 1.3 11/25/2022    AST 25 03/25/2024    ALT 33 03/25/2024     UDS positive for fentanyl  Procalcitonin 0.15    Microbiology:  3/23 BCx: negative to date  3/24 RPP: negative      Radiology:  MRI lumbar spine with and without contrast shows that she has undergone fusion from L5-S1 but there is no evidence of fracture, herniation, or infection    Bilateral lower extremity Dopplers negative for DVT    CT abdomen/pelvis: No acute process    CTA chest: Negative for PE but does show multilobar bilateral groundglass opacities consistent with multilobar bilateral pneumonia per my review of the radiology report and the images myself    Transthoracic echocardiogram: Pending    ASSESSMENT/PLAN:  Multilobar pneumonia  Substance abuse and chronic opioid  use  Antiphospholipid antibody syndrome  History of splenic infarct  Bilateral leg pain and swelling  Tooth infection/dental abscess  PTSD  Seizure disorder  Chronic lower back pain and history of lumbar fusion    While at the CT of her chest did show changes concerning for pneumonia, she really does not have significant concerns for pneumonia on exam.  She is breathing comfortably with minimal rales and is on room air.  Her procalcitonin was low at admission so I will repeat it today.  I will check a MRSA nares screen and discontinue vancomycin if it is negative.  I will follow-up the results of her blood cultures.  Thankfully her MRI did not show any evidence of lumbar infection.    I am going to change her Zosyn to Unasyn.  Ideally she would follow-up with dentistry as soon as possible after discharge to have the infected teeth extracted to provide source control. Pulmonary has ordered a Panorex, and I will follow-up that result.    Some notes mention possibility of cellulitis.  I do not see any evidence of cellulitis.    Check HIV antibody and hep C antibody with a.m. labs.    ID will follow. D/w RN.

## 2024-03-25 NOTE — CASE MANAGEMENT/SOCIAL WORK
Discharge Planning Assessment  [facility]     Patient Name: Belen Allen  MRN: [id number]  Today's Date: 3/25/2024    Admit Date: 3/23/2024    Plan: Pending progress with PT.   Discharge Needs Assessment       Row Name 03/25/24 1246       Living Environment    People in Home child(manuel), dependent;significant other    Current Living Arrangements home    Potentially Unsafe Housing Conditions none    Primary Care Provided by self    Provides Primary Care For child(manuel)    Family Caregiver if Needed significant other    Family Caregiver Names Ced [phone]    Quality of Family Relationships helpful    Able to Return to Prior Arrangements yes       Resource/Environmental Concerns    Resource/Environmental Concerns none    Transportation Concerns none       Transition Planning    Patient/Family Anticipates Transition to home with family    Patient/Family Anticipated Services at Transition none    Transportation Anticipated family or friend will provide       Discharge Needs Assessment    Equipment Currently Used at Home walker, rolling    Concerns to be Addressed discharge planning    Anticipated Changes Related to Illness none    Equipment Needed After Discharge none    Discharge Facility/Level of Care Needs other (see comments)  to be determined    Provided Post Acute Provider List? Yes    Post Acute Provider List DME Supplier;Home Health;Nursing Home    Provided Post Acute Provider Quality & Resource List? Yes    Post Acute Provider Quality and Resource List Home Health;Inpatient Rehab;Nursing Home    Delivered To Patient                   Discharge Plan       Row Name 03/25/24 1247       Plan    Plan Pending progress with PT.    Roadmap to Recovery Yes    Plan Comments Spoke with patient at bedside. Verified facesheet, Pharmacy and PCP. SDOH as charted. PT ordered and is pending. Patients significant other to transport at dc. Patient agreeable to meds to beds at dc.                      Expected  Discharge Date and Time       Expected Discharge Date Expected Discharge Time    Mar 26, 2024            Demographic Summary       Row Name 03/25/24 1245       General Information    Admission Type observation    Arrived From emergency department    Referral Source admission list    Reason for Consult discharge planning    Preferred Language English                   Functional Status       Row Name 03/25/24 1245       Functional Status    Usual Activity Tolerance good    Current Activity Tolerance moderate       Functional Status, IADL    Medications independent    Meal Preparation independent    Housekeeping independent    Laundry independent    Shopping independent    IADL Comments Significant other and children help when needed.       Mental Status    General Appearance WDL WDL       Mental Status Summary    Recent Changes in Mental Status/Cognitive Functioning no changes                  Social Determinants of Health     Tobacco Use: High Risk (3/23/2024)    Patient History     Smoking Tobacco Use: Every Day     Smokeless Tobacco Use: Never     Passive Exposure: Not on file   Alcohol Use: Not At Risk (3/25/2024)    AUDIT-C     Frequency of Alcohol Consumption: Never     Average Number of Drinks: Patient does not drink     Frequency of Binge Drinking: Never   Financial Resource Strain: Medium Risk (3/25/2024)    Overall Financial Resource Strain (CARDIA)     Difficulty of Paying Living Expenses: Somewhat hard   Food Insecurity: Food Insecurity Present (3/25/2024)    Hunger Vital Sign     Worried About Running Out of Food in the Last Year: Sometimes true     Ran Out of Food in the Last Year: Sometimes true   Transportation Needs: No Transportation Needs (3/25/2024)    PRAPARE - Transportation     Lack of Transportation (Medical): No     Lack of Transportation (Non-Medical): No   Physical Activity: Patient Declined (3/25/2024)    Exercise Vital Sign     Days of Exercise per Week: Patient declined     Minutes of  Exercise per Session: Patient declined   Stress: Stress Concern Present (3/25/2024)    [employer] - Occupational Stress Questionnaire     Feeling of Stress : To some extent   Social Connections: Not At Risk (3/25/2024)    Family and Community Support     Help with Day-to-Day Activities: I don't need any help     Lonely or Isolated: Never   Interpersonal Safety: Not At Risk (3/25/2024)    Abuse Screen     Unsafe at Home or Work/School: no     Feels Threatened by Someone?: no     Does Anyone Keep You from Contacting Others or Doint Things Outside the Home?: no     Physical Sign of Abuse Present: no   Depression: Not at risk (3/25/2024)    PHQ-2     PHQ-2 Score: 1   Housing Stability: Not At Risk (3/25/2024)    Housing Stability     Current Living Arrangements: home     Potentially Unsafe Housing Conditions: none   Utilities: At Risk (3/25/2024)    [facility] Utilities     Threatened with loss of utilities: Yes   Health Literacy: Not At Risk (3/25/2024)    Education     Help with school or training?: No     Preferred Language: English   Employment: Not At Risk (3/25/2024)    Employment     Do you want help finding or keeping work or a job?: I do not need or want help   Disabilities: Not At Risk (3/25/2024)    Disabilities     Concentrating, Remembering, or Making Decisions Difficulty: no     Doing Errands Independently Difficulty: no                 Catherine Acevedo, RN

## 2024-03-26 ENCOUNTER — APPOINTMENT (OUTPATIENT)
Dept: MRI IMAGING | Facility: HOSPITAL | Age: 44
End: 2024-03-26
Payer: COMMERCIAL

## 2024-03-26 LAB
ALBUMIN SERPL-MCNC: 3.3 G/DL (ref 3.5–5.2)
ALP SERPL-CCNC: 199 U/L (ref 39–117)
ALT SERPL W P-5'-P-CCNC: 28 U/L (ref 1–33)
ANION GAP SERPL CALCULATED.3IONS-SCNC: 8.6 MMOL/L (ref 5–15)
AST SERPL-CCNC: 22 U/L (ref 1–32)
BILIRUB CONJ SERPL-MCNC: <0.2 MG/DL (ref 0–0.3)
BILIRUB INDIRECT SERPL-MCNC: ABNORMAL MG/DL
BILIRUB SERPL-MCNC: 0.3 MG/DL (ref 0–1.2)
BUN SERPL-MCNC: 8 MG/DL (ref 6–20)
BUN/CREAT SERPL: 10.5 (ref 7–25)
CALCIUM SPEC-SCNC: 9 MG/DL (ref 8.6–10.5)
CHLORIDE SERPL-SCNC: 104 MMOL/L (ref 98–107)
CHROMATIN AB SERPL-ACNC: <10 IU/ML (ref 0–14)
CO2 SERPL-SCNC: 27.4 MMOL/L (ref 22–29)
CREAT SERPL-MCNC: 0.76 MG/DL (ref 0.57–1)
DEPRECATED RDW RBC AUTO: 48.1 FL (ref 37–54)
EGFRCR SERPLBLD CKD-EPI 2021: 100.5 ML/MIN/1.73
ERYTHROCYTE [DISTWIDTH] IN BLOOD BY AUTOMATED COUNT: 13.5 % (ref 12.3–15.4)
FOLATE SERPL-MCNC: >20 NG/ML (ref 4.78–24.2)
GLUCOSE SERPL-MCNC: 102 MG/DL (ref 65–99)
HCT VFR BLD AUTO: 31.4 % (ref 34–46.6)
HCV AB SER DONR QL: NORMAL
HGB BLD-MCNC: 10.2 G/DL (ref 12–15.9)
HIV 1+2 AB+HIV1 P24 AG SERPL QL IA: NORMAL
MCH RBC QN AUTO: 31.5 PG (ref 26.6–33)
MCHC RBC AUTO-ENTMCNC: 32.5 G/DL (ref 31.5–35.7)
MCV RBC AUTO: 96.9 FL (ref 79–97)
PLATELET # BLD AUTO: 300 10*3/MM3 (ref 140–450)
PMV BLD AUTO: 10.1 FL (ref 6–12)
POTASSIUM SERPL-SCNC: 4.2 MMOL/L (ref 3.5–5.2)
PROT SERPL-MCNC: 5.7 G/DL (ref 6–8.5)
RBC # BLD AUTO: 3.24 10*6/MM3 (ref 3.77–5.28)
SODIUM SERPL-SCNC: 140 MMOL/L (ref 136–145)
TSH SERPL DL<=0.05 MIU/L-ACNC: 3.78 UIU/ML (ref 0.27–4.2)
VIT B12 BLD-MCNC: 303 PG/ML (ref 211–946)
WBC NRBC COR # BLD AUTO: 5.37 10*3/MM3 (ref 3.4–10.8)

## 2024-03-26 PROCEDURE — 0 GADOBENATE DIMEGLUMINE 529 MG/ML SOLUTION: Performed by: INTERNAL MEDICINE

## 2024-03-26 PROCEDURE — 99232 SBSQ HOSP IP/OBS MODERATE 35: CPT | Performed by: INTERNAL MEDICINE

## 2024-03-26 PROCEDURE — 85027 COMPLETE CBC AUTOMATED: CPT | Performed by: INTERNAL MEDICINE

## 2024-03-26 PROCEDURE — 25010000002 ONDANSETRON PER 1 MG: Performed by: NURSE PRACTITIONER

## 2024-03-26 PROCEDURE — 80076 HEPATIC FUNCTION PANEL: CPT | Performed by: INTERNAL MEDICINE

## 2024-03-26 PROCEDURE — 80048 BASIC METABOLIC PNL TOTAL CA: CPT | Performed by: INTERNAL MEDICINE

## 2024-03-26 PROCEDURE — 97161 PT EVAL LOW COMPLEX 20 MIN: CPT

## 2024-03-26 PROCEDURE — 99215 OFFICE O/P EST HI 40 MIN: CPT | Performed by: STUDENT IN AN ORGANIZED HEALTH CARE EDUCATION/TRAINING PROGRAM

## 2024-03-26 PROCEDURE — G0378 HOSPITAL OBSERVATION PER HR: HCPCS

## 2024-03-26 PROCEDURE — A9577 INJ MULTIHANCE: HCPCS | Performed by: INTERNAL MEDICINE

## 2024-03-26 PROCEDURE — 82746 ASSAY OF FOLIC ACID SERUM: CPT | Performed by: STUDENT IN AN ORGANIZED HEALTH CARE EDUCATION/TRAINING PROGRAM

## 2024-03-26 PROCEDURE — 84443 ASSAY THYROID STIM HORMONE: CPT | Performed by: STUDENT IN AN ORGANIZED HEALTH CARE EDUCATION/TRAINING PROGRAM

## 2024-03-26 PROCEDURE — 86803 HEPATITIS C AB TEST: CPT | Performed by: INTERNAL MEDICINE

## 2024-03-26 PROCEDURE — 97530 THERAPEUTIC ACTIVITIES: CPT

## 2024-03-26 PROCEDURE — G0432 EIA HIV-1/HIV-2 SCREEN: HCPCS | Performed by: INTERNAL MEDICINE

## 2024-03-26 PROCEDURE — 96376 TX/PRO/DX INJ SAME DRUG ADON: CPT

## 2024-03-26 PROCEDURE — 86431 RHEUMATOID FACTOR QUANT: CPT | Performed by: STUDENT IN AN ORGANIZED HEALTH CARE EDUCATION/TRAINING PROGRAM

## 2024-03-26 PROCEDURE — 82607 VITAMIN B-12: CPT | Performed by: STUDENT IN AN ORGANIZED HEALTH CARE EDUCATION/TRAINING PROGRAM

## 2024-03-26 PROCEDURE — 72157 MRI CHEST SPINE W/O & W/DYE: CPT

## 2024-03-26 PROCEDURE — 25010000002 AMPICILLIN-SULBACTAM PER 1.5 G: Performed by: INTERNAL MEDICINE

## 2024-03-26 RX ORDER — LORAZEPAM 0.5 MG/1
0.5 TABLET ORAL ONCE AS NEEDED
Status: COMPLETED | OUTPATIENT
Start: 2024-03-26 | End: 2024-03-26

## 2024-03-26 RX ADMIN — LEVETIRACETAM 1000 MG: 500 TABLET, FILM COATED ORAL at 09:49

## 2024-03-26 RX ADMIN — Medication 10 MG: at 21:04

## 2024-03-26 RX ADMIN — CLONIDINE HYDROCHLORIDE 0.1 MG: 0.1 TABLET ORAL at 09:49

## 2024-03-26 RX ADMIN — APIXABAN 5 MG: 5 TABLET, FILM COATED ORAL at 09:49

## 2024-03-26 RX ADMIN — AMPICILLIN SODIUM AND SULBACTAM SODIUM 3 G: 2; 1 INJECTION, POWDER, FOR SOLUTION INTRAMUSCULAR; INTRAVENOUS at 23:03

## 2024-03-26 RX ADMIN — ONDANSETRON HYDROCHLORIDE 4 MG: 2 INJECTION, SOLUTION INTRAMUSCULAR; INTRAVENOUS at 14:38

## 2024-03-26 RX ADMIN — LORAZEPAM 0.5 MG: 0.5 TABLET ORAL at 19:06

## 2024-03-26 RX ADMIN — Medication 10 ML: at 21:05

## 2024-03-26 RX ADMIN — GADOBENATE DIMEGLUMINE 20 ML: 529 INJECTION, SOLUTION INTRAVENOUS at 20:35

## 2024-03-26 RX ADMIN — HYDROCODONE BITARTRATE AND ACETAMINOPHEN 1 TABLET: 5; 325 TABLET ORAL at 16:57

## 2024-03-26 RX ADMIN — Medication 10 ML: at 09:50

## 2024-03-26 RX ADMIN — GABAPENTIN 300 MG: 300 CAPSULE ORAL at 21:04

## 2024-03-26 RX ADMIN — GABAPENTIN 300 MG: 300 CAPSULE ORAL at 09:49

## 2024-03-26 RX ADMIN — HYDROCODONE BITARTRATE AND ACETAMINOPHEN 1 TABLET: 5; 325 TABLET ORAL at 09:49

## 2024-03-26 RX ADMIN — LEVETIRACETAM 1000 MG: 500 TABLET, FILM COATED ORAL at 21:04

## 2024-03-26 RX ADMIN — HYDROCODONE BITARTRATE AND ACETAMINOPHEN 1 TABLET: 5; 325 TABLET ORAL at 22:41

## 2024-03-26 RX ADMIN — APIXABAN 5 MG: 5 TABLET, FILM COATED ORAL at 21:04

## 2024-03-26 RX ADMIN — AMPICILLIN SODIUM AND SULBACTAM SODIUM 3 G: 2; 1 INJECTION, POWDER, FOR SOLUTION INTRAMUSCULAR; INTRAVENOUS at 06:25

## 2024-03-26 RX ADMIN — AMPICILLIN SODIUM AND SULBACTAM SODIUM 3 G: 2; 1 INJECTION, POWDER, FOR SOLUTION INTRAMUSCULAR; INTRAVENOUS at 11:54

## 2024-03-26 RX ADMIN — FLUOXETINE HYDROCHLORIDE 60 MG: 20 CAPSULE ORAL at 21:04

## 2024-03-26 RX ADMIN — HYDROCODONE BITARTRATE AND ACETAMINOPHEN 1 TABLET: 5; 325 TABLET ORAL at 03:22

## 2024-03-26 RX ADMIN — AMPICILLIN SODIUM AND SULBACTAM SODIUM 3 G: 2; 1 INJECTION, POWDER, FOR SOLUTION INTRAMUSCULAR; INTRAVENOUS at 16:57

## 2024-03-26 NOTE — PROGRESS NOTES
Name: Belen Allen ADMIT: 3/23/2024   : 1980  PCP: Sahil Cornelius MD    MRN: [id number] LOS: 0 days   AGE/SEX: 43 y.o. female  ROOM: [facility]     Subjective   Subjective   Patient is seen at bedside, no new complaints.       Objective   Objective   Vital Signs  Temp:  [97.7 °F (36.5 °C)-98.2 °F (36.8 °C)] 98.2 °F (36.8 °C)  Heart Rate:  [65-83] 71  Resp:  [18] 18  BP: ()/(58-75) 111/69  SpO2:  [95 %-100 %] 95 %  on   ;   Device (Oxygen Therapy): room air  Body mass index is 40.3 kg/m².  Physical Exam  General, awake and alert.  Head and ENT, normocephalic and atraumatic.  Lungs, symmetric expansion, equal air entry bilaterally.  Heart, regular rate and rhythm.  Abdomen, soft and nontender.  Extremities, no clubbing or cyanosis.  Neuro, no focal deficits.  Skin: Warm and no rash.  Psych, normal mood and affect.  Musculoskeletal, joint examination is grossly normal.        Copied text material from yesterday's note has been reviewed for appropriate changes and remains accurate as of 3/26/24.        Results Review     I reviewed the patient's new clinical results.  Results from last 7 days   Lab Units 24   WBC 10*3/mm3 5.37 5.17 7.78 9.47   HEMOGLOBIN g/dL 10.2* 9.8* 9.7* 9.8*   PLATELETS 10*3/mm3 300 250 246 286     Results from last 7 days   Lab Units 24   SODIUM mmol/L 140 141 141 138   POTASSIUM mmol/L 4.2 4.5 4.0 4.3   CHLORIDE mmol/L 104 107 106 102   CO2 mmol/L 27.4 27.5 29.3* 26.5   BUN mg/dL 8 7 6 7   CREATININE mg/dL 0.76 0.66 0.67 0.66   GLUCOSE mg/dL 102* 88 100* 114*   EGFR mL/min/1.73 100.5 112.5 112.1 112.5     Results from last 7 days   Lab Units 24  0437 24  0458 24   ALBUMIN g/dL 3.3* 3.0* 3.5   BILIRUBIN mg/dL 0.3 0.3 0.5   ALK PHOS U/L 199* 169* 179*   AST (SGOT) U/L 22 25 29   ALT (SGPT) U/L 28 33 44*     Results from last 7 days  "  Lab Units 03/26/24  0437 03/25/24  0458 03/24/24  0429 03/23/24 2012   CALCIUM mg/dL 9.0 8.6 8.4* 8.5*   ALBUMIN g/dL 3.3* 3.0*  --  3.5     Results from last 7 days   Lab Units 03/25/24  0458 03/23/24 2012   PROCALCITONIN ng/mL 0.07 0.15   LACTATE mmol/L  --  1.1     No results found for: \"HGBA1C\", \"POCGLU\"    XR Abdomen KUB    Result Date: 3/25/2024  1. No acute process.   This report was finalized on 3/25/2024 7:56 PM by Dr. Evens Arriaza M.D on Workstation: [id number]       I have personally reviewed all medications:  Scheduled Medications  ampicillin-sulbactam, 3 g, Intravenous, Q6H  apixaban, 5 mg, Oral, Q12H  cloNIDine, 0.1 mg, Oral, Daily  FLUoxetine, 60 mg, Oral, Nightly  gabapentin, 300 mg, Oral, Q12H  levETIRAcetam, 1,000 mg, Oral, Q12H  melatonin, 10 mg, Oral, Nightly  sodium chloride, 10 mL, Intravenous, Q12H    Infusions   Diet  Diet: Regular/House; Fluid Consistency: Thin (IDDSI 0)    I have personally reviewed:  [x]  Laboratory   [x]  Microbiology   [x]  Radiology   [x]  EKG/Telemetry  [x]  Cardiology/Vascular   []  Pathology    []  Records       Assessment/Plan     Active Hospital Problems    Diagnosis  POA    **Pneumonia [J18.9]  Unknown    Dental abscess [K04.7]  Unknown    Bilateral cellulitis of lower leg [L03.116, L03.115]  Unknown    Intractable back pain [M54.9]  Yes    Seizures [R56.9]  Yes    Chronic pain syndrome [G89.4]  Yes    Antiphospholipid antibody positive [R76.0]  Yes    Obesity (BMI 30-39.9) [E66.9]  Yes      Resolved Hospital Problems   No resolved problems to display.       43 y.o. female admitted with Pneumonia.    Assessment and plan  Multilobular Pneumonia  Intractable Back Pain  Cellulitis of BLE  -She has history of chronic back pain and opioid dependence, continue pain control.  Infectious disease and pulmonary consultation for further input.  - Neurology evaluation for lower extremity pain, radiating towards her back.     Dental Abscess  -Outpatient follow-up with " dentistry.     Antiphospholipid Antibody Syndrome/History of Splenic Infarct  -Continue Eliquis.     Seizure Disorder  -Continue Keppra   -Seizure precautions     -I discussed the patients findings and my recommendations with patient.      Nirmal Zendejas MD  [facility]  03/26/24  17:30 EDT

## 2024-03-26 NOTE — NURSING NOTE
"Access follow-up regards drug use:    Pt found RIB. She is \"not feeling great\". She stated she feels \"tired\" and is \"hurting all over\". She denied any cravings. She rated her current anxiety level 3/10 (if 10 is the worst). She denied depression and denied SI/HI/AVH. She stated she didn't sleep last night. Appetite poor. She accepted brochure for drug treatment resources.    Access will continue to follow.  "

## 2024-03-26 NOTE — PROGRESS NOTES
"  PROGRESS NOTE  Patient Name: Belen Allen  Age/Sex: 43 y.o. female  : 1980  MRN: [id number]    Date of Admission: 3/23/2024  Date of Encounter Visit: 24   LOS: 0 days   Patient Care Team:  Sahil Cornelius MD as PCP - General (Internal Medicine)  Code, Bjorn HERRERA II, MD as Consulting Physician (Hematology and Oncology)  Dennys Carranza MD as Referring Physician (Hospitalist)    Chief Complaint: Patient is complaining of her back pain and leg pain and leg edema with no specific respiratory issues    Hospital course: Patient came in with leg pain and back pain, was found to have pneumonia even though her white count was normal and the procalcitonin as well.   she is having some minor respiratory issues like occasional chest tightness but she is a smoker  She does have some minimal cough with no significant purulent secretion         REVIEW OF SYSTEMS:   CONSTITUTIONAL: no fever or chills  CARDIOVASCULAR: No chest pain, chest pressure or chest discomfort.  .   RESPIRATORY: No shortness of breath, cough or sputum.   GASTROINTESTINAL: No anorexia, nausea, vomiting or diarrhea. No abdominal pain or blood.   HEMATOLOGIC: No bleeding or bruising.  Pain and swelling in the legs    Ventilator/Non-Invasive Ventilation Settings (From admission, onward)      None              Vital Signs  Temp:  [97.7 °F (36.5 °C)-98.2 °F (36.8 °C)] 98.2 °F (36.8 °C)  Heart Rate:  [65-83] 71  Resp:  [18] 18  BP: ()/(58-75) 111/69  SpO2:  [95 %-100 %] 95 %  on    Device (Oxygen Therapy): room air    Intake/Output Summary (Last 24 hours) at 3/26/2024 1458  Last data filed at 3/26/2024 1300  Gross per 24 hour   Intake 1680 ml   Output --   Net 1680 ml     Flowsheet Rows      Flowsheet Row First Filed Value   Admission Height 162.6 cm (64\") Documented at 2024   Admission Weight 83.9 kg (185 lb) Documented at 2024          Body mass index is 40.3 kg/m².      24  0530 24  0855 " 03/26/24  0515   Weight: 105 kg (231 lb 4.2 oz) 105 kg (231 lb) 107 kg (234 lb 12.6 oz)       Physical Exam:  GEN:  No acute distress, alert, cooperative, well developed, on room air  EYES:   Sclerae clear. No icterus. PERRL. Normal EOM  ENT:   External ears/nose normal, no oral lesions, no thrush, mucous membranes moist  NECK:  Supple, midline trachea, no JVD  LUNGS: Normal chest on inspection, CTAB, no wheezes. No rhonchi. No crackles. Respirations regular, even and unlabored.   CV:  Regular rhythm and rate. Normal S1/S2. No murmurs, gallops, or rubs noted.  ABD:  Soft, nontender and nondistended. Normal bowel sounds. No guarding  EXT:  Moves all extremities well. No cyanosis. No redness.  1+ pitting edema.   Skin: Dry, intact, no bleeding    Results Review:    Results From Last 14 Days   Lab Units 03/23/24 2012   CRP mg/dL 11.49*   D DIMER QUANT MCGFEU/mL 0.99*   LACTATE mmol/L 1.1   SED RATE mm/hr 37*     Results from last 7 days   Lab Units 03/26/24  0437 03/25/24  0458 03/24/24  0429 03/23/24 2012   SODIUM mmol/L 140 141 141 138   POTASSIUM mmol/L 4.2 4.5 4.0 4.3   CHLORIDE mmol/L 104 107 106 102   CO2 mmol/L 27.4 27.5 29.3* 26.5   BUN mg/dL 8 7 6 7   CREATININE mg/dL 0.76 0.66 0.67 0.66   CALCIUM mg/dL 9.0 8.6 8.4* 8.5*   AST (SGOT) U/L 22 25  --  29   ALT (SGPT) U/L 28 33  --  44*   ANION GAP mmol/L 8.6 6.5 5.7 9.5   ALBUMIN g/dL 3.3* 3.0*  --  3.5     Results from last 7 days   Lab Units 03/23/24  2356 03/23/24 2012   CK TOTAL U/L  --  68   HSTROP T ng/L <6 <6   D DIMER QUANT MCGFEU/mL  --  0.99*         Results from last 7 days   Lab Units 03/23/24 2012   PROBNP pg/mL 683.0*     Results from last 7 days   Lab Units 03/26/24  0437 03/25/24  0458 03/24/24  0429 03/23/24 2012   WBC 10*3/mm3 5.37 5.17 7.78 9.47   HEMOGLOBIN g/dL 10.2* 9.8* 9.7* 9.8*   HEMATOCRIT % 31.4* 31.0* 30.0* 31.3*   PLATELETS 10*3/mm3 300 250 246 286   MCV fL 96.9 98.4* 96.8 96.0   NEUTROPHIL % %  --  46.4  --  75.9   LYMPHOCYTE %  "%  --  40.4  --  12.2*   MONOCYTES % %  --  8.9  --  9.8   EOSINOPHIL % %  --  3.5  --  1.6   BASOPHIL % %  --  0.4  --  0.1   IMM GRAN % %  --  0.4  --  0.4                   Invalid input(s): \"LDLCALC\"          No results found for: \"POCGLU\"  Results from last 7 days   Lab Units 03/25/24  0458 03/23/24 2012   PROCALCITONIN ng/mL 0.07 0.15   LACTATE mmol/L  --  1.1     Results from last 7 days   Lab Units 03/23/24  2022   BLOODCX  No growth at 2 days  No growth at 2 days         Results from last 7 days   Lab Units 03/24/24  1845   COVID19  Not Detected   ADENOVIRUS DETECTION BY PCR  Not Detected   CORONAVIRUS 229E  Not Detected   CORONAVIRUS HKU1  Not Detected   CORONAVIRUS NL63  Not Detected   CORONAVIRUS OC43  Not Detected   HUMAN METAPNEUMOVIRUS  Not Detected   HUMAN RHINOVIRUS/ENTEROVIRUS  Not Detected   INFLUENZA B PCR  Not Detected   PARAINFLUENZA 1  Not Detected   PARAINFLUENZA VIRUS 2  Not Detected   PARAINFLUENZA VIRUS 3  Not Detected   PARAINFLUENZA VIRUS 4  Not Detected   BORDETELLA PERTUSSIS PCR  Not Detected   BORDETELLA PARAPERTUSSIS PCR  Not Detected   CHLAMYDOPHILA PNEUMONIAE PCR  Not Detected   MYCOPLAMA PNEUMO PCR  Not Detected   RSV, PCR  Not Detected               Imaging:   Imaging Results (All)              I reviewed the patient's new clinical results.  I personally viewed and interpreted the patient's imaging results:        Medication Review:   ampicillin-sulbactam, 3 g, Intravenous, Q6H  apixaban, 5 mg, Oral, Q12H  cloNIDine, 0.1 mg, Oral, Daily  FLUoxetine, 60 mg, Oral, Nightly  gabapentin, 300 mg, Oral, Q12H  levETIRAcetam, 1,000 mg, Oral, Q12H  melatonin, 10 mg, Oral, Nightly  sodium chloride, 10 mL, Intravenous, Q12H             ASSESSMENT:   Bilateral bronchopneumonia  Antiphospholipid antibody  Obesity with suspected sleep apnea  Chronic pain medication with polypharmacy, Patient is clonidine, fluoxetine, gabapentin, opioid, Keppra.  Gingivitis, with tender submandibular lymph " node and long history of gum and teeth infection with multiple fractured teeth with retained teeth stumps  Chronic pain syndrome  Questionable lower extremity cellulitis    PLAN:  Unable to do the panoramic x-ray  I will defer the further workup to the primary team, may be done as an outpatient  Respiratory wise, she is having minimal symptoms  She is already on antibiotic and that should cover for most of the usual suspect even if of oral origin including anaerobes  She is to follow-up with me in 6 weeks with a noncontrast chest CT and consider further workup for other infiltrative lung disease/ILD if the pulmonary infiltrate is still persistent  This infiltrate are very faint to show up on a regular chest x-ray and need to have a CAT scan for proper follow-up  We will follow-up As needed For the remainder of the hospital stay    Follow-up appointment as well has a noncontrast CT will be ordered by my office staff and will contact the patient with the time and date    Infectious disease note reviewed  Labs/Notes/films were independently reviewed and pertinent results are summarized above  The copied texts in this note were reviewed and they are accurate as of 03/26/24    Disposition: Per primary team    Soy Reynoso MD  03/26/24  14:58 EDT           Dictated utilizing Dragon dictation

## 2024-03-26 NOTE — PROGRESS NOTES
ID PROGRESS NOTE    CC: f/u dental infection    Subj: No acute events.  No fever.  She continues to have pain on the gums on the right upper side.  She reports swelling in her legs.    Medications:    Current Facility-Administered Medications:     acetaminophen (TYLENOL) tablet 650 mg, 650 mg, Oral, Q4H PRN, 650 mg at 03/25/24 0942 **OR** acetaminophen (TYLENOL) 160 MG/5ML oral solution 650 mg, 650 mg, Oral, Q4H PRN **OR** acetaminophen (TYLENOL) suppository 650 mg, 650 mg, Rectal, Q4H PRN, Beatriz Hernandez APRN    ampicillin-sulbactam (UNASYN) 3 g in sodium chloride 0.9 % 100 mL MBP, 3 g, Intravenous, Q6H, Sahil Bran MD, Last Rate: 0 mL/hr at 03/25/24 2040, 3 g at 03/26/24 0625    apixaban (ELIQUIS) tablet 5 mg, 5 mg, Oral, Q12H, Baetriz Hernandez APRN, 5 mg at 03/25/24 2054    sennosides-docusate (PERICOLACE) 8.6-50 MG per tablet 2 tablet, 2 tablet, Oral, BID PRN **AND** polyethylene glycol (MIRALAX) packet 17 g, 17 g, Oral, Daily PRN **AND** bisacodyl (DULCOLAX) EC tablet 5 mg, 5 mg, Oral, Daily PRN **AND** bisacodyl (DULCOLAX) suppository 10 mg, 10 mg, Rectal, Daily PRN, Beatriz Hernandez APRN    calcium carbonate (TUMS) chewable tablet 500 mg (200 mg elemental), 2 tablet, Oral, BID PRN, Beatriz Hernandez APRN    cloNIDine (CATAPRES) tablet 0.1 mg, 0.1 mg, Oral, Daily, Beatriz Hernandez APRN, 0.1 mg at 03/25/24 0942    FLUoxetine (PROzac) capsule 60 mg, 60 mg, Oral, Nightly, Beatriz Hernandez APRN, 60 mg at 03/25/24 2054    gabapentin (NEURONTIN) capsule 300 mg, 300 mg, Oral, Q12H, Nirmal Zendejas MD, 300 mg at 03/25/24 2054    HYDROcodone-acetaminophen (NORCO) 5-325 MG per tablet 1 tablet, 1 tablet, Oral, Q6H PRN, Nirmal Zendejas MD, 1 tablet at 03/26/24 0322    levETIRAcetam (KEPPRA) tablet 1,000 mg, 1,000 mg, Oral, Q12H, Beatriz Hernandez APRN, 1,000 mg at 03/25/24 2054    melatonin tablet 10 mg, 10 mg, Oral, Nightly, Beatriz Hernandez APRN, 10 mg at  03/25/24 2054    nitroglycerin (NITROSTAT) SL tablet 0.4 mg, 0.4 mg, Sublingual, Q5 Min PRN, Beatriz Hernandez APRN    ondansetron ODT (ZOFRAN-ODT) disintegrating tablet 4 mg, 4 mg, Oral, Q6H PRN **OR** ondansetron (ZOFRAN) injection 4 mg, 4 mg, Intravenous, Q6H PRN, Beatriz Hernandez APRN, 4 mg at 03/25/24 1906    [COMPLETED] Insert Peripheral IV, , , Once **AND** sodium chloride 0.9 % flush 10 mL, 10 mL, Intravenous, PRN, Barrera Hauser MD    sodium chloride 0.9 % flush 10 mL, 10 mL, Intravenous, Q12H, Beatriz Hernandez APRN, 10 mL at 03/25/24 2055    sodium chloride 0.9 % flush 10 mL, 10 mL, Intravenous, PRN, Beatriz Hernandez APRN, 10 mL at 03/24/24 2345    sodium chloride 0.9 % infusion 40 mL, 40 mL, Intravenous, PRN, Beatriz Hernandez APRN    traZODone (DESYREL) tablet 50 mg, 50 mg, Oral, Nightly PRN, Beatriz Hernandez APRN, 50 mg at 03/25/24 2233      Objective   Vital Signs   Temp:  [97.7 °F (36.5 °C)-98.2 °F (36.8 °C)] 98.2 °F (36.8 °C)  Heart Rate:  [65-83] 78  Resp:  [18] 18  BP: ()/(58-75) 104/58    Physical Exam:   General: awake, alert, no acute distress  Eyes: no scleral icterus  ENT: no thrush, poor dentition, evidence of gumline inflammation  Cardiovascular: Normal rate, trace lower extremity edema  Respiratory: normal work of breathing on room air  GI: Abdomen is nondistended  :  no Best catheter  Neurological: Alert and oriented x 3  Psychiatric: Normal mood and affect     Labs:   CBC, BMP, procalcitonin blood cultures, and HIV and hep C antibodies reviewed today  Lab Results   Component Value Date    WBC 5.37 03/26/2024    HGB 10.2 (L) 03/26/2024    HCT 31.4 (L) 03/26/2024    MCV 96.9 03/26/2024     03/26/2024       Lab Results   Component Value Date    GLUCOSE 102 (H) 03/26/2024    BUN 8 03/26/2024    CREATININE 0.76 03/26/2024    EGFRIFNONA 94 02/02/2022    EGFRIFAFRI >60 11/09/2022    BCR 10.5 03/26/2024    CO2 27.4 03/26/2024    CALCIUM 9.0  03/26/2024    PROTENTOTREF 4.8 (L) 11/25/2022    ALBUMIN 3.3 (L) 03/26/2024    LABIL2 1.3 11/25/2022    AST 22 03/26/2024    ALT 28 03/26/2024     UDS positive for fentanyl  Procalcitonin 0.15--->0.07  HIV antibody negative  Hepatitis C antibody negative    Microbiology:  3/23 BCx: negative to date  3/24 RPP: negative      Prior radiology:  Transthoracic echocardiogram: No evidence of vegetation    ASSESSMENT/PLAN:  Pulmonary infiltrate - resolved  Substance abuse and chronic opioid use  Antiphospholipid antibody syndrome  History of splenic infarct  Bilateral leg pain and swelling  Tooth infection/dental abscess  PTSD  Seizure disorder  Chronic lower back pain and history of lumbar fusion    She is afebrile with a normal white blood cell count.  Her procalcitonin remains very low at 0.07.  While in the hospital, I recommend that she continue Unasyn 3 g IV every 6 hours directed at her dental infection.  At the time of discharge, this could be changed to Augmentin 875-125 mg p.o. every 12 hours with stop date of 4/6/2024.    Ideally she would follow-up with dentistry as soon as possible after discharge to have the infected teeth extracted to provide source control.     Thank you for allowing me to be involved in the care of this patient. Infectious diseases will sign off at this time with antibiotics plan in place, but please call me at [phone] if any further ID questions or new ID concerns.

## 2024-03-26 NOTE — PLAN OF CARE
Goal Outcome Evaluation:              Outcome Evaluation: pt is A&Ox4, Up ad js to BS, pt is on RA, pt c/o of pain and swelling in hips and legs, PRN Pain medication given per MAR, Neuro consulted, MRI pending, PRN ativan ordered for MRI, VSS, call light within reach, Will continue to monitor rest of shift.

## 2024-03-26 NOTE — NURSING NOTE
Patient states that her lower extremities are very swollen. RN noted minor swelling in her ankles. Patient was given PRN pain medication as needed for pain in her hip and thighs. She also CO mouth pain and ate 13 or more ice creams throughout shift due to this mouth pain. Patient up to the bathroom with assist X1 complaints of pain while ambulating.

## 2024-03-26 NOTE — CONSULTS
"Neurology Consult Note    Consult Date: 3/26/2024    Referring MD: Tommy Moran MD    Reason for Consult I have been asked to see the patient in neurological consultation to render advice and opinion regarding lower extremity pain, swelling and weakness.    Belen Allen is a 43 y.o. white female with known diagnosis of antiphospholipid syndrome not on anticoagulation prior to arrival, she stated she used to be on Eliquis but she thought she does not need it anymore and she stopped it several months ago, anxiety, depression, PTSD, migraine headache, seizure disorder, prior lumbar fusion in 2003 from disc prolapse, multiple teeth extracted was on a short course of oral antibiotics presented to the hospital complaining of bilateral lower extremity swelling, weakness and pain that has been going since last Wednesday.  Neurology consulted for the lower extremity pain and swelling.  The patient had MRI lumbar spine which showed no acute finding, lower extremity Doppler showed no deep venous thrombosis.  The patient denied losing control of bowel or bladder, she stated that she feels her private area when she wiped herself but she mentioned that the pain will go up to her umbilicus bilaterally.  She had a CT abdomen pelvis negative, CT chest that showed multilobar pneumonia.    Past Medical History:   Diagnosis Date    Abnormal Pap smear of cervix     Ankle fracture 2013    H/O Elevated liver enzymes     History of chronic back pain     History of migraine     History of urinary tract infection     Injury of back     Opioid dependence 1/16/2023    PONV (postoperative nausea and vomiting)     Seasonal allergies     Seizure     Takes Keppra       Exam  /69 (BP Location: Right arm, Patient Position: Lying)   Pulse 71   Temp 98.2 °F (36.8 °C) (Oral)   Resp 18   Ht 162.6 cm (64\")   Wt 107 kg (234 lb 12.6 oz)   SpO2 95%   BMI 40.30 kg/m²   Gen: NAD, vitals reviewed  MS: oriented x3, recent/remote memory " intact, normal attention/concentration, language intact, no neglect.  CN: visual acuity grossly normal, PERRL, EOMI, no facial droop, no dysarthria  Bilateral lower extremity weakness more on the left than the right 3-/5 on the left and 3+/5 on the right, no weakness on the upper extremities, no neck pain negative Lhermitte sign, sensation to touch and temperature are normal throughout.  Patient complaining medial joint pain on her ankles and knees, she is also complaining of low back pain.  Christine sign negative, few nonsustained clonus on the left lower extremity, reflexes 3+ on the legs, 2+ on the upper extremity.    DATA:    Lab Results   Component Value Date    GLUCOSE 102 (H) 03/26/2024    CALCIUM 9.0 03/26/2024     03/26/2024    K 4.2 03/26/2024    CO2 27.4 03/26/2024     03/26/2024    BUN 8 03/26/2024    CREATININE 0.76 03/26/2024    EGFRIFAFRI >60 11/09/2022    EGFRIFNONA 94 02/02/2022    BCR 10.5 03/26/2024    ANIONGAP 8.6 03/26/2024     Lab Results   Component Value Date    WBC 5.37 03/26/2024    HGB 10.2 (L) 03/26/2024    HCT 31.4 (L) 03/26/2024    MCV 96.9 03/26/2024     03/26/2024       Lab review: CK normal 68, HIV negative, nonreactive, CRP 11.49, ESR 37, WBC 5.3, sodium 140, BUN 8, creatinine 0.76.    Imaging review: Personally viewed her MRI lumbar spine which showed no acute finding.  Radiology report reviewed    FINDINGS:  The study is hampered somewhat by the patient's body habitus.     The patient has undergone fusion from L5 to S1 with bilateral  transpedicular screws, posterior rods and interbody graft material. The  alignment of the lumbar spine is within normal limits. The conus is at  L1 and caudal aspect of the spinal cord appears unremarkable.     L1-L2: There is no evidence of disc bulge or herniation.     L2-L3: There is no evidence of disc bulge or herniation.     L3-L4: Mild facet degenerative disease is present bilaterally.     L4-L5: Mild facet degenerative  disease is present bilaterally. Mild  central disc bulge is present with no evidence of herniation.     L5-S1: A decompressive laminectomy is noted. There is no evidence of  canal stenosis or foraminal narrowing on the left. Mild foraminal  stenosis is present on the right secondary to extension of a small disc  osteophyte complex into the neural foramen, similar in appearance as  compared to 12/18/2022.     After contrast administration, there was no evidence of abnormal  enhancement.     IMPRESSION:  The patient is status post fusion from L5 to S1. There is no  evidence of a compression fracture, marrow edema, disc herniation or of  abnormal enhancement. See above.    Diagnoses:  -Bilateral lower extremity pain with swelling and weakness likely related to the pain she mentioned that the pain will go up to her umbilicus.  -Antiphospholipid syndrome        PLAN:   1.  Will order MRI thoracic spine given the symptoms of the ascending pain from the legs going up to her umbilicus bilaterally  2.  Check rheumatoid factor, cryoglobulins, B12, TSH, folate  3.  Her CK was normal making muscle disease less likely.  4.  Lower extremity ultrasound was negative for deep venous thrombosis to explain her pain, will order CTA pelvis to rule out DVT that could be missed by lower extremity Doppler ultrasound.    I have low suspicion for neurological etiology that could explain her her lower extremity pain and swelling.    Medical Decision Making for this neurology consultation consists of the following:  Review of previous chart, including H/P, provider and nursing notes as applicable.  Review of medications and vital signs.  Review of previous labs, neuroimaging, and additional relevant diagnostics.   Interpretation of laboratory, imaging, and other diagnostic results  Discussion with other providers and family   Total face-to-face/floor time: 60 minutes.

## 2024-03-26 NOTE — PLAN OF CARE
Goal Outcome Evaluation:  Plan of Care Reviewed With: patient           Outcome Evaluation: Pt. is a 43 year old Female admitted to the hospital with BLE swelling and PNA.  Pt. reports that prior to admission symptoms she was independent with functional mobility and used no A.D. during ambulation.  Pt. currently presents with decreased strength, decreased ROM, decreased balance, and decreased tolerance to functional activity.  This PM, pt. able to ambulate 10 feet, Min. assist x 1, with no use of A.D. but using I.V. pole and sink for support.  Pt. requires CGA x 1 for bed mobility and CGA x 1 for sit <-> stand transfers.  Pt. limited in upright mobility due to BLE pain from swelling.  Pt. will benefit from skilled inpt. P.T. to address her functional deficits and to assist pt. in regaining her maximum level of independence with functional mobility.      Anticipated Discharge Disposition (PT): home with assist, home with home health

## 2024-03-26 NOTE — THERAPY EVALUATION
Patient Name: Belen Allen  : 1980    MRN: [id number]                              Today's Date: 3/26/2024       Admit Date: 3/23/2024    Visit Dx:     ICD-10-CM ICD-9-CM   1. Sepsis, due to unspecified organism, unspecified whether acute organ dysfunction present  A41.9 038.9     995.91   2. Cellulitis of left lower extremity  L03.116 682.6   3. Leg swelling  M79.89 729.81     Patient Active Problem List   Diagnosis    Splenic infarct    Anxiety disorder    Functional asplenia    Abdominal pain    Acute bilateral low back pain    Antiphospholipid antibody positive    On anticoagulant therapy    Acute pain of left knee    Vitamin D deficiency    Folate deficiency    Pain of back and left lower extremity    Common bile duct dilatation    Elevated LFTs    Right upper quadrant abdominal pain    Obesity (BMI 30-39.9)    Tobacco abuse    GERD without esophagitis    Intractable abdominal pain    Class 2 severe obesity with serious comorbidity in adult    Constipation    History of ERCP    Chronic pain syndrome    Seizures    Syncope    Lumbar back pain with radiculopathy affecting left lower extremity    Right upper quadrant pain    Uncontrolled pain    Intractable back pain    Sciatica of right side    Migraine    Mobility impaired    Seizure    Opioid dependence    Clostridioides difficile diarrhea    Pulmonary edema    Acute hypoxemic respiratory failure    Other dysphagia    Vomiting    Sepsis    Dental abscess    Bilateral cellulitis of lower leg    Pneumonia     Past Medical History:   Diagnosis Date    Abnormal Pap smear of cervix     Ankle fracture     H/O Elevated liver enzymes     History of chronic back pain     History of migraine     History of urinary tract infection     Injury of back     Opioid dependence 2023    PONV (postoperative nausea and vomiting)     Seasonal allergies     Seizure     Takes Keppra     Past Surgical History:   Procedure Laterality Date    BACK SURGERY       fusion L4, L5      CHOLECYSTECTOMY  2014    DILATATION AND CURETTAGE  2009    ENDOSCOPY N/A 11/27/2022    Procedure: ESOPHAGOGASTRODUODENOSCOPY with biopsy;  Surgeon: Christiana Mann MD;  Location: [facility] ENDOSCOPY;  Service: Gastroenterology;  Laterality: N/A;  gastritis, duodenitis, irregular z line    ERCP N/A 6/3/2021    Procedure: ENDOSCOPIC RETROGRADE CHOLANGIOPANCREATOGRAPHY WITH SPHINCTEROTOMY, DILATION WITH BALLOON CLEARANCE  (12MM-15MM);  Surgeon: Bjorn Flannery MD;  Location: [facility] ENDOSCOPY;  Service: Gastroenterology;  Laterality: N/A;  DILATED COMMON BILE DUCT    SKIN SURGERY      TUBAL ABDOMINAL LIGATION  2013    WISDOM TOOTH EXTRACTION  2018    x2      General Information       Row Name 03/26/24 1539          Physical Therapy Time and Intention    Document Type evaluation  Pt. admitted with BLE swelling and PNA  -MS     Mode of Treatment physical therapy;individual therapy  -MS       Row Name 03/26/24 1539          General Information    Patient Profile Reviewed yes  -MS     Prior Level of Function independent:  No use of A.D. prior to admission per pt. report  -MS     Existing Precautions/Restrictions fall  -MS     Barriers to Rehab none identified  -MS       Row Name 03/26/24 1539          Cognition    Orientation Status (Cognition) oriented x 3  -MS               User Key  (r) = Recorded By, (t) = Taken By, (c) = Cosigned By      Initials Name Provider Type    MS Ton Arroyo KEYON, PT Physical Therapist                   Mobility       Row Name 03/26/24 1540          Bed Mobility    Bed Mobility supine-sit;sit-supine  -MS     Sit-Supine [place] (Bed Mobility) contact guard  -MS       Row Name 03/26/24 1540          Sit-Stand Transfer    Sit-Stand [place] (Transfers) contact guard  -MS       Row Name 03/26/24 1540          Gait/Stairs (Locomotion)    [place] Level (Gait) minimum assist (75% patient effort)  -MS     Distance in Feet (Gait) 10  -MS     Deviations/Abnormal  Patterns (Gait) nubia decreased;antalgic;stride length decreased  -MS     Bilateral Gait Deviations forward flexed posture  -MS     Comment, (Gait/Stairs) Pt. coming out of bathroom upon entering room and was able to ambulate back to bed while reaching for sink and I.V. pole for support.  Quite unsteady due to BLE pain (swelling).  -MS               User Key  (r) = Recorded By, (t) = Taken By, (c) = Cosigned By      Initials Name Provider Type    Ton Mandel KEYON, PT Physical Therapist                   Obj/Interventions       Row Name 03/26/24 1542          Range of Motion Comprehensive    Comment, General Range of Motion BUE (WFL's);  BLE (Imp. 50%) due to pain/swelling  -MS       Row Name 03/26/24 1542          Strength Comprehensive (MMT)    Comment, General Manual Muscle Testing (MMT) Assessment BUE (3+/5);  BLE (3-/5)  -MS               User Key  (r) = Recorded By, (t) = Taken By, (c) = Cosigned By      Initials Name Provider Type    Ton Mandel KEYON, PT Physical Therapist                   Goals/Plan       Row Name 03/26/24 1542          Bed Mobility Goal 1 (PT)    Activity/Assistive Device (Bed Mobility Goal 1, PT) bed mobility activities, all  -MS     [place] Level/Cues Needed (Bed Mobility Goal 1, PT) standby assist  -MS     Time Frame (Bed Mobility Goal 1, PT) long term goal (LTG);1 week  -MS       Row Name 03/26/24 154          Transfer Goal 1 (PT)    Activity/Assistive Device (Transfer Goal 1, PT) transfers, all  -MS     [place] Level/Cues Needed (Transfer Goal 1, PT) standby assist  -MS     Time Frame (Transfer Goal 1, PT) long term goal (LTG);1 week  -MS       Row Name 03/26/24 1549          Gait Training Goal 1 (PT)    Activity/Assistive Device (Gait Training Goal 1, PT) gait (walking locomotion)  With or without AAD  -MS     [place] Level (Gait Training Goal 1, PT) standby assist  -MS     Distance (Gait Training Goal 1, PT) 100 feet  -MS     Time Frame (Gait Training Goal  1, PT) long term goal (LTG);1 week  -MS       Row Name 03/26/24 1543          Therapy Assessment/Plan (PT)    Planned Therapy Interventions (PT) balance training;bed mobility training;gait training;home exercise program;patient/family education;postural re-education;transfer training;strengthening;ROM (range of motion)  -MS               User Key  (r) = Recorded By, (t) = Taken By, (c) = Cosigned By      Initials Name Provider Type    Ton Mandel, PT Physical Therapist                   Clinical Impression       Row Name 03/26/24 1542          Pain    Pretreatment Pain Rating 9/10  -MS     Posttreatment Pain Rating 9/10  -MS     Pre/Posttreatment Pain Comment Bilateral L.E. pain from swelling; Pt. also reports that this swelling and pain feels like it is now up in her abdomen as well.   -MS     Pain Intervention(s) Medication (See MAR);Elevated;Nursing Notified;Repositioned  -MS       Row Name 03/26/24 1542          Plan of Care Review    Plan of Care Reviewed With patient;family  -MS       Row Name 03/26/24 1542          Therapy Assessment/Plan (PT)    Rehab Potential (PT) good, to achieve stated therapy goals  -MS     Criteria for Skilled Interventions Met (PT) skilled treatment is necessary  -MS     Therapy Frequency (PT) 6 times/wk  -MS       Row Name 03/26/24 1542          Positioning and Restraints    Pre-Treatment Position bathroom  -MS     Post Treatment Position bed  -MS     In Bed notified nsg;supine;call light within reach;encouraged to call for assist;with family/caregiver;RLE elevated;LLE elevated  -MS               User Key  (r) = Recorded By, (t) = Taken By, (c) = Cosigned By      Initials Name Provider Type    Ton Mandel, PT Physical Therapist                   Outcome Measures       Row Name 03/26/24 1101          How much help from another person do you currently need...    Turning from your back to your side while in flat bed without using bedrails? 3  -MS     Moving from lying  on back to sitting on the side of a flat bed without bedrails? 3  -MS     Moving to and from a bed to a chair (including a wheelchair)? 3  -MS     Standing up from a chair using your arms (e.g., wheelchair, bedside chair)? 3  -MS     Climbing 3-5 steps with a railing? 3  -MS     To walk in hospital room? 3  -MS     AM-PAC 6 Clicks Score (PT) 18  -MS     Highest Level of Mobility Goal 6 --> Walk 10 steps or more  -MS       Row Name 03/26/24 1544          Functional Assessment    Outcome Measure Options AM-PAC 6 Clicks Basic Mobility (PT)  -MS               User Key  (r) = Recorded By, (t) = Taken By, (c) = Cosigned By      Initials Name Provider Type    MS Ton Arroyo, PT Physical Therapist                                 Physical Therapy Education       Title: PT OT SLP Therapies (In Progress)       Topic: Physical Therapy (In Progress)       Point: Mobility training (Done)       Learning Progress Summary             Patient Acceptance, E,D, VU,NR by MS at 3/26/2024 1544                         Point: Home exercise program (Not Started)       Learner Progress:  Not documented in this visit.              Point: Body mechanics (Done)       Learning Progress Summary             Patient Acceptance, E,D, VU,NR by MS at 3/26/2024 1544                         Point: Precautions (Done)       Learning Progress Summary             Patient Acceptance, E,D, VU,NR by MS at 3/26/2024 1544                                         User Key       Initials Effective Dates Name Provider Type Discipline    MS 06/16/21 -  Ton Arroyo PT Physical Therapist PT                  PT Recommendation and Plan  Planned Therapy Interventions (PT): balance training, bed mobility training, gait training, home exercise program, patient/family education, postural re-education, transfer training, strengthening, ROM (range of motion)  Plan of Care Reviewed With: patient  Outcome Evaluation: Pt. is a 43 year old Female admitted to the  hospital with BLE swelling and PNA.  Pt. reports that prior to admission symptoms she was independent with functional mobility and used no A.D. during ambulation.  Pt. currently presents with decreased strength, decreased ROM, decreased balance, and decreased tolerance to functional activity.  This PM, pt. able to ambulate 10 feet, Min. assist x 1, with no use of A.D. but using I.V. pole and sink for support.  Pt. requires CGA x 1 for bed mobility and CGA x 1 for sit <-> stand transfers.  Pt. limited in upright mobility due to BLE pain from swelling.  Pt. will benefit from skilled inpt. P.T. to address her functional deficits and to assist pt. in regaining her maximum level of independence with functional mobility.     Time Calculation:         PT Charges       Row Name 03/26/24 1549             Time Calculation    Start Time 1315  -MS      Stop Time 1330  -MS      Time Calculation (min) 15 min  -MS      PT Received On 03/26/24  -MS      PT - Next Appointment 03/27/24  -MS      PT Goal Re-Cert Due Date 04/02/24  -MS         Time Calculation- PT    Total Timed Code Minutes- PT 14 minute(s)  -MS                User Key  (r) = Recorded By, (t) = Taken By, (c) = Cosigned By      Initials Name Provider Type    Ton Mandel, PT Physical Therapist                  Therapy Charges for Today       Code Description Service Date Service Provider Modifiers Qty    [id number]  PT EVAL LOW COMPLEXITY 2 3/26/2024 Ton Arroyo, PT GP 1    [id number]  PT THERAPEUTIC ACT EA 15 MIN 3/26/2024 Ton Arroyo, PT GP 1            PT G-Codes  Outcome Measure Options: AM-PAC 6 Clicks Basic Mobility (PT)  AM-PAC 6 Clicks Score (PT): 18  PT Discharge Summary  Anticipated Discharge Disposition (PT): home with assist, home with home health    Ton Arroyo, PT  3/26/2024

## 2024-03-27 ENCOUNTER — APPOINTMENT (OUTPATIENT)
Dept: CT IMAGING | Facility: HOSPITAL | Age: 44
End: 2024-03-27
Payer: COMMERCIAL

## 2024-03-27 ENCOUNTER — TRANSCRIBE ORDERS (OUTPATIENT)
Dept: ADMINISTRATIVE | Facility: HOSPITAL | Age: 44
End: 2024-03-27
Payer: COMMERCIAL

## 2024-03-27 DIAGNOSIS — J84.9 ILD (INTERSTITIAL LUNG DISEASE): Primary | ICD-10-CM

## 2024-03-27 PROBLEM — R93.7 ABNORMAL MRI, THORACIC SPINE: Status: ACTIVE | Noted: 2024-03-27

## 2024-03-27 LAB
ANION GAP SERPL CALCULATED.3IONS-SCNC: 6 MMOL/L (ref 5–15)
ANISOCYTOSIS BLD QL: ABNORMAL
BASOPHILS # BLD MANUAL: 0.11 10*3/MM3 (ref 0–0.2)
BASOPHILS NFR BLD MANUAL: 2 % (ref 0–1.5)
BUN SERPL-MCNC: 9 MG/DL (ref 6–20)
BUN/CREAT SERPL: 12.7 (ref 7–25)
CALCIUM SPEC-SCNC: 8.8 MG/DL (ref 8.6–10.5)
CHLORIDE SERPL-SCNC: 108 MMOL/L (ref 98–107)
CO2 SERPL-SCNC: 30 MMOL/L (ref 22–29)
CREAT SERPL-MCNC: 0.71 MG/DL (ref 0.57–1)
DEPRECATED RDW RBC AUTO: 52.1 FL (ref 37–54)
EGFRCR SERPLBLD CKD-EPI 2021: 109 ML/MIN/1.73
EOSINOPHIL # BLD MANUAL: 0.44 10*3/MM3 (ref 0–0.4)
EOSINOPHIL NFR BLD MANUAL: 8 % (ref 0.3–6.2)
ERYTHROCYTE [DISTWIDTH] IN BLOOD BY AUTOMATED COUNT: 14.5 % (ref 12.3–15.4)
GLUCOSE SERPL-MCNC: 92 MG/DL (ref 65–99)
HCT VFR BLD AUTO: 31 % (ref 34–46.6)
HGB BLD-MCNC: 9.8 G/DL (ref 12–15.9)
LYMPHOCYTES # BLD MANUAL: 2.49 10*3/MM3 (ref 0.7–3.1)
LYMPHOCYTES NFR BLD MANUAL: 6 % (ref 5–12)
MCH RBC QN AUTO: 30.9 PG (ref 26.6–33)
MCHC RBC AUTO-ENTMCNC: 31.6 G/DL (ref 31.5–35.7)
MCV RBC AUTO: 97.8 FL (ref 79–97)
MICROCYTES BLD QL: ABNORMAL
MONOCYTES # BLD: 0.33 10*3/MM3 (ref 0.1–0.9)
NEUTROPHILS # BLD AUTO: 2.16 10*3/MM3 (ref 1.7–7)
NEUTROPHILS NFR BLD MANUAL: 39 % (ref 42.7–76)
NRBC BLD AUTO-RTO: 0 /100 WBC (ref 0–0.2)
PLAT MORPH BLD: NORMAL
PLATELET # BLD AUTO: 282 10*3/MM3 (ref 140–450)
PMV BLD AUTO: 9.6 FL (ref 6–12)
POTASSIUM SERPL-SCNC: 4.3 MMOL/L (ref 3.5–5.2)
RBC # BLD AUTO: 3.17 10*6/MM3 (ref 3.77–5.28)
SODIUM SERPL-SCNC: 144 MMOL/L (ref 136–145)
VARIANT LYMPHS NFR BLD MANUAL: 45 % (ref 19.6–45.3)
WBC MORPH BLD: NORMAL
WBC NRBC COR # BLD AUTO: 5.43 10*3/MM3 (ref 3.4–10.8)

## 2024-03-27 PROCEDURE — 80048 BASIC METABOLIC PNL TOTAL CA: CPT | Performed by: INTERNAL MEDICINE

## 2024-03-27 PROCEDURE — 82595 ASSAY OF CRYOGLOBULIN: CPT | Performed by: STUDENT IN AN ORGANIZED HEALTH CARE EDUCATION/TRAINING PROGRAM

## 2024-03-27 PROCEDURE — 85007 BL SMEAR W/DIFF WBC COUNT: CPT | Performed by: INTERNAL MEDICINE

## 2024-03-27 PROCEDURE — 99214 OFFICE O/P EST MOD 30 MIN: CPT | Performed by: NURSE PRACTITIONER

## 2024-03-27 PROCEDURE — 97116 GAIT TRAINING THERAPY: CPT

## 2024-03-27 PROCEDURE — 25010000002 ONDANSETRON PER 1 MG: Performed by: NURSE PRACTITIONER

## 2024-03-27 PROCEDURE — G0378 HOSPITAL OBSERVATION PER HR: HCPCS

## 2024-03-27 PROCEDURE — 97530 THERAPEUTIC ACTIVITIES: CPT

## 2024-03-27 PROCEDURE — 96376 TX/PRO/DX INJ SAME DRUG ADON: CPT

## 2024-03-27 PROCEDURE — 25510000001 IOPAMIDOL PER 1 ML: Performed by: STUDENT IN AN ORGANIZED HEALTH CARE EDUCATION/TRAINING PROGRAM

## 2024-03-27 PROCEDURE — 99221 1ST HOSP IP/OBS SF/LOW 40: CPT | Performed by: NURSE PRACTITIONER

## 2024-03-27 PROCEDURE — 25010000002 AMPICILLIN-SULBACTAM PER 1.5 G: Performed by: INTERNAL MEDICINE

## 2024-03-27 PROCEDURE — 85025 COMPLETE CBC W/AUTO DIFF WBC: CPT | Performed by: INTERNAL MEDICINE

## 2024-03-27 PROCEDURE — 74174 CTA ABD&PLVS W/CONTRAST: CPT

## 2024-03-27 RX ORDER — CHOLECALCIFEROL (VITAMIN D3) 125 MCG
1000 CAPSULE ORAL DAILY
Status: DISCONTINUED | OUTPATIENT
Start: 2024-03-27 | End: 2024-03-29 | Stop reason: HOSPADM

## 2024-03-27 RX ADMIN — APIXABAN 5 MG: 5 TABLET, FILM COATED ORAL at 20:11

## 2024-03-27 RX ADMIN — LEVETIRACETAM 1000 MG: 500 TABLET, FILM COATED ORAL at 08:34

## 2024-03-27 RX ADMIN — Medication 10 ML: at 20:12

## 2024-03-27 RX ADMIN — ONDANSETRON HYDROCHLORIDE 4 MG: 2 INJECTION, SOLUTION INTRAMUSCULAR; INTRAVENOUS at 21:09

## 2024-03-27 RX ADMIN — CLONIDINE HYDROCHLORIDE 0.1 MG: 0.1 TABLET ORAL at 08:34

## 2024-03-27 RX ADMIN — LEVETIRACETAM 1000 MG: 500 TABLET, FILM COATED ORAL at 20:11

## 2024-03-27 RX ADMIN — HYDROCODONE BITARTRATE AND ACETAMINOPHEN 1 TABLET: 5; 325 TABLET ORAL at 12:02

## 2024-03-27 RX ADMIN — IOPAMIDOL 95 ML: 755 INJECTION, SOLUTION INTRAVENOUS at 11:30

## 2024-03-27 RX ADMIN — Medication 10 MG: at 20:12

## 2024-03-27 RX ADMIN — AMPICILLIN SODIUM AND SULBACTAM SODIUM 3 G: 2; 1 INJECTION, POWDER, FOR SOLUTION INTRAMUSCULAR; INTRAVENOUS at 23:04

## 2024-03-27 RX ADMIN — ANTACID TABLETS 2 TABLET: 500 TABLET, CHEWABLE ORAL at 21:17

## 2024-03-27 RX ADMIN — GABAPENTIN 300 MG: 300 CAPSULE ORAL at 20:11

## 2024-03-27 RX ADMIN — GABAPENTIN 300 MG: 300 CAPSULE ORAL at 08:34

## 2024-03-27 RX ADMIN — AMPICILLIN SODIUM AND SULBACTAM SODIUM 3 G: 2; 1 INJECTION, POWDER, FOR SOLUTION INTRAMUSCULAR; INTRAVENOUS at 17:07

## 2024-03-27 RX ADMIN — FLUOXETINE HYDROCHLORIDE 60 MG: 20 CAPSULE ORAL at 20:11

## 2024-03-27 RX ADMIN — HYDROCODONE BITARTRATE AND ACETAMINOPHEN 1 TABLET: 5; 325 TABLET ORAL at 05:24

## 2024-03-27 RX ADMIN — HYDROCODONE BITARTRATE AND ACETAMINOPHEN 1 TABLET: 5; 325 TABLET ORAL at 18:14

## 2024-03-27 RX ADMIN — Medication 1000 MCG: at 10:25

## 2024-03-27 RX ADMIN — AMPICILLIN SODIUM AND SULBACTAM SODIUM 3 G: 2; 1 INJECTION, POWDER, FOR SOLUTION INTRAMUSCULAR; INTRAVENOUS at 10:25

## 2024-03-27 RX ADMIN — AMPICILLIN SODIUM AND SULBACTAM SODIUM 3 G: 2; 1 INJECTION, POWDER, FOR SOLUTION INTRAMUSCULAR; INTRAVENOUS at 05:23

## 2024-03-27 RX ADMIN — APIXABAN 5 MG: 5 TABLET, FILM COATED ORAL at 08:33

## 2024-03-27 RX ADMIN — Medication 10 ML: at 08:34

## 2024-03-27 NOTE — PROGRESS NOTES
DOS: 3/27/2024  NAME: Belen Allen   : 1980  PCP: Sahil Cornelius MD    Chief Complaint   Patient presents with    Leg Pain         Subjective: Pt seen in follow up, however the problem is new to me.  Patient lying in bed as I enter the room.  She states she is having a lot of pain all over.  She thinks her legs are weak because of the pain.  She is also having some pain in her lower back in 1 specific spot.  No family at bedside.    Objective:  Vital signs:   Vitals:    24 1254 24 2138 24 2322 24 0712   BP: 111/69 112/71 103/61 114/60   BP Location: Right arm   Right arm   Patient Position: Lying   Lying   Pulse: 71 66 71 71   Resp: 18 16 16 16   Temp: 98.2 °F (36.8 °C) 98.6 °F (37 °C) 97.7 °F (36.5 °C) 98.6 °F (37 °C)   TempSrc: Oral Oral Oral Oral   SpO2: 95%  97% 98%   Weight:       Height:           Current Facility-Administered Medications:     acetaminophen (TYLENOL) tablet 650 mg, 650 mg, Oral, Q4H PRN, 650 mg at 24 0942 **OR** acetaminophen (TYLENOL) 160 MG/5ML oral solution 650 mg, 650 mg, Oral, Q4H PRN **OR** acetaminophen (TYLENOL) suppository 650 mg, 650 mg, Rectal, Q4H PRN, Beatriz Hernandez APRN    ampicillin-sulbactam (UNASYN) 3 g in sodium chloride 0.9 % 100 mL MBP, 3 g, Intravenous, Q6H, Sahil Bran MD, Last Rate: 0 mL/hr at 24 2040, 3 g at 24 1025    apixaban (ELIQUIS) tablet 5 mg, 5 mg, Oral, Q12H, Beatriz Hernandez APRN, 5 mg at 24 0833    sennosides-docusate (PERICOLACE) 8.6-50 MG per tablet 2 tablet, 2 tablet, Oral, BID PRN **AND** polyethylene glycol (MIRALAX) packet 17 g, 17 g, Oral, Daily PRN **AND** bisacodyl (DULCOLAX) EC tablet 5 mg, 5 mg, Oral, Daily PRN **AND** bisacodyl (DULCOLAX) suppository 10 mg, 10 mg, Rectal, Daily PRN, Beatriz Hernandez, NELSON    calcium carbonate (TUMS) chewable tablet 500 mg (200 mg elemental), 2 tablet, Oral, BID PRN, Beatriz Hernandez, NELSON    cloNIDine  (CATAPRES) tablet 0.1 mg, 0.1 mg, Oral, Daily, Beatriz Hernandez APRN, 0.1 mg at 03/27/24 0834    FLUoxetine (PROzac) capsule 60 mg, 60 mg, Oral, Nightly, Beatriz Hernandez APRN, 60 mg at 03/26/24 2104    gabapentin (NEURONTIN) capsule 300 mg, 300 mg, Oral, Q12H, Nirmal Zendejas MD, 300 mg at 03/27/24 0834    HYDROcodone-acetaminophen (NORCO) 5-325 MG per tablet 1 tablet, 1 tablet, Oral, Q6H PRN, Nirmal Zendejas MD, 1 tablet at 03/27/24 0524    levETIRAcetam (KEPPRA) tablet 1,000 mg, 1,000 mg, Oral, Q12H, Beatriz Hernandez APRN, 1,000 mg at 03/27/24 0834    melatonin tablet 10 mg, 10 mg, Oral, Nightly, Beatriz Hernandez APRN, 10 mg at 03/26/24 2104    nitroglycerin (NITROSTAT) SL tablet 0.4 mg, 0.4 mg, Sublingual, Q5 Min PRN, Beatriz Hernandez APRN    ondansetron ODT (ZOFRAN-ODT) disintegrating tablet 4 mg, 4 mg, Oral, Q6H PRN **OR** ondansetron (ZOFRAN) injection 4 mg, 4 mg, Intravenous, Q6H PRN, Beatriz Hernandez APRN, 4 mg at 03/26/24 1438    [COMPLETED] Insert Peripheral IV, , , Once **AND** sodium chloride 0.9 % flush 10 mL, 10 mL, Intravenous, PRN, Barrera Hauser MD    sodium chloride 0.9 % flush 10 mL, 10 mL, Intravenous, Q12H, Beatriz Hernandez APRN, 10 mL at 03/27/24 0834    sodium chloride 0.9 % flush 10 mL, 10 mL, Intravenous, PRN, Beatriz Hernandez APRN, 10 mL at 03/24/24 1495    sodium chloride 0.9 % infusion 40 mL, 40 mL, Intravenous, PRN, Beatriz Hernandez APRN    traZODone (DESYREL) tablet 50 mg, 50 mg, Oral, Nightly PRN, Beatriz Hernandez APRN, 50 mg at 03/25/24 1343    vitamin B-12 (CYANOCOBALAMIN) tablet 1,000 mcg, 1,000 mcg, Oral, Daily, Cyndi Evans MD, 1,000 mcg at 03/27/24 1025    PRN meds    acetaminophen **OR** acetaminophen **OR** acetaminophen    senna-docusate sodium **AND** polyethylene glycol **AND** bisacodyl **AND** bisacodyl    calcium carbonate    HYDROcodone-acetaminophen    nitroglycerin    ondansetron ODT **OR**  ondansetron    [COMPLETED] Insert Peripheral IV **AND** sodium chloride    sodium chloride    sodium chloride    traZODone    No current facility-administered medications on file prior to encounter.     Current Outpatient Medications on File Prior to Encounter   Medication Sig    cloNIDine (CATAPRES) 0.1 MG tablet Take 1 tablet by mouth Daily.    FLUoxetine (PROzac) 40 MG capsule Take 60 mg by mouth Every Night.    folic acid (FOLVITE) 1 MG tablet Take 1 tablet by mouth Daily.    l-methylfolate 7.5 MG tablet tablet Take 1 tablet by mouth Daily.    melatonin 5 MG tablet tablet Take 2 tablets by mouth Every Night. Patient taes 20 mg at home    SUMAtriptan (IMITREX) 100 MG tablet Take 1 tablet by mouth Every 2 (Two) Hours As Needed for Migraine.    levETIRAcetam (KEPPRA) 1000 MG tablet Take 2 tablets by mouth Every 12 (Twelve) Hours for 30 days. (Patient not taking: Reported on 3/24/2024)    methylPREDNISolone (MEDROL) 4 MG dose pack Take as directed on package instructions.    oxyCODONE-acetaminophen (PERCOCET) 5-325 MG per tablet Take 1-2 tablets by mouth Every 6 (Six) Hours As Needed (pain).    promethazine (PHENERGAN) 25 MG tablet Take 1 tablet by mouth Every 6 (Six) Hours As Needed for Nausea or Vomiting.    traZODone (DESYREL) 50 MG tablet Take 1-2 tablets by mouth At Night As Needed for sleep    Vitamin B-2 (RIBOFLAVIN) 100 MG tablet tablet Take 4 tablets by mouth Daily.       General appearance: Obese female, NAD, alert and cooperative, poor dentition  HEENT: Normocephalic, atraumatic  Resp: Even and unlabored    Neurological:   MS: oriented x3, recent/remote memory intact, normal attention/concentration, language intact, no neglect, normal fund of knowledge  CN: visual fields full, EOMI, facial sensation equal, no facial droop, shoulder shrug equal, tongue midline  Motor: 5/5 upper extremities, limited lifting attempt on lower extremities 2/2 c/o of pain. Normal dorsi/plantar bilaterally.   Reflexes: 1+ in all  "4 ext.  Sensory: light touch, cold, vibratory sensation intact in all 4 ext.  Coordination: Normal finger to nose test  Gait and station: Deferred  Rapid alternating movements: normal finger to thumb tap    Laboratory results:  Lab Results   Component Value Date    TSH 3.780 03/26/2024     Lab Results   Component Value Date    CZAIJHFE45 303 03/26/2024     No results found for: \"HGBA1C\"  Lab Results   Component Value Date    GLUCOSE 92 03/27/2024    BUN 9 03/27/2024    CREATININE 0.71 03/27/2024    EGFRIFNONA 94 02/02/2022    EGFRIFAFRI >60 11/09/2022    BCR 12.7 03/27/2024    K 4.3 03/27/2024    CO2 30.0 (H) 03/27/2024    CALCIUM 8.8 03/27/2024    PROTENTOTREF 4.8 (L) 11/25/2022    ALBUMIN 3.3 (L) 03/26/2024    LABIL2 1.3 11/25/2022    AST 22 03/26/2024    ALT 28 03/26/2024     Lab Results   Component Value Date    WBC 5.43 03/27/2024    HGB 9.8 (L) 03/27/2024    HCT 31.0 (L) 03/27/2024    MCV 97.8 (H) 03/27/2024     03/27/2024     Brief Urine Lab Results  (Last result in the past 365 days)        Color   Clarity   Blood   Leuk Est   Nitrite   Protein   CREAT   Urine HCG        07/12/23 1636 Dark Yellow   Clear   Negative   Trace   Negative   30 mg/dL (1+)                 Blood Culture   Date Value Ref Range Status   03/23/2024 No growth at 3 days  Preliminary   03/23/2024 No growth at 3 days  Preliminary     No results found for: \"BCIDPCR\", \"CXREFLEX\", \"CSFCX\", \"CULTURETIS\"  No results found for: \"CULTURES\", \"HSVCX\", \"URCX\"  No results found for: \"EYECULTURE\", \"GCCX\", \"HSVCULTURE\", \"LABHSV\"  No results found for: \"LEGIONELLA\", \"MRSACX\", \"MUMPSCX\", \"MYCOPLASCX\"  No results found for: \"NOCARDIACX\", \"STOOLCX\"  No results found for: \"THROATCX\", \"UNSTIMCULT\", \"URINECX\", \"CULTURE\", \"VZVCULTUR\"  No results found for: \"VIRALCULTU\", \"WOUNDCX\"  Pain Management Panel  More data exists         Latest Ref Rng & Units 3/23/2024 7/10/2023   Pain Management Panel   Barbiturates Screen, Urine Negative Negative  Negative  "   Benzodiazepine Screen, Urine Negative Negative  Negative    Cocaine Screen, Urine Negative Negative  Negative    Fentanyl, Urine Negative Positive  Positive    Methadone Screen , Urine Negative Negative  Negative        Review and interpretation of imaging:  MRI Thoracic Spine With & Without Contrast    Result Date: 3/27/2024  Electronically signed by Ladonna Glass M.D. on 03-27-24 at 0159    XR Abdomen KUB    Result Date: 3/25/2024  1. No acute process.   This report was finalized on 3/25/2024 7:56 PM by Dr. Evens Arriaza M.D on Workstation: [id number]      MRI Lumbar Spine With & Without Contrast    Result Date: 3/24/2024  The patient is status post fusion from L5 to S1. There is no evidence of a compression fracture, marrow edema, disc herniation or of abnormal enhancement. See above.    This report was finalized on 3/24/2024 7:59 PM by Dr. David Anne M.D on Workstation: [id number]      CT Angiogram Chest Pulmonary Embolism    Result Date: 3/23/2024  1. There is no evidence for a pulmonary embolism. 2. Multilobar bilateral ground-glass opacities consistent with a multilobar bilateral pneumonia.  Radiation dose reduction techniques were utilized, including automated exposure control and exposure modulation based on body size.   This report was finalized on 3/23/2024 11:35 PM by Dr. Glenn Lozada M.D on Workstation: [id number]      CT Abdomen Pelvis With Contrast    Result Date: 3/23/2024  There is no evidence for an active or acute intra-abdominal or pelvic process.  This report was finalized on 3/23/2024 11:35 PM by Dr. Glenn Lozada M.D on Workstation: [id number]     Results for orders placed during the hospital encounter of 03/23/24    Adult Transthoracic Echo Complete W/ Cont if Necessary Per Protocol    Interpretation Summary    Left ventricular systolic function is normal. Calculated left ventricular EF = 61.2%    Left ventricular diastolic function was normal.    Estimated right ventricular  systolic pressure from tricuspid regurgitation is normal (<35 mmHg). Calculated right ventricular systolic pressure from tricuspid regurgitation is 22 mmHg.      Impression/Assessment:  This is a 43-year-old female with past medical history of migraine headache, chronic back pain, opioid dependence, reported seizure disorder on Keppra 1 g twice daily prior to admission, antiphospholipid syndrome not on anticoagulation prior to admission due to patient self discontinuing, anxiety and depression, dental infections recently prescribed amoxicillin in the outpatient setting within the last week who presented to the hospital on 3/23/2024 with complaints of bilateral lower extremity pain and swelling for several days.  She states that she initially started to have pain in her lower back on the right.  She then started to notice that her legs appeared to be swelling and became painful.  She complains today of diffuse pain everywhere but mainly in her legs.    Since admission she has undergone BLE duplex that was negative for DVT.  She had a CT abdomen and pelvis that did not reveal any active or acute abdominal or pelvis processes.  MRI L and T-spine did not reveal any evidence of compression fracture, disc herniation, or abnormal enhancement.  There was mention of marrow edema with the anterior vertebral margin at multiple levels in the mid and lower thoracic spine most notable at T4-T5.  Her UDS was + for fentanyl.    Diagnosis:  Bilateral lower extremity pain with swelling and weakness  B12 deficiency  Chronic back pain  Antiphospholipid syndrome  Substance abuse    Work up to date:  Labs: Folate >20.00, B12 303, BC NGTD, respiratory panel negative, UDS + fentanyl, hepatitis C antibody nonreactive, rheumatoid factor <10, CK 68    Plan:  -She is now complaining of diffuse pain all over but more so in the legs.  The pain is limiting her movement which is likely the explanation of her weakness.  -T/L-spine showing no signs  of cord compression.  She had some marrow edema at T4-T5.  -CTA A/P pending  -B12 is low normal, recommend starting B12 replacement 1000 mics p.o. daily  -Rheumatoid factor was negative, folate and TSH were normal.  -Awaiting CTA A/P to evaluate for thrombosis.   -Access center to see for substance abuse.  -Discussed with her importance of resuming A/C with APL diagnosis.   Will follow peripherally.    Case discussed with patient and Dr. Antony, and he agrees with plan above.     NELSON Wu

## 2024-03-27 NOTE — CASE MANAGEMENT/SOCIAL WORK
Continued Stay Note  [facility]     Patient Name: Belen Allen  MRN: [id number]  Today's Date: 3/27/2024    Admit Date: 3/23/2024    Plan: Home with outpt F/U appointment at [facility]   Discharge Plan       Row Name 03/27/24 1617       Plan    Plan Home with outpt F/U appointment at [facility]    Patient/Family in Agreement with Plan yes    Plan Comments Reviewed clinicals, pt will need outpt follow up for dental abscess. [facility] spoke with pt and she states she does not have an appointment with a dentist for her dental abscess. [facility] offered to call [facility] for referral, and she stated that she has already been seen by them and needs referral to Oral surgeon. Explained would call them and follow up. Called [facility] Oral Maxillofacial sx office [phone] and spoke with Sadaf, she states for MD referral and clinical notes be faxed to [phone], they will review and call patient for appointment setup. [facility] spoke with Dr. Evans for referral and faxed. Updated pt they will call her for appointment. Also requested [facility] call CCP back with appointment time. feliciano erickson/ccp                   Discharge Codes    No documentation.                 Expected Discharge Date and Time       Expected Discharge Date Expected Discharge Time    Mar 29, 2024               Flor Ring RN

## 2024-03-27 NOTE — PROGRESS NOTES
Name: Belen Allen ADMIT: 3/23/2024   : 1980  PCP: Sahil Cornelius MD    MRN: [id number] LOS: 0 days   AGE/SEX: 43 y.o. female  ROOM: [facility]     Subjective   Subjective   Lying in bed, continues to complain of back and lower extremity pain pain, unchanged.  Feels weak.  Denies fevers, chills nausea or vomiting.  Denies chest pain or palpitations.    Review of Systems   As above  Objective   Objective   Vital Signs  Temp:  [97.7 °F (36.5 °C)-98.6 °F (37 °C)] 98.6 °F (37 °C)  Heart Rate:  [66-82] 82  Resp:  [16] 16  BP: (103-136)/(60-83) 136/83  SpO2:  [94 %-98 %] 94 %  on  Flow (L/min):  [2] 2;   Device (Oxygen Therapy): room air  Body mass index is 40.3 kg/m².  Physical Exam    General: Alert, sitting up in the bed, not in distress  HEENT: Normocephalic, atraumatic  CV: Regular rate and rhythm, no murmurs rubs or gallops  Lungs: Clear to auscultation bilaterally, no crackles or wheezes  Abdomen: Soft, nontender, nondistended  Extremities: Trace bilateral lower extremity edema, no cyanosis     Results Review     I reviewed the patient's new clinical results.  Results from last 7 days   Lab Units 24  05024   WBC 10*3/mm3 5.43 5.37 5.17 7.78   HEMOGLOBIN g/dL 9.8* 10.2* 9.8* 9.7*   PLATELETS 10*3/mm3 282 300 250 246     Results from last 7 days   Lab Units 24  0508 247 24  042   SODIUM mmol/L 144 140 141 141   POTASSIUM mmol/L 4.3 4.2 4.5 4.0   CHLORIDE mmol/L 108* 104 107 106   CO2 mmol/L 30.0* 27.4 27.5 29.3*   BUN mg/dL 9 8 7 6   CREATININE mg/dL 0.71 0.76 0.66 0.67   GLUCOSE mg/dL 92 102* 88 100*   Estimated Creatinine Clearance: 123.2 mL/min (by C-G formula based on SCr of 0.71 mg/dL).  Results from last 7 days   Lab Units 24  0437 24  0458 24   ALBUMIN g/dL 3.3* 3.0* 3.5   BILIRUBIN mg/dL 0.3 0.3 0.5   ALK PHOS U/L 199* 169* 179*   AST (SGOT) U/L 22 25 29   ALT (SGPT) U/L 28  "33 44*     Results from last 7 days   Lab Units 03/27/24  0508 03/26/24  0437 03/25/24  0458 03/24/24  0429 03/23/24 2012   CALCIUM mg/dL 8.8 9.0 8.6 8.4* 8.5*   ALBUMIN g/dL  --  3.3* 3.0*  --  3.5     Results from last 7 days   Lab Units 03/25/24  0458 03/23/24 2012   PROCALCITONIN ng/mL 0.07 0.15   LACTATE mmol/L  --  1.1     COVID19   Date Value Ref Range Status   03/24/2024 Not Detected Not Detected - Ref. Range Final   11/23/2022 Not Detected Not Detected - Ref. Range Final     No results found for: \"HGBA1C\", \"POCGLU\"        CT Angiogram Abdomen Pelvis  Narrative: CT ANGIOGRAM ABDOMEN AND PELVIS WITH CT VENOGRAM     HISTORY: Evaluate for pelvic vein thrombosis; lower extremity pain,  swelling and weakness. Antiphospholipid syndrome.     TECHNIQUE: CT angiogram of the abdomen and pelvis with delayed  postcontrast images in an attempt to evaluate the venous system was  performed using 95 mL Isovue-370 IV contrast. Multiplanar and  three-dimensional reformatted images were produced at a separate  workstation. Correlation with CT 03/23/2024.     FINDINGS: The visualized lower lobe pulmonary arteries enhance normally.  The lower thoracic aorta, abdominal aorta, pelvic and proximal thigh  arteries are widely patent and normal in caliber. Celiac axis, SMA, WALLY,  and right renal artery are widely patent. Small caliber but patent left  renal artery with parenchymal volume loss at portions of the left renal  upper and lower poles consistent with volume loss secondary to old  infarction or infection. This is nonacute.     The inferior vena cava is normal in caliber and appears normal. The  veins in the upper thighs and pelvis appear normal. No CT evidence of  deep venous thrombosis or venous compression.     Prior cholecystectomy. Parenchyma of the liver, spleen, pancreas, and  right kidney appears normal. Medial left upper pole renal cyst and renal  parenchymal volume loss as previously demonstrated.     Diffuse mild " third spacing. No adenopathy or free fluid. The stomach,  small bowel, appendix, and large bowel appear normal.     The uterus and ovaries and the urinary bladder appear normal.     Prior L5-S1 posterior and interbody fusion. Hardware appears positioned  as expected. Degenerative disc change in the lower thoracic spine. No  spinal compression deformity, stenosis, or paraspinal inflammatory  change in the lumbar or visualized lower thoracic spine. Sacroiliac  joints demonstrate some degenerative change; there is osseous ankylosis  bridging the posterior edge of the right SI joint. The hips appear  normal.     No lesion identified along the course of the lumbosacral plexus.     Impression: 1. Prior cholecystectomy.   2. Mild third spacing.   3. Parenchymal scarring and volume loss in parts of the left kidney.   4. No evidence of deep venous thrombosis or other vascular abnormality.     Radiation dose reduction techniques were utilized, including automated  exposure control and exposure modulation based on body size.        This report was finalized on 3/27/2024 2:02 PM by Dr. Dennys Eckert M.D on Workstation: [id number]     MRI Thoracic Spine With & Without Contrast  Narrative: Patient: CARSON MULLEN  Time Out: 01:59  Exam(s): MRI T SPINE W WO Contrast     EXAM:    MR Thoracic Spine Without and With Intravenous Contrast    CLINICAL HISTORY:     Reason for exam: B L LE pain and weakness.    TECHNIQUE:    Magnetic resonance images of the thoracic spine without and with   intravenous contrast in multiple planes.  Mild motion artifact.    COMPARISON:     CT thoracic spine 1 18 23.    FINDINGS:    Vertebrae:  There is moderate edema in the anterior margin T4-T11   vertebral bodies, most notable T4, T5 and T6, corresponding to large   osteophytes on CT.  This suggests a probable inflammatory and or more   acute component to the degenerative change, such as enthesitis, though is   nonspecific.  Less likely,  metastatic disease is unlikely, not excluded.      This does not have the appearance of discitis osteomyelitis.  There is   enhancement along the T4 and T5 vertebral margins.    Discs spinal canal neural foramina:  Moderate degenerative disc disease   C5-6 and mildly at C6-7, partially included with spondylitic ridging.  No   gross spinal stenosis as imaged.  Minimal spondylitic ridging in the   thoracic spine.  No disc herniation or spinal stenosis.    Spinal cord:  Unremarkable.  Normal signal.  No abnormal enhancement.    Soft tissues:  Unremarkable.    IMPRESSION:       1.  Marrow edema is associated with the anterior vertebral margin at   multiple levels in the mid and lower thoracic spine, most notable at T4   and T5.  This suggests a more acute component to the degenerative change   and may be inflammatory (enthesitis).  Cannot entirely exclude metastatic   disease, though this is felt much less likely.  2.  Can correlate for ankylosing spondylitis, DISH, enteropathic and   other arthritides.  3.  No discitis osteomyelitis, compression deformity, abnormal cord   signal, disc herniation or spinal stenosis.  4.  Moderate degenerative disc disease C5-6 and mildly at C6-7, without   spinal stenosis.  Impression: Electronically signed by Ladonna Glass M.D. on 03-27-24 at 0159    Scheduled Medications  ampicillin-sulbactam, 3 g, Intravenous, Q6H  apixaban, 5 mg, Oral, Q12H  cloNIDine, 0.1 mg, Oral, Daily  FLUoxetine, 60 mg, Oral, Nightly  gabapentin, 300 mg, Oral, Q12H  levETIRAcetam, 1,000 mg, Oral, Q12H  melatonin, 10 mg, Oral, Nightly  sodium chloride, 10 mL, Intravenous, Q12H  vitamin B-12, 1,000 mcg, Oral, Daily    Infusions   Diet  Diet: Regular/House; Fluid Consistency: Thin (IDDSI 0)    I have personally reviewed     []  Laboratory   []  Microbiology   []  Radiology   []  EKG/Telemetry  []  Cardiology/Vascular   []  Pathology    []  Records       Assessment/Plan     Active Hospital Problems    Diagnosis   POA    **Pneumonia [J18.9]  Unknown    Dental abscess [K04.7]  Unknown    Bilateral cellulitis of lower leg [L03.116, L03.115]  Unknown    Intractable back pain [M54.9]  Yes    Seizures [R56.9]  Yes    Chronic pain syndrome [G89.4]  Yes    Antiphospholipid antibody positive [R76.0]  Yes    Obesity (BMI 30-39.9) [E66.9]  Yes      Resolved Hospital Problems   No resolved problems to display.     Ms. Allen is a 43 y.o. smoker with a history of chronic back pain, opioid dependence, antiphospholipid antibody syndrome, and seizure disorder that presents to [facility] complaining of bilateral leg pain and low back armaan       Multilobular Pneumonia  -CT angiogram of the chest 03/23/ 2024 showed multilobar bilateral ground-glass opacities consistent with a  multilobar bilateral pneumonia.  -On IV Unasyn  -Pulmonology evaluated, recommend 6 weeks follow-up outpatient with a noncontrast CT of the chest to follow-up pulmonary filtrates    Dental abscess  -On IV Unasyn, At the time of discharge, this could be changed to Augmentin 875-125 mg p.o. every 12 hours with stop date of 4/6/2024 per ID recommendations.  -Will need to follow-up with outpatient dentistry as soon as possible after discharge to extract infected  teeh        Intractable Back Pain/bilateral lower extremity pain  -MRI of thoracic and lumbar spine   showed  Marrow edema is associated with the anterior vertebral margin at   multiple levels in the mid and lower thoracic spine, most notable at T4  and T5.  This suggests a more acute component to the degenerative change and may be inflammatory (enthesitis).  Cannot entirely exclude metastatic   disease, though this is felt much less likely. no signs of cord compression.   -Will consult neurosurgery due to abnormal MRI findings and intractable back pain  -Continue pain management          Antiphospholipid Antibody Syndrome/History of Splenic Infarct  -Continue Eliquis.     Seizure  Disorder  -Continue Keppra   -Seizure precautions    B12 low end of normal  -On B12 replacement    Anemia  -Hemoglobin 9.8, stable  -Repeat CBC in a.m., iron panel    Discussed with patient  Discussed in multidisciplinary rounds with nursing staff, CCP, pharmacy  Disposition: To be determined      Copied text in this note has been reviewed and is accurate as of 03/27/24.         Dictated utilizing Dragon dictation        Cyndi Evans MD  [facility]  03/27/24  14:59 EDT

## 2024-03-27 NOTE — PLAN OF CARE
Goal Outcome Evaluation:  Plan of Care Reviewed With: patient        Progress: improving  Outcome Evaluation: Pt seen for PT tx this PM. Pt is supervision with bed mobility and SBA with transfers. Pt was able to increase gait distance today by ambulating 50ft. Pt used rwx which pt was able to ambulate further and balance fair. Pt reports of having a rwx at home if needed to use. Will continue to follow pt for d/c needs.

## 2024-03-27 NOTE — CONSULTS
[facility] NEUROSURGERY CONSULT NOTE    Patient name: Belen Allen  Referring Provider: Dr. Walton  Reason for Consultation:     Intractable back pain, abnormal MRI imaging       Patient Care Team:  Sahil Cornelius MD as PCP - General (Internal Medicine)  Code, Bjorn HERRERA II, MD as Consulting Physician (Hematology and Oncology)  Dennys Carranza MD as Referring Physician (Hospitalist)    Chief complaint: BLE pain    Subjective .     History of present illness:    Patient is a 43 y.o.  female who presented to the hospital with bilateral leg and low back pain with associated swelling of her legs.  She has chronic back pain that is acutely worse in particular in the right side.  The pain in her legs is described as severe and burning in nature.  No associated loss of bowel or bladder control.  No saddle paresthesia.  She has reported history of antiphospholipid syndrome but prior to admission, has not been taking anticoagulant recently.  On admission she was reported to have cellulitis bilateral lower extremities, elevated D-dimer with negative CTA chest for PE and negative Doppler studies of lower extremities.  She was found to have bibasilar pneumonia on CT chest and has been on Zosyn and vancomycin.  She has been evaluated by neurology and ID service.  She reported the ID service that she had recent dental issues and has been on antibiotics.  Her antibiotics were changed from vancomycin and Zosyn to Unasyn.  She reported discomfort radiating up into her abdomen to neurology service and MRI thoracic was added to prior MRI lumbar orders.  Both of these studies have been completed.  CT pelvis was completed and ruled out DVT.  Currently she reports chronic pain in her right leg following lumbar fusion recently has had some pain in the left leg.  She states that she gets a quiver in her legs and sometimes they will give out.  She does not typically use an assistive device for walking.  She also has a very focal  area of discomfort in her paraspinous muscles on the right low back.  She has no mid back pain.  No fever or chills.    Prior lumbar fusion in 2006 at L5/S1 with Dr. Wilkins. history of opioid dependence.  No cancer history.  Admits to 1 pack/day tobacco use.    Review of Systems  Review of Systems   Constitutional:  Negative for chills and fever.   HENT:  Positive for dental problem.    Cardiovascular:  Positive for leg swelling.   Gastrointestinal:         No bowel incontinence   Genitourinary:  Negative for enuresis.   Musculoskeletal:  Positive for back pain. Negative for gait problem.   Neurological:  Positive for weakness. Negative for numbness.       History  PAST MEDICAL HISTORY  Past Medical History:   Diagnosis Date    Abnormal Pap smear of cervix     Ankle fracture 2013    H/O Elevated liver enzymes     History of chronic back pain     History of migraine     History of urinary tract infection     Injury of back     Opioid dependence 1/16/2023    PONV (postoperative nausea and vomiting)     Seasonal allergies     Seizure     Takes Keppra       PAST SURGICAL HISTORY  Past Surgical History:   Procedure Laterality Date    BACK SURGERY  2006    fusion L4, L5      CHOLECYSTECTOMY  2014    DILATATION AND CURETTAGE  2009    ENDOSCOPY N/A 11/27/2022    Procedure: ESOPHAGOGASTRODUODENOSCOPY with biopsy;  Surgeon: Christiana Mann MD;  Location: [facility] ENDOSCOPY;  Service: Gastroenterology;  Laterality: N/A;  gastritis, duodenitis, irregular z line    ERCP N/A 6/3/2021    Procedure: ENDOSCOPIC RETROGRADE CHOLANGIOPANCREATOGRAPHY WITH SPHINCTEROTOMY, DILATION WITH BALLOON CLEARANCE  (12MM-15MM);  Surgeon: Bjorn Flannery MD;  Location: [facility] ENDOSCOPY;  Service: Gastroenterology;  Laterality: N/A;  DILATED COMMON BILE DUCT    SKIN SURGERY      TUBAL ABDOMINAL LIGATION  2013    WISDOM TOOTH EXTRACTION  2018    x2       FAMILY HISTORY  Family History   Problem Relation Age of Onset    Stroke Mother     Allergic  rhinitis Mother     Allergic rhinitis Sister     Breast cancer Maternal Aunt     Cancer Maternal Grandmother     Allergic rhinitis Paternal Grandfather     Cancer Paternal Grandfather     Prostate cancer Paternal Grandfather        SOCIAL HISTORY  Social History     Tobacco Use    Smoking status: Every Day     Current packs/day: 1.00     Types: Cigarettes    Smokeless tobacco: Never   Vaping Use    Vaping status: Never Used   Substance Use Topics    Alcohol use: Not Currently    Drug use: Not Currently     not   unemployed  Lives with significant other    Allergies:  Morphine and Lidocaine    MEDICATIONS:  Medications Prior to Admission   Medication Sig Dispense Refill Last Dose    cloNIDine (CATAPRES) 0.1 MG tablet Take 1 tablet by mouth Daily.   3/23/2024    FLUoxetine (PROzac) 40 MG capsule Take 60 mg by mouth Every Night.   3/23/2024    folic acid (FOLVITE) 1 MG tablet Take 1 tablet by mouth Daily.   3/23/2024    l-methylfolate 7.5 MG tablet tablet Take 1 tablet by mouth Daily.   3/23/2024    melatonin 5 MG tablet tablet Take 2 tablets by mouth Every Night. Patient taes 20 mg at home   3/23/2024    SUMAtriptan (IMITREX) 100 MG tablet Take 1 tablet by mouth Every 2 (Two) Hours As Needed for Migraine.   Past Month    levETIRAcetam (KEPPRA) 1000 MG tablet Take 2 tablets by mouth Every 12 (Twelve) Hours for 30 days. (Patient not taking: Reported on 3/24/2024) 120 tablet 0 Not Taking    methylPREDNISolone (MEDROL) 4 MG dose pack Take as directed on package instructions. 21 each 0     oxyCODONE-acetaminophen (PERCOCET) 5-325 MG per tablet Take 1-2 tablets by mouth Every 6 (Six) Hours As Needed (pain). 12 tablet 0     promethazine (PHENERGAN) 25 MG tablet Take 1 tablet by mouth Every 6 (Six) Hours As Needed for Nausea or Vomiting. 10 tablet 0     traZODone (DESYREL) 50 MG tablet Take 1-2 tablets by mouth At Night As Needed for sleep 30 tablet 3     Vitamin B-2 (RIBOFLAVIN) 100 MG tablet tablet Take 4 tablets by  mouth Daily. 30 tablet 3      Unasyn 3 g IV every 6 hours  Eliquis 5 mg p.o. twice daily (started 3/24)  Gabapentin 300 mg p.o. twice daily  Norco 5 mg p.o. every 6 hours as needed-4 doses    Objective     Results Review:  LABS:  Results from last 7 days   Lab Units 03/27/24  0508 03/26/24 0437 03/25/24 0458   WBC 10*3/mm3 5.43 5.37 5.17   HEMOGLOBIN g/dL 9.8* 10.2* 9.8*   HEMATOCRIT % 31.0* 31.4* 31.0*   PLATELETS 10*3/mm3 282 300 250     Results from last 7 days   Lab Units 03/27/24  0508 03/26/24 0437 03/25/24 0458 03/24/24 0429 03/23/24 2012   SODIUM mmol/L 144 140 141   < > 138   POTASSIUM mmol/L 4.3 4.2 4.5   < > 4.3   CHLORIDE mmol/L 108* 104 107   < > 102   CO2 mmol/L 30.0* 27.4 27.5   < > 26.5   BUN mg/dL 9 8 7   < > 7   CREATININE mg/dL 0.71 0.76 0.66   < > 0.66   CALCIUM mg/dL 8.8 9.0 8.6   < > 8.5*   BILIRUBIN mg/dL  --  0.3 0.3  --  0.5   ALK PHOS U/L  --  199* 169*  --  179*   ALT (SGPT) U/L  --  28 33  --  44*   AST (SGOT) U/L  --  22 25  --  29   GLUCOSE mg/dL 92 102* 88   < > 114*    < > = values in this interval not displayed.     Blood culture 3/23-NGTD    Rheumatoid factor less than 10  HIV duo nonreactive  Hepatitis C antibody nonreactive   MRSA screen negative  Procalcitonin 3/25-normal  Respiratory PCR panel-normal  Urine drug screen positive for fentanyl otherwise negative  D-dimer 0.99  CRP 3/23-11.5  ESR 3/23-37    DIAGNOSTICS:  MRI thoracic and lumbar spine-degenerative endplate changes most predominant at anterior T4 and T5.  No abnormal enhancement or evidence of osteomyelitis/discitis, disc herniation, cord signal changes or cord compression throughout the thoracic or lumbar spine.  Postoperative changesWith laminectomy and instrumented posterior fusion L5/S1.    Results Review:   I reviewed the patient's new clinical results.  I personally viewed and interpreted the patient's MRI thoracic and lumbar spine.  Also reviewed by and discussed with Dr. Brock    Vital Signs    Temp:  [97.7 °F (36.5 °C)-98.6 °F (37 °C)] 98.6 °F (37 °C)  Heart Rate:  [66-82] 82  Resp:  [16] 16  BP: (103-136)/(60-83) 136/83    Physical Exam:  Physical Exam  Vitals reviewed.   Constitutional:       Appearance: Normal appearance.      Comments: Sitting up in bed having an animated conversation on her phone with her children.  No acute distress   Pulmonary:      Effort: Pulmonary effort is normal.   Musculoskeletal:      Thoracic back: No tenderness or bony tenderness.      Lumbar back: Negative right straight leg raise test and negative left straight leg raise test.      Right lower leg: Edema present.      Left lower leg: Edema present.      Comments:   Generalized tenderness lumbar spine with focal tenderness R upper paraspinous- with palpable mobile elongated mass- feels like lipoma   Skin:     General: Skin is warm and dry.   Neurological:      General: No focal deficit present.      Mental Status: She is alert.      Deep Tendon Reflexes:      Reflex Scores:       Patellar reflexes are 2+ on the right side and 2+ on the left side.  Psychiatric:         Mood and Affect: Mood normal.         Speech: Speech normal.         Thought Content: Thought content normal.       Neurologic Exam     Mental Status   Speech: speech is normal   Level of consciousness: alert  Knowledge: good.   Normal comprehension.     Motor Exam   Muscle bulk: normal  Overall muscle tone: normal    Strength   Strength 5/5 except as noted.   4/5 throughout left lower extremity, effort limited     Sensory Exam   Right leg light touch: normal  Left leg light touch: decreased from knee    Gait, Coordination, and Reflexes     Reflexes   Right patellar: 2+  Left patellar: 2+  Right plantar: normal  Left plantar: normal  Right ankle clonus: absent  Left ankle clonus: absent  Per PT note 3/27-supervision with bed mobility and SBA with transfers. Pt was able to increase gait distance today by ambulating 50ft. Pt used rwx which pt was able to  ambulate further and balance fair.       Assessment & Plan       Pneumonia    Antiphospholipid antibody positive    Obesity (BMI 30-39.9)    Chronic pain syndrome    Seizures    Intractable back pain    Dental abscess    Bilateral cellulitis of lower leg    Abnormal MRI, thoracic spine    Patient with acute on chronic right low back pain and bilateral lower extremity pain with numbness and burning.  Also has edema of bilateral lower extremities.  MRI thoracic and lumbar spine shows no acute concerns although there is some abnormality of the anterior vertebral bodies in the thoracic spine particularly T4 and 5 which is likely advanced degenerative changes but repeat imaging recommended.  She has no pain in her thoracic spine but has diffuse discomfort in the lumbar spine.  She has pain limited weakness in the left lower extremity otherwise full strength right lower extremity.  There is no evidence of any cord compression, disc herniations, fractures, or sources of her lower extremity pain, weakness and swelling.  She has had vascular studies which have thus far unremarkable.  She may benefit from outpatient EMG study for further evaluation and we discussed her chronic pain issues might be best dealt with by a spinal cord stimulator through pain management.  She has not currently established with anyone of pain management and has history of opioid use disorder.  There are no acute neurosurgical issues.  We will arrange follow-up in 2 to 3 months with repeat MRI thoracic spine.     PLAN:   Nothing to offer acutely from neurosurgery standpoint  Refer to outpatient pain management per patient request-consider spinal cord stimulator  follow-up LYNSEY w/ MRI thoracic spine with and without contrast in 8-12 weeks    I discussed the patient's findings and my recommendations with patient and Dr. Brock    During patient visit, I utilized appropriate personal protective equipment including gloves. Appropriate PPE was worn  "during the entire visit.  Hand hygiene was completed before and after.     Kaleigh Hu, NELSON  03/27/24  17:30 EDT    \"Dictated utilizing Dragon dictation\".      "

## 2024-03-27 NOTE — THERAPY TREATMENT NOTE
Patient Name: Belen Allen  : 1980    MRN: [id number]                              Today's Date: 3/27/2024       Admit Date: 3/23/2024    Visit Dx:     ICD-10-CM ICD-9-CM   1. Sepsis, due to unspecified organism, unspecified whether acute organ dysfunction present  A41.9 038.9     995.91   2. Cellulitis of left lower extremity  L03.116 682.6   3. Leg swelling  M79.89 729.81     Patient Active Problem List   Diagnosis    Splenic infarct    Anxiety disorder    Functional asplenia    Abdominal pain    Acute bilateral low back pain    Antiphospholipid antibody positive    On anticoagulant therapy    Acute pain of left knee    Vitamin D deficiency    Folate deficiency    Pain of back and left lower extremity    Common bile duct dilatation    Elevated LFTs    Right upper quadrant abdominal pain    Obesity (BMI 30-39.9)    Tobacco abuse    GERD without esophagitis    Intractable abdominal pain    Class 2 severe obesity with serious comorbidity in adult    Constipation    History of ERCP    Chronic pain syndrome    Seizures    Syncope    Lumbar back pain with radiculopathy affecting left lower extremity    Right upper quadrant pain    Uncontrolled pain    Intractable back pain    Sciatica of right side    Migraine    Mobility impaired    Seizure    Opioid dependence    Clostridioides difficile diarrhea    Pulmonary edema    Acute hypoxemic respiratory failure    Other dysphagia    Vomiting    Sepsis    Dental abscess    Bilateral cellulitis of lower leg    Pneumonia     Past Medical History:   Diagnosis Date    Abnormal Pap smear of cervix     Ankle fracture     H/O Elevated liver enzymes     History of chronic back pain     History of migraine     History of urinary tract infection     Injury of back     Opioid dependence 2023    PONV (postoperative nausea and vomiting)     Seasonal allergies     Seizure     Takes Keppra     Past Surgical History:   Procedure Laterality Date    BACK SURGERY       fusion L4, L5      CHOLECYSTECTOMY  2014    DILATATION AND CURETTAGE  2009    ENDOSCOPY N/A 11/27/2022    Procedure: ESOPHAGOGASTRODUODENOSCOPY with biopsy;  Surgeon: Christiana Mann MD;  Location: [facility] ENDOSCOPY;  Service: Gastroenterology;  Laterality: N/A;  gastritis, duodenitis, irregular z line    ERCP N/A 6/3/2021    Procedure: ENDOSCOPIC RETROGRADE CHOLANGIOPANCREATOGRAPHY WITH SPHINCTEROTOMY, DILATION WITH BALLOON CLEARANCE  (12MM-15MM);  Surgeon: Bjorn Flannery MD;  Location: [facility] ENDOSCOPY;  Service: Gastroenterology;  Laterality: N/A;  DILATED COMMON BILE DUCT    SKIN SURGERY      TUBAL ABDOMINAL LIGATION  2013    WISDOM TOOTH EXTRACTION  2018    x2      General Information       Row Name 03/27/24 1548          Physical Therapy Time and Intention    Document Type therapy note (daily note)  -     Mode of Treatment physical therapy;individual therapy  -       Row Name 03/27/24 1548          General Information    Patient Profile Reviewed yes  -     Existing Precautions/Restrictions fall  -       Row Name 03/27/24 1548          Cognition    Orientation Status (Cognition) oriented x 3  -       Row Name 03/27/24 1548          Safety Issues, Functional Mobility    Impairments Affecting Function (Mobility) pain  -               User Key  (r) = Recorded By, (t) = Taken By, (c) = Cosigned By      Initials Name Provider Type    EB Nany Benedict PTA Physical Therapist Assistant                   Mobility       Row Name 03/27/24 1548          Bed Mobility    Supine-Sit [place] (Bed Mobility) supervision  -     Sit-Supine [place] (Bed Mobility) supervision  -     Assistive Device (Bed Mobility) head of bed elevated  -       Row Name 03/27/24 1548          Sit-Stand Transfer    Sit-Stand [place] (Transfers) standby assist  -       Row Name 03/27/24 1548          Gait/Stairs (Locomotion)    [place] Level (Gait) contact guard;verbal cues  -     Assistive Device  (Gait) walker, front-wheeled  -EB     Distance in Feet (Gait) 50  -EB     Deviations/Abnormal Patterns (Gait) gait speed decreased;stride length decreased;nubia decreased  -EB     Bilateral Gait Deviations weight shift ability decreased  -EB     Comment, (Gait/Stairs) Pt with slow gait pace but steadier with using walker.  -EB               User Key  (r) = Recorded By, (t) = Taken By, (c) = Cosigned By      Initials Name Provider Type    Nany Peck PTA Physical Therapist Assistant                   Obj/Interventions    No documentation.                  Goals/Plan    No documentation.                  Clinical Impression       Row Name 03/27/24 1553          Pain    Pain Location - Side/Orientation Bilateral  -EB     Pain Location lower  -EB     Pain Location - extremity  -EB       Row Name 03/27/24 8563          Plan of Care Review    Plan of Care Reviewed With patient  -EB     Progress improving  -EB     Outcome Evaluation Pt seen for PT tx this PM. Pt is supervision with bed mobility and SBA with transfers. Pt was able to increase gait distance today by ambulating 50ft. Pt used rwx which pt was able to ambulate further and balance fair. Pt reports of having a rwx at home if needed to use. Will continue to follow pt for d/c needs.  -EB       Row Name 03/27/24 7709          Therapy Assessment/Plan (PT)    Therapy Frequency (PT) 6 times/wk  -EB       Row Name 03/27/24 4827          Positioning and Restraints    Pre-Treatment Position in bed  -EB     Post Treatment Position bed  -EB     In Bed fowlers;call light within reach;encouraged to call for assist  -EB               User Key  (r) = Recorded By, (t) = Taken By, (c) = Cosigned By      Initials Name Provider Type    Nany Peck PTA Physical Therapist Assistant                   Outcome Measures       Row Name 03/27/24 5684          How much help from another person do you currently need...    Turning from your back to your side while in flat bed  without using bedrails? 4  -EB     Moving from lying on back to sitting on the side of a flat bed without bedrails? 4  -EB     Moving to and from a bed to a chair (including a wheelchair)? 3  -EB     Standing up from a chair using your arms (e.g., wheelchair, bedside chair)? 4  -EB     Climbing 3-5 steps with a railing? 2  -EB     To walk in hospital room? 3  -EB     AM-PAC 6 Clicks Score (PT) 20  -EB     Highest Level of Mobility Goal 6 --> Walk 10 steps or more  -EB               User Key  (r) = Recorded By, (t) = Taken By, (c) = Cosigned By      Initials Name Provider Type    EB Nany Benedict PTA Physical Therapist Assistant                                 Physical Therapy Education       Title: PT OT SLP Therapies (In Progress)       Topic: Physical Therapy (In Progress)       Point: Mobility training (Done)       Learning Progress Summary             Patient Acceptance, E,D, VU,NR by  at 3/27/2024 1556    Acceptance, E,D, VU,NR by MS at 3/26/2024 1544                         Point: Home exercise program (Not Started)       Learner Progress:  Not documented in this visit.              Point: Body mechanics (Done)       Learning Progress Summary             Patient Acceptance, E,D, VU,NR by  at 3/27/2024 1556    Acceptance, E,D, VU,NR by MS at 3/26/2024 1544                         Point: Precautions (Done)       Learning Progress Summary             Patient Acceptance, E,D, VU,NR by MS at 3/26/2024 1544                                         User Key       Initials Effective Dates Name Provider Type Discipline    MS 06/16/21 -  Ton Arroyo PT Physical Therapist PT     02/14/23 -  Nany Benedict PTA Physical Therapist Assistant PT                  PT Recommendation and Plan     Plan of Care Reviewed With: patient  Progress: improving  Outcome Evaluation: Pt seen for PT tx this PM. Pt is supervision with bed mobility and SBA with transfers. Pt was able to increase gait distance today by ambulating  50ft. Pt used rwx which pt was able to ambulate further and balance fair. Pt reports of having a rwx at home if needed to use. Will continue to follow pt for d/c needs.     Time Calculation:         PT Charges       Row Name 03/27/24 1557             Time Calculation    Start Time 1442  -EB      Stop Time 1514  -EB      Time Calculation (min) 32 min  -EB      PT Received On 03/27/24  -EB      PT - Next Appointment 03/28/24  -EB         Time Calculation- PT    Total Timed Code Minutes- PT 23 minute(s)  -EB                User Key  (r) = Recorded By, (t) = Taken By, (c) = Cosigned By      Initials Name Provider Type    EB Nany Benedict PTA Physical Therapist Assistant                  Therapy Charges for Today       Code Description Service Date Service Provider Modifiers Qty    33811441294 HC GAIT TRAINING EA 15 MIN 3/27/2024 Nany Benedict PTA GP 1    23063566689 HC PT THERAPEUTIC ACT EA 15 MIN 3/27/2024 Nany Benedict PTA GP 1            PT G-Codes  Outcome Measure Options: AM-PAC 6 Clicks Basic Mobility (PT)  AM-PAC 6 Clicks Score (PT): 20       Nany Benedict PTA  3/27/2024

## 2024-03-27 NOTE — PLAN OF CARE
Goal Outcome Evaluation:  Plan of Care Reviewed With: patient        Progress: no change  Outcome Evaluation: Pt. A&Ox4.  Report of pain in legs and prn pain medication given per MAR.  MRI Spine performed.  VSS, No pt requests at this time. call light within reach.

## 2024-03-27 NOTE — PROGRESS NOTES
[facility] follow up d/t drug use; this writer reviewed chart and spoke with RN Bindu. Per RN, patient is doing fine; she slept on and off overnight.  Per EMR, patient has [facility] treatment resources; no further needs/concerns noted at this time per RN and/or medical team. [facility] to continue following.

## 2024-03-28 LAB
ALBUMIN SERPL-MCNC: 3.3 G/DL (ref 3.5–5.2)
ALBUMIN/GLOB SERPL: 1.4 G/DL
ALP SERPL-CCNC: 192 U/L (ref 39–117)
ALT SERPL W P-5'-P-CCNC: 18 U/L (ref 1–33)
ANION GAP SERPL CALCULATED.3IONS-SCNC: 7.4 MMOL/L (ref 5–15)
AST SERPL-CCNC: 19 U/L (ref 1–32)
BACTERIA SPEC AEROBE CULT: NORMAL
BACTERIA SPEC AEROBE CULT: NORMAL
BILIRUB SERPL-MCNC: 0.2 MG/DL (ref 0–1.2)
BUN SERPL-MCNC: 8 MG/DL (ref 6–20)
BUN/CREAT SERPL: 11 (ref 7–25)
CALCIUM SPEC-SCNC: 8.9 MG/DL (ref 8.6–10.5)
CHLORIDE SERPL-SCNC: 104 MMOL/L (ref 98–107)
CO2 SERPL-SCNC: 28.6 MMOL/L (ref 22–29)
CREAT SERPL-MCNC: 0.73 MG/DL (ref 0.57–1)
DEPRECATED RDW RBC AUTO: 48.4 FL (ref 37–54)
EGFRCR SERPLBLD CKD-EPI 2021: 105.5 ML/MIN/1.73
ERYTHROCYTE [DISTWIDTH] IN BLOOD BY AUTOMATED COUNT: 13.9 % (ref 12.3–15.4)
FERRITIN SERPL-MCNC: 54.8 NG/ML (ref 13–150)
GLOBULIN UR ELPH-MCNC: 2.3 GM/DL
GLUCOSE SERPL-MCNC: 97 MG/DL (ref 65–99)
HCT VFR BLD AUTO: 32 % (ref 34–46.6)
HGB BLD-MCNC: 10.2 G/DL (ref 12–15.9)
IRON 24H UR-MRATE: 49 MCG/DL (ref 37–145)
IRON SATN MFR SERPL: 14 % (ref 20–50)
MCH RBC QN AUTO: 30.6 PG (ref 26.6–33)
MCHC RBC AUTO-ENTMCNC: 31.9 G/DL (ref 31.5–35.7)
MCV RBC AUTO: 96.1 FL (ref 79–97)
PLATELET # BLD AUTO: 302 10*3/MM3 (ref 140–450)
PMV BLD AUTO: 9.9 FL (ref 6–12)
POTASSIUM SERPL-SCNC: 4.4 MMOL/L (ref 3.5–5.2)
PROT SERPL-MCNC: 5.6 G/DL (ref 6–8.5)
RBC # BLD AUTO: 3.33 10*6/MM3 (ref 3.77–5.28)
SODIUM SERPL-SCNC: 140 MMOL/L (ref 136–145)
TIBC SERPL-MCNC: 338 MCG/DL (ref 298–536)
TRANSFERRIN SERPL-MCNC: 227 MG/DL (ref 200–360)
WBC NRBC COR # BLD AUTO: 5.82 10*3/MM3 (ref 3.4–10.8)

## 2024-03-28 PROCEDURE — 82728 ASSAY OF FERRITIN: CPT | Performed by: STUDENT IN AN ORGANIZED HEALTH CARE EDUCATION/TRAINING PROGRAM

## 2024-03-28 PROCEDURE — 99214 OFFICE O/P EST MOD 30 MIN: CPT | Performed by: NURSE PRACTITIONER

## 2024-03-28 PROCEDURE — 80053 COMPREHEN METABOLIC PANEL: CPT | Performed by: STUDENT IN AN ORGANIZED HEALTH CARE EDUCATION/TRAINING PROGRAM

## 2024-03-28 PROCEDURE — 94762 N-INVAS EAR/PLS OXIMTRY CONT: CPT

## 2024-03-28 PROCEDURE — 83540 ASSAY OF IRON: CPT | Performed by: STUDENT IN AN ORGANIZED HEALTH CARE EDUCATION/TRAINING PROGRAM

## 2024-03-28 PROCEDURE — 25010000002 AMPICILLIN-SULBACTAM PER 1.5 G: Performed by: INTERNAL MEDICINE

## 2024-03-28 PROCEDURE — 85027 COMPLETE CBC AUTOMATED: CPT | Performed by: STUDENT IN AN ORGANIZED HEALTH CARE EDUCATION/TRAINING PROGRAM

## 2024-03-28 PROCEDURE — 94799 UNLISTED PULMONARY SVC/PX: CPT

## 2024-03-28 PROCEDURE — G0378 HOSPITAL OBSERVATION PER HR: HCPCS

## 2024-03-28 PROCEDURE — 84466 ASSAY OF TRANSFERRIN: CPT | Performed by: STUDENT IN AN ORGANIZED HEALTH CARE EDUCATION/TRAINING PROGRAM

## 2024-03-28 RX ADMIN — AMPICILLIN SODIUM AND SULBACTAM SODIUM 3 G: 2; 1 INJECTION, POWDER, FOR SOLUTION INTRAMUSCULAR; INTRAVENOUS at 11:48

## 2024-03-28 RX ADMIN — APIXABAN 5 MG: 5 TABLET, FILM COATED ORAL at 09:29

## 2024-03-28 RX ADMIN — Medication 10 MG: at 21:50

## 2024-03-28 RX ADMIN — AMPICILLIN SODIUM AND SULBACTAM SODIUM 3 G: 2; 1 INJECTION, POWDER, FOR SOLUTION INTRAMUSCULAR; INTRAVENOUS at 05:21

## 2024-03-28 RX ADMIN — GABAPENTIN 300 MG: 300 CAPSULE ORAL at 09:29

## 2024-03-28 RX ADMIN — Medication 10 ML: at 09:29

## 2024-03-28 RX ADMIN — LEVETIRACETAM 1000 MG: 500 TABLET, FILM COATED ORAL at 21:51

## 2024-03-28 RX ADMIN — HYDROCODONE BITARTRATE AND ACETAMINOPHEN 1 TABLET: 5; 325 TABLET ORAL at 11:48

## 2024-03-28 RX ADMIN — HYDROCODONE BITARTRATE AND ACETAMINOPHEN 1 TABLET: 5; 325 TABLET ORAL at 18:15

## 2024-03-28 RX ADMIN — GABAPENTIN 300 MG: 300 CAPSULE ORAL at 21:50

## 2024-03-28 RX ADMIN — HYDROCODONE BITARTRATE AND ACETAMINOPHEN 1 TABLET: 5; 325 TABLET ORAL at 00:08

## 2024-03-28 RX ADMIN — FLUOXETINE HYDROCHLORIDE 60 MG: 20 CAPSULE ORAL at 21:50

## 2024-03-28 RX ADMIN — HYDROCODONE BITARTRATE AND ACETAMINOPHEN 1 TABLET: 5; 325 TABLET ORAL at 05:51

## 2024-03-28 RX ADMIN — APIXABAN 5 MG: 5 TABLET, FILM COATED ORAL at 21:50

## 2024-03-28 RX ADMIN — CLONIDINE HYDROCHLORIDE 0.1 MG: 0.1 TABLET ORAL at 09:29

## 2024-03-28 RX ADMIN — Medication 1000 MCG: at 09:29

## 2024-03-28 RX ADMIN — LEVETIRACETAM 1000 MG: 500 TABLET, FILM COATED ORAL at 09:29

## 2024-03-28 RX ADMIN — AMPICILLIN SODIUM AND SULBACTAM SODIUM 3 G: 2; 1 INJECTION, POWDER, FOR SOLUTION INTRAMUSCULAR; INTRAVENOUS at 18:15

## 2024-03-28 NOTE — PROGRESS NOTES
"DOS: 3/28/2024  NAME: Belen Allen   : 1980  PCP: Sahil Cornelius MD  Chief Complaint   Patient presents with    Leg Pain     Neurology    Subjective: Right leg swelling has gone down.  She continues to complain of burning stabbing pain in her lower extremities and while an MRI yesterday developed bilateral knee pain as well.  Also complaining of mid back pain to the right of midline. No family at bedside. Pt seen in follow up today, however the problem is new to the examiner.      Objective:  Vital signs: /61 (BP Location: Right arm, Patient Position: Lying)   Pulse 72   Temp 98.1 °F (36.7 °C) (Oral)   Resp 18   Ht 162.6 cm (64\")   Wt 107 kg (234 lb 12.6 oz)   SpO2 95%   BMI 40.30 kg/m²       General appearance: Well developed, well nourished/obese. alert and cooperative.   HEENT: Normocephalic. Poor dentition.  Neck: Supple.    Cardiac: Regular rate and rhythm. No murmurs.   Chest Exam: Clear to auscultation bilaterally, no wheezes, no rhonchi.  Extremities: L>R leg edema.       Higher integrative function: Oriented to time, place, person, intact recent and remote memory, attention span, concentration and language. Spontaneous speech, fund of vocabulary are normal.   CN II: Normal visual fields.   CN III IV VI: Extraocular movements are full without nystagmus. Pupils are equal, round, and reactive to light.   CN V: Normal facial sensation.  CN VII: Facial movements are symmetric, no weakness.   CN VIII: Auditory acuity is normal.   CN IX & X: Symmetric palatal movement.   CN XI: Sternocleidomastoid and trapezius are normal. No weakness.   CN XII: The tongue is midline. No atrophy or fasciculations.   Motor: Bilateral upper extremities 5 out of 5, right lower extremity 4+ out of 5, left lower extremity 4 out of 5, antalgic with giveaway weakness.. No fasciculations, rigidity, spasticity or abnormal movements.   Sensation: Diffusely intact to light touch/pinprick, decreased to " "vibratory sensation in the left foot and reduced temperature sensation in the left lower extremity.   Station and gait: Comes to stand easily. Wide-based gait but completely independent, no ataxia.   Muscle stretch reflexes: Reflexes are 2+ upper/lower extremities. Plantar reflexes are flexor bilaterally.   Coordination: Finger to nose test showed no dysmetria.     Scheduled Meds:ampicillin-sulbactam, 3 g, Intravenous, Q6H  apixaban, 5 mg, Oral, Q12H  cloNIDine, 0.1 mg, Oral, Daily  FLUoxetine, 60 mg, Oral, Nightly  gabapentin, 300 mg, Oral, Q12H  levETIRAcetam, 1,000 mg, Oral, Q12H  melatonin, 10 mg, Oral, Nightly  sodium chloride, 10 mL, Intravenous, Q12H  vitamin B-12, 1,000 mcg, Oral, Daily      Continuous Infusions:   PRN Meds:.  acetaminophen **OR** acetaminophen **OR** acetaminophen    senna-docusate sodium **AND** polyethylene glycol **AND** bisacodyl **AND** bisacodyl    calcium carbonate    HYDROcodone-acetaminophen    nitroglycerin    ondansetron ODT **OR** ondansetron    [COMPLETED] Insert Peripheral IV **AND** sodium chloride    sodium chloride    sodium chloride    traZODone    Laboratory results:  Lab Results   Component Value Date    GLUCOSE 97 03/28/2024    CALCIUM 8.9 03/28/2024     03/28/2024    K 4.4 03/28/2024    CO2 28.6 03/28/2024     03/28/2024    BUN 8 03/28/2024    CREATININE 0.73 03/28/2024    EGFRIFAFRI >60 11/09/2022    EGFRIFNONA 94 02/02/2022    BCR 11.0 03/28/2024    ANIONGAP 7.4 03/28/2024     Lab Results   Component Value Date    WBC 5.82 03/28/2024    HGB 10.2 (L) 03/28/2024    HCT 32.0 (L) 03/28/2024    MCV 96.1 03/28/2024     03/28/2024     No results found for: \"CHOL\"  Lab Results   Component Value Date    HDL 40 (L) 03/12/2019     Lab Results   Component Value Date    LDL 93 03/12/2019     Lab Results   Component Value Date    TRIG 200 (H) 03/12/2019          Review and interpretation of imaging:  CT Angiogram Abdomen Pelvis    Result Date: 3/27/2024  CT " ANGIOGRAM ABDOMEN AND PELVIS WITH CT VENOGRAM  HISTORY: Evaluate for pelvic vein thrombosis; lower extremity pain, swelling and weakness. Antiphospholipid syndrome.  TECHNIQUE: CT angiogram of the abdomen and pelvis with delayed postcontrast images in an attempt to evaluate the venous system was performed using 95 mL Isovue-370 IV contrast. Multiplanar and three-dimensional reformatted images were produced at a separate workstation. Correlation with CT 03/23/2024.  FINDINGS: The visualized lower lobe pulmonary arteries enhance normally. The lower thoracic aorta, abdominal aorta, pelvic and proximal thigh arteries are widely patent and normal in caliber. Celiac axis, SMA, WALLY, and right renal artery are widely patent. Small caliber but patent left renal artery with parenchymal volume loss at portions of the left renal upper and lower poles consistent with volume loss secondary to old infarction or infection. This is nonacute.  The inferior vena cava is normal in caliber and appears normal. The veins in the upper thighs and pelvis appear normal. No CT evidence of deep venous thrombosis or venous compression.  Prior cholecystectomy. Parenchyma of the liver, spleen, pancreas, and right kidney appears normal. Medial left upper pole renal cyst and renal parenchymal volume loss as previously demonstrated.  Diffuse mild third spacing. No adenopathy or free fluid. The stomach, small bowel, appendix, and large bowel appear normal.  The uterus and ovaries and the urinary bladder appear normal.  Prior L5-S1 posterior and interbody fusion. Hardware appears positioned as expected. Degenerative disc change in the lower thoracic spine. No spinal compression deformity, stenosis, or paraspinal inflammatory change in the lumbar or visualized lower thoracic spine. Sacroiliac joints demonstrate some degenerative change; there is osseous ankylosis bridging the posterior edge of the right SI joint. The hips appear normal.  No lesion  identified along the course of the lumbosacral plexus.      1. Prior cholecystectomy. 2. Mild third spacing. 3. Parenchymal scarring and volume loss in parts of the left kidney. 4. No evidence of deep venous thrombosis or other vascular abnormality.  Radiation dose reduction techniques were utilized, including automated exposure control and exposure modulation based on body size.   This report was finalized on 3/27/2024 2:02 PM by Dr. Dennys Eckert M.D on Workstation: [id number]      MRI Thoracic Spine With & Without Contrast    Result Date: 3/27/2024  Patient: CARSON MULLEN  Time Out: 01:59 Exam(s): MRI T SPINE W WO Contrast EXAM:   MR Thoracic Spine Without and With Intravenous Contrast CLINICAL HISTORY:    Reason for exam: B L LE pain and weakness. TECHNIQUE:   Magnetic resonance images of the thoracic spine without and with intravenous contrast in multiple planes.  Mild motion artifact. COMPARISON:    CT thoracic spine 1 18 23. FINDINGS:   Vertebrae:  There is moderate edema in the anterior margin T4-T11 vertebral bodies, most notable T4, T5 and T6, corresponding to large osteophytes on CT.  This suggests a probable inflammatory and or more acute component to the degenerative change, such as enthesitis, though is nonspecific.  Less likely, metastatic disease is unlikely, not excluded.    This does not have the appearance of discitis osteomyelitis.  There is enhancement along the T4 and T5 vertebral margins.   Discs spinal canal neural foramina:  Moderate degenerative disc disease C5-6 and mildly at C6-7, partially included with spondylitic ridging.  No gross spinal stenosis as imaged.  Minimal spondylitic ridging in the thoracic spine.  No disc herniation or spinal stenosis.   Spinal cord:  Unremarkable.  Normal signal.  No abnormal enhancement.   Soft tissues:  Unremarkable. IMPRESSION:     1.  Marrow edema is associated with the anterior vertebral margin at multiple levels in the mid and lower thoracic  spine, most notable at T4 and T5.  This suggests a more acute component to the degenerative change and may be inflammatory (enthesitis).  Cannot entirely exclude metastatic disease, though this is felt much less likely. 2.  Can correlate for ankylosing spondylitis, DISH, enteropathic and other arthritides. 3.  No discitis osteomyelitis, compression deformity, abnormal cord signal, disc herniation or spinal stenosis. 4.  Moderate degenerative disc disease C5-6 and mildly at C6-7, without spinal stenosis.     Electronically signed by Ladonna Glass M.D. on 03-27-24 at 0159    XR Abdomen KUB    Result Date: 3/25/2024  XR ABDOMEN KUB-3/25/2024  HISTORY: Abdominal pain.  3 images are submitted. Bowel gas pattern is unremarkable with no evidence of free air or bowel dilatation. Lower lumbar fusion hardware is seen. No masses are seen.      1. No acute process.   This report was finalized on 3/25/2024 7:56 PM by Dr. Evens Arriaza M.D on Workstation: [id number]      Adult Transthoracic Echo Complete W/ Cont if Necessary Per Protocol    Result Date: 3/25/2024    Left ventricular systolic function is normal. Calculated left ventricular EF = 61.2%   Left ventricular diastolic function was normal.   Estimated right ventricular systolic pressure from tricuspid regurgitation is normal (<35 mmHg). Calculated right ventricular systolic pressure from tricuspid regurgitation is 22 mmHg.     MRI Lumbar Spine With & Without Contrast    Result Date: 3/24/2024  MRI EXAMINATION OF THE LUMBAR SPINE WITHOUT CONTRAST  HISTORY: Seizure, fall.  COMPARISON: CT lumbar spine 01/31/2024 and MRI of the lumbar spine 12/18/2022.  FINDINGS: The study is hampered somewhat by the patient's body habitus.  The patient has undergone fusion from L5 to S1 with bilateral transpedicular screws, posterior rods and interbody graft material. The alignment of the lumbar spine is within normal limits. The conus is at L1 and caudal aspect of the spinal cord  appears unremarkable.  L1-L2: There is no evidence of disc bulge or herniation.  L2-L3: There is no evidence of disc bulge or herniation.  L3-L4: Mild facet degenerative disease is present bilaterally.  L4-L5: Mild facet degenerative disease is present bilaterally. Mild central disc bulge is present with no evidence of herniation.  L5-S1: A decompressive laminectomy is noted. There is no evidence of canal stenosis or foraminal narrowing on the left. Mild foraminal stenosis is present on the right secondary to extension of a small disc osteophyte complex into the neural foramen, similar in appearance as compared to 12/18/2022.  After contrast administration, there was no evidence of abnormal enhancement.      The patient is status post fusion from L5 to S1. There is no evidence of a compression fracture, marrow edema, disc herniation or of abnormal enhancement. See above.    This report was finalized on 3/24/2024 7:59 PM by Dr. David Anne M.D on Workstation: [id number]       Impression:  43-year-old female with past medical history of migraine headache, chronic back pain status post previous decompressive laminectomy, opioid dependence, reported seizure disorder on Keppra 1 g twice daily prior to arrival, antiphospholipid syndrome not on anticoagulation prior to admission due to patient self discontinuing, anxiety/depression, dental infections with recently prescribed amoxicillin in the outpatient setting within the past week who presented 3/23/2024 with complaints of bilateral lower extremity pain and swelling for several days.  She reports she initially started to have pain in her lower back on the right and then started to notice that her legs appear to be swelling and became painful.  She describes a burning/stabbing type sensation that started in her feet and lower extremities.  This has progressed to involve her knees bilaterally.    She underwent bilateral lower extremity venous duplex which was negative for  DVT, CTA chest was negative for pulmonary embolism but was concerning for multilobar bilateral pneumonia, CT abdomen pelvis did not reveal any active or acute abdominal or pelvic processes.  MRI L and T-spine did not show evidence of compression fracture, disc herniation or abnormal enhancement however there was marrow edema in the anterior vertebral margin at multiple levels in the mid and lower thoracic spine, most notable at T4 and T5 which can be secondary to degenerative change and may be inflammatory but cannot rule out metastatic disease.  UDS positive for fentanyl,HIV 1 and 2 negative, TSH 3.87, rheumatoid factor less than 10, B12 303, folate greater than 20, CRP 11.49, sedimentation rate 37, CK 68.    She reports she believed that she was taking Percocet from a friend but UDS positive for fentanyl.  She denies IV drug abuse.  Access center following.  Denies any alcohol use.     Diagnosis:  Bilateral lower extremity pain with swelling and weakness, weakness felt secondary to pain  B12 deficiency  Chronic back pain  Antiphospholipid syndrome  Substance abuse  Dental abscess  Seizure disorder  PNA    Comment: Normal CK argues against muscle disease.  Symptoms somewhat suggestive of neuropathy but she has normal reflexes.    Plan:  She was seen by neurosurgery for abnormal MRI thoracic spine, they suspect advanced degenerative changes but are getting repeat MRI T-spine in 3 months with outpatient visit.  Replacing B12  Started on anticoagulation  Continue home Keppra dose  On Unasyn for dental abscess, recommend following up with outpatient dentistry for extraction in short term, primary team placed referral to [facility]  No further inpatient workup from neurology standpoint.  Will follow-up on cryoglobulin results.  Will schedule follow-up with me and if symptoms are persistent can consider EMG/NCS.  Recommend against substance use/abuse. Referred to pain mgmt.   D/W Dr Antony today.  Neurology will sign off  but please call if further questions or concerns.

## 2024-03-28 NOTE — PLAN OF CARE
Goal Outcome Evaluation:  Plan of Care Reviewed With: patient        Progress: improving  Outcome Evaluation: Pt. A&Ox4.  Report of leg pain and pain medication given per MD order.  Report of nausia and zofran given x 1 with report of relief afterwards.  No request at this time.  Call light within reach.  VSS.

## 2024-03-28 NOTE — PROGRESS NOTES
Name: Belen Allen ADMIT: 3/23/2024   : 1980  PCP: Sahil Cornelius MD    MRN: [id number] LOS: 0 days   AGE/SEX: 43 y.o. female  ROOM: [facility]     Subjective   Subjective       Review of Systems   As above  Objective   Objective   Vital Signs  Temp:  [98.1 °F (36.7 °C)-98.6 °F (37 °C)] 98.1 °F (36.7 °C)  Heart Rate:  [70-82] 72  Resp:  [16-18] 18  BP: (103-136)/(61-83) 103/61  SpO2:  [94 %-95 %] 95 %  on   ;   Device (Oxygen Therapy): room air  Body mass index is 40.3 kg/m².  Physical Exam    General: Alert, sitting up in the bed, not in distress  HEENT: Normocephalic, atraumatic  CV: Regular rate and rhythm, no murmurs rubs or gallops  Lungs: Clear to auscultation bilaterally, no crackles or wheezes  Abdomen: Soft, nontender, nondistended  Extremities: Trace bilateral lower extremity edema, no cyanosis     Results Review     I reviewed the patient's new clinical results.  Results from last 7 days   Lab Units 24  03224  0508 24  04324  0458   WBC 10*3/mm3 5.82 5.43 5.37 5.17   HEMOGLOBIN g/dL 10.2* 9.8* 10.2* 9.8*   PLATELETS 10*3/mm3 302 282 300 250     Results from last 7 days   Lab Units 24  0321 24  0508 24  0437 24  0458   SODIUM mmol/L 140 144 140 141   POTASSIUM mmol/L 4.4 4.3 4.2 4.5   CHLORIDE mmol/L 104 108* 104 107   CO2 mmol/L 28.6 30.0* 27.4 27.5   BUN mg/dL 8 9 8 7   CREATININE mg/dL 0.73 0.71 0.76 0.66   GLUCOSE mg/dL 97 92 102* 88   Estimated Creatinine Clearance: 119.8 mL/min (by C-G formula based on SCr of 0.73 mg/dL).  Results from last 7 days   Lab Units 24  0321 24  0437 24  0458 24   ALBUMIN g/dL 3.3* 3.3* 3.0* 3.5   BILIRUBIN mg/dL 0.2 0.3 0.3 0.5   ALK PHOS U/L 192* 199* 169* 179*   AST (SGOT) U/L 19 22 25 29   ALT (SGPT) U/L 18 28 33 44*     Results from last 7 days   Lab Units 24  0321 24  0508 24  0437 24  0458 24  0429 24   CALCIUM mg/dL 8.9  "8.8 9.0 8.6   < > 8.5*   ALBUMIN g/dL 3.3*  --  3.3* 3.0*  --  3.5    < > = values in this interval not displayed.     Results from last 7 days   Lab Units 03/25/24  0458 03/23/24 2012   PROCALCITONIN ng/mL 0.07 0.15   LACTATE mmol/L  --  1.1     COVID19   Date Value Ref Range Status   03/24/2024 Not Detected Not Detected - Ref. Range Final   11/23/2022 Not Detected Not Detected - Ref. Range Final     No results found for: \"HGBA1C\", \"POCGLU\"        CT Angiogram Abdomen Pelvis  Narrative: CT ANGIOGRAM ABDOMEN AND PELVIS WITH CT VENOGRAM     HISTORY: Evaluate for pelvic vein thrombosis; lower extremity pain,  swelling and weakness. Antiphospholipid syndrome.     TECHNIQUE: CT angiogram of the abdomen and pelvis with delayed  postcontrast images in an attempt to evaluate the venous system was  performed using 95 mL Isovue-370 IV contrast. Multiplanar and  three-dimensional reformatted images were produced at a separate  workstation. Correlation with CT 03/23/2024.     FINDINGS: The visualized lower lobe pulmonary arteries enhance normally.  The lower thoracic aorta, abdominal aorta, pelvic and proximal thigh  arteries are widely patent and normal in caliber. Celiac axis, SMA, WALLY,  and right renal artery are widely patent. Small caliber but patent left  renal artery with parenchymal volume loss at portions of the left renal  upper and lower poles consistent with volume loss secondary to old  infarction or infection. This is nonacute.     The inferior vena cava is normal in caliber and appears normal. The  veins in the upper thighs and pelvis appear normal. No CT evidence of  deep venous thrombosis or venous compression.     Prior cholecystectomy. Parenchyma of the liver, spleen, pancreas, and  right kidney appears normal. Medial left upper pole renal cyst and renal  parenchymal volume loss as previously demonstrated.     Diffuse mild third spacing. No adenopathy or free fluid. The stomach,  small bowel, appendix, and " large bowel appear normal.     The uterus and ovaries and the urinary bladder appear normal.     Prior L5-S1 posterior and interbody fusion. Hardware appears positioned  as expected. Degenerative disc change in the lower thoracic spine. No  spinal compression deformity, stenosis, or paraspinal inflammatory  change in the lumbar or visualized lower thoracic spine. Sacroiliac  joints demonstrate some degenerative change; there is osseous ankylosis  bridging the posterior edge of the right SI joint. The hips appear  normal.     No lesion identified along the course of the lumbosacral plexus.     Impression: 1. Prior cholecystectomy.   2. Mild third spacing.   3. Parenchymal scarring and volume loss in parts of the left kidney.   4. No evidence of deep venous thrombosis or other vascular abnormality.     Radiation dose reduction techniques were utilized, including automated  exposure control and exposure modulation based on body size.        This report was finalized on 3/27/2024 2:02 PM by Dr. Dennys Eckert M.D on Workstation: [id number]     MRI Thoracic Spine With & Without Contrast  Narrative: Patient: CARSON MULLEN  Time Out: 01:59  Exam(s): MRI T SPINE W WO Contrast     EXAM:    MR Thoracic Spine Without and With Intravenous Contrast    CLINICAL HISTORY:     Reason for exam: B L LE pain and weakness.    TECHNIQUE:    Magnetic resonance images of the thoracic spine without and with   intravenous contrast in multiple planes.  Mild motion artifact.    COMPARISON:     CT thoracic spine 1 18 23.    FINDINGS:    Vertebrae:  There is moderate edema in the anterior margin T4-T11   vertebral bodies, most notable T4, T5 and T6, corresponding to large   osteophytes on CT.  This suggests a probable inflammatory and or more   acute component to the degenerative change, such as enthesitis, though is   nonspecific.  Less likely, metastatic disease is unlikely, not excluded.      This does not have the appearance of  discitis osteomyelitis.  There is   enhancement along the T4 and T5 vertebral margins.    Discs spinal canal neural foramina:  Moderate degenerative disc disease   C5-6 and mildly at C6-7, partially included with spondylitic ridging.  No   gross spinal stenosis as imaged.  Minimal spondylitic ridging in the   thoracic spine.  No disc herniation or spinal stenosis.    Spinal cord:  Unremarkable.  Normal signal.  No abnormal enhancement.    Soft tissues:  Unremarkable.    IMPRESSION:       1.  Marrow edema is associated with the anterior vertebral margin at   multiple levels in the mid and lower thoracic spine, most notable at T4   and T5.  This suggests a more acute component to the degenerative change   and may be inflammatory (enthesitis).  Cannot entirely exclude metastatic   disease, though this is felt much less likely.  2.  Can correlate for ankylosing spondylitis, DISH, enteropathic and   other arthritides.  3.  No discitis osteomyelitis, compression deformity, abnormal cord   signal, disc herniation or spinal stenosis.  4.  Moderate degenerative disc disease C5-6 and mildly at C6-7, without   spinal stenosis.  Impression: Electronically signed by Ladonna Glass M.D. on 03-27-24 at 0159    Scheduled Medications  ampicillin-sulbactam, 3 g, Intravenous, Q6H  apixaban, 5 mg, Oral, Q12H  cloNIDine, 0.1 mg, Oral, Daily  FLUoxetine, 60 mg, Oral, Nightly  gabapentin, 300 mg, Oral, Q12H  levETIRAcetam, 1,000 mg, Oral, Q12H  melatonin, 10 mg, Oral, Nightly  sodium chloride, 10 mL, Intravenous, Q12H  vitamin B-12, 1,000 mcg, Oral, Daily    Infusions   Diet  Diet: Regular/House; Fluid Consistency: Thin (IDDSI 0)    I have personally reviewed     [x]  Laboratory   [x]  Microbiology   [x]  Radiology   []  EKG/Telemetry  []  Cardiology/Vascular   []  Pathology    []  Records       Assessment/Plan     Active Hospital Problems    Diagnosis  POA    **Pneumonia [J18.9]  Unknown    Abnormal MRI, thoracic spine [R93.7]   Unknown    Dental abscess [K04.7]  Unknown    Bilateral cellulitis of lower leg [L03.116, L03.115]  Unknown    Intractable back pain [M54.9]  Yes    Seizures [R56.9]  Yes    Chronic pain syndrome [G89.4]  Yes    Antiphospholipid antibody positive [R76.0]  Yes    Obesity (BMI 30-39.9) [E66.9]  Yes      Resolved Hospital Problems   No resolved problems to display.     Ms. Allen is a 43 y.o. smoker with a history of chronic back pain, opioid dependence, antiphospholipid antibody syndrome, and seizure disorder that presents to [facility] complaining of bilateral leg pain and low back armaan       Multilobular Pneumonia  -CT angiogram of the chest 03/23/ 2024 showed multilobar bilateral ground-glass opacities consistent with a  multilobar bilateral pneumonia.  -On IV Unasyn  -Pulmonology evaluated, recommend 6 weeks follow-up outpatient with a noncontrast CT of the chest to follow-up pulmonary filtrates    Dental abscess  -On IV Unasyn, At the time of discharge, this could be changed to Augmentin 875-125 mg p.o. every 12 hours with stop date of 4/6/2024 per ID recommendations.  -Will need to follow-up with outpatient dentistry as soon as possible after discharge to extract infected teeth  -Referral to [facility] dentistry placed       Intractable Back Pain/bilateral lower extremity pain  -MRI of thoracic and lumbar spine   showed  Marrow edema is associated with the anterior vertebral margin at   multiple levels in the mid and lower thoracic spine, most notable at T4  and T5.  This suggests a more acute component to the degenerative change and may be inflammatory (enthesitis).  Cannot entirely exclude metastatic   disease, though this is felt much less likely. no signs of cord compression.   -Neurosurgery evaluated-no intervention indicated currently, recommend follow-up with neurosurgery and repeat MRI of thoracic spine with and without contrast in 8 to 12 weeks.  Referral to pain management  outpatient and consideration for spinal cord stimulator.  -Continue pain management  -Will refer to pain management outpatient          Antiphospholipid Antibody Syndrome/History of Splenic Infarct  -Continue Eliquis.     Seizure Disorder  -Continue Keppra   -Seizure precautions    B12 low end of normal  -On B12 replacement    Anemia  -Iron panel with mild low iron saturation of 14, otherwise unremarkable  -Hemoglobin stable  -Monitor      Hypoxemia  -Patient remains on 2 L nasal cannula  -Encourage incentive spirometer  -Wean off O2 as able, O2 saturation goal 90% and above  -    Discussed with patient  Discussed in multidisciplinary rounds with nursing staff, CCP, pharmacy  Disposition: Home, likely tomorrow, may need home oxygen at discharge      Copied text in this note has been reviewed and is accurate as of 03/28/24.         Dictated utilizing Dragon dictation        Cyndi Evans MD  [facility]  03/28/24  09:31 EDT

## 2024-03-28 NOTE — NURSING NOTE
"  Access center follow up.    Patient lying in bed, awake and alert. She reports feeling \"rough\" today. She reports minimal sleep overnight due to pain. Regarding pain medication patient states \"not getting enough.\" Per MAR norco 5-325mg q6hrs. CCP in contact for pt. regarding scheduling f/u appt.with oral surgery. Patient reports appetite fair. Discussed LAWRENCE treatment pt. states \"probably not since I don't plan on using again.\" Offered encouragement and benefits of LAWRENCE treatment with goal of cessation, pt. v/u. Patient has been provided with recovery resources. Throughout encounter pt. drowsy, pleasant, and cooperative. Access following.   "

## 2024-03-29 VITALS
OXYGEN SATURATION: 97 % | WEIGHT: 234.79 LBS | BODY MASS INDEX: 40.08 KG/M2 | TEMPERATURE: 97.9 F | DIASTOLIC BLOOD PRESSURE: 66 MMHG | SYSTOLIC BLOOD PRESSURE: 110 MMHG | RESPIRATION RATE: 18 BRPM | HEIGHT: 64 IN | HEART RATE: 64 BPM

## 2024-03-29 LAB
25(OH)D3 SERPL-MCNC: 11.5 NG/ML (ref 30–100)
DEPRECATED RDW RBC AUTO: 49.3 FL (ref 37–54)
ERYTHROCYTE [DISTWIDTH] IN BLOOD BY AUTOMATED COUNT: 13.9 % (ref 12.3–15.4)
HCT VFR BLD AUTO: 31.5 % (ref 34–46.6)
HGB BLD-MCNC: 9.9 G/DL (ref 12–15.9)
MCH RBC QN AUTO: 30.5 PG (ref 26.6–33)
MCHC RBC AUTO-ENTMCNC: 31.4 G/DL (ref 31.5–35.7)
MCV RBC AUTO: 96.9 FL (ref 79–97)
PLATELET # BLD AUTO: 275 10*3/MM3 (ref 140–450)
PMV BLD AUTO: 10.2 FL (ref 6–12)
RBC # BLD AUTO: 3.25 10*6/MM3 (ref 3.77–5.28)
WBC NRBC COR # BLD AUTO: 5.66 10*3/MM3 (ref 3.4–10.8)

## 2024-03-29 PROCEDURE — 85027 COMPLETE CBC AUTOMATED: CPT | Performed by: STUDENT IN AN ORGANIZED HEALTH CARE EDUCATION/TRAINING PROGRAM

## 2024-03-29 PROCEDURE — G0378 HOSPITAL OBSERVATION PER HR: HCPCS

## 2024-03-29 PROCEDURE — 25010000002 AMPICILLIN-SULBACTAM PER 1.5 G: Performed by: INTERNAL MEDICINE

## 2024-03-29 PROCEDURE — 96366 THER/PROPH/DIAG IV INF ADDON: CPT

## 2024-03-29 PROCEDURE — 82306 VITAMIN D 25 HYDROXY: CPT | Performed by: STUDENT IN AN ORGANIZED HEALTH CARE EDUCATION/TRAINING PROGRAM

## 2024-03-29 RX ORDER — HYDROCODONE BITARTRATE AND ACETAMINOPHEN 5; 325 MG/1; MG/1
1 TABLET ORAL EVERY 6 HOURS PRN
Qty: 12 TABLET | Refills: 0 | Status: SHIPPED | OUTPATIENT
Start: 2024-03-29 | End: 2024-04-01

## 2024-03-29 RX ORDER — ERGOCALCIFEROL 1.25 MG/1
50000 CAPSULE ORAL
Status: DISCONTINUED | OUTPATIENT
Start: 2024-03-29 | End: 2024-03-29 | Stop reason: HOSPADM

## 2024-03-29 RX ORDER — LEVETIRACETAM 1000 MG/1
1000 TABLET ORAL EVERY 12 HOURS SCHEDULED
Qty: 60 TABLET | Refills: 0 | Status: SHIPPED | OUTPATIENT
Start: 2024-03-29 | End: 2024-04-28

## 2024-03-29 RX ORDER — GABAPENTIN 300 MG/1
300 CAPSULE ORAL EVERY 12 HOURS SCHEDULED
Qty: 14 CAPSULE | Refills: 0 | Status: SHIPPED | OUTPATIENT
Start: 2024-03-29 | End: 2024-04-05

## 2024-03-29 RX ORDER — AMOXICILLIN AND CLAVULANATE POTASSIUM 875; 125 MG/1; MG/1
1 TABLET, FILM COATED ORAL 2 TIMES DAILY
Qty: 16 TABLET | Refills: 0 | Status: SHIPPED | OUTPATIENT
Start: 2024-03-29 | End: 2024-04-06

## 2024-03-29 RX ORDER — NALOXONE HYDROCHLORIDE 4 MG/.1ML
SPRAY NASAL
Qty: 2 EACH | Refills: 0 | Status: SHIPPED | OUTPATIENT
Start: 2024-03-29

## 2024-03-29 RX ORDER — AMOXICILLIN 250 MG
2 CAPSULE ORAL DAILY
Qty: 60 TABLET | Refills: 0 | Status: SHIPPED | OUTPATIENT
Start: 2024-03-29 | End: 2024-04-28

## 2024-03-29 RX ORDER — ACETAMINOPHEN 500 MG
1000 TABLET ORAL EVERY 8 HOURS PRN
Qty: 90 TABLET | Refills: 0 | Status: SHIPPED | OUTPATIENT
Start: 2024-03-29 | End: 2024-04-13

## 2024-03-29 RX ORDER — ERGOCALCIFEROL 1.25 MG/1
50000 CAPSULE ORAL
Qty: 4 CAPSULE | Refills: 0 | Status: SHIPPED | OUTPATIENT
Start: 2024-03-29 | End: 2024-04-28

## 2024-03-29 RX ADMIN — CLONIDINE HYDROCHLORIDE 0.1 MG: 0.1 TABLET ORAL at 08:38

## 2024-03-29 RX ADMIN — Medication 1000 MCG: at 08:38

## 2024-03-29 RX ADMIN — LEVETIRACETAM 1000 MG: 500 TABLET, FILM COATED ORAL at 08:40

## 2024-03-29 RX ADMIN — GABAPENTIN 300 MG: 300 CAPSULE ORAL at 08:38

## 2024-03-29 RX ADMIN — APIXABAN 5 MG: 5 TABLET, FILM COATED ORAL at 08:38

## 2024-03-29 RX ADMIN — HYDROCODONE BITARTRATE AND ACETAMINOPHEN 1 TABLET: 5; 325 TABLET ORAL at 00:11

## 2024-03-29 RX ADMIN — AMPICILLIN SODIUM AND SULBACTAM SODIUM 3 G: 2; 1 INJECTION, POWDER, FOR SOLUTION INTRAMUSCULAR; INTRAVENOUS at 05:55

## 2024-03-29 RX ADMIN — AMPICILLIN SODIUM AND SULBACTAM SODIUM 3 G: 2; 1 INJECTION, POWDER, FOR SOLUTION INTRAMUSCULAR; INTRAVENOUS at 10:33

## 2024-03-29 RX ADMIN — AMPICILLIN SODIUM AND SULBACTAM SODIUM 3 G: 2; 1 INJECTION, POWDER, FOR SOLUTION INTRAMUSCULAR; INTRAVENOUS at 00:12

## 2024-03-29 RX ADMIN — HYDROCODONE BITARTRATE AND ACETAMINOPHEN 1 TABLET: 5; 325 TABLET ORAL at 05:54

## 2024-03-29 RX ADMIN — Medication 10 ML: at 08:39

## 2024-03-29 RX ADMIN — Medication 10 ML: at 00:17

## 2024-03-29 NOTE — DISCHARGE INSTRUCTIONS
Notify your primary care provider if you experience chest pain, difficulty breathing, fevers/chills, nausea or vomiting, bleeding in stool, excessive diarrhea, numbness or weakness or tingling in any part of your body.      Avoid taking NSAIDs ( such as ibuprofen, Aleve, Advil, naproxen) for pain while on Eliquis as it can increase risk of bleeding.

## 2024-03-29 NOTE — CASE MANAGEMENT/SOCIAL WORK
Case Management Discharge Note      Final Note: home with finance and family, and f/u outpt appointments. Family transported. feliciano rn/ccp    Provided Post Acute Provider List?: Yes  Post Acute Provider List: DME Supplier, Home Health, Nursing Home  Provided Post Acute Provider Quality & Resource List?: Yes  Post Acute Provider Quality and Resource List: Home Health, Inpatient Rehab, Nursing Home  Delivered To: Patient    Selected Continued Care - Discharged on 3/29/2024 Admission date: 3/23/2024 - Discharge disposition: Home or Self Care      Destination    No services have been selected for the patient.                Durable Medical Equipment    No services have been selected for the patient.                Dialysis/Infusion    No services have been selected for the patient.                Home Medical Care    No services have been selected for the patient.                Therapy    No services have been selected for the patient.                Community Resources    No services have been selected for the patient.                Community & DME    No services have been selected for the patient.                    Transportation Services  Private: Car    Final Discharge Disposition Code: 01 - home or self-care

## 2024-03-29 NOTE — DISCHARGE INSTR - APPOINTMENTS
You have been referral to [facility]ill[facility] sx office for your dental office. A referral was faxed ([phone] ) over to them for review and they will notify you of your appointment, if you have not heard from them by Tuesday April2, 2024 please call them at [phone] .    [facility]ill[facility]  [address] [place] [address]  PHONE# [phone]

## 2024-03-29 NOTE — PLAN OF CARE
Goal Outcome Evaluation:  Plan of Care Reviewed With: patient        Progress: improving               No issues overnight, remained on room air and completed overnight sleep pulse ox. VSS.

## 2024-03-29 NOTE — DISCHARGE SUMMARY
Patient Name: Belen Allen  : 1980  MRN: [id number]    Date of Admission: 3/23/2024  Date of Discharge:  3/29/2024  Primary Care Physician: Sahil Cornelius MD      Chief Complaint:   Leg Pain      Discharge Diagnoses     Active Hospital Problems    Diagnosis  POA    **Intractable back pain [M54.9]  Yes    Abnormal MRI, thoracic spine [R93.7]  Unknown    Dental abscess [K04.7]  Unknown    Bilateral cellulitis of lower leg [L03.116, L03.115]  Unknown    Pneumonia [J18.9]  Unknown    Seizures [R56.9]  Yes    Chronic pain syndrome [G89.4]  Yes    Antiphospholipid antibody positive [R76.0]  Yes    Obesity (BMI 30-39.9) [E66.9]  Yes      Resolved Hospital Problems   No resolved problems to display.        Hospital Course   Ms. Allen is a 43 y.o. smoker with a history of chronic back pain, opioid dependence, antiphospholipid antibody syndrome, and seizure disorder that presents to [facility] complaining of bilateral leg pain and low back duran         Multilobular Pneumonia  -CT angiogram of the chest 2024 showed multilobar bilateral ground-glass opacities consistent with a multilobar bilateral pneumonia.  Treated with IV Unasyn for dental abscess as below.  Pulmonology evaluated, recommend 6 weeks follow-up outpatient with a noncontrast CT of the chest to follow-up pulmonary filtrates.  Discussed with patient.     Dental abscess  Treated with IV Unasyn while inpatient, discharged on Augmentin 875-125 mg p.o. every 12 hours with stop date of 2024 per ID recommendations.  Patient will need to follow-up with [facility] oral surgery as soon as possible for likely multiple teeth extraction.  Referral placed, information provided for patient to contact.        Intractable Back Pain/bilateral lower extremity pain  -MRI of thoracic and lumbar spine   showed  Marrow edema is associated with the anterior vertebral margin at multiple levels in the mid and lower  thoracic spine, most notable at T4  and T5.  This suggests a more acute component to the degenerative change and may be inflammatory (enthesitis). Cannot entirely exclude metastatic  disease, though this is felt much less likely. no signs of cord compression.  Neurosurgery and neurology evaluated..  No intervention indicated currently, recommend follow-up with neurosurgery and repeat MRI of thoracic spine with and without contrast in 8 to 12 weeks.  Referral to pain management outpatient and consideration for spinal cord stimulator, referral placed.  Neurology to schedule outpatient follow-up and possible EMG if symptoms persist.  Although patient has history of substance abuse and drug screen was positive for fentanyl, prescribed 3-day course of Norco and 7-day course of gabapentin in the setting of significant MRI findings, and patient also adamantly stating determination  for cessation of drug use.         Antiphospholipid Antibody Syndrome/History of Splenic Infarct  - patient was supposed to be on Eliquis however noncompliant and has not been taking prior to admission.  Of note the history of antiphospholipid antibody DOAC's are not recommended, and warfarin is the drug of choice as it proven to be more effective, however per chart review patient had refused Coumadin previously.  Discussed with pharmacy.      Seizure Disorder  Noncompliant.  Ordered Keppra at discharge.    Substance abuse  -Drug screen was positive for fentanyl.  Patient was counseled on smoking cessation, Access evaluated.    Vitamin D deficiency  -Discharged on ergocalciferol 50,000 units weekly     Hypoxemia-overnight oximetry was normal      Edited by: Nirmal Zendejas MD at 3/26/2024 1733    Day of Discharge     Subjective:  Sitting up in the bed, eating breakfast.  Continues to have moderate back pain, unchanged.  Denies any new weakness or numbness.  No fevers no chills.    Physical Exam:  Temp:  [97.9 °F (36.6 °C)-98.1 °F (36.7 °C)]  97.9 °F (36.6 °C)  Heart Rate:  [64-73] 64  Resp:  [18-20] 18  BP: (108-117)/(59-66) 110/66  Body mass index is 40.3 kg/m².  Physical Exam    General: Alert and oriented x3, no acute distress  HEENT: Normocephalic, atraumatic  CV: Regular rate and rhythm, no murmurs rubs or gallops  Lungs: Clear to auscultation bilaterally, no crackles or wheezes  Abdomen: Soft, nontender, nondistended  Extremities: No significant peripheral edema , no cyanosis     Consultants     Consult Orders (all) (From admission, onward)       Start     Ordered    03/27/24 1457  Inpatient Neurosurgery Consult  Once        Specialty:  Neurosurgery  Provider:  Christian Willard MD    03/27/24 1459    03/26/24 1108  Inpatient Neurology Consult General  Once        Specialty:  Neurology  Provider:  Laura Antony MD    03/26/24 1107    03/25/24 1546  Inpatient Infectious Diseases Consult  Once        Specialty:  Infectious Diseases  Provider:  Sahil Bran MD    03/25/24 1546    03/25/24 1434  Inpatient Access Center Consult  Once        Comments: Positive urine drug screen.   Provider:  (Not yet assigned)    03/25/24 1433    03/24/24 1756  Inpatient Infectious Diseases Consult  Once        Specialty:  Infectious Diseases  Provider:  Emely Cabrera MD    03/24/24 1756    03/24/24 1756  Inpatient Pulmonology Consult  Once        Specialty:  Pulmonary Disease  Provider:  Soy Reynoso MD    03/24/24 1756    03/24/24 1635  Inpatient Infectious Diseases Consult  Once,   Status:  Canceled        Specialty:  Infectious Diseases  Provider:  (Not yet assigned)    03/24/24 1634    03/24/24 1635  Inpatient Pulmonology Consult  Once,   Status:  Canceled        Specialty:  Pulmonary Disease  Provider:  (Not yet assigned)    03/24/24 1634    03/23/24 2159  [facility] (on-call MD unless specified) Details  Once        Specialty:  Hospitalist  Provider:  (Not yet assigned)    03/23/24 2158                  Procedures     * Surgery not found  *      Imaging Results (All)       Procedure Component Value Units Date/Time    CT Angiogram Abdomen Pelvis [[id number]] Collected: 03/27/24 1336     Updated: 03/27/24 1405    Narrative:      CT ANGIOGRAM ABDOMEN AND PELVIS WITH CT VENOGRAM     HISTORY: Evaluate for pelvic vein thrombosis; lower extremity pain,  swelling and weakness. Antiphospholipid syndrome.     TECHNIQUE: CT angiogram of the abdomen and pelvis with delayed  postcontrast images in an attempt to evaluate the venous system was  performed using 95 mL Isovue-370 IV contrast. Multiplanar and  three-dimensional reformatted images were produced at a separate  workstation. Correlation with CT 03/23/2024.     FINDINGS: The visualized lower lobe pulmonary arteries enhance normally.  The lower thoracic aorta, abdominal aorta, pelvic and proximal thigh  arteries are widely patent and normal in caliber. Celiac axis, SMA, WALLY,  and right renal artery are widely patent. Small caliber but patent left  renal artery with parenchymal volume loss at portions of the left renal  upper and lower poles consistent with volume loss secondary to old  infarction or infection. This is nonacute.     The inferior vena cava is normal in caliber and appears normal. The  veins in the upper thighs and pelvis appear normal. No CT evidence of  deep venous thrombosis or venous compression.     Prior cholecystectomy. Parenchyma of the liver, spleen, pancreas, and  right kidney appears normal. Medial left upper pole renal cyst and renal  parenchymal volume loss as previously demonstrated.     Diffuse mild third spacing. No adenopathy or free fluid. The stomach,  small bowel, appendix, and large bowel appear normal.     The uterus and ovaries and the urinary bladder appear normal.     Prior L5-S1 posterior and interbody fusion. Hardware appears positioned  as expected. Degenerative disc change in the lower thoracic spine. No  spinal compression deformity, stenosis, or paraspinal  inflammatory  change in the lumbar or visualized lower thoracic spine. Sacroiliac  joints demonstrate some degenerative change; there is osseous ankylosis  bridging the posterior edge of the right SI joint. The hips appear  normal.     No lesion identified along the course of the lumbosacral plexus.       Impression:      1. Prior cholecystectomy.   2. Mild third spacing.   3. Parenchymal scarring and volume loss in parts of the left kidney.   4. No evidence of deep venous thrombosis or other vascular abnormality.     Radiation dose reduction techniques were utilized, including automated  exposure control and exposure modulation based on body size.        This report was finalized on 3/27/2024 2:02 PM by Dr. Dennys Eckert M.D on Workstation: [id number]       MRI Thoracic Spine With & Without Contrast [[id number]] Collected: 03/27/24 0200     Updated: 03/27/24 0200    Narrative:        Patient: CARSON MULLEN  Time Out: 01:59  Exam(s): MRI T SPINE W WO Contrast     EXAM:    MR Thoracic Spine Without and With Intravenous Contrast    CLINICAL HISTORY:     Reason for exam: B L LE pain and weakness.    TECHNIQUE:    Magnetic resonance images of the thoracic spine without and with   intravenous contrast in multiple planes.  Mild motion artifact.    COMPARISON:     CT thoracic spine 1 18 23.    FINDINGS:    Vertebrae:  There is moderate edema in the anterior margin T4-T11   vertebral bodies, most notable T4, T5 and T6, corresponding to large   osteophytes on CT.  This suggests a probable inflammatory and or more   acute component to the degenerative change, such as enthesitis, though is   nonspecific.  Less likely, metastatic disease is unlikely, not excluded.      This does not have the appearance of discitis osteomyelitis.  There is   enhancement along the T4 and T5 vertebral margins.    Discs spinal canal neural foramina:  Moderate degenerative disc disease   C5-6 and mildly at C6-7, partially included with  spondylitic ridging.  No   gross spinal stenosis as imaged.  Minimal spondylitic ridging in the   thoracic spine.  No disc herniation or spinal stenosis.    Spinal cord:  Unremarkable.  Normal signal.  No abnormal enhancement.    Soft tissues:  Unremarkable.    IMPRESSION:       1.  Marrow edema is associated with the anterior vertebral margin at   multiple levels in the mid and lower thoracic spine, most notable at T4   and T5.  This suggests a more acute component to the degenerative change   and may be inflammatory (enthesitis).  Cannot entirely exclude metastatic   disease, though this is felt much less likely.  2.  Can correlate for ankylosing spondylitis, DISH, enteropathic and   other arthritides.  3.  No discitis osteomyelitis, compression deformity, abnormal cord   signal, disc herniation or spinal stenosis.  4.  Moderate degenerative disc disease C5-6 and mildly at C6-7, without   spinal stenosis.      Impression:          Electronically signed by Ladonna Glass M.D. on 03-27-24 at 0159    XR Abdomen KUB [[id number]] Collected: 03/25/24 1956     Updated: 03/25/24 1959    Narrative:      XR ABDOMEN KUB-3/25/2024     HISTORY: Abdominal pain.     3 images are submitted. Bowel gas pattern is unremarkable with no  evidence of free air or bowel dilatation. Lower lumbar fusion hardware  is seen. No masses are seen.       Impression:      1. No acute process.        This report was finalized on 3/25/2024 7:56 PM by Dr. Evens Arriaza M.D on Workstation: [id number]       MRI Lumbar Spine With & Without Contrast [[id number]] Collected: 03/24/24 1629     Updated: 03/24/24 2002    Narrative:      MRI EXAMINATION OF THE LUMBAR SPINE WITHOUT CONTRAST     HISTORY: Seizure, fall.     COMPARISON: CT lumbar spine 01/31/2024 and MRI of the lumbar spine  12/18/2022.     FINDINGS:  The study is hampered somewhat by the patient's body habitus.     The patient has undergone fusion from L5 to S1 with bilateral  transpedicular  screws, posterior rods and interbody graft material. The  alignment of the lumbar spine is within normal limits. The conus is at  L1 and caudal aspect of the spinal cord appears unremarkable.     L1-L2: There is no evidence of disc bulge or herniation.     L2-L3: There is no evidence of disc bulge or herniation.     L3-L4: Mild facet degenerative disease is present bilaterally.     L4-L5: Mild facet degenerative disease is present bilaterally. Mild  central disc bulge is present with no evidence of herniation.     L5-S1: A decompressive laminectomy is noted. There is no evidence of  canal stenosis or foraminal narrowing on the left. Mild foraminal  stenosis is present on the right secondary to extension of a small disc  osteophyte complex into the neural foramen, similar in appearance as  compared to 12/18/2022.     After contrast administration, there was no evidence of abnormal  enhancement.       Impression:      The patient is status post fusion from L5 to S1. There is no  evidence of a compression fracture, marrow edema, disc herniation or of  abnormal enhancement. See above.           This report was finalized on 3/24/2024 7:59 PM by Dr. David Anne M.D  on Workstation: [id number]       CT Angiogram Chest Pulmonary Embolism [[id number]] Collected: 03/23/24 2159     Updated: 03/23/24 2339    Narrative:      EXAMINATION: CT ANGIOGRAM OF THE CHEST WITH CONTRAST     HISTORY: 43-year-old female with a history of fever and right  flank/chest pain.     TECHNIQUE: Contiguous axial images were obtained through the chest  following bolus intravenous administration of nonionic contrast. 3D  reformatted images were produced and presented for interpretation.     COMPARISON: CT of the abdomen and pelvis with contrast, 03/23/2024.     FINDINGS: No filling defects are seen within the pulmonary arterial  system to the level of the subsegmental pulmonary arteries. The heart  and great vessels have a normal appearance. There is  no evidence for  mediastinal or hilar adenopathy.     Patchy ground-glass opacities are seen throughout the bilateral upper  lobes, right middle lobe, right lower lobe, left lower lobe and to a  lesser degree the lingula. A pleural effusion is not visualized.  Moderate multilevel abutting endplate osteophytic change is noted.     The heart and great vessels appear normal.       Impression:      1. There is no evidence for a pulmonary embolism.  2. Multilobar bilateral ground-glass opacities consistent with a  multilobar bilateral pneumonia.     Radiation dose reduction techniques were utilized, including automated  exposure control and exposure modulation based on body size.        This report was finalized on 3/23/2024 11:35 PM by Dr. Glenn Lozada M.D on Workstation: [id number]       CT Abdomen Pelvis With Contrast [[id number]] Collected: 03/23/24 2158     Updated: 03/23/24 2338    Narrative:      EXAMINATION: CT OF THE ABDOMEN AND PELVIS WITH CONTRAST     HISTORY: 43-year-old female with a history of right flank pain and  fever.     TECHNIQUE: Contiguous axial images were obtained through the abdomen and  pelvis following intravenous administration of nonionic contrast. Oral  contrast was not administered. Radiation dose reduction techniques were  utilized, including automated exposure control and exposure modulation  based on body size.     COMPARISON: CT of the abdomen and pelvis without contrast, 07/27/2023.     FINDINGS: The visualized portion of the lung bases are clear of  consolidation. The visualized portion of the heart appears normal. The  liver has a normal appearance. The patient is status post  cholecystectomy. The pancreas appears normal. The right kidney appears  normal. There is a 2.2 cm left renal cyst. Scattered parenchymal  scarring of the left kidney is noted. The adrenal glands appear normal.  The spleen has a normal appearance. Moderate retention of stool is seen  throughout the colon. A  normal appendix is visualized. There is no  evidence for intra-abdominal or pelvic adenopathy. Evidence of prior  L5-S1 fusion is noted.       Impression:      There is no evidence for an active or acute intra-abdominal  or pelvic process.     This report was finalized on 3/23/2024 11:35 PM by Dr. Glenn Lozada M.D on Workstation: [id number]             Results for orders placed during the hospital encounter of 03/23/24    Duplex Venous Lower Extremity - Bilateral CAR    Interpretation Summary    Normal bilateral lower extremity venous duplex scan.    Results for orders placed during the hospital encounter of 03/23/24    Adult Transthoracic Echo Complete W/ Cont if Necessary Per Protocol    Interpretation Summary    Left ventricular systolic function is normal. Calculated left ventricular EF = 61.2%    Left ventricular diastolic function was normal.    Estimated right ventricular systolic pressure from tricuspid regurgitation is normal (<35 mmHg). Calculated right ventricular systolic pressure from tricuspid regurgitation is 22 mmHg.    Pertinent Labs     Results from last 7 days   Lab Units 03/29/24  0314 03/28/24 0321 03/27/24 0508 03/26/24 0437   WBC 10*3/mm3 5.66 5.82 5.43 5.37   HEMOGLOBIN g/dL 9.9* 10.2* 9.8* 10.2*   PLATELETS 10*3/mm3 275 302 282 300     Results from last 7 days   Lab Units 03/28/24 0321 03/27/24 0508 03/26/24 0437 03/25/24 0458   SODIUM mmol/L 140 144 140 141   POTASSIUM mmol/L 4.4 4.3 4.2 4.5   CHLORIDE mmol/L 104 108* 104 107   CO2 mmol/L 28.6 30.0* 27.4 27.5   BUN mg/dL 8 9 8 7   CREATININE mg/dL 0.73 0.71 0.76 0.66   GLUCOSE mg/dL 97 92 102* 88   Estimated Creatinine Clearance: 119.8 mL/min (by C-G formula based on SCr of 0.73 mg/dL).  Results from last 7 days   Lab Units 03/28/24 0321 03/26/24 0437 03/25/24 0458 03/23/24 2012   ALBUMIN g/dL 3.3* 3.3* 3.0* 3.5   BILIRUBIN mg/dL 0.2 0.3 0.3 0.5   ALK PHOS U/L 192* 199* 169* 179*   AST (SGOT) U/L 19 22 25 29   ALT (SGPT) U/L  "18 28 33 44*     Results from last 7 days   Lab Units 03/28/24  0321 03/27/24  0508 03/26/24  0437 03/25/24  0458 03/24/24  0429 03/23/24 2012   CALCIUM mg/dL 8.9 8.8 9.0 8.6   < > 8.5*   ALBUMIN g/dL 3.3*  --  3.3* 3.0*  --  3.5    < > = values in this interval not displayed.     Results from last 7 days   Lab Units 03/23/24 2012   LIPASE U/L 9*     Results from last 7 days   Lab Units 03/23/24  2356 03/23/24 2012   CK TOTAL U/L  --  68   HSTROP T ng/L <6 <6   PROBNP pg/mL  --  683.0*   D DIMER QUANT MCGFEU/mL  --  0.99*           Invalid input(s): \"LDLCALC\"  Results from last 7 days   Lab Units 03/25/24  1101 03/23/24 2022   BLOODCX   --  No growth at 5 days  No growth at 5 days   MRSAPCR  No MRSA Detected  --      Results from last 7 days   Lab Units 03/24/24  1845   COVID19  Not Detected       Test Results Pending at Discharge     Pending Labs       Order Current Status    Cryoglobulin In process            Discharge Details        Discharge Medications        New Medications        Instructions Start Date   Acetaminophen Extra Strength 500 MG tablet   1,000 mg, Oral, Every 8 Hours PRN      amoxicillin-clavulanate 875-125 MG per tablet  Commonly known as: AUGMENTIN   1 tablet, Oral, 2 Times Daily      Eliquis 5 MG tablet tablet  Generic drug: apixaban   5 mg, Oral, Every 12 Hours Scheduled      gabapentin 300 MG capsule  Commonly known as: NEURONTIN   300 mg, Oral, Every 12 Hours Scheduled      HYDROcodone-acetaminophen 5-325 MG per tablet  Commonly known as: NORCO   1 tablet, Oral, Every 6 Hours PRN      naloxone 4 MG/0.1ML nasal spray  Commonly known as: NARCAN   Call 911. Don't prime. [place] in 1 nostril for overdose. Repeat in 2-3 minutes in other nostril if no or minimal breathing/responsiveness.      Senexon-S 8.6-50 MG per tablet  Generic drug: sennosides-docusate   2 tablets, Oral, Daily, Hold for loose stools.      vitamin B-12 1000 MCG tablet  Commonly known as: CYANOCOBALAMIN   1,000 mcg, Oral, " Daily      vitamin D 1.25 MG (75941 UT) capsule capsule  Commonly known as: ERGOCALCIFEROL   50,000 Units, Oral, Every 7 Days             Changes to Medications        Instructions Start Date   levETIRAcetam 1000 MG tablet  Commonly known as: KEPPRA  What changed: how much to take   1,000 mg, Oral, Every 12 Hours Scheduled             Continue These Medications        Instructions Start Date   cloNIDine 0.1 MG tablet  Commonly known as: CATAPRES   0.1 mg, Oral, Daily      FLUoxetine 40 MG capsule  Commonly known as: PROzac   60 mg, Oral, Nightly      folic acid 1 MG tablet  Commonly known as: FOLVITE   1 mg, Oral, Daily      l-methylfolate 7.5 MG tablet tablet   1 tablet, Oral, Daily      melatonin 5 MG tablet tablet   10 mg, Oral, Nightly, Patient taes 20 mg at home      SUMAtriptan 100 MG tablet  Commonly known as: IMITREX   100 mg, Oral, Every 2 Hours PRN      traZODone 50 MG tablet  Commonly known as: DESYREL   Take 1-2 tablets by mouth At Night As Needed for sleep             Stop These Medications      methylPREDNISolone 4 MG dose pack  Commonly known as: MEDROL     oxyCODONE-acetaminophen 5-325 MG per tablet  Commonly known as: PERCOCET     promethazine 25 MG tablet  Commonly known as: PHENERGAN     Vitamin B-2 100 MG tablet tablet  Commonly known as: RIBOFLAVIN              Allergies   Allergen Reactions    Morphine Hives    Lidocaine Rash     Pt reports lidocaine patches make her breakout in a rash       Discharge Disposition:  Home or Self Care      Discharge Diet:  No active diet order      Discharge Activity:       CODE STATUS:    Code Status and Medical Interventions:   Ordered at: 03/23/24 4996     Code Status (Patient has no pulse and is not breathing):    CPR (Attempt to Resuscitate)     Medical Interventions (Patient has pulse or is breathing):    Full Support       Future Appointments   Date Time Provider Department Center   6/3/2024 11:00 AM Puneet Lainez MD [facility] E[facility]PT [facility]      Follow-up  Information       [facility] .    Specialty: Pain Medicine  Contact information:  [address]  [address]  [facility] [address] [phone]  Additional information:  Phone Number: [phone].  4th floor of [facility]             Ashli, Sahil Malcolm MD Follow up in 1 week(s).    Specialty: Internal Medicine  Contact information:  [address] [facility] [id number] [phone]               Nisha Valles, APRN. Schedule an appointment as soon as possible for a visit.    Specialties: Nurse Practitioner, Neurology  Contact information:  [address]  [facility] [address]  [phone]               Kaleigh Hu, APRN. Schedule an appointment as soon as possible for a visit in 8 week(s).    Specialties: Nurse Practitioner, Neurosurgery  Contact information:  [address]  [address]  [facility] [address]  [phone]                             Time Spent on Discharge:  Greater than 30 minutes      Cyndi Evans MD  [facility]  03/29/24  08:30 EDT

## 2024-04-01 LAB — CRYOGLOB SER QL 1D COLD INC: NORMAL

## 2024-04-02 ENCOUNTER — TELEPHONE (OUTPATIENT)
Dept: NEUROSURGERY | Facility: CLINIC | Age: 44
End: 2024-04-02
Payer: COMMERCIAL

## 2024-04-02 DIAGNOSIS — R93.7 ABNORMAL MRI, THORACIC SPINE: Primary | ICD-10-CM

## 2024-04-02 DIAGNOSIS — G89.4 CHRONIC PAIN SYNDROME: ICD-10-CM

## 2024-04-02 NOTE — TELEPHONE ENCOUNTER
HUB CAN READ    LVM for patient I scheduled her an appointment with Dr. Brock for June 19 2024 at 3:00 pm. Patient will need to complete Mri before visit.

## 2024-04-15 ENCOUNTER — HOSPITAL ENCOUNTER (INPATIENT)
Facility: HOSPITAL | Age: 44
LOS: 3 days | Discharge: HOME | DRG: 158 | End: 2024-04-20
Attending: EMERGENCY MEDICINE | Admitting: INTERNAL MEDICINE
Payer: COMMERCIAL

## 2024-04-15 ENCOUNTER — APPOINTMENT (OUTPATIENT)
Dept: GENERAL RADIOLOGY | Facility: HOSPITAL | Age: 44
DRG: 158 | End: 2024-04-15
Payer: COMMERCIAL

## 2024-04-15 ENCOUNTER — APPOINTMENT (OUTPATIENT)
Dept: CT IMAGING | Facility: HOSPITAL | Age: 44
DRG: 158 | End: 2024-04-15
Payer: COMMERCIAL

## 2024-04-15 DIAGNOSIS — M79.671 RIGHT FOOT PAIN: ICD-10-CM

## 2024-04-15 DIAGNOSIS — R52 UNCONTROLLED PAIN: ICD-10-CM

## 2024-04-15 DIAGNOSIS — G40.909 RECURRENT SEIZURES: Primary | ICD-10-CM

## 2024-04-15 DIAGNOSIS — M54.16 LUMBAR BACK PAIN WITH RADICULOPATHY AFFECTING LEFT LOWER EXTREMITY: ICD-10-CM

## 2024-04-15 DIAGNOSIS — K04.7 DENTAL ABSCESS: ICD-10-CM

## 2024-04-15 DIAGNOSIS — R76.0 ANTIPHOSPHOLIPID ANTIBODY POSITIVE: ICD-10-CM

## 2024-04-15 DIAGNOSIS — K04.7 DENTAL INFECTION: ICD-10-CM

## 2024-04-15 LAB
ALBUMIN SERPL-MCNC: 4.1 G/DL (ref 3.5–5.2)
ALBUMIN/GLOB SERPL: 1.6 G/DL
ALP SERPL-CCNC: 157 U/L (ref 39–117)
ALT SERPL W P-5'-P-CCNC: 37 U/L (ref 1–33)
ANION GAP SERPL CALCULATED.3IONS-SCNC: 9.2 MMOL/L (ref 5–15)
AST SERPL-CCNC: 31 U/L (ref 1–32)
B PARAPERT DNA SPEC QL NAA+PROBE: NOT DETECTED
B PERT DNA SPEC QL NAA+PROBE: NOT DETECTED
BASOPHILS # BLD AUTO: 0.05 10*3/MM3 (ref 0–0.2)
BASOPHILS NFR BLD AUTO: 0.4 % (ref 0–1.5)
BILIRUB SERPL-MCNC: 0.2 MG/DL (ref 0–1.2)
BUN SERPL-MCNC: 8 MG/DL (ref 6–20)
BUN/CREAT SERPL: 11.6 (ref 7–25)
C PNEUM DNA NPH QL NAA+NON-PROBE: NOT DETECTED
CALCIUM SPEC-SCNC: 9 MG/DL (ref 8.6–10.5)
CHLORIDE SERPL-SCNC: 105 MMOL/L (ref 98–107)
CO2 SERPL-SCNC: 25.8 MMOL/L (ref 22–29)
CREAT SERPL-MCNC: 0.69 MG/DL (ref 0.57–1)
D-LACTATE SERPL-SCNC: 1.1 MMOL/L (ref 0.5–2)
DEPRECATED RDW RBC AUTO: 47.8 FL (ref 37–54)
EGFRCR SERPLBLD CKD-EPI 2021: 111.3 ML/MIN/1.73
EOSINOPHIL # BLD AUTO: 0.09 10*3/MM3 (ref 0–0.4)
EOSINOPHIL NFR BLD AUTO: 0.7 % (ref 0.3–6.2)
ERYTHROCYTE [DISTWIDTH] IN BLOOD BY AUTOMATED COUNT: 14 % (ref 12.3–15.4)
FLUAV SUBTYP SPEC NAA+PROBE: NOT DETECTED
FLUBV RNA ISLT QL NAA+PROBE: NOT DETECTED
GLOBULIN UR ELPH-MCNC: 2.6 GM/DL
GLUCOSE SERPL-MCNC: 93 MG/DL (ref 65–99)
HADV DNA SPEC NAA+PROBE: NOT DETECTED
HCOV 229E RNA SPEC QL NAA+PROBE: NOT DETECTED
HCOV HKU1 RNA SPEC QL NAA+PROBE: NOT DETECTED
HCOV NL63 RNA SPEC QL NAA+PROBE: NOT DETECTED
HCOV OC43 RNA SPEC QL NAA+PROBE: NOT DETECTED
HCT VFR BLD AUTO: 38.6 % (ref 34–46.6)
HGB BLD-MCNC: 12.5 G/DL (ref 12–15.9)
HMPV RNA NPH QL NAA+NON-PROBE: NOT DETECTED
HPIV1 RNA ISLT QL NAA+PROBE: NOT DETECTED
HPIV2 RNA SPEC QL NAA+PROBE: NOT DETECTED
HPIV3 RNA NPH QL NAA+PROBE: NOT DETECTED
HPIV4 P GENE NPH QL NAA+PROBE: NOT DETECTED
IMM GRANULOCYTES # BLD AUTO: 0.06 10*3/MM3 (ref 0–0.05)
IMM GRANULOCYTES NFR BLD AUTO: 0.5 % (ref 0–0.5)
LYMPHOCYTES # BLD AUTO: 3.93 10*3/MM3 (ref 0.7–3.1)
LYMPHOCYTES NFR BLD AUTO: 30 % (ref 19.6–45.3)
M PNEUMO IGG SER IA-ACNC: NOT DETECTED
MCH RBC QN AUTO: 30.7 PG (ref 26.6–33)
MCHC RBC AUTO-ENTMCNC: 32.4 G/DL (ref 31.5–35.7)
MCV RBC AUTO: 94.8 FL (ref 79–97)
MONOCYTES # BLD AUTO: 0.82 10*3/MM3 (ref 0.1–0.9)
MONOCYTES NFR BLD AUTO: 6.3 % (ref 5–12)
NEUTROPHILS NFR BLD AUTO: 62.1 % (ref 42.7–76)
NEUTROPHILS NFR BLD AUTO: 8.15 10*3/MM3 (ref 1.7–7)
NRBC BLD AUTO-RTO: 0 /100 WBC (ref 0–0.2)
PLATELET # BLD AUTO: 235 10*3/MM3 (ref 140–450)
PMV BLD AUTO: 10.2 FL (ref 6–12)
POTASSIUM SERPL-SCNC: 4 MMOL/L (ref 3.5–5.2)
PROCALCITONIN SERPL-MCNC: 0.82 NG/ML (ref 0–0.25)
PROT SERPL-MCNC: 6.7 G/DL (ref 6–8.5)
RBC # BLD AUTO: 4.07 10*6/MM3 (ref 3.77–5.28)
RHINOVIRUS RNA SPEC NAA+PROBE: NOT DETECTED
RSV RNA NPH QL NAA+NON-PROBE: NOT DETECTED
SARS-COV-2 RNA NPH QL NAA+NON-PROBE: NOT DETECTED
SODIUM SERPL-SCNC: 140 MMOL/L (ref 136–145)
WBC NRBC COR # BLD AUTO: 13.1 10*3/MM3 (ref 3.4–10.8)

## 2024-04-15 PROCEDURE — 70450 CT HEAD/BRAIN W/O DYE: CPT

## 2024-04-15 PROCEDURE — 99285 EMERGENCY DEPT VISIT HI MDM: CPT

## 2024-04-15 PROCEDURE — 25010000002 LEVETRIRACETAM PER 10 MG: Performed by: EMERGENCY MEDICINE

## 2024-04-15 PROCEDURE — 36415 COLL VENOUS BLD VENIPUNCTURE: CPT

## 2024-04-15 PROCEDURE — 80053 COMPREHEN METABOLIC PANEL: CPT | Performed by: EMERGENCY MEDICINE

## 2024-04-15 PROCEDURE — 84145 PROCALCITONIN (PCT): CPT | Performed by: EMERGENCY MEDICINE

## 2024-04-15 PROCEDURE — 83605 ASSAY OF LACTIC ACID: CPT | Performed by: EMERGENCY MEDICINE

## 2024-04-15 PROCEDURE — 25010000002 HYDROMORPHONE PER 4 MG: Performed by: EMERGENCY MEDICINE

## 2024-04-15 PROCEDURE — 73610 X-RAY EXAM OF ANKLE: CPT

## 2024-04-15 PROCEDURE — 25010000002 AMPICILLIN-SULBACTAM PER 1.5 G: Performed by: EMERGENCY MEDICINE

## 2024-04-15 PROCEDURE — 87040 BLOOD CULTURE FOR BACTERIA: CPT | Performed by: EMERGENCY MEDICINE

## 2024-04-15 PROCEDURE — 25010000002 ONDANSETRON PER 1 MG: Performed by: EMERGENCY MEDICINE

## 2024-04-15 PROCEDURE — 73630 X-RAY EXAM OF FOOT: CPT

## 2024-04-15 PROCEDURE — 85025 COMPLETE CBC W/AUTO DIFF WBC: CPT | Performed by: EMERGENCY MEDICINE

## 2024-04-15 PROCEDURE — 71045 X-RAY EXAM CHEST 1 VIEW: CPT

## 2024-04-15 PROCEDURE — 0202U NFCT DS 22 TRGT SARS-COV-2: CPT | Performed by: EMERGENCY MEDICINE

## 2024-04-15 RX ORDER — LEVETIRACETAM 500 MG/5ML
1000 INJECTION, SOLUTION, CONCENTRATE INTRAVENOUS ONCE
Status: COMPLETED | OUTPATIENT
Start: 2024-04-15 | End: 2024-04-15

## 2024-04-15 RX ORDER — NITROGLYCERIN 0.4 MG/1
0.4 TABLET SUBLINGUAL
Status: DISCONTINUED | OUTPATIENT
Start: 2024-04-15 | End: 2024-04-20 | Stop reason: HOSPADM

## 2024-04-15 RX ORDER — POLYETHYLENE GLYCOL 3350 17 G/17G
17 POWDER, FOR SOLUTION ORAL DAILY PRN
Status: DISCONTINUED | OUTPATIENT
Start: 2024-04-15 | End: 2024-04-20 | Stop reason: HOSPADM

## 2024-04-15 RX ORDER — BISACODYL 5 MG/1
5 TABLET, DELAYED RELEASE ORAL DAILY PRN
Status: DISCONTINUED | OUTPATIENT
Start: 2024-04-15 | End: 2024-04-20 | Stop reason: HOSPADM

## 2024-04-15 RX ORDER — HYDROCODONE BITARTRATE AND ACETAMINOPHEN 5; 325 MG/1; MG/1
1 TABLET ORAL EVERY 4 HOURS PRN
Status: DISCONTINUED | OUTPATIENT
Start: 2024-04-15 | End: 2024-04-16

## 2024-04-15 RX ORDER — ONDANSETRON 4 MG/1
4 TABLET, ORALLY DISINTEGRATING ORAL EVERY 6 HOURS PRN
Status: DISCONTINUED | OUTPATIENT
Start: 2024-04-15 | End: 2024-04-20 | Stop reason: HOSPADM

## 2024-04-15 RX ORDER — SENNOSIDES 8.6 MG
650 CAPSULE ORAL
COMMUNITY
End: 2024-04-20 | Stop reason: HOSPADM

## 2024-04-15 RX ORDER — BISACODYL 10 MG
10 SUPPOSITORY, RECTAL RECTAL DAILY PRN
Status: DISCONTINUED | OUTPATIENT
Start: 2024-04-15 | End: 2024-04-20 | Stop reason: HOSPADM

## 2024-04-15 RX ORDER — AMOXICILLIN 250 MG
2 CAPSULE ORAL 2 TIMES DAILY PRN
Status: DISCONTINUED | OUTPATIENT
Start: 2024-04-15 | End: 2024-04-20 | Stop reason: HOSPADM

## 2024-04-15 RX ORDER — ONDANSETRON 2 MG/ML
4 INJECTION INTRAMUSCULAR; INTRAVENOUS ONCE
Status: COMPLETED | OUTPATIENT
Start: 2024-04-15 | End: 2024-04-15

## 2024-04-15 RX ORDER — ACETAMINOPHEN 325 MG/1
650 TABLET ORAL EVERY 4 HOURS PRN
Status: DISCONTINUED | OUTPATIENT
Start: 2024-04-15 | End: 2024-04-16

## 2024-04-15 RX ORDER — OXYCODONE HYDROCHLORIDE AND ACETAMINOPHEN 5; 325 MG/1; MG/1
2 TABLET ORAL ONCE
Status: COMPLETED | OUTPATIENT
Start: 2024-04-15 | End: 2024-04-15

## 2024-04-15 RX ORDER — ONDANSETRON 2 MG/ML
4 INJECTION INTRAMUSCULAR; INTRAVENOUS EVERY 6 HOURS PRN
Status: DISCONTINUED | OUTPATIENT
Start: 2024-04-15 | End: 2024-04-20 | Stop reason: HOSPADM

## 2024-04-15 RX ORDER — ALUMINA, MAGNESIA, AND SIMETHICONE 2400; 2400; 240 MG/30ML; MG/30ML; MG/30ML
15 SUSPENSION ORAL EVERY 6 HOURS PRN
Status: DISCONTINUED | OUTPATIENT
Start: 2024-04-15 | End: 2024-04-20 | Stop reason: HOSPADM

## 2024-04-15 RX ORDER — HYDROMORPHONE HYDROCHLORIDE 1 MG/ML
0.5 INJECTION, SOLUTION INTRAMUSCULAR; INTRAVENOUS; SUBCUTANEOUS ONCE
Status: COMPLETED | OUTPATIENT
Start: 2024-04-15 | End: 2024-04-15

## 2024-04-15 RX ORDER — SODIUM CHLORIDE 0.9 % (FLUSH) 0.9 %
10 SYRINGE (ML) INJECTION AS NEEDED
Status: DISCONTINUED | OUTPATIENT
Start: 2024-04-15 | End: 2024-04-20 | Stop reason: HOSPADM

## 2024-04-15 RX ADMIN — OXYCODONE HYDROCHLORIDE AND ACETAMINOPHEN 2 TABLET: 5; 325 TABLET ORAL at 20:43

## 2024-04-15 RX ADMIN — LEVETIRACETAM 1000 MG: 100 INJECTION INTRAVENOUS at 22:40

## 2024-04-15 RX ADMIN — AMPICILLIN SODIUM AND SULBACTAM SODIUM 1.5 G: 1; .5 INJECTION, POWDER, FOR SOLUTION INTRAMUSCULAR; INTRAVENOUS at 22:44

## 2024-04-15 RX ADMIN — HYDROMORPHONE HYDROCHLORIDE 0.5 MG: 1 INJECTION, SOLUTION INTRAMUSCULAR; INTRAVENOUS; SUBCUTANEOUS at 22:40

## 2024-04-15 RX ADMIN — ONDANSETRON 4 MG: 2 INJECTION INTRAMUSCULAR; INTRAVENOUS at 20:43

## 2024-04-15 NOTE — Clinical Note
Level of Care: Telemetry [5]   Diagnosis: Recurrent seizures [890101]   Admitting Physician: LEONOR ULLOA LOC [[id number]]   Attending Physician: LEONOR ULLOA LOC [[id number]]   Certification: I Certify That Inpatient Hospital Services Are Medically Necessary For Greater Than 2 Midnights

## 2024-04-15 NOTE — ED PROVIDER NOTES
EMERGENCY DEPARTMENT ENCOUNTER  Room Number:  40/40  PCP: Sahil Cornelius MD  Independent Historians: Patient, EMS who brought patient      HPI:  Chief Complaint: had concerns including Seizures and Fall.     A complete HPI/ROS/PMH/PSH/SH/FH are unobtainable due to:   Chronic or social conditions impacting patient care (Social Determinants of Health):       Context: Belen Allen is a 43 y.o. female with a medical history of seizure disorder, chronic back pain, antiphospholipid syndrome who presents to the ED c/o acute multiple complaints.  Patient is complaining of recurrent seizures.  She had a seizure last night with urinary incontinence which is unusual for her.  She states she has had probably 3-4 seizures through the day today.  She did injure her right foot and ankle during one of the seizures.  She also complains of fever as high as 105 earlier this morning.  She does report ongoing cough which is occasionally productive of colored sputum.  She has also had ongoing dental pain and is awaiting dental evaluation for poor dentition.  Patient also describing dysuria ongoing over the last several days.      Review of prior external notes (non-ED) -and- Review of prior external test results outside of this encounter:   I reviewed prior medical records note patient was hospitalized here from 3/23 through 3/29 with intractable back pain.  Patient's history was complicated by multi lobar pneumonia and dental infection.  Patient has history of antiphospholipid syndrome but has been noncompliant with warfarin.  Also documented noncompliance with Keppra.      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    Abdominal pain 11/29/2020    Acute bilateral low back pain 11/29/2020    Antiphospholipid antibody positive 11/29/2020    On  anticoagulant therapy 11/29/2020    Acute pain of left knee 11/29/2020    Vitamin D deficiency 11/30/2020    Folate deficiency 12/01/2020    Pain of back and left lower extremity 12/02/2020    Common bile duct dilatation 05/31/2021    Elevated LFTs 06/01/2021    Right upper quadrant abdominal pain 06/01/2021    Obesity (BMI 30-39.9) 04/18/2019    Tobacco abuse 06/02/2021    GERD without esophagitis 06/02/2021    Intractable abdominal pain 07/05/2021    Class 2 severe obesity with serious comorbidity in adult 07/05/2021    Constipation 07/05/2021    History of ERCP 07/05/2021    Chronic pain syndrome 07/05/2021    Seizures 01/01/2022    Syncope 01/01/2022    Lumbar back pain with radiculopathy affecting left lower extremity 01/05/2022    Right upper quadrant pain 11/23/2022    Uncontrolled pain 12/17/2022    Intractable back pain 12/17/2022    Sciatica of right side 12/20/2022    Migraine 12/20/2022    Mobility impaired 12/20/2022    Seizure 12/31/2022    Opioid dependence 01/16/2023    Clostridioides difficile diarrhea 01/18/2023    Pulmonary edema 07/27/2023    Acute hypoxemic respiratory failure 07/29/2023    Other dysphagia 08/01/2023    Vomiting 08/02/2023    Sepsis 03/23/2024    Dental abscess 03/24/2024    Bilateral cellulitis of lower leg 03/24/2024    Pneumonia 03/24/2024    Abnormal MRI, thoracic spine 03/27/2024     Resolved Ambulatory Problems     Diagnosis Date Noted    Choledocholithiasis 06/02/2021    Rhabdomyolysis 01/01/2022    Encephalopathy 01/01/2022    Hypokalemia 12/18/2022    Opioid withdrawal 07/12/2023     Past Medical History:   Diagnosis Date    Abnormal Pap smear of cervix     Ankle fracture 2013    H/O Elevated liver enzymes     History of chronic back pain     History of migraine     History of urinary tract infection     Injury of back     PONV (postoperative nausea and vomiting)     Seasonal allergies          PAST SURGICAL HISTORY  Past Surgical History:   Procedure Laterality Date     BACK SURGERY  2006    fusion L4, L5      CHOLECYSTECTOMY  2014    DILATATION AND CURETTAGE  2009    ENDOSCOPY N/A 11/27/2022    Procedure: ESOPHAGOGASTRODUODENOSCOPY with biopsy;  Surgeon: Christiana Mann MD;  Location: [facility] ENDOSCOPY;  Service: Gastroenterology;  Laterality: N/A;  gastritis, duodenitis, irregular z line    ERCP N/A 6/3/2021    Procedure: ENDOSCOPIC RETROGRADE CHOLANGIOPANCREATOGRAPHY WITH SPHINCTEROTOMY, DILATION WITH BALLOON CLEARANCE  (12MM-15MM);  Surgeon: Bjorn Flannery MD;  Location: [facility] ENDOSCOPY;  Service: Gastroenterology;  Laterality: N/A;  DILATED COMMON BILE DUCT    SKIN SURGERY      TUBAL ABDOMINAL LIGATION  2013    WISDOM TOOTH EXTRACTION  2018    x2         FAMILY HISTORY  Family History   Problem Relation Age of Onset    Stroke Mother     Allergic rhinitis Mother     Allergic rhinitis Sister     Breast cancer Maternal Aunt     Cancer Maternal Grandmother     Allergic rhinitis Paternal Grandfather     Cancer Paternal Grandfather     Prostate cancer Paternal Grandfather          SOCIAL HISTORY  Social History     Socioeconomic History    Marital status: Single    Number of children: 2   Tobacco Use    Smoking status: Every Day     Current packs/day: 1.00     Types: Cigarettes    Smokeless tobacco: Never   Vaping Use    Vaping status: Never Used   Substance and Sexual Activity    Alcohol use: Not Currently    Drug use: Not Currently    Sexual activity: Defer         ALLERGIES  Morphine and Lidocaine      REVIEW OF SYSTEMS  Review of Systems   Constitutional:  Positive for fever (Reported temperature of 105 earlier this morning).   Respiratory:  Positive for cough. Negative for shortness of breath.    Cardiovascular:  Negative for chest pain.   Gastrointestinal:  Positive for nausea and vomiting (Vomiting x 1 today).   Musculoskeletal:  Positive for arthralgias (Pain at the right foot and ankle increased after fall yesterday.) and back pain (Chronic back pain).    Neurological:  Positive for seizures.   All other systems reviewed and are negative.    Included in HPI  All systems reviewed and negative except for those discussed in HPI.      PHYSICAL EXAM    I have reviewed the triage vital signs and nursing notes.    ED Triage Vitals [04/15/24 1937]   Temp Heart Rate Resp BP SpO2   98 °F (36.7 °C) 81 19 147/75 97 %      Temp src Heart Rate Source Patient Position BP Location FiO2 (%)   Tympanic Monitor Lying Right arm --       Physical Exam  GENERAL: Alert female who looks older than her stated age.  Triage vitals notable for temperature of 98.  Heart rate blood pressure and O2 sats are benign  SKIN: Warm, dry-no obvious worrisome rashes are noted  HENT: Normocephalic, atraumatic  EYES: no scleral icterus  CV: regular rhythm, regular rate-no murmur  RESPIRATORY: normal effort, lungs clear-O2 sats upper 90s room air  ABDOMEN: soft, nontender, nondistended  MUSCULOSKELETAL: no deformity-examination of the right foot and ankle reveals diffuse tenderness to palpation without noted bony deformity.  There is no laceration or significant discoloration.  Distal strength sensation pulses are grossly intact.  NEURO: alert, moves all extremities, follows commands      LAB RESULTS  Recent Results (from the past 24 hour(s))   Comprehensive Metabolic Panel    Collection Time: 04/15/24  8:42 PM    Specimen: Blood   Result Value Ref Range    Glucose 93 65 - 99 mg/dL    BUN 8 6 - 20 mg/dL    Creatinine 0.69 0.57 - 1.00 mg/dL    Sodium 140 136 - 145 mmol/L    Potassium 4.0 3.5 - 5.2 mmol/L    Chloride 105 98 - 107 mmol/L    CO2 25.8 22.0 - 29.0 mmol/L    Calcium 9.0 8.6 - 10.5 mg/dL    Total Protein 6.7 6.0 - 8.5 g/dL    Albumin 4.1 3.5 - 5.2 g/dL    ALT (SGPT) 37 (H) 1 - 33 U/L    AST (SGOT) 31 1 - 32 U/L    Alkaline Phosphatase 157 (H) 39 - 117 U/L    Total Bilirubin 0.2 0.0 - 1.2 mg/dL    Globulin 2.6 gm/dL    A/G Ratio 1.6 g/dL    BUN/Creatinine Ratio 11.6 7.0 - 25.0    Anion Gap 9.2 5.0  - 15.0 mmol/L    eGFR 111.3 >60.0 mL/min/1.73   Procalcitonin    Collection Time: 04/15/24  8:42 PM    Specimen: Blood   Result Value Ref Range    Procalcitonin 0.82 (H) 0.00 - 0.25 ng/mL   Lactic Acid, Plasma    Collection Time: 04/15/24  8:42 PM    Specimen: Blood   Result Value Ref Range    Lactate 1.1 0.5 - 2.0 mmol/L   Respiratory Panel PCR w/COVID-19(SARS-CoV-2) LETY/REESE/RASHAWN/PAD/COR/CHANEL In-House, NP Swab in [facility], 2 HR TAT - Swab, Nasopharynx    Collection Time: 04/15/24  8:42 PM    Specimen: Nasopharynx; Swab   Result Value Ref Range    ADENOVIRUS, PCR Not Detected Not Detected    Coronavirus 229E Not Detected Not Detected    Coronavirus HKU1 Not Detected Not Detected    Coronavirus NL63 Not Detected Not Detected    Coronavirus OC43 Not Detected Not Detected    COVID19 Not Detected Not Detected - Ref. Range    Human Metapneumovirus Not Detected Not Detected    Human Rhinovirus/Enterovirus Not Detected Not Detected    Influenza A PCR Not Detected Not Detected    Influenza B PCR Not Detected Not Detected    Parainfluenza Virus 1 Not Detected Not Detected    Parainfluenza Virus 2 Not Detected Not Detected    Parainfluenza Virus 3 Not Detected Not Detected    Parainfluenza Virus 4 Not Detected Not Detected    RSV, PCR Not Detected Not Detected    Bordetella pertussis pcr Not Detected Not Detected    Bordetella parapertussis PCR Not Detected Not Detected    Chlamydophila pneumoniae PCR Not Detected Not Detected    Mycoplasma pneumo by PCR Not Detected Not Detected   CBC Auto Differential    Collection Time: 04/15/24  8:42 PM    Specimen: Blood   Result Value Ref Range    WBC 13.10 (H) 3.40 - 10.80 10*3/mm3    RBC 4.07 3.77 - 5.28 10*6/mm3    Hemoglobin 12.5 12.0 - 15.9 g/dL    Hematocrit 38.6 34.0 - 46.6 %    MCV 94.8 79.0 - 97.0 fL    MCH 30.7 26.6 - 33.0 pg    MCHC 32.4 31.5 - 35.7 g/dL    RDW 14.0 12.3 - 15.4 %    RDW-SD 47.8 37.0 - 54.0 fl    MPV 10.2 6.0 - 12.0 fL    Platelets 235 140 - 450 10*3/mm3     Neutrophil % 62.1 42.7 - 76.0 %    Lymphocyte % 30.0 19.6 - 45.3 %    Monocyte % 6.3 5.0 - 12.0 %    Eosinophil % 0.7 0.3 - 6.2 %    Basophil % 0.4 0.0 - 1.5 %    Immature Grans % 0.5 0.0 - 0.5 %    Neutrophils, Absolute 8.15 (H) 1.70 - 7.00 10*3/mm3    Lymphocytes, Absolute 3.93 (H) 0.70 - 3.10 10*3/mm3    Monocytes, Absolute 0.82 0.10 - 0.90 10*3/mm3    Eosinophils, Absolute 0.09 0.00 - 0.40 10*3/mm3    Basophils, Absolute 0.05 0.00 - 0.20 10*3/mm3    Immature Grans, Absolute 0.06 (H) 0.00 - 0.05 10*3/mm3    nRBC 0.0 0.0 - 0.2 /100 WBC         RADIOLOGY  XR Chest 1 View, XR Ankle 3+ View Right, XR Foot 3+ View Right    Result Date: 4/15/2024  XR CHEST 1 VW-, XR ANKLE 3+ VW RIGHT-, XR FOOT 3+ VW RIGHT-  HISTORY: Female who is 43 years-old, trauma, cough and fever  TECHNIQUE: Frontal view of the chest, 3 views of the right foot, 3 views of the right ankle  COMPARISON: Chest x-ray from 7/30/2023  FINDINGS:  Chest x-ray: Heart, mediastinum and pulmonary vasculature are unremarkable. No focal pulmonary consolidation, pleural effusion, or pneumothorax. No acute osseous process.  Right foot and ankle: No acute fracture, erosion, or dislocation is identified. Ankle mortise appears preserved. Mild calcaneal spurring is present. If further imaging evaluation of the right foot and ankle is indicated, MRI could be considered.      As described.    This report was finalized on 4/15/2024 8:50 PM by Dr. Yonathan Neal M.D on Workstation: [id number]      CT Head Without Contrast    Result Date: 4/15/2024  Emergency CT scan of the head without contrast on 4/15/2024  CLINICAL HISTORY: Patient had a seizure and fell. The patient is anticoagulated on Eliquis  TECHNIQUE: Spiral CT images were obtained from the base of the skull to the vertex without intra venous contrast. The images were reformatted and are submitted in 3 mm thick axial, sagittal and coronal CT sections with brain algorithm and 2 mm thick axial CT sections with  high-resolution bone algorithm.  This is correlated to a prior head CT from [facility] on 1/24/2023.  FINDINGS: The brain parenchyma is normal in attenuation. The ventricles are normal in size. I see no focal mass effect. There is no midline shift. No extra axial fluid collections are identified. There is no evidence of acute intracranial hemorrhage. No acute skull fracture is identified. The calvarium and the skull base are normal in appearance. The paranasal sinuses and the mastoid air cells and the middle ear cavities are clear.      1. Normal head CT. Specifically, no acute skull fracture or intracranial hemorrhage is identified.  Radiation dose reduction techniques were utilized, including automated exposure control and exposure modulation based on body size.   This report was finalized on 4/15/2024 8:46 PM by Dr. Robert Valdivia M.D on Workstation: [id number]         MEDICATIONS GIVEN IN ER  Medications   sodium chloride 0.9 % flush 10 mL (has no administration in time range)   HYDROmorphone (DILAUDID) injection 0.5 mg (has no administration in time range)   ampicillin-sulbactam (UNASYN) 1.5 g in sodium chloride 0.9 % 100 mL MBP (has no administration in time range)   levETIRAcetam (KEPPRA) injection 1,000 mg (has no administration in time range)   sodium chloride 0.9 % flush 10 mL (has no administration in time range)   nitroglycerin (NITROSTAT) SL tablet 0.4 mg (has no administration in time range)   acetaminophen (TYLENOL) tablet 650 mg (has no administration in time range)   sennosides-docusate (PERICOLACE) 8.6-50 MG per tablet 2 tablet (has no administration in time range)     And   polyethylene glycol (MIRALAX) packet 17 g (has no administration in time range)     And   bisacodyl (DULCOLAX) EC tablet 5 mg (has no administration in time range)     And   bisacodyl (DULCOLAX) suppository 10 mg (has no administration in time range)   ondansetron ODT (ZOFRAN-ODT) disintegrating tablet 4 mg  (has no administration in time range)     Or   ondansetron (ZOFRAN) injection 4 mg (has no administration in time range)   aluminum-magnesium hydroxide-simethicone (MAALOX MAX) 400-400-40 MG/5ML suspension 15 mL (has no administration in time range)   HYDROcodone-acetaminophen (NORCO) 5-325 MG per tablet 1 tablet (has no administration in time range)   ondansetron (ZOFRAN) injection 4 mg (4 mg Intravenous Given 4/15/24 2043)   oxyCODONE-acetaminophen (PERCOCET) 5-325 MG per tablet 2 tablet (2 tablets Oral Given 4/15/24 2043)         ORDERS PLACED DURING THIS VISIT:  Orders Placed This Encounter   Procedures    Blood Culture - Blood,    Blood Culture - Blood,    Respiratory Panel PCR w/COVID-19(SARS-CoV-2) LETY/REESE/RASHAWN/PAD/COR/CHANEL In-House, NP Swab in UTM/VTM, 2 HR TAT - Swab, Nasopharynx    XR Chest 1 View    XR Ankle 3+ View Right    XR Foot 3+ View Right    CT Head Without Contrast    Comprehensive Metabolic Panel    Procalcitonin    Lactic Acid, Plasma    CBC Auto Differential    Diet: Cardiac; Healthy Heart (2-3 Na+); Fluid Consistency: Thin (IDDSI 0)    Vital Signs    Intake & Output    Weigh Patient    Oral Care    Place Sequential Compression Device    Maintain Sequential Compression Device    Maintain IV Access    Telemetry - Place Orders & Notify Provider of Results When Patient Experiences Acute Chest Pain, Dysrhythmia or Respiratory Distress    May Be Off Telemetry for Tests    Activity - Ad Agata    Code Status and Medical Interventions:    [facility] (on-call MD unless specified) Details    Incentive Spirometry    Insert Peripheral IV    Inpatient Admission    CBC & Differential         OUTPATIENT MEDICATION MANAGEMENT:  Current Facility-Administered Medications Ordered in Epic   Medication Dose Route Frequency Provider Last Rate Last Admin    acetaminophen (TYLENOL) tablet 650 mg  650 mg Oral Q4H PRN Delgado Aj, NELSON        aluminum-magnesium hydroxide-simethicone (MAALOX MAX) 400-400-40 MG/5ML  suspension 15 mL  15 mL Oral Q6H PRN Delgado Aj APRN        ampicillin-sulbactam (UNASYN) 1.5 g in sodium chloride 0.9 % 100 mL MBP  1.5 g Intravenous Once Fantasma, Gold GREER MD        sennosides-docusate (PERICOLACE) 8.6-50 MG per tablet 2 tablet  2 tablet Oral BID PRN Delgado Aj APRN        And    polyethylene glycol (MIRALAX) packet 17 g  17 g Oral Daily PRN Delgado Aj APRN        And    bisacodyl (DULCOLAX) EC tablet 5 mg  5 mg Oral Daily PRN Delgado Aj APRN        And    bisacodyl (DULCOLAX) suppository 10 mg  10 mg Rectal Daily PRN Delgado Aj APRN        HYDROcodone-acetaminophen (NORCO) 5-325 MG per tablet 1 tablet  1 tablet Oral Q4H PRN Delgado Aj APRN        HYDROmorphone (DILAUDID) injection 0.5 mg  0.5 mg Intravenous Once Fantasma, Gold GREER MD        levETIRAcetam (KEPPRA) injection 1,000 mg  1,000 mg Intravenous Once [place], Gold GREER MD        nitroglycerin (NITROSTAT) SL tablet 0.4 mg  0.4 mg Sublingual Q5 Min PRN Delgado Aj APRN        ondansetron ODT (ZOFRAN-ODT) disintegrating tablet 4 mg  4 mg Oral Q6H PRN Dlegado Aj APRN        Or    ondansetron (ZOFRAN) injection 4 mg  4 mg Intravenous Q6H PRN Delgado Aj APRN        sodium chloride 0.9 % flush 10 mL  10 mL Intravenous PRN FantasmaGold aguilar MD        sodium chloride 0.9 % flush 10 mL  10 mL Intravenous PRN Delgado Aj APRN         Current Outpatient Medications Ordered in Epic   Medication Sig Dispense Refill    apixaban (ELIQUIS) 5 MG tablet tablet Take 1 tablet by mouth Every 12 (Twelve) Hours for 30 days. Indications: Other - full anticoagulation 60 tablet 0    cloNIDine (CATAPRES) 0.1 MG tablet Take 1 tablet by mouth Daily.      cyanocobalamin (VITAMIN B-12) 1000 MCG tablet Take 1 tablet by mouth Daily for 30 days. 30 tablet 0    FLUoxetine (PROzac) 40 MG capsule Take 60 mg by mouth Every Night.      folic acid (FOLVITE) 1 MG tablet Take 1 tablet by  mouth Daily.      gabapentin (NEURONTIN) 300 MG capsule Take 1 capsule by mouth Every 12 (Twelve) Hours for 7 days. 14 capsule 0    l-methylfolate 7.5 MG tablet tablet Take 1 tablet by mouth Daily.      levETIRAcetam (KEPPRA) 1000 MG tablet Take 1 tablet by mouth Every 12 (Twelve) Hours for 30 days. 60 tablet 0    melatonin 5 MG tablet tablet Take 2 tablets by mouth Every Night. Patient taes 20 mg at home      naloxone (NARCAN) 4 MG/0.1ML nasal spray Call 911. Don't prime. [place] in 1 nostril for overdose. Repeat in 2-3 minutes in other nostril if no or minimal breathing/responsiveness. 2 each 0    sennosides-docusate (PERICOLACE) 8.6-50 MG per tablet Take 2 tablets by mouth Daily for 30 days. Hold for loose stools. 60 tablet 0    SUMAtriptan (IMITREX) 100 MG tablet Take 1 tablet by mouth Every 2 (Two) Hours As Needed for Migraine.      traZODone (DESYREL) 50 MG tablet Take 1-2 tablets by mouth At Night As Needed for sleep 30 tablet 3    vitamin D (ERGOCALCIFEROL) 1.25 MG (66681 UT) capsule capsule Take 1 capsule by mouth Every 7 (Seven) Days for 30 days. 4 capsule 0         PROCEDURES  Procedures            PROGRESS, DATA ANALYSIS, CONSULTS, AND MEDICAL DECISION MAKING  All labs have been independently interpreted by me.  All radiology studies have been reviewed by me. All EKG's have been independently viewed and interpreted by me.  Discussion below represents my analysis of pertinent findings related to patient's condition, differential diagnosis, treatment plan and final disposition.    Differential diagnosis includes but is not limited to infection of unclear source, worsening of chronic seizures, traumatic injury related to seizure and fall.      ED Course as of 04/15/24 2237   Mon Apr 15, 2024   2056 CT scan of the brain independently interpreted by me shows no obvious fracture or hemorrhage. [DB]   2057 Chest x-ray shows no obvious acute pneumonia.    Plain films of the right foot and ankle independently  interpreted by me show no obvious fracture or dislocation.  There are some arthritic changes noted. [DB]   2139 Patient with elevated procalcitonin of 0.82.  Suspect infection is likely dental in nature and will go ahead and start IV Unasyn, contact hospitalist about admission. [DB]   2237 Discussed treatment and evaluation of the patient with Mica from [facility] who will admit on behalf of Dr. Nadir Suazo. [DB]      ED Course User Index  [DB] Gold Meek MD             AS OF 22:37 EDT VITALS:    BP - 121/73  HR - 80  TEMP - 98 °F (36.7 °C) (Tympanic)  O2 SATS - 96%    COMPLEXITY OF CARE  Complicated patient with multiple medical problems presents with recent fever to 105.  She is also had multiple seizures over the last several days despite reporting compliance with Keppra.    Please see ED course for my interpretation of testing including CT scan of brain, x-rays of foot ankle and chest.  No obvious fracture or noted infection.    Blood work did show elevated white count and procalcitonin worrisome for likely underlying infection.  I suspect the infection is likely dental in nature and will prescribe IV Unasyn.    Given patient's recurrent seizures will treat with IV Keppra and admit for neurology consultation and evaluation.      DIAGNOSIS  Final diagnoses:   Recurrent seizures   Dental infection   Antiphospholipid antibody positive   Right foot pain         DISPOSITION  ED Disposition       ED Disposition   Decision to Admit    Condition   --    Comment   Level of Care: Telemetry [5]   Diagnosis: Recurrent seizures [767000]   Admitting Physician: NADIR SUAZO LOC [[id number]]   Attending Physician: NADIR SUAZO LOC [[id number]]   Certification: I Certify That Inpatient Hospital Services Are Medically Necessary For Greater Than 2 Midnights                  Please note that portions of this document were completed with a voice recognition program.    Note Disclaimer: At [facility], we believe that sharing information  builds trust and better relationships. You are receiving this note because you recently visited [facility]. It is possible you will see health information before a provider has talked with you about it. This kind of information can be easy to misunderstand. To help you fully understand what it means for your health, we urge you to discuss this note with your provider.         Gold Meek MD  04/15/24 3059

## 2024-04-16 PROBLEM — I10 HTN (HYPERTENSION): Status: ACTIVE | Noted: 2024-04-16

## 2024-04-16 PROBLEM — D68.61 ANTIPHOSPHOLIPID SYNDROME: Status: ACTIVE | Noted: 2024-04-16

## 2024-04-16 LAB
AMPHET+METHAMPHET UR QL: NEGATIVE
BARBITURATES UR QL SCN: NEGATIVE
BENZODIAZ UR QL SCN: NEGATIVE
CANNABINOIDS SERPL QL: NEGATIVE
COCAINE UR QL: NEGATIVE
FENTANYL UR-MCNC: POSITIVE NG/ML
HCG SERPL QL: NEGATIVE
METHADONE UR QL SCN: NEGATIVE
OPIATES UR QL: NEGATIVE
OXYCODONE UR QL SCN: POSITIVE

## 2024-04-16 PROCEDURE — 80307 DRUG TEST PRSMV CHEM ANLYZR: CPT | Performed by: NURSE PRACTITIONER

## 2024-04-16 PROCEDURE — 25010000002 AMPICILLIN-SULBACTAM PER 1.5 G

## 2024-04-16 PROCEDURE — 99223 1ST HOSP IP/OBS HIGH 75: CPT | Performed by: INTERNAL MEDICINE

## 2024-04-16 PROCEDURE — 25010000002 AMPICILLIN-SULBACTAM PER 1.5 G: Performed by: NURSE PRACTITIONER

## 2024-04-16 PROCEDURE — 25010000002 KETOROLAC TROMETHAMINE PER 15 MG: Performed by: INTERNAL MEDICINE

## 2024-04-16 PROCEDURE — G0378 HOSPITAL OBSERVATION PER HR: HCPCS

## 2024-04-16 PROCEDURE — 25010000002 HYDROMORPHONE PER 4 MG: Performed by: NURSE PRACTITIONER

## 2024-04-16 PROCEDURE — 90791 PSYCH DIAGNOSTIC EVALUATION: CPT | Performed by: SOCIAL WORKER

## 2024-04-16 PROCEDURE — 84703 CHORIONIC GONADOTROPIN ASSAY: CPT | Performed by: INTERNAL MEDICINE

## 2024-04-16 PROCEDURE — 25010000002 ONDANSETRON PER 1 MG: Performed by: NURSE PRACTITIONER

## 2024-04-16 RX ORDER — AMPICILLIN AND SULBACTAM 2; 1 G/1; G/1
INJECTION, POWDER, FOR SOLUTION INTRAMUSCULAR; INTRAVENOUS
Status: COMPLETED
Start: 2024-04-16 | End: 2024-04-16

## 2024-04-16 RX ORDER — HYDROCODONE BITARTRATE AND ACETAMINOPHEN 5; 325 MG/1; MG/1
1 TABLET ORAL EVERY 6 HOURS PRN
Status: DISCONTINUED | OUTPATIENT
Start: 2024-04-16 | End: 2024-04-16

## 2024-04-16 RX ORDER — LEVETIRACETAM 500 MG/1
1000 TABLET ORAL EVERY 12 HOURS SCHEDULED
Status: DISCONTINUED | OUTPATIENT
Start: 2024-04-16 | End: 2024-04-17

## 2024-04-16 RX ORDER — CHOLECALCIFEROL (VITAMIN D3) 125 MCG
10 CAPSULE ORAL NIGHTLY
Status: DISCONTINUED | OUTPATIENT
Start: 2024-04-16 | End: 2024-04-20 | Stop reason: HOSPADM

## 2024-04-16 RX ORDER — ACETAMINOPHEN 500 MG
1000 TABLET ORAL EVERY 8 HOURS
Status: DISCONTINUED | OUTPATIENT
Start: 2024-04-16 | End: 2024-04-16

## 2024-04-16 RX ORDER — KETOROLAC TROMETHAMINE 15 MG/ML
15 INJECTION, SOLUTION INTRAMUSCULAR; INTRAVENOUS EVERY 6 HOURS PRN
Status: DISCONTINUED | OUTPATIENT
Start: 2024-04-16 | End: 2024-04-16

## 2024-04-16 RX ORDER — TRAMADOL HYDROCHLORIDE 50 MG/1
50 TABLET ORAL EVERY 6 HOURS PRN
Status: DISCONTINUED | OUTPATIENT
Start: 2024-04-16 | End: 2024-04-17

## 2024-04-16 RX ORDER — CLONIDINE HYDROCHLORIDE 0.1 MG/1
0.1 TABLET ORAL DAILY
Status: DISCONTINUED | OUTPATIENT
Start: 2024-04-16 | End: 2024-04-20 | Stop reason: HOSPADM

## 2024-04-16 RX ORDER — TRAZODONE HYDROCHLORIDE 50 MG/1
50 TABLET ORAL NIGHTLY PRN
Status: DISCONTINUED | OUTPATIENT
Start: 2024-04-16 | End: 2024-04-20 | Stop reason: HOSPADM

## 2024-04-16 RX ORDER — HYDROMORPHONE HYDROCHLORIDE 1 MG/ML
0.5 INJECTION, SOLUTION INTRAMUSCULAR; INTRAVENOUS; SUBCUTANEOUS
Status: COMPLETED | OUTPATIENT
Start: 2024-04-16 | End: 2024-04-16

## 2024-04-16 RX ORDER — GABAPENTIN 300 MG/1
300 CAPSULE ORAL EVERY 12 HOURS SCHEDULED
Status: DISCONTINUED | OUTPATIENT
Start: 2024-04-16 | End: 2024-04-20 | Stop reason: HOSPADM

## 2024-04-16 RX ORDER — FLUOXETINE HYDROCHLORIDE 20 MG/1
60 CAPSULE ORAL NIGHTLY
Status: DISCONTINUED | OUTPATIENT
Start: 2024-04-16 | End: 2024-04-20 | Stop reason: HOSPADM

## 2024-04-16 RX ORDER — NICOTINE 21 MG/24HR
1 PATCH, TRANSDERMAL 24 HOURS TRANSDERMAL
Status: DISCONTINUED | OUTPATIENT
Start: 2024-04-16 | End: 2024-04-20 | Stop reason: HOSPADM

## 2024-04-16 RX ORDER — KETOROLAC TROMETHAMINE 15 MG/ML
15 INJECTION, SOLUTION INTRAMUSCULAR; INTRAVENOUS EVERY 6 HOURS PRN
Status: DISCONTINUED | OUTPATIENT
Start: 2024-04-16 | End: 2024-04-20 | Stop reason: HOSPADM

## 2024-04-16 RX ORDER — ACETAMINOPHEN 500 MG
1000 TABLET ORAL EVERY 8 HOURS
Status: DISCONTINUED | OUTPATIENT
Start: 2024-04-16 | End: 2024-04-17

## 2024-04-16 RX ADMIN — ONDANSETRON 4 MG: 2 INJECTION INTRAMUSCULAR; INTRAVENOUS at 10:49

## 2024-04-16 RX ADMIN — AMPICILLIN SODIUM AND SULBACTAM SODIUM 3 G: 2; 1 INJECTION, POWDER, FOR SOLUTION INTRAMUSCULAR; INTRAVENOUS at 05:42

## 2024-04-16 RX ADMIN — GABAPENTIN 300 MG: 300 CAPSULE ORAL at 08:06

## 2024-04-16 RX ADMIN — HYDROCODONE BITARTRATE AND ACETAMINOPHEN 1 TABLET: 5; 325 TABLET ORAL at 10:40

## 2024-04-16 RX ADMIN — LEVETIRACETAM 1000 MG: 500 TABLET, FILM COATED ORAL at 21:20

## 2024-04-16 RX ADMIN — HYDROCODONE BITARTRATE AND ACETAMINOPHEN 1 TABLET: 5; 325 TABLET ORAL at 00:58

## 2024-04-16 RX ADMIN — CLONIDINE HYDROCHLORIDE 0.1 MG: 0.1 TABLET ORAL at 08:06

## 2024-04-16 RX ADMIN — Medication 1 PATCH: at 08:07

## 2024-04-16 RX ADMIN — FLUOXETINE HYDROCHLORIDE 60 MG: 20 CAPSULE ORAL at 22:32

## 2024-04-16 RX ADMIN — AMPICILLIN SODIUM AND SULBACTAM SODIUM 3 G: 2; 1 INJECTION, POWDER, FOR SOLUTION INTRAMUSCULAR; INTRAVENOUS at 18:53

## 2024-04-16 RX ADMIN — APIXABAN 5 MG: 5 TABLET, FILM COATED ORAL at 08:06

## 2024-04-16 RX ADMIN — AMPICILLIN SODIUM AND SULBACTAM SODIUM 3 G: 2; 1 INJECTION, POWDER, FOR SOLUTION INTRAMUSCULAR; INTRAVENOUS at 23:47

## 2024-04-16 RX ADMIN — HYDROMORPHONE HYDROCHLORIDE 0.5 MG: 1 INJECTION, SOLUTION INTRAMUSCULAR; INTRAVENOUS; SUBCUTANEOUS at 05:20

## 2024-04-16 RX ADMIN — APIXABAN 5 MG: 5 TABLET, FILM COATED ORAL at 21:20

## 2024-04-16 RX ADMIN — ACETAMINOPHEN 1000 MG: 500 TABLET ORAL at 12:57

## 2024-04-16 RX ADMIN — HYDROMORPHONE HYDROCHLORIDE 0.5 MG: 1 INJECTION, SOLUTION INTRAMUSCULAR; INTRAVENOUS; SUBCUTANEOUS at 08:07

## 2024-04-16 RX ADMIN — TRAMADOL HYDROCHLORIDE 50 MG: 50 TABLET ORAL at 18:52

## 2024-04-16 RX ADMIN — AMPICILLIN SODIUM AND SULBACTAM SODIUM 3 G: 2; 1 INJECTION, POWDER, FOR SOLUTION INTRAMUSCULAR; INTRAVENOUS at 10:40

## 2024-04-16 RX ADMIN — HYDROMORPHONE HYDROCHLORIDE 0.5 MG: 1 INJECTION, SOLUTION INTRAMUSCULAR; INTRAVENOUS; SUBCUTANEOUS at 03:32

## 2024-04-16 RX ADMIN — Medication 10 MG: at 22:31

## 2024-04-16 RX ADMIN — GABAPENTIN 300 MG: 300 CAPSULE ORAL at 21:20

## 2024-04-16 RX ADMIN — KETOROLAC TROMETHAMINE 15 MG: 15 INJECTION, SOLUTION INTRAMUSCULAR; INTRAVENOUS at 14:33

## 2024-04-16 RX ADMIN — LEVETIRACETAM 1000 MG: 500 TABLET, FILM COATED ORAL at 08:07

## 2024-04-16 RX ADMIN — ONDANSETRON 4 MG: 2 INJECTION INTRAMUSCULAR; INTRAVENOUS at 21:44

## 2024-04-16 NOTE — CONSULTS
"Referring Provider: NELSON Bear    Reason for Consultation: dental abscess/infection     History of present illness:  Belen Allen is a 43 y.o. who I am asked to evaluate and give opinion for \"dental abscess/infection.\"  History is obtained from the patient and review of the old medical records which I summarize/synthesize as follows: She has a past medical history of antiphospholipid antibody syndrome and opiate use.  She presented to the hospital with seizures.  Actually saw her in the hospital about 3 weeks ago when she was here and noted to have a dental infection.  I recommended that she be discharged on Augmentin 3/6/2024 and follow-up with dentistry soon as possible.    Back to this admission, it is seizures that brought her here.  However she reported ongoing mouth/dental pain.  She says her appointment at [facility] dentistry is not until May 24, 2024.  No fevers or chills.  WBC was elevated to 13 at admission.  Blood cultures were obtained and she was started on Unasyn.  ID and oral surgery have been asked to evaluate.    Past Medical History:   Diagnosis Date    Abnormal Pap smear of cervix     Ankle fracture 2013    H/O Elevated liver enzymes     History of chronic back pain     History of migraine     History of urinary tract infection     Injury of back     Opioid dependence 1/16/2023    PONV (postoperative nausea and vomiting)     Seasonal allergies     Seizure     Takes Keppra       Past Surgical History:   Procedure Laterality Date    BACK SURGERY  2006    fusion L4, L5      CHOLECYSTECTOMY  2014    DILATATION AND CURETTAGE  2009    ENDOSCOPY N/A 11/27/2022    Procedure: ESOPHAGOGASTRODUODENOSCOPY with biopsy;  Surgeon: Christiana Mann MD;  Location: [facility] ENDOSCOPY;  Service: Gastroenterology;  Laterality: N/A;  gastritis, duodenitis, irregular z line    ERCP N/A 6/3/2021    Procedure: ENDOSCOPIC RETROGRADE CHOLANGIOPANCREATOGRAPHY WITH SPHINCTEROTOMY, DILATION WITH BALLOON CLEARANCE  " (12MM-15MM);  Surgeon: Bjorn Flannery MD;  Location: [facility] ENDOSCOPY;  Service: Gastroenterology;  Laterality: N/A;  DILATED COMMON BILE DUCT    SKIN SURGERY      TUBAL ABDOMINAL LIGATION  2013    WISDOM TOOTH EXTRACTION  2018    x2       Antibiotic allergies and intolerances:  None     Medications:    Current Facility-Administered Medications:     acetaminophen (TYLENOL) tablet 650 mg, 650 mg, Oral, Q4H PRN, Delgado Aj APRN    aluminum-magnesium hydroxide-simethicone (MAALOX MAX) 400-400-40 MG/5ML suspension 15 mL, 15 mL, Oral, Q6H PRN, Delgado Aj APRN    ampicillin-sulbactam (UNASYN) 3 g in sodium chloride 0.9 % 100 mL MBP, 3 g, Intravenous, Q6H, Beatriz Hernandez APRN, 3 g at 04/16/24 0542    apixaban (ELIQUIS) tablet 5 mg, 5 mg, Oral, Q12H, Beatriz Hernandez APRN, 5 mg at 04/16/24 0806    benzocaine-menthol (CEPACOL) lozenge 1 each, 1 lozenge, Mouth/Throat, Q4H PRN, Beatriz Hernandez APRN    sennosides-docusate (PERICOLACE) 8.6-50 MG per tablet 2 tablet, 2 tablet, Oral, BID PRN **AND** polyethylene glycol (MIRALAX) packet 17 g, 17 g, Oral, Daily PRN **AND** bisacodyl (DULCOLAX) EC tablet 5 mg, 5 mg, Oral, Daily PRN **AND** bisacodyl (DULCOLAX) suppository 10 mg, 10 mg, Rectal, Daily PRN, Delgado Aj APRN    cloNIDine (CATAPRES) tablet 0.1 mg, 0.1 mg, Oral, Daily, Beatriz Hernandez APRN, 0.1 mg at 04/16/24 0806    FLUoxetine (PROzac) capsule 60 mg, 60 mg, Oral, Nightly, Beatriz Hernandez APRN    gabapentin (NEURONTIN) capsule 300 mg, 300 mg, Oral, Q12H, Beatriz Hernandez APRN, 300 mg at 04/16/24 0806    HYDROcodone-acetaminophen (NORCO) 5-325 MG per tablet 1 tablet, 1 tablet, Oral, Q6H PRN, Beatriz Hernandez APRN    levETIRAcetam (KEPPRA) tablet 1,000 mg, 1,000 mg, Oral, Q12H, Beatriz Hernandez APRN, 1,000 mg at 04/16/24 0807    melatonin tablet 10 mg, 10 mg, Oral, Nightly, Beatriz Hernandez APRN    nicotine (NICODERM CQ) 21 MG/24HR patch 1  patch, 1 patch, Transdermal, Q24H, Beatriz Hernandez APRN, 1 patch at 04/16/24 0807    nitroglycerin (NITROSTAT) SL tablet 0.4 mg, 0.4 mg, Sublingual, Q5 Min PRN, Delgado Aj APRN    ondansetron ODT (ZOFRAN-ODT) disintegrating tablet 4 mg, 4 mg, Oral, Q6H PRN **OR** ondansetron (ZOFRAN) injection 4 mg, 4 mg, Intravenous, Q6H PRN, Delgado Aj APRN    [COMPLETED] Insert Peripheral IV, , , Once **AND** sodium chloride 0.9 % flush 10 mL, 10 mL, Intravenous, PRN, Gold Meek MD    sodium chloride 0.9 % flush 10 mL, 10 mL, Intravenous, PRN, Delgado Aj APRN    traZODone (DESYREL) tablet 50 mg, 50 mg, Oral, Nightly PRN, Beatriz Hernandez APRN    Current Outpatient Medications:     acetaminophen (TYLENOL) 650 MG 8 hr tablet, Take 1 tablet by mouth., Disp: , Rfl:     apixaban (ELIQUIS) 5 MG tablet tablet, Take 1 tablet by mouth Every 12 (Twelve) Hours for 30 days. Indications: Other - full anticoagulation, Disp: 60 tablet, Rfl: 0    Benzocaine-Menthol 20-0.26 % gel, Apply  to the mouth or throat., Disp: , Rfl:     cloNIDine (CATAPRES) 0.1 MG tablet, Take 1 tablet by mouth Daily., Disp: , Rfl:     cyanocobalamin (VITAMIN B-12) 1000 MCG tablet, Take 1 tablet by mouth Daily for 30 days., Disp: 30 tablet, Rfl: 0    FLUoxetine (PROzac) 40 MG capsule, Take 60 mg by mouth Every Night., Disp: , Rfl:     folic acid (FOLVITE) 1 MG tablet, Take 1 tablet by mouth Daily., Disp: , Rfl:     l-methylfolate 7.5 MG tablet tablet, Take 1 tablet by mouth Daily., Disp: , Rfl:     levETIRAcetam (KEPPRA) 1000 MG tablet, Take 1 tablet by mouth Every 12 (Twelve) Hours for 30 days., Disp: 60 tablet, Rfl: 0    melatonin 5 MG tablet tablet, Take 2 tablets by mouth Every Night. Patient taes 20 mg at home, Disp: , Rfl:     SUMAtriptan (IMITREX) 100 MG tablet, Take 1 tablet by mouth Every 2 (Two) Hours As Needed for Migraine., Disp: , Rfl:     traZODone (DESYREL) 50 MG tablet, Take 1-2 tablets by mouth At Night As  Needed for sleep, Disp: 30 tablet, Rfl: 3    vitamin D (ERGOCALCIFEROL) 1.25 MG (56108 UT) capsule capsule, Take 1 capsule by mouth Every 7 (Seven) Days for 30 days., Disp: 4 capsule, Rfl: 0    gabapentin (NEURONTIN) 300 MG capsule, Take 1 capsule by mouth Every 12 (Twelve) Hours for 7 days., Disp: 14 capsule, Rfl: 0    naloxone (NARCAN) 4 MG/0.1ML nasal spray, Call 911. Don't prime. Kirby in 1 nostril for overdose. Repeat in 2-3 minutes in other nostril if no or minimal breathing/responsiveness., Disp: 2 each, Rfl: 0    sennosides-docusate (PERICOLACE) 8.6-50 MG per tablet, Take 2 tablets by mouth Daily for 30 days. Hold for loose stools., Disp: 60 tablet, Rfl: 0      Objective   Vital Signs   Temp:  [98 °F (36.7 °C)] 98 °F (36.7 °C)  Heart Rate:  [] 79  Resp:  [18-19] 18  BP: (118-147)/(73-81) 121/81    Physical Exam:   General: awake, alert, NAD, sitting up in bed, very nice  Eyes: no scleral icterus  ENT: no thrush, poor dentition with missing teeth, caries present, and gumline erythema; no purulence seen  Cardiovascular: NR  Respiratory: normal work of breathing on room air  GI: Abdomen is soft, not distended  :  no Best catheter  Neurological: Alert and oriented x 3  Psychiatric: Normal mood and affect     Labs:     Lab Results   Component Value Date    WBC 13.10 (H) 04/15/2024    HGB 12.5 04/15/2024    HCT 38.6 04/15/2024    MCV 94.8 04/15/2024     04/15/2024       Lab Results   Component Value Date    GLUCOSE 93 04/15/2024    BUN 8 04/15/2024    CREATININE 0.69 04/15/2024    EGFRIFNONA 94 02/02/2022    EGFRIFAFRI >60 11/09/2022    BCR 11.6 04/15/2024    CO2 25.8 04/15/2024    CALCIUM 9.0 04/15/2024    PROTENTOTREF 4.8 (L) 11/25/2022    ALBUMIN 4.1 04/15/2024    LABIL2 1.3 11/25/2022    AST 31 04/15/2024    ALT 37 (H) 04/15/2024     Procalcitonin 0.82  UDS positive for fentanyl and oxycodone  Lactate 1.1  HIV negative (3/26/2024)  Hep C antibody nonreactive (3/26/2024)    Microbiology:  4/15  BCx: Pending    Radiology:  CXR personally reviewed and is negative for pneumonia on my reading    ASSESSMENT/PLAN:  Dental abscess and caries  Seizures  Antiphospholipid antibody syndrome  Opiate use  PTSD    For dental infection, I agree with empiric Unasyn while awaiting blood culture results and oral surgery evaluation.  I will follow-up their recommendations.  Check CBC and BMP in the a.m. for close monitoring while on broad-spectrum antibiotic.    ID will follow.

## 2024-04-16 NOTE — CASE MANAGEMENT/SOCIAL WORK
Discharge Planning Assessment  [facility]     Patient Name: Belen Allen  MRN: [id number]  Today's Date: 4/16/2024    Admit Date: 4/15/2024    Plan: Home with significant other   Discharge Needs Assessment       Row Name 04/16/24 1159       Living Environment    People in Home significant other;child(manuel), dependent    Current Living Arrangements home    Potentially Unsafe Housing Conditions none    Primary Care Provided by self    Provides Primary Care For child(manuel)    Family Caregiver if Needed significant other    Quality of Family Relationships involved;helpful       Resource/Environmental Concerns    Resource/Environmental Concerns none       Transition Planning    Patient/Family Anticipates Transition to home    Patient/Family Anticipated Services at Transition none       Discharge Needs Assessment    Equipment Currently Used at Home walker, rolling;wheelchair;commode    Concerns to be Addressed no discharge needs identified;denies needs/concerns at this time                   Discharge Plan       Row Name 04/16/24 1159       Plan    Plan Home with significant other    Plan Comments CCP met with patient at bedside. CCP role explained and discharge planning discussed. Face sheet verified and patient's PCP is Dr. Cornelius. Patient lives with her fiancé and two children (11 and 13 years old). Patient has BSC, walker, and wheelchair. Patient has not used home health or been to SNF. Patient plans to return home at discharge. CCP attempted to complete SDOH; patient did not feel good and became upset. CCP updated RN. CCP will follow for any needs to arise. Ellen SHARMA                  Continued Care and Services - Admitted Since 4/15/2024    No active coordination exists for this encounter.          Demographic Summary       Row Name 04/16/24 1158       General Information    Admission Type inpatient    Arrived From emergency department    Referral Source admission list    Reason for Consult discharge  planning    Preferred Language English                   Functional Status       Row Name 04/16/24 1159       Functional Status    Usual Activity Tolerance good    Current Activity Tolerance good       Functional Status, IADL    Medications assistive equipment    Meal Preparation assistive equipment    Housekeeping assistive equipment    Laundry assistive equipment    Shopping assistive equipment                   Psychosocial    No documentation.                  Abuse/Neglect    No documentation.                  Legal    No documentation.                  Substance Abuse    No documentation.                  Patient Forms    No documentation.                     ZACHARY Kumar

## 2024-04-16 NOTE — H&P
"      Patient Name:  Belen Allen  YOB: 1980  MRN:  [id number]  Admit Date:  4/15/2024  Patient Care Team:  Sahil Cornelius MD as PCP - General (Internal Medicine)  Code, Bjorn HERRERA II, MD as Consulting Physician (Hematology and Oncology)  Dennys Carranza MD as Referring Physician (Hospitalist)      Subjective   History Present Illness     Chief Complaint   Patient presents with    Seizures    Fall       Ms. Allen is a 43 y.o. smoker with a history of seizures, antiphospholipid syndrome, hypertension, and chronic low back pain that presents to [facility] complaining of seizures.  She reports she had \"7 or 8\" seizures yesterday that were witnessed by her daughter and fiance. She states she has been compliant with her Keppra since she was discharged from the hospital on 3/29/2024.  She was recently  admitted from 3/23/2024 to 3/29/2024 for intractable back pain, dental abscess, cellulitis of bilateral legs, and pneumonia.  She also complains of teeth pain and left facial swelling.  She states she completed the antibiotics she was prescribed on discharge but she is not scheduled to see [facility] oral surgery until 5/24/2024.  Lab work in the ED revealed WBC 13.10 and procal 0.82.  A CT of the head was negative for an acute intracranial process.  She received IV Keppra and Unasyn and is being admitted for further evaluation.      History of Present Illness  Review of Systems   Constitutional:  Negative for chills and fever.   HENT:  Positive for dental problem and facial swelling. Negative for congestion, sore throat and trouble swallowing.    Eyes:  Negative for photophobia and visual disturbance.   Respiratory:  Negative for cough and shortness of breath.    Cardiovascular:  Negative for chest pain, palpitations and leg swelling.   Gastrointestinal:  Positive for abdominal pain and nausea. Negative for constipation, diarrhea and vomiting.   Musculoskeletal:  " Positive for arthralgias and back pain.   Skin:  Negative for color change and rash.   Neurological:  Positive for headaches. Negative for dizziness, speech difficulty, weakness and numbness.        Personal History     Past Medical History:   Diagnosis Date    Abnormal Pap smear of cervix     Ankle fracture 2013    H/O Elevated liver enzymes     History of chronic back pain     History of migraine     History of urinary tract infection     Injury of back     Opioid dependence 1/16/2023    PONV (postoperative nausea and vomiting)     Seasonal allergies     Seizure     Takes Keppra     Past Surgical History:   Procedure Laterality Date    BACK SURGERY  2006    fusion L4, L5      CHOLECYSTECTOMY  2014    DILATATION AND CURETTAGE  2009    ENDOSCOPY N/A 11/27/2022    Procedure: ESOPHAGOGASTRODUODENOSCOPY with biopsy;  Surgeon: Christiana Mann MD;  Location: [facility] ENDOSCOPY;  Service: Gastroenterology;  Laterality: N/A;  gastritis, duodenitis, irregular z line    ERCP N/A 6/3/2021    Procedure: ENDOSCOPIC RETROGRADE CHOLANGIOPANCREATOGRAPHY WITH SPHINCTEROTOMY, DILATION WITH BALLOON CLEARANCE  (12MM-15MM);  Surgeon: Bjorn Flannery MD;  Location: [facility] ENDOSCOPY;  Service: Gastroenterology;  Laterality: N/A;  DILATED COMMON BILE DUCT    SKIN SURGERY      TUBAL ABDOMINAL LIGATION  2013    WISDOM TOOTH EXTRACTION  2018    x2     Family History   Problem Relation Age of Onset    Stroke Mother     Allergic rhinitis Mother     Allergic rhinitis Sister     Breast cancer Maternal Aunt     Cancer Maternal Grandmother     Allergic rhinitis Paternal Grandfather     Cancer Paternal Grandfather     Prostate cancer Paternal Grandfather      Social History     Tobacco Use    Smoking status: Every Day     Current packs/day: 1.00     Types: Cigarettes    Smokeless tobacco: Never   Vaping Use    Vaping status: Never Used   Substance Use Topics    Alcohol use: Not Currently    Drug use: Not Currently     (Not in a hospital  admission)    Allergies:    Allergies   Allergen Reactions    Morphine Hives    Lidocaine Rash     Pt reports lidocaine patches make her breakout in a rash       Objective    Objective     Vital Signs  Temp:  [98 °F (36.7 °C)] 98 °F (36.7 °C)  Heart Rate:  [] 94  Resp:  [18-19] 18  BP: (118-147)/(73-80) 118/80  SpO2:  [94 %-97 %] 95 %  on   ;   Device (Oxygen Therapy): room air  Body mass index is 41.2 kg/m².    Physical Exam  Vitals and nursing note reviewed.   Constitutional:       Appearance: Normal appearance.   HENT:      Head: Normocephalic and atraumatic.      Nose: Nose normal.      Mouth/Throat:      Mouth: Mucous membranes are moist.      Dentition: Abnormal dentition. Dental tenderness and dental caries present.      Pharynx: Oropharynx is clear.   Eyes:      Extraocular Movements: Extraocular movements intact.      Conjunctiva/sclera: Conjunctivae normal.   Cardiovascular:      Rate and Rhythm: Normal rate and regular rhythm.      Pulses: Normal pulses.      Heart sounds: Normal heart sounds.   Pulmonary:      Effort: Pulmonary effort is normal.      Breath sounds: Normal breath sounds.   Abdominal:      General: Bowel sounds are normal.      Palpations: Abdomen is soft.   Musculoskeletal:      Cervical back: Normal range of motion and neck supple.      Right lower leg: No edema.      Left lower leg: No edema.   Skin:     General: Skin is warm and dry.   Neurological:      General: No focal deficit present.      Mental Status: She is alert and oriented to person, place, and time.   Psychiatric:         Mood and Affect: Mood normal.         Behavior: Behavior normal.         Results Review:  I reviewed the patient's new clinical results.  I reviewed the patient's new imaging results and agree with the interpretation.  I reviewed the patient's other test results and agree with the interpretation  I personally viewed and interpreted the patient's EKG/Telemetry data  Discussed with ED provider.    Lab  Results (last 24 hours)       Procedure Component Value Units Date/Time    CBC & Differential [[id number]]  (Abnormal) Collected: 04/15/24 2042    Specimen: Blood Updated: 04/15/24 2057    Narrative:      The following orders were created for panel order CBC & Differential.  Procedure                               Abnormality         Status                     ---------                               -----------         ------                     CBC Auto Differential[[id number]]        Abnormal            Final result                 Please view results for these tests on the individual orders.    Comprehensive Metabolic Panel [[id number]]  (Abnormal) Collected: 04/15/24 2042    Specimen: Blood Updated: 04/15/24 2115     Glucose 93 mg/dL      BUN 8 mg/dL      Creatinine 0.69 mg/dL      Sodium 140 mmol/L      Potassium 4.0 mmol/L      Chloride 105 mmol/L      CO2 25.8 mmol/L      Calcium 9.0 mg/dL      Total Protein 6.7 g/dL      Albumin 4.1 g/dL      ALT (SGPT) 37 U/L      AST (SGOT) 31 U/L      Alkaline Phosphatase 157 U/L      Total Bilirubin 0.2 mg/dL      Globulin 2.6 gm/dL      A/G Ratio 1.6 g/dL      BUN/Creatinine Ratio 11.6     Anion Gap 9.2 mmol/L      eGFR 111.3 mL/min/1.73     Narrative:      GFR Normal >60  Chronic Kidney Disease <60  Kidney Failure <15      Procalcitonin [[id number]]  (Abnormal) Collected: 04/15/24 2042    Specimen: Blood Updated: 04/15/24 2122     Procalcitonin 0.82 ng/mL     Narrative:      As a Marker for Sepsis (Non-Neonates):    1. <0.5 ng/mL represents a low risk of severe sepsis and/or septic shock.  2. >2 ng/mL represents a high risk of severe sepsis and/or septic shock.    As a Marker for Lower Respiratory Tract Infections that require antibiotic therapy:    PCT on Admission    Antibiotic Therapy       6-12 Hrs later    >0.5                Strongly Recommended  >0.25 - <0.5        Recommended   0.1 - 0.25          Discouraged              Remeasure/reassess PCT  <0.1                 "Strongly Discouraged     Remeasure/reassess PCT    As 28 day mortality risk marker: \"Change in Procalcitonin Result\" (>80% or <=80%) if Day 0 (or Day 1) and Day 4 values are available. Refer to http://www.[facility]-pct-calculator.com    Change in PCT <=80%  A decrease of PCT levels below or equal to 80% defines a positive change in PCT test result representing a higher risk for 28-day all-cause mortality of patients diagnosed with severe sepsis for septic shock.    Change in PCT >80%  A decrease of PCT levels of more than 80% defines a negative change in PCT result representing a lower risk for 28-day all-cause mortality of patients diagnosed with severe sepsis or septic shock.       Lactic Acid, Plasma [[id number]]  (Normal) Collected: 04/15/24 2042    Specimen: Blood Updated: 04/15/24 2112     Lactate 1.1 mmol/L     Respiratory Panel PCR w/COVID-19(SARS-CoV-2) LETY/REESE/RASHAWN/PAD/COR/CHANEL In-House, NP Swab in UTM/VTM, 2 HR TAT - Swab, Nasopharynx [[id number]]  (Normal) Collected: 04/15/24 2042    Specimen: Swab from Nasopharynx Updated: 04/15/24 2141     ADENOVIRUS, PCR Not Detected     Coronavirus 229E Not Detected     Coronavirus HKU1 Not Detected     Coronavirus NL63 Not Detected     Coronavirus OC43 Not Detected     COVID19 Not Detected     Human Metapneumovirus Not Detected     Human Rhinovirus/Enterovirus Not Detected     Influenza A PCR Not Detected     Influenza B PCR Not Detected     Parainfluenza Virus 1 Not Detected     Parainfluenza Virus 2 Not Detected     Parainfluenza Virus 3 Not Detected     Parainfluenza Virus 4 Not Detected     RSV, PCR Not Detected     Bordetella pertussis pcr Not Detected     Bordetella parapertussis PCR Not Detected     Chlamydophila pneumoniae PCR Not Detected     Mycoplasma pneumo by PCR Not Detected    Narrative:      In the setting of a positive respiratory panel with a viral infection PLUS a negative procalcitonin without other underlying concern for bacterial infection, consider " observing off antibiotics or discontinuation of antibiotics and continue supportive care. If the respiratory panel is positive for atypical bacterial infection (Bordetella pertussis, Chlamydophila pneumoniae, or Mycoplasma pneumoniae), consider antibiotic de-escalation to target atypical bacterial infection.    CBC Auto Differential [[id number]]  (Abnormal) Collected: 04/15/24 2042    Specimen: Blood Updated: 04/15/24 2057     WBC 13.10 10*3/mm3      RBC 4.07 10*6/mm3      Hemoglobin 12.5 g/dL      Hematocrit 38.6 %      MCV 94.8 fL      MCH 30.7 pg      MCHC 32.4 g/dL      RDW 14.0 %      RDW-SD 47.8 fl      MPV 10.2 fL      Platelets 235 10*3/mm3      Neutrophil % 62.1 %      Lymphocyte % 30.0 %      Monocyte % 6.3 %      Eosinophil % 0.7 %      Basophil % 0.4 %      Immature Grans % 0.5 %      Neutrophils, Absolute 8.15 10*3/mm3      Lymphocytes, Absolute 3.93 10*3/mm3      Monocytes, Absolute 0.82 10*3/mm3      Eosinophils, Absolute 0.09 10*3/mm3      Basophils, Absolute 0.05 10*3/mm3      Immature Grans, Absolute 0.06 10*3/mm3      nRBC 0.0 /100 WBC     Blood Culture - Blood, Arm, Right [[id number]] Collected: 04/15/24 2052    Specimen: Blood from Arm, Right Updated: 04/15/24 2106    Blood Culture - Blood, Arm, Right [[id number]] Collected: 04/15/24 2225    Specimen: Blood from Arm, Right Updated: 04/15/24 2230    Urine Drug Screen - Urine, Clean Catch [[id number]]  (Abnormal) Collected: 04/16/24 0333    Specimen: Urine, Clean Catch Updated: 04/16/24 0405     Amphet/Methamphet, Screen Negative     Barbiturates Screen, Urine Negative     Benzodiazepine Screen, Urine Negative     Cocaine Screen, Urine Negative     Opiate Screen Negative     THC, Screen, Urine Negative     Methadone Screen, Urine Negative     Oxycodone Screen, Urine Positive     Fentanyl, Urine Positive    Narrative:      Negative Thresholds Per Drugs Screened:    Amphetamines                 500 ng/ml  Barbiturates                 200  ng/ml  Benzodiazepines              100 ng/ml  Cocaine                      300 ng/ml  Methadone                    300 ng/ml  Opiates                      300 ng/ml  Oxycodone                    100 ng/ml  THC                           50 ng/ml  Fentanyl                       5 ng/ml      The Normal Value for all drugs tested is negative. This report includes final unconfirmed screening results to be used for medical treatment purposes only. Unconfirmed results must not be used for non-medical purposes such as employment or legal testing. Clinical consideration should be applied to any drug of abuse test, particularly when unconfirmed results are used.                    Imaging Results (Last 24 Hours)       Procedure Component Value Units Date/Time    XR Chest 1 View [[id number]] Collected: 04/15/24 2048     Updated: 04/15/24 2053    Narrative:      XR CHEST 1 VW-, XR ANKLE 3+ VW RIGHT-, XR FOOT 3+ VW RIGHT-     HISTORY: Female who is 43 years-old, trauma, cough and fever     TECHNIQUE: Frontal view of the chest, 3 views of the right foot, 3 views  of the right ankle     COMPARISON: Chest x-ray from 7/30/2023     FINDINGS:     Chest x-ray: Heart, mediastinum and pulmonary vasculature are  unremarkable. No focal pulmonary consolidation, pleural effusion, or  pneumothorax. No acute osseous process.     Right foot and ankle: No acute fracture, erosion, or dislocation is  identified. Ankle mortise appears preserved. Mild calcaneal spurring is  present. If further imaging evaluation of the right foot and ankle is  indicated, MRI could be considered.       Impression:      As described.           This report was finalized on 4/15/2024 8:50 PM by Dr. Yonathan Neal M.D on Workstation: [id number]       XR Ankle 3+ View Right [[id number]] Collected: 04/15/24 2048     Updated: 04/15/24 2053    Narrative:      XR CHEST 1 VW-, XR ANKLE 3+ VW RIGHT-, XR FOOT 3+ VW RIGHT-     HISTORY: Female who is 43 years-old, trauma, cough  and fever     TECHNIQUE: Frontal view of the chest, 3 views of the right foot, 3 views  of the right ankle     COMPARISON: Chest x-ray from 7/30/2023     FINDINGS:     Chest x-ray: Heart, mediastinum and pulmonary vasculature are  unremarkable. No focal pulmonary consolidation, pleural effusion, or  pneumothorax. No acute osseous process.     Right foot and ankle: No acute fracture, erosion, or dislocation is  identified. Ankle mortise appears preserved. Mild calcaneal spurring is  present. If further imaging evaluation of the right foot and ankle is  indicated, MRI could be considered.       Impression:      As described.           This report was finalized on 4/15/2024 8:50 PM by Dr. Yonathan Neal M.D on Workstation: [id number]       XR Foot 3+ View Right [[id number]] Collected: 04/15/24 2048     Updated: 04/15/24 2053    Narrative:      XR CHEST 1 VW-, XR ANKLE 3+ VW RIGHT-, XR FOOT 3+ VW RIGHT-     HISTORY: Female who is 43 years-old, trauma, cough and fever     TECHNIQUE: Frontal view of the chest, 3 views of the right foot, 3 views  of the right ankle     COMPARISON: Chest x-ray from 7/30/2023     FINDINGS:     Chest x-ray: Heart, mediastinum and pulmonary vasculature are  unremarkable. No focal pulmonary consolidation, pleural effusion, or  pneumothorax. No acute osseous process.     Right foot and ankle: No acute fracture, erosion, or dislocation is  identified. Ankle mortise appears preserved. Mild calcaneal spurring is  present. If further imaging evaluation of the right foot and ankle is  indicated, MRI could be considered.       Impression:      As described.           This report was finalized on 4/15/2024 8:50 PM by Dr. Yonathan Nela M.D on Workstation: [id number]       CT Head Without Contrast [[id number]] Collected: 04/15/24 2037     Updated: 04/15/24 2049    Narrative:      Emergency CT scan of the head without contrast on 4/15/2024     CLINICAL HISTORY: Patient had a seizure and fell. The  patient is  anticoagulated on Eliquis     TECHNIQUE: Spiral CT images were obtained from the base of the skull to  the vertex without intra venous contrast. The images were reformatted  and are submitted in 3 mm thick axial, sagittal and coronal CT sections  with brain algorithm and 2 mm thick axial CT sections with  high-resolution bone algorithm.     This is correlated to a prior head CT from [facility] on 1/24/2023.     FINDINGS: The brain parenchyma is normal in attenuation. The ventricles  are normal in size. I see no focal mass effect. There is no midline  shift. No extra axial fluid collections are identified. There is no  evidence of acute intracranial hemorrhage. No acute skull fracture is  identified. The calvarium and the skull base are normal in appearance.  The paranasal sinuses and the mastoid air cells and the middle ear  cavities are clear.       Impression:      1. Normal head CT. Specifically, no acute skull fracture or intracranial  hemorrhage is identified.     Radiation dose reduction techniques were utilized, including automated  exposure control and exposure modulation based on body size.        This report was finalized on 4/15/2024 8:46 PM by Dr. Robert Valdivia M.D  on Workstation: [id number]               Results for orders placed during the hospital encounter of 03/23/24    Adult Transthoracic Echo Complete W/ Cont if Necessary Per Protocol    Interpretation Summary    Left ventricular systolic function is normal. Calculated left ventricular EF = 61.2%    Left ventricular diastolic function was normal.    Estimated right ventricular systolic pressure from tricuspid regurgitation is normal (<35 mmHg). Calculated right ventricular systolic pressure from tricuspid regurgitation is 22 mmHg.      No orders to display        Assessment/Plan     Active Hospital Problems    Diagnosis  POA    **Recurrent seizures [G40.909]  Yes    Antiphospholipid syndrome [D68.61]  Unknown     HTN (hypertension) [I10]  Unknown    Dental abscess [K04.7]  Yes    Chronic pain syndrome [G89.4]  Yes    Tobacco abuse [Z72.0]  Yes       Recurrent Seizures  -Admit to a neuro telemetry unit for monitoring  -Neurology consult  -She received a dose of IV Keppra in the ED. Will defer any further IV dosing to the neurology group  -Will also defer any further imaging  -PRN ativan for seizure activity  -Seizure precautions    Dental Abscess/Infection  -She was discharged on Augmentin through 4/6/2024 per ID, which she states she completed. She has leukocytosis and an elevated procal. Continue IV Unasyn  -ID consult  -Dentist consult  -PRN analgesia    Hypertension  -Blood pressures have been stable  -Continue Clonidine    Antiphospholipid Antibody Syndrome/History of Splenic Infarct  -She has previously refused Warfarin  -Continue Eliquis    History of Substance Abuse/Chronic Low Back Pain  -Check UDS    Tobacco Abuse  -Nicotine patch    -I discussed the patients findings and my recommendations with patient.    VTE Prophylaxis - Eliquis (home med).  Code Status - Full code.       NELSON Kelly  [facility]  04/16/24  05:08 EDT

## 2024-04-16 NOTE — PROGRESS NOTES
[facility] Clinical Pharmacy Services: Unasyn dosing Consult    Pt Name: Belen Allen   : 1980  Weight: 109 kg (240 lb)  Antibiotic: Unasyn  Indication:  dental abscess    Relevant clinical data and objective history reviewed:    Past Medical History:   Diagnosis Date    Abnormal Pap smear of cervix     Ankle fracture     H/O Elevated liver enzymes     History of chronic back pain     History of migraine     History of urinary tract infection     Injury of back     Opioid dependence 2023    PONV (postoperative nausea and vomiting)     Seasonal allergies     Seizure     Takes Keppra     Creatinine   Date Value Ref Range Status   04/15/2024 0.69 0.57 - 1.00 mg/dL Final   2024 0.73 0.57 - 1.00 mg/dL Final   2024 0.71 0.57 - 1.00 mg/dL Final   2022 0.90 0.60 - 1.30 mg/dL Final     Comment:     Serial Number: [id number]:  [id number]   2022 0.90 0.55 - 1.02 mg/dL Final   2022 0.79 0.55 - 1.02 mg/dL Final   2022 0.83 0.55 - 1.02 mg/dL Final     BUN   Date Value Ref Range Status   04/15/2024 8 6 - 20 mg/dL Final   2022 10 7 - 19 mg/dL Final     Estimated Creatinine Clearance: 128.1 mL/min (by C-G formula based on SCr of 0.69 mg/dL).    Lab Results   Component Value Date    WBC 13.10 (H) 04/15/2024     Temp Readings from Last 3 Encounters:   04/15/24 98 °F (36.7 °C) (Tympanic)   24 97.9 °F (36.6 °C) (Oral)   24 98.8 °F (37.1 °C) (Oral)      Assessment/Plan    Ordered Unasyn 3 g iv every 6 hours for a total of 5 days. Will monitor and adjust if culture data or pertinent lab values indicate this is best for the patient.     Thank you for this consult and please contact pharmacy with any questions or concerns.     Alpesh Laura III, [facility]  Clinical Pharmacist
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
## 2024-04-16 NOTE — NURSING NOTE
"Tech found Pt sticking her finger down her throat trying to make her self throw up. When asked by Tech on reason for sticking finger down throat. Pt responded \"Have something stuck in my throat\". RN gave pt IV Zofran. MD aware.  "

## 2024-04-17 ENCOUNTER — APPOINTMENT (OUTPATIENT)
Dept: CT IMAGING | Facility: HOSPITAL | Age: 44
DRG: 158 | End: 2024-04-17
Payer: COMMERCIAL

## 2024-04-17 LAB
ANION GAP SERPL CALCULATED.3IONS-SCNC: 8.5 MMOL/L (ref 5–15)
BUN SERPL-MCNC: 10 MG/DL (ref 6–20)
BUN/CREAT SERPL: 15.6 (ref 7–25)
CALCIUM SPEC-SCNC: 9 MG/DL (ref 8.6–10.5)
CHLORIDE SERPL-SCNC: 104 MMOL/L (ref 98–107)
CO2 SERPL-SCNC: 28.5 MMOL/L (ref 22–29)
CREAT SERPL-MCNC: 0.64 MG/DL (ref 0.57–1)
DEPRECATED RDW RBC AUTO: 47.3 FL (ref 37–54)
EGFRCR SERPLBLD CKD-EPI 2021: 113.3 ML/MIN/1.73
ERYTHROCYTE [DISTWIDTH] IN BLOOD BY AUTOMATED COUNT: 13.5 % (ref 12.3–15.4)
GLUCOSE SERPL-MCNC: 99 MG/DL (ref 65–99)
HCT VFR BLD AUTO: 33.8 % (ref 34–46.6)
HGB BLD-MCNC: 11.2 G/DL (ref 12–15.9)
MCH RBC QN AUTO: 31.6 PG (ref 26.6–33)
MCHC RBC AUTO-ENTMCNC: 33.1 G/DL (ref 31.5–35.7)
MCV RBC AUTO: 95.5 FL (ref 79–97)
PLATELET # BLD AUTO: 205 10*3/MM3 (ref 140–450)
PMV BLD AUTO: 9.8 FL (ref 6–12)
POTASSIUM SERPL-SCNC: 4.5 MMOL/L (ref 3.5–5.2)
PROCALCITONIN SERPL-MCNC: 0.32 NG/ML (ref 0–0.25)
RBC # BLD AUTO: 3.54 10*6/MM3 (ref 3.77–5.28)
SODIUM SERPL-SCNC: 141 MMOL/L (ref 136–145)
WBC NRBC COR # BLD AUTO: 6.9 10*3/MM3 (ref 3.4–10.8)

## 2024-04-17 PROCEDURE — G0378 HOSPITAL OBSERVATION PER HR: HCPCS

## 2024-04-17 PROCEDURE — 25010000002 AMPICILLIN-SULBACTAM PER 1.5 G: Performed by: NURSE PRACTITIONER

## 2024-04-17 PROCEDURE — 25010000002 KETOROLAC TROMETHAMINE PER 15 MG: Performed by: INTERNAL MEDICINE

## 2024-04-17 PROCEDURE — 97162 PT EVAL MOD COMPLEX 30 MIN: CPT

## 2024-04-17 PROCEDURE — 80048 BASIC METABOLIC PNL TOTAL CA: CPT | Performed by: INTERNAL MEDICINE

## 2024-04-17 PROCEDURE — 25510000001 IOPAMIDOL 61 % SOLUTION: Performed by: INTERNAL MEDICINE

## 2024-04-17 PROCEDURE — 97530 THERAPEUTIC ACTIVITIES: CPT

## 2024-04-17 PROCEDURE — 70487 CT MAXILLOFACIAL W/DYE: CPT

## 2024-04-17 PROCEDURE — 25010000002 ONDANSETRON PER 1 MG: Performed by: NURSE PRACTITIONER

## 2024-04-17 PROCEDURE — 84145 PROCALCITONIN (PCT): CPT | Performed by: INTERNAL MEDICINE

## 2024-04-17 PROCEDURE — 36415 COLL VENOUS BLD VENIPUNCTURE: CPT | Performed by: INTERNAL MEDICINE

## 2024-04-17 PROCEDURE — 85027 COMPLETE CBC AUTOMATED: CPT | Performed by: INTERNAL MEDICINE

## 2024-04-17 PROCEDURE — 99232 SBSQ HOSP IP/OBS MODERATE 35: CPT | Performed by: INTERNAL MEDICINE

## 2024-04-17 PROCEDURE — 99214 OFFICE O/P EST MOD 30 MIN: CPT | Performed by: STUDENT IN AN ORGANIZED HEALTH CARE EDUCATION/TRAINING PROGRAM

## 2024-04-17 RX ORDER — ACETAMINOPHEN 325 MG/1
650 TABLET ORAL EVERY 6 HOURS PRN
Status: DISCONTINUED | OUTPATIENT
Start: 2024-04-17 | End: 2024-04-20 | Stop reason: HOSPADM

## 2024-04-17 RX ORDER — OXYCODONE AND ACETAMINOPHEN 7.5; 325 MG/1; MG/1
1 TABLET ORAL EVERY 4 HOURS PRN
Status: DISCONTINUED | OUTPATIENT
Start: 2024-04-17 | End: 2024-04-20 | Stop reason: HOSPADM

## 2024-04-17 RX ORDER — LEVETIRACETAM 500 MG/1
1500 TABLET ORAL EVERY 12 HOURS SCHEDULED
Status: DISCONTINUED | OUTPATIENT
Start: 2024-04-17 | End: 2024-04-20 | Stop reason: HOSPADM

## 2024-04-17 RX ADMIN — Medication 10 MG: at 20:44

## 2024-04-17 RX ADMIN — ALUMINUM HYDROXIDE, MAGNESIUM HYDROXIDE, AND SIMETHICONE 15 ML: 2400; 240; 2400 SUSPENSION ORAL at 17:45

## 2024-04-17 RX ADMIN — TRAMADOL HYDROCHLORIDE 50 MG: 50 TABLET ORAL at 11:52

## 2024-04-17 RX ADMIN — TRAZODONE HYDROCHLORIDE 50 MG: 50 TABLET ORAL at 20:43

## 2024-04-17 RX ADMIN — AMPICILLIN SODIUM AND SULBACTAM SODIUM 3 G: 2; 1 INJECTION, POWDER, FOR SOLUTION INTRAMUSCULAR; INTRAVENOUS at 17:45

## 2024-04-17 RX ADMIN — APIXABAN 5 MG: 5 TABLET, FILM COATED ORAL at 09:05

## 2024-04-17 RX ADMIN — OXYCODONE AND ACETAMINOPHEN 1 TABLET: 7.5; 325 TABLET ORAL at 15:38

## 2024-04-17 RX ADMIN — CLONIDINE HYDROCHLORIDE 0.1 MG: 0.1 TABLET ORAL at 09:03

## 2024-04-17 RX ADMIN — KETOROLAC TROMETHAMINE 15 MG: 15 INJECTION, SOLUTION INTRAMUSCULAR; INTRAVENOUS at 17:45

## 2024-04-17 RX ADMIN — TRAMADOL HYDROCHLORIDE 50 MG: 50 TABLET ORAL at 03:32

## 2024-04-17 RX ADMIN — Medication 1 PATCH: at 09:05

## 2024-04-17 RX ADMIN — ONDANSETRON 4 MG: 2 INJECTION INTRAMUSCULAR; INTRAVENOUS at 15:38

## 2024-04-17 RX ADMIN — LEVETIRACETAM 1500 MG: 500 TABLET, FILM COATED ORAL at 20:44

## 2024-04-17 RX ADMIN — GABAPENTIN 300 MG: 300 CAPSULE ORAL at 20:44

## 2024-04-17 RX ADMIN — LEVETIRACETAM 1000 MG: 500 TABLET, FILM COATED ORAL at 09:03

## 2024-04-17 RX ADMIN — OXYCODONE AND ACETAMINOPHEN 1 TABLET: 7.5; 325 TABLET ORAL at 20:44

## 2024-04-17 RX ADMIN — ACETAMINOPHEN 1000 MG: 500 TABLET ORAL at 11:59

## 2024-04-17 RX ADMIN — AMPICILLIN SODIUM AND SULBACTAM SODIUM 3 G: 2; 1 INJECTION, POWDER, FOR SOLUTION INTRAMUSCULAR; INTRAVENOUS at 11:58

## 2024-04-17 RX ADMIN — IOPAMIDOL 100 ML: 612 INJECTION, SOLUTION INTRAVENOUS at 09:26

## 2024-04-17 RX ADMIN — AMPICILLIN SODIUM AND SULBACTAM SODIUM 3 G: 2; 1 INJECTION, POWDER, FOR SOLUTION INTRAMUSCULAR; INTRAVENOUS at 06:01

## 2024-04-17 RX ADMIN — GABAPENTIN 300 MG: 300 CAPSULE ORAL at 09:02

## 2024-04-17 RX ADMIN — AMPICILLIN SODIUM AND SULBACTAM SODIUM 3 G: 2; 1 INJECTION, POWDER, FOR SOLUTION INTRAMUSCULAR; INTRAVENOUS at 23:14

## 2024-04-17 RX ADMIN — FLUOXETINE HYDROCHLORIDE 60 MG: 20 CAPSULE ORAL at 20:43

## 2024-04-17 NOTE — PROGRESS NOTES
ID PROGRESS NOTE    CC: f/u dental abscess/infection     Subj: No fever.  She reports worsening, sharp, left-sided facial and dental pain.  She just returned from CT scan for the facial bones.  She is tolerating Unasyn without rash.    Medications:    Current Facility-Administered Medications:     acetaminophen (TYLENOL) tablet 1,000 mg, 1,000 mg, Oral, Q8H, Ton Tristan MD, 1,000 mg at 04/16/24 1257    aluminum-magnesium hydroxide-simethicone (MAALOX MAX) 400-400-40 MG/5ML suspension 15 mL, 15 mL, Oral, Q6H PRN, Delgado Aj APRN    ampicillin-sulbactam (UNASYN) 3 g in sodium chloride 0.9 % 100 mL MBP, 3 g, Intravenous, Q6H, Beatriz Hernandez APRN, 3 g at 04/17/24 0601    apixaban (ELIQUIS) tablet 5 mg, 5 mg, Oral, Q12H, Beatriz Hernandez APRN, 5 mg at 04/17/24 0905    benzocaine-menthol (CEPACOL) lozenge 1 each, 1 lozenge, Mouth/Throat, Q4H PRN, Beatriz Hernandez APRN    sennosides-docusate (PERICOLACE) 8.6-50 MG per tablet 2 tablet, 2 tablet, Oral, BID PRN **AND** polyethylene glycol (MIRALAX) packet 17 g, 17 g, Oral, Daily PRN **AND** bisacodyl (DULCOLAX) EC tablet 5 mg, 5 mg, Oral, Daily PRN **AND** bisacodyl (DULCOLAX) suppository 10 mg, 10 mg, Rectal, Daily PRN, Delgado Aj APRN    cloNIDine (CATAPRES) tablet 0.1 mg, 0.1 mg, Oral, Daily, Beatriz Hernandez APRN, 0.1 mg at 04/17/24 0903    FLUoxetine (PROzac) capsule 60 mg, 60 mg, Oral, Nightly, Beatriz Hernandez APRN, 60 mg at 04/16/24 2232    gabapentin (NEURONTIN) capsule 300 mg, 300 mg, Oral, Q12H, Beatriz Hernandez APRN, 300 mg at 04/17/24 0902    ketorolac (TORADOL) injection 15 mg, 15 mg, Intravenous, Q6H PRN, Ton Tristan MD, 15 mg at 04/16/24 1433    levETIRAcetam (KEPPRA) tablet 1,000 mg, 1,000 mg, Oral, Q12H, Beatriz Hernandez APRN, 1,000 mg at 04/17/24 0903    melatonin tablet 10 mg, 10 mg, Oral, Nightly, Beatriz Hernandez APRN, 10 mg at 04/16/24 2231    nicotine (NICODERM CQ) 21 MG/24HR  patch 1 patch, 1 patch, Transdermal, Q24H, Beatriz Hernandez APRN, 1 patch at 04/17/24 0905    nitroglycerin (NITROSTAT) SL tablet 0.4 mg, 0.4 mg, Sublingual, Q5 Min PRN, Delgado Aj APRN    ondansetron ODT (ZOFRAN-ODT) disintegrating tablet 4 mg, 4 mg, Oral, Q6H PRN **OR** ondansetron (ZOFRAN) injection 4 mg, 4 mg, Intravenous, Q6H PRN, Delgado Aj APRN, 4 mg at 04/16/24 2144    [COMPLETED] Insert Peripheral IV, , , Once **AND** sodium chloride 0.9 % flush 10 mL, 10 mL, Intravenous, PRN, Gold Meek MD    sodium chloride 0.9 % flush 10 mL, 10 mL, Intravenous, PRN, Delgado Aj APRN    traMADol (ULTRAM) tablet 50 mg, 50 mg, Oral, Q6H PRN, Ton Tristan MD, 50 mg at 04/17/24 0332    traZODone (DESYREL) tablet 50 mg, 50 mg, Oral, Nightly PRN, Beatriz Hernandez APRN      Objective   Vital Signs   Temp:  [98.1 °F (36.7 °C)-98.4 °F (36.9 °C)] 98.4 °F (36.9 °C)  Heart Rate:  [70-84] 72  Resp:  [18] 18  BP: (112-132)/(61-86) 121/76    Physical Exam:   General: awake, sitting up in bed, holding the left side of her face due to pain  Eyes: no scleral icterus  ENT: no thrush, poor dentition with missing teeth, caries present, and gumline erythema; no purulence seen  Cardiovascular: Normal rate  Respiratory: normal work of breathing on ambient air  GI: Abdomen is not distended  :  no Best catheter  Neurological: Alert and oriented x 3  Psychiatric: Normal mood and affect     Labs:   CBC, BMP, hCG and blood cultures reviewed today  Lab Results   Component Value Date    WBC 6.90 04/17/2024    HGB 11.2 (L) 04/17/2024    HCT 33.8 (L) 04/17/2024    MCV 95.5 04/17/2024     04/17/2024     Lab Results   Component Value Date    GLUCOSE 99 04/17/2024    CALCIUM 9.0 04/17/2024     04/17/2024    K 4.5 04/17/2024    CO2 28.5 04/17/2024     04/17/2024    BUN 10 04/17/2024    CREATININE 0.64 04/17/2024    EGFR 113.3 04/17/2024    BCR 15.6 04/17/2024    ANIONGAP 8.5 04/17/2024          hCG negative  Procalcitonin 0.82  UDS positive for fentanyl and oxycodone  Lactate 1.1  HIV negative (3/26/2024)  Hep C antibody nonreactive (3/26/2024)    Microbiology:  4/15 BCx: Negative to date    Radiology:  CT facial bones pending    ASSESSMENT/PLAN:  Dental abscess and caries  Seizures  Antiphospholipid antibody syndrome  Opiate use  PTSD    For dental infection, I recommend she continue empiric Unasyn 3 g IV every 6 hours.  Oral surgery has been consulted, and I will follow-up their recommendations.      Discussed with RN.  ID will follow.

## 2024-04-17 NOTE — PLAN OF CARE
Goal Outcome Evaluation:  Plan of Care Reviewed With: patient           Outcome Evaluation: Pt agreeable to PT eval this afternoon. She was admitted to [facility] on 4/15/24 w recurrent seizures. Pt has hx of back pain, HTN, and PTSD. At baseline, pt lives at home w significant other and 2 children. She reports independence w mobility and ADLS, but does have walker and WC to use when needed. Today, pt states she is doing ok and has been getting up to BR on her own. Pt able to perform bed mobiltiy w independence. She stood independently and then ambulated approx 100 ft w supervision. SLow pace, but steady. No LOB noted. Pt back in bed at end of session. SHe is at her baseline. She denies further PT needs at this time. PT will sign off.      Anticipated Discharge Disposition (PT): home with assist

## 2024-04-17 NOTE — CONSULTS
[facility]   HISTORY AND PHYSICAL    Patient Name: Belen Allen  : 1980  MRN: [id number]  Primary Care Physician:  Sahil Cornelius MD  Date of admission: 4/15/2024    Subjective   Subjective     Chief Complaint: tooth pain     HPI:    Belen Allen is a 43 y.o. female who presented to ER for complaints of weakness and seizure activity.   Patient was admitted for evaluation and any subsequent treatment.  Patient reports significant tooth pain and reports a history of chronic dental issues.  Patient reports many teeth hurt at times but her main issues with pain on the left side right now.  Patient denies fever or dyspnea.  OMS consulted for evaluation and treatment as needed.    Review of Systems   All systems were reviewed and negative except for: negative except above cc/hpi    Personal History     Past Medical History:   Diagnosis Date    Abnormal Pap smear of cervix     Ankle fracture     H/O Elevated liver enzymes     History of chronic back pain     History of migraine     History of urinary tract infection     Injury of back     Opioid dependence 2023    PONV (postoperative nausea and vomiting)     Seasonal allergies     Seizure     Takes Keppra       Past Surgical History:   Procedure Laterality Date    BACK SURGERY  2006    fusion L4, L5      CHOLECYSTECTOMY  2014    DILATATION AND CURETTAGE  2009    ENDOSCOPY N/A 2022    Procedure: ESOPHAGOGASTRODUODENOSCOPY with biopsy;  Surgeon: Christiana Mann MD;  Location: [facility] ENDOSCOPY;  Service: Gastroenterology;  Laterality: N/A;  gastritis, duodenitis, irregular z line    ERCP N/A 6/3/2021    Procedure: ENDOSCOPIC RETROGRADE CHOLANGIOPANCREATOGRAPHY WITH SPHINCTEROTOMY, DILATION WITH BALLOON CLEARANCE  (12MM-15MM);  Surgeon: Bjorn Flannery MD;  Location: [facility] ENDOSCOPY;  Service: Gastroenterology;  Laterality: N/A;  DILATED COMMON BILE DUCT    SKIN SURGERY      TUBAL ABDOMINAL LIGATION  2013    BARRIE  TOOTH EXTRACTION  2018    x2       Family History: family history includes Allergic rhinitis in her mother, paternal grandfather, and sister; Breast cancer in her maternal aunt; Cancer in her maternal grandmother and paternal grandfather; Prostate cancer in her paternal grandfather; Stroke in her mother. Otherwise pertinent FHx was reviewed and not pertinent to current issue.    Social History:  reports that she has been smoking cigarettes. She has never used smokeless tobacco. She reports that she does not currently use alcohol. She reports that she does not currently use drugs.    Home Medications:  Benzocaine-Menthol, FLUoxetine, SUMAtriptan, acetaminophen, apixaban, cloNIDine, folic acid, gabapentin, l-methylfolate, levETIRAcetam, melatonin, naloxone, sennosides-docusate, traZODone, vitamin B-12, and vitamin D      Allergies:  Allergies   Allergen Reactions    Morphine Hives    Lidocaine Rash     Pt reports lidocaine patches make her breakout in a rash       Objective   Objective     Vitals:   Temp:  [98.1 °F (36.7 °C)-98.4 °F (36.9 °C)] 98.2 °F (36.8 °C)  Heart Rate:  [70-78] 74  Resp:  [18] 18  BP: (112-130)/(61-77) 130/77  Physical Exam    Constitutional: Awake, alert   Eyes: PERRLA, sclerae anicteric, no conjunctival injection   HENT: NCAT, mucous membranes moist   ORAL: multiple carious lesions, no suspicious mucosal lesions, no gross purulence, minimal edema associated with left buccal vestibule    Neck: Supple, no thyromegaly, no lymphadenopathy, trachea midline   Respiratory: Clear to auscultation bilaterally, nonlabored respirations    Cardiovascular: RRR, no murmurs, rubs, or gallops, palpable pedal pulses bilaterally   Gastrointestinal: Positive bowel sounds, soft, nontender, nondistended   Musculoskeletal: No bilateral ankle edema, no clubbing or cyanosis to extremities   Psychiatric: Appropriate affect, cooperative   Neurologic: Oriented x 3, strength symmetric in all extremities, Cranial Nerves  grossly intact to confrontation, speech clear   Skin: No rashes     Result Review    Result Review:  I have personally reviewed the results from the time of this admission to 4/17/2024 17:11 EDT and agree with these findings:  [x]  Laboratory list / accordion  [x]  Microbiology  [x]  Radiology  []  EKG/Telemetry   []  Cardiology/Vascular   []  Pathology  [x]  Old records  []  Other:  Most notable findings include: gross caries, PA radioluceny associated with #20, #13 ttpalp       Assessment & Plan   Assessment / Plan     Brief Patient Summary:  Belen Allen is a 43 y.o. female with poor dentition, specifically #13,20    Active Hospital Problems:  Active Hospital Problems    Diagnosis     **Recurrent seizures     Antiphospholipid syndrome     HTN (hypertension)     Dental abscess     Chronic pain syndrome     Tobacco abuse      Plan:   Cont current management   Options discussed with patient, RBAs reviewed   Plan to OR tomorrow for extraction #13,20; will recommend patient to continue treatment ASAP on outpatient basis to address other chronic dental needs   Cont IV abx per ID  NPO pMN      DVT prophylaxis:  Medical and mechanical DVT prophylaxis orders are present.        CODE STATUS:    Code Status (Patient has no pulse and is not breathing): CPR (Attempt to Resuscitate)  Medical Interventions (Patient has pulse or is breathing): Full Support      Glenn Blunt MD

## 2024-04-17 NOTE — PLAN OF CARE
Problem: Adult Inpatient Plan of Care  Goal: Plan of Care Review  Outcome: Ongoing, Progressing     Problem: Adult Inpatient Plan of Care  Goal: Absence of Hospital-Acquired Illness or Injury  Intervention: Identify and Manage Fall Risk  Description: Perform standard risk assessment on admission using a validated tool or comprehensive approach appropriate to the patient; reassess fall risk frequently, with change in status or transfer to another level of care.  Communicate fall injury risk to interprofessional healthcare team.  Determine need for increased observation, equipment and environmental modification, such as low bed, signage and supportive, nonskid footwear.  Adjust safety measures to individual developmental age, stage and identified risk factors.  Reinforce the importance of safety and physical activity with patient and family.  Perform regular intentional rounding to assess need for position change, pain assessment and personal needs, including assistance with toileting.  Intervention: Prevent Skin Injury  Description: Perform a screening for skin injury risk, such as pressure or moisture associated skin damage on admission and at regular intervals throughout hospital stay.  Keep all areas of skin (especially folds) clean and dry.  Maintain adequate skin hydration.  Relieve and redistribute pressure and protect bony prominences; implement measures based on patient-specific risk factors.  Match turning and repositioning schedule to clinical condition.  Encourage weight shift frequently; assist with reposition if unable to complete independently.  Float heels off bed; avoid pressure on the Achilles tendon.  Keep skin free from extended contact with medical devices.  Encourage functional activity and mobility, as early as tolerated.  Use aids (e.g., slide boards, mechanical lift) during transfer.  Intervention: Prevent and Manage VTE (Venous Thromboembolism) Risk  Description: Assess for VTE (venous  thromboembolism) risk.  Encourage and assist with early ambulation.  Initiate and maintain compression or other therapy, as indicated, based on identified risk in accordance with organizational protocol and provider order.  Encourage both active and passive leg exercises while in bed, if unable to ambulate.  Goal: Optimal Comfort and Wellbeing  Intervention: Provide Person-Centered Care  Description: Use a family-focused approach to care.  Develop trust and rapport by proactively providing information, encouraging questions, addressing concerns and offering reassurance.  Acknowledge emotional response to hospitalization.  Recognize and utilize personal coping strategies.  Honor spiritual and cultural preferences.  Goal: Readiness for Transition of Care  Intervention: Mutually Develop Transition Plan  Description: Identify available resources for support (e.g., family, friends, community).  Identify and address barriers to ongoing treatment and home management (e.g., environmental, financial).  Provide opportunities to practice self-management skills.  Assess and monitor emotional readiness for transition.  Establish or reconnect linkage with outpatient providers or community-based services.     Problem: Pain Acute  Goal: Acceptable Pain Control and Functional Ability  Intervention: Prevent or Manage Pain  Description: Evaluate pain level, effect of treatment and patient response at regular intervals.  Minimize painful stimuli; coordinate care and adjust environment (e.g., light, noise, unnecessary movement); promote sleep/rest.  Match pharmacologic analgesia to severity and type of pain mechanism (e.g., neuropathic, muscle, inflammatory); consider multimodal approach (e.g., nonopioid, opioid, adjuvant).  Provide medication at regular intervals; titrate to patient response; premedicate for painful procedures.  Manage breakthrough pain with additional doses; consider rotation or switching medication.  Monitor for  signs of substance tolerance (increased dose to reach desired effect, decreased effect with same dose).  Manage medication-induced effects, such as constipation, nausea, pruritus, urinary retention, somnolence and dizziness.  Provide multimodal interventions, such as as physical activity, therapeutic exercise, yoga, TENS (transcutaneous electrical nerve stimulation) and manual therapy.  Train in functional activity modifications, such as body mechanics, posture, ergonomics, energy conservation and activity pacing.  Consider addition of complementary or alternative therapy, such as acupuncture, hypnosis or therapeutic touch.  Intervention: Optimize Psychosocial Wellbeing  Description: Facilitate patient’s self-control over pain by providing pain information and allowing choices in treatment.  Consider and address emotional response to pain.  Explore and promote use of coping strategies; address barriers to successful coping.  Evaluate and assist with psychosocial, cultural and spiritual factors impacting pain.  Modify pain perception using techniques, such as distraction, mindfulness, guided imagery, meditation or music.  Assess for risk factors for developing chronic pain, such as depression, fear, pain avoidance and pain catastrophizing.  Consider referral for ongoing coping support, such as education, relaxation training and role of thoughts.   Goal Outcome Evaluation:  Plan of Care Reviewed With: patient        Progress: no change

## 2024-04-17 NOTE — THERAPY EVALUATION
Patient Name: Belen Allen  : 1980    MRN: [id number]                              Today's Date: 2024       Admit Date: 4/15/2024    Visit Dx:     ICD-10-CM ICD-9-CM   1. Recurrent seizures  G40.909 345.80   2. Dental infection  K04.7 522.4   3. Antiphospholipid antibody positive  R76.0 795.79   4. Right foot pain  M79.671 729.5   5. Dental abscess  K04.7 522.5     Patient Active Problem List   Diagnosis    Splenic infarct    Anxiety disorder    Functional asplenia    Abdominal pain    Acute bilateral low back pain    Antiphospholipid antibody positive    On anticoagulant therapy    Acute pain of left knee    Vitamin D deficiency    Folate deficiency    Pain of back and left lower extremity    Common bile duct dilatation    Elevated LFTs    Right upper quadrant abdominal pain    Obesity (BMI 30-39.9)    Tobacco abuse    GERD without esophagitis    Intractable abdominal pain    Class 2 severe obesity with serious comorbidity in adult    Constipation    History of ERCP    Chronic pain syndrome    Seizures    Syncope    Lumbar back pain with radiculopathy affecting left lower extremity    Right upper quadrant pain    Uncontrolled pain    Intractable back pain    Sciatica of right side    Migraine    Mobility impaired    Seizure    Opioid dependence    Clostridioides difficile diarrhea    Pulmonary edema    Acute hypoxemic respiratory failure    Other dysphagia    Vomiting    Sepsis    Dental abscess    Bilateral cellulitis of lower leg    Pneumonia    Abnormal MRI, thoracic spine    Recurrent seizures    Antiphospholipid syndrome    HTN (hypertension)     Past Medical History:   Diagnosis Date    Abnormal Pap smear of cervix     Ankle fracture     H/O Elevated liver enzymes     History of chronic back pain     History of migraine     History of urinary tract infection     Injury of back     Opioid dependence 2023    PONV (postoperative nausea and vomiting)     Seasonal allergies     Seizure      Elias Dasilva     Past Surgical History:   Procedure Laterality Date    BACK SURGERY  2006    fusion L4, L5      CHOLECYSTECTOMY  2014    DILATATION AND CURETTAGE  2009    ENDOSCOPY N/A 11/27/2022    Procedure: ESOPHAGOGASTRODUODENOSCOPY with biopsy;  Surgeon: Christiana Mann MD;  Location: [facility] ENDOSCOPY;  Service: Gastroenterology;  Laterality: N/A;  gastritis, duodenitis, irregular z line    ERCP N/A 6/3/2021    Procedure: ENDOSCOPIC RETROGRADE CHOLANGIOPANCREATOGRAPHY WITH SPHINCTEROTOMY, DILATION WITH BALLOON CLEARANCE  (12MM-15MM);  Surgeon: Bjorn Flannery MD;  Location: [facility] ENDOSCOPY;  Service: Gastroenterology;  Laterality: N/A;  DILATED COMMON BILE DUCT    SKIN SURGERY      TUBAL ABDOMINAL LIGATION  2013    WISDOM TOOTH EXTRACTION  2018    x2      General Information       Row Name 04/17/24 1549          Physical Therapy Time and Intention    Document Type evaluation;discharge evaluation/summary  -EJ     Mode of Treatment physical therapy  -EJ       Row Name 04/17/24 1549          General Information    Patient Profile Reviewed yes  -EJ     Prior Level of Function independent:;all household mobility;community mobility;ADL's  has walker or  WC if needed  -EJ     Existing Precautions/Restrictions fall  -EJ     Barriers to Rehab medically complex  -EJ       Row Name 04/17/24 1549          Living Environment    People in Home child(manuel), dependent;significant other  -EJ       Row Name 04/17/24 1549          Cognition    Orientation Status (Cognition) oriented x 4  -EJ               User Key  (r) = Recorded By, (t) = Taken By, (c) = Cosigned By      Initials Name Provider Type    EJ Rose Nelson, PT Physical Therapist                   Mobility       Row Name 04/17/24 1550          Bed Mobility    Bed Mobility bed mobility (all) activities  -EJ     All Activities, [place] (Bed Mobility) independent  -EJ       Row Name 04/17/24 1550          Sit-Stand Transfer    Sit-Stand [place]  (Transfers) independent  -       Row Name 04/17/24 1550          Gait/Stairs (Locomotion)    [place] Level (Gait) standby assist  -EJ     Distance in Feet (Gait) 100  -EJ     Deviations/Abnormal Patterns (Gait) nubia decreased;stride length decreased  -EJ     Comment, (Gait/Stairs) no LOB noted  -EJ               User Key  (r) = Recorded By, (t) = Taken By, (c) = Cosigned By      Initials Name Provider Type    Rose Bower, PT Physical Therapist                   Obj/Interventions       Row Name 04/17/24 1550          Range of Motion Comprehensive    General Range of Motion no range of motion deficits identified  -[facility] Name 04/17/24 1550          Strength Comprehensive (MMT)    General Manual Muscle Testing (MMT) Assessment no strength deficits identified  -EJ               User Key  (r) = Recorded By, (t) = Taken By, (c) = Cosigned By      Initials Name Provider Type    Rose Bower, PT Physical Therapist                   Goals/Plan    No documentation.                  Clinical Impression       [facility] Name 04/17/24 1550          Pain    Pretreatment Pain Rating 0/10 - no pain  -[facility] Name 04/17/24 1550          Plan of Care Review    Plan of Care Reviewed With patient  -     Outcome Evaluation Pt agreeable to PT eval this afternoon. She was admitted to [facility] on 4/15/24 w recurrent seizures. Pt has hx of back pain, HTN, and PTSD. At baseline, pt lives at home w significant other and 2 children. She reports independence w mobility and ADLS, but does have walker and WC to use when needed. Today, pt states she is doing ok and has been getting up to BR on her own. Pt able to perform bed mobiltiy w independence. She stood independently and then ambulated approx 100 ft w supervision. SLow pace, but steady. No LOB noted. Pt back in bed at end of session. SHe is at her baseline. She denies further PT needs at this time. PT will sign off.  -       Row Name 04/17/24 6230           Therapy Assessment/Plan (PT)    Criteria for Skilled Interventions Met (PT) no problems identified which require skilled intervention  -EJ     Therapy Frequency (PT) evaluation only  -EJ       Row Name 04/17/24 3855          Positioning and Restraints    Pre-Treatment Position in bed  -EJ     Post Treatment Position bed  -EJ     In Bed notified nsg;supine;call light within reach;encouraged to call for assist  -EJ               User Key  (r) = Recorded By, (t) = Taken By, (c) = Cosigned By      Initials Name Provider Type    Rose Bower, PT Physical Therapist                   Outcome Measures       Row Name 04/17/24 8359          How much help from another person do you currently need...    Turning from your back to your side while in flat bed without using bedrails? 4  -EJ     Moving from lying on back to sitting on the side of a flat bed without bedrails? 4  -EJ     Moving to and from a bed to a chair (including a wheelchair)? 4  -EJ     Standing up from a chair using your arms (e.g., wheelchair, bedside chair)? 4  -EJ     Climbing 3-5 steps with a railing? 3  -EJ     To walk in hospital room? 4  -EJ     AM-PAC 6 Clicks Score (PT) 23  -EJ     Highest Level of Mobility Goal 7 --> Walk 25 feet or more  -EJ               User Key  (r) = Recorded By, (t) = Taken By, (c) = Cosigned By      Initials Name Provider Type    Rose Bower, PT Physical Therapist                                   PT Recommendation and Plan     Plan of Care Reviewed With: patient  Outcome Evaluation: Pt agreeable to PT eval this afternoon. She was admitted to [facility] on 4/15/24 w recurrent seizures. Pt has hx of back pain, HTN, and PTSD. At baseline, pt lives at home w significant other and 2 children. She reports independence w mobility and ADLS, but does have walker and WC to use when needed. Today, pt states she is doing ok and has been getting up to BR on her own. Pt able to perform bed mobiltiy w independence. She stood  independently and then ambulated approx 100 ft w supervision. SLow pace, but steady. No LOB noted. Pt back in bed at end of session. SHe is at her baseline. She denies further PT needs at this time. PT will sign off.     Time Calculation:         PT Charges       Row Name 04/17/24 1556             Time Calculation    Start Time 1525  -EJ      Stop Time 1545  -EJ      Time Calculation (min) 20 min  -EJ      PT Received On 04/17/24  -EJ         Time Calculation- PT    Total Timed Code Minutes- PT 15 minute(s)  -EJ                User Key  (r) = Recorded By, (t) = Taken By, (c) = Cosigned By      Initials Name Provider Type    Rose Bower, PT Physical Therapist                  Therapy Charges for Today       Code Description Service Date Service Provider Modifiers Qty    [id number]  PT EVAL MOD COMPLEXITY 3 4/17/2024 Rose Nelson, PT GP 1    [id number]  PT THERAPEUTIC ACT EA 15 MIN 4/17/2024 Rose Nelson, PT GP 1            PT G-Codes  AM-PAC 6 Clicks Score (PT): 23  PT Discharge Summary  Anticipated Discharge Disposition (PT): home with assist    Rose Nelson PT  4/17/2024

## 2024-04-17 NOTE — PROGRESS NOTES
Name: Belen Allen ADMIT: 4/15/2024   : 1980  PCP: Sahil Cornelius MD    MRN: [id number] LOS: 1 days   AGE/SEX: 43 y.o. female  ROOM: [facility]     Subjective   Subjective   No acute events. Patient complaining of dental/mandibular pain. States meds are not helping much. Taking PO. No family at bedside.    Objective   Objective   Vital Signs  Temp:  [98.1 °F (36.7 °C)-98.4 °F (36.9 °C)] 98.2 °F (36.8 °C)  Heart Rate:  [70-84] 74  Resp:  [18] 18  BP: (112-130)/(61-77) 130/77  SpO2:  [91 %-100 %] 98 %  on   ;   Device (Oxygen Therapy): room air  Body mass index is 41.2 kg/m².  Physical Exam  Vitals and nursing note reviewed.   Constitutional:       General: She is not in acute distress.     Appearance: She is not toxic-appearing or diaphoretic.   HENT:      Head: Normocephalic and atraumatic.      Nose: Nose normal.      Mouth/Throat:      Mouth: Mucous membranes are moist.      Pharynx: Oropharynx is clear.   Eyes:      Conjunctiva/sclera: Conjunctivae normal.      Pupils: Pupils are equal, round, and reactive to light.   Cardiovascular:      Rate and Rhythm: Normal rate and regular rhythm.      Pulses: Normal pulses.   Pulmonary:      Effort: Pulmonary effort is normal.      Breath sounds: Normal breath sounds.   Abdominal:      General: Bowel sounds are normal.      Palpations: Abdomen is soft.   Musculoskeletal:         General: No swelling or tenderness.      Cervical back: Neck supple.   Skin:     General: Skin is warm and dry.   Neurological:      General: No focal deficit present.      Mental Status: She is alert and oriented to person, place, and time.   Psychiatric:         Mood and Affect: Mood normal.         Behavior: Behavior normal.       Results Review     I reviewed the patient's new clinical results.  Results from last 7 days   Lab Units 24  0554 04/15/24  2042   WBC 10*3/mm3 6.90 13.10*   HEMOGLOBIN g/dL 11.2* 12.5   PLATELETS 10*3/mm3 205 235     Results from last 7  "days   Lab Units 04/17/24  0554 04/15/24  2042   SODIUM mmol/L 141 140   POTASSIUM mmol/L 4.5 4.0   CHLORIDE mmol/L 104 105   CO2 mmol/L 28.5 25.8   BUN mg/dL 10 8   CREATININE mg/dL 0.64 0.69   GLUCOSE mg/dL 99 93   EGFR mL/min/1.73 113.3 111.3     Results from last 7 days   Lab Units 04/15/24  2042   ALBUMIN g/dL 4.1   BILIRUBIN mg/dL 0.2   ALK PHOS U/L 157*   AST (SGOT) U/L 31   ALT (SGPT) U/L 37*     Results from last 7 days   Lab Units 04/17/24  0554 04/15/24  2042   CALCIUM mg/dL 9.0 9.0   ALBUMIN g/dL  --  4.1     Results from last 7 days   Lab Units 04/17/24  0554 04/15/24  2042   PROCALCITONIN ng/mL 0.32* 0.82*   LACTATE mmol/L  --  1.1     No results found for: \"HGBA1C\", \"POCGLU\"    CT Facial Bones With Contrast    Result Date: 4/17/2024  There are findings consistent with a periapical abscess with a lucency seen at the level of the root of tooth #20. There is a small ovoid hypodense focus within the adjacent gingiva measuring up to 8 x 5 x 9 mm in greatest dimensions that is compatible with an abscess or developing abscess. Adjacent infiltration of the subcutaneous fat is noted both deep and superficial to the platysma that is compatible with a facial cellulitis.  Additionally, there is a carious appearance of multiple mandibular and maxillary teeth.  I am attempting to discuss these findings with Dr. Tristan on 04/17/2024 at approximately 2:12 p.m.  Radiation dose reduction techniques were utilized, including automated exposure control and exposure modulation based on body size.       XR Chest 1 View    Result Date: 4/15/2024  As described.    This report was finalized on 4/15/2024 8:50 PM by Dr. Yonathan Neal M.D on Workstation: [id number]      XR Ankle 3+ View Right    Result Date: 4/15/2024  As described.    This report was finalized on 4/15/2024 8:50 PM by Dr. Yonathan Neal M.D on Workstation: [id number]      XR Foot 3+ View Right    Result Date: 4/15/2024  As described.    This report was " finalized on 4/15/2024 8:50 PM by Dr. Yonathan Neal M.D on Workstation: [id number]      CT Head Without Contrast    Result Date: 4/15/2024  1. Normal head CT. Specifically, no acute skull fracture or intracranial hemorrhage is identified.  Radiation dose reduction techniques were utilized, including automated exposure control and exposure modulation based on body size.   This report was finalized on 4/15/2024 8:46 PM by Dr. Robert Valdivia M.D on Workstation: [id number]       I have personally reviewed all medications:  Scheduled Medications  ampicillin-sulbactam, 3 g, Intravenous, Q6H  [Held by provider] apixaban, 5 mg, Oral, Q12H  cloNIDine, 0.1 mg, Oral, Daily  FLUoxetine, 60 mg, Oral, Nightly  gabapentin, 300 mg, Oral, Q12H  levETIRAcetam, 1,500 mg, Oral, Q12H  melatonin, 10 mg, Oral, Nightly  nicotine, 1 patch, Transdermal, Q24H    Infusions   Diet  Diet: Cardiac; Healthy Heart (2-3 Na+); Fluid Consistency: Thin (IDDSI 0)    I have personally reviewed:  [x]  Laboratory   [x]  Microbiology   [x]  Radiology   [x]  EKG/Telemetry  []  Cardiology/Vascular   []  Pathology    []  Records    Assessment/Plan     Active Hospital Problems    Diagnosis  POA    **Recurrent seizures [G40.909]  Yes    Antiphospholipid syndrome [D68.61]  Unknown    HTN (hypertension) [I10]  Unknown    Dental abscess [K04.7]  Yes    Chronic pain syndrome [G89.4]  Yes    Tobacco abuse [Z72.0]  Yes      Resolved Hospital Problems   No resolved problems to display.   Recurrent Seizures  - none further while in the hospital  - keppra increased, patient is to take seizure precautions and avoid driving until seizure free for at least 3 months  - appreciate neurology recs    Dental Abscesses  - CT facial bones noted and I d/w Dr. Muse-patient has at least 1, likely 2 periapical abscesses  - continue unasyn, appreciate ID recs  - Dr. Rivas to see later today, eliquis held until then  - continue pain control, switch scheduled tylenol and tramadol to  PRN percocet, continue PRN toradol    Chronic Back Pain  - stable, patient is trying to get in to pain management outpatient  - evaluated by [facility] last admission, follow with them as scheduled    Antiphospholipid Syndrome  - previously refused warfarin, on eliquis which is held pending dental evaluation as above    Positive UDS  - fentanyl detected, access following    SCDs for DVT prophylaxis.  Full code.  Discussed with patient and nursing staff.  Anticipate discharge home in 1-2 days.  Expected Discharge Date: 4/18/2024; Expected Discharge Time:       Ton Tristan MD  [facility]  04/17/24  15:16 EDT

## 2024-04-17 NOTE — NURSING NOTE
Consult called to Dentist, Dr. Rivas. Spoke to Bindu HOLGUIN regarding consult. Face sheet faxed to [phone].

## 2024-04-17 NOTE — CONSULTS
"Neurology Consult Note    Consult Date: 4/17/2024    Referring MD: Gold Meek MD    Reason for Consult I have been asked to see the patient in neurological consultation to render advice and opinion regarding breakthrough seizures.    Belen Allen is a 43 y.o. white female with known diagnosis of seizure disorder on Keppra 1 g twice daily, antiphospholipid syndrome, opioid dependence, migraine headache, chronic back pain presented to the hospital complaining of multiple complaints.,  She was stating that she had recurrent seizures at home, she stated that the day prior to coming to the hospital she had 3-4 seizures, generalized tonic-clonic seizures, she lost control on her bowel and bladder and bit her tongue, she had witnessed seizure while in the emergency department.  She stated that she was compliant with her Keppra 1 g twice daily with no significant side effects.  She stated that she was not having a good sleep because of her back pain, she was taken pain medication from a friend and was told to be oxycodone but her drug screen continue to show fentanyl which is the same on last admission.  She stated that she recently had a UTI, and she was recently treated for dental infections.  On presentation she had elevated procalcitonin at 0.82, today improved to 0.32.    Past Medical History:   Diagnosis Date    Abnormal Pap smear of cervix     Ankle fracture 2013    H/O Elevated liver enzymes     History of chronic back pain     History of migraine     History of urinary tract infection     Injury of back     Opioid dependence 1/16/2023    PONV (postoperative nausea and vomiting)     Seasonal allergies     Seizure     Takes Keppra       Exam  /76 (BP Location: Right arm, Patient Position: Lying)   Pulse 72   Temp 98.4 °F (36.9 °C) (Oral)   Resp 18   Ht 162.6 cm (64\")   Wt 109 kg (240 lb)   SpO2 99%   BMI 41.20 kg/m²   Gen: NAD, vitals reviewed  MS: oriented x3, recent/remote memory intact, " normal attention/concentration, language intact, no neglect.  CN: visual acuity grossly normal, PERRL, EOMI, no facial droop, no dysarthria  Motor: 5/5 throughout upper and lower extremities, normal tone    DATA:    Lab Results   Component Value Date    GLUCOSE 99 04/17/2024    CALCIUM 9.0 04/17/2024     04/17/2024    K 4.5 04/17/2024    CO2 28.5 04/17/2024     04/17/2024    BUN 10 04/17/2024    CREATININE 0.64 04/17/2024    EGFRIFAFRI >60 11/09/2022    EGFRIFNONA 94 02/02/2022    BCR 15.6 04/17/2024    ANIONGAP 8.5 04/17/2024     Lab Results   Component Value Date    WBC 6.90 04/17/2024    HGB 11.2 (L) 04/17/2024    HCT 33.8 (L) 04/17/2024    MCV 95.5 04/17/2024     04/17/2024       Lab review: Elevated procalcitonin 0.82 improved to 0.32, WBC 13.10 improved to 6.9, urine drug screen positive for oxycodone and fentanyl.    Imaging review: I personally reviewed the CT scan of the head dated 4/15 which showed no acute abnormality.    Diagnoses:  -Breakthrough seizure likely related to recent infections, stress, sleep deprivation, drug abuse.  Patient stated had recent UTI      PLAN:   -Increase Keppra to 1500 mg twice daily given her current infection with elevated procalcitonin.  Keppra can be lowered back to 1 g twice daily when she she follows with neurology as an outpatient.  -Check urinalysis  -Consider checking Keppra level as an outpatient, patient already on Keppra for the last 3 days on this admission.  -Management of antibiotics as per infectious disease  -Follow-up with Nisha Valles as scheduled on 5/8/2024  -Counseled the patient to avoid fentanyl and street drugs, avoid sleep deprivation, treat infectious as they can worsen seizures.  -Seizure precautions discussed with the patient as detailed below  -Discussed with the patient risk of SUDEP    Seizure Precautions:  ·    Do not drive 90 days. (this is to include: car, bicycle, or motorcycle)  ·    Do not drive operate dangerous  equipment such as power tools, heavy machinery with moving parts, or an open flame.  ·    Do not swim alone (this would include a bathtub full of water is a small swimming pool).  ·    Avoid unsecured heights. (this is to include: scaffolding, ladders, trees, and roofs).    No further workup needed from neurology at this time will sign off and see again as needed.

## 2024-04-17 NOTE — PROGRESS NOTES
[facility] follow up d/t UDS positive for fentanyl; this writer reviewed chart and spoke with RN Marly. Per RN, patient achieved little to no sleep overnight; she complained of pain, however, declined ordered pain medications and is requesting stronger medication.  Patient sees NP (cannot remember her name) for psychiatric medications; she is also involved in OP counseling and plans to start PTSD treatment post hospitalization.  Per EMR, patient has pain management appointment in a few days; she plans to return to current OP behavior health providers. No further needs/concerns noted at this time per RN and/or medical team; [facility] to continue following.

## 2024-04-18 ENCOUNTER — ANESTHESIA EVENT (OUTPATIENT)
Dept: PERIOP | Facility: HOSPITAL | Age: 44
End: 2024-04-18
Payer: COMMERCIAL

## 2024-04-18 ENCOUNTER — ANESTHESIA (OUTPATIENT)
Dept: PERIOP | Facility: HOSPITAL | Age: 44
End: 2024-04-18
Payer: COMMERCIAL

## 2024-04-18 PROBLEM — K04.7 DENTAL INFECTION: Status: ACTIVE | Noted: 2024-04-18

## 2024-04-18 LAB
ANION GAP SERPL CALCULATED.3IONS-SCNC: 8.7 MMOL/L (ref 5–15)
BUN SERPL-MCNC: 10 MG/DL (ref 6–20)
BUN/CREAT SERPL: 14.3 (ref 7–25)
CALCIUM SPEC-SCNC: 8.9 MG/DL (ref 8.6–10.5)
CHLORIDE SERPL-SCNC: 104 MMOL/L (ref 98–107)
CO2 SERPL-SCNC: 28.3 MMOL/L (ref 22–29)
CREAT SERPL-MCNC: 0.7 MG/DL (ref 0.57–1)
DEPRECATED RDW RBC AUTO: 49.2 FL (ref 37–54)
EGFRCR SERPLBLD CKD-EPI 2021: 110.9 ML/MIN/1.73
ERYTHROCYTE [DISTWIDTH] IN BLOOD BY AUTOMATED COUNT: 13.9 % (ref 12.3–15.4)
GLUCOSE SERPL-MCNC: 92 MG/DL (ref 65–99)
HCT VFR BLD AUTO: 36 % (ref 34–46.6)
HGB BLD-MCNC: 11.8 G/DL (ref 12–15.9)
MCH RBC QN AUTO: 31.4 PG (ref 26.6–33)
MCHC RBC AUTO-ENTMCNC: 32.8 G/DL (ref 31.5–35.7)
MCV RBC AUTO: 95.7 FL (ref 79–97)
PLATELET # BLD AUTO: 228 10*3/MM3 (ref 140–450)
PMV BLD AUTO: 10.1 FL (ref 6–12)
POTASSIUM SERPL-SCNC: 4.4 MMOL/L (ref 3.5–5.2)
RBC # BLD AUTO: 3.76 10*6/MM3 (ref 3.77–5.28)
SODIUM SERPL-SCNC: 141 MMOL/L (ref 136–145)
WBC NRBC COR # BLD AUTO: 6.78 10*3/MM3 (ref 3.4–10.8)

## 2024-04-18 PROCEDURE — 25010000002 MIDAZOLAM PER 1 MG: Performed by: ANESTHESIOLOGY

## 2024-04-18 PROCEDURE — 25810000003 LACTATED RINGERS PER 1000 ML: Performed by: ANESTHESIOLOGY

## 2024-04-18 PROCEDURE — 25010000002 FENTANYL CITRATE (PF) 50 MCG/ML SOLUTION: Performed by: NURSE ANESTHETIST, CERTIFIED REGISTERED

## 2024-04-18 PROCEDURE — 25010000002 PROPOFOL 200 MG/20ML EMULSION: Performed by: NURSE ANESTHETIST, CERTIFIED REGISTERED

## 2024-04-18 PROCEDURE — 25010000002 HYDROMORPHONE PER 4 MG: Performed by: NURSE ANESTHETIST, CERTIFIED REGISTERED

## 2024-04-18 PROCEDURE — 0CDXXZ0 EXTRACTION OF LOWER TOOTH, SINGLE, EXTERNAL APPROACH: ICD-10-PCS | Performed by: DENTIST

## 2024-04-18 PROCEDURE — 25010000002 SUGAMMADEX 200 MG/2ML SOLUTION: Performed by: ANESTHESIOLOGY

## 2024-04-18 PROCEDURE — 80048 BASIC METABOLIC PNL TOTAL CA: CPT | Performed by: INTERNAL MEDICINE

## 2024-04-18 PROCEDURE — 99232 SBSQ HOSP IP/OBS MODERATE 35: CPT | Performed by: INTERNAL MEDICINE

## 2024-04-18 PROCEDURE — 25010000002 DEXAMETHASONE SODIUM PHOSPHATE 20 MG/5ML SOLUTION: Performed by: NURSE ANESTHETIST, CERTIFIED REGISTERED

## 2024-04-18 PROCEDURE — 36415 COLL VENOUS BLD VENIPUNCTURE: CPT | Performed by: INTERNAL MEDICINE

## 2024-04-18 PROCEDURE — 85027 COMPLETE CBC AUTOMATED: CPT | Performed by: INTERNAL MEDICINE

## 2024-04-18 PROCEDURE — 25010000002 AMPICILLIN-SULBACTAM PER 1.5 G: Performed by: DENTIST

## 2024-04-18 PROCEDURE — 25010000002 ONDANSETRON PER 1 MG: Performed by: ANESTHESIOLOGY

## 2024-04-18 PROCEDURE — 0CDWXZ0 EXTRACTION OF UPPER TOOTH, SINGLE, EXTERNAL APPROACH: ICD-10-PCS | Performed by: DENTIST

## 2024-04-18 PROCEDURE — 25010000002 AMPICILLIN-SULBACTAM PER 1.5 G: Performed by: INTERNAL MEDICINE

## 2024-04-18 PROCEDURE — 25010000002 AMPICILLIN-SULBACTAM PER 1.5 G: Performed by: NURSE PRACTITIONER

## 2024-04-18 RX ORDER — MAGNESIUM HYDROXIDE 1200 MG/15ML
LIQUID ORAL AS NEEDED
Status: DISCONTINUED | OUTPATIENT
Start: 2024-04-18 | End: 2024-04-18 | Stop reason: HOSPADM

## 2024-04-18 RX ORDER — FLUMAZENIL 0.1 MG/ML
0.2 INJECTION INTRAVENOUS AS NEEDED
Status: DISCONTINUED | OUTPATIENT
Start: 2024-04-18 | End: 2024-04-18 | Stop reason: HOSPADM

## 2024-04-18 RX ORDER — HYDROCODONE BITARTRATE AND ACETAMINOPHEN 5; 325 MG/1; MG/1
1 TABLET ORAL ONCE AS NEEDED
Status: DISCONTINUED | OUTPATIENT
Start: 2024-04-18 | End: 2024-04-18 | Stop reason: HOSPADM

## 2024-04-18 RX ORDER — BUPIVACAINE HYDROCHLORIDE AND EPINEPHRINE 2.5; 5 MG/ML; UG/ML
INJECTION, SOLUTION EPIDURAL; INFILTRATION; INTRACAUDAL; PERINEURAL AS NEEDED
Status: DISCONTINUED | OUTPATIENT
Start: 2024-04-18 | End: 2024-04-18 | Stop reason: HOSPADM

## 2024-04-18 RX ORDER — DROPERIDOL 2.5 MG/ML
0.62 INJECTION, SOLUTION INTRAMUSCULAR; INTRAVENOUS
Status: DISCONTINUED | OUTPATIENT
Start: 2024-04-18 | End: 2024-04-18 | Stop reason: HOSPADM

## 2024-04-18 RX ORDER — LABETALOL HYDROCHLORIDE 5 MG/ML
5 INJECTION, SOLUTION INTRAVENOUS
Status: DISCONTINUED | OUTPATIENT
Start: 2024-04-18 | End: 2024-04-18 | Stop reason: HOSPADM

## 2024-04-18 RX ORDER — SODIUM CHLORIDE 0.9 % (FLUSH) 0.9 %
3-10 SYRINGE (ML) INJECTION AS NEEDED
Status: DISCONTINUED | OUTPATIENT
Start: 2024-04-18 | End: 2024-04-18 | Stop reason: HOSPADM

## 2024-04-18 RX ORDER — PROMETHAZINE HYDROCHLORIDE 25 MG/1
25 SUPPOSITORY RECTAL ONCE AS NEEDED
Status: DISCONTINUED | OUTPATIENT
Start: 2024-04-18 | End: 2024-04-18 | Stop reason: HOSPADM

## 2024-04-18 RX ORDER — FAMOTIDINE 10 MG/ML
20 INJECTION, SOLUTION INTRAVENOUS ONCE
Status: COMPLETED | OUTPATIENT
Start: 2024-04-18 | End: 2024-04-18

## 2024-04-18 RX ORDER — EPHEDRINE SULFATE 50 MG/ML
5 INJECTION, SOLUTION INTRAVENOUS ONCE AS NEEDED
Status: DISCONTINUED | OUTPATIENT
Start: 2024-04-18 | End: 2024-04-18 | Stop reason: HOSPADM

## 2024-04-18 RX ORDER — DIPHENHYDRAMINE HYDROCHLORIDE 50 MG/ML
12.5 INJECTION INTRAMUSCULAR; INTRAVENOUS
Status: DISCONTINUED | OUTPATIENT
Start: 2024-04-18 | End: 2024-04-18 | Stop reason: HOSPADM

## 2024-04-18 RX ORDER — DEXAMETHASONE SODIUM PHOSPHATE 4 MG/ML
INJECTION, SOLUTION INTRA-ARTICULAR; INTRALESIONAL; INTRAMUSCULAR; INTRAVENOUS; SOFT TISSUE AS NEEDED
Status: DISCONTINUED | OUTPATIENT
Start: 2024-04-18 | End: 2024-04-18 | Stop reason: SURG

## 2024-04-18 RX ORDER — KETAMINE HCL IN NACL, ISO-OSM 100MG/10ML
SYRINGE (ML) INJECTION AS NEEDED
Status: DISCONTINUED | OUTPATIENT
Start: 2024-04-18 | End: 2024-04-18 | Stop reason: SURG

## 2024-04-18 RX ORDER — HYDRALAZINE HYDROCHLORIDE 20 MG/ML
5 INJECTION INTRAMUSCULAR; INTRAVENOUS
Status: DISCONTINUED | OUTPATIENT
Start: 2024-04-18 | End: 2024-04-18 | Stop reason: HOSPADM

## 2024-04-18 RX ORDER — SODIUM CHLORIDE, SODIUM LACTATE, POTASSIUM CHLORIDE, CALCIUM CHLORIDE 600; 310; 30; 20 MG/100ML; MG/100ML; MG/100ML; MG/100ML
9 INJECTION, SOLUTION INTRAVENOUS CONTINUOUS
Status: DISCONTINUED | OUTPATIENT
Start: 2024-04-18 | End: 2024-04-20 | Stop reason: HOSPADM

## 2024-04-18 RX ORDER — FENTANYL CITRATE 50 UG/ML
INJECTION, SOLUTION INTRAMUSCULAR; INTRAVENOUS AS NEEDED
Status: DISCONTINUED | OUTPATIENT
Start: 2024-04-18 | End: 2024-04-18 | Stop reason: SURG

## 2024-04-18 RX ORDER — ONDANSETRON 2 MG/ML
INJECTION INTRAMUSCULAR; INTRAVENOUS AS NEEDED
Status: DISCONTINUED | OUTPATIENT
Start: 2024-04-18 | End: 2024-04-18 | Stop reason: SURG

## 2024-04-18 RX ORDER — OXYCODONE AND ACETAMINOPHEN 7.5; 325 MG/1; MG/1
1 TABLET ORAL EVERY 4 HOURS PRN
Status: DISCONTINUED | OUTPATIENT
Start: 2024-04-18 | End: 2024-04-18 | Stop reason: HOSPADM

## 2024-04-18 RX ORDER — PROPOFOL 10 MG/ML
INJECTION, EMULSION INTRAVENOUS AS NEEDED
Status: DISCONTINUED | OUTPATIENT
Start: 2024-04-18 | End: 2024-04-18 | Stop reason: SURG

## 2024-04-18 RX ORDER — MIDAZOLAM HYDROCHLORIDE 1 MG/ML
1 INJECTION INTRAMUSCULAR; INTRAVENOUS
Status: COMPLETED | OUTPATIENT
Start: 2024-04-18 | End: 2024-04-18

## 2024-04-18 RX ORDER — FENTANYL CITRATE 50 UG/ML
50 INJECTION, SOLUTION INTRAMUSCULAR; INTRAVENOUS
Status: DISCONTINUED | OUTPATIENT
Start: 2024-04-18 | End: 2024-04-18 | Stop reason: HOSPADM

## 2024-04-18 RX ORDER — ONDANSETRON 2 MG/ML
4 INJECTION INTRAMUSCULAR; INTRAVENOUS ONCE AS NEEDED
Status: DISCONTINUED | OUTPATIENT
Start: 2024-04-18 | End: 2024-04-18 | Stop reason: HOSPADM

## 2024-04-18 RX ORDER — PROMETHAZINE HYDROCHLORIDE 25 MG/1
25 TABLET ORAL ONCE AS NEEDED
Status: DISCONTINUED | OUTPATIENT
Start: 2024-04-18 | End: 2024-04-18 | Stop reason: HOSPADM

## 2024-04-18 RX ORDER — ROCURONIUM BROMIDE 10 MG/ML
INJECTION, SOLUTION INTRAVENOUS AS NEEDED
Status: DISCONTINUED | OUTPATIENT
Start: 2024-04-18 | End: 2024-04-18 | Stop reason: SURG

## 2024-04-18 RX ORDER — IPRATROPIUM BROMIDE AND ALBUTEROL SULFATE 2.5; .5 MG/3ML; MG/3ML
3 SOLUTION RESPIRATORY (INHALATION) ONCE AS NEEDED
Status: DISCONTINUED | OUTPATIENT
Start: 2024-04-18 | End: 2024-04-18 | Stop reason: HOSPADM

## 2024-04-18 RX ORDER — SODIUM CHLORIDE 0.9 % (FLUSH) 0.9 %
3 SYRINGE (ML) INJECTION EVERY 12 HOURS SCHEDULED
Status: DISCONTINUED | OUTPATIENT
Start: 2024-04-18 | End: 2024-04-18 | Stop reason: HOSPADM

## 2024-04-18 RX ORDER — NALOXONE HCL 0.4 MG/ML
0.2 VIAL (ML) INJECTION AS NEEDED
Status: DISCONTINUED | OUTPATIENT
Start: 2024-04-18 | End: 2024-04-18 | Stop reason: HOSPADM

## 2024-04-18 RX ORDER — CHLORHEXIDINE GLUCONATE ORAL RINSE 1.2 MG/ML
15 SOLUTION DENTAL EVERY 12 HOURS SCHEDULED
Status: DISCONTINUED | OUTPATIENT
Start: 2024-04-19 | End: 2024-04-20 | Stop reason: HOSPADM

## 2024-04-18 RX ORDER — HYDROMORPHONE HYDROCHLORIDE 1 MG/ML
0.5 INJECTION, SOLUTION INTRAMUSCULAR; INTRAVENOUS; SUBCUTANEOUS
Status: DISCONTINUED | OUTPATIENT
Start: 2024-04-18 | End: 2024-04-18 | Stop reason: HOSPADM

## 2024-04-18 RX ADMIN — GABAPENTIN 300 MG: 300 CAPSULE ORAL at 08:41

## 2024-04-18 RX ADMIN — OXYCODONE AND ACETAMINOPHEN 1 TABLET: 7.5; 325 TABLET ORAL at 08:41

## 2024-04-18 RX ADMIN — FAMOTIDINE 20 MG: 10 INJECTION INTRAVENOUS at 16:08

## 2024-04-18 RX ADMIN — OXYCODONE AND ACETAMINOPHEN 1 TABLET: 7.5; 325 TABLET ORAL at 04:10

## 2024-04-18 RX ADMIN — Medication 1 PATCH: at 08:42

## 2024-04-18 RX ADMIN — AMPICILLIN SODIUM AND SULBACTAM SODIUM 3 G: 2; 1 INJECTION, POWDER, FOR SOLUTION INTRAMUSCULAR; INTRAVENOUS at 23:48

## 2024-04-18 RX ADMIN — AMPICILLIN SODIUM AND SULBACTAM SODIUM 3 G: 2; 1 INJECTION, POWDER, FOR SOLUTION INTRAMUSCULAR; INTRAVENOUS at 10:48

## 2024-04-18 RX ADMIN — FLUOXETINE HYDROCHLORIDE 60 MG: 20 CAPSULE ORAL at 20:37

## 2024-04-18 RX ADMIN — ONDANSETRON 4 MG: 2 INJECTION INTRAMUSCULAR; INTRAVENOUS at 17:38

## 2024-04-18 RX ADMIN — GABAPENTIN 300 MG: 300 CAPSULE ORAL at 20:37

## 2024-04-18 RX ADMIN — ROCURONIUM BROMIDE 70 MG: 10 INJECTION, SOLUTION INTRAVENOUS at 17:25

## 2024-04-18 RX ADMIN — AMPICILLIN SODIUM AND SULBACTAM SODIUM 3 G: 2; 1 INJECTION, POWDER, FOR SOLUTION INTRAMUSCULAR; INTRAVENOUS at 06:30

## 2024-04-18 RX ADMIN — CLONIDINE HYDROCHLORIDE 0.1 MG: 0.1 TABLET ORAL at 08:41

## 2024-04-18 RX ADMIN — OXYCODONE AND ACETAMINOPHEN 1 TABLET: 7.5; 325 TABLET ORAL at 13:23

## 2024-04-18 RX ADMIN — HYDROMORPHONE HYDROCHLORIDE 0.5 MG: 1 INJECTION, SOLUTION INTRAMUSCULAR; INTRAVENOUS; SUBCUTANEOUS at 18:03

## 2024-04-18 RX ADMIN — Medication 50 MG: at 17:26

## 2024-04-18 RX ADMIN — SODIUM CHLORIDE, POTASSIUM CHLORIDE, SODIUM LACTATE AND CALCIUM CHLORIDE 9 ML/HR: 600; 310; 30; 20 INJECTION, SOLUTION INTRAVENOUS at 16:08

## 2024-04-18 RX ADMIN — MIDAZOLAM 1 MG: 1 INJECTION INTRAMUSCULAR; INTRAVENOUS at 16:46

## 2024-04-18 RX ADMIN — HYDROMORPHONE HYDROCHLORIDE 0.5 MG: 1 INJECTION, SOLUTION INTRAMUSCULAR; INTRAVENOUS; SUBCUTANEOUS at 18:34

## 2024-04-18 RX ADMIN — HYDROMORPHONE HYDROCHLORIDE 0.5 MG: 1 INJECTION, SOLUTION INTRAMUSCULAR; INTRAVENOUS; SUBCUTANEOUS at 18:22

## 2024-04-18 RX ADMIN — TRAZODONE HYDROCHLORIDE 50 MG: 50 TABLET ORAL at 23:48

## 2024-04-18 RX ADMIN — LEVETIRACETAM 1500 MG: 500 TABLET, FILM COATED ORAL at 20:37

## 2024-04-18 RX ADMIN — MIDAZOLAM 1 MG: 1 INJECTION INTRAMUSCULAR; INTRAVENOUS at 16:09

## 2024-04-18 RX ADMIN — Medication 10 MG: at 20:37

## 2024-04-18 RX ADMIN — HYDROMORPHONE HYDROCHLORIDE 0.5 MG: 1 INJECTION, SOLUTION INTRAMUSCULAR; INTRAVENOUS; SUBCUTANEOUS at 18:11

## 2024-04-18 RX ADMIN — SUGAMMADEX 400 MG: 100 INJECTION, SOLUTION INTRAVENOUS at 17:38

## 2024-04-18 RX ADMIN — PROPOFOL 300 MG: 10 INJECTION, EMULSION INTRAVENOUS at 17:25

## 2024-04-18 RX ADMIN — LEVETIRACETAM 1500 MG: 500 TABLET, FILM COATED ORAL at 08:41

## 2024-04-18 RX ADMIN — FENTANYL CITRATE 50 MCG: 50 INJECTION, SOLUTION INTRAMUSCULAR; INTRAVENOUS at 18:42

## 2024-04-18 RX ADMIN — DEXAMETHASONE SODIUM PHOSPHATE 0.02 MG: 4 INJECTION, SOLUTION INTRAMUSCULAR; INTRAVENOUS at 17:26

## 2024-04-18 RX ADMIN — FENTANYL CITRATE 50 MCG: 50 INJECTION, SOLUTION INTRAMUSCULAR; INTRAVENOUS at 17:26

## 2024-04-18 RX ADMIN — OXYCODONE AND ACETAMINOPHEN 1 TABLET: 7.5; 325 TABLET ORAL at 23:48

## 2024-04-18 RX ADMIN — OXYCODONE AND ACETAMINOPHEN 1 TABLET: 7.5; 325 TABLET ORAL at 18:37

## 2024-04-18 NOTE — NURSING NOTE
"Access follow-up regards drug use:    Pt found RIB. She stated she is feeling \"ok\". She was educated on the dangers today of taking pain pills or other medications that are not hers or are bought off the street. She stated she understood and \"I'll never do that again\". She otherwise adamantly denied any type of drug use or alcohol use. She denied withdrawal symps and denied any cravings. She denied any feelings of depression and rated her level 0/10 (10 being the worst), rated her anxiety level 6/10. She denied current SI/Hi/AVH. Appetite \"good\" and sleep \"ok\".     She has an outpatient therapist she is working with and next appointment is \"next week\". She denied any further need for resources or Access follow-up, so will sign off.  "

## 2024-04-18 NOTE — PAYOR COMM NOTE
"Carson Mullen (43 y.o. Female)        PLEASE SEE ATTACHED FOR INPT AUTH.     DX:  SEIZURES  G40.909          DENTAL ABSCESS  K04.7          Antiphospholipid syndrome [D68.61       PLEASE CALL  OR  [phone]    THANK YOU    YARELI REAVESJAYLIN MCALLISTER [facility]   Date of Birth   1980    Social Security Number       Address   [address] [place], [place] [address]    Home Phone   [phone]    MRN   [id number]       Orthodox   Jew    Marital Status   Single                            Admission Date   4/15/24  OBS   4/18/24  INPT Admission Type   Emergency    Admitting Provider   Ton Tristan MD    Attending Provider   Lester Aguirre DO    Department, Room/Bed   [address], N[address]/1       Discharge Date       Discharge Disposition       Discharge Destination                                 Attending Provider: Lester Aguirre DO    Allergies: Morphine, Lidocaine    Isolation: None   Infection: None   Code Status: CPR    Ht: 162.6 cm (64\")   Wt: 109 kg (240 lb)    Admission Cmt: None   Principal Problem: Recurrent seizures [G40.909]                   Active Insurance as of 4/15/2024       Primary Coverage       Payor Plan Insurance Group Employer/Plan Group    [employer] BY ALY[employer] [facility] BY ALYSHA [id number]       Payor Plan Address Payor Plan Phone Number Payor Plan Fax Number Effective Dates    [address] [address]   1/1/2021 - None Entered    [facility] [id number]         Subscriber Name Subscriber Birth Date Member ID       CARSON MULLEN 1980 [id number]                     Emergency Contacts        (Rel.) Home Phone Work Phone Mobile Phone    DEDEJUNIOR (Significant Other) [phone] -- --    ALEX STOCK (Sister) [phone] -- --              [place]: [facility] [id number]  Tax ID [id number]     History & Physical        Beatriz Hernandez APRN at 04/15/24 2351       Attestation signed by Ton Tristan MD at 04/16/24 " "1247    I have personally interviewed and examined the patient in addition to reviewing her clinical data including labs, imaging, telemetry, and some medical records. I have done a substantive portion of the medical decision making in this split/shared service. My history and physical examination findings confirm those documented in the note below unless otherwise indicated. Patient with a history of HTN, antiphospholipid antibody syndrome (refused warfarin, on eliquis), seizures, drug abuse, and chronic lower back pain as well as multiple dental caries and a recent dental infection. She was hospitalized at this facility last month and treated for pneumonia and a dental infection. She completed her course of augmentin and is set to follow up with [facility] dentistry next month. She presents now with multiple seizures yesterday, perhaps 7-8. She is on keppra and denies missing doses of this. She has not had further episodes since admission and her examination is non-focal. Her head CT scan is negative for anything acute. We will treat with IV keppra and ask neurology to evaluate. She does have chronic back pain as well and she was evaluated by neurosurgery during the prior admission and has follow up with them and is trying to get in to pain management. Her main complaint today other than her seizures is her dental pain which she states is around her right jaw. She tells me \"she has an infection in the bone\". Upon approaching the room her door was partially open and the lights were off and she was there in silence but when I knocked she started writhing and wimpering and immediately told me about her dental pain. On palpation of her right mandibular area is not swollen and I did not note any change in non-verbal pain expression. She has multiple dental caries but no purulence. Her WBC count and procalcitonin are elevated and we will treat with unasyn. ID is consulted. Her urine drug screen is notably positive for " "genie and I asked the patient about this, after which her writhing and wimpering stopped and she was able to complete the remainder of the interview in a normal tone without further occurrence of these. She states that at home she took what she thought was a percocet from a friend and she found out it had fentanyl in it so she doesn't talk to him anymore. It is noted that her UDS was positive for fentanyl during the last admission on 3/23/24 as well. I did  her on the importance of avoiding all street drugs and any medications which were not directly prescribed to her. Given this and her history I am going to go ahead and have access center evaluate. In the mean time we will treat any dental infection with unasyn as above and I am going to check a contrasted ct scan of the facial bones and ask dentistry to see her. Check Hcg. We should try to avoid narcotics as able and will treat with scheduled high-dose tylenol, PRN tramadol, and PRN toradol if her HCG is negative.                        Patient Name:  Belen Allen  YOB: 1980  MRN:  [id number]  Admit Date:  4/15/2024  Patient Care Team:  Sahil Cornelius MD as PCP - General (Internal Medicine)  Code, Bjorn HERRERA II, MD as Consulting Physician (Hematology and Oncology)  Dennys Carranza MD as Referring Physician (Hospitalist)      Subjective  History Present Illness     Chief Complaint   Patient presents with    Seizures    Fall       Ms. Allen is a 43 y.o. smoker with a history of seizures, antiphospholipid syndrome, hypertension, and chronic low back pain that presents to [facility] complaining of seizures.  She reports she had \"7 or 8\" seizures yesterday that were witnessed by her daughter and fiance. She states she has been compliant with her Keppra since she was discharged from the hospital on 3/29/2024.  She was recently  admitted from 3/23/2024 to 3/29/2024 for intractable back pain, dental abscess, " cellulitis of bilateral legs, and pneumonia.  She also complains of teeth pain and left facial swelling.  She states she completed the antibiotics she was prescribed on discharge but she is not scheduled to see [facility] oral surgery until 5/24/2024.  Lab work in the ED revealed WBC 13.10 and procal 0.82.  A CT of the head was negative for an acute intracranial process.  She received IV Keppra and Unasyn and is being admitted for further evaluation.      History of Present Illness  Review of Systems   Constitutional:  Negative for chills and fever.   HENT:  Positive for dental problem and facial swelling. Negative for congestion, sore throat and trouble swallowing.    Eyes:  Negative for photophobia and visual disturbance.   Respiratory:  Negative for cough and shortness of breath.    Cardiovascular:  Negative for chest pain, palpitations and leg swelling.   Gastrointestinal:  Positive for abdominal pain and nausea. Negative for constipation, diarrhea and vomiting.   Musculoskeletal:  Positive for arthralgias and back pain.   Skin:  Negative for color change and rash.   Neurological:  Positive for headaches. Negative for dizziness, speech difficulty, weakness and numbness.        Personal History     Past Medical History:   Diagnosis Date    Abnormal Pap smear of cervix     Ankle fracture 2013    H/O Elevated liver enzymes     History of chronic back pain     History of migraine     History of urinary tract infection     Injury of back     Opioid dependence 1/16/2023    PONV (postoperative nausea and vomiting)     Seasonal allergies     Seizure     Takes Keppra     Past Surgical History:   Procedure Laterality Date    BACK SURGERY  2006    fusion L4, L5      CHOLECYSTECTOMY  2014    DILATATION AND CURETTAGE  2009    ENDOSCOPY N/A 11/27/2022    Procedure: ESOPHAGOGASTRODUODENOSCOPY with biopsy;  Surgeon: Christiana Mann MD;  Location: [facility] ENDOSCOPY;  Service: Gastroenterology;  Laterality: N/A;   gastritis, duodenitis, irregular z line    ERCP N/A 6/3/2021    Procedure: ENDOSCOPIC RETROGRADE CHOLANGIOPANCREATOGRAPHY WITH SPHINCTEROTOMY, DILATION WITH BALLOON CLEARANCE  (12MM-15MM);  Surgeon: Bjorn Flannery MD;  Location: [facility] ENDOSCOPY;  Service: Gastroenterology;  Laterality: N/A;  DILATED COMMON BILE DUCT    SKIN SURGERY      TUBAL ABDOMINAL LIGATION  2013    WISDOM TOOTH EXTRACTION  2018    x2     Family History   Problem Relation Age of Onset    Stroke Mother     Allergic rhinitis Mother     Allergic rhinitis Sister     Breast cancer Maternal Aunt     Cancer Maternal Grandmother     Allergic rhinitis Paternal Grandfather     Cancer Paternal Grandfather     Prostate cancer Paternal Grandfather      Social History     Tobacco Use    Smoking status: Every Day     Current packs/day: 1.00     Types: Cigarettes    Smokeless tobacco: Never   Vaping Use    Vaping status: Never Used   Substance Use Topics    Alcohol use: Not Currently    Drug use: Not Currently     (Not in a hospital admission)    Allergies:    Allergies   Allergen Reactions    Morphine Hives    Lidocaine Rash     Pt reports lidocaine patches make her breakout in a rash       Objective   Objective     Vital Signs  Temp:  [98 °F (36.7 °C)] 98 °F (36.7 °C)  Heart Rate:  [] 94  Resp:  [18-19] 18  BP: (118-147)/(73-80) 118/80  SpO2:  [94 %-97 %] 95 %  on   ;   Device (Oxygen Therapy): room air  Body mass index is 41.2 kg/m².    Physical Exam  Vitals and nursing note reviewed.   Constitutional:       Appearance: Normal appearance.   HENT:      Head: Normocephalic and atraumatic.      Nose: Nose normal.      Mouth/Throat:      Mouth: Mucous membranes are moist.      Dentition: Abnormal dentition. Dental tenderness and dental caries present.      Pharynx: Oropharynx is clear.   Eyes:      Extraocular Movements: Extraocular movements intact.      Conjunctiva/sclera: Conjunctivae normal.   Cardiovascular:      Rate and Rhythm: Normal rate  and regular rhythm.      Pulses: Normal pulses.      Heart sounds: Normal heart sounds.   Pulmonary:      Effort: Pulmonary effort is normal.      Breath sounds: Normal breath sounds.   Abdominal:      General: Bowel sounds are normal.      Palpations: Abdomen is soft.   Musculoskeletal:      Cervical back: Normal range of motion and neck supple.      Right lower leg: No edema.      Left lower leg: No edema.   Skin:     General: Skin is warm and dry.   Neurological:      General: No focal deficit present.      Mental Status: She is alert and oriented to person, place, and time.   Psychiatric:         Mood and Affect: Mood normal.         Behavior: Behavior normal.         Results Review:  I reviewed the patient's new clinical results.  I reviewed the patient's new imaging results and agree with the interpretation.  I reviewed the patient's other test results and agree with the interpretation  I personally viewed and interpreted the patient's EKG/Telemetry data  Discussed with ED provider.    Lab Results (last 24 hours)       Procedure Component Value Units Date/Time    CBC & Differential [[id number]]  (Abnormal) Collected: 04/15/24 2042    Specimen: Blood Updated: 04/15/24 2057    Narrative:      The following orders were created for panel order CBC & Differential.  Procedure                               Abnormality         Status                     ---------                               -----------         ------                     CBC Auto Differential[[id number]]        Abnormal            Final result                 Please view results for these tests on the individual orders.    Comprehensive Metabolic Panel [[id number]]  (Abnormal) Collected: 04/15/24 2042    Specimen: Blood Updated: 04/15/24 2115     Glucose 93 mg/dL      BUN 8 mg/dL      Creatinine 0.69 mg/dL      Sodium 140 mmol/L      Potassium 4.0 mmol/L      Chloride 105 mmol/L      CO2 25.8 mmol/L      Calcium 9.0 mg/dL      Total Protein 6.7 g/dL      " Albumin 4.1 g/dL      ALT (SGPT) 37 U/L      AST (SGOT) 31 U/L      Alkaline Phosphatase 157 U/L      Total Bilirubin 0.2 mg/dL      Globulin 2.6 gm/dL      A/G Ratio 1.6 g/dL      BUN/Creatinine Ratio 11.6     Anion Gap 9.2 mmol/L      eGFR 111.3 mL/min/1.73     Narrative:      GFR Normal >60  Chronic Kidney Disease <60  Kidney Failure <15      Procalcitonin [[id number]]  (Abnormal) Collected: 04/15/24 2042    Specimen: Blood Updated: 04/15/24 2122     Procalcitonin 0.82 ng/mL     Narrative:      As a Marker for Sepsis (Non-Neonates):    1. <0.5 ng/mL represents a low risk of severe sepsis and/or septic shock.  2. >2 ng/mL represents a high risk of severe sepsis and/or septic shock.    As a Marker for Lower Respiratory Tract Infections that require antibiotic therapy:    PCT on Admission    Antibiotic Therapy       6-12 Hrs later    >0.5                Strongly Recommended  >0.25 - <0.5        Recommended   0.1 - 0.25          Discouraged              Remeasure/reassess PCT  <0.1                Strongly Discouraged     Remeasure/reassess PCT    As 28 day mortality risk marker: \"Change in Procalcitonin Result\" (>80% or <=80%) if Day 0 (or Day 1) and Day 4 values are available. Refer to http://www.[facility] [place]    Change in PCT <=80%  A decrease of PCT levels below or equal to 80% defines a positive change in PCT test result representing a higher risk for 28-day all-cause mortality of patients diagnosed with severe sepsis for septic shock.    Change in PCT >80%  A decrease of PCT levels of more than 80% defines a negative change in PCT result representing a lower risk for 28-day all-cause mortality of patients diagnosed with severe sepsis or septic shock.       Lactic Acid, Plasma [[id number]]  (Normal) Collected: 04/15/24 2042    Specimen: Blood Updated: 04/15/24 2112     Lactate 1.1 mmol/L     Respiratory Panel PCR w/COVID-19(SARS-CoV-2) LETY/REESE/RASHAWN/PAD/COR/CHANEL In-House, NP Swab in UTM/VTM, 2 HR TAT - " Swab, Nasopharynx [[id number]]  (Normal) Collected: 04/15/24 2042    Specimen: Swab from Nasopharynx Updated: 04/15/24 2141     ADENOVIRUS, PCR Not Detected     Coronavirus 229E Not Detected     Coronavirus HKU1 Not Detected     Coronavirus NL63 Not Detected     Coronavirus OC43 Not Detected     COVID19 Not Detected     Human Metapneumovirus Not Detected     Human Rhinovirus/Enterovirus Not Detected     Influenza A PCR Not Detected     Influenza B PCR Not Detected     Parainfluenza Virus 1 Not Detected     Parainfluenza Virus 2 Not Detected     Parainfluenza Virus 3 Not Detected     Parainfluenza Virus 4 Not Detected     RSV, PCR Not Detected     Bordetella pertussis pcr Not Detected     Bordetella parapertussis PCR Not Detected     Chlamydophila pneumoniae PCR Not Detected     Mycoplasma pneumo by PCR Not Detected    Narrative:      In the setting of a positive respiratory panel with a viral infection PLUS a negative procalcitonin without other underlying concern for bacterial infection, consider observing off antibiotics or discontinuation of antibiotics and continue supportive care. If the respiratory panel is positive for atypical bacterial infection (Bordetella pertussis, Chlamydophila pneumoniae, or Mycoplasma pneumoniae), consider antibiotic de-escalation to target atypical bacterial infection.    CBC Auto Differential [[id number]]  (Abnormal) Collected: 04/15/24 2042    Specimen: Blood Updated: 04/15/24 2057     WBC 13.10 10*3/mm3      RBC 4.07 10*6/mm3      Hemoglobin 12.5 g/dL      Hematocrit 38.6 %      MCV 94.8 fL      MCH 30.7 pg      MCHC 32.4 g/dL      RDW 14.0 %      RDW-SD 47.8 fl      MPV 10.2 fL      Platelets 235 10*3/mm3      Neutrophil % 62.1 %      Lymphocyte % 30.0 %      Monocyte % 6.3 %      Eosinophil % 0.7 %      Basophil % 0.4 %      Immature Grans % 0.5 %      Neutrophils, Absolute 8.15 10*3/mm3      Lymphocytes, Absolute 3.93 10*3/mm3      Monocytes, Absolute 0.82 10*3/mm3       Eosinophils, Absolute 0.09 10*3/mm3      Basophils, Absolute 0.05 10*3/mm3      Immature Grans, Absolute 0.06 10*3/mm3      nRBC 0.0 /100 WBC     Blood Culture - Blood, Arm, Right [[id number]] Collected: 04/15/24 2052    Specimen: Blood from Arm, Right Updated: 04/15/24 2106    Blood Culture - Blood, Arm, Right [[id number]] Collected: 04/15/24 2225    Specimen: Blood from Arm, Right Updated: 04/15/24 2230    Urine Drug Screen - Urine, Clean Catch [[id number]]  (Abnormal) Collected: 04/16/24 0333    Specimen: Urine, Clean Catch Updated: 04/16/24 0405     Amphet/Methamphet, Screen Negative     Barbiturates Screen, Urine Negative     Benzodiazepine Screen, Urine Negative     Cocaine Screen, Urine Negative     Opiate Screen Negative     THC, Screen, Urine Negative     Methadone Screen, Urine Negative     Oxycodone Screen, Urine Positive     Fentanyl, Urine Positive    Narrative:      Negative Thresholds Per Drugs Screened:    Amphetamines                 500 ng/ml  Barbiturates                 200 ng/ml  Benzodiazepines              100 ng/ml  Cocaine                      300 ng/ml  Methadone                    300 ng/ml  Opiates                      300 ng/ml  Oxycodone                    100 ng/ml  THC                           50 ng/ml  Fentanyl                       5 ng/ml      The Normal Value for all drugs tested is negative. This report includes final unconfirmed screening results to be used for medical treatment purposes only. Unconfirmed results must not be used for non-medical purposes such as employment or legal testing. Clinical consideration should be applied to any drug of abuse test, particularly when unconfirmed results are used.                    Imaging Results (Last 24 Hours)       Procedure Component Value Units Date/Time    XR Chest 1 View [[id number]] Collected: 04/15/24 2048     Updated: 04/15/24 2053    Narrative:      XR CHEST 1 VW-, XR ANKLE 3+ VW RIGHT-, XR FOOT 3+ VW RIGHT-     HISTORY: Female  who is 43 years-old, trauma, cough and fever     TECHNIQUE: Frontal view of the chest, 3 views of the right foot, 3 views  of the right ankle     COMPARISON: Chest x-ray from 7/30/2023     FINDINGS:     Chest x-ray: Heart, mediastinum and pulmonary vasculature are  unremarkable. No focal pulmonary consolidation, pleural effusion, or  pneumothorax. No acute osseous process.     Right foot and ankle: No acute fracture, erosion, or dislocation is  identified. Ankle mortise appears preserved. Mild calcaneal spurring is  present. If further imaging evaluation of the right foot and ankle is  indicated, MRI could be considered.       Impression:      As described.           This report was finalized on 4/15/2024 8:50 PM by Dr. Yonathan Neal M.D on Workstation: [id number]       XR Ankle 3+ View Right [[id number]] Collected: 04/15/24 2048     Updated: 04/15/24 2053    Narrative:      XR CHEST 1 VW-, XR ANKLE 3+ VW RIGHT-, XR FOOT 3+ VW RIGHT-     HISTORY: Female who is 43 years-old, trauma, cough and fever     TECHNIQUE: Frontal view of the chest, 3 views of the right foot, 3 views  of the right ankle     COMPARISON: Chest x-ray from 7/30/2023     FINDINGS:     Chest x-ray: Heart, mediastinum and pulmonary vasculature are  unremarkable. No focal pulmonary consolidation, pleural effusion, or  pneumothorax. No acute osseous process.     Right foot and ankle: No acute fracture, erosion, or dislocation is  identified. Ankle mortise appears preserved. Mild calcaneal spurring is  present. If further imaging evaluation of the right foot and ankle is  indicated, MRI could be considered.       Impression:      As described.           This report was finalized on 4/15/2024 8:50 PM by Dr. Yonathan Neal M.D on Workstation: [id number]       XR Foot 3+ View Right [[id number]] Collected: 04/15/24 2048     Updated: 04/15/24 2053    Narrative:      XR CHEST 1 VW-, XR ANKLE 3+ VW RIGHT-, XR FOOT 3+ VW RIGHT-     HISTORY: Female who is 43  years-old, trauma, cough and fever     TECHNIQUE: Frontal view of the chest, 3 views of the right foot, 3 views  of the right ankle     COMPARISON: Chest x-ray from 7/30/2023     FINDINGS:     Chest x-ray: Heart, mediastinum and pulmonary vasculature are  unremarkable. No focal pulmonary consolidation, pleural effusion, or  pneumothorax. No acute osseous process.     Right foot and ankle: No acute fracture, erosion, or dislocation is  identified. Ankle mortise appears preserved. Mild calcaneal spurring is  present. If further imaging evaluation of the right foot and ankle is  indicated, MRI could be considered.       Impression:      As described.           This report was finalized on 4/15/2024 8:50 PM by Dr. Yonathan Neal M.D on Workstation: [id number]       CT Head Without Contrast [[id number]] Collected: 04/15/24 2037     Updated: 04/15/24 2049    Narrative:      Emergency CT scan of the head without contrast on 4/15/2024     CLINICAL HISTORY: Patient had a seizure and fell. The patient is  anticoagulated on Eliquis     TECHNIQUE: Spiral CT images were obtained from the base of the skull to  the vertex without intra venous contrast. The images were reformatted  and are submitted in 3 mm thick axial, sagittal and coronal CT sections  with brain algorithm and 2 mm thick axial CT sections with  high-resolution bone algorithm.     This is correlated to a prior head CT from [facility] on 1/24/2023.     FINDINGS: The brain parenchyma is normal in attenuation. The ventricles  are normal in size. I see no focal mass effect. There is no midline  shift. No extra axial fluid collections are identified. There is no  evidence of acute intracranial hemorrhage. No acute skull fracture is  identified. The calvarium and the skull base are normal in appearance.  The paranasal sinuses and the mastoid air cells and the middle ear  cavities are clear.       Impression:      1. Normal head CT. Specifically, no  acute skull fracture or intracranial  hemorrhage is identified.     Radiation dose reduction techniques were utilized, including automated  exposure control and exposure modulation based on body size.        This report was finalized on 4/15/2024 8:46 PM by Dr. Robert Valdivia M.D  on Workstation: [id number]               Results for orders placed during the hospital encounter of 03/23/24    Adult Transthoracic Echo Complete W/ Cont if Necessary Per Protocol    Interpretation Summary    Left ventricular systolic function is normal. Calculated left ventricular EF = 61.2%    Left ventricular diastolic function was normal.    Estimated right ventricular systolic pressure from tricuspid regurgitation is normal (<35 mmHg). Calculated right ventricular systolic pressure from tricuspid regurgitation is 22 mmHg.      No orders to display        Assessment/Plan     Active Hospital Problems    Diagnosis  POA    **Recurrent seizures [G40.909]  Yes    Antiphospholipid syndrome [D68.61]  Unknown    HTN (hypertension) [I10]  Unknown    Dental abscess [K04.7]  Yes    Chronic pain syndrome [G89.4]  Yes    Tobacco abuse [Z72.0]  Yes       Recurrent Seizures  -Admit to a neuro telemetry unit for monitoring  -Neurology consult  -She received a dose of IV Keppra in the ED. Will defer any further IV dosing to the neurology group  -Will also defer any further imaging  -PRN ativan for seizure activity  -Seizure precautions    Dental Abscess/Infection  -She was discharged on Augmentin through 4/6/2024 per ID, which she states she completed. She has leukocytosis and an elevated procal. Continue IV Unasyn  -ID consult  -Dentist consult  -PRN analgesia    Hypertension  -Blood pressures have been stable  -Continue Clonidine    Antiphospholipid Antibody Syndrome/History of Splenic Infarct  -She has previously refused Warfarin  -Continue Eliquis    History of Substance Abuse/Chronic Low Back Pain  -Check UDS    Tobacco Abuse  -Nicotine  patch    -I discussed the patients findings and my recommendations with patient.    VTE Prophylaxis - Eliquis (home med).  Code Status - Full code.       NELSON Kelly  [facility]  04/16/24  05:08 EDT      Electronically signed by Ton Tristan MD at 04/16/24 1247          Emergency Department Notes        Gold Meek MD at 04/15/24 1957           EMERGENCY DEPARTMENT ENCOUNTER  Room Number:  40/40  PCP: Sahil Cornelius MD  Independent Historians: Patient, EMS who brought patient      HPI:  Chief Complaint: had concerns including Seizures and Fall.     A complete HPI/ROS/PMH/PSH/SH/FH are unobtainable due to:   Chronic or social conditions impacting patient care (Social Determinants of Health):       Context: Belen Allen is a 43 y.o. female with a medical history of seizure disorder, chronic back pain, antiphospholipid syndrome who presents to the ED c/o acute multiple complaints.  Patient is complaining of recurrent seizures.  She had a seizure last night with urinary incontinence which is unusual for her.  She states she has had probably 3-4 seizures through the day today.  She did injure her right foot and ankle during one of the seizures.  She also complains of fever as high as 105 earlier this morning.  She does report ongoing cough which is occasionally productive of colored sputum.  She has also had ongoing dental pain and is awaiting dental evaluation for poor dentition.  Patient also describing dysuria ongoing over the last several days.      Review of prior external notes (non-ED) -and- Review of prior external test results outside of this encounter:   I reviewed prior medical records note patient was hospitalized here from 3/23 through 3/29 with intractable back pain.  Patient's history was complicated by multi lobar pneumonia and dental infection.  Patient has history of antiphospholipid syndrome but has been noncompliant with warfarin.  Also  documented noncompliance with Keppra.      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    Abdominal pain 11/29/2020    Acute bilateral low back pain 11/29/2020    Antiphospholipid antibody positive 11/29/2020    On anticoagulant therapy 11/29/2020    Acute pain of left knee 11/29/2020    Vitamin D deficiency 11/30/2020    Folate deficiency 12/01/2020    Pain of back and left lower extremity 12/02/2020    Common bile duct dilatation 05/31/2021    Elevated LFTs 06/01/2021    Right upper quadrant abdominal pain 06/01/2021    Obesity (BMI 30-39.9) 04/18/2019    Tobacco abuse 06/02/2021    GERD without esophagitis 06/02/2021    Intractable abdominal pain 07/05/2021    Class 2 severe obesity with serious comorbidity in adult 07/05/2021    Constipation 07/05/2021    History of ERCP 07/05/2021    Chronic pain syndrome 07/05/2021    Seizures 01/01/2022    Syncope 01/01/2022    Lumbar back pain with radiculopathy affecting left lower extremity 01/05/2022    Right upper quadrant pain 11/23/2022    Uncontrolled pain 12/17/2022    Intractable back pain 12/17/2022    Sciatica of right side 12/20/2022    Migraine 12/20/2022    Mobility impaired 12/20/2022    Seizure 12/31/2022    Opioid dependence 01/16/2023    Clostridioides difficile diarrhea 01/18/2023    Pulmonary edema 07/27/2023    Acute hypoxemic respiratory failure 07/29/2023    Other dysphagia 08/01/2023    Vomiting 08/02/2023    Sepsis 03/23/2024    Dental abscess 03/24/2024    Bilateral cellulitis of lower leg 03/24/2024    Pneumonia 03/24/2024    Abnormal MRI, thoracic spine 03/27/2024     Resolved Ambulatory Problems     Diagnosis Date Noted    Choledocholithiasis 06/02/2021    Rhabdomyolysis 01/01/2022    Encephalopathy 01/01/2022    Hypokalemia 12/18/2022    Opioid withdrawal 07/12/2023      Past Medical History:   Diagnosis Date    Abnormal Pap smear of cervix     Ankle fracture 2013    H/O Elevated liver enzymes     History of chronic back pain     History of migraine     History of urinary tract infection     Injury of back     PONV (postoperative nausea and vomiting)     Seasonal allergies          PAST SURGICAL HISTORY  Past Surgical History:   Procedure Laterality Date    BACK SURGERY  2006    fusion L4, L5      CHOLECYSTECTOMY  2014    DILATATION AND CURETTAGE  2009    ENDOSCOPY N/A 11/27/2022    Procedure: ESOPHAGOGASTRODUODENOSCOPY with biopsy;  Surgeon: Christiana Mann MD;  Location: [facility] ENDOSCOPY;  Service: Gastroenterology;  Laterality: N/A;  gastritis, duodenitis, irregular z line    ERCP N/A 6/3/2021    Procedure: ENDOSCOPIC RETROGRADE CHOLANGIOPANCREATOGRAPHY WITH SPHINCTEROTOMY, DILATION WITH BALLOON CLEARANCE  (12MM-15MM);  Surgeon: Bjorn Flannery MD;  Location: [facility] ENDOSCOPY;  Service: Gastroenterology;  Laterality: N/A;  DILATED COMMON BILE DUCT    SKIN SURGERY      TUBAL ABDOMINAL LIGATION  2013    WISDOM TOOTH EXTRACTION  2018    x2         FAMILY HISTORY  Family History   Problem Relation Age of Onset    Stroke Mother     Allergic rhinitis Mother     Allergic rhinitis Sister     Breast cancer Maternal Aunt     Cancer Maternal Grandmother     Allergic rhinitis Paternal Grandfather     Cancer Paternal Grandfather     Prostate cancer Paternal Grandfather          SOCIAL HISTORY  Social History     Socioeconomic History    Marital status: Single    Number of children: 2   Tobacco Use    Smoking status: Every Day     Current packs/day: 1.00     Types: Cigarettes    Smokeless tobacco: Never   Vaping Use    Vaping status: Never Used   Substance and Sexual Activity    Alcohol use: Not Currently    Drug use: Not Currently    Sexual activity: Defer         ALLERGIES  Morphine and Lidocaine      REVIEW OF SYSTEMS  Review of Systems   Constitutional:  Positive for fever  (Reported temperature of 105 earlier this morning).   Respiratory:  Positive for cough. Negative for shortness of breath.    Cardiovascular:  Negative for chest pain.   Gastrointestinal:  Positive for nausea and vomiting (Vomiting x 1 today).   Musculoskeletal:  Positive for arthralgias (Pain at the right foot and ankle increased after fall yesterday.) and back pain (Chronic back pain).   Neurological:  Positive for seizures.   All other systems reviewed and are negative.    Included in HPI  All systems reviewed and negative except for those discussed in HPI.      PHYSICAL EXAM    I have reviewed the triage vital signs and nursing notes.    ED Triage Vitals [04/15/24 1937]   Temp Heart Rate Resp BP SpO2   98 °F (36.7 °C) 81 19 147/75 97 %      Temp src Heart Rate Source Patient Position BP Location FiO2 (%)   Tympanic Monitor Lying Right arm --       Physical Exam  GENERAL: Alert female who looks older than her stated age.  Triage vitals notable for temperature of 98.  Heart rate blood pressure and O2 sats are benign  SKIN: Warm, dry-no obvious worrisome rashes are noted  HENT: Normocephalic, atraumatic  EYES: no scleral icterus  CV: regular rhythm, regular rate-no murmur  RESPIRATORY: normal effort, lungs clear-O2 sats upper 90s room air  ABDOMEN: soft, nontender, nondistended  MUSCULOSKELETAL: no deformity-examination of the right foot and ankle reveals diffuse tenderness to palpation without noted bony deformity.  There is no laceration or significant discoloration.  Distal strength sensation pulses are grossly intact.  NEURO: alert, moves all extremities, follows commands      LAB RESULTS  Recent Results (from the past 24 hour(s))   Comprehensive Metabolic Panel    Collection Time: 04/15/24  8:42 PM    Specimen: Blood   Result Value Ref Range    Glucose 93 65 - 99 mg/dL    BUN 8 6 - 20 mg/dL    Creatinine 0.69 0.57 - 1.00 mg/dL    Sodium 140 136 - 145 mmol/L    Potassium 4.0 3.5 - 5.2 mmol/L    Chloride 105 98 -  107 mmol/L    CO2 25.8 22.0 - 29.0 mmol/L    Calcium 9.0 8.6 - 10.5 mg/dL    Total Protein 6.7 6.0 - 8.5 g/dL    Albumin 4.1 3.5 - 5.2 g/dL    ALT (SGPT) 37 (H) 1 - 33 U/L    AST (SGOT) 31 1 - 32 U/L    Alkaline Phosphatase 157 (H) 39 - 117 U/L    Total Bilirubin 0.2 0.0 - 1.2 mg/dL    Globulin 2.6 gm/dL    A/G Ratio 1.6 g/dL    BUN/Creatinine Ratio 11.6 7.0 - 25.0    Anion Gap 9.2 5.0 - 15.0 mmol/L    eGFR 111.3 >60.0 mL/min/1.73   Procalcitonin    Collection Time: 04/15/24  8:42 PM    Specimen: Blood   Result Value Ref Range    Procalcitonin 0.82 (H) 0.00 - 0.25 ng/mL   Lactic Acid, Plasma    Collection Time: 04/15/24  8:42 PM    Specimen: Blood   Result Value Ref Range    Lactate 1.1 0.5 - 2.0 mmol/L   Respiratory Panel PCR w/COVID-19(SARS-CoV-2) LETY/REESE/RASHAWN/PAD/COR/CHANEL In-House, NP Swab in [facility], 2 HR TAT - Swab, Nasopharynx    Collection Time: 04/15/24  8:42 PM    Specimen: Nasopharynx; Swab   Result Value Ref Range    ADENOVIRUS, PCR Not Detected Not Detected    Coronavirus 229E Not Detected Not Detected    Coronavirus HKU1 Not Detected Not Detected    Coronavirus NL63 Not Detected Not Detected    Coronavirus OC43 Not Detected Not Detected    COVID19 Not Detected Not Detected - Ref. Range    Human Metapneumovirus Not Detected Not Detected    Human Rhinovirus/Enterovirus Not Detected Not Detected    Influenza A PCR Not Detected Not Detected    Influenza B PCR Not Detected Not Detected    Parainfluenza Virus 1 Not Detected Not Detected    Parainfluenza Virus 2 Not Detected Not Detected    Parainfluenza Virus 3 Not Detected Not Detected    Parainfluenza Virus 4 Not Detected Not Detected    RSV, PCR Not Detected Not Detected    Bordetella pertussis pcr Not Detected Not Detected    Bordetella parapertussis PCR Not Detected Not Detected    Chlamydophila pneumoniae PCR Not Detected Not Detected    Mycoplasma pneumo by PCR Not Detected Not Detected   CBC Auto Differential    Collection Time: 04/15/24  8:42 PM     Specimen: Blood   Result Value Ref Range    WBC 13.10 (H) 3.40 - 10.80 10*3/mm3    RBC 4.07 3.77 - 5.28 10*6/mm3    Hemoglobin 12.5 12.0 - 15.9 g/dL    Hematocrit 38.6 34.0 - 46.6 %    MCV 94.8 79.0 - 97.0 fL    MCH 30.7 26.6 - 33.0 pg    MCHC 32.4 31.5 - 35.7 g/dL    RDW 14.0 12.3 - 15.4 %    RDW-SD 47.8 37.0 - 54.0 fl    MPV 10.2 6.0 - 12.0 fL    Platelets 235 140 - 450 10*3/mm3    Neutrophil % 62.1 42.7 - 76.0 %    Lymphocyte % 30.0 19.6 - 45.3 %    Monocyte % 6.3 5.0 - 12.0 %    Eosinophil % 0.7 0.3 - 6.2 %    Basophil % 0.4 0.0 - 1.5 %    Immature Grans % 0.5 0.0 - 0.5 %    Neutrophils, Absolute 8.15 (H) 1.70 - 7.00 10*3/mm3    Lymphocytes, Absolute 3.93 (H) 0.70 - 3.10 10*3/mm3    Monocytes, Absolute 0.82 0.10 - 0.90 10*3/mm3    Eosinophils, Absolute 0.09 0.00 - 0.40 10*3/mm3    Basophils, Absolute 0.05 0.00 - 0.20 10*3/mm3    Immature Grans, Absolute 0.06 (H) 0.00 - 0.05 10*3/mm3    nRBC 0.0 0.0 - 0.2 /100 WBC         RADIOLOGY  XR Chest 1 View, XR Ankle 3+ View Right, XR Foot 3+ View Right    Result Date: 4/15/2024  XR CHEST 1 VW-, XR ANKLE 3+ VW RIGHT-, XR FOOT 3+ VW RIGHT-  HISTORY: Female who is 43 years-old, trauma, cough and fever  TECHNIQUE: Frontal view of the chest, 3 views of the right foot, 3 views of the right ankle  COMPARISON: Chest x-ray from 7/30/2023  FINDINGS:  Chest x-ray: Heart, mediastinum and pulmonary vasculature are unremarkable. No focal pulmonary consolidation, pleural effusion, or pneumothorax. No acute osseous process.  Right foot and ankle: No acute fracture, erosion, or dislocation is identified. Ankle mortise appears preserved. Mild calcaneal spurring is present. If further imaging evaluation of the right foot and ankle is indicated, MRI could be considered.      As described.    This report was finalized on 4/15/2024 8:50 PM by Dr. Yonathan Neal M.D on Workstation: [id number]      CT Head Without Contrast    Result Date: 4/15/2024  Emergency CT scan of the head without  contrast on 4/15/2024  CLINICAL HISTORY: Patient had a seizure and fell. The patient is anticoagulated on Eliquis  TECHNIQUE: Spiral CT images were obtained from the base of the skull to the vertex without intra venous contrast. The images were reformatted and are submitted in 3 mm thick axial, sagittal and coronal CT sections with brain algorithm and 2 mm thick axial CT sections with high-resolution bone algorithm.  This is correlated to a prior head CT from [facility] on 1/24/2023.  FINDINGS: The brain parenchyma is normal in attenuation. The ventricles are normal in size. I see no focal mass effect. There is no midline shift. No extra axial fluid collections are identified. There is no evidence of acute intracranial hemorrhage. No acute skull fracture is identified. The calvarium and the skull base are normal in appearance. The paranasal sinuses and the mastoid air cells and the middle ear cavities are clear.      1. Normal head CT. Specifically, no acute skull fracture or intracranial hemorrhage is identified.  Radiation dose reduction techniques were utilized, including automated exposure control and exposure modulation based on body size.   This report was finalized on 4/15/2024 8:46 PM by Dr. Robert Valdivia M.D on Workstation: [id number]         MEDICATIONS GIVEN IN ER  Medications   sodium chloride 0.9 % flush 10 mL (has no administration in time range)   HYDROmorphone (DILAUDID) injection 0.5 mg (has no administration in time range)   ampicillin-sulbactam (UNASYN) 1.5 g in sodium chloride 0.9 % 100 mL MBP (has no administration in time range)   levETIRAcetam (KEPPRA) injection 1,000 mg (has no administration in time range)   sodium chloride 0.9 % flush 10 mL (has no administration in time range)   nitroglycerin (NITROSTAT) SL tablet 0.4 mg (has no administration in time range)   acetaminophen (TYLENOL) tablet 650 mg (has no administration in time range)   sennosides-docusate  (PERICOLACE) 8.6-50 MG per tablet 2 tablet (has no administration in time range)     And   polyethylene glycol (MIRALAX) packet 17 g (has no administration in time range)     And   bisacodyl (DULCOLAX) EC tablet 5 mg (has no administration in time range)     And   bisacodyl (DULCOLAX) suppository 10 mg (has no administration in time range)   ondansetron ODT (ZOFRAN-ODT) disintegrating tablet 4 mg (has no administration in time range)     Or   ondansetron (ZOFRAN) injection 4 mg (has no administration in time range)   aluminum-magnesium hydroxide-simethicone (MAALOX MAX) 400-400-40 MG/5ML suspension 15 mL (has no administration in time range)   HYDROcodone-acetaminophen (NORCO) 5-325 MG per tablet 1 tablet (has no administration in time range)   ondansetron (ZOFRAN) injection 4 mg (4 mg Intravenous Given 4/15/24 2043)   oxyCODONE-acetaminophen (PERCOCET) 5-325 MG per tablet 2 tablet (2 tablets Oral Given 4/15/24 2043)         ORDERS PLACED DURING THIS VISIT:  Orders Placed This Encounter   Procedures    Blood Culture - Blood,    Blood Culture - Blood,    Respiratory Panel PCR w/COVID-19(SARS-CoV-2) LETY/REESE/RASHAWN/PAD/COR/CHANEL In-House, NP Swab in UTM/VTM, 2 HR TAT - Swab, Nasopharynx    XR Chest 1 View    XR Ankle 3+ View Right    XR Foot 3+ View Right    CT Head Without Contrast    Comprehensive Metabolic Panel    Procalcitonin    Lactic Acid, Plasma    CBC Auto Differential    Diet: Cardiac; Healthy Heart (2-3 Na+); Fluid Consistency: Thin (IDDSI 0)    Vital Signs    Intake & Output    Weigh Patient    Oral Care    Place Sequential Compression Device    Maintain Sequential Compression Device    Maintain IV Access    Telemetry - Place Orders & Notify Provider of Results When Patient Experiences Acute Chest Pain, Dysrhythmia or Respiratory Distress    May Be Off Telemetry for Tests    Activity - Ad Agata    Code Status and Medical Interventions:    [facility] (on-call MD unless specified) Details    Incentive Spirometry    Insert  Peripheral IV    Inpatient Admission    CBC & Differential         OUTPATIENT MEDICATION MANAGEMENT:  Current Facility-Administered Medications Ordered in Epic   Medication Dose Route Frequency Provider Last Rate Last Admin    acetaminophen (TYLENOL) tablet 650 mg  650 mg Oral Q4H PRN Delgado Aj APRN        aluminum-magnesium hydroxide-simethicone (MAALOX MAX) 400-400-40 MG/5ML suspension 15 mL  15 mL Oral Q6H PRN Delgado Aj APRN        ampicillin-sulbactam (UNASYN) 1.5 g in sodium chloride 0.9 % 100 mL MBP  1.5 g Intravenous Once [place], Gold GREER MD        sennosides-docusate (PERICOLACE) 8.6-50 MG per tablet 2 tablet  2 tablet Oral BID PRN Delgado Aj APRN        And    polyethylene glycol (MIRALAX) packet 17 g  17 g Oral Daily PRN Delgado Aj APRN        And    bisacodyl (DULCOLAX) EC tablet 5 mg  5 mg Oral Daily PRN Delgado Aj APRN        And    bisacodyl (DULCOLAX) suppository 10 mg  10 mg Rectal Daily PRN Delgado Aj APRN        HYDROcodone-acetaminophen (NORCO) 5-325 MG per tablet 1 tablet  1 tablet Oral Q4H PRN Delgado Aj APRN        HYDROmorphone (DILAUDID) injection 0.5 mg  0.5 mg Intravenous Once Fantasma, Gold GREER MD        levETIRAcetam (KEPPRA) injection 1,000 mg  1,000 mg Intravenous Once Fantasma, Gold GREER MD        nitroglycerin (NITROSTAT) SL tablet 0.4 mg  0.4 mg Sublingual Q5 Min PRN Delgado Aj APRN        ondansetron ODT (ZOFRAN-ODT) disintegrating tablet 4 mg  4 mg Oral Q6H PRN Delgado Aj APRN        Or    ondansetron (ZOFRAN) injection 4 mg  4 mg Intravenous Q6H PRN Delgado Aj APRN        sodium chloride 0.9 % flush 10 mL  10 mL Intravenous PRN FantasmaGold aguilar MD        sodium chloride 0.9 % flush 10 mL  10 mL Intravenous PRN Delgado Aj APRN         Current Outpatient Medications Ordered in Epic   Medication Sig Dispense Refill    apixaban (ELIQUIS) 5 MG tablet tablet Take 1 tablet by mouth  Every 12 (Twelve) Hours for 30 days. Indications: Other - full anticoagulation 60 tablet 0    cloNIDine (CATAPRES) 0.1 MG tablet Take 1 tablet by mouth Daily.      cyanocobalamin (VITAMIN B-12) 1000 MCG tablet Take 1 tablet by mouth Daily for 30 days. 30 tablet 0    FLUoxetine (PROzac) 40 MG capsule Take 60 mg by mouth Every Night.      folic acid (FOLVITE) 1 MG tablet Take 1 tablet by mouth Daily.      gabapentin (NEURONTIN) 300 MG capsule Take 1 capsule by mouth Every 12 (Twelve) Hours for 7 days. 14 capsule 0    l-methylfolate 7.5 MG tablet tablet Take 1 tablet by mouth Daily.      levETIRAcetam (KEPPRA) 1000 MG tablet Take 1 tablet by mouth Every 12 (Twelve) Hours for 30 days. 60 tablet 0    melatonin 5 MG tablet tablet Take 2 tablets by mouth Every Night. Patient taes 20 mg at home      naloxone (NARCAN) 4 MG/0.1ML nasal spray Call 911. Don't prime. Fulda in 1 nostril for overdose. Repeat in 2-3 minutes in other nostril if no or minimal breathing/responsiveness. 2 each 0    sennosides-docusate (PERICOLACE) 8.6-50 MG per tablet Take 2 tablets by mouth Daily for 30 days. Hold for loose stools. 60 tablet 0    SUMAtriptan (IMITREX) 100 MG tablet Take 1 tablet by mouth Every 2 (Two) Hours As Needed for Migraine.      traZODone (DESYREL) 50 MG tablet Take 1-2 tablets by mouth At Night As Needed for sleep 30 tablet 3    vitamin D (ERGOCALCIFEROL) 1.25 MG (30035 UT) capsule capsule Take 1 capsule by mouth Every 7 (Seven) Days for 30 days. 4 capsule 0         PROCEDURES  Procedures            PROGRESS, DATA ANALYSIS, CONSULTS, AND MEDICAL DECISION MAKING  All labs have been independently interpreted by me.  All radiology studies have been reviewed by me. All EKG's have been independently viewed and interpreted by me.  Discussion below represents my analysis of pertinent findings related to patient's condition, differential diagnosis, treatment plan and final disposition.    Differential diagnosis includes but is not  limited to infection of unclear source, worsening of chronic seizures, traumatic injury related to seizure and fall.      ED Course as of 04/15/24 2237   Mon Apr 15, 2024   2056 CT scan of the brain independently interpreted by me shows no obvious fracture or hemorrhage. [DB]   2057 Chest x-ray shows no obvious acute pneumonia.    Plain films of the right foot and ankle independently interpreted by me show no obvious fracture or dislocation.  There are some arthritic changes noted. [DB]   2139 Patient with elevated procalcitonin of 0.82.  Suspect infection is likely dental in nature and will go ahead and start IV Unasyn, contact hospitalist about admission. [DB]   2237 Discussed treatment and evaluation of the patient with Mica from [facility] who will admit on behalf of Dr. Nadir Suazo. [DB]      ED Course User Index  [DB] Gold Meek MD             AS OF 22:37 EDT VITALS:    BP - 121/73  HR - 80  TEMP - 98 °F (36.7 °C) (Tympanic)  O2 SATS - 96%    COMPLEXITY OF CARE  Complicated patient with multiple medical problems presents with recent fever to 105.  She is also had multiple seizures over the last several days despite reporting compliance with Keppra.    Please see ED course for my interpretation of testing including CT scan of brain, x-rays of foot ankle and chest.  No obvious fracture or noted infection.    Blood work did show elevated white count and procalcitonin worrisome for likely underlying infection.  I suspect the infection is likely dental in nature and will prescribe IV Unasyn.    Given patient's recurrent seizures will treat with IV Keppra and admit for neurology consultation and evaluation.      DIAGNOSIS  Final diagnoses:   Recurrent seizures   Dental infection   Antiphospholipid antibody positive   Right foot pain         DISPOSITION  ED Disposition       ED Disposition   Decision to Admit    Condition   --    Comment   Level of Care: Telemetry [5]   Diagnosis: Recurrent seizures [706616]    Admitting Physician: LEONOR ULLOA LOC [[id number]]   Attending Physician: LEONOR ULLOA LOC [[id number]]   Certification: I Certify That Inpatient Hospital Services Are Medically Necessary For Greater Than 2 Midnights                  Please note that portions of this document were completed with a voice recognition program.    Note Disclaimer: At [facility], we believe that sharing information builds trust and better relationships. You are receiving this note because you recently visited [facility]. It is possible you will see health information before a provider has talked with you about it. This kind of information can be easy to misunderstand. To help you fully understand what it means for your health, we urge you to discuss this note with your provider.         Gold Meek MD  04/15/24 2238      Electronically signed by Gold Meek MD at 04/15/24 2238       Oxygen Therapy (since admission)       Date/Time SpO2 Device (Oxygen Therapy) Flow (L/min) Oxygen Concentration (%) ETCO2 (mmHg)    04/18/24 1245 96 room air -- -- --    04/18/24 0840 -- room air -- -- --    04/18/24 0730 98 room air -- -- --    04/17/24 2105 100 -- -- -- --    04/17/24 1315 98 room air -- -- --    04/17/24 0850 -- room air -- -- --    04/17/24 0710 99 room air -- -- --    04/16/24 2313 97 room air -- -- --    04/16/24 1859 91 room air -- -- --    04/16/24 1602 -- room air -- -- --    04/16/24 1555 100 room air -- -- --    04/16/24 1421 97 -- -- -- --    04/16/24 1100 96 room air -- -- --    04/16/24 0601 96 -- -- -- --    04/16/24 0325 95 -- -- -- --    04/16/24 0300 94 room air -- -- --    04/16/24 0011 97 room air -- -- --    04/15/24 2308 97 room air -- -- --    04/15/24 2135 96 room air -- -- --    04/15/24 2059 95 -- -- -- --    04/15/24 1937 97 room air -- -- --          Intake & Output (last 4 days)         04/14 0701  04/15 0700 04/15 0701 04/16 0700 04/16 0701 04/17 0700 04/17 0701 04/18 0700 04/18 0701 04/19 0700    P.O.    1800 690     IV Piggyback  100       Total Intake(mL/kg)  100 (0.9) 1800 (16.5) 690 (6.3)     Net  +100 +1800 +690              Urine Unmeasured Occurrence   5 x            Lines, Drains & Airways       Active LDAs       Name Placement date Placement time Site Days    Peripheral IV 04/15/24 2030 Left Antecubital 04/15/24  2030  Antecubital  2              Inactive LDAs       Name Placement date Placement time Removal date Removal time Site Days    [REMOVED] Peripheral IV 03/29/24 1033 Anterior;Right Forearm 03/29/24  1033  04/17/24  0926  Forearm  18                  CIWA (last 4 days)        None                  COWS (last 4 days)        None                  Medication Administration Report for Belen Allen as of 4/15/24 through 4/18/24     Legend:    Given Hold Not Given Due Canceled Entry Other Actions    Time Time (Time) Time Time-Action         Discontinued     Completed     Future     MAR Hold     Linked             Medications 04/15/24 04/16/24 04/17/24 04/18/24     acetaminophen (TYLENOL) tablet 650 mg  Dose: 650 mg  Freq: Every 6 Hours PRN Route: PO  PRN Reasons: Mild Pain,Headache,Fever  Start: 04/17/24 1506     Admin Instructions:   If given for fever, use fever parameter: fever greater than 100.4 °F  Based on patient request - if ordered for moderate or severe pain, provider allows for administration of a medication prescribed for a lower pain scale.    Do not exceed 4 grams of acetaminophen in a 24 hr period. Max dose of 2gm for AST/ALT greater than 120 units/L.    If given for pain, use the following pain scale:   Mild Pain = Pain Score of 1-3, CPOT 1-2  Moderate Pain = Pain Score of 4-6, CPOT 3-4  Severe Pain = Pain Score of 7-10, CPOT 5-8            aluminum-magnesium hydroxide-simethicone (MAALOX MAX) 400-400-40 MG/5ML suspension 15 mL  Dose: 15 mL  Freq: Every 6 Hours PRN Route: PO  PRN Reason: Heartburn  Start: 04/15/24 2231     Admin Instructions:   Maximum 60 mL in 24 hours.         1745-Given            ampicillin-sulbactam (UNASYN) 3 g in sodium chloride 0.9 % 100 mL MBP  Dose: 3 g  Freq: Every 6 Hours Route: IV  Indications Comment: dental abscess  Start: 04/16/24 0533 End: 04/28/24 2359     Admin Instructions:   Activate vial before using.       0542-New Bag     1040-New Bag       1853-New Bag     2347-New Bag        0601-New Bag     1158-New Bag       1745-New Bag     2314-New Bag        0630-New Bag     1048-New Bag       1734     2334          apixaban (ELIQUIS) tablet 5 mg  Dose: 5 mg  Freq: Every 12 Hours Scheduled Route: PO  Indications of Use: Other - full anticoagulation  Start: 04/16/24 0900 End: 04/28/24 0859     Admin Instructions:   Tablet may be crushed and suspended in 60 mL of water or D5W and immediately delivered via NG tube.       0806-Given     2120-Given        0905-Given     1151-Held by provider       2100-Held by provider         0900-Held by provider     2100-Held by provider          benzocaine-menthol (CEPACOL) lozenge 1 each  Dose: 1 lozenge  Freq: Every 4 Hours PRN Route: MT  PRN Reason: Sore Throat  Start: 04/16/24 0513     Admin Instructions:                sennosides-docusate (PERICOLACE) 8.6-50 MG per tablet 2 tablet  Dose: 2 tablet  Freq: 2 Times Daily PRN Route: PO  PRN Reason: Constipation  Start: 04/15/24 2231     Admin Instructions:   Start bowel management regimen if patient has not had a bowel movement after 12 hours.            And   polyethylene glycol (MIRALAX) packet 17 g  Dose: 17 g  Freq: Daily PRN Route: PO  PRN Reason: Constipation  PRN Comment: Use if senna-docusate is ineffective  Start: 04/15/24 2231     Admin Instructions:   Use if no bowel movement after 12 hours. Mix in 6-8 ounces of water.  Use 4-8 ounces of water, tea, or juice for each 17 gram dose.            And   bisacodyl (DULCOLAX) EC tablet 5 mg  Dose: 5 mg  Freq: Daily PRN Route: PO  PRN Reason: Constipation  PRN Comment: Use if polyethylene glycol is ineffective  Start:  04/15/24 2231     Admin Instructions:   Use if no bowel movement after 12 hours.  Swallow whole. Do not crush, split, or chew tablet.            And   bisacodyl (DULCOLAX) suppository 10 mg  Dose: 10 mg  Freq: Daily PRN Route: RE  PRN Reason: Constipation  PRN Comment: Use if bisacodyl oral is ineffective  Start: 04/15/24 2231     Admin Instructions:   Use if no bowel movement after 12 hours.  Hold for diarrhea            cloNIDine (CATAPRES) tablet 0.1 mg  Dose: 0.1 mg  Freq: Daily Route: PO  Start: 04/16/24 0900 End: 09/02/24 0859     Admin Instructions:   Hold for SBP less than 100, DBP less than 60, or heart rate less than 50. If a dose is held, please contact the provider.  Caution: Look alike/sound alike drug alert.       0806-Given         0903-Given         0841-Given           FLUoxetine (PROzac) capsule 60 mg  Dose: 60 mg  Freq: Nightly Route: PO  Start: 04/16/24 2100     Admin Instructions:   Caution: Look alike/sound alike drug alert       2232-Given         2043-Given         2100           gabapentin (NEURONTIN) capsule 300 mg  Dose: 300 mg  Freq: Every 12 Hours Scheduled Route: PO  Start: 04/16/24 0900     Admin Instructions:          0806-Given     2120-Given        0902-Given     2044-Given        0841-Given     2100          ketorolac (TORADOL) injection 15 mg  Dose: 15 mg  Freq: Every 6 Hours PRN Route: IV  PRN Reason: Severe Pain  Start: 04/16/24 1412 End: 04/21/24 1411     Admin Instructions:   Based on patient request - if ordered for moderate or severe pain, provider allows for administration of a medication prescribed for a lower pain scale.      If given for pain, use the following pain scale:  Mild Pain = Pain Score of 1-3, CPOT 1-2  Moderate Pain = Pain Score of 4-6, CPOT 3-4  Severe Pain = Pain Score of 7-10, CPOT 5-8       1433-Given         1745-Given            levETIRAcetam (KEPPRA) tablet 1,500 mg  Dose: 1,500 mg  Freq: Every 12 Hours Scheduled Route: PO  Start: 04/17/24 2100      Admin Instructions:   Mucous membrane irritant. Do not crush or chew tablet or capsule unless administered through a feeding tube.  For tube route administration, disperse crushed tablets in 10 mL of water, shake for 5 minutes to dissolve, and administer immediately via enteral feeding tube.    Caution: Look alike/sound alike drug alert.        2044-Given         0841-Given     2100          melatonin tablet 10 mg  Dose: 10 mg  Freq: Nightly Route: PO  Start: 04/16/24 2100 2231-Given         2044-Given         2100           nicotine (NICODERM CQ) 21 MG/24HR patch 1 patch  Dose: 1 patch  Freq: Every 24 Hours Scheduled Route: TD  Start: 04/16/24 0900     Admin Instructions:   Apply to clean, dry, nonhairy area of skin (typically upper arm or shoulder)  Dispose of nicotine replacement therapies and their wrappers in non-hazardous pharmaceutical waste or in regular trash.       0807-Medication Applied         0903-Medication Removed     0905-Medication Applied        0841-Medication Removed     0842-Medication Applied          nitroglycerin (NITROSTAT) SL tablet 0.4 mg  Dose: 0.4 mg  Freq: Every 5 Minutes PRN Route: SL  PRN Reason: Chest Pain  PRN Comment: Only if SBP Greater Than 100  Start: 04/15/24 2230     Admin Instructions:   If Pain Unrelieved After 3 Doses Notify MD  May administer up to 3 doses per episode.             ondansetron ODT (ZOFRAN-ODT) disintegrating tablet 4 mg  Dose: 4 mg  Freq: Every 6 Hours PRN Route: PO  PRN Reasons: Nausea,Vomiting  Start: 04/15/24 2231     Admin Instructions:   If BOTH ondansetron (ZOFRAN) and promethazine (PHENERGAN) are ordered use ondansetron first and THEN promethazine IF ondansetron is ineffective.  Place on tongue and allow to dissolve.       1049-Not Given:  See Alt     2144-Not Given:  See Alt        1538-Not Given:  See Alt            Or   ondansetron (ZOFRAN) injection 4 mg  Dose: 4 mg  Freq: Every 6 Hours PRN Route: IV  PRN Reasons: Nausea,Vomiting  Start:  04/15/24 2231     Admin Instructions:   If BOTH ondansetron (ZOFRAN) and promethazine (PHENERGAN) are ordered use ondansetron first and THEN promethazine IF ondansetron is ineffective.       1049-Given     2144-Given        1538-Given            oxyCODONE-acetaminophen (PERCOCET) 7.5-325 MG per tablet 1 tablet  Dose: 1 tablet  Freq: Every 4 Hours PRN Route: PO  PRN Reasons: Moderate Pain,Severe Pain  Start: 04/17/24 1506 End: 04/22/24 1505     Admin Instructions:   Based on patient request - if ordered for moderate or severe pain, provider allows for administration of a medication prescribed for a lower pain scale.  [JED]    Do not exceed 4 grams of acetaminophen in a 24 hr period. Max dose of 2gm for AST/ALT greater than 120 units/L        If given for pain, use the following pain scale:   Mild Pain = Pain Score of 1-3, CPOT 1-2  Moderate Pain = Pain Score of 4-6, CPOT 3-4  Severe Pain = Pain Score of 7-10, CPOT 5-8        1538-Given     2044-Given        0410-Given     0841-Given       1323-Given           sodium chloride 0.9 % flush 10 mL  Dose: 10 mL  Freq: As Needed Route: IV  PRN Reason: Line Care  Start: 04/15/24 2230             sodium chloride 0.9 % flush 10 mL  Dose: 10 mL  Freq: As Needed Route: IV  PRN Reason: Line Care  Start: 04/15/24 2011            traZODone (DESYREL) tablet 50 mg  Dose: 50 mg  Freq: Nightly PRN Route: PO  PRN Reason: Sleep  Start: 04/16/24 0513     Admin Instructions:   Take with food.  Caution: Look alike/sound alike drug alert        2043-Given            Completed Medications  Medications 04/15/24 04/16/24 04/17/24 04/18/24      ampicillin-sulbactam (UNASYN) 1.5 g in sodium chloride 0.9 % 100 mL MBP  Dose: 1.5 g  Freq: Once Route: IV  Indications of Use: SKIN AND SOFT TISSUE INFECTION  Start: 04/15/24 2157 End: 04/15/24 2329     Admin Instructions:   Activate vial before using.      2244-New Bag     2329-Stopped             ampicillin-sulbactam (UNASYN) 3 (2-1) g injection  - ADS  "Override Pull  Start: 04/16/24 0540 End: 04/16/24 0618     Admin Instructions:   Created by cabinet override       0618-Override Pull-Not given [C]             HYDROmorphone (DILAUDID) injection 0.5 mg  Dose: 0.5 mg  Freq: Every 2 Hours PRN Route: IV  PRN Reason: Severe Pain  Start: 04/16/24 0221 End: 04/16/24 0807     Admin Instructions:   Based on patient request - if ordered for moderate or severe pain, provider allows for administration of a medication prescribed for a lower pain scale.  If given for pain, use the following pain scale:  Mild Pain = Pain Score of 1-3, CPOT 1-2  Moderate Pain = Pain Score of 4-6, CPOT 3-4  Severe Pain = Pain Score of 7-10, CPOT 5-8       0332-Given     0520-Given       0807-Given             HYDROmorphone (DILAUDID) injection 0.5 mg  Dose: 0.5 mg  Freq: Once Route: IV  Start: 04/15/24 2157 End: 04/15/24 2240     Admin Instructions:   Based on patient request - if ordered for moderate or severe pain, provider allows for administration of a medication prescribed for a lower pain scale.  If given for pain, use the following pain scale:  Mild Pain = Pain Score of 1-3, CPOT 1-2  Moderate Pain = Pain Score of 4-6, CPOT 3-4  Severe Pain = Pain Score of 7-10, CPOT 5-8      2240-Given              iopamidol (ISOVUE-300) 61 % injection 100 mL  Dose: 100 mL  Freq: Once in Imaging Route: IV  Start: 04/17/24 1015 End: 04/17/24 0926        0926-Given by Other            levETIRAcetam (KEPPRA) injection 1,000 mg  Dose: 1,000 mg  Freq: Once Route: IV  Start: 04/15/24 2200 End: 04/15/24 2240     Admin Instructions:   Caution: Look alike/sound alike drug alert.    *When giving as IV push: Do not exceed 1000 mg/min.*      2240-Given              ondansetron (ZOFRAN) injection 4 mg  Dose: 4 mg  Freq: Once Route: IV  Start: 04/15/24 2028 End: 04/15/24 2043     Admin Instructions:   \"If multiple N/V medications ordered, use in the following order: Ondansetron, Prochlorperazine, Promethazine. Use PO " "unless patient refuses or patient unable to swallow.\"      2043-Given              oxyCODONE-acetaminophen (PERCOCET) 5-325 MG per tablet 2 tablet  Dose: 2 tablet  Freq: Once Route: PO  Start: 04/15/24 2028 End: 04/15/24 2043     Admin Instructions:   [JED]    Do not exceed 4 grams of acetaminophen in a 24 hr period. Max dose of 2gm for AST/ALT greater than 120 units/L        If given for pain, use the following pain scale:   Mild Pain = Pain Score of 1-3, CPOT 1-2  Moderate Pain = Pain Score of 4-6, CPOT 3-4  Severe Pain = Pain Score of 7-10, CPOT 5-8      2043-Given              Discontinued Medications  Medications 04/15/24 04/16/24 04/17/24 04/18/24      acetaminophen (TYLENOL) tablet 1,000 mg  Dose: 1,000 mg  Freq: Every 8 Hours Route: PO  Start: 04/16/24 1246 End: 04/16/24 1230     Admin Instructions:   If given for fever, use fever parameter: fever greater than 100.4 °F  Based on patient request - if ordered for moderate or severe pain, provider allows for administration of a medication prescribed for a lower pain scale.    Do not exceed 4 grams of acetaminophen in a 24 hr period. Max dose of 2gm for AST/ALT greater than 120 units/L.    If given for pain, use the following pain scale:   Mild Pain = Pain Score of 1-3, CPOT 1-2  Moderate Pain = Pain Score of 4-6, CPOT 3-4  Severe Pain = Pain Score of 7-10, CPOT 5-8            acetaminophen (TYLENOL) tablet 1,000 mg  Dose: 1,000 mg  Freq: Every 8 Hours Route: PO  Start: 04/16/24 1200 End: 04/17/24 1506     Admin Instructions:   If given for fever, use fever parameter: fever greater than 100.4 °F  Based on patient request - if ordered for moderate or severe pain, provider allows for administration of a medication prescribed for a lower pain scale.    Do not exceed 4 grams of acetaminophen in a 24 hr period. Max dose of 2gm for AST/ALT greater than 120 units/L.    If given for pain, use the following pain scale:   Mild Pain = Pain Score of 1-3, CPOT 1-2  Moderate " Pain = Pain Score of 4-6, CPOT 3-4  Severe Pain = Pain Score of 7-10, CPOT 5-8       1257-Given     (2121)-Not Given        (0332)-Not Given     1159-Given           acetaminophen (TYLENOL) tablet 650 mg  Dose: 650 mg  Freq: Every 4 Hours PRN Route: PO  PRN Reason: Mild Pain  Start: 04/15/24 2231 End: 04/16/24 1110     Admin Instructions:   If given for fever, use fever parameter: fever greater than 100.4 °F  Based on patient request - if ordered for moderate or severe pain, provider allows for administration of a medication prescribed for a lower pain scale.    Do not exceed 4 grams of acetaminophen in a 24 hr period. Max dose of 2gm for AST/ALT greater than 120 units/L.    If given for pain, use the following pain scale:   Mild Pain = Pain Score of 1-3, CPOT 1-2  Moderate Pain = Pain Score of 4-6, CPOT 3-4  Severe Pain = Pain Score of 7-10, CPOT 5-8            HYDROcodone-acetaminophen (NORCO) 5-325 MG per tablet 1 tablet  Dose: 1 tablet  Freq: Every 6 Hours PRN Route: PO  PRN Reasons: Severe Pain,Moderate Pain  Start: 04/16/24 0221 End: 04/16/24 1110     Admin Instructions:   Based on patient request - if ordered for moderate or severe pain, provider allows for administration of a medication prescribed for a lower pain scale.  [JED]    Do not exceed 4 grams of acetaminophen in a 24 hr period. Max dose of 2gm for AST/ALT greater than 120 units/L        If given for pain, use the following pain scale:   Mild Pain = Pain Score of 1-3, CPOT 1-2  Moderate Pain = Pain Score of 4-6, CPOT 3-4  Severe Pain = Pain Score of 7-10, CPOT 5-8       1040-Given             HYDROcodone-acetaminophen (NORCO) 5-325 MG per tablet 1 tablet  Dose: 1 tablet  Freq: Every 4 Hours PRN Route: PO  PRN Reasons: Severe Pain,Moderate Pain  Start: 04/15/24 2231 End: 04/16/24 0221     Admin Instructions:   Based on patient request - if ordered for moderate or severe pain, provider allows for administration of a medication prescribed for a lower  pain scale.  [JED]    Do not exceed 4 grams of acetaminophen in a 24 hr period. Max dose of 2gm for AST/ALT greater than 120 units/L        If given for pain, use the following pain scale:   Mild Pain = Pain Score of 1-3, CPOT 1-2  Moderate Pain = Pain Score of 4-6, CPOT 3-4  Severe Pain = Pain Score of 7-10, CPOT 5-8       0058-Given             ketorolac (TORADOL) injection 15 mg  Dose: 15 mg  Freq: Every 6 Hours PRN Route: IV  PRN Reason: Severe Pain  Start: 04/16/24 1111 End: 04/16/24 1245     Admin Instructions:   Based on patient request - if ordered for moderate or severe pain, provider allows for administration of a medication prescribed for a lower pain scale.      If given for pain, use the following pain scale:  Mild Pain = Pain Score of 1-3, CPOT 1-2  Moderate Pain = Pain Score of 4-6, CPOT 3-4  Severe Pain = Pain Score of 7-10, CPOT 5-8       (1246)-Not Given             levETIRAcetam (KEPPRA) tablet 1,000 mg  Dose: 1,000 mg  Freq: Every 12 Hours Scheduled Route: PO  Start: 04/16/24 0900 End: 04/17/24 1207     Admin Instructions:   Mucous membrane irritant. Do not crush or chew tablet or capsule unless administered through a feeding tube.  For tube route administration, disperse crushed tablets in 10 mL of water, shake for 5 minutes to dissolve, and administer immediately via enteral feeding tube.    Caution: Look alike/sound alike drug alert.       0807-Given     2120-Given        0903-Given            Pharmacy Consult - Pharmacy to dose  Freq: Continuous PRN Route: XX  PRN Reason: Consult  Start: 04/16/24 0515 End: 04/16/24 0826     Order specific questions:   Pharmacy to Dose: unasyn  Indication of Use dental abscess            traMADol (ULTRAM) tablet 50 mg  Dose: 50 mg  Freq: Every 6 Hours PRN Route: PO  PRN Reasons: Moderate Pain,Severe Pain  Start: 04/16/24 1111 End: 04/17/24 1506     Admin Instructions:   Based on patient request - if ordered for moderate or severe pain, provider allows for  administration of a medication prescribed for a lower pain scale.      Caution: Look alike/sound alike drug alert    If given for pain, use the following pain scale:  Mild Pain = Pain Score of 1-3, CPOT 1-2  Moderate Pain = Pain Score of 4-6, CPOT 3-4  Severe Pain = Pain Score of 7-10, CPOT 5-8       1852-Given         0332-Given     1152-Given                       Operative/Procedure Notes (all)    No notes of this type exist for this encounter.          Physician Progress Notes (all)        Sahil Bran MD at 04/18/24 0948          ID PROGRESS NOTE    CC: f/u dental abscess/infection     Subj: No fever.  Going to the OR later today to have 2 teeth extracted.    Medications:    Current Facility-Administered Medications:     acetaminophen (TYLENOL) tablet 650 mg, 650 mg, Oral, Q6H PRN, Ton Tristan MD    aluminum-magnesium hydroxide-simethicone (MAALOX MAX) 400-400-40 MG/5ML suspension 15 mL, 15 mL, Oral, Q6H PRN, Delgado Aj APRN, 15 mL at 04/17/24 1745    ampicillin-sulbactam (UNASYN) 3 g in sodium chloride 0.9 % 100 mL MBP, 3 g, Intravenous, Q6H, Beatriz Hernandez APRN, 3 g at 04/18/24 0630    [Held by provider] apixaban (ELIQUIS) tablet 5 mg, 5 mg, Oral, Q12H, Beatriz Hernandez APRN, 5 mg at 04/17/24 0905    benzocaine-menthol (CEPACOL) lozenge 1 each, 1 lozenge, Mouth/Throat, Q4H PRN, Beatriz Hernandez APRN    sennosides-docusate (PERICOLACE) 8.6-50 MG per tablet 2 tablet, 2 tablet, Oral, BID PRN **AND** polyethylene glycol (MIRALAX) packet 17 g, 17 g, Oral, Daily PRN **AND** bisacodyl (DULCOLAX) EC tablet 5 mg, 5 mg, Oral, Daily PRN **AND** bisacodyl (DULCOLAX) suppository 10 mg, 10 mg, Rectal, Daily PRN, Delgado Aj, APRN    cloNIDine (CATAPRES) tablet 0.1 mg, 0.1 mg, Oral, Daily, Beatriz Hernandez APRN, 0.1 mg at 04/18/24 0841    FLUoxetine (PROzac) capsule 60 mg, 60 mg, Oral, Nightly, Beatriz Hernandez APRN, 60 mg at 04/17/24 3777    gabapentin  (NEURONTIN) capsule 300 mg, 300 mg, Oral, Q12H, Beatriz Hernandez APRN, 300 mg at 04/18/24 0841    ketorolac (TORADOL) injection 15 mg, 15 mg, Intravenous, Q6H PRN, Ton Tristan MD, 15 mg at 04/17/24 1745    levETIRAcetam (KEPPRA) tablet 1,500 mg, 1,500 mg, Oral, Q12H, Laura Antony MD, 1,500 mg at 04/18/24 0841    melatonin tablet 10 mg, 10 mg, Oral, Nightly, Beatriz Hernandez APRN, 10 mg at 04/17/24 2044    nicotine (NICODERM CQ) 21 MG/24HR patch 1 patch, 1 patch, Transdermal, Q24H, Beatriz Hernandez APRN, 1 patch at 04/18/24 0842    nitroglycerin (NITROSTAT) SL tablet 0.4 mg, 0.4 mg, Sublingual, Q5 Min PRN, Delgado Aj APRN    ondansetron ODT (ZOFRAN-ODT) disintegrating tablet 4 mg, 4 mg, Oral, Q6H PRN **OR** ondansetron (ZOFRAN) injection 4 mg, 4 mg, Intravenous, Q6H PRN, Delgado Aj APRN, 4 mg at 04/17/24 1538    oxyCODONE-acetaminophen (PERCOCET) 7.5-325 MG per tablet 1 tablet, 1 tablet, Oral, Q4H PRN, Ton Tristan MD, 1 tablet at 04/18/24 0841    [COMPLETED] Insert Peripheral IV, , , Once **AND** sodium chloride 0.9 % flush 10 mL, 10 mL, Intravenous, PRN, Gold Meek MD    sodium chloride 0.9 % flush 10 mL, 10 mL, Intravenous, PRN, Delgado jA APRN    traZODone (DESYREL) tablet 50 mg, 50 mg, Oral, Nightly PRN, Beatriz Hernandez APRN, 50 mg at 04/17/24 2043      Objective   Vital Signs   Temp:  [97.5 °F (36.4 °C)-98.2 °F (36.8 °C)] 97.9 °F (36.6 °C)  Heart Rate:  [66-80] 66  Resp:  [16-18] 18  BP: (109-146)/(70-91) 131/83    Physical Exam:   General: awake, sitting up in bed, more comfortable today  Eyes: no scleral icterus  ENT: no thrush, poor dentition with missing teeth, caries present, and gumline erythema; no purulence seen  Cardiovascular: NR  Respiratory: normal work of breathing on RA  GI: Abdomen is soft  :  no Best catheter  Neurological: Alert and oriented x 3  Psychiatric: Normal mood and affect     Labs:   CBC, BMP,  procalcitonin, and blood cultures reviewed today  Lab Results   Component Value Date    WBC 6.78 2024    HGB 11.8 (L) 2024    HCT 36.0 2024    MCV 95.7 2024     2024     Lab Results   Component Value Date    GLUCOSE 92 2024    CALCIUM 8.9 2024     2024    K 4.4 2024    CO2 28.3 2024     2024    BUN 10 2024    CREATININE 0.70 2024    EGFR 110.9 2024    BCR 14.3 2024    ANIONGAP 8.7 2024       hCG negative  Procalcitonin 0.3  UDS positive for fentanyl and oxycodone  HIV negative (3/26/2024)  Hep C antibody nonreactive (3/26/2024)    Microbiology:  4/15 BCx: Negative to date    Radiology:  CT facial bones showed periapical abscess at tooth 20.  There was also adjacent infiltration of the subcutaneous fat surrounding the platysma muscle consistent with a facial cellulitis.    ASSESSMENT/PLAN:  Dental abscess and caries  Facial cellulitis  Seizures  Antiphospholipid antibody syndrome  Opiate use  PTSD    For dental infection and possibly adjacent facial cellulits, I recommend she continue empiric Unasyn 3 g IV every 6 hours while in the hospital.  At discharge, she can be transitioned to Augmentin 875-125 mg p.o. every 12 (tablet versus oral solution to be determined by primary team prior to discharge) with a stop date of 2024 which will complete a 10-day course of antibiotics following teeth extraction.    Thank you for allowing me to be involved in the care of this patient. Infectious diseases will sign off at this time with antibiotics plan in place, but please call me at [phone] if any further ID questions or new ID concerns.        Electronically signed by Sahil Bran MD at 24 0925       Lester Aguirre DO at 24 [phone]              Name: Belen Allen ADMIT: 4/15/2024   : 1980  PCP: Sahil Cornelius MD    MRN: [id number] LOS: 1 days   AGE/SEX: 43 y.o.  female  [facility]     Subjective   Subjective   Patient seen and examined this morning.  Hospital day 3.  She is awake and resting in bed, having ongoing mouth pain and discomfort, patient supposed to have tooth extraction this afternoon.      Objective   Objective   Vital Signs  Temp:  [97.5 °F (36.4 °C)-98.2 °F (36.8 °C)] 97.9 °F (36.6 °C)  Heart Rate:  [66-80] 66  Resp:  [16-18] 18  BP: (109-146)/(70-91) 131/83  SpO2:  [98 %-100 %] 98 %  on   ;   Device (Oxygen Therapy): room air  Body mass index is 41.2 kg/m².  Physical Exam  Vitals and nursing note reviewed.   Constitutional:       General: She is not in acute distress.     Comments: Chronically ill-appearing   HENT:      Head:      Comments: Multiple dental caries with erythema  Cardiovascular:      Rate and Rhythm: Normal rate and regular rhythm.      Pulses: Normal pulses.      Heart sounds: Normal heart sounds.   Pulmonary:      Effort: Pulmonary effort is normal. No respiratory distress.      Breath sounds: Normal breath sounds.   Abdominal:      Palpations: Abdomen is soft.      Tenderness: There is no abdominal tenderness.   Skin:     General: Skin is warm and dry.   Neurological:      Mental Status: She is alert.       Results Review     I reviewed the patient's new clinical results.  Results from last 7 days   Lab Units 04/18/24  0344 04/17/24  0554 04/15/24  2042   WBC 10*3/mm3 6.78 6.90 13.10*   HEMOGLOBIN g/dL 11.8* 11.2* 12.5   PLATELETS 10*3/mm3 228 205 235     Results from last 7 days   Lab Units 04/18/24  0853 04/17/24  0554 04/15/24  2042   SODIUM mmol/L 141 141 140   POTASSIUM mmol/L 4.4 4.5 4.0   CHLORIDE mmol/L 104 104 105   CO2 mmol/L 28.3 28.5 25.8   BUN mg/dL 10 10 8   CREATININE mg/dL 0.70 0.64 0.69   GLUCOSE mg/dL 92 99 93   EGFR mL/min/1.73 110.9 113.3 111.3     Results from last 7 days   Lab Units 04/15/24  2042   ALBUMIN g/dL 4.1   BILIRUBIN mg/dL 0.2   ALK PHOS U/L 157*   AST (SGOT) U/L 31   ALT (SGPT) U/L 37*     Results from  "last 7 days   Lab Units 04/18/24  0853 04/17/24  0554 04/15/24  2042   CALCIUM mg/dL 8.9 9.0 9.0   ALBUMIN g/dL  --   --  4.1     Results from last 7 days   Lab Units 04/17/24  0554 04/15/24  2042   PROCALCITONIN ng/mL 0.32* 0.82*   LACTATE mmol/L  --  1.1     No results found for: \"HGBA1C\", \"POCGLU\"    CT Facial Bones With Contrast    Result Date: 4/17/2024   There are findings consistent with a periapical abscess with a lucency seen at the level of the root of tooth #20. There is a small ovoid hypodense focus within the adjacent gingiva measuring up to 8 x 5 x 9 mm in greatest dimensions that is compatible with an abscess or developing abscess. Adjacent infiltration of the subcutaneous fat is noted both deep and superficial to the platysma that is compatible with a facial cellulitis.  Additionally, there is a carious appearance of multiple mandibular and maxillary teeth.  These findings were discussed with Dr. Tristan on 04/17/2024 at approximately 2:30 p.m.  Radiation dose reduction techniques were utilized, including automated exposure control and exposure modulation based on body size.   This report was finalized on 4/17/2024 5:59 PM by Dr. Vinicius Muse M.D on Workstation: [id number]       I have personally reviewed all medications:  Scheduled Medications  ampicillin-sulbactam, 3 g, Intravenous, Q6H  [Held by provider] apixaban, 5 mg, Oral, Q12H  cloNIDine, 0.1 mg, Oral, Daily  FLUoxetine, 60 mg, Oral, Nightly  gabapentin, 300 mg, Oral, Q12H  levETIRAcetam, 1,500 mg, Oral, Q12H  melatonin, 10 mg, Oral, Nightly  nicotine, 1 patch, Transdermal, Q24H    Infusions   Diet  NPO Diet NPO Type: Strict NPO    I have personally reviewed:  [x]  Laboratory   [x]  Microbiology   [x]  Radiology   [x]  EKG/Telemetry  [x]  Cardiology/Vascular   []  Pathology    []  Records      Assessment/Plan     Active Hospital Problems    Diagnosis  POA    **Recurrent seizures [G40.909]  Yes    Antiphospholipid syndrome [D68.61]  Unknown    " HTN (hypertension) [I10]  Unknown    Dental abscess [K04.7]  Yes    Chronic pain syndrome [G89.4]  Yes    Tobacco abuse [Z72.0]  Yes      Resolved Hospital Problems   No resolved problems to display.       43 y.o. female admitted with Recurrent seizures.    Recurrent Seizures  - none further while in the hospital  - keppra increased, patient is to take seizure precautions and avoid driving until seizure free for at least 3 months  - appreciate neurology recs     Dental Abscesses  - CT facial bones noted and Dr. Tristan discussed with  Dr. Muse-patient has at least 1, likely 2 periapical abscesses  - continue unasyn, appreciate ID recs  - Oral surgery following, plan for OR today (04/18) for teeth extraction.   - continue pain control, scheduled tylenol and tramadol to PRN percocet, continue PRN toradol  - Will continue to follow consultant plans/recommendations. Greatly appreciate their help.    Chronic Back Pain  - stable, patient is trying to get in to pain management outpatient  - evaluated by [facility] last admission, follow with them as scheduled     Antiphospholipid Syndrome  - previously refused warfarin, on eliquis which is held given plans for intervention, can resume when okay with consultants.    Anemia  - Hemoglobin low on most recent labs. No evidence of overt blood loss. No indications for acute intervention at this time.    - Order repeat CBC in AM for reassessment. Continue to monitor, transfuse for hemoglobin <7.    Positive UDS  - fentanyl detected, access following      All workup, problems and plans new to me today. First day taking care of patient.    Portions of this text have been copied and I have reviewed these. Documentation accurate as of 04/18/24.     SCDs for DVT prophylaxis.  Full code.  Discussed with patient and nursing staff.  Anticipate discharge home later this week.  Expected Discharge Date: 4/18/2024; Expected Discharge Time:       [facility]  24          Electronically signed by Lester Aguirre DO at 24 0934       Ton Tristan MD at 24 1509              Name: Belen Allen ADMIT: 4/15/2024   : 1980  PCP: Sahil Cornelius MD    MRN: [id number] LOS: 1 days   AGE/SEX: 43 y.o. female  ROOM: [facility]     Subjective   Subjective   No acute events. Patient complaining of dental/mandibular pain. States meds are not helping much. Taking PO. No family at bedside.    Objective   Objective   Vital Signs  Temp:  [98.1 °F (36.7 °C)-98.4 °F (36.9 °C)] 98.2 °F (36.8 °C)  Heart Rate:  [70-84] 74  Resp:  [18] 18  BP: (112-130)/(61-77) 130/77  SpO2:  [91 %-100 %] 98 %  on   ;   Device (Oxygen Therapy): room air  Body mass index is 41.2 kg/m².  Physical Exam  Vitals and nursing note reviewed.   Constitutional:       General: She is not in acute distress.     Appearance: She is not toxic-appearing or diaphoretic.   HENT:      Head: Normocephalic and atraumatic.      Nose: Nose normal.      Mouth/Throat:      Mouth: Mucous membranes are moist.      Pharynx: Oropharynx is clear.   Eyes:      Conjunctiva/sclera: Conjunctivae normal.      Pupils: Pupils are equal, round, and reactive to light.   Cardiovascular:      Rate and Rhythm: Normal rate and regular rhythm.      Pulses: Normal pulses.   Pulmonary:      Effort: Pulmonary effort is normal.      Breath sounds: Normal breath sounds.   Abdominal:      General: Bowel sounds are normal.      Palpations: Abdomen is soft.   Musculoskeletal:         General: No swelling or tenderness.      Cervical back: Neck supple.   Skin:     General: Skin is warm and dry.   Neurological:      General: No focal deficit present.      Mental Status: She is alert and oriented to person, place, and time.   Psychiatric:         Mood and Affect: Mood normal.         Behavior: Behavior normal.       Results Review     I reviewed the patient's new clinical results.  Results from last 7 days   Lab Units  "04/17/24  0554 04/15/24  2042   WBC 10*3/mm3 6.90 13.10*   HEMOGLOBIN g/dL 11.2* 12.5   PLATELETS 10*3/mm3 205 235     Results from last 7 days   Lab Units 04/17/24  0554 04/15/24  2042   SODIUM mmol/L 141 140   POTASSIUM mmol/L 4.5 4.0   CHLORIDE mmol/L 104 105   CO2 mmol/L 28.5 25.8   BUN mg/dL 10 8   CREATININE mg/dL 0.64 0.69   GLUCOSE mg/dL 99 93   EGFR mL/min/1.73 113.3 111.3     Results from last 7 days   Lab Units 04/15/24  2042   ALBUMIN g/dL 4.1   BILIRUBIN mg/dL 0.2   ALK PHOS U/L 157*   AST (SGOT) U/L 31   ALT (SGPT) U/L 37*     Results from last 7 days   Lab Units 04/17/24  0554 04/15/24  2042   CALCIUM mg/dL 9.0 9.0   ALBUMIN g/dL  --  4.1     Results from last 7 days   Lab Units 04/17/24  0554 04/15/24  2042   PROCALCITONIN ng/mL 0.32* 0.82*   LACTATE mmol/L  --  1.1     No results found for: \"HGBA1C\", \"POCGLU\"    CT Facial Bones With Contrast    Result Date: 4/17/2024  There are findings consistent with a periapical abscess with a lucency seen at the level of the root of tooth #20. There is a small ovoid hypodense focus within the adjacent gingiva measuring up to 8 x 5 x 9 mm in greatest dimensions that is compatible with an abscess or developing abscess. Adjacent infiltration of the subcutaneous fat is noted both deep and superficial to the platysma that is compatible with a facial cellulitis.  Additionally, there is a carious appearance of multiple mandibular and maxillary teeth.  I am attempting to discuss these findings with Dr. Tristan on 04/17/2024 at approximately 2:12 p.m.  Radiation dose reduction techniques were utilized, including automated exposure control and exposure modulation based on body size.       XR Chest 1 View    Result Date: 4/15/2024  As described.    This report was finalized on 4/15/2024 8:50 PM by Dr. Yonathan Neal M.D on Workstation: [id number]      XR Ankle 3+ View Right    Result Date: 4/15/2024  As described.    This report was finalized on 4/15/2024 8:50 PM by Dr." Yonathan Neal M.D on Workstation: [id number]      XR Foot 3+ View Right    Result Date: 4/15/2024  As described.    This report was finalized on 4/15/2024 8:50 PM by Dr. Yonathan Neal M.D on Workstation: [id number]      CT Head Without Contrast    Result Date: 4/15/2024  1. Normal head CT. Specifically, no acute skull fracture or intracranial hemorrhage is identified.  Radiation dose reduction techniques were utilized, including automated exposure control and exposure modulation based on body size.   This report was finalized on 4/15/2024 8:46 PM by Dr. Robert Valdivia M.D on Workstation: [id number]       I have personally reviewed all medications:  Scheduled Medications  ampicillin-sulbactam, 3 g, Intravenous, Q6H  [Held by provider] apixaban, 5 mg, Oral, Q12H  cloNIDine, 0.1 mg, Oral, Daily  FLUoxetine, 60 mg, Oral, Nightly  gabapentin, 300 mg, Oral, Q12H  levETIRAcetam, 1,500 mg, Oral, Q12H  melatonin, 10 mg, Oral, Nightly  nicotine, 1 patch, Transdermal, Q24H    Infusions   Diet  Diet: Cardiac; Healthy Heart (2-3 Na+); Fluid Consistency: Thin (IDDSI 0)    I have personally reviewed:  [x]  Laboratory   [x]  Microbiology   [x]  Radiology   [x]  EKG/Telemetry  []  Cardiology/Vascular   []  Pathology    []  Records    Assessment/Plan     Active Hospital Problems    Diagnosis  POA    **Recurrent seizures [G40.909]  Yes    Antiphospholipid syndrome [D68.61]  Unknown    HTN (hypertension) [I10]  Unknown    Dental abscess [K04.7]  Yes    Chronic pain syndrome [G89.4]  Yes    Tobacco abuse [Z72.0]  Yes      Resolved Hospital Problems   No resolved problems to display.   Recurrent Seizures  - none further while in the hospital  - keppra increased, patient is to take seizure precautions and avoid driving until seizure free for at least 3 months  - appreciate neurology recs    Dental Abscesses  - CT facial bones noted and I d/w Dr. Muse-patient has at least 1, likely 2 periapical abscesses  - continue unasyn,  appreciate ID recs  - Dr. Rivas to see later today, eliquis held until then  - continue pain control, switch scheduled tylenol and tramadol to PRN percocet, continue PRN toradol    Chronic Back Pain  - stable, patient is trying to get in to pain management outpatient  - evaluated by [facility] last admission, follow with them as scheduled    Antiphospholipid Syndrome  - previously refused warfarin, on eliquis which is held pending dental evaluation as above    Positive UDS  - fentanyl detected, access following    SCDs for DVT prophylaxis.  Full code.  Discussed with patient and nursing staff.  Anticipate discharge home in 1-2 days.  Expected Discharge Date: 4/18/2024; Expected Discharge Time:       Ton Tristan MD  [facility]  04/17/24  15:16 EDT      Electronically signed by Ton Tristan MD at 04/17/24 1517       Sahil Bran MD at 04/17/24 1015          ID PROGRESS NOTE    CC: f/u dental abscess/infection     Subj: No fever.  She reports worsening, sharp, left-sided facial and dental pain.  She just returned from CT scan for the facial bones.  She is tolerating Unasyn without rash.    Medications:    Current Facility-Administered Medications:     acetaminophen (TYLENOL) tablet 1,000 mg, 1,000 mg, Oral, Q8H, Ton Tristan MD, 1,000 mg at 04/16/24 1257    aluminum-magnesium hydroxide-simethicone (MAALOX MAX) 400-400-40 MG/5ML suspension 15 mL, 15 mL, Oral, Q6H PRN, Delgado Aj APRN    ampicillin-sulbactam (UNASYN) 3 g in sodium chloride 0.9 % 100 mL MBP, 3 g, Intravenous, Q6H, Beatriz Hernandez APRN, 3 g at 04/17/24 0601    apixaban (ELIQUIS) tablet 5 mg, 5 mg, Oral, Q12H, Beatriz Hernandez APRN, 5 mg at 04/17/24 0905    benzocaine-menthol (CEPACOL) lozenge 1 each, 1 lozenge, Mouth/Throat, Q4H PRN, Beatriz Hernandez APRN    sennosides-docusate (PERICOLACE) 8.6-50 MG per tablet 2 tablet, 2 tablet, Oral, BID PRN **AND** polyethylene glycol (MIRALAX)  packet 17 g, 17 g, Oral, Daily PRN **AND** bisacodyl (DULCOLAX) EC tablet 5 mg, 5 mg, Oral, Daily PRN **AND** bisacodyl (DULCOLAX) suppository 10 mg, 10 mg, Rectal, Daily PRN, Delgado Aj APRN    cloNIDine (CATAPRES) tablet 0.1 mg, 0.1 mg, Oral, Daily, Beatriz Hernandez APRN, 0.1 mg at 04/17/24 0903    FLUoxetine (PROzac) capsule 60 mg, 60 mg, Oral, Nightly, Beatriz Hernandez APRN, 60 mg at 04/16/24 2232    gabapentin (NEURONTIN) capsule 300 mg, 300 mg, Oral, Q12H, Beatriz Hernandez APRN, 300 mg at 04/17/24 0902    ketorolac (TORADOL) injection 15 mg, 15 mg, Intravenous, Q6H PRN, Ton Tristan MD, 15 mg at 04/16/24 1433    levETIRAcetam (KEPPRA) tablet 1,000 mg, 1,000 mg, Oral, Q12H, Beatriz Hernandez APRN, 1,000 mg at 04/17/24 0903    melatonin tablet 10 mg, 10 mg, Oral, Nightly, Beatriz Hernandez APRN, 10 mg at 04/16/24 2231    nicotine (NICODERM CQ) 21 MG/24HR patch 1 patch, 1 patch, Transdermal, Q24H, Beatriz Hernandez APRN, 1 patch at 04/17/24 0905    nitroglycerin (NITROSTAT) SL tablet 0.4 mg, 0.4 mg, Sublingual, Q5 Min PRN, Delgado Aj APRN    ondansetron ODT (ZOFRAN-ODT) disintegrating tablet 4 mg, 4 mg, Oral, Q6H PRN **OR** ondansetron (ZOFRAN) injection 4 mg, 4 mg, Intravenous, Q6H PRN, Delgado Aj APRN, 4 mg at 04/16/24 2144    [COMPLETED] Insert Peripheral IV, , , Once **AND** sodium chloride 0.9 % flush 10 mL, 10 mL, Intravenous, PRN, Gold Meek MD    sodium chloride 0.9 % flush 10 mL, 10 mL, Intravenous, PRN, Delgado Aj, APRN    traMADol (ULTRAM) tablet 50 mg, 50 mg, Oral, Q6H PRN, Ton Tristan MD, 50 mg at 04/17/24 0332    traZODone (DESYREL) tablet 50 mg, 50 mg, Oral, Nightly PRN, Beatriz Hernandez, APRN      Objective   Vital Signs   Temp:  [98.1 °F (36.7 °C)-98.4 °F (36.9 °C)] 98.4 °F (36.9 °C)  Heart Rate:  [70-84] 72  Resp:  [18] 18  BP: (112-132)/(61-86) 121/76    Physical Exam:   General: awake, sitting up in bed,  holding the left side of her face due to pain  Eyes: no scleral icterus  ENT: no thrush, poor dentition with missing teeth, caries present, and gumline erythema; no purulence seen  Cardiovascular: Normal rate  Respiratory: normal work of breathing on ambient air  GI: Abdomen is not distended  :  no Best catheter  Neurological: Alert and oriented x 3  Psychiatric: Normal mood and affect     Labs:   CBC, BMP, hCG and blood cultures reviewed today  Lab Results   Component Value Date    WBC 6.90 2024    HGB 11.2 (L) 2024    HCT 33.8 (L) 2024    MCV 95.5 2024     2024     Lab Results   Component Value Date    GLUCOSE 99 2024    CALCIUM 9.0 2024     2024    K 4.5 2024    CO2 28.5 2024     2024    BUN 10 2024    CREATININE 0.64 2024    EGFR 113.3 2024    BCR 15.6 2024    ANIONGAP 8.5 2024         hCG negative  Procalcitonin 0.82  UDS positive for fentanyl and oxycodone  Lactate 1.1  HIV negative (3/26/2024)  Hep C antibody nonreactive (3/26/2024)    Microbiology:  4/15 BCx: Negative to date    Radiology:  CT facial bones pending    ASSESSMENT/PLAN:  Dental abscess and caries  Seizures  Antiphospholipid antibody syndrome  Opiate use  PTSD    For dental infection, I recommend she continue empiric Unasyn 3 g IV every 6 hours.  Oral surgery has been consulted, and I will follow-up their recommendations.      Discussed with RN.  ID will follow.       Electronically signed by Sahil Bran MD at 24 1018       Ton Tristan MD at 24 1248          I evaluated the patient on 24 and attested the H&P    Electronically signed by Ton Tristan MD at 24 1248          Consult Notes (all)        Glenn Blunt MD at 24 1711           [facility]   HISTORY AND PHYSICAL    Patient Name: Belen Allen  : 1980  MRN: [id number]  Primary Care Physician:   Sahil Cornelius MD  Date of admission: 4/15/2024    Subjective   Subjective     Chief Complaint: tooth pain     HPI:    Belen Allen is a 43 y.o. female who presented to ER for complaints of weakness and seizure activity.   Patient was admitted for evaluation and any subsequent treatment.  Patient reports significant tooth pain and reports a history of chronic dental issues.  Patient reports many teeth hurt at times but her main issues with pain on the left side right now.  Patient denies fever or dyspnea.  OMS consulted for evaluation and treatment as needed.    Review of Systems   All systems were reviewed and negative except for: negative except above cc/hpi    Personal History     Past Medical History:   Diagnosis Date    Abnormal Pap smear of cervix     Ankle fracture 2013    H/O Elevated liver enzymes     History of chronic back pain     History of migraine     History of urinary tract infection     Injury of back     Opioid dependence 1/16/2023    PONV (postoperative nausea and vomiting)     Seasonal allergies     Seizure     Takes Keppra       Past Surgical History:   Procedure Laterality Date    BACK SURGERY  2006    fusion L4, L5      CHOLECYSTECTOMY  2014    DILATATION AND CURETTAGE  2009    ENDOSCOPY N/A 11/27/2022    Procedure: ESOPHAGOGASTRODUODENOSCOPY with biopsy;  Surgeon: Christiana Mann MD;  Location: [facility] ENDOSCOPY;  Service: Gastroenterology;  Laterality: N/A;  gastritis, duodenitis, irregular z line    ERCP N/A 6/3/2021    Procedure: ENDOSCOPIC RETROGRADE CHOLANGIOPANCREATOGRAPHY WITH SPHINCTEROTOMY, DILATION WITH BALLOON CLEARANCE  (12MM-15MM);  Surgeon: Bjorn Flannery MD;  Location: [facility] ENDOSCOPY;  Service: Gastroenterology;  Laterality: N/A;  DILATED COMMON BILE DUCT    SKIN SURGERY      TUBAL ABDOMINAL LIGATION  2013    WISDOM TOOTH EXTRACTION  2018    x2       Family History: family history includes Allergic rhinitis in her mother, paternal grandfather, and  sister; Breast cancer in her maternal aunt; Cancer in her maternal grandmother and paternal grandfather; Prostate cancer in her paternal grandfather; Stroke in her mother. Otherwise pertinent FHx was reviewed and not pertinent to current issue.    Social History:  reports that she has been smoking cigarettes. She has never used smokeless tobacco. She reports that she does not currently use alcohol. She reports that she does not currently use drugs.    Home Medications:  Benzocaine-Menthol, FLUoxetine, SUMAtriptan, acetaminophen, apixaban, cloNIDine, folic acid, gabapentin, l-methylfolate, levETIRAcetam, melatonin, naloxone, sennosides-docusate, traZODone, vitamin B-12, and vitamin D      Allergies:  Allergies   Allergen Reactions    Morphine Hives    Lidocaine Rash     Pt reports lidocaine patches make her breakout in a rash       Objective   Objective     Vitals:   Temp:  [98.1 °F (36.7 °C)-98.4 °F (36.9 °C)] 98.2 °F (36.8 °C)  Heart Rate:  [70-78] 74  Resp:  [18] 18  BP: (112-130)/(61-77) 130/77  Physical Exam    Constitutional: Awake, alert   Eyes: PERRLA, sclerae anicteric, no conjunctival injection   HENT: NCAT, mucous membranes moist   ORAL: multiple carious lesions, no suspicious mucosal lesions, no gross purulence, minimal edema associated with left buccal vestibule    Neck: Supple, no thyromegaly, no lymphadenopathy, trachea midline   Respiratory: Clear to auscultation bilaterally, nonlabored respirations    Cardiovascular: RRR, no murmurs, rubs, or gallops, palpable pedal pulses bilaterally   Gastrointestinal: Positive bowel sounds, soft, nontender, nondistended   Musculoskeletal: No bilateral ankle edema, no clubbing or cyanosis to extremities   Psychiatric: Appropriate affect, cooperative   Neurologic: Oriented x 3, strength symmetric in all extremities, Cranial Nerves grossly intact to confrontation, speech clear   Skin: No rashes     Result Review    Result Review:  I have personally reviewed the  results from the time of this admission to 4/17/2024 17:11 EDT and agree with these findings:  [x]  Laboratory list / accordion  [x]  Microbiology  [x]  Radiology  []  EKG/Telemetry   []  Cardiology/Vascular   []  Pathology  [x]  Old records  []  Other:  Most notable findings include: gross caries, PA radioluceny associated with #20, #13 ttpalp       Assessment & Plan   Assessment / Plan     Brief Patient Summary:  Belen Allen is a 43 y.o. female with poor dentition, specifically #13,20    Active Hospital Problems:  Active Hospital Problems    Diagnosis     **Recurrent seizures     Antiphospholipid syndrome     HTN (hypertension)     Dental abscess     Chronic pain syndrome     Tobacco abuse      Plan:   Cont current management   Options discussed with patient, RBAs reviewed   Plan to OR tomorrow for extraction #13,20; will recommend patient to continue treatment ASAP on outpatient basis to address other chronic dental needs   Cont IV abx per ID  NPO pMN      DVT prophylaxis:  Medical and mechanical DVT prophylaxis orders are present.        CODE STATUS:    Code Status (Patient has no pulse and is not breathing): CPR (Attempt to Resuscitate)  Medical Interventions (Patient has pulse or is breathing): Full Support      Glenn Blunt MD    Electronically signed by Glenn Blunt MD at 04/17/24 1997       Laura Antony MD at 04/17/24 1149        Consult Orders    1. Inpatient Neurology Consult General [[id number]] ordered by Ton Tristan MD at 04/17/24 1006                 Neurology Consult Note    Consult Date: 4/17/2024    Referring MD: Gold Meek MD    Reason for Consult I have been asked to see the patient in neurological consultation to render advice and opinion regarding breakthrough seizures.    Belen Allen is a 43 y.o. white female with known diagnosis of seizure disorder on Keppra 1 g twice daily, antiphospholipid syndrome, opioid dependence, migraine headache, chronic  "back pain presented to the hospital complaining of multiple complaints.,  She was stating that she had recurrent seizures at home, she stated that the day prior to coming to the hospital she had 3-4 seizures, generalized tonic-clonic seizures, she lost control on her bowel and bladder and bit her tongue, she had witnessed seizure while in the emergency department.  She stated that she was compliant with her Keppra 1 g twice daily with no significant side effects.  She stated that she was not having a good sleep because of her back pain, she was taken pain medication from a friend and was told to be oxycodone but her drug screen continue to show fentanyl which is the same on last admission.  She stated that she recently had a UTI, and she was recently treated for dental infections.  On presentation she had elevated procalcitonin at 0.82, today improved to 0.32.    Past Medical History:   Diagnosis Date    Abnormal Pap smear of cervix     Ankle fracture 2013    H/O Elevated liver enzymes     History of chronic back pain     History of migraine     History of urinary tract infection     Injury of back     Opioid dependence 1/16/2023    PONV (postoperative nausea and vomiting)     Seasonal allergies     Seizure     Takes Keppra       Exam  /76 (BP Location: Right arm, Patient Position: Lying)   Pulse 72   Temp 98.4 °F (36.9 °C) (Oral)   Resp 18   Ht 162.6 cm (64\")   Wt 109 kg (240 lb)   SpO2 99%   BMI 41.20 kg/m²   Gen: NAD, vitals reviewed  MS: oriented x3, recent/remote memory intact, normal attention/concentration, language intact, no neglect.  CN: visual acuity grossly normal, PERRL, EOMI, no facial droop, no dysarthria  Motor: 5/5 throughout upper and lower extremities, normal tone    DATA:    Lab Results   Component Value Date    GLUCOSE 99 04/17/2024    CALCIUM 9.0 04/17/2024     04/17/2024    K 4.5 04/17/2024    CO2 28.5 04/17/2024     04/17/2024    BUN 10 04/17/2024    CREATININE 0.64 " 04/17/2024    EGFRIFAFRI >60 11/09/2022    EGFRIFNONA 94 02/02/2022    BCR 15.6 04/17/2024    ANIONGAP 8.5 04/17/2024     Lab Results   Component Value Date    WBC 6.90 04/17/2024    HGB 11.2 (L) 04/17/2024    HCT 33.8 (L) 04/17/2024    MCV 95.5 04/17/2024     04/17/2024       Lab review: Elevated procalcitonin 0.82 improved to 0.32, WBC 13.10 improved to 6.9, urine drug screen positive for oxycodone and fentanyl.    Imaging review: I personally reviewed the CT scan of the head dated 4/15 which showed no acute abnormality.    Diagnoses:  -Breakthrough seizure likely related to recent infections, stress, sleep deprivation, drug abuse.  Patient stated had recent UTI      PLAN:   -Increase Keppra to 1500 mg twice daily given her current infection with elevated procalcitonin.  Keppra can be lowered back to 1 g twice daily when she she follows with neurology as an outpatient.  -Check urinalysis  -Consider checking Keppra level as an outpatient, patient already on Keppra for the last 3 days on this admission.  -Management of antibiotics as per infectious disease  -Follow-up with Nisha Valles as scheduled on 5/8/2024  -Counseled the patient to avoid fentanyl and street drugs, avoid sleep deprivation, treat infectious as they can worsen seizures.  -Seizure precautions discussed with the patient as detailed below  -Discussed with the patient risk of SUDEP    Seizure Precautions:  ·    Do not drive 90 days. (this is to include: car, bicycle, or motorcycle)  ·    Do not drive operate dangerous equipment such as power tools, heavy machinery with moving parts, or an open flame.  ·    Do not swim alone (this would include a bathtub full of water is a small swimming pool).  ·    Avoid unsecured heights. (this is to include: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

## 2024-04-18 NOTE — ANESTHESIA POSTPROCEDURE EVALUATION
"Patient: Belen Allen    Procedure Summary       Date: 04/18/24 Room / Location: [facility] OR 09 / [facility] MAIN OR    Anesthesia Start: 1716 Anesthesia Stop: 1755    Procedure: TOOTH EXTRACTION #13, 20 (Mouth) Diagnosis:       Dental abscess      (Dental abscess [K04.7])    Surgeons: Glenn Blunt MD Provider: Sharda Vitale MD    Anesthesia Type: general ASA Status: 3            Anesthesia Type: general    Vitals  Vitals Value Taken Time   /88 04/18/24 1815   Temp 36.6 °C (97.8 °F) 04/18/24 1750   Pulse 66 04/18/24 1822   Resp 20 04/18/24 1815   SpO2 100 % 04/18/24 1822   Vitals shown include unfiled device data.        Post Anesthesia Care and Evaluation    Patient location during evaluation: bedside  Patient participation: complete - patient participated  Level of consciousness: awake  Pain management: adequate    Airway patency: patent  Anesthetic complications: No anesthetic complications  PONV Status: controlled  Cardiovascular status: acceptable  Respiratory status: acceptable  Hydration status: acceptable    Comments: /88   Pulse 68   Temp 36.6 °C (97.8 °F) (Oral)   Resp 20   Ht 162.6 cm (64\")   Wt 109 kg (240 lb)   SpO2 100%   BMI 41.20 kg/m²       "

## 2024-04-18 NOTE — ANESTHESIA PREPROCEDURE EVALUATION
" Anesthesia Evaluation     Patient summary reviewed and Nursing notes reviewed   history of anesthetic complications:  PONV  NPO Solid Status: > 8 hours  NPO Liquid Status: > 2 hours           Airway   Mallampati: II  TM distance: >3 FB  Neck ROM: full  No difficulty expected and Large neck circumference  Dental      Pulmonary    (+) a smoker Current, cigarettes,  Cardiovascular     ECG reviewed    (+) hypertension      Neuro/Psych  (+) seizures (on keppra), headaches, syncope, numbness (sciatica r side), psychiatric history Anxiety  GI/Hepatic/Renal/Endo    (+) obesity, morbid obesity, GERD    Musculoskeletal     Abdominal    Substance History   (+) drug use (opioid dependence, UDS pos for fent)     OB/GYN          Other   blood dyscrasia (antiphospholipid antibody syndrome),     ROS/Med Hx Other: Antiphospholipid Syndrome    Lidocaine patch allergy - rash most likely due to adhesive component                Anesthesia Plan    ASA 3     general     (PONV prophylaxis    I have reviewed the patient's history and chart with the patient, including all pertinent laboratory results and imaging. I have explained the risks of anesthesia including but not limited to dental damage, corneal abrasion, nerve injury, MI, stroke, aspiration, and death. Patient has agreed to proceed.      /95 (BP Location: Right arm, Patient Position: Lying)   Pulse 65   Temp 36.9 °C (98.4 °F) (Oral)   Resp 18   Ht 162.6 cm (64\")   Wt 109 kg (240 lb)   SpO2 99%   BMI 41.20 kg/m²   )  intravenous induction     Anesthetic plan, risks, benefits, and alternatives have been provided, discussed and informed consent has been obtained with: patient.    CODE STATUS:    Code Status (Patient has no pulse and is not breathing): CPR (Attempt to Resuscitate)  Medical Interventions (Patient has pulse or is breathing): Full Support      "

## 2024-04-18 NOTE — PROGRESS NOTES
Name: Belen Allen ADMIT: 4/15/2024   : 1980  PCP: Sahil Cornelius MD    MRN: [id number] LOS: 1 days   AGE/SEX: 43 y.o. female  ROOM: [facility]     Subjective   Subjective   Patient seen and examined this morning.  Hospital day 3.  She is awake and resting in bed, having ongoing mouth pain and discomfort, patient supposed to have tooth extraction this afternoon.       Objective   Objective   Vital Signs  Temp:  [97.5 °F (36.4 °C)-98.2 °F (36.8 °C)] 97.9 °F (36.6 °C)  Heart Rate:  [66-80] 66  Resp:  [16-18] 18  BP: (109-146)/(70-91) 131/83  SpO2:  [98 %-100 %] 98 %  on   ;   Device (Oxygen Therapy): room air  Body mass index is 41.2 kg/m².  Physical Exam  Vitals and nursing note reviewed.   Constitutional:       General: She is not in acute distress.     Comments: Chronically ill-appearing   HENT:      Head:      Comments: Multiple dental caries with erythema  Cardiovascular:      Rate and Rhythm: Normal rate and regular rhythm.      Pulses: Normal pulses.      Heart sounds: Normal heart sounds.   Pulmonary:      Effort: Pulmonary effort is normal. No respiratory distress.      Breath sounds: Normal breath sounds.   Abdominal:      Palpations: Abdomen is soft.      Tenderness: There is no abdominal tenderness.   Skin:     General: Skin is warm and dry.   Neurological:      Mental Status: She is alert.       Results Review     I reviewed the patient's new clinical results.  Results from last 7 days   Lab Units 24  0344 24  0554 04/15/24  2042   WBC 10*3/mm3 6.78 6.90 13.10*   HEMOGLOBIN g/dL 11.8* 11.2* 12.5   PLATELETS 10*3/mm3 228 205 235     Results from last 7 days   Lab Units 24  0853 24  0554 04/15/24  2042   SODIUM mmol/L 141 141 140   POTASSIUM mmol/L 4.4 4.5 4.0   CHLORIDE mmol/L 104 104 105   CO2 mmol/L 28.3 28.5 25.8   BUN mg/dL 10 10 8   CREATININE mg/dL 0.70 0.64 0.69   GLUCOSE mg/dL 92 99 93   EGFR mL/min/1.73 110.9 113.3 111.3     Results from last 7 days  "  Lab Units 04/15/24  2042   ALBUMIN g/dL 4.1   BILIRUBIN mg/dL 0.2   ALK PHOS U/L 157*   AST (SGOT) U/L 31   ALT (SGPT) U/L 37*     Results from last 7 days   Lab Units 04/18/24  0853 04/17/24  0554 04/15/24  2042   CALCIUM mg/dL 8.9 9.0 9.0   ALBUMIN g/dL  --   --  4.1     Results from last 7 days   Lab Units 04/17/24  0554 04/15/24  2042   PROCALCITONIN ng/mL 0.32* 0.82*   LACTATE mmol/L  --  1.1     No results found for: \"HGBA1C\", \"POCGLU\"    CT Facial Bones With Contrast    Result Date: 4/17/2024   There are findings consistent with a periapical abscess with a lucency seen at the level of the root of tooth #20. There is a small ovoid hypodense focus within the adjacent gingiva measuring up to 8 x 5 x 9 mm in greatest dimensions that is compatible with an abscess or developing abscess. Adjacent infiltration of the subcutaneous fat is noted both deep and superficial to the platysma that is compatible with a facial cellulitis.  Additionally, there is a carious appearance of multiple mandibular and maxillary teeth.  These findings were discussed with Dr. Tristan on 04/17/2024 at approximately 2:30 p.m.  Radiation dose reduction techniques were utilized, including automated exposure control and exposure modulation based on body size.   This report was finalized on 4/17/2024 5:59 PM by Dr. Vinicius Muse M.D on Workstation: [id number]       I have personally reviewed all medications:  Scheduled Medications  ampicillin-sulbactam, 3 g, Intravenous, Q6H  [Held by provider] apixaban, 5 mg, Oral, Q12H  cloNIDine, 0.1 mg, Oral, Daily  FLUoxetine, 60 mg, Oral, Nightly  gabapentin, 300 mg, Oral, Q12H  levETIRAcetam, 1,500 mg, Oral, Q12H  melatonin, 10 mg, Oral, Nightly  nicotine, 1 patch, Transdermal, Q24H    Infusions   Diet  NPO Diet NPO Type: Strict NPO    I have personally reviewed:  [x]  Laboratory   [x]  Microbiology   [x]  Radiology   [x]  EKG/Telemetry  [x]  Cardiology/Vascular   []  Pathology    []  Records     "   Assessment/Plan     Active Hospital Problems    Diagnosis  POA    **Recurrent seizures [G40.909]  Yes    Antiphospholipid syndrome [D68.61]  Unknown    HTN (hypertension) [I10]  Unknown    Dental abscess [K04.7]  Yes    Chronic pain syndrome [G89.4]  Yes    Tobacco abuse [Z72.0]  Yes      Resolved Hospital Problems   No resolved problems to display.       43 y.o. female admitted with Recurrent seizures.    Recurrent Seizures  - none further while in the hospital  - keppra increased, patient is to take seizure precautions and avoid driving until seizure free for at least 3 months  - appreciate neurology recs     Dental Abscesses  - CT facial bones noted and Dr. Tristan discussed with  Dr. Muse-patient has at least 1, likely 2 periapical abscesses  - continue unasyn, appreciate ID recs  - Oral surgery following, plan for OR today (04/18) for teeth extraction.   - continue pain control, scheduled tylenol and tramadol to PRN percocet, continue PRN toradol  - Will continue to follow consultant plans/recommendations. Greatly appreciate their help.    Chronic Back Pain  - stable, patient is trying to get in to pain management outpatient  - evaluated by [facility] last admission, follow with them as scheduled     Antiphospholipid Syndrome  - previously refused warfarin, on eliquis which is held given plans for intervention, can resume when okay with consultants.    Anemia  - Hemoglobin low on most recent labs. No evidence of overt blood loss. No indications for acute intervention at this time.    - Order repeat CBC in AM for reassessment. Continue to monitor, transfuse for hemoglobin <7.    Positive UDS  - fentanyl detected, access following      All workup, problems and plans new to me today. First day taking care of patient.    Portions of this text have been copied and I have reviewed these. Documentation accurate as of 04/18/24.     SCDs for DVT prophylaxis.  Full code.  Discussed with patient and nursing staff.  Anticipate  discharge home later this week.  Expected Discharge Date: 4/18/2024; Expected Discharge Time:       Lester Aguirre DO  [facility]  04/18/24

## 2024-04-18 NOTE — OP NOTE
TOOTH EXTRACTION  Procedure Report    Patient Name:  Belen Allen  YOB: 1980    Date of Surgery:  4/18/2024     Indications:  44yo female who presented to ER with complaints of weakness and recent seizure was admitted for further evaluation and subsequent indicated treatment.  Patient complained of severe dental pain that is chronic is nature on left side for which she has been taking antibiotics.  Patient is set to see a dentist for comprehensive dental care in a few weeks however CT scan revealed a more acute issue with #20 specifically.  Options were discussed, risks, benefits and alternatives reviewed.  Patient agreed to stated procedure.      Pre-op Diagnosis:   Dental abscess [K04.7]       Post-Op Diagnosis Codes:     * Dental abscess [K04.7]    Procedure/CPT® Codes:      Procedure(s):  TOOTH EXTRACTION #13, 20              Staff:  Surgeon(s):  Glenn Blunt MD         Anesthesia: General    Estimated Blood Loss: minimal    Implants:    Nothing was implanted during the procedure    Specimen:          None        Findings: none    Complications: none    Description of Procedure: H&P updated. Informed consent gained.  Patient transported to OR, secured to table and had noninvasive monitoring placed.  IV medication given to induce general anesthesia.  Patient intubated orally.  Patient positioned and prepped for surgery.  Timeout performed.  0.25% marcaine administered to surgical field, approximately 8cc given.  Mucoperiosteal flaps created, removed bone and teeth #13,20 elevated and delivered from socket.  Sockets curetted out and copiously irrigated.  Gelfoam placed.  Moist throat pack removed.  Gauze hemostasis.  Patient allowed to emerge from general anesthesia and extubated without complication.  Patient will recover per PACU protocol then return to floor.  Patient will be discharged from floor once admitting service allows.   All counts correct.                      Glenn  MD Jose Raul     Date: 4/18/2024  Time: 17:49 EDT

## 2024-04-18 NOTE — ANESTHESIA PROCEDURE NOTES
Airway  Urgency: elective    Date/Time: 4/18/2024 5:31 PM  Airway not difficult    General Information and Staff    Patient location during procedure: OR  Anesthesiologist: Sharda Vitale MD  CRNA/CAA: Kathy Blackwell CRNA    Indications and Patient Condition  Indications for airway management: airway protection    Preoxygenated: yes  Mask difficulty assessment: 1 - vent by mask    Final Airway Details  Final airway type: endotracheal airway      Successful airway: ETT  Cuffed: yes   Successful intubation technique: direct laryngoscopy  Endotracheal tube insertion site: oral  Blade: Almodovar  ETT size (mm): 7.0  Cormack-Lehane Classification: grade I - full view of glottis  Placement verified by: chest auscultation   Measured from: teeth  Number of attempts at approach: 1  Assessment: lips, teeth, and gum same as pre-op and atraumatic intubation

## 2024-04-18 NOTE — BRIEF OP NOTE
TOOTH EXTRACTION  Progress Note    Belen Allen  4/18/2024    Pre-op Diagnosis:   Dental abscess [K04.7]       Post-Op Diagnosis Codes:     * Dental abscess [K04.7]    Procedure/CPT® Codes:        Procedure(s):  TOOTH EXTRACTION #13, 20              Surgeon(s):  Glenn Blunt MD    Anesthesia: General    Staff:   Circulator: Taryn Shay RN  Scrub Person: Brittany Agee         Estimated Blood Loss: minimal    Urine Voided: * No values recorded between 4/18/2024  5:16 PM and 4/18/2024  5:47 PM *    Specimens:                None          Drains: * No LDAs found *    Findings: none         Complications: none           Glenn Blunt MD     Date: 4/18/2024  Time: 17:49 EDT

## 2024-04-18 NOTE — PROGRESS NOTES
ID PROGRESS NOTE    CC: f/u dental abscess/infection     Subj: No fever.  Going to the OR later today to have 2 teeth extracted.    Medications:    Current Facility-Administered Medications:     acetaminophen (TYLENOL) tablet 650 mg, 650 mg, Oral, Q6H PRN, Ton Tristan MD    aluminum-magnesium hydroxide-simethicone (MAALOX MAX) 400-400-40 MG/5ML suspension 15 mL, 15 mL, Oral, Q6H PRN, Delgado Aj APRN, 15 mL at 04/17/24 1745    ampicillin-sulbactam (UNASYN) 3 g in sodium chloride 0.9 % 100 mL MBP, 3 g, Intravenous, Q6H, Beatriz Hernandez APRN, 3 g at 04/18/24 0630    [Held by provider] apixaban (ELIQUIS) tablet 5 mg, 5 mg, Oral, Q12H, Beatriz Hernandez APRN, 5 mg at 04/17/24 0905    benzocaine-menthol (CEPACOL) lozenge 1 each, 1 lozenge, Mouth/Throat, Q4H PRN, Beatriz Hernandez APRN    sennosides-docusate (PERICOLACE) 8.6-50 MG per tablet 2 tablet, 2 tablet, Oral, BID PRN **AND** polyethylene glycol (MIRALAX) packet 17 g, 17 g, Oral, Daily PRN **AND** bisacodyl (DULCOLAX) EC tablet 5 mg, 5 mg, Oral, Daily PRN **AND** bisacodyl (DULCOLAX) suppository 10 mg, 10 mg, Rectal, Daily PRN, Delgado Aj APRN    cloNIDine (CATAPRES) tablet 0.1 mg, 0.1 mg, Oral, Daily, Beatriz Hernandez APRN, 0.1 mg at 04/18/24 0841    FLUoxetine (PROzac) capsule 60 mg, 60 mg, Oral, Nightly, Beatriz Hernandez APRN, 60 mg at 04/17/24 2043    gabapentin (NEURONTIN) capsule 300 mg, 300 mg, Oral, Q12H, Beatriz Hernandez APRN, 300 mg at 04/18/24 0841    ketorolac (TORADOL) injection 15 mg, 15 mg, Intravenous, Q6H PRN, Ton Tristan MD, 15 mg at 04/17/24 1745    levETIRAcetam (KEPPRA) tablet 1,500 mg, 1,500 mg, Oral, Q12H, Laura Antony MD, 1,500 mg at 04/18/24 0841    melatonin tablet 10 mg, 10 mg, Oral, Nightly, Beatriz Hernandez APRN, 10 mg at 04/17/24 2044    nicotine (NICODERM CQ) 21 MG/24HR patch 1 patch, 1 patch, Transdermal, Q24H, Beatriz Hernandez APRN, 1 patch at 04/18/24  0842    nitroglycerin (NITROSTAT) SL tablet 0.4 mg, 0.4 mg, Sublingual, Q5 Min PRN, Delgado Aj APRN    ondansetron ODT (ZOFRAN-ODT) disintegrating tablet 4 mg, 4 mg, Oral, Q6H PRN **OR** ondansetron (ZOFRAN) injection 4 mg, 4 mg, Intravenous, Q6H PRN, Delgado Aj APRN, 4 mg at 04/17/24 1538    oxyCODONE-acetaminophen (PERCOCET) 7.5-325 MG per tablet 1 tablet, 1 tablet, Oral, Q4H PRN, Ton Tristan MD, 1 tablet at 04/18/24 0841    [COMPLETED] Insert Peripheral IV, , , Once **AND** sodium chloride 0.9 % flush 10 mL, 10 mL, Intravenous, PRN, Gold Meek MD    sodium chloride 0.9 % flush 10 mL, 10 mL, Intravenous, PRN, Delgado Aj APRN    traZODone (DESYREL) tablet 50 mg, 50 mg, Oral, Nightly PRN, Beatriz Hernandez APRN, 50 mg at 04/17/24 2043      Objective   Vital Signs   Temp:  [97.5 °F (36.4 °C)-98.2 °F (36.8 °C)] 97.9 °F (36.6 °C)  Heart Rate:  [66-80] 66  Resp:  [16-18] 18  BP: (109-146)/(70-91) 131/83    Physical Exam:   General: awake, sitting up in bed, more comfortable today  Eyes: no scleral icterus  ENT: no thrush, poor dentition with missing teeth, caries present, and gumline erythema; no purulence seen  Cardiovascular: NR  Respiratory: normal work of breathing on RA  GI: Abdomen is soft  :  no Best catheter  Neurological: Alert and oriented x 3  Psychiatric: Normal mood and affect     Labs:   CBC, BMP, procalcitonin, and blood cultures reviewed today  Lab Results   Component Value Date    WBC 6.78 04/18/2024    HGB 11.8 (L) 04/18/2024    HCT 36.0 04/18/2024    MCV 95.7 04/18/2024     04/18/2024     Lab Results   Component Value Date    GLUCOSE 92 04/18/2024    CALCIUM 8.9 04/18/2024     04/18/2024    K 4.4 04/18/2024    CO2 28.3 04/18/2024     04/18/2024    BUN 10 04/18/2024    CREATININE 0.70 04/18/2024    EGFR 110.9 04/18/2024    BCR 14.3 04/18/2024    ANIONGAP 8.7 04/18/2024       hCG negative  Procalcitonin 0.3  UDS positive for fentanyl  and oxycodone  HIV negative (3/26/2024)  Hep C antibody nonreactive (3/26/2024)    Microbiology:  4/15 BCx: Negative to date    Radiology:  CT facial bones showed periapical abscess at tooth 20.  There was also adjacent infiltration of the subcutaneous fat surrounding the platysma muscle consistent with a facial cellulitis.    ASSESSMENT/PLAN:  Dental abscess and caries  Facial cellulitis  Seizures  Antiphospholipid antibody syndrome  Opiate use  PTSD    For dental infection and possibly adjacent facial cellulits, I recommend she continue empiric Unasyn 3 g IV every 6 hours while in the hospital.  At discharge, she can be transitioned to Augmentin 875-125 mg p.o. every 12 (tablet versus oral solution to be determined by primary team prior to discharge) with a stop date of 4/28/2024 which will complete a 10-day course of antibiotics following teeth extraction.    Thank you for allowing me to be involved in the care of this patient. Infectious diseases will sign off at this time with antibiotics plan in place, but please call me at [phone] if any further ID questions or new ID concerns.

## 2024-04-19 LAB
ANION GAP SERPL CALCULATED.3IONS-SCNC: 8.6 MMOL/L (ref 5–15)
BUN SERPL-MCNC: 8 MG/DL (ref 6–20)
BUN/CREAT SERPL: 10.5 (ref 7–25)
CALCIUM SPEC-SCNC: 8.8 MG/DL (ref 8.6–10.5)
CHLORIDE SERPL-SCNC: 104 MMOL/L (ref 98–107)
CO2 SERPL-SCNC: 28.4 MMOL/L (ref 22–29)
CREAT SERPL-MCNC: 0.76 MG/DL (ref 0.57–1)
DEPRECATED RDW RBC AUTO: 44.7 FL (ref 37–54)
EGFRCR SERPLBLD CKD-EPI 2021: 100.5 ML/MIN/1.73
ERYTHROCYTE [DISTWIDTH] IN BLOOD BY AUTOMATED COUNT: 13.2 % (ref 12.3–15.4)
GLUCOSE SERPL-MCNC: 96 MG/DL (ref 65–99)
HCT VFR BLD AUTO: 37 % (ref 34–46.6)
HGB BLD-MCNC: 11.8 G/DL (ref 12–15.9)
MCH RBC QN AUTO: 29.6 PG (ref 26.6–33)
MCHC RBC AUTO-ENTMCNC: 31.9 G/DL (ref 31.5–35.7)
MCV RBC AUTO: 93 FL (ref 79–97)
PLATELET # BLD AUTO: 225 10*3/MM3 (ref 140–450)
PMV BLD AUTO: 9.5 FL (ref 6–12)
POTASSIUM SERPL-SCNC: 4.1 MMOL/L (ref 3.5–5.2)
RBC # BLD AUTO: 3.98 10*6/MM3 (ref 3.77–5.28)
SODIUM SERPL-SCNC: 141 MMOL/L (ref 136–145)
WBC NRBC COR # BLD AUTO: 6.85 10*3/MM3 (ref 3.4–10.8)

## 2024-04-19 PROCEDURE — 25010000002 AMPICILLIN-SULBACTAM PER 1.5 G: Performed by: DENTIST

## 2024-04-19 PROCEDURE — 85027 COMPLETE CBC AUTOMATED: CPT | Performed by: INTERNAL MEDICINE

## 2024-04-19 PROCEDURE — 80048 BASIC METABOLIC PNL TOTAL CA: CPT | Performed by: INTERNAL MEDICINE

## 2024-04-19 RX ADMIN — 0.12% CHLORHEXIDINE GLUCONATE 15 ML: 1.2 RINSE ORAL at 20:51

## 2024-04-19 RX ADMIN — OXYCODONE AND ACETAMINOPHEN 1 TABLET: 7.5; 325 TABLET ORAL at 20:51

## 2024-04-19 RX ADMIN — LEVETIRACETAM 1500 MG: 500 TABLET, FILM COATED ORAL at 08:30

## 2024-04-19 RX ADMIN — GABAPENTIN 300 MG: 300 CAPSULE ORAL at 08:30

## 2024-04-19 RX ADMIN — Medication 10 MG: at 20:51

## 2024-04-19 RX ADMIN — APIXABAN 5 MG: 5 TABLET, FILM COATED ORAL at 20:51

## 2024-04-19 RX ADMIN — AMPICILLIN SODIUM AND SULBACTAM SODIUM 3 G: 2; 1 INJECTION, POWDER, FOR SOLUTION INTRAMUSCULAR; INTRAVENOUS at 05:48

## 2024-04-19 RX ADMIN — GABAPENTIN 300 MG: 300 CAPSULE ORAL at 20:51

## 2024-04-19 RX ADMIN — OXYCODONE AND ACETAMINOPHEN 1 TABLET: 7.5; 325 TABLET ORAL at 16:31

## 2024-04-19 RX ADMIN — CLONIDINE HYDROCHLORIDE 0.1 MG: 0.1 TABLET ORAL at 08:30

## 2024-04-19 RX ADMIN — LEVETIRACETAM 1500 MG: 500 TABLET, FILM COATED ORAL at 20:51

## 2024-04-19 RX ADMIN — OXYCODONE AND ACETAMINOPHEN 1 TABLET: 7.5; 325 TABLET ORAL at 05:48

## 2024-04-19 RX ADMIN — AMPICILLIN SODIUM AND SULBACTAM SODIUM 3 G: 2; 1 INJECTION, POWDER, FOR SOLUTION INTRAMUSCULAR; INTRAVENOUS at 16:31

## 2024-04-19 RX ADMIN — Medication 1 PATCH: at 08:31

## 2024-04-19 RX ADMIN — FLUOXETINE HYDROCHLORIDE 60 MG: 20 CAPSULE ORAL at 20:51

## 2024-04-19 RX ADMIN — AMPICILLIN SODIUM AND SULBACTAM SODIUM 3 G: 2; 1 INJECTION, POWDER, FOR SOLUTION INTRAMUSCULAR; INTRAVENOUS at 12:04

## 2024-04-19 RX ADMIN — OXYCODONE AND ACETAMINOPHEN 1 TABLET: 7.5; 325 TABLET ORAL at 12:04

## 2024-04-19 NOTE — PLAN OF CARE
Goal Outcome Evaluation:              Outcome Evaluation: Pt aox4, VSS on ra , pain 10/10 no matter what. Ice packs and pain meds given. Pt repeatedly asks for ridiculous amounts of pudding and ice cream to sooth her mouth. Educated pt on effects of excessive amounts of sugar on her already poor dental hygine. Ice chips given instead. Pt will be asleep in the room and when she wakes up and realizes I'm in the room she starts to moan and hold her jaw. Will CTM the rest of my shift.

## 2024-04-19 NOTE — PROGRESS NOTES
S: c/o pain with mouth   O: AF, VSS  No s/s acute infection  Wounds intact and hemostatic  A/P: 42yo F ~1d s/p extraction #13,20  - cont current management   - abx per ID  - po pain control  - peridex rinse   - soft diet x 3 days  - advised pt to see [facility] ASAP once discharged  - stable from OMS standpoint  - call with questions

## 2024-04-19 NOTE — PROGRESS NOTES
Name: Belen Allen ADMIT: 4/15/2024   : 1980  PCP: Sahil Cornelius MD    MRN: [id number] LOS: 2 days   AGE/SEX: 43 y.o. female  ROOM: [facility]     Subjective   Subjective   Patient seen and examined this morning.  Hospital day 4.  POD #1 s/p extraction #13,20 with oral surgery. She is resting in bed, having some mouth discomfort.         Objective   Objective   Vital Signs  Temp:  [97.8 °F (36.6 °C)-97.9 °F (36.6 °C)] 97.9 °F (36.6 °C)  Heart Rate:  [64-76] 68  Resp:  [16-20] 20  BP: (108-142)/(60-95) 114/64  SpO2:  [95 %-100 %] 99 %  on  Flow (L/min):  [2] 2;   Device (Oxygen Therapy): room air  Body mass index is 41.2 kg/m².  Physical Exam  Vitals and nursing note reviewed.   Constitutional:       General: She is not in acute distress.     Comments: Chronically ill-appearing   HENT:      Head:      Comments: Multiple dental caries with erythema  Cardiovascular:      Rate and Rhythm: Normal rate and regular rhythm.      Pulses: Normal pulses.      Heart sounds: Normal heart sounds.   Pulmonary:      Effort: Pulmonary effort is normal. No respiratory distress.      Breath sounds: Normal breath sounds.   Abdominal:      Palpations: Abdomen is soft.      Tenderness: There is no abdominal tenderness.   Skin:     General: Skin is warm and dry.   Neurological:      Mental Status: She is alert.       Results Review     I reviewed the patient's new clinical results.  Results from last 7 days   Lab Units 24  0633 24  0344 24  0554 04/15/24  2042   WBC 10*3/mm3 6.85 6.78 6.90 13.10*   HEMOGLOBIN g/dL 11.8* 11.8* 11.2* 12.5   PLATELETS 10*3/mm3 225 228 205 235     Results from last 7 days   Lab Units 24  0633 24  0853 24  0554 04/15/24  2042   SODIUM mmol/L 141 141 141 140   POTASSIUM mmol/L 4.1 4.4 4.5 4.0   CHLORIDE mmol/L 104 104 104 105   CO2 mmol/L 28.4 28.3 28.5 25.8   BUN mg/dL 8 10 10 8   CREATININE mg/dL 0.76 0.70 0.64 0.69   GLUCOSE mg/dL 96 92 99 93   EGFR  "mL/min/1.73 100.5 110.9 113.3 111.3     Results from last 7 days   Lab Units 04/15/24  2042   ALBUMIN g/dL 4.1   BILIRUBIN mg/dL 0.2   ALK PHOS U/L 157*   AST (SGOT) U/L 31   ALT (SGPT) U/L 37*     Results from last 7 days   Lab Units 04/19/24  0633 04/18/24  0853 04/17/24  0554 04/15/24  2042   CALCIUM mg/dL 8.8 8.9 9.0 9.0   ALBUMIN g/dL  --   --   --  4.1     Results from last 7 days   Lab Units 04/17/24  0554 04/15/24  2042   PROCALCITONIN ng/mL 0.32* 0.82*   LACTATE mmol/L  --  1.1     No results found for: \"HGBA1C\", \"POCGLU\"    No radiology results for the last day    I have personally reviewed all medications:  Scheduled Medications  ampicillin-sulbactam, 3 g, Intravenous, Q6H  [Held by provider] apixaban, 5 mg, Oral, Q12H  chlorhexidine, 15 mL, Mouth/Throat, Q12H  cloNIDine, 0.1 mg, Oral, Daily  FLUoxetine, 60 mg, Oral, Nightly  gabapentin, 300 mg, Oral, Q12H  levETIRAcetam, 1,500 mg, Oral, Q12H  melatonin, 10 mg, Oral, Nightly  nicotine, 1 patch, Transdermal, Q24H    Infusions  lactated ringers, 9 mL/hr, Last Rate: 9 mL/hr (04/18/24 1608)    Diet  Diet: Regular/House; No Straw; Texture: Soft to Chew (NDD 3); Soft to Chew: Whole Meat; Fluid Consistency: Thin (IDDSI 0)    I have personally reviewed:  [x]  Laboratory   [x]  Microbiology   [x]  Radiology   [x]  EKG/Telemetry  [x]  Cardiology/Vascular   []  Pathology    []  Records       Assessment/Plan     Active Hospital Problems    Diagnosis  POA    **Recurrent seizures [G40.909]  Yes    Dental infection [K04.7]  Yes    Antiphospholipid syndrome [D68.61]  Unknown    HTN (hypertension) [I10]  Unknown    Dental abscess [K04.7]  Yes    Chronic pain syndrome [G89.4]  Yes    Tobacco abuse [Z72.0]  Yes      Resolved Hospital Problems   No resolved problems to display.       43 y.o. female admitted with Recurrent seizures.    Recurrent Seizures  - None further while in the hospital, patient followed by Neurology, her Keppra was increased, per their recommendations, " patient is to take seizure precautions and avoid driving until seizure free for at least 3 months. Appreciate Neurology assistance, continue medications as prescribed.     Dental Abscesses  - CT facial bones noted and Dr. Tristan discussed with  Dr. Muse-patient has at least 1, likely 2 periapical abscesses.   - continue unasyn, appreciate ID recs  - Oral surgery following, patient taken to OR on 04/18, she is POD #1 s/p extraction #13,20. They recommend continuation of antibiotics, Peridex rinse, soft diet x3 days, recommended she see [facility] ASAP after discharge.  - ID following, patient currently on Unasyn.   - Will continue to follow consultant plans/recommendations. Greatly appreciate their help.  - Continue pain control as ordered.    Chronic Back Pain  - Stable, patient is trying to get in to pain management outpatient  - Evaluated by [facility] last admission, needs to follow with them as scheduled     Antiphospholipid Syndrome  Long term anticoagulation  - previously refused warfarin, on eliquis which is held at present due to operative intervention, can resume when okay with consultants.    Anemia  - Hemoglobin low on most recent labs. No evidence of overt blood loss. No indications for acute intervention at this time.    - Order repeat CBC in AM for reassessment. Continue to monitor, transfuse for hemoglobin <7.    Hypertension  - Blood pressure appears stable and acceptable acutely at this time. No indications to warrant acute changes/intervention at this time.  - Continue current medications as prescribed. Trend BP to guide ongoing management decisions.    Positive UDS  - Fentanyl detected, access consulted for evaluation.    Obesity  - BMI 41.20 kg/m2. Complicating all medical problems.    Portions of this text have been copied and I have reviewed these. Documentation accurate as of 04/19/24.     SCDs for DVT prophylaxis.  Full code.  Discussed with patient and nursing staff.  Anticipate discharge home  tomorrow.  Expected Discharge Date: 4/18/2024; Expected Discharge Time:       Lester Aguirre DO  [facility]  04/19/24

## 2024-04-20 ENCOUNTER — READMISSION MANAGEMENT (OUTPATIENT)
Dept: CALL CENTER | Facility: HOSPITAL | Age: 44
End: 2024-04-20
Payer: COMMERCIAL

## 2024-04-20 VITALS
HEART RATE: 68 BPM | BODY MASS INDEX: 40.97 KG/M2 | DIASTOLIC BLOOD PRESSURE: 89 MMHG | RESPIRATION RATE: 18 BRPM | SYSTOLIC BLOOD PRESSURE: 136 MMHG | HEIGHT: 64 IN | OXYGEN SATURATION: 96 % | TEMPERATURE: 98.1 F | WEIGHT: 240 LBS

## 2024-04-20 LAB
ANION GAP SERPL CALCULATED.3IONS-SCNC: 10 MMOL/L (ref 5–15)
BACTERIA SPEC AEROBE CULT: NORMAL
BACTERIA SPEC AEROBE CULT: NORMAL
BUN SERPL-MCNC: 9 MG/DL (ref 6–20)
BUN/CREAT SERPL: 11.7 (ref 7–25)
CALCIUM SPEC-SCNC: 8.7 MG/DL (ref 8.6–10.5)
CHLORIDE SERPL-SCNC: 105 MMOL/L (ref 98–107)
CO2 SERPL-SCNC: 28 MMOL/L (ref 22–29)
CREAT SERPL-MCNC: 0.77 MG/DL (ref 0.57–1)
DEPRECATED RDW RBC AUTO: 46.1 FL (ref 37–54)
EGFRCR SERPLBLD CKD-EPI 2021: 98.9 ML/MIN/1.73
ERYTHROCYTE [DISTWIDTH] IN BLOOD BY AUTOMATED COUNT: 13.6 % (ref 12.3–15.4)
GLUCOSE SERPL-MCNC: 93 MG/DL (ref 65–99)
HCT VFR BLD AUTO: 36.8 % (ref 34–46.6)
HGB BLD-MCNC: 12 G/DL (ref 12–15.9)
MCH RBC QN AUTO: 30.8 PG (ref 26.6–33)
MCHC RBC AUTO-ENTMCNC: 32.6 G/DL (ref 31.5–35.7)
MCV RBC AUTO: 94.4 FL (ref 79–97)
PLATELET # BLD AUTO: 227 10*3/MM3 (ref 140–450)
PMV BLD AUTO: 9.8 FL (ref 6–12)
POTASSIUM SERPL-SCNC: 4.3 MMOL/L (ref 3.5–5.2)
RBC # BLD AUTO: 3.9 10*6/MM3 (ref 3.77–5.28)
SODIUM SERPL-SCNC: 143 MMOL/L (ref 136–145)
WBC NRBC COR # BLD AUTO: 6.09 10*3/MM3 (ref 3.4–10.8)

## 2024-04-20 PROCEDURE — 80048 BASIC METABOLIC PNL TOTAL CA: CPT | Performed by: DENTIST

## 2024-04-20 PROCEDURE — 85027 COMPLETE CBC AUTOMATED: CPT | Performed by: DENTIST

## 2024-04-20 PROCEDURE — 36415 COLL VENOUS BLD VENIPUNCTURE: CPT | Performed by: DENTIST

## 2024-04-20 PROCEDURE — 25010000002 AMPICILLIN-SULBACTAM PER 1.5 G: Performed by: DENTIST

## 2024-04-20 RX ORDER — CHLORHEXIDINE GLUCONATE ORAL RINSE 1.2 MG/ML
15 SOLUTION DENTAL EVERY 12 HOURS SCHEDULED
Qty: 473 ML | Refills: 1 | Status: SHIPPED | OUTPATIENT
Start: 2024-04-20

## 2024-04-20 RX ORDER — ACETAMINOPHEN 325 MG/1
650 TABLET ORAL EVERY 6 HOURS PRN
Qty: 30 TABLET | Refills: 0 | Status: SHIPPED | OUTPATIENT
Start: 2024-04-20

## 2024-04-20 RX ORDER — BENZOCAINE 6 MG-MENTHOL 10 MG LOZENGES
1 EVERY 4 HOURS PRN
Qty: 168 EACH | Refills: 0 | Status: SHIPPED | OUTPATIENT
Start: 2024-04-20

## 2024-04-20 RX ORDER — AMOXICILLIN 250 MG
2 CAPSULE ORAL 2 TIMES DAILY PRN
Qty: 60 TABLET | Refills: 0 | Status: SHIPPED | OUTPATIENT
Start: 2024-04-20

## 2024-04-20 RX ORDER — POLYETHYLENE GLYCOL 3350 17 G/17G
17 POWDER, FOR SOLUTION ORAL DAILY PRN
Qty: 510 G | Refills: 0 | Status: SHIPPED | OUTPATIENT
Start: 2024-04-20

## 2024-04-20 RX ORDER — AMOXICILLIN AND CLAVULANATE POTASSIUM 875; 125 MG/1; MG/1
1 TABLET, FILM COATED ORAL 2 TIMES DAILY
Qty: 18 TABLET | Refills: 0 | Status: SHIPPED | OUTPATIENT
Start: 2024-04-20 | End: 2024-04-29

## 2024-04-20 RX ORDER — NICOTINE 21 MG/24HR
1 PATCH, TRANSDERMAL 24 HOURS TRANSDERMAL
Qty: 28 EACH | Refills: 0 | Status: SHIPPED | OUTPATIENT
Start: 2024-04-20

## 2024-04-20 RX ORDER — GABAPENTIN 300 MG/1
300 CAPSULE ORAL EVERY 12 HOURS SCHEDULED
Qty: 14 CAPSULE | Refills: 0 | Status: SHIPPED | OUTPATIENT
Start: 2024-04-20 | End: 2024-04-27

## 2024-04-20 RX ORDER — BISACODYL 5 MG/1
5 TABLET, DELAYED RELEASE ORAL DAILY PRN
Qty: 30 TABLET | Refills: 0 | Status: SHIPPED | OUTPATIENT
Start: 2024-04-20

## 2024-04-20 RX ORDER — OXYCODONE AND ACETAMINOPHEN 7.5; 325 MG/1; MG/1
1 TABLET ORAL EVERY 6 HOURS PRN
Qty: 12 TABLET | Refills: 0 | Status: SHIPPED | OUTPATIENT
Start: 2024-04-20

## 2024-04-20 RX ORDER — AMOXICILLIN AND CLAVULANATE POTASSIUM 875; 125 MG/1; MG/1
1 TABLET, FILM COATED ORAL 2 TIMES DAILY
Qty: 18 TABLET | Refills: 0 | Status: SHIPPED | OUTPATIENT
Start: 2024-04-20 | End: 2024-04-20

## 2024-04-20 RX ORDER — LEVETIRACETAM 750 MG/1
1500 TABLET ORAL EVERY 12 HOURS SCHEDULED
Qty: 60 TABLET | Refills: 1 | Status: SHIPPED | OUTPATIENT
Start: 2024-04-20

## 2024-04-20 RX ADMIN — LEVETIRACETAM 1500 MG: 500 TABLET, FILM COATED ORAL at 08:42

## 2024-04-20 RX ADMIN — Medication 1 PATCH: at 08:42

## 2024-04-20 RX ADMIN — OXYCODONE AND ACETAMINOPHEN 1 TABLET: 7.5; 325 TABLET ORAL at 16:55

## 2024-04-20 RX ADMIN — OXYCODONE AND ACETAMINOPHEN 1 TABLET: 7.5; 325 TABLET ORAL at 04:50

## 2024-04-20 RX ADMIN — APIXABAN 5 MG: 5 TABLET, FILM COATED ORAL at 08:42

## 2024-04-20 RX ADMIN — AMPICILLIN SODIUM AND SULBACTAM SODIUM 3 G: 2; 1 INJECTION, POWDER, FOR SOLUTION INTRAMUSCULAR; INTRAVENOUS at 11:30

## 2024-04-20 RX ADMIN — OXYCODONE AND ACETAMINOPHEN 1 TABLET: 7.5; 325 TABLET ORAL at 12:55

## 2024-04-20 RX ADMIN — AMPICILLIN SODIUM AND SULBACTAM SODIUM 3 G: 2; 1 INJECTION, POWDER, FOR SOLUTION INTRAMUSCULAR; INTRAVENOUS at 16:55

## 2024-04-20 RX ADMIN — CLONIDINE HYDROCHLORIDE 0.1 MG: 0.1 TABLET ORAL at 08:42

## 2024-04-20 RX ADMIN — AMPICILLIN SODIUM AND SULBACTAM SODIUM 3 G: 2; 1 INJECTION, POWDER, FOR SOLUTION INTRAMUSCULAR; INTRAVENOUS at 04:50

## 2024-04-20 RX ADMIN — GABAPENTIN 300 MG: 300 CAPSULE ORAL at 08:42

## 2024-04-20 RX ADMIN — 0.12% CHLORHEXIDINE GLUCONATE 15 ML: 1.2 RINSE ORAL at 08:42

## 2024-04-20 RX ADMIN — OXYCODONE AND ACETAMINOPHEN 1 TABLET: 7.5; 325 TABLET ORAL at 08:50

## 2024-04-20 RX ADMIN — AMPICILLIN SODIUM AND SULBACTAM SODIUM 3 G: 2; 1 INJECTION, POWDER, FOR SOLUTION INTRAMUSCULAR; INTRAVENOUS at 00:26

## 2024-04-20 NOTE — PLAN OF CARE
Goal Outcome Evaluation:  Plan of Care Reviewed With: patient        Progress: no change  Outcome Evaluation: Patients vitals stable. Patient reports pain and PRN pain medication given. Patient tolerating IV antibiotics withput difficulty. Patient using Ice packs to help with pain as well and asleep most of shift with no other complaints. Will continue to monitor.

## 2024-04-20 NOTE — DISCHARGE SUMMARY
Patient Name: Belen Allen  : 1980  MRN: [id number]    Date of Admission: 4/15/2024  Date of Discharge:  2024  Primary Care Physician: Sahil Cornelius MD      Chief Complaint:   Seizures and Fall      Discharge Diagnoses     Active Hospital Problems    Diagnosis  POA    **Recurrent seizures [G40.909]  Yes    Dental infection [K04.7]  Yes    Antiphospholipid syndrome [D68.61]  Yes    HTN (hypertension) [I10]  Yes    Dental abscess [K04.7]  Yes    Chronic pain syndrome [G89.4]  Yes    Tobacco abuse [Z72.0]  Yes      Resolved Hospital Problems   No resolved problems to display.        Hospital Course     Ms. Allen is a 43 y.o. female with a history of antiphospholipid syndrome, long-term use of anticoagulation, tobacco use, hypertension, chronic low back pain who presented to [facility] initially complaining of seizure.  Please see the admitting history and physical for further details.  She was admitted to the hospital for further evaluation and treatment.     Seizures  - Patient presenting with seizure, Neurology was consulted and followed, they felt that etiology of seizure was likely breakthrough in nature, related to recent infections, stress, sleep deprivation, drug abuse. CT head was reviewed, showing no acute findings. Her Keppra was increased to 1500 mg BID, and it was recommended that she continue this as prescribed until follow up with Nisha Valles on 24, at which time further decisions can be made regarding further medication management, as they note possible plan to decrease dose back to 1000 mg BID at that time, depending on clinical presentation. They subsequently cleared patient for discharge, with plans for outpatient follow up as above. She was provided counseling with seizure precautions, and these were also given in discharge instructions.    Dental Abscess and carries  Facial cellulitis  - Patient with poor dentition and oral pain upon  "arrival. CT facial bones obtained and showed \"findings consistent with a periapical abscess with a lucency seen at the level of the root of tooth #20. There is a small ovoid hypodense focus within the adjacent gingiva measuring up to 8 x 5 x 9 mm in greatest dimensions that is compatible with an abscess or developing abscess. Adjacent infiltration of the subcutaneous fat is noted both deep and superficial to the platysma that is compatible with a facial cellulitis.\"  Patient followed by oral surgery and Infectious disease. She was treated with Unasyn while admitted to the hospital. She also was taken to the OR on with Oral surgery and underwent extraction #13,20. She did well post-operatively. ID recommend transition to oral antibiotics with Augmentin 875-125 mg p.o. every 12 hours with a stop date of 4/28/2024 which will complete a 10-day course of antibiotics, which was ordered. Oral surgery cleared patient for discharge, with recommendations as follows: Continue antibiotics as ordered, Peridex rinse, soft diet x 3 days, \"advised pt to see [facility] ASAP once discharged.\" Treatments ordered as recommended, follow up with Oral surgery after discharge within 1 week. Also, recommend close follow up with PCP within 1 week after discharge for reassessment to guide ongoing management decisions.    Antiphospholipid Syndrome  Long term anticoagulation  - Eliquis initially had to be held in setting of surgical intervention, however, later cleared to resume medication per Oral surgery by time of discharge. Continue Eliquis as previously prescribed.    Hypertension  - Blood pressure appears stable and acceptable acutely at this time. No indications to warrant acute changes/intervention at this time. Continue current medication as prescribed. Recommend that patient check her blood pressure 2-3 times daily and record those values in log book and take with her to next PCP visit to allow for greater insight into treatment efficacy to " guide further management decisions. Recommend heart healthy/low sodium diet.    Chronic Back Pain  - Stable, patient is trying to get in to pain management outpatient. Per prior notes, she was evaluated by [facility] last admission, needs to follow with them as scheduled.    Anemia  - Hemoglobin low on prior labs, however, subsequently improved and within normal limits on testing prior to discharge. Recommend repeat CBC with PCP within 1 week for reassessment.    Positive UDS  - Fentanyl detected, access consulted for evaluation. Recommend close follow up with PCP within 1 week after discharge for reassessment to guide ongoing management decisions.    Obesity  - BMI 41.20 kg/m2. Complicating all medical problems.    Day of Discharge     Subjective:  Patient seen and examined this morning.  Hospital day 5.  She is awake and resting in bed, having some postoperative pain and discomfort, however, improved with pain medication.  Cleared for discharge by consultants with plans for outpatient follow-up.    Physical Exam:  Temp:  [98.1 °F (36.7 °C)-98.6 °F (37 °C)] 98.2 °F (36.8 °C)  Heart Rate:  [66-85] 85  Resp:  [18-20] 18  BP: ()/(55-78) 128/78  Body mass index is 41.2 kg/m².  Physical Exam  Vitals and nursing note reviewed.   Constitutional:       General: She is not in acute distress.     Comments: Chronically ill-appearing   HENT:      Head:      Comments: S/p extractions, other dental carries noted  Cardiovascular:      Rate and Rhythm: Normal rate and regular rhythm.      Pulses: Normal pulses.      Heart sounds: Normal heart sounds.   Pulmonary:      Effort: Pulmonary effort is normal. No respiratory distress.      Breath sounds: Normal breath sounds. No wheezing.   Abdominal:      Palpations: Abdomen is soft.      Tenderness: There is no abdominal tenderness.   Skin:     General: Skin is warm and dry.   Neurological:      Mental Status: She is alert.         Consultants     Consult Orders (all) (From admission,  onward)       Start     Ordered    04/17/24 1007  Inpatient Neurology Consult General  Once        Specialty:  Neurology  Provider:  Laura Antony MD    04/17/24 1006    04/16/24 1234  Inpatient Access Center Consult  Once        Provider:  (Not yet assigned)    04/16/24 1234    04/16/24 0518  Inpatient Infectious Diseases Consult  Once        Specialty:  Infectious Diseases  Provider:  Sahil Bran MD    04/16/24 0517    04/16/24 0517  Inpatient Dentist Consult  Once,   Status:  Canceled        Specialty:  Dental General Practice  Provider:  Keith Rivas DMD    04/16/24 0517    04/16/24 0515  Inpatient Dentist Consult  Once,   Status:  Canceled        Specialty:  Dental General Practice  Provider:  (Not yet assigned)    04/16/24 0516    04/16/24 0515  Inpatient Infectious Diseases Consult  Once,   Status:  Canceled        Specialty:  Infectious Diseases  Provider:  (Not yet assigned)    04/16/24 0516    04/15/24 2142  [facility] (on-call MD unless specified) Details  Once        Specialty:  Hospitalist  Provider:  (Not yet assigned)    04/15/24 2142                  Procedures     TOOTH EXTRACTION #13, 20    Imaging Results (All)       Procedure Component Value Units Date/Time    CT Facial Bones With Contrast [[id number]] Collected: 04/17/24 1419     Updated: 04/17/24 1802    Narrative:      CT SCAN OF THE MAXILLOFACIAL REGION WITH CONTRAST     CLINICAL HISTORY: Dental infection. Possible abscess.     TECHNIQUE: CT scan of the maxillofacial region was obtained with 2 mm  axial, coronal, and sagittal soft tissue algorithm images following the  administration of IV contrast. Additionally, there are 1 mm axial,  coronal, and sagittal bone algorithm images.     FINDINGS:     The visualized contents of the cranial vault are within normal limits.  The orbits are unremarkable. The parotid glands and submandibular glands  are within normal limits. There is a lucency circumscribing the root of  tooth #20  compatible with a periapical abscess. Additionally, there is a  small ovoid low-density focus within the adjacent gingiva which measures  up to approximately 8 x 5 mm in greatest axial dimensions and 9 mm in  greatest craniocaudad dimensions that is suspicious for a small abscess  or developing abscess. There is adjacent infiltration of subcutaneous  fat both deep and superficial to the platysma that is compatible with a  facial cellulitis. There is a carious appearance of multiple mandibular  and maxillary teeth. Lymph nodes are identified within the the adjacent  submandibular region that are likely benign and reactive in nature.     Otherwise, the visualized contents of the cranial vault are within  normal limits. The orbits are unremarkable. The parotid glands and  submandibular glands are unremarkable. The sublingual glands are within  normal limits. The remaining soft tissues of the maxillofacial region  are also unremarkable.       Impression:         There are findings consistent with a periapical abscess with a lucency  seen at the level of the root of tooth #20. There is a small ovoid  hypodense focus within the adjacent gingiva measuring up to 8 x 5 x 9 mm  in greatest dimensions that is compatible with an abscess or developing  abscess. Adjacent infiltration of the subcutaneous fat is noted both  deep and superficial to the platysma that is compatible with a facial  cellulitis.     Additionally, there is a carious appearance of multiple mandibular and  maxillary teeth.     These findings were discussed with Dr. Tristan on 04/17/2024 at  approximately 2:30 p.m.     Radiation dose reduction techniques were utilized, including automated  exposure control and exposure modulation based on body size.        This report was finalized on 4/17/2024 5:59 PM by Dr. Vinicius Muse M.D  on Workstation: [id number]       XR Chest 1 View [[id number]] Collected: 04/15/24 2048     Updated: 04/15/24 2053    Narrative:      XR  CHEST 1 VW-, XR ANKLE 3+ VW RIGHT-, XR FOOT 3+ VW RIGHT-     HISTORY: Female who is 43 years-old, trauma, cough and fever     TECHNIQUE: Frontal view of the chest, 3 views of the right foot, 3 views  of the right ankle     COMPARISON: Chest x-ray from 7/30/2023     FINDINGS:     Chest x-ray: Heart, mediastinum and pulmonary vasculature are  unremarkable. No focal pulmonary consolidation, pleural effusion, or  pneumothorax. No acute osseous process.     Right foot and ankle: No acute fracture, erosion, or dislocation is  identified. Ankle mortise appears preserved. Mild calcaneal spurring is  present. If further imaging evaluation of the right foot and ankle is  indicated, MRI could be considered.       Impression:      As described.           This report was finalized on 4/15/2024 8:50 PM by Dr. Yonathan Neal M.D on Workstation: [id number]       XR Ankle 3+ View Right [[id number]] Collected: 04/15/24 2048     Updated: 04/15/24 2053    Narrative:      XR CHEST 1 VW-, XR ANKLE 3+ VW RIGHT-, XR FOOT 3+ VW RIGHT-     HISTORY: Female who is 43 years-old, trauma, cough and fever     TECHNIQUE: Frontal view of the chest, 3 views of the right foot, 3 views  of the right ankle     COMPARISON: Chest x-ray from 7/30/2023     FINDINGS:     Chest x-ray: Heart, mediastinum and pulmonary vasculature are  unremarkable. No focal pulmonary consolidation, pleural effusion, or  pneumothorax. No acute osseous process.     Right foot and ankle: No acute fracture, erosion, or dislocation is  identified. Ankle mortise appears preserved. Mild calcaneal spurring is  present. If further imaging evaluation of the right foot and ankle is  indicated, MRI could be considered.       Impression:      As described.           This report was finalized on 4/15/2024 8:50 PM by Dr. Yonathan Neal M.D on Workstation: [id number]       XR Foot 3+ View Right [[id number]] Collected: 04/15/24 2048     Updated: 04/15/24 2053    Narrative:      XR CHEST 1  VW-, XR ANKLE 3+ VW RIGHT-, XR FOOT 3+ VW RIGHT-     HISTORY: Female who is 43 years-old, trauma, cough and fever     TECHNIQUE: Frontal view of the chest, 3 views of the right foot, 3 views  of the right ankle     COMPARISON: Chest x-ray from 7/30/2023     FINDINGS:     Chest x-ray: Heart, mediastinum and pulmonary vasculature are  unremarkable. No focal pulmonary consolidation, pleural effusion, or  pneumothorax. No acute osseous process.     Right foot and ankle: No acute fracture, erosion, or dislocation is  identified. Ankle mortise appears preserved. Mild calcaneal spurring is  present. If further imaging evaluation of the right foot and ankle is  indicated, MRI could be considered.       Impression:      As described.           This report was finalized on 4/15/2024 8:50 PM by Dr. Yonathan Neal M.D on Workstation: [id number]       CT Head Without Contrast [[id number]] Collected: 04/15/24 2037     Updated: 04/15/24 2049    Narrative:      Emergency CT scan of the head without contrast on 4/15/2024     CLINICAL HISTORY: Patient had a seizure and fell. The patient is  anticoagulated on Eliquis     TECHNIQUE: Spiral CT images were obtained from the base of the skull to  the vertex without intra venous contrast. The images were reformatted  and are submitted in 3 mm thick axial, sagittal and coronal CT sections  with brain algorithm and 2 mm thick axial CT sections with  high-resolution bone algorithm.     This is correlated to a prior head CT from [facility] on 1/24/2023.     FINDINGS: The brain parenchyma is normal in attenuation. The ventricles  are normal in size. I see no focal mass effect. There is no midline  shift. No extra axial fluid collections are identified. There is no  evidence of acute intracranial hemorrhage. No acute skull fracture is  identified. The calvarium and the skull base are normal in appearance.  The paranasal sinuses and the mastoid air cells and the middle  ear  cavities are clear.       Impression:      1. Normal head CT. Specifically, no acute skull fracture or intracranial  hemorrhage is identified.     Radiation dose reduction techniques were utilized, including automated  exposure control and exposure modulation based on body size.        This report was finalized on 4/15/2024 8:46 PM by Dr. Robert Valdivia M.D  on Workstation: [id number]             Results for orders placed during the hospital encounter of 03/23/24    Duplex Venous Lower Extremity - Bilateral CAR    Interpretation Summary    Normal bilateral lower extremity venous duplex scan.    Results for orders placed during the hospital encounter of 03/23/24    Adult Transthoracic Echo Complete W/ Cont if Necessary Per Protocol    Interpretation Summary    Left ventricular systolic function is normal. Calculated left ventricular EF = 61.2%    Left ventricular diastolic function was normal.    Estimated right ventricular systolic pressure from tricuspid regurgitation is normal (<35 mmHg). Calculated right ventricular systolic pressure from tricuspid regurgitation is 22 mmHg.    Pertinent Labs     Results from last 7 days   Lab Units 04/20/24 0418 04/19/24  0633 04/18/24  0344 04/17/24  0554   WBC 10*3/mm3 6.09 6.85 6.78 6.90   HEMOGLOBIN g/dL 12.0 11.8* 11.8* 11.2*   PLATELETS 10*3/mm3 227 225 228 205     Results from last 7 days   Lab Units 04/20/24 0418 04/19/24  0633 04/18/24  0853 04/17/24  0554   SODIUM mmol/L 143 141 141 141   POTASSIUM mmol/L 4.3 4.1 4.4 4.5   CHLORIDE mmol/L 105 104 104 104   CO2 mmol/L 28.0 28.4 28.3 28.5   BUN mg/dL 9 8 10 10   CREATININE mg/dL 0.77 0.76 0.70 0.64   GLUCOSE mg/dL 93 96 92 99   EGFR mL/min/1.73 98.9 100.5 110.9 113.3     Results from last 7 days   Lab Units 04/15/24  2042   ALBUMIN g/dL 4.1   BILIRUBIN mg/dL 0.2   ALK PHOS U/L 157*   AST (SGOT) U/L 31   ALT (SGPT) U/L 37*     Results from last 7 days   Lab Units 04/20/24 0418 04/19/24  0633 04/18/24  0853  "04/17/24  0554 04/15/24  2042   CALCIUM mg/dL 8.7 8.8 8.9 9.0 9.0   ALBUMIN g/dL  --   --   --   --  4.1               Invalid input(s): \"LDLCALC\"  Results from last 7 days   Lab Units 04/15/24  2225 04/15/24  2052   BLOODCX  No growth at 4 days No growth at 4 days     Results from last 7 days   Lab Units 04/15/24  2042   COVID19  Not Detected       Test Results Pending at Discharge     Pending Labs       Order Current Status    Blood Culture - Blood, Arm, Right Preliminary result    Blood Culture - Blood, Arm, Right Preliminary result            Discharge Details        Discharge Medications        New Medications        Instructions Start Date   acetaminophen 325 MG tablet  Commonly known as: TYLENOL  Replaces: acetaminophen 650 MG 8 hr tablet   650 mg, Oral, Every 6 Hours PRN      amoxicillin-clavulanate 875-125 MG per tablet  Commonly known as: AUGMENTIN   1 tablet, Oral, 2 Times Daily      benzocaine-menthol 15-3.6 MG lozenge lozenge  Commonly known as: CEPACOL  Replaces: Benzocaine-Menthol 20-0.26 % gel   1 each, Mouth/Throat, Every 4 Hours PRN      bisacodyl 5 MG EC tablet  Commonly known as: DULCOLAX   5 mg, Oral, Daily PRN      chlorhexidine 0.12 % solution  Commonly known as: PERIDEX   15 mL, Mouth/Throat, Every 12 Hours Scheduled      nicotine 21 MG/24HR patch  Commonly known as: NICODERM CQ   1 patch, Transdermal, Every 24 Hours Scheduled      oxyCODONE-acetaminophen 7.5-325 MG per tablet  Commonly known as: PERCOCET   1 tablet, Oral, Every 6 Hours PRN      polyethylene glycol 17 g packet  Commonly known as: MIRALAX   17 g, Oral, Daily PRN             Changes to Medications        Instructions Start Date   levETIRAcetam 750 MG tablet  Commonly known as: KEPPRA  What changed:   medication strength  how much to take   1,500 mg, Oral, Every 12 Hours Scheduled      sennosides-docusate 8.6-50 MG per tablet  Commonly known as: PERICOLACE  What changed:   when to take this  reasons to take this  additional " instructions   2 tablets, Oral, 2 Times Daily PRN             Continue These Medications        Instructions Start Date   cloNIDine 0.1 MG tablet  Commonly known as: CATAPRES   0.1 mg, Oral, Daily      Eliquis 5 MG tablet tablet  Generic drug: apixaban   5 mg, Oral, Every 12 Hours Scheduled      FLUoxetine 40 MG capsule  Commonly known as: PROzac   60 mg, Oral, Nightly      folic acid 1 MG tablet  Commonly known as: FOLVITE   1 mg, Oral, Daily      gabapentin 300 MG capsule  Commonly known as: NEURONTIN   300 mg, Oral, Every 12 Hours Scheduled      l-methylfolate 7.5 MG tablet tablet   1 tablet, Oral, Daily      melatonin 5 MG tablet tablet   10 mg, Oral, Nightly, Patient taes 20 mg at home      naloxone 4 MG/0.1ML nasal spray  Commonly known as: NARCAN   Call 911. Don't prime. [place] in 1 nostril for overdose. Repeat in 2-3 minutes in other nostril if no or minimal breathing/responsiveness.      SUMAtriptan 100 MG tablet  Commonly known as: IMITREX   100 mg, Oral, Every 2 Hours PRN      traZODone 50 MG tablet  Commonly known as: DESYREL   Take 1-2 tablets by mouth At Night As Needed for sleep      vitamin B-12 1000 MCG tablet  Commonly known as: CYANOCOBALAMIN   1,000 mcg, Oral, Daily      vitamin D 1.25 MG (23306 UT) capsule capsule  Commonly known as: ERGOCALCIFEROL   50,000 Units, Oral, Every 7 Days             Stop These Medications      acetaminophen 650 MG 8 hr tablet  Commonly known as: TYLENOL  Replaced by: acetaminophen 325 MG tablet     Benzocaine-Menthol 20-0.26 % gel  Replaced by: benzocaine-menthol 15-3.6 MG lozenge lozenge              Allergies   Allergen Reactions    Morphine Hives    Lidocaine Rash     Pt reports lidocaine patches make her breakout in a rash       Discharge Disposition:  Home or Self Care      Discharge Diet:  Diet Order   Procedures    Diet: Regular/House; No Straw; Texture: Soft to Chew (NDD 3); Soft to Chew: Whole Meat; Fluid Consistency: Thin (IDDSI 0)       Discharge Activity:    Activity Instructions       Gradually Increase Activity Until at Pre-Hospitalization Level              CODE STATUS:    Code Status and Medical Interventions:   Ordered at: 04/15/24 2231     Code Status (Patient has no pulse and is not breathing):    CPR (Attempt to Resuscitate)     Medical Interventions (Patient has pulse or is breathing):    Full Support       Future Appointments   Date Time Provider Department Center   4/25/2024  5:45 PM [facility] MRI 1 (3T) [facility]   5/8/2024  2:00 PM Nisha Valles APRN [facility]   6/3/2024 11:00 AM Puneet Lainez MD [facility] PM E[facility]PT [facility]   6/19/2024  3:00 PM Brian Brock MD [facility]     Additional Instructions for the Follow-ups that You Need to Schedule       Discharge Follow-up with PCP   As directed       Currently Documented PCP:    Sahil Cornelius MD    PCP Phone Number:    [phone]     Follow Up Details: Within 1 week after discharge for reassessment, medication symptom review, blood pressure check, BMP and CBC        Discharge Follow-up with Specialty: Oral surgery, neurology   As directed      Specialty: Oral surgery, neurology   Follow Up Details: Within 1 to 2 weeks after discharge or as scheduled.  Please call the office to ensure follow-up appointment is made.               Follow-up Information       Sahil Cornelius MD .    Specialty: Internal Medicine  Why: Within 1 week after discharge for reassessment, medication symptom review, blood pressure check, BMP and CBC  Contact information:  [address] [place] [id number] [phone]                             Additional Instructions for the Follow-ups that You Need to Schedule       Discharge Follow-up with PCP   As directed       Currently Documented PCP:    Sahil Cornelius MD    PCP Phone Number:    [phone]     Follow Up Details: Within 1 week after discharge for reassessment, medication symptom review, blood pressure check, BMP and  CBC        Discharge Follow-up with Specialty: Oral surgery, neurology   As directed      Specialty: Oral surgery, neurology   Follow Up Details: Within 1 to 2 weeks after discharge or as scheduled.  Please call the office to ensure follow-up appointment is made.            Time Spent on Discharge:  Greater than 30 minutes      DO [facility]  04/20/24  08:42 EDT

## 2024-04-21 NOTE — CASE MANAGEMENT/SOCIAL WORK
Case Management Discharge Note      Final Note: home         Selected Continued Care - Discharged on 4/20/2024 Admission date: 4/15/2024 - Discharge disposition: Home or Self Care      Destination    No services have been selected for the patient.                Durable Medical Equipment    No services have been selected for the patient.                Dialysis/Infusion    No services have been selected for the patient.                Home Medical Care    No services have been selected for the patient.                Therapy    No services have been selected for the patient.                Community Resources    No services have been selected for the patient.                Community & DME    No services have been selected for the patient.                    Transportation Services  Private: Car    Final Discharge Disposition Code: 01 - home or self-care

## 2024-04-21 NOTE — OUTREACH NOTE
Prep Survey      Flowsheet Row Responses   [facility] facility patient discharged from? [place]   Is LACE score < 7 ? No   Eligibility Readm Mgmt   Discharge diagnosis *Recurrent seizures   Does the patient have one of the following disease processes/diagnoses(primary or secondary)? Other   Does the patient have Home health ordered? No   Is there a DME ordered? No   Prep survey completed? Yes            JENIFER SEARS - Registered Nurse

## 2024-04-25 ENCOUNTER — READMISSION MANAGEMENT (OUTPATIENT)
Dept: CALL CENTER | Facility: HOSPITAL | Age: 44
End: 2024-04-25
Payer: COMMERCIAL

## 2024-04-25 NOTE — OUTREACH NOTE
Medical Week 1 Survey      Flowsheet Row Responses   [facility] patient discharged from? [place]   Does the patient have one of the following disease processes/diagnoses(primary or secondary)? Other   Week 1 attempt successful? No   Unsuccessful attempts Attempt 1            Tamara Villareal Registered Nurse

## 2024-04-29 ENCOUNTER — READMISSION MANAGEMENT (OUTPATIENT)
Dept: CALL CENTER | Facility: HOSPITAL | Age: 44
End: 2024-04-29
Payer: COMMERCIAL

## 2024-04-29 NOTE — OUTREACH NOTE
Medical Week 1 Survey      Flowsheet Row Responses   [facility] patient discharged from? [place]   Does the patient have one of the following disease processes/diagnoses(primary or secondary)? Other   Week 1 attempt successful? Yes   Call start time 1042   Call end time 1055   Discharge diagnosis *Recurrent seizures   Person spoke with today (if not patient) and relationship Patient   Meds reviewed with patient/caregiver? Yes   Is the patient having any side effects they believe may be caused by any medication additions or changes? No   Does the patient have all medications ordered at discharge? Yes   Is the patient taking all medications as directed (includes completed medication regime)? Yes   Comments regarding appointments Neurology \[facility] [place] [facility]\"" f/u appt on 5/8/24 at 2:00 PM with NELSON Roblero.   Does the patient have a primary care provider?  Yes   Does the patient have an appointment with their PCP within 7 days of discharge? No   Comments regarding PCP Patient wants to change PCP providers. PCP Line phone number given to patient to help locate a new PCP.   Nursing Interventions Educated patient on importance of making appointment   Has the patient kept scheduled appointments due by today? N/A   Has home health visited the patient within 72 hours of discharge? N/A   Psychosocial issues? No   Comments patient states she is still having oral pain, now on the opposite side of her mouth. She has an appt at [facility] next month and is on a wait list for a sooner appt. pt states she is monitoring her temp to watch out for infection. No fevers reported. Pt states she needs testing done prior to her Neurology f/u appt, waiting for a call. Centralized scheduling number given to patient to call and schedule testing.   Did the patient receive a copy of their discharge instructions? Yes   Nursing interventions Reviewed instructions with patient   What is the patient's perception of their health  status since discharge? Improving   Is the patient/caregiver able to teach back signs and symptoms related to disease process for when to call PCP? Yes   Is the patient/caregiver able to teach back signs and symptoms related to disease process for when to call 911? Yes   Is the patient/caregiver able to teach back the hierarchy of who to call/visit for symptoms/problems? PCP, Specialist, Home health nurse, Urgent Care, ED, 911 Yes   Week 1 call completed? Yes   Graduated Yes   Would this patient benefit from a Referral to Amb Social Work? No   Is the patient interested in additional calls from an ambulatory ? No   Graduated/Revoked comments Answered all questions. No concerns or needs.   Call end time 1055            Pepper PIKE - Registered Nurse

## 2024-05-07 ENCOUNTER — PATIENT MESSAGE (OUTPATIENT)
Dept: NEUROSURGERY | Facility: CLINIC | Age: 44
End: 2024-05-07
Payer: COMMERCIAL

## 2024-05-09 ENCOUNTER — TELEPHONE (OUTPATIENT)
Dept: NEUROSURGERY | Facility: CLINIC | Age: 44
End: 2024-05-09
Payer: COMMERCIAL

## 2024-05-14 ENCOUNTER — PREP FOR SURGERY (OUTPATIENT)
Dept: OTHER | Facility: HOSPITAL | Age: 44
End: 2024-05-14
Payer: COMMERCIAL

## 2024-05-14 DIAGNOSIS — R93.7 ABNORMAL MRI, THORACIC SPINE: Primary | ICD-10-CM

## 2024-05-14 RX ORDER — DIAZEPAM 5 MG/ML
5 INJECTION, SOLUTION INTRAMUSCULAR; INTRAVENOUS ONCE
OUTPATIENT
Start: 2024-05-14

## 2024-05-14 RX ORDER — HYDROMORPHONE HYDROCHLORIDE 1 MG/ML
0.25 INJECTION, SOLUTION INTRAMUSCULAR; INTRAVENOUS; SUBCUTANEOUS ONCE
OUTPATIENT
Start: 2024-05-14

## 2024-06-10 ENCOUNTER — TELEPHONE (OUTPATIENT)
Dept: NEUROSURGERY | Facility: CLINIC | Age: 44
End: 2024-06-10
Payer: COMMERCIAL

## 2024-06-10 NOTE — TELEPHONE ENCOUNTER
Called patient to reschedule appt on 06- due to Dr RODRIGUEZ being on medical leave.  Please give patient the option to schedule with [facility] or wait until Dr. RODRIGUEZ next available in October.  [facility] can reschedule

## 2024-06-12 ENCOUNTER — TELEPHONE (OUTPATIENT)
Dept: NEUROSURGERY | Facility: CLINIC | Age: 44
End: 2024-06-12
Payer: COMMERCIAL

## 2024-06-12 NOTE — TELEPHONE ENCOUNTER
"[facility] TO [facility]AR[facility] THE BELOW NOTE  FOR \"PT WILL NEED TO GO TO TRIAGE FOR PREMED\".   GLORIA INFORMED THAT PT DON'T GO TO TRIAGE FOR RX AND THE PT WILL NEED TO HAVE A RX CALLED IN IF THIS NOTE IS IN REGARDS TO PT BE CLAUSTROPHOBIC.  PLEASE CALL GLORIA TO CLARIFY THIS NOTE AT: [phone]  "

## 2024-06-12 NOTE — TELEPHONE ENCOUNTER
PER GLORIA IN SCHEDULING: THE [facility]AD[facility] WHO WILL NEED TO SCHEDULE THIS IS OFF TODAY, THEY WILL CALL THE PT TOMORROW TO GET HER SCHEDULED.

## 2024-06-12 NOTE — TELEPHONE ENCOUNTER
I spoke to Mila and advised her that there are already orders placed for the medications that are needed in radiology triage and that I spoke to radiology triage nurse prior to placing those orders so I understood how to do it correctly.  There is a telephone note dated 5/9 regarding this information and in orders only note from 5/14 regarding the medications ordered.    After I hung up with Mila I realized that the patient scan was supposed to be 2 days ago and she canceled.  I attempted to call Mila back to find out if the patient is being rescheduled but she did not answer her line.  Please reach out to Mila and ensure that there is a new MRI date scheduled.  Thanks

## 2024-06-13 ENCOUNTER — TELEPHONE (OUTPATIENT)
Dept: NEUROSURGERY | Facility: CLINIC | Age: 44
End: 2024-06-13
Payer: COMMERCIAL

## 2024-06-13 NOTE — TELEPHONE ENCOUNTER
I spoke with Isabell and explained everything documented in yesterday (6/12) note per my discussion with Mila.  She will get the patient rescheduled.

## 2024-06-13 NOTE — TELEPHONE ENCOUNTER
Isabell with [facility] called and she needs verification on the way that an MRI order is written for this patient because the patient needs either anesthesia or a valium before hand. Please call her back at your convenience, thanks!

## 2024-07-25 ENCOUNTER — TELEPHONE (OUTPATIENT)
Dept: NEUROSURGERY | Facility: CLINIC | Age: 44
End: 2024-07-25
Payer: COMMERCIAL

## 2024-07-25 NOTE — TELEPHONE ENCOUNTER
[facility] called they are going to leave her on schedule for mri until 330 but they have not heard from passport . If they can not get auth we will have to move out mri and most likely reschedule with Mikie.

## 2024-07-29 NOTE — TELEPHONE ENCOUNTER
Lvm informing patient we are canceling her 08/02 appt due to no imaging.    Informed her the mri was denied by ins but we are working on getting it approved

## 2024-08-21 NOTE — DISCHARGE INSTRUCTIONS
Although you are being discharged from the ED today, I encourage you to return for worsening symptoms. Things can, and do, change such that treatment at home with medication may not be adequate. Specifically I recommend returning for chest pain or discomfort, difficulty breathing, persistent vomiting or difficulty holding down liquids or medications, fever > 102.0 F or any other worsening or alarming symptoms.     Rest. Drink plenty of fluids.  Follow up with Dr. Sandoval for further evaluation and management.  Follow up with primary care provider for further management and to have blood pressure rechecked.  Call [phone] to get established with a new primary care provider if you do not already have one.  Take your medications as prescribed.    RETURN TO THE EMERGENCY DEPARTMENT IF YOU HAVE A SEIZURE LASTING LONGER THAN 5 MINUTES, IF YOU HAVE MULTIPLE SEIZURE EVENTS WITHOUT RETURN TO BASELINE, OR IF YOU SUSTAIN A SERIOUS INJURY FROM A SEIZURE.    Take your medications as prescribed. Do not sign any legal documents within 24 hours of taking any sedating medications. Do not drive for 90 days (from your most recent seizure or any questionable seizure-like activity) per [place] state law. It is my medical recommendation that you not place yourself in any situation wherein a loss of consciousness could cause harm to yourself or others. This includes, but is not limited to: working from heights, working around flames and heat (ie cooking, campfire, welding), working with or around machinery, swimming or bathing without supervision, working with firearms and hunting equipment, taking care of small children or any other at risk activities until evaluated and cleared by neurology. Follow-up with your neurologist for further evaluation, treatment and recommendations.     No

## 2024-11-09 ENCOUNTER — APPOINTMENT (OUTPATIENT)
Dept: GENERAL RADIOLOGY | Facility: HOSPITAL | Age: 44
End: 2024-11-09
Payer: COMMERCIAL

## 2024-11-09 ENCOUNTER — HOSPITAL ENCOUNTER (EMERGENCY)
Facility: HOSPITAL | Age: 44
Discharge: HOME | End: 2024-11-09
Attending: EMERGENCY MEDICINE
Payer: COMMERCIAL

## 2024-11-09 ENCOUNTER — APPOINTMENT (OUTPATIENT)
Dept: CT IMAGING | Facility: HOSPITAL | Age: 44
End: 2024-11-09
Payer: COMMERCIAL

## 2024-11-09 VITALS
DIASTOLIC BLOOD PRESSURE: 108 MMHG | WEIGHT: 245 LBS | TEMPERATURE: 98.7 F | OXYGEN SATURATION: 95 % | RESPIRATION RATE: 20 BRPM | HEART RATE: 112 BPM | HEIGHT: 64 IN | BODY MASS INDEX: 41.83 KG/M2 | SYSTOLIC BLOOD PRESSURE: 134 MMHG

## 2024-11-09 DIAGNOSIS — M79.604 RIGHT LEG PAIN: ICD-10-CM

## 2024-11-09 DIAGNOSIS — Z91.148 NONCOMPLIANCE WITH MEDICATION REGIMEN: ICD-10-CM

## 2024-11-09 DIAGNOSIS — M54.50 ACUTE BILATERAL LOW BACK PAIN, UNSPECIFIED WHETHER SCIATICA PRESENT: ICD-10-CM

## 2024-11-09 DIAGNOSIS — J06.9 VIRAL URI WITH COUGH: ICD-10-CM

## 2024-11-09 DIAGNOSIS — S09.90XA CLOSED HEAD INJURY, INITIAL ENCOUNTER: ICD-10-CM

## 2024-11-09 DIAGNOSIS — R56.9 SEIZURE: Primary | ICD-10-CM

## 2024-11-09 LAB
ALBUMIN SERPL-MCNC: 3.8 G/DL (ref 3.5–5.2)
ALBUMIN/GLOB SERPL: 1.7 G/DL
ALP SERPL-CCNC: 105 U/L (ref 39–117)
ALT SERPL W P-5'-P-CCNC: 15 U/L (ref 1–33)
ANION GAP SERPL CALCULATED.3IONS-SCNC: 10 MMOL/L (ref 5–15)
AST SERPL-CCNC: 17 U/L (ref 1–32)
BASOPHILS # BLD AUTO: 0.02 10*3/MM3 (ref 0–0.2)
BASOPHILS NFR BLD AUTO: 0.3 % (ref 0–1.5)
BILIRUB SERPL-MCNC: 0.3 MG/DL (ref 0–1.2)
BUN SERPL-MCNC: 4 MG/DL (ref 6–20)
BUN/CREAT SERPL: 5.2 (ref 7–25)
CALCIUM SPEC-SCNC: 8.9 MG/DL (ref 8.6–10.5)
CHLORIDE SERPL-SCNC: 106 MMOL/L (ref 98–107)
CO2 SERPL-SCNC: 24 MMOL/L (ref 22–29)
CREAT SERPL-MCNC: 0.77 MG/DL (ref 0.57–1)
DEPRECATED RDW RBC AUTO: 46.4 FL (ref 37–54)
EGFRCR SERPLBLD CKD-EPI 2021: 98.9 ML/MIN/1.73
EOSINOPHIL # BLD AUTO: 0.08 10*3/MM3 (ref 0–0.4)
EOSINOPHIL NFR BLD AUTO: 1.1 % (ref 0.3–6.2)
ERYTHROCYTE [DISTWIDTH] IN BLOOD BY AUTOMATED COUNT: 13 % (ref 12.3–15.4)
FLUAV SUBTYP SPEC NAA+PROBE: NOT DETECTED
FLUBV RNA ISLT QL NAA+PROBE: NOT DETECTED
GLOBULIN UR ELPH-MCNC: 2.3 GM/DL
GLUCOSE SERPL-MCNC: 92 MG/DL (ref 65–99)
HCT VFR BLD AUTO: 42.5 % (ref 34–46.6)
HETEROPH AB SER QL LA: NEGATIVE
HGB BLD-MCNC: 14 G/DL (ref 12–15.9)
IMM GRANULOCYTES # BLD AUTO: 0.01 10*3/MM3 (ref 0–0.05)
IMM GRANULOCYTES NFR BLD AUTO: 0.1 % (ref 0–0.5)
LYMPHOCYTES # BLD AUTO: 1.99 10*3/MM3 (ref 0.7–3.1)
LYMPHOCYTES NFR BLD AUTO: 28 % (ref 19.6–45.3)
MCH RBC QN AUTO: 31.6 PG (ref 26.6–33)
MCHC RBC AUTO-ENTMCNC: 32.9 G/DL (ref 31.5–35.7)
MCV RBC AUTO: 95.9 FL (ref 79–97)
MONOCYTES # BLD AUTO: 0.46 10*3/MM3 (ref 0.1–0.9)
MONOCYTES NFR BLD AUTO: 6.5 % (ref 5–12)
NEUTROPHILS NFR BLD AUTO: 4.54 10*3/MM3 (ref 1.7–7)
NEUTROPHILS NFR BLD AUTO: 64 % (ref 42.7–76)
NRBC BLD AUTO-RTO: 0 /100 WBC (ref 0–0.2)
PLATELET # BLD AUTO: 363 10*3/MM3 (ref 140–450)
PMV BLD AUTO: 9.2 FL (ref 6–12)
POTASSIUM SERPL-SCNC: 4 MMOL/L (ref 3.5–5.2)
PROCALCITONIN SERPL-MCNC: 0.06 NG/ML (ref 0–0.25)
PROT SERPL-MCNC: 6.1 G/DL (ref 6–8.5)
RBC # BLD AUTO: 4.43 10*6/MM3 (ref 3.77–5.28)
S PYO AG THROAT QL: NEGATIVE
SARS-COV-2 RNA NPH QL NAA+NON-PROBE: NOT DETECTED
SODIUM SERPL-SCNC: 140 MMOL/L (ref 136–145)
WBC NRBC COR # BLD AUTO: 7.1 10*3/MM3 (ref 3.4–10.8)

## 2024-11-09 PROCEDURE — 73502 X-RAY EXAM HIP UNI 2-3 VIEWS: CPT

## 2024-11-09 PROCEDURE — 87081 CULTURE SCREEN ONLY: CPT | Performed by: EMERGENCY MEDICINE

## 2024-11-09 PROCEDURE — 96375 TX/PRO/DX INJ NEW DRUG ADDON: CPT

## 2024-11-09 PROCEDURE — 25010000002 ONDANSETRON PER 1 MG: Performed by: NURSE PRACTITIONER

## 2024-11-09 PROCEDURE — 99284 EMERGENCY DEPT VISIT MOD MDM: CPT

## 2024-11-09 PROCEDURE — 86308 HETEROPHILE ANTIBODY SCREEN: CPT | Performed by: EMERGENCY MEDICINE

## 2024-11-09 PROCEDURE — 96374 THER/PROPH/DIAG INJ IV PUSH: CPT

## 2024-11-09 PROCEDURE — 80053 COMPREHEN METABOLIC PANEL: CPT | Performed by: EMERGENCY MEDICINE

## 2024-11-09 PROCEDURE — 73552 X-RAY EXAM OF FEMUR 2/>: CPT

## 2024-11-09 PROCEDURE — 36415 COLL VENOUS BLD VENIPUNCTURE: CPT

## 2024-11-09 PROCEDURE — 85025 COMPLETE CBC W/AUTO DIFF WBC: CPT | Performed by: EMERGENCY MEDICINE

## 2024-11-09 PROCEDURE — 25010000002 DROPERIDOL PER 5 MG: Performed by: EMERGENCY MEDICINE

## 2024-11-09 PROCEDURE — 25010000002 LEVETRIRACETAM PER 10 MG: Performed by: EMERGENCY MEDICINE

## 2024-11-09 PROCEDURE — 72131 CT LUMBAR SPINE W/O DYE: CPT

## 2024-11-09 PROCEDURE — 87880 STREP A ASSAY W/OPTIC: CPT | Performed by: EMERGENCY MEDICINE

## 2024-11-09 PROCEDURE — 84145 PROCALCITONIN (PCT): CPT | Performed by: EMERGENCY MEDICINE

## 2024-11-09 PROCEDURE — 70450 CT HEAD/BRAIN W/O DYE: CPT

## 2024-11-09 PROCEDURE — 71045 X-RAY EXAM CHEST 1 VIEW: CPT

## 2024-11-09 RX ORDER — CYCLOBENZAPRINE HCL 5 MG
5 TABLET ORAL 3 TIMES DAILY PRN
Qty: 15 TABLET | Refills: 0 | Status: SHIPPED | OUTPATIENT
Start: 2024-11-09

## 2024-11-09 RX ORDER — OXYCODONE AND ACETAMINOPHEN 5; 325 MG/1; MG/1
1 TABLET ORAL ONCE
Status: COMPLETED | OUTPATIENT
Start: 2024-11-09 | End: 2024-11-09

## 2024-11-09 RX ORDER — ONDANSETRON 2 MG/ML
4 INJECTION INTRAMUSCULAR; INTRAVENOUS ONCE
Status: COMPLETED | OUTPATIENT
Start: 2024-11-09 | End: 2024-11-09

## 2024-11-09 RX ORDER — OXYCODONE AND ACETAMINOPHEN 5; 325 MG/1; MG/1
1 TABLET ORAL ONCE AS NEEDED
Status: COMPLETED | OUTPATIENT
Start: 2024-11-09 | End: 2024-11-09

## 2024-11-09 RX ORDER — IBUPROFEN 600 MG/1
600 TABLET, FILM COATED ORAL EVERY 6 HOURS PRN
Qty: 30 TABLET | Refills: 0 | Status: SHIPPED | OUTPATIENT
Start: 2024-11-09

## 2024-11-09 RX ORDER — LEVETIRACETAM 750 MG/1
1500 TABLET ORAL EVERY 12 HOURS SCHEDULED
Qty: 60 TABLET | Refills: 0 | Status: SHIPPED | OUTPATIENT
Start: 2024-11-09

## 2024-11-09 RX ORDER — LEVETIRACETAM 500 MG/5ML
2000 INJECTION, SOLUTION, CONCENTRATE INTRAVENOUS ONCE
Status: COMPLETED | OUTPATIENT
Start: 2024-11-09 | End: 2024-11-09

## 2024-11-09 RX ORDER — DROPERIDOL 2.5 MG/ML
2.5 INJECTION, SOLUTION INTRAMUSCULAR; INTRAVENOUS ONCE
Status: COMPLETED | OUTPATIENT
Start: 2024-11-09 | End: 2024-11-09

## 2024-11-09 RX ADMIN — OXYCODONE HYDROCHLORIDE AND ACETAMINOPHEN 1 TABLET: 5; 325 TABLET ORAL at 22:29

## 2024-11-09 RX ADMIN — ONDANSETRON 4 MG: 2 INJECTION, SOLUTION INTRAMUSCULAR; INTRAVENOUS at 20:15

## 2024-11-09 RX ADMIN — DROPERIDOL 2.5 MG: 2.5 INJECTION, SOLUTION INTRAMUSCULAR; INTRAVENOUS at 21:52

## 2024-11-09 RX ADMIN — LEVETIRACETAM 2000 MG: 100 INJECTION INTRAVENOUS at 20:10

## 2024-11-09 RX ADMIN — OXYCODONE HYDROCHLORIDE AND ACETAMINOPHEN 1 TABLET: 5; 325 TABLET ORAL at 20:10

## 2024-11-10 NOTE — ED NOTES
Call for seizure hour and a half ago, lasted 3.5 minutes witnessed by . Hx of epilepsy. Had seizure last night as well. Hypertensive but other vital signs stable. Pt has been alert/oriented for ems entire time.

## 2024-11-10 NOTE — ED PROVIDER NOTES
EMERGENCY DEPARTMENT ENCOUNTER  Room Number:  02/02  Date of encounter:  11/10/2024  PCP: Sahil Cornelius MD  Patient Care Team:  Sahil Cornelius MD as PCP - General (Internal Medicine)  Aroldo, Bjorn HERRERA II, MD as Consulting Physician (Hematology and Oncology)  Dennys Carranza MD as Referring Physician (Hospitalist)     HPI:  Context: Belen Allen is a 43 y.o. female who presents to the ED c/o chief complaint of seizure.  Patient reports that she had a seizure today as well as a seizure yesterday.  Patient reports that she fell with a seizure today, struck her head, does have a headache, knocked out one of her teeth.  Patient is also complaining of low back pain, dull and achy, radiates into her right leg, complaining of pain in her right hip as well as right thigh.  Patient denies any weakness or numbness.  Patient reports that she has been out of her Keppra for the last 5 days, reports that she went to the pharmacy and was told that she did not have a refill of it.  Patient reports that she has also been feeling ill for the last 2 days, reports a family member currently has COVID.  Patient reports subjective fevers with shakes and chills, endorses runny nose with sore throat and cough, nausea vomiting, no diarrhea.  No chest pain or difficulty breathing.    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    Abdominal pain 11/29/2020    Acute bilateral low back pain 11/29/2020    Antiphospholipid antibody positive 11/29/2020    On anticoagulant therapy 11/29/2020    Acute pain of left knee 11/29/2020    Vitamin D deficiency 11/30/2020    Folate deficiency 12/01/2020    Pain of back and left lower extremity 12/02/2020    Common bile duct dilatation 05/31/2021    Elevated LFTs 06/01/2021    Right upper quadrant abdominal pain  06/01/2021    Obesity (BMI 30-39.9) 04/18/2019    Tobacco abuse 06/02/2021    GERD without esophagitis 06/02/2021    Intractable abdominal pain 07/05/2021    Class 2 severe obesity with serious comorbidity in adult 07/05/2021    Constipation 07/05/2021    History of ERCP 07/05/2021    Chronic pain syndrome 07/05/2021    Seizures 01/01/2022    Syncope 01/01/2022    Lumbar back pain with radiculopathy affecting left lower extremity 01/05/2022    Right upper quadrant pain 11/23/2022    Uncontrolled pain 12/17/2022    Intractable back pain 12/17/2022    Sciatica of right side 12/20/2022    Migraine 12/20/2022    Mobility impaired 12/20/2022    Seizure 12/31/2022    Opioid dependence 01/16/2023    Clostridioides difficile diarrhea 01/18/2023    Pulmonary edema 07/27/2023    Acute hypoxemic respiratory failure 07/29/2023    Other dysphagia 08/01/2023    Vomiting 08/02/2023    Sepsis 03/23/2024    Dental abscess 03/24/2024    Bilateral cellulitis of lower leg 03/24/2024    Pneumonia 03/24/2024    Abnormal MRI, thoracic spine 03/27/2024    Recurrent seizures 04/15/2024    Antiphospholipid syndrome 04/16/2024    HTN (hypertension) 04/16/2024    Dental infection 04/18/2024     Resolved Ambulatory Problems     Diagnosis Date Noted    Choledocholithiasis 06/02/2021    Rhabdomyolysis 01/01/2022    Encephalopathy 01/01/2022    Hypokalemia 12/18/2022    Opioid withdrawal 07/12/2023     Past Medical History:   Diagnosis Date    Abnormal Pap smear of cervix     Ankle fracture 2013    H/O Elevated liver enzymes     History of chronic back pain     History of migraine     History of urinary tract infection     Injury of back     PONV (postoperative nausea and vomiting)     Seasonal allergies        PAST SURGICAL HISTORY  Past Surgical History:   Procedure Laterality Date    BACK SURGERY  2006    fusion L4, L5      CHOLECYSTECTOMY  2014    DILATATION AND CURETTAGE  2009    ENDOSCOPY N/A 11/27/2022    Procedure:  ESOPHAGOGASTRODUODENOSCOPY with biopsy;  Surgeon: Christiana Mann MD;  Location: [facility] ENDOSCOPY;  Service: Gastroenterology;  Laterality: N/A;  gastritis, duodenitis, irregular z line    ERCP N/A 6/3/2021    Procedure: ENDOSCOPIC RETROGRADE CHOLANGIOPANCREATOGRAPHY WITH SPHINCTEROTOMY, DILATION WITH BALLOON CLEARANCE  (12MM-15MM);  Surgeon: Bjorn Flannery MD;  Location: [facility] ENDOSCOPY;  Service: Gastroenterology;  Laterality: N/A;  DILATED COMMON BILE DUCT    SKIN SURGERY      TEETH EXTRACTION N/A 4/18/2024    Procedure: TOOTH EXTRACTION #13, 20;  Surgeon: Glenn Blunt MD;  Location: [facility] MAIN OR;  Service: Maxillofacial;  Laterality: N/A;    TUBAL ABDOMINAL LIGATION  2013    WISDOM TOOTH EXTRACTION  2018    x2       FAMILY HISTORY  Family History   Problem Relation Age of Onset    Stroke Mother     Allergic rhinitis Mother     Allergic rhinitis Sister     Breast cancer Maternal Aunt     Cancer Maternal Grandmother     Allergic rhinitis Paternal Grandfather     Cancer Paternal Grandfather     Prostate cancer Paternal Grandfather        SOCIAL HISTORY  Social History     Socioeconomic History    Marital status: Single    Number of children: 2   Tobacco Use    Smoking status: Every Day     Current packs/day: 1.00     Types: Cigarettes    Smokeless tobacco: Never   Vaping Use    Vaping status: Never Used   Substance and Sexual Activity    Alcohol use: Not Currently    Drug use: Not Currently    Sexual activity: Defer       ALLERGIES  Morphine and Lidocaine    The patient's allergies have been reviewed    REVIEW OF SYSTEMS  All systems reviewed and negative except for those discussed in HPI.     PHYSICAL EXAM  I have reviewed the triage vital signs and nursing notes.  ED Triage Vitals [11/09/24 1924]   Temp Heart Rate Resp BP SpO2   98.7 °F (37.1 °C) 103 20 152/100 100 %      Temp src Heart Rate Source Patient Position BP Location FiO2 (%)   Tympanic Monitor -- -- --       General: No acute  distress.  HENT: NCAT, PERRL, Nares patent.  No raccoon eyes or [place] sign.  Extremely poor dentition, missing numerous teeth, numerous caries.  No gingival lacerations or step-offs, no trismus.  Eyes: no scleral icterus.  Neck: trachea midline, no ROM limitations.  CV: regular rhythm, regular rate.  Respiratory: normal effort, CTAB.  Lumbar spine: No step-offs or deformities, no midline tenderness to palpation, bilateral paraspinal tenderness palpation, right greater than left.  Pelvis stable.  Right lower extremity has normal lie.  Patient has tenderness in right lateral hip as well as right lateral thigh and just superior to the knee, knee is nontender, has full range of motion, neurovasc intact distally.  Abdomen: soft, nondistended, NTTP, no rebound tenderness, no guarding or rigidity.  Musculoskeletal: no deformity.  Neuro: alert, moves all extremities, follows commands.  Skin: warm, dry.    LAB RESULTS  Recent Results (from the past 24 hours)   Mononucleosis Screen    Collection Time: 11/09/24  8:02 PM    Specimen: Blood   Result Value Ref Range    Monospot Negative Negative   Comprehensive Metabolic Panel    Collection Time: 11/09/24  8:02 PM    Specimen: Blood   Result Value Ref Range    Glucose 92 65 - 99 mg/dL    BUN 4 (L) 6 - 20 mg/dL    Creatinine 0.77 0.57 - 1.00 mg/dL    Sodium 140 136 - 145 mmol/L    Potassium 4.0 3.5 - 5.2 mmol/L    Chloride 106 98 - 107 mmol/L    CO2 24.0 22.0 - 29.0 mmol/L    Calcium 8.9 8.6 - 10.5 mg/dL    Total Protein 6.1 6.0 - 8.5 g/dL    Albumin 3.8 3.5 - 5.2 g/dL    ALT (SGPT) 15 1 - 33 U/L    AST (SGOT) 17 1 - 32 U/L    Alkaline Phosphatase 105 39 - 117 U/L    Total Bilirubin 0.3 0.0 - 1.2 mg/dL    Globulin 2.3 gm/dL    A/G Ratio 1.7 g/dL    BUN/Creatinine Ratio 5.2 (L) 7.0 - 25.0    Anion Gap 10.0 5.0 - 15.0 mmol/L    eGFR 98.9 >60.0 mL/min/1.73   Procalcitonin    Collection Time: 11/09/24  8:02 PM    Specimen: Blood   Result Value Ref Range    Procalcitonin 0.06 0.00 -  0.25 ng/mL   CBC Auto Differential    Collection Time: 11/09/24  8:02 PM    Specimen: Blood   Result Value Ref Range    WBC 7.10 3.40 - 10.80 10*3/mm3    RBC 4.43 3.77 - 5.28 10*6/mm3    Hemoglobin 14.0 12.0 - 15.9 g/dL    Hematocrit 42.5 34.0 - 46.6 %    MCV 95.9 79.0 - 97.0 fL    MCH 31.6 26.6 - 33.0 pg    MCHC 32.9 31.5 - 35.7 g/dL    RDW 13.0 12.3 - 15.4 %    RDW-SD 46.4 37.0 - 54.0 fl    MPV 9.2 6.0 - 12.0 fL    Platelets 363 140 - 450 10*3/mm3    Neutrophil % 64.0 42.7 - 76.0 %    Lymphocyte % 28.0 19.6 - 45.3 %    Monocyte % 6.5 5.0 - 12.0 %    Eosinophil % 1.1 0.3 - 6.2 %    Basophil % 0.3 0.0 - 1.5 %    Immature Grans % 0.1 0.0 - 0.5 %    Neutrophils, Absolute 4.54 1.70 - 7.00 10*3/mm3    Lymphocytes, Absolute 1.99 0.70 - 3.10 10*3/mm3    Monocytes, Absolute 0.46 0.10 - 0.90 10*3/mm3    Eosinophils, Absolute 0.08 0.00 - 0.40 10*3/mm3    Basophils, Absolute 0.02 0.00 - 0.20 10*3/mm3    Immature Grans, Absolute 0.01 0.00 - 0.05 10*3/mm3    nRBC 0.0 0.0 - 0.2 /100 WBC   Rapid Strep A Screen - Swab, Throat    Collection Time: 11/09/24  8:03 PM    Specimen: Throat; Swab   Result Value Ref Range    Strep A Ag Negative Negative   COVID-19 and FLU A/B PCR, 1 HR TAT - Swab, Nasopharynx    Collection Time: 11/09/24  8:03 PM    Specimen: Nasopharynx; Swab   Result Value Ref Range    COVID19 Not Detected Not Detected - Ref. Range    Influenza A PCR Not Detected Not Detected    Influenza B PCR Not Detected Not Detected       I ordered the above labs and reviewed the results.    RADIOLOGY  CT Lumbar Spine Without Contrast    Result Date: 11/9/2024  CT OF THE LUMBAR SPINE  HISTORY: Back pain  COMPARISON: January 31, 2024  TECHNIQUE: Axial CT imaging was obtained through the lumbar spine. Coronal and sagittal reformatted images were obtained.  FINDINGS: No acute fracture or subluxation of the lumbar spine is seen. There are changes of prior posterior lumbar spinal fusion at L5-S1. Lumbar vertebral body alignment appears  within normal limits. Intervertebral disc spaces are otherwise well-maintained. Review of soft tissues demonstrates a left renal cyst. There also appears to be a left ovarian cyst, measuring roughly 1.7 cm.  T12-L1: There is no canal stenosis or neuroforaminal narrowing. L1-L2: There is no canal stenosis or neuroforaminal narrowing. L2-L3: There is no canal stenosis or neuroforaminal narrowing. L3-L4: There is mild disc bulge, without significant canal stenosis or neuroforaminal narrowing. L4-L5: There is diffuse disc bulge. There is some minimal canal narrowing. There is no significant neuroforaminal narrowing. L5-S1: Patient is status post bilateral laminectomies and left facetectomy there is no significant canal stenosis. There does appear to be neuroforaminal narrowing on the right. Appearance is similar to the prior study.      No acute osseous findings.  Radiation dose reduction techniques were utilized, including automated exposure control and exposure modulation based on body size.   This report was finalized on 11/9/2024 10:04 PM by Dr. Marli Eastman M.D on Workstation: [id number]      CT Head Without Contrast    Result Date: 11/9/2024  CT OF THE HEAD WITHOUT CONTRAST  HISTORY: Seizure  COMPARISON: April 15, 2024  TECHNIQUE: Axial CT imaging was obtained through the brain. No IV contrast was administered.  FINDINGS: No acute intracranial hemorrhage is seen. Ventricles are normal in size. There is no midline shift or mass effect. No calvarial fracture is seen. Trace fluid is noted within the right mastoid air cells.      No acute intracranial findings.  Radiation dose reduction techniques were utilized, including automated exposure control and exposure modulation based on body size.   This report was finalized on 11/9/2024 9:59 PM by Dr. Mrali Eastman M.D on Workstation: [id number]      XR Hip With or Without Pelvis 2 - 3 View Right, XR Femur 2 View Right    Result Date: 11/9/2024  AP VIEW THE  PELVIS +2 VIEWS RIGHT HIP; 2 VIEWS RIGHT FEMUR  HISTORY: Pain after a fall  COMPARISON: None available.  FINDINGS: No acute fracture or subluxation of the bony pelvis or either hip is seen. There are postsurgical changes of the lumbosacral spine. Surgical clips are noted within the left side of the abdomen. No acute fracture or subluxation of the distal femur is seen. There is no suprapatellar effusion.      No acute osseous findings.  This report was finalized on 11/9/2024 9:26 PM by Dr. Marli Eastman M.D on Workstation: [id number]      XR Chest 1 View    Result Date: 11/9/2024  SINGLE VIEW OF THE CHEST  HISTORY: Cough  COMPARISON: April 15, 2024  FINDINGS: Heart size is within normal limits for technique. No pneumothorax, pleural effusion, or acute infiltrate is seen.      No acute findings.  This report was finalized on 11/9/2024 9:24 PM by Dr. Marli Eastman M.D on Workstation: [email or web address]       I ordered the above noted radiological studies. I reviewed the images and results. I agree with the radiologist interpretation.    PROCEDURES  Procedures    MEDICATIONS GIVEN IN ER  Medications   levETIRAcetam (KEPPRA) injection 2,000 mg (2,000 mg Intravenous Given 11/9/24 2010)   oxyCODONE-acetaminophen (PERCOCET) 5-325 MG per tablet 1 tablet (1 tablet Oral Given 11/9/24 2010)   oxyCODONE-acetaminophen (PERCOCET) 5-325 MG per tablet 1 tablet (1 tablet Oral Given 11/9/24 2229)   ondansetron (ZOFRAN) injection 4 mg (4 mg Intravenous Given 11/9/24 2015)   droperidol (INAPSINE) injection 2.5 mg (2.5 mg Intravenous Given 11/9/24 2152)       PROGRESS, DATA ANALYSIS, CONSULTS, AND MEDICAL DECISION MAKING  A complete history and physical exam have been performed.  All available laboratory and imaging results have been reviewed by myself prior to disposition.    MDM    After the initial H&P, I discussed pertinent information from history and physical exam with patient/family.  Discussed differential diagnosis.   Discussed plan for ED evaluation/workup/treatment.  All questions answered.  Patient/family is agreeable with plan.  ED Course as of 11/10/24 0204   Sat Nov 09, 2024 2141 I reviewed chest x-ray in PACS, no pulmonary infiltrates per my read. [JG]   2141 I reviewed right femur x-rays in PACS, no fracture per my read. [JG]   2141 I reviewed right hip with pelvis x-rays in PACS, no fracture per my read. [JG]   2212 I reviewed CT head imaging in PACS, no intracranial hemorrhage per my read. [JG]   2213 I reviewed CT lumbar imaging in PACS, no fracture per my read.   [JG]   2213 Patient afebrile, initially tachycardic, tachycardia resolved, vital signs stable.  Chest x-ray shows no pneumonia, patient is negative for COVID, negative for influenza, negative for Monospot.  Procalcitonin low, patient likely has nonspecific viral infection.  ED workup otherwise unremarkable and reassuring.  Patient was Keppra loaded.  Patient is well-appearing appears appropriate for discharge with outpatient follow-up. [JG]   2214 Patient reassessed, discussed ED workup and results, discussed plan for discharge.  Discussed refill of seizure medication, need for follow-up with primary care, neurology, discussed referral to orthopedics.  Given extensive discussion return precautions, discharging. [JG]      ED Course User Index  [JG] Arturo Srinivasan MD       AS OF 02:04 EST VITALS:    BP - (!) 134/108  HR - 112  TEMP - 98.7 °F (37.1 °C) (Tympanic)  O2 SATS - 95%    DIAGNOSIS  Final diagnoses:   Seizure   Noncompliance with medication regimen   Viral URI with cough   Closed head injury, initial encounter   Acute bilateral low back pain, unspecified whether sciatica present   Right leg pain         DISPOSITION  DISCHARGE    Patient discharged in stable condition.    Reviewed implications of results, diagnosis, meds, responsibility to follow up, warning signs and symptoms of possible worsening, potential complications and reasons to return to  ER.    Patient/Family voiced understanding of above instructions.    Discussed plan for discharge, as there is no emergent indication for admission. Patient referred to primary care provider for BP management due to today's BP. Pt/family is agreeable and understands need for follow up and repeat testing.  Pt is aware that discharge does not mean that nothing is wrong but it indicates no emergency is present that requires admission and they must continue care with follow-up as given below or physician of their choice.     FOLLOW-UP  Sahil Cornelius MD  [address] [facility] [id number] [phone]    Schedule an appointment as soon as possible for a visit in 2 days  even if well    your neurologist    Schedule an appointment as soon as possible for a visit in 2 days      your spinal surgeon    Schedule an appointment as soon as possible for a visit in 2 days      Keith Ortiz II, MD  [address] [facility] [address]  [facility] [place] [id number] [phone]    Schedule an appointment as soon as possible for a visit in 2 days           Medication List        New Prescriptions      cyclobenzaprine 5 MG tablet  Commonly known as: FLEXERIL  Take 1 tablet by mouth 3 (Three) Times a Day As Needed for Muscle Spasms.     ibuprofen 600 MG tablet  Commonly known as: ADVIL,MOTRIN  Take 1 tablet by mouth Every 6 (Six) Hours As Needed for Mild Pain.               Where to Get Your Medications        These medications were sent to [id number] - [place], [place] - [address] [address] [address] - [phone] [facility] [phone]   [address] [facility] [address], [facility] [address]      Phone: [phone]   cyclobenzaprine 5 MG tablet  ibuprofen 600 MG tablet  levETIRAcetam 750 MG tablet            Arturo Srinivasan MD  11/10/24 0204

## 2024-11-11 LAB — BACTERIA SPEC AEROBE CULT: NORMAL

## 2025-01-12 ENCOUNTER — APPOINTMENT (OUTPATIENT)
Dept: CT IMAGING | Facility: HOSPITAL | Age: 45
End: 2025-01-12
Payer: COMMERCIAL

## 2025-01-12 ENCOUNTER — HOSPITAL ENCOUNTER (EMERGENCY)
Facility: HOSPITAL | Age: 45
Discharge: HOME | End: 2025-01-12
Attending: EMERGENCY MEDICINE | Admitting: EMERGENCY MEDICINE
Payer: COMMERCIAL

## 2025-01-12 VITALS
OXYGEN SATURATION: 99 % | RESPIRATION RATE: 18 BRPM | HEART RATE: 120 BPM | SYSTOLIC BLOOD PRESSURE: 137 MMHG | TEMPERATURE: 98.5 F | DIASTOLIC BLOOD PRESSURE: 94 MMHG

## 2025-01-12 DIAGNOSIS — K08.9 CHRONIC DENTAL PAIN: ICD-10-CM

## 2025-01-12 DIAGNOSIS — K02.9 PAIN DUE TO DENTAL CARIES: Primary | ICD-10-CM

## 2025-01-12 DIAGNOSIS — G89.29 CHRONIC DENTAL PAIN: ICD-10-CM

## 2025-01-12 DIAGNOSIS — R11.2 NAUSEA AND VOMITING, UNSPECIFIED VOMITING TYPE: ICD-10-CM

## 2025-01-12 LAB
ALBUMIN SERPL-MCNC: 4 G/DL (ref 3.5–5.2)
ALBUMIN/GLOB SERPL: 1.4 G/DL
ALP SERPL-CCNC: 110 U/L (ref 39–117)
ALT SERPL W P-5'-P-CCNC: 24 U/L (ref 1–33)
ANION GAP SERPL CALCULATED.3IONS-SCNC: 12.9 MMOL/L (ref 5–15)
AST SERPL-CCNC: 41 U/L (ref 1–32)
BASOPHILS # BLD AUTO: 0.03 10*3/MM3 (ref 0–0.2)
BASOPHILS NFR BLD AUTO: 0.3 % (ref 0–1.5)
BILIRUB SERPL-MCNC: 0.6 MG/DL (ref 0–1.2)
BUN SERPL-MCNC: 8 MG/DL (ref 6–20)
BUN/CREAT SERPL: 10.8 (ref 7–25)
CALCIUM SPEC-SCNC: 8.7 MG/DL (ref 8.6–10.5)
CHLORIDE SERPL-SCNC: 102 MMOL/L (ref 98–107)
CO2 SERPL-SCNC: 21.1 MMOL/L (ref 22–29)
CREAT SERPL-MCNC: 0.74 MG/DL (ref 0.57–1)
DEPRECATED RDW RBC AUTO: 44.1 FL (ref 37–54)
EGFRCR SERPLBLD CKD-EPI 2021: 103.1 ML/MIN/1.73
EOSINOPHIL # BLD AUTO: 0.02 10*3/MM3 (ref 0–0.4)
EOSINOPHIL NFR BLD AUTO: 0.2 % (ref 0.3–6.2)
ERYTHROCYTE [DISTWIDTH] IN BLOOD BY AUTOMATED COUNT: 13.1 % (ref 12.3–15.4)
GLOBULIN UR ELPH-MCNC: 2.9 GM/DL
GLUCOSE SERPL-MCNC: 95 MG/DL (ref 65–99)
HCT VFR BLD AUTO: 46.9 % (ref 34–46.6)
HGB BLD-MCNC: 15.1 G/DL (ref 12–15.9)
IMM GRANULOCYTES # BLD AUTO: 0.04 10*3/MM3 (ref 0–0.05)
IMM GRANULOCYTES NFR BLD AUTO: 0.4 % (ref 0–0.5)
LYMPHOCYTES # BLD AUTO: 1.61 10*3/MM3 (ref 0.7–3.1)
LYMPHOCYTES NFR BLD AUTO: 16.3 % (ref 19.6–45.3)
MCH RBC QN AUTO: 30.2 PG (ref 26.6–33)
MCHC RBC AUTO-ENTMCNC: 32.2 G/DL (ref 31.5–35.7)
MCV RBC AUTO: 93.8 FL (ref 79–97)
MONOCYTES # BLD AUTO: 0.61 10*3/MM3 (ref 0.1–0.9)
MONOCYTES NFR BLD AUTO: 6.2 % (ref 5–12)
NEUTROPHILS NFR BLD AUTO: 7.55 10*3/MM3 (ref 1.7–7)
NEUTROPHILS NFR BLD AUTO: 76.6 % (ref 42.7–76)
NRBC BLD AUTO-RTO: 0 /100 WBC (ref 0–0.2)
PLATELET # BLD AUTO: 282 10*3/MM3 (ref 140–450)
PMV BLD AUTO: 9.3 FL (ref 6–12)
POTASSIUM SERPL-SCNC: 4.7 MMOL/L (ref 3.5–5.2)
PROT SERPL-MCNC: 6.9 G/DL (ref 6–8.5)
RBC # BLD AUTO: 5 10*6/MM3 (ref 3.77–5.28)
SODIUM SERPL-SCNC: 136 MMOL/L (ref 136–145)
WBC NRBC COR # BLD AUTO: 9.86 10*3/MM3 (ref 3.4–10.8)

## 2025-01-12 PROCEDURE — 85025 COMPLETE CBC W/AUTO DIFF WBC: CPT | Performed by: EMERGENCY MEDICINE

## 2025-01-12 PROCEDURE — 25010000002 KETOROLAC TROMETHAMINE PER 15 MG: Performed by: EMERGENCY MEDICINE

## 2025-01-12 PROCEDURE — 25810000003 SODIUM CHLORIDE 0.9 % SOLUTION: Performed by: EMERGENCY MEDICINE

## 2025-01-12 PROCEDURE — 80053 COMPREHEN METABOLIC PANEL: CPT | Performed by: EMERGENCY MEDICINE

## 2025-01-12 PROCEDURE — 25010000002 ONDANSETRON PER 1 MG: Performed by: EMERGENCY MEDICINE

## 2025-01-12 PROCEDURE — 96374 THER/PROPH/DIAG INJ IV PUSH: CPT

## 2025-01-12 PROCEDURE — 70487 CT MAXILLOFACIAL W/DYE: CPT

## 2025-01-12 PROCEDURE — 99285 EMERGENCY DEPT VISIT HI MDM: CPT

## 2025-01-12 PROCEDURE — 96375 TX/PRO/DX INJ NEW DRUG ADDON: CPT

## 2025-01-12 PROCEDURE — 25510000001 IOPAMIDOL 61 % SOLUTION: Performed by: EMERGENCY MEDICINE

## 2025-01-12 RX ORDER — ONDANSETRON 4 MG/1
4 TABLET, ORALLY DISINTEGRATING ORAL EVERY 6 HOURS PRN
Qty: 15 TABLET | Refills: 0 | Status: SHIPPED | OUTPATIENT
Start: 2025-01-12

## 2025-01-12 RX ORDER — HYDROCODONE BITARTRATE AND ACETAMINOPHEN 7.5; 325 MG/1; MG/1
1 TABLET ORAL ONCE
Status: COMPLETED | OUTPATIENT
Start: 2025-01-12 | End: 2025-01-12

## 2025-01-12 RX ORDER — IBUPROFEN 800 MG/1
800 TABLET, FILM COATED ORAL EVERY 6 HOURS PRN
Qty: 30 TABLET | Refills: 0 | Status: SHIPPED | OUTPATIENT
Start: 2025-01-12

## 2025-01-12 RX ORDER — KETOROLAC TROMETHAMINE 15 MG/ML
15 INJECTION, SOLUTION INTRAMUSCULAR; INTRAVENOUS ONCE
Status: COMPLETED | OUTPATIENT
Start: 2025-01-12 | End: 2025-01-12

## 2025-01-12 RX ORDER — IOPAMIDOL 612 MG/ML
100 INJECTION, SOLUTION INTRAVASCULAR
Status: COMPLETED | OUTPATIENT
Start: 2025-01-12 | End: 2025-01-12

## 2025-01-12 RX ORDER — SODIUM CHLORIDE 0.9 % (FLUSH) 0.9 %
10 SYRINGE (ML) INJECTION AS NEEDED
Status: DISCONTINUED | OUTPATIENT
Start: 2025-01-12 | End: 2025-01-12 | Stop reason: HOSPADM

## 2025-01-12 RX ORDER — ONDANSETRON 2 MG/ML
4 INJECTION INTRAMUSCULAR; INTRAVENOUS ONCE
Status: COMPLETED | OUTPATIENT
Start: 2025-01-12 | End: 2025-01-12

## 2025-01-12 RX ADMIN — ONDANSETRON 4 MG: 2 INJECTION, SOLUTION INTRAMUSCULAR; INTRAVENOUS at 16:44

## 2025-01-12 RX ADMIN — KETOROLAC TROMETHAMINE 15 MG: 15 INJECTION, SOLUTION INTRAMUSCULAR; INTRAVENOUS at 16:44

## 2025-01-12 RX ADMIN — SODIUM CHLORIDE 1000 ML: 9 INJECTION, SOLUTION INTRAVENOUS at 16:43

## 2025-01-12 RX ADMIN — HYDROCODONE BITARTRATE AND ACETAMINOPHEN 1 TABLET: 7.5; 325 TABLET ORAL at 17:47

## 2025-01-12 RX ADMIN — IOPAMIDOL 85 ML: 612 INJECTION, SOLUTION INTRAVENOUS at 17:40

## 2025-01-12 NOTE — ED TRIAGE NOTES
Patient to ED via EMS c/o bilateral mouth pain, worse on R side x 4 days. Patient had dental surgery about 3 yrs ago and has had issues since. Patient states she has been taking amoxicillin x 5 days. Patient states the pain is radiating into her head on the R side. Patient has been nauseous, vomiting, and unable to eat.

## 2025-01-12 NOTE — DISCHARGE INSTRUCTIONS
Take medications as prescribed.  Follow-up with your dentist or the dental clinic of your choice as soon as possible (see list below).    [facility] Dental Clinic List    [facility]  [address] [facility]  [phone] Based on Income (Monday-Friday)  Appointment ONLY 8am-1pm  $15 minimum   [facility]  [address]  [phone] Based on Income (Monday-Friday)  Walk in at [facility] at 7:30 am  $12-24 minimum   [facility]  [address] [facility]   [phone] By appointment ONLY  Must call by 8:30 am to obtain an appointment for the next day  $50 minimum   [facility]  [address] [facility]  [phone] Must be homeless to be seen   [facility]  [address] [facility]  [phone] Walk in and appointments  7 days a week 9am-9pm  $83 minimum   [facility]  [address]  [phone] Walk in and appointments  9am-4pm  $50 minimum  Passport and other insurances accepted   [facility]  [address] [facility]  [phone] Walk in and appointments  9am-4pm  $50 minimum  Passport and other insurances accepted   [address] [place] [address] Dental  [address]  [phone] Walk in and appointments  9am-4pm  $50 minimum  Passport and other insurances accepted   [facility]  [phone] Walk in and appointments  9am-4pm  $50 minimum  Passport and other insurances accepted   [address]  [phone] Walk in and appointments  9am-4pm  $50 minimum  Passport and other insurances accepted       List listing is provided only as an informal guide and is not guaranteed to be complete or correct.  Please may your own inquiries and confirm the terms of care prior to setting an appointment.

## 2025-01-12 NOTE — ED PROVIDER NOTES
EMERGENCY DEPARTMENT ENCOUNTER    Room Number:  40/40  PCP: Sahil Cornelius MD  Historian: Patient, EMS    I initially evaluated the patient at 1618    HPI:  Chief Complaint: Dental pain, vomiting  A complete HPI/ROS/PMH/PSH/SH/FH are unobtainable due to: Nothing  Context: Belen Allen is a 44 y.o. female with a medical history of seizures who presents to the ED c/o acute dental pain and vomiting for the past 5 days.  Patient had multiple teeth extracted about 3 years ago.  She has had issues with dental pain since then.  Pain became acutely worse about 5 days ago.  She has pain in multiple teeth.  Pain radiates into her head.  She has had trouble swallowing.  She has had a nausea and few episodes of vomiting.  She was seen at the [facility] care center 5 days ago and started on amoxicillin.  She says she did not see a dentist until next month.  She has had a fever.  Denies sore throat, chest pain, shortness of breath, or abdominal pain.            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    Abdominal pain 11/29/2020    Acute bilateral low back pain 11/29/2020    Antiphospholipid antibody positive 11/29/2020    On anticoagulant therapy 11/29/2020    Acute pain of left knee 11/29/2020    Vitamin D deficiency 11/30/2020    Folate deficiency 12/01/2020    Pain of back and left lower extremity 12/02/2020    Common bile duct dilatation 05/31/2021    Elevated LFTs 06/01/2021    Right upper quadrant abdominal pain 06/01/2021    Obesity (BMI 30-39.9) 04/18/2019    Tobacco abuse 06/02/2021    GERD without esophagitis 06/02/2021    Intractable abdominal pain 07/05/2021    Class 2 severe obesity with serious comorbidity in adult 07/05/2021    Constipation 07/05/2021    History of ERCP 07/05/2021    Chronic pain syndrome 07/05/2021    Seizures 01/01/2022    Syncope 01/01/2022    Lumbar  back pain with radiculopathy affecting left lower extremity 01/05/2022    Right upper quadrant pain 11/23/2022    Uncontrolled pain 12/17/2022    Intractable back pain 12/17/2022    Sciatica of right side 12/20/2022    Migraine 12/20/2022    Mobility impaired 12/20/2022    Seizure 12/31/2022    Opioid dependence 01/16/2023    Clostridioides difficile diarrhea 01/18/2023    Pulmonary edema 07/27/2023    Acute hypoxemic respiratory failure 07/29/2023    Other dysphagia 08/01/2023    Vomiting 08/02/2023    Sepsis 03/23/2024    Dental abscess 03/24/2024    Bilateral cellulitis of lower leg 03/24/2024    Pneumonia 03/24/2024    Abnormal MRI, thoracic spine 03/27/2024    Recurrent seizures 04/15/2024    Antiphospholipid syndrome 04/16/2024    HTN (hypertension) 04/16/2024    Dental infection 04/18/2024     Resolved Ambulatory Problems     Diagnosis Date Noted    Choledocholithiasis 06/02/2021    Rhabdomyolysis 01/01/2022    Encephalopathy 01/01/2022    Hypokalemia 12/18/2022    Opioid withdrawal 07/12/2023     Past Medical History:   Diagnosis Date    Abnormal Pap smear of cervix     Ankle fracture 2013    H/O Elevated liver enzymes     History of chronic back pain     History of migraine     History of urinary tract infection     Injury of back     PONV (postoperative nausea and vomiting)     Seasonal allergies          PAST SURGICAL HISTORY  Past Surgical History:   Procedure Laterality Date    BACK SURGERY  2006    fusion L4, L5      CHOLECYSTECTOMY  2014    DILATATION AND CURETTAGE  2009    ENDOSCOPY N/A 11/27/2022    Procedure: ESOPHAGOGASTRODUODENOSCOPY with biopsy;  Surgeon: Christiana Mann MD;  Location: [facility] ENDOSCOPY;  Service: Gastroenterology;  Laterality: N/A;  gastritis, duodenitis, irregular z line    ERCP N/A 6/3/2021    Procedure: ENDOSCOPIC RETROGRADE CHOLANGIOPANCREATOGRAPHY WITH SPHINCTEROTOMY, DILATION WITH BALLOON CLEARANCE  (12MM-15MM);  Surgeon: Bjorn Flannery MD;  Location: [facility]  ENDOSCOPY;  Service: Gastroenterology;  Laterality: N/A;  DILATED COMMON BILE DUCT    SKIN SURGERY      TEETH EXTRACTION N/A 4/18/2024    Procedure: TOOTH EXTRACTION #13, 20;  Surgeon: Glenn Blunt MD;  Location: [facility] OR;  Service: Maxillofacial;  Laterality: N/A;    TUBAL ABDOMINAL LIGATION  2013    WISDOM TOOTH EXTRACTION  2018    x2         FAMILY HISTORY  Family History   Problem Relation Age of Onset    Stroke Mother     Allergic rhinitis Mother     Allergic rhinitis Sister     Breast cancer Maternal Aunt     Cancer Maternal Grandmother     Allergic rhinitis Paternal Grandfather     Cancer Paternal Grandfather     Prostate cancer Paternal Grandfather          SOCIAL HISTORY  Social History     Socioeconomic History    Marital status: Single    Number of children: 2   Tobacco Use    Smoking status: Every Day     Current packs/day: 1.00     Types: Cigarettes    Smokeless tobacco: Never   Vaping Use    Vaping status: Never Used   Substance and Sexual Activity    Alcohol use: Not Currently    Drug use: Not Currently    Sexual activity: Defer         ALLERGIES  Morphine and Lidocaine    REVIEW OF SYSTEMS  Review of Systems  Included in HPI  All systems reviewed and negative except for those discussed in HPI.      PHYSICAL EXAM  ED Triage Vitals   Temp Heart Rate Resp BP SpO2   01/12/25 1558 01/12/25 1555 01/12/25 1555 01/12/25 1555 01/12/25 1555   98.5 °F (36.9 °C) 120 18 150/90 99 %      Temp src Heart Rate Source Patient Position BP Location FiO2 (%)   01/12/25 1558 01/12/25 1555 -- -- --   Tympanic Monitor          Physical Exam      GENERAL: Awake, alert, oriented x 3.  Nontoxic-appearing female.  Voice is normal.  Resting comfortably in no acute distress  HENT: NCAT, nares patent, there is poor dentition with multiple dental caries, no gum swelling, swallowing secretions without difficulty, no facial swelling/erythema, no cervical lymphadenopathy  EYES: no scleral icterus  CV: regular rhythm,  tachycardic  RESPIRATORY: normal effort, clear to auscultation bilaterally  ABDOMEN: soft, nontender  MUSCULOSKELETAL: Extremities are nontender with full range of motion.  Neck is supple.  NEURO: Speech is normal.  No facial droop.  PSYCH:  calm, cooperative  SKIN: warm, dry    Vital signs and nursing notes reviewed.          LAB RESULTS  Recent Results (from the past 24 hours)   Comprehensive Metabolic Panel    Collection Time: 01/12/25  4:34 PM    Specimen: Blood   Result Value Ref Range    Glucose 95 65 - 99 mg/dL    BUN 8 6 - 20 mg/dL    Creatinine 0.74 0.57 - 1.00 mg/dL    Sodium 136 136 - 145 mmol/L    Potassium 4.7 3.5 - 5.2 mmol/L    Chloride 102 98 - 107 mmol/L    CO2 21.1 (L) 22.0 - 29.0 mmol/L    Calcium 8.7 8.6 - 10.5 mg/dL    Total Protein 6.9 6.0 - 8.5 g/dL    Albumin 4.0 3.5 - 5.2 g/dL    ALT (SGPT) 24 1 - 33 U/L    AST (SGOT) 41 (H) 1 - 32 U/L    Alkaline Phosphatase 110 39 - 117 U/L    Total Bilirubin 0.6 0.0 - 1.2 mg/dL    Globulin 2.9 gm/dL    A/G Ratio 1.4 g/dL    BUN/Creatinine Ratio 10.8 7.0 - 25.0    Anion Gap 12.9 5.0 - 15.0 mmol/L    eGFR 103.1 >60.0 mL/min/1.73   CBC Auto Differential    Collection Time: 01/12/25  4:34 PM    Specimen: Blood   Result Value Ref Range    WBC 9.86 3.40 - 10.80 10*3/mm3    RBC 5.00 3.77 - 5.28 10*6/mm3    Hemoglobin 15.1 12.0 - 15.9 g/dL    Hematocrit 46.9 (H) 34.0 - 46.6 %    MCV 93.8 79.0 - 97.0 fL    MCH 30.2 26.6 - 33.0 pg    MCHC 32.2 31.5 - 35.7 g/dL    RDW 13.1 12.3 - 15.4 %    RDW-SD 44.1 37.0 - 54.0 fl    MPV 9.3 6.0 - 12.0 fL    Platelets 282 140 - 450 10*3/mm3    Neutrophil % 76.6 (H) 42.7 - 76.0 %    Lymphocyte % 16.3 (L) 19.6 - 45.3 %    Monocyte % 6.2 5.0 - 12.0 %    Eosinophil % 0.2 (L) 0.3 - 6.2 %    Basophil % 0.3 0.0 - 1.5 %    Immature Grans % 0.4 0.0 - 0.5 %    Neutrophils, Absolute 7.55 (H) 1.70 - 7.00 10*3/mm3    Lymphocytes, Absolute 1.61 0.70 - 3.10 10*3/mm3    Monocytes, Absolute 0.61 0.10 - 0.90 10*3/mm3    Eosinophils, Absolute 0.02  0.00 - 0.40 10*3/mm3    Basophils, Absolute 0.03 0.00 - 0.20 10*3/mm3    Immature Grans, Absolute 0.04 0.00 - 0.05 10*3/mm3    nRBC 0.0 0.0 - 0.2 /100 WBC       Ordered the above labs and reviewed the results.        RADIOLOGY  CT Facial Bones With Contrast    Result Date: 1/12/2025  CT FACIAL BONES WITH IV CONTRAST  HISTORY: Dental pain and vomiting for the past 5 days. Teeth extraction 3 years ago.  TECHNIQUE: Facial CT includes imaging through the facial bones with IV contrast and data reconstructed in coronal and sagittal planes.  COMPARISON: Facial bone CT 04/17/2024.  FINDINGS: No sinus fluid levels demonstrated. The ostiomeatal complexes, frontal recesses, sphenoethmoidal recesses are patent.  There appears to be dental caries involving the left upper incisors, canine, right second incisor, canine and remaining right upper molar. There have been several upper tooth extractions and there is mild periapical lucency without evidence for maxillary abscess. There are also caries involving left lower canine, first premolar, right first premolar, and remaining right second premolar. Remaining lower dentition has been extracted. No evidence for mandibular abscess.  No facial abscess or fluid collection is evident.      Poor dentition for which dental consultation is recommended. No evidence for maxillary or mandibular bony abscess or soft tissue abscess.  Radiation dose reduction techniques were utilized, including automated exposure control and exposure modulation based on body size.    This report was finalized on 1/12/2025 5:58 PM by Kristopher Arambula M.D on Workstation: [id number]       Ordered the above noted radiological studies. Reviewed by me in PACS.            PROCEDURES  Procedures        OUTPATIENT MEDICATION MANAGEMENT:  Current Facility-Administered Medications Ordered in Epic   Medication Dose Route Frequency Provider Last Rate Last Admin    sodium chloride 0.9 % flush 10 mL  10 mL Intravenous PRN  North Segovia MD         Current Outpatient Medications Ordered in Epic   Medication Sig Dispense Refill    acetaminophen (TYLENOL) 325 MG tablet Take 2 tablets by mouth Every 6 (Six) Hours As Needed for Mild Pain, Headache or Fever. 30 tablet 0    benzocaine-menthol (Chloraseptic Sore Throat) 6-10 MG lozenge Dissolve 1 each in the mouth Every 4 (Four) Hours As Needed (Sore throat/mouth). 168 each 0    bisacodyl (DULCOLAX) 5 MG EC tablet Take 1 tablet by mouth Daily As Needed for Constipation (Use if polyethylene glycol is ineffective). 30 tablet 0    chlorhexidine (PERIDEX) 0.12 % solution Apply 15 mL to the mouth or throat Every 12 (Twelve) Hours. 473 mL 1    cyclobenzaprine (FLEXERIL) 5 MG tablet Take 1 tablet by mouth 3 (Three) Times a Day As Needed for Muscle Spasms. 15 tablet 0    FLUoxetine (PROzac) 40 MG capsule Take 60 mg by mouth Every Night.      folic acid (FOLVITE) 1 MG tablet Take 1 tablet by mouth Daily.      gabapentin (NEURONTIN) 300 MG capsule Take 1 capsule by mouth Every 12 (Twelve) Hours for 7 days. 14 capsule 0    ibuprofen (ADVIL,MOTRIN) 600 MG tablet Take 1 tablet by mouth Every 6 (Six) Hours As Needed for Mild Pain. 30 tablet 0    ibuprofen (ADVIL,MOTRIN) 800 MG tablet Take 1 tablet by mouth Every 6 (Six) Hours As Needed for Moderate Pain. 30 tablet 0    l-methylfolate 7.5 MG tablet tablet Take 1 tablet by mouth Daily.      levETIRAcetam (KEPPRA) 750 MG tablet Take 2 tablets by mouth Every 12 (Twelve) Hours. 60 tablet 0    melatonin 5 MG tablet tablet Take 2 tablets by mouth Every Night. Patient taes 20 mg at home      naloxone (NARCAN) 4 MG/0.1ML nasal spray Call 911. Don't prime. [place] in 1 nostril for overdose. Repeat in 2-3 minutes in other nostril if no or minimal breathing/responsiveness. 2 each 0    nicotine (NICODERM CQ) 21 MG/24HR patch Place 1 patch on the skin as directed by provider Daily. 28 each 0    ondansetron ODT (ZOFRAN-ODT) 4 MG disintegrating tablet Place 1 tablet  on the tongue Every 6 (Six) Hours As Needed for Nausea or Vomiting. 15 tablet 0    oxyCODONE-acetaminophen (PERCOCET) 7.5-325 MG per tablet Take 1 tablet by mouth Every 6 (Six) Hours As Needed for Moderate Pain or Severe Pain. 12 tablet 0    polyethylene glycol (MIRALAX) 17 GM/SCOOP powder Mix one capful (17 g) in liquid and take by mouth Daily As Needed (Use if senna-docusate is ineffective). 510 g 0    sennosides-docusate (PERICOLACE) 8.6-50 MG per tablet Take 2 tablets by mouth 2 (Two) Times a Day As Needed for Constipation. 60 tablet 0    SUMAtriptan (IMITREX) 100 MG tablet Take 1 tablet by mouth Every 2 (Two) Hours As Needed for Migraine.      traZODone (DESYREL) 50 MG tablet Take 1-2 tablets by mouth At Night As Needed for sleep 30 tablet 3           MEDICATIONS GIVEN IN ER  Medications   sodium chloride 0.9 % flush 10 mL (has no administration in time range)   sodium chloride 0.9 % bolus 1,000 mL (1,000 mL Intravenous New Bag 1/12/25 3073)   ondansetron (ZOFRAN) injection 4 mg (4 mg Intravenous Given 1/12/25 1644)   ketorolac (TORADOL) injection 15 mg (15 mg Intravenous Given 1/12/25 1644)   HYDROcodone-acetaminophen (NORCO) 7.5-325 MG per tablet 1 tablet (1 tablet Oral Given 1/12/25 6553)   iopamidol (ISOVUE-300) 61 % injection 100 mL (85 mL Intravenous Given by Other 1/12/25 9505)                   MEDICAL DECISION MAKING, PROGRESS, and CONSULTS    All labs have been independently reviewed by me.  All radiology studies have been reviewed by me and I have also reviewed the radiology report.   EKG's independently viewed and interpreted by me.  Discussion below represents my analysis of pertinent findings related to patient's condition, differential diagnosis, treatment plan and final disposition.      Additional sources:    - Discussed/ obtained information from independent historians: None    - External (non-ED) record review: Patient was seen at [facility] and August 2024 for dental pain.  She  was last admitted here in April 2024 for recurrent seizures.  CT of the facial bones done at that time showed a periapical abscess at the root of tooth #20.  There was a gingival abscess.  There was curious appearance of multiple mandibular and maxillary teeth.    -Prescription drug monitoring program review: AMARJIT reviewed by North Segovia MD   N/A    - Chronic or social conditions impacting patient care (Social Determinants of Health): None          Orders placed during this visit:  Orders Placed This Encounter   Procedures    CT Facial Bones With Contrast    Comprehensive Metabolic Panel    CBC Auto Differential    Insert Peripheral IV    CBC & Differential         Additional orders considered but not ordered:          Differential diagnosis includes, but is not limited to:    Dental caries, dental infection, gingival abscess, facial abscess      Independent interpretation of labs, radiology studies, and discussions with consultants:  ED Course as of 01/12/25 1917   Sun Jan 12, 2025   1725 WBC: 9.86 [WH]   1725 Hemoglobin: 15.1 [WH]   1725 Glucose: 95 [WH]   1725 BUN: 8 [WH]   1725 Creatinine: 0.74 [WH]   1725 CO2(!): 21.1 [WH]   1725 Anion Gap: 12.9 [WH]   1810 CT face personally interpreted by me.  My personal interpretation is: No fluid collection.  No soft tissue swelling.    Per the radiologist, there is poor dentition with multiple dental caries.  There is no abscess or fluid collection. [WH]   1851 Patient still complains of dental pain and headache.  Test results and diagnoses were discussed with her.  Heart rate is in the 90s.  Patient was advised to follow-up with a dentist as soon as possible.  She will be given a list of local dental clinics.  Return precautions were discussed.  She will be given prescriptions for ibuprofen and Zofran.  All questions were answered. [WH]   1916 MDM: Patient presented to the ED complaining of dental pain, headache, and vomiting.  She was afebrile.  She has  extremely poor dentition with multiple dental caries.  CT of the face did not show any abscess.  Labs were unremarkable.  She was not dehydrated.  She was given IV fluids and IV medications for pain and nausea.  Her pain is likely due to chronic dental caries.  Patient will need to follow-up with a dentist. [WH]      ED Course User Index  [WH] North Segovia MD         COMPLEXITY OF CARE        DIAGNOSIS  Final diagnoses:   Pain due to dental caries   Chronic dental pain   Nausea and vomiting, unspecified vomiting type         DISPOSITION  DISCHARGE    Patient discharged in stable condition.    Reviewed implications of results, diagnosis, meds, responsibility to follow up, warning signs and symptoms of possible worsening, potential complications and reasons to return to ER, including worsening/persistent symptoms, fever, shortness of breath, trouble swallowing, or other concern.    Patient/Family voiced understanding of above instructions.    Discussed plan for discharge, as there is no emergent indication for admission. Patient referred to primary care provider for BP management due to today's BP. Pt/family is agreeable and understands need for follow up and repeat testing.  Pt is aware that discharge does not mean that nothing is wrong but it indicates no emergency is present that requires admission and they must continue care with follow-up as given below or physician of their choice.     FOLLOW-UP  Sahil Cornelius MD  [facility] [id number] [phone]          Your dentist    Schedule an appointment as soon as possible for a visit            Medication List        New Prescriptions      ondansetron ODT 4 MG disintegrating tablet  Commonly known as: ZOFRAN-ODT  Place 1 tablet on the tongue Every 6 (Six) Hours As Needed for Nausea or Vomiting.            Changed      * ibuprofen 600 MG tablet  Commonly known as: ADVIL,MOTRIN  Take 1 tablet by mouth Every 6 (Six) Hours As  Needed for Mild Pain.  What changed: Another medication with the same name was added. Make sure you understand how and when to take each.     * ibuprofen 800 MG tablet  Commonly known as: ADVIL,MOTRIN  Take 1 tablet by mouth Every 6 (Six) Hours As Needed for Moderate Pain.  What changed: You were already taking a medication with the same name, and this prescription was added. Make sure you understand how and when to take each.           * This list has 2 medication(s) that are the same as other medications prescribed for you. Read the directions carefully, and ask your doctor or other care provider to review them with you.                   Where to Get Your Medications        These medications were sent to [id number] - [place], [place] - [address] [address] [address] AT SEC OF [address] [address] & [facility] [address] - [phone]  - [phone]   [address] [address] [address], [facility] [address]      Phone: [phone]   ibuprofen 800 MG tablet  ondansetron ODT 4 MG disintegrating tablet                   Latest Documented Vital Signs:  AS OF 19:17 EST VITALS:    BP - 137/94  HR - 120  TEMP - 98.5 °F (36.9 °C) (Tympanic)  O2 SATS - 99%    AMARJIT reviewed by North Segovia MD       --    Please note that portions of this were completed with a voice recognition program.       Note Disclaimer: At [facility], we believe that sharing information builds trust and better relationships. You are receiving this note because you are receiving care at [facility] or recently visited. It is possible you will see health information before a provider has talked with you about it. This kind of information can be easy to misunderstand. To help you fully understand what it means for your health, we urge you to discuss this note with your provider.             North Segovia MD  01/12/25 1917

## 2025-01-24 NOTE — H&P
Patient Name:  Belen Allen  YOB: 1980  MRN:  [id number]  Admit Date:  12/17/2022  Patient Care Team:  Sahil Cornelius MD as PCP - General (Internal Medicine)  Code, Bjorn HERRERA II, MD as Consulting Physician (Hematology and Oncology)  Dennys Carranza MD as Referring Physician (Hospitalist)      Subjective   History Present Illness     Chief Complaint   Patient presents with   • Seizures   • Head Injury       Ms. Allen is a 42 y.o. smoker with a history of splenic infarction, on anticoagulation, chronic back pain, seizure disorder, bilateral leg weakness, obesity, and GERD that presents to [facility] complaining of a seizure and back pain.  She reports she was going to the bathroom earlier today when she had a seizure.  She states she woke up on her back and she believes she may have hit her head.  She reports chronic pain in her back that has worsened.  She complains of pain that radiates from her neck down to her lumbar spine.  She describes the pain as constant, severe, and aching in nature.  She states movement exacerbates her pain and the pain medication she has received has been alleviating.  She reports chronic weakness in bilateral legs that has worsened. She denies loss of bowel and bladder control.  She states she has been having seizures almost weekly for the past few weeks.  X-ray and CT of the thoracic, lumbar, and cervical spine were negative for an acute fracture.  A CT of the head was also negative for an acute intracranial process.        History of Present Illness  Review of Systems   Constitutional: Negative for chills and fever.   HENT: Negative for congestion and sore throat.    Eyes: Negative for photophobia.   Respiratory: Negative for cough and shortness of breath.    Cardiovascular: Positive for palpitations. Negative for chest pain and leg swelling.   Gastrointestinal: Positive for nausea and vomiting. Negative for abdominal pain.  Chief complaint:   Chief Complaint   Patient presents with    Physical       Vitals:  Visit Vitals  Pulse 124   Temp 97.4 °F (36.3 °C) (Temporal)   Ht 33\" (83.8 cm)   Wt 14.2 kg (31 lb 6.4 oz)   HC 51 cm (20.08\")   BMI 20.27 kg/m²       HISTORY OF PRESENT ILLNESS     HPI  The patient is a 12-month-old male presenting for his wellness exam. He is accompanied by his parents.    The patient's mother reports that he has been experiencing teething symptoms, including fever and drooling, but no signs of a cold. He has developed molars on the top and bottom. The mother expresses concern about his feet, which occasionally appear cyanotic, particularly when he wears boots. His feet often feel cold, but he does not seem to be bothered by this. He is currently transitioning from bottle feeding to regular meals, with a bottle still being used at bedtime and upon waking. He has a preference for water and cow's milk, which he consumes without any issues. The mother notes that he occasionally sounds phlegmy. He has a tendency to overeat, leading to vomiting episodes. He is able to stand independently and walk with assistance. He has begun to speak, with \"mama\" and \"dick\" being part of his vocabulary. He is also able to throw a ball. His diet includes solid foods, and he consumes less milk than before. The mother is considering introducing yogurt into his diet. He has shown fear towards certain objects, such as a vacuum , and loud noises. The mother is concerned about his mental health, as he often appears upset due to teething discomfort. The parents have been managing his teething pain with ibuprofen and frozen beef sticks.    MEDICATIONS  Ibuprofen      Other significant problems:  Patient Active Problem List    Diagnosis Date Noted    Clubfoot of right lower extremity 2024     Priority: Low     CHW ORTHO  10/2/24. Doing very well with treatment. Can continue boots and bar use to nights and nap only. Gently stretch    Endocrine: Negative for polydipsia, polyphagia and polyuria.   Genitourinary: Negative for decreased urine volume, dysuria, frequency, hematuria and urgency.   Musculoskeletal: Positive for arthralgias, back pain and neck pain.   Skin: Negative for rash and wound.   Neurological: Positive for weakness (BLE). Negative for dizziness, speech difficulty, light-headedness, numbness and headaches.        Personal History     Past Medical History:   Diagnosis Date   • Abnormal Pap smear of cervix    • Ankle fracture 2013   • H/O Elevated liver enzymes    • History of chronic back pain    • History of migraine    • History of urinary tract infection    • Injury of back    • PONV (postoperative nausea and vomiting)    • Seasonal allergies    • Seizure (HCC)     Takes Keppra     Past Surgical History:   Procedure Laterality Date   • BACK SURGERY  2006    fusion L4, L5     • CHOLECYSTECTOMY  2014   • DILATATION AND CURETTAGE  2009   • ENDOSCOPY N/A 11/27/2022    Procedure: ESOPHAGOGASTRODUODENOSCOPY with biopsy;  Surgeon: Christiana Mann MD;  Location: [facility] ENDOSCOPY;  Service: Gastroenterology;  Laterality: N/A;  gastritis, duodenitis, irregular z line   • ERCP N/A 6/3/2021    Procedure: ENDOSCOPIC RETROGRADE CHOLANGIOPANCREATOGRAPHY WITH SPHINCTEROTOMY, DILATION WITH BALLOON CLEARANCE  (12MM-15MM);  Surgeon: Bjorn Flannery MD;  Location: [facility] ENDOSCOPY;  Service: Gastroenterology;  Laterality: N/A;  DILATED COMMON BILE DUCT   • SKIN SURGERY     • TUBAL ABDOMINAL LIGATION  2013   • WISDOM TOOTH EXTRACTION  2018    x2     Family History   Problem Relation Age of Onset   • Stroke Mother    • Allergic rhinitis Mother    • Allergic rhinitis Sister    • Breast cancer Maternal Aunt    • Cancer Maternal Grandmother    • Allergic rhinitis Paternal Grandfather    • Cancer Paternal Grandfather    • Prostate cancer Paternal Grandfather      Social History     Tobacco Use   • Smoking status: Some Days     Packs/day: 0.50  the foot while he is out of the brace. Return 3 months.   7/3/24. Doing well with treatment. Can reduce boots and bar use to nights and nap only now. Follow up with  for new boot fitting. Return 3 months.   5/1/24. Right foot is corrected nicely with no further correction required. Continue in boots and bar for 23 hours per day. Return 2 months.   4/2/24. Right foot is corrected nicely with no further correction required. He will transition into a boots and bar brace for continued management. Return 3 months for clinical examination- wear brace for 23 hours a day until follow up.   3/13/24, Right Percutaneous tendo-Achilles lengthening, application of right Ponseti cast.  FUV in 3 wks  3/6/24,  weekly cast changes. Return in 1 week.   2/28/24,  weekly Ponseti cast removal and reapplication. Defer TOMMY, and attempt to improve foot position more prior to TOMMY procedure. Applied cast.   2/21/24,  Cast is applied and procedure tolerated well. FUV in 1 wk  2/7/24,  Cast number 2 applied. Follow up in 2 weeks.   1/31/24,  Cast number 1 applied, with weekly cast changes.  US Infant Hips Dynamic in 6 wks, FUV in 1 week.         PAST MEDICAL, FAMILY AND SOCIAL HISTORY     Medications:  Current Outpatient Medications   Medication Sig Dispense Refill    acetaminophen (Tylenol Childrens) 160 MG/5ML suspension Take 2.6 mLs by mouth every 6 hours as needed for Pain or Fever. 118 mL 0     No current facility-administered medications for this visit.       Allergies:  ALLERGIES:  No Known Allergies    Past Medical  History/Surgeries:  Past Medical History:   Diagnosis Date    Milk protein intolerance 2024       No past surgical history on file.    Family History:  No family history on file.    Social History:  Social History     Tobacco Use    Smoking status: Not on file    Smokeless tobacco: Not on file   Substance Use Topics    Alcohol use: Not on file       REVIEW OF SYSTEMS     Review of Systems    PHYSICAL EXAM      Physical Exam  Vitals and nursing note reviewed.   Constitutional:       General: He is not in acute distress.     Appearance: He is well-developed.   HENT:      Head: Atraumatic.      Right Ear: Tympanic membrane and external ear normal.      Left Ear: Tympanic membrane and external ear normal.      Nose: Nose normal.      Mouth/Throat:      Mouth: Mucous membranes are moist.      Pharynx: Oropharynx is clear.      Neck: Normal range of motion. No rigidity.   Eyes:      General:         Right eye: No discharge.         Left eye: No discharge.      Conjunctiva/sclera: Conjunctivae normal.      Pupils: Pupils are equal, round, and reactive to light.   Cardiovascular:      Rate and Rhythm: Normal rate and regular rhythm.      Heart sounds: S1 normal and S2 normal. No murmur heard.  Pulmonary:      Effort: Pulmonary effort is normal. No respiratory distress.      Breath sounds: Normal breath sounds. No wheezing.   Abdominal:      General: Bowel sounds are normal. There is no distension.      Palpations: Abdomen is soft.      Hernia: There is no hernia in the left inguinal area.   Genitourinary:     Penis: Normal.       Testes: Normal.   Musculoskeletal:         General: Normal range of motion.   Skin:     General: Skin is warm.      Findings: No rash.   Neurological:      Mental Status: He is alert.         ASSESSMENT/PLAN     1. Wellness examination.  His growth trajectory is satisfactory, with a weight gain of 1 pound since the last visit. His overall health status is commendable, exhibiting normal interactions and developmentally appropriate behaviors. The parents were reassured that the coldness in his feet is a common occurrence in children of his age. They were advised to add an extra layer of clothing to his chest area to maintain warmth. It was also suggested that they allow him to stand and move around when his feet appear purple. The use of a laundry basket instead of a baby walker was recommended. The      Types: Cigarettes     Last attempt to quit: 2020     Years since quittin.0   • Smokeless tobacco: Never   Vaping Use   • Vaping Use: Never used   Substance Use Topics   • Alcohol use: Not Currently   • Drug use: Not Currently     Medications Prior to Admission   Medication Sig Dispense Refill Last Dose   • amitriptyline (ELAVIL) 25 MG tablet Take 25 mg by mouth Every Night.      • apixaban (ELIQUIS) 5 MG tablet tablet Take 5 mg by mouth 2 (Two) Times a Day. Last filled 21 - pt states she was instructed to stop taking medication prior to ERCP ~1 month ago and was not instructed to restart medication.   Indication: Splenic infarct      • Calcium Carbonate-Vitamin D 600-200 MG-UNIT tablet Take 1 tablet by mouth Daily.      • dicyclomine (BENTYL) 20 MG tablet Take 1 tablet by mouth Every 6 (Six) Hours. 20 tablet 0    • divalproex (DEPAKOTE) 500 MG DR tablet Take 1 tablet by mouth Every 12 (Twelve) Hours for 30 days. 60 tablet 0    • FLUoxetine (PROzac) 40 MG capsule Take 40 mg by mouth Every Night.      • folic acid (FOLVITE) 1 MG tablet Take 1 tablet by mouth Daily. 30 tablet 0    • gabapentin (NEURONTIN) 800 MG tablet Take 800 mg by mouth 4 (Four) Times a Day.      • hydrOXYzine (ATARAX) 25 MG tablet Take 1-2 tablets by mouth 3 (Three) Times a Day As Needed for Anxiety.      • loratadine (CLARITIN) 10 MG tablet Take 10 mg by mouth 2 (Two) Times a Day.      • LORazepam (ATIVAN) 0.5 MG tablet Take 0.25-0.5 mg by mouth.      • melatonin 5 MG tablet tablet Take 10 mg by mouth Every Night.      • methocarbamol (ROBAXIN) 750 MG tablet Take 1 tablet by mouth 3 (Three) Times a Day As Needed for Muscle Spasms (Abdominal wall pain). 21 tablet 0    • ondansetron ODT (ZOFRAN-ODT) 8 MG disintegrating tablet Place 1 tablet on the tongue Every 8 (Eight) Hours As Needed for Nausea or Vomiting. 15 tablet 0    • pantoprazole (PROTONIX) 40 MG EC tablet Take 1 tablet by mouth 2 (Two) Times a Day. 60 tablet 0    •  parents were encouraged to engage him in activities that would help him learn new words. They were informed that it is normal for him to consume less milk as he transitions to solid foods. In case of a day where his milk intake is less than 12 ounces, an additional serving of cheese or yogurt can be provided. If he exhibits a decreased appetite for food, his milk intake can be increased. The parents were advised to offer him vegetables first, followed by fruits. They were also informed that it is normal for him to develop fears as he grows older. The parents were advised to administer Tylenol or ibuprofen in case of fever following vaccination. They were also informed that he is due for screening labs, including complete blood count and lead test, to ensure proper cell line development over the past year.    Follow-up  The patient is scheduled for a follow-up visit in 3 months, or earlier if necessary.       polyethylene glycol (polyethylene glycol) 17 g packet Take 17 g by mouth Daily.      • sucralfate (CARAFATE) 1 g tablet Take 1 tablet by mouth 4 (Four) Times a Day. 20 tablet 0    • SUMAtriptan (IMITREX) 100 MG tablet Take 50 mg by mouth Every 2 (Two) Hours As Needed for Migraine. Take one tablet at onset of headache. May repeat dose one time in 2 hours if headache not relieved.      • traZODone (DESYREL) 50 MG tablet Take 100 mg by mouth At Night As Needed for Sleep. Take 1 to 2 tablets by mouth every  Night as needed for sleep      • vitamin D (ERGOCALCIFEROL) 1.25 MG (47805 UT) capsule capsule Take 50,000 Units by mouth 1 (One) Time Per Week.        Allergies:    Allergies   Allergen Reactions   • Lidocaine Rash     Pt reports lidocaine patches make her breakout in a rash       Objective    Objective     Vital Signs  Temp:  [97.7 °F (36.5 °C)-99.1 °F (37.3 °C)] 97.7 °F (36.5 °C)  Heart Rate:  [] 95  Resp:  [18] 18  BP: (128-142)/(89-98) 128/89  SpO2:  [97 %-98 %] 97 %  on  Flow (L/min):  [2] 2;   Device (Oxygen Therapy): room air  Body mass index is 37.05 kg/m².    Physical Exam  Vitals and nursing note reviewed.   Constitutional:       Appearance: She is obese.   HENT:      Head: Normocephalic and atraumatic.      Nose: Nose normal.      Mouth/Throat:      Mouth: Mucous membranes are moist.      Pharynx: Oropharynx is clear.   Eyes:      Extraocular Movements: Extraocular movements intact.      Conjunctiva/sclera: Conjunctivae normal.   Cardiovascular:      Rate and Rhythm: Normal rate and regular rhythm.      Pulses: Normal pulses.      Heart sounds: Normal heart sounds.   Pulmonary:      Effort: Pulmonary effort is normal.      Breath sounds: Normal breath sounds.   Abdominal:      General: Bowel sounds are normal.      Palpations: Abdomen is soft.   Musculoskeletal:         General: Tenderness (She is TTP from her cervical spine down to her lubar spine. Tenderness to palpation is worse in the thoracic  spine.) present.      Cervical back: Normal range of motion and neck supple.      Right lower leg: No edema.      Left lower leg: No edema.   Skin:     General: Skin is warm and dry.   Neurological:      General: No focal deficit present.      Mental Status: She is alert and oriented to person, place, and time.   Psychiatric:         Mood and Affect: Mood normal.         Results Review:  I reviewed the patient's new clinical results.  I reviewed the patient's new imaging results and agree with the interpretation.  I reviewed the patient's other test results and agree with the interpretation  I personally viewed and interpreted the patient's EKG/Telemetry data  Discussed with ED provider.    Lab Results (last 24 hours)     Procedure Component Value Units Date/Time    BASIC METABOLIC PANEL [[id number]]  (Abnormal) Collected: 12/17/22 0149     Updated: 12/17/22 1603    CBC AND DIFFERENTIAL [[id number]]  (Abnormal) Collected: 12/17/22 0149     Updated: 12/17/22 1603    AUTODIFF [[id number]]  (Abnormal) Collected: 12/17/22 0149    Specimen: Blood Updated: 12/17/22 1603     Neutrophil % 47.9 %      Lymphocyte % 44.4 %      Monocyte % 6.0 %      Eosinophil % 1.3 %      Basophil % 0.4 %      Neutrophils Absolute 4.50 x10(3)/ul      Lymphocytes Absolute 4.1 x10(3)/ul      Monocytes Absolute 0.6 x10(3)/ul      Eosinophils Absolute 0.1 x10(3)/ul      Basophils Absolute 0.0 x10(3)/ul     Narrative:       Ordered by Discern Expert.    CBC & Differential [[id number]]  (Normal) Collected: 12/17/22 1628    Specimen: Blood Updated: 12/17/22 1639    Narrative:      The following orders were created for panel order CBC & Differential.  Procedure                               Abnormality         Status                     ---------                               -----------         ------                     CBC Auto Differential[[id number]]        Normal              Final result                 Please view results for these tests on the  individual orders.    Comprehensive Metabolic Panel [[id number]]  (Abnormal) Collected: 12/17/22 1628    Specimen: Blood Updated: 12/17/22 1733     Glucose 106 mg/dL      BUN 4 mg/dL      Creatinine 0.69 mg/dL      Sodium 143 mmol/L      Potassium 3.5 mmol/L      Comment: Slight hemolysis detected by analyzer. Results may be affected.        Chloride 108 mmol/L      CO2 27.0 mmol/L      Calcium 8.4 mg/dL      Total Protein 5.8 g/dL      Albumin 3.70 g/dL      ALT (SGPT) 31 U/L      AST (SGOT) 23 U/L      Alkaline Phosphatase 111 U/L      Total Bilirubin 0.2 mg/dL      Globulin 2.1 gm/dL      A/G Ratio 1.8 g/dL      BUN/Creatinine Ratio 5.8     Anion Gap 8.0 mmol/L      eGFR 112.0 mL/min/1.73      Comment: National Kidney Foundation and American Society of Nephrology (ASN) Task Force recommended calculation based on the Chronic Kidney Disease Epidemiology Collaboration (CKD-EPI) equation refit without adjustment for race.       Narrative:      GFR Normal >60  Chronic Kidney Disease <60  Kidney Failure <15      Valproic Acid Level, Total [[id number]]  (Abnormal) Collected: 12/17/22 1628    Specimen: Blood Updated: 12/17/22 1657     Valproic Acid <2.8 mcg/mL     Narrative:      Therapeutic Ranges for Valproic Acid    Epilepsy:       mcg/ml  Bipolar/Lucretia  up to 125 mcg/ml      CBC Auto Differential [[id number]]  (Normal) Collected: 12/17/22 1628    Specimen: Blood Updated: 12/17/22 1639     WBC 7.16 10*3/mm3      RBC 3.81 10*6/mm3      Hemoglobin 12.4 g/dL      Hematocrit 36.4 %      MCV 95.5 fL      MCH 32.5 pg      MCHC 34.1 g/dL      RDW 13.6 %      RDW-SD 47.9 fl      MPV 10.2 fL      Platelets 284 10*3/mm3      Neutrophil % 61.6 %      Lymphocyte % 29.6 %      Monocyte % 7.1 %      Eosinophil % 1.0 %      Basophil % 0.4 %      Immature Grans % 0.3 %      Neutrophils, Absolute 4.41 10*3/mm3      Lymphocytes, Absolute 2.12 10*3/mm3      Monocytes, Absolute 0.51 10*3/mm3      Eosinophils, Absolute 0.07 10*3/mm3       Basophils, Absolute 0.03 10*3/mm3      Immature Grans, Absolute 0.02 10*3/mm3      nRBC 0.1 /100 WBC     Ethanol [[id number]] Collected: 12/17/22 1628    Specimen: Blood Updated: 12/17/22 2125    CK [[id number]] Collected: 12/17/22 1628    Specimen: Blood Updated: 12/17/22 2125    Magnesium [[id number]] Collected: 12/17/22 1628    Specimen: Blood Updated: 12/17/22 2125          Imaging Results (Last 24 Hours)     Procedure Component Value Units Date/Time    CT Chest Without Contrast Diagnostic [[id number]] Resulted: 12/17/22 2112     Updated: 12/17/22 2113    CT Head Without Contrast [[id number]] Collected: 12/17/22 1920     Updated: 12/17/22 1921    Narrative:      EMERGENCY NONCONTRAST HEAD CT AND NONCONTRAST CERVICAL SPINE CT  12/17/2022     CLINICAL HISTORY: Patient had a seizure, has headaches. Patient is on  Eliquis.     HEAD CT TECHNIQUE: Spiral CT images were obtained from the base of the  skull to the vertex without intravenous contrast. The images were  reformatted and submitted in 3 mm thick axial, sagittal and coronal CT  sections with brain algorithm and 2 mm thick axial CT sections with  high-resolution bone algorithm.     This is correlated to a prior MRI of the brain from [facility] on 01/04/2022 and a noncontrast head CT 01/01/2022.     FINDINGS: The brain parenchyma is normal in attenuation. The ventricles  are normal in size. I see no focal mass effect. There is no midline  shift. No extraaxial fluid collections are identified. There is no  evidence of acute intracranial hemorrhage. No acute skull fracture is  identified. The calvarium and the skull base are normal in appearance.  The paranasal sinuses and the mastoid air cells and the middle ear  cavities are clear.       Impression:      Normal head CT with no acute skull fracture or intracranial  hemorrhage identified.     CERVICAL SPINE CT TECHNIQUE: Spiral CT images were obtained from the  skull base down to the T2-3 thoracic  level and the images were  reformatted and submitted in 2 mm thick axial and sagittal CT sections  with soft tissue algorithm and 1 mm thick axial, sagittal and coronal CT  sections with high-resolution bone algorithm.     FINDINGS: The images are extremely grainy from C4 down to T2 which  limits evaluation of bony detail and limits evaluation of the posterior  disc margins.     The atlantooccipital articulation is normal in appearance.     At C1-C2 there are mild arthritic changes at the atlantodental interval  with mild marginal spurring off the anterior ring of C1 and the  odontoid. Otherwise the C1-2 level is normal in appearance.     At C2-3 the posterior disc margin, facets and uncovertebral joints are  within normal limits with no canal or foraminal narrowing.     At C3-4 the disc space, facets and uncovertebral joints are within  normal limits with no canal or foraminal narrowing.     At C4-5 the posterior disc margin, facets and uncovertebral joints are  within normal limits with no canal or foraminal narrowing.     At C5-6 the posterior endplates, facets and uncovertebral joints are  within normal limits with no canal or foraminal narrowing.     At C6-7 the posterior endplates, facets and uncovertebral joints are  within normal limits with no canal or foraminal narrowing.     At C7-T1 there are prominent left anterior marginal bridging  osteophytes. The posterior endplates and facets are normal with no canal  or foraminal narrowing.     No acute fracture is seen in the cervical spine.     IMPRESSION: No acute fracture is seen in the cervical spine. The images  are extremely grainy from C4 to T2 which limits evaluation of bony  detail and limits evaluation of the posterior disc margins. There is  prominent left anterior marginal osteophytic spurring at C7-T1. The  remainder of the cervical spine CT is unremarkable.     Radiation dose reduction techniques were utilized, including automated  exposure control  and exposure modulation based on body size.          CT Cervical Spine Without Contrast [[id number]] Collected: 12/17/22 1920     Updated: 12/17/22 1920    Narrative:      EMERGENCY NONCONTRAST HEAD CT AND NONCONTRAST CERVICAL SPINE CT  12/17/2022     CLINICAL HISTORY: Patient had a seizure, has headaches. Patient is on  Eliquis.     HEAD CT TECHNIQUE: Spiral CT images were obtained from the base of the  skull to the vertex without intravenous contrast. The images were  reformatted and submitted in 3 mm thick axial, sagittal and coronal CT  sections with brain algorithm and 2 mm thick axial CT sections with  high-resolution bone algorithm.     This is correlated to a prior MRI of the brain from [facility] on 01/04/2022 and a noncontrast head CT 01/01/2022.     FINDINGS: The brain parenchyma is normal in attenuation. The ventricles  are normal in size. I see no focal mass effect. There is no midline  shift. No extraaxial fluid collections are identified. There is no  evidence of acute intracranial hemorrhage. No acute skull fracture is  identified. The calvarium and the skull base are normal in appearance.  The paranasal sinuses and the mastoid air cells and the middle ear  cavities are clear.       Impression:      Normal head CT with no acute skull fracture or intracranial  hemorrhage identified.     CERVICAL SPINE CT TECHNIQUE: Spiral CT images were obtained from the  skull base down to the T2-3 thoracic level and the images were  reformatted and submitted in 2 mm thick axial and sagittal CT sections  with soft tissue algorithm and 1 mm thick axial, sagittal and coronal CT  sections with high-resolution bone algorithm.     FINDINGS: The images are extremely grainy from C4 down to T2 which  limits evaluation of bony detail and limits evaluation of the posterior  disc margins.     The atlantooccipital articulation is normal in appearance.     At C1-C2 there are mild arthritic changes at the  atlantodental interval  with mild marginal spurring off the anterior ring of C1 and the  odontoid. Otherwise the C1-2 level is normal in appearance.     At C2-3 the posterior disc margin, facets and uncovertebral joints are  within normal limits with no canal or foraminal narrowing.     At C3-4 the disc space, facets and uncovertebral joints are within  normal limits with no canal or foraminal narrowing.     At C4-5 the posterior disc margin, facets and uncovertebral joints are  within normal limits with no canal or foraminal narrowing.     At C5-6 the posterior endplates, facets and uncovertebral joints are  within normal limits with no canal or foraminal narrowing.     At C6-7 the posterior endplates, facets and uncovertebral joints are  within normal limits with no canal or foraminal narrowing.     At C7-T1 there are prominent left anterior marginal bridging  osteophytes. The posterior endplates and facets are normal with no canal  or foraminal narrowing.     No acute fracture is seen in the cervical spine.     IMPRESSION: No acute fracture is seen in the cervical spine. The images  are extremely grainy from C4 to T2 which limits evaluation of bony  detail and limits evaluation of the posterior disc margins. There is  prominent left anterior marginal osteophytic spurring at C7-T1. The  remainder of the cervical spine CT is unremarkable.     Radiation dose reduction techniques were utilized, including automated  exposure control and exposure modulation based on body size.          XR Spine Lumbar Complete 4+VW [[id number]] Collected: 12/17/22 1837     Updated: 12/17/22 1845    Narrative:      Emergency 3 views of thoracic spine and lumbar spine plain film series  complete 4+ views 12/17/2022     CLINICAL HISTORY: Back pain after seizure     Thoracic spine: 3 views of thoracic spine including AP and lateral views  of the entire thoracic spine and swimmer's lateral view of lower  cervical and upper thoracic spine are  submitted for interpretation. On  the lateral view there is evidence of anterior marginal endplate  spurring at all levels from T3 to T12. Overall the thoracic vertebral  body heights are well-maintained. On the AP view, the pedicles and  spinous processes are intact.       Impression:      1. No convincing acute fracture is seen in the thoracic spine. There is  anterior marginal endplate spurring at all levels from T3 to T12.     Lumbar spine plain films: A total 5 views lumbar spine including AP  bilateral oblique and lateral views of the entire lumbar spine and coned  down lateral view of lumbosacral junction are submitted for  interpretation. Patient's had previous lumbosacral surgery with  bilateral posterior rods bridging the posterior elements at L5-S1  anchored by bilateral pedicle screws at L5 and S1. There is a disc  implants in the L5-S1 disc space. Patient has mild levoscoliotic  curvature lumbar spine with its apex at the L3-4 lumbar level. Overall,  the lumbar vertebral body heights are well-maintained. The disc heights  from T12 to L5 are well-maintained. On the oblique views the pars  interarticularis are intact. On the AP view, the pedicles, transverse  and spinous processes are intact.     IMPRESSION:  1. No acute fracture is seen lumbar spine.  2. Patient had previous bilateral ramses and pedicle screw fixation L5-S1  disc implant L5-S1 disc space is mild levoscoliotic curvature lumbar  spine apex at L3-4.     This report was finalized on 12/17/2022 6:42 PM by Dr. Robert Valdivia M.D.       XR Spine Thoracic 3 View [[id number]] Collected: 12/17/22 1837     Updated: 12/17/22 1845    Narrative:      Emergency 3 views of thoracic spine and lumbar spine plain film series  complete 4+ views 12/17/2022     CLINICAL HISTORY: Back pain after seizure     Thoracic spine: 3 views of thoracic spine including AP and lateral views  of the entire thoracic spine and swimmer's lateral view of lower  cervical and upper  thoracic spine are submitted for interpretation. On  the lateral view there is evidence of anterior marginal endplate  spurring at all levels from T3 to T12. Overall the thoracic vertebral  body heights are well-maintained. On the AP view, the pedicles and  spinous processes are intact.       Impression:      1. No convincing acute fracture is seen in the thoracic spine. There is  anterior marginal endplate spurring at all levels from T3 to T12.     Lumbar spine plain films: A total 5 views lumbar spine including AP  bilateral oblique and lateral views of the entire lumbar spine and coned  down lateral view of lumbosacral junction are submitted for  interpretation. Patient's had previous lumbosacral surgery with  bilateral posterior rods bridging the posterior elements at L5-S1  anchored by bilateral pedicle screws at L5 and S1. There is a disc  implants in the L5-S1 disc space. Patient has mild levoscoliotic  curvature lumbar spine with its apex at the L3-4 lumbar level. Overall,  the lumbar vertebral body heights are well-maintained. The disc heights  from T12 to L5 are well-maintained. On the oblique views the pars  interarticularis are intact. On the AP view, the pedicles, transverse  and spinous processes are intact.     IMPRESSION:  1. No acute fracture is seen lumbar spine.  2. Patient had previous bilateral ramses and pedicle screw fixation L5-S1  disc implant L5-S1 disc space is mild levoscoliotic curvature lumbar  spine apex at L3-4.     This report was finalized on 12/17/2022 6:42 PM by Dr. Robert Valdivia M.D.             Results for orders placed during the hospital encounter of 11/08/20    Adult Transesophageal Echo (DENIS) W/ Cont if Necessary Per Protocol    Interpretation Summary  · Left ventricular ejection fraction appears to be 61 - 65%. Left ventricular systolic function is normal.  · Saline test results are negative.      No orders to display        Assessment/Plan     Active Hospital Problems     Diagnosis  POA   • **Intractable back pain [M54.9]  Unknown   • Seizures (HCC) [R56.9]  Yes   • GERD without esophagitis [K21.9]  Yes   • Elevated LFTs [R79.89]  Yes   • Bilateral leg weakness [R29.898]  Yes   • Anxiety disorder [F41.9]  Yes   • Obesity (BMI 30-39.9) [E66.9]  Yes       Intractable Back Pain  -Admit to a telemetry unit for monitoring  -She has history of L5-S1 decompression/fusion in 2006  -Neurosurgery consult  -Neurovascular checks  -PRN analgesia  -PT consult  -Will defer any further imaging to neurology group    Seizure Disorder  -Neurology consult  -Valproic acid level low. She reported to her RN that she has not been taking divalproex since February and has been taking Keppra, but is unsure of the dose. A discharge summary from [facility] showed she was on divalproex 500 mg q 12 H on 11/30/22. Reviewed a noted from neurology from 1/3/22 that reports she should avoid Keppra due to anxiety, depression, and mood issues  -Will defer seizure medications and any further imaging to neurology  -Seizure precautions    History of Splenic Thrombus  -Continue Eliquis    GERD  -Continue Protonix and Carafate    Anxiety Disorder  -Continue Fluoxetine     -I discussed the patients findings and my recommendations with patient and family.    VTE Prophylaxis - SCDs.  Code Status - Full code.       NELSON Kelly  [facility]  12/17/22  21:43 EST

## 2025-03-07 NOTE — PROGRESS NOTES
"[facility]: Vancomycin Pharmacokinetic Initial Consult Note    Belen Allen is a 43 y.o. female who is on day 1 of pharmacy to dose vancomycin.    Indication: Skin and Soft Tissue  Consulting Provider: NELSON Tee  Planned Duration of Therapy: 5 days  Loading Dose Given: 1750 mg IV once on 3/23 at 2235  MRSA PCR performed: No  Culture/Source: BCx-pending  Target: -600 mg/L.hr   Other Antimicrobials: Zosyn 3.375g IV q8h x 5 days    Vitals/Labs  Ht: 162.6 cm (64\"); Wt: 83.9 kg (185 lb)  Temp Readings from Last 1 Encounters:   03/23/24 99.7 °F (37.6 °C) (Oral)      Estimated Creatinine Clearance: 116.4 mL/min (by C-G formula based on SCr of 0.66 mg/dL).    Results from last 7 days   Lab Units 03/23/24 2012   CREATININE mg/dL 0.66   WBC 10*3/mm3 9.47     Assessment/Plan:    Vancomycin Dose: 1250 mg IV every 12 hours  Predictive AUC level for the dose ordered is 471 mg/L.hr, which is within the target of 400-600 mg/L.hr  Vanc Trough has been scheduled for Monday morning 3/25 at 1000.     Pharmacy will follow patient's kidney function and will adjust doses and obtain levels as necessary. Thank you for involving pharmacy in this patient's care. Please contact pharmacy with any questions or concerns.                           Elida Narvaez PharmLuizaD., [facility]   Clinical Pharmacist   " Bed: 10  Expected date: 3/7/25  Expected time:   Means of arrival:   Comments:  Medic 10

## 2025-04-23 ENCOUNTER — HOSPITAL ENCOUNTER (EMERGENCY)
Facility: HOSPITAL | Age: 45
Discharge: HOME | End: 2025-04-23
Attending: EMERGENCY MEDICINE
Payer: COMMERCIAL

## 2025-04-23 VITALS
TEMPERATURE: 98 F | SYSTOLIC BLOOD PRESSURE: 129 MMHG | WEIGHT: 244.71 LBS | HEART RATE: 94 BPM | OXYGEN SATURATION: 98 % | BODY MASS INDEX: 41.78 KG/M2 | HEIGHT: 64 IN | DIASTOLIC BLOOD PRESSURE: 89 MMHG | RESPIRATION RATE: 20 BRPM

## 2025-04-23 DIAGNOSIS — F11.93: Primary | ICD-10-CM

## 2025-04-23 LAB
ALBUMIN SERPL-MCNC: 4.4 G/DL (ref 3.5–5.2)
ALBUMIN/GLOB SERPL: 1.4 G/DL
ALP SERPL-CCNC: 153 U/L (ref 39–117)
ALT SERPL W P-5'-P-CCNC: 28 U/L (ref 1–33)
AMPHET+METHAMPHET UR QL: NEGATIVE
AMPHETAMINES UR QL: NEGATIVE
ANION GAP SERPL CALCULATED.3IONS-SCNC: 8.6 MMOL/L (ref 5–15)
AST SERPL-CCNC: 21 U/L (ref 1–32)
BACTERIA UR QL AUTO: ABNORMAL /HPF
BARBITURATES UR QL SCN: NEGATIVE
BASOPHILS # BLD AUTO: 0.03 10*3/MM3 (ref 0–0.2)
BASOPHILS NFR BLD AUTO: 0.2 % (ref 0–1.5)
BENZODIAZ UR QL SCN: NEGATIVE
BILIRUB SERPL-MCNC: 0.5 MG/DL (ref 0–1.2)
BILIRUB UR QL STRIP: NEGATIVE
BUN SERPL-MCNC: 14 MG/DL (ref 6–20)
BUN/CREAT SERPL: 17.7 (ref 7–25)
BUPRENORPHINE SERPL-MCNC: NEGATIVE NG/ML
CALCIUM SPEC-SCNC: 9.9 MG/DL (ref 8.6–10.5)
CANNABINOIDS SERPL QL: POSITIVE
CHLORIDE SERPL-SCNC: 100 MMOL/L (ref 98–107)
CLARITY UR: CLEAR
CO2 SERPL-SCNC: 22.4 MMOL/L (ref 22–29)
COCAINE UR QL: NEGATIVE
COLOR UR: YELLOW
CREAT SERPL-MCNC: 0.79 MG/DL (ref 0.57–1)
DEPRECATED RDW RBC AUTO: 42.5 FL (ref 37–54)
EGFRCR SERPLBLD CKD-EPI 2021: 95.3 ML/MIN/1.73
EOSINOPHIL # BLD AUTO: 0 10*3/MM3 (ref 0–0.4)
EOSINOPHIL NFR BLD AUTO: 0 % (ref 0.3–6.2)
ERYTHROCYTE [DISTWIDTH] IN BLOOD BY AUTOMATED COUNT: 12.6 % (ref 12.3–15.4)
ETHANOL BLD-MCNC: <10 MG/DL (ref 0–10)
ETHANOL UR QL: <0.01 %
FENTANYL UR-MCNC: POSITIVE NG/ML
GLOBULIN UR ELPH-MCNC: 3.1 GM/DL
GLUCOSE SERPL-MCNC: 141 MG/DL (ref 65–99)
GLUCOSE UR STRIP-MCNC: NEGATIVE MG/DL
HCG SERPL QL: NEGATIVE
HCT VFR BLD AUTO: 43.7 % (ref 34–46.6)
HGB BLD-MCNC: 14.4 G/DL (ref 12–15.9)
HGB UR QL STRIP.AUTO: NEGATIVE
HYALINE CASTS UR QL AUTO: ABNORMAL /LPF
IMM GRANULOCYTES # BLD AUTO: 0.05 10*3/MM3 (ref 0–0.05)
IMM GRANULOCYTES NFR BLD AUTO: 0.3 % (ref 0–0.5)
KETONES UR QL STRIP: NEGATIVE
LEUKOCYTE ESTERASE UR QL STRIP.AUTO: ABNORMAL
LYMPHOCYTES # BLD AUTO: 1.27 10*3/MM3 (ref 0.7–3.1)
LYMPHOCYTES NFR BLD AUTO: 8.8 % (ref 19.6–45.3)
MAGNESIUM SERPL-MCNC: 1.8 MG/DL (ref 1.6–2.6)
MCH RBC QN AUTO: 30.8 PG (ref 26.6–33)
MCHC RBC AUTO-ENTMCNC: 33 G/DL (ref 31.5–35.7)
MCV RBC AUTO: 93.6 FL (ref 79–97)
METHADONE UR QL SCN: NEGATIVE
MONOCYTES # BLD AUTO: 0.76 10*3/MM3 (ref 0.1–0.9)
MONOCYTES NFR BLD AUTO: 5.3 % (ref 5–12)
NEUTROPHILS NFR BLD AUTO: 12.33 10*3/MM3 (ref 1.7–7)
NEUTROPHILS NFR BLD AUTO: 85.4 % (ref 42.7–76)
NITRITE UR QL STRIP: NEGATIVE
NRBC BLD AUTO-RTO: 0 /100 WBC (ref 0–0.2)
OPIATES UR QL: NEGATIVE
OXYCODONE UR QL SCN: NEGATIVE
PCP UR QL SCN: NEGATIVE
PH UR STRIP.AUTO: 6.5 [PH] (ref 5–8)
PLATELET # BLD AUTO: 347 10*3/MM3 (ref 140–450)
PMV BLD AUTO: 9.1 FL (ref 6–12)
POTASSIUM SERPL-SCNC: 4 MMOL/L (ref 3.5–5.2)
PROT SERPL-MCNC: 7.5 G/DL (ref 6–8.5)
PROT UR QL STRIP: NEGATIVE
RBC # BLD AUTO: 4.67 10*6/MM3 (ref 3.77–5.28)
RBC # UR STRIP: ABNORMAL /HPF
REF LAB TEST METHOD: ABNORMAL
SODIUM SERPL-SCNC: 131 MMOL/L (ref 136–145)
SP GR UR STRIP: 1.01 (ref 1–1.03)
SQUAMOUS #/AREA URNS HPF: ABNORMAL /HPF
TRICYCLICS UR QL SCN: NEGATIVE
UROBILINOGEN UR QL STRIP: ABNORMAL
WBC # UR STRIP: ABNORMAL /HPF
WBC NRBC COR # BLD AUTO: 14.44 10*3/MM3 (ref 3.4–10.8)

## 2025-04-23 PROCEDURE — 84703 CHORIONIC GONADOTROPIN ASSAY: CPT | Performed by: PHYSICIAN ASSISTANT

## 2025-04-23 PROCEDURE — 93005 ELECTROCARDIOGRAM TRACING: CPT | Performed by: PHYSICIAN ASSISTANT

## 2025-04-23 PROCEDURE — 81001 URINALYSIS AUTO W/SCOPE: CPT | Performed by: PHYSICIAN ASSISTANT

## 2025-04-23 PROCEDURE — 25810000003 SODIUM CHLORIDE 0.9 % SOLUTION: Performed by: PHYSICIAN ASSISTANT

## 2025-04-23 PROCEDURE — 80307 DRUG TEST PRSMV CHEM ANLYZR: CPT | Performed by: PHYSICIAN ASSISTANT

## 2025-04-23 PROCEDURE — 96374 THER/PROPH/DIAG INJ IV PUSH: CPT

## 2025-04-23 PROCEDURE — 83735 ASSAY OF MAGNESIUM: CPT | Performed by: PHYSICIAN ASSISTANT

## 2025-04-23 PROCEDURE — 93010 ELECTROCARDIOGRAM REPORT: CPT | Performed by: INTERNAL MEDICINE

## 2025-04-23 PROCEDURE — 82077 ASSAY SPEC XCP UR&BREATH IA: CPT | Performed by: PHYSICIAN ASSISTANT

## 2025-04-23 PROCEDURE — 90791 PSYCH DIAGNOSTIC EVALUATION: CPT

## 2025-04-23 PROCEDURE — 85025 COMPLETE CBC W/AUTO DIFF WBC: CPT | Performed by: PHYSICIAN ASSISTANT

## 2025-04-23 PROCEDURE — 25010000002 ONDANSETRON PER 1 MG: Performed by: PHYSICIAN ASSISTANT

## 2025-04-23 PROCEDURE — 99284 EMERGENCY DEPT VISIT MOD MDM: CPT

## 2025-04-23 PROCEDURE — 80053 COMPREHEN METABOLIC PANEL: CPT | Performed by: PHYSICIAN ASSISTANT

## 2025-04-23 RX ORDER — NICOTINE 21 MG/24HR
1 PATCH, TRANSDERMAL 24 HOURS TRANSDERMAL ONCE
Status: DISCONTINUED | OUTPATIENT
Start: 2025-04-23 | End: 2025-04-23 | Stop reason: HOSPADM

## 2025-04-23 RX ORDER — ONDANSETRON 4 MG/1
4 TABLET, ORALLY DISINTEGRATING ORAL EVERY 8 HOURS PRN
Qty: 12 TABLET | Refills: 0 | Status: SHIPPED | OUTPATIENT
Start: 2025-04-23

## 2025-04-23 RX ORDER — BUPRENORPHINE HYDROCHLORIDE AND NALOXONE HYDROCHLORIDE DIHYDRATE 8; 2 MG/1; MG/1
1 TABLET SUBLINGUAL ONCE
Status: COMPLETED | OUTPATIENT
Start: 2025-04-23 | End: 2025-04-23

## 2025-04-23 RX ORDER — ONDANSETRON 2 MG/ML
4 INJECTION INTRAMUSCULAR; INTRAVENOUS ONCE
Status: COMPLETED | OUTPATIENT
Start: 2025-04-23 | End: 2025-04-23

## 2025-04-23 RX ORDER — SODIUM CHLORIDE 0.9 % (FLUSH) 0.9 %
10 SYRINGE (ML) INJECTION AS NEEDED
Status: DISCONTINUED | OUTPATIENT
Start: 2025-04-23 | End: 2025-04-23 | Stop reason: HOSPADM

## 2025-04-23 RX ORDER — ACETAMINOPHEN 500 MG
1000 TABLET ORAL ONCE
Status: COMPLETED | OUTPATIENT
Start: 2025-04-23 | End: 2025-04-23

## 2025-04-23 RX ADMIN — SODIUM CHLORIDE 1000 ML: 9 INJECTION, SOLUTION INTRAVENOUS at 16:21

## 2025-04-23 RX ADMIN — BUPRENORPHINE HYDROCHLORIDE AND NALOXONE HYDROCHLORIDE DIHYDRATE 1 TABLET: 8; 2 TABLET SUBLINGUAL at 17:16

## 2025-04-23 RX ADMIN — ONDANSETRON 4 MG: 2 INJECTION, SOLUTION INTRAMUSCULAR; INTRAVENOUS at 16:14

## 2025-04-23 RX ADMIN — NICOTINE 1 PATCH: 21 PATCH TRANSDERMAL at 16:55

## 2025-04-23 RX ADMIN — BUPRENORPHINE HYDROCHLORIDE AND NALOXONE HYDROCHLORIDE DIHYDRATE 1 TABLET: 8; 2 TABLET SUBLINGUAL at 18:56

## 2025-04-23 RX ADMIN — ACETAMINOPHEN 1000 MG: 500 TABLET, FILM COATED ORAL at 16:55

## 2025-04-23 NOTE — ED PROVIDER NOTES
" EMERGENCY DEPARTMENT ENCOUNTER      PCP: Provider, No Known  Patient Care Team:  Provider, No Known as PCP - General Kendrick, Bjorn HERRERA II, MD as Consulting Physician (Hematology and Oncology)  Dennys Carranza MD as Referring Physician (Hospitalist)   Independent Historians: Patient    HPI:  Chief Complaint: Withdrawal  A complete HPI/ROS/PMH/PSH/SH/FH are unobtainable due to: None    Chronic or social conditions impacting patient care (social determinants of health): None    Context: Belen Allen is a 44 y.o. female with history of anxiety, GERD who presents to the ED via EMS c/o acute fentanyl withdrawal.  Patient reports taking fentanyl every hour over the past 3 years.  She states she \"snorts or swallows\" the fentanyl.  Her last use was around 0600 yesterday.  She complains of nausea, vomiting, generalized bodyaches as well as hallucinations.  She denies any other drug use or alcohol use.  She denies any suicidal or homicidal ideation.  She is wanting to stop fentanyl use. Pt denies having been treated previously for opioid abuse.    Review of prior external notes and/or external test results outside of this encounter: Patient's urine drug screens on 7/10/2023, 3/23/2024, and 4/16/2024 all positive for fentanyl.      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    Abdominal pain 11/29/2020    Acute bilateral low back pain 11/29/2020    Antiphospholipid antibody positive 11/29/2020    On anticoagulant therapy 11/29/2020    Acute pain of left knee 11/29/2020    Vitamin D deficiency 11/30/2020    Folate deficiency 12/01/2020    Pain of back and left lower extremity 12/02/2020    Common bile duct dilatation 05/31/2021    Elevated LFTs 06/01/2021    Right upper quadrant abdominal pain 06/01/2021    Obesity (BMI 30-39.9) 04/18/2019    Tobacco abuse 06/02/2021    GERD without esophagitis " 06/02/2021    Intractable abdominal pain 07/05/2021    Class 2 severe obesity with serious comorbidity in adult 07/05/2021    Constipation 07/05/2021    History of ERCP 07/05/2021    Chronic pain syndrome 07/05/2021    Seizures 01/01/2022    Syncope 01/01/2022    Lumbar back pain with radiculopathy affecting left lower extremity 01/05/2022    Right upper quadrant pain 11/23/2022    Uncontrolled pain 12/17/2022    Intractable back pain 12/17/2022    Sciatica of right side 12/20/2022    Migraine 12/20/2022    Mobility impaired 12/20/2022    Seizure 12/31/2022    Opioid dependence 01/16/2023    Clostridioides difficile diarrhea 01/18/2023    Pulmonary edema 07/27/2023    Acute hypoxemic respiratory failure 07/29/2023    Other dysphagia 08/01/2023    Vomiting 08/02/2023    Sepsis 03/23/2024    Dental abscess 03/24/2024    Bilateral cellulitis of lower leg 03/24/2024    Pneumonia 03/24/2024    Abnormal MRI, thoracic spine 03/27/2024    Recurrent seizures 04/15/2024    Antiphospholipid syndrome 04/16/2024    HTN (hypertension) 04/16/2024    Dental infection 04/18/2024     Resolved Ambulatory Problems     Diagnosis Date Noted    Choledocholithiasis 06/02/2021    Rhabdomyolysis 01/01/2022    Encephalopathy 01/01/2022    Hypokalemia 12/18/2022    Opioid withdrawal 07/12/2023     Past Medical History:   Diagnosis Date    Abnormal Pap smear of cervix     Ankle fracture 2013    H/O Elevated liver enzymes     History of chronic back pain     History of migraine     History of urinary tract infection     Injury of back     PONV (postoperative nausea and vomiting)     Seasonal allergies        The patient qualifies to receive the vaccine, but they have not yet received it.    PAST SURGICAL HISTORY  Past Surgical History:   Procedure Laterality Date    BACK SURGERY  2006    fusion L4, L5      CHOLECYSTECTOMY  2014    DILATATION AND CURETTAGE  2009    ENDOSCOPY N/A 11/27/2022    Procedure: ESOPHAGOGASTRODUODENOSCOPY with biopsy;   Surgeon: Christiana Mann MD;  Location: [facility] ENDOSCOPY;  Service: Gastroenterology;  Laterality: N/A;  gastritis, duodenitis, irregular z line    ERCP N/A 6/3/2021    Procedure: ENDOSCOPIC RETROGRADE CHOLANGIOPANCREATOGRAPHY WITH SPHINCTEROTOMY, DILATION WITH BALLOON CLEARANCE  (12MM-15MM);  Surgeon: Bjorn Flannery MD;  Location: [facility] ENDOSCOPY;  Service: Gastroenterology;  Laterality: N/A;  DILATED COMMON BILE DUCT    SKIN SURGERY      TEETH EXTRACTION N/A 4/18/2024    Procedure: TOOTH EXTRACTION #13, 20;  Surgeon: Glenn Blunt MD;  Location: [facility] MAIN OR;  Service: Maxillofacial;  Laterality: N/A;    TUBAL ABDOMINAL LIGATION  2013    WISDOM TOOTH EXTRACTION  2018    x2         FAMILY HISTORY  Family History   Problem Relation Age of Onset    Stroke Mother     Allergic rhinitis Mother     Allergic rhinitis Sister     Breast cancer Maternal Aunt     Cancer Maternal Grandmother     Allergic rhinitis Paternal Grandfather     Cancer Paternal Grandfather     Prostate cancer Paternal Grandfather          SOCIAL HISTORY  Social History     Socioeconomic History    Marital status: Single    Number of children: 2   Tobacco Use    Smoking status: Every Day     Current packs/day: 1.00     Types: Cigarettes    Smokeless tobacco: Never   Vaping Use    Vaping status: Never Used   Substance and Sexual Activity    Alcohol use: Not Currently    Drug use: Not Currently    Sexual activity: Defer         ALLERGIES  Morphine and Lidocaine        REVIEW OF SYSTEMS  Review of Systems   Gastrointestinal:  Positive for nausea and vomiting.   Musculoskeletal:  Positive for myalgias.   Psychiatric/Behavioral:  Positive for hallucinations.         All systems reviewed and negative except for those discussed in HPI.       PHYSICAL EXAM    I have reviewed the triage vital signs and nursing notes.    ED Triage Vitals [04/23/25 1556]   Temp Heart Rate Resp BP SpO2   98 °F (36.7 °C) (!) 130 20 130/80 97 %      Temp src Heart  Rate Source Patient Position BP Location FiO2 (%)   -- Monitor -- -- --       Physical Exam  GENERAL: alert, anxious  SKIN: Warm, dry  HENT: Normocephalic, atraumatic  EYES: no scleral icterus  CV: regular rhythm, tachycardic rate  RESPIRATORY: normal effort, lungs clear  ABDOMEN: soft, nontender, nondistended  MUSCULOSKELETAL: no deformity  NEURO: alert, moves all extremities, follows commands          LAB RESULTS  Recent Results (from the past 24 hours)   ECG 12 Lead QT Measurement    Collection Time: 04/23/25  4:11 PM   Result Value Ref Range    QT Interval 314 ms    QTC Interval 453 ms   Comprehensive Metabolic Panel    Collection Time: 04/23/25  4:14 PM    Specimen: Blood   Result Value Ref Range    Glucose 141 (H) 65 - 99 mg/dL    BUN 14 6 - 20 mg/dL    Creatinine 0.79 0.57 - 1.00 mg/dL    Sodium 131 (L) 136 - 145 mmol/L    Potassium 4.0 3.5 - 5.2 mmol/L    Chloride 100 98 - 107 mmol/L    CO2 22.4 22.0 - 29.0 mmol/L    Calcium 9.9 8.6 - 10.5 mg/dL    Total Protein 7.5 6.0 - 8.5 g/dL    Albumin 4.4 3.5 - 5.2 g/dL    ALT (SGPT) 28 1 - 33 U/L    AST (SGOT) 21 1 - 32 U/L    Alkaline Phosphatase 153 (H) 39 - 117 U/L    Total Bilirubin 0.5 0.0 - 1.2 mg/dL    Globulin 3.1 gm/dL    A/G Ratio 1.4 g/dL    BUN/Creatinine Ratio 17.7 7.0 - 25.0    Anion Gap 8.6 5.0 - 15.0 mmol/L    eGFR 95.3 >60.0 mL/min/1.73   hCG, Serum, Qualitative    Collection Time: 04/23/25  4:14 PM    Specimen: Blood   Result Value Ref Range    HCG Qualitative Negative Negative   Magnesium    Collection Time: 04/23/25  4:14 PM    Specimen: Blood   Result Value Ref Range    Magnesium 1.8 1.6 - 2.6 mg/dL   Ethanol    Collection Time: 04/23/25  4:14 PM    Specimen: Blood   Result Value Ref Range    Ethanol <10 0 - 10 mg/dL    Ethanol % <0.010 %   CBC Auto Differential    Collection Time: 04/23/25  4:14 PM    Specimen: Blood   Result Value Ref Range    WBC 14.44 (H) 3.40 - 10.80 10*3/mm3    RBC 4.67 3.77 - 5.28 10*6/mm3    Hemoglobin 14.4 12.0 - 15.9  g/dL    Hematocrit 43.7 34.0 - 46.6 %    MCV 93.6 79.0 - 97.0 fL    MCH 30.8 26.6 - 33.0 pg    MCHC 33.0 31.5 - 35.7 g/dL    RDW 12.6 12.3 - 15.4 %    RDW-SD 42.5 37.0 - 54.0 fl    MPV 9.1 6.0 - 12.0 fL    Platelets 347 140 - 450 10*3/mm3    Neutrophil % 85.4 (H) 42.7 - 76.0 %    Lymphocyte % 8.8 (L) 19.6 - 45.3 %    Monocyte % 5.3 5.0 - 12.0 %    Eosinophil % 0.0 (L) 0.3 - 6.2 %    Basophil % 0.2 0.0 - 1.5 %    Immature Grans % 0.3 0.0 - 0.5 %    Neutrophils, Absolute 12.33 (H) 1.70 - 7.00 10*3/mm3    Lymphocytes, Absolute 1.27 0.70 - 3.10 10*3/mm3    Monocytes, Absolute 0.76 0.10 - 0.90 10*3/mm3    Eosinophils, Absolute 0.00 0.00 - 0.40 10*3/mm3    Basophils, Absolute 0.03 0.00 - 0.20 10*3/mm3    Immature Grans, Absolute 0.05 0.00 - 0.05 10*3/mm3    nRBC 0.0 0.0 - 0.2 /100 WBC   Urinalysis With Microscopic If Indicated (No Culture) - Urine, Clean Catch    Collection Time: 04/23/25  5:00 PM    Specimen: Urine, Clean Catch   Result Value Ref Range    Color, UA Yellow Yellow, Straw    Appearance, UA Clear Clear    pH, UA 6.5 5.0 - 8.0    Specific Gravity, UA 1.013 1.005 - 1.030    Glucose, UA Negative Negative    Ketones, UA Negative Negative    Bilirubin, UA Negative Negative    Blood, UA Negative Negative    Protein, UA Negative Negative    Leuk Esterase, UA Trace (A) Negative    Nitrite, UA Negative Negative    Urobilinogen, UA 0.2 E.U./dL 0.2 - 1.0 E.U./dL   Urine Drug Screen - Urine, Clean Catch    Collection Time: 04/23/25  5:00 PM    Specimen: Urine, Clean Catch   Result Value Ref Range    THC, Screen, Urine Positive (A) Negative    Phencyclidine (PCP), Urine Negative Negative    Cocaine Screen, Urine Negative Negative    Methamphetamine, Ur Negative Negative    Opiate Screen Negative Negative    Amphetamine Screen, Urine Negative Negative    Benzodiazepine Screen, Urine Negative Negative    Tricyclic Antidepressants Screen Negative Negative    Methadone Screen, Urine Negative Negative    Barbiturates Screen,  Urine Negative Negative    Oxycodone Screen, Urine Negative Negative    Buprenorphine, Screen, Urine Negative Negative   Fentanyl, Urine - Urine, Clean Catch    Collection Time: 04/23/25  5:00 PM    Specimen: Urine, Clean Catch   Result Value Ref Range    Fentanyl, Urine Positive (A) Negative   Urinalysis, Microscopic Only - Urine, Clean Catch    Collection Time: 04/23/25  5:00 PM    Specimen: Urine, Clean Catch   Result Value Ref Range    RBC, UA 0-2 None Seen, 0-2 /HPF    WBC, UA 0-2 None Seen, 0-2 /HPF    Bacteria, UA None Seen None Seen /HPF    Squamous Epithelial Cells, UA 3-6 (A) None Seen, 0-2 /HPF    Hyaline Casts, UA 0-2 None Seen /LPF    Methodology Automated Microscopy        Ordered the above labs and independently reviewed and interpreted the results.        RADIOLOGY  No Radiology Exams Resulted Within Past 24 Hours    I ordered the above noted radiological studies. Independently reviewed and interpreted by me.  See dictation for official radiology interpretation.      PROCEDURES    Procedures      MEDICATIONS GIVEN IN ER    Medications   ondansetron (ZOFRAN) injection 4 mg (4 mg Intravenous Given 4/23/25 1614)   sodium chloride 0.9 % bolus 1,000 mL (0 mL Intravenous Stopped 4/23/25 1716)   buprenorphine-naloxone (SUBOXONE) 8-2 MG per SL tablet 1 tablet (1 tablet Sublingual Given 4/23/25 1716)   acetaminophen (TYLENOL) tablet 1,000 mg (1,000 mg Oral Given 4/23/25 1655)   buprenorphine-naloxone (SUBOXONE) 8-2 MG per SL tablet 1 tablet (1 tablet Sublingual Given 4/23/25 1856)         PROGRESS, DATA ANALYSIS, CONSULTS, AND MEDICAL DECISION MAKING    All labs have been independently reviewed and interpreted by me.  All radiology studies have been independently reviewed and interpreted by me and discussed with radiologist dictating the report.   EKG's independently reviewed and interpreted by me.  Discussion below represents my analysis of pertinent findings related to patient's condition, differential  diagnosis, treatment plan and final disposition.    Differential diagnosis: opiate withdrawal, acute intoxication, psychosis    ED Course as of 04/23/25 2338   Wed Apr 23, 2025   1640 EKG PROCEDURE    EKG time: 1611  Rhythm/Rate: Sinus tachycardia, rate 125  P waves and IN: Normal P, normal IN  QRS, axis: Narrow QRS, normal axis  ST and T waves: Baseline wander and artifact but no definite acute findings    Independently Interpreted by me  When compared to EKG dated 7/30/2023 there is new baseline wander and artifact [TR]   1731 THC Screen, Urine(!): Positive [TR]   1755 Fentanyl, Urine(!): Positive [DC]   1804 The patient had partial improvement with 8 mg of Suboxone.  Adding a second 8 mg for a full induction. [TR]   1818 Access has seen and evaluated patient and has provided her with resources for outpatient treatment. [DC]      ED Course User Index  [DC] Karen Martinez PA  [TR] Jose Yee MD             AS OF 23:38 EDT VITALS:    BP - 129/89  HR - 94  TEMP - 98 °F (36.7 °C)  O2 SATS - 98%        DIAGNOSIS  Final diagnoses:   Opioid withdrawal without complication         DISPOSITION  ED Disposition       ED Disposition   Discharge    Condition   Stable    Comment   --                  Note Disclaimer: At [facility], we believe that sharing information builds trust and better relationships. You are receiving this note because you recently visited [facility]. It is possible you will see health information before a provider has talked with you about it. This kind of information can be easy to misunderstand. To help you fully understand what it means for your health, we urge you to discuss this note with your provider.         Karen Martinez PA  04/23/25 2274

## 2025-04-23 NOTE — ED NOTES
"Patient to ED via EMS from home c/o withdrawal symptoms. Patient last used fentanyl 0600 yesterday. Patient normally takes fentanyl every hour, \"snorts or swallows\". Patient is currently having N/V, body aches, visual and auditory hallucinations.  "

## 2025-04-23 NOTE — CONSULTS
"[facility] consult d/t drugs. Pt presented to ED for withdrawal from fentanyl. Chart reviewed and spoke w/ RN who states patient possibly will be admitted for but no withdrawal. UDS positive for THC and BAL negative.  Patient came to ER after having last use fentanyl at 0600 yesterday patient reported she uses fentanyl every 30 minutes for pain management.  Patient well-known to [facility] see prior notes.      Pt A&Ox4. Pt trembling with pain and guarded in conversation.  Hyperfocused on receiving pain medication.  Judgment and insight poor.  Thought process illogical. Rates anxiety \"9/10\" and depression \"1/10\" (10 being the worst) today. Denies SI/HI, hallucinations or wish to be dead. Pt reports eating and sleeping has been well for the past 2 days. Current stressors: Pain management and continued daily fentanyl use.  Patient stated her pain was so bad she was either going to go get more fentanyl or come here for help.  Patient states that her recent dental work for a tooth abscess and believes she has further infection \"my mouth is on fire \".       MENTAL HEALTH HX update: Pt reports currently under the care of PCP for prescription of Prozac.  Pt has hx of counseling however she has not participated in many years.  Patient denies history of inpatient psychiatric hospitalization.   She also denies a history of SI attempts/violence/neglect/abuse.  Patient reports history of trauma however did not provide details. Family hx of mental health \"probably but I do not know \".      SUBSTANCE USE HX update: Pt reports using fentanyl every 1-2 hours for the past several months.  Patient has a longstanding history of fentanyl use.  Despite having being seen by the [facility] multiple times and provided resources patient continues to have not pursued substance abuse treatment in the past.   Longest period of sobriety \" I do not know\".  Patient reports she used THC the for the first time today to see if that would " help with detox symptoms.  Patient reports she smokes 1 pack/day of cigarettes.  Denies other substance use or alcohol use.  Reports family hx pain pill use by maternal aunt.    SOCIAL: Patient is a 44-year-old engaged  female. Pt lives with alex and 2 daughters ages 14 and 11.  Patient reports that this is a safe environment.  Patient remains unemployed.   Spiritism [employer] affiliation.  Her emergency contact is [facility].  Pt feels supported by aunt and sister.     PLAN: Discussed patient's longstanding history with fentanyl use and inability to follow through with treatment recommendations to refrain from use.  Discussed recommendation for patient to go to residential treatment.  Pt reports she would like to go somewhere, but states she does not feel comfortable leaving her children with her alex despite her children currently being in the care of her alex.  Discussed resources for [facility] patient brochure.  Patient states she is not able to think about treatment options right now and would prefer to focus on her pain control and withdrawal symptoms. Updated RN who states they will pass information along to MD and PA.     [facility] following.

## 2025-04-23 NOTE — ED PROVIDER NOTES
EMERGENCY DEPARTMENT MD ATTESTATION NOTE    Room Number:  06/06  PCP: Provider, No Known  Independent Historians: Patient and EMS    HPI:  A complete HPI/ROS/PMH/PSH/SH/FH are unobtainable due to: Uncooperative    Chronic or social conditions impacting patient care (Social Determinants of Health): None      Context: Belen Allen is a 44 y.o. female with a medical history of splenic infarct, GERD, sciatica, seizure who presents to the ED c/o acute withdrawal.  The patient reports that she is experiencing fentanyl withdrawal.  She states she uses fentanyl daily.  She states she uses it every 30 to 60 minutes.  She states her last use was yesterday.  She reports she has chronic back pain and got addicted to fentanyl.  She states she wants to quit using fentanyl.  She reports severe pain all over her body as well as nausea.  She denies any suicidal or homicidal ideation.        Review of prior external notes (non-ED) -and- Review of prior external test results outside of this encounter:  Laboratory evaluation 1/12/2025 shows normal CMP    Prescription drug monitoring program review:           PHYSICAL EXAM    I have reviewed the triage vital signs and nursing notes.    ED Triage Vitals [04/23/25 1556]   Temp Heart Rate Resp BP SpO2   98 °F (36.7 °C) (!) 130 20 130/80 97 %      Temp src Heart Rate Source Patient Position BP Location FiO2 (%)   -- Monitor -- -- --       Physical Exam  GENERAL: Awake, alert, yelling out in pain, uncomfortable appearing, only somewhat cooperative with providing history  SKIN: Warm, dry  HENT: Normocephalic, atraumatic  EYES: no scleral icterus  CV: regular rhythm, regular rate  RESPIRATORY: normal effort, lungs clear  ABDOMEN: soft, nontender, nondistended  MUSCULOSKELETAL: no deformity  NEURO: alert, moves all extremities, follows commands            MEDICATIONS GIVEN IN ER  Medications   ondansetron (ZOFRAN) injection 4 mg (4 mg Intravenous Given 4/23/25 1614)   sodium chloride 0.9  % bolus 1,000 mL (0 mL Intravenous Stopped 4/23/25 1716)   buprenorphine-naloxone (SUBOXONE) 8-2 MG per SL tablet 1 tablet (1 tablet Sublingual Given 4/23/25 1716)   acetaminophen (TYLENOL) tablet 1,000 mg (1,000 mg Oral Given 4/23/25 1655)   buprenorphine-naloxone (SUBOXONE) 8-2 MG per SL tablet 1 tablet (1 tablet Sublingual Given 4/23/25 1856)         ORDERS PLACED DURING THIS VISIT:  Orders Placed This Encounter   Procedures    Comprehensive Metabolic Panel    hCG, Serum, Qualitative    Urinalysis With Microscopic If Indicated (No Culture) - Urine, Clean Catch    Magnesium    Urine Drug Screen - Urine, Clean Catch    Ethanol    CBC Auto Differential    Fentanyl, Urine - Urine, Clean Catch    Urinalysis, Microscopic Only - Urine, Clean Catch    Psych / [facility] to see    ECG 12 Lead QT Measurement    CBC & Differential         PROCEDURES  Procedures            PROGRESS, DATA ANALYSIS, CONSULTS, AND MEDICAL DECISION MAKING  All labs have been independently interpreted by me.  All radiology studies have been reviewed by me. All EKG's have been independently viewed and interpreted by me.  Discussion below represents my analysis of pertinent findings related to patient's condition, differential diagnosis, treatment plan and final disposition.    Differential diagnosis includes but is not limited to substance use, intoxication, withdrawal.    Clinical Scores:                                     ED Course as of 04/23/25 2336   Wed Apr 23, 2025   1640 EKG PROCEDURE    EKG time: 1611  Rhythm/Rate: Sinus tachycardia, rate 125  P waves and OK: Normal P, normal OK  QRS, axis: Narrow QRS, normal axis  ST and T waves: Baseline wander and artifact but no definite acute findings    Independently Interpreted by me  When compared to EKG dated 7/30/2023 there is new baseline wander and artifact [TR]   1731 THC Screen, Urine(!): Positive [TR]   1755 Fentanyl, Urine(!): Positive [DC]   1804 The patient had partial improvement with 8 mg  of Suboxone.  Adding a second 8 mg for a full induction. [TR]   1818 [facility] has seen and evaluated patient and has provided her with resources for outpatient treatment. [DC]      ED Course User Index  [DC] Karen Martinez PA  [TR] Jose Yee MD       MDM: Plan laboratory evaluation.  Will obtain urine as well as serum testing.  Clinically she is in narcotic withdrawal.  We will give Suboxone and Zofran.  We will assess for response.      COMPLEXITY OF CARE  Admission was considered but after careful review of the patient's presentation, physical examination, diagnostic results, and response to treatment the patient may be safely discharged with outpatient follow-up.    Please note that portions of this document were completed with a voice recognition program.    Note Disclaimer: At [facility], we believe that sharing information builds trust and better relationships. You are receiving this note because you recently visited [facility]. It is possible you will see health information before a provider has talked with you about it. This kind of information can be easy to misunderstand. To help you fully understand what it means for your health, we urge you to discuss this note with your provider.         Jose Yee MD  04/23/25 5858

## 2025-04-24 LAB
QT INTERVAL: 314 MS
QTC INTERVAL: 453 MS

## 2025-05-02 ENCOUNTER — TELEPHONE (OUTPATIENT)
Dept: NEUROSURGERY | Facility: CLINIC | Age: 45
End: 2025-05-02
Payer: COMMERCIAL

## 2025-05-02 NOTE — TELEPHONE ENCOUNTER
I attempted to call Belen regarding her MRI order on 04/02/2024 to see if she still wanted to get her MRI completed and be seen with Dr. Brock.  I attempted to leave a  however her inbox is not set-up and I was unable to leave .

## (undated) DEVICE — SUT GUT CHRM 3/0 PS2 27IN 1638H

## (undated) DEVICE — PAPR PRNT PK SONY W RIBN UPC55

## (undated) DEVICE — KT ORCA ORCAPOD DISP STRL

## (undated) DEVICE — GAUZE,SPONGE,4"X4",16PLY,XRAY,STRL,LF: Brand: MEDLINE

## (undated) DEVICE — ADAPT CLN BIOGUARD AIR/H2O DISP

## (undated) DEVICE — IRRIGATOR BULB ASEPTO 60CC STRL

## (undated) DEVICE — SENSR O2 OXIMAX FNGR A/ 18IN NONSTR

## (undated) DEVICE — CANN O2 ETCO2 FITS ALL CONN CO2 SMPL A/ 7IN DISP LF

## (undated) DEVICE — BITEBLOCK OMNI BLOC

## (undated) DEVICE — BITEBLOCK ENDO W/STRAP 60F A/ LF DISP

## (undated) DEVICE — LN SMPL CO2 SHTRM SD STREAM W/M LUER

## (undated) DEVICE — PK ENT MINOR 40

## (undated) DEVICE — SPONGE,LAP,4"X18",XR,ST,5/PK,40PK/CS: Brand: MEDLINE INDUSTRIES, INC.

## (undated) DEVICE — PK ENDO GI 50

## (undated) DEVICE — STANDARD HYPODERMIC NEEDLE,POLYPROPYLENE HUB: Brand: MONOJECT

## (undated) DEVICE — TUBING, SUCTION, 1/4" X 10', STRAIGHT: Brand: MEDLINE

## (undated) DEVICE — FRCP BX RADJAW4 NDL 2.8 240CM LG OG BX40

## (undated) DEVICE — DEV POSITION LK AND BIOP CAP SYS

## (undated) DEVICE — SPHINCTEROTOME: Brand: DREAMTOME™ RX 44

## (undated) DEVICE — GLV SURG PREMIERPRO ORTHO LTX PF SZ8 BRN

## (undated) DEVICE — ERBE NESSY®PLATE 170 SPLIT; 168CM²; CABLE 3M: Brand: ERBE

## (undated) DEVICE — RETRIEVAL BALLOON CATHETER: Brand: EXTRACTOR™ PRO RX